# Patient Record
Sex: FEMALE | Race: WHITE | NOT HISPANIC OR LATINO | Employment: OTHER | URBAN - METROPOLITAN AREA
[De-identification: names, ages, dates, MRNs, and addresses within clinical notes are randomized per-mention and may not be internally consistent; named-entity substitution may affect disease eponyms.]

---

## 2017-01-03 ENCOUNTER — ANTICOAGULATION THERAPY VISIT (OUTPATIENT)
Dept: ANTICOAGULATION | Facility: CLINIC | Age: 77
End: 2017-01-03
Payer: MEDICARE

## 2017-01-03 DIAGNOSIS — Z79.01 LONG-TERM (CURRENT) USE OF ANTICOAGULANTS: Primary | ICD-10-CM

## 2017-01-03 DIAGNOSIS — I26.99 PULMONARY EMBOLISM, OTHER: ICD-10-CM

## 2017-01-03 LAB — INR POINT OF CARE: 4.8 (ref 0.86–1.14)

## 2017-01-03 PROCEDURE — 36416 COLLJ CAPILLARY BLOOD SPEC: CPT

## 2017-01-03 PROCEDURE — 85610 PROTHROMBIN TIME: CPT | Mod: QW

## 2017-01-03 PROCEDURE — 99207 ZZC NO CHARGE NURSE ONLY: CPT

## 2017-01-03 NOTE — PROGRESS NOTES
ANTICOAGULATION FOLLOW-UP CLINIC VISIT    Patient Name:  Tracy Palomo  Date:  1/3/2017  Contact Type:  Face to Face    SUBJECTIVE:     Patient Findings     Positives Diet Changes (drinking clamato juice), OTC meds (took Ibuprofen one day over the weekend after hitting her knee on a drawer )           OBJECTIVE    INR PROTIME   Date Value Ref Range Status   01/03/2017 4.8* 0.86 - 1.14 Final       ASSESSMENT / PLAN  INR assessment SUPRA    Recheck INR In: 3 WEEKS    INR Location Clinic      Anticoagulation Summary as of 1/3/2017     INR goal 2.0-3.0   Selected INR 4.8! (1/3/2017)   Maintenance plan 2.5 mg (5 mg x 0.5) on Tue, Thu, Sat; 5 mg (5 mg x 1) all other days   Full instructions 1/3: Hold; Otherwise 2.5 mg on Tue, Thu, Sat; 5 mg all other days   Weekly total 27.5 mg   Plan last modified Julio Cesar Santiago RN (6/1/2016)   Next INR check 1/24/2017   Target end date     Indications   Pulmonary emboli 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 [I26.99]  Long-term (current) use of anticoagulants [Z79.01] [Z79.01]         Anticoagulation Episode Summary     INR check location     Preferred lab     Send INR reminders to [facility]    Comments 5 mg tablets       Anticoagulation Care Providers     Provider Role Specialty Phone number    Gerard Medrano MD [facility] [phone]            See the Encounter Report to view Anticoagulation Flowsheet and Dosing Calendar (Go to Encounters tab in chart review, and find the Anticoagulation Therapy Visit)    Dosage adjustment made based on physician directed care plan.    Hold x1 then resume.     Johanna Marshall RN

## 2017-01-24 ENCOUNTER — ANTICOAGULATION THERAPY VISIT (OUTPATIENT)
Dept: ANTICOAGULATION | Facility: CLINIC | Age: 77
End: 2017-01-24
Payer: MEDICARE

## 2017-01-24 DIAGNOSIS — I26.99 PULMONARY EMBOLISM, OTHER: ICD-10-CM

## 2017-01-24 DIAGNOSIS — Z79.01 LONG-TERM (CURRENT) USE OF ANTICOAGULANTS: Primary | ICD-10-CM

## 2017-01-24 LAB — INR POINT OF CARE: 1.8 (ref 0.86–1.14)

## 2017-01-24 PROCEDURE — 36416 COLLJ CAPILLARY BLOOD SPEC: CPT

## 2017-01-24 PROCEDURE — 99207 ZZC NO CHARGE NURSE ONLY: CPT

## 2017-01-24 PROCEDURE — 85610 PROTHROMBIN TIME: CPT | Mod: QW

## 2017-01-24 NOTE — PROGRESS NOTES
ANTICOAGULATION FOLLOW-UP CLINIC VISIT    Patient Name:  Tracy Palomo  Date:  1/24/2017  Contact Type:  Face to Face    SUBJECTIVE:     Patient Findings     Positives Diet Changes (more brocolii this week. Will cut down ), No Problem Findings           OBJECTIVE    INR PROTIME   Date Value Ref Range Status   01/24/2017 1.8* 0.86 - 1.14 Final       ASSESSMENT / PLAN  INR assessment SUB    Recheck INR In: 6 WEEKS    INR Location Clinic      Anticoagulation Summary as of 1/24/2017     INR goal 2.0-3.0   Selected INR 1.8! (1/24/2017)   Maintenance plan 2.5 mg (5 mg x 0.5) on Tue, Thu, Sat; 5 mg (5 mg x 1) all other days   Full instructions 1/24: 5 mg; Otherwise 2.5 mg on Tue, Thu, Sat; 5 mg all other days   Weekly total 27.5 mg   Plan last modified Julio Cesar Santiago RN (6/1/2016)   Next INR check 3/7/2017   Target end date     Indications   Pulmonary emboli 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 [I26.99]  Long-term (current) use of anticoagulants [Z79.01] [Z79.01]         Anticoagulation Episode Summary     INR check location     Preferred lab     Send INR reminders to [facility] PO[facility]    Comments 5 mg tablets       Anticoagulation Care Providers     Provider Role Specialty Phone number    Gerard Medrano MD [facility] [phone]            See the Encounter Report to view Anticoagulation Flowsheet and Dosing Calendar (Go to Encounters tab in chart review, and find the Anticoagulation Therapy Visit)    Dosage adjustment made based on physician directed care plan.    Johanna Marshall RN

## 2017-01-25 ENCOUNTER — OFFICE VISIT (OUTPATIENT)
Dept: URGENT CARE | Facility: RETAIL CLINIC | Age: 77
End: 2017-01-25
Payer: MEDICARE

## 2017-01-25 VITALS
SYSTOLIC BLOOD PRESSURE: 139 MMHG | HEART RATE: 71 BPM | OXYGEN SATURATION: 96 % | DIASTOLIC BLOOD PRESSURE: 79 MMHG | TEMPERATURE: 101.6 F

## 2017-01-25 DIAGNOSIS — Z20.89 PNEUMONIA EXPOSURE: ICD-10-CM

## 2017-01-25 DIAGNOSIS — J10.1 INFLUENZA A: Primary | ICD-10-CM

## 2017-01-25 DIAGNOSIS — R50.9 FEVER, UNSPECIFIED: ICD-10-CM

## 2017-01-25 DIAGNOSIS — R05.9 COUGH: ICD-10-CM

## 2017-01-25 PROCEDURE — 87804 INFLUENZA ASSAY W/OPTIC: CPT | Mod: QW | Performed by: PHYSICIAN ASSISTANT

## 2017-01-25 PROCEDURE — 99213 OFFICE O/P EST LOW 20 MIN: CPT | Performed by: PHYSICIAN ASSISTANT

## 2017-01-25 RX ORDER — OSELTAMIVIR PHOSPHATE 75 MG/1
75 CAPSULE ORAL 2 TIMES DAILY
Qty: 10 CAPSULE | Refills: 0 | Status: SHIPPED | OUTPATIENT
Start: 2017-01-25 | End: 2017-03-15

## 2017-01-25 NOTE — PROGRESS NOTES
SUBJECTIVE:   Pt. presenting to [facility] -  with a chief complaint of sudden onset fever, chills, some cough last night . No SOB or chest pain. Raspy cough. Gets in coughing jags and V x 1.  Hx of asthma no  Here with daughter.  Onset of symptoms sudden  Course of illness is worsening.    Severity moderate  Current and Associated symptoms: fever, cough  and malaise  Treatment measures tried include Fluids and Rest.  Predisposing factors include coumadin and spouse recently dx with pneumonia     Last antibiotic > year    Smoker no    Past Medical History   Diagnosis Date     Unspecified essential hypertension      Other and unspecified hyperlipidemia      Colon polyps      Diverticulosis of colon (without mention of hemorrhage)      Diverticulosis     DVT (deep vein thrombosis) in pregnancy (H) 2014     after colon surgery     PE (pulmonary embolism) 2014     related to colon surgery     Atrial fibrillation (H) 2014     related to colon surgery     Past Surgical History   Procedure Laterality Date     Colonoscopy  09, 10, 12     [facility]     Flexible sigmoidoscopy  09, 11     Laparotomy exploratory N/A 10/20/2014     Procedure: LAPAROTOMY EXPLORATORY;  Surgeon: Miguel Oleary MD;  Location: [facility] OR     Laparotomy, lysis adhesions, combined N/A 10/20/2014     Procedure: COMBINED LAPAROTOMY, LYSIS ADHESIONS;  Surgeon: Miguel Oleary MD;  Location: [facility] OR     Resect small bowel without ostomy N/A 10/20/2014     Procedure: RESECT SMALL BOWEL WITHOUT OSTOMY;  Surgeon: Miguel Oleary MD;  Location: [facility] OR     Patient Active Problem List   Diagnosis     Hypertension goal BP (blood pressure) < 140/90     Hyperlipidemia LDL goal <130     Encounter for long-term current use of medication     History of colonic polyps     Advanced directives, counseling/discussion     Pulmonary emboli 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     Recent major surgery - exploratory lap with small bowel  resection 10/20     Pleural effusion on right     Anemia     Paroxysmal atrial fibrillation (H)     Long-term (current) use of anticoagulants [Z79.01]     Current Outpatient Prescriptions   Medication     warfarin (COUMADIN) 5 MG tablet     lisinopril (PRINIVIL,ZESTRIL) 10 MG tablet     lovastatin (MEVACOR) 40 MG tablet     ciprofloxacin (CIPRO) 500 MG tablet     No current facility-administered medications for this visit.       OBJECTIVE:  /79 mmHg  Pulse 71  Temp(Src) 101.6  F (38.7  C) (Oral)  SpO2 96%    GENERAL APPEARANCE: cooperative, alert and no distress. Appears well hydrated.  EYES: conjunctiva clear  HENT: Rt ear canal  clear and TM normal   Lt ear canal clear and TM normal   Nose no congestion. no discharge  Mouth without ulcers or lesions. no erythema. no exudate  NECK: supple, no palp ant nodes. No  posterior nodes.  RESP: lungs clear to auscultation - no rales, rhonchi or wheezes. Breathing easily. Deep dry cough  CV: regular rates and rhythm  ABDOMEN:  soft, nontender, no HSM or masses and bowel sounds normal   SKIN: no suspicious lesions or rashes  no tenderness to palpate over  sinus areas.    Influenza A and B - B neg A pos    ASSESSMENT:     Cough  Fever, unspecified  Pneumonia exposure  Influenza A      PLAN:  Symptomatic measures   Prescriptions as below. Discussed indications, dosing, side affects and adverse reactions of medications with  Patient and daughter -Tamiflu. See AVS  OTC cough suppressant/expectorant discussed  Cool mist vaporizer   Stay in clean air environment.  > rest.  > fluids.  Contagiousness and hygiene discussed.  Fever and pain  control measures discussed.   If unable to swallow or any breathing difficulty to go to ED     Follow up with your primary care provider in the next 1-2 days -earlier if worse.  [facility]  [phone]    AVS given and discussed:  Patient Instructions     Influenza (Adult)    Influenza is also called the flu. It is a viral  illness that affects the air passages of your lungs. It is different from the common cold. The flu can easily be passed from one to person to another. It may be spread through the air by coughing and sneezing. Or it can be spread by touching the sick person and then touching your own eyes, nose, or mouth.  The flu starts 1 to 3 days after you are exposed to the flu virus. It may last for 1 to 2 weeks. You usually don t need to take antibiotics unless you have a complication. This might be an ear or sinus infection or pneumonia.  Symptoms of the flu may be mild or severe. They can include extreme tiredness (wanting to stay in bed all day), chills, fevers, muscle aches, soreness with eye movement, headache, and a dry, hacking cough.  Home care  Follow these guidelines when caring for yourself at home:    Avoid being around cigarette smoke, whether yours or other people s.    Acetaminophen or ibuprofen will help ease your fever, muscle aches, and headache. Don t give aspirin to anyone younger than 18 who has the flu. Aspirin can harm the liver.    Nausea and loss of appetite are common with the flu. Eat light meals. Drink 6 to 8 glasses of liquids every day. Good choices are water, sport drinks, soft drinks without caffeine, juices, tea, and soup. Extra fluids will also help loosen secretions in your nose and lungs.    Over-the-counter cold medicines will not make the flu go away faster. But the medicines may help with coughing, sore throat, and congestion in your nose and sinuses. Don t use a decongestant if you have high blood pressure.    Stay home until your fever has been gone for at least 24 hours without using medicine to reduce fever.  Follow-up care  Follow up with your health care provider, or as advised, if you are not getting better over the next week.  If you are 65 or older, talk with your provider about getting a pneumococcal vaccine every 5 years. You should also get this vaccine if you have chronic  asthma or COPD. All adults should get a flu vaccine every fall. Ask your provider about this.  When to seek medical advice  Call your health care provider right away if any of these occur:    Cough with lots of colored sputum (mucus) or blood in your sputum    Chest pain, shortness of breath, wheezing, or difficulty breathing    Severe headache, or face, neck, or ear pain    New rash with fever    Fever of 101 F (38 C) oral or higher that doesn t get better with fever medicine    Confusion, behavior change, or seizure    Severe weakness or dizziness    You get a fever or cough after getting better for a few days       3693-0587 [employer]. [address] [place] [address]. All rights reserved. This information is not intended as a substitute for professional medical care. Always follow your healthcare professional's instructions.        Pneumonia (Adult)  Pneumonia is an infection deep within the lungs. It is in the small air sacs (alveoli). Pneumonia may be caused by a virus or bacteria. Pneumonia caused by bacteria is usually treated with an antibiotic. Severe cases may need to be treated in the hospital. Milder cases can be treated at home. Symptoms usually start to get better during the first 2 days of treatment.    Home care  Follow these guidelines when caring for yourself at home:    Rest at home for the first 2 to 3 days, or until you feel stronger. Don t let yourself get overly tired when you go back to your activities.    Stay away from cigarette smoke - yours or other people s.    You may use acetaminophen or ibuprofen to control fever or pain, unless another medicine was prescribed. If you have chronic liver or kidney disease, talk with your health care provider before using these medicines. Also talk with your provider if you ve had a stomach ulcer or GI bleeding. Don t give aspirin to anyone younger than 18 years of age who is ill with a fever. It may cause severe liver  damage.    Your appetite may be poor, so a light diet is fine.    Drink 6 to 8 glasses of fluids every day to make sure you are getting enough fluids. Beverages can include water, sport drinks, sodas without caffeine, juices, tea, or soup. Fluids will help loosen secretions in the lung. This will make it easier for you to cough up the phlegm (sputum). If you also have heart or kidney disease, check with your health care provider before you drink extra fluids.    Take antibiotic medicine prescribed until it is all gone, even if you are feeling better after a few days.  Follow-up care  Follow up with your health care provider in the next 2 to 3 days, or as advised. This is to be sure the medicine is helping you get better.  If you are 65 or older, you should get a pneumococcal vaccine and a yearly flu (influenza) shot. You should also get these vaccines if you have chronic lung disease like asthma, emphysema, or COPD. Ask your provider about this.  When to seek medical advice  Call your health care provider right away if any of these occur:    You don t get better within the first 48 hours of treatment    Shortness of breath gets worse    Rapid breathing (more than 25 breaths per minute)    Coughing up blood    Chest pain gets worse with breathing    Fever of 102 F (38 C) or higher that doesn t get better with fever medicine    Weakness, dizziness, or fainting that gets worse    Thirst or dry mouth that gets worse    Sinus pain, headache, or a stiff neck    Chest pain not caused by coughing    4303-1315 [employer]. [address] [place] [address]. All rights reserved. This information is not intended as a substitute for professional medical care. Always follow your healthcare professional's instructions.      ...............................  Please make an appointment to see Dr Betts or colleague for recheck before the weekend.  Go to the emergency room if you are short of breath, wheezing or  'worse'.  Do not visit spouse in hospital.    Robitussin DM or Delsym may help nighttime cough.      Patient is comfortable with this plan.  Electronically signed,  STARR Bey, PAC

## 2017-01-25 NOTE — MR AVS SNAPSHOT
After Visit Summary   1/25/2017    Tracy Palomo    MRN: [id number]           Patient Information     Date Of Birth          1940        Visit Information        Provider Department      1/25/2017 2:40 PM Judy Bey PA-C [facility]        Today's Diagnoses     Cough    -  1     Fever, unspecified         Pneumonia exposure         Influenza A           Care Instructions      Influenza (Adult)    Influenza is also called the flu. It is a viral illness that affects the air passages of your lungs. It is different from the common cold. The flu can easily be passed from one to person to another. It may be spread through the air by coughing and sneezing. Or it can be spread by touching the sick person and then touching your own eyes, nose, or mouth.  The flu starts 1 to 3 days after you are exposed to the flu virus. It may last for 1 to 2 weeks. You usually don t need to take antibiotics unless you have a complication. This might be an ear or sinus infection or pneumonia.  Symptoms of the flu may be mild or severe. They can include extreme tiredness (wanting to stay in bed all day), chills, fevers, muscle aches, soreness with eye movement, headache, and a dry, hacking cough.  Home care  Follow these guidelines when caring for yourself at home:    Avoid being around cigarette smoke, whether yours or other people s.    Acetaminophen or ibuprofen will help ease your fever, muscle aches, and headache. Don t give aspirin to anyone younger than 18 who has the flu. Aspirin can harm the liver.    Nausea and loss of appetite are common with the flu. Eat light meals. Drink 6 to 8 glasses of liquids every day. Good choices are water, sport drinks, soft drinks without caffeine, juices, tea, and soup. Extra fluids will also help loosen secretions in your nose and lungs.    Over-the-counter cold medicines will not make the flu go away faster. But the medicines may help with coughing,  sore throat, and congestion in your nose and sinuses. Don t use a decongestant if you have high blood pressure.    Stay home until your fever has been gone for at least 24 hours without using medicine to reduce fever.  Follow-up care  Follow up with your health care provider, or as advised, if you are not getting better over the next week.  If you are 65 or older, talk with your provider about getting a pneumococcal vaccine every 5 years. You should also get this vaccine if you have chronic asthma or COPD. All adults should get a flu vaccine every fall. Ask your provider about this.  When to seek medical advice  Call your health care provider right away if any of these occur:    Cough with lots of colored sputum (mucus) or blood in your sputum    Chest pain, shortness of breath, wheezing, or difficulty breathing    Severe headache, or face, neck, or ear pain    New rash with fever    Fever of 101 F (38 C) oral or higher that doesn t get better with fever medicine    Confusion, behavior change, or seizure    Severe weakness or dizziness    You get a fever or cough after getting better for a few days       3182-7432 [employer]. [address] [place] [address]. All rights reserved. This information is not intended as a substitute for professional medical care. Always follow your healthcare professional's instructions.        Pneumonia (Adult)  Pneumonia is an infection deep within the lungs. It is in the small air sacs (alveoli). Pneumonia may be caused by a virus or bacteria. Pneumonia caused by bacteria is usually treated with an antibiotic. Severe cases may need to be treated in the hospital. Milder cases can be treated at home. Symptoms usually start to get better during the first 2 days of treatment.    Home care  Follow these guidelines when caring for yourself at home:    Rest at home for the first 2 to 3 days, or until you feel stronger. Don t let yourself get overly tired when you go  back to your activities.    Stay away from cigarette smoke - yours or other people s.    You may use acetaminophen or ibuprofen to control fever or pain, unless another medicine was prescribed. If you have chronic liver or kidney disease, talk with your health care provider before using these medicines. Also talk with your provider if you ve had a stomach ulcer or GI bleeding. Don t give aspirin to anyone younger than 18 years of age who is ill with a fever. It may cause severe liver damage.    Your appetite may be poor, so a light diet is fine.    Drink 6 to 8 glasses of fluids every day to make sure you are getting enough fluids. Beverages can include water, sport drinks, sodas without caffeine, juices, tea, or soup. Fluids will help loosen secretions in the lung. This will make it easier for you to cough up the phlegm (sputum). If you also have heart or kidney disease, check with your health care provider before you drink extra fluids.    Take antibiotic medicine prescribed until it is all gone, even if you are feeling better after a few days.  Follow-up care  Follow up with your health care provider in the next 2 to 3 days, or as advised. This is to be sure the medicine is helping you get better.  If you are 65 or older, you should get a pneumococcal vaccine and a yearly flu (influenza) shot. You should also get these vaccines if you have chronic lung disease like asthma, emphysema, or COPD. Ask your provider about this.  When to seek medical advice  Call your health care provider right away if any of these occur:    You don t get better within the first 48 hours of treatment    Shortness of breath gets worse    Rapid breathing (more than 25 breaths per minute)    Coughing up blood    Chest pain gets worse with breathing    Fever of 102 F (38 C) or higher that doesn t get better with fever medicine    Weakness, dizziness, or fainting that gets worse    Thirst or dry mouth that gets worse    Sinus pain, headache,  "or a stiff neck    Chest pain not caused by coughing    2873-7015 [employer]. [address] [place] [address], [place] [address]. All rights reserved. This information is not intended as a substitute for professional medical care. Always follow your healthcare professional's instructions.      ...............................  Please make an appointment to see Dr Betts or colleague for recheck before the weekend.  Go to the emergency room if you are short of breath, wheezing or 'worse'.  Do not visit spouse in hospital.    Robitussin DM or Delsym may help nighttime cough.        Follow-ups after your visit        Your next 10 appointments already scheduled     Mar 07, 2017  9:45 AM   [facility]    [address] [place] [id number] [phone]              Who to contact     You can reach your care team any time of the day by calling [phone].  Notification of test results:  If you have an abnormal lab result, we will notify you by phone as soon as possible.         Additional Information About Your Visit        MyChart Information     [facility] lets you send messages to your doctor, view your test results, renew your prescriptions, schedule appointments and more. To sign up, go to [email or web address] . Click on \"Log in\" on the left side of the screen, which will take you to the Welcome page. Then click on \"Sign up Now\" on the right side of the page.     You will be asked to enter the access code listed below, as well as some personal information. Please follow the directions to create your username and password.     Your access code is: [email or web address]  Expires: 2017  3:12 PM     Your access code will  in 90 days. If you need help or a new code, please call your [facility] or [phone].        Care EveryWhere ID     This is your Care EveryWhere ID. This could be used by other organizations " to access your [facility] medical records  [id number]        Your Vitals Were     Pulse Temperature Pulse Oximetry             71 101.6  F (38.7  C) (Oral) 96%          Blood Pressure from Last 3 Encounters:   01/25/17 139/79   11/11/16 118/74   10/11/16 111/65    Weight from Last 3 Encounters:   11/11/16 192 lb (87.091 kg)   08/30/16 192 lb (87.091 kg)   08/11/16 193 lb 9.6 oz (87.816 kg)              We Performed the Following     Influenza A/B antigen          Today's Medication Changes          These changes are accurate as of: 1/25/17  3:12 PM.  If you have any questions, ask your nurse or doctor.               Start taking these medicines.        Dose/Directions    oseltamivir 75 MG capsule   Commonly known as:  TAMIFLU   Used for:  Influenza A        Dose:  75 mg   Take 1 capsule (75 mg) by mouth 2 times daily   Quantity:  10 capsule   Refills:  0            Where to get your medicines      These medications were sent to [address]  [address], [facility] [address]     Phone:  [phone]    - oseltamivir 75 MG capsule             Primary Care Provider Office Phone # Fax #    Chad Betts [phone]       [facility] [address] [facility]   [facility] [address]        Thank you!     Thank you for choosing [facility]  for your care. Our goal is always to provide you with excellent care. Hearing back from our patients is one way we can continue to improve our services. Please take a few minutes to complete the written survey that you may receive in the mail after your visit with us. Thank you!             Your Updated Medication List - Protect others around you: Learn how to safely use, store and throw away your medicines at www.disposemymeds.org.          This list is accurate as of: 1/25/17  3:12 PM.  Always use your most recent med list.                   Brand Name Dispense Instructions for use    ciprofloxacin 500 MG  tablet    CIPRO    6 tablet    Take 1 tablet (500 mg) by mouth 2 times daily       lisinopril 10 MG tablet    PRINIVIL/ZESTRIL    90 tablet    Take 1 tablet (10 mg) by mouth daily       lovastatin 40 MG tablet    MEVACOR    90 tablet    TAKE ONE TABLET BY MOUTH AT BEDTIME       oseltamivir 75 MG capsule    TAMIFLU    10 capsule    Take 1 capsule (75 mg) by mouth 2 times daily       warfarin 5 MG tablet    COUMADIN    100 tablet    2.5 mg Tues, Thurs, Sat and 5 mg the rest of the week. Or as directed by the coumadin clinic

## 2017-01-25 NOTE — PATIENT INSTRUCTIONS
Influenza (Adult)    Influenza is also called the flu. It is a viral illness that affects the air passages of your lungs. It is different from the common cold. The flu can easily be passed from one to person to another. It may be spread through the air by coughing and sneezing. Or it can be spread by touching the sick person and then touching your own eyes, nose, or mouth.  The flu starts 1 to 3 days after you are exposed to the flu virus. It may last for 1 to 2 weeks. You usually don t need to take antibiotics unless you have a complication. This might be an ear or sinus infection or pneumonia.  Symptoms of the flu may be mild or severe. They can include extreme tiredness (wanting to stay in bed all day), chills, fevers, muscle aches, soreness with eye movement, headache, and a dry, hacking cough.  Home care  Follow these guidelines when caring for yourself at home:    Avoid being around cigarette smoke, whether yours or other people s.    Acetaminophen or ibuprofen will help ease your fever, muscle aches, and headache. Don t give aspirin to anyone younger than 18 who has the flu. Aspirin can harm the liver.    Nausea and loss of appetite are common with the flu. Eat light meals. Drink 6 to 8 glasses of liquids every day. Good choices are water, sport drinks, soft drinks without caffeine, juices, tea, and soup. Extra fluids will also help loosen secretions in your nose and lungs.    Over-the-counter cold medicines will not make the flu go away faster. But the medicines may help with coughing, sore throat, and congestion in your nose and sinuses. Don t use a decongestant if you have high blood pressure.    Stay home until your fever has been gone for at least 24 hours without using medicine to reduce fever.  Follow-up care  Follow up with your health care provider, or as advised, if you are not getting better over the next week.  If you are 65 or older, talk with your provider about getting a pneumococcal vaccine  every 5 years. You should also get this vaccine if you have chronic asthma or COPD. All adults should get a flu vaccine every fall. Ask your provider about this.  When to seek medical advice  Call your health care provider right away if any of these occur:    Cough with lots of colored sputum (mucus) or blood in your sputum    Chest pain, shortness of breath, wheezing, or difficulty breathing    Severe headache, or face, neck, or ear pain    New rash with fever    Fever of 101 F (38 C) oral or higher that doesn t get better with fever medicine    Confusion, behavior change, or seizure    Severe weakness or dizziness    You get a fever or cough after getting better for a few days       6103-5731 [employer]. [address] [place] [address], [place] [address]. All rights reserved. This information is not intended as a substitute for professional medical care. Always follow your healthcare professional's instructions.        Pneumonia (Adult)  Pneumonia is an infection deep within the lungs. It is in the small air sacs (alveoli). Pneumonia may be caused by a virus or bacteria. Pneumonia caused by bacteria is usually treated with an antibiotic. Severe cases may need to be treated in the hospital. Milder cases can be treated at home. Symptoms usually start to get better during the first 2 days of treatment.    Home care  Follow these guidelines when caring for yourself at home:    Rest at home for the first 2 to 3 days, or until you feel stronger. Don t let yourself get overly tired when you go back to your activities.    Stay away from cigarette smoke - yours or other people s.    You may use acetaminophen or ibuprofen to control fever or pain, unless another medicine was prescribed. If you have chronic liver or kidney disease, talk with your health care provider before using these medicines. Also talk with your provider if you ve had a stomach ulcer or GI bleeding. Don t give aspirin to anyone younger than 18  years of age who is ill with a fever. It may cause severe liver damage.    Your appetite may be poor, so a light diet is fine.    Drink 6 to 8 glasses of fluids every day to make sure you are getting enough fluids. Beverages can include water, sport drinks, sodas without caffeine, juices, tea, or soup. Fluids will help loosen secretions in the lung. This will make it easier for you to cough up the phlegm (sputum). If you also have heart or kidney disease, check with your health care provider before you drink extra fluids.    Take antibiotic medicine prescribed until it is all gone, even if you are feeling better after a few days.  Follow-up care  Follow up with your health care provider in the next 2 to 3 days, or as advised. This is to be sure the medicine is helping you get better.  If you are 65 or older, you should get a pneumococcal vaccine and a yearly flu (influenza) shot. You should also get these vaccines if you have chronic lung disease like asthma, emphysema, or COPD. Ask your provider about this.  When to seek medical advice  Call your health care provider right away if any of these occur:    You don t get better within the first 48 hours of treatment    Shortness of breath gets worse    Rapid breathing (more than 25 breaths per minute)    Coughing up blood    Chest pain gets worse with breathing    Fever of 102 F (38 C) or higher that doesn t get better with fever medicine    Weakness, dizziness, or fainting that gets worse    Thirst or dry mouth that gets worse    Sinus pain, headache, or a stiff neck    Chest pain not caused by coughing    3759-8484 [employer]. [address] [place] [address], [place] [id number]. All rights reserved. This information is not intended as a substitute for professional medical care. Always follow your healthcare professional's instructions.      ...............................  Please make an appointment to see Dr Betts or colleague for recheck before the  weekend.  Go to the emergency room if you are short of breath, wheezing or 'worse'.  Do not visit spouse in hospital.    Robitussin DM or Delsym may help nighttime cough.

## 2017-01-27 ENCOUNTER — OFFICE VISIT (OUTPATIENT)
Dept: INTERNAL MEDICINE | Facility: CLINIC | Age: 77
End: 2017-01-27
Payer: MEDICARE

## 2017-01-27 VITALS
TEMPERATURE: 97.9 F | OXYGEN SATURATION: 96 % | HEART RATE: 82 BPM | RESPIRATION RATE: 16 BRPM | SYSTOLIC BLOOD PRESSURE: 110 MMHG | DIASTOLIC BLOOD PRESSURE: 76 MMHG | BODY MASS INDEX: 31.17 KG/M2 | WEIGHT: 193 LBS

## 2017-01-27 DIAGNOSIS — R05.9 COUGH: Primary | ICD-10-CM

## 2017-01-27 DIAGNOSIS — J10.1 INFLUENZA A: ICD-10-CM

## 2017-01-27 PROCEDURE — 99213 OFFICE O/P EST LOW 20 MIN: CPT | Performed by: INTERNAL MEDICINE

## 2017-01-27 ASSESSMENT — PAIN SCALES - GENERAL: PAINLEVEL: NO PAIN (0)

## 2017-01-27 NOTE — PROGRESS NOTES
SUBJECTIVE:                                                    Tracy Palomo is a 76 year old female who presents to clinic today for the following health issues:    Chief Complaint   Patient presents with     Flu     follow up on influenza and possible pneumonia      has had influenza and in the hospital. She got it two days later, was positive for the flu as well, had temps. She has been on tamiflu, [facility] wanted her seen and lungs checked.  No more fevers, some cough with laughing, ribs are sore.    Past Medical History   Diagnosis Date     Unspecified essential hypertension      Other and unspecified hyperlipidemia      Colon polyps      Diverticulosis of colon (without mention of hemorrhage)      Diverticulosis     DVT (deep vein thrombosis) in pregnancy (H) 2014     after colon surgery     PE (pulmonary embolism) 2014     related to colon surgery     Atrial fibrillation (H) 2014     related to colon surgery     Current Outpatient Prescriptions   Medication     oseltamivir (TAMIFLU) 75 MG capsule     warfarin (COUMADIN) 5 MG tablet     lisinopril (PRINIVIL,ZESTRIL) 10 MG tablet     lovastatin (MEVACOR) 40 MG tablet     No current facility-administered medications for this visit.     Social History   Substance Use Topics     Smoking status: Never Smoker      Smokeless tobacco: Never Used     Alcohol Use: No     Physical Exam  /76 mmHg  Pulse 82  Temp(Src) 97.9  F (36.6  C) (Temporal)  Resp 16  Wt 193 lb (87.544 kg)  SpO2 96%  General Appearance-healthy, alert, no distress  Cardiac-regular rate and rhythm  with normal S1, S2 ; no murmur, rub or gallops  Lungs-a few rhonchi in the right lower lobe, the rest of. the lungs are clear    ASSESSMENT:  Patient who was positive for influenza earlier this week. She has been on Tamiflu for 2 days. Her fevers are gone. Her  was sick with influenza and is in the ICU. They're concerned about her lungs. Her breathing is getting better. She  has minimal cough, no fevers, her energy is improved. She is not worried but was told to come in for evaluation. I think overall she is much improved she should finish the Tamiflu. She did work on deep breathing to keep her lungs open. As long as are fevers are gone she should get better. If she worsens then she needs to get on antibiotic treatment. We discussed doing a chest x-ray but would not  so we will hold off.    Electronically signed by Chad Betts MD

## 2017-01-27 NOTE — MR AVS SNAPSHOT
"              After Visit Summary   1/27/2017    Tracy Palomo    MRN: [id number]           Patient Information     Date Of Birth          1940        Visit Information        Provider Department      1/27/2017 12:00 PM Chad Betts MD [facility]         Follow-ups after your visit        Your next 10 appointments already scheduled     Jan 27, 2017 12:00 PM   [facility]    [address] [place] [id number] [phone]            Mar 07, 2017  9:45 AM   Anticoagulation Visit with [facility]   [facility]    [address] [place] [id number] [phone]              Who to contact     If you have questions or need follow up information about today's clinic visit or your schedule please contact [facility] directly at [phone].  Normal or non-critical lab and imaging results will be communicated to you by MyChart, letter or phone within 4 business days after the clinic has received the results. If you do not hear from us within 7 days, please contact the clinic through [employer] or phone. If you have a critical or abnormal lab result, we will notify you by phone as soon as possible.  Submit refill requests through [employer] or call your pharmacy and they will forward the refill request to us. Please allow 3 business days for your refill to be completed.          Additional Information About Your Visit        [employer] Information     [employer] lets you send messages to your doctor, view your test results, renew your prescriptions, schedule appointments and more. To sign up, go to [email or web address] . Click on \"Log in\" on the left side of the screen, which will take you to the Welcome page. Then click on \"Sign up Now\" on the right side of the page.     You will be asked to enter the access code listed below, as well as some " personal information. Please follow the directions to create your username and password.     Your access code is: [email or web address]  Expires: 2017  3:12 PM     Your access code will  in 90 days. If you need help or a new code, please call your [place] clinic or [phone].        Care EveryWhere ID     This is your Care EveryWhere ID. This could be used by other organizations to access your [place] medical records  [id number]        Your Vitals Were     Pulse Temperature Respirations Pulse Oximetry          82 97.9  F (36.6  C) (Temporal) 16 96%         Blood Pressure from Last 3 Encounters:   17 110/76   17 139/79   16 118/74    Weight from Last 3 Encounters:   17 193 lb (87.544 kg)   16 192 lb (87.091 kg)   16 192 lb (87.091 kg)              Today, you had the following     No orders found for display         Today's Medication Changes          These changes are accurate as of: 17 11:55 AM.  If you have any questions, ask your nurse or doctor.               Stop taking these medicines if you haven't already. Please contact your care team if you have questions.     ciprofloxacin 500 MG tablet   Commonly known as:  CIPRO   Stopped by:  Chad Betts MD                    Primary Care Provider Office Phone # Fax #    Chad Betts [phone]       [facility] [address] [place] [address]        Thank you!     Thank you for choosing [facility]  for your care. Our goal is always to provide you with excellent care. Hearing back from our patients is one way we can continue to improve our services. Please take a few minutes to complete the written survey that you may receive in the mail after your visit with us. Thank you!             Your Updated Medication List - Protect others around you: Learn how to safely use, store and throw away your medicines at www.disposemymeds.org.          This list is accurate as  of: 1/27/17 11:55 AM.  Always use your most recent med list.                   Brand Name Dispense Instructions for use    lisinopril 10 MG tablet    PRINIVIL/ZESTRIL    90 tablet    Take 1 tablet (10 mg) by mouth daily       lovastatin 40 MG tablet    MEVACOR    90 tablet    TAKE ONE TABLET BY MOUTH AT BEDTIME       oseltamivir 75 MG capsule    TAMIFLU    10 capsule    Take 1 capsule (75 mg) by mouth 2 times daily       warfarin 5 MG tablet    COUMADIN    100 tablet    2.5 mg Tues, Thurs, Sat and 5 mg the rest of the week. Or as directed by the coumadin clinic

## 2017-01-27 NOTE — NURSING NOTE
"Chief Complaint   Patient presents with     Flu     follow up on influenza and possible pneumonia       Initial /76 mmHg  Pulse 82  Temp(Src) 97.9  F (36.6  C) (Temporal)  Resp 16  Wt 193 lb (87.544 kg)  SpO2 96% Estimated body mass index is 31.17 kg/(m^2) as calculated from the following:    Height as of 8/11/16: 5' 6\" (1.676 m).    Weight as of this encounter: 193 lb (87.544 kg).  BP completed using cuff size: christian Ellsworth MA    "

## 2017-03-07 ENCOUNTER — TELEPHONE (OUTPATIENT)
Dept: ANTICOAGULATION | Facility: CLINIC | Age: 77
End: 2017-03-07

## 2017-03-07 ENCOUNTER — ANTICOAGULATION THERAPY VISIT (OUTPATIENT)
Dept: ANTICOAGULATION | Facility: CLINIC | Age: 77
End: 2017-03-07
Payer: MEDICARE

## 2017-03-07 DIAGNOSIS — I48.20 CHRONIC ATRIAL FIBRILLATION (H): Primary | ICD-10-CM

## 2017-03-07 DIAGNOSIS — I26.99 PULMONARY EMBOLISM, OTHER: ICD-10-CM

## 2017-03-07 DIAGNOSIS — Z79.01 LONG-TERM (CURRENT) USE OF ANTICOAGULANTS: ICD-10-CM

## 2017-03-07 LAB — INR POINT OF CARE: 1.6 (ref 0.86–1.14)

## 2017-03-07 PROCEDURE — 85610 PROTHROMBIN TIME: CPT | Mod: QW

## 2017-03-07 PROCEDURE — 36416 COLLJ CAPILLARY BLOOD SPEC: CPT

## 2017-03-07 PROCEDURE — 99207 ZZC NO CHARGE NURSE ONLY: CPT

## 2017-03-07 NOTE — PROGRESS NOTES
ANTICOAGULATION FOLLOW-UP CLINIC VISIT    Patient Name:  Tracy Palomo  Date:  3/7/2017  Contact Type:  Face to Face    SUBJECTIVE:     Patient Findings     Positives Change in diet/appetite (pt is doing Weight Watchers so she is eating more greens- she plans to keep this u[p)           OBJECTIVE    INR Protime   Date Value Ref Range Status   03/07/2017 1.6 (A) 0.86 - 1.14 Final       ASSESSMENT / PLAN  INR assessment SUB    Recheck INR In: 2 WEEKS    INR Location Clinic      Anticoagulation Summary as of 3/7/2017     INR goal 2.0-3.0   Today's INR 1.6!   Maintenance plan 2.5 mg (5 mg x 0.5) on Mon; 5 mg (5 mg x 1) all other days   Full instructions 2.5 mg on Mon; 5 mg all other days   Weekly total 32.5 mg   Plan last modified Johanna Marshall, RN (3/7/2017)   Next INR check 3/21/2017   Target end date     Indications   Pulmonary emboli 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 [I26.99]  Long-term (current) use of anticoagulants [Z79.01] [Z79.01]         Anticoagulation Episode Summary     INR check location     Preferred lab     Send INR reminders to [facility] POOL    Comments 5 mg tablets       Anticoagulation Care Providers     Provider Role Specialty Phone number    Gerard Medrano MD [facility] [phone]            See the Encounter Report to view Anticoagulation Flowsheet and Dosing Calendar (Go to Encounters tab in chart review, and find the Anticoagulation Therapy Visit)    Dosage adjustment made based on physician directed care plan.    Johanna Marshall RN

## 2017-03-07 NOTE — MR AVS SNAPSHOT
Tracy SAV Dewey   3/7/2017 9:45 AM   Anticoagulation Therapy Visit    Description:  76 year old female   Provider:  [facility]   Department:  [facility]           INR as of 3/7/2017     Today's INR 1.6!      Anticoagulation Summary as of 3/7/2017     INR goal 2.0-3.0   Today's INR 1.6!   Full instructions 2.5 mg on Mon; 5 mg all other days   Next INR check 3/21/2017    Indications   Pulmonary emboli 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 [I26.99]  Long-term (current) use of anticoagulants [Z79.01] [Z79.01]         Your next Anticoagulation Clinic appointment(s)     Mar 21, 2017  9:15 AM CDT   Anticoagulation Visit with [facility] ANTI MART[facility]   [facility]    [address] [place] [id number] [phone]              Contact Numbers     Clinic Number:         March 2017 Details    Sun Mon Tue Wed Thu Fri Sat        1               2               3               4                 5               6               7      5 mg   See details      8      5 mg         9      5 mg         10      5 mg         11      5 mg           12      5 mg         13      2.5 mg         14      5 mg         15      5 mg         16      5 mg         17      5 mg         18      5 mg           19      5 mg         20      2.5 mg         21            22               23               24               25                 26               27               28               29               30               31                 Date Details   03/07 This INR check       Date of next INR:  3/21/2017         How to take your warfarin dose     To take:  2.5 mg Take 0.5 of a 5 mg tablet.    To take:  5 mg Take 1 of the 5 mg tablets.

## 2017-03-07 NOTE — TELEPHONE ENCOUNTER
Please review and renew this patients INR referral, orders pending. Thank you!      Johanna Marshall RN

## 2017-03-15 ENCOUNTER — HOSPITAL ENCOUNTER (INPATIENT)
Facility: CLINIC | Age: 77
LOS: 4 days | Discharge: NURSING, REHAB OR LONG-TERM CARE | DRG: 470 | End: 2017-03-19
Attending: EMERGENCY MEDICINE | Admitting: FAMILY MEDICINE
Payer: MEDICARE

## 2017-03-15 ENCOUNTER — APPOINTMENT (OUTPATIENT)
Dept: GENERAL RADIOLOGY | Facility: CLINIC | Age: 77
DRG: 470 | End: 2017-03-15
Attending: FAMILY MEDICINE
Payer: MEDICARE

## 2017-03-15 ENCOUNTER — APPOINTMENT (OUTPATIENT)
Dept: GENERAL RADIOLOGY | Facility: CLINIC | Age: 77
DRG: 470 | End: 2017-03-15
Attending: EMERGENCY MEDICINE
Payer: MEDICARE
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
## 2017-03-15 DIAGNOSIS — Z79.01 LONG-TERM (CURRENT) USE OF ANTICOAGULANTS: ICD-10-CM

## 2017-03-15 DIAGNOSIS — I26.99 PULMONARY EMBOLISM, OTHER: ICD-10-CM

## 2017-03-15 DIAGNOSIS — S72.002A HIP FRACTURE, LEFT, CLOSED, INITIAL ENCOUNTER (H): ICD-10-CM

## 2017-03-15 DIAGNOSIS — I48.0 PAROXYSMAL ATRIAL FIBRILLATION (H): Primary | ICD-10-CM

## 2017-03-15 LAB
ANION GAP SERPL CALCULATED.3IONS-SCNC: 5 MMOL/L (ref 3–14)
BASOPHILS # BLD AUTO: 0 10E9/L (ref 0–0.2)
BASOPHILS NFR BLD AUTO: 0.3 %
BUN SERPL-MCNC: 11 MG/DL (ref 7–30)
CALCIUM SERPL-MCNC: 9.6 MG/DL (ref 8.5–10.1)
CHLORIDE SERPL-SCNC: 105 MMOL/L (ref 94–109)
CO2 SERPL-SCNC: 32 MMOL/L (ref 20–32)
CREAT SERPL-MCNC: 0.92 MG/DL (ref 0.52–1.04)
DIFFERENTIAL METHOD BLD: NORMAL
EOSINOPHIL # BLD AUTO: 0.1 10E9/L (ref 0–0.7)
EOSINOPHIL NFR BLD AUTO: 2 %
ERYTHROCYTE [DISTWIDTH] IN BLOOD BY AUTOMATED COUNT: 14.2 % (ref 10–15)
GFR SERPL CREATININE-BSD FRML MDRD: 60 ML/MIN/1.7M2
GLUCOSE SERPL-MCNC: 107 MG/DL (ref 70–99)
HCT VFR BLD AUTO: 43.4 % (ref 35–47)
HGB BLD-MCNC: 13.8 G/DL (ref 11.7–15.7)
IMM GRANULOCYTES # BLD: 0 10E9/L (ref 0–0.4)
IMM GRANULOCYTES NFR BLD: 0.3 %
INR PPP: 2.99 (ref 0.86–1.14)
LYMPHOCYTES # BLD AUTO: 1.7 10E9/L (ref 0.8–5.3)
LYMPHOCYTES NFR BLD AUTO: 24.6 %
MCH RBC QN AUTO: 28.5 PG (ref 26.5–33)
MCHC RBC AUTO-ENTMCNC: 31.8 G/DL (ref 31.5–36.5)
MCV RBC AUTO: 90 FL (ref 78–100)
MONOCYTES # BLD AUTO: 0.6 10E9/L (ref 0–1.3)
MONOCYTES NFR BLD AUTO: 8.5 %
NEUTROPHILS # BLD AUTO: 4.4 10E9/L (ref 1.6–8.3)
NEUTROPHILS NFR BLD AUTO: 64.3 %
PLATELET # BLD AUTO: 228 10E9/L (ref 150–450)
POTASSIUM SERPL-SCNC: 4 MMOL/L (ref 3.4–5.3)
RBC # BLD AUTO: 4.85 10E12/L (ref 3.8–5.2)
SODIUM SERPL-SCNC: 142 MMOL/L (ref 133–144)
WBC # BLD AUTO: 6.8 10E9/L (ref 4–11)

## 2017-03-15 PROCEDURE — 25000128 H RX IP 250 OP 636: Performed by: EMERGENCY MEDICINE

## 2017-03-15 PROCEDURE — 99222 1ST HOSP IP/OBS MODERATE 55: CPT | Mod: AI | Performed by: FAMILY MEDICINE

## 2017-03-15 PROCEDURE — 85025 COMPLETE CBC W/AUTO DIFF WBC: CPT | Performed by: EMERGENCY MEDICINE

## 2017-03-15 PROCEDURE — 99285 EMERGENCY DEPT VISIT HI MDM: CPT | Performed by: EMERGENCY MEDICINE

## 2017-03-15 PROCEDURE — 25000128 H RX IP 250 OP 636: Performed by: FAMILY MEDICINE

## 2017-03-15 PROCEDURE — 12000000 ZZH R&B MED SURG/OB

## 2017-03-15 PROCEDURE — 80048 BASIC METABOLIC PNL TOTAL CA: CPT | Performed by: EMERGENCY MEDICINE

## 2017-03-15 PROCEDURE — 99285 EMERGENCY DEPT VISIT HI MDM: CPT | Mod: 25

## 2017-03-15 PROCEDURE — 85610 PROTHROMBIN TIME: CPT | Performed by: EMERGENCY MEDICINE

## 2017-03-15 PROCEDURE — A9270 NON-COVERED ITEM OR SERVICE: HCPCS | Mod: GY | Performed by: EMERGENCY MEDICINE

## 2017-03-15 PROCEDURE — 96374 THER/PROPH/DIAG INJ IV PUSH: CPT

## 2017-03-15 PROCEDURE — 25000132 ZZH RX MED GY IP 250 OP 250 PS 637: Mod: GY | Performed by: EMERGENCY MEDICINE

## 2017-03-15 PROCEDURE — 73502 X-RAY EXAM HIP UNI 2-3 VIEWS: CPT | Mod: TC

## 2017-03-15 PROCEDURE — 96376 TX/PRO/DX INJ SAME DRUG ADON: CPT

## 2017-03-15 PROCEDURE — 71010 XR CHEST PORT 1 VW: CPT | Mod: TC

## 2017-03-15 RX ORDER — PROCHLORPERAZINE 25 MG
12.5 SUPPOSITORY, RECTAL RECTAL EVERY 12 HOURS PRN
Status: DISCONTINUED | OUTPATIENT
Start: 2017-03-15 | End: 2017-03-16

## 2017-03-15 RX ORDER — LIDOCAINE 40 MG/G
CREAM TOPICAL
Status: DISCONTINUED | OUTPATIENT
Start: 2017-03-15 | End: 2017-03-15

## 2017-03-15 RX ORDER — HYDROMORPHONE HYDROCHLORIDE 1 MG/ML
0.5 INJECTION, SOLUTION INTRAMUSCULAR; INTRAVENOUS; SUBCUTANEOUS
Status: COMPLETED | OUTPATIENT
Start: 2017-03-15 | End: 2017-03-15

## 2017-03-15 RX ORDER — PROCHLORPERAZINE MALEATE 5 MG
5 TABLET ORAL EVERY 6 HOURS PRN
Status: DISCONTINUED | OUTPATIENT
Start: 2017-03-15 | End: 2017-03-16

## 2017-03-15 RX ORDER — NALOXONE HYDROCHLORIDE 0.4 MG/ML
.1-.4 INJECTION, SOLUTION INTRAMUSCULAR; INTRAVENOUS; SUBCUTANEOUS
Status: DISCONTINUED | OUTPATIENT
Start: 2017-03-15 | End: 2017-03-16

## 2017-03-15 RX ORDER — HYDROMORPHONE HYDROCHLORIDE 1 MG/ML
0.2 INJECTION, SOLUTION INTRAMUSCULAR; INTRAVENOUS; SUBCUTANEOUS
Status: DISCONTINUED | OUTPATIENT
Start: 2017-03-15 | End: 2017-03-15

## 2017-03-15 RX ORDER — ONDANSETRON 4 MG/1
4 TABLET, ORALLY DISINTEGRATING ORAL EVERY 6 HOURS PRN
Status: DISCONTINUED | OUTPATIENT
Start: 2017-03-15 | End: 2017-03-16

## 2017-03-15 RX ORDER — ONDANSETRON 2 MG/ML
4 INJECTION INTRAMUSCULAR; INTRAVENOUS EVERY 6 HOURS PRN
Status: DISCONTINUED | OUTPATIENT
Start: 2017-03-15 | End: 2017-03-16

## 2017-03-15 RX ORDER — HYDROMORPHONE HYDROCHLORIDE 1 MG/ML
.3-.5 INJECTION, SOLUTION INTRAMUSCULAR; INTRAVENOUS; SUBCUTANEOUS
Status: DISCONTINUED | OUTPATIENT
Start: 2017-03-15 | End: 2017-03-16

## 2017-03-15 RX ORDER — SODIUM CHLORIDE 9 MG/ML
INJECTION, SOLUTION INTRAVENOUS CONTINUOUS
Status: DISCONTINUED | OUTPATIENT
Start: 2017-03-15 | End: 2017-03-16

## 2017-03-15 RX ORDER — LIDOCAINE 40 MG/G
CREAM TOPICAL
Status: DISCONTINUED | OUTPATIENT
Start: 2017-03-15 | End: 2017-03-16

## 2017-03-15 RX ADMIN — ONDANSETRON HYDROCHLORIDE 4 MG: 2 SOLUTION INTRAMUSCULAR; INTRAVENOUS at 22:11

## 2017-03-15 RX ADMIN — HYDROMORPHONE HYDROCHLORIDE 0.5 MG: 10 INJECTION, SOLUTION INTRAMUSCULAR; INTRAVENOUS; SUBCUTANEOUS at 18:14

## 2017-03-15 RX ADMIN — HYDROMORPHONE HYDROCHLORIDE 0.5 MG: 10 INJECTION, SOLUTION INTRAMUSCULAR; INTRAVENOUS; SUBCUTANEOUS at 21:43

## 2017-03-15 RX ADMIN — HYDROMORPHONE HYDROCHLORIDE 0.5 MG: 10 INJECTION, SOLUTION INTRAMUSCULAR; INTRAVENOUS; SUBCUTANEOUS at 19:13

## 2017-03-15 RX ADMIN — HYDROMORPHONE HYDROCHLORIDE 0.5 MG: 10 INJECTION, SOLUTION INTRAMUSCULAR; INTRAVENOUS; SUBCUTANEOUS at 20:40

## 2017-03-15 RX ADMIN — PHYTONADIONE 2.5 MG: 5 TABLET ORAL at 20:01

## 2017-03-15 RX ADMIN — SODIUM CHLORIDE: 9 INJECTION, SOLUTION INTRAVENOUS at 22:18

## 2017-03-15 ASSESSMENT — ACTIVITIES OF DAILY LIVING (ADL)
DRESS: 0-->INDEPENDENT
BATHING: 2-->ASSISTIVE PERSON
RETIRED_EATING: 0-->INDEPENDENT
TRANSFERRING: 0-->INDEPENDENT
COGNITION: 0 - NO COGNITION ISSUES REPORTED
TOILETING: 0-->INDEPENDENT
SWALLOWING: 0-->SWALLOWS FOODS/LIQUIDS WITHOUT DIFFICULTY
DRESS: 2-->ASSISTIVE PERSON
FALL_HISTORY_WITHIN_LAST_SIX_MONTHS: YES
NUMBER_OF_TIMES_PATIENT_HAS_FALLEN_WITHIN_LAST_SIX_MONTHS: 1
WHICH_OF_THE_ABOVE_FUNCTIONAL_RISKS_HAD_A_RECENT_ONSET_OR_CHANGE?: AMBULATION;TRANSFERRING;TOILETING;FALL HISTORY
TRANSFERRING: 4-->COMPLETELY DEPENDENT
COMMUNICATION: 0-->UNDERSTANDS/COMMUNICATES WITHOUT DIFFICULTY
AMBULATION: 4-->COMPLETELY DEPENDENT
BATHING: 0-->INDEPENDENT
CHANGE_IN_FUNCTIONAL_STATUS_SINCE_ONSET_OF_CURRENT_ILLNESS/INJURY: NO
TOILETING: 4-->COMPLETELY DEPENDENT
SWALLOWING: 0-->SWALLOWS FOODS/LIQUIDS WITHOUT DIFFICULTY
EATING: 0-->INDEPENDENT
AMBULATION: 0-->INDEPENDENT
RETIRED_COMMUNICATION: 0-->UNDERSTANDS/COMMUNICATES WITHOUT DIFFICULTY

## 2017-03-15 NOTE — IP AVS SNAPSHOT
` ` Patient Information     Patient Name Sex     Tracy Palomo ([id number]) Female 1940       Room Bed    266 266-01      Patient Demographics     Address Phone    [address] [place] [id number] [phone] (Home)  [phone] (Mobile)      Patient Ethnicity & Race     Ethnic Group Patient Race    American White      Emergency Contact(s)     Name Relation Home Work Mobile    mattie day Daughter [phone]    luma snowden Daughter [phone]    Pete Palomo  Spouse [phone]        Documents on File        Status Date Received Description       Documents for the Patient    Privacy Notice - [facility]       Insurance Card Received 13 medicare    External Medication Information Consent       Patient ID Received () 14     Consent for Services - Hospital/Clinic  ()      Insurance Card Received 13 parish    Consent for Services - Hospital/Clinic Received () 13     Privacy Notice - [facility] Received 13     External Medication Information Consent Accepted 13     Consent for EHR Access  13 Copied from existing Consent for services - C/HOD collected on 2013    Advance Directives and Living Will Received 13 POLST 2013    Advance Directives and Living Will Received 13 POLST 2013    [facility] Specified Other       Insurance Card Received 14 [employer]    Insurance Card Received 14 medicare    Consent for Services - Hospital/Clinic Received () 14     [employer] [employer] Authorization - File Only  14 [facility] - 14    Consent for Services - Hospital/Clinic Received () 05/15/15     Consent for Services/Privacy Notice - Hospital/Clinic Received 17     Consent for Services/Privacy Notice - Hospital/Clinic Received () 16     Insurance Card Received 16     Insurance Card Received 08/30/16 8/3/2020    Patient  ID Received 01/03/17 exp 6/2020    Insurance Card Received 03/15/17 [facility]       Documents for the Encounter    CMS IM for Patient Signature Received 03/17/17     ECG   ECG Report      Admission Information     Attending Provider Admitting Provider Admission Type Admission Date/Time    Kaleb Pang, Kaleb Clinton DO Emergency 03/15/17  1736    Discharge Date Hospital Service Auth/Cert Status Service Area     Hospitalist [facility] SERVICES    Unit Room/Bed Admission Status       PH 2A MEDICAL SURGICAL 266/266-01 Admission (Confirmed)       Admission     Complaint    Closed left hip fracture (H), left hip fracture, Fractured hip, left, closed, initial encounter (H)      Hospital Account     Name Acct ID Class Status Primary Coverage    Tracy Palomo [id number] Inpatient Open MEDICARE - MEDICARE            Guarantor Account (for Hospital Account #[id number])     Name Relation to Pt Service Area Active? Acct Type    Tracy Palomo Self FCS Yes Personal/Family    Address Phone          [address] [place] [address] [phone](H)              Coverage Information (for Hospital Account #[id number])     1. MEDICARE/MEDICARE     F/O Payor/Plan Precert #    MEDICARE/MEDICARE     Subscriber Subscriber #    Tracy Palomo [id number]    Address Phone    [employer] CLAIMS  [address]  [place] [address] [phone]          2. [employer]     F/O Payor/Plan Precert #    [employer]     Subscriber Subscriber #    Tracy Palomo [id number]    Address Phone    Service Center   [facility] [place] [address] [phone]

## 2017-03-15 NOTE — IP AVS SNAPSHOT
"` `           [address] MEDICAL SURGICAL: [phone]                                              INTERAGENCY TRANSFER FORM - NURSING   3/15/2017                    Hospital Admission Date: 3/15/2017  YOLANDA WOODARD   : 1940  Sex: Female        Attending Provider: Kaleb Pang DO     Allergies:  No Known Allergies    Infection:  None   Service:  HOSPITALIST    Ht:  1.676 m (5' 6\")   Wt:  86.6 kg (191 lb)   Admission Wt:  84.8 kg (187 lb)    BMI:  30.83 kg/m 2   BSA:  2.01 m 2            Patient PCP Information     Provider PCP Type    Chad Betts MD General      Current Code Status     Date Active Code Status Order ID Comments User Context       Prior      Code Status History     Date Active Date Inactive Code Status Order ID Comments User Context    3/19/2017  8:48 AM  Full Code [id number]  Ge Mahmood MD Outpatient    3/16/2017  1:46 PM 3/19/2017  8:48 AM Full Code [id number]  Kaleb Pang DO Inpatient    3/15/2017 10:01 PM 3/16/2017  1:45 PM Full Code [id number]  Donnie Jarrett MD Inpatient    2014  4:30 PM 2014  5:21 PM Full Code [id number]  Kaleb Lloyd MD Inpatient    10/20/2014  3:51 PM 10/24/2014  6:26 PM Full Code [id number]  Miguel Oleary MD Inpatient      Advance Directives        Does patient have a scanned Advance Directive/ACP document in EPIC?           Yes        Hospital Problems as of 3/19/2017              Priority Class Noted POA    Hypertension goal BP (blood pressure) < 140/90   2013 Yes    Hyperlipidemia LDL goal <130   2013 Yes    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 Medium  2014 Yes    Paroxysmal atrial fibrillation (H) Medium  11/3/2014 Yes    Long-term (current) use of anticoagulants [Z79.01] Medium  3/22/2016 Yes    * (Principal)Hip fracture, left, closed, initial encounter (H) Medium  3/15/2017 Yes    Closed left hip fracture (H) Medium  3/15/2017 Yes    Fractured " hip, left, closed, initial encounter (H) Medium  3/16/2017 Yes      Non-Hospital Problems as of 3/19/2017              Priority Class Noted    Encounter for long-term current use of medication   2/22/2013    History of colonic polyps   2/22/2013    Advanced directives, counseling/discussion   2/22/2013    Recent major surgery - exploratory lap with small bowel resection 10/20 Medium  11/1/2014    Pleural effusion on right Medium  11/1/2014    Anemia Medium  11/3/2014      Immunizations     Name Date      Hepatitis A Vac Ped/Adol-2 Dose 11/01/10     Hepatitis B 11/01/10     Hepatitis B 10/26/09     Hepatitis B 08/31/09     Influenza (High Dose) 3 valent vaccine 08/30/16     Influenza (High Dose) 3 valent vaccine 10/22/14     Influenza (IIV3) 08/31/09     Pneumococcal (PCV 13) 08/31/09     Pneumococcal 23 valent 10/22/14     TD (ADULT, 7+) 08/31/09          END      ASSESSMENT     Discharge Profile Flowsheet     EXPECTED DISCHARGE     Swallowing  0-->swallows foods/liquids without difficulty 03/19/17 1029    Expected Discharge Date  03/18/17 03/17/17 0950   Change in Functional Status Since Onset of Current Illness/Injury  no 03/15/17 2239    DISCHARGE NEEDS ASSESSMENT     COGNITIVE/PERCEPTUAL/DEVELOPMENTAL      Concerns To Be Addressed  basic needs concerns;adjustment to diagnosis/illness concerns (caregiving concerns) 11/03/14 1126   Current Mental Status/Cognitive Functioning  no deficits noted 03/19/17 1029    Patient/family verbalizes understanding of discharge plan recommendations?  Yes 03/19/17 1029   Recent Changes in Mental Status/Cognitive Functioning  no changes 03/19/17 1029    Medical Team notified of plan?  yes 03/19/17 1029   GASTROINTESTINAL (ADULT,PEDIATRIC,OB)      Readmission Within The Last 30 Days  no previous admission in last 30 days 03/15/17 2245   GI WDL  WDL 03/19/17 0937    Anticipated Changes Related to Illness  none 03/15/17 2245   Last Bowel Movement  03/19/17 03/19/17 1029    Equipment  "Currently Used at Home  none 03/17/17 1002   Passing flatus  yes 03/19/17 0937    Transportation Available  van, wheelchair accessible 03/19/17 1029   COMMUNICATION ASSESSMENT      FUNCTIONAL LEVEL CURRENT     Patient's communication style  spoken language (English or Bilingual) 03/15/17 1736    Ambulation  3-->assistive equipment and person 03/19/17 1029   SKIN      Transferring  2-->assistive person 03/19/17 1029   Inspection  Full 03/19/17 0937    Toileting  2-->assistive person 03/19/17 1029   Procedural focused assessment (identify areas inspected)   -- (operative site, ESU site) 03/16/17 1203    Bathing  2-->assistive person 03/19/17 1029   Skin WDL  ex 03/19/17 0129    Dressing  2-->assistive person 03/19/17 1029   Skin Integrity  incision(s) 03/19/17 0937    Eating  0-->independent 03/19/17 1029   SAFETY      Communication  0-->understands/communicates without difficulty 03/19/17 1029   Safety WDL  WDL 03/19/17 0937                 Assessment WDL (Within Defined Limits) Definitions           Safety WDL     Effective: 09/28/15    Row Information: <b>WDL Definition:</b> Bed in low position, wheels locked; call light in reach; upper side rails up x 2; ID band on<br> <font color=\"gray\"><i>Item=AS safety wdl>>List=AS safety wdl>>Version=F14</i></font>      Skin WDL     Effective: 09/28/15    Row Information: <b>WDL Definition:</b> Warm; dry; intact; elastic; without discoloration; pressure points without redness<br> <font color=\"gray\"><i>Item=AS skin wdl>>List=AS skin wdl>>Version=F14</i></font>      Vitals     Vital Signs Flowsheet     VITAL SIGNS     CLINICALLY ALIGNED PAIN ASSESSMENT (CAPA) ([facility], [facility] AND [facility] ADULTS ONLY)      Temp  97.9  F (36.6  C) 03/19/17 0717   Comfort  comfortably manageable 03/19/17 0232    Temp src  Oral 03/19/17 0717   Change in Pain  about the same 03/19/17 0232    Resp  16 03/19/17 0717   Pain Control  fully effective 03/19/17 0232    Pulse  69 03/19/17 0717   Functioning  " "can do most things, but pain gets in the way of some 03/18/17 0206    Heart Rate  69 03/19/17 0717   Sleep  normal sleep 03/18/17 0206    Pulse/Heart Rate Source  Monitor 03/19/17 0717   ANALGESIA SIDE EFFECTS MONITORING      BP  93/60 03/19/17 0717   Side Effects Monitoring: Respiratory Quality  R 03/19/17 0232    BP Location  Left arm 03/19/17 0717   Side Effects Monitoring: Respiratory Depth  N 03/19/17 0232    OXYGEN THERAPY     Side Effects Monitoring: Sedation Level  S 03/19/17 0232    SpO2  96 % 03/19/17 0717   HEIGHT AND WEIGHT      O2 Device  None (Room air) 03/19/17 0717   Height  1.676 m (5' 6\") 03/15/17 2212    Oxygen Delivery  1 LPM 03/17/17 0707   Height Method  Actual 03/15/17 1744    RESPIRATORY MONITORING     Weight  86.6 kg (191 lb) 03/15/17 2212    Respiratory Monitoring (EtCO2)  40 mmHg 03/17/17 0204   BSA (Calculated - sq m)  2.01 03/15/17 2212    Integrated Pulmonary Index (IPI)  8-9 03/17/17 0204   BMI (Calculated)  30.89 03/15/17 2212    PAIN/COMFORT     POSITIONING      Patient Currently in Pain  sleeping: patient not able to self report 03/19/17 0124   Head of Bed (HOB)  HOB at 20-30 degrees 03/19/17 0232    Preferred Pain Scale  CAPA (Clinically Aligned Pain Assessment) ([facility], [facility] and [facility] Adults Only) 03/16/17 1352   Body Position  independently positioning 03/19/17 0937    0-10 Pain Scale  6 03/19/17 0905   Chair  Upright in chair 03/17/17 1614    Pain Location  Hip 03/17/17 0323   DAILY CARE      Pain Orientation  Left 03/17/17 0323   Activity Type  up in chair 03/19/17 0937    Pain Descriptors  Aching;Sore 03/16/17 1244   Activity Level of Assistance  assistance, 1 person 03/19/17 0937    Pain Management Interventions  analgesia administered 03/18/17 1204   ECG      Pain Intervention(s)  Medication (See eMAR);Cold applied 03/18/17 0206   ECG Rhythm  Sinus rhythm 03/16/17 1249    Response to Interventions  Absence of nonverbal indicators of pain 03/19/17 0124               "   Patient Lines/Drains/Airways Status    Active LINES/DRAINS/AIRWAYS     Name: Placement date: Placement time: Site: Days: Last dressing change:    Peripheral IV 03/16/17 Left Lower forearm 03/16/17   1026   Lower forearm   3     Incision/Surgical Site 03/16/17 Left Hip 03/16/17   1107    2             Patient Lines/Drains/Airways Status    Active PICC/CVC     None            Intake/Output Detail Report     Date Intake         Output   Net    Shift P.O. I.V. IV Piggyback Plasma Blood Components Total Urine Blood Total       Noc 03/17/17 2300 - 03/18/17 0659 -- -- -- -- -- -- -- -- -- 0    Day 03/18/17 0700 - 03/18/17 1459 360 -- -- -- -- 360 700 -- 700 -340    Kiara 03/18/17 1500 - 03/18/17 2259 -- -- -- -- -- -- 100 -- 100 -100    Noc 03/18/17 2300 - 03/19/17 0659 -- -- -- -- -- -- -- -- -- 0    Day 03/19/17 0700 - 03/19/17 1459 -- -- -- -- -- -- -- -- -- 0      Last Void/BM       Most Recent Value    Urine Occurrence 1 at 03/18/2017 0200    Stool Occurrence       Case Management/Discharge Planning     Case Management/Discharge Planning Flowsheet     REFERRAL INFORMATION     COPING/STRESS      Did the Initial Social Work Assessment result in a Social Work Case?  Yes 03/17/17 0929   Major Change/Loss/Stressor  other (see comments) (spouse in [place]) 03/15/17 2250    Admission Type  inpatient 03/17/17 0929   Patient Personal Strengths  assertive;expressive of needs;positive attitude;resourceful;self-reliant 11/03/14 1126    Arrived From  home or self-care 03/17/17 0929   EXPECTED DISCHARGE      Referral Source  interdisciplinary rounds;physician 03/17/17 0929   Expected Discharge Date  03/18/17 03/17/17 0950    Reason For Consult  discharge planning 03/17/17 0929   ASSESSMENT/CONCERNS TO BE ADDRESSED      Record Reviewed  history and physical;medical record 03/17/17 0929   Concerns To Be Addressed  basic needs concerns;adjustment to diagnosis/illness concerns (caregiving concerns) 11/03/14 1126      Assigned to Case  Hailey 03/17/17 0929   DISCHARGE PLANNING      [facility]  03/17/17 0950   Patient/family verbalizes understanding of discharge plan recommendations?  Yes 03/19/17 1029    Primary Care MD Name  Dr. Chad Betts  03/17/17 0950   Medical Team notified of plan?  yes 03/19/17 1029    LIVING ENVIRONMENT     Readmission Within The Last 30 Days  no previous admission in last 30 days 03/15/17 2245    Lives With  spouse 03/17/17 1000   Anticipated Changes Related to Illness  none 03/15/17 2245    Living Arrangements  house 03/17/17 1000   Transportation Available  van, wheelchair accessible 03/19/17 1029    Provides Primary Care For  no one 03/17/17 0950   Discharge Plan Concerns  no concerns 03/15/17 2245    Quality Of Family Relationships  supportive;helpful;involved 03/17/17 0950   FINAL RESOURCES      Able to Return to Prior Living Arrangements  other (see comments) (Will go to [facility] before returning home ) 03/17/17 0950   Equipment Currently Used at Home  none 03/17/17 1002    HOME SAFETY     ABUSE RISK SCREEN      Patient Feels Safe Living in Home?  yes 03/17/17 0950   QUESTION TO PATIENT:  Has a member of your family or a partner(now or in the past) intimidated, hurt, manipulated, or controlled you in any way?  no 03/15/17 2240    ASSESSMENT OF FAMILY/SOCIAL SUPPORT     QUESTION TO PATIENT: Do you feel safe going back to the place where you are living?  yes 03/15/17 2240    Marital Status   03/17/17 0950   OBSERVATION: Is there reason to believe there has been maltreatment of a vulnerable adult (ie. Physical/Sexual/Emotional abuse, self neglect, lack of adequate food, shelter, medical care, or financial exploitation)?  no 03/15/17 2240    Who is your support system?  ;Children 03/17/17 0950   (R) MENTAL HEALTH SUICIDE RISK      Spouse's Name  Pete  03/17/17 0950   Are you depressed or being treated for depression?  No 03/15/17 2240    Description of  Support System  Supportive;Involved 03/17/17 0950   HOMICIDE RISK      Support Assessment  Adequate family and caregiver support;Adequate social supports 03/17/17 0950   Homicidal Ideation  no 03/15/17 9481    Quality of Family Relationships  supportive;involved;evident 03/17/17 0912

## 2017-03-15 NOTE — IP AVS SNAPSHOT
` `           [address] SURGICAL: [phone]                 INTERAGENCY TRANSFER FORM - NOTES (H&P, Discharge Summary, Consults, Procedures, Therapies)   3/15/2017                    Hospital Admission Date: 3/15/2017  YOLANDA WOODARD   : 1940  Sex: Female        Patient PCP Information     Provider PCP Type    Chad Betts MD General         History & Physicals      H&P by Donnie Jarrett MD at 3/15/2017  8:30 PM     Author:  Donnie Jarrett MD Service:  Hospitalist Author Type:  Physician    Filed:  3/15/2017 11:00 PM Date of Service:  3/15/2017  8:30 PM Note Created:  3/15/2017  8:52 PM    Status:  Addendum :  Donnie Jarrett MD (Physician)         [facility]    History and Physical  Hospitalist       Date of Admission:  3/15/2017  Date of Service (when I saw the patient): 03/15/17[WG1.1]    Assessment & Plan[WG1.2]   Yolanda Woodard is a 76 year old female who presents with left hip pain after slipping on the ice at home and falling. Evaluation in the emergency department is showing the displaced transverse fracture of the cervical portion of the proximal femur. They've spoken to Dr. Pang, was anticipating surgery tomorrow afternoon. Her past history is significant for history of pulmonary embolism on chronic Coumadin therapy. Her INR today is 2.99. She has been given oral vitamin K and will plan to recheck INR in the morning. The goal is to have her INR 1.4 or less. She will be admitted to inpatient status, kept at bed rest, with NPO status after midnight, IV fluids and IV narcotics for pain. Anticipates she will be in the hospital for at least 2 days and may need TCU placement at discharge.[WG1.1] If the INR can be corrected, she would have no contraindications to surgery tomorrow.[WG1.2]     Principal Problem:    Hip fracture, left, closed, initial encounter (H)    Assessment:[WG1.3] occurring after a fall at home. No  other injuries[WG1.1]    Plan:[WG1.3] probable surgery tomorrow. Keep at bed rest tonight with IV narcotics for pain. 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 May need fresh frozen plasma prior to surgery[WG1.1]    Paroxysmal atrial fibrillation (H)    Assessment:[WG1.3] noted, no current symptoms[WG1.1]    Plan:[WG1.3] check EKG[WG1.1]    Long-term (current) use of anticoagulants [Z79.01]    Assessment:[WG1.3] taking in relation to previous DVT and pulmonary embolism[WG1.1]    Plan:[WG1.3] hold for now, vitamin K given, expect she will be on Lovenox after surgery with restarting the Coumadin at some point[WG1.1]    Hyperlipidemia LDL goal <130    Assessment:[WG1.3] taking Mevacor[WG1.1]    Plan:[WG1.3] restart later  DVT Prophylaxis: Pneumatic Compression Devices  Code Status: Full Code    Disposition: Expected discharge in 2-3 days once surgery completed, disposition arranged.[WG1.1]    Donnie Jarrett MD    Primary Care Physician   Chad Betts    Chief Complaint[WG1.2]   76-year-old female with left hip pain after a fall    History is obtained from the patient, electronic health record, emergency department physician and patient's daughter[WG1.1]    History of Present Illness[WG1.2]   Tracy Palomo is a 76 year old female who presents with acute left hip pain after a fall on ice at home this afternoon. Was walking to her house from the car when she slipped on ice. Hit her left hip on the ground with immediate  pain. Denies injuries to her head, neck or back. She was able to call 911 and transported to the hospital by ambulance. Prior to falling she felt well with no upper respiratory symptoms cough, shortness of breath, abdominal pain. She is on Coumadin therapy for treatment of previous PE in 2014. She has known hypertension, and hyperlipidemia taking medications. Her hip pain has been controlled with use of fentanyl and IV hydromorphone.[WG1.1]    Past Medical History[WG1.2]    I have reviewed this patient's medical history and updated it with pertinent information if needed.[WG1.1]   Past Medical History   Diagnosis Date     Atrial fibrillation (H) 2014     related to colon surgery     Colon polyps      Diverticulosis of colon (without mention of hemorrhage)      Diverticulosis     DVT (deep vein thrombosis) in pregnancy (H) 2014     after colon surgery     Other and unspecified hyperlipidemia      PE (pulmonary embolism) 2014     related to colon surgery     Unspecified essential hypertension        Past Surgical History[WG1.2]   I have reviewed this patient's surgical history and updated it with pertinent information if needed.[WG1.1]  Past Surgical History   Procedure Laterality Date     Colonoscopy  09, 10, 12     [facility], [place]     Flexible sigmoidoscopy  09, 11     Laparotomy exploratory N/A 10/20/2014     Procedure: LAPAROTOMY EXPLORATORY;  Surgeon: Miguel Oleary MD;  Location: [facility] OR     Laparotomy, lysis adhesions, combined N/A 10/20/2014     Procedure: COMBINED LAPAROTOMY, LYSIS ADHESIONS;  Surgeon: Miguel Oleary MD;  Location: [facility] OR     Resect small bowel without ostomy N/A 10/20/2014     Procedure: RESECT SMALL BOWEL WITHOUT OSTOMY;  Surgeon: Miguel Oleary MD;  Location: [facility] OR       Prior to Admission Medications   Prior to Admission Medications   Prescriptions Last Dose Informant Patient Reported? Taking?   lisinopril (PRINIVIL,ZESTRIL) 10 MG tablet 3/14/2017 at 2200  No Yes    Sig: Take 1 tablet (10 mg) by mouth daily   lovastatin (MEVACOR) 40 MG tablet 3/14/2017 at 2200  No Yes   Sig: TAKE ONE TABLET BY MOUTH AT BEDTIME   warfarin (COUMADIN) 5 MG tablet 3/14/2017 at 2200  No Yes   Si.5 mg Tues, Thurs, Sat and 5 mg the rest of the week. Or as directed by the coumadin clinic      Facility-Administered Medications: None     Allergies   Allergies   Allergen Reactions     No Known Allergies        Social History[WG1.2]   I have reviewed this patient's social history and updated it with pertinent information if needed. Tracy Palomo[WG1.1]  reports that she has never smoked. She has never used smokeless tobacco. She reports that she does not drink alcohol or use illicit drugs.    Family History[WG1.2]   I have reviewed this patient's family history and updated it with pertinent information if needed.[WG1.1]   Family History   Problem Relation Age of Onset     Blood Disease No family hx of      blood clots       Review of Systems[WG1.2]   The 10 point Review of Systems is negative other than noted in the HPI or here.[WG1.1]     Physical Exam   Temp: 96.3  F (35.7  C) Temp src: Oral BP: 166/68 Pulse: 84   Resp: 16 SpO2: 100 %[WG1.2]      Vital Signs with Ranges[WG1.1]  Temp:  [96.3  F (35.7  C)-97.6  F (36.4  C)] 96.3  F (35.7  C)  Pulse:  [57-84] 84  Resp:  [16-20] 16  BP: (121-166)/(68-96) 166/68  SpO2:  [96 %-100 %] 100 %  191 lbs 0 oz[WG1.2]    EXAM:  Constitutional: alert, mild distress and severe distress   Cardiovascular: PMI normal. No lifts, heaves, or thrills. RRR. No murmurs, clicks gallops or rub  Respiratory: Percussion normal. Good diaphragmatic excursion. Lungs clear  Psychiatric: mentation appears normal and affect normal/bright  Head: Normocephalic. No masses, lesions, tenderness or abnormalities  Neck: Neck supple. No adenopathy. Thyroid symmetric, normal size,  ENT: ENT exam normal, no neck nodes or sinus tenderness  Abdomen: Abdomen soft, non-tender. BS normal. No  masses, organomegaly  NEURO: non-focal, sensation intact  SKIN: no suspicious lesions or rashes  LYMPH: Normal cervical lymph nodes  JOINT/EXTREMITIES: left leg externally rotated and mildly shortened. Tender over the left hip area.[WG1.1]     Data[WG1.2]   Data reviewed today:  I personally reviewed the Hip x-ray image(s) showing Transverse fracture over the proximal femur.[WG1.1] EKG with bradycardia, otherwise appears normal    Recent Labs  Lab 03/15/17  1807   WBC 6.8   HGB 13.8   MCV 90      INR 2.99*      POTASSIUM 4.0   CHLORIDE 105   CO2 32   BUN 11   CR 0.92   ANIONGAP 5   HARVEY 9.6   *       Recent Results (from the past 24 hour(s))   XR Pelvis w Hip Left 1 View    Narrative    PELVIS AND HIP LEFT ONE VIEW  3/15/2017 6:34 PM     COMPARISON: None    HISTORY: Trauma      Impression    IMPRESSION: There is a transverse fracture of the low cervical neck of  the proximal left femur with lateral displacement and various  angulation of the distal fragment. No other fractures.    SELENE ESCOBEDO MD   XR Chest Port 1 View    Narrative    CHEST PORTABLE ONE VIEW 3/15/2017 9:16 PM     COMPARISON: Two-view chest x-ray 11/14/2014.    HISTORY: Pre-op.    FINDINGS: The cardiac silhouette, pulmonary vasculature, lungs and  pleural spaces are within normal limits.      Impression    IMPRESSION: Clear lungs.    SELENE ESCOBEDO MD[WG1.2]          Revision History        User Key Date/Time User Provider Type Action    > WG1.2 3/15/2017 11:00 PM Donnie Jarrett MD Physician Addend     WG1.3 3/15/2017  9:08 PM Donnie Jarrett MD Physician Sign     WG1.1 3/15/2017  8:52 PM Donnie Jarrett MD Physician                      Discharge Summaries      Discharge Summaries by Rohan Valerio MD at 3/19/2017  8:54 AM     Author:  Rohan Valerio MD Service:  Orthopedics Author Type:  Physician    Filed:  3/19/2017 10:09 AM Date of Service:  3/19/2017  8:54 AM Note Created:  3/19/2017  8:49  AM    Status:  Addendum :  Rohan Valerio MD (Physician)         [facility] Discharge Summary    Tracy Palomo MRN# [id number]   Age: 76 year old YOB: 1940     Date of Admission:  3/15/2017  Date of Discharge::  3/19/2017  Admitting Physician:  Kaleb Pang DO  Discharge Physician:  Ge Mahmood MD     Home clinic: [facility]          Admission Diagnoses:   Closed left hip fracture (H)  left hip fracture  Fractured hip, left, closed, initial encounter (H)          Discharge Diagnosis:   Closed left hip fracture (H)  left hip fracture  Fractured hip, left, closed, initial encounter (H)          Procedures:   Procedure(s): Procedure(s):  OPEN REDUCTION INTERNAL FIXATION HIP BIPOLAR       No procedures performed during this admission           Medications Prior to Admission:     Prescriptions Prior to Admission   Medication Sig Dispense Refill Last Dose     warfarin (COUMADIN) 5 MG tablet 2.5 mg Tues, Thurs, Sat and 5 mg the rest of the week. Or as directed by the coumadin clinic 100 tablet 3 3/14/2017 at 2200     lisinopril (PRINIVIL,ZESTRIL) 10 MG tablet Take 1 tablet (10 mg) by mouth daily 90 tablet 3 3/14/2017 at 2200     lovastatin (MEVACOR) 40 MG tablet TAKE ONE TABLET BY MOUTH AT BEDTIME 90 tablet 3 3/14/2017 at 2200[JL1.1]           Review of your medicines      START taking       Dose / Directions    acetaminophen 325 MG tablet   Commonly known as:  TYLENOL        Dose:  975 mg   Take 3 tablets (975 mg) by mouth every 8 hours   Quantity:  100 tablet   Refills:  0       cyclobenzaprine 5 MG tablet   Commonly known as:  FLEXERIL        Dose:  5 mg   Take 1 tablet (5 mg) by mouth 3 times daily as needed for muscle spasms   Quantity:  42 tablet   Refills:  0       docusate sodium 100 MG capsule   Commonly known as:  COLACE        Dose:  100 mg   Take 1 capsule (100 mg) by mouth 2 times daily   Quantity:  60 capsule   Refills:  0        metoprolol 25 MG tablet   Commonly known as:  LOPRESSOR   Used for:  Paroxysmal atrial fibrillation (H)        Dose:  12.5 mg   Take 0.5 tablets (12.5 mg) by mouth 2 times daily   Refills:  0       oxyCODONE 5 MG IR tablet   Commonly known as:  ROXICODONE        Dose:  5 mg   Take 1 tablet (5 mg) by mouth every 4 hours as needed for moderate to severe pain   Quantity:  40 tablet   Refills:  0         CONTINUE these medicines which may have CHANGED, or have new prescriptions. If we are uncertain of the size of tablets/capsules you have at home, strength may be listed as something that might have changed.       Dose / Directions    warfarin 5 MG tablet   Commonly known as:  COUMADIN   This may have changed:  additional instructions   Used for:  Long-term (current) use of anticoagulants, Pulmonary embolism, other (H)        7.5 mg on 3/19.  Then dosing based on INR results.  Pt's usual regimen (once back to therapeutic) is 2.5 mg Tues, Thurs, Sat and 5 mg the rest of the week.   Quantity:  100 tablet   Refills:  3         CONTINUE these medicines which have NOT CHANGED       Dose / Directions    lisinopril 10 MG tablet   Commonly known as:  PRINIVIL/ZESTRIL   Used for:  Essential hypertension with goal blood pressure less than 140/90        Dose:  10 mg   Take 1 tablet (10 mg) by mouth daily   Quantity:  90 tablet   Refills:  3       lovastatin 40 MG tablet   Commonly known as:  MEVACOR   Used for:  Hyperlipidemia LDL goal <130        TAKE ONE TABLET BY MOUTH AT BEDTIME   Quantity:  90 tablet   Refills:  3            Where to get your medicines      Some of these will need a paper prescription and others can be bought over the counter. Ask your nurse if you have questions.     Bring a paper prescription for each of these medications      oxyCODONE 5 MG IR tablet       You don't need a prescription for these medications      acetaminophen 325 MG tablet     cyclobenzaprine 5 MG tablet     docusate sodium 100 MG  capsule     metoprolol 25 MG tablet     warfarin 5 MG tablet[DJ1.1]                 Consultations:   Consultation during this admission received from hospital-based physician.          Brief History of Illness:   Reason for admission requiring a surgical or invasive procedure:   Closed left hip fracture (H)  left hip fracture  Fractured hip, left, closed, initial encounter (H)   The patient underwent the following procedure(s):   Procedure(s):  OPEN REDUCTION INTERNAL FIXATION HIP BIPOLAR   There were no immediate complications during this procedure.    Please refer to the full operative summary for details.           Hospital Course:   The patient's hospital course was unremarkable.  She recovered as anticipated and experienced no post-operative complications.    She was ambulating well with the use of a walker weightbearing as tolerated. The wound dressing was intact. The wound was covered with Aquacell.    She did have some episodes of atrial fibrillation. These were felt to be related to the stress of surgery. These appear to reversed back to her normal.     Patient did have her chronic Coumadin therapy reversed for the surgical procedure. She was restarted on Coumadin using her normal preoperative dosages. However because of preoperative vitamin K her Coumadin levels were slow to return. Therefore her anticoagulation is being bridged with Lovenox. Those instructions should be on the discharge MAR completed by the hospital-based physician          Discharge Instructions and Follow-Up:   Discharge diet: Pre-procedure diet or regular   Discharge activity: Activity as tolerated  Driving restricitIons: no driving while taking narcotic analgesics  Weight bearing status: Weight bearing as tolerated   Discharge follow-up: Follow up with Dr. Pang in 10-14 days.  Patient should already have an appointment scheduled   Wound care: Aquacell dressing in place - OK to shower over this  Aquacell and staples to be removed at  follow up           Discharge Disposition:   Discharged to short-term care facility      Attestation:  I have reviewed today's vital signs, notes, medications, labs and imaging.  Amount of time performed on this evaluating patient, reviewing chart, conversing with hospital-based physician, completing the discharge summary: 30 minutes.  Total time: 30 minutes    Ge Mahmood MD[JL1.1]          Revision History        User Key Date/Time User Provider Type Action    > DJ1.1 3/19/2017 10:09 AM Rohan Valerio MD Physician Addend     JL1.1 3/19/2017  8:54 AM Ge Mahmood MD Physician Sign                     Consult Notes      Consults by Hailey Rodriguez at 3/17/2017  3:04 PM     Author:  Hailey Rodriguez Service:  Social Work Author Type:      Filed:  3/17/2017  3:04 PM Date of Service:  3/17/2017  3:04 PM Note Created:  3/17/2017  2:59 PM    Status:  Signed :  Hailey Rodriguez ()     Consult Orders:    1. Care Transition RN/SW IP Consult [[id number]] ordered by Kaleb Pang DO at 03/17/17 1151                Care Transition Initial Assessment - SW  Reason For Consult: discharge planning  Met with: Patient[SF1.1]    Principal Problem:    Hip fracture, left, closed, initial encounter (H)  Active Problems:    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    Paroxysmal atrial fibrillation (H)    Long-term (current) use of anticoagulants [Z79.01]    Closed left hip fracture (H)    Fractured hip, left, closed, initial encounter (H)    Hypertension goal BP (blood pressure) < 140/90    Hyperlipidemia LDL goal <130[SF1.2]         DATA  Lives With: spouse  Living Arrangements: house  Description of Support System: Supportive, Involved  Who is your support system?: , Children  Support Assessment: Adequate family and caregiver support, Adequate social supports.     Patient feels that they have adequate support @ home?  No           Quality Of Family  Relationships: supportive, helpful, involved    Transportation Available: family or friend will provide  Van transport and private pay cost also discussed in case needed.     ASSESSMENT  Cognitive Status:  awake, alert and oriented       PLAN  Financial costs for the patient includes :Van transport if needed.  Patient given options and choices for discharge : Patient wanting [facility] TCU, as  is currently in TCU there.  Patient/family is agreeable to the plan?  Yes    Patient anticipates discharging to:  TCU at [facility].      will continue to follow for d/c planning.[SF1.1]        Revision History        User Key Date/Time User Provider Type Action    > SF1.2 3/17/2017  3:04 PM Hailey Rodriguez  Sign     SF1.1 3/17/2017  2:59 PM Hailey Rodriguez              Consults by Mary Alice Jacobs at 3/16/2017  1:58 PM     Author:  Mary Alice Jacobs Service:  Pharmacy Author Type:  Pharmacy Intern    Filed:  3/16/2017  1:58 PM Date of Service:  3/16/2017  1:58 PM Note Created:  3/16/2017  1:57 PM    Status:  Signed :  Mary Alice Jacobs (Pharmacy Intern)    Cosigner:  Jarrod Kiran RPH at 3/16/2017  1:59 PM         Consult Orders:    1. Pharmacy to dose warfarin (COUMADIN) [[id number]] ordered by Kaleb Pang DO at 03/16/17 1140                Pharmacy to dose warfarin consult has been acknowledged.   Please see previous note for details.    Mary Alice Jacobs, PharmD Student[NF1.1]        Revision History        User Key Date/Time User Provider Type Action    > NF1.1 3/16/2017  1:58 PM Mary Alice Jacobs Pharmacy Intern Sign            Consults signed by Kaleb Pang DO at 3/16/2017 10:17 AM      Author:  Kaleb Pang DO Service:  Orthopedics Author Type:  Physician    Filed:  3/16/2017 10:17 AM Date of Service:  3/16/2017  8:08 AM Note Created:  3/16/2017  9:53 AM    Status:  Signed :  Kaleb Pang DO  (Physician)         ORTHOPEDIC SURGERY CONSULTATION      REQUESTING PHYSICIAN:  Donnie Jarrett MD      REASON FOR CONSULTATION:  Left hip fracture.      NOTE:  Tracy Palomo is a pleasant 76-year-old female independent ambulator who lives alone, with a ground level fall after slipping on ice, landing on her left side.  She was evaluated in the ER, and diagnosed with a displaced left femoral neck fracture.  She denies any other injuries.  Denies any loss of consciousness.  She was evaluated on the second floor, resting comfortably.  History of atrial fibrillation with a previous pulmonary embolisms.  She normally takes Coumadin.      PAST MEDICAL HISTORY:  Atrial fibrillation, colon polyps, diverticulosis, DVT, pulmonary embolism, hypertension.      PAST SURGICAL HISTORY:  Sigmoidoscopy, exploratory laparotomy, lysis of adhesions with the laparotomy, small bowel resection.      PRIOR TO ADMISSION MEDICATIONS:  Lisinopril, lovastatin, Coumadin.      ALLERGIES:  No known drug allergies.      SOCIAL HISTORY:  No tobacco, ETOH or illicits.  Lives independently, ambulates independently.   is currently residing at [facility].      FAMILY HISTORY:  Blood clots.      REVIEW OF SYSTEMS:  Ten-point review of systems was performed and otherwise unremarkable as per HPI.      PHYSICAL EXAM:   VITAL SIGNS:  Temperature 98.3, pulse 60, blood pressure 110/65, respiratory rate 16, saturation 95% on room air.   GENERAL:  She is awake, alert, nontoxic, in no acute distress, appropriate and pleasant.   HEENT:  Atraumatic, normocephalic.   /NECK:  Cervical spine has no pain with palpation or range of motion.  There is no gross deformity or step-off.   CHEST:  Equal chest rise and fall.  No audible wheezing or retraction.  No pain with palpation.   ABDOMEN:  Soft, nontender, nondistended.   EXTREMITIES:  Bilateral upper extremities and right lower extremity show no pain with palpation or range of motion.  There is no swelling or  peripheral edema.  The integumentary system is intact.  Left lower extremity is in a shortened, externally rotated posture.  Distally, she has no pain from mid-thigh down.  There is no knee effusion.  Motor is intact distally.  Sensation is intact.  Toes are slightly cool but equal bilaterally.  There is a 2+ dorsalis pedis pulse.  No peripheral edema.      IMAGING:  X-rays reviewed, and show a displaced left femoral neck fracture.      IMPRESSION:  This is a 76-year-old female with a displaced left femoral neck fracture with Coumadin coagulopathy.      PLAN:  The above was relayed to the patient.  I also discussed the case with Dr. Akers, the hospitalist.  She had a repeat INR this morning after receiving vitamin K in the ER, showing the INR is 3.0.  She just received some additional IV vitamin K.  In order to expedite surgery, we will plan to utilize fresh frozen plasma in order to reverse safely and allow her to become mobile quicker.  Plan to proceed with surgery later this morning.  The risks, benefits, complications, alternatives and anticipated postoperative course were reviewed with her.  Risks including bleeding, infection, scar, scar tenderness, neurovascular injury, fracture, blood clots, heart attacks, strokes and death were discussed.  I would anticipate she will need a rehab placement postoperatively, as she lives alone.  Anticipated hospitalization is approximately 2-3 days.         SULAIMAN HERNANDEZ DO             D: 2017 08:08   T: 2017 09:52   MT: [id number]      Name:     YOLANDA WOODARD   MRN:      [id number]        Account:       [id number]   :      1940           Consult Date:  2017      Document: [id number]       cc: Donnie Jarrett III, MD   [JL1.1]   Revision History        User Key Date/Time User Provider Type Action    > JL1.1 3/16/2017 10:17 AM Sulaiman Hernandez DO Physician Sign            Consults by Sulaiman Hernandez DO at 3/16/2017  7:57 AM      Author:  Kaleb Pang DO Service:  Orthopedics Author Type:  Physician    Filed:  3/16/2017  8:08 AM Date of Service:  3/16/2017  7:57 AM Note Created:  3/16/2017  7:57 AM    Status:  Addendum :  Kaleb Pang DO (Physician)     Consult Orders:    1. Orthopedic Surgery IP Consult: Patient to be seen: Routine - within 24 hours; left hip fracture; Consultant may enter orders: Yes [[id number]] ordered by Donnie Jarrett MD at 03/15/17 2052                Impression:  Left displaced femoral fracture  Coumadin coagulopathy    Plan:  Recommend left hip hemiarthroplasty in order to regain function.  She started C vitamin K and INR equals 3. Spoke with Dr. Akers-will utilize fresh frozen plasma.  Keep n.p.o.  Risks and benefits discussed.  Planning surgery 10 AM today.    Full consult dictated:[JL1.1][id number][JL1.2]    Vitaly Pang D.O.[JL1.1]     Revision History        User Key Date/Time User Provider Type Action    > JL1.2 3/16/2017  8:08 AM Kaleb Pang DO Physician Addend     JL1.1 3/16/2017  7:59 AM Kaleb Pang DO Physician Sign                     Progress Notes - Physician (Notes from 03/16/17 through 03/19/17)      Progress Notes by Rohan Valerio MD at 3/19/2017 10:09 AM     Author:  Rohan Valerio MD Service:  Hospitalist Author Type:  Physician    Filed:  3/19/2017 10:11 AM Date of Service:  3/19/2017 10:09 AM Note Created:  3/19/2017 10:09 AM    Status:  Signed :  Rohan Valerio MD (Physician)         [facility]    Hospitalist Progress Note    Date of Service (when I saw the patient): 03/19/2017    Assessment & Plan   Tracy Palomo is a 76 year old female who was admitted on 3/15/2017. Doing well.  Hip is healing appropriately.  Atrial fibrillation is controlled.[DJ1.1]    Principal Problem:    Hip fracture, left, closed, initial encounter (H)  Active Problems:    Hypertension goal  BP (blood pressure) < 140/90    Hyperlipidemia LDL goal <130    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    Paroxysmal atrial fibrillation (H)    Long-term (current) use of anticoagulants [Z79.01]    Closed left hip fracture (H)    Fractured hip, left, closed, initial encounter (H)[DJ1.2]    Will d/c to rehab today on addition of metoprolol and continue warfarin until therapeutic.      DVT Prophylaxis: Enoxaprain (Lovenox) SQ and Warfarin  Code Status: Prior    Disposition: d/c today per roz Valerio MD    Interval History   Feeling much better.  Rate controlled.  INR is increasing.  Feels ready to go to rehab today.    -Data reviewed today: I reviewed all new labs and imaging results over the last 24 hours. I personally reviewed no images or EKG's today.    Physical Exam   Temp: 97.9  F (36.6  C) Temp src: Oral BP: 93/60 Pulse: 69 Heart Rate: 69 Resp: 16 SpO2: 96 % O2 Device: None (Room air)    Vitals:    03/15/17 1744 03/15/17 2150   Weight: 187 lb (84.8 kg) 191 lb (86.6 kg)     Vital Signs with Ranges  Temp:  [96.1  F (35.6  C)-97.9  F (36.6  C)] 97.9  F (36.6  C)  Pulse:  [62-69] 69  Heart Rate:  [62-70] 69  Resp:  [16-18] 16  BP: ()/(60-65) 93/60  SpO2:  [95 %-96 %] 96 %  I/O last 3 completed shifts:  In: 360 [P.O.:360]  Out: 800 [Urine:800]    Constitutional: WDWN  Respiratory: CTAB  Cardiovascular: RRR, no MRG  GI: soft, benign  Skin/Integumen: no bleeding/bruising  Neuro: grossly intact  Other:     Medications     Warfarin Therapy Reminder         warfarin  7.5 mg Oral ONCE at 18:00     enoxaparin  1 mg/kg Subcutaneous Q12H     metoprolol  12.5 mg Oral BID     simvastatin  20 mg Oral At Bedtime     sodium chloride (PF)  3 mL Intracatheter Q8H     acetaminophen  975 mg Oral Q8H     docusate sodium  100 mg Oral BID       Data   Data reviewed today:  I personally reviewed her labs.    Recent Labs  Lab 03/19/17  0603 03/18/17  0625 03/17/17  0535  03/16/17  0525  03/15/17  1807   WBC  --   --   --   --   --  6.8   HGB  --  10.6* 10.5*  --  13.3 13.8   MCV  --   --   --   --   --  90   PLT  --   --   --   --   --  228   INR 1.19* 1.04 1.08  < > 3.08* 2.99*   NA  --   --   --   --   --  142   POTASSIUM  --   --   --   --   --  4.0   CHLORIDE  --   --   --   --   --  105   CO2  --   --   --   --   --  32   BUN  --   --   --   --   --  11   CR  --   --   --   --   --  0.92   ANIONGAP  --   --   --   --   --  5   HARVEY  --   --   --   --   --  9.6   GLC  --   --  121*  --   --  107*   < > = values in this interval not displayed.    No results found for this or any previous visit (from the past 24 hour(s)).[DJ1.1]      Revision History        User Key Date/Time User Provider Type Action    > DJ1.2 3/19/2017 10:11 AM Rohan Valerio MD Physician Sign     DJ1.1 3/19/2017 10:09 AM Rohan Valerio MD Physician             Progress Notes by Ge Mahmood MD at 3/19/2017  8:38 AM     Author:  Ge Mahmood MD Service:  Orthopedics Author Type:  Physician    Filed:  3/19/2017  8:40 AM Date of Service:  3/19/2017  8:38 AM Note Created:  3/19/2017  8:38 AM    Status:  Signed :  Ge Mahmood MD (Physician)         [facility] Orthopedic Post-Op Note         Assessment and Plan:    Assessment:   Post-operative day #3  Femoral neck fracture and repair (Left)  Procedure(s):  OPEN REDUCTION INTERNAL FIXATION HIP BIPOLAR  Doing well.  Pain well-controlled.  Tolerating physical therapy and rehabilitation well.  It appears as though her irregular heart rhythm has stabilized.   Her anticoagulation is slowly increasing. She is now being bridged with Lovenox.        Plan:    patient to be transferred to extended care facility today.            Interval History:   Doing well.  Continues to improve.  Pain is well-controlled.  No fevers.              Physical Exam:   All vitals have been reviewed  Patient Vitals for the past 8 hrs:   BP Temp Temp  src Pulse Heart Rate Resp SpO2   03/19/17 0717 93/60 97.9  F (36.6  C) Oral 69 69 16 96 %   03/19/17 0200 113/65 96.1  F (35.6  C) Oral 62 62 16 95 %     I/O last 3 completed shifts:  In: 360 [P.O.:360]  Out: 800 [Urine:800]    Dressing dry and intact.           Data:   All laboratory/imaging data related to this surgery reviewed  Results for orders placed or performed during the hospital encounter of 03/15/17 (from the past 24 hour(s))   INR   Result Value Ref Range    INR 1.19 (H) 0.86 - 1.14        Ge Mahmood MD[JL1.1]     Revision History        User Key Date/Time User Provider Type Action    > JL1.1 3/19/2017  8:40 AM Ge Mahmood MD Physician Sign            Progress Notes by Whitley Pacheco at 3/18/2017 11:58 AM     Author:  Whitley Pacheco Service:  (none) Author Type:      Filed:  3/18/2017 12:04 PM Date of Service:  3/18/2017 11:58 AM Note Created:  3/18/2017 11:58 AM    Status:  Addendum :  Whitley Pacheco ()         Updated[JK1.1] Marlon,[JK1.2] P.Marilla that patient is not medically ready for discharge today.    HONEY Johnson  [facility] [phone]/ [facility] [phone][JK1.1]       Revision History        User Key Date/Time User Provider Type Action    > JK1.2 3/18/2017 12:04 PM Whitley Pacheco  Addend     JK1.1 3/18/2017 11:59 AM Whitley Pacheco  Sign            Progress Notes by Rohan Valerio MD at 3/18/2017 10:31 AM     Author:  Rohan Valerio MD Service:  Hospitalist Author Type:  Physician    Filed:  3/18/2017 10:44 AM Date of Service:  3/18/2017 10:31 AM Note Created:  3/18/2017 10:31 AM    Status:  Signed :  Rohan Valerio MD (Physician)         [facility]    Hospitalist Progress Note    Date of Service (when I saw the patient):[DJ1.1] 03/18/2017    Assessment & Plan[DJ1.2]   Tracy Palomo is a 76 year old female who was admitted on 3/15/2017. Her hip fracture was  repaired on 3/16, making her POD #2.  She did have episode of paroxysmal atrial fibrillation, but spontaneously converted.  INR is not therapeutic.  Pt's rate is bradycardic at baseline.  Not on any rate-lowering medications.  She does continue to have some mild dizziness at this time.[DJ1.1]    Principal Problem:    Hip fracture, left, closed, initial encounter (H)  Active Problems:    Hypertension goal BP (blood pressure) < 140/90    Hyperlipidemia LDL goal <130    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    Paroxysmal atrial fibrillation (H)    Long-term (current) use of anticoagulants [Z79.01]    Closed left hip fracture (H)    Fractured hip, left, closed, initial encounter (H)[DJ1.3]    Will start Lovenox since still very low INR.  Will recommend more aggressive dosing as I think we'll need to overcome the preoperative vitamin K before she will get therapeutic.  Will cautiously try low dose of metoprolol for rate control.  Suspect that her episode of atrial fib was related to post-op fluids causing atrial stretch.  These have been stopped at this point.    Monitor for further dizziness.  Anticipate need for one more day of inpatient care.  DVT Prophylaxis: Enoxaprain (Lovenox) SQ and Warfarin  Code Status:[DJ1.1] Full Code[DJ1.2]    Disposition: Expected discharge in 1 day once better control of paroxysmal atrial fibrillation and if stable per ortho.[DJ1.1]    Rohan Valerio MD    Interval History[DJ1.2]   Pt had episode of palpitations last night, was in atrial fibrillation with elevated rates.  Pt spontaneously converted without intervention.  Has known h/o atrial fibrillation (paroxysmal).  She does have some persistent dizziness this morning after getting up to the restroom.  Otherwise doing well.  Pain is controlled.  INR still sub therapeutic, actually slightly worse.    -Data reviewed today: I reviewed all new labs and imaging results over the last 24 hours. I personally  reviewed no images or EKG's today.  Atrial fibrillation was on rhythm strip[DJ1.1]    Physical Exam   Temp: 97.2  F (36.2  C) Temp src: Oral BP: 108/63 Pulse: 59 Heart Rate: 63 Resp: 18 SpO2: 96 % O2 Device: None (Room air)    Vitals:    03/15/17 1744 03/15/17 2150   Weight: 187 lb (84.8 kg) 191 lb (86.6 kg)[DJ1.2]     Vital Signs with Ranges[DJ1.1]  Temp:  [96.2  F (35.7  C)-97.5  F (36.4  C)] 97.2  F (36.2  C)  Pulse:  [59-74] 59  Heart Rate:  [59-74] 63  Resp:  [16-18] 18  BP: (105-116)/(54-63) 108/63  SpO2:  [96 %-98 %] 96 %  I/O last 3 completed shifts:  In: 740 [P.O.:740]  Out: 700 [Urine:700][DJ1.2]    Constitutional: WDWN, NAD  Respiratory: CTAB  Cardiovascular: RRR, no MRG  GI: soft, NT, ND, no masses  Skin/Integumen: no rash  Neuro: grossly intact  Other:[DJ1.1]     Medications     Warfarin Therapy Reminder         enoxaparin  1 mg/kg Subcutaneous Q12H     metoprolol  12.5 mg Oral BID     warfarin  10 mg Oral ONCE at 18:00     simvastatin  20 mg Oral At Bedtime     sodium chloride (PF)  3 mL Intracatheter Q8H     acetaminophen  975 mg Oral Q8H     docusate sodium  100 mg Oral BID       Data[DJ1.2]   Data reviewed today:  I personally reviewed her labs.[DJ1.1]    Recent Labs  Lab 03/18/17  0625 03/17/17  0535 03/16/17  0955 03/16/17  0525 03/15/17  1807   WBC  --   --   --   --  6.8   HGB 10.6* 10.5*  --  13.3 13.8   MCV  --   --   --   --  90   PLT  --   --   --   --  228   INR 1.04 1.08 1.54* 3.08* 2.99*   NA  --   --   --   --  142   POTASSIUM  --   --   --   --  4.0   CHLORIDE  --   --   --   --  105   CO2  --   --   --   --  32   BUN  --   --   --   --  11   CR  --   --   --   --  0.92   ANIONGAP  --   --   --   --  5   HARVEY  --   --   --   --  9.6   GLC  --  121*  --   --  107*       No results found for this or any previous visit (from the past 24 hour(s)).[DJ1.2]      Revision History        User Key Date/Time User Provider Type Action    > DJ1.3 3/18/2017 10:44 AM Rohan Valerio MD Physician  Sign     DJ1.2 3/18/2017 10:38 AM Rohan Valerio MD Physician      DJ1.1 3/18/2017 10:31 AM Rohan Valerio MD Physician             Progress Notes by Ge Mahmood MD at 3/18/2017  7:03 AM     Author:  Ge Mahmood MD Service:  Orthopedics Author Type:  Physician    Filed:  3/18/2017  7:08 AM Date of Service:  3/18/2017  7:03 AM Note Created:  3/18/2017  7:03 AM    Status:  Signed :  Ge Mahmood MD (Physician)         [facility] Orthopedic Post-Op Note         Assessment and Plan:    Assessment:   Post-operative day #2  Femoral neck fracture and repair (Left)  Procedure(s):  OPEN REDUCTION INTERNAL FIXATION HIP BIPOLAR  Doing well.  No immediate surgical complications identified.  No excessive bleeding  Pain well-controlled.  Tolerating physical therapy and rehabilitation well.  Recognizes that she has had episodes of atrial fibrillation. Patient is on chronic Coumadin therapy for history of DVT.       Plan:   Continue occupational therapy, physical therapy, supportive and symptomatic treatment and we will have to defer to the hospital physician any further recommendations for her heart rhythm.       It appears as though she is indeed stabilizing with respect to her orthopedic problem. From this perspective she may be ready to transfer soon.            Interval History:   Doing well.  Continues to improve.  Pain is well-controlled.  No fevers.    Describing the atypical heart rhythm.             Physical Exam:   All vitals have been reviewed[JL1.1]  No data found.    I/O last 3 completed shifts:  In: 740 [P.O.:740]  Out: 700 [Urine:700][JL1.2]    Dressing dry and intact.  Patient tolerates left hip range of motion without any difficulty.   Dressing will be changed today by the nurse.            Data:   All laboratory/imaging data related to this surgery reviewed[JL1.1]  Results for orders placed or performed during the hospital encounter of 03/15/17  (from the past 24 hour(s))   Care Transition RN/SW IP Consult    Narrative    Hailey Rodriguez     3/17/2017  3:04 PM  Care Transition Initial Assessment - SW  Reason For Consult: discharge planning  Met with: Patient    Principal Problem:    Hip fracture, left, closed, initial encounter (H)  Active Problems:    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    Paroxysmal atrial fibrillation (H)    Long-term (current) use of anticoagulants [Z79.01]    Closed left hip fracture (H)    Fractured hip, left, closed, initial encounter (H)    Hypertension goal BP (blood pressure) < 140/90    Hyperlipidemia LDL goal <130         DATA  Lives With: spouse  Living Arrangements: house  Description of Support System: Supportive, Involved  Who is your support system?: , Children  Support Assessment: Adequate family and caregiver support,   Adequate social supports.     Patient feels that they have adequate support @ home?  No           Quality Of Family Relationships: supportive, helpful, involved    Transportation Available: family or friend will provide  Van transport and private pay cost also discussed in case needed.       ASSESSMENT  Cognitive Status:  awake, alert and oriented       PLAN  Financial costs for the patient includes :Van transport if   needed.  Patient given options and choices for discharge : Patient wanting   [facility] TCU, as  is currently in TCU there.  Patient/family is agreeable to the plan?  Yes    Patient anticipates discharging to:  TCU at [facility].      will continue to follow for d/c planning.      Hemoglobin   Result Value Ref Range    Hemoglobin 10.6 (L) 11.7 - 15.7 g/dL   INR   Result Value Ref Range    INR 1.04 0.86 - 1.14        Ge Mahmood MD[JL1.2]     Revision History        User Key Date/Time User Provider Type Action    > JL1.2 3/18/2017  7:08 AM Ge Mahmood MD Physician Sign     JL1.1 3/18/2017  7:03 AM Ge Mahmood  "MD Juanito Physician             Progress Notes by Ann Steele, RN at 3/18/2017 12:30 AM     Author:  Ann Steele RN Service:  (none) Author Type:  Registered Nurse    Filed:  3/18/2017  2:44 AM Date of Service:  3/18/2017 12:30 AM Note Created:  3/18/2017  2:41 AM    Status:  Signed :  Ann Steeel RN (Registered Nurse)         S-(situation): MD notification    B-(background): post op JUANCARLOS , history of AFib.     A-(assessment): patient complained of palpitations noted heart rate irregular and rate ranges from /130. She denied any complaints of chest pain, dizziness or shortness of breath.     R-(recommendations): Dr Jarrett aware will continue anticoagulants as ordered and continue to monitor vitals and for any sustained elevated heart rate or change in symptoms at this time.[KL1.1]        Revision History        User Key Date/Time User Provider Type Action    > KL1.1 3/18/2017  2:44 AM Ann Steele RN Registered Nurse Sign            Progress Notes by Randy Tobar at 3/17/2017  3:32 PM     Author:  Randy Tobar Service:  Spiritual Health Author Type:      Filed:  3/17/2017  3:58 PM Date of Service:  3/17/2017  3:32 PM Note Created:  3/17/2017  3:32 PM    Status:  Signed :  Randy Tobar ()         SPIRITUAL HEALTH SERVICES  SPIRITUAL ASSESSMENT Progress Note  [facility]      PRIMARY FOCUS:     Emotional/spiritual/Yazidi distress - Pt requested visit    Support for coping    ILLNESS CIRCUMSTANCES:     Reviewed documentation.     Context of Serious Illness/Symptoms - Left hip fracture due to a fall.    Resources for Support - two daughters, two adult grandsons, other family    DISTRESS:     Emotional/Spiritual/Existential Distress - Pt became teary-eyed as she talked about her concern for her , Leonel, who was declining mentally and physically.  She said she was bearing the brunt of his anger \"that the world " "was closing in on him.\"   validated her tears and affirmed her for talking about it.  (Pt also reminisced about the many good times with her  until five years ago.)    Uatsdin Distress - Pt said she would like to possibly connect with a Sikhism, but her  was opposed.    Social/Cultural/Economic Distress - Not Discussed    SPIRITUAL/Yazidism COPING:     Cheondoism/Lori - Pt said she was raised Yazidism and her  Denominational (his Dad was a Denominational ), but they could never agree on a denomination, so they need up without any.   talked about the Lord \"knowing a person's heart\" and loving her as God's daughter.     Spiritual Practice - Prayer  Pt welcomed prayer, which  provided.  She declined a prayer shawl because she said that might upset her .    GOALS OF CARE:    Goals of Care - Recover from surgery, begin rehab.    Meaning/Sense-Making - Not Discussed    PLAN:  will refer pt to Associate nima Candelaria who will be here next week.    Randy Tobar M.Div., [facility]  Staff   Office tel: [phone][JV1.1]       Revision History        User Key Date/Time User Provider Type Action    > JV1.1 3/17/2017  3:58 PM Randy Tobar Sign            Progress Notes by Rohan Valerio MD at 3/17/2017 11:58 AM     Author:  Rohan Valerio MD Service:  Hospitalist Author Type:  Physician    Filed:  3/17/2017 12:06 PM Date of Service:  3/17/2017 11:58 AM Note Created:  3/17/2017 11:58 AM    Status:  Signed :  Rohan Valerio MD (Physician)         [facility]    Hospitalist Progress Note    Date of Service (when I saw the patient):[DJ1.1] 03/17/2017    Assessment & Plan[DJ1.2]   Tracy Palomo is a 76 year old female who was admitted on 3/15/2017 for left hip fracture.  This was repaired on 3/16.  Pt doing well with post-op course.  No obvious complications at this time.  Does have " history of PE, had vitamin K to reverse her anticoagulation preoperatively.[DJ1.1]    Principal Problem:    Hip fracture, left, closed, initial encounter (H)  Active Problems:    Hypertension goal BP (blood pressure) < 140/90    Hyperlipidemia LDL goal <130    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    Paroxysmal atrial fibrillation (H)    Long-term (current) use of anticoagulants [Z79.01]    Closed left hip fracture (H)    Fractured hip, left, closed, initial encounter (H)[DJ1.3]    BP controlled.  Continue current regimen.    Resume anticoagulation for history of PE and atrial fibrillation.      DVT Prophylaxis: Warfarin  Code Status:[DJ1.1] Full Code[DJ1.2]    Disposition: Per ortho[DJ1.1]    Rohan Valerio MD    Interval History[DJ1.2]   Pt reports good pain control, starting to ambulate.  No real complaints at all.  Denies CP, SOB, other concerns.    -Data reviewed today: I reviewed all new labs and imaging results over the last 24 hours. I personally reviewed her reprots.[DJ1.1]    Physical Exam   Temp: 97.3  F (36.3  C) Temp src: Oral BP: 128/71 Pulse: 56 Heart Rate: 56 Resp: 16 SpO2: 96 % O2 Device: Nasal cannula Oxygen Delivery: 1 LPM  Vitals:    03/15/17 1744 03/15/17 2150   Weight: 187 lb (84.8 kg) 191 lb (86.6 kg)[DJ1.2]     Vital Signs with Ranges[DJ1.1]  Temp:  [95.8  F (35.4  C)-97.9  F (36.6  C)] 97.3  F (36.3  C)  Pulse:  [55-61] 56  Heart Rate:  [55-64] 56  Resp:  [5-18] 16  BP: ()/(40-83) 128/71  SpO2:  [90 %-97 %] 96 %  I/O last 3 completed shifts:  In: 3835 [P.O.:300; I.V.:2935]  Out: 2625 [Urine:1975; Blood:650][DJ1.2]    Constitutional: WDWN, NAD  Respiratory: CTAB  Cardiovascular: RRR, no MRG  GI: soft, NT, ND, no massses  Skin/Integumen: no rashes noted.  Neuro: grossly intact.  Other:[DJ1.1]     Medications     Warfarin Therapy Reminder       lactated ringers Stopped (03/17/17 0324)       warfarin  7.5 mg Oral ONCE at 18:00     simvastatin  20 mg Oral At  Bedtime     sodium chloride (PF)  3 mL Intracatheter Q8H     acetaminophen  975 mg Oral Q8H     docusate sodium  100 mg Oral BID       Data[DJ1.2]   Data reviewed today:  I personally reviewed her labs.[DJ1.1]    Recent Labs  Lab 03/17/17  0535 03/16/17  0955 03/16/17  0525 03/15/17  1807   WBC  --   --   --  6.8   HGB 10.5*  --  13.3 13.8   MCV  --   --   --  90   PLT  --   --   --  228   INR 1.08 1.54* 3.08* 2.99*   NA  --   --   --  142   POTASSIUM  --   --   --  4.0   CHLORIDE  --   --   --  105   CO2  --   --   --  32   BUN  --   --   --  11   CR  --   --   --  0.92   ANIONGAP  --   --   --  5   HARVEY  --   --   --  9.6   *  --   --  107*       Recent Results (from the past 24 hour(s))   XR Pelvis w Hip Port Left 1 View    Narrative    PORTABLE ONE VIEW PELVIS AND ONE VIEW LEFT HIP 3/16/2017 12:29 PM     HISTORY: Postoperative evaluation of the left hip.    COMPARISON: 3/15/2017    FINDINGS: There are interval surgical changes of a left bipolar hip  arthroplasty. The hardware is intact with no fracture or other  complication seen. Lateral skin staples are seen.  No other  abnormality is demonstrated.      Impression    IMPRESSION: Unremarkable postoperative appearance of the left hip.    JOYCE LAYTON MD[DJ1.2]         Revision History        User Key Date/Time User Provider Type Action    > DJ1.3 3/17/2017 12:06 PM Rohan Valerio MD Physician Sign     DJ1.2 3/17/2017 12:03 PM Rohan Valerio MD Physician      DJ1.1 3/17/2017 11:58 AM Rohan Valerio MD Physician             Progress Notes by Jana Guerra OT at 3/17/2017 10:54 AM     Author:  Jana Guerra OT Service:  (none) Author Type:  Occupational Therapist    Filed:  3/17/2017 10:55 AM Date of Service:  3/17/2017 10:54 AM Note Created:  3/17/2017 10:54 AM    Status:  Signed :  Charlie, Jana M, OT (Occupational Therapist)                                            Occupational Therapy  "Evaluation     03/17/17 0955   Quick Adds   Type of Visit Initial Occupational Therapy Evaluation   Living Environment   Lives With spouse   Living Arrangements house   Home Accessibility no concerns   Number of Stairs to Enter Home 0  (ramp)   Number of Stairs Within Home 10   Transportation Available family or friend will provide   Living Environment Comment Pt lives in a house with a ramp into the front door. Once inside, all needs are on the main level including bedroom, bathroom, kitchen, living room and laundry. The bathroom is wheelchair accessible with a walk-in shower and a shower chair. She typically is the caretaker for her , however he is currently at a TCU for a recent hospital admission. No one else is at home with them.   (original from PT eval note)   Self-Care   Dominant Hand right   Usual Activity Tolerance excellent   Current Activity Tolerance good   Regular Exercise yes   Activity/Exercise Type walking   Exercise Amount/Frequency daily   Equipment Currently Used at Home none   Activity/Exercise/Self-Care Comment Medical alert systen (when returning to home will be activated) reacher, sock aide, LH shoe horn, shower chair, , walker, lift chair   Functional Level Prior   Ambulation 0-->independent   Transferring 0-->independent   Toileting 0-->independent   Bathing 0-->independent   Dressing 0-->independent   Eating 0-->independent   Communication 0-->understands/communicates without difficulty   Swallowing 0-->swallows foods/liquids without difficulty   Cognition 0 - no cognition issues reported   Fall history within last six months yes   Number of times patient has fallen within last six months 1   Which of the above functional risks had a recent onset or change? ambulation;transferring   Prior Functional Level Comment Very active woman.   General Information   Onset of Illness/Injury or Date of Surgery - Date 03/16/17   Referring Physician Dr Pang   Patient/Family Goals Statement \"short " term rehab prior return to previous living situation.   Additional Occupational Profile Info/Pertinent History of Current Problem The patient is a 77 y/o female who slipped and fell onto ice, resulting with left hip fracture and hemiplasty.  She is a very active individual and was fully independent with BADL/IADLs, including care for her  prior injury.   Precautions/Limitations fall precautions   Weight-Bearing Status - LLE weight-bearing as tolerated   General Info Comments Daughter, Iesha present for most of OT eval.   Cognitive Status Examination   Orientation orientation to person, place and time   Level of Consciousness alert   Able to Follow Commands success, 4-or-more step commands   Personal Safety (Cognitive) good awareness, safety precautions   Memory intact   Attention No deficits were identified   Organization/Problem Solving No deficits were identified   Executive Function No deficits were identified   Cognitive Comment (patient is alert and sociable)   Visual Perception   Visual Perception Wears glasses  (reading only)   Pain Assessment   Patient Currently in Pain Yes, see Vital Sign flowsheet   Posture   Posture not impaired   Range of Motion (ROM)   ROM Quick Adds No deficits were identified   Strength   Manual Muscle Testing Quick Adds No deficits were identified   Coordination   Upper Extremity Coordination No deficits were identified   Transfer Skills   Transfer Comments refer to PT eval and findings   Balance   Balance Comments refer PT eval and findings   Upper Body Dressing   Level of [place]: Dress Upper Body independent   Lower Body Dressing   Level of [place]: Dress Lower Body dependent (less than 25% patients effort)  (due to hip precautions)   Grooming   Level of [place]: Grooming independent   Physical Assist/Nonphysical Assist: Grooming set-up required   Eating/Self Feeding   Level of [place]: Eating independent   Physical Assist/Nonphysical Assist: Eating set-up  "required   Instrumental Activities of Daily Living (IADL)   Previous Responsibilities driving;meal prep;housekeeping;laundry;shopping;yardwork;medication management;finances   IADL Comments bike rides, walks, fishing, camping, volunteer  (cared for  medical needs)   General Therapy Interventions   Planned Therapy Interventions ADL retraining;bed mobility training;transfer training;home program guidelines   Clinical Impression   Criteria for Skilled Therapeutic Interventions Met yes, treatment indicated   OT Diagnosis Decreased safety and functional independence BADL/IADL s/p injury    Influenced by the following impairments s/p left hip hemiplasty; pain, decreased ROM/strength and standing tolerance   Assessment of Occupational Performance 1-3 Performance Deficits   Identified Performance Deficits dressing, bathing, groom/hygiene standing at sink, driving, home mgt, community mobility, leisure activities and care for spouse   Clinical Decision Making (Complexity) Low complexity   Therapy Frequency daily   Predicted Duration of Therapy Intervention (days/wks) 1 day, prior TCU   Anticipated Equipment Needs at Discharge (defer to TCU)   Anticipated Discharge Disposition Transitional Care Facility   Risks and Benefits of Treatment have been explained. Yes   Patient, Family & other staff in agreement with plan of care Yes   [facility] TM \"6 Clicks\"   2016, Trustees of [facility], under license to [employer]  All rights reserved.   6 Clicks Short Forms Daily Activity Inpatient Short Form   [facility]  \"6 Clicks\" Daily Activity Inpatient Short Form   1. Putting on and taking off regular lower body clothing? 1 - Total   2. Bathing (including washing, rinsing, drying)? 2 - A Lot   3. Toileting, which includes using toilet, bedpan or urinal? 2 - A Lot   4. Putting on and taking off regular upper body clothing? 4 - None   5. Taking care of personal grooming such as brushing teeth? 4 " "- None   6. Eating meals? 4 - None   Daily Activity Raw Score (Score out of 24.Lower scores equate to lower levels of function) 17   Total Evaluation Time   Total Evaluation Time (Minutes) 15         Jana Guerra MA, OTR/L  [facility]  [phone][MS1.1]     Revision History        User Key Date/Time User Provider Type Action    > MS1.1 3/17/2017 10:55 AM Jana Guerra OT Occupational Therapist Sign            Progress Notes by Abimael Dodson PA-C at 3/17/2017  7:04 AM     Author:  Abimael Dodson PA-C Service:  Orthopedics Author Type:  Physician Assistant    Filed:  3/17/2017  7:17 AM Date of Service:  3/17/2017  7:04 AM Note Created:  3/17/2017  7:04 AM    Status:  Addendum :  Abimael Dodson PA-C (Physician Assistant)         [facility]    Progress note    76 year old female POD#1 s/p L Hip Hemiarthroplasty   S: Patient seen at bedside in no apparent distress, alert and pleasant.  I discussed surgery and rehabilitation with the patient.  She denies numbness, tingling or major pain issues.  She is doing well, says pain is a lot better than before surgery.  PT went well yesterday.  Discussed DC to TCU.    O: CMS intact LLE, DF/PF intact       Dressing on, clean and dry with a good seal       Ace on and intact       L hip of what I can see above the ace, with minimal swelling and no erythema or ecchymosis        Bilateral calves soft and non tender    Vital signs:  Temp: 97.2  F (36.2  C) Temp src: Oral BP: 102/55 Pulse: 55 Heart Rate: 55 Resp: 14 SpO2: 95 % O2 Device: Nasal cannula Oxygen Delivery: 2 LPM Height: 167.6 cm (5' 6\") Weight: 86.6 kg (191 lb)  Estimated body mass index is 30.83 kg/(m^2) as calculated from the following:    Height as of this encounter: 1.676 m (5' 6\").    Weight as of this encounter: 86.6 kg (191 lb).      Hemoglobin   Date Value Ref Range Status   03/17/2017 10.5 (L) 11.7 - 15.7 g/dL Final     INR   Date Value Ref " Range Status   03/17/2017 1.08 0.86 - 1.14 Final     INR Protime   Date Value Ref Range Status   03/07/2017 1.6 (A) 0.86 - 1.14 Final         A/P:  1. Pain-controlled   2. DVT Prophylaxis[MT1.1]/afib treatment[MT1.2]-[MT1.1]back on coumadin, INR Goal 2-3[MT1.2]  3. Weight Bearing Status-WBAT[MT1.1] L[MT1.2]LE  4. Physical Therapy-[MT1.1]Recommending Transitional Care Center.[MT1.2]    5. Occupational Therapy-[MT1.1]ADLs[MT1.2]   6. Case Management-[MT1.1]to see the patient.  She says [facility][MT1.2]   7. Hospitalist-[MT1.1]Dr. Kolby MD did H&P[MT1.2]  8. Incision-no signs or symptoms of infection[MT1.1]. Keep incision covered with hospital dressing (Aquacel) for one week. It is okay to shower with this dressing on. However, do not submerge your dressing and incision in water for roughly 2 weeks. After one week remove this dressing and then keep it clean, dry, and covered. If this dressing become looses or falls off it is ok to cover with gauze and tape.  Wash the incision gently with warm soapy water after removing your hospital dressing and lightly pat dry.[MT1.3]    9. Plan-[MT1.1]DC to [facility] Sunday is the plan.[MT1.2]      Abimael Dodson PA-C  3/17/2017[MT1.1]             Revision History        User Key Date/Time User Provider Type Action    > MT1.3 3/17/2017  7:17 AM Abimael Dodson PA-C Physician Assistant Addend     MT1.2 3/17/2017  7:10 AM Abimael Dodson PA-C Physician Assistant Sign     MT1.1 3/17/2017  7:04 AM Abimael Dodson PA-C Physician Assistant             Progress Notes by Jay Jay Hurd PT at 3/16/2017  5:26 PM     Author:  Jay Jay Hurd PT Service:  (none) Author Type:  Physical Therapist    Filed:  3/16/2017  5:26 PM Date of Service:  3/16/2017  5:26 PM Note Created:  3/16/2017  5:26 PM    Status:  Signed :  Jay Jay Hurd PT (Physical Therapist)          03/16/17 1547   Quick Adds   Type of Visit Initial PT Evaluation   Living Environment    Lives With spouse   Living Arrangements house   Home Accessibility no concerns   Number of Stairs to Enter Home 0  (ramp)   Number of Stairs Within Home 10  (basement)   Stair Railings at Home other (see comments)  (grab bars set up in bedroom, bathroom)   Transportation Available family or friend will provide   Living Environment Comment Pt lives in a house with a ramp into the front door. Once inside, all needs are on the main level including bedroom, bathroom, kitchen, living room and laundry. The bathroom is wheelchair accessible with a walk-in shower and a shower chair. She typically is the caretaker for her , however he is currently at a TCU for a recent hospital admission. No one else is at home with them.    Self-Care   Usual Activity Tolerance good   Current Activity Tolerance good   Regular Exercise yes   Activity/Exercise Type walking   Exercise Amount/Frequency daily   Equipment Currently Used at Home none   Activity/Exercise/Self-Care Comment Pt very active at baseline. She is used to taking care of her  who is wheelchair-bound, and walks daily as well.    Functional Level Prior   Ambulation 0-->independent   Transferring 0-->independent   Toileting 0-->independent   Bathing 0-->independent   Dressing 0-->independent   Eating 0-->independent   Communication 0-->understands/communicates without difficulty   Swallowing 0-->swallows foods/liquids without difficulty   Cognition 0 - no cognition issues reported   Fall history within last six months yes   Number of times patient has fallen within last six months 1   Which of the above functional risks had a recent onset or change? ambulation;transferring   Prior Functional Level Comment Pt was independent with ADLs and ambulation prior   General Information   Onset of Illness/Injury or Date of Surgery - Date 03/16/17   Referring Physician Dr. Pang   Patient/Family Goals Statement Pt wants to get back to going on her daily walks, and ride a  "bike   Pertinent History of Current Problem (include personal factors and/or comorbidities that impact the POC) Copied from previous EMR: \"Tracy Palomo is a pleasant 76-year-old female independent ambulator who lives alone, with a ground level fall after slipping on ice, landing on her left side.  She was evaluated in the ER, and diagnosed with a displaced left femoral neck fracture.\" PMH includes history of PEs secondary to A-fib, and pt had been under anticoagulation therapy, which was reversed for the surgery. She is the primary caretaker for her , who is dependent on wheelchair for mobility, however he has been in a TCU himself, so pt is currently living alone. She is a fairly active and fit individual at baseline.    Precautions/Limitations fall precautions   Weight-Bearing Status - LUE full weight-bearing   Weight-Bearing Status - RUE full weight-bearing   Weight-Bearing Status - LLE weight-bearing as tolerated   Weight-Bearing Status - RLE full weight-bearing   Heart Disease Risk Factors Age   General Observations Pt's sister and daughter present for session   Cognitive Status Examination   Orientation orientation to person, place and time   Level of Consciousness alert   Follows Commands and Answers Questions 100% of the time   Personal Safety and Judgment intact   Memory intact   Pain Assessment   Patient Currently in Pain No   Integumentary/Edema   Integumentary/Edema no deficits were identifed   Integumentary/Edema Comments Incision not directly observed, dressing in place over left hip, no bleeding or spotting noted   Posture    Posture Protracted shoulders;Kyphosis   Range of Motion (ROM)   ROM Comment UEs and R LE WFL for functional mobility and ambulation, L LE limited secondary to recent surgery   Strength   Strength Comments UEs and R LE WFL for functional mobility and ambulation, L LE limited secondary to recent surgery   Bed Mobility   Bed Mobility Comments Demonstrates bed mobility " including rolling bilaterally and bridging with Min Assist to help mobilize L LE, using overhead trapeze, with HOB flat   Transfer Skills   Transfer Comments Supine <> sit transfers with use of overhead trapeze with Min Assist to move L LE, able to work her way to edge of bed safely given extra time to perform task. Sit <> stand transfer with CGA x1 for safety using front wheeled walker appropriately, verbal cues for hand placement with task, bed raised slightly.    Gait   Gait Comments Gait not truly assessed during evaluation due to fatigue, Able to perform pre-gait activities such as marching and side-stepping to [facility] with CGA x1 and verbal cues with no increase in pain reported   Balance   Balance Comments Sitting balance fair, relies with moderate effort of UEs to maintain balance, but able to raise both arms overhead without significant trunk LOB. Standing balance fair with use of walker, relies on UEs for steadiness   Sensory Examination   Sensory Perception no deficits were identified   Coordination   Coordination no deficits were identified   Muscle Tone   Muscle Tone no deficits were identified   Modality Interventions   Planned Modality Interventions Comments prn for pain    General Therapy Interventions   Planned Therapy Interventions balance training;bed mobility training;gait training;ROM;strengthening;transfer training;risk factor education;progressive activity/exercise   Intervention Comments Plan to use the above interventions to progress safety and independence with functional mobility and ambulation prior to d/c   Clinical Impression   Criteria for Skilled Therapeutic Intervention yes, treatment indicated   PT Diagnosis s/p L hip Hemiarthroplasty, POD #0   Influenced by the following impairments Pain, Decreased strength, Decreased ROM   Functional limitations due to impairments Impaired bed mobility, gait, transfers, balance   Clinical Presentation Stable/Uncomplicated   Clinical Presentation  "Rationale Doing as expected for current post-op status   Clinical Decision Making (Complexity) Low complexity   Therapy Frequency` daily   Predicted Duration of Therapy Intervention (days/wks) 3 days   Anticipated Discharge Disposition Transitional Care Facility   Risk & Benefits of therapy have been explained Yes   Patient, Family & other staff in agreement with plan of care Yes   Clinical Impression Comments Tracy is a pleasant 75 y/o female admitted to the hospital currently POD #0 s/p L hip hemiarthroplasty, after sustaining a ground level fall slipping on ice in her driveway yesterday. She is doing well overall thus far, requiring Min Assist for functional mobility and contact guard for pre-gait activities in standing. She will benefit from skilled therapy to progress safety and independence with functional mobility and ambulation, and will likely require short term rehab stay to ensure she is independent with ADLs and all mobility prior to returning home, as she does not have any assistance at home.    [facility] TM \"6 Clicks\"   2016, Trustees of [facility], under license to [employer].  All rights reserved.   6 Clicks Short Forms Basic Mobility Inpatient Short Form   [facility]  \"6 Clicks\" V.2 Basic Mobility Inpatient Short Form   1. Turning from your back to your side while in a flat bed without using bedrails? 3 - A Little   2. Moving from lying on your back to sitting on the side of a flat bed without using bedrails? 3 - A Little   3. Moving to and from a bed to a chair (including a wheelchair)? 3 - A Little   4. Standing up from a chair using your arms (e.g., wheelchair, or bedside chair)? 3 - A Little   5. To walk in hospital room? 2 - A Lot   6. Climbing 3-5 steps with a railing? 2 - A Lot   Basic Mobility Raw Score (Score out of 24.Lower scores equate to lower levels of function) 16   Total Evaluation Time   Total Evaluation Time (Minutes) 23           Jay Jay " GUILLERMO Hurd, DPT        3/16/2017      5:26 PM  [facility]  [phone][TD1.1]             Revision History        User Key Date/Time User Provider Type Action    > TD1.1 3/16/2017  5:26 PM Jay Jay Hurd PT Physical Therapist Sign            Progress Notes by Mona Duran RN at 3/16/2017  2:02 PM     Author:  Mona Duran RN Service:  (none) Author Type:  Registered Nurse    Filed:  3/16/2017  2:05 PM Date of Service:  3/16/2017  2:02 PM Note Created:  3/16/2017  2:02 PM    Status:  Signed :  Mona Duran RN (Registered Nurse)         S-(situation): Transfer of care note.    B-(background): s/p left hip hemiarthroplasty    A-(assessment): pt sleepy, arousable to voice. Denies pain at this time. Left hip dressing C/D/I. CMS intact. Tolerating ice chips/clear liquids.    R-(recommendations): continue post -op VS and assesments.[KH1.1]     Revision History        User Key Date/Time User Provider Type Action    > KH1.1 3/16/2017  2:05 PM Mona Duran RN Registered Nurse Sign            ED Provider Notes by Ronal Jones MD at 3/15/2017  5:36 PM     Author:  Ronal Jones MD Service:  Emergency Medicine Author Type:  Physician    Filed:  3/16/2017 10:40 AM Date of Service:  3/15/2017  5:36 PM Note Created:  3/15/2017  5:44 PM    Status:  Addendum :  Ronal Jones MD (Physician)           History[MM1.1]     Chief Complaint   Patient presents with     Hip Pain     Left hip pain. Fall just PTA. Fell on ice.[BM1.1]      The history is provided by the patient.[MM1.2]     Tracy Palomo is a 76 year old female who[MM1.1] presents to the ED via EMS with left hip pain. Patient was outside her house when she slipped and fell. She has excruciating pain to left hip[MM1.2] that radiates down her leg[BM1.2]. She was given IV fentanyl, 150 mg en route with some relief. Patient lives alone.[MM1.2]     Patient Active Problem List   Diagnosis     Hypertension goal BP (blood pressure) <  140/90     Hyperlipidemia LDL goal <130     Encounter for long-term current use of medication     History of colonic polyps     Advanced directives, counseling/discussion     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     Recent major surgery - exploratory lap with small bowel resection 10/20     Pleural effusion on right     Anemia     Paroxysmal atrial fibrillation (H)     Long-term (current) use of anticoagulants [Z79.01]     Hip fracture, left, closed, initial encounter (H)     Closed left hip fracture (H)     Past Medical History   Diagnosis Date     Atrial fibrillation (H) 2014     related to colon surgery     Colon polyps      Diverticulosis of colon (without mention of hemorrhage)      Diverticulosis     DVT (deep vein thrombosis) in pregnancy (H) 2014     after colon surgery     Other and unspecified hyperlipidemia      PE (pulmonary embolism) 2014     related to colon surgery     Unspecified essential hypertension        Past Surgical History   Procedure Laterality Date     Colonoscopy  09, 10, 12     [facility]     Flexible sigmoidoscopy  09, 11     Laparotomy exploratory N/A 10/20/2014     Procedure: LAPAROTOMY EXPLORATORY;  Surgeon: Miguel Oleary MD;  Location: [facility] OR     Laparotomy, lysis adhesions, combined N/A 10/20/2014     Procedure: COMBINED LAPAROTOMY, LYSIS ADHESIONS;  Surgeon: Miguel Oleary MD;  Location: [facility] OR     Resect small bowel without ostomy N/A 10/20/2014     Procedure: RESECT SMALL BOWEL WITHOUT OSTOMY;  Surgeon: Miguel Oleary MD;  Location: [facility] OR       Family History   Problem Relation Age of Onset     Blood Disease No family hx of      blood clots       Social History   Substance Use Topics     Smoking status: Never Smoker     Smokeless tobacco: Never Used     Alcohol use No        Immunization History   Administered Date(s) Administered     Hepatitis A Vac Ped/Adol-2 Dose 11/01/2010     Hepatitis B 08/31/2009, 10/26/2009,  11/01/2010     Influenza (High Dose) 3 valent vaccine 10/22/2014, 08/30/2016     Influenza (IIV3) 08/31/2009     Pneumococcal (PCV 13) 08/31/2009     Pneumococcal 23 valent 10/22/2014     TD (ADULT, 7+) 08/31/2009          Allergies   Allergen Reactions     No Known Allergies        No current outpatient prescriptions on file.[BM1.1]       I have reviewed the Medications, Allergies, Past Medical and Surgical History, and Social History in the Epic system.[MM1.1]    Review of Systems   Musculoskeletal:        Positive for left hip pain.    All other systems reviewed and are negative.[MM1.2]      Physical Exam[MM1.1]      Physical Exam   Constitutional: She is oriented to person, place, and time. She appears well-developed and well-nourished. No distress.   HENT:   Head: Normocephalic and atraumatic.   Eyes: Conjunctivae and[MM1.2] EOM[BM1.3] are normal. Pupils are equal, round, and reactive to light.   Neck:[MM1.2] Normal range of motion[BM1.3] present.   Cardiovascular: Normal rate, regular rhythm and[MM1.2] normal heart sounds[BM1.3].    No murmur heard.  Pulmonary/Chest: Effort normal and breath sounds normal. No respiratory distress. She exhibits[MM1.2] no tenderness[BM1.3].   Abdominal:[MM1.2] Soft[BM1.3].[MM1.2] Bowel sounds are normal[BM1.3]. There is[MM1.2] no tenderness[BM1.3].   Musculoskeletal:[MM1.2] Normal range of motion[BM1.3]. She exhibits no[MM1.2] tenderness[BM1.3].        Cervical back: She exhibits[MM1.2] no tenderness[BM1.3].        Thoracic back: She exhibits[MM1.2] no tenderness[BM1.3].        Lumbar back: She exhibits[MM1.2] no tenderness[BM1.3].   She is holding her left hip. There is pain with flexion. Minimal range of motion creates pain. CMS to left foot is intact.    Neurological: She is alert and oriented to person, place, and time.   Skin: Skin is warm and dry.[MM1.2] No abrasion[BM1.3],[MM1.2] no laceration[BM1.3] and no rash noted. She is not diaphoretic.   Psychiatric: She has a  normal mood and affect. Her behavior is normal.   Nursing note and vitals reviewed.[MM1.2]      ED Course[MM1.1]     ED Course[BM1.1]     Procedures             Critical Care time:[MM1.1]  none[BM1.2]          Results for orders placed or performed during the hospital encounter of 03/15/17 (from the past 24 hour(s))   CBC with platelets differential   Result Value Ref Range    WBC 6.8 4.0 - 11.0 10e9/L    RBC Count 4.85 3.8 - 5.2 10e12/L    Hemoglobin 13.8 11.7 - 15.7 g/dL    Hematocrit 43.4 35.0 - 47.0 %    MCV 90 78 - 100 fl    MCH 28.5 26.5 - 33.0 pg    MCHC 31.8 31.5 - 36.5 g/dL    RDW 14.2 10.0 - 15.0 %    Platelet Count 228 150 - 450 10e9/L    Diff Method Automated Method     % Neutrophils 64.3 %    % Lymphocytes 24.6 %    % Monocytes 8.5 %    % Eosinophils 2.0 %    % Basophils 0.3 %    % Immature Granulocytes 0.3 %    Absolute Neutrophil 4.4 1.6 - 8.3 10e9/L    Absolute Lymphocytes 1.7 0.8 - 5.3 10e9/L    Absolute Monocytes 0.6 0.0 - 1.3 10e9/L    Absolute Eosinophils 0.1 0.0 - 0.7 10e9/L    Absolute Basophils 0.0 0.0 - 0.2 10e9/L    Abs Immature Granulocytes 0.0 0 - 0.4 10e9/L   INR   Result Value Ref Range    INR 2.99 (H) 0.86 - 1.14   Basic metabolic panel   Result Value Ref Range    Sodium 142 133 - 144 mmol/L    Potassium 4.0 3.4 - 5.3 mmol/L    Chloride 105 94 - 109 mmol/L    Carbon Dioxide 32 20 - 32 mmol/L    Anion Gap 5 3 - 14 mmol/L    Glucose 107 (H) 70 - 99 mg/dL    Urea Nitrogen 11 7 - 30 mg/dL    Creatinine 0.92 0.52 - 1.04 mg/dL    GFR Estimate 60 (L) >60 mL/min/1.7m2    GFR Estimate If Black 72 >60 mL/min/1.7m2    Calcium 9.6 8.5 - 10.1 mg/dL   XR Pelvis w Hip Left 1 View    Narrative    PELVIS AND HIP LEFT ONE VIEW  3/15/2017 6:34 PM     COMPARISON: None    HISTORY: Trauma      Impression    IMPRESSION: There is a transverse fracture of the low cervical neck of  the proximal left femur with lateral displacement and various  angulation of the distal fragment. No other fractures.    SELENE  MD FANNY   XR Chest Port 1 View    Narrative    CHEST PORTABLE ONE VIEW 3/15/2017 9:16 PM     COMPARISON: Two-view chest x-ray 11/14/2014.    HISTORY: Pre-op.    FINDINGS: The cardiac silhouette, pulmonary vasculature, lungs and  pleural spaces are within normal limits.      Impression    IMPRESSION: Clear lungs.    SELENE ESCOBEDO MD   Orthopedic Surgery IP Consult: Patient to be seen: Routine - within 24 hours; left hip fracture; Consultant may enter orders: Yes    Kaleb France, DO     3/16/2017  8:08 AM  Impression:  Left displaced femoral fracture  Coumadin coagulopathy    Plan:  Recommend left hip hemiarthroplasty in order to regain function.  She started C vitamin K and INR equals 3. Spoke with Dr. Akers-will utilize fresh frozen plasma.  Keep n.p.o.  Risks and benefits discussed.  Planning surgery 10 AM today.    Full consult dictated:[id number]    Vitaly Pang D.O.   INR   Result Value Ref Range    INR 3.08 (H) 0.86 - 1.14   Hemoglobin   Result Value Ref Range    Hemoglobin 13.3 11.7 - 15.7 g/dL   Plasma prepare order unit   Result Value Ref Range    Ordered Component Type Plasma     Units Ordered 2    Blood component   Result Value Ref Range    Unit Number [id number]     Blood Component Type Plasma, Thawed     Division Number 00     Status of Unit Released to care unit 03/16/2017 0917     Blood Product Code [id number]     Unit Status ISS    Blood component   Result Value Ref Range    Unit Number [id number]     Blood Component Type Plasma, Thawed     Division Number 00     Status of Unit Released to care unit 03/16/2017 0844     Blood Product Code [id number]     Unit Status ISS    ABO/Rh type and screen   Result Value Ref Range    ABO Pending     RH(D) Pending     Antibody Screen Pending     Test Valid Only At Pending     Specimen Expires Pending      Medications   naloxone (NARCAN) injection 0.1-0.4 mg ( Intravenous Auto Hold 3/16/17 0937)   lidocaine 1 % 1 mL ( Other Auto Hold  3/16/17 0937)   lidocaine (LMX4) kit ( Topical Auto Hold 3/16/17 0937)   sodium chloride (PF) 0.9% PF flush 3 mL ( Intracatheter Auto Hold 3/16/17 0937)   sodium chloride (PF) 0.9% PF flush 3 mL ( Intracatheter Automatically Held 4/14/17 2215)   0.9% sodium chloride infusion ( Intravenous New Bag 3/16/17 0647)   HYDROmorphone (PF) (DILAUDID) injection 0.3-0.5 mg ( Intravenous Auto Hold 3/16/17 0937)   ondansetron (ZOFRAN-ODT) ODT tab 4 mg ( Oral Auto Hold 3/16/17 0937)     Or   ondansetron (ZOFRAN) injection 4 mg ( Intravenous Auto Hold 3/16/17 0937)   prochlorperazine (COMPAZINE) injection 5 mg ( Intravenous Auto Hold 3/16/17 0937)     Or   prochlorperazine (COMPAZINE) tablet 5 mg ( Oral Auto Hold 3/16/17 0937)     Or   prochlorperazine (COMPAZINE) Suppository 12.5 mg ( Rectal Auto Hold 3/16/17 0937)   ceFAZolin sodium-dextrose (ANCEF) infusion 2 g (not administered)   ceFAZolin (ANCEF) 1 g vial to attach to  ml bag for ADULT or 50 ml bag for PEDS (not administered)   sodium citrate-citric acid (BICITRA) solution 30 mL (not administered)   ondansetron (ZOFRAN) injection 4 mg (not administered)   HYDROmorphone (PF) (DILAUDID) injection 0.5 mg (0.5 mg Intravenous Given 3/15/17 2040)   phytonadione (MEPHYTON) half-tab 2.5 mg (2.5 mg Oral Given 3/15/17 2001)   phytonadione (AQUA-MEPHYTON) 5 mg in NaCl 0.9 % 50 mL intermittent infusion (5 mg Intravenous New Bag 3/16/17 0647)   ondansetron (ZOFRAN) injection 4 mg (4 mg Intravenous Given 3/16/17 0957)[BM1.1]       Assessments & Plan (with Medical Decision Making)  Tracy Palomo is a 76 year old female[MM1.1] presenting with excruciating pain to left hip after a fall. She was given  Dilaudid for the pain.[MM1.2] Left pelvis and hip x-ray revealed a transverse fracture of the low cervical neck of the proximal left femur.[MM1.3]  She is admitted to the hospitalist.  Case was also discussed with orthopedist on call, Dr. Pang.  She is on Coumadin as he has a history  of atrial fibrillation and a pulmonary embolism associated with clearing her colon obstruction perioperatively.  They both felt she was appropriate for admission here and treatment here.  They have recommended initiating vitamin K.[BM1.2]       I have reviewed the nursing notes.    I have reviewed the findings, diagnosis, plan and need for follow up with the patient.[MM1.1]    Current Discharge Medication List          Final diagnoses:   Hip fracture, left, closed, initial encounter (H)   Fall, initial encounter[BM1.1]   This document serves as a record of services personally performed by Ronal Jones MD. It was created on their behalf by Jasmine Mortensen, a trained medical scribe. The creation of this record is based on the provider's personal observations and the statements of the patient. This document has been checked and approved by the attending provider.   Note: Chart documentation done in part with Dragon Voice Recognition software. Although reviewed after completion, some word and grammatical errors may remain.[MM1.2]        3/15/2017   [facility] EMERGENCY DEPARTMENT[MM1.1]     Ronal Jones MD  03/16/17 1033[BM1.4]       Ronal Jones MD  03/16/17 1040  [BM1.5]     Revision History        User Key Date/Time User Provider Type Action    > BM1.5 3/16/2017 10:40 AM Ronal Jones MD Physician Addend     BM1.3 3/16/2017 10:39 AM Ronal Jones MD Physician      BM1.4 3/16/2017 10:33 AM Ronal Jones MD Physician Sign     BM1.1 3/16/2017 10:32 AM Ronal Jones MD Physician      BM1.2 3/16/2017 10:27 AM Ronal Jones MD Physician      MM1.3 3/15/2017  7:03 PM Jasmine Mortensen     MM1.2 3/15/2017  5:54 PM Jasmine Mortensen     MM1.1 3/15/2017  5:46 PM Jasmine Mortensen            Progress Notes by Donnie Jarrett MD at 3/16/2017  6:14 AM     Author:  Donnie Jarrett MD Service:  Hospitalist Author Type:  Physician    Filed:   3/16/2017  6:15 AM Date of Service:  3/16/2017  6:14 AM Note Created:  3/16/2017  6:14 AM    Status:  Signed :  Donnie Jarrett MD (Physician)         INR this AM still high at 3.08. Has received 2.5 mg oral vitamin K last PM. Spoke with pharmacy and will order 5 mg Vit K IV now and recheck at noon. Still planning for surgery later this PM  Electronically signed by ANGELICA Jarrett on March 16, 2017[WG1.1]        Revision History        User Key Date/Time User Provider Type Action    > WG1.1 3/16/2017  6:15 AM Donnie Jarrett MD Physician Sign                  Procedure Notes     No notes of this type exist for this encounter.         Progress Notes - Therapies (Notes from 03/16/17 through 03/19/17)      Progress Notes by Jana Guerra OT at 3/17/2017 10:54 AM     Author:  Jana Guerra OT Service:  (none) Author Type:  Occupational Therapist    Filed:  3/17/2017 10:55 AM Date of Service:  3/17/2017 10:54 AM Note Created:  3/17/2017 10:54 AM    Status:  Signed :  Jana Guerra OT (Occupational Therapist)                                            Occupational Therapy Evaluation     03/17/17 0955   Quick Adds   Type of Visit Initial Occupational Therapy Evaluation   Living Environment   Lives With spouse   Living Arrangements house   Home Accessibility no concerns   Number of Stairs to Enter Home 0  (ramp)   Number of Stairs Within Home 10   Transportation Available family or friend will provide   Living Environment Comment Pt lives in a house with a ramp into the front door. Once inside, all needs are on the main level including bedroom, bathroom, kitchen, living room and laundry. The bathroom is wheelchair accessible with a walk-in shower and a shower chair. She typically is the caretaker for her , however he is currently at a TCU for a recent hospital admission. No one else is at home with them.   (original from PT eval note)   Self-Care   Dominant Hand right  "  Usual Activity Tolerance excellent   Current Activity Tolerance good   Regular Exercise yes   Activity/Exercise Type walking   Exercise Amount/Frequency daily   Equipment Currently Used at Home none   Activity/Exercise/Self-Care Comment Medical alert systen (when returning to home will be activated) reacher, sock aide, LH shoe horn, shower chair, , walker, lift chair   Functional Level Prior   Ambulation 0-->independent   Transferring 0-->independent   Toileting 0-->independent   Bathing 0-->independent   Dressing 0-->independent   Eating 0-->independent   Communication 0-->understands/communicates without difficulty   Swallowing 0-->swallows foods/liquids without difficulty   Cognition 0 - no cognition issues reported   Fall history within last six months yes   Number of times patient has fallen within last six months 1   Which of the above functional risks had a recent onset or change? ambulation;transferring   Prior Functional Level Comment Very active woman.   General Information   Onset of Illness/Injury or Date of Surgery - Date 03/16/17   Referring Physician Dr Pang   Patient/Family Goals Statement \"short term rehab prior return to previous living situation.   Additional Occupational Profile Info/Pertinent History of Current Problem The patient is a 75 y/o female who slipped and fell onto ice, resulting with left hip fracture and hemiplasty.  She is a very active individual and was fully independent with BADL/IADLs, including care for her  prior injury.   Precautions/Limitations fall precautions   Weight-Bearing Status - LLE weight-bearing as tolerated   General Info Comments Daughter, Iesha present for most of OT eval.   Cognitive Status Examination   Orientation orientation to person, place and time   Level of Consciousness alert   Able to Follow Commands success, 4-or-more step commands   Personal Safety (Cognitive) good awareness, safety precautions   Memory intact   Attention No deficits were " identified   Organization/Problem Solving No deficits were identified   Executive Function No deficits were identified   Cognitive Comment (patient is alert and sociable)   Visual Perception   Visual Perception Wears glasses  (reading only)   Pain Assessment   Patient Currently in Pain Yes, see Vital Sign flowsheet   Posture   Posture not impaired   Range of Motion (ROM)   ROM Quick Adds No deficits were identified   Strength   Manual Muscle Testing Quick Adds No deficits were identified   Coordination   Upper Extremity Coordination No deficits were identified   Transfer Skills   Transfer Comments refer to PT eval and findings   Balance   Balance Comments refer PT eval and findings   Upper Body Dressing   Level of [place]: Dress Upper Body independent   Lower Body Dressing   Level of [place]: Dress Lower Body dependent (less than 25% patients effort)  (due to hip precautions)   Grooming   Level of [place]: Grooming independent   Physical Assist/Nonphysical Assist: Grooming set-up required   Eating/Self Feeding   Level of [place]: Eating independent   Physical Assist/Nonphysical Assist: Eating set-up required   Instrumental Activities of Daily Living (IADL)   Previous Responsibilities driving;meal prep;housekeeping;laundry;shopping;yardwork;medication management;finances   IADL Comments bike rides, walks, fishing, camping, volunteer  (cared for  medical needs)   General Therapy Interventions   Planned Therapy Interventions ADL retraining;bed mobility training;transfer training;home program guidelines   Clinical Impression   Criteria for Skilled Therapeutic Interventions Met yes, treatment indicated   OT Diagnosis Decreased safety and functional independence BADL/IADL s/p injury    Influenced by the following impairments s/p left hip hemiplasty; pain, decreased ROM/strength and standing tolerance   Assessment of Occupational Performance 1-3 Performance Deficits   Identified Performance  "Deficits dressing, bathing, groom/hygiene standing at sink, driving, home mgt, community mobility, leisure activities and care for spouse   Clinical Decision Making (Complexity) Low complexity   Therapy Frequency daily   Predicted Duration of Therapy Intervention (days/wks) 1 day, prior TCU   Anticipated Equipment Needs at Discharge (defer to TCU)   Anticipated Discharge Disposition Transitional Care Facility   Risks and Benefits of Treatment have been explained. Yes   Patient, Family & other staff in agreement with plan of care Yes   [facility] TM \"6 Clicks\"   2016, Trustees of [facility], under license to [employer].  All rights reserved.   6 Clicks Short Forms Daily Activity Inpatient Short Form   [facility] AM-PAC  \"6 Clicks\" Daily Activity Inpatient Short Form   1. Putting on and taking off regular lower body clothing? 1 - Total   2. Bathing (including washing, rinsing, drying)? 2 - A Lot   3. Toileting, which includes using toilet, bedpan or urinal? 2 - A Lot   4. Putting on and taking off regular upper body clothing? 4 - None   5. Taking care of personal grooming such as brushing teeth? 4 - None   6. Eating meals? 4 - None   Daily Activity Raw Score (Score out of 24.Lower scores equate to lower levels of function) 17   Total Evaluation Time   Total Evaluation Time (Minutes) 15         Jana Guerra MA, OTR/L  [facility]  [phone][MS1.1]     Revision History        User Key Date/Time User Provider Type Action    > MS1.1 3/17/2017 10:55 AM Jana Guerra, OT Occupational Therapist Sign            Progress Notes by Jay Jay Hurd PT at 3/16/2017  5:26 PM     Author:  Jay Jay Hurd PT Service:  (none) Author Type:  Physical Therapist    Filed:  3/16/2017  5:26 PM Date of Service:  3/16/2017  5:26 PM Note Created:  3/16/2017  5:26 PM    Status:  Signed :  Jay Jay Hurd PT (Physical Therapist)          03/16/17 1547   Quick Adds "   Type of Visit Initial PT Evaluation   Living Environment   Lives With spouse   Living Arrangements house   Home Accessibility no concerns   Number of Stairs to Enter Home 0  (ramp)   Number of Stairs Within Home 10  (basement)   Stair Railings at Home other (see comments)  (grab bars set up in bedroom, bathroom)   Transportation Available family or friend will provide   Living Environment Comment Pt lives in a house with a ramp into the front door. Once inside, all needs are on the main level including bedroom, bathroom, kitchen, living room and laundry. The bathroom is wheelchair accessible with a walk-in shower and a shower chair. She typically is the caretaker for her , however he is currently at a TCU for a recent hospital admission. No one else is at home with them.    Self-Care   Usual Activity Tolerance good   Current Activity Tolerance good   Regular Exercise yes   Activity/Exercise Type walking   Exercise Amount/Frequency daily   Equipment Currently Used at Home none   Activity/Exercise/Self-Care Comment Pt very active at baseline. She is used to taking care of her  who is wheelchair-bound, and walks daily as well.    Functional Level Prior   Ambulation 0-->independent   Transferring 0-->independent   Toileting 0-->independent   Bathing 0-->independent   Dressing 0-->independent   Eating 0-->independent   Communication 0-->understands/communicates without difficulty   Swallowing 0-->swallows foods/liquids without difficulty   Cognition 0 - no cognition issues reported   Fall history within last six months yes   Number of times patient has fallen within last six months 1   Which of the above functional risks had a recent onset or change? ambulation;transferring   Prior Functional Level Comment Pt was independent with ADLs and ambulation prior   General Information   Onset of Illness/Injury or Date of Surgery - Date 03/16/17   Referring Physician Dr. Pang   Patient/Family Goals Statement Pt  "wants to get back to going on her daily walks, and ride a bike   Pertinent History of Current Problem (include personal factors and/or comorbidities that impact the POC) Copied from previous EMR: \"Tracy Palomo is a pleasant 76-year-old female independent ambulator who lives alone, with a ground level fall after slipping on ice, landing on her left side.  She was evaluated in the ER, and diagnosed with a displaced left femoral neck fracture.\" PMH includes history of PEs secondary to A-fib, and pt had been under anticoagulation therapy, which was reversed for the surgery. She is the primary caretaker for her , who is dependent on wheelchair for mobility, however he has been in a TCU himself, so pt is currently living alone. She is a fairly active and fit individual at baseline.    Precautions/Limitations fall precautions   Weight-Bearing Status - LUE full weight-bearing   Weight-Bearing Status - RUE full weight-bearing   Weight-Bearing Status - LLE weight-bearing as tolerated   Weight-Bearing Status - RLE full weight-bearing   Heart Disease Risk Factors Age   General Observations Pt's sister and daughter present for session   Cognitive Status Examination   Orientation orientation to person, place and time   Level of Consciousness alert   Follows Commands and Answers Questions 100% of the time   Personal Safety and Judgment intact   Memory intact   Pain Assessment   Patient Currently in Pain No   Integumentary/Edema   Integumentary/Edema no deficits were identifed   Integumentary/Edema Comments Incision not directly observed, dressing in place over left hip, no bleeding or spotting noted   Posture    Posture Protracted shoulders;Kyphosis   Range of Motion (ROM)   ROM Comment UEs and R LE WFL for functional mobility and ambulation, L LE limited secondary to recent surgery   Strength   Strength Comments UEs and R LE WFL for functional mobility and ambulation, L LE limited secondary to recent surgery   Bed " Mobility   Bed Mobility Comments Demonstrates bed mobility including rolling bilaterally and bridging with Min Assist to help mobilize L LE, using overhead trapeze, with HOB flat   Transfer Skills   Transfer Comments Supine <> sit transfers with use of overhead trapeze with Min Assist to move L LE, able to work her way to edge of bed safely given extra time to perform task. Sit <> stand transfer with CGA x1 for safety using front wheeled walker appropriately, verbal cues for hand placement with task, bed raised slightly.    Gait   Gait Comments Gait not truly assessed during evaluation due to fatigue, Able to perform pre-gait activities such as marching and side-stepping to [facility] with CGA x1 and verbal cues with no increase in pain reported   Balance   Balance Comments Sitting balance fair, relies with moderate effort of UEs to maintain balance, but able to raise both arms overhead without significant trunk LOB. Standing balance fair with use of walker, relies on UEs for steadiness   Sensory Examination   Sensory Perception no deficits were identified   Coordination   Coordination no deficits were identified   Muscle Tone   Muscle Tone no deficits were identified   Modality Interventions   Planned Modality Interventions Comments prn for pain    General Therapy Interventions   Planned Therapy Interventions balance training;bed mobility training;gait training;ROM;strengthening;transfer training;risk factor education;progressive activity/exercise   Intervention Comments Plan to use the above interventions to progress safety and independence with functional mobility and ambulation prior to d/c   Clinical Impression   Criteria for Skilled Therapeutic Intervention yes, treatment indicated   PT Diagnosis s/p L hip Hemiarthroplasty, POD #0   Influenced by the following impairments Pain, Decreased strength, Decreased ROM   Functional limitations due to impairments Impaired bed mobility, gait, transfers, balance   Clinical  "Presentation Stable/Uncomplicated   Clinical Presentation Rationale Doing as expected for current post-op status   Clinical Decision Making (Complexity) Low complexity   Therapy Frequency` daily   Predicted Duration of Therapy Intervention (days/wks) 3 days   Anticipated Discharge Disposition Transitional Care Facility   Risk & Benefits of therapy have been explained Yes   Patient, Family & other staff in agreement with plan of care Yes   Clinical Impression Comments Tracy is a pleasant 75 y/o female admitted to the hospital currently POD #0 s/p L hip hemiarthroplasty, after sustaining a ground level fall slipping on ice in her driveway yesterday. She is doing well overall thus far, requiring Min Assist for functional mobility and contact guard for pre-gait activities in standing. She will benefit from skilled therapy to progress safety and independence with functional mobility and ambulation, and will likely require short term rehab stay to ensure she is independent with ADLs and all mobility prior to returning home, as she does not have any assistance at home.    [facility] TM \"6 Clicks\"   2016, Trustees of [facility], under license to [employer].  All rights reserved.   6 Clicks Short Forms Basic Mobility Inpatient Short Form   [facility] AM-Located within [facility]  \"6 Clicks\" V.2 Basic Mobility Inpatient Short Form   1. Turning from your back to your side while in a flat bed without using bedrails? 3 - A Little   2. Moving from lying on your back to sitting on the side of a flat bed without using bedrails? 3 - A Little   3. Moving to and from a bed to a chair (including a wheelchair)? 3 - A Little   4. Standing up from a chair using your arms (e.g., wheelchair, or bedside chair)? 3 - A Little   5. To walk in hospital room? 2 - A Lot   6. Climbing 3-5 steps with a railing? 2 - A Lot   Basic Mobility Raw Score (Score out of 24.Lower scores equate to lower levels of function) 16   Total Evaluation Time "   Total Evaluation Time (Minutes) 23           Jay Jay Hurd PT, DPT        3/16/2017      5:26 PM  [facility]  [phone][TD1.1]             Revision History        User Key Date/Time User Provider Type Action    > TD1.1 3/16/2017  5:26 PM Jay Jay Hurd, GUILLERMO Physical Therapist Sign

## 2017-03-15 NOTE — IP AVS SNAPSHOT
"          [address] SURGICAL: [phone]                                              INTERAGENCY TRANSFER FORM - LAB / IMAGING / EKG / EMG RESULTS   3/15/2017                    Hospital Admission Date: 3/15/2017  YOLANDA WOODARD   : 1940  Sex: Female        Attending Provider: Kaleb Pang DO     Allergies:  No Known Allergies    Infection:  None   Service:  HOSPITALIST    Ht:  1.676 m (5' 6\")   Wt:  86.6 kg (191 lb)   Admission Wt:  84.8 kg (187 lb)    BMI:  30.83 kg/m 2   BSA:  2.01 m 2            Patient PCP Information     Provider PCP Type    Chad Betts MD General         Lab Results - 3 Days      INR [[id number]] (Abnormal)  Resulted: 17 0624, Result status: Final result    Ordering provider: Kaleb Pang,   17 0000 Resulting lab: [facility]    Specimen Information    Type Source Collected On   Blood  17 0603          Components       Value Reference Range Flag Lab   INR 1.19 0.86 - 1.14 H [facility]            INR [[id number]]  Resulted: 17 0656, Result status: Final result    Ordering provider: Kaleb Pang DO  17 0000 Resulting lab: [facility]    Specimen Information    Type Source Collected On   Blood  17 0625          Components       Value Reference Range Flag Lab   INR 1.04 0.86 - 1.14  [facility]            Hemoglobin [[id number]] (Abnormal)  Resulted: 17 0650, Result status: Final result    Ordering provider: Kaleb Pang DO  17 0000 Resulting lab: [facility]    Specimen Information    Type Source Collected On   Blood  17 0625          Components       Value Reference Range Flag Lab   Hemoglobin 10.6 11.7 - 15.7 g/dL L [facility]            Hemoglobin [[id number]] (Abnormal)  Resulted: 17 0616, Result status: Final result    Ordering provider: Kaleb Pang DO  17 0001 Resulting " lab: [facility]    Specimen Information    Type Source Collected On   Blood  03/17/17 0535          Components       Value Reference Range Flag Lab   Hemoglobin 10.5 11.7 - 15.7 g/dL L [facility]            Glucose [[id number]] (Abnormal)  Resulted: 03/17/17 0603, Result status: Final result    Ordering provider: Kaleb Pang DO  03/17/17 0001 Resulting lab: [facility]    Specimen Information    Type Source Collected On   Blood  03/17/17 0535          Components       Value Reference Range Flag Lab   Glucose 121 70 - 99 mg/dL H [facility]            INR [[id number]]  Resulted: 03/17/17 0600, Result status: Final result    Ordering provider: Kaleb Pang DO  03/17/17 0001 Resulting lab: [facility]    Specimen Information    Type Source Collected On   Blood  03/17/17 0535          Components       Value Reference Range Flag Lab   INR 1.08 0.86 - 1.14  [facility]            INR [[id number]] (Abnormal)  Resulted: 03/16/17 1055, Result status: Final result    Ordering provider: Hank Alonso APRN CRNA  03/16/17 0945 Resulting lab: [facility]    Specimen Information    Type Source Collected On   Blood  03/16/17 0955          Components       Value Reference Range Flag Lab   INR 1.54 0.86 - 1.14 H [facility]            INR [[id number]] (Abnormal)  Resulted: 03/16/17 0550, Result status: Final result    Ordering provider: Donnie Jarrett MD  03/16/17 0001 Resulting lab: [facility]    Specimen Information    Type Source Collected On   Blood  03/16/17 0525          Components       Value Reference Range Flag Lab   INR 3.08 0.86 - 1.14 H [facility]            Hemoglobin [[id number]]  Resulted: 03/16/17 0539, Result status: Final result    Ordering provider: Donnie Jarrett MD  03/16/17 0001 Resulting lab: [facility]    Specimen Information    Type Source Collected On    Blood  03/16/17 0525          Components       Value Reference Range Flag Lab   Hemoglobin 13.3 11.7 - 15.7 g/dL  [facility]            Testing Performed By     Lab - Abbreviation Name Director Address Valid Date Range    22 - FN Lab [facility] Unknown [address] [facility]   [place] [address] 05/08/15 1057 - Present            Unresulted Labs (24h ago through future)    Start       Ordered    03/17/17 0600  INR  (warfarin (COUMADIN) Pharmacy Consult  )  DAILY,   Routine      03/16/17 1346    Unscheduled  Hemoglobin  CONDITIONAL X 2,   Routine     Comments:  Release on POD 1 and POD 2 if the morning Hgb is less than 8.0    03/16/17 1346    Unscheduled  INR  Routine      03/19/17 1027         Imaging Results - 3 Days      XR Pelvis w Hip Port Left 1 View [[id number]]  Resulted: 03/16/17 1247, Result status: Final result    Ordering provider: Kaleb Pang,   03/16/17 1200 Resulted by: Joyce Casey MD    Performed: 03/16/17 1207 - 03/16/17 1229 Resulting lab: RADIOLOGY RESULTS    Narrative:       PORTABLE ONE VIEW PELVIS AND ONE VIEW LEFT HIP 3/16/2017 12:29 PM     HISTORY: Postoperative evaluation of the left hip.    COMPARISON: 3/15/2017    FINDINGS: There are interval surgical changes of a left bipolar hip  arthroplasty. The hardware is intact with no fracture or other  complication seen. Lateral skin staples are seen.  No other  abnormality is demonstrated.      Impression:       IMPRESSION: Unremarkable postoperative appearance of the left hip.    JOYCE CASEY MD      Testing Performed By     Lab - Abbreviation Name Director Address Valid Date Range    104 - Rad [facility] RADIOLOGY RESULTS Unknown Unknown 02/16/05 1553 - Present            Encounter-Level Documents:     There are no encounter-level documents.      Order-Level Documents:     There are no order-level documents.

## 2017-03-15 NOTE — LETTER
Transition Communication Hand-off for Care Transitions to Next Level of Care Provider    Name: Tracy Palomo  MRN #: [id number]  Primary Care Provider: Chad Betts  Primary Care MD Name: Dr. Chad Betts   Primary Clinic: [facility] [address] [place] [address]  Primary Care Clinic Name: [facility]   Reason for Hospitalization:  Closed left hip fracture (H)  left hip fracture  Fractured hip, left, closed, initial encounter (H)  Admit Date/Time: 3/15/2017  5:36 PM  Discharge Date: 3/19/17  Payor Source: Payor: MEDICARE / Plan: MEDICARE / Product Type: Medicare /     Discharge Plan:  [facility] (Main Phone: [phone] Admissions Phone: [phone] Fax: [phone])     Discharge Needs Assessment:  Needs       Most Recent Value    Anticipated Changes Related to Illness none    Equipment Currently Used at Home none    Transportation Available van, wheelchair accessible    PAS Number [id number]          Follow-up plan:  Future Appointments  Date Time Provider Department Center   3/21/2017 9:15 AM [facility]       Any outstanding tests or procedures:        Referrals     Future Labs/Procedures    Occupational Therapy Adult Consult     Comments:    Evaluate and treat as clinically indicated.    Reason:  ADLs.  Until mastered.    Physical Therapy Adult Consult     Comments:    Evaluate and treat as clinically indicated.    Reason:  Left hip fracture treated with hemiarthroplasty through a posterior surgical approach. Patient should be gait trained weight-bear as tolerated. Hip precautions for the posterior approach should be maintained. Standard postop hip exercises may be implemented.        Supplies     Future Labs/Procedures    AntiEmbolism Stockings     Comments:    Bilateral below knee length.On in the morning, off at night          HONEY Johnson  [facility] [phone]/ [facility] [phone]      AVS/Discharge Summary is the source of truth;  this is a helpful guide for improved communication of patient story

## 2017-03-15 NOTE — IP AVS SNAPSHOT
"          [address] MEDICAL SURGICAL: [phone]                                              INTERAGENCY TRANSFER FORM - PHYSICIAN ORDERS   3/15/2017                    Hospital Admission Date: 3/15/2017  YOLANDA WOODARD   : 1940  Sex: Female        Attending Provider: Kaleb Pang DO     Allergies:  No Known Allergies    Infection:  None   Service:  HOSPITALIST    Ht:  1.676 m (5' 6\")   Wt:  86.6 kg (191 lb)   Admission Wt:  84.8 kg (187 lb)    BMI:  30.83 kg/m 2   BSA:  2.01 m 2            Patient PCP Information     Provider PCP Type    Chad Betts MD General      ED Clinical Impression     Diagnosis Description Comment Added By Time Added    Hip fracture, left, closed, initial encounter (H) [S72.002A] Hip fracture, left, closed, initial encounter (H) [S72.002A]  Ronal Jones MD 3/16/2017 10:31 AM    Fall, initial encounter [W19.XXXA] Fall, initial encounter [W19.XXXA]  Ronal Jones MD 3/16/2017 10:32 AM      Hospital Problems as of 3/19/2017              Priority Class Noted POA    Hypertension goal BP (blood pressure) < 140/90   2013 Yes    Hyperlipidemia LDL goal <130   2013 Yes    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 Medium  2014 Yes    Paroxysmal atrial fibrillation (H) Medium  11/3/2014 Yes    Long-term (current) use of anticoagulants [Z79.01] Medium  3/22/2016 Yes    * (Principal)Hip fracture, left, closed, initial encounter (H) Medium  3/15/2017 Yes    Closed left hip fracture (H) Medium  3/15/2017 Yes    Fractured hip, left, closed, initial encounter (H) Medium  3/16/2017 Yes      Non-Hospital Problems as of 3/19/2017              Priority Class Noted    Encounter for long-term current use of medication   2013    History of colonic polyps   2013    Advanced directives, counseling/discussion   2013    Recent major surgery - exploratory lap with small bowel resection 10/20 Medium  2014    Pleural " effusion on right Medium  11/1/2014    Anemia Medium  11/3/2014      Code Status History     Date Active Date Inactive Code Status Order ID Comments User Context    3/19/2017  8:48 AM  Full Code [id number]  Ge Mahmood MD Outpatient    3/16/2017  1:46 PM 3/19/2017  8:48 AM Full Code [id number]  Kaleb Pang DO Inpatient    3/15/2017 10:01 PM 3/16/2017  1:45 PM Full Code [id number]  Donnie Jarrett MD Inpatient    11/1/2014  4:30 PM 11/6/2014  5:21 PM Full Code [id number]  Kaleb Lloyd MD Inpatient    10/20/2014  3:51 PM 10/24/2014  6:26 PM Full Code [id number]  Miguel Oleary MD Inpatient         Medication Review      START taking        Dose / Directions Comments    acetaminophen 325 MG tablet   Commonly known as:  TYLENOL        Dose:  975 mg   Take 3 tablets (975 mg) by mouth every 8 hours   Quantity:  100 tablet   Refills:  0        cyclobenzaprine 5 MG tablet   Commonly known as:  FLEXERIL        Dose:  5 mg   Take 1 tablet (5 mg) by mouth 3 times daily as needed for muscle spasms   Quantity:  42 tablet   Refills:  0        docusate sodium 100 MG capsule   Commonly known as:  COLACE        Dose:  100 mg   Take 1 capsule (100 mg) by mouth 2 times daily   Quantity:  60 capsule   Refills:  0        metoprolol 25 MG tablet   Commonly known as:  LOPRESSOR   Used for:  Paroxysmal atrial fibrillation (H)        Dose:  12.5 mg   Take 0.5 tablets (12.5 mg) by mouth 2 times daily   Refills:  0        oxyCODONE 5 MG IR tablet   Commonly known as:  ROXICODONE        Dose:  5 mg   Take 1 tablet (5 mg) by mouth every 4 hours as needed for moderate to severe pain   Quantity:  40 tablet   Refills:  0          CONTINUE these medications which may have CHANGED, or have new prescriptions. If we are uncertain of the size of tablets/capsules you have at home, strength may be listed as something that might have changed.        Dose / Directions Comments    warfarin 5 MG tablet   Commonly known as:   COUMADIN   This may have changed:  additional instructions   Used for:  Long-term (current) use of anticoagulants, Pulmonary embolism, other (H)        7.5 mg on 3/19.  Then dosing based on INR results.  Pt's usual regimen (once back to therapeutic) is 2.5 mg Tues, Thurs, Sat and 5 mg the rest of the week.   Quantity:  100 tablet   Refills:  3          CONTINUE these medications which have NOT CHANGED        Dose / Directions Comments    lisinopril 10 MG tablet   Commonly known as:  PRINIVIL/ZESTRIL   Used for:  Essential hypertension with goal blood pressure less than 140/90        Dose:  10 mg   Take 1 tablet (10 mg) by mouth daily   Quantity:  90 tablet   Refills:  3        lovastatin 40 MG tablet   Commonly known as:  MEVACOR   Used for:  Hyperlipidemia LDL goal <130        TAKE ONE TABLET BY MOUTH AT BEDTIME   Quantity:  90 tablet   Refills:  3                Summary of Visit     Reason for your hospital stay       Patient was hospitalized following a left femoral neck fracture.  She underwent successful surgical treatment on 3/16/2017 by Dr. Kaleb Pang  She is on chronic Coumadin therapy for history of paroxysmal atrial fibrillation as well as a previous pulmonary embolism.             After Care     Activity - Ambulate in hallway       Every shift       Advance Diet as Tolerated       Follow this diet upon discharge: Regular       General info for SNF       Length of Stay Estimate: Short Term Care: Estimated # of Days <30  Condition at Discharge: Improving  Level of care:board and care  Rehabilitation Potential: Excellent  Admission H&P remains valid and up-to-date: Yes  Recent Chemotherapy: N/A  Use Nursing Home Standing Orders: N/A       Hip precautions       Posterior approach       Mantoux instructions       Give two-step Mantoux (PPD) Per Facility Policy Yes       Weight bearing status       As tolerated       Wound care (specify)       Site:   Left hip  Instructions:  The wound has been covered with  aqua cell. This should be left in place until follow-up with Dr. Kaleb obrien in 2 weeks. Patient may shower with dressing in place.             Referrals     Occupational Therapy Adult Consult       Evaluate and treat as clinically indicated.    Reason:  ADLs.  Until mastered.       Physical Therapy Adult Consult       Evaluate and treat as clinically indicated.    Reason:  Left hip fracture treated with hemiarthroplasty through a posterior surgical approach. Patient should be gait trained weight-bear as tolerated. Hip precautions for the posterior approach should be maintained. Standard postop hip exercises may be implemented.             Supplies     AntiEmbolism Stockings       Bilateral below knee length.On in the morning, off at night             Lab Orders     INR  (1 day)                Your next 10 appointments already scheduled     Mar 21, 2017  9:15 AM CDT   Anticoagulation Visit with PH [facility] CO[facility]   [facility]    [address] [place] [id number] [phone]              Follow-Up Appointment Instructions     Future Labs/Procedures    Follow Up and recommended labs and tests     Comments:    Follow up with Dr. Obrien  in 10  days.  No follow up labs or test are needed.    Follow Up and recommended labs and tests     Comments:    Daily INR until further orders      Follow-Up Appointment Instructions     Follow Up and recommended labs and tests       Follow up with Dr. Obrien  in 10  days.  No follow up labs or test are needed.       Follow Up and recommended labs and tests       Daily INR until further orders             Statement of Approval     Ordered          03/19/17 1050  I have reviewed and agree with all the recommendations and orders detailed in this document.  EFFECTIVE NOW     Approved and electronically signed by:  Rohna Valerio MD

## 2017-03-15 NOTE — ED PROVIDER NOTES
History     Chief Complaint   Patient presents with     Hip Pain     Left hip pain. Fall just PTA. Fell on ice.      The history is provided by the patient.     Tracy Palomo is a 76 year old female who presents to the ED via EMS with left hip pain. Patient was outside her house when she slipped and fell. She has excruciating pain to left hip that radiates down her leg. She was given IV fentanyl, 150 mg en route with some relief. Patient lives alone.     Patient Active Problem List   Diagnosis     Hypertension goal BP (blood pressure) < 140/90     Hyperlipidemia LDL goal <130     Encounter for long-term current use of medication     History of colonic polyps     Advanced directives, counseling/discussion     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     Recent major surgery - exploratory lap with small bowel resection 10/20     Pleural effusion on right     Anemia     Paroxysmal atrial fibrillation (H)     Long-term (current) use of anticoagulants [Z79.01]     Hip fracture, left, closed, initial encounter (H)     Closed left hip fracture (H)     Past Medical History   Diagnosis Date     Atrial fibrillation (H) 2014     related to colon surgery     Colon polyps      Diverticulosis of colon (without mention of hemorrhage)      Diverticulosis     DVT (deep vein thrombosis) in pregnancy (H) 2014     after colon surgery     Other and unspecified hyperlipidemia      PE (pulmonary embolism) 2014     related to colon surgery     Unspecified essential hypertension        Past Surgical History   Procedure Laterality Date     Colonoscopy  09, 10, 12     [facility]     Flexible sigmoidoscopy  09, 11     Laparotomy exploratory N/A 10/20/2014     Procedure: LAPAROTOMY EXPLORATORY;  Surgeon: Miguel Oleary MD;  Location: [facility] OR     Laparotomy, lysis adhesions, combined N/A 10/20/2014     Procedure: COMBINED LAPAROTOMY, LYSIS ADHESIONS;  Surgeon: Miguel Oleary MD;  Location: [facility] OR      Resect small bowel without ostomy N/A 10/20/2014     Procedure: RESECT SMALL BOWEL WITHOUT OSTOMY;  Surgeon: Miguel Oleary MD;  Location: [facility] OR       Family History   Problem Relation Age of Onset     Blood Disease No family hx of      blood clots       Social History   Substance Use Topics     Smoking status: Never Smoker     Smokeless tobacco: Never Used     Alcohol use No        Immunization History   Administered Date(s) Administered     Hepatitis A Vac Ped/Adol-2 Dose 11/01/2010     Hepatitis B 08/31/2009, 10/26/2009, 11/01/2010     Influenza (High Dose) 3 valent vaccine 10/22/2014, 08/30/2016     Influenza (IIV3) 08/31/2009     Pneumococcal (PCV 13) 08/31/2009     Pneumococcal 23 valent 10/22/2014     TD (ADULT, 7+) 08/31/2009          Allergies   Allergen Reactions     No Known Allergies        No current outpatient prescriptions on file.       I have reviewed the Medications, Allergies, Past Medical and Surgical History, and Social History in the Epic system.    Review of Systems   Musculoskeletal:        Positive for left hip pain.    All other systems reviewed and are negative.      Physical Exam      Physical Exam   Constitutional: She is oriented to person, place, and time. She appears well-developed and well-nourished. No distress.   HENT:   Head: Normocephalic and atraumatic.   Eyes: Conjunctivae and EOM are normal. Pupils are equal, round, and reactive to light.   Neck: Normal range of motion present.   Cardiovascular: Normal rate, regular rhythm and normal heart sounds.    No murmur heard.  Pulmonary/Chest: Effort normal and breath sounds normal. No respiratory distress. She exhibits no tenderness.   Abdominal: Soft. Bowel sounds are normal. There is no tenderness.   Musculoskeletal: Normal range of motion. She exhibits no tenderness.        Cervical back: She exhibits no tenderness.        Thoracic back: She exhibits no tenderness.        Lumbar back: She exhibits no tenderness.   She is  holding her left hip. There is pain with flexion. Minimal range of motion creates pain. CMS to left foot is intact.    Neurological: She is alert and oriented to person, place, and time.   Skin: Skin is warm and dry. No abrasion, no laceration and no rash noted. She is not diaphoretic.   Psychiatric: She has a normal mood and affect. Her behavior is normal.   Nursing note and vitals reviewed.      ED Course     ED Course     Procedures             Critical Care time:  none          Results for orders placed or performed during the hospital encounter of 03/15/17 (from the past 24 hour(s))   CBC with platelets differential   Result Value Ref Range    WBC 6.8 4.0 - 11.0 10e9/L    RBC Count 4.85 3.8 - 5.2 10e12/L    Hemoglobin 13.8 11.7 - 15.7 g/dL    Hematocrit 43.4 35.0 - 47.0 %    MCV 90 78 - 100 fl    MCH 28.5 26.5 - 33.0 pg    MCHC 31.8 31.5 - 36.5 g/dL    RDW 14.2 10.0 - 15.0 %    Platelet Count 228 150 - 450 10e9/L    Diff Method Automated Method     % Neutrophils 64.3 %    % Lymphocytes 24.6 %    % Monocytes 8.5 %    % Eosinophils 2.0 %    % Basophils 0.3 %    % Immature Granulocytes 0.3 %    Absolute Neutrophil 4.4 1.6 - 8.3 10e9/L    Absolute Lymphocytes 1.7 0.8 - 5.3 10e9/L    Absolute Monocytes 0.6 0.0 - 1.3 10e9/L    Absolute Eosinophils 0.1 0.0 - 0.7 10e9/L    Absolute Basophils 0.0 0.0 - 0.2 10e9/L    Abs Immature Granulocytes 0.0 0 - 0.4 10e9/L   INR   Result Value Ref Range    INR 2.99 (H) 0.86 - 1.14   Basic metabolic panel   Result Value Ref Range    Sodium 142 133 - 144 mmol/L    Potassium 4.0 3.4 - 5.3 mmol/L    Chloride 105 94 - 109 mmol/L    Carbon Dioxide 32 20 - 32 mmol/L    Anion Gap 5 3 - 14 mmol/L    Glucose 107 (H) 70 - 99 mg/dL    Urea Nitrogen 11 7 - 30 mg/dL    Creatinine 0.92 0.52 - 1.04 mg/dL    GFR Estimate 60 (L) >60 mL/min/1.7m2    GFR Estimate If Black 72 >60 mL/min/1.7m2    Calcium 9.6 8.5 - 10.1 mg/dL   XR Pelvis w Hip Left 1 View    Narrative    PELVIS AND HIP LEFT ONE VIEW   3/15/2017 6:34 PM     COMPARISON: None    HISTORY: Trauma      Impression    IMPRESSION: There is a transverse fracture of the low cervical neck of  the proximal left femur with lateral displacement and various  angulation of the distal fragment. No other fractures.    SELENE ESCOBEDO MD   XR Chest Port 1 View    Narrative    CHEST PORTABLE ONE VIEW 3/15/2017 9:16 PM     COMPARISON: Two-view chest x-ray 11/14/2014.    HISTORY: Pre-op.    FINDINGS: The cardiac silhouette, pulmonary vasculature, lungs and  pleural spaces are within normal limits.      Impression    IMPRESSION: Clear lungs.    SELENE ESCOBEDO MD   Orthopedic Surgery IP Consult: Patient to be seen: Routine - within 24 hours; left hip fracture; Consultant may enter orders: Yes    Kaleb France, DO     3/16/2017  8:08 AM  Impression:  Left displaced femoral fracture  Coumadin coagulopathy    Plan:  Recommend left hip hemiarthroplasty in order to regain function.  She started C vitamin K and INR equals 3. Spoke with Dr. Akers-will utilize fresh frozen plasma.  Keep n.p.o.  Risks and benefits discussed.  Planning surgery 10 AM today.    Full consult dictated:[id number]    Vitaly Pang D.O.   INR   Result Value Ref Range    INR 3.08 (H) 0.86 - 1.14   Hemoglobin   Result Value Ref Range    Hemoglobin 13.3 11.7 - 15.7 g/dL   Plasma prepare order unit   Result Value Ref Range    Ordered Component Type Plasma     Units Ordered 2    Blood component   Result Value Ref Range    Unit Number [id number]     Blood Component Type Plasma, Thawed     Division Number 00     Status of Unit Released to care unit 03/16/2017 0917     Blood Product Code O7475E80     Unit Status ISS    Blood component   Result Value Ref Range    Unit Number [id number]     Blood Component Type Plasma, Thawed     Division Number 00     Status of Unit Released to care unit 03/16/2017 0844     Blood Product Code J7317I32     Unit Status ISS    ABO/Rh type and screen    Result Value Ref Range    ABO Pending     RH(D) Pending     Antibody Screen Pending     Test Valid Only At Pending     Specimen Expires Pending      Medications   naloxone (NARCAN) injection 0.1-0.4 mg ( Intravenous Auto Hold 3/16/17 0937)   lidocaine 1 % 1 mL ( Other Auto Hold 3/16/17 0937)   lidocaine (LMX4) kit ( Topical Auto Hold 3/16/17 0937)   sodium chloride (PF) 0.9% PF flush 3 mL ( Intracatheter Auto Hold 3/16/17 0937)   sodium chloride (PF) 0.9% PF flush 3 mL ( Intracatheter Automatically Held 4/14/17 2215)   0.9% sodium chloride infusion ( Intravenous New Bag 3/16/17 0647)   HYDROmorphone (PF) (DILAUDID) injection 0.3-0.5 mg ( Intravenous Auto Hold 3/16/17 0937)   ondansetron (ZOFRAN-ODT) ODT tab 4 mg ( Oral Auto Hold 3/16/17 0937)     Or   ondansetron (ZOFRAN) injection 4 mg ( Intravenous Auto Hold 3/16/17 0937)   prochlorperazine (COMPAZINE) injection 5 mg ( Intravenous Auto Hold 3/16/17 0937)     Or   prochlorperazine (COMPAZINE) tablet 5 mg ( Oral Auto Hold 3/16/17 0937)     Or   prochlorperazine (COMPAZINE) Suppository 12.5 mg ( Rectal Auto Hold 3/16/17 0937)   ceFAZolin sodium-dextrose (ANCEF) infusion 2 g (not administered)   ceFAZolin (ANCEF) 1 g vial to attach to  ml bag for ADULT or 50 ml bag for PEDS (not administered)   sodium citrate-citric acid (BICITRA) solution 30 mL (not administered)   ondansetron (ZOFRAN) injection 4 mg (not administered)   HYDROmorphone (PF) (DILAUDID) injection 0.5 mg (0.5 mg Intravenous Given 3/15/17 2040)   phytonadione (MEPHYTON) half-tab 2.5 mg (2.5 mg Oral Given 3/15/17 2001)   phytonadione (AQUA-MEPHYTON) 5 mg in NaCl 0.9 % 50 mL intermittent infusion (5 mg Intravenous New Bag 3/16/17 0581)   ondansetron (ZOFRAN) injection 4 mg (4 mg Intravenous Given 3/16/17 7002)       Assessments & Plan (with Medical Decision Making)  Tracy Palomo is a 76 year old female presenting with excruciating pain to left hip after a fall. She was given  Dilaudid for the  pain. Left pelvis and hip x-ray revealed a transverse fracture of the low cervical neck of the proximal left femur.  She is admitted to the hospitalist.  Case was also discussed with orthopedist on call, Dr. Pang.  She is on Coumadin as he has a history of atrial fibrillation and a pulmonary embolism associated with clearing her colon obstruction perioperatively.  They both felt she was appropriate for admission here and treatment here.  They have recommended initiating vitamin K.       I have reviewed the nursing notes.    I have reviewed the findings, diagnosis, plan and need for follow up with the patient.    Current Discharge Medication List          Final diagnoses:   Hip fracture, left, closed, initial encounter (H)   Fall, initial encounter   This document serves as a record of services personally performed by Ronal Jones MD. It was created on their behalf by Jasmine Mortensen, a trained medical scribe. The creation of this record is based on the provider's personal observations and the statements of the patient. This document has been checked and approved by the attending provider.   Note: Chart documentation done in part with Dragon Voice Recognition software. Although reviewed after completion, some word and grammatical errors may remain.        3/15/2017   [facility] EMERGENCY DEPARTMENT     Ronal Jones MD  03/16/17 1033       Ronal Jones MD  03/16/17 1043

## 2017-03-15 NOTE — ED NOTES
Pt presents with severe left hip pain after falling on ice at home. Pt layed on ground and called 911. IV fentanyl 150 mg given enroute. Pt states pain is severe. Pillow under left knee. Pt notes her  is in the  [facility].

## 2017-03-15 NOTE — IP AVS SNAPSHOT
MRN:[id number]                      After Visit Summary   3/15/2017    Tracy Palomo    MRN: [id number]           Thank you!     Thank you for choosing [facility] for your care. Our goal is always to provide you with excellent care. Hearing back from our patients is one way we can continue to improve our services. Please take a few minutes to complete the written survey that you may receive in the mail after you visit with us. Thank you!        Patient Information     Date Of Birth          1940        About your hospital stay     You were admitted on:  March 15, 2017 You last received care in the:  [address] [facility]    You were discharged on:  March 19, 2017        Reason for your hospital stay       Patient was hospitalized following a left femoral neck fracture.  She underwent successful surgical treatment on 3/16/2017 by Dr. Kaleb Pang  She is on chronic Coumadin therapy for history of paroxysmal atrial fibrillation as well as a previous pulmonary embolism.                  Who to Call     For medical emergencies, please call 911.  For non-urgent questions about your medical care, please call your primary care provider or clinic, [phone]  For questions related to your surgery, please call your surgery clinic        Attending Provider     Provider Specialty    Ronal Jones MD Emergency Medicine    [place], Donnie Neumann MD Family Practice    Kaleb Pang DO Orthopedics       Primary Care Provider Office Phone # Fax #    Chad Betts [phone]       [facility] [address] [place] [address]        After Care Instructions     Activity - Ambulate in hallway       Every shift            Advance Diet as Tolerated       Follow this diet upon discharge: Regular            General info for SNF       Length of Stay Estimate: Short Term Care: Estimated # of Days <30  Condition at Discharge: Improving  Level of  care:board and care  Rehabilitation Potential: Excellent  Admission H&P remains valid and up-to-date: Yes  Recent Chemotherapy: N/A  Use Nursing Home Standing Orders: N/A            Hip precautions       Posterior approach            Mantoux instructions       Give two-step Mantoux (PPD) Per Facility Policy Yes            Weight bearing status       As tolerated            Wound care (specify)       Site:   Left hip  Instructions:  The wound has been covered with aqua cell. This should be left in place until follow-up with Dr. Kaleb obrien in 2 weeks. Patient may shower with dressing in place.                  Follow-up Appointments     Follow Up and recommended labs and tests       Follow up with Dr. Obrien  in 10  days.  No follow up labs or test are needed.            Follow Up and recommended labs and tests       Daily INR until further orders                  Your next 10 appointments already scheduled     Mar 21, 2017  9:15 AM CDT   [facility] with [facility]   [facility]    [address] [place] [id number] [phone]              Additional Services     Occupational Therapy Adult Consult       Evaluate and treat as clinically indicated.    Reason:  ADLs.  Until mastered.            Physical Therapy Adult Consult       Evaluate and treat as clinically indicated.    Reason:  Left hip fracture treated with hemiarthroplasty through a posterior surgical approach. Patient should be gait trained weight-bear as tolerated. Hip precautions for the posterior approach should be maintained. Standard postop hip exercises may be implemented.                  Future tests that were ordered for you     AntiEmbolism Stockings       Bilateral below knee length.On in the morning, off at night            INR  (1 day)                Warfarin Instruction     You have started taking a medicine called warfarin. This is a blood-thinning medicine (anticoagulant).  "It helps prevent and treat blood clots.      Before leaving the hospital, make sure you know how much to take and how long to take it.      You will need regular blood tests to make sure your blood is clotting safely. It is very important to see your doctor for regular blood tests.    Talk to your doctor before taking any new medicine (this includes over-the-counter drugs and herbal products). Many medicines can interact with warfarin. This may cause more bleeding or too much clotting.     Eating a lot of vitamin K--found in green, leafy vegetables--can change the way warfarin works in your body. Do NOT avoid these foods. Instead, try to eat the same amount each day.     Bleeding is the most common side-effect of warfarin. You may notice bleeding gums, a bloody nose, bruises and bleeding longer when you cut yourself. See a doctor at once if:   o You cough up blood  o You find blood in your stool (poop)  o You have a deep cut, or a cut that bleeds longer than 10 minutes   o You have a bad cut, hard fall, accident or hit your head (go to urgent care or the emergency room).    For women who can get pregnant: This medicine can harm an unborn baby. Be very careful not to get pregnant while taking this medicine. If you think you might be pregnant, call your doctor right away.    For more information, read \"Guide to Warfarin Therapy,  the booklet you received in the hospital.        Pending Results     No orders found from 3/13/2017 to 3/16/2017.            Statement of Approval     Ordered          03/19/17 1050  I have reviewed and agree with all the recommendations and orders detailed in this document.  EFFECTIVE NOW     Approved and electronically signed by:  Rohan Valerio MD             Admission Information     Date & Time Provider Department Dept. Phone    3/15/2017 Kaleb Pang DO [address] [facility] [phone]      Your Vitals Were     Blood Pressure Pulse Temperature " "Respirations Height Weight    93/60 (BP Location: Left arm) 69 97.9  F (36.6  C) (Oral) 16 1.676 m (5' 6\") 86.6 kg (191 lb)    Pulse Oximetry BMI (Body Mass Index)                96% 30.83 kg/m2          Care EveryWhere ID     This is your Care EveryWhere ID. This could be used by other organizations to access your [facility] medical records  [id number]           Review of your medicines      START taking        Dose / Directions    acetaminophen 325 MG tablet   Commonly known as:  TYLENOL        Dose:  975 mg   Take 3 tablets (975 mg) by mouth every 8 hours   Quantity:  100 tablet   Refills:  0       cyclobenzaprine 5 MG tablet   Commonly known as:  FLEXERIL        Dose:  5 mg   Take 1 tablet (5 mg) by mouth 3 times daily as needed for muscle spasms   Quantity:  42 tablet   Refills:  0       docusate sodium 100 MG capsule   Commonly known as:  COLACE        Dose:  100 mg   Take 1 capsule (100 mg) by mouth 2 times daily   Quantity:  60 capsule   Refills:  0       metoprolol 25 MG tablet   Commonly known as:  LOPRESSOR   Used for:  Paroxysmal atrial fibrillation (H)        Dose:  12.5 mg   Take 0.5 tablets (12.5 mg) by mouth 2 times daily   Refills:  0       oxyCODONE 5 MG IR tablet   Commonly known as:  ROXICODONE        Dose:  5 mg   Take 1 tablet (5 mg) by mouth every 4 hours as needed for moderate to severe pain   Quantity:  40 tablet   Refills:  0         CONTINUE these medicines which may have CHANGED, or have new prescriptions. If we are uncertain of the size of tablets/capsules you have at home, strength may be listed as something that might have changed.        Dose / Directions    warfarin 5 MG tablet   Commonly known as:  COUMADIN   This may have changed:  additional instructions   Used for:  Long-term (current) use of anticoagulants, Pulmonary embolism, other (H)        7.5 mg on 3/19.  Then dosing based on INR results.  Pt's usual regimen (once back to therapeutic) is 2.5 mg Tues, Thurs, Sat and 5 mg the " rest of the week.   Quantity:  100 tablet   Refills:  3         CONTINUE these medicines which have NOT CHANGED        Dose / Directions    lisinopril 10 MG tablet   Commonly known as:  PRINIVIL/ZESTRIL   Used for:  Essential hypertension with goal blood pressure less than 140/90        Dose:  10 mg   Take 1 tablet (10 mg) by mouth daily   Quantity:  90 tablet   Refills:  3       lovastatin 40 MG tablet   Commonly known as:  MEVACOR   Used for:  Hyperlipidemia LDL goal <130        TAKE ONE TABLET BY MOUTH AT BEDTIME   Quantity:  90 tablet   Refills:  3            Where to get your medicines      Some of these will need a paper prescription and others can be bought over the counter. Ask your nurse if you have questions.     Bring a paper prescription for each of these medications     oxyCODONE 5 MG IR tablet       You don't need a prescription for these medications     acetaminophen 325 MG tablet    cyclobenzaprine 5 MG tablet    docusate sodium 100 MG capsule    metoprolol 25 MG tablet    warfarin 5 MG tablet                Protect others around you: Learn how to safely use, store and throw away your medicines at www.disposemymeds.org.             Medication List: This is a list of all your medications and when to take them. Check marks below indicate your daily home schedule. Keep this list as a reference.      Medications           Morning Afternoon Evening Bedtime As Needed    acetaminophen 325 MG tablet   Commonly known as:  TYLENOL   Take 3 tablets (975 mg) by mouth every 8 hours   Last time this was given:  975 mg on 3/19/2017  2:59 AM                                cyclobenzaprine 5 MG tablet   Commonly known as:  FLEXERIL   Take 1 tablet (5 mg) by mouth 3 times daily as needed for muscle spasms                                docusate sodium 100 MG capsule   Commonly known as:  COLACE   Take 1 capsule (100 mg) by mouth 2 times daily   Last time this was given:  100 mg on 3/19/2017  9:05 AM                                 lisinopril 10 MG tablet   Commonly known as:  PRINIVIL/ZESTRIL   Take 1 tablet (10 mg) by mouth daily                                lovastatin 40 MG tablet   Commonly known as:  MEVACOR   TAKE ONE TABLET BY MOUTH AT BEDTIME                                metoprolol 25 MG tablet   Commonly known as:  LOPRESSOR   Take 0.5 tablets (12.5 mg) by mouth 2 times daily   Last time this was given:  12.5 mg on 3/19/2017  9:05 AM                                oxyCODONE 5 MG IR tablet   Commonly known as:  ROXICODONE   Take 1 tablet (5 mg) by mouth every 4 hours as needed for moderate to severe pain   Last time this was given:  5 mg on 3/19/2017  9:05 AM                                warfarin 5 MG tablet   Commonly known as:  COUMADIN   7.5 mg on 3/19.  Then dosing based on INR results.  Pt's usual regimen (once back to therapeutic) is 2.5 mg Tues, Thurs, Sat and 5 mg the rest of the week.   Last time this was given:  10 mg on 3/18/2017  6:38 PM

## 2017-03-15 NOTE — IP AVS SNAPSHOT
` [address] SURGICAL: [phone]            Medication Administration Report for Tracy Palomo as of 03/19/17 1053   Legend:    Given Hold Not Given Due Canceled Entry Other Actions    Time Time (Time) Time  Time-Action       Inactive    Active    Linked        Medications 03/13/17 03/14/17 03/15/17 03/16/17 03/17/17 03/18/17 03/19/17    acetaminophen (TYLENOL) tablet 975 mg  Dose: 975 mg Freq: EVERY 8 HOURS Route: PO  Start: 03/16/17 1930   End: 03/19/17 1929   Admin Instructions: Do not use if patient has an active opioid/acetaminophen analgesic order for pain  Maximum acetaminophen dose from all sources = 75 mg/kg/day not to exceed 4 grams/day.        1920 (975 mg)-Given        0318 (975 mg)-Given       1203 (975 mg)-Given       1949 (975 mg)-Given        0457 (975 mg)-Given [C]       1159 (975 mg)-Given       1838 (975 mg)-Given        0259 (975 mg)-Given       [ ] 1130       1929-Med Discontinued       benzocaine-menthol (CHLORASEPTIC) 6-10 MG lozenge 1-2 lozenge  Dose: 1-2 lozenge Freq: EVERY 1 HOUR PRN Route: BU  PRN Reason: sore throat  PRN Comment: sore throat without fever  Start: 03/16/17 1346              cyclobenzaprine (FLEXERIL) tablet 5 mg  Dose: 5 mg Freq: 3 TIMES DAILY PRN Route: PO  PRN Reason: muscle spasms  Start: 03/16/17 1346   Admin Instructions: Caution to be used when administering multiple CNS depressing meds within a short time frame.               docusate sodium (COLACE) capsule 100 mg  Dose: 100 mg Freq: 2 TIMES DAILY Route: PO  Start: 03/16/17 2100   Admin Instructions: To prevent constipation.  Hold for loose stools.        1951 (100 mg)-Given               0800 (100 mg)-Given       2035 (100 mg)-Given        0859 (100 mg)-Given       2144 (100 mg)-Given        0905 (100 mg)-Given       [ ] 2100           enoxaparin (LOVENOX) injection 90 mg  Dose: 1 mg/kg Freq: EVERY 12 HOURS Route: SC  Start: 03/18/17 1015         1203 (90 mg)-Given       2146 (34  "mg)-Given        [ ] 1015       [ ] 2215           HYDROmorphone (PF) (DILAUDID) injection 0.3-0.5 mg  Dose: 0.3-0.5 mg Freq: EVERY 2 HOURS PRN Route: IV  PRN Reason: severe pain  PRN Comment: or if patient unable to take PO  Start: 03/16/17 1346   Admin Instructions: Hold while on PCA.               hydrOXYzine (ATARAX) tablet 10 mg  Dose: 10 mg Freq: EVERY 6 HOURS PRN Route: PO  PRN Reason: itching  Start: 03/16/17 1346   Admin Instructions: Caution to be used when administering multiple CNS depressing meds within a short time frame.               lidocaine (LMX4) kit  Freq: EVERY 1 HOUR PRN Route: Top  PRN Reason: pain  PRN Comment: with VAD insertion or accessing implanted port.  Start: 03/16/17 1346   Admin Instructions: Do NOT give if patient has a history of allergy to any local anesthetic or any \"itz\" product.   Apply 30 minutes prior to VAD insertion or port access.  MAX Dose:  2.5 g (  of 5 g tube)               lidocaine 1 % 1 mL  Dose: 1 mL Freq: EVERY 1 HOUR PRN Route: OTHER  PRN Comment: mild pain with VAD insertion or accessing implanted port  Start: 03/16/17 1346   Admin Instructions: Do NOT give if patient has a history of allergy to any local anesthetic or any \"itz\" product. MAX dose 1 mL subcutaneous OR intradermal in divided doses.               metoprolol (LOPRESSOR) half-tab 12.5 mg  Dose: 12.5 mg Freq: 2 TIMES DAILY Route: PO  Start: 03/18/17 1015         1200 (12.5 mg)-Given       2144 (12.5 mg)-Given        0905 (12.5 mg)-Given       [ ] 2100           naloxone (NARCAN) injection 0.1-0.4 mg  Dose: 0.1-0.4 mg Freq: EVERY 2 MIN PRN Route: IV  PRN Reason: opioid reversal  Start: 03/16/17 1346   Admin Instructions: For respiratory rate LESS than or EQUAL to 8.  Partial reversal dose:  0.1 mg titrated q 2 minutes for Analgesia Side Effects Monitoring Sedation Level of 3 (frequently drowsy, arousable, drifts to sleep during conversation).Full reversal dose:  0.4 mg bolus for Analgesia Side " Effects Monitoring Sedation Level of 4 (somnolent, minimal or no response to stimulation).               ondansetron (ZOFRAN-ODT) ODT tab 4 mg  Dose: 4 mg Freq: EVERY 6 HOURS PRN Route: PO  PRN Reason: nausea  Start: 03/16/17 1346   Admin Instructions: This is Step 1 of nausea and vomiting management.  If nausea not resolved in 15 minutes, go to Step 2 prochlorperazine (COMPAZINE). Do not push through foil backing. Peel back foil and gently remove. Place on tongue immediately. Administration with liquid unnecessary              Or  ondansetron (ZOFRAN) injection 4 mg  Dose: 4 mg Freq: EVERY 6 HOURS PRN Route: IV  PRN Reasons: nausea,vomiting  Start: 03/16/17 1346   Admin Instructions: This is Step 1 of nausea and vomiting management.  If nausea not resolved in 15 minutes, go to Step 2 prochlorperazine (COMPAZINE).  Irritant.               oxyCODONE (ROXICODONE) IR tablet 5 mg  Dose: 5 mg Freq: EVERY 4 HOURS PRN Route: PO  PRN Reason: moderate to severe pain  Start: 03/16/17 1346   Admin Instructions: Hold while on PCA or with regular IV opioid dosing.  IF CrCl UNKNOWN start at lowest end of dosing range.        1952 (5 mg)-Given        0200 (5 mg)-Given       0737 (5 mg)-Given       1203 (5 mg)-Given       1602 (5 mg)-Given       2035 (5 mg)-Given        0013 (5 mg)-Given       1200 (5 mg)-Given       2144 (5 mg)-Given        0905 (5 mg)-Given           prochlorperazine (COMPAZINE) injection 5 mg  Dose: 5 mg Freq: EVERY 6 HOURS PRN Route: IV  PRN Reasons: nausea,vomiting  Start: 03/16/17 1346   Admin Instructions: This is Step 2 of nausea and vomiting management.   If nausea not resolved in 15 minutes, give metoclopramide (REGLAN) if ordered (step 3 of nausea and vomiting management)        1556 (5 mg)-Given             Or  prochlorperazine (COMPAZINE) tablet 5 mg  Dose: 5 mg Freq: EVERY 6 HOURS PRN Route: PO  PRN Reasons: nausea,vomiting  Start: 03/16/17 1346   Admin Instructions: This is Step 2 of nausea and  vomiting management.   If nausea not resolved in 15 minutes, give metoclopramide (REGLAN) if ordered (step 3 of nausea and vomiting management)                      simvastatin (ZOCOR) tablet 20 mg  Dose: 20 mg Freq: AT BEDTIME Route: PO  Start: 03/16/17 2100   Admin Instructions: lovastatin has been automatically substituted for simvastatin per [employer]<br>        1951 (20 mg)-Given               2035 (20 mg)-Given        2144 (20 mg)-Given        [ ] 2100           sodium chloride (PF) 0.9% PF flush 3 mL  Dose: 3 mL Freq: EVERY 8 HOURS Route: IK  Start: 03/16/17 1400   Admin Instructions: And Q1H PRN, to lock peripheral IV dormant line.        (1406)-Not Given       (2153)-Not Given        0730 (3 mL)-Given       1602 (3 mL)-Given       (2200)-Not Given        (0600)-Not Given       (1400)-Not Given       (2145)-Not Given        [ ] 0600       [ ] 1400       [ ] 2200           sodium chloride (PF) 0.9% PF flush 3 mL  Dose: 3 mL Freq: EVERY 1 HOUR PRN Route: IK  PRN Reason: line flush  PRN Comment: for peripheral IV flush post IV meds  Start: 03/16/17 1346              Warfarin Therapy Reminder (Check START DATE - warfarin may be starting in the FUTURE)  Freq: CONTINUOUS PRN Route: XX  Start: 03/16/17 1346   Admin Instructions: Reorder warfarin (COUMADIN) daily  Patient is on Warfarin Therapy - check for daily order              Future Medications  Medications 03/13/17 03/14/17 03/15/17 03/16/17 03/17/17 03/18/17 03/19/17       warfarin (COUMADIN) tablet 7.5 mg  Dose: 7.5 mg Freq: ONCE AT 6PM Route: PO  Start: 03/19/17 1800          [ ] 1800          Completed Medications  Medications 03/13/17 03/14/17 03/15/17 03/16/17 03/17/17 03/18/17 03/19/17         Dose: 2 g Freq: EVERY 8 HOURS Route: IV  Indications of Use: SURGICAL PROPHYLAXIS  Start: 03/16/17 1830   End: 03/17/17 0227   Admin Instructions: First post-op dose due 8 hours after intra-op dose, see eMAR.          1920 (2 g)-Given        0157 (2  g)-Given               Dose: 10 mg Freq: ONCE AT 6PM Route: PO  Start: 03/18/17 1800   End: 03/18/17 1838         1838 (10 mg)-Given              Dose: 5 mg Freq: ONCE AT 6PM Route: PO  Start: 03/16/17 1800   End: 03/16/17 1920       1920 (5 mg)-Given                Dose: 7.5 mg Freq: ONCE AT 6PM Route: PO  Start: 03/17/17 1800   End: 03/17/17 1920        1920 (7.5 mg)-Given            Discontinued Medications  Medications 03/13/17 03/14/17 03/15/17 03/16/17 03/17/17 03/18/17 03/19/17         Rate: 100 mL/hr Freq: CONTINUOUS Route: IV  Last Dose: Stopped (03/16/17 1136)  Start: 03/15/17 2215   End: 03/16/17 1345      2218 ( )-New Bag        0647 ( )-New Bag       1136-Stopped       1345-Med Discontinued            Dose: 2.5 mg Freq: EVERY 4 HOURS PRN Route: NEBULIZATION  PRN Reason: shortness of breath / dyspnea  Start: 03/16/17 1200   End: 03/16/17 1344       1344-Med Discontinued            Dose: 1,500 ml given Freq: PRN  Start: 03/16/17 1126   End: 03/16/17 1344       1126 (1,500 ml given)-Given       1344-Med Discontinued            Dose: 1 g Freq: SEE ADMIN INSTRUCTIONS Route: IV  Indications of Use: SURGICAL PROPHYLAXIS  Start: 03/16/17 0921   End: 03/16/17 1159   Admin Instructions: Intra-Op Dose.  Give every 2 hours while patient in surgery, starting 2 hours after pre-op dose.  DO NOT GIVE intra-op dose if CrCl less than 10 mL/min (on dialysis).  If CrCL less than 50 mL/min, double the time interval between doses.               1159-Med Discontinued            Dose: 12.5 mg Freq: EVERY 10 MIN PRN Route: IV  PRN Comment: nausea & vomiting accompanied by dizziness / motion sickness.  Start: 03/16/17 1200   End: 03/16/17 1344       1344-Med Discontinued            Dose: 25-50 mcg Freq: EVERY 2 MIN PRN Route: IV  PRN Reason: other  PRN Comment: acute pain  Start: 03/16/17 1200   End: 03/16/17 1344   Admin Instructions: MAX cumulative dose = 250 mcg.    Use Fentanyl initially, as a short acting agent for acute  pain control.  If insufficient, or a longer acting agent is needed, begin Morphine or Hydromorphone if ordered.        1230 (50 mcg)-Given       1344-Med Discontinued            Dose: 2.5-5 mg Freq: EVERY 10 MIN PRN Route: IV  PRN Reason: high blood pressure  PRN Comment: for Systolic Blood Pressure greater than 160 and Heart Rate greater than 60 bpm.  Start: 03/16/17 1200   End: 03/16/17 1344   Admin Instructions: Max cumulative dose = 20 mg.  FOR USE IN PACU ONLY, DC WHEN TRANSFERRED TO FLOOR.        1344-Med Discontinued            Dose: 0.3-0.5 mg Freq: EVERY 5 MIN PRN Route: IV  PRN Reason: other  PRN Comment: acute pain.  May administer if Respiratory Rate is greater than 10  Start: 03/16/17 1200   End: 03/16/17 1344   Admin Instructions: If fentanyl is also ordered, use HYDROmorphone if pain control insufficient with fentanyl or a longer acting agent is needed.   Max cumulative dose = 2 mg        1211 (0.5 mg)-Given       1232 (0.5 mg)-Given       1344-Med Discontinued            Dose: 0.3-0.5 mg Freq: EVERY 1 HOUR PRN Route: IV  PRN Reason: severe pain  Start: 03/15/17 2138   End: 03/16/17 1345   Admin Instructions: Hold while on PCA.       2143 (0.5 mg)-Given        0029 (0.5 mg)-Given       0335 (0.5 mg)-Given       0608 (0.5 mg)-Given       0809 (0.5 mg)-Given       0937-Auto Hold       1344-Unhold       1345-Med Discontinued            Rate: 100 mL/hr Freq: CONTINUOUS Route: IV  Last Dose: Stopped (03/17/17 0324)  Start: 03/16/17 1400   End: 03/18/17 1022   Admin Instructions: Change to saline lock when PO well tolerated.        1418 ( )-New Bag       2209 ( )-Rate/Dose Verify        0324-Stopped        1022-Med Discontinued          Rate: 100 mL/hr Freq: CONTINUOUS Route: IV  Start: 03/16/17 1215   End: 03/16/17 1344   Admin Instructions: Continue until IV catheter is weaned               1344-Med Discontinued            Freq: EVERY 1 HOUR PRN Route: Top  PRN Reason: pain  PRN Comment: with VAD  "insertion or accessing implanted port.  Start: 03/15/17 2201   End: 03/16/17 1345   Admin Instructions: Do NOT give if patient has a history of allergy to any local anesthetic or any \"itz\" product.   Apply 30 minutes prior to VAD insertion or port access.  MAX Dose:  2.5 g (  of 5 g tube)        0937-Auto Hold       1344-Unhold       1345-Med Discontinued            Dose: 1 mL Freq: EVERY 1 HOUR PRN Route: OTHER  PRN Comment: mild pain with VAD insertion or accessing implanted port  Start: 03/15/17 2201   End: 03/16/17 1345   Admin Instructions: Do NOT give if patient has a history of allergy to any local anesthetic or any \"itz\" product. MAX dose 1 mL subcutaneous OR intradermal in divided doses.        0937-Auto Hold       1344-Unhold       1345-Med Discontinued            Dose: 0.1-0.4 mg Freq: EVERY 2 MIN PRN Route: IV  PRN Reason: opioid reversal  Start: 03/15/17 2201   End: 03/16/17 1345   Admin Instructions: For respiratory rate LESS than or EQUAL to 8.  Partial reversal dose:  0.1 mg titrated q 2 minutes for Analgesia Side Effects Monitoring Sedation Level of 3 (frequently drowsy, arousable, drifts to sleep during conversation).Full reversal dose:  0.4 mg bolus for Analgesia Side Effects Monitoring Sedation Level of 4 (somnolent, minimal or no response to stimulation).        0937-Auto Hold       1344-Unhold       1345-Med Discontinued            Dose: 4 mg Freq: ONCE Route: IV  Start: 03/16/17 1000   End: 03/16/17 1159   Admin Instructions: Give in Pre-OP for nausea.  Irritant.               1159-Med Discontinued            Dose: 4 mg Freq: EVERY 30 MIN PRN Route: PO  PRN Reason: nausea  Start: 03/16/17 1200   End: 03/16/17 1344   Admin Instructions: MAX total dose = 8 mg, including OR dosing. If not resolved in 15 minutes, then go to step 2 (Prochlorperazine if ordered).        1344-Med Discontinued         Or    Dose: 4 mg Freq: EVERY 30 MIN PRN Route: IV  PRN Reason: nausea  Start: 03/16/17 1200   " End: 03/16/17 1344   Admin Instructions: MAX total dose = 8 mg, including OR dosing. If not resolved in 15 minutes, then go to step 2 (Prochlorperazine if ordered).  Irritant.        1344-Med Discontinued            Dose: 4 mg Freq: EVERY 6 HOURS PRN Route: PO  PRN Reason: nausea  Start: 03/15/17 2201   End: 03/16/17 1345   Admin Instructions: This is Step 1 of nausea and vomiting management.  If nausea not resolved in 15 minutes, go to Step 2 prochlorperazine (COMPAZINE). Do not push through foil backing. Peel back foil and gently remove. Place on tongue immediately. Administration with liquid unnecessary                      0937-Auto Hold       1344-Unhold       1345-Med Discontinued         Or    Dose: 4 mg Freq: EVERY 6 HOURS PRN Route: IV  PRN Reasons: nausea,vomiting  Start: 03/15/17 2201   End: 03/16/17 1345   Admin Instructions: This is Step 1 of nausea and vomiting management.  If nausea not resolved in 15 minutes, go to Step 2 prochlorperazine (COMPAZINE).  Irritant.       2211 (4 mg)-Given        0822 (4 mg)-Given       0937-Auto Hold       1344-Unhold       1345-Med Discontinued            Dose: 10 mg Freq: EVERY 4 HOURS PRN Route: PO  PRN Reason: moderate to severe pain  Start: 03/16/17 1346   End: 03/16/17 1418       1418-Med Discontinued            Dose: 5 mg Freq: EVERY 6 HOURS PRN Route: IV  PRN Reasons: nausea,vomiting  Start: 03/15/17 2201   End: 03/16/17 1345   Admin Instructions: This is Step 2 of nausea and vomiting management.   If nausea not resolved in 15 minutes, give metoclopramide (REGLAN) if ordered (step 3 of nausea and vomiting management)        0937-Auto Hold       1344-Unhold       1345-Med Discontinued         Or    Dose: 5 mg Freq: EVERY 6 HOURS PRN Route: PO  PRN Reason: vomiting  Start: 03/15/17 2201   End: 03/16/17 1345   Admin Instructions: This is Step 2 of nausea and vomiting management.   If nausea not resolved in 15 minutes, give metoclopramide (REGLAN) if ordered (step 3  of nausea and vomiting management)        0937-Auto Hold       1344-Unhold       1345-Med Discontinued         Or    Dose: 12.5 mg Freq: EVERY 12 HOURS PRN Route: RE  PRN Reasons: nausea,vomiting  Start: 03/15/17 2201   End: 03/16/17 1345   Admin Instructions: This is Step 2 of nausea and vomiting management.   If nausea not resolved in 15 minutes, give metoclopramide (REGLAN) if ordered (step 3 of nausea and vomiting management)        0937-Auto Hold       1344-Unhold       1345-Med Discontinued            Dose: 3 mL Freq: EVERY 8 HOURS Route: IK  Start: 03/15/17 2215   End: 03/16/17 1345   Admin Instructions: And Q1H PRN, to lock peripheral IV dormant line.       2215 (3 mL)-Given        (0628)-Not Given       0937-Auto Hold       1344-Unhold       1345-Med Discontinued            Dose: 3 mL Freq: EVERY 1 HOUR PRN Route: IK  PRN Reason: line flush  PRN Comment: for peripheral IV flush post IV meds  Start: 03/15/17 2201   End: 03/16/17 1345       0937-Auto Hold       1344-Unhold       1345-Med Discontinued            Dose: 30 mL Freq: PRE-OP/PRE-PROCEDURE Route: PO  Start: 03/16/17 0953   End: 03/16/17 1159   Admin Instructions: Give in Pre-Op for gastric pH neutralization.        1159-Med Discontinued            Dose: 7.5 mg Freq: ONCE AT 6PM Route: PO  Start: 03/18/17 1800   End: 03/18/17 1032         1032-Med Discontinued     Medications 03/13/17 03/14/17 03/15/17 03/16/17 03/17/17 03/18/17 03/19/17

## 2017-03-16 ENCOUNTER — ANESTHESIA EVENT (OUTPATIENT)
Dept: SURGERY | Facility: CLINIC | Age: 77
DRG: 470 | End: 2017-03-16
Payer: MEDICARE

## 2017-03-16 ENCOUNTER — APPOINTMENT (OUTPATIENT)
Dept: GENERAL RADIOLOGY | Facility: CLINIC | Age: 77
DRG: 470 | End: 2017-03-16
Attending: ORTHOPAEDIC SURGERY
Payer: MEDICARE

## 2017-03-16 ENCOUNTER — APPOINTMENT (OUTPATIENT)
Dept: PHYSICAL THERAPY | Facility: CLINIC | Age: 77
DRG: 470 | End: 2017-03-16
Attending: ORTHOPAEDIC SURGERY
Payer: MEDICARE

## 2017-03-16 ENCOUNTER — ANESTHESIA (OUTPATIENT)
Dept: SURGERY | Facility: CLINIC | Age: 77
DRG: 470 | End: 2017-03-16
Payer: MEDICARE

## 2017-03-16 PROBLEM — S72.002A: Status: ACTIVE | Noted: 2017-03-16

## 2017-03-16 LAB
ABO + RH BLD: NORMAL
ABO + RH BLD: NORMAL
BLD GP AB SCN SERPL QL: NORMAL
BLD PROD TYP BPU: NORMAL
BLD UNIT ID BPU: 0
BLD UNIT ID BPU: 0
BLOOD BANK CMNT PATIENT-IMP: NORMAL
BLOOD PRODUCT CODE: NORMAL
BLOOD PRODUCT CODE: NORMAL
BPU ID: NORMAL
BPU ID: NORMAL
HGB BLD-MCNC: 13.3 G/DL (ref 11.7–15.7)
INR PPP: 1.54 (ref 0.86–1.14)
INR PPP: 3.08 (ref 0.86–1.14)
NUM BPU REQUESTED: 2
SPECIMEN EXP DATE BLD: NORMAL
TRANSFUSION STATUS PATIENT QL: NORMAL

## 2017-03-16 PROCEDURE — 85610 PROTHROMBIN TIME: CPT | Performed by: NURSE ANESTHETIST, CERTIFIED REGISTERED

## 2017-03-16 PROCEDURE — 36415 COLL VENOUS BLD VENIPUNCTURE: CPT | Performed by: FAMILY MEDICINE

## 2017-03-16 PROCEDURE — 25000128 H RX IP 250 OP 636: Performed by: NURSE ANESTHETIST, CERTIFIED REGISTERED

## 2017-03-16 PROCEDURE — A9270 NON-COVERED ITEM OR SERVICE: HCPCS | Mod: GY | Performed by: ORTHOPAEDIC SURGERY

## 2017-03-16 PROCEDURE — 0SRS03A REPLACEMENT OF LEFT HIP JOINT, FEMORAL SURFACE WITH CERAMIC SYNTHETIC SUBSTITUTE, UNCEMENTED, OPEN APPROACH: ICD-10-PCS | Performed by: ORTHOPAEDIC SURGERY

## 2017-03-16 PROCEDURE — 37000009 ZZH ANESTHESIA TECHNICAL FEE, EACH ADDTL 15 MIN: Performed by: ORTHOPAEDIC SURGERY

## 2017-03-16 PROCEDURE — 40000306 ZZH STATISTIC PRE PROC ASSESS II: Performed by: ORTHOPAEDIC SURGERY

## 2017-03-16 PROCEDURE — 97161 PT EVAL LOW COMPLEX 20 MIN: CPT | Mod: GP

## 2017-03-16 PROCEDURE — 71000015 ZZH RECOVERY PHASE 1 LEVEL 2 EA ADDTL HR: Performed by: ORTHOPAEDIC SURGERY

## 2017-03-16 PROCEDURE — 25000125 ZZHC RX 250: Performed by: NURSE ANESTHETIST, CERTIFIED REGISTERED

## 2017-03-16 PROCEDURE — 86850 RBC ANTIBODY SCREEN: CPT | Performed by: NURSE ANESTHETIST, CERTIFIED REGISTERED

## 2017-03-16 PROCEDURE — 36000093 ZZH SURGERY LEVEL 4 1ST 30 MIN: Performed by: ORTHOPAEDIC SURGERY

## 2017-03-16 PROCEDURE — 27210794 ZZH OR GENERAL SUPPLY STERILE: Performed by: ORTHOPAEDIC SURGERY

## 2017-03-16 PROCEDURE — 99221 1ST HOSP IP/OBS SF/LOW 40: CPT | Mod: 57 | Performed by: ORTHOPAEDIC SURGERY

## 2017-03-16 PROCEDURE — 40000344 ZZHCL STATISTIC THAWING COMPONENT: Performed by: ORTHOPAEDIC SURGERY

## 2017-03-16 PROCEDURE — 40000193 ZZH STATISTIC PT WARD VISIT

## 2017-03-16 PROCEDURE — P9059 PLASMA, FRZ BETWEEN 8-24HOUR: HCPCS | Performed by: ORTHOPAEDIC SURGERY

## 2017-03-16 PROCEDURE — C1776 JOINT DEVICE (IMPLANTABLE): HCPCS | Performed by: ORTHOPAEDIC SURGERY

## 2017-03-16 PROCEDURE — 86901 BLOOD TYPING SEROLOGIC RH(D): CPT | Performed by: NURSE ANESTHETIST, CERTIFIED REGISTERED

## 2017-03-16 PROCEDURE — 71000014 ZZH RECOVERY PHASE 1 LEVEL 2 FIRST HR: Performed by: ORTHOPAEDIC SURGERY

## 2017-03-16 PROCEDURE — 86900 BLOOD TYPING SEROLOGIC ABO: CPT | Performed by: NURSE ANESTHETIST, CERTIFIED REGISTERED

## 2017-03-16 PROCEDURE — 36415 COLL VENOUS BLD VENIPUNCTURE: CPT | Performed by: NURSE ANESTHETIST, CERTIFIED REGISTERED

## 2017-03-16 PROCEDURE — 85018 HEMOGLOBIN: CPT | Performed by: FAMILY MEDICINE

## 2017-03-16 PROCEDURE — 25000132 ZZH RX MED GY IP 250 OP 250 PS 637: Mod: GY | Performed by: ORTHOPAEDIC SURGERY

## 2017-03-16 PROCEDURE — 40000940 XR PELVIS AND HIP PORTABLE LEFT 1 VIEW

## 2017-03-16 PROCEDURE — 85610 PROTHROMBIN TIME: CPT | Performed by: FAMILY MEDICINE

## 2017-03-16 PROCEDURE — 25800025 ZZH RX 258: Performed by: NURSE ANESTHETIST, CERTIFIED REGISTERED

## 2017-03-16 PROCEDURE — 27236 TREAT THIGH FRACTURE: CPT | Mod: LT | Performed by: ORTHOPAEDIC SURGERY

## 2017-03-16 PROCEDURE — 12000007 ZZH R&B INTERMEDIATE

## 2017-03-16 PROCEDURE — 25000128 H RX IP 250 OP 636: Performed by: FAMILY MEDICINE

## 2017-03-16 PROCEDURE — 25000566 ZZH SEVOFLURANE, EA 15 MIN: Performed by: ORTHOPAEDIC SURGERY

## 2017-03-16 PROCEDURE — 25000125 ZZHC RX 250: Performed by: ORTHOPAEDIC SURGERY

## 2017-03-16 PROCEDURE — 36000063 ZZH SURGERY LEVEL 4 EA 15 ADDTL MIN: Performed by: ORTHOPAEDIC SURGERY

## 2017-03-16 PROCEDURE — 25000128 H RX IP 250 OP 636: Performed by: ORTHOPAEDIC SURGERY

## 2017-03-16 PROCEDURE — 25800025 ZZH RX 258: Performed by: ORTHOPAEDIC SURGERY

## 2017-03-16 PROCEDURE — 97530 THERAPEUTIC ACTIVITIES: CPT | Mod: GP

## 2017-03-16 PROCEDURE — 37000008 ZZH ANESTHESIA TECHNICAL FEE, 1ST 30 MIN: Performed by: ORTHOPAEDIC SURGERY

## 2017-03-16 DEVICE — IMPLANTABLE DEVICE: Type: IMPLANTABLE DEVICE | Site: HIP | Status: FUNCTIONAL

## 2017-03-16 RX ORDER — HYDRALAZINE HYDROCHLORIDE 20 MG/ML
2.5-5 INJECTION INTRAMUSCULAR; INTRAVENOUS EVERY 10 MIN PRN
Status: DISCONTINUED | OUTPATIENT
Start: 2017-03-16 | End: 2017-03-16 | Stop reason: HOSPADM

## 2017-03-16 RX ORDER — NALOXONE HYDROCHLORIDE 0.4 MG/ML
.1-.4 INJECTION, SOLUTION INTRAMUSCULAR; INTRAVENOUS; SUBCUTANEOUS
Status: DISCONTINUED | OUTPATIENT
Start: 2017-03-16 | End: 2017-03-19 | Stop reason: HOSPADM

## 2017-03-16 RX ORDER — OXYCODONE HYDROCHLORIDE 5 MG/1
10 TABLET ORAL EVERY 4 HOURS PRN
Status: DISCONTINUED | OUTPATIENT
Start: 2017-03-16 | End: 2017-03-16

## 2017-03-16 RX ORDER — ONDANSETRON 2 MG/ML
4 INJECTION INTRAMUSCULAR; INTRAVENOUS ONCE
Status: COMPLETED | OUTPATIENT
Start: 2017-03-16 | End: 2017-03-16

## 2017-03-16 RX ORDER — CYCLOBENZAPRINE HCL 5 MG
5 TABLET ORAL 3 TIMES DAILY PRN
Status: DISCONTINUED | OUTPATIENT
Start: 2017-03-16 | End: 2017-03-19 | Stop reason: HOSPADM

## 2017-03-16 RX ORDER — HYDROMORPHONE HYDROCHLORIDE 1 MG/ML
.3-.5 INJECTION, SOLUTION INTRAMUSCULAR; INTRAVENOUS; SUBCUTANEOUS EVERY 5 MIN PRN
Status: DISCONTINUED | OUTPATIENT
Start: 2017-03-16 | End: 2017-03-16 | Stop reason: HOSPADM

## 2017-03-16 RX ORDER — ONDANSETRON 4 MG/1
4 TABLET, ORALLY DISINTEGRATING ORAL EVERY 6 HOURS PRN
Status: DISCONTINUED | OUTPATIENT
Start: 2017-03-16 | End: 2017-03-19 | Stop reason: HOSPADM

## 2017-03-16 RX ORDER — ALBUTEROL SULFATE 0.83 MG/ML
2.5 SOLUTION RESPIRATORY (INHALATION) EVERY 4 HOURS PRN
Status: DISCONTINUED | OUTPATIENT
Start: 2017-03-16 | End: 2017-03-16 | Stop reason: HOSPADM

## 2017-03-16 RX ORDER — OXYCODONE HYDROCHLORIDE 5 MG/1
5 TABLET ORAL EVERY 4 HOURS PRN
Status: DISCONTINUED | OUTPATIENT
Start: 2017-03-16 | End: 2017-03-19 | Stop reason: HOSPADM

## 2017-03-16 RX ORDER — CEFAZOLIN SODIUM 1 G/3ML
1 INJECTION, POWDER, FOR SOLUTION INTRAMUSCULAR; INTRAVENOUS SEE ADMIN INSTRUCTIONS
Status: DISCONTINUED | OUTPATIENT
Start: 2017-03-16 | End: 2017-03-16 | Stop reason: HOSPADM

## 2017-03-16 RX ORDER — ACETAMINOPHEN 10 MG/ML
INJECTION, SOLUTION INTRAVENOUS PRN
Status: DISCONTINUED | OUTPATIENT
Start: 2017-03-16 | End: 2017-03-16

## 2017-03-16 RX ORDER — CEFAZOLIN SODIUM 2 G/100ML
2 INJECTION, SOLUTION INTRAVENOUS EVERY 8 HOURS
Status: COMPLETED | OUTPATIENT
Start: 2017-03-16 | End: 2017-03-17

## 2017-03-16 RX ORDER — ONDANSETRON 2 MG/ML
INJECTION INTRAMUSCULAR; INTRAVENOUS PRN
Status: DISCONTINUED | OUTPATIENT
Start: 2017-03-16 | End: 2017-03-16

## 2017-03-16 RX ORDER — SODIUM CHLORIDE, SODIUM LACTATE, POTASSIUM CHLORIDE, CALCIUM CHLORIDE 600; 310; 30; 20 MG/100ML; MG/100ML; MG/100ML; MG/100ML
INJECTION, SOLUTION INTRAVENOUS CONTINUOUS
Status: DISCONTINUED | OUTPATIENT
Start: 2017-03-16 | End: 2017-03-16 | Stop reason: HOSPADM

## 2017-03-16 RX ORDER — PROCHLORPERAZINE MALEATE 5 MG
5 TABLET ORAL EVERY 6 HOURS PRN
Status: DISCONTINUED | OUTPATIENT
Start: 2017-03-16 | End: 2017-03-19 | Stop reason: HOSPADM

## 2017-03-16 RX ORDER — SODIUM CHLORIDE, SODIUM LACTATE, POTASSIUM CHLORIDE, CALCIUM CHLORIDE 600; 310; 30; 20 MG/100ML; MG/100ML; MG/100ML; MG/100ML
INJECTION, SOLUTION INTRAVENOUS CONTINUOUS
Status: DISCONTINUED | OUTPATIENT
Start: 2017-03-16 | End: 2017-03-18

## 2017-03-16 RX ORDER — FENTANYL CITRATE 50 UG/ML
INJECTION, SOLUTION INTRAMUSCULAR; INTRAVENOUS PRN
Status: DISCONTINUED | OUTPATIENT
Start: 2017-03-16 | End: 2017-03-16

## 2017-03-16 RX ORDER — SIMVASTATIN 20 MG
20 TABLET ORAL AT BEDTIME
Status: DISCONTINUED | OUTPATIENT
Start: 2017-03-16 | End: 2017-03-19 | Stop reason: HOSPADM

## 2017-03-16 RX ORDER — LIDOCAINE 40 MG/G
CREAM TOPICAL
Status: DISCONTINUED | OUTPATIENT
Start: 2017-03-16 | End: 2017-03-19 | Stop reason: HOSPADM

## 2017-03-16 RX ORDER — ONDANSETRON 2 MG/ML
4 INJECTION INTRAMUSCULAR; INTRAVENOUS ONCE
Status: DISCONTINUED | OUTPATIENT
Start: 2017-03-16 | End: 2017-03-16 | Stop reason: HOSPADM

## 2017-03-16 RX ORDER — ETOMIDATE 2 MG/ML
INJECTION INTRAVENOUS PRN
Status: DISCONTINUED | OUTPATIENT
Start: 2017-03-16 | End: 2017-03-16

## 2017-03-16 RX ORDER — DIMENHYDRINATE 50 MG/ML
12.5 INJECTION, SOLUTION INTRAMUSCULAR; INTRAVENOUS EVERY 10 MIN PRN
Status: DISCONTINUED | OUTPATIENT
Start: 2017-03-16 | End: 2017-03-16 | Stop reason: HOSPADM

## 2017-03-16 RX ORDER — PROPOFOL 10 MG/ML
INJECTION, EMULSION INTRAVENOUS PRN
Status: DISCONTINUED | OUTPATIENT
Start: 2017-03-16 | End: 2017-03-16

## 2017-03-16 RX ORDER — ACETAMINOPHEN 325 MG/1
975 TABLET ORAL EVERY 8 HOURS
Status: DISCONTINUED | OUTPATIENT
Start: 2017-03-16 | End: 2017-03-19 | Stop reason: HOSPADM

## 2017-03-16 RX ORDER — ONDANSETRON 2 MG/ML
4 INJECTION INTRAMUSCULAR; INTRAVENOUS EVERY 6 HOURS PRN
Status: DISCONTINUED | OUTPATIENT
Start: 2017-03-16 | End: 2017-03-19 | Stop reason: HOSPADM

## 2017-03-16 RX ORDER — NEOSTIGMINE METHYLSULFATE 1 MG/ML
VIAL (ML) INJECTION PRN
Status: DISCONTINUED | OUTPATIENT
Start: 2017-03-16 | End: 2017-03-16

## 2017-03-16 RX ORDER — GLYCOPYRROLATE 0.2 MG/ML
INJECTION, SOLUTION INTRAMUSCULAR; INTRAVENOUS PRN
Status: DISCONTINUED | OUTPATIENT
Start: 2017-03-16 | End: 2017-03-16

## 2017-03-16 RX ORDER — DOCUSATE SODIUM 100 MG/1
100 CAPSULE, LIQUID FILLED ORAL 2 TIMES DAILY
Status: DISCONTINUED | OUTPATIENT
Start: 2017-03-16 | End: 2017-03-19 | Stop reason: HOSPADM

## 2017-03-16 RX ORDER — ONDANSETRON 4 MG/1
4 TABLET, ORALLY DISINTEGRATING ORAL EVERY 30 MIN PRN
Status: DISCONTINUED | OUTPATIENT
Start: 2017-03-16 | End: 2017-03-16 | Stop reason: HOSPADM

## 2017-03-16 RX ORDER — ONDANSETRON 2 MG/ML
4 INJECTION INTRAMUSCULAR; INTRAVENOUS EVERY 30 MIN PRN
Status: DISCONTINUED | OUTPATIENT
Start: 2017-03-16 | End: 2017-03-16 | Stop reason: HOSPADM

## 2017-03-16 RX ORDER — WARFARIN SODIUM 5 MG/1
5 TABLET ORAL
Status: COMPLETED | OUTPATIENT
Start: 2017-03-16 | End: 2017-03-16

## 2017-03-16 RX ORDER — HYDROXYZINE HYDROCHLORIDE 10 MG/1
10 TABLET, FILM COATED ORAL EVERY 6 HOURS PRN
Status: DISCONTINUED | OUTPATIENT
Start: 2017-03-16 | End: 2017-03-19 | Stop reason: HOSPADM

## 2017-03-16 RX ORDER — FENTANYL CITRATE 50 UG/ML
25-50 INJECTION, SOLUTION INTRAMUSCULAR; INTRAVENOUS
Status: DISCONTINUED | OUTPATIENT
Start: 2017-03-16 | End: 2017-03-16 | Stop reason: HOSPADM

## 2017-03-16 RX ORDER — HYDROMORPHONE HYDROCHLORIDE 1 MG/ML
.3-.5 INJECTION, SOLUTION INTRAMUSCULAR; INTRAVENOUS; SUBCUTANEOUS
Status: DISCONTINUED | OUTPATIENT
Start: 2017-03-16 | End: 2017-03-19 | Stop reason: HOSPADM

## 2017-03-16 RX ORDER — EPHEDRINE SULFATE 50 MG/ML
INJECTION, SOLUTION INTRAMUSCULAR; INTRAVENOUS; SUBCUTANEOUS PRN
Status: DISCONTINUED | OUTPATIENT
Start: 2017-03-16 | End: 2017-03-16

## 2017-03-16 RX ORDER — SODIUM CHLORIDE, SODIUM LACTATE, POTASSIUM CHLORIDE, CALCIUM CHLORIDE 600; 310; 30; 20 MG/100ML; MG/100ML; MG/100ML; MG/100ML
INJECTION, SOLUTION INTRAVENOUS CONTINUOUS PRN
Status: DISCONTINUED | OUTPATIENT
Start: 2017-03-16 | End: 2017-03-16

## 2017-03-16 RX ORDER — CEFAZOLIN SODIUM 2 G/100ML
2 INJECTION, SOLUTION INTRAVENOUS
Status: COMPLETED | OUTPATIENT
Start: 2017-03-16 | End: 2017-03-16

## 2017-03-16 RX ADMIN — SODIUM CHLORIDE, POTASSIUM CHLORIDE, SODIUM LACTATE AND CALCIUM CHLORIDE: 600; 310; 30; 20 INJECTION, SOLUTION INTRAVENOUS at 11:30

## 2017-03-16 RX ADMIN — PHYTONADIONE 5 MG: 10 INJECTION, EMULSION INTRAMUSCULAR; INTRAVENOUS; SUBCUTANEOUS at 06:47

## 2017-03-16 RX ADMIN — PROCHLORPERAZINE EDISYLATE 5 MG: 5 INJECTION INTRAMUSCULAR; INTRAVENOUS at 15:56

## 2017-03-16 RX ADMIN — MIDAZOLAM HYDROCHLORIDE 0.5 MG: 1 INJECTION, SOLUTION INTRAMUSCULAR; INTRAVENOUS at 10:11

## 2017-03-16 RX ADMIN — ONDANSETRON HYDROCHLORIDE 4 MG: 2 SOLUTION INTRAMUSCULAR; INTRAVENOUS at 08:22

## 2017-03-16 RX ADMIN — CEFAZOLIN SODIUM 2 G: 2 INJECTION, SOLUTION INTRAVENOUS at 19:20

## 2017-03-16 RX ADMIN — CEFAZOLIN SODIUM 2 G: 2 INJECTION, SOLUTION INTRAVENOUS at 10:35

## 2017-03-16 RX ADMIN — SIMVASTATIN 20 MG: 20 TABLET, FILM COATED ORAL at 19:51

## 2017-03-16 RX ADMIN — SUCCINYLCHOLINE CHLORIDE 100 MG: 20 INJECTION, SOLUTION INTRAMUSCULAR; INTRAVENOUS at 10:20

## 2017-03-16 RX ADMIN — ROCURONIUM BROMIDE 15 MG: 10 INJECTION INTRAVENOUS at 10:35

## 2017-03-16 RX ADMIN — ACETAMINOPHEN 975 MG: 325 TABLET ORAL at 19:20

## 2017-03-16 RX ADMIN — ROCURONIUM BROMIDE 20 MG: 10 INJECTION INTRAVENOUS at 10:30

## 2017-03-16 RX ADMIN — OXYCODONE HYDROCHLORIDE 5 MG: 5 TABLET ORAL at 19:52

## 2017-03-16 RX ADMIN — PROPOFOL 30 MG: 10 INJECTION, EMULSION INTRAVENOUS at 11:28

## 2017-03-16 RX ADMIN — FENTANYL CITRATE 50 MCG: 50 INJECTION, SOLUTION INTRAMUSCULAR; INTRAVENOUS at 12:30

## 2017-03-16 RX ADMIN — ONDANSETRON HYDROCHLORIDE 4 MG: 2 SOLUTION INTRAMUSCULAR; INTRAVENOUS at 09:57

## 2017-03-16 RX ADMIN — SODIUM CHLORIDE: 9 INJECTION, SOLUTION INTRAVENOUS at 06:47

## 2017-03-16 RX ADMIN — HYDROMORPHONE HYDROCHLORIDE 0.5 MG: 10 INJECTION, SOLUTION INTRAMUSCULAR; INTRAVENOUS; SUBCUTANEOUS at 08:09

## 2017-03-16 RX ADMIN — HYDROMORPHONE HYDROCHLORIDE 0.5 MG: 1 INJECTION, SOLUTION INTRAMUSCULAR; INTRAVENOUS; SUBCUTANEOUS at 12:11

## 2017-03-16 RX ADMIN — WARFARIN SODIUM 5 MG: 5 TABLET ORAL at 19:20

## 2017-03-16 RX ADMIN — Medication 5 MG: at 10:30

## 2017-03-16 RX ADMIN — FENTANYL CITRATE 50 MCG: 50 INJECTION, SOLUTION INTRAMUSCULAR; INTRAVENOUS at 10:42

## 2017-03-16 RX ADMIN — HYDROMORPHONE HYDROCHLORIDE 0.5 MG: 1 INJECTION, SOLUTION INTRAMUSCULAR; INTRAVENOUS; SUBCUTANEOUS at 12:32

## 2017-03-16 RX ADMIN — SODIUM CHLORIDE, POTASSIUM CHLORIDE, SODIUM LACTATE AND CALCIUM CHLORIDE: 600; 310; 30; 20 INJECTION, SOLUTION INTRAVENOUS at 14:18

## 2017-03-16 RX ADMIN — HYDROMORPHONE HYDROCHLORIDE 0.5 MG: 10 INJECTION, SOLUTION INTRAMUSCULAR; INTRAVENOUS; SUBCUTANEOUS at 03:35

## 2017-03-16 RX ADMIN — HYDROMORPHONE HYDROCHLORIDE 0.5 MG: 10 INJECTION, SOLUTION INTRAMUSCULAR; INTRAVENOUS; SUBCUTANEOUS at 06:08

## 2017-03-16 RX ADMIN — NEOSTIGMINE METHYLSULFATE 3 MG: 1 INJECTION INTRAMUSCULAR; INTRAVENOUS; SUBCUTANEOUS at 11:31

## 2017-03-16 RX ADMIN — ETOMIDATE 16 MG: 2 INJECTION INTRAVENOUS at 10:20

## 2017-03-16 RX ADMIN — SODIUM CHLORIDE, POTASSIUM CHLORIDE, SODIUM LACTATE AND CALCIUM CHLORIDE: 600; 310; 30; 20 INJECTION, SOLUTION INTRAVENOUS at 10:26

## 2017-03-16 RX ADMIN — FENTANYL CITRATE 50 MCG: 50 INJECTION, SOLUTION INTRAMUSCULAR; INTRAVENOUS at 10:14

## 2017-03-16 RX ADMIN — ONDANSETRON 4 MG: 2 INJECTION INTRAMUSCULAR; INTRAVENOUS at 11:17

## 2017-03-16 RX ADMIN — ACETAMINOPHEN 1000 MG: 10 INJECTION, SOLUTION INTRAVENOUS at 11:16

## 2017-03-16 RX ADMIN — Medication 5 MG: at 11:17

## 2017-03-16 RX ADMIN — GLYCOPYRROLATE 0.6 MG: 0.2 INJECTION, SOLUTION INTRAMUSCULAR; INTRAVENOUS at 11:31

## 2017-03-16 RX ADMIN — HYDROMORPHONE HYDROCHLORIDE 0.5 MG: 10 INJECTION, SOLUTION INTRAMUSCULAR; INTRAVENOUS; SUBCUTANEOUS at 00:29

## 2017-03-16 RX ADMIN — DOCUSATE SODIUM 100 MG: 100 CAPSULE ORAL at 19:51

## 2017-03-16 RX ADMIN — Medication 5 MG: at 10:52

## 2017-03-16 ASSESSMENT — ENCOUNTER SYMPTOMS: DYSRHYTHMIAS: 1

## 2017-03-16 ASSESSMENT — PAIN DESCRIPTION - DESCRIPTORS: DESCRIPTORS: ACHING;SORE

## 2017-03-16 NOTE — CONSULTS
Impression:  Left displaced femoral fracture  Coumadin coagulopathy    Plan:  Recommend left hip hemiarthroplasty in order to regain function.  She started C vitamin K and INR equals 3. Spoke with Dr. Akers-will utilize fresh frozen plasma.  Keep n.p.o.  Risks and benefits discussed.  Planning surgery 10 AM today.    Full consult dictated:[id number]    Vitaly Pang D.O.

## 2017-03-16 NOTE — PHARMACY-ANTICOAGULATION SERVICE
Clinical Pharmacy - Warfarin Dosing Consult     Pharmacy has been consulted to manage this patient s warfarin therapy.  Indication: Atrial Fibrillation  Therapy Goal: INR 2-3  Provider/Team: Gerard ARAGON [facility]: YEN Franco  Warfarin Prior to Admission: Yes  Warfarin PTA Regimen: 2.5 mg on Mon; 5 mg all other days  Recent documented change in oral intake/nutrition: Unknown    INR   Date Value Ref Range Status   03/16/2017 1.54 (H) 0.86 - 1.14 Final   03/16/2017 3.08 (H) 0.86 - 1.14 Final       Recommend warfarin 5 mg today.  Pharmacy will monitor Tracy Palomo daily and order warfarin doses to achieve specified goal.      Please contact pharmacy as soon as possible if the warfarin needs to be held for a procedure or if the warfarin goals change.      Mary Alice Jacobs, PharmD Student

## 2017-03-16 NOTE — ED NOTES
ED Nursing criteria listed below was addressed during verbal handoff:     Abnormal vitals: Yes  Abnormal results: Yes  Med Reconciliation completed: Yes  Meds given in ED: Yes  Any Overdue Meds: No  Core Measures: N/A  Isolation: N/A  Special needs: Yes  Skin assessment: Yes    Observation Patient  Education provided: N/A

## 2017-03-16 NOTE — PROGRESS NOTES
INR this AM still high at 3.08. Has received 2.5 mg oral vitamin K last PM. Spoke with pharmacy and will order 5 mg Vit K IV now and recheck at noon. Still planning for surgery later this PM  Electronically signed by ANGELICA Jarrett on March 16, 2017

## 2017-03-16 NOTE — BRIEF OP NOTE
[facility] Brief Operative Note    Pre-operative diagnosis: left displaced femoral neck fracture   Post-operative diagnosis: left displaced femoral neck fracture   Procedure: Procedure(s):  Left hip hemiarthroplasty    Surgeon: Kaleb Pang DO   Assistant(s): Brad Cosby Scrub Tech   Estimated blood loss:  Fluids:  UO:  Implants: 650 ml  1900 ml Crystalloids  125 ml  [place] Omnifit HFx 132 size 9, 28 mm +2.5 offset head, 28/49 mm bipolar cup   Specimens: None   Findings: See dictated operative report for full details [id number]     Vitaly Pang D.O.

## 2017-03-16 NOTE — CONSULTS
ORTHOPEDIC SURGERY CONSULTATION      REQUESTING PHYSICIAN:  Donnie Jarrett MD      REASON FOR CONSULTATION:  Left hip fracture.      NOTE:  Tracy Palomo is a pleasant 76-year-old female independent ambulator who lives alone, with a ground level fall after slipping on ice, landing on her left side.  She was evaluated in the ER, and diagnosed with a displaced left femoral neck fracture.  She denies any other injuries.  Denies any loss of consciousness.  She was evaluated on the second floor, resting comfortably.  History of atrial fibrillation with a previous pulmonary embolisms.  She normally takes Coumadin.      PAST MEDICAL HISTORY:  Atrial fibrillation, colon polyps, diverticulosis, DVT, pulmonary embolism, hypertension.      PAST SURGICAL HISTORY:  Sigmoidoscopy, exploratory laparotomy, lysis of adhesions with the laparotomy, small bowel resection.      PRIOR TO ADMISSION MEDICATIONS:  Lisinopril, lovastatin, Coumadin.      ALLERGIES:  No known drug allergies.      SOCIAL HISTORY:  No tobacco, ETOH or illicits.  Lives independently, ambulates independently.   is currently residing at [place].      FAMILY HISTORY:  Blood clots.      REVIEW OF SYSTEMS:  Ten-point review of systems was performed and otherwise unremarkable as per HPI.      PHYSICAL EXAM:   VITAL SIGNS:  Temperature 98.3, pulse 60, blood pressure 110/65, respiratory rate 16, saturation 95% on room air.   GENERAL:  She is awake, alert, nontoxic, in no acute distress, appropriate and pleasant.   HEENT:  Atraumatic, normocephalic.   /NECK:  Cervical spine has no pain with palpation or range of motion.  There is no gross deformity or step-off.   CHEST:  Equal chest rise and fall.  No audible wheezing or retraction.  No pain with palpation.   ABDOMEN:  Soft, nontender, nondistended.   EXTREMITIES:  Bilateral upper extremities and right lower extremity show no pain with palpation or range of motion.  There is no swelling or peripheral edema.  The  integumentary system is intact.  Left lower extremity is in a shortened, externally rotated posture.  Distally, she has no pain from mid-thigh down.  There is no knee effusion.  Motor is intact distally.  Sensation is intact.  Toes are slightly cool but equal bilaterally.  There is a 2+ dorsalis pedis pulse.  No peripheral edema.      IMAGING:  X-rays reviewed, and show a displaced left femoral neck fracture.      IMPRESSION:  This is a 76-year-old female with a displaced left femoral neck fracture with Coumadin coagulopathy.      PLAN:  The above was relayed to the patient.  I also discussed the case with Dr. Akers, the hospitalist.  She had a repeat INR this morning after receiving vitamin K in the ER, showing the INR is 3.0.  She just received some additional IV vitamin K.  In order to expedite surgery, we will plan to utilize fresh frozen plasma in order to reverse safely and allow her to become mobile quicker.  Plan to proceed with surgery later this morning.  The risks, benefits, complications, alternatives and anticipated postoperative course were reviewed with her.  Risks including bleeding, infection, scar, scar tenderness, neurovascular injury, fracture, blood clots, heart attacks, strokes and death were discussed.  I would anticipate she will need a rehab placement postoperatively, as she lives alone.  Anticipated hospitalization is approximately 2-3 days.         SULAIMAN HERNANDEZ DO             D: 2017 08:08   T: 2017 09:52   MT: [id number]      Name:     YOLANDA WOODARD   MRN:      [id number]        Account:       [id number]   :      1940           Consult Date:  2017      Document: [id number]       cc: Donnie Jarrett III, MD

## 2017-03-16 NOTE — OR NURSING
Transfer from  PACU to Room 266  Transferred via hospital bed (Glyder Mat,Transfer Boar,Slider Sheet)    S: 77 y/o female  S/P left hip hemiarthroplasty       Anesthesia Type:  general       Surgeon:  Dr. Pang       Allergies:  See Medication Reconciliation Record       DNR:   (Yes,No)    B:  Pertinent Medical History:    (CHF; Heart Disease; Lung Disease; Chronic Pain; Diabetes; Other (Comment)          Surgical History:      A:  EBL: total 650 in OR, first 450ml was rapid per OR CRNA report, pt received 2 units FFP prior to going to OR, INR was 1.54 per Hank CLEMENTS CRNA when pt went into OR         IVF:  800 NS, 1050 LR in OR, 450 in PACU        UOP:  125 in OR        NPO:  ___Yes __xNo has taken a few ice chips        Vomiting:  ___Yes _x__No         Drainage: none        Skin Integrity: new surgical incision to left hip (Normal; Pressure Ulcer (Location)        RFO: _x__Yes___No (identify item if present) mora cather was present went pt went into OR        SSI Patient?  __x_Yes___No (if yes, see checklist for actions)        Brace/sling/equipment:  ___Yes_x__No (identify item if present)         See PACU record for ongoing assessment, vital signs and pain assessment.  Patient c/o pain initally, given IV Fentanyl and Dilaudid for pain.  Patient is sensitive to narcotics, switched from cannula to oxymask at 4 lpm, O2 sats 95-97 %.  Post op xray completed at 1222    R: Post-Op vitals and assessments as ordered/indicated per patient's condition.       Follow Post-Op orders and notify Physician prn.       Continue to involve patient/family in plan of care and discharge planning.       Reinforce Pre-Operative education.       Implement skin safety interventions as appropriate.    Name: Trena Sloan RN

## 2017-03-16 NOTE — H&P
[facility]    History and Physical  Hospitalist       Date of Admission:  3/15/2017  Date of Service (when I saw the patient): 03/15/17    Assessment & Plan   Tracy Palomo is a 76 year old female who presents with left hip pain after slipping on the ice at home and falling. Evaluation in the emergency department is showing the displaced transverse fracture of the cervical portion of the proximal femur. They've spoken to Dr. Pang, was anticipating surgery tomorrow afternoon. Her past history is significant for history of pulmonary embolism on chronic Coumadin therapy. Her INR today is 2.99. She has been given oral vitamin K and will plan to recheck INR in the morning. The goal is to have her INR 1.4 or less. She will be admitted to inpatient status, kept at bed rest, with NPO status after midnight, IV fluids and IV narcotics for pain. Anticipates she will be in the hospital for at least 2 days and may need TCU placement at discharge. If the INR can be corrected, she would have no contraindications to surgery tomorrow.     Principal Problem:    Hip fracture, left, closed, initial encounter (H)    Assessment: occurring after a fall at home. No other injuries    Plan: probable surgery tomorrow. Keep at bed rest tonight with IV narcotics for pain. Somewhat concerned about increased bleeding with her Coumadin, will check hemoglobin in a.m.  Active Problems:    Hypertension goal BP (blood pressure) < 140/90    Assessment: controlled use of lisinopril    Plan: hold lisinopril for now, reevaluate need after surgery    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    Assessment: occurring after surgery in 2014, on ongoing Coumadin since    Plan: will need to reverse tonight with vitamin K, recheck INR in a.m. May need fresh frozen plasma prior to surgery    Paroxysmal atrial fibrillation (H)    Assessment: noted, no current symptoms    Plan: check EKG    Long-term  (current) use of anticoagulants [Z79.01]    Assessment: taking in relation to previous DVT and pulmonary embolism    Plan: hold for now, vitamin K given, expect she will be on Lovenox after surgery with restarting the Coumadin at some point    Hyperlipidemia LDL goal <130    Assessment: taking Mevacor    Plan: restart later  DVT Prophylaxis: Pneumatic Compression Devices  Code Status: Full Code    Disposition: Expected discharge in 2-3 days once surgery completed, disposition arranged.    Donine Jarrett MD    Primary Care Physician   Chad Betts    Chief Complaint   76-year-old female with left hip pain after a fall    History is obtained from the patient, electronic health record, emergency department physician and patient's daughter    History of Present Illness   Tracy Palomo is a 76 year old female who presents with acute left hip pain after a fall on ice at home this afternoon. Was walking to her house from the car when she slipped on ice. Hit her left hip on the ground with immediate pain. Denies injuries to her head, neck or back. She was able to call 911 and transported to the hospital by ambulance. Prior to falling she felt well with no upper respiratory symptoms cough, shortness of breath, abdominal pain. She is on Coumadin therapy for treatment of previous PE in 2014. She has known hypertension, and hyperlipidemia taking medications. Her hip pain has been controlled with use of fentanyl and IV hydromorphone.    Past Medical History    I have reviewed this patient's medical history and updated it with pertinent information if needed.   Past Medical History   Diagnosis Date     Atrial fibrillation (H) 2014     related to colon surgery     Colon polyps      Diverticulosis of colon (without mention of hemorrhage)      Diverticulosis     DVT (deep vein thrombosis) in pregnancy (H) 2014     after colon surgery     Other and unspecified hyperlipidemia      PE (pulmonary embolism) 2014     related to  colon surgery     Unspecified essential hypertension        Past Surgical History   I have reviewed this patient's surgical history and updated it with pertinent information if needed.  Past Surgical History   Procedure Laterality Date     Colonoscopy  09, 10,      [facility]     Flexible sigmoidoscopy       Laparotomy exploratory N/A 10/20/2014     Procedure: LAPAROTOMY EXPLORATORY;  Surgeon: Miguel Oleary MD;  Location: [facility] OR     Laparotomy, lysis adhesions, combined N/A 10/20/2014     Procedure: COMBINED LAPAROTOMY, LYSIS ADHESIONS;  Surgeon: Miguel Oleary MD;  Location: [facility] OR     Resect small bowel without ostomy N/A 10/20/2014     Procedure: RESECT SMALL BOWEL WITHOUT OSTOMY;  Surgeon: Miguel Oleary MD;  Location: [facility] OR       Prior to Admission Medications   Prior to Admission Medications   Prescriptions Last Dose Informant Patient Reported? Taking?   lisinopril (PRINIVIL,ZESTRIL) 10 MG tablet 3/14/2017 at 2200  No Yes   Sig: Take 1 tablet (10 mg) by mouth daily   lovastatin (MEVACOR) 40 MG tablet 3/14/2017 at 2200  No Yes   Sig: TAKE ONE TABLET BY MOUTH AT BEDTIME   warfarin (COUMADIN) 5 MG tablet 3/14/2017 at 2200  No Yes   Si.5 mg Tues, Thurs, Sat and 5 mg the rest of the week. Or as directed by the coumadin clinic      Facility-Administered Medications: None     Allergies   Allergies   Allergen Reactions     No Known Allergies        Social History   I have reviewed this patient's social history and updated it with pertinent information if needed. Tracy Palomo  reports that she has never smoked. She has never used smokeless tobacco. She reports that she does not drink alcohol or use illicit drugs.    Family History   I have reviewed this patient's family history and updated it with pertinent information if needed.   Family History   Problem Relation Age of Onset     Blood Disease No family hx of      blood clots       Review of Systems   The 10 point Review  of Systems is negative other than noted in the HPI or here.     Physical Exam   Temp: 96.3  F (35.7  C) Temp src: Oral BP: 166/68 Pulse: 84   Resp: 16 SpO2: 100 %      Vital Signs with Ranges  Temp:  [96.3  F (35.7  C)-97.6  F (36.4  C)] 96.3  F (35.7  C)  Pulse:  [57-84] 84  Resp:  [16-20] 16  BP: (121-166)/(68-96) 166/68  SpO2:  [96 %-100 %] 100 %  191 lbs 0 oz    EXAM:  Constitutional: alert, mild distress and severe distress   Cardiovascular: PMI normal. No lifts, heaves, or thrills. RRR. No murmurs, clicks gallops or rub  Respiratory: Percussion normal. Good diaphragmatic excursion. Lungs clear  Psychiatric: mentation appears normal and affect normal/bright  Head: Normocephalic. No masses, lesions, tenderness or abnormalities  Neck: Neck supple. No adenopathy. Thyroid symmetric, normal size,  ENT: ENT exam normal, no neck nodes or sinus tenderness  Abdomen: Abdomen soft, non-tender. BS normal. No masses, organomegaly  NEURO: non-focal, sensation intact  SKIN: no suspicious lesions or rashes  LYMPH: Normal cervical lymph nodes  JOINT/EXTREMITIES: left leg externally rotated and mildly shortened. Tender over the left hip area.     Data   Data reviewed today:  I personally reviewed the Hip x-ray image(s) showing Transverse fracture over the proximal femur. EKG with bradycardia, otherwise appears normal    Recent Labs  Lab 03/15/17  1807   WBC 6.8   HGB 13.8   MCV 90      INR 2.99*      POTASSIUM 4.0   CHLORIDE 105   CO2 32   BUN 11   CR 0.92   ANIONGAP 5   HARVEY 9.6   *       Recent Results (from the past 24 hour(s))   XR Pelvis w Hip Left 1 View    Narrative    PELVIS AND HIP LEFT ONE VIEW  3/15/2017 6:34 PM     COMPARISON: None    HISTORY: Trauma      Impression    IMPRESSION: There is a transverse fracture of the low cervical neck of  the proximal left femur with lateral displacement and various  angulation of the distal fragment. No other fractures.    SELENE ESCOBEDO MD   XR Chest Port 1 View     Narrative    CHEST PORTABLE ONE VIEW 3/15/2017 9:16 PM     COMPARISON: Two-view chest x-ray 11/14/2014.    HISTORY: Pre-op.    FINDINGS: The cardiac silhouette, pulmonary vasculature, lungs and  pleural spaces are within normal limits.      Impression    IMPRESSION: Clear lungs.    SELENE ESCOBEDO MD

## 2017-03-16 NOTE — CONSULTS
Pharmacy to dose warfarin consult has been acknowledged.   Please see previous note for details.    Mary Alice Jacobs, PharmD Student

## 2017-03-16 NOTE — PLAN OF CARE
Problem: Goal Outcome Summary  Goal: Goal Outcome Summary  Patient is a 76 year old year old female admitted to the hospital currently POD #0 s/p L Hip Hemiarthroplasty after sustaining a ground level fall in her driveway. Prior to admission, patient was living in a house with her spouse (spouse is w/c dependent, and is in a TCU himself at the moment) with 0 stairs to enter (ramp), using no AD for mobility, and requiring no assist for ADLs. Currently, patient is requiring Minimal assistance for functional mobility and CGA for ambulation (has only taken several steps thus far) using front wheeled walker. Barriers to return to previous living situation include Lives alone, ramps to enter.  Patient equipment needs at discharge include Defer to TCU.  Recommend discharge to TCU with family transport.     Upon first sitting upright, pt did report dizziness/lightheadedness, with seated BP measured at 107/59. Her symptoms passed after approximately 45 seconds, prior to performing standing activities.           Jay Jay Hurd, PT, DPT        3/16/2017      5:12 PM  [facility]  [phone]

## 2017-03-16 NOTE — ANESTHESIA PREPROCEDURE EVALUATION
Anesthesia Evaluation     . Pt has had prior anesthetic. Type: General    No history of anesthetic complications     ROS/MED HX    ENT/Pulmonary:  - neg pulmonary ROS     Neurologic:  - neg neurologic ROS     Cardiovascular:     (+) Dyslipidemia, hypertension-range: < 140 / 90, ---. Taking blood thinners Pt has received instructions: Instructions Given to patient: Will give FFP and bring to OR for ORIF of her left hip with hemiarthroplasty. . . :. dysrhythmias a-fib, Irregular Heartbeat/Palpitations, . Previous cardiac testing Echodate:11/02/2014results:Interpretation Summary  Left ventricular systolic function is normal. No regional wall motion   abnormalities noted.  PatientHeight: 66 in  PatientWeight: 189 lbs  SystolicPressure: 104 mmHg  DiastolicPressure: 57 mmHg  HeartRate: 60 bpm  BSA 2.0 m^2        Left Ventricle  The left ventricle is normal in size.  There is borderline concentric left ventricular hypertrophy.  Left ventricular systolic function is normal.  The visual ejection fraction is estimated at 55-60%.  Normal left ventricular diastolic function.  E by E prime ratio is between 8 and 15, which is indeterminate for assessment   of left ventricular filling pressures.  No regional wall motion abnormalities noted.     Right Ventricle  The right ventricle is normal size.  The right ventricular systolic function is normal.     Atria  Normal left atrial size.  Right atrial size is normal.     Mitral Valve  The mitral valve leaflets appear normal. There is no evidence of stenosis,   fluttering, or prolapse.  There is physiologic mitral regurgitation.     Tricuspid Valve  Normal tricuspid valve.  There is trace to mild tricuspid regurgitation.  The right ventricular systolic pressure is approximated at 30 mmHg plus the   right atrial pressure.  Normal IVC (1.5-2.5cm) with >50% respiratory collapse; right atrial pressure   is estimated at 5-10mmHg.     Aortic Valve  The aortic valve is trileaflet.  No aortic  regurgitation is present.  No aortic stenosis is present.     Pulmonic Valve  The pulmonic valve is not well visualized.  This degree of valvular regurgitation is within normal limits.  Normal pulmonic valve velocity.     Vessels  The aortic root is not well visualized.  Normal size ascending aorta.  The IVC is normal in size and reactivity with respiration, suggesting normal   central venous pressure.     Pericardium  There is no pericardial effusion.     Rhythm  The rhythm was normal sinus.     Procedure  Complete Portable Echo Adult.     MMode 2D Measurements & Calculations  RVDd: 2.9 cm  IVSd: 1.1 cm  IVSs: 1.4 cm  LVIDd: 4.3 cm  LVIDs: 2.9 cm  LVPWd: 1.1 cm  LVPWs: 1.4 cm  FS: 33 %  LV mass(C)d: 163 grams  Ao root diam: 2.5 cm  LA dimension: 3.3 cm  asc Aorta: 3.1 cm  LA/Ao: 1.3   Doppler Measurements & Calculations  MV E point: 80 cm/sec  MV A point: 94 cm/sec  MV E/A: 0.85   MV dec time: 0.20 sec  TR Max nadine: 272 cm/sec  TR Max P mmHg     Interpreting Physician:  Johanna Mckeon MD electronically signed on   2014 12:14:10date: results:ECG reviewed date:03/15/2017 results:Sinus Bradycardia   -Old anterior infarct.     ABNORMAL    date: results:         : 140/90.   METS/Exercise Tolerance:  >4 METS   Hematologic: Comments: INR 3.08 this am - Given 5 mg Vit K and is on her 2nd unit if FFP and surgeon requests pt be brought to the surgery pre-op area and wants to proceed with Left hemiarthroplasty after consult with Dr. Akers.    (+) History of blood clots pt is anticoagulated, -      Musculoskeletal: Comment: Left hip fx - presents for ORIF of same with hemiarthroplasty  (+) , fracture lower extremity: Hip, -       GI/Hepatic:     (+) GERD Symptomatic,       Renal/Genitourinary:  - ROS Renal section negative       Endo:  - neg endo ROS       Psychiatric:  - neg psychiatric ROS       Infectious Disease:  - neg infectious disease ROS       Malignancy:      - no malignancy   Other:    - neg  other ROS           Physical Exam  Normal systems: dental    Airway   Mallampati: I  TM distance: >3 FB  Neck ROM: full    Dental     Cardiovascular   Rhythm and rate: irregular and normal      Pulmonary    breath sounds clear to auscultation    Other findings: Last dose Lisinopril was 48 hrs ago  INR 3.08 this am and receiving po Vit K and on her 2nd unit of FFP.                Anesthesia Plan      History & Physical Review  History and physical reviewed and following examination; no interval change.    ASA Status:  3 emergent.    NPO Status:  > 6 hours    Plan for General, ETT and RSI with Intravenous and Etomidate induction. Maintenance will be Balanced.    PONV prophylaxis:  Ondansetron (or other 5HT-3) and Dexamethasone or Solumedrol       Postoperative Care  Postoperative pain management:  IV analgesics.      Consents  Anesthetic plan, risks, benefits and alternatives discussed with:  Patient and Spouse.  Use of blood products discussed: Yes.   Use of blood products discussed with Patient and Spouse.  Consented to blood products.  .                          .

## 2017-03-16 NOTE — ANESTHESIA CARE TRANSFER NOTE
Patient: Tracy Palomo    Procedure(s):  left hip hemiarthroplasty - Wound Class: I-Clean    Diagnosis: left hip fracture  Diagnosis Additional Information: No value filed.    Anesthesia Type:   General, ETT, RSI     Note:  Airway :Nasal Cannula  Patient transferred to:PACU        Vitals: (Last set prior to Anesthesia Care Transfer)    CRNA VITALS  3/16/2017 1124 - 3/16/2017 1205      3/16/2017             Pulse: 82    SpO2: (!)  88 %                Electronically Signed By: BILLY Bowers CRNA  March 16, 2017  12:05 PM

## 2017-03-16 NOTE — PROGRESS NOTES
S-(situation): Transfer of care note.    B-(background): s/p left hip hemiarthroplasty    A-(assessment): pt sleepy, arousable to voice. Denies pain at this time. Left hip dressing C/D/I. CMS intact. Tolerating ice chips/clear liquids.    R-(recommendations): continue post -op VS and assesments.

## 2017-03-16 NOTE — PROGRESS NOTES
S-(situation): Patient arrives to room 266 @ 2200 from ED    B-(background): Left hip fx    A-(assessment): LS clear, no skin issues noted, L hip pain tolerable at present, dilaudid given in ED.      R-(recommendations): Orders reviewed with pt. Will monitor patient per MD orders. yes    Inpatient nursing criteria listed below were met:    Health care directives status obtained and documented: yes  Core Measures assessed (SSI): yes  SCD's Documented:yes  Vaccine assessment done and vaccines ordered if appropriate: prior  Skin issues/needs documented:yes  Isolation needs addressed, if appropriate:n/a  Fall Prevention: Care plan updated, Education given and documented yes  MRSA swab completed for patient 55 years and older (exclude JUANCARLOS and TKA): hip  My Chart patient sign up addressed and documented: declined  Care Plan initiate   yes  Education Assessment documented:{yes  Education Documented (Pre-existing chronic infection such as, MRSA/VRE need education on admission): yes  New medication patient education completed and documented (Possible Side Effects of Common Medications handout): yes  Home medications if not able to send immediately home with family stored here:n/a   Reminder note placed in discharge instructions:n/a  Discharge planning review completed (admission navigator) yes, pt's  in [place] at present, she would like to go there at discharge for rehab

## 2017-03-16 NOTE — PLAN OF CARE
Problem: Goal Outcome Summary  Goal: Goal Outcome Summary  S-(situation): end of shift summary     B-(background): left hip fracture due to fall     A-(assessment): Pt resting in between pain medication doses.  Adequate urine output. VSS, afebrile.  INR elevated this morning, MD has IV medication ordered.       R-(recommendations): Plan to have hip fracture repaired this afternoon.

## 2017-03-16 NOTE — PROGRESS NOTES
03/16/17 1547   Quick Adds   Type of Visit Initial PT Evaluation   Living Environment   Lives With spouse   Living Arrangements house   Home Accessibility no concerns   Number of Stairs to Enter Home 0  (ramp)   Number of Stairs Within Home 10  (basement)   Stair Railings at Home other (see comments)  (grab bars set up in bedroom, bathroom)   Transportation Available family or friend will provide   Living Environment Comment Pt lives in a house with a ramp into the front door. Once inside, all needs are on the main level including bedroom, bathroom, kitchen, living room and laundry. The bathroom is wheelchair accessible with a walk-in shower and a shower chair. She typically is the caretaker for her , however he is currently at a TCU for a recent hospital admission. No one else is at home with them.    Self-Care   Usual Activity Tolerance good   Current Activity Tolerance good   Regular Exercise yes   Activity/Exercise Type walking   Exercise Amount/Frequency daily   Equipment Currently Used at Home none   Activity/Exercise/Self-Care Comment Pt very active at baseline. She is used to taking care of her  who is wheelchair-bound, and walks daily as well.    Functional Level Prior   Ambulation 0-->independent   Transferring 0-->independent   Toileting 0-->independent   Bathing 0-->independent   Dressing 0-->independent   Eating 0-->independent   Communication 0-->understands/communicates without difficulty   Swallowing 0-->swallows foods/liquids without difficulty   Cognition 0 - no cognition issues reported   Fall history within last six months yes   Number of times patient has fallen within last six months 1   Which of the above functional risks had a recent onset or change? ambulation;transferring   Prior Functional Level Comment Pt was independent with ADLs and ambulation prior   General Information   Onset of Illness/Injury or Date of Surgery - Date 03/16/17   Referring Physician Dr. Pang  "  Patient/Family Goals Statement Pt wants to get back to going on her daily walks, and ride a bike   Pertinent History of Current Problem (include personal factors and/or comorbidities that impact the POC) Copied from previous EMR: \"Tracy Palomo is a pleasant 76-year-old female independent ambulator who lives alone, with a ground level fall after slipping on ice, landing on her left side.  She was evaluated in the ER, and diagnosed with a displaced left femoral neck fracture.\" PMH includes history of PEs secondary to A-fib, and pt had been under anticoagulation therapy, which was reversed for the surgery. She is the primary caretaker for her , who is dependent on wheelchair for mobility, however he has been in a TCU himself, so pt is currently living alone. She is a fairly active and fit individual at baseline.    Precautions/Limitations fall precautions   Weight-Bearing Status - LUE full weight-bearing   Weight-Bearing Status - RUE full weight-bearing   Weight-Bearing Status - LLE weight-bearing as tolerated   Weight-Bearing Status - RLE full weight-bearing   Heart Disease Risk Factors Age   General Observations Pt's sister and daughter present for session   Cognitive Status Examination   Orientation orientation to person, place and time   Level of Consciousness alert   Follows Commands and Answers Questions 100% of the time   Personal Safety and Judgment intact   Memory intact   Pain Assessment   Patient Currently in Pain No   Integumentary/Edema   Integumentary/Edema no deficits were identifed   Integumentary/Edema Comments Incision not directly observed, dressing in place over left hip, no bleeding or spotting noted   Posture    Posture Protracted shoulders;Kyphosis   Range of Motion (ROM)   ROM Comment UEs and R LE WFL for functional mobility and ambulation, L LE limited secondary to recent surgery   Strength   Strength Comments UEs and R LE WFL for functional mobility and ambulation, L LE limited " secondary to recent surgery   Bed Mobility   Bed Mobility Comments Demonstrates bed mobility including rolling bilaterally and bridging with Min Assist to help mobilize L LE, using overhead trapeze, with HOB flat   Transfer Skills   Transfer Comments Supine <> sit transfers with use of overhead trapeze with Min Assist to move L LE, able to work her way to edge of bed safely given extra time to perform task. Sit <> stand transfer with CGA x1 for safety using front wheeled walker appropriately, verbal cues for hand placement with task, bed raised slightly.    Gait   Gait Comments Gait not truly assessed during evaluation due to fatigue, Able to perform pre-gait activities such as marching and side-stepping to [facility] with CGA x1 and verbal cues with no increase in pain reported   Balance   Balance Comments Sitting balance fair, relies with moderate effort of UEs to maintain balance, but able to raise both arms overhead without significant trunk LOB. Standing balance fair with use of walker, relies on UEs for steadiness   Sensory Examination   Sensory Perception no deficits were identified   Coordination   Coordination no deficits were identified   Muscle Tone   Muscle Tone no deficits were identified   Modality Interventions   Planned Modality Interventions Comments prn for pain    General Therapy Interventions   Planned Therapy Interventions balance training;bed mobility training;gait training;ROM;strengthening;transfer training;risk factor education;progressive activity/exercise   Intervention Comments Plan to use the above interventions to progress safety and independence with functional mobility and ambulation prior to d/c   Clinical Impression   Criteria for Skilled Therapeutic Intervention yes, treatment indicated   PT Diagnosis s/p L hip Hemiarthroplasty, POD #0   Influenced by the following impairments Pain, Decreased strength, Decreased ROM   Functional limitations due to impairments Impaired bed mobility, gait,  "transfers, balance   Clinical Presentation Stable/Uncomplicated   Clinical Presentation Rationale Doing as expected for current post-op status   Clinical Decision Making (Complexity) Low complexity   Therapy Frequency` daily   Predicted Duration of Therapy Intervention (days/wks) 3 days   Anticipated Discharge Disposition Transitional Care Facility   Risk & Benefits of therapy have been explained Yes   Patient, Family & other staff in agreement with plan of care Yes   Clinical Impression Comments Tracy is a pleasant 77 y/o female admitted to the hospital currently POD #0 s/p L hip hemiarthroplasty, after sustaining a ground level fall slipping on ice in her driveway yesterday. She is doing well overall thus far, requiring Min Assist for functional mobility and contact guard for pre-gait activities in standing. She will benefit from skilled therapy to progress safety and independence with functional mobility and ambulation, and will likely require short term rehab stay to ensure she is independent with ADLs and all mobility prior to returning home, as she does not have any assistance at home.    [facility] TM \"6 Clicks\"   2016, Trustees of [facility], under license to [employer].  All rights reserved.   6 Clicks Short Forms Basic Mobility Inpatient Short Form   [facility]  \"6 Clicks\" V.2 Basic Mobility Inpatient Short Form   1. Turning from your back to your side while in a flat bed without using bedrails? 3 - A Little   2. Moving from lying on your back to sitting on the side of a flat bed without using bedrails? 3 - A Little   3. Moving to and from a bed to a chair (including a wheelchair)? 3 - A Little   4. Standing up from a chair using your arms (e.g., wheelchair, or bedside chair)? 3 - A Little   5. To walk in hospital room? 2 - A Lot   6. Climbing 3-5 steps with a railing? 2 - A Lot   Basic Mobility Raw Score (Score out of 24.Lower scores equate to lower levels of " function) 16   Total Evaluation Time   Total Evaluation Time (Minutes) 23           Jay Jay Hurd PT, DPT        3/16/2017      5:26 PM  [facility]  [phone]

## 2017-03-17 ENCOUNTER — APPOINTMENT (OUTPATIENT)
Dept: PHYSICAL THERAPY | Facility: CLINIC | Age: 77
DRG: 470 | End: 2017-03-17
Payer: MEDICARE

## 2017-03-17 ENCOUNTER — APPOINTMENT (OUTPATIENT)
Dept: OCCUPATIONAL THERAPY | Facility: CLINIC | Age: 77
DRG: 470 | End: 2017-03-17
Attending: ORTHOPAEDIC SURGERY
Payer: MEDICARE

## 2017-03-17 LAB
GLUCOSE SERPL-MCNC: 121 MG/DL (ref 70–99)
HGB BLD-MCNC: 10.5 G/DL (ref 11.7–15.7)
INR PPP: 1.08 (ref 0.86–1.14)

## 2017-03-17 PROCEDURE — 82947 ASSAY GLUCOSE BLOOD QUANT: CPT | Performed by: ORTHOPAEDIC SURGERY

## 2017-03-17 PROCEDURE — 97535 SELF CARE MNGMENT TRAINING: CPT | Mod: GO | Performed by: OCCUPATIONAL THERAPIST

## 2017-03-17 PROCEDURE — 99232 SBSQ HOSP IP/OBS MODERATE 35: CPT | Performed by: FAMILY MEDICINE

## 2017-03-17 PROCEDURE — 97165 OT EVAL LOW COMPLEX 30 MIN: CPT | Mod: GO | Performed by: OCCUPATIONAL THERAPIST

## 2017-03-17 PROCEDURE — 85018 HEMOGLOBIN: CPT | Performed by: ORTHOPAEDIC SURGERY

## 2017-03-17 PROCEDURE — 36415 COLL VENOUS BLD VENIPUNCTURE: CPT | Performed by: ORTHOPAEDIC SURGERY

## 2017-03-17 PROCEDURE — 97530 THERAPEUTIC ACTIVITIES: CPT | Mod: GP

## 2017-03-17 PROCEDURE — 97116 GAIT TRAINING THERAPY: CPT | Mod: GP

## 2017-03-17 PROCEDURE — 85610 PROTHROMBIN TIME: CPT | Performed by: ORTHOPAEDIC SURGERY

## 2017-03-17 PROCEDURE — 97110 THERAPEUTIC EXERCISES: CPT | Mod: GP

## 2017-03-17 PROCEDURE — 12000007 ZZH R&B INTERMEDIATE

## 2017-03-17 PROCEDURE — A9270 NON-COVERED ITEM OR SERVICE: HCPCS | Mod: GY | Performed by: ORTHOPAEDIC SURGERY

## 2017-03-17 PROCEDURE — 25000128 H RX IP 250 OP 636: Performed by: ORTHOPAEDIC SURGERY

## 2017-03-17 PROCEDURE — 40000193 ZZH STATISTIC PT WARD VISIT

## 2017-03-17 PROCEDURE — 99207 ZZC MOONLIGHTING INDICATOR: CPT | Performed by: FAMILY MEDICINE

## 2017-03-17 PROCEDURE — 40000133 ZZH STATISTIC OT WARD VISIT: Performed by: OCCUPATIONAL THERAPIST

## 2017-03-17 PROCEDURE — 25000132 ZZH RX MED GY IP 250 OP 250 PS 637: Mod: GY | Performed by: ORTHOPAEDIC SURGERY

## 2017-03-17 RX ADMIN — WARFARIN SODIUM 7.5 MG: 5 TABLET ORAL at 19:20

## 2017-03-17 RX ADMIN — OXYCODONE HYDROCHLORIDE 5 MG: 5 TABLET ORAL at 02:00

## 2017-03-17 RX ADMIN — OXYCODONE HYDROCHLORIDE 5 MG: 5 TABLET ORAL at 07:37

## 2017-03-17 RX ADMIN — OXYCODONE HYDROCHLORIDE 5 MG: 5 TABLET ORAL at 16:02

## 2017-03-17 RX ADMIN — SIMVASTATIN 20 MG: 20 TABLET, FILM COATED ORAL at 20:35

## 2017-03-17 RX ADMIN — OXYCODONE HYDROCHLORIDE 5 MG: 5 TABLET ORAL at 20:35

## 2017-03-17 RX ADMIN — ACETAMINOPHEN 975 MG: 325 TABLET ORAL at 19:49

## 2017-03-17 RX ADMIN — ACETAMINOPHEN 975 MG: 325 TABLET ORAL at 12:03

## 2017-03-17 RX ADMIN — CEFAZOLIN SODIUM 2 G: 2 INJECTION, SOLUTION INTRAVENOUS at 01:57

## 2017-03-17 RX ADMIN — DOCUSATE SODIUM 100 MG: 100 CAPSULE ORAL at 20:35

## 2017-03-17 RX ADMIN — DOCUSATE SODIUM 100 MG: 100 CAPSULE ORAL at 08:00

## 2017-03-17 RX ADMIN — ACETAMINOPHEN 975 MG: 325 TABLET ORAL at 03:18

## 2017-03-17 RX ADMIN — OXYCODONE HYDROCHLORIDE 5 MG: 5 TABLET ORAL at 12:03

## 2017-03-17 ASSESSMENT — ACTIVITIES OF DAILY LIVING (ADL): PREVIOUS_RESPONSIBILITIES: DRIVING;MEAL PREP;HOUSEKEEPING;LAUNDRY;SHOPPING;YARDWORK;MEDICATION MANAGEMENT;FINANCES

## 2017-03-17 NOTE — PLAN OF CARE
Problem: Goal Outcome Summary  Goal: Goal Outcome Summary  Occupational Therapy     Goal status:   OT referral received and evaluation completed     Functional status: SBA to min assist with 1st instruction of use of adaptive equipment for LB dressing.  Patient declined any additional functional activities, this date due to pain and fatigue.     Areas of progress:  Ambulation and pain     Barrier to discharge Home: level of assist with BADL/IADLs     Equipment needs: defer to TCU     Discharge disposition/rationale: TCU for continued strengthening , safety and functional independence training with BADL/IADL.       Transport recommendations: car/family     Jana MEADE/L  [facility]  [phone]

## 2017-03-17 NOTE — OP NOTE
PREOPERATIVE DIAGNOSIS:  Left displaced femoral neck fracture.      POSTOPERATIVE DIAGNOSIS:  Left displaced femoral neck fracture.      PROCEDURE:  Left hip hemiarthroplasty.      DATE OF PROCEDURE:  03/16/2017      SURGEON:  Kaleb Pang DO      ASSISTANT:  Brad Cosby Scrub Tech.      ANESTHESIA:  General.      COMPLICATIONS:  None.      ESTIMATED BLOOD LOSS:  650 cc.       FLUIDS:  1900 cc crystalloids.      URINE OUTPUT:  125 mL.      SPECIFICATIONS:  Include a [place] Omnifit fracture stem 132 degrees size 9; a 28 mm +2.5 offset head with a 49 mm outer diameter bipolar cup.      COUNTS:  Correct.      DISPOSITION:  To PACU in stable condition.      GROSS FINDINGS:  There was a comminuted femoral neck fracture with complete displacement.      INDICATIONS:  Ms. Tracy Palomo is a 76-year-old female with a history of Coumadin usage for atrial fibrillation and pulmonary embolism who presented after a ground level fall.  She is a normal independent ambulator.  Evaluated in the ER.  She was initially given some vitamin K.  I evaluated her the next morning.  INR actually had gone up slightly.  She had just received some vitamin K IV.  We discussed treatment options.  Given her independent ambulation status with displaced femoral neck in a 76-year-old female I recommended a left hip hemiarthroplasty.  Risks, benefits, complications, alternatives, anticipated postop course was reviewed.  Risks including bleeding, infection, scar, scar tenderness, neurovascular injuries, fractures, blood clots, heart attacks, strokes and death.  We also discussed reversal of her Coumadin and the risks for blood clots being elevated.  Once fully reviewed, she opted to proceed.  Given that her INR was not correcting we opted to proceed with fresh frozen plasma in order to expedite her surgery in order to return her back to her pre-injury function and avoid the sequelae of prolonged bed rest.  She received 2 units of fresh frozen  plasma started approximately 1 hour before surgery and infused as she was wheeled into the operating room.      DETAILS OF PROCEDURE:  She was met preoperatively and again informed consent was verified.  Her daughter was at the bedside, the consent was signed.  She was wheeled to OP suite #1.  When deemed appropriate by Anesthesia, she was positioned in the lateral decubitus position.  An axillary roll was placed.  All bony prominences were padded.  She was secured to the table.  The left lower extremity was then prepped and draped in a normal manner.  Prior to incision, a timeout was performed, and the patient, surgery and extremity were verified and antibiotics administered.       A posterior approach was taken.  The skin was incised.  The subcutaneous tissue was incised in line utilizing electrocautery for hemostasis.  This was brought down to the level of the IT band and fascia.  This was split in line.  A Charnley retractor was placed.  She had just a slight bit of oozing from her incision area, no gross bleeding.  The short external rotators were released.  I did this sparing the piriformis.  The capsule was split in line.  This was tagged for later repair.  The leg was positioned and the fracture was easily visualized.  A corkscrew was placed into the femoral head with no issues and this was removed.  A cobra retractor was placed around the fractured femoral neck.  Utilizing a saw this was cleaned up to fresh margins.  These fragments were removed.  The acetabulum was swept with my finger and all bony fragments were removed.  A canal finder was utilized.  This was followed with sequential reaming.  I had cortical chatter at 10 mm I stopped reaming.  I then sequentially broached.  A size 9 had excellent stability.  This was removed.  The area was copiously irrigated.  I placed the Omnifit fracture stem size 9 with good press-fit.  A trialed some femoral head neck offsets.  I opted for the +2.5 which seemed to  recreate leg length and stability.  The final components as listed above were placed into position.  The hip was taken through range of motion and had good range of motion with no instability.  A dilute Betadine lavage followed with pulse lavage was then performed.  The short external rotators and capsule were repaired back to the greater trochanter.  The IT band and fascia were repaired followed with a 2-0 subcuticular closure.  Staples were then placed.  A sterile bandage was applied.       She was subsequently transferred to the PACU in stable condition, admitted to the hospital for continued orthopedic care, DVT prophylaxis and pain management.         SULAIMAN HERNANDEZ DO             D: 2017 07:26   T: 2017 09:48   MT: [id number]      Name:     YOLANDA WOODARD   MRN:      [id number]        Account:        [id number]   :      1940           Procedure Date: 2017      Document: [id number]

## 2017-03-17 NOTE — PHARMACY-ANTICOAGULATION SERVICE
Clinical Pharmacy - Warfarin Dosing Consult     Pharmacy has been consulted to manage this patient s warfarin therapy.  Indication: Atrial Fibrillation  Therapy Goal: INR 2-3  Provider/Team: Gerard ARAGON [facility]: YEN Franco  Warfarin Prior to Admission: Yes  Warfarin PTA Regimen: 2.5 mg on Mon; 5 mg all other days  Recent documented change in oral intake/nutrition: Unknown    INR   Date Value Ref Range Status   03/17/2017 1.08 0.86 - 1.14 Final   03/16/2017 1.54 (H) 0.86 - 1.14 Final       Recommend warfarin 7.5 mg today. Providing loading dose in order to overcome vitamin K administration and get to the target INR quickly.  Pharmacy will monitor Tracy Palomo daily and order warfarin doses to achieve specified goal.      Please contact pharmacy as soon as possible if the warfarin needs to be held for a procedure or if the warfarin goals change.      Demetrio Flores, PharmD. Pharmacy Resident

## 2017-03-17 NOTE — PROGRESS NOTES
"SPIRITUAL HEALTH SERVICES  SPIRITUAL ASSESSMENT Progress Note  [facility]      PRIMARY FOCUS:     Emotional/spiritual/Orthodox distress - Pt requested visit    Support for coping    ILLNESS CIRCUMSTANCES:     Reviewed documentation.     Context of Serious Illness/Symptoms - Left hip fracture due to a fall.    Resources for Support - two daughters, two adult grandsons, other family    DISTRESS:     Emotional/Spiritual/Existential Distress - Pt became teary-eyed as she talked about her concern for her , Leonel, who was declining mentally and physically.  She said she was bearing the brunt of his anger \"that the world was closing in on him.\"   validated her tears and affirmed her for talking about it.  (Pt also reminisced about the many good times with her  until five years ago.)    Zoroastrianism Distress - Pt said she would like to possibly connect with a Rastafarian, but her  was opposed.    Social/Cultural/Economic Distress - Not Discussed    SPIRITUAL/Spiritism COPING:     Anglican/Lori - Pt said she was raised Denominational and her  Voodoo (his Dad was a Voodoo ), but they could never agree on a denomination, so they need up without any.   talked about the Lord \"knowing a person's heart\" and loving her as God's daughter.     Spiritual Practice - Prayer  Pt welcomed prayer, which  provided.  She declined a prayer shawl because she said that might upset her .    GOALS OF CARE:    Goals of Care - Recover from surgery, begin rehab.    Meaning/Sense-Making - Not Discussed    PLAN:  will refer pt to Associate nima Candelaria who will be here next week.    Randy Tobar M.Div., [facility]  Staff   Office tel: [phone]    "

## 2017-03-17 NOTE — ANESTHESIA POSTPROCEDURE EVALUATION
Patient: Tracy Palomo    Procedure(s):  left hip hemiarthroplasty - Wound Class: I-Clean    Diagnosis:left hip fracture  Diagnosis Additional Information: No value filed.    Anesthesia Type:  General, ETT, RSI    Note:  Anesthesia Post Evaluation    Patient location during evaluation: Floor  Patient participation: Able to fully participate in evaluation  Level of consciousness: awake and alert  Pain management: adequate  Airway patency: patent  Cardiovascular status: acceptable  Respiratory status: acceptable, spontaneous ventilation and room air  Hydration status: acceptable  PONV: none     Anesthetic complications: None          Last vitals:  Vitals:    03/16/17 2226 03/17/17 0100 03/17/17 0706   BP:  102/55 128/71   Pulse: 61 55 56   Resp: 12 14 16   Temp:  97.2  F (36.2  C) 97.3  F (36.3  C)   SpO2: 94% 95% 96%         Electronically Signed By: BILLY Poole CRNA  March 17, 2017  8:47 AM

## 2017-03-17 NOTE — CONSULTS
Care Transition Initial Assessment -   Reason For Consult: discharge planning  Met with: Patient    Principal Problem:    Hip fracture, left, closed, initial encounter (H)  Active Problems:    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    Paroxysmal atrial fibrillation (H)    Long-term (current) use of anticoagulants [Z79.01]    Closed left hip fracture (H)    Fractured hip, left, closed, initial encounter (H)    Hypertension goal BP (blood pressure) < 140/90    Hyperlipidemia LDL goal <130         DATA  Lives With: spouse  Living Arrangements: house  Description of Support System: Supportive, Involved  Who is your support system?: , Children  Support Assessment: Adequate family and caregiver support, Adequate social supports.     Patient feels that they have adequate support @ home?  No           Quality Of Family Relationships: supportive, helpful, involved    Transportation Available: family or friend will provide  Van transport and private pay cost also discussed in case needed.     ASSESSMENT  Cognitive Status:  awake, alert and oriented       PLAN  Financial costs for the patient includes :Van transport if needed.  Patient given options and choices for [facility] : [facility].  Patient/family is agreeable to the plan?  Yes    Patient anticipates discharging to:  TCU at [facility].      will continue to follow for d/c planning.

## 2017-03-17 NOTE — PLAN OF CARE
Problem: Goal Outcome Summary  Goal: Goal Outcome Summary     Goal status: Progressing     Functional status: Pt sitting in chair at start of session, performs sit <> stand transfer with CGA x1 for safety and verbal cues for hip precautions. Ambulates x80 ft with front wheeled walker with CGA x1, verbal cues given for sequencing walker gait, and to keep walker a bit further in front of her.     PM session: Doing better, SBA for ambulation in hallway with front wheeled walker with good walker use without reminders, x125 ft. Still requiring Min Assist to help L LE into bed with sit <> supine transfers due to pain. Reports little pain other than with sit <> supine transfers. Needs occasional reinforcement of hip precautions with sit <> stands, as she tends to lean forward to stand.      Areas of progress: Gait initiated, level of assist with sit <> stand    Barrier to discharge Home: No assistance available at home     Equipment needs: Defer to TCU     Discharge disposition/rationale: TCU for rehab to progress safety and independence with functional mobility and ambulation prior to returning to home     Transport recommendations: Family/friend transport if available           Jay Jay Hurd PT, DPT        3/17/2017      9:51 AM  [facility]  [phone]

## 2017-03-17 NOTE — PLAN OF CARE
Problem: Goal Outcome Summary  Goal: Goal Outcome Summary  VSS, 2LNC-94% O2 sat level, lungs clear, +CMS, adequate UO in Reyna catheter, clear liquids-no nausea, pain at 4-Tylenol and Roxicodone 5mg effective, A and O, family has been present.

## 2017-03-17 NOTE — PLAN OF CARE
Problem: Goal Outcome Summary  Goal: Goal Outcome Summary  Outcome: Therapy, progress toward functional goals as expected  Pt T&R every 3 hours per her request.  Mora d/c'd.  Has not voided yet.  Good amount of urine from her mora bag.  VSS.  Afebrile.  Medicated x 2 for pain.  CMS intact good pulse.  Incision covered  D&I.

## 2017-03-17 NOTE — PROGRESS NOTES
Occupational Therapy Evaluation     03/17/17 0935   Quick Adds   Type of Visit Initial Occupational Therapy Evaluation   Living Environment   Lives With spouse   Living Arrangements house   Home Accessibility no concerns   Number of Stairs to Enter Home 0  (ramp)   Number of Stairs Within Home 10   Transportation Available family or friend will provide   Living Environment Comment Pt lives in a house with a ramp into the front door. Once inside, all needs are on the main level including bedroom, bathroom, kitchen, living room and laundry. The bathroom is wheelchair accessible with a walk-in shower and a shower chair. She typically is the caretaker for her , however he is currently at a TCU for a recent hospital admission. No one else is at home with them.   (original from PT eval note)   Self-Care   Dominant Hand right   Usual Activity Tolerance excellent   Current Activity Tolerance good   Regular Exercise yes   Activity/Exercise Type walking   Exercise Amount/Frequency daily   Equipment Currently Used at Home none   Activity/Exercise/Self-Care Comment Medical alert systen (when returning to home will be activated) reacher, sock aide, LH shoe horn, shower chair, , walker, lift chair   Functional Level Prior   Ambulation 0-->independent   Transferring 0-->independent   Toileting 0-->independent   Bathing 0-->independent   Dressing 0-->independent   Eating 0-->independent   Communication 0-->understands/communicates without difficulty   Swallowing 0-->swallows foods/liquids without difficulty   Cognition 0 - no cognition issues reported   Fall history within last six months yes   Number of times patient has fallen within last six months 1   Which of the above functional risks had a recent onset or change? ambulation;transferring   Prior Functional Level Comment Very active woman.   General Information   Onset of Illness/Injury or Date of Surgery - Date 03/16/17   Referring  "Physician Dr Pang   Patient/Family Goals Statement \"short term rehab prior return to previous living situation.   Additional Occupational Profile Info/Pertinent History of Current Problem The patient is a 75 y/o female who slipped and fell onto ice, resulting with left hip fracture and hemiplasty.  She is a very active individual and was fully independent with BADL/IADLs, including care for her  prior injury.   Precautions/Limitations fall precautions   Weight-Bearing Status - LLE weight-bearing as tolerated   General Info Comments Daughter, Iesha present for most of OT eval.   Cognitive Status Examination   Orientation orientation to person, place and time   Level of Consciousness alert   Able to Follow Commands success, 4-or-more step commands   Personal Safety (Cognitive) good awareness, safety precautions   Memory intact   Attention No deficits were identified   Organization/Problem Solving No deficits were identified   Executive Function No deficits were identified   Cognitive Comment (patient is alert and sociable)   Visual Perception   Visual Perception Wears glasses  (reading only)   Pain Assessment   Patient Currently in Pain Yes, see Vital Sign flowsheet   Posture   Posture not impaired   Range of Motion (ROM)   ROM Quick Adds No deficits were identified   Strength   Manual Muscle Testing Quick Adds No deficits were identified   Coordination   Upper Extremity Coordination No deficits were identified   Transfer Skills   Transfer Comments refer to PT eval and findings   Balance   Balance Comments refer PT eval and findings   Upper Body Dressing   Level of [place]: Dress Upper Body independent   Lower Body Dressing   Level of [place]: Dress Lower Body dependent (less than 25% patients effort)  (due to hip precautions)   Grooming   Level of [place]: Grooming independent   Physical Assist/Nonphysical Assist: Grooming set-up required   Eating/Self Feeding   Level of [place]: Eating " "independent   Physical Assist/Nonphysical Assist: Eating set-up required   Instrumental Activities of Daily Living (IADL)   Previous Responsibilities driving;meal prep;housekeeping;laundry;shopping;yardwork;medication management;finances   IADL Comments bike rides, walks, fishing, camping, volunteer  (cared for  medical needs)   General Therapy Interventions   Planned Therapy Interventions ADL retraining;bed mobility training;transfer training;home program guidelines   Clinical Impression   Criteria for Skilled Therapeutic Interventions Met yes, treatment indicated   OT Diagnosis Decreased safety and functional independence BADL/IADL s/p injury    Influenced by the following impairments s/p left hip hemiplasty; pain, decreased ROM/strength and standing tolerance   Assessment of Occupational Performance 1-3 Performance Deficits   Identified Performance Deficits dressing, bathing, groom/hygiene standing at sink, driving, home mgt, community mobility, leisure activities and care for spouse   Clinical Decision Making (Complexity) Low complexity   Therapy Frequency daily   Predicted Duration of Therapy Intervention (days/wks) 1 day, prior TCU   Anticipated Equipment Needs at Discharge (defer to TCU)   Anticipated Discharge Disposition Transitional Care Facility   Risks and Benefits of Treatment have been explained. Yes   Patient, Family & other staff in agreement with plan of care Yes   [facility] AM-PAC TM \"6 Clicks\"   2016, Trustees of [facility], under license to [email or web address].  All rights reserved.   6 Clicks Short Forms Daily Activity Inpatient Short Form   [facility] AM-PAC  \"6 Clicks\" Daily Activity Inpatient Short Form   1. Putting on and taking off regular lower body clothing? 1 - Total   2. Bathing (including washing, rinsing, drying)? 2 - A Lot   3. Toileting, which includes using toilet, bedpan or urinal? 2 - A Lot   4. Putting on and taking off regular upper body clothing? 4 - None "   5. Taking care of personal grooming such as brushing teeth? 4 - None   6. Eating meals? 4 - None   Daily Activity Raw Score (Score out of 24.Lower scores equate to lower levels of function) 17   Total Evaluation Time   Total Evaluation Time (Minutes) 15         Jana Guerra MA, OTR/L  [facility]  [phone]

## 2017-03-17 NOTE — PROGRESS NOTES
"[facility] Orthoped[facility]    Progress note    76 year old female POD#1 s/p L Hip Hemiarthroplasty   S: Patient seen at bedside in no apparent distress, alert and pleasant.  I discussed surgery and rehabilitation with the patient.  She denies numbness, tingling or major pain issues.  She is doing well, says pain is a lot better than before surgery.  PT went well yesterday.  Discussed DC to TCU.    O: CMS intact LLE, DF/PF intact       Dressing on, clean and dry with a good seal       Ace on and intact       L hip of what I can see above the ace, with minimal swelling and no erythema or ecchymosis        Bilateral calves soft and non tender    Vital signs:  Temp: 97.2  F (36.2  C) Temp src: Oral BP: 102/55 Pulse: 55 Heart Rate: 55 Resp: 14 SpO2: 95 % O2 Device: Nasal cannula Oxygen Delivery: 2 LPM Height: 167.6 cm (5' 6\") Weight: 86.6 kg (191 lb)  Estimated body mass index is 30.83 kg/(m^2) as calculated from the following:    Height as of this encounter: 1.676 m (5' 6\").    Weight as of this encounter: 86.6 kg (191 lb).      Hemoglobin   Date Value Ref Range Status   03/17/2017 10.5 (L) 11.7 - 15.7 g/dL Final     INR   Date Value Ref Range Status   03/17/2017 1.08 0.86 - 1.14 Final     INR Protime   Date Value Ref Range Status   03/07/2017 1.6 (A) 0.86 - 1.14 Final         A/P:  1. Pain-controlled   2. DVT Prophylaxis/afib treatment-back on coumadin, INR Goal 2-3  3. Weight Bearing Status-WBAT LLE  4. Physical Therapy-Recommending [facility].    5. Occupational Therapy-ADLs   6. Case Management-to see the patient.  She says [facility]   7. Hospitalist-Dr. Kolby MD did H&P  8. Incision-no signs or symptoms of infection. Keep incision covered with hospital dressing (Aquacel) for one week. It is okay to shower with this dressing on. However, do not submerge your dressing and incision in water for roughly 2 weeks. After one week remove this dressing and then keep it clean, dry, " and covered. If this dressing become looses or falls off it is ok to cover with gauze and tape.  Wash the incision gently with warm soapy water after removing your hospital dressing and lightly pat dry.    9. Plan-DC to [facility] Sunday is the plan.      Abimael Dodson PA-C  3/17/2017

## 2017-03-17 NOTE — PROGRESS NOTES
[facility]    Hospitalist Progress Note    Date of Service (when I saw the patient): 03/17/2017    Assessment & Plan   Tracy Palomo is a 76 year old female who was admitted on 3/15/2017 for left hip fracture.  This was repaired on 3/16.  Pt doing well with post-op course.  No obvious complications at this time.  Does have history of PE, had vitamin K to reverse her anticoagulation preoperatively.    Principal Problem:    Hip fracture, left, closed, initial encounter (H)  Active Problems:    Hypertension goal BP (blood pressure) < 140/90    Hyperlipidemia LDL goal <130    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    Paroxysmal atrial fibrillation (H)    Long-term (current) use of anticoagulants [Z79.01]    Closed left hip fracture (H)    Fractured hip, left, closed, initial encounter (H)    BP controlled.  Continue current regimen.    Resume anticoagulation for history of PE and atrial fibrillation.      DVT Prophylaxis: Warfarin  Code Status: Full Code    Disposition: Per ortho    Rohan Valerio MD    Interval History   Pt reports good pain control, starting to ambulate.  No real complaints at all.  Denies CP, SOB, other concerns.    -Data reviewed today: I reviewed all new labs and imaging results over the last 24 hours. I personally reviewed her reprots.    Physical Exam   Temp: 97.3  F (36.3  C) Temp src: Oral BP: 128/71 Pulse: 56 Heart Rate: 56 Resp: 16 SpO2: 96 % O2 Device: Nasal cannula Oxygen Delivery: 1 LPM  Vitals:    03/15/17 1744 03/15/17 2150   Weight: 187 lb (84.8 kg) 191 lb (86.6 kg)     Vital Signs with Ranges  Temp:  [95.8  F (35.4  C)-97.9  F (36.6  C)] 97.3  F (36.3  C)  Pulse:  [55-61] 56  Heart Rate:  [55-64] 56  Resp:  [5-18] 16  BP: ()/(40-83) 128/71  SpO2:  [90 %-97 %] 96 %  I/O last 3 completed shifts:  In: 3835 [P.O.:300; I.V.:2935]  Out: 2625 [Urine:1975; Blood:650]    Constitutional: WDWN,  NAD  Respiratory: CTAB  Cardiovascular: RRR, no MRG  GI: soft, NT, ND, no massses  Skin/Integumen: no rashes noted.  Neuro: grossly intact.  Other:     Medications     Warfarin Therapy Reminder       lactated ringers Stopped (03/17/17 0324)       warfarin  7.5 mg Oral ONCE at 18:00     simvastatin  20 mg Oral At Bedtime     sodium chloride (PF)  3 mL Intracatheter Q8H     acetaminophen  975 mg Oral Q8H     docusate sodium  100 mg Oral BID       Data   Data reviewed today:  I personally reviewed her labs.    Recent Labs  Lab 03/17/17  0535 03/16/17  0955 03/16/17  0525 03/15/17  1807   WBC  --   --   --  6.8   HGB 10.5*  --  13.3 13.8   MCV  --   --   --  90   PLT  --   --   --  228   INR 1.08 1.54* 3.08* 2.99*   NA  --   --   --  142   POTASSIUM  --   --   --  4.0   CHLORIDE  --   --   --  105   CO2  --   --   --  32   BUN  --   --   --  11   CR  --   --   --  0.92   ANIONGAP  --   --   --  5   HARVEY  --   --   --  9.6   *  --   --  107*       Recent Results (from the past 24 hour(s))   XR Pelvis w Hip Port Left 1 View    Narrative    PORTABLE ONE VIEW PELVIS AND ONE VIEW LEFT HIP 3/16/2017 12:29 PM     HISTORY: Postoperative evaluation of the left hip.    COMPARISON: 3/15/2017    FINDINGS: There are interval surgical changes of a left bipolar hip  arthroplasty. The hardware is intact with no fracture or other  complication seen. Lateral skin staples are seen.  No other  abnormality is demonstrated.      Impression    IMPRESSION: Unremarkable postoperative appearance of the left hip.    JOYCE LAYTON MD

## 2017-03-18 ENCOUNTER — APPOINTMENT (OUTPATIENT)
Dept: PHYSICAL THERAPY | Facility: CLINIC | Age: 77
DRG: 470 | End: 2017-03-18
Payer: MEDICARE

## 2017-03-18 LAB
HGB BLD-MCNC: 10.6 G/DL (ref 11.7–15.7)
INR PPP: 1.04 (ref 0.86–1.14)

## 2017-03-18 PROCEDURE — 25000132 ZZH RX MED GY IP 250 OP 250 PS 637: Mod: GY | Performed by: FAMILY MEDICINE

## 2017-03-18 PROCEDURE — 85018 HEMOGLOBIN: CPT | Performed by: ORTHOPAEDIC SURGERY

## 2017-03-18 PROCEDURE — 85610 PROTHROMBIN TIME: CPT | Performed by: ORTHOPAEDIC SURGERY

## 2017-03-18 PROCEDURE — 12000000 ZZH R&B MED SURG/OB

## 2017-03-18 PROCEDURE — 99232 SBSQ HOSP IP/OBS MODERATE 35: CPT | Performed by: FAMILY MEDICINE

## 2017-03-18 PROCEDURE — 40000893 ZZH STATISTIC PT IP EVAL DEFER: Performed by: PHYSICAL THERAPIST

## 2017-03-18 PROCEDURE — 25000132 ZZH RX MED GY IP 250 OP 250 PS 637: Mod: GY | Performed by: ORTHOPAEDIC SURGERY

## 2017-03-18 PROCEDURE — 25000128 H RX IP 250 OP 636: Performed by: FAMILY MEDICINE

## 2017-03-18 PROCEDURE — 97110 THERAPEUTIC EXERCISES: CPT | Mod: GP | Performed by: PHYSICAL THERAPIST

## 2017-03-18 PROCEDURE — A9270 NON-COVERED ITEM OR SERVICE: HCPCS | Mod: GY | Performed by: FAMILY MEDICINE

## 2017-03-18 PROCEDURE — 40000193 ZZH STATISTIC PT WARD VISIT: Performed by: PHYSICAL THERAPIST

## 2017-03-18 PROCEDURE — 36415 COLL VENOUS BLD VENIPUNCTURE: CPT | Performed by: ORTHOPAEDIC SURGERY

## 2017-03-18 PROCEDURE — 99207 ZZC MOONLIGHTING INDICATOR: CPT | Performed by: FAMILY MEDICINE

## 2017-03-18 PROCEDURE — A9270 NON-COVERED ITEM OR SERVICE: HCPCS | Mod: GY | Performed by: ORTHOPAEDIC SURGERY

## 2017-03-18 PROCEDURE — 97116 GAIT TRAINING THERAPY: CPT | Mod: GP | Performed by: PHYSICAL THERAPIST

## 2017-03-18 RX ORDER — WARFARIN SODIUM 5 MG/1
10 TABLET ORAL
Status: COMPLETED | OUTPATIENT
Start: 2017-03-18 | End: 2017-03-18

## 2017-03-18 RX ADMIN — OXYCODONE HYDROCHLORIDE 5 MG: 5 TABLET ORAL at 21:44

## 2017-03-18 RX ADMIN — WARFARIN SODIUM 10 MG: 5 TABLET ORAL at 18:38

## 2017-03-18 RX ADMIN — METOPROLOL TARTRATE 12.5 MG: 25 TABLET, FILM COATED ORAL at 12:00

## 2017-03-18 RX ADMIN — ACETAMINOPHEN 975 MG: 325 TABLET ORAL at 04:57

## 2017-03-18 RX ADMIN — OXYCODONE HYDROCHLORIDE 5 MG: 5 TABLET ORAL at 12:00

## 2017-03-18 RX ADMIN — METOPROLOL TARTRATE 12.5 MG: 25 TABLET, FILM COATED ORAL at 21:44

## 2017-03-18 RX ADMIN — ACETAMINOPHEN 975 MG: 325 TABLET ORAL at 11:59

## 2017-03-18 RX ADMIN — OXYCODONE HYDROCHLORIDE 5 MG: 5 TABLET ORAL at 00:13

## 2017-03-18 RX ADMIN — SIMVASTATIN 20 MG: 20 TABLET, FILM COATED ORAL at 21:44

## 2017-03-18 RX ADMIN — ENOXAPARIN SODIUM 90 MG: 100 INJECTION SUBCUTANEOUS at 12:03

## 2017-03-18 RX ADMIN — ENOXAPARIN SODIUM 90 MG: 100 INJECTION SUBCUTANEOUS at 21:45

## 2017-03-18 RX ADMIN — DOCUSATE SODIUM 100 MG: 100 CAPSULE ORAL at 21:44

## 2017-03-18 RX ADMIN — DOCUSATE SODIUM 100 MG: 100 CAPSULE ORAL at 08:59

## 2017-03-18 RX ADMIN — ACETAMINOPHEN 975 MG: 325 TABLET ORAL at 18:38

## 2017-03-18 NOTE — PROGRESS NOTES
Updated Yoko Mason that patient is not medically ready for discharge today.    HONEY Johnson  [facility] [phone]/ [facility] [phone]

## 2017-03-18 NOTE — PROGRESS NOTES
[facility]    Hospitalist Progress Note    Date of Service (when I saw the patient): 03/18/2017    Assessment & Plan   Tracy Palomo is a 76 year old female who was admitted on 3/15/2017. Her hip fracture was repaired on 3/16, making her POD #2.  She did have episode of paroxysmal atrial fibrillation, but spontaneously converted.  INR is not therapeutic.  Pt's rate is bradycardic at baseline.  Not on any rate-lowering medications.  She does continue to have some mild dizziness at this time.    Principal Problem:    Hip fracture, left, closed, initial encounter (H)  Active Problems:    Hypertension goal BP (blood pressure) < 140/90    Hyperlipidemia LDL goal <130    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    Paroxysmal atrial fibrillation (H)    Long-term (current) use of anticoagulants [Z79.01]    Closed left hip fracture (H)    Fractured hip, left, closed, initial encounter (H)    Will start Lovenox since still very low INR.  Will recommend more aggressive dosing as I think we'll need to overcome the preoperative vitamin K before she will get therapeutic.  Will cautiously try low dose of metoprolol for rate control.  Suspect that her episode of atrial fib was related to post-op fluids causing atrial stretch.  These have been stopped at this point.    Monitor for further dizziness.  Anticipate need for one more day of inpatient care.  DVT Prophylaxis: Enoxaprain (Lovenox) SQ and Warfarin  Code Status: Full Code    Disposition: Expected discharge in 1 day once better control of paroxysmal atrial fibrillation and if stable per ortho.    Rohan Valerio MD    Interval History   Pt had episode of palpitations last night, was in atrial fibrillation with elevated rates.  Pt spontaneously converted without intervention.  Has known h/o atrial fibrillation (paroxysmal).  She does have some persistent dizziness this morning after getting up to the restroom.   Otherwise doing well.  Pain is controlled.  INR still sub therapeutic, actually slightly worse.    -Data reviewed today: I reviewed all new labs and imaging results over the last 24 hours. I personally reviewed no images or EKG's today.  Atrial fibrillation was on rhythm strip    Physical Exam   Temp: 97.2  F (36.2  C) Temp src: Oral BP: 108/63 Pulse: 59 Heart Rate: 63 Resp: 18 SpO2: 96 % O2 Device: None (Room air)    Vitals:    03/15/17 1744 03/15/17 2150   Weight: 187 lb (84.8 kg) 191 lb (86.6 kg)     Vital Signs with Ranges  Temp:  [96.2  F (35.7  C)-97.5  F (36.4  C)] 97.2  F (36.2  C)  Pulse:  [59-74] 59  Heart Rate:  [59-74] 63  Resp:  [16-18] 18  BP: (105-116)/(54-63) 108/63  SpO2:  [96 %-98 %] 96 %  I/O last 3 completed shifts:  In: 740 [P.O.:740]  Out: 700 [Urine:700]    Constitutional: WDWN, NAD  Respiratory: CTAB  Cardiovascular: RRR, no MRG  GI: soft, NT, ND, no masses  Skin/Integumen: no rash  Neuro: grossly intact  Other:     Medications     Warfarin Therapy Reminder         enoxaparin  1 mg/kg Subcutaneous Q12H     metoprolol  12.5 mg Oral BID     warfarin  10 mg Oral ONCE at 18:00     simvastatin  20 mg Oral At Bedtime     sodium chloride (PF)  3 mL Intracatheter Q8H     acetaminophen  975 mg Oral Q8H     docusate sodium  100 mg Oral BID       Data   Data reviewed today:  I personally reviewed her labs.    Recent Labs  Lab 03/18/17  0625 03/17/17  0535 03/16/17  0955 03/16/17  0525 03/15/17  1807   WBC  --   --   --   --  6.8   HGB 10.6* 10.5*  --  13.3 13.8   MCV  --   --   --   --  90   PLT  --   --   --   --  228   INR 1.04 1.08 1.54* 3.08* 2.99*   NA  --   --   --   --  142   POTASSIUM  --   --   --   --  4.0   CHLORIDE  --   --   --   --  105   CO2  --   --   --   --  32   BUN  --   --   --   --  11   CR  --   --   --   --  0.92   ANIONGAP  --   --   --   --  5   HARVEY  --   --   --   --  9.6   GLC  --  121*  --   --  107*       No results found for this or any previous visit (from the past 24  hour(s)).

## 2017-03-18 NOTE — PLAN OF CARE
Problem: Goal Outcome Summary  Goal: Goal Outcome Summary  Outcome: Improving  CMS left lower extremity intact. Dressing covering incision is clean, dry, intact. Up to bathroom with walker and one assist. Pain controlled with oxycodone 5 mg every 3 hours as needed. Medicated once during the night. No further incidence of heart palpitations and has been able to get some restful sleep.

## 2017-03-18 NOTE — PHARMACY-ANTICOAGULATION SERVICE
Clinical Pharmacy - Warfarin Dosing Consult     Pharmacy has been consulted to manage this patient s warfarin therapy.  Indication: Atrial Fibrillation  Therapy Goal: INR 2-3  Provider/Team: Gerard ARAGON [facility]: YEN Franco  Warfarin Prior to Admission: Yes  Warfarin PTA Regimen: 2.5 mg on Mon; 5 mg all other days  Recent documented change in oral intake/nutrition: Unknown    INR   Date Value Ref Range Status   03/18/2017 1.04 0.86 - 1.14 Final   03/17/2017 1.08 0.86 - 1.14 Final       Recommend warfarin 7.5 mg today. Patient given 10mg per MD order.  Pharmacy will monitor Tracy Palomo daily and order warfarin doses to achieve specified goal.      Please contact pharmacy as soon as possible if the warfarin needs to be held for a procedure or if the warfarin goals change.

## 2017-03-18 NOTE — PLAN OF CARE
Problem: Goal Outcome Summary  Goal: Goal Outcome Summary  Pt too dizzy this a.m. For early appt.  She became dizzy with nursing when up to bathroom.  OT checked on her during rounding time and still too dizzy to do anything.  Pt requested to wait on PT until 10:45 or later.  Cancelled first appt for today.     Saw pt later for second appt today and she was feeling much better.        Goal status: one cue on precautions and one clarification on ex to avoid.  Progressing with mobility.     Functional status: progressed back to CGA then advancing to SBA with gait with walker.     Areas of progress:Mobilty and ex.    Barrier to discharge Home: safe, independent mobility and all self cares while maintaining precuaitons     Equipment needs: defer to TCU     Discharge disposition/rationale: TCU     Transport recommendations: van

## 2017-03-18 NOTE — PROGRESS NOTES
S-(situation): MD notification    B-(background): post op JUANCARLOS , history of AFib.     A-(assessment): patient complained of palpitations noted heart rate irregular and rate ranges from /130. She denied any complaints of chest pain, dizziness or shortness of breath.     R-(recommendations): Dr Jarrett aware will continue anticoagulants as ordered and continue to monitor vitals and for any sustained elevated heart rate or change in symptoms at this time.

## 2017-03-18 NOTE — PLAN OF CARE
Problem: Fractured Hip (Adult)  Goal: Signs and Symptoms of Listed Potential Problems Will be Absent or Manageable (Fractured Hip)  Signs and symptoms of listed potential problems will be absent or manageable by discharge/transition of care (reference Fractured Hip (Adult) CPG).   Outcome: Improving  VSS, up with one assist and walker, pain well controlled with Roxicodone, adequate I and O, C/O some dizziness with activity-has resolved- + CMS, no S/S of infection, coumadin adjusted and Lovenox begun, slight heart palpitations at times, plans to D/C tp [facility] when medically stable.

## 2017-03-18 NOTE — PLAN OF CARE
Problem: Goal Outcome Summary  Goal: Goal Outcome Summary  Outcome: Improving  VSS. Afebrile. Left hip dressing C/D/I. Pain management with Oxycodone PRN and scheduled Tylenol. Transferring with assist of one and walker.

## 2017-03-18 NOTE — PLAN OF CARE
Problem: Goal Outcome Summary  Goal: Goal Outcome Summary  Outcome: No Change  OT:  Patient politely declining OOB activity this AM due to reports of dizziness.  Patient experienced episode of A-fib yesterday.  Continue to recommend TCU when medically stable for discharge.    Amy PALMER

## 2017-03-18 NOTE — PROGRESS NOTES
[facility] Orthopedic Post-Op Note         Assessment and Plan:    Assessment:   Post-operative day #2  Femoral neck fracture and repair (Left)  Procedure(s):  OPEN REDUCTION INTERNAL FIXATION HIP BIPOLAR  Doing well.  No immediate surgical complications identified.  No excessive bleeding  Pain well-controlled.  Tolerating physical therapy and rehabilitation well.  Recognizes that she has had episodes of atrial fibrillation. Patient is on chronic Coumadin therapy for history of DVT.       Plan:   Continue occupational therapy, physical therapy, supportive and symptomatic treatment and we will have to defer to the hospital physician any further recommendations for her heart rhythm.       It appears as though she is indeed stabilizing with respect to her orthopedic problem. From this perspective she may be ready to transfer soon.            Interval History:   Doing well.  Continues to improve.  Pain is well-controlled.  No fevers.    Describing the atypical heart rhythm.             Physical Exam:   All vitals have been reviewed  No data found.    I/O last 3 completed shifts:  In: 740 [P.O.:740]  Out: 700 [Urine:700]    Dressing dry and intact.  Patient tolerates left hip range of motion without any difficulty.   Dressing will be changed today by the nurse.            Data:   All laboratory/imaging data related to this surgery reviewed  Results for orders placed or performed during the hospital encounter of 03/15/17 (from the past 24 hour(s))   Care Transition RN/SW IP Consult    Narrative    Hailey Rodriguez     3/17/2017  3:04 PM  Care Transition Initial Assessment - SW  Reason For Consult: discharge planning  Met with: Patient    Principal Problem:    Hip fracture, left, closed, initial encounter (H)  Active Problems:    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    Paroxysmal atrial fibrillation (H)    Long-term (current) use of anticoagulants [Z79.01]    Closed left hip fracture  (H)    Fractured hip, left, closed, initial encounter (H)    Hypertension goal BP (blood pressure) < 140/90    Hyperlipidemia LDL goal <130         DATA  Lives With: spouse  Living Arrangements: house  Description of Support System: Supportive, Involved  Who is your support system?: , Children  Support Assessment: Adequate family and caregiver support,   Adequate social supports.     Patient feels that they have adequate support @ home?  No           Quality Of Family Relationships: supportive, helpful, involved    Transportation Available: family or friend will provide  Van transport and private pay cost also discussed in case needed.       ASSESSMENT  Cognitive Status:  awake, alert and oriented       PLAN  Financial costs for the patient includes :Van transport if   needed.  Patient given options and choices for discharge : Patient wanting   [facility] TCU, as  is currently in TCU there.  Patient/family is agreeable to the plan?  Yes    Patient anticipates discharging to:  TCU at [facility].      will continue to follow for d/c planning.      Hemoglobin   Result Value Ref Range    Hemoglobin 10.6 (L) 11.7 - 15.7 g/dL   INR   Result Value Ref Range    INR 1.04 0.86 - 1.14        Ge Mahmood MD

## 2017-03-19 ENCOUNTER — APPOINTMENT (OUTPATIENT)
Dept: PHYSICAL THERAPY | Facility: CLINIC | Age: 77
DRG: 470 | End: 2017-03-19
Payer: MEDICARE

## 2017-03-19 VITALS
HEART RATE: 69 BPM | WEIGHT: 191 LBS | RESPIRATION RATE: 16 BRPM | BODY MASS INDEX: 30.7 KG/M2 | TEMPERATURE: 97.9 F | DIASTOLIC BLOOD PRESSURE: 60 MMHG | SYSTOLIC BLOOD PRESSURE: 93 MMHG | HEIGHT: 66 IN | OXYGEN SATURATION: 96 %

## 2017-03-19 LAB — INR PPP: 1.19 (ref 0.86–1.14)

## 2017-03-19 PROCEDURE — 25000132 ZZH RX MED GY IP 250 OP 250 PS 637: Mod: GY | Performed by: ORTHOPAEDIC SURGERY

## 2017-03-19 PROCEDURE — 99232 SBSQ HOSP IP/OBS MODERATE 35: CPT | Performed by: FAMILY MEDICINE

## 2017-03-19 PROCEDURE — 25000128 H RX IP 250 OP 636: Performed by: FAMILY MEDICINE

## 2017-03-19 PROCEDURE — 25000132 ZZH RX MED GY IP 250 OP 250 PS 637: Mod: GY | Performed by: FAMILY MEDICINE

## 2017-03-19 PROCEDURE — 40000193 ZZH STATISTIC PT WARD VISIT: Performed by: PHYSICAL THERAPIST

## 2017-03-19 PROCEDURE — 85610 PROTHROMBIN TIME: CPT | Performed by: ORTHOPAEDIC SURGERY

## 2017-03-19 PROCEDURE — 99207 ZZC MOONLIGHTING INDICATOR: CPT | Performed by: FAMILY MEDICINE

## 2017-03-19 PROCEDURE — 97116 GAIT TRAINING THERAPY: CPT | Mod: GP | Performed by: PHYSICAL THERAPIST

## 2017-03-19 PROCEDURE — A9270 NON-COVERED ITEM OR SERVICE: HCPCS | Mod: GY | Performed by: ORTHOPAEDIC SURGERY

## 2017-03-19 PROCEDURE — 36415 COLL VENOUS BLD VENIPUNCTURE: CPT | Performed by: ORTHOPAEDIC SURGERY

## 2017-03-19 RX ORDER — WARFARIN SODIUM 5 MG/1
TABLET ORAL
Qty: 100 TABLET | Refills: 3 | DISCHARGE
Start: 2017-03-19 | End: 2017-03-22

## 2017-03-19 RX ORDER — OXYCODONE HYDROCHLORIDE 5 MG/1
5 TABLET ORAL EVERY 4 HOURS PRN
Qty: 40 TABLET | Refills: 0 | Status: SHIPPED | OUTPATIENT
Start: 2017-03-19 | End: 2017-03-22

## 2017-03-19 RX ORDER — METOPROLOL TARTRATE 25 MG/1
12.5 TABLET, FILM COATED ORAL 2 TIMES DAILY
DISCHARGE
Start: 2017-03-19 | End: 2017-08-06

## 2017-03-19 RX ORDER — ACETAMINOPHEN 325 MG/1
975 TABLET ORAL EVERY 8 HOURS
Qty: 100 TABLET | DISCHARGE
Start: 2017-03-19 | End: 2018-10-10 | Stop reason: ALTCHOICE

## 2017-03-19 RX ORDER — CYCLOBENZAPRINE HCL 5 MG
5 TABLET ORAL 3 TIMES DAILY PRN
Qty: 42 TABLET | DISCHARGE
Start: 2017-03-19 | End: 2017-08-06

## 2017-03-19 RX ORDER — DOCUSATE SODIUM 100 MG/1
100 CAPSULE, LIQUID FILLED ORAL 2 TIMES DAILY
Qty: 60 CAPSULE | DISCHARGE
Start: 2017-03-19 | End: 2017-08-06

## 2017-03-19 RX ADMIN — METOPROLOL TARTRATE 12.5 MG: 25 TABLET, FILM COATED ORAL at 09:05

## 2017-03-19 RX ADMIN — ENOXAPARIN SODIUM 90 MG: 100 INJECTION SUBCUTANEOUS at 10:55

## 2017-03-19 RX ADMIN — DOCUSATE SODIUM 100 MG: 100 CAPSULE ORAL at 09:05

## 2017-03-19 RX ADMIN — ACETAMINOPHEN 975 MG: 325 TABLET ORAL at 02:59

## 2017-03-19 RX ADMIN — OXYCODONE HYDROCHLORIDE 5 MG: 5 TABLET ORAL at 09:05

## 2017-03-19 NOTE — PROGRESS NOTES
[facility] Post-Op Note         Assessment and Plan:    Assessment:   Post-operative day #3  Femoral neck fracture and repair (Left)  Procedure(s):  OPEN REDUCTION INTERNAL FIXATION HIP BIPOLAR  Doing well.  Pain well-controlled.  Tolerating physical therapy and rehabilitation well.  It appears as though her irregular heart rhythm has stabilized.   Her anticoagulation is slowly increasing. She is now being bridged with Lovenox.        Plan:    patient to be transferred to extended care facility today.            Interval History:   Doing well.  Continues to improve.  Pain is well-controlled.  No fevers.              Physical Exam:   All vitals have been reviewed  Patient Vitals for the past 8 hrs:   BP Temp Temp src Pulse Heart Rate Resp SpO2   03/19/17 0717 93/60 97.9  F (36.6  C) Oral 69 69 16 96 %   03/19/17 0200 113/65 96.1  F (35.6  C) Oral 62 62 16 95 %     I/O last 3 completed shifts:  In: 360 [P.O.:360]  Out: 800 [Urine:800]    Dressing dry and intact.           Data:   All laboratory/imaging data related to this surgery reviewed  Results for orders placed or performed during the hospital encounter of 03/15/17 (from the past 24 hour(s))   INR   Result Value Ref Range    INR 1.19 (H) 0.86 - 1.14        Ge Mahmood MD

## 2017-03-19 NOTE — PROGRESS NOTES
S-(situation): Patient discharged to [facility], via [facility]i-van W/C.    B-(background):  Left hip fx -repair    A-(assessment): Goals were met for D/C to rehab.    R-(recommendations): Discharge instructions reviewed with pt and daughter. Listed belongings gathered and returned to patient. Report was given to TIARA Mathur.         Discharge Nursing Criteria:     Care Plan and Patient education resolved: Yes    New Medications- pt has been educated about purpose and side effects: Yes    Vaccines  Pneumonia Vaccine verified at discharge: Yes  Influenza status verified at discharge:  Yes          MISC  Prescriptions if needed, hard copies sent with patient  Yes- with [place] papers  Home and hospital aquired medications returned to patient: NA  Medication Bin checked and emptied on discharge Yes  Patient reports post-discharge pain management plan is effective: Yes

## 2017-03-19 NOTE — DISCHARGE SUMMARY
[facility] Discharge Summary    Tracy Palomo MRN# [id number]   Age: 76 year old YOB: 1940     Date of Admission:  3/15/2017  Date of Discharge::  3/19/2017  Admitting Physician:  Kaleb Pang DO  Discharge Physician:  Ge Mahmood MD     [place] clinic: [facility]          Admission Diagnoses:   Closed left hip fracture (H)  left hip fracture  Fractured hip, left, closed, initial encounter (H)          Discharge Diagnosis:   Closed left hip fracture (H)  left hip fracture  Fractured hip, left, closed, initial encounter (H)          Procedures:   Procedure(s): Procedure(s):  OPEN REDUCTION INTERNAL FIXATION HIP BIPOLAR       No procedures performed during this admission           Medications Prior to Admission:     Prescriptions Prior to Admission   Medication Sig Dispense Refill Last Dose     warfarin (COUMADIN) 5 MG tablet 2.5 mg Tues, Thurs, Sat and 5 mg the rest of the week. Or as directed by the coumadin clinic 100 tablet 3 3/14/2017 at 2200     lisinopril (PRINIVIL,ZESTRIL) 10 MG tablet Take 1 tablet (10 mg) by mouth daily 90 tablet 3 3/14/2017 at 2200     lovastatin (MEVACOR) 40 MG tablet TAKE ONE TABLET BY MOUTH AT BEDTIME 90 tablet 3 3/14/2017 at 2200           Review of your medicines      START taking       Dose / Directions    acetaminophen 325 MG tablet   Commonly known as:  TYLENOL        Dose:  975 mg   Take 3 tablets (975 mg) by mouth every 8 hours   Quantity:  100 tablet   Refills:  0       cyclobenzaprine 5 MG tablet   Commonly known as:  FLEXERIL        Dose:  5 mg   Take 1 tablet (5 mg) by mouth 3 times daily as needed for muscle spasms   Quantity:  42 tablet   Refills:  0       docusate sodium 100 MG capsule   Commonly known as:  COLACE        Dose:  100 mg   Take 1 capsule (100 mg) by mouth 2 times daily   Quantity:  60 capsule   Refills:  0       metoprolol 25 MG tablet   Commonly known as:  LOPRESSOR   Used for:  Paroxysmal  atrial fibrillation (H)        Dose:  12.5 mg   Take 0.5 tablets (12.5 mg) by mouth 2 times daily   Refills:  0       oxyCODONE 5 MG IR tablet   Commonly known as:  ROXICODONE        Dose:  5 mg   Take 1 tablet (5 mg) by mouth every 4 hours as needed for moderate to severe pain   Quantity:  40 tablet   Refills:  0         CONTINUE these medicines which may have CHANGED, or have new prescriptions. If we are uncertain of the size of tablets/capsules you have at home, strength may be listed as something that might have changed.       Dose / Directions    warfarin 5 MG tablet   Commonly known as:  COUMADIN   This may have changed:  additional instructions   Used for:  Long-term (current) use of anticoagulants, Pulmonary embolism, other (H)        7.5 mg on 3/19.  Then dosing based on INR results.  Pt's usual regimen (once back to therapeutic) is 2.5 mg Tues, Thurs, Sat and 5 mg the rest of the week.   Quantity:  100 tablet   Refills:  3         CONTINUE these medicines which have NOT CHANGED       Dose / Directions    lisinopril 10 MG tablet   Commonly known as:  PRINIVIL/ZESTRIL   Used for:  Essential hypertension with goal blood pressure less than 140/90        Dose:  10 mg   Take 1 tablet (10 mg) by mouth daily   Quantity:  90 tablet   Refills:  3       lovastatin 40 MG tablet   Commonly known as:  MEVACOR   Used for:  Hyperlipidemia LDL goal <130        TAKE ONE TABLET BY MOUTH AT BEDTIME   Quantity:  90 tablet   Refills:  3            Where to get your medicines      Some of these will need a paper prescription and others can be bought over the counter. Ask your nurse if you have questions.     Bring a paper prescription for each of these medications      oxyCODONE 5 MG IR tablet       You don't need a prescription for these medications      acetaminophen 325 MG tablet     cyclobenzaprine 5 MG tablet     docusate sodium 100 MG capsule     metoprolol 25 MG tablet     warfarin 5 MG tablet                  Consultations:   Consultation during this admission received from hospital-based physician.          Brief History of Illness:   Reason for admission requiring a surgical or invasive procedure:   Closed left hip fracture (H)  left hip fracture  Fractured hip, left, closed, initial encounter (H)   The patient underwent the following procedure(s):   Procedure(s):  OPEN REDUCTION INTERNAL FIXATION HIP BIPOLAR   There were no immediate complications during this procedure.    Please refer to the full operative summary for details.           Hospital Course:   The patient's hospital course was unremarkable.  She recovered as anticipated and experienced no post-operative complications.    She was ambulating well with the use of a walker weightbearing as tolerated. The wound dressing was intact. The wound was covered with Aquacell.    She did have some episodes of atrial fibrillation. These were felt to be related to the stress of surgery. These appear to reversed back to her normal.     Patient did have her chronic Coumadin therapy reversed for the surgical procedure. She was restarted on Coumadin using her normal preoperative dosages. However because of preoperative vitamin K her Coumadin levels were slow to return. Therefore her anticoagulation is being bridged with Lovenox. Those instructions should be on the discharge MAR completed by the hospital-based physician          Discharge Instructions and Follow-Up:   Discharge diet: Pre-procedure diet or regular   Discharge activity: Activity as tolerated  Driving restricitIons: no driving while taking narcotic analgesics  Weight bearing status: Weight bearing as tolerated   Discharge follow-up: Follow up with Dr. Pang in 10-14 days.  Patient should already have an appointment scheduled   Wound care: Aquacell dressing in place - OK to shower over this  Aquacell and staples to be removed at follow up           Discharge Disposition:   Discharged to short-term care facility       Attestation:  I have reviewed today's vital signs, notes, medications, labs and imaging.  Amount of time performed on this evaluating patient, reviewing chart, conversing with hospital-based physician, completing the discharge summary: 30 minutes.  Total time: 30 minutes    Ge Mahmood MD

## 2017-03-19 NOTE — PROGRESS NOTES
Name: Tracy Palomo    MRN#: [id number]    Reason for Hospitalization: Closed left hip fracture (H)  left hip fracture  Fractured hip, left, closed, initial encounter (H)    Discharge Date: 3/19/2017    Patient / Family response to discharge plan: in agreement    Follow-Up Appt: Future Appointments  Date Time Provider Department Center   3/21/2017 9:15 AM PH ANTI COAG [facility]       Other Providers (Care Coordinator, County Services, PCA services etc): No    Discharge Disposition: transitional care unit - [facility] (Main Phone: [phone] Admissions Phone: [phone] Fax: [phone]) via [facility]-Van    PAS-RR    D: Per DHS regulation, SW completed and submitted PAS-RR to [place] [employer]ging Direct Connect via the Senior LinkAge Line.  PAS-RR confirmation # is : [id number]    P: Further questions may be directed to Senior LinkAge Line at #[phone], option #4 for PAS-RR staff.      HONEY Johnson  [facility] [phone]/ [facility] [phone]

## 2017-03-19 NOTE — PLAN OF CARE
Problem: Goal Outcome Summary  Goal: Goal Outcome Summary  Outcome: Improving  Patient up with assist of one and walker to bathroom, cms intact denies numbness/tingling, dressing CDI, pain controlled with oxycodone.  Plan to discharge to [facility] today

## 2017-03-19 NOTE — PLAN OF CARE
Problem: Goal Outcome Summary  Goal: Goal Outcome Summary  Outcome: Improving  AOx3. VSSA. Sating WNL RA. Voiding. No stool yet, + flatus. Up ambulating in halls with walker and SBA. Oxycodone given for pain with relief. Family here and supportive. Dressing to hip CDI. CMS +.

## 2017-03-19 NOTE — PROGRESS NOTES
"Tracy Palomo  Gender: female  : 1940  [address]  [facility] [address]  [phone] (home)     Medical Record: [id number]  Pharmacy: [id number] - [place], [place] - [address]  Primary Care Provider: Chad Betts    Parent's names are: Data Unavailable (mother) and Data Unavailable (father).      [facility]  2017    Discharge Phone Call:  Key Words/Key Times      Introduction - AIDET (Acknowledge, Introduce, Duration, Explanation)      Empathy-   We are calling to see how you are since your recent stay in the hospital?     Call back COMMENTS:       Clinical Questions -  (f/u appts, medication side effects/purpose, ability to care for self at home) \"For your safety, it is important to us that you understand the purpose and side effects of your medications, can you tell me what your new medications are?\"     Call back COMMENTS:       Staff Recognition -  We like to recognize staff and physicians who have done an excellent job.  Do you remember any people from your care team that you would like recognize?     Call back COMMENTS:       Very Good Care -  We want to provide very good care to all patients.  How was your care?     Call back COMMENTS:       Opportunities for Improvement -  Our goal is to be the best.  Do you have any suggestions for things that we could improve upon?     Call back COMMENTS:      Thank You     3/19/17 1130 pt discharged to [facility] [place] [place]. Jaime Pipkin RN    "

## 2017-03-19 NOTE — PHARMACY-ANTICOAGULATION SERVICE
March 19, 2017, 7:36 AM Patient s goal INR is: 2-3 for the indication of Atrial fibrillation.     Tracy Palomo's INR level of 1.19 for today is Subtherapeutic for the patient. (trending up)    Recommend warfarin 7.5 mg today.  Pharmacy will continue to monitor Tracy Palomo's labs daily and order warfarin doses to achieve specified INR goal.      Jarrod Kiran RP

## 2017-03-19 NOTE — PLAN OF CARE
Problem: Goal Outcome Summary  Goal: Goal Outcome Summary     Goal status:progressing with gait and transfers     Functional status: feelign much better today     Areas of progress:up with SBA    Barrier to discharge Home: safety with independent mobiltity and cares     Equipment needs: defer to TCU       Discharge disposition/rationale: TCU today     Transport recommendations: van

## 2017-03-19 NOTE — PROGRESS NOTES
[facility]    Hospitalist Progress Note    Date of Service (when I saw the patient): 03/19/2017    Assessment & Plan   Tracy Palomo is a 76 year old female who was admitted on 3/15/2017. Doing well.  Hip is healing appropriately.  Atrial fibrillation is controlled.    Principal Problem:    Hip fracture, left, closed, initial encounter (H)  Active Problems:    Hypertension goal BP (blood pressure) < 140/90    Hyperlipidemia LDL goal <130    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    Paroxysmal atrial fibrillation (H)    Long-term (current) use of anticoagulants [Z79.01]    Closed left hip fracture (H)    Fractured hip, left, closed, initial encounter (H)    Will d/c to rehab today on addition of metoprolol and continue warfarin until therapeutic.      DVT Prophylaxis: Enoxaprain (Lovenox) SQ and Warfarin  Code Status: Prior    Disposition: d/c today per roz Valerio MD    Interval History   Feeling much better.  Rate controlled.  INR is increasing.  Feels ready to go to rehab today.    -Data reviewed today: I reviewed all new labs and imaging results over the last 24 hours. I personally reviewed no images or EKG's today.    Physical Exam   Temp: 97.9  F (36.6  C) Temp src: Oral BP: 93/60 Pulse: 69 Heart Rate: 69 Resp: 16 SpO2: 96 % O2 Device: None (Room air)    Vitals:    03/15/17 1744 03/15/17 2150   Weight: 187 lb (84.8 kg) 191 lb (86.6 kg)     Vital Signs with Ranges  Temp:  [96.1  F (35.6  C)-97.9  F (36.6  C)] 97.9  F (36.6  C)  Pulse:  [62-69] 69  Heart Rate:  [62-70] 69  Resp:  [16-18] 16  BP: ()/(60-65) 93/60  SpO2:  [95 %-96 %] 96 %  I/O last 3 completed shifts:  In: 360 [P.O.:360]  Out: 800 [Urine:800]    Constitutional: WDWN  Respiratory: CTAB  Cardiovascular: RRR, no MRG  GI: soft, benign  Skin/Integumen: no bleeding/bruising  Neuro: grossly intact  Other:     Medications     Warfarin Therapy Reminder         warfarin  7.5  mg Oral ONCE at 18:00     enoxaparin  1 mg/kg Subcutaneous Q12H     metoprolol  12.5 mg Oral BID     simvastatin  20 mg Oral At Bedtime     sodium chloride (PF)  3 mL Intracatheter Q8H     acetaminophen  975 mg Oral Q8H     docusate sodium  100 mg Oral BID       Data   Data reviewed today:  I personally reviewed her labs.    Recent Labs  Lab 03/19/17  0603 03/18/17  0625 03/17/17  0535  03/16/17  0525 03/15/17  1807   WBC  --   --   --   --   --  6.8   HGB  --  10.6* 10.5*  --  13.3 13.8   MCV  --   --   --   --   --  90   PLT  --   --   --   --   --  228   INR 1.19* 1.04 1.08  < > 3.08* 2.99*   NA  --   --   --   --   --  142   POTASSIUM  --   --   --   --   --  4.0   CHLORIDE  --   --   --   --   --  105   CO2  --   --   --   --   --  32   BUN  --   --   --   --   --  11   CR  --   --   --   --   --  0.92   ANIONGAP  --   --   --   --   --  5   HARVEY  --   --   --   --   --  9.6   GLC  --   --  121*  --   --  107*   < > = values in this interval not displayed.    No results found for this or any previous visit (from the past 24 hour(s)).

## 2017-03-20 ENCOUNTER — CARE COORDINATION (OUTPATIENT)
Dept: CARE COORDINATION | Facility: CLINIC | Age: 77
End: 2017-03-20

## 2017-03-20 ENCOUNTER — HOSPITAL LABORATORY (OUTPATIENT)
Dept: NURSING HOME | Facility: OTHER | Age: 77
End: 2017-03-20

## 2017-03-20 ENCOUNTER — NURSING HOME VISIT (OUTPATIENT)
Dept: GERIATRICS | Facility: CLINIC | Age: 77
End: 2017-03-20
Payer: MEDICARE

## 2017-03-20 VITALS
DIASTOLIC BLOOD PRESSURE: 58 MMHG | RESPIRATION RATE: 16 BRPM | OXYGEN SATURATION: 97 % | SYSTOLIC BLOOD PRESSURE: 106 MMHG | TEMPERATURE: 97.6 F | HEART RATE: 68 BPM | BODY MASS INDEX: 31.25 KG/M2 | WEIGHT: 193.6 LBS

## 2017-03-20 DIAGNOSIS — Z51.81 ENCOUNTER FOR THERAPEUTIC DRUG MONITORING: ICD-10-CM

## 2017-03-20 DIAGNOSIS — I48.0 PAROXYSMAL ATRIAL FIBRILLATION (H): Primary | ICD-10-CM

## 2017-03-20 DIAGNOSIS — Z79.01 LONG-TERM (CURRENT) USE OF ANTICOAGULANTS: ICD-10-CM

## 2017-03-20 DIAGNOSIS — I26.99 PULMONARY EMBOLISM, OTHER: ICD-10-CM

## 2017-03-20 LAB
BLD PROD TYP BPU: NORMAL
BLD UNIT ID BPU: 0
BLOOD PRODUCT CODE: NORMAL
BPU ID: NORMAL
INR PPP: 1.53 (ref 0.86–1.14)
TRANSFUSION STATUS PATIENT QL: NORMAL

## 2017-03-20 PROCEDURE — 99307 SBSQ NF CARE SF MDM 10: CPT | Performed by: NURSE PRACTITIONER

## 2017-03-20 NOTE — LETTER
Hand-off  for Care Coordination  What is Care Coordination?  [facility] Care Coordination Services are available to people in complex situations,   for example medical, social or financial. The Care Coordinator, a SW or an RN, works with the   patient and their doctor to determine health goals, obtain resources, achieve outcomes,   and develop plans to coordinate care across settings.      o Patient Name:   Tracy Palomo  o Patient :     1940  o Patient PCP:     Chad Betts MD    o Patient Primary Clinic:   [address]  [place] [facility] [address]  o D/C Facility: [facility]   o TCU Contact Info for questions: ___________________________  o D/C Date:  ______________________________________________  o Follow-up Apt with PCP after TCU D/C:   ____________________  o Other Follow-Up Apt s: ____________________________________  Additional information (concerns, and Home Care, ect ):   __________________________________________________________________  __________________________________________________________________        __________________________________________________________________    Care Coordinator to Contact  **Please fax this sheet back to me when pt is ready to discharge**  Gemma Tracy RN  Clinic Care Coordinator  Fax: [phone]  Phone: [phone]

## 2017-03-20 NOTE — MR AVS SNAPSHOT
After Visit Summary   3/20/2017    Tracy Palomo    MRN: [id number]           Patient Information     Date Of Birth          1940        Visit Information        Provider Department      3/20/2017 1:00 PM Barbara Becerra APRN CNP Ger[facility]s Transitional [facility]        Today's Diagnoses     Paroxysmal atrial fibrillation (H)    -  1    Pulmonary embolism, other (H)        Encounter for therapeutic drug monitoring        Long-term (current) use of anticoagulants [Z79.01]           Follow-ups after your visit        Your next 10 appointments already scheduled     Mar 30, 2017 10:10 AM CDT   [facility] Kaleb Pang,    [facility]    [address] [facility] [address] [address] [phone]              Future tests that were ordered for you today     Open Future Orders        Priority Expected Expires Ordered    XR Pelvis and Hip Left 2 Views Routine 3/30/2017 3/21/2018 3/20/2017            Who to contact     If you have questions or need follow up information about today's clinic visit or your schedule please contact [facility] directly at [phone].  Normal or non-critical lab and imaging results will be communicated to you by MyChart, letter or phone within 4 business days after the clinic has received the results. If you do not hear from us within 7 days, please contact the clinic through MyChart or phone. If you have a critical or abnormal lab result, we will notify you by phone as soon as possible.  Submit refill requests through [facility] or call your pharmacy and they will forward the refill request to us. Please allow 3 business days for your refill to be completed.          Additional Information About Your Visit        Care EveryWhere ID     This is your Care EveryWhere ID. This could be used by other organizations to access your [place] medical records  [id number]        Your Vitals Were      Pulse Temperature Respirations Pulse Oximetry BMI (Body Mass Index)       68 97.6  F (36.4  C) 16 97% 31.25 kg/m2        Blood Pressure from Last 3 Encounters:   03/23/17 115/54   03/21/17 108/65   03/20/17 106/58    Weight from Last 3 Encounters:   03/23/17 193 lb (87.5 kg)   03/21/17 194 lb 12.8 oz (88.4 kg)   03/20/17 193 lb 9.6 oz (87.8 kg)              Today, you had the following     No orders found for display       Primary Care Provider Office Phone # Fax #    Chad Betts [phone]       [facility] [address] [place] [address]        Thank you!     Thank you for choosing [facility]AT[facility]  for your care. Our goal is always to provide you with excellent care. Hearing back from our patients is one way we can continue to improve our services. Please take a few minutes to complete the written survey that you may receive in the mail after your visit with us. Thank you!             Your Updated Medication List - Protect others around you: Learn how to safely use, store and throw away your medicines at www.disposemymeds.org.          This list is accurate as of: 3/20/17 11:59 PM.  Always use your most recent med list.                   Brand Name Dispense Instructions for use    acetaminophen 325 MG tablet    TYLENOL    100 tablet    Take 3 tablets (975 mg) by mouth every 8 hours       cyclobenzaprine 5 MG tablet    FLEXERIL    42 tablet    Take 1 tablet (5 mg) by mouth 3 times daily as needed for muscle spasms       docusate sodium 100 MG capsule    COLACE    60 capsule    Take 1 capsule (100 mg) by mouth 2 times daily       lisinopril 10 MG tablet    PRINIVIL/ZESTRIL    90 tablet    Take 1 tablet (10 mg) by mouth daily       lovastatin 40 MG tablet    MEVACOR    90 tablet    TAKE ONE TABLET BY MOUTH AT BEDTIME       metoprolol 25 MG tablet    LOPRESSOR     Take 0.5 tablets (12.5 mg) by mouth 2 times daily

## 2017-03-20 NOTE — PROGRESS NOTES
Clinic Care Coordination Contact  Care Team Conversations    Care Coordination Communication    Referral Source: CTS    Clinical Data: RN CC received CTS referral. Pt was discharged from [facility] on 3/19/17 and went to [facility]. RN CC faxed communication sheet to  at TCU.     Plan:  RN CC will wait for f/u from  at TCU notifying RN CC of pt's discharge plans/needs. Plans to review chart and check in with TCU in 2-4 weeks.    Aida Briseno RN-BSN  RN Clinic Care Coordinator  [facility]  [phone]

## 2017-03-20 NOTE — PROGRESS NOTES
[facility]    HPI:    Tracy Palomo is a 76 year old  (1940), who is being seen today for an episodic care visit at [facility]. Today's concern is INR/Coumadin management for A. Fib    Bleeding Signs/Symptoms:  None  Thromboembolic Signs/Symptoms:  None    Medication Changes:  Yes: was just in the hospital and so finding adjusting her coumadin   Dietary Changes:  Yes: nursing home food now  Activity Changes: Yes: attending therapies for fractured hip  Bacterial/Viral Infection:  No    Missed Coumadin Doses:  None    On ASA: No    Other Concerns:  No    OBJECTIVE:    INR Today:  1.53  Current Dose:  Coumadin 7.5mg on 3/19/17    ASSESSMENT:    Subtherapeutic INR for goal of 2-3    PLAN:    New Dose: Coumadin 7.5 mg po QD x 2 days.      Next INR: 3/22/17      BILLY Grider CNP

## 2017-03-21 ENCOUNTER — NURSING HOME VISIT (OUTPATIENT)
Dept: GERIATRICS | Facility: CLINIC | Age: 77
End: 2017-03-21
Payer: MEDICARE

## 2017-03-21 DIAGNOSIS — I48.0 PAROXYSMAL ATRIAL FIBRILLATION (H): ICD-10-CM

## 2017-03-21 DIAGNOSIS — S72.002D CLOSED LEFT HIP FRACTURE, WITH ROUTINE HEALING, SUBSEQUENT ENCOUNTER: ICD-10-CM

## 2017-03-21 DIAGNOSIS — I10 HYPERTENSION GOAL BP (BLOOD PRESSURE) < 140/90: Primary | ICD-10-CM

## 2017-03-21 DIAGNOSIS — K59.01 SLOW TRANSIT CONSTIPATION: ICD-10-CM

## 2017-03-21 PROCEDURE — 99207 ZZC CDG-CORRECTLY CODED, REVIEWED AND AGREE: CPT | Performed by: NURSE PRACTITIONER

## 2017-03-21 PROCEDURE — 99310 SBSQ NF CARE HIGH MDM 45: CPT | Performed by: NURSE PRACTITIONER

## 2017-03-21 NOTE — MR AVS SNAPSHOT
After Visit Summary   3/21/2017    Tracy Palomo    MRN: [id number]           Patient Information     Date Of Birth          1940        Visit Information        Provider Department      3/21/2017 11:30 AM Barbara Becerra APRN CNP [facility]        Today's Diagnoses     Hypertension goal BP (blood pressure) < 140/90    -  1    Closed left hip fracture, with routine healing, subsequent encounter        Paroxysmal atrial fibrillation (H)        Slow transit constipation           Follow-ups after your visit        Your next 10 appointments already scheduled     Mar 30, 2017  9:50 AM CDT   XR PELVIS AND HIP LEFT 2 VIEWS with [email or web address]   [facility])    [address] [place] [id number]              Please bring a list of your current medicines to your exam. (Include vitamins, minerals and over-thecounter medicines.) Leave your valuables at home.  Tell your doctor if there is a chance you may be pregnant.  You do not need to do anything special for this exam.            Mar 30, 2017 10:10 AM CDT   [facility]   [facility]    [address] [place] [id number] [phone]            Apr 11, 2017  9:45 AM CDT   Anticoagulation Visit with PH ANTI COAG   [facility]    [address] [place] [id number] [phone]              Future tests that were ordered for you today     Open Future Orders        Priority Expected Expires Ordered    XR Pelvis and Hip Left 2 Views Routine 3/30/2017 3/21/2018 3/20/2017            Who to contact     If you have questions or need follow up information about today's clinic visit or your schedule please contact G[facility]AT[facility] directly at [phone].  Normal or non-critical lab and imaging results will be communicated to you by  MyChart, letter or phone within 4 business days after the clinic has received the results. If you do not hear from us within 7 days, please contact the clinic through MyChart or phone. If you have a critical or abnormal lab result, we will notify you by phone as soon as possible.  Submit refill requests through [employer] or call your pharmacy and they will forward the refill request to us. Please allow 3 business days for your refill to be completed.          Additional Information About Your Visit        Care EveryWhere ID     This is your Care EveryWhere ID. This could be used by other organizations to access your [place] medical records  [id number]        Your Vitals Were     Pulse Temperature Respirations Pulse Oximetry BMI (Body Mass Index)       57 97.1  F (36.2  C) 22 96% 31.44 kg/m2        Blood Pressure from Last 3 Encounters:   03/29/17 111/60   03/28/17 111/60   03/23/17 115/54    Weight from Last 3 Encounters:   03/29/17 191 lb 3.2 oz (86.7 kg)   03/28/17 191 lb 3.2 oz (86.7 kg)   03/23/17 193 lb (87.5 kg)              Today, you had the following     No orders found for display       Primary Care Provider Office Phone # Fax #    Chad Betts [phone]       [facility] [address] [place] [address]        Thank you!     Thank you for choosing [facility]  for your care. Our goal is always to provide you with excellent care. Hearing back from our patients is one way we can continue to improve our services. Please take a few minutes to complete the written survey that you may receive in the mail after your visit with us. Thank you!             Your Updated Medication List - Protect others around you: Learn how to safely use, store and throw away your medicines at www.disposemymeds.org.          This list is accurate as of: 3/21/17 11:59 PM.  Always use your most recent med list.                   Brand Name Dispense Instructions for use     acetaminophen 325 MG tablet    TYLENOL    100 tablet    Take 3 tablets (975 mg) by mouth every 8 hours       cyclobenzaprine 5 MG tablet    FLEXERIL    42 tablet    Take 1 tablet (5 mg) by mouth 3 times daily as needed for muscle spasms       docusate sodium 100 MG capsule    COLACE    60 capsule    Take 1 capsule (100 mg) by mouth 2 times daily       lisinopril 10 MG tablet    PRINIVIL/ZESTRIL    90 tablet    Take 1 tablet (10 mg) by mouth daily       lovastatin 40 MG tablet    MEVACOR    90 tablet    TAKE ONE TABLET BY MOUTH AT BEDTIME       metoprolol 25 MG tablet    LOPRESSOR     Take 0.5 tablets (12.5 mg) by mouth 2 times daily       sennosides 8.6 MG tablet    SENOKOT     Take 1 tablet by mouth 2 times daily

## 2017-03-22 ENCOUNTER — HOSPITAL LABORATORY (OUTPATIENT)
Dept: NURSING HOME | Facility: OTHER | Age: 77
End: 2017-03-22

## 2017-03-22 ENCOUNTER — NURSING HOME VISIT (OUTPATIENT)
Dept: GERIATRICS | Facility: CLINIC | Age: 77
End: 2017-03-22
Payer: MEDICARE

## 2017-03-22 VITALS
TEMPERATURE: 97.1 F | HEART RATE: 57 BPM | DIASTOLIC BLOOD PRESSURE: 65 MMHG | RESPIRATION RATE: 22 BRPM | WEIGHT: 194.8 LBS | OXYGEN SATURATION: 96 % | SYSTOLIC BLOOD PRESSURE: 108 MMHG | BODY MASS INDEX: 31.44 KG/M2

## 2017-03-22 DIAGNOSIS — I26.99 PULMONARY EMBOLISM, OTHER: ICD-10-CM

## 2017-03-22 DIAGNOSIS — Z79.01 LONG-TERM (CURRENT) USE OF ANTICOAGULANTS: ICD-10-CM

## 2017-03-22 DIAGNOSIS — I48.0 PAROXYSMAL ATRIAL FIBRILLATION (H): Primary | ICD-10-CM

## 2017-03-22 DIAGNOSIS — Z51.81 ENCOUNTER FOR THERAPEUTIC DRUG MONITORING: ICD-10-CM

## 2017-03-22 LAB — INR PPP: 3.28 (ref 0.86–1.14)

## 2017-03-22 PROCEDURE — 99307 SBSQ NF CARE SF MDM 10: CPT | Performed by: NURSE PRACTITIONER

## 2017-03-22 RX ORDER — SENNOSIDES 8.6 MG
1 TABLET ORAL 2 TIMES DAILY
COMMUNITY
End: 2017-08-06

## 2017-03-22 NOTE — MR AVS SNAPSHOT
After Visit Summary   3/22/2017    Tracy Palomo    MRN: [id number]           Patient Information     Date Of Birth          1940        Visit Information        Provider Department      3/22/2017 12:00 PM Barbara Becerra APRN CNP [facility] Transitional [facility]        Today's Diagnoses     Paroxysmal atrial fibrillation (H)    -  1    Pulmonary embolism, other (H)        Encounter for therapeutic drug monitoring        Long-term (current) use of anticoagulants [Z79.01]           Follow-ups after your visit        Your next 10 appointments already scheduled     Mar 30, 2017 10:10 AM CDT   Return Visit with Kaleb Pang,    [facility]    [address] [id number] [phone]              Future tests that were ordered for you today     Open Future Orders        Priority Expected Expires Ordered    XR Pelvis and Hip Left 2 Views Routine 3/30/2017 3/21/2018 3/20/2017            Who to contact     If you have questions or need follow up information about today's clinic visit or your schedule please contact GER[facility]AT[facility] directly at [phone].  Normal or non-critical lab and imaging results will be communicated to you by MyChart, letter or phone within 4 business days after the clinic has received the results. If you do not hear from us within 7 days, please contact the clinic through MyChart or phone. If you have a critical or abnormal lab result, we will notify you by phone as soon as possible.  Submit refill requests through [employer] or call your pharmacy and they will forward the refill request to us. Please allow 3 business days for your refill to be completed.          Additional Information About Your Visit        Care EveryWhere ID     This is your Care EveryWhere ID. This could be used by other organizations to access your [place] medical records  [id number]        Your Vitals Were      Pulse Temperature Respirations Pulse Oximetry BMI (Body Mass Index)       62 97.6  F (36.4  C) 17 96% 31.15 kg/m2        Blood Pressure from Last 3 Encounters:   03/23/17 115/54   03/21/17 108/65   03/20/17 106/58    Weight from Last 3 Encounters:   03/23/17 193 lb (87.5 kg)   03/21/17 194 lb 12.8 oz (88.4 kg)   03/20/17 193 lb 9.6 oz (87.8 kg)              Today, you had the following     No orders found for display       Primary Care Provider Office Phone # Fax #    Chad Betts [phone]       [facility] [address] [place] [address]        Thank you!     Thank you for choosing [facility]  for your care. Our goal is always to provide you with excellent care. Hearing back from our patients is one way we can continue to improve our services. Please take a few minutes to complete the written survey that you may receive in the mail after your visit with us. Thank you!             Your Updated Medication List - Protect others around you: Learn how to safely use, store and throw away your medicines at www.disposemymeds.org.          This list is accurate as of: 3/22/17 11:59 PM.  Always use your most recent med list.                   Brand Name Dispense Instructions for use    acetaminophen 325 MG tablet    TYLENOL    100 tablet    Take 3 tablets (975 mg) by mouth every 8 hours       cyclobenzaprine 5 MG tablet    FLEXERIL    42 tablet    Take 1 tablet (5 mg) by mouth 3 times daily as needed for muscle spasms       docusate sodium 100 MG capsule    COLACE    60 capsule    Take 1 capsule (100 mg) by mouth 2 times daily       lisinopril 10 MG tablet    PRINIVIL/ZESTRIL    90 tablet    Take 1 tablet (10 mg) by mouth daily       lovastatin 40 MG tablet    MEVACOR    90 tablet    TAKE ONE TABLET BY MOUTH AT BEDTIME       metoprolol 25 MG tablet    LOPRESSOR     Take 0.5 tablets (12.5 mg) by mouth 2 times daily       sennosides 8.6 MG tablet    SENOKOT     Take 1  tablet by mouth 2 times daily

## 2017-03-22 NOTE — PROGRESS NOTES
[facility]  PRIMARY CARE PROVIDER AND CLINIC:  Chad Betts [facility] [address]  / [place] [address]  Chief Complaint   Patient presents with     Hospital F/U     Fracture     Atrial Fib     Hypertension       HPI:    Tracy Palomo is a 76 year old  (1940),admitted to the [facility]  from Hospital  [facility].  Hospital stay 3/15/17 through 3/19/17.  Admitted to this facility for  rehab, medical management and nursing care. Current issues are:        1. Hypertension goal BP (blood pressure) < 140/90 - came to see Tracy today as she was in therapies.  She is here for rehab after falling on ice at her house and fractured her left hip.    Vitals daily.  Pressures ranging from 120-140's/60's.   2. Closed left hip fracture, with routine healing, subsequent encounter - Aquacel coving incision.  Left leg shows swelling.  Expected to stay for a short time and then back home.  Talked about pain control while doing therapies.   3. Paroxysmal atrial fibrillation (H) - INR done yesterday with one again tomorrow.   4. Slow transit constipation - is on scheduled Colace.       CODE STATUS/ADVANCE DIRECTIVES DISCUSSION:   CPR/Full code   Patient's living condition: lives with spouse    ALLERGIES:No known allergies  PAST MEDICAL HISTORY:  has a past medical history of Atrial fibrillation (H) (2014); Colon polyps; Diverticulosis of colon (without mention of hemorrhage); DVT (deep vein thrombosis) in pregnancy (H) (2014); Other and unspecified hyperlipidemia; PE (pulmonary embolism) (2014); and Unspecified essential hypertension.  PAST SURGICAL HISTORY:  has a past surgical history that includes colonoscopy (09, 10, 12); flexible sigmoidoscopy (09, 11); Laparotomy exploratory (N/A, 10/20/2014); Laparotomy, lysis adhesions, combined (N/A, 10/20/2014); Resect small bowel without ostomy (N/A, 10/20/2014); and Open reduction internal fixation  hip bipolar (Left, 3/16/2017).  FAMILY HISTORY: family history is negative for Blood Disease.  SOCIAL HISTORY:  reports that she has never smoked. She has never used smokeless tobacco. She reports that she does not drink alcohol or use illicit drugs.    Post Discharge Medication Reconciliation Status: discharge medications reconciled and changed, per note/orders (see AVS).  Current Outpatient Prescriptions   Medication Sig Dispense Refill     sennosides (SENOKOT) 8.6 MG tablet Take 1 tablet by mouth 2 times daily       acetaminophen (TYLENOL) 325 MG tablet Take 3 tablets (975 mg) by mouth every 8 hours 100 tablet      metoprolol (LOPRESSOR) 25 MG tablet Take 0.5 tablets (12.5 mg) by mouth 2 times daily       docusate sodium (COLACE) 100 MG capsule Take 1 capsule (100 mg) by mouth 2 times daily 60 capsule      cyclobenzaprine (FLEXERIL) 5 MG tablet Take 1 tablet (5 mg) by mouth 3 times daily as needed for muscle spasms 42 tablet      lisinopril (PRINIVIL,ZESTRIL) 10 MG tablet Take 1 tablet (10 mg) by mouth daily 90 tablet 3     lovastatin (MEVACOR) 40 MG tablet TAKE ONE TABLET BY MOUTH AT BEDTIME 90 tablet 3       ROS:  4 point ROS including Respiratory, CV, GI and , other than that noted in the HPI,  is negative    Exam:  /65  Pulse 57  Temp 97.1  F (36.2  C)  Resp 22  Wt 194 lb 12.8 oz (88.4 kg)  SpO2 96%  BMI 31.44 kg/m2  GENERAL APPEARANCE:  Alert, in no distress, appears healthy, oriented, cooperative  EYES:  EOM, conjunctivae, lids, pupils and irises normal, PERRL  RESP:  respiratory effort and palpation of chest normal, lungs clear to auscultation , no respiratory distress  CV:  Palpation and auscultation of heart done , heart rate regular/irregular, left leg shows mild swelling with no edema in right.  ABDOMEN:  normal bowel sounds, soft, nontender, no hepatosplenomegaly or other masses, no guarding or rebound  M/S:   Gait and station abnormal SBA for ambulating with walker, attending  therapies  SKIN:  skin is pink, dry and warm.  Aquacel covering incision and monitored by nursing  PSYCH:  oriented X 3, normal insight, judgement and memory, affect and mood normal    Lab/Diagnostic data:     WBC   Date Value Ref Range Status   03/15/2017 6.8 4.0 - 11.0 10e9/L Final   ]  Hemoglobin   Date Value Ref Range Status   03/18/2017 10.6 (L) 11.7 - 15.7 g/dL Final   03/17/2017 10.5 (L) 11.7 - 15.7 g/dL Final   ]  Last Basic Metabolic Panel:  Lab Results   Component Value Date     03/15/2017      Lab Results   Component Value Date    POTASSIUM 4.0 03/15/2017     Lab Results   Component Value Date    HARVEY 9.6 03/15/2017     Lab Results   Component Value Date    CO2 32 03/15/2017     Lab Results   Component Value Date    BUN 11 03/15/2017     Lab Results   Component Value Date    CR 0.92 03/15/2017     Lab Results   Component Value Date     03/17/2017       ASSESSMENT/PLAN:  Hypertension goal BP (blood pressure) < 140/90  Vitals daily.  Remains on Lisinopril 10mg daily, and metoprolol 12.5mg twice a day.  Stable.    Closed left hip fracture, with routine healing, subsequent encounter  Does not want the oxycodone and so will discontinue.  Will just use the Tylenol 975mg every 8 hours.  aquacel in place until appointment with Dr. Pang 3/30/17.  Will continue to attend therapies.  Is on Coumadin already.      Paroxysmal atrial fibrillation (H)  Coumadin at 7.5mg daily with INR tomorrow.  Want to get her up to be in range of 2-3.  In orders the regular dosing is recorded and so will plan to get her back to 2.5mg three times a week and 5mg on other days.      Slow transit constipation  Will add Senna 1 tab BID since she is already on Colace twice a day.  Having trouble with stooling right after surgery.  Staff can offer her other things to help get her stool moving.       Orders:  1.  Discontinue oxycodone  2.  Add Senna 1 tab BID for constipation.  3.  HGB level on 3/27/17 to follow after  surgery    Information reviewed:  Medications, vital signs, orders, nursing notes, problem list, hospital information. Total time spent with patient visit was 35 min including patient visit and review of past records. Greater than 50% of total time spent with counseling and coordinating care.    Electronically signed by:  BILLY Grider CNP

## 2017-03-23 VITALS
HEART RATE: 62 BPM | BODY MASS INDEX: 31.15 KG/M2 | RESPIRATION RATE: 17 BRPM | WEIGHT: 193 LBS | DIASTOLIC BLOOD PRESSURE: 54 MMHG | TEMPERATURE: 97.6 F | OXYGEN SATURATION: 96 % | SYSTOLIC BLOOD PRESSURE: 115 MMHG

## 2017-03-23 NOTE — PROGRESS NOTES
[facility]    HPI:    Tracy Palomo is a 76 year old  (1940), who is being seen today for an episodic care visit at [facility]. Today's concern is INR/Coumadin management for A. Fib and PE    Bleeding Signs/Symptoms:  None  Thromboembolic Signs/Symptoms:  None    Medication Changes:  No  Dietary Changes:  No  Activity Changes: Yes: attending therapies after having a hip fracture  Bacterial/Viral Infection:  No    Missed Coumadin Doses:  None    On ASA: No    Other Concerns:  Yes: attempting to get INR into range after hospital stay.    OBJECTIVE:    INR Today:  3.28  Current Dose:  Coumadin 7.5 x2 days    ASSESSMENT:    Therapeutic INR for goal of 2-3    PLAN:    New Dose: will return to previous dosing of 2.5mg of coumadin on Tu, Th and Sat with 5mg on other days      Next INR: Monday, 3/27/17      BILLY Grider CNP

## 2017-03-27 ENCOUNTER — HOSPITAL LABORATORY (OUTPATIENT)
Dept: NURSING HOME | Facility: OTHER | Age: 77
End: 2017-03-27

## 2017-03-27 LAB — HGB BLD-MCNC: 10.4 G/DL (ref 11.7–15.7)

## 2017-03-28 ENCOUNTER — DISCHARGE SUMMARY NURSING HOME (OUTPATIENT)
Dept: GERIATRICS | Facility: CLINIC | Age: 77
End: 2017-03-28
Payer: MEDICARE

## 2017-03-28 ENCOUNTER — ANTICOAGULATION THERAPY VISIT (OUTPATIENT)
Dept: ANTICOAGULATION | Facility: CLINIC | Age: 77
End: 2017-03-28
Payer: MEDICARE

## 2017-03-28 DIAGNOSIS — I10 HYPERTENSION GOAL BP (BLOOD PRESSURE) < 140/90: ICD-10-CM

## 2017-03-28 DIAGNOSIS — Z79.01 LONG-TERM (CURRENT) USE OF ANTICOAGULANTS: ICD-10-CM

## 2017-03-28 DIAGNOSIS — I26.99 PULMONARY EMBOLISM, OTHER: ICD-10-CM

## 2017-03-28 DIAGNOSIS — I48.0 PAROXYSMAL ATRIAL FIBRILLATION (H): Primary | ICD-10-CM

## 2017-03-28 DIAGNOSIS — S72.002D CLOSED LEFT HIP FRACTURE, WITH ROUTINE HEALING, SUBSEQUENT ENCOUNTER: ICD-10-CM

## 2017-03-28 LAB — INR POINT OF CARE: 2.6 (ref 0.86–1.14)

## 2017-03-28 PROCEDURE — 99316 NF DSCHRG MGMT 30 MIN+: CPT | Performed by: NURSE PRACTITIONER

## 2017-03-28 PROCEDURE — 36416 COLLJ CAPILLARY BLOOD SPEC: CPT

## 2017-03-28 PROCEDURE — 85610 PROTHROMBIN TIME: CPT | Mod: QW

## 2017-03-28 PROCEDURE — 99207 ZZC NO CHARGE NURSE ONLY: CPT

## 2017-03-28 PROCEDURE — 99207 ZZC CDG-CORRECTLY CODED, REVIEWED AND AGREE: CPT | Performed by: NURSE PRACTITIONER

## 2017-03-28 NOTE — PROGRESS NOTES
ANTICOAGULATION FOLLOW-UP CLINIC VISIT    Patient Name:  Tracy Palomo  Date:  3/28/2017  Contact Type:  Face to Face    SUBJECTIVE:     Patient Findings     Positives No Problem Findings           OBJECTIVE    INR Protime   Date Value Ref Range Status   03/28/2017 2.6 (A) 0.86 - 1.14 Final       ASSESSMENT / PLAN  INR assessment THER    Recheck INR In: 2 WEEKS    INR Location Clinic      Anticoagulation Summary as of 3/28/2017     INR goal 2.0-3.0   Today's INR 2.6   Maintenance plan 2.5 mg (5 mg x 0.5) on Mon; 5 mg (5 mg x 1) all other days   Full instructions 2.5 mg on Mon; 5 mg all other days   Weekly total 32.5 mg   Plan last modified Johanna Marshall RN (3/7/2017)   Next INR check 4/11/2017   Target end date     Indications   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]  Long-term (current) use of anticoagulants [Z79.01] [Z79.01]         Anticoagulation Episode Summary     INR check location     Preferred lab     Send INR reminders to [facility] POOL    Comments 5 mg tablets       Anticoagulation Care Providers     Provider Role Specialty Phone number    Gerard Medrano MD [facility] [phone]            See the Encounter Report to view Anticoagulation Flowsheet and Dosing Calendar (Go to Encounters tab in chart review, and find the Anticoagulation Therapy Visit)    Dosage adjustment made based on physician directed care plan.    Pt [facility] notes, pt has been getting 2.5 mg Tues, Thurs, Sat and 5 mg ROW. She will be discharged tomorrow. She was in clinic today to see her  and she wanted to get her INR done for peace of mind.   She will cont with 2.5 mg Tues, Thurs, Sat and 5 mg ROW, recheck 2 weeks.     Johanna Marshall, RN

## 2017-03-28 NOTE — MR AVS SNAPSHOT
Tracy ANG [facility]   3/28/2017 3:00 PM   Anticoagulation Therapy Visit    Description:  76 year old female   Provider:  ERICK ANTI COAG   Department:  [facility]           INR as of 3/28/2017     Today's INR 2.6      Anticoagulation Summary as of 3/28/2017     INR goal 2.0-3.0   Today's INR 2.6   Full instructions 2.5 mg on Tue, Thu, Sat; 5 mg all other days   Next INR check 4/11/2017    Indications   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]  Long-term (current) use of anticoagulants [Z79.01] [Z79.01]         Your next Anticoagulation Clinic appointment(s)     Apr 11, 2017  9:45 AM CDT   Anticoagulation Visit with ERICK ANTI MARTIN   [facility]    [address] [place] [id number]   [phone]              Contact Numbers     Clinic Number:         March 2017 Details    Sun Mon Tue Wed Thu Fri Sat        1               2               3               4                 5               6               7               8               9               10               11                 12               13               14               15               16               17               18                 19               20               21               22               23               24               25                 26               27               28      2.5 mg   See details      29      5 mg         30      2.5 mg         31      5 mg           Date Details   03/28 This INR check               How to take your warfarin dose     To take:  2.5 mg Take 0.5 of a 5 mg tablet.    To take:  5 mg Take 1 of the 5 mg tablets.           April 2017 Details    Sun Mon Tue Wed Thu Fri Sat           1      2.5 mg           2      5 mg         3      5 mg         4      2.5 mg         5      5 mg         6      2.5 mg         7      5 mg         8      2.5 mg           9      5 mg         10      5 mg         11            12                13               14               15                 16               17               18               19               20               21               22                 23               24               25               26               27               28               29                 30                      Date Details   No additional details    Date of next INR:  4/11/2017         How to take your warfarin dose     To take:  2.5 mg Take 0.5 of a 5 mg tablet.    To take:  5 mg Take 1 of the 5 mg tablets.

## 2017-03-29 ENCOUNTER — NURSING HOME VISIT (OUTPATIENT)
Dept: GERIATRICS | Facility: CLINIC | Age: 77
End: 2017-03-29
Payer: MEDICARE

## 2017-03-29 ENCOUNTER — HOSPITAL LABORATORY (OUTPATIENT)
Dept: NURSING HOME | Facility: OTHER | Age: 77
End: 2017-03-29

## 2017-03-29 VITALS
HEART RATE: 67 BPM | WEIGHT: 191.2 LBS | OXYGEN SATURATION: 98 % | DIASTOLIC BLOOD PRESSURE: 60 MMHG | BODY MASS INDEX: 30.86 KG/M2 | RESPIRATION RATE: 23 BRPM | TEMPERATURE: 97.7 F | SYSTOLIC BLOOD PRESSURE: 111 MMHG

## 2017-03-29 VITALS
OXYGEN SATURATION: 98 % | HEART RATE: 67 BPM | WEIGHT: 191.2 LBS | RESPIRATION RATE: 23 BRPM | SYSTOLIC BLOOD PRESSURE: 111 MMHG | DIASTOLIC BLOOD PRESSURE: 60 MMHG | TEMPERATURE: 97.7 F | BODY MASS INDEX: 30.86 KG/M2

## 2017-03-29 DIAGNOSIS — I48.0 PAROXYSMAL ATRIAL FIBRILLATION (H): Primary | ICD-10-CM

## 2017-03-29 DIAGNOSIS — I26.99 PULMONARY EMBOLISM, OTHER: ICD-10-CM

## 2017-03-29 DIAGNOSIS — Z79.01 LONG-TERM (CURRENT) USE OF ANTICOAGULANTS: ICD-10-CM

## 2017-03-29 DIAGNOSIS — Z51.81 ENCOUNTER FOR THERAPEUTIC DRUG MONITORING: ICD-10-CM

## 2017-03-29 LAB — INR PPP: 2.27 (ref 0.86–1.14)

## 2017-03-29 PROCEDURE — 99307 SBSQ NF CARE SF MDM 10: CPT | Performed by: NURSE PRACTITIONER

## 2017-03-29 NOTE — PROGRESS NOTES
[facility]    HPI:    Tracy Palomo is a 76 year old  (1940), who is being seen today for an episodic care visit at [facility]. Today's concern is INR/Coumadin management for A. Fib and PE    Bleeding Signs/Symptoms:  None  Thromboembolic Signs/Symptoms:  None    Medication Changes:  Yes: eliminating medications due to not needing some anymore after surgery  Dietary Changes:  Yes: discharging home today and so diet will change once home  Activity Changes: No  Bacterial/Viral Infection:  No    Missed Coumadin Doses:  None    On ASA: No    Other Concerns:  No    OBJECTIVE:    INR Today:  2.27  Current Dose:  2.5mg on Tu/Th/Sa and 5mg on other days    ASSESSMENT:    Therapeutic INR for goal of 2-3    PLAN:    New Dose: No Change      Next INR: 1 week in the clinic, she will make her own appointment        BILLY Grider CNP

## 2017-03-29 NOTE — PROGRESS NOTES
[place] [facility] DISCHARGE SUMMARY    PATIENT'S NAME: Tracy Palomo  YOB: 1940  MEDICAL RECORD NUMBER:  [id number]    PRIMARY CARE PROVIDER AND CLINIC RESPONSIBLE AFTER TRANSFER: Chad Betts [facility] [address] [facility] [address]  / [place] [address]     CODE STATUS/ADVANCE DIRECTIVES DISCUSSION:   CPR/Full code        Allergies   Allergen Reactions     No Known Allergies        TRANSFERRING PROVIDERS: BILLY Grider CNP, Meena Booth MD  DATE OF SNF ADMISSION:  March / 09 / 2017  DATE OF SNF (anticipated) DISCHARGE: March / 29 / 2017  DISCHARGE DISPOSITION: [facility] Provider   Nursing Facility: [facility] stay 3/15/17 to 3/19/17.     Condition on Discharge:  Improving.  Function:  Ambulating with walker  Cognitive Scores: BIMS 15/15 on 3/28/17    Equipment: walker    DISCHARGE DIAGNOSIS:   1. Paroxysmal atrial fibrillation (H)    2. Hypertension goal BP (blood pressure) < 140/90    3. Closed left hip fracture, with routine healing, subsequent encounter    4. Pulmonary embolism, other (H)        [facility] Course:    Hypertension goal BP (blood pressure) < 140/90  Blood pressures have ranged from 100-120's/60's.    Remains on Lisinopril 10mg daily and metoprolol 12.5mg twice a day.    Closed left hip fracture, with routine healing, subsequent encounter  Tracy fell on some ice at her home and suffered a left hip fracture.  She has done well.  Using a walker for ambulation.  No services to go home with.  Her spouse will be here so she will be here often with help of her children.  Has an appointment on 3/30/17 with the surgeon.  Aquacel has stayed intact until appointment with clinic.  Continues with the Tylenol 975mg every 8 hours.  Did not want oxycodone and so that was discontinued.  Does have cyclobenzaprine 5mg TID prn.    Paroxysmal atrial fibrillation (H)  Pulmonary embolism,  other (H)  - will be having a INR done prior to return home.  Has returned her to her previous regimen of coumadin 2.5mg on Tu/Th and Sat with 5mg on other days.        PAST MEDICAL HISTORY:  has a past medical history of Atrial fibrillation (H) (2014); Colon polyps; Diverticulosis of colon (without mention of hemorrhage); DVT (deep vein thrombosis) in pregnancy (H) (2014); Other and unspecified hyperlipidemia; PE (pulmonary embolism) (2014); and Unspecified essential hypertension.    DISCHARGE MEDICATIONS:  Current Outpatient Prescriptions   Medication Sig Dispense Refill     sennosides (SENOKOT) 8.6 MG tablet Take 1 tablet by mouth 2 times daily       acetaminophen (TYLENOL) 325 MG tablet Take 3 tablets (975 mg) by mouth every 8 hours 100 tablet      metoprolol (LOPRESSOR) 25 MG tablet Take 0.5 tablets (12.5 mg) by mouth 2 times daily       docusate sodium (COLACE) 100 MG capsule Take 1 capsule (100 mg) by mouth 2 times daily 60 capsule      cyclobenzaprine (FLEXERIL) 5 MG tablet Take 1 tablet (5 mg) by mouth 3 times daily as needed for muscle spasms 42 tablet      lisinopril (PRINIVIL,ZESTRIL) 10 MG tablet Take 1 tablet (10 mg) by mouth daily 90 tablet 3     lovastatin (MEVACOR) 40 MG tablet TAKE ONE TABLET BY MOUTH AT BEDTIME 90 tablet 3       MEDICATION CHANGES/RATIONALE:   3/20/17  OT eval and treat for ADLs, therapeutic exercise, and functional mobility  3/20/17  PT eval and treat for exercise, gait, therapeutic activity and neuro re-ed  3/20/17  INR - 1.53  Coumadin 7.5mg daily with INR on 3/22/17  3/21/17  HgB next Monday for s/p hip fracture.  Discontinue oxycodone.  Senna-S 1 tabs BID for constipation  3/22/17  INR = 3.28  Coumadin 2.5mg Tu, Th and Sa with 5mg on other days.  INR on Monday 3/27/17  3/23/17  Discontinue colace and senna  3/28/17  INR tomorrow PE    Controlled medications sent with patient: not applicable/none     ROS:    4 point ROS including Respiratory, CV, GI and , other than that  noted in the HPI,  is negative    Physical Exam:   Vitals: /60  Pulse 67  Temp 97.7  F (36.5  C)  Resp 23  Wt 191 lb 3.2 oz (86.7 kg)  SpO2 98%  BMI 30.86 kg/m2  BMI= Body mass index is 30.86 kg/(m^2).    GENERAL APPEARANCE:  Alert, in no distress, cooperative  EYES:  EOM, conjunctivae, lids, pupils and irises normal, PERRL  RESP:  respiratory effort and palpation of chest normal, lungs clear to auscultation , no respiratory distress  CV:  Palpation and auscultation of heart done , no edema, heart rate regular and strong today, slight edema in left lower leg.  ABDOMEN:  normal bowel sounds, soft, nontender, no hepatosplenomegaly or other masses, no guarding or rebound  M/S:   Gait and station abnormal using walker for safety  SKIN:  pink, warm and dry.  Aquacel in place.  PSYCH:  oriented X 3, normal insight, judgement and memory, affect and mood normal    DISCHARGE PLAN:  return home without service  Patient instructed to follow-up with:  PCP in 7-14 days       [facility] scheduled appointments:  Future Appointments  Date Time Provider Department Center   4/11/2017 9:45 AM PH ANTI COAG [facility]   4/26/2017 9:40 [facility]       MT referral needed and placed by this provider: No    Pending labs: INR tomorrow    SNF labs INR as above.  Lab Results   Component Value Date    HGB 10.4 03/27/2017       Discharge Treatments: ambulate with walker.    TOTAL DISCHARGE TIME:   Greater than 30 minutes  Electronically signed by:  BILLY Grider CNP

## 2017-03-29 NOTE — MR AVS SNAPSHOT
After Visit Summary   3/29/2017    Tracy Palomo    MRN: [id number]           Patient Information     Date Of Birth          1940        Visit Information        Provider Department      3/29/2017 11:00 AM Barbara Becerra APRN CNP [facility]        Today's Diagnoses     Paroxysmal atrial fibrillation (H)    -  1    Pulmonary embolism, other (H)        Encounter for therapeutic drug monitoring        Long-term (current) use of anticoagulants [Z79.01]           Follow-ups after your visit        Your next 10 appointments already scheduled     Apr 11, 2017  9:45 AM CDT   [facility]   [facility]    [address] [place] [address] [phone]            Apr 26, 2017  9:40 AM CDT   Return Visit with Kaleb Pang,    [facility]    [address] [place] [address] [id number] [phone]              Who to contact     If you have questions or need follow up information about today's clinic visit or your schedule please contact GER[facility]ATR[facility] TR[facility]ITIONAL [facility] directly at [phone].  Normal or non-critical lab and imaging results will be communicated to you by MyChart, letter or phone within 4 business days after the clinic has received the results. If you do not hear from us within 7 days, please contact the clinic through MyChart or phone. If you have a critical or abnormal lab result, we will notify you by phone as soon as possible.  Submit refill requests through [employer] or call your pharmacy and they will forward the refill request to us. Please allow 3 business days for your refill to be completed.          Additional Information About Your Visit        Care EveryWhere ID     This is your Care EveryWhere ID. This could be used by other organizations to access your [place] medical records  [id number]        Your Vitals  Were     Pulse Temperature Respirations Pulse Oximetry BMI (Body Mass Index)       67 97.7  F (36.5  C) 23 98% 30.86 kg/m2        Blood Pressure from Last 3 Encounters:   03/29/17 111/60   03/28/17 111/60   03/23/17 115/54    Weight from Last 3 Encounters:   03/30/17 191 lb (86.6 kg)   03/29/17 191 lb 3.2 oz (86.7 kg)   03/28/17 191 lb 3.2 oz (86.7 kg)              Today, you had the following     No orders found for display       Primary Care Provider Office Phone # Fax #    Chad Betts [phone]       [facility] [address] [place] [address]        Thank you!     Thank you for choosing G[facility]AT[facility]  for your care. Our goal is always to provide you with excellent care. Hearing back from our patients is one way we can continue to improve our services. Please take a few minutes to complete the written survey that you may receive in the mail after your visit with us. Thank you!             Your Updated Medication List - Protect others around you: Learn how to safely use, store and throw away your medicines at www.disposemymeds.org.          This list is accurate as of: 3/29/17 11:59 PM.  Always use your most recent med list.                   Brand Name Dispense Instructions for use    acetaminophen 325 MG tablet    TYLENOL    100 tablet    Take 3 tablets (975 mg) by mouth every 8 hours       cyclobenzaprine 5 MG tablet    FLEXERIL    42 tablet    Take 1 tablet (5 mg) by mouth 3 times daily as needed for muscle spasms       docusate sodium 100 MG capsule    COLACE    60 capsule    Take 1 capsule (100 mg) by mouth 2 times daily       lisinopril 10 MG tablet    PRINIVIL/ZESTRIL    90 tablet    Take 1 tablet (10 mg) by mouth daily       lovastatin 40 MG tablet    MEVACOR    90 tablet    TAKE ONE TABLET BY MOUTH AT BEDTIME       metoprolol 25 MG tablet    LOPRESSOR     Take 0.5 tablets (12.5 mg) by mouth 2 times daily       sennosides 8.6 MG tablet    SENOKOT      Take 1 tablet by mouth 2 times daily

## 2017-03-30 ENCOUNTER — RADIANT APPOINTMENT (OUTPATIENT)
Dept: GENERAL RADIOLOGY | Facility: CLINIC | Age: 77
End: 2017-03-30
Attending: ORTHOPAEDIC SURGERY
Payer: MEDICARE

## 2017-03-30 ENCOUNTER — OFFICE VISIT (OUTPATIENT)
Dept: ORTHOPEDICS | Facility: CLINIC | Age: 77
End: 2017-03-30
Payer: MEDICARE

## 2017-03-30 VITALS — BODY MASS INDEX: 30.7 KG/M2 | WEIGHT: 191 LBS | HEIGHT: 66 IN | TEMPERATURE: 97.3 F

## 2017-03-30 DIAGNOSIS — S72.002D CLOSED LEFT HIP FRACTURE, WITH ROUTINE HEALING, SUBSEQUENT ENCOUNTER: Primary | ICD-10-CM

## 2017-03-30 DIAGNOSIS — S72.002A CLOSED LEFT HIP FRACTURE (H): ICD-10-CM

## 2017-03-30 PROCEDURE — 99024 POSTOP FOLLOW-UP VISIT: CPT | Performed by: ORTHOPAEDIC SURGERY

## 2017-03-30 PROCEDURE — 73502 X-RAY EXAM HIP UNI 2-3 VIEWS: CPT | Mod: TC

## 2017-03-30 ASSESSMENT — PAIN SCALES - GENERAL: PAINLEVEL: NO PAIN (0)

## 2017-03-30 NOTE — MR AVS SNAPSHOT
After Visit Summary   3/30/2017    Tracy Palomo    MRN: [id number]           Patient Information     Date Of Birth          1940        Visit Information        Provider Department      3/30/2017 10:10 AM Kaleb Pang,  [facility]        Today's Diagnoses     Closed left hip fracture, with routine healing, subsequent encounter    -  1       Follow-ups after your visit        Additional Services     PHYSICAL THERAPY REFERRAL       *This therapy referral will be filtered to a centralized scheduling office at [facility] and the patient will receive a call to schedule an appointment at a [place] location most convenient for them. *     [facility] provides Physical Therapy evaluation and treatment and many specialty services across the [place] system.  If requesting a specialty program, please choose from the list below.    If you have not heard from the scheduling office within 2 business days, please call [phone] for all locations, with the exception of [place], please call [phone].  Treatment: Evaluation & Treatment  Special Instructions/Modalities: Core,Hamstring, Quad,Hip flexor.  Home program  Dos: 3/16/17- total hip  Start with flexibility and work towards endurance    Special Programs:     Please be aware that coverage of these services is subject to the terms and limitations of your health insurance plan.  Call member services at your health plan with any benefit or coverage questions.      **Note to Provider:  If you are referring outside of [place] for the therapy appointment, please list the name of the location in the  special instructions  above, print the referral and give to the patient to schedule the appointment.                  Your next 10 appointments already scheduled     Apr 11, 2017  9:45 AM CDT   Anticoagulation Visit with PH ANTI COAG   [facility] ([facility]  [facility])    [address] [place] [address] [phone]            Apr 26, 2017  9:40 AM CDT   Return Visit with Kaleb Pang DO   [facility]    [address] [place] [phone]              Who to contact     If you have questions or need follow up information about today's clinic visit or your schedule please contact [facility] directly at [phone].  Normal or non-critical lab and imaging results will be communicated to you by MyChart, letter or phone within 4 business days after the clinic has received the results. If you do not hear from us within 7 days, please contact the clinic through MyChart or phone. If you have a critical or abnormal lab result, we will notify you by phone as soon as possible.  Submit refill requests through [employer] or call your pharmacy and they will forward the refill request to us. Please allow 3 business days for your refill to be completed.          Additional Information About Your Visit        Care EveryWhere ID     This is your Care EveryWhere ID. This could be used by other organizations to access your [place] medical records  [id number]        Your Vitals Were     Temperature Height BMI (Body Mass Index)             97.3  F (36.3  C) (Temporal) 1.676 m (5' 6\") 30.83 kg/m2          Blood Pressure from Last 3 Encounters:   03/29/17 111/60   03/28/17 111/60   03/23/17 115/54    Weight from Last 3 Encounters:   03/30/17 86.6 kg (191 lb)   03/29/17 86.7 kg (191 lb 3.2 oz)   03/28/17 86.7 kg (191 lb 3.2 oz)              We Performed the Following     PHYSICAL THERAPY REFERRAL        Primary Care Provider Office Phone # Fax #    Chad Betts [phone]       [address] [place] [address]        Thank you!     Thank you for choosing [facility]  for your care. Our goal is always to provide you " with excellent care. Hearing back from our patients is one way we can continue to improve our services. Please take a few minutes to complete the written survey that you may receive in the mail after your visit with us. Thank you!             Your Updated Medication List - Protect others around you: Learn how to safely use, store and throw away your medicines at www.disposemymeds.org.          This list is accurate as of: 3/30/17 10:40 AM.  Always use your most recent med list.                   Brand Name Dispense Instructions for use    acetaminophen 325 MG tablet    TYLENOL    100 tablet    Take 3 tablets (975 mg) by mouth every 8 hours       cyclobenzaprine 5 MG tablet    FLEXERIL    42 tablet    Take 1 tablet (5 mg) by mouth 3 times daily as needed for muscle spasms       docusate sodium 100 MG capsule    COLACE    60 capsule    Take 1 capsule (100 mg) by mouth 2 times daily       lisinopril 10 MG tablet    PRINIVIL/ZESTRIL    90 tablet    Take 1 tablet (10 mg) by mouth daily       lovastatin 40 MG tablet    MEVACOR    90 tablet    TAKE ONE TABLET BY MOUTH AT BEDTIME       metoprolol 25 MG tablet    LOPRESSOR     Take 0.5 tablets (12.5 mg) by mouth 2 times daily       sennosides 8.6 MG tablet    SENOKOT     Take 1 tablet by mouth 2 times daily

## 2017-03-30 NOTE — PROGRESS NOTES
Appropriate assistive devices provided during their visit. yes (Yes, No, N/A) cane (list device)    Exam table and/or cart  placed in the lowest position. yes (Yes, No, N/A)    Brakes on tables/carts/wheelchairs used at all times. yes (Yes, No, N/A)    Non slip footwear applied. n/a (Yes, No, NA)    Patient was accompanied by staff throughout visit. yes (Yes, No, N/A)    Equipment safety straps used. n/a (Yes, No, N/A)    Assist with toileting. n/a (Yes, No, N/A)

## 2017-03-30 NOTE — LETTER
3/30/2017       RE: Tracy Palomo  [address] [place] [id number]           Dear Colleague,    Thank you for referring your patient, Tracy Palomo, to the [facility]. Please see a copy of my visit note below.    Orthopedic Clinic Post-Operative Note    CHIEF COMPLAINT:   Chief Complaint   Patient presents with     Surgical Followup     DOS: 3/16/17~Left hip hemiarthroplasty~14 days postop       HISTORY OF PRESENT ILLNESS  Location: left hip   Rating of Pain:  mild  Pain is better with:  Rest  Pain improving overall: Yes  Associated Features: None  Patient concerns: No    Patient's past medical, surgical, social and family histories reviewed.     Past Medical History:   Diagnosis Date     Atrial fibrillation (H) 2014    related to colon surgery     Colon polyps      Diverticulosis of colon (without mention of hemorrhage)     Diverticulosis     DVT (deep vein thrombosis) in pregnancy (H) 2014    after colon surgery     Other and unspecified hyperlipidemia      PE (pulmonary embolism) 2014    related to colon surgery     Unspecified essential hypertension        Past Surgical History:   Procedure Laterality Date     COLONOSCOPY  09, 10, 12    [facility]     FLEXIBLE SIGMOIDOSCOPY  09, 11     LAPAROTOMY EXPLORATORY N/A 10/20/2014    Procedure: LAPAROTOMY EXPLORATORY;  Surgeon: Miguel Oleary MD;  Location: [facility] OR     LAPAROTOMY, LYSIS ADHESIONS, COMBINED N/A 10/20/2014    Procedure: COMBINED LAPAROTOMY, LYSIS ADHESIONS;  Surgeon: Miguel Oleary MD;  Location: [facility] OR     OPEN REDUCTION INTERNAL FIXATION HIP BIPOLAR Left 3/16/2017    Procedure: OPEN REDUCTION INTERNAL FIXATION HIP BIPOLAR;  Surgeon: Kaleb Pang DO;  Location: [facility] OR     RESECT SMALL BOWEL WITHOUT OSTOMY N/A 10/20/2014    Procedure: RESECT SMALL BOWEL WITHOUT OSTOMY;  Surgeon: Miguel Oleary MD;  Location: [facility] OR       Medications:    Current Outpatient Prescriptions on File Prior to  "Visit:  acetaminophen (TYLENOL) 325 MG tablet Take 3 tablets (975 mg) by mouth every 8 hours   metoprolol (LOPRESSOR) 25 MG tablet Take 0.5 tablets (12.5 mg) by mouth 2 times daily   lisinopril (PRINIVIL,ZESTRIL) 10 MG tablet Take 1 tablet (10 mg) by mouth daily   lovastatin (MEVACOR) 40 MG tablet TAKE ONE TABLET BY MOUTH AT BEDTIME   sennosides (SENOKOT) 8.6 MG tablet Take 1 tablet by mouth 2 times daily   docusate sodium (COLACE) 100 MG capsule Take 1 capsule (100 mg) by mouth 2 times daily   cyclobenzaprine (FLEXERIL) 5 MG tablet Take 1 tablet (5 mg) by mouth 3 times daily as needed for muscle spasms     No current facility-administered medications on file prior to visit.     Allergies   Allergen Reactions     No Known Allergies        Social History     Occupational History     Not on file.     Social History Main Topics     Smoking status: Never Smoker     Smokeless tobacco: Never Used     Alcohol use No     Drug use: No     Sexual activity: Yes     Partners: Male       Family History   Problem Relation Age of Onset     Blood Disease No family hx of      blood clots       REVIEW OF SYSTEMS  General: negative for, night sweats, dizziness, fatigue  Resp: No shortness of breath and no cough  CV: negative for chest pain, syncope or near-syncope  GI: negative for nausea, vomiting and diarrhea  : negative for dysuria and hematuria  Musculoskeletal: as above  Neurologic: negative for syncope   Hematologic: negative for bleeding disorder    Physical Exam:  Vitals: Temp 97.3  F (36.3  C) (Temporal)  Ht 5' 6\" (1.676 m)  Wt 191 lb (86.6 kg)  BMI 30.83 kg/m2  BMI= Body mass index is 30.83 kg/(m^2).  Constitutional: healthy, alert and no acute distress   Psychiatric: mentation appears normal and affect normal/bright  NEURO: no focal deficits  SKIN: .healing well, well approximated skin edges, without signs of infection including no erythema, incision breakdown or purlent drainage, some ecchymosis into the thigh. Some " appropriate incisional site swelling. No hematoma.  JOINT/EXTREMITIES: Distal neurovascular intact. No significant peripheral edema.  GAIT: antalgic and with use of a cane    Diagnostic Modalities:  left hip X-ray: Hemiarthroplasty in place. No evidence of position changes. Press-fit.  Independent visualization of the images was performed.      Impression:   Chief Complaint   Patient presents with     Surgical Followup     DOS: 3/16/17~Left hip hemiarthroplasty~14 days postop    doing exceedingly well. Ambulating with a cane.    Plan:   Activity: Posterior precautions for an additional 4 weeks.  Staples/Sutures out: Yes.  Pain controlled: Yes. Continue to use: Nothing  Immobilzation: No  Physical Therapy: passive range of motion, active range of motion, endurance  Rest, Ice, Elevation  Return to clinic 4, week(s), or sooner as needed for changes.  Currently taking Coumadin.  Re-x-ray on return: No    Vitaly Pang D.O.    Again, thank you for allowing me to participate in the care of your patient.        Sincerely,              Kaleb Pang, DO

## 2017-03-30 NOTE — PROGRESS NOTES
Orthopedic Clinic Post-Operative Note    CHIEF COMPLAINT:   Chief Complaint   Patient presents with     Surgical Followup     DOS: 3/16/17~Left hip hemiarthroplasty~14 days postop       HISTORY OF PRESENT ILLNESS  Location: left hip   Rating of Pain:  mild  Pain is better with:  Rest  Pain improving overall: Yes  Associated Features: None  Patient concerns: No    Patient's past medical, surgical, social and family histories reviewed.     Past Medical History:   Diagnosis Date     Atrial fibrillation (H) 2014    related to colon surgery     Colon polyps      Diverticulosis of colon (without mention of hemorrhage)     Diverticulosis     DVT (deep vein thrombosis) in pregnancy (H) 2014    after colon surgery     Other and unspecified hyperlipidemia      PE (pulmonary embolism) 2014    related to colon surgery     Unspecified essential hypertension        Past Surgical History:   Procedure Laterality Date     COLONOSCOPY  09, 10, 12    [facility]     FLEXIBLE SIGMOIDOSCOPY  09, 11     LAPAROTOMY EXPLORATORY N/A 10/20/2014    Procedure: LAPAROTOMY EXPLORATORY;  Surgeon: Miguel Oleary MD;  Location: [facility] OR     LAPAROTOMY, LYSIS ADHESIONS, COMBINED N/A 10/20/2014    Procedure: COMBINED LAPAROTOMY, LYSIS ADHESIONS;  Surgeon: Miguel Oleary MD;  Location: [facility] OR     OPEN REDUCTION INTERNAL FIXATION HIP BIPOLAR Left 3/16/2017    Procedure: OPEN REDUCTION INTERNAL FIXATION HIP BIPOLAR;  Surgeon: Kaleb Pang DO;  Location: [facility]     RESECT SMALL BOWEL WITHOUT OSTOMY N/A 10/20/2014    Procedure: RESECT SMALL BOWEL WITHOUT OSTOMY;  Surgeon: Miguel Oleary MD;  Location: [facility] OR       Medications:    Current Outpatient Prescriptions on File Prior to Visit:  acetaminophen (TYLENOL) 325 MG tablet Take 3 tablets (975 mg) by mouth every 8 hours   metoprolol (LOPRESSOR) 25 MG tablet Take 0.5 tablets (12.5 mg) by mouth 2 times daily   lisinopril (PRINIVIL,ZESTRIL) 10 MG tablet Take 1 tablet (10  "mg) by mouth daily   lovastatin (MEVACOR) 40 MG tablet TAKE ONE TABLET BY MOUTH AT BEDTIME   sennosides (SENOKOT) 8.6 MG tablet Take 1 tablet by mouth 2 times daily   docusate sodium (COLACE) 100 MG capsule Take 1 capsule (100 mg) by mouth 2 times daily   cyclobenzaprine (FLEXERIL) 5 MG tablet Take 1 tablet (5 mg) by mouth 3 times daily as needed for muscle spasms     No current facility-administered medications on file prior to visit.     Allergies   Allergen Reactions     No Known Allergies        Social History     Occupational History     Not on file.     Social History Main Topics     Smoking status: Never Smoker     Smokeless tobacco: Never Used     Alcohol use No     Drug use: No     Sexual activity: Yes     Partners: Male       Family History   Problem Relation Age of Onset     Blood Disease No family hx of      blood clots       REVIEW OF SYSTEMS  General: negative for, night sweats, dizziness, fatigue  Resp: No shortness of breath and no cough  CV: negative for chest pain, syncope or near-syncope  GI: negative for nausea, vomiting and diarrhea  : negative for dysuria and hematuria  Musculoskeletal: as above  Neurologic: negative for syncope   Hematologic: negative for bleeding disorder    Physical Exam:  Vitals: Temp 97.3  F (36.3  C) (Temporal)  Ht 5' 6\" (1.676 m)  Wt 191 lb (86.6 kg)  BMI 30.83 kg/m2  BMI= Body mass index is 30.83 kg/(m^2).  Constitutional: healthy, alert and no acute distress   Psychiatric: mentation appears normal and affect normal/bright  NEURO: no focal deficits  SKIN: .healing well, well approximated skin edges, without signs of infection including no erythema, incision breakdown or purlent drainage, some ecchymosis into the thigh. Some appropriate incisional site swelling. No hematoma.  JOINT/EXTREMITIES: Distal neurovascular intact. No significant peripheral edema.  GAIT: antalgic and with use of a cane    Diagnostic Modalities:  left hip X-ray: Hemiarthroplasty in place. No " evidence of position changes. Press-fit.  Independent visualization of the images was performed.      Impression:   Chief Complaint   Patient presents with     Surgical Followup     DOS: 3/16/17~Left hip hemiarthroplasty~14 days postop    doing exceedingly well. Ambulating with a cane.    Plan:   Activity: Posterior precautions for an additional 4 weeks.  Staples/Sutures out: Yes.  Pain controlled: Yes. Continue to use: Nothing  Immobilzation: No  Physical Therapy: passive range of motion, active range of motion, endurance  Rest, Ice, Elevation  Return to clinic 4, week(s), or sooner as needed for changes.  Currently taking Coumadin.  Re-x-ray on return: No    Vitaly Pang D.O.

## 2017-03-30 NOTE — NURSING NOTE
"Chief Complaint   Patient presents with     Surgical Followup     DOS: 3/16/17~Left hip hemiarthroplasty~14 days postop       Initial Temp 97.3  F (36.3  C) (Temporal)  Ht 1.676 m (5' 6\")  BMI 30.86 kg/m2 Estimated body mass index is 30.83 kg/(m^2) as calculated from the following:    Height as of this encounter: 1.676 m (5' 6\").    Weight as of this encounter: 86.6 kg (191 lb).  Medication Reconciliation: complete   Owen/RAMIRO     "

## 2017-04-05 ENCOUNTER — HOSPITAL ENCOUNTER (OUTPATIENT)
Dept: PHYSICAL THERAPY | Facility: CLINIC | Age: 77
Setting detail: THERAPIES SERIES
End: 2017-04-05
Attending: ORTHOPAEDIC SURGERY
Payer: MEDICARE

## 2017-04-05 PROCEDURE — 97110 THERAPEUTIC EXERCISES: CPT | Mod: GP | Performed by: PHYSICAL THERAPIST

## 2017-04-05 PROCEDURE — G8979 MOBILITY GOAL STATUS: HCPCS | Mod: GP,CH | Performed by: PHYSICAL THERAPIST

## 2017-04-05 PROCEDURE — G8978 MOBILITY CURRENT STATUS: HCPCS | Mod: GP,CK | Performed by: PHYSICAL THERAPIST

## 2017-04-05 PROCEDURE — 40000718 ZZHC STATISTIC PT DEPARTMENT ORTHO VISIT: Performed by: PHYSICAL THERAPIST

## 2017-04-05 PROCEDURE — 97161 PT EVAL LOW COMPLEX 20 MIN: CPT | Mod: GP | Performed by: PHYSICAL THERAPIST

## 2017-04-05 NOTE — PROGRESS NOTES
04/05/17 1000   General Information   Type of Visit Initial OP Ortho PT Evaluation   Start of Care Date 04/05/17   Referring Physician Bird   Patient/Family Goals Statement Pt wants to be able to walk approximately an hour, 3x per day.    Orders Evaluate and Treat   Orders Comment lance the core, quad   Date of Order 03/30/17   Insurance Type Medicare   Medical Diagnosis Post op L Hip Arthroplasty   Surgical/Medical history reviewed Yes   Precautions/Limitations no known precautions/limitations   Weight-Bearing Status - LLE weight-bearing as tolerated   Body Part(s)   Body Part(s) Hip   Presentation and Etiology   Pertinent history of current problem (include personal factors and/or comorbidities that impact the POC) Pt fell on ice, broke her hip on 3/15/2017. Hip surgery, L hemiarthroplasty, 3/16/2017. Pt states she enjoys gardening, ride her bike, walk, read. Pt wants to work on getting back to riding your bike. Pt states the most difficulty thing for her to do is roll over in bed without fearing of hurting her hip. Pt states she really has not been having any pain, she says she one time tried to roll over onto her right side, felt an increase in pain in the left hip with the transfer. Pt states her  is handicapped but not planning to come home from the SNF until he can be more mobile with transfers.   Impairments D. Decreased ROM;E. Decreased flexibility;H. Impaired gait;F. Decreased strength and endurance   Functional Limitations perform activities of daily living;perform desired leisure / sports activities   Symptom Location L lateral hip   How/Where did it occur With a fall   Onset date of current episode/exacerbation 03/16/17   Chronicity New   Pain rating (0-10 point scale) Denies pain   Pain quality H. Other  (none)   Prior Level of Function   Prior Level of Function-Mobility Pt could previously walk independently, without an AD and perform bicycling for exercise. Pt could also perform  cleaning, cooking and gardening without being limited due to mobility.   Current Level of Function   Patient role/employment history F. Retired   Living environment House/townhome   Home/community accessibility no stairs, rambler home, handicap accessible bathroom, walk in shower.    Current equipment-Gait/Locomotion Standard cane   Fall Risk Screen   Fall screen completed by PT   Per patient - Fall 2 or more times in past year? No   Per patient - Fall with injury in past year? Yes   Is patient a fall risk? No   Fall screen comments Pt fell on ice, breaking her hip with subsequent surgery on 3/16/2017   Hip Objective Findings   Side (if bilateral, select both right and left) Left   Integumentary  observed L hip surgical incision, normal wound healing observed with normal skin coloring, steristrips still in place. wound is closed.   Posture forward head posture, kyphosis   Gait/Locomotion Pt amb w/ SPC in RUE, pt's elbow is bent to 90*. Pt amb with decreased foot clearance on LLE, decreased terminal knee extension   Balance/Proprioception (Single Leg Stance) 6s on the right, 1s on the left with RUE support.   Hip ROM Comments Pt could tolerate 90* hip flexion ROM (within precautions) and maintain neutral position when supine to perform exercises.   Hip Special Tests Comments special tests not indicated secondary to pt recent post op left hemiarthroplasty with precautions of ROM    Neurological Testing Comments not indicated, pt states no numbness/ tingling into the feet or legs   Accessory Motion/Joint Mobility NT secondary to recent hemiarthroplasty.   Left Hip Flexion Strength L 4-/5, R 4+/5   Left Knee Flexion Strength L 4/5, R 4+/5   Left Knee Extension Strength L 4/5, R 4+/5   Planned Therapy Interventions   Planned Therapy Interventions IADL retraining;balance training;gait training;manual therapy;motor coordination training;ROM;strengthening;stretching;joint mobilization   Clinical Impression   Criteria for  Skilled Therapeutic Interventions Met yes, treatment indicated   PT Diagnosis Decreased Strength and endurance secondary to recent L Hemiarthroplasty   Influenced by the following impairments decreased strength   Functional limitations due to impairments decreased independence with gait, decreased participation with cleaning, cooking   Clinical Presentation Stable/Uncomplicated   Clinical Presentation Rationale Pt presents with stable comorbidities,few participation restrictions and stable characteristics. Pt is motivated to participate with PT.   Clinical Decision Making (Complexity) Low complexity   Therapy Frequency 1 time/week   Predicted Duration of Therapy Intervention (days/wks) 8 weeks   Risk & Benefits of therapy have been explained Yes   Patient, Family & other staff in agreement with plan of care Yes   ORTHO GOALS   PT Ortho Eval Goals 1;2;3;4   Ortho Goal 1   Goal Identifier Ambulation   Goal Description Pt will be able to walk >1 hour with no AD in order to perform full participation with functional mobility.   Target Date 05/03/17   Ortho Goal 2   Goal Identifier Bike    Goal Description Pt will be able to use a bicycle in 30minutes-1 hour outside in order to participate with previous level of activity and exercise.   Target Date 05/31/17   Ortho Goal 3   Goal Identifier Gardening   Goal Description Pt will be able to lift atleast 10# with proper body mechanics in order to participate with gardening and home tasks.   Target Date 05/31/17   Ortho Goal 4   Goal Identifier HEP   Goal Description Pt will be able to demonstrate proper form and duration of strengthening exercises in order to encourage self managment of symptoms.   Target Date 05/10/17   Total Evaluation Time   Total Evaluation Time 25   Therapy Certification   Certification date from 04/05/17   Certification date to 05/31/17   Medical Diagnosis Post op L Hip Arthroplasty

## 2017-04-05 NOTE — PROGRESS NOTES
[facility]          OUTPATIENT PHYSICAL THERAPY ORTHOPEDIC EVALUATION  PLAN OF TREATMENT FOR OUTPATIENT REHABILITATION  (COMPLETE FOR INITIAL CLAIMS ONLY)  Patient's Last Name, First Name, M.I.  YOB: 1940  Tracy Palomo    Provider s Name:  [facility]   Medical Record No.  [id number]   Start of Care Date:  04/05/17   Onset Date:  03/16/17   Type:     _X__PT   ___OT   ___SLP Medical Diagnosis:  Post op L Hip Arthroplasty     PT Diagnosis:  Decreased Strength and endurance secondary to recent L Hemiarthroplasty   Visits from SOC:  1      _________________________________________________________________________________  Plan of Treatment/Functional Goals:  IADL retraining, balance training, gait training, manual therapy, motor coordination training, ROM, strengthening, stretching, joint mobilization           Goals  Goal Identifier: Ambulation  Goal Description: Pt will be able to walk >1 hour with no AD in order to perform full participation with functional mobility.  Target Date: 05/03/17    Goal Identifier: Bike   Goal Description: Pt will be able to use a bicycle in 30minutes-1 hour outside in order to participate with previous level of activity and exercise.  Target Date: 05/31/17    Goal Identifier: Gardening  Goal Description: Pt will be able to lift atleast 10# with proper body mechanics in order to participate with gardening and home tasks.  Target Date: 05/31/17    Goal Identifier: HEP  Goal Description: Pt will be able to demonstrate proper form and duration of strengthening exercises in order to encourage self managment of symptoms.  Target Date: 05/10/17             Therapy Frequency:  1 time/week  Predicted Duration of Therapy Intervention:  8 weeks    Aida Valdez PT                 I CERTIFY THE NEED FOR THESE SERVICES FURNISHED UNDER        THIS PLAN OF TREATMENT AND WHILE UNDER MY CARE     (Physician co-signature of this document  indicates review and certification of the therapy plan).                       Certification Date From:  04/05/17   Certification Date To:  05/31/17    Referring Provider:  Bird    Initial Assessment        See Epic Evaluation Start of Care Date: 04/05/17

## 2017-04-11 ENCOUNTER — HOSPITAL ENCOUNTER (OUTPATIENT)
Dept: PHYSICAL THERAPY | Facility: CLINIC | Age: 77
Setting detail: THERAPIES SERIES
End: 2017-04-11
Attending: ORTHOPAEDIC SURGERY
Payer: MEDICARE

## 2017-04-11 ENCOUNTER — ANTICOAGULATION THERAPY VISIT (OUTPATIENT)
Dept: ANTICOAGULATION | Facility: CLINIC | Age: 77
End: 2017-04-11
Payer: MEDICARE

## 2017-04-11 VITALS — HEART RATE: 56 BPM | DIASTOLIC BLOOD PRESSURE: 62 MMHG | SYSTOLIC BLOOD PRESSURE: 105 MMHG

## 2017-04-11 DIAGNOSIS — I26.99 PULMONARY EMBOLISM, OTHER: ICD-10-CM

## 2017-04-11 DIAGNOSIS — Z79.01 LONG-TERM (CURRENT) USE OF ANTICOAGULANTS: ICD-10-CM

## 2017-04-11 LAB — INR POINT OF CARE: 2.2 (ref 0.86–1.14)

## 2017-04-11 PROCEDURE — 36416 COLLJ CAPILLARY BLOOD SPEC: CPT

## 2017-04-11 PROCEDURE — 85610 PROTHROMBIN TIME: CPT | Mod: QW

## 2017-04-11 PROCEDURE — 99207 ZZC NO CHARGE NURSE ONLY: CPT

## 2017-04-11 PROCEDURE — 97110 THERAPEUTIC EXERCISES: CPT | Mod: GP

## 2017-04-11 PROCEDURE — 40000718 ZZHC STATISTIC PT DEPARTMENT ORTHO VISIT

## 2017-04-11 NOTE — PROGRESS NOTES
ANTICOAGULATION FOLLOW-UP CLINIC VISIT    Patient Name:  Tracy Palomo  Date:  4/11/2017  Contact Type:  Face to Face    SUBJECTIVE:     Patient Findings     Positives No Problem Findings           OBJECTIVE    INR Protime   Date Value Ref Range Status   04/11/2017 2.2 (A) 0.86 - 1.14 Final       ASSESSMENT / PLAN  INR assessment THER    Recheck INR In: 3 WEEKS    INR Location Clinic      Anticoagulation Summary as of 4/11/2017     INR goal 2.0-3.0   Today's INR 2.2   Maintenance plan 2.5 mg (5 mg x 0.5) on Tue, Thu, Sat; 5 mg (5 mg x 1) all other days   Full instructions 2.5 mg on Tue, Thu, Sat; 5 mg all other days   Weekly total 27.5 mg   No change documented Johanna Marshall RN   Plan last modified Johanna Marshall RN (3/28/2017)   Next INR check 5/2/2017   Target end date     Indications   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]  Long-term (current) use of anticoagulants [Z79.01] [Z79.01]         Anticoagulation Episode Summary     INR check location     Preferred lab     Send INR reminders to [facility] POOL    Comments 5 mg tablets       Anticoagulation Care Providers     Provider Role Specialty Phone number    Gerard Medrano MD [facility] [phone]            See the Encounter Report to view Anticoagulation Flowsheet and Dosing Calendar (Go to Encounters tab in chart review, and find the Anticoagulation Therapy Visit)    Dosage adjustment made based on physician directed care plan.    Johanna Marshall RN

## 2017-04-11 NOTE — MR AVS SNAPSHOT
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]  Long-term (current) use of anticoagulants [Z79.01] [Z79.01]         Your next Anticoagulation Clinic appointment(s)     May 02, 2017  9:15 AM CDT   Anticoagulation Visit with ERICK ANTI COAPATRICK   [facility] ([facility])    [address] [place] [id number]   [phone]              Contact Numbers     Clinic Number:         April 2017 Details    Sun Mon Tue Wed Thu Fri Sat           1                 2               3               4               5               6               7               8                 9               10               11      2.5 mg   See details      12      5 mg         13      2.5 mg         14      5 mg         15      2.5 mg           16      5 mg         17      5 mg         18      2.5 mg         19      5 mg         20      2.5 mg         21      5 mg         22      2.5 mg           23      5 mg         24      5 mg         25      2.5 mg         26      5 mg         27      2.5 mg         28      5 mg         29      2.5 mg           30      5 mg                Date Details   04/11 This INR check               How to take your warfarin dose     To take:  2.5 mg Take 0.5 of a 5 mg tablet.    To take:  5 mg Take 1 of the 5 mg tablets.           May 2017 Details    Sun Mon Tue Wed Thu Fri Sat      1      5 mg         2            3               4               5               6                 7               8               9               10               11                12               13                 14               15               16               17               18               19               20                 21               22               23               24               25               26               27                 28               29               30               31                   Date Details   No additional details    Date of next INR:  5/2/2017         How to take your warfarin dose     To take:  2.5 mg Take 0.5 of a 5 mg tablet.    To take:  5 mg Take 1 of the 5 mg tablets.

## 2017-04-12 ENCOUNTER — CARE COORDINATION (OUTPATIENT)
Dept: CARE COORDINATION | Facility: CLINIC | Age: 77
End: 2017-04-12

## 2017-04-14 NOTE — PROGRESS NOTES
Clinic Care Coordination Contact  Care Team Conversations    Received late notice pt was discharged from TCU after recent hip fracture related to fall. Pt was sent home with no HC.  In reviewing Chart pt has already been in to see her Orthopedic doctor and being managed by Coumadin clinic.   No outreach for TCU follow up at this time.         Gemma Tarcy RN,BSN  Clinical Care Coordinator    [facility] [phone]  [facility] [phone]

## 2017-04-18 ENCOUNTER — HOSPITAL ENCOUNTER (OUTPATIENT)
Dept: PHYSICAL THERAPY | Facility: CLINIC | Age: 77
Setting detail: THERAPIES SERIES
End: 2017-04-18
Attending: ORTHOPAEDIC SURGERY
Payer: MEDICARE

## 2017-04-18 PROCEDURE — 97110 THERAPEUTIC EXERCISES: CPT | Mod: GP

## 2017-04-18 PROCEDURE — 40000718 ZZHC STATISTIC PT DEPARTMENT ORTHO VISIT

## 2017-04-25 ENCOUNTER — HOSPITAL ENCOUNTER (OUTPATIENT)
Dept: PHYSICAL THERAPY | Facility: CLINIC | Age: 77
Setting detail: THERAPIES SERIES
End: 2017-04-25
Attending: ORTHOPAEDIC SURGERY
Payer: MEDICARE

## 2017-04-25 PROCEDURE — 40000718 ZZHC STATISTIC PT DEPARTMENT ORTHO VISIT

## 2017-04-25 PROCEDURE — 97110 THERAPEUTIC EXERCISES: CPT | Mod: GP

## 2017-04-25 PROCEDURE — 97112 NEUROMUSCULAR REEDUCATION: CPT | Mod: GP

## 2017-04-26 ENCOUNTER — TELEPHONE (OUTPATIENT)
Dept: INTERNAL MEDICINE | Facility: CLINIC | Age: 77
End: 2017-04-26

## 2017-04-26 ENCOUNTER — OFFICE VISIT (OUTPATIENT)
Dept: INTERNAL MEDICINE | Facility: CLINIC | Age: 77
End: 2017-04-26
Payer: MEDICARE

## 2017-04-26 ENCOUNTER — OFFICE VISIT (OUTPATIENT)
Dept: ORTHOPEDICS | Facility: CLINIC | Age: 77
End: 2017-04-26
Payer: MEDICARE

## 2017-04-26 VITALS
TEMPERATURE: 97 F | BODY MASS INDEX: 30.34 KG/M2 | SYSTOLIC BLOOD PRESSURE: 130 MMHG | DIASTOLIC BLOOD PRESSURE: 80 MMHG | WEIGHT: 188 LBS | HEART RATE: 78 BPM | OXYGEN SATURATION: 98 %

## 2017-04-26 VITALS — BODY MASS INDEX: 30.22 KG/M2 | HEIGHT: 66 IN | WEIGHT: 188 LBS | TEMPERATURE: 96.9 F

## 2017-04-26 DIAGNOSIS — S72.002D CLOSED LEFT HIP FRACTURE, WITH ROUTINE HEALING, SUBSEQUENT ENCOUNTER: Primary | ICD-10-CM

## 2017-04-26 DIAGNOSIS — Z79.01 LONG-TERM (CURRENT) USE OF ANTICOAGULANTS: ICD-10-CM

## 2017-04-26 DIAGNOSIS — D64.9 ANEMIA, UNSPECIFIED TYPE: ICD-10-CM

## 2017-04-26 DIAGNOSIS — I48.0 PAROXYSMAL ATRIAL FIBRILLATION (H): Primary | ICD-10-CM

## 2017-04-26 LAB
ERYTHROCYTE [DISTWIDTH] IN BLOOD BY AUTOMATED COUNT: 14.1 % (ref 10–15)
HCT VFR BLD AUTO: 40.6 % (ref 35–47)
HGB BLD-MCNC: 12.6 G/DL (ref 11.7–15.7)
MCH RBC QN AUTO: 28.4 PG (ref 26.5–33)
MCHC RBC AUTO-ENTMCNC: 31 G/DL (ref 31.5–36.5)
MCV RBC AUTO: 91 FL (ref 78–100)
PLATELET # BLD AUTO: 265 10E9/L (ref 150–450)
RBC # BLD AUTO: 4.44 10E12/L (ref 3.8–5.2)
WBC # BLD AUTO: 6.1 10E9/L (ref 4–11)

## 2017-04-26 PROCEDURE — 99024 POSTOP FOLLOW-UP VISIT: CPT | Performed by: ORTHOPAEDIC SURGERY

## 2017-04-26 PROCEDURE — 36415 COLL VENOUS BLD VENIPUNCTURE: CPT | Performed by: INTERNAL MEDICINE

## 2017-04-26 PROCEDURE — 85027 COMPLETE CBC AUTOMATED: CPT | Performed by: INTERNAL MEDICINE

## 2017-04-26 PROCEDURE — 99213 OFFICE O/P EST LOW 20 MIN: CPT | Performed by: INTERNAL MEDICINE

## 2017-04-26 RX ORDER — WARFARIN SODIUM 5 MG/1
TABLET ORAL
Qty: 30 TABLET | COMMUNITY
Start: 2017-04-26 | End: 2017-10-04 | Stop reason: DRUGHIGH

## 2017-04-26 NOTE — TELEPHONE ENCOUNTER
----- Message from Chad Betts MD sent at 4/26/2017 12:36 PM CDT -----  Her hgb is back up to normal levels.

## 2017-04-26 NOTE — PROGRESS NOTES
SUBJECTIVE:                                                    Tracy Palomo is a 76 year old female who presents to clinic today for the following health issues:    Chief Complaint   Patient presents with     Consult     questions regarding      She had hip fracture and had it repaired and is back home now.  Living on her own, saw ortho and doing well, no restriction.      Patient had some postoperative anemia needs that rechecked. She is doing rather well at home.    Her stress is down as her  is now in the nursing home.    She is accompanied by her -2 daughters and we did discuss her 'nursing home stay, his left leg weakness, his difficulty with swallowing and aspiration.    Past Medical History:   Diagnosis Date     Atrial fibrillation (H) 2014    related to colon surgery     Colon polyps      Diverticulosis of colon (without mention of hemorrhage)     Diverticulosis     DVT (deep vein thrombosis) in pregnancy (H) 2014    after colon surgery     Other and unspecified hyperlipidemia      PE (pulmonary embolism) 2014    related to colon surgery     Unspecified essential hypertension      Current Outpatient Prescriptions   Medication     warfarin (COUMADIN) 5 MG tablet     sennosides (SENOKOT) 8.6 MG tablet     acetaminophen (TYLENOL) 325 MG tablet     metoprolol (LOPRESSOR) 25 MG tablet     docusate sodium (COLACE) 100 MG capsule     lisinopril (PRINIVIL,ZESTRIL) 10 MG tablet     lovastatin (MEVACOR) 40 MG tablet     cyclobenzaprine (FLEXERIL) 5 MG tablet     No current facility-administered medications for this visit.      Physical Exam  /80  Pulse 78  Temp 97  F (36.1  C) (Temporal)  Wt 188 lb (85.3 kg)  SpO2 98%  BMI 30.34 kg/m2  General Appearance-healthy, alert, no distress    ASSESSMENT:  Patient who is post hospital stay for a hip fracture and post nursing home stay. She was doing well at home, has completed rehabilitation. She saw orthopedics today. Post operative  anemia we will recheck her hemoglobin. Recheck of her blood pressure was good and she'll continue her medications of lisinopril, metoprolol, lovastatin, and Coumadin.    Electronically signed by Chad Betts MD    s

## 2017-04-26 NOTE — MR AVS SNAPSHOT
After Visit Summary   4/26/2017    Tracy Palomo    MRN: [id number]           Patient Information     Date Of Birth          1940        Visit Information        Provider Department      4/26/2017 11:30 AM Chad Betts MD [facility]         Follow-ups after your visit        Your next 10 appointments already scheduled     May 02, 2017  8:30 AM CDT   Treatment 45 with Itzel Ferguson, PT   [facility] ([facility])    [address] [place] [address]  [place] [id number] [phone]            May 02, 2017  9:15 AM CDT   Anticoagulation Visit with [facility] ANTI COAG   [facility]    [address] [id number] [phone]            Jun 07, 2017  9:10 AM CDT   Return Visit with Kaleb Pang DO   [facility] ([facility]    [address] [id number] [phone]              Who to contact     If you have questions or need follow up information about today's clinic visit or your schedule please contact [facility] directly at [phone].  Normal or non-critical lab and imaging results will be communicated to you by MyChart, letter or phone within 4 business days after the clinic has received the results. If you do not hear from us within 7 days, please contact the clinic through MyChart or phone. If you have a critical or abnormal lab result, we will notify you by phone as soon as possible.  Submit refill requests through [employer] or call your pharmacy and they will forward the refill request to us. Please allow 3 business days for your refill to be completed.          Additional Information About Your Visit        Care EveryWhere ID     This is your Care EveryWhere ID. This could be used by other organizations to access your [facility] medical records  [id number]        Your Vitals Were     Pulse Temperature  Pulse Oximetry BMI (Body Mass Index)          78 97  F (36.1  C) (Temporal) 98% 30.34 kg/m2         Blood Pressure from Last 3 Encounters:   04/26/17 148/76   04/11/17 105/62   03/29/17 111/60    Weight from Last 3 Encounters:   04/26/17 188 lb (85.3 kg)   04/26/17 188 lb (85.3 kg)   03/30/17 191 lb (86.6 kg)              Today, you had the following     No orders found for display       Primary Care Provider Office Phone # Fax #    Chad Betts [phone]       [facility] [address] [place] [address]        Thank you!     Thank you for choosing [facility]  for your care. Our goal is always to provide you with excellent care. Hearing back from our patients is one way we can continue to improve our services. Please take a few minutes to complete the written survey that you may receive in the mail after your visit with us. Thank you!             Your Updated Medication List - Protect others around you: Learn how to safely use, store and throw away your medicines at www.disposemymeds.org.          This list is accurate as of: 4/26/17 11:33 AM.  Always use your most recent med list.                   Brand Name Dispense Instructions for use    acetaminophen 325 MG tablet    TYLENOL    100 tablet    Take 3 tablets (975 mg) by mouth every 8 hours       cyclobenzaprine 5 MG tablet    FLEXERIL    42 tablet    Take 1 tablet (5 mg) by mouth 3 times daily as needed for muscle spasms       docusate sodium 100 MG capsule    COLACE    60 capsule    Take 1 capsule (100 mg) by mouth 2 times daily       lisinopril 10 MG tablet    PRINIVIL/ZESTRIL    90 tablet    Take 1 tablet (10 mg) by mouth daily       lovastatin 40 MG tablet    MEVACOR    90 tablet    TAKE ONE TABLET BY MOUTH AT BEDTIME       metoprolol 25 MG tablet    LOPRESSOR     Take 0.5 tablets (12.5 mg) by mouth 2 times daily       sennosides 8.6 MG tablet    SENOKOT     Take 1 tablet by mouth 2 times daily

## 2017-04-26 NOTE — NURSING NOTE
"Chief Complaint   Patient presents with     Consult     questions regarding        Initial /76 (BP Location: Right arm, Patient Position: Chair, Cuff Size: Adult Regular)  Pulse 78  Temp 97  F (36.1  C) (Temporal)  Wt 188 lb (85.3 kg)  SpO2 98%  BMI 30.34 kg/m2 Estimated body mass index is 30.34 kg/(m^2) as calculated from the following:    Height as of an earlier encounter on 4/26/17: 5' 6\" (1.676 m).    Weight as of this encounter: 188 lb (85.3 kg).  Medication Reconciliation: complete    "

## 2017-04-26 NOTE — PROGRESS NOTES
Orthopedic Clinic Post-Operative Note    CHIEF COMPLAINT:   Chief Complaint   Patient presents with     RECHECK     Left hip follow up     Surgical Followup     DOS: 3/16/17~Left hip hemiarthroplasty~6 weeks postop       HISTORY OF PRESENT ILLNESS  Presents with daughters. Ambulating with use the cane. She is very active. Some medial thigh discomfort at times. Worse when she swings her leg in the bed.    Patient's past medical, surgical, social and family histories reviewed.     Past Medical History:   Diagnosis Date     Atrial fibrillation (H) 2014    related to colon surgery     Colon polyps      Diverticulosis of colon (without mention of hemorrhage)     Diverticulosis     DVT (deep vein thrombosis) in pregnancy (H) 2014    after colon surgery     Other and unspecified hyperlipidemia      PE (pulmonary embolism) 2014    related to colon surgery     Unspecified essential hypertension        Past Surgical History:   Procedure Laterality Date     COLONOSCOPY  09, 10, 12    [facility]     FLEXIBLE SIGMOIDOSCOPY  09, 11     LAPAROTOMY EXPLORATORY N/A 10/20/2014    Procedure: LAPAROTOMY EXPLORATORY;  Surgeon: Miguel Oleary MD;  [facility]     LAPAROTOMY, LYSIS ADHESIONS, COMBINED N/A 10/20/2014    Procedure: COMBINED LAPAROTOMY, LYSIS ADHESIONS;  Surgeon: Miguel Oleary MD;  [facility]     OPEN REDUCTION INTERNAL FIXATION HIP BIPOLAR Left 3/16/2017    Procedure: OPEN REDUCTION INTERNAL FIXATION HIP BIPOLAR;  Surgeon: Kaleb Pang DO;  [facility]     RESECT SMALL BOWEL WITHOUT OSTOMY N/A 10/20/2014    Procedure: RESECT SMALL BOWEL WITHOUT OSTOMY;  Surgeon: Miguel Oleary MD;  Location[facility]       Medications:    Current Outpatient Prescriptions on File Prior to Visit:  sennosides (SENOKOT) 8.6 MG tablet Take 1 tablet by mouth 2 times daily   acetaminophen (TYLENOL) 325 MG tablet Take 3 tablets (975 mg) by mouth every 8 hours   metoprolol (LOPRESSOR) 25 MG  "tablet Take 0.5 tablets (12.5 mg) by mouth 2 times daily   docusate sodium (COLACE) 100 MG capsule Take 1 capsule (100 mg) by mouth 2 times daily   cyclobenzaprine (FLEXERIL) 5 MG tablet Take 1 tablet (5 mg) by mouth 3 times daily as needed for muscle spasms   lisinopril (PRINIVIL,ZESTRIL) 10 MG tablet Take 1 tablet (10 mg) by mouth daily   lovastatin (MEVACOR) 40 MG tablet TAKE ONE TABLET BY MOUTH AT BEDTIME     No current facility-administered medications on file prior to visit.     Allergies   Allergen Reactions     No Known Allergies        Social History     Occupational History     Not on file.     Social History Main Topics     Smoking status: Never Smoker     Smokeless tobacco: Never Used     Alcohol use No     Drug use: No     Sexual activity: Yes     Partners: Male       Family History   Problem Relation Age of Onset     Blood Disease No family hx of      blood clots       REVIEW OF SYSTEMS  General: negative for, night sweats, dizziness, fatigue  Resp: No shortness of breath and no cough  CV: negative for chest pain, syncope or near-syncope  GI: negative for nausea, vomiting and diarrhea  : negative for dysuria and hematuria  Musculoskeletal: as above  Neurologic: negative for syncope   Hematologic: negative for bleeding disorder    Physical Exam:  Vitals: Temp 96.9  F (36.1  C) (Temporal)  Ht 5' 6\" (1.676 m)  Wt 188 lb (85.3 kg)  BMI 30.34 kg/m2  BMI= Body mass index is 30.34 kg/(m^2).  Constitutional: healthy, alert and no acute distress   Psychiatric: mentation appears normal and affect normal/bright  NEURO: no focal deficits  SKIN: .well healed, no erythema, no incision breakdown and no drainage.  JOINT/EXTREMITIES: Full motion. No focal areas of tenderness. Some weakness with flexion, abduction, adduction.  GAIT: non-antalgic    Diagnostic Modalities:  None today.  Independent visualization of the images was performed.      Impression:   Chief Complaint   Patient presents with     RECHECK     " Left hip follow up     Surgical Followup     DOS: 3/16/17~Left hip hemiarthroplasty~6 weeks postop    doing extremely well for her age. Some weakness and I recommend she continue work on strengthening.    Plan:   Activity: Gradual return back to full activity  Staples/Sutures out: Not applicable.  Pain controlled: Yes. Continue to use: Nothing  Immobilzation: No  Physical Therapy: endurance, resistance training  Return to clinic 6, week(s), or sooner as needed for changes.    Re-x-ray on return: Yes.    Vitaly Pang D.O.

## 2017-04-26 NOTE — LETTER
4/26/2017       RE: Tracy Palomo  [address] [place] [id number]           Dear Colleague,    Thank you for referring your patient, Tracy Palomo, to the [facility]. Please see a copy of my visit note below.    Orthopedic Clinic Post-Operative Note    CHIEF COMPLAINT:   Chief Complaint   Patient presents with     RECHECK     Left hip follow up     Surgical Followup     DOS: 3/16/17~Left hip hemiarthroplasty~6 weeks postop       HISTORY OF PRESENT ILLNESS  Presents with daughters. Ambulating with use the cane. She is very active. Some medial thigh discomfort at times. Worse when she swings her leg in the bed.    Patient's past medical, surgical, social and family histories reviewed.     Past Medical History:   Diagnosis Date     Atrial fibrillation (H) 2014    related to colon surgery     Colon polyps      Diverticulosis of colon (without mention of hemorrhage)     Diverticulosis     DVT (deep vein thrombosis) in pregnancy (H) 2014    after colon surgery     Other and unspecified hyperlipidemia      PE (pulmonary embolism) 2014    related to colon surgery     Unspecified essential hypertension        Past Surgical History:   Procedure Laterality Date     COLONOSCOPY  09, 10, 12    [facility]     FLEXIBLE SIGMOIDOSCOPY  09, 11     LAPAROTOMY EXPLORATORY N/A 10/20/2014    Procedure: LAPAROTOMY EXPLORATORY;  Surgeon: Miguel Oleary MD;  [facility]     LAPAROTOMY, LYSIS ADHESIONS, COMBINED N/A 10/20/2014    Procedure: COMBINED LAPAROTOMY, LYSIS ADHESIONS;  Surgeon: Miguel Oleary MD;  [facility]     OPEN REDUCTION INTERNAL FIXATION HIP BIPOLAR Left 3/16/2017    Procedure: OPEN REDUCTION INTERNAL FIXATION HIP BIPOLAR;  Surgeon: Kaleb Pang DO;  [facility]     RESECT SMALL BOWEL WITHOUT OSTOMY N/A 10/20/2014    Procedure: RESECT SMALL BOWEL WITHOUT OSTOMY;  Surgeon: Miguel Oleary MD;  Location: [facility]       Medications:    Current  "Outpatient Prescriptions on File Prior to Visit:  sennosides (SENOKOT) 8.6 MG tablet Take 1 tablet by mouth 2 times daily   acetaminophen (TYLENOL) 325 MG tablet Take 3 tablets (975 mg) by mouth every 8 hours   metoprolol (LOPRESSOR) 25 MG tablet Take 0.5 tablets (12.5 mg) by mouth 2 times daily   docusate sodium (COLACE) 100 MG capsule Take 1 capsule (100 mg) by mouth 2 times daily   cyclobenzaprine (FLEXERIL) 5 MG tablet Take 1 tablet (5 mg) by mouth 3 times daily as needed for muscle spasms   lisinopril (PRINIVIL,ZESTRIL) 10 MG tablet Take 1 tablet (10 mg) by mouth daily   lovastatin (MEVACOR) 40 MG tablet TAKE ONE TABLET BY MOUTH AT BEDTIME     No current facility-administered medications on file prior to visit.     Allergies   Allergen Reactions     No Known Allergies        Social History     Occupational History     Not on file.     Social History Main Topics     Smoking status: Never Smoker     Smokeless tobacco: Never Used     Alcohol use No     Drug use: No     Sexual activity: Yes     Partners: Male       Family History   Problem Relation Age of Onset     Blood Disease No family hx of      blood clots       REVIEW OF SYSTEMS  General: negative for, night sweats, dizziness, fatigue  Resp: No shortness of breath and no cough  CV: negative for chest pain, syncope or near-syncope  GI: negative for nausea, vomiting and diarrhea  : negative for dysuria and hematuria  Musculoskeletal: as above  Neurologic: negative for syncope   Hematologic: negative for bleeding disorder    Physical Exam:  Vitals: Temp 96.9  F (36.1  C) (Temporal)  Ht 5' 6\" (1.676 m)  Wt 188 lb (85.3 kg)  BMI 30.34 kg/m2  BMI= Body mass index is 30.34 kg/(m^2).  Constitutional: healthy, alert and no acute distress   Psychiatric: mentation appears normal and affect normal/bright  NEURO: no focal deficits  SKIN: .well healed, no erythema, no incision breakdown and no drainage.  JOINT/EXTREMITIES: Full motion. No focal areas of tenderness. " Some weakness with flexion, abduction, adduction.  GAIT: non-antalgic    Diagnostic Modalities:  None today.  Independent visualization of the images was performed.      Impression:   Chief Complaint   Patient presents with     RECHECK     Left hip follow up     Surgical Followup     DOS: 3/16/17~Left hip hemiarthroplasty~6 weeks postop    doing extremely well for her age. Some weakness and I recommend she continue work on strengthening.    Plan:   Activity: Gradual return back to full activity  Staples/Sutures out: Not applicable.  Pain controlled: Yes. Continue to use: Nothing  Immobilzation: No  Physical Therapy: endurance, resistance training  Return to clinic 6, week(s), or sooner as needed for changes.    Re-x-ray on return: Yes.    Vitaly Pang D.O.    Again, thank you for allowing me to participate in the care of your patient.        Sincerely,              Kaleb Pang, DO

## 2017-04-26 NOTE — NURSING NOTE
"Chief Complaint   Patient presents with     RECHECK     Left hip follow up     Surgical Followup     DOS: 3/16/17~Left hip hemiarthroplasty~6 weeks postop       Initial Temp 96.9  F (36.1  C) (Temporal)  Ht 1.676 m (5' 6\")  Wt 85.3 kg (188 lb)  BMI 30.34 kg/m2 Estimated body mass index is 30.34 kg/(m^2) as calculated from the following:    Height as of this encounter: 1.676 m (5' 6\").    Weight as of this encounter: 85.3 kg (188 lb).  Medication Reconciliation: complete    "

## 2017-04-26 NOTE — MR AVS SNAPSHOT
After Visit Summary   4/26/2017    Tracy Palomo    MRN: [id number]           Patient Information     Date Of Birth          1940        Visit Information        Provider Department      4/26/2017 9:40 AM Kaleb Pang DO [facility]         Follow-ups after your visit        Your next 10 appointments already scheduled     Apr 26, 2017  9:40 AM CDT   Return Visit with Kaleb Pang DO   [facility] ([address] [id number] [phone]            Apr 26, 2017 11:30 AM CDT   Office Visit with Chad Betts MD   [facility] ([facility])    [address] [place] [id number] [phone]           Bring a current list of meds and any records pertaining to this visit.  For Physicals, please bring immunization records and any forms needing to be filled out.  Please arrive 10 minutes early to complete paperwork.            May 02, 2017  8:30 AM CDT   Treatment 45 with Itzel Ferguson, GUILLERMO   [facility] ([facility])    [address] [place] [address]  [place] [address] [phone]            May 02, 2017  9:15 AM CDT   Anticoagulation Visit with PH ANTI COAG   [facility] ([facility])    [address] [place] [id number] [phone]              Who to contact     If you have questions or need follow up information about today's clinic visit or your schedule please contact [facility] directly at [phone].  Normal or non-critical lab and imaging results will be communicated to you by MyChart, letter or phone within 4 business days after the clinic has received the results. If you do not hear from us within 7 days, please contact the clinic through MyChart or phone. If you have a critical or abnormal lab result, we will notify you by phone as soon as  "possible.  Submit refill requests through [employer] or call your pharmacy and they will forward the refill request to us. Please allow 3 business days for your refill to be completed.          Additional Information About Your Visit        Care EveryWhere ID     This is your Care EveryWhere ID. This could be used by other organizations to access your [facility] medical records  [id number]        Your Vitals Were     Temperature Height BMI (Body Mass Index)             96.9  F (36.1  C) (Temporal) 1.676 m (5' 6\") 30.34 kg/m2          Blood Pressure from Last 3 Encounters:   04/11/17 105/62   03/29/17 111/60   03/28/17 111/60    Weight from Last 3 Encounters:   04/26/17 85.3 kg (188 lb)   03/30/17 86.6 kg (191 lb)   03/29/17 86.7 kg (191 lb 3.2 oz)              Today, you had the following     No orders found for display       Primary Care Provider Office Phone # Fax #    Chad Betts [phone]       [facility] [address] [place] [address]        Thank you!     Thank you for choosing [facility]  for your care. Our goal is always to provide you with excellent care. Hearing back from our patients is one way we can continue to improve our services. Please take a few minutes to complete the written survey that you may receive in the mail after your visit with us. Thank you!             Your Updated Medication List - Protect others around you: Learn how to safely use, store and throw away your medicines at www.disposemymeds.org.          This list is accurate as of: 4/26/17  9:36 AM.  Always use your most recent med list.                   Brand Name Dispense Instructions for use    acetaminophen 325 MG tablet    TYLENOL    100 tablet    Take 3 tablets (975 mg) by mouth every 8 hours       cyclobenzaprine 5 MG tablet    FLEXERIL    42 tablet    Take 1 tablet (5 mg) by mouth 3 times daily as needed for muscle spasms       docusate sodium 100 MG capsule    COLACE    " 60 capsule    Take 1 capsule (100 mg) by mouth 2 times daily       lisinopril 10 MG tablet    PRINIVIL/ZESTRIL    90 tablet    Take 1 tablet (10 mg) by mouth daily       lovastatin 40 MG tablet    MEVACOR    90 tablet    TAKE ONE TABLET BY MOUTH AT BEDTIME       metoprolol 25 MG tablet    LOPRESSOR     Take 0.5 tablets (12.5 mg) by mouth 2 times daily       sennosides 8.6 MG tablet    SENOKOT     Take 1 tablet by mouth 2 times daily

## 2017-05-02 ENCOUNTER — ANTICOAGULATION THERAPY VISIT (OUTPATIENT)
Dept: ANTICOAGULATION | Facility: CLINIC | Age: 77
End: 2017-05-02
Payer: MEDICARE

## 2017-05-02 ENCOUNTER — HOSPITAL ENCOUNTER (OUTPATIENT)
Dept: PHYSICAL THERAPY | Facility: CLINIC | Age: 77
Setting detail: THERAPIES SERIES
End: 2017-05-02
Attending: ORTHOPAEDIC SURGERY
Payer: MEDICARE

## 2017-05-02 DIAGNOSIS — Z79.01 LONG-TERM (CURRENT) USE OF ANTICOAGULANTS: ICD-10-CM

## 2017-05-02 DIAGNOSIS — I26.99 PULMONARY EMBOLI (H): ICD-10-CM

## 2017-05-02 LAB — INR POINT OF CARE: 3.2 (ref 0.86–1.14)

## 2017-05-02 PROCEDURE — 85610 PROTHROMBIN TIME: CPT | Mod: QW

## 2017-05-02 PROCEDURE — 99207 ZZC NO CHARGE NURSE ONLY: CPT

## 2017-05-02 PROCEDURE — 36416 COLLJ CAPILLARY BLOOD SPEC: CPT

## 2017-05-02 PROCEDURE — 97112 NEUROMUSCULAR REEDUCATION: CPT | Mod: GP

## 2017-05-02 PROCEDURE — 40000718 ZZHC STATISTIC PT DEPARTMENT ORTHO VISIT

## 2017-05-02 PROCEDURE — 97110 THERAPEUTIC EXERCISES: CPT | Mod: GP

## 2017-05-02 NOTE — PROGRESS NOTES
ANTICOAGULATION FOLLOW-UP CLINIC VISIT    Patient Name:  Tracy Palomo  Date:  5/2/2017  Contact Type:  Face to Face    SUBJECTIVE:     Patient Findings     Positives No Problem Findings           OBJECTIVE    INR Protime   Date Value Ref Range Status   05/02/2017 3.2 (A) 0.86 - 1.14 Final       ASSESSMENT / PLAN  INR assessment THER    Recheck INR In: 3 WEEKS    INR Location Clinic      Anticoagulation Summary as of 5/2/2017     INR goal 2.0-3.0   Today's INR 3.2!   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]  Long-term (current) use of anticoagulants [Z79.01] [Z79.01]         Anticoagulation Episode Summary     INR check location     Preferred lab     Send INR reminders to [facility] POOL    Comments 5 mg tablets       Anticoagulation Care Providers     Provider Role Specialty Phone number    Gerard Medrano MD [facility] [phone]            See the Encounter Report to view Anticoagulation Flowsheet and Dosing Calendar (Go to Encounters tab in chart review, and find the Anticoagulation Therapy Visit)    Dosage adjustment made based on physician directed care plan.    Arti Ruelas RN

## 2017-05-02 NOTE — MR AVS SNAPSHOT
Tracy ANG Dewey   5/2/2017 9:15 AM   Anticoagulation Therapy Visit    Description:  76 year old female   Provider:  [facility] ANTI COAPATRICK   Department:  [facility]           INR as of 5/2/2017     Today's INR 3.2!      Anticoagulation Summary as of 5/2/2017     INR goal 2.0-3.0   Today's INR 3.2!   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]  Long-term (current) use of anticoagulants [Z79.01] [Z79.01]         Your next Anticoagulation Clinic appointment(s)     May 23, 2017 10:45 AM CDT   Anticoagulation [facility] with PH ANTI COAG   [facility]    [address] [place] [id number]   [phone]              Contact Numbers     Clinic Number:         May 2017 Details    Sun Mon Tue Wed Thu Fri Sat      1               2      2.5 mg   See details      3      5 mg         4      2.5 mg         5      5 mg         6      2.5 mg           7      5 mg         8      5 mg         9      2.5 mg         10      5 mg         11      2.5 mg         12      5 mg         13      2.5 mg           14      5 mg         15      5 mg         16      2.5 mg         17      5 mg         18      2.5 mg         19      5 mg         20      2.5 mg           21      5 mg         22      5 mg         23            24               25               26               27                 28               29               30               31                   Date Details   05/02 This INR check       Date of next INR:  5/23/2017         How to take your warfarin dose     To take:  2.5 mg Take 0.5 of a 5 mg tablet.    To take:  5 mg Take 1 of the 5 mg tablets.

## 2017-05-10 ENCOUNTER — HOSPITAL ENCOUNTER (OUTPATIENT)
Dept: PHYSICAL THERAPY | Facility: CLINIC | Age: 77
Setting detail: THERAPIES SERIES
End: 2017-05-10
Attending: ORTHOPAEDIC SURGERY
Payer: MEDICARE

## 2017-05-10 PROCEDURE — 97112 NEUROMUSCULAR REEDUCATION: CPT | Mod: GP

## 2017-05-10 PROCEDURE — 40000718 ZZHC STATISTIC PT DEPARTMENT ORTHO VISIT

## 2017-05-10 PROCEDURE — 97110 THERAPEUTIC EXERCISES: CPT | Mod: GP

## 2017-05-18 ENCOUNTER — HOSPITAL ENCOUNTER (OUTPATIENT)
Dept: PHYSICAL THERAPY | Facility: CLINIC | Age: 77
Setting detail: THERAPIES SERIES
End: 2017-05-18
Attending: ORTHOPAEDIC SURGERY
Payer: MEDICARE

## 2017-05-18 PROCEDURE — 97112 NEUROMUSCULAR REEDUCATION: CPT | Mod: GP

## 2017-05-18 PROCEDURE — 40000718 ZZHC STATISTIC PT DEPARTMENT ORTHO VISIT

## 2017-05-23 ENCOUNTER — ANTICOAGULATION THERAPY VISIT (OUTPATIENT)
Dept: ANTICOAGULATION | Facility: CLINIC | Age: 77
End: 2017-05-23
Payer: MEDICARE

## 2017-05-23 DIAGNOSIS — I26.99 PULMONARY EMBOLI (H): ICD-10-CM

## 2017-05-23 DIAGNOSIS — Z79.01 LONG-TERM (CURRENT) USE OF ANTICOAGULANTS: ICD-10-CM

## 2017-05-23 LAB — INR PPP: 1.9

## 2017-05-23 PROCEDURE — 99207 ZZC NO CHARGE NURSE ONLY: CPT

## 2017-05-23 NOTE — PROGRESS NOTES
ANTICOAGULATION FOLLOW-UP CLINIC VISIT    Patient Name:  Tracy Palomo  Date:  5/23/2017  Contact Type:  Face to Face    SUBJECTIVE:     Patient Findings     Positives Change in diet/appetite (more Julianne K this week)           OBJECTIVE    INR   Date Value Ref Range Status   05/23/2017 1.9  Final       ASSESSMENT / PLAN  INR assessment THER    Recheck INR In: 3 WEEKS    INR Location Clinic      Anticoagulation Summary as of 5/23/2017     INR goal 2.0-3.0   Today's INR 1.9!   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]  Long-term (current) use of anticoagulants [Z79.01] [Z79.01]         Anticoagulation Episode Summary     INR check location     Preferred lab     Send INR reminders to [facility] POOL    Comments 5 mg tablets       Anticoagulation Care Providers     Provider Role Specialty Phone number    Gerard Medrano MD [facility] [phone]            See the Encounter Report to view Anticoagulation Flowsheet and Dosing Calendar (Go to Encounters tab in chart review, and find the Anticoagulation Therapy Visit)    Dosage adjustment made based on physician directed care plan.      Johanna Marshall RN

## 2017-05-23 NOTE — MR AVS SNAPSHOT
Tracy Palomo   5/23/2017 10:45 AM   Anticoagulation Therapy Visit    Description:  76 year old female   Provider:  ERICK ANTI COAPATRICK   Department:  [facility] Antico[facility]           INR as of 5/23/2017     Today's INR 1.9!      Anticoagulation Summary as of 5/23/2017     INR goal 2.0-3.0   Today's INR 1.9!   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]  Long-term (current) use of anticoagulants [Z79.01] [Z79.01]         Your next Anticoagulation Clinic appointment(s)     Jun 13, 2017  9:15 AM CDT   [facility]agulation [facility] with [facility] ANTI COAG   [facility]    [address] [place] [id number]   [phone]              Contact Numbers     Clinic Number:         May 2017 Details    Sun Mon Tue Wed Thu Fri Sat      1               2               3               4               5               6                 7               8               9               10               11               12               13                 14               15               16               17               18               19               20                 21               22               23      2.5 mg   See details      24      5 mg         25      2.5 mg         26      5 mg         27      2.5 mg           28      5 mg         29      5 mg         30      2.5 mg         31      5 mg             Date Details   05/23 This INR check               How to take your warfarin dose     To take:  2.5 mg Take 0.5 of a 5 mg tablet.    To take:  5 mg Take 1 of the 5 mg tablets.           June 2017 Details    Sun Mon Tue Wed Thu Fri Sat         1      2.5 mg         2      5 mg         3      2.5 mg           4      5 mg         5      5 mg         6      2.5 mg         7      5 mg         8      2.5 mg         9      5 mg         10      2.5 mg           11             12               13               14               15               16               17                 18               19               20               21               22               23               24                 25               26               27               28               29               30                 Date Details   No additional details    Date of next INR:  6/11/2017         How to take your warfarin dose     To take:  2.5 mg Take 0.5 of a 5 mg tablet.    To take:  5 mg Take 1 of the 5 mg tablets.

## 2017-06-07 ENCOUNTER — RADIANT APPOINTMENT (OUTPATIENT)
Dept: GENERAL RADIOLOGY | Facility: CLINIC | Age: 77
End: 2017-06-07
Attending: ORTHOPAEDIC SURGERY
Payer: MEDICARE

## 2017-06-07 ENCOUNTER — OFFICE VISIT (OUTPATIENT)
Dept: ORTHOPEDICS | Facility: CLINIC | Age: 77
End: 2017-06-07
Payer: MEDICARE

## 2017-06-07 VITALS — BODY MASS INDEX: 29.49 KG/M2 | WEIGHT: 183.5 LBS | TEMPERATURE: 97.2 F | HEIGHT: 66 IN

## 2017-06-07 DIAGNOSIS — S72.002D CLOSED LEFT HIP FRACTURE, WITH ROUTINE HEALING, SUBSEQUENT ENCOUNTER: Primary | ICD-10-CM

## 2017-06-07 DIAGNOSIS — S72.002D CLOSED LEFT HIP FRACTURE, WITH ROUTINE HEALING, SUBSEQUENT ENCOUNTER: ICD-10-CM

## 2017-06-07 PROCEDURE — 99024 POSTOP FOLLOW-UP VISIT: CPT | Performed by: ORTHOPAEDIC SURGERY

## 2017-06-07 PROCEDURE — 73502 X-RAY EXAM HIP UNI 2-3 VIEWS: CPT | Mod: TC

## 2017-06-07 NOTE — PROGRESS NOTES
Appropriate assistive devices provided during their visit. na (Yes, No, N/A) na (list device)    Exam table and/or cart  placed in the lowest position. yes (Yes, No, N/A)    Brakes on tables/carts/wheelchairs used at all times. na (Yes, No, N/A)    Non slip footwear applied. na (Yes, No, NA)    Patient was accompanied by staff throughout visit. yes (Yes, No, N/A)    Equipment safety straps used. na (Yes, No, N/A)    Assist with toileting. na (Yes, No, N/A)  Debra Lamb  6/7/2017  9:06 AM

## 2017-06-07 NOTE — PROGRESS NOTES
Orthopedic Clinic Post-Operative Note    CHIEF COMPLAINT:   Chief Complaint   Patient presents with     RECHECK     Left hip follow up     Surgical Followup     DOS: 3/16/17~Left hip hemiarthroplasty~12 weeks postop       HISTORY OF PRESENT ILLNESS  Location: left hip   Rating of Pain:  mild  Pain is better with:  Rest  Pain improving overall: Yes  Associated Features: None  Patient concerns: Overall doing very well. The only issue she notices is that she lays on that side. She has some discomfort.    Patient's past medical, surgical, social and family histories reviewed.     Past Medical History:   Diagnosis Date     Atrial fibrillation (H) 2014    related to colon surgery     Colon polyps      Diverticulosis of colon (without mention of hemorrhage)     Diverticulosis     DVT (deep vein thrombosis) in pregnancy (H) 2014    after colon surgery     Other and unspecified hyperlipidemia      PE (pulmonary embolism) 2014    related to colon surgery     Unspecified essential hypertension        Past Surgical History:   Procedure Laterality Date     COLONOSCOPY  09, 10, 12    [facility]     FLEXIBLE SIGMOIDOSCOPY  09, 11     LAPAROTOMY EXPLORATORY N/A 10/20/2014    Procedure: LAPAROTOMY EXPLORATORY;  Surgeon: Miguel Oleary MD;  Location: [facility] OR     LAPAROTOMY, LYSIS ADHESIONS, COMBINED N/A 10/20/2014    Procedure: COMBINED LAPAROTOMY, LYSIS ADHESIONS;  Surgeon: Miguel Oleary MD;  Location: [facility] OR     OPEN REDUCTION INTERNAL FIXATION HIP BIPOLAR Left 3/16/2017    Procedure: OPEN REDUCTION INTERNAL FIXATION HIP BIPOLAR;  Surgeon: Kaleb Pang DO;  Location: [facility] OR     RESECT SMALL BOWEL WITHOUT OSTOMY N/A 10/20/2014    Procedure: RESECT SMALL BOWEL WITHOUT OSTOMY;  Surgeon: Miguel Oleary MD;  Location: [facility] OR       Medications:    Current Outpatient Prescriptions on File Prior to Visit:  warfarin (COUMADIN) 5 MG tablet Whole and half pill per coumadin clinic   sennosides (SENOKOT)  "8.6 MG tablet Take 1 tablet by mouth 2 times daily   acetaminophen (TYLENOL) 325 MG tablet Take 3 tablets (975 mg) by mouth every 8 hours   metoprolol (LOPRESSOR) 25 MG tablet Take 0.5 tablets (12.5 mg) by mouth 2 times daily   docusate sodium (COLACE) 100 MG capsule Take 1 capsule (100 mg) by mouth 2 times daily   cyclobenzaprine (FLEXERIL) 5 MG tablet Take 1 tablet (5 mg) by mouth 3 times daily as needed for muscle spasms (Patient not taking: Reported on 6/7/2017)   lisinopril (PRINIVIL,ZESTRIL) 10 MG tablet Take 1 tablet (10 mg) by mouth daily   lovastatin (MEVACOR) 40 MG tablet TAKE ONE TABLET BY MOUTH AT BEDTIME     No current facility-administered medications on file prior to visit.     Allergies   Allergen Reactions     No Known Allergies        Social History     Occupational History     Not on file.     Social History Main Topics     Smoking status: Never Smoker     Smokeless tobacco: Never Used     Alcohol use No     Drug use: No     Sexual activity: Yes     Partners: Male       Family History   Problem Relation Age of Onset     Blood Disease No family hx of      blood clots       REVIEW OF SYSTEMS  General: negative for, night sweats, dizziness, fatigue  Resp: No shortness of breath and no cough  CV: negative for chest pain, syncope or near-syncope  GI: negative for nausea, vomiting and diarrhea  : negative for dysuria and hematuria  Musculoskeletal: as above  Neurologic: negative for syncope   Hematologic: negative for bleeding disorder    Physical Exam:  Vitals: Temp 97.2  F (36.2  C) (Temporal)  Ht 5' 6\" (1.676 m)  Wt 183 lb 8 oz (83.2 kg)  BMI 29.62 kg/m2  BMI= Body mass index is 29.62 kg/(m^2).  Constitutional: healthy, alert and no acute distress   Psychiatric: mentation appears normal and affect normal/bright  NEURO: no focal deficits  SKIN: .well healed, no erythema, no incision breakdown and no drainage.  JOINT/EXTREMITIES: Full unrestricted range of motion. No instability. Distal " neurovascular intact. Some tissue adherence along the incision.  GAIT: non-antalgic    Diagnostic Modalities:  left hip X-ray: Press-fit hemiarthroplasty in place. No evidence of position changes. No fractures.  Independent visualization of the images was performed.      Impression:   Chief Complaint   Patient presents with     RECHECK     Left hip follow up     Surgical Followup     DOS: 3/16/17~Left hip hemiarthroplasty~12 weeks postop    doing well.    Plan:   Activity: No use of affected extremity  Staples/Sutures out: Not applicable.  Pain controlled: Yes. Continue to use: Nothing  Immobilzation: No  Physical Therapy: continue/transition to home exercise routine. , I've also recommended some soft tissue massage and showed her how to do it.  Return to clinic 9 months, or sooner as needed for changes.    Re-x-ray on return: Yes.    Vitaly Pang D.O.

## 2017-06-07 NOTE — MR AVS SNAPSHOT
"              After Visit Summary   6/7/2017    Tracy Palomo    MRN: [id number]           Patient Information     Date Of Birth          1940        Visit Information        Provider Department      6/7/2017 9:10 AM Kaleb Pang,  [facility]        Today's Diagnoses     Closed left hip fracture, with routine healing, subsequent encounter    -  1       Follow-ups after your visit        Your next 10 appointments already scheduled     Jun 13, 2017  9:15 AM CDT   Anticoagulation Visit with [facility]   [facility]    [address] [place] [address] [id number] [phone]              Who to contact     If you have questions or need follow up information about today's clinic visit or your schedule please contact [facility] directly at [phone].  Normal or non-critical lab and imaging results will be communicated to you by MyChart, letter or phone within 4 business days after the clinic has received the results. If you do not hear from us within 7 days, please contact the clinic through MyChart or phone. If you have a critical or abnormal lab result, we will notify you by phone as soon as possible.  Submit refill requests through [employer] or call your pharmacy and they will forward the refill request to us. Please allow 3 business days for your refill to be completed.          Additional Information About Your Visit        Care EveryWhere ID     This is your Care EveryWhere ID. This could be used by other organizations to access your [facility] medical records  [id number]        Your Vitals Were     Temperature Height BMI (Body Mass Index)             97.2  F (36.2  C) (Temporal) 1.676 m (5' 6\") 29.62 kg/m2          Blood Pressure from Last 3 Encounters:   04/26/17 130/80   04/11/17 105/62   03/29/17 111/60    Weight from Last 3 Encounters:   06/07/17 83.2 kg (183 lb 8 oz)   04/26/17 85.3 kg (188 lb)   04/26/17 " 85.3 kg (188 lb)               Primary Care Provider Office Phone # Fax #    Chad Betts [phone]       [facility] [address] [place] [address]        Thank you!     Thank you for choosing [facility]  for your care. Our goal is always to provide you with excellent care. Hearing back from our patients is one way we can continue to improve our services. Please take a few minutes to complete the written survey that you may receive in the mail after your visit with us. Thank you!             Your Updated Medication List - Protect others around you: Learn how to safely use, store and throw away your medicines at www.disposemymeds.org.          This list is accurate as of: 6/7/17  9:14 AM.  Always use your most recent med list.                   Brand Name Dispense Instructions for use    acetaminophen 325 MG tablet    TYLENOL    100 tablet    Take 3 tablets (975 mg) by mouth every 8 hours       cyclobenzaprine 5 MG tablet    FLEXERIL    42 tablet    Take 1 tablet (5 mg) by mouth 3 times daily as needed for muscle spasms       docusate sodium 100 MG capsule    COLACE    60 capsule    Take 1 capsule (100 mg) by mouth 2 times daily       lisinopril 10 MG tablet    PRINIVIL/ZESTRIL    90 tablet    Take 1 tablet (10 mg) by mouth daily       lovastatin 40 MG tablet    MEVACOR    90 tablet    TAKE ONE TABLET BY MOUTH AT BEDTIME       metoprolol 25 MG tablet    LOPRESSOR     Take 0.5 tablets (12.5 mg) by mouth 2 times daily       sennosides 8.6 MG tablet    SENOKOT     Take 1 tablet by mouth 2 times daily       warfarin 5 MG tablet    COUMADIN    30 tablet    Whole and half pill per coumadin clinic

## 2017-06-07 NOTE — NURSING NOTE
"Chief Complaint   Patient presents with     RECHECK     Left hip follow up     Surgical Followup     DOS: 3/16/17~Left hip hemiarthroplasty~12 weeks postop       Initial Temp 97.2  F (36.2  C) (Temporal)  Ht 1.676 m (5' 6\")  Wt 83.2 kg (183 lb 8 oz)  BMI 29.62 kg/m2 Estimated body mass index is 29.62 kg/(m^2) as calculated from the following:    Height as of this encounter: 1.676 m (5' 6\").    Weight as of this encounter: 83.2 kg (183 lb 8 oz).  Medication Reconciliation: complete    "

## 2017-06-07 NOTE — LETTER
6/7/2017       RE: Tracy Palomo  [address] [facility] [address]           Dear Colleague,    Thank you for referring your patient, Tracy Palomo, to the [facility]. Please see a copy of my visit note below.    Orthopedic Clinic Post-Operative Note    CHIEF COMPLAINT:   Chief Complaint   Patient presents with     RECHECK     Left hip follow up     Surgical Followup     DOS: 3/16/17~Left hip hemiarthroplasty~12 weeks postop       HISTORY OF PRESENT ILLNESS  Location: left hip   Rating of Pain:  mild  Pain is better with:  Rest  Pain improving overall: Yes  Associated Features: None  Patient concerns: Overall doing very well. The only issue she notices is that she lays on that side. She has some discomfort.    Patient's past medical, surgical, social and family histories reviewed.     Past Medical History:   Diagnosis Date     Atrial fibrillation (H) 2014    related to colon surgery     Colon polyps      Diverticulosis of colon (without mention of hemorrhage)     Diverticulosis     DVT (deep vein thrombosis) in pregnancy (H) 2014    after colon surgery     Other and unspecified hyperlipidemia      PE (pulmonary embolism) 2014    related to colon surgery     Unspecified essential hypertension        Past Surgical History:   Procedure Laterality Date     COLONOSCOPY  09, 10, 12    [facility]     FLEXIBLE SIGMOIDOSCOPY  09, 11     LAPAROTOMY EXPLORATORY N/A 10/20/2014    Procedure: LAPAROTOMY EXPLORATORY;  Surgeon: Miguel Oleary MD;  [facility]     LAPAROTOMY, LYSIS ADHESIONS, COMBINED N/A 10/20/2014    Procedure: COMBINED LAPAROTOMY, LYSIS ADHESIONS;  Surgeon: Miguel Oleary MD;  Location: [facility] OR     OPEN REDUCTION INTERNAL FIXATION HIP BIPOLAR Left 3/16/2017    Procedure: OPEN REDUCTION INTERNAL FIXATION HIP BIPOLAR;  Surgeon: Kaleb Pang DO;  Location: [facility] OR     RESECT SMALL BOWEL WITHOUT OSTOMY N/A 10/20/2014    Procedure: RESECT SMALL BOWEL  "WITHOUT OSTOMY;  Surgeon: Miguel Oleary MD;  Location:  OR       Medications:    Current Outpatient Prescriptions on File Prior to Visit:  warfarin (COUMADIN) 5 MG tablet Whole and half pill per coumadin clinic   sennosides (SENOKOT) 8.6 MG tablet Take 1 tablet by mouth 2 times daily   acetaminophen (TYLENOL) 325 MG tablet Take 3 tablets (975 mg) by mouth every 8 hours   metoprolol (LOPRESSOR) 25 MG tablet Take 0.5 tablets (12.5 mg) by mouth 2 times daily   docusate sodium (COLACE) 100 MG capsule Take 1 capsule (100 mg) by mouth 2 times daily   cyclobenzaprine (FLEXERIL) 5 MG tablet Take 1 tablet (5 mg) by mouth 3 times daily as needed for muscle spasms (Patient not taking: Reported on 6/7/2017)   lisinopril (PRINIVIL,ZESTRIL) 10 MG tablet Take 1 tablet (10 mg) by mouth daily   lovastatin (MEVACOR) 40 MG tablet TAKE ONE TABLET BY MOUTH AT BEDTIME     No current facility-administered medications on file prior to visit.     Allergies   Allergen Reactions     No Known Allergies        Social History     Occupational History     Not on file.     Social History Main Topics     Smoking status: Never Smoker     Smokeless tobacco: Never Used     Alcohol use No     Drug use: No     Sexual activity: Yes     Partners: Male       Family History   Problem Relation Age of Onset     Blood Disease No family hx of      blood clots       REVIEW OF SYSTEMS  General: negative for, night sweats, dizziness, fatigue  Resp: No shortness of breath and no cough  CV: negative for chest pain, syncope or near-syncope  GI: negative for nausea, vomiting and diarrhea  : negative for dysuria and hematuria  Musculoskeletal: as above  Neurologic: negative for syncope   Hematologic: negative for bleeding disorder    Physical Exam:  Vitals: Temp 97.2  F (36.2  C) (Temporal)  Ht 5' 6\" (1.676 m)  Wt 183 lb 8 oz (83.2 kg)  BMI 29.62 kg/m2  BMI= Body mass index is 29.62 kg/(m^2).  Constitutional: healthy, alert and no acute distress "   Psychiatric: mentation appears normal and affect normal/bright  NEURO: no focal deficits  SKIN: .well healed, no erythema, no incision breakdown and no drainage.  JOINT/EXTREMITIES: Full unrestricted range of motion. No instability. Distal neurovascular intact. Some tissue adherence along the incision.  GAIT: non-antalgic    Diagnostic Modalities:  left hip X-ray: Press-fit hemiarthroplasty in place. No evidence of position changes. No fractures.  Independent visualization of the images was performed.      Impression:   Chief Complaint   Patient presents with     RECHECK     Left hip follow up     Surgical Followup     DOS: 3/16/17~Left hip hemiarthroplasty~12 weeks postop    doing well.    Plan:   Activity: No use of affected extremity  Staples/Sutures out: Not applicable.  Pain controlled: Yes. Continue to use: Nothing  Immobilzation: No  Physical Therapy: continue/transition to home exercise routine. , I've also recommended some soft tissue massage and showed her how to do it.  Return to clinic 9 months, or sooner as needed for changes.    Re-x-ray on return: Yes.    Vitaly Pang D.O.    Again, thank you for allowing me to participate in the care of your patient.        Sincerely,              Kaleb Pang, DO

## 2017-06-13 ENCOUNTER — ANTICOAGULATION THERAPY VISIT (OUTPATIENT)
Dept: ANTICOAGULATION | Facility: CLINIC | Age: 77
End: 2017-06-13
Payer: MEDICARE

## 2017-06-13 DIAGNOSIS — I26.99 PULMONARY EMBOLI (H): ICD-10-CM

## 2017-06-13 DIAGNOSIS — Z79.01 LONG-TERM (CURRENT) USE OF ANTICOAGULANTS: ICD-10-CM

## 2017-06-13 LAB — INR POINT OF CARE: 2.7 (ref 0.86–1.14)

## 2017-06-13 PROCEDURE — 99207 ZZC NO CHARGE NURSE ONLY: CPT

## 2017-06-13 PROCEDURE — 85610 PROTHROMBIN TIME: CPT | Mod: QW

## 2017-06-13 PROCEDURE — 36416 COLLJ CAPILLARY BLOOD SPEC: CPT

## 2017-06-13 NOTE — MR AVS SNAPSHOT
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]  Long-term (current) use of anticoagulants [Z79.01] [Z79.01]         Your next Anticoagulation Clinic appointment(s)     Jul 11, 2017 10:15 AM CDT   Anticoagulation Visit with [facility] ANTI COAG   [facility]    [address] [place] [id number]   [phone]              Contact Numbers     Clinic Number:         June 2017 Details    Sun Mon Tue Wed Thu Fri Sat         1               2               3                 4               5               6               7               8               9               10                 11               12               13      2.5 mg   See details      14      5 mg         15      2.5 mg         16      5 mg         17      2.5 mg           18      5 mg         19      5 mg         20      2.5 mg         21      5 mg         22      2.5 mg         23      5 mg         24      2.5 mg           25      5 mg         26      5 mg         27      2.5 mg         28      5 mg         29      2.5 mg         30      5 mg           Date Details   06/13 This INR check               How to take your warfarin dose     To take:  2.5 mg Take 0.5 of a 5 mg tablet.    To take:  5 mg Take 1 of the 5 mg tablets.           July 2017 Details    Sun Mon Tue Wed Thu Fri Sat           1      2.5 mg           2      5 mg         3      5 mg         4      2.5 mg         5      5 mg         6      2.5 mg         7      5 mg         8      2.5 mg           9      5 mg         10       5 mg         11            12               13               14               15                 16               17               18               19               20               21               22                 23               24               25               26               27               28               29                 30               31                     Date Details   No additional details    Date of next INR:  7/11/2017         How to take your warfarin dose     To take:  2.5 mg Take 0.5 of a 5 mg tablet.    To take:  5 mg Take 1 of the 5 mg tablets.

## 2017-06-13 NOTE — PROGRESS NOTES
ANTICOAGULATION FOLLOW-UP CLINIC VISIT    Patient Name:  Tracy Palomo  Date:  6/13/2017  Contact Type:  Face to Face    SUBJECTIVE:     Patient Findings     Positives No Problem Findings           OBJECTIVE    INR Protime   Date Value Ref Range Status   06/13/2017 2.7 (A) 0.86 - 1.14 Final       ASSESSMENT / PLAN  INR assessment THER    Recheck INR In: 4 WEEKS    INR Location Clinic      Anticoagulation Summary as of 6/13/2017     INR goal 2.0-3.0   Today's INR 2.7   Maintenance plan 2.5 mg (5 mg x 0.5) on Tue, Thu, Sat; 5 mg (5 mg x 1) all other days   Full instructions 2.5 mg on Tue, Thu, Sat; 5 mg all other days   Weekly total 27.5 mg   No change documented Johanna Mckeon RN   Plan last modified Johanna Mckeon RN (3/28/2017)   Next INR check 7/11/2017   Target end date     Indications   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]  Long-term (current) use of anticoagulants [Z79.01] [Z79.01]         Anticoagulation Episode Summary     INR check location     Preferred lab     Send INR reminders to [facility] POOL    Comments 5 mg tablets       Anticoagulation Care Providers     Provider Role Specialty Phone number    Gerard Medrano MD [facility] [phone]            See the Encounter Report to view Anticoagulation Flowsheet and Dosing Calendar (Go to Encounters tab in chart review, and find the Anticoagulation Therapy Visit)    Dosage adjustment made based on physician directed care plan.      Johanna Mckeon RN

## 2017-07-11 ENCOUNTER — ANTICOAGULATION THERAPY VISIT (OUTPATIENT)
Dept: ANTICOAGULATION | Facility: CLINIC | Age: 77
End: 2017-07-11
Payer: MEDICARE

## 2017-07-11 DIAGNOSIS — Z79.01 LONG-TERM (CURRENT) USE OF ANTICOAGULANTS: ICD-10-CM

## 2017-07-11 DIAGNOSIS — I26.99 PULMONARY EMBOLI (H): ICD-10-CM

## 2017-07-11 LAB — INR POINT OF CARE: 2.4 (ref 0.86–1.14)

## 2017-07-11 PROCEDURE — 85610 PROTHROMBIN TIME: CPT | Mod: QW

## 2017-07-11 PROCEDURE — 99207 ZZC NO CHARGE NURSE ONLY: CPT

## 2017-07-11 PROCEDURE — 36416 COLLJ CAPILLARY BLOOD SPEC: CPT

## 2017-07-11 NOTE — MR AVS SNAPSHOT
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]  Long-term (current) use of anticoagulants [Z79.01] [Z79.01]         Your next Anticoagulation Clinic appointment(s)     Jul 11, 2017 10:15 AM CDT   Anticoagulation Visit with PH ANTI COAG   [facility] ([address] [address]   [phone]            Aug 08, 2017  9:30 AM CDT   Anticoagulation Visit with PH ANTI COAG   [facility] ([address] [address]   [phone]              Contact Numbers     Clinic Number:         July 2017 Details    Sun Mon Tue Wed Thu Fri Sat           1                 2               3               4               5               6               7               8                 9               10               11      2.5 mg   See details      12      5 mg         13      2.5 mg         14      5 mg         15      2.5 mg           16      5 mg         17      5 mg         18      2.5 mg         19      5 mg         20      2.5 mg         21      5 mg         22      2.5 mg           23      5 mg         24      5 mg         25      2.5 mg         26      5 mg         27      2.5 mg         28      5 mg         29      2.5 mg           30      5 mg         31      5 mg               Date Details   07/11 This INR check               How to take your warfarin dose     To take:  2.5 mg Take 0.5 of a 5 mg tablet.    To take:  5 mg Take 1 of the 5 mg tablets.           August 2017  Details    Sun Mon Tue Wed Thu Fri Sat       1      2.5 mg         2      5 mg         3      2.5 mg         4      5 mg         5      2.5 mg           6      5 mg         7      5 mg         8            9               10               11               12                 13               14               15               16               17               18               19                 20               21               22               23               24               25               26                 27               28               29               30               31                  Date Details   No additional details    Date of next INR:  8/8/2017         How to take your warfarin dose     To take:  2.5 mg Take 0.5 of a 5 mg tablet.    To take:  5 mg Take 1 of the 5 mg tablets.

## 2017-07-11 NOTE — PROGRESS NOTES
ANTICOAGULATION FOLLOW-UP CLINIC VISIT    Patient Name:  Tracy Palomo  Date:  7/11/2017  Contact Type:  Face to Face    SUBJECTIVE:     Patient Findings     Positives No Problem Findings           OBJECTIVE    INR Protime   Date Value Ref Range Status   07/11/2017 2.4 (A) 0.86 - 1.14 Final       ASSESSMENT / PLAN  INR assessment THER    Recheck INR In: 4 WEEKS    INR Location Clinic      Anticoagulation Summary as of 7/11/2017     INR goal 2.0-3.0   Today's INR 2.4   Maintenance plan 2.5 mg (5 mg x 0.5) on Tue, Thu, Sat; 5 mg (5 mg x 1) all other days   Full instructions 2.5 mg on Tue, Thu, Sat; 5 mg all other days   Weekly total 27.5 mg   No change documented Johanna Mckeon RN   Plan last modified Johanna Mckeon RN (3/28/2017)   Next INR check 8/8/2017   Target end date     Indications   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]  Long-term (current) use of anticoagulants [Z79.01] [Z79.01]         Anticoagulation Episode Summary     INR check location     Preferred lab     Send INR reminders to [facility] POOL    Comments 5 mg tablets       Anticoagulation Care Providers     Provider Role Specialty Phone number    Gerard Medrano MD [facility] [phone]            See the Encounter Report to view Anticoagulation Flowsheet and Dosing Calendar (Go to Encounters tab in chart review, and find the Anticoagulation Therapy Visit)    Dosage adjustment made based on physician directed care plan.      Johanna Mckeon RN

## 2017-08-06 ENCOUNTER — HOSPITAL ENCOUNTER (EMERGENCY)
Facility: CLINIC | Age: 77
Discharge: HOME | End: 2017-08-06
Admitting: PHYSICIAN ASSISTANT
Payer: MEDICARE

## 2017-08-06 ENCOUNTER — APPOINTMENT (OUTPATIENT)
Dept: ULTRASOUND IMAGING | Facility: CLINIC | Age: 77
End: 2017-08-06
Attending: PHYSICIAN ASSISTANT
Payer: MEDICARE

## 2017-08-06 VITALS
TEMPERATURE: 97.9 F | HEIGHT: 66 IN | HEART RATE: 59 BPM | SYSTOLIC BLOOD PRESSURE: 144 MMHG | BODY MASS INDEX: 30.22 KG/M2 | RESPIRATION RATE: 18 BRPM | WEIGHT: 188 LBS | OXYGEN SATURATION: 96 % | DIASTOLIC BLOOD PRESSURE: 81 MMHG

## 2017-08-06 DIAGNOSIS — Z79.01 LONG TERM (CURRENT) USE OF ANTICOAGULANTS: ICD-10-CM

## 2017-08-06 DIAGNOSIS — M79.661 PAIN OF RIGHT LOWER LEG: ICD-10-CM

## 2017-08-06 LAB — INR PPP: 2.91 (ref 0.86–1.14)

## 2017-08-06 PROCEDURE — 99284 EMERGENCY DEPT VISIT MOD MDM: CPT | Mod: 25 | Performed by: PHYSICIAN ASSISTANT

## 2017-08-06 PROCEDURE — 93971 EXTREMITY STUDY: CPT | Mod: RT

## 2017-08-06 PROCEDURE — 85610 PROTHROMBIN TIME: CPT | Performed by: PHYSICIAN ASSISTANT

## 2017-08-06 PROCEDURE — 99282 EMERGENCY DEPT VISIT SF MDM: CPT | Mod: Z6 | Performed by: PHYSICIAN ASSISTANT

## 2017-08-06 ASSESSMENT — ENCOUNTER SYMPTOMS
SHORTNESS OF BREATH: 0
CHILLS: 0
FEVER: 0

## 2017-08-06 NOTE — ED AVS SNAPSHOT
[facility] Emergency Department    [address] [place] [address]    Phone:  [phone]    Fax:  [phone]                                       Tracy Palomo   MRN: [id number]    Department:  [facility] Emergency Department   Date of Visit:  8/6/2017           After Visit Summary Signature Page     I have received my discharge instructions, and my questions have been answered. I have discussed any challenges I see with this plan with the nurse or doctor.    ..........................................................................................................................................  Patient/Patient Representative Signature      ..........................................................................................................................................  Patient Representative Print Name and Relationship to Patient    ..................................................               ................................................  Date                                            Time    ..........................................................................................................................................  Reviewed by Signature/Title    ...................................................              ..............................................  Date                                                            Time

## 2017-08-06 NOTE — ED AVS SNAPSHOT
[facility] Emergency Department    [address] [place] [address]    Phone:  [phone]    Fax:  [phone]                                       Tracy Palomo   MRN: [id number]    Department:  [facility]   Date of Visit:  8/6/2017           Patient Information     Date Of Birth          1940        Your diagnoses for this visit were:     Pain of right lower leg         Discharge Instructions       Please follow up with her primary care provider this week.  Return to the ER for any changing or worsening symptoms.  Use Tylenol for pain.          Possible Causes of Low Back or Leg Pain    The symptoms in your back or leg may be due to pressure on a nerve. This pressure may be caused by a damaged disk or by abnormal bone growth. Either way, you may feel pain, burning, tingling, or numbness. If you have pressure on a nerve that connects to the sciatic nerve, pain may shoot down your leg.    Pressure from the disk  Constant wear and tear can weaken a disk over time and cause back pain. The disk can then be damaged by a sudden movement or injury. If its soft center begins to bulge, the disk may press on a nerve. Or the outside of the disk may tear, and the soft center may squeeze through and pinch a nerve.    Pressure from bone  As a disk wears out, the vertebrae right above and below the disk begin to touch. This can put pressure on a nerve. Often, abnormal bone (called bone spurs) grows where the vertebrae rub against each other. This can cause the foramen or the spinal canal to narrow (called stenosis) and press against a nerve.  Date Last Reviewed: 10/4/2015    [id number] [employer]. [address] [place] [address], [place] [address]. All rights reserved. This information is not intended as a substitute for professional medical care. Always follow your healthcare professional's instructions.          Future Appointments        Provider Department Dept  Phone Center    8/8/2017 9:30 AM [facility] [phone] [facility]      24 Hour Appointment Hotline       To make an appointment at any [facility], call [phone] ([phone]). If you don't have a family doctor or clinic, we will help you find one. [facility] are conveniently located to serve the needs of you and your family.             Review of your medicines      Our records show that you are taking the medicines listed below. If these are incorrect, please call your family doctor or clinic.        Dose / Directions Last dose taken    acetaminophen 325 MG tablet   Commonly known as:  TYLENOL   Dose:  975 mg   Quantity:  100 tablet        Take 3 tablets (975 mg) by mouth every 8 hours   Refills:  0        lisinopril 10 MG tablet   Commonly known as:  PRINIVIL/ZESTRIL   Dose:  10 mg   Quantity:  90 tablet        Take 1 tablet (10 mg) by mouth daily   Refills:  3        lovastatin 40 MG tablet   Commonly known as:  MEVACOR   Quantity:  90 tablet        TAKE ONE TABLET BY MOUTH AT BEDTIME   Refills:  3        warfarin 5 MG tablet   Commonly known as:  COUMADIN   Indication:  5 mg Sun, Mon, Wed, Fri then 2.5 mg on Tues, Thurs and Sat   Quantity:  30 tablet        Whole and half pill per coumadin clinic   Refills:  0                Procedures and tests performed during your visit     INR    US Lower Extremity Venous Duplex Right      Orders Needing Specimen Collection     None      Pending Results     Date and Time Order Name Status Description    8/6/2017 2023 US Lower Extremity Venous Duplex Right Preliminary             Pending Culture Results     No orders found from 8/4/2017 to 8/7/2017.            Pending Results Instructions     If you had any lab results that were not finalized at the time of your Discharge, you can call the ED Lab Result RN at [phone]. You will be contacted by this team for any positive Lab results or changes in treatment.  "The nurses are available 7 days a week from 10A to 6:30P.  You can leave a message 24 hours per day and they will return your call.        Thank you for choosing [facility]       Thank you for choosing [facility] for your care. Our goal is always to provide you with excellent care. Hearing back from our patients is one way we can continue to improve our services. Please take a few minutes to complete the written survey that you may receive in the mail after you visit with us. Thank you!        [email or web address] Information     [employer] lets you send messages to your doctor, view your test results, renew your prescriptions, schedule appointments and more. To sign up, go to [email or web address] . Click on \"Log in\" on the left side of the screen, which will take you to the Welcome page. Then click on \"Sign up Now\" on the right side of the page.     You will be asked to enter the access code listed below, as well as some personal information. Please follow the directions to create your username and password.     Your access code is: [email or web address]  Expires: 2017 10:08 PM     Your access code will  in 90 days. If you need help or a new code, please call your [facility] clinic or [phone].        Care EveryWhere ID     This is your Care EveryWhere ID. This could be used by other organizations to access your [facility] medical records  [id number]        Equal Access to Services     KODI REYNOLDS : Hadareli Braga, waaxda genna, qaybta kaalbuffy ramos. So [facility] [phone].    ATENCIÓN: Si habla español, tiene a dale disposición servicios gratuitos de asistencia lingüística. Dinesh al [phone].    We comply with applicable federal civil rights laws and [place] laws. We do not discriminate on the basis of race, color, national origin, age, disability sex, sexual orientation or gender identity.            After Visit Summary       This is your record. Keep this " with you and show to your community pharmacist(s) and doctor(s) at your next visit.

## 2017-08-07 ENCOUNTER — TELEPHONE (OUTPATIENT)
Dept: FAMILY MEDICINE | Facility: CLINIC | Age: 77
End: 2017-08-07

## 2017-08-07 NOTE — ED NOTES
"/81  Pulse 59  Temp 97.9  F (36.6  C)  Resp 18  Ht 1.676 m (5' 6\")  Wt 85.3 kg (188 lb)  SpO2 96%  BMI 30.34 kg/m2    DC'd stable and ambulatory.  Denies further needs or questions at this time.   "

## 2017-08-07 NOTE — DISCHARGE INSTRUCTIONS
Please follow up with her primary care provider this week.  Return to the ER for any changing or worsening symptoms.  Use Tylenol for pain.          Possible Causes of Low Back or Leg Pain    The symptoms in your back or leg may be due to pressure on a nerve. This pressure may be caused by a damaged disk or by abnormal bone growth. Either way, you may feel pain, burning, tingling, or numbness. If you have pressure on a nerve that connects to the sciatic nerve, pain may shoot down your leg.    Pressure from the disk  Constant wear and tear can weaken a disk over time and cause back pain. The disk can then be damaged by a sudden movement or injury. If its soft center begins to bulge, the disk may press on a nerve. Or the outside of the disk may tear, and the soft center may squeeze through and pinch a nerve.    Pressure from bone  As a disk wears out, the vertebrae right above and below the disk begin to touch. This can put pressure on a nerve. Often, abnormal bone (called bone spurs) grows where the vertebrae rub against each other. This can cause the foramen or the spinal canal to narrow (called stenosis) and press against a nerve.  Date Last Reviewed: 10/4/2015    [id number] [employer]. [address] [place] [address], [place] [address]. All rights reserved. This information is not intended as a substitute for professional medical care. Always follow your healthcare professional's instructions.

## 2017-08-07 NOTE — ED PROVIDER NOTES
"  History     Chief Complaint   Patient presents with     Leg Pain     right     The history is provided by the patient.     Tracy Palomo is a 77 year old female with a history of Hypertension, Hyperlipidemia, Paroxysmal Atrial Fibrillation, DVT, PE, and long term anticoagulant therapy, who presents to the ED complaining of right leg pain. She says that she felt fine most of the day. She went swimming this morning, and went to visit her  in the [facility] this afternoon. When she returned home from visiting her , she noticed that her right lower leg felt \"tight\". This has persisted since onset. No chest pain. No rash. Feels fine otherwise. Patient denies any trauma, pain, fever, chills, shortness or breath, or other associated symptoms. Her last INR was 2.4 on 7/11.     Patient Active Problem List   Diagnosis     Hypertension goal BP (blood pressure) < 140/90     Hyperlipidemia LDL goal <130     Encounter for long-term current use of medication     History of colonic polyps     Advanced directives, counseling/discussion     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     Recent major surgery - exploratory lap with small bowel resection 10/20     Pleural effusion on right     Anemia     Paroxysmal atrial fibrillation (H)     Long-term (current) use of anticoagulants [Z79.01]     Hip fracture, left, closed, initial encounter (H)     Closed left hip fracture (H)     Fractured hip, left, closed, initial encounter (H)     Past Medical History:   Diagnosis Date     Atrial fibrillation (H) 2014    related to colon surgery     Colon polyps      Diverticulosis of colon (without mention of hemorrhage)     Diverticulosis     DVT (deep vein thrombosis) in pregnancy (H) 2014    after colon surgery     Other and unspecified hyperlipidemia      PE (pulmonary embolism) 2014    related to colon surgery     Unspecified essential hypertension        Past Surgical History:   Procedure Laterality Date "     COLONOSCOPY  09, 10, 12    [facility]     FLEXIBLE SIGMOIDOSCOPY  09, 11     LAPAROTOMY EXPLORATORY N/A 10/20/2014    Procedure: LAPAROTOMY EXPLORATORY;  Surgeon: Miguel Oleary MD;  Location: [facility] OR     LAPAROTOMY, LYSIS ADHESIONS, COMBINED N/A 10/20/2014    Procedure: COMBINED LAPAROTOMY, LYSIS ADHESIONS;  Surgeon: Miguel Oleary MD;  Location: [facility] OR     OPEN REDUCTION INTERNAL FIXATION HIP BIPOLAR Left 3/16/2017    Procedure: OPEN REDUCTION INTERNAL FIXATION HIP BIPOLAR;  Surgeon: Kaleb Pang DO;  Location: [facility] OR     RESECT SMALL BOWEL WITHOUT OSTOMY N/A 10/20/2014    Procedure: RESECT SMALL BOWEL WITHOUT OSTOMY;  Surgeon: Miguel Oleary MD;  Location: [facility] OR       Family History   Problem Relation Age of Onset     Blood Disease No family hx of      blood clots       Social History   Substance Use Topics     Smoking status: Never Smoker     Smokeless tobacco: Never Used     Alcohol use No        Immunization History   Administered Date(s) Administered     HepB-Peds 08/31/2009, 10/26/2009, 11/01/2010     Hepatitis A Vac Ped/Adol-2 Dose 11/01/2010     Influenza (High Dose) 3 valent vaccine 10/22/2014, 08/30/2016     Influenza (IIV3) 08/31/2009     Pneumococcal (PCV 13) 08/31/2009     Pneumococcal 23 valent 10/22/2014     TD (ADULT, 7+) 08/31/2009          Allergies   Allergen Reactions     No Known Allergies        Current Outpatient Prescriptions   Medication Sig Dispense Refill     warfarin (COUMADIN) 5 MG tablet Whole and half pill per coumadin clinic 30 tablet      lisinopril (PRINIVIL,ZESTRIL) 10 MG tablet Take 1 tablet (10 mg) by mouth daily 90 tablet 3     lovastatin (MEVACOR) 40 MG tablet TAKE ONE TABLET BY MOUTH AT BEDTIME 90 tablet 3     acetaminophen (TYLENOL) 325 MG tablet Take 3 tablets (975 mg) by mouth every 8 hours 100 tablet      I have reviewed the Medications, Allergies, Past Medical and Surgical History, and Social History in the Epic  "system.    Review of Systems   Constitutional: Negative for chills and fever.   Respiratory: Negative for shortness of breath.    Musculoskeletal:        Positive for right lower leg/ calf tightness.    All other systems reviewed and are negative.      Physical Exam   BP: 158/80  Pulse: 61  Temp: 97.9  F (36.6  C)  Resp: 12  Height: 167.6 cm (5' 6\")  Weight: 85.3 kg (188 lb)  SpO2: 99 %    Physical Exam   Constitutional: No distress.   HENT:   Head: Normocephalic and atraumatic.   Eyes: Conjunctivae and EOM are normal.   Neck: Normal range of motion. Neck supple.   Cardiovascular: Normal rate, regular rhythm, normal heart sounds and intact distal pulses.    No murmur heard.  Pulmonary/Chest: Effort normal. No respiratory distress. She has no wheezes. She has no rales.   Abdominal: She exhibits no distension.   Musculoskeletal: Normal range of motion.        Right lower leg: She exhibits no tenderness, no swelling and no edema.        Left lower leg: She exhibits no tenderness, no swelling and no edema.   Neurological: She is alert.   Skin: Skin is warm and dry. No rash noted. She is not diaphoretic. No pallor.   Psychiatric: She has a normal mood and affect. Her behavior is normal.   Nursing note and vitals reviewed.      ED Course     ED Course     Procedures            Results for orders placed or performed during the hospital encounter of 08/06/17 (from the past 24 hour(s))   INR   Result Value Ref Range    INR 2.91 (H) 0.86 - 1.14   US Lower Extremity Venous Duplex Right    Narrative    ULTRASOUND VENOUS LOWER EXTREMITY UNILATERAL RIGHT  8/6/2017 9:47 PM     HISTORY: Rule out DVT.    COMPARISON: None.    TECHNIQUE: Ultrasound gray scale, Color Doppler flow, and spectral  Doppler waveform analysis performed.    FINDINGS: The right common femoral, superficial femoral, popliteal and  posterior tibial veins are patent and fully compressible and  demonstrate normal venous Doppler flow. The visualized " "greater  saphenous vein is negative for thrombus. Probable 5.0 x 1.0 x 2.0 cm  Baker's cyst on the right.      Impression    IMPRESSION: No DVT demonstrated.     DAYRON MORGAN MD       Results for orders placed or performed during the hospital encounter of 08/06/17   US Lower Extremity Venous Duplex Right    Narrative    ULTRASOUND VENOUS LOWER EXTREMITY UNILATERAL RIGHT  8/6/2017 9:47 PM     HISTORY: Rule out DVT.    COMPARISON: None.    TECHNIQUE: Ultrasound gray scale, Color Doppler flow, and spectral  Doppler waveform analysis performed.    FINDINGS: The right common femoral, superficial femoral, popliteal and  posterior tibial veins are patent and fully compressible and  demonstrate normal venous Doppler flow. The visualized greater  saphenous vein is negative for thrombus. Probable 5.0 x 1.0 x 2.0 cm  Baker's cyst on the right.      Impression    IMPRESSION: No DVT demonstrated.     DAYRON MORGAN MD           Assessments & Plan (with Medical Decision Making)  Tracy is a 77 year old female who presents to the ED complaining of right lower leg \"tightness\" that began this evening PTA. She has a history of PE and DVT, and is currently on anticoagulant therapy. Patient's blood pressure is 158/80 mmHg. She is otherwise afebrile with normal vitals upon presentation to the ED. Her exam is completely unremarkable. INR collected, 2.9.  Right Lower Extremity Venous US obtained, no dvt but there is a baker's cyst of R knee.  Pt likely over exerted herself today.  She does not have any concerning findings on exam.  I recommended use of Tylenol and follow up with her primary care provider.  I discussed with her red flags for return to the emergency department.  She states understanding and leaves in stable condition.       I have reviewed the nursing notes.    I have reviewed the findings, diagnosis, plan and need for follow up with the patient.    Discharge Medication List as of 8/6/2017 10:08 PM          Final diagnoses: "   Pain of right lower leg     This document serves as a record of services personally performed by DEE DEE Jamison It was created on their behalf by Veronica Navarrete, a trained medical scribe. The creation of this record is based on the provider's personal observations and the statements of the patient. This document has been checked and approved by the attending provider.    Note: Chart documentation done in part with Dragon Voice Recognition software. Although reviewed after completion, some word and grammatical errors may remain.    8/6/2017     Abimael Jones    [facility] EMERGENCY DEPARTMENT     Abimael Jones PA-C  08/06/17 2224

## 2017-08-07 NOTE — TELEPHONE ENCOUNTER
": 1940  PHONE #'s: [phone] (home)     PRESENTING PROBLEM:  R leg is painful. Was seen in the ER last night to R/O blood clot.  Hx of them.  \" They didn't find anything , but the baker's cyst behind my knee.\"     NURSING ASSESSMENT  Description:  Denies chest pain, NO SOB,  If I walk > 5,000 steps I will have a lot of pain. I am on Coumadin fo rmy A. Fib.\"  Onset/duration:  4 days  Precip. factors:   Etiology unknown.  Assoc. Sx:  \" The back of my calf feels so hard. \"  Improves/worsens Sx:   same  Pain scale (1-10)   \" The calf feels really tight.\"   Sx specific meds:   Coumadin 5 mg tabs. Taking one whole and a half tab daily.   LMP/preg/breast feeding:  NA  Last exam/Tx: Last evening the ER. \" I didn't get any answers. Just know I don't have a blood clot, but can't figure out why it hurts so bad still. \"     RECOMMENDED DISPOSITION:  See in 24 hours - Dr. Betts is out until Thursday. RN scheduled her with DR. Akers for FU ER check as her leg is still bothering her. Tomasa at 1:40 PM tomorrow afternoon. Her coumadin appt is in the AM. To go to the ER if sudden onset of SOB, chest pain, decreased mobility.   Will comply with recommendation: YES   If further questions/concerns or if Sx do not improve, worsen or new Sx develop, call your PCP or [facility] as soon as possible.    NOTES:  Disposition was determined by the first positive assessment question, therefore all previous assessment questions were negative.  Informed to check provider manual or call insurance company to assure coverage.    Guideline used: Leg Pain / Swelling.   Telephone Triage Protocols for Nurses, Fifth Edition, Anisa Monaco, GLORIA      ED for acute condition Discharge Protocol    \"Hi, my name is Lubna Monaco, a registered nurse, and I am calling from [facility].  I am calling to follow up and see how things are going for you after your recent emergency visit.\"    Tell me how you are doing now " "that you are home?\" \" My Right leg still feels so tight. I didn't really get any answers as to why it hurts. Just told me it isn't a blood clot.\"      Discharge Instructions    \"Let's review your discharge instructions.  What is/are the follow-up recommendations?  Pt. Response: Rest, Use my cane.     \"Has an appointment with your primary care provider been scheduled?\"  No (needed - schedule appointment and remind to bring meds) Scheduled to see Dr. Akers tomorrow.     Medications    \"Tell me what changed about your medicines when you discharged?\"    Nothing. I am taknig Coumadin for my A. Fib.     \"What questions do you have about your medications?\"   None    On warfarin: \"Were you given any recommendations for follow-up with the anticoagulation clinic?\" Yes - Anticoagulation clinic appointment is already scheduled at appropriate interval    Call Summary    \"What questions or concerns do you have about your recent visit and your follow-up care?\"     I just want to know why my R leg still hurts in the back when  I  am up and about. . Advice given: Let's get her in tomorrow to get checked.   Tomasa with Dr. Akers tomorrow at [facility]>     \"If you have questions or things don't continue to improve, we encourage you contact us through the main clinic number (give number).  Even if the clinic is not open, triage nurses are available 24/7 to help you.     We would like you to know that our clinic has extended hours (provide information).  We also have urgent care (provide details on closest location and hours/contact info)\"    \"Thank you for your time and take care!\"  GLORIA Francis                    "

## 2017-08-08 ENCOUNTER — TELEPHONE (OUTPATIENT)
Dept: ANTICOAGULATION | Facility: CLINIC | Age: 77
End: 2017-08-08

## 2017-08-08 ENCOUNTER — ANTICOAGULATION THERAPY VISIT (OUTPATIENT)
Dept: ANTICOAGULATION | Facility: CLINIC | Age: 77
End: 2017-08-08
Payer: MEDICARE

## 2017-08-08 ENCOUNTER — OFFICE VISIT (OUTPATIENT)
Dept: FAMILY MEDICINE | Facility: CLINIC | Age: 77
End: 2017-08-08
Payer: MEDICARE

## 2017-08-08 VITALS
BODY MASS INDEX: 29.89 KG/M2 | SYSTOLIC BLOOD PRESSURE: 164 MMHG | DIASTOLIC BLOOD PRESSURE: 102 MMHG | OXYGEN SATURATION: 98 % | HEART RATE: 71 BPM | TEMPERATURE: 98.3 F | RESPIRATION RATE: 20 BRPM | WEIGHT: 185.2 LBS

## 2017-08-08 DIAGNOSIS — Z79.01 LONG-TERM (CURRENT) USE OF ANTICOAGULANTS: ICD-10-CM

## 2017-08-08 DIAGNOSIS — M79.89 RIGHT LEG SWELLING: Primary | ICD-10-CM

## 2017-08-08 DIAGNOSIS — I26.99 PULMONARY EMBOLI (H): ICD-10-CM

## 2017-08-08 LAB — INR POINT OF CARE: 3.4 (ref 0.86–1.14)

## 2017-08-08 PROCEDURE — 36416 COLLJ CAPILLARY BLOOD SPEC: CPT

## 2017-08-08 PROCEDURE — 99207 ZZC NO CHARGE NURSE ONLY: CPT

## 2017-08-08 PROCEDURE — 85610 PROTHROMBIN TIME: CPT | Mod: QW

## 2017-08-08 PROCEDURE — 99213 OFFICE O/P EST LOW 20 MIN: CPT | Performed by: FAMILY MEDICINE

## 2017-08-08 ASSESSMENT — PATIENT HEALTH QUESTIONNAIRE - PHQ9
5. POOR APPETITE OR OVEREATING: NOT AT ALL
SUM OF ALL RESPONSES TO PHQ QUESTIONS 1-9: 0

## 2017-08-08 ASSESSMENT — ANXIETY QUESTIONNAIRES
1. FEELING NERVOUS, ANXIOUS, OR ON EDGE: NOT AT ALL
6. BECOMING EASILY ANNOYED OR IRRITABLE: NOT AT ALL
GAD7 TOTAL SCORE: 0
7. FEELING AFRAID AS IF SOMETHING AWFUL MIGHT HAPPEN: NOT AT ALL
IF YOU CHECKED OFF ANY PROBLEMS ON THIS QUESTIONNAIRE, HOW DIFFICULT HAVE THESE PROBLEMS MADE IT FOR YOU TO DO YOUR WORK, TAKE CARE OF THINGS AT HOME, OR GET ALONG WITH OTHER PEOPLE: NOT DIFFICULT AT ALL
5. BEING SO RESTLESS THAT IT IS HARD TO SIT STILL: NOT AT ALL
3. WORRYING TOO MUCH ABOUT DIFFERENT THINGS: NOT AT ALL
2. NOT BEING ABLE TO STOP OR CONTROL WORRYING: NOT AT ALL

## 2017-08-08 ASSESSMENT — PAIN SCALES - GENERAL: PAINLEVEL: NO PAIN (0)

## 2017-08-08 NOTE — MR AVS SNAPSHOT
"              After Visit Summary   8/8/2017    Tracy Palomo    MRN: [id number]           Patient Information     Date Of Birth          1940        Visit Information        Provider Department      8/8/2017 1:40 PM David Akers MD [facility]        Today's Diagnoses     Right leg swelling    -  1       Follow-ups after your visit        Your next 10 appointments already scheduled     Aug 22, 2017  1:30 PM CDT   Anticoagulation Visit with PH ANTI COAG   [facility]    [address] [place] [address] [phone]              Who to contact     If you have questions or need follow up information about today's clinic visit or your schedule please contact [facility] directly at [phone].  Normal or non-critical lab and imaging results will be communicated to you by MyChart, letter or phone within 4 business days after the clinic has received the results. If you do not hear from us within 7 days, please contact the clinic through MyChart or phone. If you have a critical or abnormal lab result, we will notify you by phone as soon as possible.  Submit refill requests through [employer] or call your pharmacy and they will forward the refill request to us. Please allow 3 business days for your refill to be completed.          Additional Information About Your Visit        MyChart Information     [employer] lets you send messages to your doctor, view your test results, renew your prescriptions, schedule appointments and more. To sign up, go to [email or web address] . Click on \"Log in\" on the left side of the screen, which will take you to the Welcome page. Then click on \"Sign up Now\" on the right side of the page.     You will be asked to enter the access code listed below, as well as some personal information. Please follow the directions to create your username and password.     Your access code is: " [email or web address]  Expires: 2017 10:08 PM     Your access code will  in 90 days. If you need help or a new code, please call your [facility] or [phone].        Care EveryWhere ID     This is your Care EveryWhere ID. This could be used by other organizations to access your [place] medical records  [id number]        Your Vitals Were     Pulse Temperature Respirations Pulse Oximetry BMI (Body Mass Index)       71 98.3  F (36.8  C) (Temporal) 20 98% 29.89 kg/m2        Blood Pressure from Last 3 Encounters:   17 (!) 164/102   17 144/81   17 130/80    Weight from Last 3 Encounters:   17 185 lb 3.2 oz (84 kg)   17 188 lb (85.3 kg)   17 183 lb 8 oz (83.2 kg)              Today, you had the following     No orders found for display       Primary Care Provider Office Phone # Fax #    Chad Betts [phone]       [facility] [address] [place] [address]        Equal Access to Services     [facility] AH: Hadii aad ku hadasho Soomaali, waaxda luqadaha, qaybta kaalmada adeegyada, waxay idiin hayaan adeeg madonnaarathelma ladoran ah. So [facility] [phone].    ATENCIÓN: Si habla español, tiene a dale disposición servicios gratuitos de asistencia lingüística. Llame al [phone].    We comply with applicable federal civil rights laws and [place] laws. We do not discriminate on the basis of race, color, national origin, age, disability sex, sexual orientation or gender identity.            Thank you!     Thank you for choosing [facility]  for your care. Our goal is always to provide you with excellent care. Hearing back from our patients is one way we can continue to improve our services. Please take a few minutes to complete the written survey that you may receive in the mail after your visit with us. Thank you!             Your Updated Medication List - Protect others around you: Learn how to safely use, store and throw away your  medicines at www.disposemymeds.org.          This list is accurate as of: 8/8/17  7:14 PM.  Always use your most recent med list.                   Brand Name Dispense Instructions for use Diagnosis    acetaminophen 325 MG tablet    TYLENOL    100 tablet    Take 3 tablets (975 mg) by mouth every 8 hours    Hip fracture, left, closed, initial encounter (H)       lisinopril 10 MG tablet    PRINIVIL/ZESTRIL    90 tablet    Take 1 tablet (10 mg) by mouth daily    Essential hypertension with goal blood pressure less than 140/90       lovastatin 40 MG tablet    MEVACOR    90 tablet    TAKE ONE TABLET BY MOUTH AT BEDTIME    Hyperlipidemia LDL goal <130       warfarin 5 MG tablet    COUMADIN    30 tablet    Whole and half pill per coumadin clinic    Long-term (current) use of anticoagulants

## 2017-08-08 NOTE — NURSING NOTE
"Chief Complaint   Patient presents with     ER F/U       Initial BP (!) 164/102 (BP Location: Right arm, Patient Position: Chair, Cuff Size: Adult Regular)  Pulse 71  Temp 98.3  F (36.8  C) (Temporal)  Resp 20  Wt 185 lb 3.2 oz (84 kg)  SpO2 98%  BMI 29.89 kg/m2 Estimated body mass index is 29.89 kg/(m^2) as calculated from the following:    Height as of 8/6/17: 5' 6\" (1.676 m).    Weight as of this encounter: 185 lb 3.2 oz (84 kg).  Medication Reconciliation: complete   Veronica Jasmine CMA     "

## 2017-08-08 NOTE — MR AVS SNAPSHOT
Tracy SAV Dewey   8/8/2017 9:30 AM   Anticoagulation Therapy Visit    Description:  77 year old female   Provider:  ERICK ANTI COAPATRICK   Department:  Ph Anticoag           INR as of 8/8/2017     Today's INR 3.4!      Anticoagulation Summary as of 8/8/2017     INR goal 2.0-3.0   Today's INR 3.4!   Full instructions 2.5 mg on Tue, Thu, Sat; 5 mg all other days   Next INR check 8/22/2017    Indications   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]  Long-term (current) use of anticoagulants [Z79.01] [Z79.01]         Your next Anticoagulation Clinic appointment(s)     Aug 22, 2017  1:30 PM CDT   [facility]agulation [facility] with PH ANTI COAG   [facility]    [address] [place] [id number]   [phone]              Contact Numbers     Clinic Number:         August 2017 Details    Sun Mon Tue Wed Thu Fri Sat       1               2               3               4               5                 6               7               8      2.5 mg   See details      9      5 mg         10      2.5 mg         11      5 mg         12      2.5 mg           13      5 mg         14      5 mg         15      2.5 mg         16      5 mg         17      2.5 mg         18      5 mg         19      2.5 mg           20      5 mg         21      5 mg         22            23               24               25               26                 27               28               29               30               31                  Date Details   08/08 This INR check       Date of next INR:  8/22/2017         How to take your warfarin dose     To take:  2.5 mg Take 0.5 of a 5 mg tablet.    To take:  5 mg Take 1 of the 5 mg tablets.

## 2017-08-08 NOTE — PROGRESS NOTES
ANTICOAGULATION FOLLOW-UP CLINIC VISIT    Patient Name:  Tracy Palomo  Date:  8/8/2017  Contact Type:  Face to Face    SUBJECTIVE:     Patient Findings     Positives Unexplained INR or factor level change    Comments Pt states that she's had lots of greens, and isn't sure why INR would be high.  Pt has an appt with Dr. Akers at 140 pm for RLE edema.  Advised pt to stop in after appt for dosing.  Pt agrees.  Pt wants to increase greens instead of adjusting dose if possible.           OBJECTIVE    INR Protime   Date Value Ref Range Status   08/08/2017 3.4 (A) 0.86 - 1.14 Final       ASSESSMENT / PLAN  INR assessment SUPRA    Recheck INR In: 2 WEEKS    INR Location Clinic      Anticoagulation Summary as of 8/8/2017     INR goal 2.0-3.0   Today's INR 3.4!   Maintenance plan 2.5 mg (5 mg x 0.5) on Tue, Thu, Sat; 5 mg (5 mg x 1) all other days   Full instructions 2.5 mg on Tue, Thu, Sat; 5 mg all other days   Weekly total 27.5 mg   No change documented Julio Cesar Santiago RN   Plan last modified Johanna Mckeon RN (3/28/2017)   Next INR check 8/22/2017   Target end date     Indications   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]  Long-term (current) use of anticoagulants [Z79.01] [Z79.01]         Anticoagulation Episode Summary     INR check location     Preferred lab     Send INR reminders to [facility]    Comments 5 mg tablets       Anticoagulation Care Providers     Provider Role Specialty Phone number    Gerard Medrano MD [facility] [phone]            See the Encounter Report to view Anticoagulation Flowsheet and Dosing Calendar (Go to Encounters tab in chart review, and find the Anticoagulation Therapy Visit)    Dosage adjustment made based on physician directed care plan.    Julio Cesar Santiago, GLORIA

## 2017-08-08 NOTE — TELEPHONE ENCOUNTER
Telephone call back from pt, advised as below.  Pt verbalized understanding and agrees to plan.    Julio Cesar Santiago RN, BSN

## 2017-08-08 NOTE — TELEPHONE ENCOUNTER
Pt saw Dr. Akers this afternoon after INR appt.  Pt was going to stop back in with INR nurse as new medications were a possibility with appt.  Pt was not prescribed anything.  Pt forgot to stop by after appt.  Telephone call attempted to pt x2, no answer.  Left message to call [facility] back.  Pt wanted to increase greens to bring INR down.  Pt can stay on current dose of 2.5 mg Tues, Thurs, Sat and 5 mg ROW with recheck on 8/22/17, and increase greens.      Julio Cesar Santiago RN, BSN

## 2017-08-08 NOTE — PROGRESS NOTES
SUBJECTIVE:                                                    Tracy Palomo is a 77 year old female who presents to clinic today for the following health issues:    Chief Complaint   Patient presents with     ER F/U        ED/UC Followup:    Facility:  [facility]  Date of visit: 08/06/2017  Reason for visit: Right lower leg pain, swelling  Current Status: States that the pain/swelling has gone down since she is resting with her leg up.      r leg swelling. Now better. Us no dvt. Same leg as baker cyst and old dvt. Wondering why she is feeling more weak in legs. Recently broke L hip and much less active. Steps are down 4,000, from 10,000.      ROS:  Constitutional, HEENT, cardiovascular, pulmonary, gi and gu systems are negative, except as otherwise noted.      OBJECTIVE:                                                    BP (!) 164/102 (BP Location: Right arm, Patient Position: Chair, Cuff Size: Adult Regular)  Pulse 71  Temp 98.3  F (36.8  C) (Temporal)  Resp 20  Wt 185 lb 3.2 oz (84 kg)  SpO2 98%  BMI 29.89 kg/m2  Body mass index is 29.89 kg/(m^2).    GENERAL: healthy, alert and no distress  NECK: no adenopathy, no asymmetry, masses, or scars and thyroid normal to palpation  RESP: lungs clear to auscultation - no rales, rhonchi or wheezes  CV: regular rate and rhythm, normal S1 S2, no S3 or S4, no murmur, click or rub, no peripheral edema and peripheral pulses strong. Right and left calves at the greatest circumference measure 41 cm.  ABDOMEN: soft, nontender, no hepatosplenomegaly, no masses and bowel sounds normal  MS: no gross musculoskeletal defects noted, no edema    Diagnostic Test Results:  none      ASSESSMENT/PLAN:                                                        ICD-10-CM    1. Right leg swelling M79.89      Patient with right leg swelling that is now resolved. She was in the ER and had a negative ultrasound. Her swelling sounds like it resolves with elevation. She does have a history of  a blood clot in this leg as well as a Baker's cyst, both of which can interfere with circulation. She is also been much less mobile after fracture left hip. I asked her to find an exercise that would help her maintain her strength and also allow her to improve her circulation. She may use supportive stockings as needed.    David Akers MD  [facility]

## 2017-08-09 ASSESSMENT — ANXIETY QUESTIONNAIRES: GAD7 TOTAL SCORE: 0

## 2017-08-30 ENCOUNTER — ANTICOAGULATION THERAPY VISIT (OUTPATIENT)
Dept: ANTICOAGULATION | Facility: CLINIC | Age: 77
End: 2017-08-30
Payer: MEDICARE

## 2017-08-30 DIAGNOSIS — Z79.01 LONG-TERM (CURRENT) USE OF ANTICOAGULANTS: ICD-10-CM

## 2017-08-30 DIAGNOSIS — I26.99 PULMONARY EMBOLI (H): ICD-10-CM

## 2017-08-30 LAB — INR POINT OF CARE: 3.5 (ref 0.86–1.14)

## 2017-08-30 PROCEDURE — 85610 PROTHROMBIN TIME: CPT | Mod: QW

## 2017-08-30 PROCEDURE — 36416 COLLJ CAPILLARY BLOOD SPEC: CPT

## 2017-08-30 PROCEDURE — 99207 ZZC NO CHARGE NURSE ONLY: CPT

## 2017-08-30 NOTE — PROGRESS NOTES
ANTICOAGULATION FOLLOW-UP CLINIC VISIT    Patient Name:  Tracy Palomo  Date:  8/30/2017  Contact Type:  Face to Face    SUBJECTIVE:     Patient Findings     Positives Unexplained INR or factor level change           OBJECTIVE    INR Protime   Date Value Ref Range Status   08/30/2017 3.5 (A) 0.86 - 1.14 Final       ASSESSMENT / PLAN  INR assessment SUPRA    Recheck INR In: 2 WEEKS    INR Location Clinic      Anticoagulation Summary as of 8/30/2017     INR goal 2.0-3.0   Today's INR 3.5!   Maintenance plan 5 mg (5 mg x 1) on Tue, Thu, Sat; 2.5 mg (5 mg x 0.5) all other days   Full instructions 5 mg on Tue, Thu, Sat; 2.5 mg all other days   Weekly total 25 mg   Plan last modified Johanna Mckeon RN (8/30/2017)   Next INR check 9/13/2017   Target end date     Indications   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]  Long-term (current) use of anticoagulants [Z79.01] [Z79.01]         Anticoagulation Episode Summary     INR check location     Preferred lab     Send INR reminders to [facility] POOL    Comments 5 mg tablets       Anticoagulation Care Providers     Provider Role Specialty Phone number    Gerard Medrano MD [facility] [phone]            See the Encounter Report to view Anticoagulation Flowsheet and Dosing Calendar (Go to Encounters tab in chart review, and find the Anticoagulation Therapy Visit)    Dosage adjustment made based on physician directed care plan.    Pt increased her greens since last visit and INR is still up. Will decrease dose slightly at this time.     Johanna Mckeon RN

## 2017-08-30 NOTE — MR AVS SNAPSHOT
Tracy ANG Dewey   8/30/2017 2:15 PM   Anticoagulation Therapy Visit    Description:  77 year old female   Provider:  ERICK ANTI COAG   Department:  Ph Anticoag           INR as of 8/30/2017     Today's INR 3.5!      Anticoagulation Summary as of 8/30/2017     INR goal 2.0-3.0   Today's INR 3.5!   Full instructions 5 mg on Tue, Thu, Sat; 2.5 mg all other days   Next INR check 9/13/2017    Indications   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]  Long-term (current) use of anticoagulants [Z79.01] [Z79.01]         Your next Anticoagulation Clinic appointment(s)     Sep 13, 2017  1:30 PM CDT   Anticoagulation Visit with ERICK ANTI MARTIN   [facility]    [address] [place] [address]   [phone]              Contact Numbers     Clinic Number:         August 2017 Details    Sun Mon Tue Wed Thu Fri Sat       1               2               3               4               5                 6               7               8               9               10               11               12                 13               14               15               16               17               18               19                 20               21               22               23               24               25               26                 27               28               29               30      2.5 mg   See details      31      5 mg            Date Details   08/30 This INR check               How to take your warfarin dose     To take:  2.5 mg Take 0.5 of a 5 mg tablet.    To take:  5 mg Take 1 of the 5 mg tablets.           September 2017 Details    Sun Mon Tue Wed Thu Fri Sat          1      2.5 mg         2      5 mg           3      2.5 mg         4      2.5 mg         5      5 mg         6      2.5 mg         7      5 mg         8      2.5 mg         9      5 mg           10      2.5 mg         11      2.5 mg         12       5 mg         13            14               15               16                 17               18               19               20               21               22               23                 24               25               26               27               28               29               30                Date Details   No additional details    Date of next INR:  9/13/2017         How to take your warfarin dose     To take:  2.5 mg Take 0.5 of a 5 mg tablet.    To take:  5 mg Take 1 of the 5 mg tablets.

## 2017-09-13 ENCOUNTER — ANTICOAGULATION THERAPY VISIT (OUTPATIENT)
Dept: ANTICOAGULATION | Facility: CLINIC | Age: 77
End: 2017-09-13
Payer: MEDICARE

## 2017-09-13 ENCOUNTER — OFFICE VISIT (OUTPATIENT)
Dept: ORTHOPEDICS | Facility: CLINIC | Age: 77
End: 2017-09-13
Payer: MEDICARE

## 2017-09-13 ENCOUNTER — RADIANT APPOINTMENT (OUTPATIENT)
Dept: GENERAL RADIOLOGY | Facility: CLINIC | Age: 77
End: 2017-09-13
Attending: ORTHOPAEDIC SURGERY
Payer: MEDICARE

## 2017-09-13 VITALS — TEMPERATURE: 97 F | HEIGHT: 66 IN | BODY MASS INDEX: 29.36 KG/M2 | WEIGHT: 182.7 LBS

## 2017-09-13 DIAGNOSIS — S72.002A: ICD-10-CM

## 2017-09-13 DIAGNOSIS — Z96.642 HISTORY OF HEMIARTHROPLASTY OF LEFT HIP: ICD-10-CM

## 2017-09-13 DIAGNOSIS — S72.002D: Primary | ICD-10-CM

## 2017-09-13 DIAGNOSIS — Z79.01 LONG-TERM (CURRENT) USE OF ANTICOAGULANTS: ICD-10-CM

## 2017-09-13 DIAGNOSIS — I26.99 PULMONARY EMBOLI (H): ICD-10-CM

## 2017-09-13 LAB — INR POINT OF CARE: 2.4 (ref 0.86–1.14)

## 2017-09-13 PROCEDURE — 73502 X-RAY EXAM HIP UNI 2-3 VIEWS: CPT | Mod: TC

## 2017-09-13 PROCEDURE — 99213 OFFICE O/P EST LOW 20 MIN: CPT | Performed by: ORTHOPAEDIC SURGERY

## 2017-09-13 PROCEDURE — 99207 ZZC NO CHARGE NURSE ONLY: CPT

## 2017-09-13 PROCEDURE — 36416 COLLJ CAPILLARY BLOOD SPEC: CPT

## 2017-09-13 PROCEDURE — 85610 PROTHROMBIN TIME: CPT | Mod: QW

## 2017-09-13 ASSESSMENT — PAIN SCALES - GENERAL: PAINLEVEL: SEVERE PAIN (6)

## 2017-09-13 NOTE — MR AVS SNAPSHOT
"              After Visit Summary   9/13/2017    Tracy Palomo    MRN: [id number]           Patient Information     Date Of Birth          1940        Visit Information        Provider Department      9/13/2017 1:00 PM Kaleb Pang DO [facility]        Today's Diagnoses     Fractured hip, left, closed, initial encounter (H)    -  1       Follow-ups after your visit        Your next 10 appointments already scheduled     Sep 13, 2017  1:00 PM CDT   Return Visit with Kaleb Pang DO   [facility] ([facility]    [address] [place] [id number] [phone]            Sep 13, 2017  1:30 PM CDT   Anticoagulation Visit with PH ANTI COAG   [facility] ([address] [address] [phone]              Who to contact     If you have questions or need follow up information about today's clinic visit or your schedule please contact [facility] directly at [phone].  Normal or non-critical lab and imaging results will be communicated to you by [employer], letter or phone within 4 business days after the clinic has received the results. If you do not hear from us within 7 days, please contact the clinic through [employer] or phone. If you have a critical or abnormal lab result, we will notify you by phone as soon as possible.  Submit refill requests through [employer] or call your pharmacy and they will forward the refill request to us. Please allow 3 business days for your refill to be completed.          Additional Information About Your Visit        [employer]Particlehart Information     [employer] lets you send messages to your doctor, view your test results, renew your prescriptions, schedule appointments and more. To sign up, go to [email or web address] . Click on \"Log in\" on the left side of the screen, which will take you to the Welcome page. Then click on \"Sign up " "Now\" on the right side of the page.     You will be asked to enter the access code listed below, as well as some personal information. Please follow the directions to create your username and password.     Your access code is: [email or web address]  Expires: 2017 10:08 PM     Your access code will  in 90 days. If you need help or a new code, please call your [place] clinic or [phone].        Care EveryWhere ID     This is your Care EveryWhere ID. This could be used by other organizations to access your [place] medical records  [id number]        Your Vitals Were     Temperature Height BMI (Body Mass Index)             97  F (36.1  C) (Temporal) 1.676 m (5' 6\") 29.49 kg/m2          Blood Pressure from Last 3 Encounters:   17 (!) 164/102   17 144/81   17 130/80    Weight from Last 3 Encounters:   17 82.9 kg (182 lb 11.2 oz)   17 84 kg (185 lb 3.2 oz)   17 85.3 kg (188 lb)               Primary Care Provider Office Phone # Fax #    Chad Betts [phone]       [facility] [address] [place] [address]        Equal Access to Services     [facility] AH: Hadii aad ku hadasho Soomaali, waaxda luqadaha, qaybta kaalmada adeegyada, waxmaury holguin. So [facility] [phone].    ATENCIÓN: Si habla español, tiene a dale disposición servicios gratuitos de asistencia lingüística. Llame al [phone].    We comply with applicable federal civil rights laws and [place] laws. We do not discriminate on the basis of race, color, national origin, age, disability sex, sexual orientation or gender identity.            Thank you!     Thank you for choosing [facility]  for your care. Our goal is always to provide you with excellent care. Hearing back from our patients is one way we can continue to improve our services. Please take a few minutes to complete the written survey that you may receive in the mail after " your visit with us. Thank you!             Your Updated Medication List - Protect others around you: Learn how to safely use, store and throw away your medicines at www.disposemymeds.org.          This list is accurate as of: 9/13/17 12:57 PM.  Always use your most recent med list.                   Brand Name Dispense Instructions for use Diagnosis    acetaminophen 325 MG tablet    TYLENOL    100 tablet    Take 3 tablets (975 mg) by mouth every 8 hours    Hip fracture, left, closed, initial encounter (H)       lisinopril 10 MG tablet    PRINIVIL/ZESTRIL    90 tablet    Take 1 tablet (10 mg) by mouth daily    Essential hypertension with goal blood pressure less than 140/90       lovastatin 40 MG tablet    MEVACOR    90 tablet    TAKE ONE TABLET BY MOUTH AT BEDTIME    Hyperlipidemia LDL goal <130       warfarin 5 MG tablet    COUMADIN    30 tablet    Whole and half pill per coumadin clinic    Long-term (current) use of anticoagulants

## 2017-09-13 NOTE — NURSING NOTE
"Chief Complaint   Patient presents with     RECHECK     left hip follow up     Surgical Followup     DOS: 3/16/17~Left hip hemiarthroplasty~6 months postop       Initial Temp 97  F (36.1  C) (Temporal)  Ht 1.676 m (5' 6\")  Wt 82.9 kg (182 lb 11.2 oz)  BMI 29.49 kg/m2 Estimated body mass index is 29.49 kg/(m^2) as calculated from the following:    Height as of this encounter: 1.676 m (5' 6\").    Weight as of this encounter: 82.9 kg (182 lb 11.2 oz).  Medication Reconciliation: complete    "

## 2017-09-13 NOTE — PROGRESS NOTES
ANTICOAGULATION FOLLOW-UP CLINIC VISIT    Patient Name:  Tracy Palomo  Date:  9/13/2017  Contact Type:  Face to Face    SUBJECTIVE:     Patient Findings     Positives No Problem Findings           OBJECTIVE    INR Protime   Date Value Ref Range Status   09/13/2017 2.4 (A) 0.86 - 1.14 Final       ASSESSMENT / PLAN  INR assessment THER    Recheck INR In: 3 WEEKS    INR Location Clinic      Anticoagulation Summary as of 9/13/2017     INR goal 2.0-3.0   Today's INR 2.4   Maintenance plan 5 mg (5 mg x 1) on Tue, Thu, Sat; 2.5 mg (5 mg x 0.5) all other days   Full instructions 5 mg on Tue, Thu, Sat; 2.5 mg all other days   Weekly total 25 mg   No change documented Johanna Mckeon RN   Plan last modified Johanna Mckeon RN (8/30/2017)   Next INR check 10/3/2017   Target end date     Indications   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]  Long-term (current) use of anticoagulants [Z79.01] [Z79.01]         Anticoagulation Episode Summary     INR check location     Preferred lab     Send INR reminders to [facility] POOL    Comments 5 mg tablets       Anticoagulation Care Providers     Provider Role Specialty Phone number    Gerard Medrano MD [facility] [phone]            See the Encounter Report to view Anticoagulation Flowsheet and Dosing Calendar (Go to Encounters tab in chart review, and find the Anticoagulation Therapy Visit)    Dosage adjustment made based on physician directed care plan.      Johanna Mckeon RN

## 2017-09-13 NOTE — LETTER
"    9/13/2017         RE: Tracy Palomo  [address] [place] [id number]        Dear Colleague,    Thank you for referring your patient, Tracy Palomo, to the [facility]. Please see a copy of my visit note below.    Orthopedic Clinic Post-Operative Note    CHIEF COMPLAINT:   Chief Complaint   Patient presents with     RECHECK     left hip follow up     Surgical Followup     DOS: 3/16/17~Left hip hemiarthroplasty~6 months postop       HISTORY OF PRESENT ILLNESS  Per patient since last visit, she has had increasing left hip pain that is described as deep into the groin and occasional left anterior thigh pain.  Per patient however she is also much more active then she has been. She notes that when she walks more than 4000 steps a day she will \"feel it\" in the evening.  She also notes that some activities like swimming can cause pain. No weakness, no numbness/tingling no new symptoms.  Is doing at home exercises for stretching but no strengthening.  Patient discouraged that she is having increasing pain but also reports she is able to do much more activity.     She was also seen in August in the ED for right leg pain, she states that completely resolved and is doing well.     Patient's past medical, surgical, social and family histories reviewed.     Past Medical History:   Diagnosis Date     Atrial fibrillation (H) 2014    related to colon surgery     Colon polyps      Diverticulosis of colon (without mention of hemorrhage)     Diverticulosis     DVT (deep vein thrombosis) in pregnancy (H) 2014    after colon surgery     Other and unspecified hyperlipidemia      PE (pulmonary embolism) 2014    related to colon surgery     Unspecified essential hypertension        Past Surgical History:   Procedure Laterality Date     COLONOSCOPY  09, 10, 12    [facility]     FLEXIBLE SIGMOIDOSCOPY  09, 11     LAPAROTOMY EXPLORATORY N/A 10/20/2014    Procedure: LAPAROTOMY EXPLORATORY;  Surgeon: " "Miguel Oleary MD;  Location: [facility] OR     LAPAROTOMY, LYSIS ADHESIONS, COMBINED N/A 10/20/2014    Procedure: COMBINED LAPAROTOMY, LYSIS ADHESIONS;  Surgeon: Miguel Oleary MD;  Location: [facility] OR     OPEN REDUCTION INTERNAL FIXATION HIP BIPOLAR Left 3/16/2017    Procedure: OPEN REDUCTION INTERNAL FIXATION HIP BIPOLAR;  Surgeon: Kaleb Pang DO;  Location: [facility] OR     RESECT SMALL BOWEL WITHOUT OSTOMY N/A 10/20/2014    Procedure: RESECT SMALL BOWEL WITHOUT OSTOMY;  Surgeon: Miguel Oleary MD;  Location: [facility] OR       Medications:    Current Outpatient Prescriptions on File Prior to Visit:  warfarin (COUMADIN) 5 MG tablet Whole and half pill per coumadin clinic   acetaminophen (TYLENOL) 325 MG tablet Take 3 tablets (975 mg) by mouth every 8 hours (Patient not taking: Reported on 8/8/2017)   lisinopril (PRINIVIL,ZESTRIL) 10 MG tablet Take 1 tablet (10 mg) by mouth daily   lovastatin (MEVACOR) 40 MG tablet TAKE ONE TABLET BY MOUTH AT BEDTIME     No current facility-administered medications on file prior to visit.     Allergies   Allergen Reactions     No Known Allergies        Social History     Occupational History     Not on file.     Social History Main Topics     Smoking status: Never Smoker     Smokeless tobacco: Never Used     Alcohol use No     Drug use: No     Sexual activity: Yes     Partners: Male       Family History   Problem Relation Age of Onset     Blood Disease No family hx of      blood clots       REVIEW OF SYSTEMS  General: negative for, night sweats, dizziness, fatigue  Resp: No shortness of breath and no cough  CV: negative for chest pain, syncope or near-syncope  GI: negative for nausea, vomiting and diarrhea  : negative for dysuria and hematuria  Musculoskeletal: as above  Neurologic: negative for syncope   Hematologic: negative for bleeding disorder    Physical Exam:  Vitals: Temp 97  F (36.1  C) (Temporal)  Ht 1.676 m (5' 6\")  Wt 82.9 kg (182 lb 11.2 oz)  BMI 29.49 " kg/m2  BMI= Body mass index is 29.49 kg/(m^2).  Constitutional: healthy, alert and no acute distress   Psychiatric: mentation appears normal and affect normal/bright  NEURO: no focal deficits  SKIN: .healing well, well approximated skin edges, without signs of infection including no erythema, incision breakdown or purlent drainage  JOINT/EXTREMITIES: Hip Exam: Palpation: Tender: no areas of focal tenderness.  Greater trochanteric is non-tender.   Range of Motion:  Full ROM to hip flexion, abd, adduction, internal and external rotation.  Mild pain with internal rotation  Strength:  Strength is present to left hip flexion against resistance, slightly weaker when compared to right.  GAIT: not tested     Diagnostic Modalities:  pelvis X-ray: The prosthesis has acceptable alignment. No fractures or dislocations. Prosthesis is well seated with no evidence of loosening. Hemiarthroplasty in place.  Independent visualization of the images was performed.      Impression: Chief Complaint   Patient presents with     RECHECK     left hip follow up     Surgical Followup     DOS: 3/16/17~Left hip hemiarthroplasty~6 months postop   Overall recovering well. More active but also more pain.  Has no at home strengthening program.  Pain likely related to increasing activity and possible acetabular cartilage wear    Plan:   Activity: as tolerated  Staples/Sutures out: Not applicable.  Pain controlled: Yes. Continue to use: Ice, Ibuprofen  Immobilzation: No  Physical Therapy: Begin/continue physical therapy. Work on: , strengthening, core and left hip.   Will send patient to PT to learn HEP for core and left hip strengthening  Rest, Ice, Compression  Return to clinic 4-6 , week(s), or sooner as needed for changes.    Re-x-ray on return: No    Scribed by:  Jared Montiel, APRN, CNP, DNP  1:16 PM  9/13/2017    I attest I have seen and evaluated the patient.  I agree with above impression and plan.  Vitaly Pang D.O.    Again, thank you for  allowing me to participate in the care of your patient.        Sincerely,        Kaleb Pang, DO

## 2017-09-13 NOTE — MR AVS SNAPSHOT
Tracy J Dewey   9/13/2017 1:30 PM   Anticoagulation Therapy Visit    Description:  77 year old female   Provider:  [facility]   Department:  [facility] Anticoag           INR as of 9/13/2017     Today's INR 2.4      Anticoagulation Summary as of 9/13/2017     INR goal 2.0-3.0   Today's INR 2.4   Full instructions 5 mg on Tue, Thu, Sat; 2.5 mg all other days   Next INR check 10/3/2017    Indications   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]  Long-term (current) use of anticoagulants [Z79.01] [Z79.01]         Your next Anticoagulation Clinic appointment(s)     Sep 13, 2017  1:30 PM CDT   Anticoagulation Visit with [facility]   [facility] ([address] [address]   [phone]            Oct 04, 2017  8:30 AM CDT   Anticoagulation Visit with [facility]   [facility] ([address] [address]   [phone]              Contact Numbers     Clinic Number:         September 2017 Details    Sun Mon Tue Wed Thu Fri Sat          1               2                 3               4               5               6               7               8               9                 10               11               12               13      2.5 mg   See details      14      5 mg         15      2.5 mg         16      5 mg           17      2.5 mg         18      2.5 mg         19      5 mg         20      2.5 mg         21      5 mg         22      2.5 mg         23      5 mg           24      2.5 mg         25      2.5 mg         26      5 mg         27      2.5 mg         28      5 mg         29      2.5 mg         30      5 mg          Date Details   09/13 This INR check               How to take your warfarin dose     To take:  2.5 mg Take 0.5 of a 5 mg tablet.    To take:  5 mg Take 1 of the 5 mg tablets.           October 2017 Details    Sun Mon Tue Wed Thu  Fri Sat     1      2.5 mg         2      2.5 mg         3            4               5               6               7                 8               9               10               11               12               13               14                 15               16               17               18               19               20               21                 22               23               24               25               26               27               28                 29               30               31                    Date Details   No additional details    Date of next INR:  10/3/2017         How to take your warfarin dose     To take:  2.5 mg Take 0.5 of a 5 mg tablet.    To take:  5 mg Take 1 of the 5 mg tablets.

## 2017-09-13 NOTE — PROGRESS NOTES
"Orthopedic Clinic Post-Operative Note    CHIEF COMPLAINT:   Chief Complaint   Patient presents with     RECHECK     left hip follow up     Surgical Followup     DOS: 3/16/17~Left hip hemiarthroplasty~6 months postop       HISTORY OF PRESENT ILLNESS  Per patient since last visit, she has had increasing left hip pain that is described as deep into the groin and occasional left anterior thigh pain.  Per patient however she is also much more active then she has been. She notes that when she walks more than 4000 steps a day she will \"feel it\" in the evening.  She also notes that some activities like swimming can cause pain. No weakness, no numbness/tingling no new symptoms.  Is doing at home exercises for stretching but no strengthening.  Patient discouraged that she is having increasing pain but also reports she is able to do much more activity.     She was also seen in August in the ED for right leg pain, she states that completely resolved and is doing well.     Patient's past medical, surgical, social and family histories reviewed.     Past Medical History:   Diagnosis Date     Atrial fibrillation (H) 2014    related to colon surgery     Colon polyps      Diverticulosis of colon (without mention of hemorrhage)     Diverticulosis     DVT (deep vein thrombosis) in pregnancy (H) 2014    after colon surgery     Other and unspecified hyperlipidemia      PE (pulmonary embolism) 2014    related to colon surgery     Unspecified essential hypertension        Past Surgical History:   Procedure Laterality Date     COLONOSCOPY  09, 10, 12    [facility]     FLEXIBLE SIGMOIDOSCOPY  09, 11     LAPAROTOMY EXPLORATORY N/A 10/20/2014    Procedure: LAPAROTOMY EXPLORATORY;  Surgeon: Miguel Oleary MD;  [facility] OR     LAPAROTOMY, LYSIS ADHESIONS, COMBINED N/A 10/20/2014    Procedure: COMBINED LAPAROTOMY, LYSIS ADHESIONS;  Surgeon: Miguel Oleary MD;  Location: [facility] OR     OPEN REDUCTION INTERNAL FIXATION HIP " "BIPOLAR Left 3/16/2017    Procedure: OPEN REDUCTION INTERNAL FIXATION HIP BIPOLAR;  Surgeon: Kaleb Pang DO;  Location: [facility] OR     RESECT SMALL BOWEL WITHOUT OSTOMY N/A 10/20/2014    Procedure: RESECT SMALL BOWEL WITHOUT OSTOMY;  Surgeon: Miguel Oleary MD;  Location: [facility] OR       Medications:    Current Outpatient Prescriptions on File Prior to Visit:  warfarin (COUMADIN) 5 MG tablet Whole and half pill per coumadin clinic   acetaminophen (TYLENOL) 325 MG tablet Take 3 tablets (975 mg) by mouth every 8 hours (Patient not taking: Reported on 8/8/2017)   lisinopril (PRINIVIL,ZESTRIL) 10 MG tablet Take 1 tablet (10 mg) by mouth daily   lovastatin (MEVACOR) 40 MG tablet TAKE ONE TABLET BY MOUTH AT BEDTIME     No current facility-administered medications on file prior to visit.     Allergies   Allergen Reactions     No Known Allergies        Social History     Occupational History     Not on file.     Social History Main Topics     Smoking status: Never Smoker     Smokeless tobacco: Never Used     Alcohol use No     Drug use: No     Sexual activity: Yes     Partners: Male       Family History   Problem Relation Age of Onset     Blood Disease No family hx of      blood clots       REVIEW OF SYSTEMS  General: negative for, night sweats, dizziness, fatigue  Resp: No shortness of breath and no cough  CV: negative for chest pain, syncope or near-syncope  GI: negative for nausea, vomiting and diarrhea  : negative for dysuria and hematuria  Musculoskeletal: as above  Neurologic: negative for syncope   Hematologic: negative for bleeding disorder    Physical Exam:  Vitals: Temp 97  F (36.1  C) (Temporal)  Ht 1.676 m (5' 6\")  Wt 82.9 kg (182 lb 11.2 oz)  BMI 29.49 kg/m2  BMI= Body mass index is 29.49 kg/(m^2).  Constitutional: healthy, alert and no acute distress   Psychiatric: mentation appears normal and affect normal/bright  NEURO: no focal deficits  SKIN: .healing well, well approximated skin edges, " without signs of infection including no erythema, incision breakdown or purlent drainage  JOINT/EXTREMITIES: Hip Exam: Palpation: Tender: no areas of focal tenderness.  Greater trochanteric is non-tender.   Range of Motion:  Full ROM to hip flexion, abd, adduction, internal and external rotation.  Mild pain with internal rotation  Strength:  Strength is present to left hip flexion against resistance, slightly weaker when compared to right.  GAIT: not tested     Diagnostic Modalities:  pelvis X-ray: The prosthesis has acceptable alignment. No fractures or dislocations. Prosthesis is well seated with no evidence of loosening. Hemiarthroplasty in place.  Independent visualization of the images was performed.      Impression: Chief Complaint   Patient presents with     RECHECK     left hip follow up     Surgical Followup     DOS: 3/16/17~Left hip hemiarthroplasty~6 months postop   Overall recovering well. More active but also more pain.  Has no at home strengthening program.  Pain likely related to increasing activity and possible acetabular cartilage wear    Plan:   Activity: as tolerated  Staples/Sutures out: Not applicable.  Pain controlled: Yes. Continue to use: Ice, Ibuprofen  Immobilzation: No  Physical Therapy: Begin/continue physical therapy. Work on: , strengthening, core and left hip.   Will send patient to PT to learn HEP for core and left hip strengthening  Rest, Ice, Compression  Return to clinic 4-6 , week(s), or sooner as needed for changes.    Re-x-ray on return: No    Scribed by:  BILLY Hernandez, CNP, DNP  1:16 PM  9/13/2017    I attest I have seen and evaluated the patient.  I agree with above impression and plan.  Vitaly Pang D.O.

## 2017-09-19 ENCOUNTER — HOSPITAL ENCOUNTER (OUTPATIENT)
Dept: PHYSICAL THERAPY | Facility: CLINIC | Age: 77
Setting detail: THERAPIES SERIES
End: 2017-09-19
Attending: NURSE PRACTITIONER
Payer: MEDICARE

## 2017-09-19 PROCEDURE — 40000718 ZZHC STATISTIC PT DEPARTMENT ORTHO VISIT: Performed by: PHYSICAL THERAPIST

## 2017-09-19 PROCEDURE — 97112 NEUROMUSCULAR REEDUCATION: CPT | Mod: GP | Performed by: PHYSICAL THERAPIST

## 2017-09-19 PROCEDURE — G8979 MOBILITY GOAL STATUS: HCPCS | Mod: GP,CI | Performed by: PHYSICAL THERAPIST

## 2017-09-19 PROCEDURE — 97162 PT EVAL MOD COMPLEX 30 MIN: CPT | Mod: GP | Performed by: PHYSICAL THERAPIST

## 2017-09-19 PROCEDURE — G8978 MOBILITY CURRENT STATUS: HCPCS | Mod: GP,CJ | Performed by: PHYSICAL THERAPIST

## 2017-09-19 NOTE — PROGRESS NOTES
[facility]          OUTPATIENT PHYSICAL THERAPY ORTHOPEDIC EVALUATION  PLAN OF TREATMENT FOR OUTPATIENT REHABILITATION  (COMPLETE FOR INITIAL CLAIMS ONLY)  Patient's Last Name, First Name, M.I.  YOB: 1940  Tracy Palomo    Provider s Name:  [facility]   Medical Record No.  [id number]   Start of Care Date:  09/19/17   Onset Date:  03/16/17 (fall, 3-17-17 surgery)   Type:     _X__PT   ___OT   ___SLP Medical Diagnosis:  Fracture left hip closed with routine healing     PT Diagnosis:  Decreased abdominal strength, antalgic gait and SIJ dysfunction with L hemiarthtoplasty   Visits from SOC:  1      _________________________________________________________________________________  Plan of Treatment/Functional Goals:     Check leg strength next session     heat or cool at home as needed  Goals  Goal Identifier: Walking  Goal Description: Tracy will be able to walk her 10,000 steps for exercise, shopping and getting around her community using pacing as needed for symptoms   Target Date: 10/27/17       Therapy Frequency:     Predicted Duration of Therapy Intervention:  1 time per week x 4 weeks    Carmen Cooper, PT                 I CERTIFY THE NEED FOR THESE SERVICES FURNISHED UNDER        THIS PLAN OF TREATMENT AND WHILE UNDER MY CARE     (Physician co-signature of this document indicates review and certification of the therapy plan).                         Certification Date From:  09/19/17   Certification Date To:  10/16/17    Referring Provider:  Dr. Kaleb Pang    Initial Assessment        See Epic Evaluation Start of Care Date: 09/19/17

## 2017-09-19 NOTE — PROGRESS NOTES
09/19/17 0752   General Information   Type of Visit Initial OP Ortho PT Evaluation   Start of Care Date 09/19/17   Referring Physician Dr. Kaleb Pang   Patient/Family Goals Statement Wants to see what is really going on and hopefully avoid another surgery.    Orders Evaluate and Treat   Orders Comment HEP for core and left leg strengthening   Date of Order 09/13/17   Insurance Type Medicare;Other   Insurance Comments/Visits Authorized [employer], cap of $1275 available   Medical Diagnosis Fracture left hip closed with routine healing   Surgical/Medical history reviewed Yes   Precautions/Limitations no known precautions/limitations   Weight-Bearing Status - LUE full weight-bearing   Weight-Bearing Status - RUE full weight-bearing   Weight-Bearing Status - LLE full weight-bearing   Weight-Bearing Status - RLE full weight-bearing       Present No   Body Part(s)   Body Part(s) Hip   Presentation and Etiology   Pertinent history of current problem (include personal factors and/or comorbidities that impact the POC) 78 yo female who slipped and fell on the ice at home sustaining a displaced transverse fracture of the cervical proximal femur. She underwent a L hip hemiplasty on 3-16-17. She has been working on her ROM exercises but has not completed strengthening. She has noticed an increase in symptoms with her activities. She had R hip issues as well and these resolved. She can walk 4,000 steps and over 7,000 steps per day she gets sore and is down for a day. She cannot do what she wants to. She usually gets 10,000 steps in per day. She cannot pivot on her L foot. She is participating in Silver Sneakers and she then goes into the hot tub. She is cmopleting the rubbing that Dr. Pang taught her and this helps.    Symptom Location L groin and along hip adductor, posterior hip symptoms when donning/doffing socks with hip in ER   How/Where did it occur With a fall   Onset date of current  episode/exacerbation 03/16/17  (fall, 3-17-17 surgery)   Chronicity New   Pain rating (0-10 point scale) Other   Pain rating comment Now: 0/10 increasing to 3/10-4/10   Pain quality A. Sharp   Frequency of pain/symptoms B. Intermittent   Pain/symptoms exacerbated by M. Other   Pain exacerbation comment Standing in one place, rolling in bed, ER of L hip to will/doff socks, walking,    Pain/symptoms eased by E. Changing positions;A. Sitting;C. Rest;K. Other   Pain eased by comment Ibuprofen   Progression of symptoms since onset: Improved   Prior Level of Function   Functional Level Prior Comment very active and independent with cares and chores   Current Level of Function   Current Community Support Family/friend caregiver  (lives alone)   Patient role/employment history F. Retired   [facility]   Home/community accessibility no concerns    Current equipment-Gait/Locomotion Walker;Standard cane   Current equipment-ADL Sock aide   Fall Risk Screen   Fall screen completed by PT   Per patient - Fall 2 or more times in past year? No   Per patient - Fall with injury in past year? Yes   Is patient a fall risk? No   Fall screen comments Situational fall - slipped on ice   Functional Scales   Functional Scales Other   Other Scales  LEFS: 37/80   Hip Objective Findings   Integumentary  Incision is well healed   Gait/Locomotion antalgic gait L   Hip ROM Comments Supine hip AROM (R/L): flexion: 3 degrees- 95 degrees/7 degrees -95 degrees. Knee AROM: extension: -12 degrees/7 degrees   Observation moves in chair but no distress   Pelvic Screen Positive distraction of posterior L SIJ, standing flexion: tightness L SIJ, tender with palpation L SIJ   Hip/Knee Strength Comments Decreased lower abdominal strength   Planned Therapy Interventions   Planned Therapy Interventions Comment Check leg strength next session   Planned Modality Interventions   Planned Modality Interventions Comments heat or cool at home as  needed   Clinical Impression   Criteria for Skilled Therapeutic Interventions Met yes, treatment indicated   PT Diagnosis Decreased abdominal strength, antalgic gait and SIJ dysfunction with L hemiarthtoplasty   Influenced by the following impairments SIJ dysfunction, contracture of R>L knees creating hip flexor tightness and increased trunk flexion in standing   Functional limitations due to impairments standing, walking, rolling in bed supine<->sit   Clinical Presentation Evolving/Changing   Clinical Presentation Rationale L SIJ, L hip, knee contracture, decreased ability to engage lower abdominal muscles   Clinical Decision Making (Complexity) Moderate complexity   Predicted Duration of Therapy Intervention (days/wks) 1 time per week x 4 weeks   Risk & Benefits of therapy have been explained Yes   Patient, Family & other staff in agreement with plan of care Yes   Clinical Impression Comments 78 yo female who reports knee issues bilaterally as well as L hip pain following hemiarhtroplasty. She is normally quite active and wants to retyrn to Silver Sneakers and walking her 10,000 steps per day. Inaddition, she had been getting onto the floor to complete prone plank exericises. I recommended she return to her exercise group using pacing to manage symptoms and hold on the core at this time. She has a posterior L innominant easily corrected with muscle energy techniques and this decreased some of her pain with walking. She is motivated to workout and we agred to 1 time per week for home program. Of interest, she has had to change her shoes to foam vs harder shank and she can stand longer on grassy surfaces vs firmer such as cement or tar.    Education Assessment   Preferred Learning Style Reading   Barriers to Learning No barriers   ORTHO GOALS   PT Ortho Eval Goals 1   Ortho Goal 1   Goal Identifier Walking   Goal Description Tracy will be able to walk her 10,000 steps for exercise, shopping and getting around her  [facility] using pacing as needed for symptoms    Target Date 10/27/17   Total Evaluation Time   Total Evaluation Time 20   Therapy Certification   Certification date from 09/19/17   Certification date to 10/16/17   Medical Diagnosis Fracture left hip closed with routine healing   Thank you for referring Tracy  to [facility]    Carmen Cooper, PT  [phone]

## 2017-09-25 ENCOUNTER — HOSPITAL ENCOUNTER (OUTPATIENT)
Dept: PHYSICAL THERAPY | Facility: CLINIC | Age: 77
Setting detail: THERAPIES SERIES
End: 2017-09-25
Attending: ORTHOPAEDIC SURGERY
Payer: MEDICARE

## 2017-09-25 PROCEDURE — 97110 THERAPEUTIC EXERCISES: CPT | Mod: GP | Performed by: PHYSICAL THERAPIST

## 2017-09-25 PROCEDURE — 40000718 ZZHC STATISTIC PT DEPARTMENT ORTHO VISIT: Performed by: PHYSICAL THERAPIST

## 2017-09-25 PROCEDURE — 97112 NEUROMUSCULAR REEDUCATION: CPT | Mod: GP | Performed by: PHYSICAL THERAPIST

## 2017-10-04 ENCOUNTER — ANTICOAGULATION THERAPY VISIT (OUTPATIENT)
Dept: ANTICOAGULATION | Facility: CLINIC | Age: 77
End: 2017-10-04
Payer: MEDICARE

## 2017-10-04 DIAGNOSIS — I26.99 PULMONARY EMBOLI (H): ICD-10-CM

## 2017-10-04 DIAGNOSIS — Z79.01 LONG-TERM (CURRENT) USE OF ANTICOAGULANTS: ICD-10-CM

## 2017-10-04 LAB — INR POINT OF CARE: 2.1 (ref 0.86–1.14)

## 2017-10-04 PROCEDURE — 99207 ZZC NO CHARGE NURSE ONLY: CPT

## 2017-10-04 PROCEDURE — 36416 COLLJ CAPILLARY BLOOD SPEC: CPT

## 2017-10-04 PROCEDURE — 85610 PROTHROMBIN TIME: CPT | Mod: QW

## 2017-10-04 RX ORDER — WARFARIN SODIUM 5 MG/1
TABLET ORAL
Qty: 30 TABLET | COMMUNITY
Start: 2017-10-04 | End: 2018-05-02 | Stop reason: DRUGHIGH

## 2017-10-04 NOTE — PROGRESS NOTES
ANTICOAGULATION FOLLOW-UP CLINIC VISIT    Patient Name:  Tracy Palomo  Date:  10/4/2017  Contact Type:  Face to Face    SUBJECTIVE:     Patient Findings     Positives OTC meds (pt start Tylenol for arthritic pain ), No Problem Findings           OBJECTIVE    INR Protime   Date Value Ref Range Status   10/04/2017 2.1 (A) 0.86 - 1.14 Final       ASSESSMENT / PLAN  INR assessment THER    Recheck INR In: 3 WEEKS    INR Location Clinic      Anticoagulation Summary as of 10/4/2017     INR goal 2.0-3.0   Today's INR 2.1   Maintenance plan 5 mg (5 mg x 1) on Tue, Thu, Sat; 2.5 mg (5 mg x 0.5) all other days   Full instructions 5 mg on Tue, Thu, Sat; 2.5 mg all other days   Weekly total 25 mg   No change documented Johanna Mckeon RN   Plan last modified Johanna Mckeon RN (8/30/2017)   Next INR check 10/25/2017   Target end date     Indications   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]  Long-term (current) use of anticoagulants [Z79.01] [Z79.01]         Anticoagulation Episode Summary     INR check location     Preferred lab     Send INR reminders to [facility] POOL    Comments 5 mg tablets       Anticoagulation Care Providers     Provider Role Specialty Phone number    Gerard Medrano MD [facility] [phone]            See the Encounter Report to view Anticoagulation Flowsheet and Dosing Calendar (Go to Encounters tab in chart review, and find the Anticoagulation Therapy Visit)    Dosage adjustment made based on physician directed care plan.      Johanna Mckeon RN

## 2017-10-04 NOTE — MR AVS SNAPSHOT
Tracy Millerdwin   10/4/2017 8:30 AM   Anticoagulation Therapy Visit    Description:  77 year old female   Provider:  ERICK ANTI COAG   Department:  [facility]           INR as of 10/4/2017     Today's INR 2.1      Anticoagulation Summary as of 10/4/2017     INR goal 2.0-3.0   Today's INR 2.1   Full instructions 5 mg on Tue, Thu, Sat; 2.5 mg all other days   Next INR check 10/25/2017    Indications   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]  Long-term (current) use of anticoagulants [Z79.01] [Z79.01]         Your next Anticoagulation Clinic appointment(s)     Oct 25, 2017  9:30 AM CDT   Anticoagulation Visit with ERICK ANTI MARTIN   [facility]    [address] [place] [id number]   [phone]              Contact Numbers     Clinic Number:         October 2017 Details    Sun Mon Tue Wed Thu Fri Sat     1               2               3               4      2.5 mg   See details      5      5 mg         6      2.5 mg         7      5 mg           8      2.5 mg         9      2.5 mg         10      5 mg         11      2.5 mg         12      5 mg         13      2.5 mg         14      5 mg           15      2.5 mg         16      2.5 mg         17      5 mg         18      2.5 mg         19      5 mg         20      2.5 mg         21      5 mg           22      2.5 mg         23      2.5 mg         24      5 mg         25            26               27               28                 29               30               31                    Date Details   10/04 This INR check       Date of next INR:  10/25/2017         How to take your warfarin dose     To take:  2.5 mg Take 0.5 of a 5 mg tablet.    To take:  5 mg Take 1 of the 5 mg tablets.

## 2017-10-25 ENCOUNTER — ANTICOAGULATION THERAPY VISIT (OUTPATIENT)
Dept: ANTICOAGULATION | Facility: CLINIC | Age: 77
End: 2017-10-25
Payer: MEDICARE

## 2017-10-25 DIAGNOSIS — I26.99 PULMONARY EMBOLI (H): ICD-10-CM

## 2017-10-25 DIAGNOSIS — I10 ESSENTIAL HYPERTENSION WITH GOAL BLOOD PRESSURE LESS THAN 140/90: ICD-10-CM

## 2017-10-25 DIAGNOSIS — Z79.01 LONG-TERM (CURRENT) USE OF ANTICOAGULANTS: ICD-10-CM

## 2017-10-25 LAB — INR POINT OF CARE: 2.8 (ref 0.86–1.14)

## 2017-10-25 PROCEDURE — 36416 COLLJ CAPILLARY BLOOD SPEC: CPT

## 2017-10-25 PROCEDURE — 85610 PROTHROMBIN TIME: CPT | Mod: QW

## 2017-10-25 PROCEDURE — 99207 ZZC NO CHARGE NURSE ONLY: CPT

## 2017-10-25 NOTE — PROGRESS NOTES
ANTICOAGULATION FOLLOW-UP CLINIC VISIT    Patient Name:  Tracy Palomo  Date:  10/25/2017  Contact Type:  Face to Face    SUBJECTIVE:     Patient Findings     Positives No Problem Findings           OBJECTIVE    INR Protime   Date Value Ref Range Status   10/25/2017 2.8 (A) 0.86 - 1.14 Final       ASSESSMENT / PLAN  INR assessment THER    Recheck INR In: 4 WEEKS    INR Location Clinic      Anticoagulation Summary as of 10/25/2017     INR goal 2.0-3.0   Today's INR 2.8   Maintenance plan 5 mg (5 mg x 1) on Tue, Thu, Sat; 2.5 mg (5 mg x 0.5) all other days   Full instructions 5 mg on Tue, Thu, Sat; 2.5 mg all other days   Weekly total 25 mg   No change documented Johanna Mckeon RN   Plan last modified Johanna Mckeon RN (8/30/2017)   Next INR check 11/22/2017   Target end date     Indications   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]  Long-term (current) use of anticoagulants [Z79.01] [Z79.01]         Anticoagulation Episode Summary     INR check location     Preferred lab     Send INR reminders to [facility] POOL    Comments 5 mg tablets       Anticoagulation Care Providers     Provider Role Specialty Phone number    Gerard Medrano MD [facility] [phone]            See the Encounter Report to view Anticoagulation Flowsheet and Dosing Calendar (Go to Encounters tab in chart review, and find the Anticoagulation Therapy Visit)    Dosage adjustment made based on physician directed care plan.              Johanna Mckeon RN

## 2017-10-25 NOTE — MR AVS SNAPSHOT
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]  Long-term (current) use of anticoagulants [Z79.01] [Z79.01]         Your next Anticoagulation Clinic appointment(s)     Nov 22, 2017  9:00 AM CST   Anticoagulation Visit with ERICK ANTI COAG   [facility]    [address] [place] [id number] [phone]              Contact Numbers     Clinic Number:         October 2017 Details    Sun Mon Tue Wed Thu Fri Sat     1               2               3               4               5               6               7                 8               9               10               11               12               13               14                 15               16               17               18               19               20               21                 22               23               24               25      2.5 mg   See details      26      5 mg         27      2.5 mg         28      5 mg           29      2.5 mg         30      2.5 mg         31      5 mg              Date Details   10/25 This INR check               How to take your warfarin dose     To take:  2.5 mg Take 0.5 of a 5 mg tablet.    To take:  5 mg Take 1 of the 5 mg tablets.           November 2017 Details    Sun Mon Tue Wed Thu Fri Sat        1      2.5 mg         2      5 mg         3      2.5 mg         4      5 mg           5      2.5 mg         6      2.5 mg         7      5 mg         8      2.5 mg         9      5 mg         10      2.5 mg         11       5 mg           12      2.5 mg         13      2.5 mg         14      5 mg         15      2.5 mg         16      5 mg         17      2.5 mg         18      5 mg           19      2.5 mg         20      2.5 mg         21      5 mg         22            23               24               25                 26               27               28               29               30                  Date Details   No additional details    Date of next INR:  11/22/2017         How to take your warfarin dose     To take:  2.5 mg Take 0.5 of a 5 mg tablet.    To take:  5 mg Take 1 of the 5 mg tablets.

## 2017-10-26 RX ORDER — LISINOPRIL 10 MG/1
TABLET ORAL
Qty: 90 TABLET | Refills: 3 | Status: SHIPPED | OUTPATIENT
Start: 2017-10-26 | End: 2017-11-22

## 2017-10-27 ENCOUNTER — TELEPHONE (OUTPATIENT)
Dept: PHYSICAL THERAPY | Facility: CLINIC | Age: 77
End: 2017-10-27

## 2017-11-01 NOTE — DISCHARGE SUMMARY
"Outpatient Physical Therapy Discharge Note     Patient: Tracy Palomo  : 1940    Beginning/End Dates of Reporting Period:  2 visits between 17 and 17    Referring Provider: Dr. Kaleb Pang    Therapy Diagnosis: Decreased abdominal strength, antalgic gait and SIJ dysfunction with L hemiarthtoplasty     Client Self Report: Spoke to Tracy and she states she is doing well and the exercises are doing \"wonders\". She plans to continue with her home program and she does not feel she needs PT at this time.     Objective Measurements:  Objective Measure: LEFS  Details:  - increased from . We discussed this.   Objective Measure: Pelvic position  Details: Good pelvic position today        Goals:  Goal Identifier Walking   Goal Description Tracy will be able to walk her 10,000 steps for exercise, shopping and getting around her community using pacing as needed for symptoms  (6,000 steps)   Target Date 10/27/17   Date Met      Progress:   Progress Toward Goals:   Not assessed this period.    Plan:  Discharge from therapy.    Discharge:    Reason for Discharge: Medicare G-code: Patient did not attend a final scheduled session prior to discharge. Unable to determine discharge functional status.  Feels she is doing well and no longer needs PT    Equipment Issued: none    Discharge Plan: Patient to continue home program.    Thank you for referring Tracy  to [facility] - Joan Cooper, PT  [phone]      "

## 2017-11-01 NOTE — ADDENDUM NOTE
Encounter addended by: Carmen Cooper PT on: 11/1/2017  2:01 PM<BR>     Actions taken: Sign clinical note, Episode resolved

## 2017-11-22 ENCOUNTER — ANTICOAGULATION THERAPY VISIT (OUTPATIENT)
Dept: ANTICOAGULATION | Facility: CLINIC | Age: 77
End: 2017-11-22
Payer: MEDICARE

## 2017-11-22 ENCOUNTER — OFFICE VISIT (OUTPATIENT)
Dept: INTERNAL MEDICINE | Facility: CLINIC | Age: 77
End: 2017-11-22
Payer: MEDICARE

## 2017-11-22 ENCOUNTER — TELEPHONE (OUTPATIENT)
Dept: INTERNAL MEDICINE | Facility: CLINIC | Age: 77
End: 2017-11-22

## 2017-11-22 VITALS
SYSTOLIC BLOOD PRESSURE: 104 MMHG | WEIGHT: 181 LBS | TEMPERATURE: 96.7 F | DIASTOLIC BLOOD PRESSURE: 64 MMHG | BODY MASS INDEX: 29.21 KG/M2 | RESPIRATION RATE: 16 BRPM | OXYGEN SATURATION: 97 % | HEART RATE: 62 BPM

## 2017-11-22 DIAGNOSIS — Z79.01 LONG-TERM (CURRENT) USE OF ANTICOAGULANTS: ICD-10-CM

## 2017-11-22 DIAGNOSIS — I10 HYPERTENSION GOAL BP (BLOOD PRESSURE) < 140/90: Primary | ICD-10-CM

## 2017-11-22 DIAGNOSIS — Z86.0100 HISTORY OF COLONIC POLYPS: ICD-10-CM

## 2017-11-22 DIAGNOSIS — I26.99 PULMONARY EMBOLI (H): ICD-10-CM

## 2017-11-22 DIAGNOSIS — Z79.899 ENCOUNTER FOR LONG-TERM CURRENT USE OF MEDICATION: ICD-10-CM

## 2017-11-22 DIAGNOSIS — Z23 NEED FOR PROPHYLACTIC VACCINATION AND INOCULATION AGAINST INFLUENZA: ICD-10-CM

## 2017-11-22 DIAGNOSIS — E78.5 HYPERLIPIDEMIA LDL GOAL <130: ICD-10-CM

## 2017-11-22 DIAGNOSIS — I10 ESSENTIAL HYPERTENSION WITH GOAL BLOOD PRESSURE LESS THAN 140/90: ICD-10-CM

## 2017-11-22 DIAGNOSIS — I48.0 PAROXYSMAL ATRIAL FIBRILLATION (H): ICD-10-CM

## 2017-11-22 DIAGNOSIS — Z12.11 ENCOUNTER FOR SCREENING COLONOSCOPY: Primary | ICD-10-CM

## 2017-11-22 LAB
ALBUMIN SERPL-MCNC: 3.4 G/DL (ref 3.4–5)
ALP SERPL-CCNC: 117 U/L (ref 40–150)
ALT SERPL W P-5'-P-CCNC: 31 U/L (ref 0–50)
ANION GAP SERPL CALCULATED.3IONS-SCNC: 8 MMOL/L (ref 3–14)
AST SERPL W P-5'-P-CCNC: 21 U/L (ref 0–45)
BILIRUB SERPL-MCNC: 1 MG/DL (ref 0.2–1.3)
BUN SERPL-MCNC: 15 MG/DL (ref 7–30)
CALCIUM SERPL-MCNC: 9.2 MG/DL (ref 8.5–10.1)
CHLORIDE SERPL-SCNC: 108 MMOL/L (ref 94–109)
CHOLEST SERPL-MCNC: 153 MG/DL
CO2 SERPL-SCNC: 28 MMOL/L (ref 20–32)
CREAT SERPL-MCNC: 0.84 MG/DL (ref 0.52–1.04)
ERYTHROCYTE [DISTWIDTH] IN BLOOD BY AUTOMATED COUNT: 14.4 % (ref 10–15)
GFR SERPL CREATININE-BSD FRML MDRD: 66 ML/MIN/1.7M2
GLUCOSE SERPL-MCNC: 89 MG/DL (ref 70–99)
HCT VFR BLD AUTO: 44.4 % (ref 35–47)
HDLC SERPL-MCNC: 64 MG/DL
HGB BLD-MCNC: 13.8 G/DL (ref 11.7–15.7)
INR POINT OF CARE: 2.2 (ref 0.86–1.14)
LDLC SERPL CALC-MCNC: 54 MG/DL
MCH RBC QN AUTO: 28.3 PG (ref 26.5–33)
MCHC RBC AUTO-ENTMCNC: 31.1 G/DL (ref 31.5–36.5)
MCV RBC AUTO: 91 FL (ref 78–100)
NONHDLC SERPL-MCNC: 89 MG/DL
PLATELET # BLD AUTO: 225 10E9/L (ref 150–450)
POTASSIUM SERPL-SCNC: 4.7 MMOL/L (ref 3.4–5.3)
PROT SERPL-MCNC: 7 G/DL (ref 6.8–8.8)
RBC # BLD AUTO: 4.87 10E12/L (ref 3.8–5.2)
SODIUM SERPL-SCNC: 144 MMOL/L (ref 133–144)
TRIGL SERPL-MCNC: 173 MG/DL
WBC # BLD AUTO: 5.9 10E9/L (ref 4–11)

## 2017-11-22 PROCEDURE — 90662 IIV NO PRSV INCREASED AG IM: CPT | Performed by: INTERNAL MEDICINE

## 2017-11-22 PROCEDURE — 99207 ZZC NO CHARGE NURSE ONLY: CPT

## 2017-11-22 PROCEDURE — G0008 ADMIN INFLUENZA VIRUS VAC: HCPCS | Performed by: INTERNAL MEDICINE

## 2017-11-22 PROCEDURE — 80053 COMPREHEN METABOLIC PANEL: CPT | Performed by: INTERNAL MEDICINE

## 2017-11-22 PROCEDURE — 85610 PROTHROMBIN TIME: CPT | Mod: QW

## 2017-11-22 PROCEDURE — 80061 LIPID PANEL: CPT | Performed by: INTERNAL MEDICINE

## 2017-11-22 PROCEDURE — 85027 COMPLETE CBC AUTOMATED: CPT | Performed by: INTERNAL MEDICINE

## 2017-11-22 PROCEDURE — 99214 OFFICE O/P EST MOD 30 MIN: CPT | Mod: 25 | Performed by: INTERNAL MEDICINE

## 2017-11-22 PROCEDURE — 36416 COLLJ CAPILLARY BLOOD SPEC: CPT

## 2017-11-22 RX ORDER — LISINOPRIL 10 MG/1
10 TABLET ORAL DAILY
Qty: 90 TABLET | Refills: 3 | Status: SHIPPED | OUTPATIENT
Start: 2017-11-22 | End: 2018-10-10

## 2017-11-22 ASSESSMENT — PAIN SCALES - GENERAL: PAINLEVEL: NO PAIN (0)

## 2017-11-22 NOTE — PROGRESS NOTES
SUBJECTIVE:   Tracy Palomo is a 77 year old female who presents to clinic today for the following health issues:    Chief Complaint   Patient presents with     Recheck Medication     Htn  Lipids     Doing well, no issues with medications.  INR was 2.2 today, no side effects. No bleeding.  She has had dvt and PE but usually with surgery, then a fib but will stay on coumadin for life unless contraindicated.     No chest pains, no sob.  No headaches.    Past Medical History:   Diagnosis Date     Atrial fibrillation (H) 2014    related to colon surgery     Colon polyps      Diverticulosis of colon (without mention of hemorrhage)     Diverticulosis     DVT (deep vein thrombosis) in pregnancy (H) 2014    after colon surgery     Other and unspecified hyperlipidemia      PE (pulmonary embolism) 2014    related to colon surgery     Unspecified essential hypertension      Current Outpatient Prescriptions   Medication     warfarin (COUMADIN) 5 MG tablet     lovastatin (MEVACOR) 40 MG tablet     acetaminophen (TYLENOL) 325 MG tablet     lisinopril (PRINIVIL,ZESTRIL) 10 MG tablet     [DISCONTINUED] lisinopril (PRINIVIL/ZESTRIL) 10 MG tablet     No current facility-administered medications for this visit.      Social History   Substance Use Topics     Smoking status: Never Smoker     Smokeless tobacco: Never Used     Alcohol use No     Review of Systems  Constitutional-No fevers, chills, or weight changes..  Cardiac-No chest pain or palpitations.  Respiratory-No cough, sob, or hemoptysis.  GI-No nausea, vomitting, diarrhea, constipation, or blood in the stool.  Musculoskeletal-No muscles aches or joint pains.      Physical Exam  /64  Pulse 62  Temp 96.7  F (35.9  C) (Temporal)  Resp 16  Wt 181 lb (82.1 kg)  SpO2 97%  BMI 29.21 kg/m2  General Appearance-healthy, alert, no distress  Cardiac-regular rate and rhythm  with normal S1, S2 ; no murmur, rub or gallops  Lungs-clear to auscultation  Extremities-no  peripheral edema, peripheral pulses normal    ASSESSMENT:  Patient is here for her yearly follow-up. She is overall doing pretty well.    Hypertension-the patient is taking lisinopril. We'll check labs today and refill this for the next year    .Lovastatin 40 mg daily, will check fasting lipids and LFTs today, refill medication for the next year.    Patient is a history of paroxysmal atrial fibrillation, DVTs and PE in the past. Those were associated with surgery regarding multiple risks has been on anticoagulation with Coumadin. She will continue this. She works. She has been stable, will check her CBC.     prostate colonoscopy or mammogram. She is okay to stop her Coumadin 5 days prior to the colonoscopy.      Electronically signed by Chad Betts MD

## 2017-11-22 NOTE — MR AVS SNAPSHOT
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]  Long-term (current) use of anticoagulants [Z79.01] [Z79.01]         Your next Anticoagulation Clinic appointment(s)     Dec 20, 2017  9:45 AM CST   Anticoagulation Visit with ERICK ANTI COAG   [facility]    [address] [place] [id number]   [phone]              Contact Numbers     Clinic Number:         November 2017 Details    Sun Mon Tue Wed Thu Fri Sat        1               2               3               4                 5               6               7               8               9               10               11                 12               13               14               15               16               17               18                 19               20               21               22      2.5 mg   See details      23      5 mg         24      2.5 mg         25      5 mg           26      2.5 mg         27      2.5 mg         28      5 mg         29      2.5 mg         30      5 mg            Date Details   11/22 This INR check               How to take your warfarin dose     To take:  2.5 mg Take 0.5 of a 5 mg tablet.    To take:  5 mg Take 1 of the 5 mg tablets.           December 2017 Details    Sun Mon Tue Wed Thu Fri Sat          1      2.5 mg         2      5 mg           3      2.5 mg         4      2.5 mg         5      5 mg         6      2.5 mg         7      5 mg         8      2.5 mg         9      5 mg           10      2.5 mg         11       2.5 mg         12      5 mg         13      2.5 mg         14      5 mg         15      2.5 mg         16      5 mg           17      2.5 mg         18      2.5 mg         19      5 mg         20            21               22               23                 24               25               26               27               28               29               30                 31                      Date Details   No additional details    Date of next INR:  12/20/2017         How to take your warfarin dose     To take:  2.5 mg Take 0.5 of a 5 mg tablet.    To take:  5 mg Take 1 of the 5 mg tablets.

## 2017-11-22 NOTE — PROGRESS NOTES
Screening Questionnaire for Adult Immunization    Are you sick today?   No   Do you have allergies to medications, food, a vaccine component or latex?   No   Have you ever had a serious reaction after receiving a vaccination?   No   Do you have a long-term health problem with heart disease, lung disease, asthma, kidney disease, metabolic disease (e.g. diabetes), anemia, or other blood disorder?   No   Do you have cancer, leukemia, HIV/AIDS, or any other immune system problem?   No   In the past 3 months, have you taken medications that affect  your immune system, such as prednisone, other steroids, or anticancer drugs; drugs for the treatment of rheumatoid arthritis, Crohn s disease, or psoriasis; or have you had radiation treatments?   No   Have you had a seizure, or a brain or other nervous system problem?   No   During the past year, have you received a transfusion of blood or blood     products, or been given immune (gamma) globulin or antiviral drug?   No   For women: Are you pregnant or is there a chance you could become        pregnant during the next month?   No   Have you received any vaccinations in the past 4 weeks?   No     Immunization questionnaire answers were all negative.        Per orders of Dr. Chad Betts, injection of HD influenza given by Jo Ellsworth. Patient instructed to remain in clinic for 15 minutes afterwards, and to report any adverse reaction to me immediately.       Screening performed by Jo Ellsworth on 11/22/2017 at 9:52 AM.    Injectable Influenza Immunization Documentation    1.  Is the person to be vaccinated sick today?   No    2. Does the person to be vaccinated have an allergy to a component   of the vaccine?   No  Egg Allergy Algorithm Link    3. Has the person to be vaccinated ever had a serious reaction   to influenza vaccine in the past?   No    4. Has the person to be vaccinated ever had Guillain-Barré syndrome?   No    Form completed by Jo Ellsworth  MA

## 2017-11-22 NOTE — TELEPHONE ENCOUNTER
Left message for patient to return call to schedule colonoscopy or EGD. If Pat or Kate are unavailable, please transfer to the surgery center.     OK to schedule with Roselia

## 2017-11-22 NOTE — PROGRESS NOTES
ANTICOAGULATION FOLLOW-UP CLINIC VISIT    Patient Name:  Tracy Palomo  Date:  11/22/2017  Contact Type:  Face to Face    SUBJECTIVE:     Patient Findings     Positives No Problem Findings           OBJECTIVE    INR Protime   Date Value Ref Range Status   11/22/2017 2.2 (A) 0.86 - 1.14 Final       ASSESSMENT / PLAN  INR assessment THER    Recheck INR In: 6 WEEKS    INR Location Clinic      Anticoagulation Summary as of 11/22/2017     INR goal 2.0-3.0   Today's INR 2.2   Maintenance plan 5 mg (5 mg x 1) on Tue, Thu, Sat; 2.5 mg (5 mg x 0.5) all other days   Full instructions 5 mg on Tue, Thu, Sat; 2.5 mg all other days   Weekly total 25 mg   No change documented Johanna Mckeon RN   Plan last modified Johanna Mckeon RN (8/30/2017)   Next INR check 12/20/2017   Target end date     Indications   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 [phone]            See the Encounter Report to view Anticoagulation Flowsheet and Dosing Calendar (Go to Encounters tab in chart review, and find the Anticoagulation Therapy Visit)    Dosage adjustment made based on physician directed care plan.        Johanna Mckeon RN

## 2017-11-22 NOTE — NURSING NOTE
"Chief Complaint   Patient presents with     Recheck Medication     Htn  Lipids       Initial /64  Pulse 62  Temp 96.7  F (35.9  C) (Temporal)  Resp 16  Wt 181 lb (82.1 kg)  SpO2 97%  BMI 29.21 kg/m2 Estimated body mass index is 29.21 kg/(m^2) as calculated from the following:    Height as of 9/13/17: 5' 6\" (1.676 m).    Weight as of this encounter: 181 lb (82.1 kg).  Medication Reconciliation: complete    "

## 2017-11-22 NOTE — MR AVS SNAPSHOT
After Visit Summary   11/22/2017    Tracy Palomo    MRN: [id number]           Patient Information     Date Of Birth          1940        Visit Information        Provider Department      11/22/2017 9:15 AM Chad Betts MD [facility]        Today's Diagnoses     Hypertension goal BP (blood pressure) < 140/90    -  1    Hyperlipidemia LDL goal <130        Encounter for long-term current use of medication        Paroxysmal atrial fibrillation (H)        Essential hypertension with goal blood pressure less than 140/90        History of colonic polyps           Follow-ups after your visit        Additional Services     GASTROENTEROLOGY ADULT REF PROCEDURE ONLY       Last Lab Result: Creatinine (mg/dL)       Date                     Value                 03/15/2017               0.92             ----------  Body mass index is 29.21 kg/(m^2).     Needed:  No  Language:  English    Patient will be contacted to schedule procedure.     Please be aware that coverage of these services is subject to the terms and limitations of your health insurance plan.  Call member services at your health plan with any benefit or coverage questions.  Any procedures must be performed at a [facility] facility OR coordinated by your clinic's referral office.    Please bring the following with you to your appointment:    (1) Any X-Rays, CTs or MRIs which have been performed.  Contact the facility where they were done to arrange for  prior to your scheduled appointment.    (2) List of current medications   (3) This referral request   (4) Any documents/labs given to you for this referral                  Your next 10 appointments already scheduled     Dec 20, 2017  9:45 AM CST   Anticoagulation Visit with PH ANTI COAG   [facility] ([facility]    [address] [place] [id number] [phone]              Who to contact     If you have questions or  "need follow up information about today's clinic visit or your schedule please contact [facility] directly at [phone].  Normal or non-critical lab and imaging results will be communicated to you by [employer], letter or phone within 4 business days after the clinic has received the results. If you do not hear from us within 7 days, please contact the clinic through [employer] or phone. If you have a critical or abnormal lab result, we will notify you by phone as soon as possible.  Submit refill requests through [employer] or call your pharmacy and they will forward the refill request to us. Please allow 3 business days for your refill to be completed.          Additional Information About Your Visit        [employer] Information     [employer] lets you send messages to your doctor, view your test results, renew your prescriptions, schedule appointments and more. To sign up, go to [email or web address] . Click on \"Log in\" on the left side of the screen, which will take you to the Welcome page. Then click on \"Sign up Now\" on the right side of the page.     You will be asked to enter the access code listed below, as well as some personal information. Please follow the directions to create your username and password.     Your access code is: [email or web address]  Expires: 2018  9:09 AM     Your access code will  in 90 days. If you need help or a new code, please call your [place] clinic or [phone].        Care EveryWhere ID     This is your Care EveryWhere ID. This could be used by other organizations to access your [facility] [place] medical records  [id number]        Your Vitals Were     Pulse Temperature Respirations Pulse Oximetry BMI (Body Mass Index)       62 96.7  F (35.9  C) (Temporal) 16 97% 29.21 kg/m2        Blood Pressure from Last 3 Encounters:   17 104/64   17 (!) 164/102   17 144/81    Weight from Last 3 Encounters:   17 181 lb (82.1 kg)   17 182 lb 11.2 oz (82.9 kg) "   08/08/17 185 lb 3.2 oz (84 kg)              We Performed the Following     CBC with platelets     Comprehensive metabolic panel     GASTROENTEROLOGY ADULT REF PROCEDURE ONLY     Lipid Profile          Where to get your medicines      These medications were sent to [facility] [address] [place], [place] - [address]  [address], [facility] [address]     Phone:  [phone]     lisinopril 10 MG tablet          Primary Care Provider Office Phone # Fax #    Chad Betts [phone]       [facility] [address] [facility]   [facility] [place] [address]        Equal Access to Services     [facility]: Hadii aad ku hadasho Soomaali, waaxda luqadaha, qaybta kaalmada adeegyada, waxmaury mansfieldin haytoñan aderomie wilcox . So [facility] [phone].    ATENCIÓN: Si habla español, tiene a dale disposición servicios gratuitos de asistencia lingüística. [facility] [phone].    We comply with applicable federal civil rights laws and [place] laws. We do not discriminate on the basis of race, color, national origin, age, disability, sex, sexual orientation, or gender identity.            Thank you!     Thank you for choosing [facility]  for your care. Our goal is always to provide you with excellent care. Hearing back from our patients is one way we can continue to improve our services. Please take a few minutes to complete the written survey that you may receive in the mail after your visit with us. Thank you!             Your Updated Medication List - Protect others around you: Learn how to safely use, store and throw away your medicines at www.disposemymeds.org.          This list is accurate as of: 11/22/17  9:53 AM.  Always use your most recent med list.                   Brand Name Dispense Instructions for use Diagnosis    acetaminophen 325 MG tablet    TYLENOL    100 tablet    Take 3 tablets (975 mg) by mouth every 8 hours    Hip fracture, left, closed, initial encounter (H)        lisinopril 10 MG tablet    PRINIVIL/ZESTRIL    90 tablet    Take 1 tablet (10 mg) by mouth daily    Essential hypertension with goal blood pressure less than 140/90       lovastatin 40 MG tablet    MEVACOR    90 tablet    TAKE ONE TABLET BY MOUTH AT BEDTIME    Hyperlipidemia LDL goal <130       warfarin 5 MG tablet    COUMADIN    30 tablet    Take 5 mg on Tues, Thurs, Sat and 2.5 mg all other days, or as directed by the coumadin clinic.

## 2017-11-26 DIAGNOSIS — Z79.01 LONG-TERM (CURRENT) USE OF ANTICOAGULANTS: ICD-10-CM

## 2017-11-27 RX ORDER — BISACODYL 5 MG/1
TABLET, DELAYED RELEASE ORAL
Qty: 4 TABLET | Refills: 0 | Status: SHIPPED | OUTPATIENT
Start: 2017-11-27 | End: 2018-06-13

## 2017-11-27 NOTE — TELEPHONE ENCOUNTER
Patient has been scheduled for a colonoscopy. Golytely sent to [facility] in [place]. Patient states that she was told to stop her coumadin 5 days prior to colonoscopy. Prep mailed.

## 2017-11-28 RX ORDER — WARFARIN SODIUM 5 MG/1
TABLET ORAL
Qty: 65 TABLET | Refills: 1 | Status: SHIPPED | OUTPATIENT
Start: 2017-11-28 | End: 2018-03-11

## 2017-12-11 ENCOUNTER — HOSPITAL ENCOUNTER (OUTPATIENT)
Facility: CLINIC | Age: 77
Discharge: HOME | End: 2017-12-11
Attending: INTERNAL MEDICINE | Admitting: INTERNAL MEDICINE
Payer: MEDICARE

## 2017-12-11 ENCOUNTER — SURGERY (OUTPATIENT)
Age: 77
End: 2017-12-11

## 2017-12-11 VITALS
TEMPERATURE: 98.4 F | WEIGHT: 181 LBS | RESPIRATION RATE: 16 BRPM | BODY MASS INDEX: 29.21 KG/M2 | OXYGEN SATURATION: 94 % | DIASTOLIC BLOOD PRESSURE: 76 MMHG | SYSTOLIC BLOOD PRESSURE: 127 MMHG

## 2017-12-11 LAB
COLONOSCOPY: NORMAL
INR PPP: 1 (ref 0.86–1.14)

## 2017-12-11 PROCEDURE — 85610 PROTHROMBIN TIME: CPT | Performed by: INTERNAL MEDICINE

## 2017-12-11 PROCEDURE — 25000125 ZZHC RX 250: Performed by: INTERNAL MEDICINE

## 2017-12-11 PROCEDURE — G0105 COLORECTAL SCRN; HI RISK IND: HCPCS | Performed by: INTERNAL MEDICINE

## 2017-12-11 PROCEDURE — 25000128 H RX IP 250 OP 636: Performed by: INTERNAL MEDICINE

## 2017-12-11 PROCEDURE — 40000296 ZZH STATISTIC ENDO RECOVERY CLASS 1:2 FIRST HOUR: Performed by: INTERNAL MEDICINE

## 2017-12-11 PROCEDURE — G0500 MOD SEDAT ENDO SERVICE >5YRS: HCPCS

## 2017-12-11 PROCEDURE — 45378 DIAGNOSTIC COLONOSCOPY: CPT | Performed by: INTERNAL MEDICINE

## 2017-12-11 RX ORDER — ONDANSETRON 2 MG/ML
4 INJECTION INTRAMUSCULAR; INTRAVENOUS
Status: DISCONTINUED | OUTPATIENT
Start: 2017-12-11 | End: 2017-12-11 | Stop reason: HOSPADM

## 2017-12-11 RX ORDER — LIDOCAINE 40 MG/G
CREAM TOPICAL
Status: DISCONTINUED | OUTPATIENT
Start: 2017-12-11 | End: 2017-12-11 | Stop reason: HOSPADM

## 2017-12-11 RX ORDER — FENTANYL CITRATE 50 UG/ML
INJECTION, SOLUTION INTRAMUSCULAR; INTRAVENOUS PRN
Status: DISCONTINUED | OUTPATIENT
Start: 2017-12-11 | End: 2017-12-11 | Stop reason: HOSPADM

## 2017-12-11 RX ADMIN — MIDAZOLAM 2 MG: 1 INJECTION INTRAMUSCULAR; INTRAVENOUS at 08:30

## 2017-12-11 RX ADMIN — FENTANYL CITRATE 50 MCG: 50 INJECTION, SOLUTION INTRAMUSCULAR; INTRAVENOUS at 08:30

## 2017-12-11 RX ADMIN — MIDAZOLAM 1 MG: 1 INJECTION INTRAMUSCULAR; INTRAVENOUS at 08:33

## 2017-12-11 RX ADMIN — MIDAZOLAM 1 MG: 1 INJECTION INTRAMUSCULAR; INTRAVENOUS at 08:31

## 2017-12-11 RX ADMIN — LIDOCAINE HYDROCHLORIDE 0.1 ML: 10 INJECTION, SOLUTION EPIDURAL; INFILTRATION; INTRACAUDAL; PERINEURAL at 07:51

## 2017-12-11 NOTE — CONSULTS
[facility] GI Pre-Procedure Physical Assessment    Tracy Palomo MRN# [id number]   Age: 77 year old YOB: 1940      Date of Surgery: 12/11/2017  Location [facility]      Date of Exam 12/11/2017 Facility (Same day)       Primary care provider: Chad Betts         Active problem list:   Patient Active Problem List   Diagnosis     Hypertension goal BP (blood pressure) < 140/90     Hyperlipidemia LDL goal <130     Encounter for long-term current use of medication     History of colonic polyps     Advanced directives, counseling/discussion     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     Recent major surgery - exploratory lap with small bowel resection 10/20     Pleural effusion on right     Anemia     Paroxysmal atrial fibrillation (H)     Long-term (current) use of anticoagulants [Z79.01]     Hip fracture, left, closed, initial encounter (H)     Closed left hip fracture (H)     Fractured hip, left, closed, initial encounter (H)            Medications (include herbals and vitamins):   Any Plavix use in the last 7 days?  No     Current Facility-Administered Medications   Medication     lidocaine 1 % 1 mL     lidocaine (LMX4) kit     sodium chloride (PF) 0.9% PF flush 3 mL     sodium chloride (PF) 0.9% PF flush 3 mL     ondansetron (ZOFRAN) injection 4 mg             Allergies:      Allergies   Allergen Reactions     No Known Allergies      Allergy to Latex?  No  Allergy to tape?    No          Social History:     Social History   Substance Use Topics     Smoking status: Never Smoker     Smokeless tobacco: Never Used     Alcohol use No            Physical Exam:   All vitals have been reviewed  Blood pressure 145/80, temperature 98.4  F (36.9  C), temperature source Oral, resp. rate 16, weight 82.1 kg (181 lb), SpO2 97 %.  Airway assessment:   Patient is able to open mouth wide  Patient is able to stick out tongue      Lungs:   No increased work of breathing,  good air exchange, clear to auscultation bilaterally, no crackles or wheezing      Cardiovascular:   Normal apical impulse, regular rate and rhythm, normal S1 and S2, no S3 or S4, and no murmur noted           Lab / Radiology Results:   All laboratory data reviewed          Assessment:   Appropriately NPO  Chief complaint or anatomic assessment of involved area: screen           Plan:   Moderate (conscious) sedation     Patient's active problems diagnostically and therapeutically optimized for the planned procedure  Risks, benefits, alternatives to sedation and blood explained and consent obtained  Risks, benefits, alternatives to procedure explained and consent obtained  P2 (patient with mild systemic disease)  Orders and progress notes are in the chart  Discharge from Phase 1 and / or Phase 2 recovery when patient meets criteria    I have reviewed the history and physical, lab finding(s), diagnostic data, medicaitons, and the plan for sedation.  I have determined this patient to be an appropriate candidate for the planned sedation / procedure and have reassessed the patient immediately prior to sedation / procedure.    I have personally and medically directed the administration of medications used.    Elvis Quezada MD

## 2018-01-17 ENCOUNTER — ANTICOAGULATION THERAPY VISIT (OUTPATIENT)
Dept: ANTICOAGULATION | Facility: CLINIC | Age: 78
End: 2018-01-17
Payer: MEDICARE

## 2018-01-17 DIAGNOSIS — I26.99 PULMONARY EMBOLI (H): ICD-10-CM

## 2018-01-17 DIAGNOSIS — Z79.01 LONG-TERM (CURRENT) USE OF ANTICOAGULANTS: ICD-10-CM

## 2018-01-17 LAB — INR POINT OF CARE: 1.8 (ref 0.86–1.14)

## 2018-01-17 PROCEDURE — 36416 COLLJ CAPILLARY BLOOD SPEC: CPT

## 2018-01-17 PROCEDURE — 99207 ZZC NO CHARGE NURSE ONLY: CPT

## 2018-01-17 PROCEDURE — 85610 PROTHROMBIN TIME: CPT | Mod: QW

## 2018-01-17 NOTE — PROGRESS NOTES
ANTICOAGULATION FOLLOW-UP CLINIC VISIT    Patient Name:  Tracy Palomo  Date:  1/17/2018  Contact Type:  Face to Face    SUBJECTIVE:     Patient Findings     Positives Change in diet/appetite (has been eating salads every day and has started drinking Ensure 1-2 times a week)           OBJECTIVE    INR Protime   Date Value Ref Range Status   01/17/2018 1.8 (A) 0.86 - 1.14 Final       ASSESSMENT / PLAN  INR assessment SUB    Recheck INR In: 2 WEEKS    INR Location Clinic      Anticoagulation Summary as of 1/17/2018     INR goal 2.0-3.0   Today's INR 1.8!   Maintenance plan 2.5 mg (5 mg x 0.5) on Mon, Wed, Fri; 5 mg (5 mg x 1) all other days   Full instructions 1/17: 5 mg; Otherwise 2.5 mg on Mon, Wed, Fri; 5 mg all other days   Weekly total 27.5 mg   Plan last modified Johanna Mckeon RN (1/17/2018)   Next INR check 1/31/2018   Target end date     Indications   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]  Long-term (current) use of anticoagulants [Z79.01] [Z79.01]         Anticoagulation Episode Summary     INR check location     Preferred lab     Send INR reminders to [facility] POOL    Comments 5 mg tablets       Anticoagulation Care Providers     Provider Role Specialty Phone number    Gerard Medrano MD [facility] [phone]            See the Encounter Report to view Anticoagulation Flowsheet and Dosing Calendar (Go to Encounters tab in chart review, and find the Anticoagulation Therapy Visit)    Dosage adjustment made based on physician directed care plan.    5 mg x1 then increase to 2.5 mg Mon, Wed, Fri and 5 mg ROW    Johanna Mckeon, GLORIA

## 2018-01-17 NOTE — MR AVS SNAPSHOT
Tracy J Dewey   1/17/2018 9:15 AM   Anticoagulation Therapy Visit    Description:  77 year old female   Provider:  ERICK ANTI COAG   Department:  Ph Anticoag           INR as of 1/17/2018     Today's INR 1.8!      Anticoagulation Summary as of 1/17/2018     INR goal 2.0-3.0   Today's INR 1.8!   Full instructions 1/17: 5 mg; Otherwise 2.5 mg on Mon, Wed, Fri; 5 mg all other days   Next INR check 1/31/2018    Indications   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]  Long-term (current) use of anticoagulants [Z79.01] [Z79.01]         Your next Anticoagulation Clinic appointment(s)     Jan 31, 2018  9:30 AM CST   Anticoagulation Visit with ERICK ANTI MARTIN   [facility]    [address] [place] [id number]   [phone]              Contact Numbers     Clinic Number:         January 2018 Details    Sun Mon Tue Wed Thu Fri Sat      1               2               3               4               5               6                 7               8               9               10               11               12               13                 14               15               16               17      5 mg   See details      18      5 mg         19      2.5 mg         20      5 mg           21      5 mg         22      2.5 mg         23      5 mg         24      2.5 mg         25      5 mg         26      2.5 mg         27      5 mg           28      5 mg         29      2.5 mg         30      5 mg         31                Date Details   01/17 This INR check       Date of next INR:  1/31/2018         How to take your warfarin dose     To take:  2.5 mg Take 0.5 of a 5 mg tablet.    To take:  5 mg Take 1 of the 5 mg tablets.

## 2018-01-31 ENCOUNTER — TELEPHONE (OUTPATIENT)
Dept: INTERNAL MEDICINE | Facility: CLINIC | Age: 78
End: 2018-01-31

## 2018-01-31 ENCOUNTER — ANTICOAGULATION THERAPY VISIT (OUTPATIENT)
Dept: ANTICOAGULATION | Facility: CLINIC | Age: 78
End: 2018-01-31
Payer: MEDICARE

## 2018-01-31 DIAGNOSIS — Z79.01 LONG-TERM (CURRENT) USE OF ANTICOAGULANTS: ICD-10-CM

## 2018-01-31 DIAGNOSIS — Z20.828 EXPOSURE TO INFLUENZA: Primary | ICD-10-CM

## 2018-01-31 DIAGNOSIS — I26.99 PULMONARY EMBOLI (H): ICD-10-CM

## 2018-01-31 LAB — INR POINT OF CARE: 3.8 (ref 0.86–1.14)

## 2018-01-31 PROCEDURE — 99207 ZZC NO CHARGE NURSE ONLY: CPT

## 2018-01-31 PROCEDURE — 36416 COLLJ CAPILLARY BLOOD SPEC: CPT

## 2018-01-31 PROCEDURE — 85610 PROTHROMBIN TIME: CPT | Mod: QW

## 2018-01-31 RX ORDER — OSELTAMIVIR PHOSPHATE 75 MG/1
75 CAPSULE ORAL DAILY
Qty: 10 CAPSULE | Refills: 0 | Status: SHIPPED | OUTPATIENT
Start: 2018-01-31 | End: 2018-04-11

## 2018-01-31 NOTE — TELEPHONE ENCOUNTER
Reason for Call:  Other call back    Detailed comments: Patient has been spending time at the [place] home with her . They have a huge outbreak for influenza there. She has had her flu shot, but is wondering if she could take tamaflu as a preventive. She is not having symptoms at this point.     Phone Number Patient can be reached at: Home number on file [phone] (home)    Best Time: any    Can we leave a detailed message on this number? YES    Call taken on 1/31/2018 at 1:13 PM by Alia Self

## 2018-01-31 NOTE — PROGRESS NOTES
ANTICOAGULATION FOLLOW-UP CLINIC VISIT    Patient Name:  Tracy Palomo  Date:  1/31/2018  Contact Type:  Face to Face    SUBJECTIVE:     Patient Findings     Positives Change in diet/appetite (pt had the stomach flu and then the resp. flu last week so she has not been eating as many greens or drinking Ensure like she was. She reports she is starting to feel better and get her appetite back now)           OBJECTIVE    INR Protime   Date Value Ref Range Status   01/31/2018 3.8 (A) 0.86 - 1.14 Final       ASSESSMENT / PLAN  INR assessment SUPRA    Recheck INR In: 2 WEEKS    INR Location Clinic      Anticoagulation Summary as of 1/31/2018     INR goal 2.0-3.0   Today's INR 3.8!   Maintenance plan 2.5 mg (5 mg x 0.5) on Mon, Wed, Fri; 5 mg (5 mg x 1) all other days   Full instructions 2/1: 2.5 mg; Otherwise 2.5 mg on Mon, Wed, Fri; 5 mg all other days   Weekly total 27.5 mg   Plan last modified Johanna Mckeon RN (1/17/2018)   Next INR check 2/14/2018   Target end date     Indications   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]  Long-term (current) use of anticoagulants [Z79.01] [Z79.01]         Anticoagulation Episode Summary     INR check location     Preferred lab     Send INR reminders to [facility]    Comments 5 mg tablets       Anticoagulation Care Providers     Provider Role Specialty Phone number    Gerard Medrano MD [facility] [phone]            See the Encounter Report to view Anticoagulation Flowsheet and Dosing Calendar (Go to Encounters tab in chart review, and find the Anticoagulation Therapy Visit)    Dosage adjustment made based on physician directed care plan.      Johanna Mckeon, GLORIA

## 2018-01-31 NOTE — MR AVS SNAPSHOT
Tracy J Dewey   1/31/2018 9:30 AM   Anticoagulation Therapy Visit    Description:  77 year old female   Provider:  ERICK ANTI COAG   Department:  Ph Anticoag           INR as of 1/31/2018     Today's INR 3.8!      Anticoagulation Summary as of 1/31/2018     INR goal 2.0-3.0   Today's INR 3.8!   Full instructions 2/1: 2.5 mg; Otherwise 2.5 mg on Mon, Wed, Fri; 5 mg all other days   Next INR check 2/14/2018    Indications   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]  Long-term (current) use of anticoagulants [Z79.01] [Z79.01]         Your next Anticoagulation Clinic appointment(s)     Feb 14, 2018 10:30 AM CST   Anticoagulation Visit with ERICK ANTI MARTIN   [facility] ([facility]    [address] [place] [address]   [phone]              Contact Numbers     Clinic Number:         January 2018 Details    Sun Mon Tue Wed Thu Fri Sat      1               2               3               4               5               6                 7               8               9               10               11               12               13                 14               15               16               17               18               19               20                 21               22               23               24               25               26               27                 28               29               30               31      2.5 mg   See details          Date Details   01/31 This INR check               How to take your warfarin dose     To take:  2.5 mg Take 0.5 of a 5 mg tablet.           February 2018 Details    Sun Mon Tue Wed Thu Fri Sat         1      2.5 mg         2      2.5 mg         3      5 mg           4      5 mg         5      2.5 mg         6      5 mg         7      2.5 mg         8      5 mg         9      2.5 mg         10      5 mg           11      5 mg         12      2.5 mg         13      5 mg          14            15               16               17                 18               19               20               21               22               23               24                 25               26               27               28                   Date Details   No additional details    Date of next INR:  2/14/2018         How to take your warfarin dose     To take:  2.5 mg Take 0.5 of a 5 mg tablet.    To take:  5 mg Take 1 of the 5 mg tablets.

## 2018-02-14 ENCOUNTER — ANTICOAGULATION THERAPY VISIT (OUTPATIENT)
Dept: ANTICOAGULATION | Facility: CLINIC | Age: 78
End: 2018-02-14
Payer: MEDICARE

## 2018-02-14 DIAGNOSIS — I26.99 PULMONARY EMBOLI (H): ICD-10-CM

## 2018-02-14 DIAGNOSIS — Z79.01 LONG-TERM (CURRENT) USE OF ANTICOAGULANTS: ICD-10-CM

## 2018-02-14 LAB — INR POINT OF CARE: 3.3 (ref 0.86–1.14)

## 2018-02-14 PROCEDURE — 85610 PROTHROMBIN TIME: CPT | Mod: QW

## 2018-02-14 PROCEDURE — 36416 COLLJ CAPILLARY BLOOD SPEC: CPT

## 2018-02-14 PROCEDURE — 99207 ZZC NO CHARGE NURSE ONLY: CPT

## 2018-02-14 NOTE — PROGRESS NOTES
ANTICOAGULATION FOLLOW-UP CLINIC VISIT    Patient Name:  Tracy Palomo  Date:  2/14/2018  Contact Type:  Face to Face    SUBJECTIVE:     Patient Findings     Positives Unexplained INR or factor level change           OBJECTIVE    INR Protime   Date Value Ref Range Status   02/14/2018 3.3 (A) 0.86 - 1.14 Final       ASSESSMENT / PLAN  INR assessment THER    Recheck INR In: 2 WEEKS    INR Location Clinic      Anticoagulation Summary as of 2/14/2018     INR goal 2.0-3.0   Today's INR 3.3!   Maintenance plan 5 mg (5 mg x 1) on Tue, Thu, Sat; 2.5 mg (5 mg x 0.5) all other days   Full instructions 5 mg on Tue, Thu, Sat; 2.5 mg all other days   Weekly total 25 mg   Plan last modified Johanna Mckeon RN (2/14/2018)   Next INR check 2/28/2018   Target end date     Indications   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]  Long-term (current) use of anticoagulants [Z79.01] [Z79.01]         Anticoagulation Episode Summary     INR check location     Preferred lab     Send INR reminders to [facility] POOL    Comments 5 mg tablets, book, PM dose       Anticoagulation Care Providers     Provider Role Specialty Phone number    Gerard eMdrano MD [facility] [phone]            See the Encounter Report to view Anticoagulation Flowsheet and Dosing Calendar (Go to Encounters tab in chart review, and find the Anticoagulation Therapy Visit)    Dosage adjustment made based on physician directed care plan.      Johanna Mckeon RN

## 2018-02-14 NOTE — MR AVS SNAPSHOT
Tracy J Dewey   2/14/2018 10:30 AM   Anticoagulation Therapy Visit    Description:  77 year old female   Provider:  ERICK ANTI MARTIN   Department:  Ph Anticoag           INR as of 2/14/2018     Today's INR 3.3!      Anticoagulation Summary as of 2/14/2018     INR goal 2.0-3.0   Today's INR 3.3!   Full instructions 5 mg on Tue, Thu, Sat; 2.5 mg all other days   Next INR check 2/28/2018    Indications   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]  Long-term (current) use of anticoagulants [Z79.01] [Z79.01]         Your next Anticoagulation Clinic appointment(s)     Feb 28, 2018  9:15 AM CST   Anticoagulation Visit with PH ANTI COAG   [facility])    [address] [place] [id number]   [phone]              Contact Numbers     Clinic Number:         February 2018 Details    Sun Mon Tue Wed Thu Fri Sat         1               2               3                 4               5               6               7               8               9               10                 11               12               13               14      2.5 mg   See details      15      5 mg         16      2.5 mg         17      5 mg           18      2.5 mg         19      2.5 mg         20      5 mg         21      2.5 mg         22      5 mg         23      2.5 mg         24      5 mg           25      2.5 mg         26      2.5 mg         27      5 mg         28                Date Details   02/14 This INR check       Date of next INR:  2/28/2018         How to take your warfarin dose     To take:  2.5 mg Take 0.5 of a 5 mg tablet.    To take:  5 mg Take 1 of the 5 mg tablets.

## 2018-02-28 ENCOUNTER — ANTICOAGULATION THERAPY VISIT (OUTPATIENT)
Dept: ANTICOAGULATION | Facility: CLINIC | Age: 78
End: 2018-02-28
Payer: MEDICARE

## 2018-02-28 DIAGNOSIS — Z79.01 LONG-TERM (CURRENT) USE OF ANTICOAGULANTS: ICD-10-CM

## 2018-02-28 DIAGNOSIS — I26.99 PULMONARY EMBOLI (H): ICD-10-CM

## 2018-02-28 LAB — INR POINT OF CARE: 2.5 (ref 0.86–1.14)

## 2018-02-28 PROCEDURE — 85610 PROTHROMBIN TIME: CPT | Mod: QW

## 2018-02-28 PROCEDURE — 36416 COLLJ CAPILLARY BLOOD SPEC: CPT

## 2018-02-28 PROCEDURE — 99207 ZZC NO CHARGE NURSE ONLY: CPT

## 2018-02-28 NOTE — PROGRESS NOTES
ANTICOAGULATION FOLLOW-UP CLINIC VISIT    Patient Name:  Tracy Palomo  Date:  2/28/2018  Contact Type:  Face to Face    SUBJECTIVE:     Patient Findings     Positives No Problem Findings           OBJECTIVE    INR Protime   Date Value Ref Range Status   02/28/2018 2.5 (A) 0.86 - 1.14 Final       ASSESSMENT / PLAN  INR assessment THER    Recheck INR In: 4 WEEKS    INR Location Clinic      Anticoagulation Summary as of 2/28/2018     INR goal 2.0-3.0   Today's INR 2.5   Maintenance plan 5 mg (5 mg x 1) on Tue, Thu, Sat; 2.5 mg (5 mg x 0.5) all other days   Full instructions 5 mg on Tue, Thu, Sat; 2.5 mg all other days   Weekly total 25 mg   No change documented Johanna Mckeon RN   Plan last modified Johanna Mckeon RN (2/14/2018)   Next INR check 3/28/2018   Target end date     Indications   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]  Long-term (current) use of anticoagulants [Z79.01] [Z79.01]         Anticoagulation Episode Summary     INR check location     Preferred lab     Send INR reminders to [facility] POOL    Comments 5 mg tablets, book, PM dose       Anticoagulation Care Providers     Provider Role Specialty Phone number    Gerard Medrano MD [facility] [phone]            See the Encounter Report to view Anticoagulation Flowsheet and Dosing Calendar (Go to Encounters tab in chart review, and find the Anticoagulation Therapy Visit)    Dosage adjustment made based on physician directed care plan.      Johanna Mckeon RN

## 2018-02-28 NOTE — MR AVS SNAPSHOT
Tracy Palomo   2/28/2018 9:15 AM   Anticoagulation Therapy Visit    Description:  77 year old female   Provider:  ERICK ANTI COAG   Department:  [facility]           INR as of 2/28/2018     Today's INR 2.5      Anticoagulation Summary as of 2/28/2018     INR goal 2.0-3.0   Today's INR 2.5   Full instructions 5 mg on Tue, Thu, Sat; 2.5 mg all other days   Next INR check 3/28/2018    Indications   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]  Long-term (current) use of anticoagulants [Z79.01] [Z79.01]         Your next Anticoagulation Clinic appointment(s)     Mar 28, 2018  9:15 AM CDT   [facility]ticoagulation [facility] with PH ANTI COAG   [facility]    [address] [place] [id number]   [phone]              Contact Numbers     Clinic Number:         February 2018 Details    Sun Mon Tue Wed Thu Fri Sat         1               2               3                 4               5               6               7               8               9               10                 11               12               13               14               15               16               17                 18               19               20               21               22               23               24                 25               26               27               28      2.5 mg   See details          Date Details   02/28 This INR check               How to take your warfarin dose     To take:  2.5 mg Take 0.5 of a 5 mg tablet.           March 2018 Details    Sun Mon Tue Wed Thu Fri Sat         1      5 mg         2      2.5 mg         3      5 mg           4      2.5 mg         5      2.5 mg         6      5 mg         7      2.5 mg         8      5 mg         9      2.5 mg         10      5 mg           11      2.5 mg         12      2.5 mg         13      5 mg         14      2.5 mg         15      5 mg         16      2.5 mg         17       5 mg           18      2.5 mg         19      2.5 mg         20      5 mg         21      2.5 mg         22      5 mg         23      2.5 mg         24      5 mg           25      2.5 mg         26      2.5 mg         27      5 mg         28            29               30               31                Date Details   No additional details    Date of next INR:  3/28/2018         How to take your warfarin dose     To take:  2.5 mg Take 0.5 of a 5 mg tablet.    To take:  5 mg Take 1 of the 5 mg tablets.

## 2018-03-11 DIAGNOSIS — Z79.01 LONG-TERM (CURRENT) USE OF ANTICOAGULANTS: ICD-10-CM

## 2018-03-12 RX ORDER — WARFARIN SODIUM 5 MG/1
TABLET ORAL
Qty: 100 TABLET | Refills: 0 | Status: SHIPPED | OUTPATIENT
Start: 2018-03-12 | End: 2018-05-02

## 2018-03-28 ENCOUNTER — ANTICOAGULATION THERAPY VISIT (OUTPATIENT)
Dept: ANTICOAGULATION | Facility: CLINIC | Age: 78
End: 2018-03-28
Payer: MEDICARE

## 2018-03-28 DIAGNOSIS — Z79.01 LONG-TERM (CURRENT) USE OF ANTICOAGULANTS: ICD-10-CM

## 2018-03-28 DIAGNOSIS — I26.99 PULMONARY EMBOLI (H): ICD-10-CM

## 2018-03-28 LAB — INR POINT OF CARE: 1.9 (ref 0.86–1.14)

## 2018-03-28 PROCEDURE — 85610 PROTHROMBIN TIME: CPT | Mod: QW

## 2018-03-28 PROCEDURE — 99207 ZZC NO CHARGE NURSE ONLY: CPT

## 2018-03-28 PROCEDURE — 36416 COLLJ CAPILLARY BLOOD SPEC: CPT

## 2018-03-28 NOTE — PROGRESS NOTES
ANTICOAGULATION FOLLOW-UP CLINIC VISIT    Patient Name:  Tracy Palomo  Date:  3/28/2018  Contact Type:  Face to Face    SUBJECTIVE:     Patient Findings     Positives No Problem Findings           OBJECTIVE    INR Protime   Date Value Ref Range Status   03/28/2018 1.9 (A) 0.86 - 1.14 Final       ASSESSMENT / PLAN  INR assessment THER    Recheck INR In: 5 WEEKS    INR Location Clinic      Anticoagulation Summary as of 3/28/2018     INR goal 2.0-3.0   Today's INR 1.9!   Maintenance plan 5 mg (5 mg x 1) on Tue, Thu, Sat; 2.5 mg (5 mg x 0.5) all other days   Full instructions 5 mg on Tue, Thu, Sat; 2.5 mg all other days   Weekly total 25 mg   No change documented Johnana Mckeon RN   Plan last modified Johanna Mckeon RN (2/14/2018)   Next INR check 5/2/2018   Target end date     Indications   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]  Long-term (current) use of anticoagulants [Z79.01] [Z79.01]         Anticoagulation Episode Summary     INR check location     Preferred lab     Send INR reminders to [facility] POOL    Comments 5 mg tablets, book, PM dose       Anticoagulation Care Providers     Provider Role Specialty Phone number    Gerard Medrano MD [facility] [phone]            See the Encounter Report to view Anticoagulation Flowsheet and Dosing Calendar (Go to Encounters tab in chart review, and find the Anticoagulation Therapy Visit)    Dosage adjustment made based on physician directed care plan.    Johanna Mckeon RN

## 2018-03-28 NOTE — MR AVS SNAPSHOT
Tracy ANG Dewey   3/28/2018 9:15 AM   Anticoagulation Therapy Visit    Description:  77 year old female   Provider:  ERICK ANTI COAG   Department:  Ph Anticoag           INR as of 3/28/2018     Today's INR 1.9!      Anticoagulation Summary as of 3/28/2018     INR goal 2.0-3.0   Today's INR 1.9!   Full instructions 5 mg on Tue, Thu, Sat; 2.5 mg all other days   Next INR check 5/2/2018    Indications   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]  Long-term (current) use of anticoagulants [Z79.01] [Z79.01]         Your next Anticoagulation Clinic appointment(s)     May 02, 2018  9:15 AM CDT   Anticoagulation Visit with ERICK ANTI MARTIN   [facility]    [address] [place] [address]   [phone]              Contact Numbers     Clinic Number:         March 2018 Details    Sun Mon Tue Wed Thu Fri Sat         1               2               3                 4               5               6               7               8               9               10                 11               12               13               14               15               16               17                 18               19               20               21               22               23               24                 25               26               27               28      2.5 mg   See details      29      5 mg         30      2.5 mg         31      5 mg          Date Details   03/28 This INR check               How to take your warfarin dose     To take:  2.5 mg Take 0.5 of a 5 mg tablet.    To take:  5 mg Take 1 of the 5 mg tablets.           April 2018 Details    Sun Mon Tue Wed Thu Fri Sat     1      2.5 mg         2      2.5 mg         3      5 mg         4      2.5 mg         5      5 mg         6      2.5 mg         7      5 mg           8      2.5 mg         9      2.5 mg         10      5 mg         11      2.5 mg         12      5  mg         13      2.5 mg         14      5 mg           15      2.5 mg         16      2.5 mg         17      5 mg         18      2.5 mg         19      5 mg         20      2.5 mg         21      5 mg           22      2.5 mg         23      2.5 mg         24      5 mg         25      2.5 mg         26      5 mg         27      2.5 mg         28      5 mg           29      2.5 mg         30      2.5 mg               Date Details   No additional details            How to take your warfarin dose     To take:  2.5 mg Take 0.5 of a 5 mg tablet.    To take:  5 mg Take 1 of the 5 mg tablets.           May 2018 Details    Sun Mon Tue Wed Thu Fri Sat       1      5 mg         2            3               4               5                 6               7               8               9               10               11               12                 13               14               15               16               17               18               19                 20               21               22               23               24               25               26                 27               28               29               30               31                  Date Details   No additional details    Date of next INR:  5/2/2018         How to take your warfarin dose     To take:  2.5 mg Take 0.5 of a 5 mg tablet.    To take:  5 mg Take 1 of the 5 mg tablets.

## 2018-04-11 ENCOUNTER — HOSPITAL ENCOUNTER (EMERGENCY)
Facility: CLINIC | Age: 78
Discharge: HOME | End: 2018-04-11
Attending: EMERGENCY MEDICINE | Admitting: EMERGENCY MEDICINE
Payer: MEDICARE

## 2018-04-11 ENCOUNTER — APPOINTMENT (OUTPATIENT)
Dept: GENERAL RADIOLOGY | Facility: CLINIC | Age: 78
End: 2018-04-11
Attending: FAMILY MEDICINE
Payer: MEDICARE

## 2018-04-11 VITALS
DIASTOLIC BLOOD PRESSURE: 74 MMHG | HEART RATE: 61 BPM | OXYGEN SATURATION: 97 % | WEIGHT: 182 LBS | TEMPERATURE: 97.2 F | SYSTOLIC BLOOD PRESSURE: 152 MMHG | BODY MASS INDEX: 29.25 KG/M2 | RESPIRATION RATE: 18 BRPM | HEIGHT: 66 IN

## 2018-04-11 DIAGNOSIS — S63.259A FINGER DISLOCATION, INITIAL ENCOUNTER: ICD-10-CM

## 2018-04-11 PROCEDURE — 99284 EMERGENCY DEPT VISIT MOD MDM: CPT | Mod: 25 | Performed by: EMERGENCY MEDICINE

## 2018-04-11 PROCEDURE — 26670 TREAT HAND DISLOCATION: CPT | Mod: Z6 | Performed by: EMERGENCY MEDICINE

## 2018-04-11 PROCEDURE — 73140 X-RAY EXAM OF FINGER(S): CPT | Mod: TC,LT

## 2018-04-11 PROCEDURE — 26670 TREAT HAND DISLOCATION: CPT | Performed by: EMERGENCY MEDICINE

## 2018-04-11 PROCEDURE — 99283 EMERGENCY DEPT VISIT LOW MDM: CPT | Mod: 25 | Performed by: EMERGENCY MEDICINE

## 2018-04-11 RX ORDER — HYDROCODONE BITARTRATE AND ACETAMINOPHEN 5; 325 MG/1; MG/1
1-2 TABLET ORAL EVERY 4 HOURS PRN
Qty: 14 TABLET | Refills: 0 | Status: SHIPPED | OUTPATIENT
Start: 2018-04-11 | End: 2018-06-13

## 2018-04-11 NOTE — DISCHARGE INSTRUCTIONS
Finger Dislocation  A finger dislocation occurs when the tissues, or ligaments, that hold the joint together are torn. The bones then move apart, or are dislocated, out of their normal position. This causes pain, swelling, and bruising. Sometimes there is also a small chip fracture. Once the joint is put back into place again, it will take about 6 weeks for the ligaments to heal. During this time, you should protect your finger from re-injury.     Buddy taping is a way to secure your injured finger to the one next to it. Buddy taping allows the joint to move. But it also protects it from dislocating again. Buddy tape can be left in place for up to 6 weeks.  Hand exercises may be prescribed at your follow-up visit. These can help speed healing and maintain function. In most cases you will regain full function of your finger. But it may take 12 to 18 months before all mild pain and swelling goes away and full function returns.  Home care    Keep your hand raised, or elevated, to reduce pain and swelling. When sitting or lying down, raise your arm above the level of your heart. You can do this by placing your arm on a pillow that rests on your chest. Or your arm can be on a pillow at your side. This is most important during the first 48 hours after injury.    Put an ice pack on the injured area for no more than 15 to 20 minutes every 3 to 6 hours. Do this for the first 24 to 48 hours. You can make an ice pack by wrapping a plastic Ziploc bag of ice cubes in a thin towel. As the ice melts, be careful that the tape, gauze, or splint doesn t get wet. After that, keep using ice as needed to ease pain and swelling.    If you have a removable splint, you may take it off to bathe and then put it back on. If you have a permanent splint, cover your entire hand with 2 plastic bags. Place 1 bag around the other. Tape each bag with duct tape at the top end. Water can still leak in even when your hand is covered. So it's best to  keep the splint away from water. If a splint gets wet, you can dry it with a hair-dryer on a cool setting.     If you use eugenie tape and it becomes wet or dirty, change it. You may replace it with paper, plastic, or cloth tape. Cloth tape and paper tapes must be kept dry. When re-applying eugenie tape, use gauze or cotton padding between your fingers. This will prevent the skin from getting moist and breaking down, or macerating. It is very important to put padding at the web space. This is the small piece of skin that joins the bases of your fingers. Keep the eugenie tape in place as instructed by your healthcare provider.    You may use over-the-counter pain medicine to control pain, unless another pain medicine was prescribed. Talk with your provider before taking these medicines if you have chronic liver or kidney disease. Also talk with your provider if you have ever had a stomach ulcer or GI (gastrointestinal) bleeding.    Do not play sports or do any physical exercise until your healthcare provider says that you can.  Follow-up care  Follow up with your healthcare provider in 1 week, or as advised. Splints should generally not be left in place longer than 3 weeks to avoid stiffness and loss of joint function. It is important that you see the referral doctor. This provider can determine how long to keep your splint in place and when to begin hand exercises.  If X-rays were taken, you will be told of any new findings that may affect your care.  When to seek medical advice  Call your healthcare provider right away if any of the following occur:    The injured finger has more pain or swelling    The injured finger becomes red or warm    The injured finger becomes cold, blue, numb, or tingly  Date Last Reviewed: 11/23/2015 2000-2017 [employer]. [address] [place] [address], [place] [address]. All rights reserved. This information is not intended as a substitute for professional medical care. Always follow  your healthcare professional's instructions.

## 2018-04-11 NOTE — ED PROVIDER NOTES
History     Chief Complaint   Patient presents with     Hand Pain     HPI  Tracy Palomo is a 77 year old female who presents with injury to her left middle finger.  She is right-hand dominant.  Patient unfortunately slipped on the ice and fell injuring the middle finger.  Exact mechanism is not clear.  She has obvious deformity over the PIP and inability to flex the finger.  Denies previous injury to that finger.  No treatment prior to arrival.  No other injury with the fall.  No abrasions or open sores.  No paresthesias distally.    Problem List:    Patient Active Problem List    Diagnosis Date Noted     Fractured hip, left, closed, initial encounter (H) 03/16/2017     Priority: Medium     Hip fracture, left, closed, initial encounter (H) 03/15/2017     Priority: Medium     Closed left hip fracture (H) 03/15/2017     Priority: Medium     Long-term (current) use of anticoagulants [Z79.01] 03/22/2016     Priority: Medium     Anemia 11/03/2014     Priority: Medium     Paroxysmal atrial fibrillation (H) 11/03/2014     Priority: Medium     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 11/01/2014     Priority: Medium     In 2014       Recent major surgery - exploratory lap with small bowel resection 10/20 11/01/2014     Priority: Medium     Pleural effusion on right 11/01/2014     Priority: Medium     Hypertension goal BP (blood pressure) < 140/90 02/22/2013     Priority: Medium     Hyperlipidemia LDL goal <130 02/22/2013     Priority: Medium     Encounter for long-term current use of medication 02/22/2013     Priority: Medium     Problem list name updated by automated process. Provider to review       History of colonic polyps 02/22/2013     Priority: Medium     Problem list name updated by automated process. Provider to review       Advanced directives, counseling/discussion 02/22/2013     Priority: Medium     Pt states has Advance Directive at home, will bring in to be scanned into chart.           Past Medical History:    Past Medical History:   Diagnosis Date     Atrial fibrillation (H) 2014     Colon polyps      Diverticulosis of colon (without mention of hemorrhage)      DVT (deep vein thrombosis) in pregnancy (H) 2014     Other and unspecified hyperlipidemia      PE (pulmonary embolism) 2014     Unspecified essential hypertension        Past Surgical History:    Past Surgical History:   Procedure Laterality Date     COLONOSCOPY  09, 10, 12    [facility]     COLONOSCOPY N/A 12/11/2017    Procedure: COLONOSCOPY;  colonoscopy;  Surgeon: Elvis Quezada MD;  Location: [facility]     FLEXIBLE SIGMOIDOSCOPY  09, 11     LAPAROTOMY EXPLORATORY N/A 10/20/2014    Procedure: LAPAROTOMY EXPLORATORY;  Surgeon: Miguel Oleary MD;  Location: [facility]     LAPAROTOMY, LYSIS ADHESIONS, COMBINED N/A 10/20/2014    Procedure: COMBINED LAPAROTOMY, LYSIS ADHESIONS;  Surgeon: Miguel Oleary MD;  Location: [facility]     OPEN REDUCTION INTERNAL FIXATION HIP BIPOLAR Left 3/16/2017    Procedure: OPEN REDUCTION INTERNAL FIXATION HIP BIPOLAR;  Surgeon: Kaleb Pang DO;  Location: [facility]     RESECT SMALL BOWEL WITHOUT OSTOMY N/A 10/20/2014    Procedure: RESECT SMALL BOWEL WITHOUT OSTOMY;  Surgeon: Miguel Oleary MD;  Location: [facility]       Family History:    Family History   Problem Relation Age of Onset     Blood Disease No family hx of      blood clots       Social History:  Marital Status:   [2]  Social History   Substance Use Topics     Smoking status: Never Smoker     Smokeless tobacco: Never Used     Alcohol use No        Medications:      HYDROcodone-acetaminophen (NORCO) 5-325 MG per tablet   warfarin (COUMADIN) 5 MG tablet   warfarin (COUMADIN) 5 MG tablet   bisacodyl (DULCOLAX) 5 MG EC tablet   lisinopril (PRINIVIL/ZESTRIL) 10 MG tablet   lovastatin (MEVACOR) 40 MG tablet   acetaminophen (TYLENOL) 325 MG tablet         Review of Systems all other systems reviewed and are  "negative.    Physical Exam   BP: 164/73  Pulse: 60  Temp: 97.2  F (36.2  C)  Resp: 18  Height: 167.6 cm (5' 6\")  Weight: 82.6 kg (182 lb)  SpO2: 97 %      Physical Exam general alert cooperative female in mild to moderate distress.  Examination of her left hand shows obvious deformity of the left middle finger.  On palpation there is obvious step-off of the PIP joint.  Distally she has capillary refill which is intact.  No other injury is noted.    ED Course     ED Course     Procedures           Results for orders placed or performed during the hospital encounter of 04/11/18   XR Finger Left G/E 2 Views    Narrative    FINGER LEFT TWO OR MORE VIEWS    4/11/2018 4:52 PM     HISTORY: Trauma.    COMPARISON: None.       Impression    IMPRESSION: Three views of the left middle finger. There is posterior  dislocation at the PIP joint. Small ossific fragments anterior to the  joint may be avulsion fractures.     Patient had a digital block of lidocaine 1% with a total of 2 cc being used.       Critical Care time:  none               Results for orders placed or performed during the hospital encounter of 04/11/18 (from the past 24 hour(s))   XR Finger Left G/E 2 Views    Narrative    FINGER LEFT TWO OR MORE VIEWS    4/11/2018 4:52 PM     HISTORY: Trauma.    COMPARISON: None.       Impression    IMPRESSION: Three views of the left middle finger. There is posterior  dislocation at the PIP joint. Small ossific fragments anterior to the  joint may be avulsion fractures.       Medications - No data to display    Assessments & Plan (with Medical Decision Making)   Patient is a 77-year-old female presents to injury to her left middle finger.  She slipped and fell on the ice.  No other injury with the incident.  She is right-hand dominant.  Pain at the PIP with obvious deformity.  She does have changes at the DIP.  Hand is unaffected.  X-ray shows a dislocation described above.  Patient had a digital block and then easy reduction " of the finger.  It was then placed in a palmar splint in functional position.  She can take Tylenol or Vicodin for pain.  Follow-up with sports medicine for reassessment in 10 days to weeks.  Handout on finger dislocation is provided.  I have reviewed the nursing notes.    I have reviewed the findings, diagnosis, plan and need for follow up with the patient.       New Prescriptions    HYDROCODONE-ACETAMINOPHEN (NORCO) 5-325 MG PER TABLET    Take 1-2 tablets by mouth every 4 hours as needed for moderate to severe pain       Final diagnoses:   Finger dislocation, initial encounter       4/11/2018   [facility] EMERGENCY DEPARTMENT     Wilbur Foley MD  04/11/18 1716       Wilbur Foley MD  04/11/18 8189

## 2018-04-11 NOTE — PROGRESS NOTES
Outpatient Physical Therapy Discharge Note     Patient: Tracy Palomo  : 1940    Beginning/End Dates of Reporting Period:  2017 to 2017    Referring Provider: Dr. Kaleb Pang    Therapy Diagnosis: gait dysfunction. Pain in hip, weakness     Client Self Report: Pt continues to have upper adductor pain. It is better she states but it is limiting her exercise tolerance.    Goals:  Goal Identifier Ambulation   Goal Description Pt will be able to walk >1 hour with no AD in order to perform full participation with functional mobility.   Target Date 17   Date Met      Progress: unknown. Pain persisted     Goal Identifier Bike    Goal Description Pt will be able to use a bicycle in 30minutes-1 hour outside in order to participate with previous level of activity and exercise.   Target Date 17   Date Met      Progress: she is being limited by pain yet     Goal Identifier Gardening   Goal Description Pt will be able to lift atleast 10# with proper body mechanics in order to participate with gardening and home tasks.   Target Date 17   Date Met      Progress: she knows her body mechanics     Goal Identifier HEP   Goal Description Pt will be able to demonstrate proper form and duration of strengthening exercises in order to encourage self managment of symptoms.   Target Date 05/10/17   Date Met      Progress:limited due to adductor pain     Plan:  Discharge from therapy.    Discharge:    Reason for Discharge: Medicare G-code: Patient did not attend a final scheduled session prior to discharge. Unable to determine discharge functional status.    Equipment Issued: none    Discharge Plan: Patient to continue home program.

## 2018-04-11 NOTE — ED AVS SNAPSHOT
[facility] Emergency Department    [address] [place] [address]    Phone:  [phone]    Fax:  [phone]                                       Tracy Palomo   MRN: [id number]    Department:  [facility] Emergency Department   Date of Visit:  4/11/2018           Patient Information     Date Of Birth          1940        Your diagnoses for this visit were:     Finger dislocation, initial encounter        You were seen by Wilbur Foley MD.        Discharge Instructions         Finger Dislocation  A finger dislocation occurs when the tissues, or ligaments, that hold the joint together are torn. The bones then move apart, or are dislocated, out of their normal position. This causes pain, swelling, and bruising. Sometimes there is also a small chip fracture. Once the joint is put back into place again, it will take about 6 weeks for the ligaments to heal. During this time, you should protect your finger from re-injury.     Arvin taping is a way to secure your injured finger to the one next to it. Arvin taping allows the joint to move. But it also protects it from dislocating again. Arvin tape can be left in place for up to 6 weeks.  Hand exercises may be prescribed at your follow-up visit. These can help speed healing and maintain function. In most cases you will regain full function of your finger. But it may take 12 to 18 months before all mild pain and swelling goes away and full function returns.  Home care    Keep your hand raised, or elevated, to reduce pain and swelling. When sitting or lying down, raise your arm above the level of your heart. You can do this by placing your arm on a pillow that rests on your chest. Or your arm can be on a pillow at your side. This is most important during the first 48 hours after injury.    Put an ice pack on the injured area for no more than 15 to 20 minutes every 3 to 6 hours. Do this for the first 24 to 48 hours. You can make an ice  pack by wrapping a plastic Ziploc bag of ice cubes in a thin towel. As the ice melts, be careful that the tape, gauze, or splint doesn t get wet. After that, keep using ice as needed to ease pain and swelling.    If you have a removable splint, you may take it off to bathe and then put it back on. If you have a permanent splint, cover your entire hand with 2 plastic bags. Place 1 bag around the other. Tape each bag with duct tape at the top end. Water can still leak in even when your hand is covered. So it's best to keep the splint away from water. If a splint gets wet, you can dry it with a hair-dryer on a cool setting.     If you use buddy tape and it becomes wet or dirty, change it. You may replace it with paper, plastic, or cloth tape. Cloth tape and paper tapes must be kept dry. When re-applying buddy tape, use gauze or cotton padding between your fingers. This will prevent the skin from getting moist and breaking down, or macerating. It is very important to put padding at the web space. This is the small piece of skin that joins the bases of your fingers. Keep the buddy tape in place as instructed by your healthcare provider.    You may use over-the-counter pain medicine to control pain, unless another pain medicine was prescribed. Talk with your provider before taking these medicines if you have chronic liver or kidney disease. Also talk with your provider if you have ever had a stomach ulcer or GI (gastrointestinal) bleeding.    Do not play sports or do any physical exercise until your healthcare provider says that you can.  Follow-up care  Follow up with your healthcare provider in 1 week, or as advised. Splints should generally not be left in place longer than 3 weeks to avoid stiffness and loss of joint function. It is important that you see the referral doctor. This provider can determine how long to keep your splint in place and when to begin hand exercises.  If X-rays were taken, you will be told of  "any new findings that may affect your care.  When to seek medical advice  Call your healthcare provider right away if any of the following occur:    The injured finger has more pain or swelling    The injured finger becomes red or warm    The injured finger becomes cold, blue, numb, or tingly  Date Last Reviewed: 11/23/2015 2000-2017 [employer]. [address] [place], [place] [address]. All rights reserved. This information is not intended as a substitute for professional medical care. Always follow your healthcare professional's instructions.          Your next 10 appointments already scheduled     May 02, 2018  9:15 AM CDT   [facility] with [facility]   [facility]    [address] [address] [phone]              24 Hour Appointment Hotline       To make an appointment at any [facility], call [phone] ([phone]). If you don't have a family doctor or clinic, we will help you find one. [facility] are conveniently located to serve the needs of you and your family.             Review of your medicines      START taking        Dose / Directions Last dose taken    HYDROcodone-acetaminophen 5-325 MG per tablet   Commonly known as:  NORCO   Dose:  1-2 tablet   Quantity:  14 tablet        Take 1-2 tablets by mouth every 4 hours as needed for moderate to severe pain   Refills:  0          Our records show that you are taking the medicines listed below. If these are incorrect, please call your family doctor or clinic.        Dose / Directions Last dose taken    acetaminophen 325 MG tablet   Commonly known as:  TYLENOL   Dose:  975 mg   Quantity:  100 tablet        Take 3 tablets (975 mg) by mouth every 8 hours   Refills:  0        bisacodyl 5 MG EC tablet   Commonly known as:  DULCOLAX   Quantity:  4 tablet        Refer to \"Getting Ready for a Colonoscopy\" instruction handout   Refills:  0        " lisinopril 10 MG tablet   Commonly known as:  PRINIVIL/ZESTRIL   Dose:  10 mg   Quantity:  90 tablet        Take 1 tablet (10 mg) by mouth daily   Refills:  3        lovastatin 40 MG tablet   Commonly known as:  MEVACOR   Quantity:  90 tablet        TAKE ONE TABLET BY MOUTH AT BEDTIME   Refills:  3        * warfarin 5 MG tablet   Commonly known as:  COUMADIN   Quantity:  30 tablet        Take 5 mg on Tues, Thurs, Sat and 2.5 mg all other days, or as directed by the coumadin clinic.   Refills:  0        * warfarin 5 MG tablet   Commonly known as:  COUMADIN   Quantity:  100 tablet        TAKE 1 TABLET ON TUESDAY,THURSDAY, SATURDAY, AND 0.5 TABLET THE REST OF THE WEEK OR AS DIRECTED BY THE COUMADIN CLINIC   Refills:  0        * Notice:  This list has 2 medication(s) that are the same as other medications prescribed for you. Read the directions carefully, and ask your doctor or other care provider to review them with you.            Prescriptions were sent or printed at these locations (1 Prescription)                   [facility] [address]    [address] , [facility] [address]    Telephone:  [phone]   Fax:  [phone]   Hours:                  Printed at Department/Unit printer (1 of 1)         HYDROcodone-acetaminophen (NORCO) 5-325 MG per tablet                Procedures and tests performed during your visit     XR Finger Left G/E 2 Views      Orders Needing Specimen Collection     None      Pending Results     Date and Time Order Name Status Description    4/11/2018 1645 XR Finger Left G/E 2 Views Preliminary             Pending Culture Results     No orders found from 4/9/2018 to 4/12/2018.            Pending Results Instructions     If you had any lab results that were not finalized at the time of your Discharge, you can call the ED Lab Result RN at [phone]. You will be contacted by this team for any positive Lab results or changes in treatment. The nurses are  "available 7 days a week from 10A to 6:30P.  You can leave a message 24 hours per day and they will return your call.        Thank you for choosing [facility]       Thank you for choosing [facility] for your care. Our goal is always to provide you with excellent care. Hearing back from our patients is one way we can continue to improve our services. Please take a few minutes to complete the written survey that you may receive in the mail after you visit with us. Thank you!        [employer] Information     [facility] lets you send messages to your doctor, view your test results, renew your prescriptions, schedule appointments and more. To sign up, go to [email or web address] . Click on \"Log in\" on the left side of the screen, which will take you to the Welcome page. Then click on \"Sign up Now\" on the right side of the page.     You will be asked to enter the access code listed below, as well as some personal information. Please follow the directions to create your username and password.     Your access code is: [email or web address]  Expires: 7/10/2018  5:18 PM     Your access code will  in 90 days. If you need help or a new code, please call your [facility] clinic or [phone].        Care EveryWhere ID     This is your Care EveryWhere ID. This could be used by other organizations to access your [facility] medical records  [id number]        Equal Access to Services     KODI REYNOLDS AH: Hadareli Braga, waaxda genna, qaybta kaalbuffy ramos . So [facility] [phone].    ATENCIÓN: Si habla español, tiene a dale disposición servicios gratuitos de asistencia lingüística. Dinesh al [phone].    We comply with applicable federal civil rights laws and [place] laws. We do not discriminate on the basis of race, color, national origin, age, disability, sex, sexual orientation, or gender identity.            After Visit Summary       This is your record. Keep this with you and " show to your community pharmacist(s) and doctor(s) at your next visit.

## 2018-04-11 NOTE — ED NOTES
She slipped on the ice and hurt her L middle finger and the heel of her thumb.  The finger is deformed and swollen.

## 2018-04-11 NOTE — ED AVS SNAPSHOT
[facility] Emergency Department    [address] [place] [address]    Phone:  [phone]    Fax:  [phone]                                       Tracy Palomo   MRN: [id number]    Department:  [facility] Emergency Department   Date of Visit:  4/11/2018           After Visit Summary Signature Page     I have received my discharge instructions, and my questions have been answered. I have discussed any challenges I see with this plan with the nurse or doctor.    ..........................................................................................................................................  Patient/Patient Representative Signature      ..........................................................................................................................................  Patient Representative Print Name and Relationship to Patient    ..................................................               ................................................  Date                                            Time    ..........................................................................................................................................  Reviewed by Signature/Title    ...................................................              ..............................................  Date                                                            Time

## 2018-04-11 NOTE — ADDENDUM NOTE
Encounter addended by: Itzel Ferguson, PT on: 4/11/2018 12:00 PM<BR>     Actions taken: Pend clinical note, Sign clinical note, Episode resolved

## 2018-04-16 ENCOUNTER — RADIANT APPOINTMENT (OUTPATIENT)
Dept: GENERAL RADIOLOGY | Facility: CLINIC | Age: 78
End: 2018-04-16
Attending: PHYSICAL MEDICINE & REHABILITATION
Payer: MEDICARE

## 2018-04-16 ENCOUNTER — OFFICE VISIT (OUTPATIENT)
Dept: ORTHOPEDICS | Facility: CLINIC | Age: 78
End: 2018-04-16
Payer: MEDICARE

## 2018-04-16 VITALS
WEIGHT: 178.5 LBS | BODY MASS INDEX: 30.48 KG/M2 | DIASTOLIC BLOOD PRESSURE: 70 MMHG | HEIGHT: 64 IN | SYSTOLIC BLOOD PRESSURE: 108 MMHG

## 2018-04-16 DIAGNOSIS — S63.259A FINGER DISLOCATION: ICD-10-CM

## 2018-04-16 DIAGNOSIS — S62.629A CLOSED AVULSION FRACTURE OF MIDDLE PHALANX OF FINGER, INITIAL ENCOUNTER: ICD-10-CM

## 2018-04-16 DIAGNOSIS — S63.259A DISLOCATION OF FINGER, INITIAL ENCOUNTER: Primary | ICD-10-CM

## 2018-04-16 PROCEDURE — 73140 X-RAY EXAM OF FINGER(S): CPT | Mod: TC

## 2018-04-16 PROCEDURE — 99203 OFFICE O/P NEW LOW 30 MIN: CPT | Performed by: PHYSICAL MEDICINE & REHABILITATION

## 2018-04-16 NOTE — PROGRESS NOTES
Sports Medicine Clinic Visit    PCP: Chad Betts    CC: Patient presents with:  Hand Pain      HPI:  Tracy Palomo is a 77 year old female who is seen as an ER referral.   She notes left middle finger pain due to an injury on 4/11/2018 when she slipped on ice and fell. She is unsure how she hurt the finger during the fall. Her pain is through the PIP.  She is in an aluminum splint. She is wearing the splint most of the time, taking it off for showering and washing dishes.  She rates the pain at a 4/10 currently.  Symptoms are relieved with splinting and ice. Symptoms are worsened by flexion. She endorses swelling and bruising.   She denies popping, grinding, catching, locking, instability, numbness, tingling, weakness, pain in other joints and fever, chills.  Other treatment has included nothing.      Review of Systems:  Musculoskeletal: as above  Remainder of review of systems is negative including constitutional, eyes, ENT, CV, pulmonary, GI, , endocrine, skin, hematologic, and neurologic except as noted in HPI or medical history.    History reviewed. No pertinent past surgical/medical/family/social history other than as mentioned in HPI.    Patient Active Problem List   Diagnosis     Hypertension goal BP (blood pressure) < 140/90     Hyperlipidemia LDL goal <130     Encounter for long-term current use of medication     History of colonic polyps     Advanced directives, counseling/discussion     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     Recent major surgery - exploratory lap with small bowel resection 10/20     Pleural effusion on right     Anemia     Paroxysmal atrial fibrillation (H)     Long-term (current) use of anticoagulants [Z79.01]     Hip fracture, left, closed, initial encounter (H)     Closed left hip fracture (H)     Fractured hip, left, closed, initial encounter (H)     Past Surgical History:   Procedure Laterality Date     COLONOSCOPY  09, 10, 12    [facility]  "Services     COLONOSCOPY N/A 12/11/2017    Procedure: COLONOSCOPY;  colonoscopy;  Surgeon: Elvis Quezada MD;  [facility]: [facility]     FLEXIBLE SIGMOIDOSCOPY  09, 11     LAPAROTOMY EXPLORATORY N/A 10/20/2014    Procedure: LAPAROTOMY EXPLORATORY;  Surgeon: Miguel Oleary MD;  Location: [facility]     LAPAROTOMY, LYSIS ADHESIONS, COMBINED N/A 10/20/2014    Procedure: COMBINED LAPAROTOMY, LYSIS ADHESIONS;  Surgeon: Miguel Oleary MD;  [facility]: [facility]     OPEN REDUCTION INTERNAL FIXATION HIP BIPOLAR Left 3/16/2017    Procedure: OPEN REDUCTION INTERNAL FIXATION HIP BIPOLAR;  Surgeon: Kaleb Pang DO;  [facility]: [facility]     RESECT SMALL BOWEL WITHOUT OSTOMY N/A 10/20/2014    Procedure: RESECT SMALL BOWEL WITHOUT OSTOMY;  Surgeon: Miguel Oleary MD;  Location: [facility]     Family History   Problem Relation Age of Onset     Blood Disease No family hx of      blood clots     Social History     Social History     Marital status:      Spouse name: N/A     Number of children: N/A     Years of education: N/A     Occupational History     Not on file.     Social History Main Topics     Smoking status: Never Smoker     Smokeless tobacco: Never Used     Alcohol use No     Drug use: No     Sexual activity: Yes     Partners: Male     Other Topics Concern     Parent/Sibling W/ Cabg, Mi Or Angioplasty Before 65f 55m? No     Social History Narrative       She is retired    Current Outpatient Prescriptions   Medication     warfarin (COUMADIN) 5 MG tablet     lisinopril (PRINIVIL/ZESTRIL) 10 MG tablet     warfarin (COUMADIN) 5 MG tablet     lovastatin (MEVACOR) 40 MG tablet     HYDROcodone-acetaminophen (NORCO) 5-325 MG per tablet     bisacodyl (DULCOLAX) 5 MG EC tablet     acetaminophen (TYLENOL) 325 MG tablet     No current facility-administered medications for this visit.      Allergies   Allergen Reactions     No Known Allergies          Objective:  /70  Ht 5' 4.25\" (1.632 m)  Wt 178 lb 8 oz (81 kg)  " BMI 30.4 kg/m2    General: Alert and in no distress    Head: Normocephalic, atraumatic  Eyes: no scleral icterus or conjunctival erythema   Oropharynx:  Mucous membranes moist  Skin: no erythema, petechiae, or jaundice  CV: regular rhythm by palpation, 2+ distal pulses  Resp: normal respiratory effort without conversational dyspnea   Psych: normal mood and affect    Gait: Non-antalgic, appropriate coordination and balance   Neuro: Motor strength and sensation as noted below    Musculoskeletal:    Bilateral Wrist and Hand exam    Inspection:  -Swelling and bruising over the left hand (dorsal/palmar)  -Slight ulnar angulation of the left 3rd digit stemming from the PIP joint    Palpation:  -Tender over the left 3rd digit PIP joint    ROM:  -Decreased flexion at the DIP joint of the 3rd digit.    Strength:  -4+/5 left third digit PIP flexion  - strength and finger abduction 5/5 bilaterally    Neurovascular:       2+ radial pulses bilaterally and normal sensation to light touch in the radial, median and ulnar nerve distributions      Radiology:  Independent visualization of images performed.  Shows interval reduction of the PIP joint of the left 3rd finger.  Small ossific fragments still seen.    Recent Results (from the past 744 hour(s))   XR Finger Left G/E 2 Views    Narrative    FINGER LEFT TWO OR MORE VIEWS    4/11/2018 4:52 PM     HISTORY: Trauma.    COMPARISON: None.       Impression    IMPRESSION: Three views of the left middle finger. There is posterior  dislocation at the PIP joint. Small ossific fragments anterior to the  joint may be avulsion fractures.    REAL MILLS MD   XR Finger Left G/E 2 Views    Narrative    LEFT FINGER TWO OR MORE VIEWS   4/16/2018 11:25 AM     HISTORY: Evaluate. Finger dislocation.    COMPARISON: Left third finger x-rays dated 4/11/2018.       Impression    IMPRESSION:  1. Since the prior state there has been relocation of the middle  phalangeal base into appropriate  relation with the head of the  proximal phalanx of the long finger.  2. Small intra-articular chip fracture of the base of the middle  phalanx of the long finger does not appear to be significantly  displaced.  3. Moderate to severe degenerative changes of the interphalangeal  joints of the fingers (worse distally) and of the first  carpometacarpal and STT joints of the wrist are noted.  4. Diffuse osteopenia.         Assessment:  1. Dislocation of finger, initial encounter    2. Closed avulsion fracture of middle phalanx of finger, initial encounter        Plan:  Discussed the assessment with the patient and developed a plan together:  -Continue splinting and buddy taping for 2 weeks. May take off for showering  -Ice or heat 15-20 minutes as needed (Avoid sleeping on a heating pad or ice)  -Patient's preferred over the counter medications as directed on packaging as needed for pain or soreness.  Please take ibuprofen with food. Do not premedicate prior to activity.    Follow Up: 2 weeks for repeat x-rays and re-evaluation. Please call with any questions or concerns.       Melinda Arias MD, CAQ  [facility]

## 2018-04-16 NOTE — PATIENT INSTRUCTIONS
Today's Plan of Care:  -Continue splinting and buddy taping for 2 weeks. May take off for showering  -Ice or heat 15-20 minutes as needed (Avoid sleeping on a heating pad or ice)  -Patient's preferred over the counter medications as directed on packaging as needed for pain or soreness.  Please take ibuprofen with food. Do not premedicate prior to activity.    Follow Up: 2 weeks for repeat x-rays and reevaluation. Please call with any questions or concerns.

## 2018-04-16 NOTE — LETTER
4/16/2018         RE: Tracy Palomo  [address] [facility] [address]        Dear Colleague,    Thank you for referring your patient, Tracy Palomo, to the [facility]. Please see a copy of my visit note below.    Sports Medicine Clinic Visit    PCP: Chad Betts    CC: Patient presents with:  Hand Pain      HPI:  Tracy Palomo is a 77 year old female who is seen as an ER referral.   She notes left middle finger pain due to an injury on 4/11/2018 when she slipped on ice and fell. She is unsure how she hurt the finger during the fall. Her pain is through the PIP.  She is in an aluminum splint. She is wearing the splint most of the time, taking it off for showering and washing dishes.  She rates the pain at a 4/10 currently.  Symptoms are relieved with splinting and ice. Symptoms are worsened by flexion. She endorses swelling and bruising.   She denies popping, grinding, catching, locking, instability, numbness, tingling, weakness, pain in other joints and fever, chills.  Other treatment has included nothing.      Review of Systems:  Musculoskeletal: as above  Remainder of review of systems is negative including constitutional, eyes, ENT, CV, pulmonary, GI, , endocrine, skin, hematologic, and neurologic except as noted in HPI or medical history.    History reviewed. No pertinent past surgical/medical/family/social history other than as mentioned in HPI.    Patient Active Problem List   Diagnosis     Hypertension goal BP (blood pressure) < 140/90     Hyperlipidemia LDL goal <130     Encounter for long-term current use of medication     History of colonic polyps     Advanced directives, counseling/discussion     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     Recent major surgery - exploratory lap with small bowel resection 10/20     Pleural effusion on right     Anemia     Paroxysmal atrial fibrillation (H)     Long-term (current) use of anticoagulants [Z79.01]      Hip fracture, left, closed, initial encounter (H)     Closed left hip fracture (H)     Fractured hip, left, closed, initial encounter (H)     Past Surgical History:   Procedure Laterality Date     COLONOSCOPY  09, 10, 12    [facility]     COLONOSCOPY N/A 12/11/2017    Procedure: COLONOSCOPY;  colonoscopy;  Surgeon: Elvis Quezada MD;  [facility]     FLEXIBLE SIGMOIDOSCOPY  09, 11     LAPAROTOMY EXPLORATORY N/A 10/20/2014    Procedure: LAPAROTOMY EXPLORATORY;  Surgeon: Miguel Oleary MD;  [facility]     LAPAROTOMY, LYSIS ADHESIONS, COMBINED N/A 10/20/2014    Procedure: COMBINED LAPAROTOMY, LYSIS ADHESIONS;  Surgeon: Miguel Oleary MD;  [facility]     OPEN REDUCTION INTERNAL FIXATION HIP BIPOLAR Left 3/16/2017    Procedure: OPEN REDUCTION INTERNAL FIXATION HIP BIPOLAR;  Surgeon: Kaleb Pang DO;  [facility]     RESECT SMALL BOWEL WITHOUT OSTOMY N/A 10/20/2014    Procedure: RESECT SMALL BOWEL WITHOUT OSTOMY;  Surgeon: Miguel Oleary MD;  [facility]: [facility]     Family History   Problem Relation Age of Onset     Blood Disease No family hx of      blood clots     Social History     Social History     Marital status:      Spouse name: N/A     Number of children: N/A     Years of education: N/A     Occupational History     Not on file.     Social History Main Topics     Smoking status: Never Smoker     Smokeless tobacco: Never Used     Alcohol use No     Drug use: No     Sexual activity: Yes     Partners: Male     Other Topics Concern     Parent/Sibling W/ Cabg, Mi Or Angioplasty Before 65f 55m? No     Social History Narrative       She is retired    Current Outpatient Prescriptions   Medication     warfarin (COUMADIN) 5 MG tablet     lisinopril (PRINIVIL/ZESTRIL) 10 MG tablet     warfarin (COUMADIN) 5 MG tablet     lovastatin (MEVACOR) 40 MG tablet     HYDROcodone-acetaminophen (NORCO) 5-325 MG per tablet     bisacodyl (DULCOLAX) 5 MG EC tablet      "acetaminophen (TYLENOL) 325 MG tablet     No current facility-administered medications for this visit.      Allergies   Allergen Reactions     No Known Allergies          Objective:  /70  Ht 5' 4.25\" (1.632 m)  Wt 178 lb 8 oz (81 kg)  BMI 30.4 kg/m2    General: Alert and in no distress    Head: Normocephalic, atraumatic  Eyes: no scleral icterus or conjunctival erythema   Oropharynx:  Mucous membranes moist  Skin: no erythema, petechiae, or jaundice  CV: regular rhythm by palpation, 2+ distal pulses  Resp: normal respiratory effort without conversational dyspnea   Psych: normal mood and affect    Gait: Non-antalgic, appropriate coordination and balance   Neuro: Motor strength and sensation as noted below    Musculoskeletal:    Bilateral Wrist and Hand exam    Inspection:  -Swelling and bruising over the left hand (dorsal/palmar)  -Slight ulnar angulation of the left 3rd digit stemming from the PIP joint    Palpation:  -Tender over the left 3rd digit PIP joint    ROM:  -Decreased flexion at the DIP joint of the 3rd digit.    Strength:  -4+/5 left third digit PIP flexion  - strength and finger abduction 5/5 bilaterally    Neurovascular:       2+ radial pulses bilaterally and normal sensation to light touch in the radial, median and ulnar nerve distributions      Radiology:  Independent visualization of images performed.  Shows interval reduction of the PIP joint of the left 3rd finger.  Small ossific fragments still seen.    Recent Results (from the past 744 hour(s))   XR Finger Left G/E 2 Views    Narrative    FINGER LEFT TWO OR MORE VIEWS    4/11/2018 4:52 PM     HISTORY: Trauma.    COMPARISON: None.       Impression    IMPRESSION: Three views of the left middle finger. There is posterior  dislocation at the PIP joint. Small ossific fragments anterior to the  joint may be avulsion fractures.    REAL MILLS MD   XR Finger Left G/E 2 Views    Narrative    LEFT FINGER TWO OR MORE VIEWS   4/16/2018 " 11:25 AM     HISTORY: Evaluate. Finger dislocation.    COMPARISON: Left third finger x-rays dated 4/11/2018.       Impression    IMPRESSION:  1. Since the prior state there has been relocation of the middle  phalangeal base into appropriate relation with the head of the  proximal phalanx of the long finger.  2. Small intra-articular chip fracture of the base of the middle  phalanx of the long finger does not appear to be significantly  displaced.  3. Moderate to severe degenerative changes of the interphalangeal  joints of the fingers (worse distally) and of the first  carpometacarpal and STT joints of the wrist are noted.  4. Diffuse osteopenia.         Assessment:  1. Dislocation of finger, initial encounter    2. Closed avulsion fracture of middle phalanx of finger, initial encounter        Plan:  Discussed the assessment with the patient and developed a plan together:  -Continue splinting and buddy taping for 2 weeks. May take off for showering  -Ice or heat 15-20 minutes as needed (Avoid sleeping on a heating pad or ice)  -Patient's preferred over the counter medications as directed on packaging as needed for pain or soreness.  Please take ibuprofen with food. Do not premedicate prior to activity.    Follow Up: 2 weeks for repeat x-rays and re-evaluation. Please call with any questions or concerns.       Melinda Arias MD, [facility]      Again, thank you for allowing me to participate in the care of your patient.        Sincerely,        Kamryn Arias MD

## 2018-04-16 NOTE — PROGRESS NOTES
Appropriate assistive devices provided during their visit. na (Yes, No, N/A) na (list device)    Exam table and/or cart  placed in the lowest position. na (Yes, No, N/A)    Brakes on tables/carts/wheelchairs used at all times. na (Yes, No, N/A)    Non slip footwear applied. na (Yes, No, NA)    Patient was accompanied by staff throughout visit. yes (Yes, No, N/A)    Equipment safety straps used. na (Yes, No, N/A)    Assist with toileting. na (Yes, No, N/A)  Debra Lamb  4/16/2018  11:25 AM

## 2018-04-16 NOTE — MR AVS SNAPSHOT
After Visit Summary   4/16/2018    Tracy Palomo    MRN: [id number]           Patient Information     Date Of Birth          1940        Visit Information        Provider Department      4/16/2018 11:20 AM Kamryn Arias MD [facility]        Today's Diagnoses     Finger dislocation    -  1      Care Instructions    Today's Plan of Care:  -Continue splinting and buddy tapingfor 2 weeks. May take off for showering  -Ice or heat 15-20 minutes as needed (Avoid sleeping on a heating pad or ice)  -Patient's preferred over the counter medications as directed on packaging as needed for pain or soreness.  Please take ibuprofen with food. Do not premedicate prior to activity.    Follow Up: 2 weeks for repeat x-rays and reevaluation. Please call with any questions or concerns.               Follow-ups after your visit        Your next 10 appointments already scheduled     May 02, 2018  9:15 AM CDT   Anticoagulation Visit with [facility]   [facility]    [address] [place] [address] [id number] [phone]              Who to contact     If you have questions or need follow up information about today's clinic visit or your schedule please contact [facility] directly at [phone].  Normal or non-critical lab and imaging results will be communicated to you by [employer], letter or phone within 4 business days after the clinic has received the results. If you do not hear from us within 7 days, please contact the clinic through [employer] or phone. If you have a critical or abnormal lab result, we will notify you by phone as soon as possible.  Submit refill requests through [employer] or call your pharmacy and they will forward the refill request to us. Please allow 3 business days for your refill to be completed.          Additional Information About Your Visit        [employer] Information     [employer] lets you send  "messages to your doctor, view your test results, renew your prescriptions, schedule appointments and more. To sign up, go to [email or web address] . Click on \"Log in\" on the left side of the screen, which will take you to the Welcome page. Then click on \"Sign up Now\" on the right side of the page.     You will be asked to enter the access code listed below, as well as some personal information. Please follow the directions to create your username and password.     Your access code is: [email or web address]  Expires: 7/10/2018  5:18 PM     Your access code will  in 90 days. If you need help or a new code, please call your [facility] clinic or [phone].        Care EveryWhere ID     This is your Care EveryWhere ID. This could be used by other organizations to access your [facility] medical records  [id number]        Your Vitals Were     Height BMI (Body Mass Index)                5' 4.25\" (1.632 m) 30.4 kg/m2           Blood Pressure from Last 3 Encounters:   18 108/70   18 152/74   17 127/76    Weight from Last 3 Encounters:   18 178 lb 8 oz (81 kg)   18 182 lb (82.6 kg)   17 181 lb (82.1 kg)               Primary Care Provider Office Phone # Fax #    Chad Betts [phone]       [address] [facility] [address]        Equal Access to Services     [facility] AH: Hadii aad ku hadasho Soomaali, waaxda luqadaha, qaybta kaalmada adeegyada, waxay daysi holguin. So [facility] [phone].    ATENCIÓN: Si habla español, tiene a dale disposición servicios gratuitos de asistencia lingüística. Llame al [phone].    We comply with applicable federal civil rights laws and [place] laws. We do not discriminate on the basis of race, color, national origin, age, disability, sex, sexual orientation, or gender identity.            Thank you!     Thank you for choosing [facility]  for your care. Our goal is always to provide you with " "excellent care. Hearing back from our patients is one way we can continue to improve our services. Please take a few minutes to complete the written survey that you may receive in the mail after your visit with us. Thank you!             Your Updated Medication List - Protect others around you: Learn how to safely use, store and throw away your medicines at www.disposemymeds.org.          This list is accurate as of 4/16/18 11:39 AM.  Always use your most recent med list.                   Brand Name Dispense Instructions for use Diagnosis    acetaminophen 325 MG tablet    TYLENOL    100 tablet    Take 3 tablets (975 mg) by mouth every 8 hours    Hip fracture, left, closed, initial encounter (H)       bisacodyl 5 MG EC tablet    DULCOLAX    4 tablet    Refer to \"Getting Ready for a Colonoscopy\" instruction handout    Encounter for screening colonoscopy       HYDROcodone-acetaminophen 5-325 MG per tablet    NORCO    14 tablet    Take 1-2 tablets by mouth every 4 hours as needed for moderate to severe pain        lisinopril 10 MG tablet    PRINIVIL/ZESTRIL    90 tablet    Take 1 tablet (10 mg) by mouth daily    Essential hypertension with goal blood pressure less than 140/90       lovastatin 40 MG tablet    MEVACOR    90 tablet    TAKE ONE TABLET BY MOUTH AT BEDTIME    Hyperlipidemia LDL goal <130       * warfarin 5 MG tablet    COUMADIN    30 tablet    Take 5 mg on Tues, Thurs, Sat and 2.5 mg all other days, or as directed by the coumadin clinic.        * warfarin 5 MG tablet    COUMADIN    100 tablet    TAKE 1 TABLET ON TUESDAY,THURSDAY, SATURDAY, AND 0.5 TABLET THE REST OF THE WEEK OR AS DIRECTED BY THE COUMADIN CLINIC    Long-term (current) use of anticoagulants       * Notice:  This list has 2 medication(s) that are the same as other medications prescribed for you. Read the directions carefully, and ask your doctor or other care provider to review them with you.      "

## 2018-04-30 ENCOUNTER — RADIANT APPOINTMENT (OUTPATIENT)
Dept: GENERAL RADIOLOGY | Facility: CLINIC | Age: 78
End: 2018-04-30
Attending: PHYSICAL MEDICINE & REHABILITATION
Payer: MEDICARE

## 2018-04-30 ENCOUNTER — OFFICE VISIT (OUTPATIENT)
Dept: ORTHOPEDICS | Facility: CLINIC | Age: 78
End: 2018-04-30
Payer: MEDICARE

## 2018-04-30 VITALS
SYSTOLIC BLOOD PRESSURE: 98 MMHG | HEIGHT: 64 IN | DIASTOLIC BLOOD PRESSURE: 68 MMHG | WEIGHT: 178.5 LBS | BODY MASS INDEX: 30.48 KG/M2

## 2018-04-30 DIAGNOSIS — S62.629A CLOSED AVULSION FRACTURE OF MIDDLE PHALANX OF FINGER, INITIAL ENCOUNTER: ICD-10-CM

## 2018-04-30 DIAGNOSIS — S63.259D DISLOCATION OF FINGER, SUBSEQUENT ENCOUNTER: Primary | ICD-10-CM

## 2018-04-30 DIAGNOSIS — S63.259A DISLOCATION OF FINGER, INITIAL ENCOUNTER: ICD-10-CM

## 2018-04-30 DIAGNOSIS — S62.629D CLOSED AVULSION FRACTURE OF MIDDLE PHALANX OF FINGER WITH ROUTINE HEALING, SUBSEQUENT ENCOUNTER: ICD-10-CM

## 2018-04-30 PROCEDURE — 99213 OFFICE O/P EST LOW 20 MIN: CPT | Performed by: PHYSICAL MEDICINE & REHABILITATION

## 2018-04-30 PROCEDURE — 73140 X-RAY EXAM OF FINGER(S): CPT | Mod: TC

## 2018-04-30 NOTE — MR AVS SNAPSHOT
After Visit Summary   4/30/2018    Tracy Palomo    MRN: [id number]           Patient Information     Date Of Birth          1940        Visit Information        Provider Department      4/30/2018 11:20 AM Kamryn Arias MD [facility]        Today's Diagnoses     Dislocation of finger, initial encounter    -  1    Closed avulsion fracture of middle phalanx of finger, initial encounter          Care Instructions    Today's Plan of Care:  -Continue buddy taping for 3 weeks.   -Splint for protection  -Ice or heat 15-20 minutes as needed (Avoid sleeping on a heating pad or ice)  -Patient's preferred over the counter medications as directed on packaging as needed for pain or soreness.  Please take ibuprofen with food. Do not premedicate prior to activity.    Follow Up: 3 weeks for repeat x-rays and reevaluation. Please call with any questions or concerns.               Follow-ups after your visit        Your next 10 appointments already scheduled     May 02, 2018  9:15 AM CDT   Anticoagulation Visit with PH ANTI COAPATRICK   [facility] ([facility]    [address] [place] [id number] [phone]              Who to contact     If you have questions or need follow up information about today's clinic visit or your schedule please contact [facility] directly at [phone].  Normal or non-critical lab and imaging results will be communicated to you by MyChart, letter or phone within 4 business days after the clinic has received the results. If you do not hear from us within 7 days, please contact the clinic through MyChart or phone. If you have a critical or abnormal lab result, we will notify you by phone as soon as possible.  Submit refill requests through [employer] or call your pharmacy and they will forward the refill request to us. Please allow 3 business days for your refill to be completed.          Additional  "Information About Your Visit        MyChart Information     [employer] lets you send messages to your doctor, view your test results, renew your prescriptions, schedule appointments and more. To sign up, go to [email or web address] . Click on \"Log in\" on the left side of the screen, which will take you to the Welcome page. Then click on \"Sign up Now\" on the right side of the page.     You will be asked to enter the access code listed below, as well as some personal information. Please follow the directions to create your username and password.     Your access code is: [email or web address]  Expires: 7/10/2018  5:18 PM     Your access code will  in 90 days. If you need help or a new code, please call your [facility] clinic or [phone].        Care EveryWhere ID     This is your Care EveryWhere ID. This could be used by other organizations to access your [facility] medical records  [id number]        Your Vitals Were     Height BMI (Body Mass Index)                5' 4.25\" (1.632 m) 30.4 kg/m2           Blood Pressure from Last 3 Encounters:   18 98/68   18 108/70   18 152/74    Weight from Last 3 Encounters:   18 178 lb 8 oz (81 kg)   18 178 lb 8 oz (81 kg)   18 182 lb (82.6 kg)               Primary Care Provider Office Phone # Fax #    Chad Betts [phone]       [address] [facility] [address]        Equal Access to Services     [facility]: Hadii aad ku hadasho Soomaali, waaxda luqadaha, qaybta kaalmada buffy jaffe . So [facility] [phone].    ATENCIÓN: Si habla español, tiene a dale disposición servicios gratuitos de asistencia lingüística. Llame al [phone].    We comply with applicable federal civil rights laws and [place] laws. We do not discriminate on the basis of race, color, national origin, age, disability, sex, sexual orientation, or gender identity.            Thank you!     Thank you for choosing " "[facility]  for your care. Our goal is always to provide you with excellent care. Hearing back from our patients is one way we can continue to improve our services. Please take a few minutes to complete the written survey that you may receive in the mail after your visit with us. Thank you!             Your Updated Medication List - Protect others around you: Learn how to safely use, store and throw away your medicines at www.disposemymeds.org.          This list is accurate as of 4/30/18 11:31 AM.  Always use your most recent med list.                   Brand Name Dispense Instructions for use Diagnosis    acetaminophen 325 MG tablet    TYLENOL    100 tablet    Take 3 tablets (975 mg) by mouth every 8 hours    Hip fracture, left, closed, initial encounter (H)       bisacodyl 5 MG EC tablet    DULCOLAX    4 tablet    Refer to \"Getting Ready for a Colonoscopy\" instruction handout    Encounter for screening colonoscopy       HYDROcodone-acetaminophen 5-325 MG per tablet    NORCO    14 tablet    Take 1-2 tablets by mouth every 4 hours as needed for moderate to severe pain        lisinopril 10 MG tablet    PRINIVIL/ZESTRIL    90 tablet    Take 1 tablet (10 mg) by mouth daily    Essential hypertension with goal blood pressure less than 140/90       lovastatin 40 MG tablet    MEVACOR    90 tablet    TAKE ONE TABLET BY MOUTH AT BEDTIME    Hyperlipidemia LDL goal <130       * warfarin 5 MG tablet    COUMADIN    30 tablet    Take 5 mg on Tues, Thurs, Sat and 2.5 mg all other days, or as directed by the coumadin clinic.        * warfarin 5 MG tablet    COUMADIN    100 tablet    TAKE 1 TABLET ON TUESDAY,THURSDAY, SATURDAY, AND 0.5 TABLET THE REST OF THE WEEK OR AS DIRECTED BY THE COUMADIN CLINIC    Long-term (current) use of anticoagulants       * Notice:  This list has 2 medication(s) that are the same as other medications prescribed for you. Read the directions carefully, and ask your doctor or other care " provider to review them with you.

## 2018-04-30 NOTE — LETTER
4/30/2018         RE: Tracy Palomo  [address] [facility] [address]        Dear Colleague,    Thank you for referring your patient, Tracy Palomo, to the [facility]. Please see a copy of my visit note below.    Sports Medicine Clinic Visit - Interim History April 30, 2018    Initial Visit Date 4/16/2018  Initial Injury Date 4/11/2018    PCP: Chad Betts    Tracy Palomo is a 77 year old female who is seen in follow up. Since last visit on 4/16/2018 patient has had improvement in the finger pain. She continues to have some soreness and swelling. She has had improvement in her flexion however still some what limited. She is continuing to wear the splint for protection and support. She rates the pain at a 0/10 currently.  Symptoms are relieved with splinting and ice.  Symptoms are worsened by bumping the finger.     - Now ~ 2.5 weeks from initial injury      Review of Systems  Musculoskeletal: as above  Remainder of review of systems is negative including constitutional, eyes, ENT, CV, pulmonary, GI, , endocrine, skin, hematologic, and neurologic except as noted in HPI or medical history.    History reviewed. No pertinent past surgical/medical/family/social history other than as mentioned in HPI.    Past Medical History:   Diagnosis Date     Atrial fibrillation (H) 2014    related to colon surgery     Colon polyps      Diverticulosis of colon (without mention of hemorrhage)     Diverticulosis     DVT (deep vein thrombosis) in pregnancy (H) 2014    after colon surgery     Other and unspecified hyperlipidemia      PE (pulmonary embolism) 2014    related to colon surgery     Unspecified essential hypertension      Past Surgical History:   Procedure Laterality Date     COLONOSCOPY  09, 10, 12    [facility]     COLONOSCOPY N/A 12/11/2017    Procedure: COLONOSCOPY;  colonoscopy;  Surgeon: Elvis Quezada MD;  Location:  GI     FLEXIBLE SIGMOIDOSCOPY  09, 11      "LAPAROTOMY EXPLORATORY N/A 10/20/2014    Procedure: LAPAROTOMY EXPLORATORY;  Surgeon: Miguel Oleary MD;  Location: [facility] OR     LAPAROTOMY, LYSIS ADHESIONS, COMBINED N/A 10/20/2014    Procedure: COMBINED LAPAROTOMY, LYSIS ADHESIONS;  Surgeon: Miguel Oleary MD;  Location: [facility] OR     OPEN REDUCTION INTERNAL FIXATION HIP BIPOLAR Left 3/16/2017    Procedure: OPEN REDUCTION INTERNAL FIXATION HIP BIPOLAR;  Surgeon: Kaleb Pang DO;  Location: [facility] OR     RESECT SMALL BOWEL WITHOUT OSTOMY N/A 10/20/2014    Procedure: RESECT SMALL BOWEL WITHOUT OSTOMY;  Surgeon: Miguel Oleary MD;  Location: [facility] OR     Family History   Problem Relation Age of Onset     Blood Disease No family hx of      blood clots     Social History     Social History     Marital status:      Spouse name: N/A     Number of children: N/A     Years of education: N/A     Occupational History     Not on file.     Social History Main Topics     Smoking status: Never Smoker     Smokeless tobacco: Never Used     Alcohol use No     Drug use: No     Sexual activity: Yes     Partners: Male     Other Topics Concern     Parent/Sibling W/ Cabg, Mi Or Angioplasty Before 65f 55m? No     Social History Narrative       She is retired    Current Outpatient Prescriptions   Medication     acetaminophen (TYLENOL) 325 MG tablet     bisacodyl (DULCOLAX) 5 MG EC tablet     HYDROcodone-acetaminophen (NORCO) 5-325 MG per tablet     lisinopril (PRINIVIL/ZESTRIL) 10 MG tablet     lovastatin (MEVACOR) 40 MG tablet     warfarin (COUMADIN) 5 MG tablet     warfarin (COUMADIN) 5 MG tablet     No current facility-administered medications for this visit.      Allergies   Allergen Reactions     No Known Allergies          Objective:  BP 98/68  Ht 5' 4.25\" (1.632 m)  Wt 178 lb 8 oz (81 kg)  BMI 30.4 kg/m2    General: Alert and in no distress    Head: Normocephalic, atraumatic  Eyes: no scleral icterus or conjunctival erythema   Oropharynx:  Mucous membranes " moist  Skin: no erythema, petechiae, or jaundice  CV: regular rhythm by palpation, 2+ distal pulses  Resp: normal respiratory effort without conversational dyspnea   Psych: normal mood and affect    Gait: Non-antalgic, appropriate coordination and balance   Neuro: Motor strength and sensation as noted below    Musculoskeletal:    Musculoskeletal:     Bilateral Wrist and Hand exam     Inspection:  -Mild bruising over the left 3rd digit  -Slight ulnar angulation of the left 3rd digit stemming from the PIP joint     Palpation:  -Tender over the left 3rd digit PIP joint     ROM:  -Slightly decreased flexion at the DIP joint of the left 3rd digit.     Strength:  -PIP/DIP flexion 5/5 right 3rd digit  - strength and finger abduction 5/5 bilaterally     Neurovascular:       2+ radial pulses bilaterally and normal sensation to light touch in the radial, median and ulnar nerve distributions    Radiology:  Independent visualization of images performed  Recent Results (from the past 744 hour(s))   XR Finger Left G/E 2 Views    Narrative    FINGER LEFT TWO OR MORE VIEWS    4/11/2018 4:52 PM     HISTORY: Trauma.    COMPARISON: None.       Impression    IMPRESSION: Three views of the left middle finger. There is posterior  dislocation at the PIP joint. Small ossific fragments anterior to the  joint may be avulsion fractures.    REAL MILLS MD   XR Finger Left G/E 2 Views    Narrative    LEFT FINGER TWO OR MORE VIEWS   4/16/2018 11:25 AM     HISTORY: Evaluate. Finger dislocation.    COMPARISON: Left third finger x-rays dated 4/11/2018.       Impression    IMPRESSION:  1. Since the prior state there has been relocation of the middle  phalangeal base into appropriate relation with the head of the  proximal phalanx of the long finger.  2. Small intra-articular chip fracture of the base of the middle  phalanx of the long finger does not appear to be significantly  displaced.  3. Moderate to severe degenerative changes of the  interphalangeal  joints of the fingers (worse distally) and of the first  carpometacarpal and STT joints of the wrist are noted.  4. Diffuse osteopenia.    LATASHA MOYA MD   XR Finger Left G/E 2 Views    Narrative    FINGER LEFT TWO OR MORE VIEWS April 30, 2018 11:19 AM     HISTORY: Evaluate healing. Dislocation of finger, initial encounter.  Closed avulsion fracture of middle phalanx of finger, initial  encounter.    COMPARISON: 4/16/2018.      Impression    IMPRESSION: Third finger alignment remains normal. Nondisplaced chip  fracture at the volar base of the middle phalanx of the third finger  has likely rotated since the prior exam since its margin does not  conform to the expected contour of the volar base of this bone. No  other fractures are identified. Marked hypertrophic degenerative  change at the distal interphalangeal joint with milder degenerative  changes elsewhere in the third and adjacent fingers. Soft tissue  swelling about the third finger.       Assessment:  1. Dislocation of finger, subsequent encounter    2. Closed avulsion fracture of middle phalanx of finger with routine healing, subsequent encounter        Plan:  Discussed the assessment with the patient and developed a plan together:  -Continue buddy taping for 3 weeks.   -She can use the splint while doing yardwork for protection if desired.  -Ice or heat 15-20 minutes as needed (Avoid sleeping on a heating pad or ice)  -Patient's preferred over the counter medications as directed on packaging as needed for pain or soreness.  Please take ibuprofen with food. Do not premedicate prior to activity.    Follow Up: 3 weeks for repeat x-rays and re-evaluation. Please call with any questions or concerns.       Melinda Arias MD, [facility]        Again, thank you for allowing me to participate in the care of your patient.        Sincerely,        Kamryn Arias MD

## 2018-04-30 NOTE — PROGRESS NOTES
Sports Medicine Clinic Visit - Interim History April 30, 2018    Initial Visit Date 4/16/2018  Initial Injury Date 4/11/2018    PCP: Chad Betts Dewey is a 77 year old female who is seen in follow up. Since last visit on 4/16/2018 patient has had improvement in the finger pain. She continues to have some soreness and swelling. She has had improvement in her flexion however still some what limited. She is continuing to wear the splint for protection and support. She rates the pain at a 0/10 currently.  Symptoms are relieved with splinting and ice.  Symptoms are worsened by bumping the finger.     - Now ~ 2.5 weeks from initial injury      Review of Systems  Musculoskeletal: as above  Remainder of review of systems is negative including constitutional, eyes, ENT, CV, pulmonary, GI, , endocrine, skin, hematologic, and neurologic except as noted in HPI or medical history.    History reviewed. No pertinent past surgical/medical/family/social history other than as mentioned in HPI.    Past Medical History:   Diagnosis Date     Atrial fibrillation (H) 2014    related to colon surgery     Colon polyps      Diverticulosis of colon (without mention of hemorrhage)     Diverticulosis     DVT (deep vein thrombosis) in pregnancy (H) 2014    after colon surgery     Other and unspecified hyperlipidemia      PE (pulmonary embolism) 2014    related to colon surgery     Unspecified essential hypertension      Past Surgical History:   Procedure Laterality Date     COLONOSCOPY  09, 10, 12    [facility]     COLONOSCOPY N/A 12/11/2017    Procedure: COLONOSCOPY;  colonoscopy;  Surgeon: Elvis Quezada MD;  Location:  GI     FLEXIBLE SIGMOIDOSCOPY  09, 11     LAPAROTOMY EXPLORATORY N/A 10/20/2014    Procedure: LAPAROTOMY EXPLORATORY;  Surgeon: Miguel Oleary MD;  Location:  OR     LAPAROTOMY, LYSIS ADHESIONS, COMBINED N/A 10/20/2014    Procedure: COMBINED LAPAROTOMY, LYSIS ADHESIONS;  Surgeon:  "Miguel Oleary MD;  Location: [facility] OR     OPEN REDUCTION INTERNAL FIXATION HIP BIPOLAR Left 3/16/2017    Procedure: OPEN REDUCTION INTERNAL FIXATION HIP BIPOLAR;  Surgeon: Kaleb Pang DO;  Location: [facility] OR     RESECT SMALL BOWEL WITHOUT OSTOMY N/A 10/20/2014    Procedure: RESECT SMALL BOWEL WITHOUT OSTOMY;  Surgeon: Miguel Oleary MD;  Location: [facility] OR     Family History   Problem Relation Age of Onset     Blood Disease No family hx of      blood clots     Social History     Social History     Marital status:      Spouse name: N/A     Number of children: N/A     Years of education: N/A     Occupational History     Not on file.     Social History Main Topics     Smoking status: Never Smoker     Smokeless tobacco: Never Used     Alcohol use No     Drug use: No     Sexual activity: Yes     Partners: Male     Other Topics Concern     Parent/Sibling W/ Cabg, Mi Or Angioplasty Before 65f 55m? No     Social History Narrative       She is retired    Current Outpatient Prescriptions   Medication     acetaminophen (TYLENOL) 325 MG tablet     bisacodyl (DULCOLAX) 5 MG EC tablet     HYDROcodone-acetaminophen (NORCO) 5-325 MG per tablet     lisinopril (PRINIVIL/ZESTRIL) 10 MG tablet     lovastatin (MEVACOR) 40 MG tablet     warfarin (COUMADIN) 5 MG tablet     warfarin (COUMADIN) 5 MG tablet     No current facility-administered medications for this visit.      Allergies   Allergen Reactions     No Known Allergies          Objective:  BP 98/68  Ht 5' 4.25\" (1.632 m)  Wt 178 lb 8 oz (81 kg)  BMI 30.4 kg/m2    General: Alert and in no distress    Head: Normocephalic, atraumatic  Eyes: no scleral icterus or conjunctival erythema   Oropharynx:  Mucous membranes moist  Skin: no erythema, petechiae, or jaundice  CV: regular rhythm by palpation, 2+ distal pulses  Resp: normal respiratory effort without conversational dyspnea   Psych: normal mood and affect    Gait: Non-antalgic, appropriate coordination and " balance   Neuro: Motor strength and sensation as noted below    Musculoskeletal:    Musculoskeletal:     Bilateral Wrist and Hand exam     Inspection:  -Mild bruising over the left 3rd digit  -Slight ulnar angulation of the left 3rd digit stemming from the PIP joint     Palpation:  -Tender over the left 3rd digit PIP joint     ROM:  -Slightly decreased flexion at the DIP joint of the left 3rd digit.     Strength:  -PIP/DIP flexion 5/5 right 3rd digit  - strength and finger abduction 5/5 bilaterally     Neurovascular:       2+ radial pulses bilaterally and normal sensation to light touch in the radial, median and ulnar nerve distributions    Radiology:  Independent visualization of images performed  Recent Results (from the past 744 hour(s))   XR Finger Left G/E 2 Views    Narrative    FINGER LEFT TWO OR MORE VIEWS    4/11/2018 4:52 PM     HISTORY: Trauma.    COMPARISON: None.       Impression    IMPRESSION: Three views of the left middle finger. There is posterior  dislocation at the PIP joint. Small ossific fragments anterior to the  joint may be avulsion fractures.    REAL MILLS MD   XR Finger Left G/E 2 Views    Narrative    LEFT FINGER TWO OR MORE VIEWS   4/16/2018 11:25 AM     HISTORY: Evaluate. Finger dislocation.    COMPARISON: Left third finger x-rays dated 4/11/2018.       Impression    IMPRESSION:  1. Since the prior state there has been relocation of the middle  phalangeal base into appropriate relation with the head of the  proximal phalanx of the long finger.  2. Small intra-articular chip fracture of the base of the middle  phalanx of the long finger does not appear to be significantly  displaced.  3. Moderate to severe degenerative changes of the interphalangeal  joints of the fingers (worse distally) and of the first  carpometacarpal and STT joints of the wrist are noted.  4. Diffuse osteopenia.    LATASHA MOYA MD   XR Finger Left G/E 2 Views    Narrative    FINGER LEFT TWO OR MORE VIEWS  April 30, 2018 11:19 AM     HISTORY: Evaluate healing. Dislocation of finger, initial encounter.  Closed avulsion fracture of middle phalanx of finger, initial  encounter.    COMPARISON: 4/16/2018.      Impression    IMPRESSION: Third finger alignment remains normal. Nondisplaced chip  fracture at the volar base of the middle phalanx of the third finger  has likely rotated since the prior exam since its margin does not  conform to the expected contour of the volar base of this bone. No  other fractures are identified. Marked hypertrophic degenerative  change at the distal interphalangeal joint with milder degenerative  changes elsewhere in the third and adjacent fingers. Soft tissue  swelling about the third finger.       Assessment:  1. Dislocation of finger, subsequent encounter    2. Closed avulsion fracture of middle phalanx of finger with routine healing, subsequent encounter        Plan:  Discussed the assessment with the patient and developed a plan together:  -Continue buddy taping for 3 weeks.   -She can use the splint while doing yardwork for protection if desired.  -Ice or heat 15-20 minutes as needed (Avoid sleeping on a heating pad or ice)  -Patient's preferred over the counter medications as directed on packaging as needed for pain or soreness.  Please take ibuprofen with food. Do not premedicate prior to activity.    Follow Up: 3 weeks for repeat x-rays and re-evaluation. Please call with any questions or concerns.       Melinda Arias MD, CAQ  [facility]

## 2018-04-30 NOTE — PATIENT INSTRUCTIONS
Today's Plan of Care:  -Continue buddy taping for 3 weeks.   -Splint for protection  -Ice or heat 15-20 minutes as needed (Avoid sleeping on a heating pad or ice)  -Patient's preferred over the counter medications as directed on packaging as needed for pain or soreness.  Please take ibuprofen with food. Do not premedicate prior to activity.    Follow Up: 3 weeks for repeat x-rays and re-evaluation. Please call with any questions or concerns.

## 2018-05-02 ENCOUNTER — TELEPHONE (OUTPATIENT)
Dept: ANTICOAGULATION | Facility: CLINIC | Age: 78
End: 2018-05-02

## 2018-05-02 ENCOUNTER — ANTICOAGULATION THERAPY VISIT (OUTPATIENT)
Dept: ANTICOAGULATION | Facility: CLINIC | Age: 78
End: 2018-05-02
Payer: MEDICARE

## 2018-05-02 DIAGNOSIS — Z79.01 LONG-TERM (CURRENT) USE OF ANTICOAGULANTS: ICD-10-CM

## 2018-05-02 DIAGNOSIS — I26.99 PULMONARY EMBOLI (H): ICD-10-CM

## 2018-05-02 DIAGNOSIS — Z86.711 HISTORY OF PULMONARY EMBOLISM: ICD-10-CM

## 2018-05-02 DIAGNOSIS — I48.20 CHRONIC ATRIAL FIBRILLATION (H): Primary | ICD-10-CM

## 2018-05-02 LAB — INR POINT OF CARE: 1.9 (ref 0.86–1.14)

## 2018-05-02 PROCEDURE — 36416 COLLJ CAPILLARY BLOOD SPEC: CPT

## 2018-05-02 PROCEDURE — 99207 ZZC NO CHARGE NURSE ONLY: CPT

## 2018-05-02 PROCEDURE — 85610 PROTHROMBIN TIME: CPT | Mod: QW

## 2018-05-02 RX ORDER — WARFARIN SODIUM 5 MG/1
TABLET ORAL
Qty: 100 TABLET | Refills: 0 | COMMUNITY
Start: 2018-05-02 | End: 2018-06-27

## 2018-05-02 NOTE — TELEPHONE ENCOUNTER
Please review and renew this patients INR referral, orders pending. The last two referrals have just A fib listed, but the one from 2014 has PE listed. I have added both to the referral that is cued up.       Has the patient previously taken warfarin? yes  If yes, for what indication? A Fib, PE    Does the patient have any of the following indications for a higher range of 2.5-3.5:    Mitral position mechanical valve? no    Francine-Shiley, Ball and Cage or Monoleaflet valve (regardless of position) no    Other (if yes, please explain) no    Johanna Mckeon RN

## 2018-05-02 NOTE — PROGRESS NOTES
ANTICOAGULATION FOLLOW-UP CLINIC VISIT    Patient Name:  Tracy Palomo  Date:  5/2/2018  Contact Type:  Face to Face    SUBJECTIVE:     Patient Findings     Positives No Problem Findings    Comments Pt dislocated her middle finger on her left had about 3 weeks ago. She is not taking any pain relievers for it             OBJECTIVE    INR Protime   Date Value Ref Range Status   05/02/2018 1.9 (A) 0.86 - 1.14 Final       ASSESSMENT / PLAN  INR assessment THER    Recheck INR In: 6 WEEKS    INR Location Clinic      Anticoagulation Summary as of 5/2/2018     INR goal 2.0-3.0   Today's INR 1.9!   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]  Long-term (current) use of anticoagulants [Z79.01] [Z79.01]         Anticoagulation Episode Summary     INR check location     Preferred lab     Send INR reminders to MIHM POOL    Comments 5 mg tablets, book, PM dose       Anticoagulation Care Providers     Provider Role Specialty Phone number    Gerard Medrano MD [facility] [phone]            See the Encounter Report to view Anticoagulation Flowsheet and Dosing Calendar (Go to Encounters tab in chart review, and find the Anticoagulation Therapy Visit)    Dosage adjustment made based on physician directed care plan.      Johanna Mckeon RN

## 2018-05-02 NOTE — MR AVS SNAPSHOT
Tracy ANG Dewey   5/2/2018 9:15 AM   Anticoagulation Therapy Visit    Description:  77 year old female   Provider:  ERICK ANTI COAG   Department:  [facility]           INR as of 5/2/2018     Today's INR 1.9!      Anticoagulation Summary as of 5/2/2018     INR goal 2.0-3.0   Today's INR 1.9!   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]  Long-term (current) use of anticoagulants [Z79.01] [Z79.01]         Your next Anticoagulation Clinic appointment(s)     Jun 13, 2018  9:15 AM CDT   Anticoagulation Visit with [facility]ICK ANTI COAPATRICK   [facility]    [address] [place] [id number]   [phone]              Contact Numbers     Clinic Number:         May 2018 Details    Sun Mon Tue Wed Thu Fri Sat       1               2      2.5 mg   See details      3      5 mg         4      2.5 mg         5      5 mg           6      5 mg         7      2.5 mg         8      5 mg         9      2.5 mg         10      5 mg         11      2.5 mg         12      5 mg           13      5 mg         14      2.5 mg         15      5 mg         16      2.5 mg         17      5 mg         18      2.5 mg         19      5 mg           20      5 mg         21      2.5 mg         22      5 mg         23      2.5 mg         24      5 mg         25      2.5 mg         26      5 mg           27      5 mg         28      2.5 mg         29      5 mg         30      2.5 mg         31      5 mg            Date Details   05/02 This INR check               How to take your warfarin dose     To take:  2.5 mg Take 0.5 of a 5 mg tablet.    To take:  5 mg Take 1 of the 5 mg tablets.           June 2018 Details    Sun Mon Tue Wed Thu Fri Sat          1      2.5 mg         2      5 mg           3      5 mg         4      2.5 mg         5      5 mg         6      2.5 mg          7      5 mg         8      2.5 mg         9      5 mg           10      5 mg         11      2.5 mg         12      5 mg         13            14               15               16                 17               18               19               20               21               22               23                 24               25               26               27               28               29               30                Date Details   No additional details    Date of next INR:  6/13/2018         How to take your warfarin dose     To take:  2.5 mg Take 0.5 of a 5 mg tablet.    To take:  5 mg Take 1 of the 5 mg tablets.

## 2018-05-21 ENCOUNTER — RADIANT APPOINTMENT (OUTPATIENT)
Dept: GENERAL RADIOLOGY | Facility: CLINIC | Age: 78
End: 2018-05-21
Attending: PHYSICAL MEDICINE & REHABILITATION
Payer: MEDICARE

## 2018-05-21 ENCOUNTER — OFFICE VISIT (OUTPATIENT)
Dept: ORTHOPEDICS | Facility: CLINIC | Age: 78
End: 2018-05-21
Payer: MEDICARE

## 2018-05-21 VITALS
WEIGHT: 178.5 LBS | SYSTOLIC BLOOD PRESSURE: 102 MMHG | DIASTOLIC BLOOD PRESSURE: 78 MMHG | BODY MASS INDEX: 30.48 KG/M2 | HEIGHT: 64 IN

## 2018-05-21 DIAGNOSIS — S62.629D CLOSED AVULSION FRACTURE OF MIDDLE PHALANX OF FINGER WITH ROUTINE HEALING, SUBSEQUENT ENCOUNTER: ICD-10-CM

## 2018-05-21 DIAGNOSIS — S63.259D DISLOCATION OF FINGER, SUBSEQUENT ENCOUNTER: Primary | ICD-10-CM

## 2018-05-21 DIAGNOSIS — S63.259D DISLOCATION OF FINGER, SUBSEQUENT ENCOUNTER: ICD-10-CM

## 2018-05-21 PROCEDURE — 73140 X-RAY EXAM OF FINGER(S): CPT | Mod: TC

## 2018-05-21 PROCEDURE — 99213 OFFICE O/P EST LOW 20 MIN: CPT | Performed by: PHYSICAL MEDICINE & REHABILITATION

## 2018-05-21 NOTE — PROGRESS NOTES
Sports Medicine Clinic Visit - Interim History May 21, 2018    Initial Visit Date 4/16/2018  Initial Injury Date 4/11/2018    PCP: Chad Betts Dewey is a 77 year old female who is seen in follow up. Since last visit on 4/30/2018 patient has been doing very well. She has not been taping for the past 4 days. She notices some discomfort with heavy lifting. She is able to  and make a fist with very mild pain to no pain at all. She rates the pain at a   0/10 currently.  Symptoms are relieved with ice.  Symptoms are worsened by lifting and straining the finger.     - Now ~ 6 weeks from initial injury      Review of Systems  Musculoskeletal: as above  Remainder of review of systems is negative including constitutional, eyes, ENT, CV, pulmonary, GI, , endocrine, skin, hematologic, and neurologic except as noted in HPI or medical history.    History reviewed. No pertinent past surgical/medical/family/social history other than as mentioned in HPI.    Past Medical History:   Diagnosis Date     Atrial fibrillation (H) 2014    related to colon surgery     Colon polyps      Diverticulosis of colon (without mention of hemorrhage)     Diverticulosis     DVT (deep vein thrombosis) in pregnancy (H) 2014    after colon surgery     Other and unspecified hyperlipidemia      PE (pulmonary embolism) 2014    related to colon surgery     Unspecified essential hypertension      Past Surgical History:   Procedure Laterality Date     COLONOSCOPY  09, 10, 12    [facility]     COLONOSCOPY N/A 12/11/2017    Procedure: COLONOSCOPY;  colonoscopy;  Surgeon: Elvis Quezada MD;  Location:  GI     FLEXIBLE SIGMOIDOSCOPY  09, 11     LAPAROTOMY EXPLORATORY N/A 10/20/2014    Procedure: LAPAROTOMY EXPLORATORY;  Surgeon: Miguel Oleary MD;  Location:  OR     LAPAROTOMY, LYSIS ADHESIONS, COMBINED N/A 10/20/2014    Procedure: COMBINED LAPAROTOMY, LYSIS ADHESIONS;  Surgeon: Miguel Oleary MD;  Location:  "[facility] OR     OPEN REDUCTION INTERNAL FIXATION HIP BIPOLAR Left 3/16/2017    Procedure: OPEN REDUCTION INTERNAL FIXATION HIP BIPOLAR;  Surgeon: Kaleb Pang DO;  Location: [facility] OR     RESECT SMALL BOWEL WITHOUT OSTOMY N/A 10/20/2014    Procedure: RESECT SMALL BOWEL WITHOUT OSTOMY;  Surgeon: Miguel Oleary MD;  Location: [facility] OR     Family History   Problem Relation Age of Onset     Blood Disease No family hx of      blood clots     Social History     Social History     Marital status:      Spouse name: N/A     Number of children: N/A     Years of education: N/A     Occupational History     Not on file.     Social History Main Topics     Smoking status: Never Smoker     Smokeless tobacco: Never Used     Alcohol use No     Drug use: No     Sexual activity: Yes     Partners: Male     Other Topics Concern     Parent/Sibling W/ Cabg, Mi Or Angioplasty Before 65f 55m? No     Social History Narrative       Current Outpatient Prescriptions   Medication     acetaminophen (TYLENOL) 325 MG tablet     bisacodyl (DULCOLAX) 5 MG EC tablet     HYDROcodone-acetaminophen (NORCO) 5-325 MG per tablet     lisinopril (PRINIVIL/ZESTRIL) 10 MG tablet     lovastatin (MEVACOR) 40 MG tablet     warfarin (COUMADIN) 5 MG tablet     No current facility-administered medications for this visit.      Allergies   Allergen Reactions     No Known Allergies          Objective:  /78  Ht 5' 4.25\" (1.632 m)  Wt 178 lb 8 oz (81 kg)  BMI 30.4 kg/m2    General: Alert and in no distress    Head: Normocephalic, atraumatic  Eyes: no scleral icterus or conjunctival erythema   Oropharynx:  Mucous membranes moist  Skin: no erythema, petechiae, or jaundice  CV: regular rhythm by palpation, 2+ distal pulses  Resp: normal respiratory effort without conversational dyspnea   Psych: normal mood and affect    Gait: Non-antalgic, appropriate coordination and balance   Neuro: Motor strength and sensation as noted " below    Musculoskeletal:    Bilateral Wrist and Hand exam      Inspection:  -Slight ulnar angulation of the left 3rd digit stemming from the PIP joint      Palpation:  -Tender over the left 3rd digit PIP joint      ROM:  -Full and pain free active ROM of the wrist/hand/digits      Strength:  -MCP/PIP/DIP flexion/extension 5/5 left 3rd digit  - strength and finger abduction 5/5 bilaterally      Neurovascular:       2+ radial pulses bilaterally and normal sensation to light touch in the radial, median and ulnar nerve distributions    Radiology:  Independent visualization of images performed  Recent Results (from the past 744 hour(s))   XR Finger Left G/E 2 Views    Narrative    FINGER LEFT TWO OR MORE VIEWS April 30, 2018 11:19 AM     HISTORY: Evaluate healing. Dislocation of finger, initial encounter.  Closed avulsion fracture of middle phalanx of finger, initial  encounter.    COMPARISON: 4/16/2018.      Impression    IMPRESSION: Third finger alignment remains normal. Nondisplaced chip  fracture at the volar base of the middle phalanx of the third finger  has likely rotated since the prior exam since its margin does not  conform to the expected contour of the volar base of this bone. No  other fractures are identified. Marked hypertrophic degenerative  change at the distal interphalangeal joint with milder degenerative  changes elsewhere in the third and adjacent fingers. Soft tissue  swelling about the third finger.    BECKY VÁZQUEZ MD   XR Finger Left G/E 2 Views    Narrative    LEFT FINGER TWO OR MORE VIEWS   5/21/2018 10:08 AM     HISTORY: Evaluate healing. Dislocation of finger, subsequent  encounter. Closed avulsion fracture of middle phalanx of finger with  routine healing, subsequent encounter.    COMPARISON: Left lung finger x-rays dated 4/30/2018.     FINDINGS: Diffuse osteopenia and severe distal interphalangeal joint  degenerative changes. Again small avulsion fracture from the palmar  aspect of  the base of the middle phalanx of the long finger is not  significantly changed in alignment. There is a lucency extending  longitudinally through the base of the middle phalanx which was not  well-seen on the prior study and could represent an additional  intra-articular fracture line. Degenerative changes of other  interphalangeal joints are also again noted.      Impression    IMPRESSION:  1. No change in alignment of the small avulsion fracture of the base  of the middle phalanx of the left long finger.  2. Longitudinal intra-articular lucency extending in the base of the  middle phalanx of the left long finger was not well-seen on the prior  study and could represent an additional intra-articular fracture line  of this phalanx.  3. Diffuse osteopenia and degenerative changes of the fingers are  again noted.         Assessment:  1. Dislocation of finger, subsequent encounter    2. Closed avulsion fracture of middle phalanx of finger with routine healing, subsequent encounter        Plan:  Discussed the assessment with the patient and developed a plan together:  -Tracy is now about ~6 weeks out and is doing quite well.  She is having minimal pain.  Discussed that she may continue to have the slight angulation in the left 3rd digit.  She states this does not bother her.  We can discontinue immobilization/buddy taping.  She can follow up as needed.  Please call with questions or concerns.      Melinda Arias MD, CAQ  [facility]

## 2018-05-21 NOTE — LETTER
5/21/2018         RE: Tracy Palomo  [address] [facility] [address]        Dear Colleague,    Thank you for referring your patient, Tracy Palomo, to the [facility]. Please see a copy of my visit note below.    Sports Medicine Clinic Visit - Interim History May 21, 2018    Initial Visit Date 4/16/2018  Initial Injury Date 4/11/2018    PCP: Chad Betts    Tracy Palomo is a 77 year old female who is seen in follow up. Since last visit on 4/30/2018 patient has been doing very well. She has not been taping for the past 4 days. She notices some discomfort with heavy lifting. She is able to  and make a fist with very mild pain to no pain at all. She rates the pain at a   0/10 currently.  Symptoms are relieved with ice.  Symptoms are worsened by lifting and straining the finger.     - Now ~ 6 weeks from initial injury      Review of Systems  Musculoskeletal: as above  Remainder of review of systems is negative including constitutional, eyes, ENT, CV, pulmonary, GI, , endocrine, skin, hematologic, and neurologic except as noted in HPI or medical history.    History reviewed. No pertinent past surgical/medical/family/social history other than as mentioned in HPI.    Past Medical History:   Diagnosis Date     Atrial fibrillation (H) 2014    related to colon surgery     Colon polyps      Diverticulosis of colon (without mention of hemorrhage)     Diverticulosis     DVT (deep vein thrombosis) in pregnancy (H) 2014    after colon surgery     Other and unspecified hyperlipidemia      PE (pulmonary embolism) 2014    related to colon surgery     Unspecified essential hypertension      Past Surgical History:   Procedure Laterality Date     COLONOSCOPY  09, 10, 12    [facility]     COLONOSCOPY N/A 12/11/2017    Procedure: COLONOSCOPY;  colonoscopy;  Surgeon: Elvsi Quezada MD;  Location:  GI     FLEXIBLE SIGMOIDOSCOPY  09, 11     LAPAROTOMY EXPLORATORY N/A 10/20/2014  "   Procedure: LAPAROTOMY EXPLORATORY;  Surgeon: Miguel Oleary MD;  Location: [facility] OR     LAPAROTOMY, LYSIS ADHESIONS, COMBINED N/A 10/20/2014    Procedure: COMBINED LAPAROTOMY, LYSIS ADHESIONS;  Surgeon: Miguel Oleary MD;  Location: [facility] OR     OPEN REDUCTION INTERNAL FIXATION HIP BIPOLAR Left 3/16/2017    Procedure: OPEN REDUCTION INTERNAL FIXATION HIP BIPOLAR;  Surgeon: Kaleb Pang DO;  Location: [facility] OR     RESECT SMALL BOWEL WITHOUT OSTOMY N/A 10/20/2014    Procedure: RESECT SMALL BOWEL WITHOUT OSTOMY;  Surgeon: Miguel Oleary MD;  Location: [facility] OR     Family History   Problem Relation Age of Onset     Blood Disease No family hx of      blood clots     Social History     Social History     Marital status:      Spouse name: N/A     Number of children: N/A     Years of education: N/A     Occupational History     Not on file.     Social History Main Topics     Smoking status: Never Smoker     Smokeless tobacco: Never Used     Alcohol use No     Drug use: No     Sexual activity: Yes     Partners: Male     Other Topics Concern     Parent/Sibling W/ Cabg, Mi Or Angioplasty Before 65f 55m? No     Social History Narrative       Current Outpatient Prescriptions   Medication     acetaminophen (TYLENOL) 325 MG tablet     bisacodyl (DULCOLAX) 5 MG EC tablet     HYDROcodone-acetaminophen (NORCO) 5-325 MG per tablet     lisinopril (PRINIVIL/ZESTRIL) 10 MG tablet     lovastatin (MEVACOR) 40 MG tablet     warfarin (COUMADIN) 5 MG tablet     No current facility-administered medications for this visit.      Allergies   Allergen Reactions     No Known Allergies          Objective:  /78  Ht 5' 4.25\" (1.632 m)  Wt 178 lb 8 oz (81 kg)  BMI 30.4 kg/m2    General: Alert and in no distress    Head: Normocephalic, atraumatic  Eyes: no scleral icterus or conjunctival erythema   Oropharynx:  Mucous membranes moist  Skin: no erythema, petechiae, or jaundice  CV: regular rhythm by palpation, 2+ distal " pulses  Resp: normal respiratory effort without conversational dyspnea   Psych: normal mood and affect    Gait: Non-antalgic, appropriate coordination and balance   Neuro: Motor strength and sensation as noted below    Musculoskeletal:    Bilateral Wrist and Hand exam      Inspection:  -Slight ulnar angulation of the left 3rd digit stemming from the PIP joint      Palpation:  -Tender over the left 3rd digit PIP joint      ROM:  -Full and pain free active ROM of the wrist/hand/digits      Strength:  -MCP/PIP/DIP flexion/extension 5/5 left 3rd digit  - strength and finger abduction 5/5 bilaterally      Neurovascular:       2+ radial pulses bilaterally and normal sensation to light touch in the radial, median and ulnar nerve distributions    Radiology:  Independent visualization of images performed  Recent Results (from the past 744 hour(s))   XR Finger Left G/E 2 Views    Narrative    FINGER LEFT TWO OR MORE VIEWS April 30, 2018 11:19 AM     HISTORY: Evaluate healing. Dislocation of finger, initial encounter.  Closed avulsion fracture of middle phalanx of finger, initial  encounter.    COMPARISON: 4/16/2018.      Impression    IMPRESSION: Third finger alignment remains normal. Nondisplaced chip  fracture at the volar base of the middle phalanx of the third finger  has likely rotated since the prior exam since its margin does not  conform to the expected contour of the volar base of this bone. No  other fractures are identified. Marked hypertrophic degenerative  change at the distal interphalangeal joint with milder degenerative  changes elsewhere in the third and adjacent fingers. Soft tissue  swelling about the third finger.    BECKY VÁZQUEZ MD   XR Finger Left G/E 2 Views    Narrative    LEFT FINGER TWO OR MORE VIEWS   5/21/2018 10:08 AM     HISTORY: Evaluate healing. Dislocation of finger, subsequent  encounter. Closed avulsion fracture of middle phalanx of finger with  routine healing, subsequent  encounter.    COMPARISON: Left lung finger x-rays dated 4/30/2018.     FINDINGS: Diffuse osteopenia and severe distal interphalangeal joint  degenerative changes. Again small avulsion fracture from the palmar  aspect of the base of the middle phalanx of the long finger is not  significantly changed in alignment. There is a lucency extending  longitudinally through the base of the middle phalanx which was not  well-seen on the prior study and could represent an additional  intra-articular fracture line. Degenerative changes of other  interphalangeal joints are also again noted.      Impression    IMPRESSION:  1. No change in alignment of the small avulsion fracture of the base  of the middle phalanx of the left long finger.  2. Longitudinal intra-articular lucency extending in the base of the  middle phalanx of the left long finger was not well-seen on the prior  study and could represent an additional intra-articular fracture line  of this phalanx.  3. Diffuse osteopenia and degenerative changes of the fingers are  again noted.         Assessment:  1. Dislocation of finger, subsequent encounter    2. Closed avulsion fracture of middle phalanx of finger with routine healing, subsequent encounter        Plan:  Discussed the assessment with the patient and developed a plan together:  -Tracy is now about ~6 weeks out and is doing quite well.  She is having minimal pain.  Discussed that she may continue to have the slight angulation in the left 3rd digit.  She states this does not bother her.  We can discontinue immobilization/buddy taping.  She can follow up as needed.  Please call with questions or concerns.      Melinda Arias MD, [facility]        Again, thank you for allowing me to participate in the care of your patient.        Sincerely,        Kamryn Arias MD

## 2018-05-21 NOTE — MR AVS SNAPSHOT
"              After Visit Summary   5/21/2018    Tracy Palomo    MRN: [id number]           Patient Information     Date Of Birth          1940        Visit Information        Provider Department      5/21/2018 10:00 AM Kamryn Arias MD [facility]        Today's Diagnoses     Dislocation of finger, subsequent encounter    -  1    Closed avulsion fracture of middle phalanx of finger with routine healing, subsequent encounter          Care Instructions    Return as needed.  Please call with questions or concerns.            Follow-ups after your visit        Your next 10 appointments already scheduled     Jun 13, 2018  9:15 AM CDT   An[facility]agulation Visit with [facility]TI COAG   [facility]    [address] [facility] [address] [phone]              Who to contact     If you have questions or need follow up information about today's clinic visit or your schedule please contact [facility] directly at [phone].  Normal or non-critical lab and imaging results will be communicated to you by MyChart, letter or phone within 4 business days after the clinic has received the results. If you do not hear from us within 7 days, please contact the clinic through [facility] or phone. If you have a critical or abnormal lab result, we will notify you by phone as soon as possible.  Submit refill requests through [employer] or call your pharmacy and they will forward the refill request to us. Please allow 3 business days for your refill to be completed.          Additional Information About Your Visit        [facility] Information     [employer] lets you send messages to your doctor, view your test results, renew your prescriptions, schedule appointments and more. To sign up, go to [email or web address] . Click on \"Log in\" on the left side of the screen, which will take you to the Welcome page. Then click on \"Sign up Now\" on the right " "side of the page.     You will be asked to enter the access code listed below, as well as some personal information. Please follow the directions to create your username and password.     Your access code is: [email or web address]  Expires: 7/10/2018  5:18 PM     Your access code will  in 90 days. If you need help or a new code, please call your [place] clinic or [phone].        Care EveryWhere ID     This is your Care EveryWhere ID. This could be used by other organizations to access your [place] medical records  [id number]        Your Vitals Were     Height BMI (Body Mass Index)                5' 4.25\" (1.632 m) 30.4 kg/m2           Blood Pressure from Last 3 Encounters:   18 102/78   18 98/68   18 108/70    Weight from Last 3 Encounters:   18 178 lb 8 oz (81 kg)   18 178 lb 8 oz (81 kg)   18 178 lb 8 oz (81 kg)               Primary Care Provider Office Phone # Fax #    Chad Betts [phone]       [address] [facility] [address]        Equal Access to Services     KODI REYNOLDS AH: Hadii aad ku hadasho Soomaali, waaxda luqadaha, qaybta kaalmada adeegyada, buffy mansfieldin haytoñan kirti wilcox . So [facility] [phone].    ATENCIÓN: Si habla español, tiene a dale disposición servicios gratuitos de asistencia lingüística. Llame al [phone].    We comply with applicable federal civil rights laws and [place] laws. We do not discriminate on the basis of race, color, national origin, age, disability, sex, sexual orientation, or gender identity.            Thank you!     Thank you for choosing [facility]  for your care. Our goal is always to provide you with excellent care. Hearing back from our patients is one way we can continue to improve our services. Please take a few minutes to complete the written survey that you may receive in the mail after your visit with us. Thank you!             Your Updated Medication List - Protect " "others around you: Learn how to safely use, store and throw away your medicines at www.disposemymeds.org.          This list is accurate as of 5/21/18 10:16 AM.  Always use your most recent med list.                   Brand Name Dispense Instructions for use Diagnosis    acetaminophen 325 MG tablet    TYLENOL    100 tablet    Take 3 tablets (975 mg) by mouth every 8 hours    Hip fracture, left, closed, initial encounter (H)       bisacodyl 5 MG EC tablet    DULCOLAX    4 tablet    Refer to \"Getting Ready for a Colonoscopy\" instruction handout    Encounter for screening colonoscopy       HYDROcodone-acetaminophen 5-325 MG per tablet    NORCO    14 tablet    Take 1-2 tablets by mouth every 4 hours as needed for moderate to severe pain        lisinopril 10 MG tablet    PRINIVIL/ZESTRIL    90 tablet    Take 1 tablet (10 mg) by mouth daily    Essential hypertension with goal blood pressure less than 140/90       lovastatin 40 MG tablet    MEVACOR    90 tablet    TAKE ONE TABLET BY MOUTH AT BEDTIME    Hyperlipidemia LDL goal <130       warfarin 5 MG tablet    COUMADIN    100 tablet    Take 2.5 mg on Mon, Wed, Fri and 5 mg all other days, or as directed by the Coumadin clinic.    Long-term (current) use of anticoagulants         "

## 2018-06-13 ENCOUNTER — ANTICOAGULATION THERAPY VISIT (OUTPATIENT)
Dept: ANTICOAGULATION | Facility: CLINIC | Age: 78
End: 2018-06-13
Payer: MEDICARE

## 2018-06-13 ENCOUNTER — TELEPHONE (OUTPATIENT)
Dept: INTERNAL MEDICINE | Facility: CLINIC | Age: 78
End: 2018-06-13

## 2018-06-13 ENCOUNTER — OFFICE VISIT (OUTPATIENT)
Dept: FAMILY MEDICINE | Facility: CLINIC | Age: 78
End: 2018-06-13
Payer: MEDICARE

## 2018-06-13 VITALS
BODY MASS INDEX: 30.21 KG/M2 | TEMPERATURE: 97.5 F | HEART RATE: 68 BPM | WEIGHT: 177.4 LBS | DIASTOLIC BLOOD PRESSURE: 70 MMHG | SYSTOLIC BLOOD PRESSURE: 124 MMHG | RESPIRATION RATE: 16 BRPM

## 2018-06-13 DIAGNOSIS — Z79.01 LONG-TERM (CURRENT) USE OF ANTICOAGULANTS: ICD-10-CM

## 2018-06-13 DIAGNOSIS — I26.99 PULMONARY EMBOLI (H): ICD-10-CM
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
## 2018-06-13 LAB — INR POINT OF CARE: 2.5 (ref 0.86–1.14)

## 2018-06-13 PROCEDURE — 85610 PROTHROMBIN TIME: CPT | Mod: QW

## 2018-06-13 PROCEDURE — 99207 ZZC NO CHARGE NURSE ONLY: CPT

## 2018-06-13 PROCEDURE — 10120 INC&RMVL FB SUBQ TISS SMPL: CPT | Performed by: FAMILY MEDICINE

## 2018-06-13 PROCEDURE — 36416 COLLJ CAPILLARY BLOOD SPEC: CPT

## 2018-06-13 ASSESSMENT — PAIN SCALES - GENERAL: PAINLEVEL: NO PAIN (0)

## 2018-06-13 NOTE — MR AVS SNAPSHOT
Tracy ANG Dewey   6/13/2018 9:15 AM   Anticoagulation Therapy Visit    Description:  78 year old female   Provider:  ERICK ANTI COAG   Department:  [facility]           INR as of 6/13/2018     Today's INR 2.5      Anticoagulation Summary as of 6/13/2018     INR goal 2.0-3.0   Today's INR 2.5   Full warfarin instructions 2.5 mg on Mon, Wed, Fri; 5 mg all other days   Next INR check 7/25/2018    Indications   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]  Long-term (current) use of anticoagulants [Z79.01] [Z79.01]         Your next Anticoagulation Clinic appointment(s)     Jul 25, 2018  9:15 AM CDT   Anticoagulation Visit with ERICK ANTI COAPATRICK   [facility]    [address] [place] [id number]   [phone]              Contact Numbers     Clinic Number:         June 2018 Details    Sun Mon Tue Wed Thu Fri Sat          1               2                 3               4               5               6               7               8               9                 10               11               12               13      2.5 mg   See details      14      5 mg         15      2.5 mg         16      5 mg           17      5 mg         18      2.5 mg         19      5 mg         20      2.5 mg         21      5 mg         22      2.5 mg         23      5 mg           24      5 mg         25      2.5 mg         26      5 mg         27      2.5 mg         28      5 mg         29      2.5 mg         30      5 mg          Date Details   06/13 This INR check               How to take your warfarin dose     To take:  2.5 mg Take 0.5 of a 5 mg tablet.    To take:  5 mg Take 1 of the 5 mg tablets.           July 2018 Details    Sun Mon Tue Wed Thu Fri Sat     1      5 mg         2      2.5 mg         3      5 mg         4      2.5 mg         5      5 mg         6      2.5 mg         7      5 mg           8      5 mg         9      2.5 mg          10      5 mg         11      2.5 mg         12      5 mg         13      2.5 mg         14      5 mg           15      5 mg         16      2.5 mg         17      5 mg         18      2.5 mg         19      5 mg         20      2.5 mg         21      5 mg           22      5 mg         23      2.5 mg         24      5 mg         25            26               27               28                 29               30               31                    Date Details   No additional details    Date of next INR:  7/25/2018         How to take your warfarin dose     To take:  2.5 mg Take 0.5 of a 5 mg tablet.    To take:  5 mg Take 1 of the 5 mg tablets.

## 2018-06-13 NOTE — PROGRESS NOTES
ANTICOAGULATION FOLLOW-UP CLINIC VISIT    Patient Name:  Tracy Palomo  Date:  6/13/2018  Contact Type:  Face to Face    SUBJECTIVE:     Patient Findings     Positives No Problem Findings           OBJECTIVE    INR Protime   Date Value Ref Range Status   06/13/2018 2.5 (A) 0.86 - 1.14 Final       ASSESSMENT / PLAN  INR assessment THER    Recheck INR In: 6 WEEKS    INR Location Clinic      Anticoagulation Summary as of 6/13/2018     INR goal 2.0-3.0   Today's INR 2.5   Warfarin maintenance plan 2.5 mg (5 mg x 0.5) on Mon, Wed, Fri; 5 mg (5 mg x 1) all other days   Full warfarin instructions 2.5 mg on Mon, Wed, Fri; 5 mg all other days   Weekly warfarin total 27.5 mg   No change documented Johanna Mckeon RN   Plan last modified Johanna Mckeon RN (5/2/2018)   Next INR check 7/25/2018   Target end date     Indications   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]  Long-term (current) use of anticoagulants [Z79.01] [Z79.01]         Anticoagulation Episode Summary     INR check location     Preferred lab     Send INR reminders to [facility] POOL    Comments 5 mg tablets, book, PM dose       Anticoagulation Care Providers     Provider Role Specialty Phone number    Gerard Medrano MD [facility] [phone]            See the Encounter Report to view Anticoagulation Flowsheet and Dosing Calendar (Go to Encounters tab in chart review, and find the Anticoagulation Therapy Visit)    Dosage adjustment made based on physician directed care plan.      Johanna Mckeon RN

## 2018-06-13 NOTE — TELEPHONE ENCOUNTER
Reason for call:  Patient reporting a symptom    Symptom or request: tick head embedded    Duration (how long have symptoms been present): just now    Have you been treated for this before? No    Additional comments: patient attempted to remove a tick and the head stayed in.  She is wondering if she needs to be seen, or if she can do something at home to get it out.      Phone Number patient can be reached at:  Home number on file [phone] (home)    Best Time:      Can we leave a detailed message on this number:  YES    Call taken on 6/13/2018 at 8:46 AM by Lexus May

## 2018-06-13 NOTE — PROGRESS NOTES
SUBJECTIVE:   Tracy Palomo is a 78 year old female who presents to clinic today for the following health issues:  Chief Complaint   Patient presents with     Insect Bites     tick bite rt rib area   /70  Pulse 68  Temp 97.5  F (36.4  C) (Temporal)  Resp 16  Wt 177 lb 6.4 oz (80.5 kg)  BMI 30.21 kg/m2    Dog tick imbedded in rt post thorax since yesterday, partially removed by pt.  Remains removed with a 3 mm  Punch with local anesthesia and sterile conditions  Wound cleaned and dressed, wound precautions given   cb if problems  Tet current

## 2018-06-27 DIAGNOSIS — Z79.01 LONG-TERM (CURRENT) USE OF ANTICOAGULANTS: ICD-10-CM

## 2018-06-27 RX ORDER — WARFARIN SODIUM 5 MG/1
TABLET ORAL
Qty: 70 TABLET | Refills: 1 | Status: SHIPPED | OUTPATIENT
Start: 2018-06-27 | End: 2018-10-10

## 2018-06-27 NOTE — TELEPHONE ENCOUNTER
"Requested Prescriptions   Pending Prescriptions Disp Refills     warfarin (COUMADIN) 5 MG tablet 100 tablet 0     Sig: Take 2.5 mg on Mon, Wed, Fri and 5 mg all other days, or as directed by the Coumadin clinic.    Vitamin K Antagonists Failed    6/27/2018  4:16 PM       Failed - INR is within goal in the past 6 weeks    Confirm INR is within goal in the past 6 weeks.     Recent Labs   Lab Test 06/13/18   INR  2.5*                      Passed - Recent (12 mo) or future (30 days) visit within the authorizing provider's specialty    Patient had office visit in the last 12 months or has a visit in the next 30 days with authorizing provider or within the authorizing provider's specialty.  See \"Patient Info\" tab in inbasket, or \"Choose Columns\" in Meds & Orders section of the refill encounter.           Passed - Patient is 18 years of age or older       Passed - Patient is not pregnant       Passed - No positive pregnancy on file in past 12 months          Last Written Prescription Date:  5/2/18  Last Fill Quantity: 100,  # refills: 0   Last Office Visit with G, P or [facility] prescribing provider:  6-13-18   Future Office Visit:       "

## 2018-06-27 NOTE — TELEPHONE ENCOUNTER
Prescription approved per [facility] Refill Protocol.  Pauline Bailey, ABDIELN, RN

## 2018-07-25 ENCOUNTER — ANTICOAGULATION THERAPY VISIT (OUTPATIENT)
Dept: ANTICOAGULATION | Facility: CLINIC | Age: 78
End: 2018-07-25
Payer: MEDICARE

## 2018-07-25 DIAGNOSIS — I26.99 PULMONARY EMBOLI (H): ICD-10-CM

## 2018-07-25 DIAGNOSIS — Z79.01 LONG-TERM (CURRENT) USE OF ANTICOAGULANTS: ICD-10-CM

## 2018-07-25 LAB — INR POINT OF CARE: 3.3 (ref 0.86–1.14)

## 2018-07-25 PROCEDURE — 36416 COLLJ CAPILLARY BLOOD SPEC: CPT

## 2018-07-25 PROCEDURE — 85610 PROTHROMBIN TIME: CPT | Mod: QW

## 2018-07-25 PROCEDURE — 99207 ZZC NO CHARGE NURSE ONLY: CPT

## 2018-07-25 NOTE — MR AVS SNAPSHOT
Tracy Palomo   7/25/2018 9:15 AM   Anticoagulation Therapy Visit    Description:  78 year old female   Provider:  ERICK ANTI MARTIN   Department:  [facility]           INR as of 7/25/2018     Today's INR 3.3!      Anticoagulation Summary as of 7/25/2018     INR goal 2.0-3.0   Today's INR 3.3!   Full warfarin instructions 2.5 mg on Mon, Wed, Fri; 5 mg all other days   Next INR check 9/5/2018    Indications   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]  Long-term (current) use of anticoagulants [Z79.01] [Z79.01]         Your next Anticoagulation Clinic appointment(s)     Sep 05, 2018  9:15 AM CDT   Anticoagulation Visit with PH ANTI COAG   [facility]    [address] [place] [id number]   [phone]              Contact Numbers     Clinic Number:         July 2018 Details    Sun Mon Tue Wed Thu Fri Sat     1               2               3               4               5               6               7                 8               9               10               11               12               13               14                 15               16               17               18               19               20               21                 22               23               24               25      2.5 mg   See details      26      5 mg         27      2.5 mg         28      5 mg           29      5 mg         30      2.5 mg         31      5 mg              Date Details   07/25 This INR check               How to take your warfarin dose     To take:  2.5 mg Take 0.5 of a 5 mg tablet.    To take:  5 mg Take 1 of the 5 mg tablets.           August 2018 Details    Sun Mon Tue Wed Thu Fri Sat        1      2.5 mg         2      5 mg         3      2.5 mg         4      5 mg           5      5 mg         6      2.5 mg         7      5 mg         8      2.5 mg         9      5 mg         10      2.5 mg         11      5  mg           12      5 mg         13      2.5 mg         14      5 mg         15      2.5 mg         16      5 mg         17      2.5 mg         18      5 mg           19      5 mg         20      2.5 mg         21      5 mg         22      2.5 mg         23      5 mg         24      2.5 mg         25      5 mg           26      5 mg         27      2.5 mg         28      5 mg         29      2.5 mg         30      5 mg         31      2.5 mg           Date Details   No additional details            How to take your warfarin dose     To take:  2.5 mg Take 0.5 of a 5 mg tablet.    To take:  5 mg Take 1 of the 5 mg tablets.           September 2018 Details    Sun Mon Tue Wed Thu Fri Sat           1      5 mg           2      5 mg         3      2.5 mg         4      5 mg         5            6               7               8                 9               10               11               12               13               14               15                 16               17               18               19               20               21               22                 23               24               25               26               27               28               29                 30                      Date Details   No additional details    Date of next INR:  9/5/2018         How to take your warfarin dose     To take:  2.5 mg Take 0.5 of a 5 mg tablet.    To take:  5 mg Take 1 of the 5 mg tablets.

## 2018-07-25 NOTE — PROGRESS NOTES
ANTICOAGULATION FOLLOW-UP CLINIC VISIT    Patient Name:  Tracy Palomo  Date:  7/25/2018  Contact Type:  Face to Face    SUBJECTIVE:     Patient Findings     Positives No Problem Findings    Comments Pt will add some extra greens today and tomorrow.            OBJECTIVE    INR Protime   Date Value Ref Range Status   07/25/2018 3.3 (A) 0.86 - 1.14 Final       ASSESSMENT / PLAN  INR assessment THER    Recheck INR In: 6 WEEKS    INR Location Clinic      Anticoagulation Summary as of 7/25/2018     INR goal 2.0-3.0   Today's INR 3.3!   Warfarin maintenance plan 2.5 mg (5 mg x 0.5) on Mon, Wed, Fri; 5 mg (5 mg x 1) all other days   Full warfarin instructions 2.5 mg on Mon, Wed, Fri; 5 mg all other days   Weekly warfarin total 27.5 mg   No change documented Arti Ruelas RN   Plan last modified Johanna Mckeon RN (5/2/2018)   Next INR check 9/5/2018   Target end date     Indications   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]  Long-term (current) use of anticoagulants [Z79.01] [Z79.01]         Anticoagulation Episode Summary     INR check location     Preferred lab     Send INR reminders to [facility] POOL    Comments 5 mg tablets, book, PM dose       Anticoagulation Care Providers     Provider Role Specialty Phone number    Gerard Medrano MD [facility] [phone]            See the Encounter Report to view Anticoagulation Flowsheet and Dosing Calendar (Go to Encounters tab in chart review, and find the Anticoagulation Therapy Visit)    Dosage adjustment made based on physician directed care plan.    Arti Ruelas RN

## 2018-09-05 ENCOUNTER — ANTICOAGULATION THERAPY VISIT (OUTPATIENT)
Dept: ANTICOAGULATION | Facility: CLINIC | Age: 78
End: 2018-09-05
Payer: MEDICARE

## 2018-09-05 DIAGNOSIS — Z79.01 LONG-TERM (CURRENT) USE OF ANTICOAGULANTS: ICD-10-CM

## 2018-09-05 DIAGNOSIS — I26.99 PULMONARY EMBOLI (H): ICD-10-CM

## 2018-09-05 LAB — INR POINT OF CARE: 2.3 (ref 0.86–1.14)

## 2018-09-05 PROCEDURE — 99207 ZZC NO CHARGE NURSE ONLY: CPT

## 2018-09-05 PROCEDURE — 85610 PROTHROMBIN TIME: CPT | Mod: QW

## 2018-09-05 PROCEDURE — 36416 COLLJ CAPILLARY BLOOD SPEC: CPT

## 2018-09-05 NOTE — MR AVS SNAPSHOT
Tracy Millerdwin   9/5/2018 9:15 AM   Anticoagulation Therapy Visit    Description:  78 year old female   Provider:  ERICK ANTI COAG   Department:  [facility]           INR as of 9/5/2018     Today's INR 2.3      Anticoagulation Summary as of 9/5/2018     INR goal 2.0-3.0   Today's INR 2.3   Full warfarin instructions 2.5 mg on Mon, Wed, Fri; 5 mg all other days   Next INR check 10/17/2018    Indications   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]  Long-term (current) use of anticoagulants [Z79.01] [Z79.01]         Your next Anticoagulation Clinic appointment(s)     Oct 17, 2018  9:15 AM CDT   Anticoagulation Visit with ERICK ANTI COAPATRICK   [facility]    [address] [place] [id number]   [phone]              Contact Numbers     Clinic Number:         September 2018 Details    Sun Mon Tue Wed Thu Fri Sat           1                 2               3               4               5      2.5 mg   See details      6      5 mg         7      2.5 mg         8      5 mg           9      5 mg         10      2.5 mg         11      5 mg         12      2.5 mg         13      5 mg         14      2.5 mg         15      5 mg           16      5 mg         17      2.5 mg         18      5 mg         19      2.5 mg         20      5 mg         21      2.5 mg         22      5 mg           23      5 mg         24      2.5 mg         25      5 mg         26      2.5 mg         27      5 mg         28      2.5 mg         29      5 mg           30      5 mg                Date Details   09/05 This INR check               How to take your warfarin dose     To take:  2.5 mg Take 0.5 of a 5 mg tablet.    To take:  5 mg Take 1 of the 5 mg tablets.           October 2018 Details    Sun Mon Tue Wed Thu Fri Sat      1      2.5 mg         2      5 mg         3      2.5 mg         4      5 mg         5      2.5 mg         6      5 mg           7      5  mg         8      2.5 mg         9      5 mg         10      2.5 mg         11      5 mg         12      2.5 mg         13      5 mg           14      5 mg         15      2.5 mg         16      5 mg         17            18               19               20                 21               22               23               24               25               26               27                 28               29               30               31                   Date Details   No additional details    Date of next INR:  10/17/2018         How to take your warfarin dose     To take:  2.5 mg Take 0.5 of a 5 mg tablet.    To take:  5 mg Take 1 of the 5 mg tablets.

## 2018-09-05 NOTE — PROGRESS NOTES
ANTICOAGULATION FOLLOW-UP CLINIC VISIT    Patient Name:  Tracy Palomo  Date:  9/5/2018  Contact Type:  Face to Face    SUBJECTIVE:     Patient Findings     Positives No Problem Findings           OBJECTIVE    INR Protime   Date Value Ref Range Status   09/05/2018 2.3 (A) 0.86 - 1.14 Final       ASSESSMENT / PLAN  INR assessment THER    Recheck INR In: 6 WEEKS    INR Location Clinic      Anticoagulation Summary as of 9/5/2018     INR goal 2.0-3.0   Today's INR 2.3   Warfarin maintenance plan 2.5 mg (5 mg x 0.5) on Mon, Wed, Fri; 5 mg (5 mg x 1) all other days   Full warfarin instructions 2.5 mg on Mon, Wed, Fri; 5 mg all other days   Weekly warfarin total 27.5 mg   No change documented Arti Ruelas RN   Plan last modified Johanna Mckeon RN (5/2/2018)   Next INR check 10/17/2018   Target end date     Indications   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]  Long-term (current) use of anticoagulants [Z79.01] [Z79.01]         Anticoagulation Episode Summary     INR check location     Preferred lab     Send INR reminders to [facility] POOL    Comments 5 mg tablets, book, PM dose       Anticoagulation Care Providers     Provider Role Specialty Phone number    Gerard Medrano MD [facility] [phone]            See the Encounter Report to view Anticoagulation Flowsheet and Dosing Calendar (Go to Encounters tab in chart review, and find the Anticoagulation Therapy Visit)    Dosage adjustment made based on physician directed care plan.    Arti Ruelas RN

## 2018-10-10 ENCOUNTER — OFFICE VISIT (OUTPATIENT)
Dept: INTERNAL MEDICINE | Facility: CLINIC | Age: 78
End: 2018-10-10
Payer: MEDICARE

## 2018-10-10 VITALS
HEART RATE: 74 BPM | DIASTOLIC BLOOD PRESSURE: 66 MMHG | WEIGHT: 179 LBS | TEMPERATURE: 96.8 F | SYSTOLIC BLOOD PRESSURE: 108 MMHG | BODY MASS INDEX: 30.49 KG/M2 | RESPIRATION RATE: 16 BRPM | OXYGEN SATURATION: 97 %

## 2018-10-10 DIAGNOSIS — I26.99 OTHER PULMONARY EMBOLISM WITHOUT ACUTE COR PULMONALE, UNSPECIFIED CHRONICITY (H): ICD-10-CM

## 2018-10-10 DIAGNOSIS — I10 ESSENTIAL HYPERTENSION WITH GOAL BLOOD PRESSURE LESS THAN 140/90: ICD-10-CM

## 2018-10-10 DIAGNOSIS — Z23 NEED FOR PROPHYLACTIC VACCINATION AND INOCULATION AGAINST INFLUENZA: ICD-10-CM

## 2018-10-10 DIAGNOSIS — I10 HYPERTENSION GOAL BP (BLOOD PRESSURE) < 140/90: ICD-10-CM

## 2018-10-10 DIAGNOSIS — I48.0 PAROXYSMAL ATRIAL FIBRILLATION (H): Primary | ICD-10-CM

## 2018-10-10 DIAGNOSIS — E78.5 HYPERLIPIDEMIA LDL GOAL <130: ICD-10-CM

## 2018-10-10 DIAGNOSIS — Z79.01 LONG TERM CURRENT USE OF ANTICOAGULANT THERAPY: ICD-10-CM

## 2018-10-10 LAB
ALBUMIN SERPL-MCNC: 3.4 G/DL (ref 3.4–5)
ALP SERPL-CCNC: 99 U/L (ref 40–150)
ALT SERPL W P-5'-P-CCNC: 25 U/L (ref 0–50)
ANION GAP SERPL CALCULATED.3IONS-SCNC: 4 MMOL/L (ref 3–14)
AST SERPL W P-5'-P-CCNC: 22 U/L (ref 0–45)
BILIRUB SERPL-MCNC: 1.3 MG/DL (ref 0.2–1.3)
BUN SERPL-MCNC: 17 MG/DL (ref 7–30)
CALCIUM SERPL-MCNC: 9.2 MG/DL (ref 8.5–10.1)
CHLORIDE SERPL-SCNC: 110 MMOL/L (ref 94–109)
CHOLEST SERPL-MCNC: 162 MG/DL
CO2 SERPL-SCNC: 30 MMOL/L (ref 20–32)
CREAT SERPL-MCNC: 1.05 MG/DL (ref 0.52–1.04)
ERYTHROCYTE [DISTWIDTH] IN BLOOD BY AUTOMATED COUNT: 13.5 % (ref 10–15)
GFR SERPL CREATININE-BSD FRML MDRD: 51 ML/MIN/1.7M2
GLUCOSE SERPL-MCNC: 89 MG/DL (ref 70–99)
HCT VFR BLD AUTO: 42.6 % (ref 35–47)
HDLC SERPL-MCNC: 54 MG/DL
HGB BLD-MCNC: 13.5 G/DL (ref 11.7–15.7)
LDLC SERPL CALC-MCNC: 59 MG/DL
MCH RBC QN AUTO: 29.1 PG (ref 26.5–33)
MCHC RBC AUTO-ENTMCNC: 31.7 G/DL (ref 31.5–36.5)
MCV RBC AUTO: 92 FL (ref 78–100)
NONHDLC SERPL-MCNC: 108 MG/DL
PLATELET # BLD AUTO: 212 10E9/L (ref 150–450)
POTASSIUM SERPL-SCNC: 4.5 MMOL/L (ref 3.4–5.3)
PROT SERPL-MCNC: 6.4 G/DL (ref 6.8–8.8)
RBC # BLD AUTO: 4.64 10E12/L (ref 3.8–5.2)
SODIUM SERPL-SCNC: 144 MMOL/L (ref 133–144)
TRIGL SERPL-MCNC: 246 MG/DL
WBC # BLD AUTO: 5.7 10E9/L (ref 4–11)

## 2018-10-10 PROCEDURE — 80053 COMPREHEN METABOLIC PANEL: CPT | Performed by: INTERNAL MEDICINE

## 2018-10-10 PROCEDURE — 99214 OFFICE O/P EST MOD 30 MIN: CPT | Mod: 25 | Performed by: INTERNAL MEDICINE

## 2018-10-10 PROCEDURE — G0008 ADMIN INFLUENZA VIRUS VAC: HCPCS | Performed by: INTERNAL MEDICINE

## 2018-10-10 PROCEDURE — 90662 IIV NO PRSV INCREASED AG IM: CPT | Performed by: INTERNAL MEDICINE

## 2018-10-10 PROCEDURE — 36415 COLL VENOUS BLD VENIPUNCTURE: CPT | Performed by: INTERNAL MEDICINE

## 2018-10-10 PROCEDURE — 85027 COMPLETE CBC AUTOMATED: CPT | Performed by: INTERNAL MEDICINE

## 2018-10-10 PROCEDURE — 80061 LIPID PANEL: CPT | Performed by: INTERNAL MEDICINE

## 2018-10-10 RX ORDER — LOVASTATIN 40 MG
TABLET ORAL
Qty: 90 TABLET | Refills: 3 | Status: SHIPPED | OUTPATIENT
Start: 2018-10-10 | End: 2020-01-15

## 2018-10-10 RX ORDER — WARFARIN SODIUM 5 MG/1
TABLET ORAL
Qty: 70 TABLET | Refills: 1 | Status: SHIPPED | OUTPATIENT
Start: 2018-10-10 | End: 2019-01-02 | Stop reason: DRUGHIGH

## 2018-10-10 RX ORDER — OMEGA-3 FATTY ACIDS/FISH OIL 300-1000MG
200 CAPSULE ORAL EVERY 4 HOURS PRN
Status: ON HOLD | COMMUNITY
End: 2019-01-11

## 2018-10-10 RX ORDER — LISINOPRIL 10 MG/1
10 TABLET ORAL DAILY
Qty: 90 TABLET | Refills: 3 | Status: ON HOLD | OUTPATIENT
Start: 2018-10-10 | End: 2019-01-11

## 2018-10-10 ASSESSMENT — PAIN SCALES - GENERAL: PAINLEVEL: SEVERE PAIN (7)

## 2018-10-10 NOTE — MR AVS SNAPSHOT
"              After Visit Summary   10/10/2018    Tracy Palomo    MRN: [id number]           Patient Information     Date Of Birth          1940        Visit Information        Provider Department      10/10/2018 8:30 AM Chad Betts MD [facility]         Follow-ups after your visit        Your next 10 appointments already scheduled     Oct 17, 2018  9:15 AM CDT   Anticoagulation Visit with PH ANTI COAG   [facility] ([facility]    [address] [place] [id number] [phone]              Who to contact     If you have questions or need follow up information about today's clinic visit or your schedule please contact [facility] directly at [phone].  Normal or non-critical lab and imaging results will be communicated to you by MyChart, letter or phone within 4 business days after the clinic has received the results. If you do not hear from us within 7 days, please contact the clinic through MyChart or phone. If you have a critical or abnormal lab result, we will notify you by phone as soon as possible.  Submit refill requests through [employer] or call your pharmacy and they will forward the refill request to us. Please allow 3 business days for your refill to be completed.          Additional Information About Your Visit        [employer] Information     [employer] lets you send messages to your doctor, view your test results, renew your prescriptions, schedule appointments and more. To sign up, go to [email or web address] . Click on \"Log in\" on the left side of the screen, which will take you to the Welcome page. Then click on \"Sign up Now\" on the right side of the page.     You will be asked to enter the access code listed below, as well as some personal information. Please follow the directions to create your username and password.     Your access code is: [email or web address]  Expires: 2019  8:19 AM     Your access code will  " in 90 days. If you need help or a new code, please call your [place] clinic or [phone].        Care EveryWhere ID     This is your Care EveryWhere ID. This could be used by other organizations to access your [place] medical records  [id number]        Your Vitals Were     Pulse Temperature Respirations Pulse Oximetry BMI (Body Mass Index)       74 96.8  F (36  C) (Temporal) 16 97% 30.49 kg/m2        Blood Pressure from Last 3 Encounters:   10/10/18 108/66   06/13/18 124/70   05/21/18 102/78    Weight from Last 3 Encounters:   10/10/18 179 lb (81.2 kg)   06/13/18 177 lb 6.4 oz (80.5 kg)   05/21/18 178 lb 8 oz (81 kg)              Today, you had the following     No orders found for display         Today's Medication Changes          These changes are accurate as of 10/10/18  8:32 AM.  If you have any questions, ask your nurse or doctor.               Stop taking these medicines if you haven't already. Please contact your care team if you have questions.     acetaminophen 325 MG tablet   Commonly known as:  TYLENOL   Stopped by:  Chad Betts MD                    Primary Care Provider Office Phone # Fax #    Chad Betts [phone]       [address] [place] [address]        Equal Access to Services     [facility]: Hadii luis antonio ku hadasho Soomaali, waaxda luqadaha, qaybta kaalmada adeegyada, buffy tavarez haymaria elena wilcox . So [facility] [phone].    ATENCIÓN: Si habla español, tiene a dale disposición servicios gratuitos de asistencia lingüística. Llame al [phone].    We comply with applicable federal civil rights laws and [place] laws. We do not discriminate on the basis of race, color, national origin, age, disability, sex, sexual orientation, or gender identity.            Thank you!     Thank you for choosing [facility]  for your care. Our goal is always to provide you with excellent care. Hearing back from our patients is one way we can  continue to improve our services. Please take a few minutes to complete the written survey that you may receive in the mail after your visit with us. Thank you!             Your Updated Medication List - Protect others around you: Learn how to safely use, store and throw away your medicines at www.disposemymeds.org.          This list is accurate as of 10/10/18  8:32 AM.  Always use your most recent med list.                   Brand Name Dispense Instructions for use Diagnosis    ibuprofen 200 MG capsule      Take 200 mg by mouth every 4 hours as needed for fever        lisinopril 10 MG tablet    PRINIVIL/ZESTRIL    90 tablet    Take 1 tablet (10 mg) by mouth daily    Essential hypertension with goal blood pressure less than 140/90       lovastatin 40 MG tablet    MEVACOR    90 tablet    TAKE ONE TABLET BY MOUTH AT BEDTIME    Hyperlipidemia LDL goal <130       warfarin 5 MG tablet    COUMADIN    70 tablet    Take 2.5 mg on Mon, Wed, Fri and 5 mg all other days, or as directed by the Coumadin clinic.    Long-term (current) use of anticoagulants

## 2018-10-10 NOTE — PROGRESS NOTES
Screening Questionnaire for Adult Immunization    Are you sick today?   No   Do you have allergies to medications, food, a vaccine component or latex?   No   Have you ever had a serious reaction after receiving a vaccination?   No   Do you have a long-term health problem with heart disease, lung disease, asthma, kidney disease, metabolic disease (e.g. diabetes), anemia, or other blood disorder?   No   Do you have cancer, leukemia, HIV/AIDS, or any other immune system problem?   No   In the past 3 months, have you taken medications that affect  your immune system, such as prednisone, other steroids, or anticancer drugs; drugs for the treatment of rheumatoid arthritis, Crohn s disease, or psoriasis; or have you had radiation treatments?   No   Have you had a seizure, or a brain or other nervous system problem?   No   During the past year, have you received a transfusion of blood or blood     products, or been given immune (gamma) globulin or antiviral drug?   No   For women: Are you pregnant or is there a chance you could become        pregnant during the next month?   No   Have you received any vaccinations in the past 4 weeks?   No     Immunization questionnaire answers were all negative.        Per orders of Dr. Chad Betts, injection of HD Influenza given by Jo Ellsworth. Patient instructed to remain in clinic for 15 minutes afterwards, and to report any adverse reaction to me immediately.       Screening performed by Jo Ellsworth on 10/10/2018 at 8:41 AM.      Injectable Influenza Immunization Documentation    1.  Is the person to be vaccinated sick today?   No    2. Does the person to be vaccinated have an allergy to a component   of the vaccine?   No  Egg Allergy Algorithm Link    3. Has the person to be vaccinated ever had a serious reaction   to influenza vaccine in the past?   No    4. Has the person to be vaccinated ever had Guillain-Barré syndrome?   No    Form completed by Jo Ellsworth  MA

## 2018-10-10 NOTE — PROGRESS NOTES
SUBJECTIVE:   Tracy Palomo is a 78 year old female who presents to clinic today for the following health issues:    Chief Complaint   Patient presents with     Recheck Medication     Atrial fib  Lipids  Htn     Arm Pain     right upper arm pain since July - NKI     Joint Pain       On coumadin for a fib and past PE, does ok and will stay on it, discussed novel therapies but likes to have it reversed if needed.      Right upper arm pain, started in July, wakes her up.  Pain goes down her biceps and in the tricep area.  No known injury.  But she is quite active, she is right handed.      Past Medical History:   Diagnosis Date     Atrial fibrillation (H) 2014    related to colon surgery     Colon polyps      Diverticulosis of colon (without mention of hemorrhage)     Diverticulosis     DVT (deep vein thrombosis) in pregnancy (H) 2014    after colon surgery     Other and unspecified hyperlipidemia      PE (pulmonary embolism) 2014    related to colon surgery     Unspecified essential hypertension      Current Outpatient Prescriptions   Medication     ibuprofen 200 MG capsule     lisinopril (PRINIVIL/ZESTRIL) 10 MG tablet     lovastatin (MEVACOR) 40 MG tablet     warfarin (COUMADIN) 5 MG tablet     [DISCONTINUED] lisinopril (PRINIVIL/ZESTRIL) 10 MG tablet     [DISCONTINUED] lovastatin (MEVACOR) 40 MG tablet     [DISCONTINUED] warfarin (COUMADIN) 5 MG tablet     No current facility-administered medications for this visit.      Social History   Substance Use Topics     Smoking status: Never Smoker     Smokeless tobacco: Never Used     Alcohol use No     Review of Systems  Constitutional-No fevers, chills, or weight changes..  ENT-No earpain, sore throat, voice changes or rhinitis.  Cardiac-No chest pain or palpitations.  Respiratory-No cough, sob, or hemoptysis.  GI-No nausea, vomitting, diarrhea, constipation, or blood in the stool.  Musculoskeletal-+joint pains.    Physical Exam  /66  Pulse 74  Temp 96.8  F  (36  C) (Temporal)  Resp 16  Wt 179 lb (81.2 kg)  SpO2 97%  BMI 30.49 kg/m2  General Appearance-healthy, alert, no distress  Cardiac-regular rate and rhythm  with normal S1, S2 ; no murmur, rub or gallops  Lungs-clear to auscultation  Extremities-no peripheral edema, peripheral pulses normal  Musculoskeletal-right shoulder with good rom, some pain in the upper arm/deltoid area.   Rotator cuff seems fine and intact.    ASSESSMENT:  78-year-old woman who is here for recheck.  She is overall doing well, atrial fibrillation she is continues on Coumadin.  We discussed other methods of anticoagulation including the Novel therapies but she will stay with Coumadin and she likes to have a way to reverse it if possible.  She is controlled with her blood pressure and heart rate today.  No changes on that.    We will check labs including her statin and fasting lipids today.    Hypertension her blood pressures controlled with lisinopril we will continue with just 10 mg a day, check her potassium and kidney function today.    We discussed her arthritis she does have some arthritis in her hands and her knees, I did offer her an injection in her knees but she will wait, she will try some over-the-counter glucosamine/chondroitin as this helped her in the past.    Right shoulder pain her rotator cuff appears intact but she does have some pain seems like from the deltoid insertion in the upper arm no bone pain, she will try to rest the area continue with heat and anti-inflammatories    Electronically signed by Chad Betts MD

## 2018-10-11 ASSESSMENT — PATIENT HEALTH QUESTIONNAIRE - PHQ9: SUM OF ALL RESPONSES TO PHQ QUESTIONS 1-9: 2

## 2018-10-17 ENCOUNTER — ANTICOAGULATION THERAPY VISIT (OUTPATIENT)
Dept: ANTICOAGULATION | Facility: CLINIC | Age: 78
End: 2018-10-17
Payer: MEDICARE

## 2018-10-17 DIAGNOSIS — Z79.01 LONG TERM CURRENT USE OF ANTICOAGULANT THERAPY: ICD-10-CM

## 2018-10-17 DIAGNOSIS — I26.99 OTHER PULMONARY EMBOLISM WITHOUT ACUTE COR PULMONALE, UNSPECIFIED CHRONICITY (H): ICD-10-CM

## 2018-10-17 LAB — INR POINT OF CARE: 4.7 (ref 0.86–1.14)

## 2018-10-17 PROCEDURE — 99207 ZZC NO CHARGE NURSE ONLY: CPT

## 2018-10-17 PROCEDURE — 85610 PROTHROMBIN TIME: CPT | Mod: QW

## 2018-10-17 PROCEDURE — 36416 COLLJ CAPILLARY BLOOD SPEC: CPT

## 2018-10-17 NOTE — PROGRESS NOTES
ANTICOAGULATION FOLLOW-UP CLINIC VISIT    Patient Name:  Tracy Palomo  Date:  10/17/2018  Contact Type:  Face to Face    SUBJECTIVE:     Patient Findings     Positives Change in medications (pt started glucosamine about 2.5 weeks ago- this can increase INR, per micromedex )           OBJECTIVE    INR Protime   Date Value Ref Range Status   10/17/2018 4.7 (A) 0.86 - 1.14 Final       ASSESSMENT / PLAN  INR assessment SUPRA    Recheck INR In: 1 WEEK    INR Location Clinic      Anticoagulation Summary as of 10/17/2018     INR goal 2.0-3.0   Today's INR 4.7!   Warfarin maintenance plan 5 mg (5 mg x 1) on Tue, Sat; 2.5 mg (5 mg x 0.5) all other days   Full warfarin instructions 5 mg on Tue, Sat; 2.5 mg all other days   Weekly warfarin total 22.5 mg   Plan last modified Johanna Mckeon RN (10/17/2018)   Next INR check 10/24/2018   Target end date     Indications   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]  Long term current use of anticoagulant therapy [Z79.01]         Anticoagulation Episode Summary     INR check location     Preferred lab     Send INR reminders to [facility] POOL    Comments 5 mg tablets, book, PM dose       Anticoagulation Care Providers     Provider Role Specialty Phone number    Gerard Medrano MD [facility] [phone]            See the Encounter Report to view Anticoagulation Flowsheet and Dosing Calendar (Go to Encounters tab in chart review, and find the Anticoagulation Therapy Visit)    Dosage adjustment made based on physician directed care plan.      Johanna Mckeon RN

## 2018-10-17 NOTE — MR AVS SNAPSHOT
Tracy Millerdwin   10/17/2018 9:15 AM   Anticoagulation Therapy Visit    Description:  78 year old female   Provider:  ERICK ANTI COAPATRICK   Department:  [facility]           INR as of 10/17/2018     Today's INR 4.7!      Anticoagulation Summary as of 10/17/2018     INR goal 2.0-3.0   Today's INR 4.7!   Full warfarin instructions 5 mg on Tue, Sat; 2.5 mg all other days   Next INR check 10/24/2018    Indications   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]  Long term current use of anticoagulant therapy [Z79.01]         Your next Anticoagulation Clinic appointment(s)     Oct 26, 2018  9:00 AM CDT   [facility]    [address] [place] [id number] [phone]              Contact Numbers     Clinic Number:         October 2018 Details    Sun Mon Tue Wed Thu Fri Sat      1               2               3               4               5               6                 7               8               9               10               11               12               13                 14               15               16               17      2.5 mg   See details      18      2.5 mg         19      2.5 mg         20      5 mg           21      2.5 mg         22      2.5 mg         23      5 mg         24            25               26               27                 28               29               30               31                   Date Details   10/17 This INR check       Date of next INR:  10/24/2018         How to take your warfarin dose     To take:  2.5 mg Take 0.5 of a 5 mg tablet.    To take:  5 mg Take 1 of the 5 mg tablets.

## 2018-10-26 ENCOUNTER — ANTICOAGULATION THERAPY VISIT (OUTPATIENT)
Dept: ANTICOAGULATION | Facility: CLINIC | Age: 78
End: 2018-10-26
Payer: MEDICARE

## 2018-10-26 DIAGNOSIS — I26.99 OTHER PULMONARY EMBOLISM WITHOUT ACUTE COR PULMONALE, UNSPECIFIED CHRONICITY (H): ICD-10-CM

## 2018-10-26 DIAGNOSIS — Z79.01 LONG TERM CURRENT USE OF ANTICOAGULANT THERAPY: ICD-10-CM

## 2018-10-26 LAB — INR POINT OF CARE: 2 (ref 0.86–1.14)

## 2018-10-26 PROCEDURE — 85610 PROTHROMBIN TIME: CPT | Mod: QW

## 2018-10-26 PROCEDURE — 99207 ZZC NO CHARGE NURSE ONLY: CPT

## 2018-10-26 PROCEDURE — 36416 COLLJ CAPILLARY BLOOD SPEC: CPT

## 2018-10-26 NOTE — MR AVS SNAPSHOT
Tracy ANG Palomo   10/26/2018 9:00 AM   Anticoagulation Therapy Visit    Description:  78 year old female   Provider:  ERICK ANTI COAG   Department:  [facility]           INR as of 10/26/2018     Today's INR 2.0      Anticoagulation Summary as of 10/26/2018     INR goal 2.0-3.0   Today's INR 2.0   Full warfarin instructions 5 mg on Tue, Thu, Sat; 2.5 mg all other days   Next INR check 11/9/2018    Indications   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]  Long term current use of anticoagulant therapy [Z79.01]         Your next Anticoagulation Clinic appointment(s)     Nov 09, 2018  9:15 AM CST   Anticoagulation Visit with ERICK ANTI MARTIN   [facility]    [address] [place] [id number] [phone]              Contact Numbers     Clinic Number:         October 2018 Details    Sun Mon Tue Wed Thu Fri Sat      1               2               3               4               5               6                 7               8               9               10               11               12               13                 14               15               16               17               18               19               20                 21               22               23               24               25               26      2.5 mg   See details      27      5 mg           28      2.5 mg         29      2.5 mg         30      5 mg         31      2.5 mg             Date Details   10/26 This INR check               How to take your warfarin dose     To take:  2.5 mg Take 0.5 of a 5 mg tablet.    To take:  5 mg Take 1 of the 5 mg tablets.           November 2018 Details    Sun Mon Tue Wed Thu Fri Sat         1      5 mg         2      2.5 mg         3      5 mg           4      2.5 mg         5      2.5 mg         6      5 mg         7      2.5 mg         8      5 mg         9            10                 11               12                13               14               15               16               17                 18               19               20               21               22               23               24                 25               26               27               28               29               30                 Date Details   No additional details    Date of next INR:  11/9/2018         How to take your warfarin dose     To take:  2.5 mg Take 0.5 of a 5 mg tablet.    To take:  5 mg Take 1 of the 5 mg tablets.

## 2018-10-26 NOTE — PROGRESS NOTES
ANTICOAGULATION FOLLOW-UP CLINIC VISIT    Patient Name:  Tracy Palomo  Date:  10/26/2018  Contact Type:  Face to Face    SUBJECTIVE:     Patient Findings     Positives No Problem Findings           OBJECTIVE    INR Protime   Date Value Ref Range Status   10/26/2018 2.0 (A) 0.86 - 1.14 Final       ASSESSMENT / PLAN  INR assessment THER    Recheck INR In: 2 WEEKS    INR Location Clinic      Anticoagulation Summary as of 10/26/2018     INR goal 2.0-3.0   Today's INR 2.0   Warfarin maintenance plan 5 mg (5 mg x 1) on Tue, Thu, Sat; 2.5 mg (5 mg x 0.5) all other days   Full warfarin instructions 5 mg on Tue, Thu, Sat; 2.5 mg all other days   Weekly warfarin total 25 mg   Plan last modified Johanna Mckeon RN (10/26/2018)   Next INR check 11/9/2018   Target end date     Indications   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]  Long term current use of anticoagulant therapy [Z79.01]         Anticoagulation Episode Summary     INR check location     Preferred lab     Send INR reminders to [facility] POOL    Comments 5 mg tablets, book, PM dose       Anticoagulation Care Providers     Provider Role Specialty Phone number    Gerard Medrano MD [facility] [phone]            See the Encounter Report to view Anticoagulation Flowsheet and Dosing Calendar (Go to Encounters tab in chart review, and find the Anticoagulation Therapy Visit)    Dosage adjustment made based on physician directed care plan.      Johanna Mckeon RN

## 2018-11-09 ENCOUNTER — ANTICOAGULATION THERAPY VISIT (OUTPATIENT)
Dept: ANTICOAGULATION | Facility: CLINIC | Age: 78
End: 2018-11-09
Payer: MEDICARE

## 2018-11-09 DIAGNOSIS — Z79.01 LONG TERM CURRENT USE OF ANTICOAGULANT THERAPY: ICD-10-CM

## 2018-11-09 DIAGNOSIS — I26.99 OTHER PULMONARY EMBOLISM WITHOUT ACUTE COR PULMONALE, UNSPECIFIED CHRONICITY (H): ICD-10-CM

## 2018-11-09 LAB — INR POINT OF CARE: 1.8 (ref 0.86–1.14)

## 2018-11-09 PROCEDURE — 99207 ZZC NO CHARGE NURSE ONLY: CPT

## 2018-11-09 PROCEDURE — 36416 COLLJ CAPILLARY BLOOD SPEC: CPT

## 2018-11-09 PROCEDURE — 85610 PROTHROMBIN TIME: CPT | Mod: QW

## 2018-11-09 NOTE — MR AVS SNAPSHOT
Tracy ANG Dewey   11/9/2018 9:15 AM   Anticoagulation Therapy Visit    Description:  78 year old female   Provider:  ERICK ANTI COAG   Department:  [facility] Anticoag           INR as of 11/9/2018     Today's INR 1.8!      Anticoagulation Summary as of 11/9/2018     INR goal 2.0-3.0   Today's INR 1.8!   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5 mg           11      5 mg         12      5 mg         13      2.5 mg         14      5 mg         15      2.5 mg         16      5 mg         17      2.5 mg           18      5 mg         19      5 mg         20      2.5 mg         21      5 mg         22      2.5 mg         23      5 mg         24      2.5 mg           25      5 mg         26            27               28               29               30                 Date Details   11/09 This INR check       Date of next INR:  11/26/2018         How to take your warfarin dose     To take:  2.5 mg Take 0.5 of a 5 mg tablet.    To take:  5 mg Take 1 of the 5 mg tablets.

## 2018-11-09 NOTE — PROGRESS NOTES
ANTICOAGULATION FOLLOW-UP CLINIC VISIT    Patient Name:  Tracy Palomo  Date:  11/9/2018  Contact Type:  Face to Face    SUBJECTIVE:     Patient Findings     Positives No Problem Findings           OBJECTIVE    INR Protime   Date Value Ref Range Status   11/09/2018 1.8 (A) 0.86 - 1.14 Final       ASSESSMENT / PLAN  INR assessment SUB    Recheck INR In: 2 WEEKS    INR Location Clinic      Anticoagulation Summary as of 11/9/2018     INR goal 2.0-3.0   Today's INR 1.8!   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]  Long term current use of anticoagulant therapy [Z79.01]         Anticoagulation Episode Summary     INR check location     Preferred lab     Send INR reminders to [facility] POOL    Comments 5 mg tablets, book, PM dose       Anticoagulation Care Providers     Provider Role Specialty Phone number    Gerard Medrano MD [facility] [phone]            See the Encounter Report to view Anticoagulation Flowsheet and Dosing Calendar (Go to Encounters tab in chart review, and find the Anticoagulation Therapy Visit)    Dosage adjustment made based on physician directed care plan.      Johanna Mckeon RN

## 2018-11-15 ENCOUNTER — OFFICE VISIT (OUTPATIENT)
Dept: ORTHOPEDICS | Facility: CLINIC | Age: 78
End: 2018-11-15
Payer: MEDICARE

## 2018-11-15 ENCOUNTER — TELEPHONE (OUTPATIENT)
Dept: ORTHOPEDICS | Facility: CLINIC | Age: 78
End: 2018-11-15

## 2018-11-15 ENCOUNTER — RADIANT APPOINTMENT (OUTPATIENT)
Dept: GENERAL RADIOLOGY | Facility: CLINIC | Age: 78
End: 2018-11-15
Attending: ORTHOPAEDIC SURGERY
Payer: MEDICARE

## 2018-11-15 VITALS
WEIGHT: 176.6 LBS | DIASTOLIC BLOOD PRESSURE: 84 MMHG | SYSTOLIC BLOOD PRESSURE: 138 MMHG | BODY MASS INDEX: 30.15 KG/M2 | HEIGHT: 64 IN

## 2018-11-15 DIAGNOSIS — M17.11 PRIMARY OSTEOARTHRITIS OF RIGHT KNEE: Primary | ICD-10-CM

## 2018-11-15 DIAGNOSIS — M17.12 PRIMARY OSTEOARTHRITIS OF LEFT KNEE: ICD-10-CM

## 2018-11-15 DIAGNOSIS — M25.561 RIGHT KNEE PAIN: ICD-10-CM

## 2018-11-15 DIAGNOSIS — Z01.818 PRE-OP EXAM: Primary | ICD-10-CM

## 2018-11-15 PROCEDURE — 99213 OFFICE O/P EST LOW 20 MIN: CPT | Performed by: ORTHOPAEDIC SURGERY

## 2018-11-15 PROCEDURE — 73564 X-RAY EXAM KNEE 4 OR MORE: CPT | Mod: TC

## 2018-11-15 ASSESSMENT — PAIN SCALES - GENERAL: PAINLEVEL: MILD PAIN (2)

## 2018-11-15 NOTE — TELEPHONE ENCOUNTER
Type of surgery: Right total knee replacement  Location of surgery: [facility]   Date of surgery: 1/8/19  Surgeon: Dr. Pang  Pre-Op Appt Date: 12/21/18  Post-Op Appt Date: 1/23/18   Packet sent out: Surgery packet was given to patient in clinic.   Pre-cert/Authorization completed: NA  Date: 11/15/2018    **labs ordered  **soap given  **class 12/20/18    Pat Reese  Surgery Scheduler

## 2018-11-15 NOTE — LETTER
"    11/15/2018         RE: Tracy Palomo  [address] [facility] [id number]        Dear Colleague,    Thank you for referring your patient, Tracy Palomo, to the [facility]. Please see a copy of my visit note below.    ORTHOPEDIC CONSULT      Chief Complaint: Tracy Palomo is a 78 year old female who is being seen for Chief Complaint   Patient presents with     Knee Pain     right knee pain       History of Present Illness: Tracy Palomo is a 78 year old female who is seen in consultation at the request of self for evaluation of bilateral knee pain  Seen previous for her hip, first time seeing her for knees.  Mechanism of Injury: No trauma or inciting event. States has had knee pain for many years, R > L.  In 2007 underwent a knee arthroscopy for a meniscus tear on the right knee, she states the right knee did well for awhile but then over the last few years and especially the last 2 years has been having increasing progressive pain.  Has had more mild pain on the left knee for at least 2 years.  Pain is right side, medially, constant dull ache.  Moderate pain on right mild pain on left.  Worst with walking, weightbearing, and especially stairs.  Starting to limp more and more causing pain in both hips, back.  Also waking her up at night due to the pain.  States the knee feel not stable, bowed out, and having some swelling.    Therapies tried to date:   Icing, ibuprofen, glucosamine, activity modification, rest, knee arthroscopy, ibuprofen without benefit.     Takes coumadin due to hx of 2 \"blood clots\" one that was reported to the knee following the knee arthroscopy in 2007 and one in the lung following a colon procedure.  Per patient she has been recommended for coumadin for life. She notes no issues with clots after the left hip surgery in 2017.      Patient's past medical, surgical, social and family histories reviewed.     Past Medical History:   Diagnosis Date     Atrial " fibrillation (H) 2014    related to colon surgery     Colon polyps      Diverticulosis of colon (without mention of hemorrhage)     Diverticulosis     DVT (deep vein thrombosis) in pregnancy (H) 2014    after colon surgery     Other and unspecified hyperlipidemia      PE (pulmonary embolism) 2014    related to colon surgery     Unspecified essential hypertension        Past Surgical History:   Procedure Laterality Date     COLONOSCOPY  09, 10, 12    [facility]     COLONOSCOPY N/A 12/11/2017    Procedure: COLONOSCOPY;  colonoscopy;  Surgeon: Elvis Quezada MD;  [facility]     FLEXIBLE SIGMOIDOSCOPY  09, 11     LAPAROTOMY EXPLORATORY N/A 10/20/2014    Procedure: LAPAROTOMY EXPLORATORY;  Surgeon: Miguel Oleary MD;  [facility]     LAPAROTOMY, LYSIS ADHESIONS, COMBINED N/A 10/20/2014    Procedure: COMBINED LAPAROTOMY, LYSIS ADHESIONS;  Surgeon: Miguel Oleary MD;  [facility]     OPEN REDUCTION INTERNAL FIXATION HIP BIPOLAR Left 3/16/2017    Procedure: OPEN REDUCTION INTERNAL FIXATION HIP BIPOLAR;  Surgeon: Kaleb Pang DO;  [facility]     RESECT SMALL BOWEL WITHOUT OSTOMY N/A 10/20/2014    Procedure: RESECT SMALL BOWEL WITHOUT OSTOMY;  Surgeon: Miguel Oleary MD;  Location: [facility]       Medications:    Current Outpatient Prescriptions on File Prior to Visit:  ibuprofen 200 MG capsule Take 200 mg by mouth every 4 hours as needed for fever   lisinopril (PRINIVIL/ZESTRIL) 10 MG tablet Take 1 tablet (10 mg) by mouth daily   lovastatin (MEVACOR) 40 MG tablet TAKE ONE TABLET BY MOUTH AT BEDTIME   warfarin (COUMADIN) 5 MG tablet Take 2.5 mg on Mon, Wed, Fri and 5 mg all other days, or as directed by the Coumadin clinic.     No current facility-administered medications on file prior to visit.     Allergies   Allergen Reactions     No Known Allergies        Social History     Occupational History     Not on file.     Social History Main Topics     Smoking status:  "Never Smoker     Smokeless tobacco: Never Used     Alcohol use No     Drug use: No     Sexual activity: Not Currently     Partners: Male       Family History   Problem Relation Age of Onset     Blood Disease No family hx of      blood clots       REVIEW OF SYSTEMS  10 point review systems performed otherwise negative as noted as per history of present illness.    Physical Exam:  Vitals: /84  Ht 1.632 m (5' 4.25\")  Wt 80.1 kg (176 lb 9.6 oz)  BMI 30.08 kg/m2  BMI= Body mass index is 30.08 kg/(m^2).  Constitutional: healthy, alert and no acute distress   Psychiatric: mentation appears normal and affect normal/bright  NEURO: no focal deficits  RESP: Normal with easy respirations and no use of accessory muscles to breathe, no audible wheezing or retractions  CV: bilateral lower extremity:   no edema  ]SKIN: No erythema, rashes, excoriation, or breakdown. No evidence of infection.   JOINT/EXTREMITIES:right knee: varus deformity, moderate effusion, no bruising.  AROM , PROM 5-110 with stiffness and pain with ROM.  Tender to lateral joint line, tender to medial joint line. Extensor mechanism intact.  No instability with valgus/varus testing. Some pain with valgus testing. Distal neurovascular grossly intact.     Left knee: no deformity, mild effusion, no bruising.  AROM 0-110, PROM 0-115 Non tender to lateral joint line, non tender to medial joint line. No focal tenderness Extensor mechanism intact.  No instability with valgus/varus testing. No pain with valgus/varus testing. Distal neurovascular grossly intact.   GAIT: not tested     Diagnostic Modalities:  Right knee xray: varus deformity, bone on bone medial compartment narrowing with osteophyte formation.  Lateral compartment has osteophyte formation.  Patellofemoral compartment bone on bone joint narrowing with osteophyte formation.   Left knee xray: varus deformity moderate to severe medial compartment narrowing with osteophyte formation.  Lateral " compartment has osteophyte formation.  Patellofemoral compartment bone on bone joint narrowing with osteophyte formation.     Independent visualization of the images was performed.      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  Patient has had many years of knee pain, progressively getting worse.  Discussed options of starting with steroid injection and physical therapy, patient requesting total knee replacement.  Given the length of symptoms, imaging, and exam this would be appropriate.  She would though in the meantime like to get started with physical therapy to work on motion.      The patient has attempted conservative treatments that include NSAIDs, gluclosamine chondroitin supplement, activity modifications, rest, knee arthroscopy 10 years ago yet still continues to have significant issues. These issues include pain at rest, increased pain with activity, pain that wakes at night, loss of motion of the joint, instability of the knee, fear of falling, unable to do activities like gardening. Secondary to these reasons I have offered surgery in the form of RIGHT total knee arthroplasty    Risks, benefits, complications, alternatives, limitations, and post operative course were discussed. Risks including infection (requiring long-term antibiotics and repeat surgeries), implant loosening (requiring revision surgery), heart attacks, strokes, bleeding (possibly requiring blood transfusion), scars, instability, numbness around the knee, stiffness (requiring manipulations or repeat surgeries), instability and dislocations, fractures, blood clots the legs and lungs, nerve injury (possibly leading to foot drop), and or death.  Postoperative course was discussed as far as limitations and expected recovery times. It was also discussed that after surgery there is a need for blood thinners to  prevent blood clots, but even when being treated it is possible to develop a blood clot. Anticipate being hospitalized 1-3 days and subsequently either discharged home or to a rehabilitation facility. Determinations of this will be based on progress with physical therapy while in the hospital. The importance of post-operative physical therapy was discussed. If discharge home from the hospital expect [facility] home physical therapy for about 2 weeks and then transition to going to a physical therapy location for more aggressive therapy. Without physical therapy, outcomes may not be optimal. All questions were answered. The patient agrees to proceed with surgery.  Past medical and surgical history was reviewed. It is positive for past blood clots, and is on coumadin. Ms. Palomo will need a pre-operative medical evaluation and clearance by a primary care provider. We will obtain a CBC, UA, nasal swab for MRSA as well as the appropriate radiographs prior to surgery. I will leave it to the discretion of the primary care provider for additional pre-operative testing.     Asked patient to stopped ibuprofen, will leave to PCP to determine need or not for lovenox bridging and coumadin management.      Return to clinic 1 week prior to surgery no imaging needed, 14 days post-op with knee xrays., or sooner as needed for changes.  Re-x-ray on return:  As above.     Scribed by:  BILLY Hernandez, CNP  12:11 PM  11/15/2018    I attest I have seen and evaluated the patient.  I agree with above impression and plan.  Vitaly Pang D.O.    Again, thank you for allowing me to participate in the care of your patient.        Sincerely,        Kaleb Pang, DO

## 2018-11-15 NOTE — PROGRESS NOTES
"ORTHOPEDIC CONSULT      Chief Complaint: Tracy Palomo is a 78 year old female who is being seen for Chief Complaint   Patient presents with     Knee Pain     right knee pain       History of Present Illness: Tracy Palomo is a 78 year old female who is seen in consultation at the request of self for evaluation of bilateral knee pain  Seen previous for her hip, first time seeing her for knees.  Mechanism of Injury: No trauma or inciting event. States has had knee pain for many years, R > L.  In 2007 underwent a knee arthroscopy for a meniscus tear on the right knee, she states the right knee did well for awhile but then over the last few years and especially the last 2 years has been having increasing progressive pain.  Has had more mild pain on the left knee for at least 2 years.  Pain is right side, medially, constant dull ache.  Moderate pain on right mild pain on left.  Worst with walking, weightbearing, and especially stairs.  Starting to limp more and more causing pain in both hips, back.  Also waking her up at night due to the pain.  States the knee feel not stable, bowed out, and having some swelling.    Therapies tried to date:   Icing, ibuprofen, glucosamine, activity modification, rest, knee arthroscopy, ibuprofen without benefit.     Takes coumadin due to hx of 2 \"blood clots\" one that was reported to the knee following the knee arthroscopy in 2007 and one in the lung following a colon procedure.  Per patient she has been recommended for coumadin for life. She notes no issues with clots after the left hip surgery in 2017.      Patient's past medical, surgical, social and family histories reviewed.     Past Medical History:   Diagnosis Date     Atrial fibrillation (H) 2014    related to colon surgery     Colon polyps      Diverticulosis of colon (without mention of hemorrhage)     Diverticulosis     DVT (deep vein thrombosis) in pregnancy (H) 2014    after colon surgery     Other and unspecified " hyperlipidemia      PE (pulmonary embolism) 2014    related to colon surgery     Unspecified essential hypertension        Past Surgical History:   Procedure Laterality Date     COLONOSCOPY  09, 10, 12    [facility]     COLONOSCOPY N/A 12/11/2017    Procedure: COLONOSCOPY;  colonoscopy;  Surgeon: Elvis Quezada MD;  [facility]     FLEXIBLE SIGMOIDOSCOPY  09, 11     LAPAROTOMY EXPLORATORY N/A 10/20/2014    Procedure: LAPAROTOMY EXPLORATORY;  Surgeon: Miguel Oleary MD;  [facility]     LAPAROTOMY, LYSIS ADHESIONS, COMBINED N/A 10/20/2014    Procedure: COMBINED LAPAROTOMY, LYSIS ADHESIONS;  Surgeon: Miguel Oleary MD;  [facility]     OPEN REDUCTION INTERNAL FIXATION HIP BIPOLAR Left 3/16/2017    Procedure: OPEN REDUCTION INTERNAL FIXATION HIP BIPOLAR;  Surgeon: Kaleb Pang DO;  [facility]     RESECT SMALL BOWEL WITHOUT OSTOMY N/A 10/20/2014    Procedure: RESECT SMALL BOWEL WITHOUT OSTOMY;  Surgeon: Miguel Oleary MD;  [facility]       Medications:    Current Outpatient Prescriptions on File Prior to Visit:  ibuprofen 200 MG capsule Take 200 mg by mouth every 4 hours as needed for fever   lisinopril (PRINIVIL/ZESTRIL) 10 MG tablet Take 1 tablet (10 mg) by mouth daily   lovastatin (MEVACOR) 40 MG tablet TAKE ONE TABLET BY MOUTH AT BEDTIME   warfarin (COUMADIN) 5 MG tablet Take 2.5 mg on Mon, Wed, Fri and 5 mg all other days, or as directed by the Coumadin clinic.     No current facility-administered medications on file prior to visit.     Allergies   Allergen Reactions     No Known Allergies        Social History     Occupational History     Not on file.     Social History Main Topics     Smoking status: Never Smoker     Smokeless tobacco: Never Used     Alcohol use No     Drug use: No     Sexual activity: Not Currently     Partners: Male       Family History   Problem Relation Age of Onset     Blood Disease No family hx of      blood clots  "      REVIEW OF SYSTEMS  10 point review systems performed otherwise negative as noted as per history of present illness.    Physical Exam:  Vitals: /84  Ht 1.632 m (5' 4.25\")  Wt 80.1 kg (176 lb 9.6 oz)  BMI 30.08 kg/m2  BMI= Body mass index is 30.08 kg/(m^2).  Constitutional: healthy, alert and no acute distress   Psychiatric: mentation appears normal and affect normal/bright  NEURO: no focal deficits  RESP: Normal with easy respirations and no use of accessory muscles to breathe, no audible wheezing or retractions  CV: bilateral lower extremity:   no edema  ]SKIN: No erythema, rashes, excoriation, or breakdown. No evidence of infection.   JOINT/EXTREMITIES:right knee: varus deformity, moderate effusion, no bruising.  AROM , PROM 5-110 with stiffness and pain with ROM.  Tender to lateral joint line, tender to medial joint line. Extensor mechanism intact.  No instability with valgus/varus testing. Some pain with valgus testing. Distal neurovascular grossly intact.     Left knee: no deformity, mild effusion, no bruising.  AROM 0-110, PROM 0-115 Non tender to lateral joint line, non tender to medial joint line. No focal tenderness Extensor mechanism intact.  No instability with valgus/varus testing. No pain with valgus/varus testing. Distal neurovascular grossly intact.   GAIT: not tested     Diagnostic Modalities:  Right knee xray: varus deformity, bone on bone medial compartment narrowing with osteophyte formation.  Lateral compartment has osteophyte formation.  Patellofemoral compartment bone on bone joint narrowing with osteophyte formation.   Left knee xray: varus deformity moderate to severe medial compartment narrowing with osteophyte formation.  Lateral compartment has osteophyte formation.  Patellofemoral compartment bone on bone joint narrowing with osteophyte formation.     Independent visualization of the images was performed.      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  Patient has had many years of knee pain, progressively getting worse.  Discussed options of starting with steroid injection and physical therapy, patient requesting total knee replacement.  Given the length of symptoms, imaging, and exam this would be appropriate.  She would though in the meantime like to get started with physical therapy to work on motion.      The patient has attempted conservative treatments that include NSAIDs, gluclosamine chondroitin supplement, activity modifications, rest, knee arthroscopy 10 years ago yet still continues to have significant issues. These issues include pain at rest, increased pain with activity, pain that wakes at night, loss of motion of the joint, instability of the knee, fear of falling, unable to do activities like gardening. Secondary to these reasons I have offered surgery in the form of RIGHT total knee arthroplasty    Risks, benefits, complications, alternatives, limitations, and post operative course were discussed. Risks including infection (requiring long-term antibiotics and repeat surgeries), implant loosening (requiring revision surgery), heart attacks, strokes, bleeding (possibly requiring blood transfusion), scars, instability, numbness around the knee, stiffness (requiring manipulations or repeat surgeries), instability and dislocations, fractures, blood clots the legs and lungs, nerve injury (possibly leading to foot drop), and or death.  Postoperative course was discussed as far as limitations and expected recovery times. It was also discussed that after surgery there is a need for blood thinners to prevent blood clots, but even when being treated it is possible to develop a blood clot. Anticipate being hospitalized 1-3 days and subsequently either discharged home or to a rehabilitation facility. Determinations of this will be  based on progress with physical therapy while in the hospital. The importance of post-operative physical therapy was discussed. If discharge home from the hospital expect [place] home physical therapy for about 2 weeks and then transition to going to a physical therapy location for more aggressive therapy. Without physical therapy, outcomes may not be optimal. All questions were answered. The patient agrees to proceed with surgery.  Past medical and surgical history was reviewed. It is positive for past blood clots, and is on coumadin. Ms. Palomo will need a pre-operative medical evaluation and clearance by a primary care provider. We will obtain a CBC, UA, nasal swab for MRSA as well as the appropriate radiographs prior to surgery. I will leave it to the discretion of the primary care provider for additional pre-operative testing.     Asked patient to stopped ibuprofen, will leave to PCP to determine need or not for lovenox bridging and coumadin management.      Return to clinic 1 week prior to surgery no imaging needed, 14 days post-op with knee xrays., or sooner as needed for changes.  Re-x-ray on return:  As above.     Scribed by:  BILLY Hernandez, CNP  12:11 PM  11/15/2018    I attest I have seen and evaluated the patient.  I agree with above impression and plan.  Vitaly Pang D.O.

## 2018-11-15 NOTE — MR AVS SNAPSHOT
"              After Visit Summary   11/15/2018    Tracy Palomo    MRN: [id number]           Patient Information     Date Of Birth          1940        Visit Information        Provider Department      11/15/2018 9:40 AM Kaleb Pang DO [facility]        Today's Diagnoses     Right knee pain    -  1       Follow-ups after your visit        Your next 10 appointments already scheduled     Nov 15, 2018  9:40 AM CST   [facility]    [address] [place] [address] [id number] [phone]            Nov 27, 2018  9:00 AM CST   [facility] with [facility]   [facility]    [address] [place] [id number] [phone]              Who to contact     If you have questions or need follow up information about today's clinic visit or your schedule please contact [facility] directly at [phone].  Normal or non-critical lab and imaging results will be communicated to you by [employer], letter or phone within 4 business days after the clinic has received the results. If you do not hear from us within 7 days, please contact the clinic through [employer] or phone. If you have a critical or abnormal lab result, we will notify you by phone as soon as possible.  Submit refill requests through [employer] or call your pharmacy and they will forward the refill request to us. Please allow 3 business days for your refill to be completed.          Additional Information About Your Visit        [employer] Information     [employer] lets you send messages to your doctor, view your test results, renew your prescriptions, schedule appointments and more. To sign up, go to [email or web address] . Click on \"Log in\" on the left side of the screen, which will take you to the Welcome page. Then click on \"Sign up Now\" on the right side of the " "page.     You will be asked to enter the access code listed below, as well as some personal information. Please follow the directions to create your username and password.     Your access code is: [email or web address]  Expires: 2019  7:19 AM     Your access code will  in 90 days. If you need help or a new code, please call your [place] clinic or [phone].        Care EveryWhere ID     This is your Care EveryWhere ID. This could be used by other organizations to access your [place] medical records  [id number]        Your Vitals Were     Height BMI (Body Mass Index)                1.632 m (5' 4.25\") 30.08 kg/m2           Blood Pressure from Last 3 Encounters:   11/15/18 138/84   10/10/18 108/66   18 124/70    Weight from Last 3 Encounters:   11/15/18 80.1 kg (176 lb 9.6 oz)   10/10/18 81.2 kg (179 lb)   18 80.5 kg (177 lb 6.4 oz)               Primary Care Provider Office Phone # Fax #    Chad Betts [phone]       [address] [place] [phone]        Equal Access to Services     KODI REYNOLDS AH: Hadii luis antonio ku hadasho Soomaali, waaxda luqadaha, qaybta kaalmada adeegyada, waxay daysi holguin. So [facility] [phone].    ATENCIÓN: Si habla español, tiene a dale disposición servicios gratuitos de asistencia lingüística. Llame al [phone].    We comply with applicable federal civil rights laws and [place] laws. We do not discriminate on the basis of race, color, national origin, age, disability, sex, sexual orientation, or gender identity.            Thank you!     Thank you for choosing [facility]  for your care. Our goal is always to provide you with excellent care. Hearing back from our patients is one way we can continue to improve our services. Please take a few minutes to complete the written survey that you may receive in the mail after your visit with us. Thank you!             Your Updated Medication List - Protect others " around you: Learn how to safely use, store and throw away your medicines at www.disposemymeds.org.          This list is accurate as of 11/15/18  9:37 AM.  Always use your most recent med list.                   Brand Name Dispense Instructions for use Diagnosis    ibuprofen 200 MG capsule      Take 200 mg by mouth every 4 hours as needed for fever        lisinopril 10 MG tablet    PRINIVIL/ZESTRIL    90 tablet    Take 1 tablet (10 mg) by mouth daily    Essential hypertension with goal blood pressure less than 140/90       lovastatin 40 MG tablet    MEVACOR    90 tablet    TAKE ONE TABLET BY MOUTH AT BEDTIME    Hyperlipidemia LDL goal <130       warfarin 5 MG tablet    COUMADIN    70 tablet    Take 2.5 mg on Mon, Wed, Fri and 5 mg all other days, or as directed by the Coumadin clinic.    Paroxysmal atrial fibrillation (H), Other pulmonary embolism without acute cor pulmonale, unspecified chronicity (H)

## 2018-11-27 ENCOUNTER — ANTICOAGULATION THERAPY VISIT (OUTPATIENT)
Dept: ANTICOAGULATION | Facility: CLINIC | Age: 78
End: 2018-11-27
Payer: MEDICARE

## 2018-11-27 DIAGNOSIS — I26.99 OTHER PULMONARY EMBOLISM WITHOUT ACUTE COR PULMONALE, UNSPECIFIED CHRONICITY (H): ICD-10-CM

## 2018-11-27 DIAGNOSIS — Z79.01 LONG TERM CURRENT USE OF ANTICOAGULANT THERAPY: ICD-10-CM

## 2018-11-27 LAB — INR POINT OF CARE: 4.4 (ref 0.86–1.14)

## 2018-11-27 PROCEDURE — 99207 ZZC NO CHARGE NURSE ONLY: CPT

## 2018-11-27 PROCEDURE — 36416 COLLJ CAPILLARY BLOOD SPEC: CPT

## 2018-11-27 PROCEDURE — 85610 PROTHROMBIN TIME: CPT | Mod: QW

## 2018-11-27 NOTE — MR AVS SNAPSHOT
Tracy Millerdwin   11/27/2018 9:00 AM   Anticoagulation Therapy Visit    Description:  78 year old female   Provider:  [facility] ANTI COAG   Department:  [facility]           INR as of 11/27/2018     Today's INR 4.4!      Anticoagulation Summary as of 11/27/2018     INR goal 2.0-3.0   Today's INR 4.4!   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]  Long term current use of anticoagulant therapy [Z79.01]         Your next Anticoagulation Clinic appointment(s)     Nov 30, 2018  9:15 AM CST   Anticoagulation Visit with [facility] ANTI COAG   [facility] ([facility]    [address] [place] [address]   [phone]              Contact Numbers     Clinic Number:         November 2018 Details    Sun Mon Tue Wed Thu Fri Sat         1               2               3                 4               5               6               7               8               9               10                 11               12               13               14               15               16               17                 18               19               20               21               22               23               24                 25               26               27      Hold   See details      28      5 mg         29      2.5 mg         30              Date Details   11/27 This INR check       Date of next INR:  11/30/2018         How to take your warfarin dose     To take:  2.5 mg Take 0.5 of a 5 mg tablet.    To take:  5 mg Take 1 of the 5 mg tablets.    Hold Do not take your warfarin dose. See the Details table to the right for additional instructions.

## 2018-11-27 NOTE — PROGRESS NOTES
ANTICOAGULATION FOLLOW-UP CLINIC VISIT    Patient Name:  Tracy Palomo  Date:  11/27/2018  Contact Type:  Face to Face    SUBJECTIVE:     Patient Findings     Positives OTC meds (taking Ibuprofen- about 2 tablets 4 times a day. I encouraged her to stick with Tylenol- she said she will stop the Ibu)           OBJECTIVE    INR Protime   Date Value Ref Range Status   11/27/2018 4.4 (A) 0.86 - 1.14 Final       ASSESSMENT / PLAN  INR assessment SUPRA    Recheck INR In: 3 DAYS    INR Location Clinic      Anticoagulation Summary as of 11/27/2018     INR goal 2.0-3.0   Today's INR 4.4!   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]  Long term current use of anticoagulant therapy [Z79.01]         Anticoagulation Episode Summary     INR check location     Preferred lab     Send INR reminders to [facility] POOL    Comments 5 mg tablets, book, PM dose       Anticoagulation Care Providers     Provider Role Specialty Phone number    Gerard Medrano MD [facility] [phone]            See the Encounter Report to view Anticoagulation Flowsheet and Dosing Calendar (Go to Encounters tab in chart review, and find the Anticoagulation Therapy Visit)    Dosage adjustment made based on physician directed care plan.      Johanna Mckeon RN

## 2018-11-30 ENCOUNTER — ANTICOAGULATION THERAPY VISIT (OUTPATIENT)
Dept: ANTICOAGULATION | Facility: CLINIC | Age: 78
End: 2018-11-30
Payer: MEDICARE

## 2018-11-30 DIAGNOSIS — I26.99 OTHER PULMONARY EMBOLISM WITHOUT ACUTE COR PULMONALE, UNSPECIFIED CHRONICITY (H): ICD-10-CM

## 2018-11-30 DIAGNOSIS — Z79.01 LONG TERM CURRENT USE OF ANTICOAGULANT THERAPY: ICD-10-CM

## 2018-11-30 LAB — INR POINT OF CARE: 3.4 (ref 0.86–1.14)

## 2018-11-30 PROCEDURE — 36416 COLLJ CAPILLARY BLOOD SPEC: CPT

## 2018-11-30 PROCEDURE — 85610 PROTHROMBIN TIME: CPT | Mod: QW

## 2018-11-30 PROCEDURE — 99207 ZZC NO CHARGE NURSE ONLY: CPT

## 2018-11-30 NOTE — MR AVS SNAPSHOT
Tracy ANG Dewey   11/30/2018 9:15 AM   Anticoagulation Therapy Visit    Description:  78 year old female   Provider:  ERICK ANTI COAG   Department:  [facility]           INR as of 11/30/2018     Today's INR 3.4!      Anticoagulation Summary as of 11/30/2018     INR goal 2.0-3.0   Today's INR 3.4!   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]  Long term current use of anticoagulant therapy [Z79.01]         Your next Anticoagulation Clinic appointment(s)     Nov 30, 2018  9:15 AM CST   Anticoagulation Visit with PH ANTI COAG   [facility]    [address] [place] [id number]   [phone]            Dec 05, 2018  9:15 AM CST   Anticoagulation Visit with PH ANTI COAG   [facility]    [address] [place] [id number]   [phone]              Contact Numbers     Clinic Number:         November 2018 Details    Sun Mon Tue Wed Thu Fri Sat         1               2               3                 4               5               6               7               8               9               10                 11               12               13               14               15               16               17                 18               19               20               21               22               23               24                 25               26               27               28               29               30      2.5 mg   See details        Date Details   11/30 This INR check               How to take your warfarin dose     To take:  2.5 mg Take 0.5 of a 5 mg tablet.           December 2018 Details    Sun Mon Tue Wed Thu Fri Sat           1      5 mg           2       2.5 mg         3      2.5 mg         4      5 mg         5            6               7               8                 9               10               11               12               13               14               15                 16               17               18               19               20               21               22                 23               24               25               26               27               28               29                 30               31                     Date Details   No additional details    Date of next INR:  12/5/2018         How to take your warfarin dose     To take:  2.5 mg Take 0.5 of a 5 mg tablet.    To take:  5 mg Take 1 of the 5 mg tablets.

## 2018-11-30 NOTE — PROGRESS NOTES
ANTICOAGULATION FOLLOW-UP CLINIC VISIT    Patient Name:  Tracy Palomo  Date:  11/30/2018  Contact Type:  Face to Face    SUBJECTIVE:     Patient Findings     Positives OTC meds (stopped taking Ibuprofen and has only needed a little Tylenol over the last couple days since knee pain is improving ), Intentional hold of therapy (held on Tues due to elevated INR)           OBJECTIVE    INR Protime   Date Value Ref Range Status   11/30/2018 3.4 (A) 0.86 - 1.14 Final       ASSESSMENT / PLAN  INR assessment SUPRA    Recheck INR In: 5 DAYS    INR Location Clinic      Anticoagulation Summary as of 11/30/2018     INR goal 2.0-3.0   Today's INR 3.4!   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]  Long term current use of anticoagulant therapy [Z79.01]         Anticoagulation Episode Summary     INR check location     Preferred lab     Send INR reminders to [facility] POOL    Comments 5 mg tablets, book, PM dose       Anticoagulation Care Providers     Provider Role Specialty Phone number    Gerard Medrano MD [facility] [phone]            See the Encounter Report to view Anticoagulation Flowsheet and Dosing Calendar (Go to Encounters tab in chart review, and find the Anticoagulation Therapy Visit)    Dosage adjustment made based on physician directed care plan.      Johanna Mckeon, GLORIA

## 2018-12-04 ENCOUNTER — HOSPITAL ENCOUNTER (OUTPATIENT)
Dept: PHYSICAL THERAPY | Facility: CLINIC | Age: 78
Setting detail: THERAPIES SERIES
End: 2018-12-04
Attending: NURSE PRACTITIONER
Payer: MEDICARE

## 2018-12-04 ENCOUNTER — TELEPHONE (OUTPATIENT)
Dept: ANTICOAGULATION | Facility: CLINIC | Age: 78
End: 2018-12-04

## 2018-12-04 DIAGNOSIS — M17.11 PRIMARY OSTEOARTHRITIS OF RIGHT KNEE: ICD-10-CM

## 2018-12-04 DIAGNOSIS — I26.99 OTHER PULMONARY EMBOLISM WITHOUT ACUTE COR PULMONALE, UNSPECIFIED CHRONICITY (H): Primary | ICD-10-CM

## 2018-12-04 DIAGNOSIS — M17.12 PRIMARY OSTEOARTHRITIS OF LEFT KNEE: ICD-10-CM

## 2018-12-04 PROCEDURE — 97161 PT EVAL LOW COMPLEX 20 MIN: CPT | Mod: GP | Performed by: PHYSICAL THERAPIST

## 2018-12-04 PROCEDURE — 97110 THERAPEUTIC EXERCISES: CPT | Mod: GP | Performed by: PHYSICAL THERAPIST

## 2018-12-04 PROCEDURE — 40000718 ZZHC STATISTIC PT DEPARTMENT ORTHO VISIT: Performed by: PHYSICAL THERAPIST

## 2018-12-04 PROCEDURE — G8979 MOBILITY GOAL STATUS: HCPCS | Mod: GP,CK | Performed by: PHYSICAL THERAPIST

## 2018-12-04 PROCEDURE — G8978 MOBILITY CURRENT STATUS: HCPCS | Mod: GP,CK | Performed by: PHYSICAL THERAPIST

## 2018-12-04 NOTE — TELEPHONE ENCOUNTER
Pt will be having a total knee replacement on 1/8/19. Please review pt's history and advise if he/she will need to be bridged with Lovenox. If so, 1 mg/kg BID or 1.5 mg/kg daily? If not, OK to stop coumadin 5 days prior and resume usual dose 12-24 hours after procedure? Please advise.    Pt sees ACC tomorrow and will be informed of decision then    Johanna Mckeon RN

## 2018-12-04 NOTE — TELEPHONE ENCOUNTER
Pt weighs 176 pounds (80 kg). Please review the Lovenox instructions below. If appropriate, please send RX to the pharmacy.     Thursday 1/3/19 5 days before surgery: Stop warfarin.  No Lovenox.   Friday 1/4/19  4 days before surgery: No warfarin.  No Lovenox.  Saturday 1/5/19 3 days before surgery: No warfarin, begin Lovenox shots 80 mg  SQ every 12 hours.  Sunday 1/6/19  2 days before surgery: No warfarin, Lovenox shots 80 mg SQ every 12 hours.  Monday 1/7/19  1 day before surgery: No warfarin, Lovenox shot in AM only. No PM dose.  Check INR.    Tuesday 1/8/19  Day of surgery: No Lovenox, warfarin 7.5 mg (bump dose) in the PM after surgery/procedure (first dose of Coumadin should be taken 12-24 hours following procedure to reduce the risk of bleeding)    Wednesday 1/9/19 POD 1: warfarin 5 mg, Lovenox 80 mg SQ every 12 hours.  Thursday 1/10/19 POD 2: warfarin 2.5 mg, Lovenox 80 mg SQ every 12 hours.  Friday 1/11/19  POD 3: warfarin 2.5 mg, Lovenox 80 mg SQ every 12 hours.  Saturday 1/12/19 POD 4: warfarin 5 mg, Lovenox 80 mg SQ every 12 hours.  Sunday 1/13/19 POD 5: warfarin 5 mg, Lovenox 80 mg SQ every 12 hours.  Monday 1/14/19 POD 6: Lovenox 80 mg in the AM - further instructions with INR check (needs to be done at the lab)    *or as directed by the surgeon     Johanna Mckeon RN

## 2018-12-05 ENCOUNTER — ANTICOAGULATION THERAPY VISIT (OUTPATIENT)
Dept: ANTICOAGULATION | Facility: CLINIC | Age: 78
End: 2018-12-05
Payer: MEDICARE

## 2018-12-05 DIAGNOSIS — Z79.01 LONG TERM CURRENT USE OF ANTICOAGULANT THERAPY: ICD-10-CM

## 2018-12-05 DIAGNOSIS — I26.99 OTHER PULMONARY EMBOLISM WITHOUT ACUTE COR PULMONALE, UNSPECIFIED CHRONICITY (H): ICD-10-CM

## 2018-12-05 LAB — INR POINT OF CARE: 2.3 (ref 0.86–1.14)

## 2018-12-05 PROCEDURE — 85610 PROTHROMBIN TIME: CPT | Mod: QW

## 2018-12-05 PROCEDURE — 99207 ZZC NO CHARGE NURSE ONLY: CPT

## 2018-12-05 PROCEDURE — 36416 COLLJ CAPILLARY BLOOD SPEC: CPT

## 2018-12-05 NOTE — PROGRESS NOTES
ANTICOAGULATION FOLLOW-UP CLINIC VISIT    Patient Name:  Tracy Palomo  Date:  12/5/2018  Contact Type:  Face to Face    SUBJECTIVE:     Patient Findings     Positives OTC meds (pt stopped taking glucosimde about 2 weeks ago and has only needed Tylenol a couple times in the last week (glucosimide can increase INR()    Comments Pt has TKR scheduled for 1/8. She is aware that she will need to be bridged with Lovenox. She will be hospitalized for a couple days afterward and then rehabbing at the [facility], so she will only need a few syringes of Lovenox. I have called [facility] and asked that they only dispense 5 syringes for now, as the NH will be supplying the Lovenox when discharged.            OBJECTIVE    INR Protime   Date Value Ref Range Status   12/05/2018 2.3 (A) 0.86 - 1.14 Final       ASSESSMENT / PLAN  INR assessment THER    Recheck INR In: 10 DAYS    INR Location Clinic      Anticoagulation Summary as of 12/5/2018     INR goal 2.0-3.0   Today's INR 2.3   Warfarin maintenance plan 5 mg (5 mg x 1) on Mon, Wed, Fri; 2.5 mg (5 mg x 0.5) all other days   Full warfarin instructions 5 mg on Mon, Wed, Fri; 2.5 mg all other days   Weekly warfarin total 25 mg   Plan last modified Johanna Mckeon RN (12/5/2018)   Next INR check 12/14/2018   Target end date     Indications   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]  Long term current use of anticoagulant therapy [Z79.01]         Anticoagulation Episode Summary     INR check location     Preferred lab     Send INR reminders to [facility] POOL    Comments 5 mg tablets, book, PM dose       Anticoagulation Care Providers     Provider Role Specialty Phone number    Gerard Medrano MD [facility] [phone]            See the Encounter Report to view Anticoagulation Flowsheet and Dosing Calendar (Go to Encounters tab in chart review, and find the Anticoagulation Therapy Visit)    Dosage adjustment made based on physician  directed care plan.      Johanna Mckeon RN

## 2018-12-05 NOTE — MR AVS SNAPSHOT
Tracy ANG Dewey   12/5/2018 9:15 AM   Anticoagulation Therapy Visit    Description:  78 year old female   Provider:  ERICK ANTI COAPATRICK   Department:  [facility]           INR as of 12/5/2018     Today's INR 2.3      Anticoagulation Summary as of 12/5/2018     INR goal 2.0-3.0   Today's INR 2.3   Full warfarin instructions 5 mg on Mon, Wed, Fri; 2.5 mg all other days   Next INR check 12/14/2018    Indications   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]  Long term current use of anticoagulant therapy [Z79.01]         Your next Anticoagulation Clinic appointment(s)     Dec 14, 2018  8:45 AM CST   Anticoagulation Visit with PH ANTI COAG   [facility]    [address] [place] [id number] [phone]              Contact Numbers     Clinic Number:         December 2018 Details    Sun Mon Tue Wed Thu Fri Sat           1                 2               3               4               5      5 mg   See details      6      2.5 mg         7      5 mg         8      2.5 mg           9      2.5 mg         10      5 mg         11      2.5 mg         12      5 mg         13      2.5 mg         14            15                 16               17               18               19               20               21               22                 23               24               25               26               27               28               29                 30               31                     Date Details   12/05 This INR check       Date of next INR:  12/14/2018         How to take your warfarin dose     To take:  2.5 mg Take 0.5 of a 5 mg tablet.    To take:  5 mg Take 1 of the 5 mg tablets.

## 2018-12-12 ENCOUNTER — TELEPHONE (OUTPATIENT)
Dept: INTERNAL MEDICINE | Facility: CLINIC | Age: 78
End: 2018-12-12

## 2018-12-12 NOTE — TELEPHONE ENCOUNTER
Prior Authorization Retail Medication Request    Medication/Dose:enoxaparin (LOVENOX) 80 MG/0.8ML syringe   ICD code (if different than what is on RX):    Previously Tried and Failed:    Rationale:  Needs to bridge with lovenox for surgery    Insurance Name:  [employer] WE[employer] JUDIE NGUYEN  Member ID [id number]  Group ID:        Pharmacy Information (if different than what is on RX)  Name:  [facility]  Phone:  [phone]

## 2018-12-13 NOTE — PROGRESS NOTES
12/04/18 0730   General Information   Type of Visit Initial OP Ortho PT Evaluation   Start of Care Date 12/04/18   Referring Physician DEE DEE Hernandez and Dr Pang   Patient/Family Goals Statement Get ex pre surgery for knee and strengthen it before surgery.   Orders Evaluate and Treat   Date of Order 11/15/18   Insurance Type Medicare;Other  ([facility])   Insurance Comments/Visits Authorized G-Codes   Medical Diagnosis primary OA of right and left knees   Surgical/Medical history reviewed Yes   Precautions/Limitations no known precautions/limitations   General Information Comments PMH: colon operation 5 years ago for a blockage   Body Part(s)   Body Part(s) Knee   Presentation and Etiology   Pertinent history of current problem (include personal factors and/or comorbidities that impact the POC) Primary OA in both right and left knees.  Has h/o [employer] and was active as kid, even then the doctor said I would get arthritis.  Knee scoped and menicus shaved in 2007 while living in [place].  Hip fracture from a fall (slipped on black ice) 2017 surgically repaired.  She notes her tennis shoes really wore down in a flat pattern (per her) but much more worn than the left--she believes on the heel taht she wore down.  She has new tennis shoes now.  Both knees are irritated.  See LEFS for details on specific difficulties.  She got blood clots in lungs after hip surgery.  She is on warfarin.   Impairments A. Pain;D. Decreased ROM;E. Decreased flexibility;F. Decreased strength and endurance;H. Impaired gait;G. Impaired balance   Functional Limitations perform activities of daily living;perform desired leisure / sports activities   Symptom Location both knees, right more than left   How/Where did it occur From Degenerative Joint Disease   Onset date of current episode/exacerbation 11/15/18   Pain rating (0-10 point scale) Best (/10);Worst (/10);Other   Best (/10) R 0; L 0   Worst (/10) R 10 (stood all day at  Thanksgiving and had 9-10 pain that night and 10 the next day and still hurting pretty bad on Sun; L not sure because R was so bad, but in the night both knees were reallly bad   Pain rating comment current: R 0/10; L 0/10   Pain quality A. Sharp;B. Dull   Frequency of pain/symptoms C. With activity   Pain/symptoms are: Worse during the night   Pain/symptoms exacerbated by B. Walking;A. Sitting  (walking on cement; sleeping)   Pain/symptoms eased by C. Rest;H. Cold   Progression of symptoms since onset: Worsened   Prior Level of Function   Prior Level of Function-Mobility active and no use AD   Prior Level of Function-ADLs active and independent with no use of AD   Current Level of Function   Patient role/employment history F. 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 No   Have you fallen and had an injury in the past year? No   Is patient a fall risk? No   Functional Scales   Functional Scales Other   Other Scales  LEFS 35/80   Knee Objective Findings   Side (if bilateral, select both right and left) Right;Left   Right Knee Extension AROM minus 20 degrees in sitting but in standing equals PROM minus 10 degrees   Right Knee Extension PROM minus 18 degrees, firm endfeel   Right Knee Flexion Strength 4+/5   Left Knee Extension AROM minus 10 degrees in sitting but in standing equals PROM which is minus 5 degrees   Left Knee Extension PROM minus 5 degrees   Left Knee Flexion Strength 4/5   Planned Therapy Interventions   Planned Therapy Interventions neuromuscular re-education;strengthening;stretching;balance training   Clinical Impression   Criteria for Skilled Therapeutic Interventions Met yes, treatment indicated   PT Diagnosis bilat knee pain   Influenced by the following impairments  pain   Functional limitations due to impairments functional mobility   Clinical Presentation Stable/Uncomplicated   Clinical Decision Making (Complexity) Low complexity   Therapy Frequency 1 time/week   Predicted Duration of Therapy Intervention (days/wks) 6 weeks   Risk & Benefits of therapy have been explained Yes   Patient, Family & other staff in agreement with plan of care Yes   Education Assessment   Preferred Learning Style Listening;Demonstration;Pictures/video   Barriers to Learning No barriers   ORTHO GOALS   PT Ortho Eval Goals 1;2   Ortho Goal 1   Goal Identifier HEP   Goal Description Independent with HEP.   Target Date 02/01/19   Ortho Goal 2   Goal Identifier ready for surgery   Goal Description Pt will be able to complete all ex for both knees and show 5/5 MMT to be ready for surgery.   Target Date 02/01/19   Total Evaluation Time   PT Eval, Low Complexity Minutes (32773) 40   Therapy Certification   Certification date from 12/04/18   Certification date to 02/15/19   Medical Diagnosis Primary OA of right and left knees

## 2018-12-13 NOTE — PROGRESS NOTES
[facility]          OUTPATIENT PHYSICAL THERAPY ORTHOPEDIC EVALUATION  PLAN OF TREATMENT FOR OUTPATIENT REHABILITATION  (COMPLETE FOR INITIAL CLAIMS ONLY)  Patient's Last Name, First Name, M.I.  YOB: 1940  Tracy Palomo    Provider s Name:  [facility]   Medical Record No.  [id number]   Start of Care Date:  12/04/18   Onset Date:  11/15/18   Type:     _X__PT   ___OT   ___SLP Medical Diagnosis:  Primary OA of right and left knees     PT Diagnosis:  bilat knee pain   Visits from SOC:  1      _________________________________________________________________________________  Plan of Treatment/Functional Goals:  neuromuscular re-education, strengthening, stretching, balance training           Goals  Goal Identifier: HEP  Goal Description: Independent with HEP.  Target Date: 02/01/19    Goal Identifier: ready for surgery  Goal Description: Pt will be able to complete all ex for both knees and show 5/5 MMT to be ready for surgery.  Target Date: 02/01/19             Therapy Frequency:  1 time/week  Predicted Duration of Therapy Intervention:  6 weeks    Lea Martins, PT                 I CERTIFY THE NEED FOR THESE SERVICES FURNISHED UNDER        THIS PLAN OF TREATMENT AND WHILE UNDER MY CARE     (Physician co-signature of this document indicates review and certification of the therapy plan).                       Certification Date From:  12/04/18   Certification Date To:  02/15/19    Referring Provider:  DEE DEE Hernandez and Dr Pang    Initial Assessment        See Epic Evaluation Start of Care Date: 12/04/18

## 2018-12-13 NOTE — TELEPHONE ENCOUNTER
PA Initiation    Medication: enoxaparin (LOVENOX) 80 MG/0.8ML syringe - pending  Insurance Company: [employer] - Phone [phone] Fax [phone]  Pharmacy Filling the Rx: [facility] [address] [place] [address] - [address]  Filling Pharmacy Phone:  [phone]  Filling Pharmacy Fax:    Start Date: 12/13/2018    *waiting for question set.

## 2018-12-13 NOTE — ADDENDUM NOTE
Encounter addended by: Lea Martins, PT on: 12/13/2018 1:50 PM   Actions taken: Sign clinical note, Document edited, Flowsheet accepted

## 2018-12-14 ENCOUNTER — HOSPITAL ENCOUNTER (OUTPATIENT)
Dept: PHYSICAL THERAPY | Facility: CLINIC | Age: 78
Setting detail: THERAPIES SERIES
End: 2018-12-14
Attending: ORTHOPAEDIC SURGERY
Payer: MEDICARE

## 2018-12-14 ENCOUNTER — ANTICOAGULATION THERAPY VISIT (OUTPATIENT)
Dept: ANTICOAGULATION | Facility: CLINIC | Age: 78
End: 2018-12-14
Payer: MEDICARE

## 2018-12-14 DIAGNOSIS — I26.99 OTHER PULMONARY EMBOLISM WITHOUT ACUTE COR PULMONALE, UNSPECIFIED CHRONICITY (H): ICD-10-CM

## 2018-12-14 DIAGNOSIS — Z79.01 LONG TERM CURRENT USE OF ANTICOAGULANT THERAPY: ICD-10-CM

## 2018-12-14 LAB — INR POINT OF CARE: 2.3 (ref 0.86–1.14)

## 2018-12-14 PROCEDURE — 97110 THERAPEUTIC EXERCISES: CPT | Mod: GP | Performed by: PHYSICAL THERAPIST

## 2018-12-14 PROCEDURE — 36416 COLLJ CAPILLARY BLOOD SPEC: CPT

## 2018-12-14 PROCEDURE — 85610 PROTHROMBIN TIME: CPT | Mod: QW

## 2018-12-14 PROCEDURE — 99207 ZZC NO CHARGE NURSE ONLY: CPT

## 2018-12-14 NOTE — TELEPHONE ENCOUNTER
I have submitted info via CoverMyMeds     Note: Pt will be admitted to the hospital for a couple days following the procedure, then will be rehabbing at the [facility] so she will only need about 5 syringes for bridging beforehand    Johanna Mckeon RN

## 2018-12-14 NOTE — PROGRESS NOTES
ANTICOAGULATION FOLLOW-UP CLINIC VISIT    Patient Name:  Tracy Palomo  Date:  2018  Contact Type:  Face to Face    SUBJECTIVE:     Patient Findings     Positives:   No Problem Findings    Comments:   Has procedure in [place]- Lovenox needs PA which is in process  /Johanna Mckeon RN           OBJECTIVE    INR Protime   Date Value Ref Range Status   2018 2.3 (A) 0.86 - 1.14 Final       ASSESSMENT / PLAN  INR assessment THER    Recheck INR In: 2 WEEKS    INR Location Clinic      Anticoagulation Summary  As of 2018    INR goal:   2.0-3.0   TTR:   59.0 % (2.7 y)   INR used for dosin.3 (2018)   Warfarin maintenance plan:   5 mg (5 mg x 1) every Mon, Wed, Fri; 2.5 mg (5 mg x 0.5) all other days   Full warfarin instructions:   5 mg every Mon, Wed, Fri; 2.5 mg all other days   Weekly warfarin total:   25 mg   No change documented:   Johanna Mckeon RN   Plan last modified:   Johanna Mckeon RN (2018)   Next INR check:   2018   Target end date:       Indications    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]  Long term current use of anticoagulant therapy [Z79.01]             Anticoagulation Episode Summary     INR check location:       Preferred lab:       Send INR reminders to:   MICHAEL MAYA    Comments:   5 mg tablets, book, PM dose       Anticoagulation Care Providers     Provider Role Specialty Phone number    Gerard Medrano MD [facility] [phone]            See the Encounter Report to view Anticoagulation Flowsheet and Dosing Calendar (Go to Encounters tab in chart review, and find the Anticoagulation Therapy Visit)    Dosage adjustment made based on physician directed care plan.      Johanna Mckeon RN

## 2018-12-21 ENCOUNTER — OFFICE VISIT (OUTPATIENT)
Dept: INTERNAL MEDICINE | Facility: CLINIC | Age: 78
End: 2018-12-21
Payer: MEDICARE

## 2018-12-21 VITALS
RESPIRATION RATE: 16 BRPM | SYSTOLIC BLOOD PRESSURE: 112 MMHG | WEIGHT: 176.9 LBS | OXYGEN SATURATION: 98 % | TEMPERATURE: 96.2 F | DIASTOLIC BLOOD PRESSURE: 66 MMHG | BODY MASS INDEX: 30.2 KG/M2 | HEART RATE: 73 BPM | HEIGHT: 64 IN

## 2018-12-21 DIAGNOSIS — Z01.818 PREOP GENERAL PHYSICAL EXAM: Primary | ICD-10-CM

## 2018-12-21 DIAGNOSIS — M17.11 ARTHRITIS OF RIGHT KNEE: ICD-10-CM

## 2018-12-21 DIAGNOSIS — Z01.818 PRE-OP EXAM: ICD-10-CM

## 2018-12-21 LAB
ANION GAP SERPL CALCULATED.3IONS-SCNC: 8 MMOL/L (ref 3–14)
BUN SERPL-MCNC: 18 MG/DL (ref 7–30)
CALCIUM SERPL-MCNC: 9 MG/DL (ref 8.5–10.1)
CHLORIDE SERPL-SCNC: 105 MMOL/L (ref 94–109)
CO2 SERPL-SCNC: 28 MMOL/L (ref 20–32)
CREAT SERPL-MCNC: 0.99 MG/DL (ref 0.52–1.04)
ERYTHROCYTE [DISTWIDTH] IN BLOOD BY AUTOMATED COUNT: 13.4 % (ref 10–15)
GFR SERPL CREATININE-BSD FRML MDRD: 55 ML/MIN/{1.73_M2}
GLUCOSE SERPL-MCNC: 93 MG/DL (ref 70–99)
HCT VFR BLD AUTO: 41.7 % (ref 35–47)
HGB BLD-MCNC: 13.3 G/DL (ref 11.7–15.7)
MCH RBC QN AUTO: 29 PG (ref 26.5–33)
MCHC RBC AUTO-ENTMCNC: 31.9 G/DL (ref 31.5–36.5)
MCV RBC AUTO: 91 FL (ref 78–100)
PLATELET # BLD AUTO: 213 10E9/L (ref 150–450)
POTASSIUM SERPL-SCNC: 4.4 MMOL/L (ref 3.4–5.3)
RBC # BLD AUTO: 4.58 10E12/L (ref 3.8–5.2)
SODIUM SERPL-SCNC: 141 MMOL/L (ref 133–144)
WBC # BLD AUTO: 5.3 10E9/L (ref 4–11)

## 2018-12-21 PROCEDURE — 93000 ELECTROCARDIOGRAM COMPLETE: CPT | Performed by: INTERNAL MEDICINE

## 2018-12-21 PROCEDURE — 80048 BASIC METABOLIC PNL TOTAL CA: CPT | Performed by: INTERNAL MEDICINE

## 2018-12-21 PROCEDURE — 36415 COLL VENOUS BLD VENIPUNCTURE: CPT | Performed by: INTERNAL MEDICINE

## 2018-12-21 PROCEDURE — 40000868 ZZHCL STATISTIC MRSA/MSSA PRESURGICAL SCREEN ID: Performed by: ORTHOPAEDIC SURGERY

## 2018-12-21 PROCEDURE — 85027 COMPLETE CBC AUTOMATED: CPT | Performed by: INTERNAL MEDICINE

## 2018-12-21 PROCEDURE — 99215 OFFICE O/P EST HI 40 MIN: CPT | Performed by: INTERNAL MEDICINE

## 2018-12-21 PROCEDURE — 40000869 ZZHCL STATISTIC MRSA/MSSA PRESURGICAL SCREEN CULTURE: Performed by: ORTHOPAEDIC SURGERY

## 2018-12-21 ASSESSMENT — ANXIETY QUESTIONNAIRES
7. FEELING AFRAID AS IF SOMETHING AWFUL MIGHT HAPPEN: NOT AT ALL
2. NOT BEING ABLE TO STOP OR CONTROL WORRYING: NOT AT ALL
1. FEELING NERVOUS, ANXIOUS, OR ON EDGE: NOT AT ALL
GAD7 TOTAL SCORE: 0
IF YOU CHECKED OFF ANY PROBLEMS ON THIS QUESTIONNAIRE, HOW DIFFICULT HAVE THESE PROBLEMS MADE IT FOR YOU TO DO YOUR WORK, TAKE CARE OF THINGS AT HOME, OR GET ALONG WITH OTHER PEOPLE: NOT DIFFICULT AT ALL
5. BEING SO RESTLESS THAT IT IS HARD TO SIT STILL: NOT AT ALL
6. BECOMING EASILY ANNOYED OR IRRITABLE: NOT AT ALL
3. WORRYING TOO MUCH ABOUT DIFFERENT THINGS: NOT AT ALL

## 2018-12-21 ASSESSMENT — MIFFLIN-ST. JEOR: SCORE: 1276.38

## 2018-12-21 ASSESSMENT — PATIENT HEALTH QUESTIONNAIRE - PHQ9: 5. POOR APPETITE OR OVEREATING: NOT AT ALL

## 2018-12-21 ASSESSMENT — PAIN SCALES - GENERAL: PAINLEVEL: NO PAIN (0)

## 2018-12-21 NOTE — PROGRESS NOTES
[address] [address]  [phone]  Dept: [phone]    PRE-OP EVALUATION:  Today's date: 2018    Tracy Palomo (: 1940) presents for pre-operative evaluation assessment as requested by Dr. Pang.  She requires evaluation and anesthesia risk assessment prior to undergoing surgery/procedure for treatment of Right total knee replacement .    Proposed Surgery/ Procedure: right total knee replacement  Date of Surgery/ Procedure: 19  Time of Surgery/ Procedure: 10:00 am   Hospital/Surgical Facility: [facility]  Fax number for surgical facility:   Primary Physician: Chad Betts  Type of Anesthesia Anticipated: to be determined    Patient has a Health Care Directive or Living Will:  YES    1. NO - Do you have a history of heart attack, stroke, stent, bypass or surgery on an artery in the head, neck, heart or legs?  2. NO - Do you ever have any pain or discomfort in your chest?  3. NO - Do you have a history of  Heart Failure?  4. NO - Are you troubled by shortness of breath when: walking on the level, up a slight hill or at night?  5. NO - Do you currently have a cold, bronchitis or other respiratory infection?  6. NO - Do you have a cough, shortness of breath or wheezing?  7. NO - Do you sometimes get pains in the calves of your legs when you walk?  8. YES - DO YOU OR ANYONE IN YOUR FAMILY HAVE PREVIOUS HISTORY OF BLOOD CLOTS? PE and DVT and atrial fibrillation  9. NO - Do you or does anyone in your family have a serious bleeding problem such as prolonged bleeding following surgeries or cuts?  10. NO - Have you ever had problems with anemia or been told to take iron pills?  11. NO - Have you had any abnormal blood loss such as black, tarry or bloody stools, or abnormal vaginal bleeding?  12. NO - Have you ever had a blood transfusion?  13. NO - Have you or any of your relatives ever had problems with anesthesia?  14. NO - Do you have sleep  apnea, excessive snoring or daytime drowsiness?  15. NO - Do you have any prosthetic heart valves?  16. YES - DO YOU HAVE PROSTHETIC JOINTS?   17. NO - Is there any chance that you may be pregnant?      HPI:     HPI related to upcoming procedure: right knee arthritis and needs replacement.       A-FIB - Patient has a longstanding history of chronic A-fib currently on rate control. Current treatment regimen includes Warfarin for stroke prevention and denies significant symptoms of lightheadedness, palpitations or dyspnea.                                                                                                                                                                             .  HYPERLIPIDEMIA - Patient has a long history of significant Hyperlipidemia requiring medication for treatment with recent good control. Patient reports no problems or side effects with the medication.                                                                                                                                                       .  HYPERTENSION - Patient has longstanding history of HTN , currently denies any symptoms referable to elevated blood pressure. Specifically denies chest pain, palpitations, dyspnea, orthopnea, PND or peripheral edema. Blood pressure readings have been in normal range. Current medication regimen is as listed below. Patient denies any side effects of medication.                                                                                                                                                                                          .    MEDICAL HISTORY:     Patient Active Problem List    Diagnosis Date Noted     Primary osteoarthritis of right knee 11/15/2018     Priority: Medium     Primary osteoarthritis of left knee 11/15/2018     Priority: Medium     Fractured hip, left, closed, initial encounter (H) 03/16/2017     Priority: Medium     Hip fracture, left, closed,  initial encounter (H) 03/15/2017     Priority: Medium     Closed left hip fracture (H) 03/15/2017     Priority: Medium     Long term current use of anticoagulant therapy 03/22/2016     Priority: Medium     Anemia 11/03/2014     Priority: Medium     Paroxysmal atrial fibrillation (H) 11/03/2014     Priority: Medium     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 11/01/2014     Priority: Medium     In 2014       Recent major surgery - exploratory lap with small bowel resection 10/20 11/01/2014     Priority: Medium     Pleural effusion on right 11/01/2014     Priority: Medium     Hypertension goal BP (blood pressure) < 140/90 02/22/2013     Priority: Medium     Hyperlipidemia LDL goal <130 02/22/2013     Priority: Medium     Encounter for long-term current use of medication 02/22/2013     Priority: Medium     Problem list name updated by automated process. Provider to review       History of colonic polyps 02/22/2013     Priority: Medium     Problem list name updated by automated process. Provider to review       Advanced directives, counseling/discussion 02/22/2013     Priority: Medium     Pt states has Advance Directive at home, will bring in to be scanned into chart.        Past Medical History:   Diagnosis Date     Atrial fibrillation (H) 2014    related to colon surgery     Colon polyps      Diverticulosis of colon (without mention of hemorrhage)     Diverticulosis     DVT (deep vein thrombosis) in pregnancy (H) 2014    after colon surgery     Other and unspecified hyperlipidemia      PE (pulmonary embolism) 2014    related to colon surgery     Unspecified essential hypertension      Past Surgical History:   Procedure Laterality Date     COLONOSCOPY  09, 10, 12    [facility]     COLONOSCOPY N/A 12/11/2017    Procedure: COLONOSCOPY;  colonoscopy;  Surgeon: Elvis Quezada MD;  [facility]: [facility]     FLEXIBLE SIGMOIDOSCOPY  09, 11     LAPAROTOMY EXPLORATORY N/A 10/20/2014     "Procedure: LAPAROTOMY EXPLORATORY;  Surgeon: Miguel Oleary MD;  [facility]     LAPAROTOMY, LYSIS ADHESIONS, COMBINED N/A 10/20/2014    Procedure: COMBINED LAPAROTOMY, LYSIS ADHESIONS;  Surgeon: Miguel Oleary MD;  [facility]     OPEN REDUCTION INTERNAL FIXATION HIP BIPOLAR Left 3/16/2017    Procedure: OPEN REDUCTION INTERNAL FIXATION HIP BIPOLAR;  Surgeon: Kaleb Pang DO;  [facility]     RESECT SMALL BOWEL WITHOUT OSTOMY N/A 10/20/2014    Procedure: RESECT SMALL BOWEL WITHOUT OSTOMY;  Surgeon: Miguel Oleary MD;  [facility]     Current Outpatient Medications   Medication Sig Dispense Refill     enoxaparin (LOVENOX) 80 MG/0.8ML syringe Inject 0.8 mLs (80 mg) Subcutaneous every 12 hours 5 Syringe 0     ibuprofen 200 MG capsule Take 200 mg by mouth every 4 hours as needed for fever       lisinopril (PRINIVIL/ZESTRIL) 10 MG tablet Take 1 tablet (10 mg) by mouth daily 90 tablet 3     lovastatin (MEVACOR) 40 MG tablet TAKE ONE TABLET BY MOUTH AT BEDTIME 90 tablet 3     warfarin (COUMADIN) 5 MG tablet Take 2.5 mg on Mon, Wed, Fri and 5 mg all other days, or as directed by the Coumadin clinic. 70 tablet 1     OTC products: None, except as noted above    Allergies   Allergen Reactions     No Known Allergies       Latex Allergy: NO    Social History     Tobacco Use     Smoking status: Never Smoker     Smokeless tobacco: Never Used   Substance Use Topics     Alcohol use: No     Alcohol/week: 0.0 oz     History   Drug Use No       REVIEW OF SYSTEMS:   Constitutional, neuro, ENT, endocrine, pulmonary, cardiac, gastrointestinal, genitourinary, musculoskeletal, integument and psychiatric systems are negative, except as otherwise noted.    EXAM:   /66 (BP Location: Left arm, Patient Position: Chair, Cuff Size: Adult Regular)   Pulse 73   Temp 96.2  F (35.7  C) (Temporal)   Resp 16   Ht 1.632 m (5' 4.25\")   Wt 80.2 kg (176 lb 14.4 oz)   SpO2 98%   BMI 30.13 kg/m      " GENERAL APPEARANCE: healthy, alert and no distress     HENT: ear canals and TM's normal and nose and mouth without ulcers or lesions     NECK: no adenopathy, no asymmetry, masses, or scars and thyroid normal to palpation     RESP: lungs clear to auscultation - no rales, rhonchi or wheezes     CV: regular rates and rhythm, normal S1 S2, no S3 or S4 and no murmur, click or rub     ABDOMEN:  soft, nontender, no HSM or masses and bowel sounds normal     MS: 1+ pitting edema      SKIN: no suspicious lesions or rashes     NEURO: Normal strength and tone, sensory exam grossly normal, mentation intact and speech normal     PSYCH: mentation appears normal. and affect normal/bright     LYMPHATICS: No cervical adenopathy    DIAGNOSTICS:   EKG: sinus bradycardia, low voltage, normal axis, normal intervals, no acute ST/T changes c/w ischemia, unchanged from previous tracings    Labs pending today.     Recent Labs   Lab Test 12/14/18 12/05/18  10/10/18  0910  11/22/17  0956  10/20/14  0625   HGB  --   --   --  13.5  --  13.8   < > 16.0*   PLT  --   --   --  212  --  225   < > 241   INR 2.3* 2.3*   < >  --    < >  --    < >  --    NA  --   --   --  144  --  144   < > 138   POTASSIUM  --   --   --  4.5  --  4.7   < > 3.7   CR  --   --   --  1.05*  --  0.84   < > 0.80   A1C  --   --   --   --   --   --   --  5.8    < > = values in this interval not displayed.     IMPRESSION:   Reason for surgery/procedure: right knee arthritis    The proposed surgical procedure is considered INTERMEDIATE risk.    REVISED CARDIAC RISK INDEX  The patient has the following serious cardiovascular risks for perioperative complications such as (MI, PE, VFib and 3  AV Block):  No serious cardiac risks  INTERPRETATION: 1 risks: Class II (low risk - 0.9% complication rate)    The patient has the following additional risks for perioperative complications:  Atrial fibrillation and past PE, needs lovenox.      ICD-10-CM    1. Preop general physical exam Z01.818         RECOMMENDATIONS:     --Because of DVT or PE history, patient should be on low molecular weight heparin and wear compression hose before & after surgery      Anticoagulant or Antiplatelet Medication Use  WARFARIN: Bridging therapy will be coordinated by coumadin clinic        ACE Inhibitor or Angiotensin Receptor Blocker (ARB) Use  Ace inhibitor or Angiotensin Receptor Blocker (ARB) and should HOLD this medication for the 24 hours prior to surgery.      APPROVAL GIVEN to proceed with proposed procedure, without further diagnostic evaluation       Signed Electronically by: Chad Betts MD    Copy of this evaluation report is provided to requesting physician.    [facility] Preop Guidelines    Revised Cardiac Risk Index

## 2018-12-21 NOTE — H&P (VIEW-ONLY)
[address] [address]  [phone]  Dept: [phone]    PRE-OP EVALUATION:  Today's date: 2018    Tracy Palomo (: 1940) presents for pre-operative evaluation assessment as requested by Dr. Pang.  She requires evaluation and anesthesia risk assessment prior to undergoing surgery/procedure for treatment of Right total knee replacement .    Proposed Surgery/ Procedure: right total knee replacement  Date of Surgery/ Procedure: 19  Time of Surgery/ Procedure: 10:00 am   Hospital/Surgical Facility: [facility]  Fax number for surgical facility:   Primary Physician: Chad Betts  Type of Anesthesia Anticipated: to be determined    Patient has a Health Care Directive or Living Will:  YES    1. NO - Do you have a history of heart attack, stroke, stent, bypass or surgery on an artery in the head, neck, heart or legs?  2. NO - Do you ever have any pain or discomfort in your chest?  3. NO - Do you have a history of  Heart Failure?  4. NO - Are you troubled by shortness of breath when: walking on the level, up a slight hill or at night?  5. NO - Do you currently have a cold, bronchitis or other respiratory infection?  6. NO - Do you have a cough, shortness of breath or wheezing?  7. NO - Do you sometimes get pains in the calves of your legs when you walk?  8. YES - DO YOU OR ANYONE IN YOUR FAMILY HAVE PREVIOUS HISTORY OF BLOOD CLOTS? PE and DVT and atrial fibrillation  9. NO - Do you or does anyone in your family have a serious bleeding problem such as prolonged bleeding following surgeries or cuts?  10. NO - Have you ever had problems with anemia or been told to take iron pills?  11. NO - Have you had any abnormal blood loss such as black, tarry or bloody stools, or abnormal vaginal bleeding?  12. NO - Have you ever had a blood transfusion?  13. NO - Have you or any of your relatives ever had problems with anesthesia?  14. NO - Do you have sleep  apnea, excessive snoring or daytime drowsiness?  15. NO - Do you have any prosthetic heart valves?  16. YES - DO YOU HAVE PROSTHETIC JOINTS?   17. NO - Is there any chance that you may be pregnant?      HPI:     HPI related to upcoming procedure: right knee arthritis and needs replacement.       A-FIB - Patient has a longstanding history of chronic A-fib currently on rate control. Current treatment regimen includes Warfarin for stroke prevention and denies significant symptoms of lightheadedness, palpitations or dyspnea.                                                                                                                                                                             .  HYPERLIPIDEMIA - Patient has a long history of significant Hyperlipidemia requiring medication for treatment with recent good control. Patient reports no problems or side effects with the medication.                                                                                                                                                       .  HYPERTENSION - Patient has longstanding history of HTN , currently denies any symptoms referable to elevated blood pressure. Specifically denies chest pain, palpitations, dyspnea, orthopnea, PND or peripheral edema. Blood pressure readings have been in normal range. Current medication regimen is as listed below. Patient denies any side effects of medication.                                                                                                                                                                                          .    MEDICAL HISTORY:     Patient Active Problem List    Diagnosis Date Noted     Primary osteoarthritis of right knee 11/15/2018     Priority: Medium     Primary osteoarthritis of left knee 11/15/2018     Priority: Medium     Fractured hip, left, closed, initial encounter (H) 03/16/2017     Priority: Medium     Hip fracture, left, closed,  initial encounter (H) 03/15/2017     Priority: Medium     Closed left hip fracture (H) 03/15/2017     Priority: Medium     Long term current use of anticoagulant therapy 03/22/2016     Priority: Medium     Anemia 11/03/2014     Priority: Medium     Paroxysmal atrial fibrillation (H) 11/03/2014     Priority: Medium     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 11/01/2014     Priority: Medium     In 2014       Recent major surgery - exploratory lap with small bowel resection 10/20 11/01/2014     Priority: Medium     Pleural effusion on right 11/01/2014     Priority: Medium     Hypertension goal BP (blood pressure) < 140/90 02/22/2013     Priority: Medium     Hyperlipidemia LDL goal <130 02/22/2013     Priority: Medium     Encounter for long-term current use of medication 02/22/2013     Priority: Medium     Problem list name updated by automated process. Provider to review       History of colonic polyps 02/22/2013     Priority: Medium     Problem list name updated by automated process. Provider to review       Advanced directives, counseling/discussion 02/22/2013     Priority: Medium     Pt states has Advance Directive at home, will bring in to be scanned into chart.        Past Medical History:   Diagnosis Date     Atrial fibrillation (H) 2014    related to colon surgery     Colon polyps      Diverticulosis of colon (without mention of hemorrhage)     Diverticulosis     DVT (deep vein thrombosis) in pregnancy (H) 2014    after colon surgery     Other and unspecified hyperlipidemia      PE (pulmonary embolism) 2014    related to colon surgery     Unspecified essential hypertension      Past Surgical History:   Procedure Laterality Date     COLONOSCOPY  09, 10, 12    [facility]     COLONOSCOPY N/A 12/11/2017    Procedure: COLONOSCOPY;  colonoscopy;  Surgeon: Elvis Quezada MD;  [facility]     FLEXIBLE SIGMOIDOSCOPY  09, 11     LAPAROTOMY EXPLORATORY N/A 10/20/2014     "Procedure: LAPAROTOMY EXPLORATORY;  Surgeon: Miguel Oleary MD;  [facility]     LAPAROTOMY, LYSIS ADHESIONS, COMBINED N/A 10/20/2014    Procedure: COMBINED LAPAROTOMY, LYSIS ADHESIONS;  Surgeon: Miguel Oleary MD;  [facility]     OPEN REDUCTION INTERNAL FIXATION HIP BIPOLAR Left 3/16/2017    Procedure: OPEN REDUCTION INTERNAL FIXATION HIP BIPOLAR;  Surgeon: Kaleb Pang DO;  [facility]     RESECT SMALL BOWEL WITHOUT OSTOMY N/A 10/20/2014    Procedure: RESECT SMALL BOWEL WITHOUT OSTOMY;  Surgeon: Miguel Oleary MD;  [facility]     Current Outpatient Medications   Medication Sig Dispense Refill     enoxaparin (LOVENOX) 80 MG/0.8ML syringe Inject 0.8 mLs (80 mg) Subcutaneous every 12 hours 5 Syringe 0     ibuprofen 200 MG capsule Take 200 mg by mouth every 4 hours as needed for fever       lisinopril (PRINIVIL/ZESTRIL) 10 MG tablet Take 1 tablet (10 mg) by mouth daily 90 tablet 3     lovastatin (MEVACOR) 40 MG tablet TAKE ONE TABLET BY MOUTH AT BEDTIME 90 tablet 3     warfarin (COUMADIN) 5 MG tablet Take 2.5 mg on Mon, Wed, Fri and 5 mg all other days, or as directed by the Coumadin clinic. 70 tablet 1     OTC products: None, except as noted above    Allergies   Allergen Reactions     No Known Allergies       Latex Allergy: NO    Social History     Tobacco Use     Smoking status: Never Smoker     Smokeless tobacco: Never Used   Substance Use Topics     Alcohol use: No     Alcohol/week: 0.0 oz     History   Drug Use No       REVIEW OF SYSTEMS:   Constitutional, neuro, ENT, endocrine, pulmonary, cardiac, gastrointestinal, genitourinary, musculoskeletal, integument and psychiatric systems are negative, except as otherwise noted.    EXAM:   /66 (BP Location: Left arm, Patient Position: Chair, Cuff Size: Adult Regular)   Pulse 73   Temp 96.2  F (35.7  C) (Temporal)   Resp 16   Ht 1.632 m (5' 4.25\")   Wt 80.2 kg (176 lb 14.4 oz)   SpO2 98%   BMI 30.13 kg/m      " GENERAL APPEARANCE: healthy, alert and no distress     HENT: ear canals and TM's normal and nose and mouth without ulcers or lesions     NECK: no adenopathy, no asymmetry, masses, or scars and thyroid normal to palpation     RESP: lungs clear to auscultation - no rales, rhonchi or wheezes     CV: regular rates and rhythm, normal S1 S2, no S3 or S4 and no murmur, click or rub     ABDOMEN:  soft, nontender, no HSM or masses and bowel sounds normal     MS: 1+ pitting edema      SKIN: no suspicious lesions or rashes     NEURO: Normal strength and tone, sensory exam grossly normal, mentation intact and speech normal     PSYCH: mentation appears normal. and affect normal/bright     LYMPHATICS: No cervical adenopathy    DIAGNOSTICS:   EKG: sinus bradycardia, low voltage, normal axis, normal intervals, no acute ST/T changes c/w ischemia, unchanged from previous tracings    Labs pending today.     Recent Labs   Lab Test 12/14/18 12/05/18  10/10/18  0910  11/22/17  0956  10/20/14  0625   HGB  --   --   --  13.5  --  13.8   < > 16.0*   PLT  --   --   --  212  --  225   < > 241   INR 2.3* 2.3*   < >  --    < >  --    < >  --    NA  --   --   --  144  --  144   < > 138   POTASSIUM  --   --   --  4.5  --  4.7   < > 3.7   CR  --   --   --  1.05*  --  0.84   < > 0.80   A1C  --   --   --   --   --   --   --  5.8    < > = values in this interval not displayed.     IMPRESSION:   Reason for surgery/procedure: right knee arthritis    The proposed surgical procedure is considered INTERMEDIATE risk.    REVISED CARDIAC RISK INDEX  The patient has the following serious cardiovascular risks for perioperative complications such as (MI, PE, VFib and 3  AV Block):  No serious cardiac risks  INTERPRETATION: 1 risks: Class II (low risk - 0.9% complication rate)    The patient has the following additional risks for perioperative complications:  Atrial fibrillation and past PE, needs lovenox.      ICD-10-CM    1. Preop general physical exam Z01.818         RECOMMENDATIONS:     --Because of DVT or PE history, patient should be on low molecular weight heparin and wear compression hose before & after surgery      Anticoagulant or Antiplatelet Medication Use  WARFARIN: Bridging therapy will be coordinated by coumadin clinic        ACE Inhibitor or Angiotensin Receptor Blocker (ARB) Use  Ace inhibitor or Angiotensin Receptor Blocker (ARB) and should HOLD this medication for the 24 hours prior to surgery.      APPROVAL GIVEN to proceed with proposed procedure, without further diagnostic evaluation       Signed Electronically by: Chad Betts MD    Copy of this evaluation report is provided to requesting physician.    [facility] Preop Guidelines    Revised Cardiac Risk Index

## 2018-12-22 DIAGNOSIS — Z79.01 LONG TERM CURRENT USE OF ANTICOAGULANT THERAPY: ICD-10-CM

## 2018-12-22 LAB
BACTERIA SPEC CULT: ABNORMAL
SPECIMEN SOURCE: ABNORMAL

## 2018-12-22 ASSESSMENT — ANXIETY QUESTIONNAIRES: GAD7 TOTAL SCORE: 0

## 2018-12-23 ENCOUNTER — TELEPHONE (OUTPATIENT)
Dept: ORTHOPEDICS | Facility: CLINIC | Age: 78
End: 2018-12-23

## 2018-12-23 DIAGNOSIS — Z22.321 MSSA (METHICILLIN-SUSCEPTIBLE STAPHYLOCOCCUS AUREUS) COLONIZATION: Primary | ICD-10-CM

## 2018-12-23 NOTE — TELEPHONE ENCOUNTER
Lab results returned.  +MSSA from nasal swab.  Patient will need 5 days BID 0.5gram each nostril 2% bactroban prescription.     Please inform patient and determine which pharmacy she would like this sent. medication pended    BILLY Oh, CNP  Orthopedic Surgery

## 2018-12-24 NOTE — TELEPHONE ENCOUNTER
Pharmacy obtained. Patient notified of results and instructions. Script sent to pharmacy.  Pauline Mantilla RN on 12/24/2018 at 8:52 AM

## 2018-12-26 RX ORDER — WARFARIN SODIUM 5 MG/1
TABLET ORAL
Qty: 65 TABLET | Refills: 1 | Status: ON HOLD | OUTPATIENT
Start: 2018-12-26 | End: 2019-01-11

## 2018-12-27 ENCOUNTER — HOSPITAL ENCOUNTER (OUTPATIENT)
Dept: PHYSICAL THERAPY | Facility: CLINIC | Age: 78
Setting detail: THERAPIES SERIES
End: 2018-12-27
Attending: ORTHOPAEDIC SURGERY
Payer: MEDICARE

## 2018-12-27 PROCEDURE — 97110 THERAPEUTIC EXERCISES: CPT | Mod: GP

## 2018-12-27 PROCEDURE — G8980 MOBILITY D/C STATUS: HCPCS | Mod: GP,CJ

## 2018-12-27 PROCEDURE — G8979 MOBILITY GOAL STATUS: HCPCS | Mod: GP,CK

## 2018-12-28 ENCOUNTER — ANTICOAGULATION THERAPY VISIT (OUTPATIENT)
Dept: ANTICOAGULATION | Facility: CLINIC | Age: 78
End: 2018-12-28
Payer: MEDICARE

## 2018-12-28 DIAGNOSIS — I26.99 OTHER PULMONARY EMBOLISM WITHOUT ACUTE COR PULMONALE, UNSPECIFIED CHRONICITY (H): ICD-10-CM

## 2018-12-28 DIAGNOSIS — Z79.01 LONG TERM CURRENT USE OF ANTICOAGULANT THERAPY: ICD-10-CM

## 2018-12-28 LAB — INR POINT OF CARE: 3.5 (ref 0.86–1.14)

## 2018-12-28 PROCEDURE — 99207 ZZC NO CHARGE NURSE ONLY: CPT

## 2018-12-28 PROCEDURE — 36416 COLLJ CAPILLARY BLOOD SPEC: CPT

## 2018-12-28 PROCEDURE — 85610 PROTHROMBIN TIME: CPT | Mod: QW

## 2018-12-28 NOTE — PROGRESS NOTES
ANTICOAGULATION FOLLOW-UP CLINIC VISIT    Patient Name:  Tracy Palomo  Date:  12/28/2018  Contact Type:  Face to Face    SUBJECTIVE:     Patient Findings     Positives:   Change in diet/appetite (did not have broccoli this week, but plans to go back to her normal amount of twice a week)           OBJECTIVE    INR Protime   Date Value Ref Range Status   12/28/2018 3.5 (A) 0.86 - 1.14 Final       ASSESSMENT / PLAN  INR assessment SUPRA    Recheck INR In: 5 DAYS    INR Location Clinic      Anticoagulation Summary  As of 12/28/2018    INR goal:   2.0-3.0   TTR:   59.0 % (2.7 y)   INR used for dosing:   3.5! (12/28/2018)   Warfarin maintenance plan:   5 mg (5 mg x 1) every Mon, Wed, Fri; 2.5 mg (5 mg x 0.5) all other days   Full warfarin instructions:   12/28: 2.5 mg; Otherwise 5 mg every Mon, Wed, Fri; 2.5 mg all other days   Weekly warfarin total:   25 mg   Plan last modified:   Johanna Mckeon RN (12/5/2018)   Next INR check:   1/2/2019   Target end date:       Indications    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]  Long term current use of anticoagulant therapy [Z79.01]             Anticoagulation Episode Summary     INR check location:       Preferred lab:       Send INR reminders to:   MICHAEL MAYA    Comments:   5 mg tablets, book, PM dose       Anticoagulation Care Providers     Provider Role Specialty Phone number    Gerard Medrnao MD [facility] [phone]            See the Encounter Report to view Anticoagulation Flowsheet and Dosing Calendar (Go to Encounters tab in chart review, and find the Anticoagulation Therapy Visit)    Dosage adjustment made based on physician directed care plan.      Johanna Mckeon RN

## 2018-12-28 NOTE — TELEPHONE ENCOUNTER
Patient states she ended up just buying the lovenox because insurance took too long to approve, but would like to be reimbursed if possible when its approved.   Johanna Mckeon RN

## 2018-12-28 NOTE — TELEPHONE ENCOUNTER
I have called [facility] ([phone]) and spoke with a representative. She said the turnaround/approval is 24-72 hours and at that time patient can be reimbursed if approved  Johanna Mckeon RN

## 2018-12-28 NOTE — TELEPHONE ENCOUNTER
See note below from Divine Wright. I dont know what a question set is?  I see patient today to discuss lovenox and she will need to start Lovenox next week so this will need to be addressed ASAP.   Let me know if there is anything I can do.   Johanna Mckeon RN            The question set was sent to clinic and it appears answered by the clinic. I just called [employer] and they have not received the questions yet. Can you fax again to [employer] or fax to the PA with Attn: Divine and I can continue to follow? (Routing comment)

## 2018-12-31 NOTE — PROGRESS NOTES
Outpatient Physical Therapy Discharge Note     Patient: Tracy Palomo  : 1940    Beginning/End Dates of Reporting Period:  18 to 2018    Referring Provider: DEE DEE Hernandez / Bird Ann Diagnosis: Bilateral knee pain due to primary osteoarthritis     Client Self Report: Patient reports her home exercises have helped decrease knee pain, her groin has been sore for a few days but is not sure what she did to cause this pain. Feeling positive about her upcoming surgery .     Objective Measurements:    Outcome Measures (most recent score):    Lower Extremity Functional Scale: 35/80 on 18, did not assess today.     Goals:  Goal Identifier HEP   Goal Description Independent with HEP.   Target Date 19   Date Met  18   Progress:     Goal Identifier ready for surgery   Goal Description Pt will be able to complete all ex for both knees and show 5/5 MMT to be ready for surgery.   Target Date 19   Date Met      Progress: Patient demonstrates compliance with all exercises today, did not assess MMT. Patient reports improved functional strength throughout day.       Progress Toward Goals:   Progress this reporting period: Patient is independent with home exercise program. Patient reports improved functional strength and ability throughout ADLs and other activities. Patient reports she is eager for upcoming surgery on 19. Review HEP at end of treatment today and patient demonstrates understanding.     Plan:  Discharge from therapy.    Discharge:    Reason for Discharge: Patient chooses to discontinue therapy. Patient feels home exercise program will be enough to prepare for upcoming surgery. Patient reports exercises have improved pain and strength and expresses willingness and readiness to perform home program diligently upon discharge. Patient plans to return to physical therapy post-op.     Equipment Issued: None    Discharge Plan: Patient to continue home program.

## 2018-12-31 NOTE — ADDENDUM NOTE
Encounter addended by: Klaudia Martins, PT on: 12/31/2018 8:38 AM   Actions taken: Sign clinical note, Flowsheet accepted, Charge Capture section accepted, Episode resolved

## 2019-01-02 ENCOUNTER — OFFICE VISIT (OUTPATIENT)
Dept: ORTHOPEDICS | Facility: CLINIC | Age: 79
End: 2019-01-02
Payer: MEDICARE

## 2019-01-02 ENCOUNTER — ANTICOAGULATION THERAPY VISIT (OUTPATIENT)
Dept: ANTICOAGULATION | Facility: CLINIC | Age: 79
End: 2019-01-02
Payer: MEDICARE

## 2019-01-02 VITALS
BODY MASS INDEX: 30.73 KG/M2 | WEIGHT: 180 LBS | DIASTOLIC BLOOD PRESSURE: 75 MMHG | SYSTOLIC BLOOD PRESSURE: 126 MMHG | HEIGHT: 64 IN

## 2019-01-02 DIAGNOSIS — I26.99 OTHER PULMONARY EMBOLISM WITHOUT ACUTE COR PULMONALE, UNSPECIFIED CHRONICITY (H): ICD-10-CM

## 2019-01-02 DIAGNOSIS — M17.11 PRIMARY OSTEOARTHRITIS OF RIGHT KNEE: Primary | ICD-10-CM

## 2019-01-02 DIAGNOSIS — Z79.01 LONG TERM CURRENT USE OF ANTICOAGULANT THERAPY: ICD-10-CM

## 2019-01-02 LAB — INR POINT OF CARE: 2.1 (ref 0.86–1.14)

## 2019-01-02 PROCEDURE — 85610 PROTHROMBIN TIME: CPT | Mod: QW

## 2019-01-02 PROCEDURE — 36416 COLLJ CAPILLARY BLOOD SPEC: CPT

## 2019-01-02 PROCEDURE — 99207 ZZC NO CHARGE NURSE ONLY: CPT

## 2019-01-02 PROCEDURE — 99207 ZZC PREOP VISIT IN GLOBAL PKG: CPT | Performed by: ORTHOPAEDIC SURGERY

## 2019-01-02 ASSESSMENT — MIFFLIN-ST. JEOR: SCORE: 1290.44

## 2019-01-02 NOTE — LETTER
1/2/2019         RE: Tracy Palomo  [address] [place] [id number]        Dear Colleague,    Thank you for referring your patient, Tracy Palomo, to the [facility]. Please see a copy of my visit note below.    1 week prior to right total knee replacement.  H&P reviewed.  She has a plan for Coumadin/Lovenox bridging.  She has no family to assist.  She would like to go to a rehab center postoperatively.  Plan to resume Coumadin postoperatively.  All questions answered.  MSSA treated with Bactroban    Again, thank you for allowing me to participate in the care of your patient.        Sincerely,        Kaleb Pang, DO

## 2019-01-02 NOTE — PROGRESS NOTES
1 week prior to right total knee replacement.  H&P reviewed.  She has a plan for Coumadin/Lovenox bridging.  She has no family to assist.  She would like to go to a rehab center postoperatively.  Plan to resume Coumadin postoperatively.  All questions answered.  MSSA treated with Bactroban

## 2019-01-02 NOTE — PROGRESS NOTES
ANTICOAGULATION FOLLOW-UP CLINIC VISIT    Patient Name:  Tracy Palomo  Date:  2019  Contact Type:  Face to Face    SUBJECTIVE:     Patient Findings     Positives:   No Problem Findings    Comments:   Pt will start holding coumadin tomorrow, and then will bridge with Lovenox. She will be discharged to the [facility] after surgery            OBJECTIVE    INR Protime   Date Value Ref Range Status   2019 2.1 (A) 0.86 - 1.14 Final       ASSESSMENT / PLAN  INR assessment THER    Recheck INR In: 5 DAYS    INR Location Clinic      Anticoagulation Summary  As of 2019    INR goal:   2.0-3.0   TTR:   59.1 % (2.7 y)   INR used for dosin.1 (2019)   Warfarin maintenance plan:   5 mg (5 mg x 1) every Mon, Wed, Fri; 2.5 mg (5 mg x 0.5) all other days   Full warfarin instructions:   1/3: Hold; : Hold; : Hold; : Hold; Otherwise 5 mg every Mon, Wed, Fri; 2.5 mg all other days   Weekly warfarin total:   25 mg   Plan last modified:   Johanna Mckeon RN (2018)   Next INR check:   2019   Target end date:       Indications    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]  Long term current use of anticoagulant therapy [Z79.01]             Anticoagulation Episode Summary     INR check location:       Preferred lab:       Send INR reminders to:   MICHAEL MAYA    Comments:   5 mg tablets, book, PM dose       Anticoagulation Care Providers     Provider Role Specialty Phone number    Gerard Medrano MD [facility] [phone]            See the Encounter Report to view Anticoagulation Flowsheet and Dosing Calendar (Go to Encounters tab in chart review, and find the Anticoagulation Therapy Visit)    Dosage adjustment made based on physician directed care plan.      Johanna Mckeon, RN

## 2019-01-04 NOTE — TELEPHONE ENCOUNTER
I spoke with a [employer] representative. It was approved. Authorization # [id number]  She back dated it to 12/5/18 -12/5/19. She said the patient can go to the pharmacy and run the claim for the date that she bought the medication or call the reimbursement # - [phone].  Johanna Mckeon RN

## 2019-01-07 ENCOUNTER — ANTICOAGULATION THERAPY VISIT (OUTPATIENT)
Dept: ANTICOAGULATION | Facility: CLINIC | Age: 79
End: 2019-01-07
Payer: MEDICARE

## 2019-01-07 ENCOUNTER — ANESTHESIA EVENT (OUTPATIENT)
Dept: SURGERY | Facility: CLINIC | Age: 79
DRG: 470 | End: 2019-01-07
Payer: MEDICARE

## 2019-01-07 DIAGNOSIS — Z79.01 LONG TERM CURRENT USE OF ANTICOAGULANT THERAPY: ICD-10-CM

## 2019-01-07 DIAGNOSIS — I26.99 OTHER PULMONARY EMBOLISM WITHOUT ACUTE COR PULMONALE, UNSPECIFIED CHRONICITY (H): ICD-10-CM

## 2019-01-07 LAB — INR POINT OF CARE: 1.2 (ref 0.86–1.14)

## 2019-01-07 PROCEDURE — 36416 COLLJ CAPILLARY BLOOD SPEC: CPT

## 2019-01-07 PROCEDURE — 85610 PROTHROMBIN TIME: CPT | Mod: QW

## 2019-01-07 PROCEDURE — 99207 ZZC NO CHARGE NURSE ONLY: CPT

## 2019-01-07 NOTE — PROGRESS NOTES
ANTICOAGULATION FOLLOW-UP CLINIC VISIT    Patient Name:  Tracy Palomo  Date:  2019  Contact Type:  Face to Face    SUBJECTIVE:     Patient Findings     Positives:   Intentional hold of therapy (pt has knee surgery tomorrow so she has been holding Coumadin since Thursday last week)    Comments:   Total knee replacement tomorrow. Will be admitted to the hospital about 2 days then will rehab at the [facility] for a couple weeks  Johanna Mckeon RN             OBJECTIVE    INR Protime   Date Value Ref Range Status   2019 1.2 (A) 0.86 - 1.14 Final       ASSESSMENT / PLAN  INR assessment THER    Recheck INR In: 3 WEEKS    INR Location Clinic      Anticoagulation Summary  As of 2019    INR goal:   2.0-3.0   TTR:   58.8 % (2.8 y)   INR used for dosin.2! (2019)   Warfarin maintenance plan:   5 mg (5 mg x 1) every Mon, Wed, Fri; 2.5 mg (5 mg x 0.5) all other days   Full warfarin instructions:   5 mg every Mon, Wed, Fri; 2.5 mg all other days   Weekly warfarin total:   25 mg   No change documented:   Johanna Mckeon RN   Plan last modified:   Johanna Mckeon RN (2018)   Next INR check:   2019   Target end date:       Indications    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]  Long term current use of anticoagulant therapy [Z79.01]             Anticoagulation Episode Summary     INR check location:       Preferred lab:       Send INR reminders to:   MICHAEL MAYA    Comments:   5 mg tablets, book, PM dose       Anticoagulation Care Providers     Provider Role Specialty Phone number    Gerard Medrano MD [facility] [phone]            See the Encounter Report to view Anticoagulation Flowsheet and Dosing Calendar (Go to Encounters tab in chart review, and find the Anticoagulation Therapy Visit)    Dosage adjustment made based on physician directed care plan.      Johanna Mckeon RN

## 2019-01-08 ENCOUNTER — APPOINTMENT (OUTPATIENT)
Dept: GENERAL RADIOLOGY | Facility: CLINIC | Age: 79
DRG: 470 | End: 2019-01-08
Attending: ORTHOPAEDIC SURGERY
Payer: MEDICARE

## 2019-01-08 ENCOUNTER — APPOINTMENT (OUTPATIENT)
Dept: PHYSICAL THERAPY | Facility: CLINIC | Age: 79
DRG: 470 | End: 2019-01-08
Attending: ORTHOPAEDIC SURGERY
Payer: MEDICARE

## 2019-01-08 ENCOUNTER — HOSPITAL ENCOUNTER (INPATIENT)
Facility: CLINIC | Age: 79
LOS: 3 days | Discharge: NURSING, REHAB OR LONG-TERM CARE | DRG: 470 | End: 2019-01-11
Attending: ORTHOPAEDIC SURGERY | Admitting: ORTHOPAEDIC SURGERY
Payer: MEDICARE

## 2019-01-08 ENCOUNTER — ANESTHESIA (OUTPATIENT)
Dept: SURGERY | Facility: CLINIC | Age: 79
DRG: 470 | End: 2019-01-08
Payer: MEDICARE

## 2019-01-08 DIAGNOSIS — I10 HYPERTENSION GOAL BP (BLOOD PRESSURE) < 140/90: ICD-10-CM

## 2019-01-08 DIAGNOSIS — I26.99 OTHER PULMONARY EMBOLISM WITHOUT ACUTE COR PULMONALE, UNSPECIFIED CHRONICITY (H): ICD-10-CM

## 2019-01-08 DIAGNOSIS — Z96.651 S/P TOTAL KNEE REPLACEMENT USING CEMENT, RIGHT: Primary | ICD-10-CM

## 2019-01-08 DIAGNOSIS — Z79.01 LONG TERM CURRENT USE OF ANTICOAGULANT THERAPY: ICD-10-CM

## 2019-01-08 PROBLEM — R07.89 OTHER CHEST PAIN: Status: ACTIVE | Noted: 2019-01-08

## 2019-01-08 PROCEDURE — 25000128 H RX IP 250 OP 636: Performed by: NURSE ANESTHETIST, CERTIFIED REGISTERED

## 2019-01-08 PROCEDURE — 99207 ZZC CONSULT E&M CHANGED TO SUBSEQUENT LEVEL: CPT | Performed by: INTERNAL MEDICINE

## 2019-01-08 PROCEDURE — 0SRC0J9 REPLACEMENT OF RIGHT KNEE JOINT WITH SYNTHETIC SUBSTITUTE, CEMENTED, OPEN APPROACH: ICD-10-PCS | Performed by: ORTHOPAEDIC SURGERY

## 2019-01-08 PROCEDURE — 37000008 ZZH ANESTHESIA TECHNICAL FEE, 1ST 30 MIN: Performed by: ORTHOPAEDIC SURGERY

## 2019-01-08 PROCEDURE — 25000125 ZZHC RX 250: Performed by: INTERNAL MEDICINE

## 2019-01-08 PROCEDURE — 27810169 ZZH OR IMPLANT GENERAL: Performed by: ORTHOPAEDIC SURGERY

## 2019-01-08 PROCEDURE — 36000063 ZZH SURGERY LEVEL 4 EA 15 ADDTL MIN: Performed by: ORTHOPAEDIC SURGERY

## 2019-01-08 PROCEDURE — 25000128 H RX IP 250 OP 636: Performed by: ORTHOPAEDIC SURGERY

## 2019-01-08 PROCEDURE — 25000128 H RX IP 250 OP 636: Performed by: NURSE PRACTITIONER

## 2019-01-08 PROCEDURE — A9270 NON-COVERED ITEM OR SERVICE: HCPCS | Mod: GY | Performed by: INTERNAL MEDICINE

## 2019-01-08 PROCEDURE — 12000000 ZZH R&B MED SURG/OB

## 2019-01-08 PROCEDURE — 25000125 ZZHC RX 250: Performed by: NURSE ANESTHETIST, CERTIFIED REGISTERED

## 2019-01-08 PROCEDURE — 37000009 ZZH ANESTHESIA TECHNICAL FEE, EACH ADDTL 15 MIN: Performed by: ORTHOPAEDIC SURGERY

## 2019-01-08 PROCEDURE — 99233 SBSQ HOSP IP/OBS HIGH 50: CPT | Performed by: INTERNAL MEDICINE

## 2019-01-08 PROCEDURE — 97110 THERAPEUTIC EXERCISES: CPT | Mod: GP | Performed by: PHYSICAL THERAPIST

## 2019-01-08 PROCEDURE — 97530 THERAPEUTIC ACTIVITIES: CPT | Mod: GP | Performed by: PHYSICAL THERAPIST

## 2019-01-08 PROCEDURE — 40000306 ZZH STATISTIC PRE PROC ASSESS II: Performed by: ORTHOPAEDIC SURGERY

## 2019-01-08 PROCEDURE — 73560 X-RAY EXAM OF KNEE 1 OR 2: CPT | Mod: TC,RT

## 2019-01-08 PROCEDURE — 25000125 ZZHC RX 250: Performed by: ORTHOPAEDIC SURGERY

## 2019-01-08 PROCEDURE — A9270 NON-COVERED ITEM OR SERVICE: HCPCS | Mod: GY | Performed by: ORTHOPAEDIC SURGERY

## 2019-01-08 PROCEDURE — 25000132 ZZH RX MED GY IP 250 OP 250 PS 637: Mod: GY | Performed by: ORTHOPAEDIC SURGERY

## 2019-01-08 PROCEDURE — 25000132 ZZH RX MED GY IP 250 OP 250 PS 637: Mod: GY | Performed by: INTERNAL MEDICINE

## 2019-01-08 PROCEDURE — C1776 JOINT DEVICE (IMPLANTABLE): HCPCS | Performed by: ORTHOPAEDIC SURGERY

## 2019-01-08 PROCEDURE — 40000193 ZZH STATISTIC PT WARD VISIT: Performed by: PHYSICAL THERAPIST

## 2019-01-08 PROCEDURE — 25000566 ZZH SEVOFLURANE, EA 15 MIN: Performed by: ORTHOPAEDIC SURGERY

## 2019-01-08 PROCEDURE — 36000093 ZZH SURGERY LEVEL 4 1ST 30 MIN: Performed by: ORTHOPAEDIC SURGERY

## 2019-01-08 PROCEDURE — 27110028 ZZH OR GENERAL SUPPLY NON-STERILE: Performed by: ORTHOPAEDIC SURGERY

## 2019-01-08 PROCEDURE — 27447 TOTAL KNEE ARTHROPLASTY: CPT | Mod: AS | Performed by: NURSE PRACTITIONER

## 2019-01-08 PROCEDURE — 97161 PT EVAL LOW COMPLEX 20 MIN: CPT | Mod: GP | Performed by: PHYSICAL THERAPIST

## 2019-01-08 PROCEDURE — 27210794 ZZH OR GENERAL SUPPLY STERILE: Performed by: ORTHOPAEDIC SURGERY

## 2019-01-08 PROCEDURE — 27447 TOTAL KNEE ARTHROPLASTY: CPT | Mod: RT | Performed by: ORTHOPAEDIC SURGERY

## 2019-01-08 PROCEDURE — 71000014 ZZH RECOVERY PHASE 1 LEVEL 2 FIRST HR: Performed by: ORTHOPAEDIC SURGERY

## 2019-01-08 DEVICE — IMPLANTABLE DEVICE: Type: IMPLANTABLE DEVICE | Site: TIBIA | Status: FUNCTIONAL

## 2019-01-08 DEVICE — BONE CEMENT GENTAMYCIN SMART SET GHV 5450-35-500: Type: IMPLANTABLE DEVICE | Site: KNEE | Status: FUNCTIONAL

## 2019-01-08 DEVICE — IMPLANTABLE DEVICE: Type: IMPLANTABLE DEVICE | Site: FEMUR | Status: FUNCTIONAL

## 2019-01-08 RX ORDER — HYDROMORPHONE HYDROCHLORIDE 1 MG/ML
0.2 INJECTION, SOLUTION INTRAMUSCULAR; INTRAVENOUS; SUBCUTANEOUS
Status: DISCONTINUED | OUTPATIENT
Start: 2019-01-08 | End: 2019-01-11 | Stop reason: HOSPADM

## 2019-01-08 RX ORDER — HYDROXYZINE HYDROCHLORIDE 10 MG/1
10 TABLET, FILM COATED ORAL EVERY 6 HOURS PRN
Status: DISCONTINUED | OUTPATIENT
Start: 2019-01-08 | End: 2019-01-11 | Stop reason: HOSPADM

## 2019-01-08 RX ORDER — MEPERIDINE HYDROCHLORIDE 25 MG/ML
12.5 INJECTION INTRAMUSCULAR; INTRAVENOUS; SUBCUTANEOUS
Status: DISCONTINUED | OUTPATIENT
Start: 2019-01-08 | End: 2019-01-08 | Stop reason: HOSPADM

## 2019-01-08 RX ORDER — NALOXONE HYDROCHLORIDE 0.4 MG/ML
.1-.4 INJECTION, SOLUTION INTRAMUSCULAR; INTRAVENOUS; SUBCUTANEOUS
Status: DISCONTINUED | OUTPATIENT
Start: 2019-01-08 | End: 2019-01-08 | Stop reason: HOSPADM

## 2019-01-08 RX ORDER — ONDANSETRON 2 MG/ML
4 INJECTION INTRAMUSCULAR; INTRAVENOUS EVERY 6 HOURS PRN
Status: DISCONTINUED | OUTPATIENT
Start: 2019-01-08 | End: 2019-01-11 | Stop reason: HOSPADM

## 2019-01-08 RX ORDER — DIMENHYDRINATE 50 MG/ML
25 INJECTION, SOLUTION INTRAMUSCULAR; INTRAVENOUS
Status: DISCONTINUED | OUTPATIENT
Start: 2019-01-08 | End: 2019-01-08 | Stop reason: HOSPADM

## 2019-01-08 RX ORDER — LIDOCAINE 40 MG/G
CREAM TOPICAL
Status: DISCONTINUED | OUTPATIENT
Start: 2019-01-08 | End: 2019-01-11 | Stop reason: HOSPADM

## 2019-01-08 RX ORDER — SODIUM CHLORIDE, SODIUM LACTATE, POTASSIUM CHLORIDE, CALCIUM CHLORIDE 600; 310; 30; 20 MG/100ML; MG/100ML; MG/100ML; MG/100ML
INJECTION, SOLUTION INTRAVENOUS CONTINUOUS
Status: DISCONTINUED | OUTPATIENT
Start: 2019-01-08 | End: 2019-01-10

## 2019-01-08 RX ORDER — CEFAZOLIN SODIUM 2 G/100ML
2 INJECTION, SOLUTION INTRAVENOUS EVERY 8 HOURS
Status: COMPLETED | OUTPATIENT
Start: 2019-01-08 | End: 2019-01-09

## 2019-01-08 RX ORDER — FENTANYL CITRATE 50 UG/ML
25-50 INJECTION, SOLUTION INTRAMUSCULAR; INTRAVENOUS
Status: DISCONTINUED | OUTPATIENT
Start: 2019-01-08 | End: 2019-01-08 | Stop reason: HOSPADM

## 2019-01-08 RX ORDER — HYDROMORPHONE HYDROCHLORIDE 1 MG/ML
.3-.5 INJECTION, SOLUTION INTRAMUSCULAR; INTRAVENOUS; SUBCUTANEOUS EVERY 10 MIN PRN
Status: DISCONTINUED | OUTPATIENT
Start: 2019-01-08 | End: 2019-01-08 | Stop reason: HOSPADM

## 2019-01-08 RX ORDER — ONDANSETRON 2 MG/ML
4 INJECTION INTRAMUSCULAR; INTRAVENOUS EVERY 30 MIN PRN
Status: DISCONTINUED | OUTPATIENT
Start: 2019-01-08 | End: 2019-01-08 | Stop reason: HOSPADM

## 2019-01-08 RX ORDER — ONDANSETRON 4 MG/1
4 TABLET, ORALLY DISINTEGRATING ORAL EVERY 30 MIN PRN
Status: DISCONTINUED | OUTPATIENT
Start: 2019-01-08 | End: 2019-01-08 | Stop reason: HOSPADM

## 2019-01-08 RX ORDER — BUPIVACAINE HYDROCHLORIDE AND EPINEPHRINE 5; 5 MG/ML; UG/ML
INJECTION, SOLUTION PERINEURAL PRN
Status: DISCONTINUED | OUTPATIENT
Start: 2019-01-08 | End: 2019-01-08

## 2019-01-08 RX ORDER — OXYCODONE HYDROCHLORIDE 5 MG/1
5 TABLET ORAL
Status: DISCONTINUED | OUTPATIENT
Start: 2019-01-08 | End: 2019-01-10

## 2019-01-08 RX ORDER — PROPOFOL 10 MG/ML
INJECTION, EMULSION INTRAVENOUS PRN
Status: DISCONTINUED | OUTPATIENT
Start: 2019-01-08 | End: 2019-01-08

## 2019-01-08 RX ORDER — SODIUM CHLORIDE, SODIUM LACTATE, POTASSIUM CHLORIDE, CALCIUM CHLORIDE 600; 310; 30; 20 MG/100ML; MG/100ML; MG/100ML; MG/100ML
INJECTION, SOLUTION INTRAVENOUS CONTINUOUS
Status: DISCONTINUED | OUTPATIENT
Start: 2019-01-08 | End: 2019-01-08 | Stop reason: HOSPADM

## 2019-01-08 RX ORDER — NALOXONE HYDROCHLORIDE 0.4 MG/ML
.1-.4 INJECTION, SOLUTION INTRAMUSCULAR; INTRAVENOUS; SUBCUTANEOUS
Status: ACTIVE | OUTPATIENT
Start: 2019-01-08 | End: 2019-01-09

## 2019-01-08 RX ORDER — ONDANSETRON 2 MG/ML
INJECTION INTRAMUSCULAR; INTRAVENOUS PRN
Status: DISCONTINUED | OUTPATIENT
Start: 2019-01-08 | End: 2019-01-08

## 2019-01-08 RX ORDER — NITROGLYCERIN 0.4 MG/1
0.4 TABLET SUBLINGUAL EVERY 5 MIN PRN
Status: DISCONTINUED | OUTPATIENT
Start: 2019-01-08 | End: 2019-01-11 | Stop reason: HOSPADM

## 2019-01-08 RX ORDER — CEFAZOLIN SODIUM 1 G/3ML
1 INJECTION, POWDER, FOR SOLUTION INTRAMUSCULAR; INTRAVENOUS SEE ADMIN INSTRUCTIONS
Status: DISCONTINUED | OUTPATIENT
Start: 2019-01-08 | End: 2019-01-08 | Stop reason: HOSPADM

## 2019-01-08 RX ORDER — METHOCARBAMOL 500 MG/1
500 TABLET, FILM COATED ORAL 4 TIMES DAILY PRN
Status: DISCONTINUED | OUTPATIENT
Start: 2019-01-08 | End: 2019-01-11 | Stop reason: HOSPADM

## 2019-01-08 RX ORDER — LIDOCAINE 40 MG/G
CREAM TOPICAL
Status: DISCONTINUED | OUTPATIENT
Start: 2019-01-08 | End: 2019-01-08 | Stop reason: HOSPADM

## 2019-01-08 RX ORDER — GABAPENTIN 100 MG/1
100 CAPSULE ORAL AT BEDTIME
Status: COMPLETED | OUTPATIENT
Start: 2019-01-08 | End: 2019-01-10

## 2019-01-08 RX ORDER — ONDANSETRON 4 MG/1
4 TABLET, ORALLY DISINTEGRATING ORAL EVERY 6 HOURS PRN
Status: DISCONTINUED | OUTPATIENT
Start: 2019-01-08 | End: 2019-01-11 | Stop reason: HOSPADM

## 2019-01-08 RX ORDER — BUPIVACAINE HYDROCHLORIDE 7.5 MG/ML
INJECTION, SOLUTION INTRASPINAL PRN
Status: DISCONTINUED | OUTPATIENT
Start: 2019-01-08 | End: 2019-01-08

## 2019-01-08 RX ORDER — DEXAMETHASONE SODIUM PHOSPHATE 10 MG/ML
INJECTION, SOLUTION INTRAMUSCULAR; INTRAVENOUS PRN
Status: DISCONTINUED | OUTPATIENT
Start: 2019-01-08 | End: 2019-01-08

## 2019-01-08 RX ORDER — SODIUM CHLORIDE, SODIUM LACTATE, POTASSIUM CHLORIDE, CALCIUM CHLORIDE 600; 310; 30; 20 MG/100ML; MG/100ML; MG/100ML; MG/100ML
INJECTION, SOLUTION INTRAVENOUS CONTINUOUS
Status: DISCONTINUED | OUTPATIENT
Start: 2019-01-08 | End: 2019-01-08

## 2019-01-08 RX ORDER — PROPOFOL 10 MG/ML
INJECTION, EMULSION INTRAVENOUS CONTINUOUS PRN
Status: DISCONTINUED | OUTPATIENT
Start: 2019-01-08 | End: 2019-01-08

## 2019-01-08 RX ORDER — NALOXONE HYDROCHLORIDE 0.4 MG/ML
.1-.4 INJECTION, SOLUTION INTRAMUSCULAR; INTRAVENOUS; SUBCUTANEOUS
Status: DISCONTINUED | OUTPATIENT
Start: 2019-01-08 | End: 2019-01-11 | Stop reason: HOSPADM

## 2019-01-08 RX ORDER — EPHEDRINE SULFATE 50 MG/ML
INJECTION, SOLUTION INTRAMUSCULAR; INTRAVENOUS; SUBCUTANEOUS PRN
Status: DISCONTINUED | OUTPATIENT
Start: 2019-01-08 | End: 2019-01-08

## 2019-01-08 RX ORDER — ACETAMINOPHEN 325 MG/1
975 TABLET ORAL EVERY 8 HOURS
Status: COMPLETED | OUTPATIENT
Start: 2019-01-08 | End: 2019-01-11

## 2019-01-08 RX ORDER — CEFAZOLIN SODIUM 2 G/100ML
2 INJECTION, SOLUTION INTRAVENOUS
Status: COMPLETED | OUTPATIENT
Start: 2019-01-08 | End: 2019-01-08

## 2019-01-08 RX ORDER — FENTANYL CITRATE 50 UG/ML
INJECTION, SOLUTION INTRAMUSCULAR; INTRAVENOUS PRN
Status: DISCONTINUED | OUTPATIENT
Start: 2019-01-08 | End: 2019-01-08

## 2019-01-08 RX ORDER — PROCHLORPERAZINE MALEATE 5 MG
5 TABLET ORAL EVERY 6 HOURS PRN
Status: DISCONTINUED | OUTPATIENT
Start: 2019-01-08 | End: 2019-01-11 | Stop reason: HOSPADM

## 2019-01-08 RX ORDER — DOCUSATE SODIUM 100 MG/1
100 CAPSULE, LIQUID FILLED ORAL 2 TIMES DAILY
Status: DISCONTINUED | OUTPATIENT
Start: 2019-01-08 | End: 2019-01-11 | Stop reason: HOSPADM

## 2019-01-08 RX ORDER — WARFARIN SODIUM 5 MG/1
5 TABLET ORAL
Status: COMPLETED | OUTPATIENT
Start: 2019-01-08 | End: 2019-01-08

## 2019-01-08 RX ADMIN — CEFAZOLIN SODIUM 2 G: 2 INJECTION, SOLUTION INTRAVENOUS at 10:14

## 2019-01-08 RX ADMIN — FENTANYL CITRATE 50 MCG: 50 INJECTION INTRAMUSCULAR; INTRAVENOUS at 12:20

## 2019-01-08 RX ADMIN — CEFAZOLIN SODIUM 2 G: 2 INJECTION, SOLUTION INTRAVENOUS at 18:37

## 2019-01-08 RX ADMIN — BUPIVACAINE HYDROCHLORIDE IN DEXTROSE 1.7 ML: 7.5 INJECTION, SOLUTION SUBARACHNOID at 10:13

## 2019-01-08 RX ADMIN — DEXAMETHASONE SODIUM PHOSPHATE 10 MG: 10 INJECTION, SOLUTION INTRAMUSCULAR; INTRAVENOUS at 12:22

## 2019-01-08 RX ADMIN — SODIUM CHLORIDE, POTASSIUM CHLORIDE, SODIUM LACTATE AND CALCIUM CHLORIDE: 600; 310; 30; 20 INJECTION, SOLUTION INTRAVENOUS at 22:43

## 2019-01-08 RX ADMIN — OXYCODONE HYDROCHLORIDE 5 MG: 5 TABLET ORAL at 20:56

## 2019-01-08 RX ADMIN — PROPOFOL 150 MG: 10 INJECTION, EMULSION INTRAVENOUS at 11:14

## 2019-01-08 RX ADMIN — Medication 100 MG: at 11:14

## 2019-01-08 RX ADMIN — Medication 10 MG: at 10:32

## 2019-01-08 RX ADMIN — WARFARIN SODIUM 5 MG: 5 TABLET ORAL at 18:32

## 2019-01-08 RX ADMIN — MIDAZOLAM 2 MG: 1 INJECTION INTRAMUSCULAR; INTRAVENOUS at 10:00

## 2019-01-08 RX ADMIN — LIDOCAINE HYDROCHLORIDE 30 ML: 20 SOLUTION ORAL; TOPICAL at 21:15

## 2019-01-08 RX ADMIN — DEXAMETHASONE SODIUM PHOSPHATE 10 MG: 10 INJECTION, SOLUTION INTRAMUSCULAR; INTRAVENOUS at 11:40

## 2019-01-08 RX ADMIN — GABAPENTIN 100 MG: 100 CAPSULE ORAL at 20:52

## 2019-01-08 RX ADMIN — Medication 10 MG: at 10:36

## 2019-01-08 RX ADMIN — FENTANYL CITRATE 50 MCG: 50 INJECTION, SOLUTION INTRAMUSCULAR; INTRAVENOUS at 10:11

## 2019-01-08 RX ADMIN — SODIUM CHLORIDE, POTASSIUM CHLORIDE, SODIUM LACTATE AND CALCIUM CHLORIDE: 600; 310; 30; 20 INJECTION, SOLUTION INTRAVENOUS at 10:36

## 2019-01-08 RX ADMIN — DOCUSATE SODIUM 100 MG: 100 CAPSULE, LIQUID FILLED ORAL at 20:52

## 2019-01-08 RX ADMIN — PROPOFOL 100 MCG/KG/MIN: 10 INJECTION, EMULSION INTRAVENOUS at 10:15

## 2019-01-08 RX ADMIN — Medication 20 ML: at 12:22

## 2019-01-08 RX ADMIN — ONDANSETRON 4 MG: 2 INJECTION INTRAMUSCULAR; INTRAVENOUS at 11:40

## 2019-01-08 RX ADMIN — SODIUM CHLORIDE, POTASSIUM CHLORIDE, SODIUM LACTATE AND CALCIUM CHLORIDE: 600; 310; 30; 20 INJECTION, SOLUTION INTRAVENOUS at 09:16

## 2019-01-08 RX ADMIN — ACETAMINOPHEN 975 MG: 325 TABLET ORAL at 15:17

## 2019-01-08 RX ADMIN — OXYCODONE HYDROCHLORIDE 5 MG: 5 TABLET ORAL at 15:20

## 2019-01-08 RX ADMIN — ACETAMINOPHEN 975 MG: 325 TABLET ORAL at 22:37

## 2019-01-08 RX ADMIN — LIDOCAINE HYDROCHLORIDE 1 ML: 10 INJECTION, SOLUTION EPIDURAL; INFILTRATION; INTRACAUDAL; PERINEURAL at 09:16

## 2019-01-08 ASSESSMENT — ENCOUNTER SYMPTOMS: DYSRHYTHMIAS: 1

## 2019-01-08 ASSESSMENT — ACTIVITIES OF DAILY LIVING (ADL)
ADLS_ACUITY_SCORE: 14
ADLS_ACUITY_SCORE: 13

## 2019-01-08 NOTE — PHARMACY-ANTICOAGULATION SERVICE
Clinical Pharmacy - Warfarin Dosing Consult     Pharmacy has been consulted to manage this patient s warfarin therapy.  Indication: DVT/PE Prophylaxis  Therapy Goal: INR 2-3  Warfarin Prior to Admission: Yes  Warfarin PTA Regimen: 5 mg M, W, F, 2.5 mg all other days of week  Significant drug interactions: cefazolin  Recent documented change in oral intake/nutrition: Unknown    INR Protime   Date Value Ref Range Status   01/07/2019 1.2 (A) 0.86 - 1.14 Final   01/02/2019 2.1 (A) 0.86 - 1.14 Final       Recommend warfarin 5 mg today.  Pharmacy will monitor Tracy Palomo daily and order warfarin doses to achieve specified goal.      Please contact pharmacy as soon as possible if the warfarin needs to be held for a procedure or if the warfarin goals change.

## 2019-01-08 NOTE — OR NURSING
PACU to Inpatient Nursing Handoff    Patient Tracy Palomo is a 78 year old female who speaks English.   Procedure Procedure(s):  Right total knee replacement   Surgeon(s) Primary: Kaleb Pang DO  Assisting: Al Montiel APRN CNP     Allergies   Allergen Reactions     No Known Allergies        Isolation  [unfilled]     Past Medical History   has a past medical history of Atrial fibrillation (H) (2014), Colon polyps, Diverticulosis of colon (without mention of hemorrhage), DVT (deep vein thrombosis) in pregnancy (H) (2014), Other and unspecified hyperlipidemia, PE (pulmonary embolism) (2014), and Unspecified essential hypertension.    Anesthesia Spinal   Dermatome Level     Preop Meds Not applicable   Nerve block Adductor canal.  Location:right. Med:Not applicable. Time given: 1222   Intraop Meds see EHR   Local Meds Yes - Local Cocktail (morphine, ropivacaine, epinephrine, Toradol)   Antibiotics cefazolin (Ancef) - last given at see MAR     Pain Patient Currently in Pain: yes  Comfort: comfortably manageable  Pain Control: partially effective   PACU meds  fentanyl (Sublimaze): 50 mcg (total dose) last given at 1223    PCA / epidural No   Capnography Respiratory Monitoring (EtCO2): 34 mmHg   Telemetry ECG Rhythm: Normal sinus rhythm   Inpatient Telemetry Monitor Ordered? No        Labs Glucose Lab Results   Component Value Date    GLC 93 12/21/2018       Hgb Lab Results   Component Value Date    HGB 13.3 12/21/2018       INR Lab Results   Component Value Date    INR 1.2 01/07/2019    INR 1.00 12/11/2017      PACU Imaging Completed     Wound/Incision Incision/Surgical Site 03/16/17 Left Hip (Active)   Number of days: 663       Incision/Surgical Site 01/08/19 Right Knee (Active)   Incision Assessment UTV 1/8/2019 12:46 PM   Alissa-Incision Assessment UTV 1/8/2019 12:46 PM   Closure Approximated;Sutures;Liquid bandage 1/8/2019 11:42 AM   Incision Drainage Amount None 1/8/2019 12:46 PM    Incision Care Ice applied 1/8/2019 12:46 PM   Dressing Intervention Clean, dry, intact 1/8/2019 12:46 PM   Number of days: 0      CMS        Equipment ice pack and knee immobilizer   Other LDA       IV Access Peripheral IV 01/08/19 Right Hand (Active)   Site Assessment WDL 1/8/2019 12:33 PM   Line Status Infusing 1/8/2019 12:33 PM   Phlebitis Scale 0-->no symptoms 1/8/2019 12:33 PM   Infiltration Scale 0 1/8/2019 12:33 PM   Infiltration Site Treatment Method  None 1/8/2019 12:33 PM   Extravasation? No 1/8/2019 12:33 PM   Dressing Intervention New dressing  1/8/2019  9:15 AM   Number of days: 0      Blood Products Not applicable EBL 0 mL   Intake/Output Date 01/08/19 0700 - 01/09/19 0659   Shift 4050-4839 4103-3203 2171-6608 24 Hour Total   INTAKE   I.V. 1400   1400   Shift Total 1400   1400   OUTPUT   Shift Total       Weight (kg)          Drains / Reyna     Time of void PreOp      PostOp      Diapered? No   Bladder Scan     PO    ice chips     Vitals    B/P: 124/65  T: 97.3  F (36.3  C)    Temp src: Oral  P:  Pulse: 56 (01/08/19 1245)    Heart Rate: 68 (01/08/19 1245)     R: 16  O2:  SpO2: 96 %    O2 Device: Nasal cannula (01/08/19 1245)    Oxygen Delivery: 2 LPM (01/08/19 1245)         Family/support present daughters, mattie and luma   Patient belongings     Patient transported on air mat   DC meds/scripts (obs/outpt) Not applicable   Inpatient Pain Meds Released? No       Special needs/considerations None   Tasks needing completion None       Kvng Mendoza RN

## 2019-01-08 NOTE — OP NOTE
Procedure Date: 01/08/2019      PREOPERATIVE DIAGNOSIS:  Right knee primary osteoarthritis.      POSTOPERATIVE DIAGNOSIS:  Right knee primary osteoarthritis.      PROCEDURE:  Right total knee arthroplasty (patella was not resurfaced).      SURGEON:  Kaleb Pang DO      ASSISTANT:  Jared Montiel, nurse practitioner (utilized throughout the procedure, assisting with soft tissue retraction, assisting with trial and final implant placement, and providing skin closure).      ANESTHESIA:  Spinal that was converted to general.      ESTIMATED BLOOD LOSS:  Less than 10 mL.      FLUIDS:  1400 mL crystalloids.      URINE OUTPUT:  75 mL.      TOURNIQUET PRESSURE:  83 minutes at 292 mmHg.      COUNTS:  Correct.      DISPOSITION:  To PACU in stable condition.      IMPLANTS:  Include DePuy Attune size 5 rotating platform tibial baseplate, a size 6 posterior stabilized femoral component Attune and a 5 mm polyethylene insert.      GROSS FINDINGS:  Motion was approximately .  There were significant degenerative changes throughout.  Her patella had significant rimming osteophytes associated with thinning of the lateral facet.  It measured approximately 12 mm in dimension and thickness.  Given the significant wear, I did not perform a resurfacing out of fear of fracture.      INDICATION FOR PROCEDURE:  This is a pleasant 78-year-old female with complaints of bilateral knee pain for many years.  Pain has been refractory to rest, activity modifications, anti-inflammatories, glucosamine and chondroitin supplements and a knee arthroscopy 10 years ago.  She had pain at rest, increased pain with activity, pain that woke her at night and loss of motion.  Her radiographs are consistent with her clinical exam.  Given her failure of conservative care with pain impacting the quality of her life, we discussed proceeding with a right total knee arthroplasty.  The risks, benefits, and complications were thoroughly discussed.  Risks including  bleeding, infection and neurovascular injury were reviewed.  The postoperative timeframe for recovery was discussed.  Once reviewed, she opted to proceed.      DETAILS OF PROCEDURE:  She was met preoperatively.  Informed consent was verified.  The appropriate extremity was marked, and she was wheeled to operative suite #1.  Transferred to the OR table with no issues.  When deemed appropriate by Anesthesia, the right lower extremity was sterilely prepped and draped in the normal manner.  Prior to the incision, a timeout was performed and the appropriate patient, surgery and extremity were verified and antibiotics were administered.  The HemaClear tourniquet was applied.  A midline skin incision was followed with a medial parapatellar arthrotomy.  The anterior fat pad was excised.  At this point, the patella was carefully inspected.  There was some rimming osteophytes that were removed with a rongeur.  I measured the patella, showing it to be approximately 12 mm in the lateral facet.  It was extremely sclerotic at that area.  Given the thinness, I opted not to perform a resection out of fear of fracture.        I then turned my attention back to the distal femur.  The knee was flexed up, collateral ligament retractors were placed and maintained through the key components of the procedure.  A drill was utilized to enter the distal intramedullary canal with no issues.  The contents were aspirated.  The distal sizing guide was gradually and very easily placed intramedullary.  I decided to take 11 mm off distally given her flexion contracture and 5 degrees of valgus.  The jig was pinned into position.  With the ligaments protected, the medial and lateral aspect of the distal femur was resected with no issues.  The tibia was then presented.  Using the extramedullary tibia device, I decided to take 4 mm off the medial side, which was the low wear side with care to recreate her slope and varus valgus alignment and the jig  was pinned into position.  With this shown to be acceptable, an osteotomy of the proximal tibia was performed with no issues.  Bone remnants were removed.  The knee was brought out to extension and a spacer block was placed.  It showed full restoration of extension and was balanced in full extension.  The block was removed.  The knee was gently flexed back up with retractors placed.  The sizing guide was placed.  Rotation was based on the Pascual line and epicondylar axis and it was pinned into position.  It measured a size 6.  The size 6, 4-in-1 block was placed.  The knee was brought out to 90 degrees with the block showing an equal flexion and extension space.  The cuts were then performed with no problems.  The box cut was performed.  Excess meniscal and bone remnants were removed.  Trial implants were placed.  She had 0-130 degrees of motion.  Patellar tracked midline with the no-thumbs technique.  The tibia and femur were prepared.  The final components as listed above were cemented in position and held in extension under compression until the cement cured.  During this process, a dilute Betadine lavage was performed for approximately 3 minutes followed with pulse lavage.  The knee was inspected.  Excess bone and cement fragments were removed.  The arthrotomy was closed with 0 Vicryl, followed with 2-0 Vicryl subcutaneous, followed with a running 4-0 Monocryl suture with some Dermabond on the skin.  A sterile bandage was applied.  She was subsequently transferred to the PACU in stable condition.      PLAN:  She will be admitted to the hospital for orthopedic care, DVT prophylaxis, and pain management.         SULAIMAN HERNANDEZ DO             D: 2019   T: 2019   MT: FAITH      Name:     YOLANDA WOODARD   MRN:      [id number]        Account:        [id number]   :      1940           Procedure Date: 2019      Document: [id number]

## 2019-01-08 NOTE — ANESTHESIA CARE TRANSFER NOTE
Patient: Tracy Palomo    Procedure(s):  Right total knee replacement    Diagnosis: Right knee arthritis  Diagnosis Additional Information: No value filed.    Anesthesia Type:   Spinal     Note:  Airway :Face Mask  Patient transferred to:PACU  Handoff Report: Identifed the Patient, Identified the Reponsible Provider, Reviewed the pertinent medical history, Discussed the surgical course, Reviewed Intra-OP anesthesia mangement and issues during anesthesia, Set expectations for post-procedure period and Allowed opportunity for questions and acknowledgement of understanding      Vitals: (Last set prior to Anesthesia Care Transfer)    CRNA VITALS  1/8/2019 1135 - 1/8/2019 1234      1/8/2019             SpO2:  95 %                Electronically Signed By: BILLY Melendrez CRNA  January 8, 2019  12:34 PM

## 2019-01-08 NOTE — PROGRESS NOTES
"[facility]    Post Op Check Note    78 year old female POD#0 s/p right total knee arthroplasty  S: Patient seen at bedside in no apparent distress, alert and pleasant.  She is visiting with her 2 daughters.  I discussed surgery with the patient.  She denies numbness, tingling or major pain issues.  She is joking and looking in good spirits.  She is happy she did physical therapy prior to surgery.    O: CMS intact right LE, DF/PF intact       Able to do a very slight straight leg raise.       Ace Dressing on, clean, dry and intact       Knee Immobilizer on and intact       Right knee with minimal swelling and no erythema or ecchymosis at the foot (the rest of the leg is covered in the ace wrap)       Bilateral calves soft and non tender    Vital signs:  Temp: 97.3  F (36.3  C) Temp src: Oral BP: 121/65 Pulse: 61 Heart Rate: 78 Resp: 16 SpO2: 97 % O2 Device: Nasal cannula Oxygen Delivery: 2 LPM      Estimated body mass index is 30.66 kg/m  as calculated from the following:    Height as of 1/2/19: 1.632 m (5' 4.25\").    Weight as of 1/2/19: 81.6 kg (180 lb).      Hemoglobin   Date Value Ref Range Status   12/21/2018 13.3 11.7 - 15.7 g/dL Final     INR   Date Value Ref Range Status   12/11/2017 1.00 0.86 - 1.14 Final     Comment:     Test performed on [employer]ixChek  This test is intended for monitoring Coumadin therapy.  Results are not   accurate in patients with prolonged INR due to factor deficiency.       INR Protime   Date Value Ref Range Status   01/07/2019 1.2 (A) 0.86 - 1.14 Final         A/P:  1. Pain-controlled   2. DVT Prophylaxis-Starting Lovenox tomorrow, patient normally on Coumadin for atrial fibrillation.  Patient also has Ace wrap and leg pumps.  3. Weight Bearing Status-WBAT right LE  4. Plan-Continue to follow.    Abimael Dodson PA-C  1/8/2019         "

## 2019-01-08 NOTE — INTERVAL H&P NOTE
This H&P has been reviewed and there are no clinically significant changes in the patient s condition.  The patient was evaluated by myself as well as Dr. Betts prior to surgery. The Patient is approved for the surgery and the stated surgical procedure is still clinically indicated.

## 2019-01-08 NOTE — PLAN OF CARE
S-(situation): Shift note    B-(background): s/p Right total knee arthroplasty    A-(assessment): Patient received oxycodone once with relief of pain; also on scheduled tylenol.  Denies nausea.  Tolerating clear liquids and applesauce.  Up to side of bed with PT.  Surgical dressing clean, dry, intact.  Vitals stable.    R-(recommendations): Continue to monitor pain, vitals.  Encourage activity.

## 2019-01-08 NOTE — ANESTHESIA PREPROCEDURE EVALUATION
Anesthesia Pre-Procedure Evaluation    Patient: Tracy Palomo   MRN: [id number] : 1940          Preoperative Diagnosis: Right knee arthritis    Procedure(s):  Right total knee replacement    Past Medical History:   Diagnosis Date     Atrial fibrillation (H)     related to colon surgery     Colon polyps      Diverticulosis of colon (without mention of hemorrhage)     Diverticulosis     DVT (deep vein thrombosis) in pregnancy (H)     after colon surgery     Other and unspecified hyperlipidemia      PE (pulmonary embolism)     related to colon surgery     Unspecified essential hypertension      Past Surgical History:   Procedure Laterality Date     COLONOSCOPY  09, 10,     [facility]     COLONOSCOPY N/A 2017    Procedure: COLONOSCOPY;  colonoscopy;  Surgeon: Elvis Quezada MD;  Location: [facility]     FLEXIBLE SIGMOIDOSCOPY       LAPAROTOMY EXPLORATORY N/A 10/20/2014    Procedure: LAPAROTOMY EXPLORATORY;  Surgeon: Miguel Oleary MD;  Location: [facility]     LAPAROTOMY, LYSIS ADHESIONS, COMBINED N/A 10/20/2014    Procedure: COMBINED LAPAROTOMY, LYSIS ADHESIONS;  Surgeon: Miguel Oleary MD;  Location: [facility]     OPEN REDUCTION INTERNAL FIXATION HIP BIPOLAR Left 3/16/2017    Procedure: OPEN REDUCTION INTERNAL FIXATION HIP BIPOLAR;  Surgeon: Kaleb Pang DO;  Location: [facility]     RESECT SMALL BOWEL WITHOUT OSTOMY N/A 10/20/2014    Procedure: RESECT SMALL BOWEL WITHOUT OSTOMY;  Surgeon: Miguel Oleary MD;  Location: [facility]       Anesthesia Evaluation     . Pt has had prior anesthetic. Type: General    No history of anesthetic complications          ROS/MED HX    ENT/Pulmonary:  - neg pulmonary ROS     Neurologic:  - neg neurologic ROS     Cardiovascular:     (+) Dyslipidemia, hypertension-range: < 140 / 90, ---. Taking blood thinners Pt has received instructions: Instructions Given to patient: Will give FFP and bring to OR for ORIF of her left hip with  hemiarthroplasty. . . :. dysrhythmias a-fib, Irregular Heartbeat/Palpitations, . Previous cardiac testing Echodate:11/02/2014results:Interpretation Summary  Left ventricular systolic function is normal. No regional wall motion   abnormalities noted.  PatientHeight: 66 in  PatientWeight: 189 lbs  SystolicPressure: 104 mmHg  DiastolicPressure: 57 mmHg  HeartRate: 60 bpm  BSA 2.0 m^2        Left Ventricle  The left ventricle is normal in size.  There is borderline concentric left ventricular hypertrophy.  Left ventricular systolic function is normal.  The visual ejection fraction is estimated at 55-60%.  Normal left ventricular diastolic function.  E by E prime ratio is between 8 and 15, which is indeterminate for assessment   of left ventricular filling pressures.  No regional wall motion abnormalities noted.     Right Ventricle  The right ventricle is normal size.  The right ventricular systolic function is normal.     Atria  Normal left atrial size.  Right atrial size is normal.     Mitral Valve  The mitral valve leaflets appear normal. There is no evidence of stenosis,   fluttering, or prolapse.  There is physiologic mitral regurgitation.     Tricuspid Valve  Normal tricuspid valve.  There is trace to mild tricuspid regurgitation.  The right ventricular systolic pressure is approximated at 30 mmHg plus the   right atrial pressure.  Normal IVC (1.5-2.5cm) with >50% respiratory collapse; right atrial pressure   is estimated at 5-10mmHg.     Aortic Valve  The aortic valve is trileaflet.  No aortic regurgitation is present.  No aortic stenosis is present.     Pulmonic Valve  The pulmonic valve is not well visualized.  This degree of valvular regurgitation is within normal limits.  Normal pulmonic valve velocity.     Vessels  The aortic root is not well visualized.  Normal size ascending aorta.  The IVC is normal in size and reactivity with respiration, suggesting normal   central venous pressure.     Pericardium  There  is no pericardial effusion.     Rhythm  The rhythm was normal sinus.     Procedure  Complete Portable Echo Adult.     MMode 2D Measurements & Calculations  RVDd: 2.9 cm  IVSd: 1.1 cm  IVSs: 1.4 cm  LVIDd: 4.3 cm  LVIDs: 2.9 cm  LVPWd: 1.1 cm  LVPWs: 1.4 cm  FS: 33 %  LV mass(C)d: 163 grams  Ao root diam: 2.5 cm  LA dimension: 3.3 cm  asc Aorta: 3.1 cm  LA/Ao: 1.3   Doppler Measurements & Calculations  MV E point: 80 cm/sec  MV A point: 94 cm/sec  MV E/A: 0.85   MV dec time: 0.20 sec  TR Max nadine: 272 cm/sec  TR Max P mmHg     Interpreting Physician:  Johanna Mckeon MD electronically signed on   2014 12:14:10date: results:ECG reviewed date:03/15/2017 results:Sinus Bradycardia   -Old anterior infarct.     ABNORMAL    date: results:         : 140/90.   METS/Exercise Tolerance:  >4 METS   Hematologic: Comments: INR 3.08 this am - Given 5 mg Vit K and is on her 2nd unit if FFP and surgeon requests pt be brought to the surgery pre-op area and wants to proceed with Left hemiarthroplasty after consult with Dr. Akers.    (+) History of blood clots pt is anticoagulated, -      Musculoskeletal: Comment: Left hip fx - presents for ORIF of same with hemiarthroplasty  (+) arthritis, , Hip, -      (-) fracture lower extremity   GI/Hepatic:     (+) GERD Symptomatic,       Renal/Genitourinary:  - ROS Renal section negative       Endo:  - neg endo ROS       Psychiatric:  - neg psychiatric ROS       Infectious Disease:  - neg infectious disease ROS       Malignancy:      - no malignancy   Other:    - neg other ROS                      Physical Exam  Normal systems: dental    Airway   Mallampati: I  TM distance: >3 FB  Neck ROM: full    Dental     Cardiovascular   Rhythm and rate: regular and normal      Pulmonary    breath sounds clear to auscultation            Lab Results   Component Value Date    WBC 5.3 2018    HGB 13.3 2018    HCT 41.7 2018     2018    CRP <2.9 10/20/2014    NA  "141 12/21/2018    POTASSIUM 4.4 12/21/2018    CHLORIDE 105 12/21/2018    CO2 28 12/21/2018    BUN 18 12/21/2018    CR 0.99 12/21/2018    GLC 93 12/21/2018    HARVEY 9.0 12/21/2018    ALBUMIN 3.4 10/10/2018    PROTTOTAL 6.4 (L) 10/10/2018    ALT 25 10/10/2018    AST 22 10/10/2018    ALKPHOS 99 10/10/2018    BILITOTAL 1.3 10/10/2018    LIPASE 115 11/01/2014    PTT 34 11/01/2014    INR 1.2 (A) 01/07/2019    TSH 1.56 08/24/2015       Preop Vitals  BP Readings from Last 3 Encounters:   01/02/19 126/75   12/21/18 112/66   11/15/18 138/84    Pulse Readings from Last 3 Encounters:   12/21/18 73   10/10/18 74   06/13/18 68      Resp Readings from Last 3 Encounters:   12/21/18 16   10/10/18 16   06/13/18 16    SpO2 Readings from Last 3 Encounters:   12/21/18 98%   10/10/18 97%   04/11/18 97%      Temp Readings from Last 1 Encounters:   12/21/18 96.2  F (35.7  C) (Temporal)    Ht Readings from Last 1 Encounters:   01/02/19 1.632 m (5' 4.25\")      Wt Readings from Last 1 Encounters:   01/02/19 81.6 kg (180 lb)    Estimated body mass index is 30.66 kg/m  as calculated from the following:    Height as of 1/2/19: 1.632 m (5' 4.25\").    Weight as of 1/2/19: 81.6 kg (180 lb).       Anesthesia Plan      History & Physical Review  History and physical reviewed and following examination; no interval change.    ASA Status:  2 .    NPO Status:  > 6 hours    Plan for Periph. Nerve Block for postop pain and Spinal with Propofol induction. Maintenance will be TIVA.    PONV prophylaxis:  Ondansetron (or other 5HT-3) and Dexamethasone or Solumedrol       Postoperative Care  Postoperative pain management:  IV analgesics, Oral pain medications, Peripheral nerve block (Single Shot) and Multi-modal analgesia.      Consents  Anesthetic plan, risks, benefits and alternatives discussed with:  Patient and Spouse.  Use of blood products discussed: No .   Use of blood products discussed with Patient and Spouse.  .                 BILLY Melendrez " CRNA

## 2019-01-08 NOTE — ANESTHESIA PROCEDURE NOTES
Peripheral nerve/Neuraxial procedure note : intrathecal  Pre-Procedure  Performed by  Sergey Dunaway APRN CRNA   Location: OR      Pre-Anesthestic Checklist: patient identified, IV checked, risks and benefits discussed, informed consent, monitors and equipment checked and pre-op evaluation    Timeout  Correct Patient: Yes   Correct Procedure: Yes   Correct Site: Yes   Correct Laterality: N/A   Correct Position: Yes   Site Marked: N/A   .   Procedure Documentation  ASA 2  Diagnosis:Knee surgery.    Procedure:    Intrathecal.  Insertion Site:L3-4  (midline approach)      Patient Prep;mask, sterile gloves, povidone-iodine 7.5% surgical scrub, patient draped.  .  Needle: Donna tip Spinal Needle (gauge): 25  Spinal/LP Needle Length (inches): 3.5 # of attempts: 1 and # of redirects:  Introducer used Introducer: 20 G .       Assessment/Narrative  Paresthesias: No.  .  .  clear CSF fluid removed while sitting   . Sensory Level: T6 Comments:  Patient tolerated spinal block very well. No complications noted.

## 2019-01-08 NOTE — PLAN OF CARE
Discharge Planner PT   Patient plan for discharge: TCU  Current status: Patient is a 78 year old female, day 0 status post right total knee arthroplasty. Patient has a previous medical history of AFib, HTN, hyperlipidemia, anemia, pulmonary emboli and left hip fracture with bipolar ORIF in 3/2017. Prior to admission patient living alone in a single family home with no stairs to enter and stairs into the basement which she does not use. Patient reports using a walker and cane in 2017, thus has equipment, however does not use currently. Patient reports no falls in the past 6 months with decreased activity tolerance due to knee pain. Patient with daughter's present and supportive however patient requesting to discharge to TCU. Currently, patient with knee immobilizer on, instructed in management and use of device. Completed LE TKA HEP with good tolerance, knee AROM 3-30 degrees with pain at end range. Supine to sitting EOB MOD IND with knee immobilizer. Sitting EOB 5 minutes with dizziness and declined to stand. Sit to supine min assist for LE management with good tolerance. Good bridging and scooting in bed with knee immobilizer. Educated regarding CAPNO, limb position, knee extension, elevation and icing.   Barriers to return to prior living situation: Medical status, decreased activity tolerance, knee pain, impaired balance, decreased safety with transitional movements, lives alone  Recommendations for discharge: TCU  Rationale for recommendations: Patient mobilized fair, unable to to progress to gait however given patient's prior level of function and motivation anticipate patient to progress well with mobility. Given patient's lack of assistance in the home she would benefit from TCU placement for safe discharge disposition. Patient would benefit from continued skilled therapeutic intervention in order to progress them towards their desired level of function in accordance with their surgeon's protocol.        Entered by: Anna Mortensen 01/08/2019 4:58 PM

## 2019-01-08 NOTE — BRIEF OP NOTE
[facility] Brief Operative Note    Pre-operative diagnosis: right knee primary osteoarthritis    Post-operative diagnosis: right knee primary osteoarthritis    Procedure: Procedure(s):  Right total knee replacement (patella was not resurfaced)   Surgeon:  Anesthesia: Kaleb Pang DO  Spinal-converted to general   Assistant(s): Jared Montiel, APRN, CNP, DNP (A advanced practice provider was necessary for his expertise, exposure and surgical assistance throughout the case.)   Estimated blood loss:  Fluids:  UO:  TT/Pressure: Less than 10 ml  1400 ml Crystalloids  75 ml  83 minutes at 292 mmHg   Specimens: None   Findings: See dictated operative report for full details [id number]     Vitaly Pang D.O.

## 2019-01-08 NOTE — ANESTHESIA PROCEDURE NOTES
Peripheral nerve/Neuraxial procedure note : Adductor canal  Pre-Procedure  Performed by  Sergey Dunaway APRN CRNA   Location: post-op      Pre-Anesthestic Checklist: patient identified, IV checked, site marked, risks and benefits discussed, informed consent, monitors and equipment checked, pre-op evaluation, at physician/surgeon's request and post-op pain management    Timeout  Correct Patient: Yes   Correct Procedure: Yes   Correct Site: Yes   Correct Laterality: Yes   Correct Position: Yes   Site Marked: Yes   .   Procedure Documentation    Diagnosis:POST OP PAIN CONTROL.    Procedure:  right  Adductor canal.  Local skin infiltrated with 1 mL of 1% lidocaine.     Ultrasound used to identify targeted nerve, plexus, or vascular marker and placed a needle adjacent to it., Ultrasound was used to visualize the spread of the anesthetic in close proximity to the above stated nerve. A permanent image is entered into the patient's record.  Patient Prep;mask, sterile gloves, chlorhexidine gluconate and isopropyl alcohol.  Nerve Stim: Initial Level 0.05 mA. Lowest motor response mA..  Needle: insulated Needle Gauge: 22.  Needle Length (millimeters) 100  Insertion Method: Single Shot.       Assessment/Narrative  Paresthesias: No.  Injection made incrementally with aspirations every 5 mL..  The placement was negative for: blood aspirated, painful injection and site bleeding.  Bolus given via needle..   Secured via.   Complications: none. Comments:  Pt tolerated procedure well.  Patient's pain decreased to 9 / 10 from 3/10.  No complications noted.  Will follow if needed.

## 2019-01-09 ENCOUNTER — APPOINTMENT (OUTPATIENT)
Dept: PHYSICAL THERAPY | Facility: CLINIC | Age: 79
DRG: 470 | End: 2019-01-09
Attending: ORTHOPAEDIC SURGERY
Payer: MEDICARE

## 2019-01-09 ENCOUNTER — APPOINTMENT (OUTPATIENT)
Dept: CARDIOLOGY | Facility: CLINIC | Age: 79
DRG: 470 | End: 2019-01-09
Attending: ORTHOPAEDIC SURGERY
Payer: MEDICARE

## 2019-01-09 ENCOUNTER — APPOINTMENT (OUTPATIENT)
Dept: OCCUPATIONAL THERAPY | Facility: CLINIC | Age: 79
DRG: 470 | End: 2019-01-09
Attending: ORTHOPAEDIC SURGERY
Payer: MEDICARE

## 2019-01-09 ENCOUNTER — TELEPHONE (OUTPATIENT)
Dept: ORTHOPEDICS | Facility: OTHER | Age: 79
End: 2019-01-09

## 2019-01-09 PROBLEM — I50.810 RVF (RIGHT VENTRICULAR FAILURE) (H): Status: ACTIVE | Noted: 2019-01-09

## 2019-01-09 PROBLEM — R60.0 PERIPHERAL EDEMA: Status: ACTIVE | Noted: 2019-01-09

## 2019-01-09 LAB
GLUCOSE SERPL-MCNC: 145 MG/DL (ref 70–99)
HGB BLD-MCNC: 10.5 G/DL (ref 11.7–15.7)
INR PPP: 1.15 (ref 0.86–1.14)
TROPONIN I SERPL-MCNC: <0.015 UG/L (ref 0–0.04)

## 2019-01-09 PROCEDURE — 40000264 ECHOCARDIOGRAM COMPLETE

## 2019-01-09 PROCEDURE — 97165 OT EVAL LOW COMPLEX 30 MIN: CPT | Mod: GO

## 2019-01-09 PROCEDURE — 97530 THERAPEUTIC ACTIVITIES: CPT | Mod: GP | Performed by: PHYSICAL THERAPIST

## 2019-01-09 PROCEDURE — 40000193 ZZH STATISTIC PT WARD VISIT

## 2019-01-09 PROCEDURE — 97530 THERAPEUTIC ACTIVITIES: CPT | Mod: GP

## 2019-01-09 PROCEDURE — 93306 TTE W/DOPPLER COMPLETE: CPT | Mod: 26 | Performed by: INTERNAL MEDICINE

## 2019-01-09 PROCEDURE — 25000128 H RX IP 250 OP 636: Performed by: ORTHOPAEDIC SURGERY

## 2019-01-09 PROCEDURE — 82947 ASSAY GLUCOSE BLOOD QUANT: CPT | Performed by: ORTHOPAEDIC SURGERY

## 2019-01-09 PROCEDURE — 85018 HEMOGLOBIN: CPT | Performed by: ORTHOPAEDIC SURGERY

## 2019-01-09 PROCEDURE — A9270 NON-COVERED ITEM OR SERVICE: HCPCS | Mod: GY | Performed by: ORTHOPAEDIC SURGERY

## 2019-01-09 PROCEDURE — 25500064 ZZH RX 255 OP 636: Performed by: INTERNAL MEDICINE

## 2019-01-09 PROCEDURE — 84484 ASSAY OF TROPONIN QUANT: CPT | Performed by: ORTHOPAEDIC SURGERY

## 2019-01-09 PROCEDURE — 97110 THERAPEUTIC EXERCISES: CPT | Mod: GP

## 2019-01-09 PROCEDURE — 36415 COLL VENOUS BLD VENIPUNCTURE: CPT | Performed by: INTERNAL MEDICINE

## 2019-01-09 PROCEDURE — 40000133 ZZH STATISTIC OT WARD VISIT

## 2019-01-09 PROCEDURE — 99232 SBSQ HOSP IP/OBS MODERATE 35: CPT | Performed by: PEDIATRICS

## 2019-01-09 PROCEDURE — 12000000 ZZH R&B MED SURG/OB

## 2019-01-09 PROCEDURE — 25000132 ZZH RX MED GY IP 250 OP 250 PS 637: Mod: GY | Performed by: ORTHOPAEDIC SURGERY

## 2019-01-09 PROCEDURE — 85610 PROTHROMBIN TIME: CPT | Performed by: ORTHOPAEDIC SURGERY

## 2019-01-09 PROCEDURE — 40000193 ZZH STATISTIC PT WARD VISIT: Performed by: PHYSICAL THERAPIST

## 2019-01-09 PROCEDURE — 36415 COLL VENOUS BLD VENIPUNCTURE: CPT | Performed by: ORTHOPAEDIC SURGERY

## 2019-01-09 PROCEDURE — 84484 ASSAY OF TROPONIN QUANT: CPT | Performed by: INTERNAL MEDICINE

## 2019-01-09 PROCEDURE — 97535 SELF CARE MNGMENT TRAINING: CPT | Mod: GO

## 2019-01-09 PROCEDURE — 97116 GAIT TRAINING THERAPY: CPT | Mod: GP | Performed by: PHYSICAL THERAPIST

## 2019-01-09 RX ORDER — WARFARIN SODIUM 5 MG/1
5 TABLET ORAL
Status: COMPLETED | OUTPATIENT
Start: 2019-01-09 | End: 2019-01-09

## 2019-01-09 RX ADMIN — METHOCARBAMOL 500 MG: 500 TABLET ORAL at 16:00

## 2019-01-09 RX ADMIN — CEFAZOLIN SODIUM 2 G: 2 INJECTION, SOLUTION INTRAVENOUS at 01:05

## 2019-01-09 RX ADMIN — ACETAMINOPHEN 975 MG: 325 TABLET ORAL at 23:07

## 2019-01-09 RX ADMIN — HYDROXYZINE HYDROCHLORIDE 10 MG: 10 TABLET ORAL at 19:35

## 2019-01-09 RX ADMIN — GABAPENTIN 100 MG: 100 CAPSULE ORAL at 20:39

## 2019-01-09 RX ADMIN — HUMAN ALBUMIN MICROSPHERES AND PERFLUTREN 4 ML: 10; .22 INJECTION, SOLUTION INTRAVENOUS at 08:29

## 2019-01-09 RX ADMIN — OXYCODONE HYDROCHLORIDE 5 MG: 5 TABLET ORAL at 23:42

## 2019-01-09 RX ADMIN — OXYCODONE HYDROCHLORIDE 5 MG: 5 TABLET ORAL at 06:57

## 2019-01-09 RX ADMIN — WARFARIN SODIUM 5 MG: 5 TABLET ORAL at 18:00

## 2019-01-09 RX ADMIN — ENOXAPARIN SODIUM 80 MG: 80 INJECTION SUBCUTANEOUS at 14:25

## 2019-01-09 RX ADMIN — OXYCODONE HYDROCHLORIDE 5 MG: 5 TABLET ORAL at 14:25

## 2019-01-09 RX ADMIN — METHOCARBAMOL 500 MG: 500 TABLET ORAL at 23:07

## 2019-01-09 RX ADMIN — ENOXAPARIN SODIUM 80 MG: 80 INJECTION SUBCUTANEOUS at 23:08

## 2019-01-09 RX ADMIN — DOCUSATE SODIUM 100 MG: 100 CAPSULE, LIQUID FILLED ORAL at 09:57

## 2019-01-09 RX ADMIN — OXYCODONE HYDROCHLORIDE 5 MG: 5 TABLET ORAL at 10:05

## 2019-01-09 RX ADMIN — OXYCODONE HYDROCHLORIDE 5 MG: 5 TABLET ORAL at 20:39

## 2019-01-09 RX ADMIN — OXYCODONE HYDROCHLORIDE 5 MG: 5 TABLET ORAL at 18:00

## 2019-01-09 RX ADMIN — DOCUSATE SODIUM 100 MG: 100 CAPSULE, LIQUID FILLED ORAL at 20:39

## 2019-01-09 RX ADMIN — ACETAMINOPHEN 975 MG: 325 TABLET ORAL at 14:25

## 2019-01-09 RX ADMIN — ACETAMINOPHEN 975 MG: 325 TABLET ORAL at 06:24

## 2019-01-09 ASSESSMENT — ACTIVITIES OF DAILY LIVING (ADL)
RETIRED_COMMUNICATION: 0-->UNDERSTANDS/COMMUNICATES WITHOUT DIFFICULTY
AMBULATION: 0-->INDEPENDENT
SWALLOWING: 0-->SWALLOWS FOODS/LIQUIDS WITHOUT DIFFICULTY
ADLS_ACUITY_SCORE: 13
ADLS_ACUITY_SCORE: 13
BATHING: 0-->INDEPENDENT
FALL_HISTORY_WITHIN_LAST_SIX_MONTHS: NO
ADLS_ACUITY_SCORE: 16
ADLS_ACUITY_SCORE: 16
DRESS: 0-->INDEPENDENT
COGNITION: 0 - NO COGNITION ISSUES REPORTED
RETIRED_EATING: 0-->INDEPENDENT
TOILETING: 1-->ASSISTIVE EQUIPMENT
ADLS_ACUITY_SCORE: 13
TRANSFERRING: 0-->INDEPENDENT
PREVIOUS_RESPONSIBILITIES: HOUSEKEEPING;MEAL PREP;LAUNDRY;SHOPPING;YARDWORK;MEDICATION MANAGEMENT;FINANCES;DRIVING
ADLS_ACUITY_SCORE: 16

## 2019-01-09 NOTE — PLAN OF CARE
Pt alert/orientated, VSS, LS clear, IS up to 2000, CMS intact, afebrile, Echo completed today ER 50-60%, getting prn oxycodone 5mg q 3 hrs, had robaxen around 1615 for increased pain, ice pack also used, Tele NSR, coumadin restarted tonight, INR 1.15. Trop at noon neg.

## 2019-01-09 NOTE — PROGRESS NOTES
01/08/19 1400   Quick Adds   Type of Visit Initial PT Evaluation       Present no   Living Environment   Lives With alone   Living Arrangements house   Home Accessibility no concerns;stairs within home  (to basement, however does not need to access. )   Living Environment Comment 1 step to wash room within the [facility]   Self-Care   Usual Activity Tolerance moderate   Current Activity Tolerance moderate   Equipment Currently Used at Home cane, straight;grab bar, toilet;grab bar, tub/shower;shower chair;lift device   Activity/Exercise/Self-Care Comment Able to walk 4,000 steps without pain, the remaining 4,000 a day resulted in increased pain - decreased walking in November due to pain. Has a 2WW and SPC available at home. Walk in shower.    Functional Level Prior   Ambulation 0-->independent   Transferring 0-->independent   Toileting 1-->assistive equipment  (grab bar when needed)   Bathing 0-->independent   Communication 0-->understands/communicates without difficulty   Swallowing 0-->swallows foods/liquids without difficulty   Cognition 0 - no cognition issues reported   Fall history within last six months no   Which of the above functional risks had a recent onset or change? none   Prior Functional Level Comment Uses a SPC when needed, however not used recently.   General Information   Onset of Illness/Injury or Date of Surgery - Date 01/08/19   Referring Physician Dr. Pang   Patient/Family Goals Statement Discharge to TCU   Pertinent History of Current Problem (include personal factors and/or comorbidities that impact the POC) Patient is a 78 year old female, day 0 status post right total knee arthroplasty. Patient has a previous medical history of AFib, HTN, hyperlipidemia, anemia, pulmonary emboli and left hip fracture with bipolar ORIF in 3/2017.   Precautions/Limitations fall precautions   Weight-Bearing Status - LUE full weight-bearing   Weight-Bearing Status - RUE full  weight-bearing   Weight-Bearing Status - LLE full weight-bearing   Weight-Bearing Status - RLE weight-bearing as tolerated   General Observations Patient alert and pleasant, daughters Iesha and Jenny visiting. Patient agreeable to PT evaluation.    General Info Comments PT orders: evaluate and treat post operative knee. Activity orders: up in chair, up with assistance, position knee, PCDs, CAPNO and incentive spirometer.    Cognitive Status Examination   Orientation orientation to person, place and time   Level of Consciousness alert   Follows Commands and Answers Questions 100% of the time;able to follow multistep instructions   Personal Safety and Judgment intact   Memory intact   Pain Assessment   Patient Currently in Pain No   Integumentary/Edema   Integumentary/Edema Comments RLE post operative dressing intact   Posture    Posture Forward head position;Kyphosis   Range of Motion (ROM)   ROM Comment Bilat UE WFL, LLE WFL, right knee arom 3-30 with pain   Strength   Strength Comments bilat UE 4/5, LLE 4/5   Bed Mobility   Bed Mobility Bed mobility skill: Scooting/Bridging;Bed mobility skill: Sit to supine;Bed mobility skill: Supine to sit   Bed Mobility Skill: Scooting/Bridging   Level of [place]: Scooting/Bridging stand-by assist   Physical/Nonphysical Assist: Scooting/Bridging verbal cues   Bed Mobility Skill: Sit to Supine   Level of [place]: Sit/Supine minimum assist (75% patients effort)   Physical Assist/Nonphysical Assist: Sit/Supine supervision;verbal cues   Assistive Device: Sit/Supine (knee immobilizer)   Bed Mobility Skill: Supine to Sit   Level of [place]: Supine/Sit stand-by assist   Physical Assist/Nonphysical Assist: Supine/Sit supervision;verbal cues   Assistive Device: Supine/Sit (knee immobilizer)   Transfer Skills   Transfer Comments Did not progress due to symptoms upon sitting EOB   Balance   Balance Comments IND sitting balance   Sensory Examination   Sensory Perception no  deficits were identified   Coordination   Coordination no deficits were identified   Muscle Tone   Muscle Tone no deficits were identified   Modality Interventions   Planned Modality Interventions Cryotherapy   Planned Modality Interventions Comments right knee PRN   General Therapy Interventions   Planned Therapy Interventions balance training;bed mobility training;gait training;ROM;strengthening;home program guidelines   Clinical Impression   Criteria for Skilled Therapeutic Intervention yes, treatment indicated   PT Diagnosis right knee stiffness, pain, muscle weakness, impaired gait   Influenced by the following impairments day 0 status post right TKA   Functional limitations due to impairments impaired functional mobility, decreased safety with transitional movement, increased assistance for self care and safe mobilization, decreased activity tolerance   Clinical Presentation Evolving/Changing   Clinical Presentation Rationale Patient is day 0 status post TKA, reports changes in knee pain and dizziness with mobilization. Anticipate progression towards stable presentation with medical management and therapeutic intervention.    Clinical Decision Making (Complexity) Low complexity   Therapy Frequency` 2 times/day   Predicted Duration of Therapy Intervention (days/wks) 2-3 days   Anticipated Equipment Needs at Discharge (defer to TCU)   Anticipated Discharge Disposition Transitional Care Facility   Risk & Benefits of therapy have been explained Yes   Patient, Family & other staff in agreement with plan of care Yes   Clinical Impression Comments Patient mobilized fair, unable to progress to gait however given patient's prior level of function and motivation anticipate patient to progress well with mobility. Given patient's lack of assistance in the home she would benefit from TCU placement for safe discharge disposition. Patient would benefit from continued skilled therapeutic intervention in order to progress them  "towards their desired level of function in accordance with their surgeon's protocol.   [employer] AM-PAC TM \"6 Clicks\"   2016, Trustees of [employer], under license to [employer].  All rights reserved.   6 Clicks Short Forms Basic Mobility Inpatient Short Form   [employer] AM-PAC  \"6 Clicks\" V.2 Basic Mobility Inpatient Short Form   1. Turning from your back to your side while in a flat bed without using bedrails? 4 - None   2. Moving from lying on your back to sitting on the side of a flat bed without using bedrails? 4 - None   3. Moving to and from a bed to a chair (including a wheelchair)? 2 - A Lot   4. Standing up from a chair using your arms (e.g., wheelchair, or bedside chair)? 2 - A Lot   5. To walk in hospital room? 2 - A Lot   6. Climbing 3-5 steps with a railing? 1 - Total   Basic Mobility Raw Score (Score out of 24.Lower scores equate to lower levels of function) 15   Total Evaluation Time   Total Evaluation Time (Minutes) 15     Thank you for your referral.    Anna Mortensen, PT, DPT, ATC    [facility]    O: [phone]  E: [email or web address]      "

## 2019-01-09 NOTE — ANESTHESIA POSTPROCEDURE EVALUATION
Patient: Tracy Palomo    Procedure(s):  Right total knee replacement    Diagnosis:Right knee arthritis  Diagnosis Additional Information: No value filed.    Anesthesia Type:  Spinal    Note:  Anesthesia Post Evaluation    Patient location during evaluation: Floor  Patient participation: Able to fully participate in evaluation  Level of consciousness: awake and alert  Pain management: adequate  Airway patency: patent  Cardiovascular status: blood pressure returned to baseline  Respiratory status: spontaneous ventilation and room air  Hydration status: acceptable  PONV: none     Anesthetic complications: None    Comments: Patient was very pleased with her anesthetic yesterday.  She feels her adductor canal block is working and has only had to take tylenol po.  She has been up walking with PT without incident. No anesthesia concerns.         Last vitals:  Vitals:    01/08/19 2235 01/09/19 0419 01/09/19 0843   BP: 103/58 108/52 116/65   Pulse:  54 57   Resp: 16 15 16   Temp: 97.2  F (36.2  C) 96.7  F (35.9  C) 97.2  F (36.2  C)   SpO2: 95% 97% 97%         Electronically Signed By: BILLY Scott CRNA  January 9, 2019  12:25 PM

## 2019-01-09 NOTE — PROGRESS NOTES
Saw and examined patient. More sore after physical therapy and block has worn off.  Pain with knee.  Ambulatory but sore.  No other new symptoms.  Starting oral medications.    Daughters had multiple questions and concerns. All questions answered concerns discussed.  Per patient staying with a sister would not work now as she has 2 large active dogs and concerned about being injured.  No family to stay with patient she would like to continue with plan for rehab.  Discussed edema control again with patient and family.    /52 (BP Location: Left arm)   Pulse 59   Temp 97.7  F (36.5  C) (Oral)   Resp 16   SpO2 96%   Dressing CDI.  Sensation intact to foot.  Some swelling throughout leg. Toes well perfused.      Will see again tomorrow AM.    BILLY Oh, CNP  Orthopedic Surgery

## 2019-01-09 NOTE — PLAN OF CARE
Discharge Planner PT   Patient plan for discharge: TCU  Current status: Pt was able to complete IND sit <> supine transfers with HOB flat today. SIT <> stand transfers with first trial to stand min A, following trials only required SBA with FWW. Pt ambulated 70 feet in the hallway with FWW with step through patterning and good walker negotiation. Good engagement with TKA exericses. Noted to actively achieve about 40 degs knee flexion, lacked about 5 degs from full extension.   Barriers to return to prior living situation: Lives alone, assist for mobility  Recommendations for discharge: TCU  Rationale for recommendations: To progress strength and functional mobility for improved safety and IND in home       Entered by: Linnette Hernandez 01/09/2019 12:09 PM     Thank you for your referral,     Linnette Hernandez, PT, DPT  [phone]  [facility]

## 2019-01-09 NOTE — PLAN OF CARE
Discharge Planner OT   Patient plan for discharge: Pt is debating on TCU or home with HH care  Current status: OT eval completed and treatment initiated. Pt completed all aspects of toileting with CGA and verbal cues for safety with use of toilet safety frame; min assist for bed mobility; mod assist for LB dressing. Pt lives alone in an accessible home. Pt does have all recommended AE to facilitate safety within her home, however is unsure if she would be able to receive 24/7 support upon discharge. Pt states her children may be able to stay with her over the weekend or she may be able to stay at her sister's house, but she is unsure at this time.  Barriers to return to prior living situation: Lives alone; level of assist  Recommendations for discharge: TCU or home with HHA, HH OT, and support from family, pending pt's progress and if she is able to determine safe plan for 24/7 supervision/assist upon discharge  Rationale for recommendations: Given pt's current level of function pt would benefit from further skilled OT services within the TCU setting to increase independence with ADLs and progress toward prior level of function. If pt is able to acquire 24/7 supervision/assist upon discharge, home with HHA and HHOT may be appropriate.        Entered by: Mary Alice Dodson 01/09/2019 1:32 PM

## 2019-01-09 NOTE — PROGRESS NOTES
01/09/19 1145   Quick Adds   Type of Visit Initial Occupational Therapy Evaluation   Living Environment   Lives With alone   Living Arrangements house   Home Accessibility no concerns;stairs within home  (to basement, does not need to access. )   Living Environment Comment Pt reports her home is accessible. Walk in shower with shower chair. Grab bars by shower and toilet. Pt lives alone, however states her children may be able to stay with her over the weekend. Pt is also thinking about staying with her sister upon d/c   Self-Care   Usual Activity Tolerance moderate   Current Activity Tolerance fair   Equipment Currently Used at Home cane, straight;grab bar, toilet;grab bar, tub/shower;shower chair;lift device   Functional Level   Ambulation 0-->independent   Transferring 0-->independent   Toileting 1-->assistive equipment  (grab bar when needed)   Bathing 0-->independent   Dressing 0-->independent   Eating 0-->independent   Communication 0-->understands/communicates without difficulty   Swallowing 0-->swallows foods/liquids without difficulty   Cognition 0 - no cognition issues reported   Fall history within last six months no   Which of the above functional risks had a recent onset or change? none   General Information   Onset of Illness/Injury or Date of Surgery - Date 01/08/19   Referring Physician Dr. Pang   Patient/Family Goals Statement TCU or return home, pt is attempting to figure out options right now   Additional Occupational Profile Info/Pertinent History of Current Problem Patient is a 78 year old female being seen for OT evaluation s/p right total knee arthroplasty. Patient has a previous medical history of AFib, HTN, hyperlipidemia, anemia, pulmonary emboli and left hip fracture with bipolar ORIF in 3/2017. Pt lives alone in an accessible house and was previously independent with all ADL/IADL tasks.    Mobility   Bed Mobility Bed mobility skill: Supine to sit;Bed mobility skill: Sit to supine   Bed  Mobility Skill: Sit to Supine   Level of [place]: Sit/Supine minimum assist (75% patients effort)   Physical Assist/Nonphysical Assist: Sit/Supine 1 person assist   Bed Mobility Skill: Supine to Sit   Level of [place]: Supine/Sit minimum assist (75% patients effort)   Physical Assist/Nonphysical Assist: Supine/Sit 1 person assist   Bathing   Level of [place] minimum assist (75% patients effort)   Physical Assist/Nonphysical Assist 1 person assist   Assistive Device shower chair;detachable shower head   Upper Body Dressing   Level of [place]: Dress Upper Body independent   Lower Body Dressing   Level of [place]: Dress Lower Body moderate assist (50% patients effort)   Physical Assist/Nonphysical Assist: Dress Lower Body 1 person assist   Toileting   Level of [place]: Toilet contact guard   Physical Assist/Nonphysical Assist: Toilet 1 person assist   Grooming   Level of [place]: Grooming contact guard   Physical Assist/Nonphysical Assist: Grooming 1 person assist   Eating/Self Feeding   Level of [place]: Eating independent   Instrumental Activities of Daily Living (IADL)   Previous Responsibilities housekeeping;meal prep;laundry;shopping;yardwork;medication management;finances;driving   IADL Comments Pt was previously independent with all ADL   Activities of Daily Living Analysis   Impairments Contributing to Impaired Activities of Daily Living balance impaired;ROM decreased;post surgical precautions;pain;strength decreased   General Therapy Interventions   Planned Therapy Interventions ADL retraining;IADL retraining;bed mobility training;ROM;strengthening   Clinical Impression   Criteria for Skilled Therapeutic Interventions Met yes, treatment indicated   OT Diagnosis Decreased independence with ADL/IADL   Influenced by the following impairments R TKA   Assessment of Occupational Performance 3-5 Performance Deficits   Identified Performance Deficits dressing, bathing,  "toileting, home mgmt   Clinical Decision Making (Complexity) Low complexity   Therapy Frequency daily   Predicted Duration of Therapy Intervention (days/wks) 1-3 days   Anticipated Discharge Disposition Transitional Care Facility;Home with Home Therapy;Home with Assist   Risks and Benefits of Treatment have been explained. Yes   Patient, Family & other staff in agreement with plan of care Yes   Clinical Impression Comments Pt would benefit from skilled OT inpatient services to increase independence with ADL and progress toward prior level of function   [facility] TM \"6 Clicks\"   2016, Trustees of [facility], under license to [employer].  All rights reserved.   6 Clicks Short Forms Daily Activity Inpatient Short Form   [facility] AM-PAC  \"6 Clicks\" Daily Activity Inpatient Short Form   1. Putting on and taking off regular lower body clothing? 3 - A Little   2. Bathing (including washing, rinsing, drying)? 3 - A Little   3. Toileting, which includes using toilet, bedpan or urinal? 3 - A Little   4. Putting on and taking off regular upper body clothing? 4 - None   5. Taking care of personal grooming such as brushing teeth? 3 - A Little   6. Eating meals? 4 - None   Daily Activity Raw Score (Score out of 24.Lower scores equate to lower levels of function) 20   Total Evaluation Time   Total Evaluation Time (Minutes) 10     MARLENE Sam/L  [facility]  [phone]  "

## 2019-01-09 NOTE — CONSULTS
[facility]    Hospitalist Consultation    Date of Admission:  1/8/2019    Assessment & Plan   Tracy Palomo is a 78 year old female who was admitted on 1/8/2019 for elective Right TKA . I was asked to see the patient for evaluation of chest pain.    Principal Problem:      Chest pain, nonexertional, developed at 20: 55. DD angina vs gerd vs musculoskeletal. Unlikely PE-no hypoxia, tachypnea, tachycardia, cough, pleuritic component of chest pain.  Chest pain developed during emotional conversation with her family-mentioning patient's recent in August 2018 death of her , and other numerous losses of friends/family within last 4 months.  She has been resolved spontaneously within 5 minutes.  EKG showed sinus bradycardia, HR 59, old inferior MI-QS in lead III, which look the same on EKG from 12/21/18 and 3/15/17.  No history of CAD, CHF, valvular abnormalities.  Most recent echo on 11/1/14: Concentric LVH, EF 55-60%, no diastolic dysfunction, no WMA.  Patient CAD risk factors: HTN, HLD, age.  -Obtain serial cardiac enzymes; echo in a.m.  Repeat EKG if chest pain occurs.  Telemetry monitoring.  As needed NTG.  One-time dose of GI cocktail.  Continue anticoagulation-Coumadin and Lovenox bridge.    Active Problems:    Hypertension goal BP (blood pressure) < 140/90; low normal postop BP.  Takes lisinopril at home.  Holding lisinopril for now, will resume when SBP persistently above 120.    Paroxysmal atrial fibrillation, currently normal SR.  Not in rate control medications.  Anticoagulated on Coumadin.  This was stopped preop.  INR 1.1 1/7/19.  Coumadin resumed, on Lovenox per orthopedics.      Hyperlipidemia LDL goal <130, on lovastatin, continue.      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, developed after colonoscopy in 2014.  Anticoagulated on Coumadin.      S/P total knee replacement using cement, right, on 1/8/19.  No immediate postop complications.   Management per orthopedic surgery.    DVT Prophylaxis: Enoxaparin (Lovenox) SQ and Warfarin  Code Status: Full Code    Disposition: Expected discharge in 2-3 days once cleared for discharge by orthopedics.    Eunice Feldman    Reason for Consult   Reason for consult: I was asked by Dr. Pang to evaluate this patient for chest pain.    Primary Care Physician   Chad Betts    Chief Complaint   Chest pain    History is obtained from the patient    History of Present Illness   Tracy Palomo is a 78 year old female with PMH of paroxysmal atrial fibrillation, PE developed after colonoscopy, chronic anticoagulation with Coumadin, HTN, HLD, left hip fracture from fall/status post ORIF, admitted by orthopedic surgery for elective right TKA.  Uneventful surgery, per Orth O note reviewed.  Postoperatively doing well.  Patient developed substernal chest pain/pressure at 20: 55, during visit of her daughter.  They were talking about patient  status in August 2018, and on the family/friend losses within last 4 months.  In the middle of conversation patient developed substernal chest discomfort, without radiation, without associated dyspnea, diaphoresis, nausea, lightheadedness.  Stat EKG obtained, showed normal sinus rhythm, no ischemic changes.  Chest pain resolved spontaneously.  First troponin obtained, results pending.  Patient denies history of similar chest pains in the past.  No history of CAD, CHF, valvular disease- per echo in 2014.  BP low normal postop.  He is anticoagulated on Coumadin, which was felt preop, and resumed today, along with Lovenox.  Denies history of GERD.  Patient endorses stress test more than 10 years ago, when she used to live in [place].  To have best recollection, it was negative for inducible ischemia.  No history of coronary angiograms.  No history of diabetes, PVD, smoking, stimulant abuse.    Past Medical History    I have reviewed this patient's medical history and updated it with  pertinent information if needed.   Past Medical History:   Diagnosis Date     Atrial fibrillation (H) 2014    related to colon surgery     Colon polyps      Diverticulosis of colon (without mention of hemorrhage)     Diverticulosis     DVT (deep vein thrombosis) in pregnancy (H) 2014    after colon surgery     Other and unspecified hyperlipidemia      PE (pulmonary embolism) 2014    related to colon surgery     Unspecified essential hypertension        Past Surgical History   I have reviewed this patient's surgical history and updated it with pertinent information if needed.  Past Surgical History:   Procedure Laterality Date     COLONOSCOPY  09, 10, 12    [facility]     COLONOSCOPY N/A 12/11/2017    Procedure: COLONOSCOPY;  colonoscopy;  Surgeon: Elvis Quezada MD;  Location: [facility]     FLEXIBLE SIGMOIDOSCOPY  09, 11     LAPAROTOMY EXPLORATORY N/A 10/20/2014    Procedure: LAPAROTOMY EXPLORATORY;  Surgeon: Miguel Oleary MD;  Location: [facility]     LAPAROTOMY, LYSIS ADHESIONS, COMBINED N/A 10/20/2014    Procedure: COMBINED LAPAROTOMY, LYSIS ADHESIONS;  Surgeon: Miguel Oleary MD;  Location: [facility]     OPEN REDUCTION INTERNAL FIXATION HIP BIPOLAR Left 3/16/2017    Procedure: OPEN REDUCTION INTERNAL FIXATION HIP BIPOLAR;  Surgeon: Kaleb Pang DO;  Location: [facility]     RESECT SMALL BOWEL WITHOUT OSTOMY N/A 10/20/2014    Procedure: RESECT SMALL BOWEL WITHOUT OSTOMY;  Surgeon: Miguel Oleary MD;  Location: [facility]       Prior to Admission Medications   Prior to Admission Medications   Prescriptions Last Dose Informant Patient Reported? Taking?   enoxaparin (LOVENOX) 80 MG/0.8ML syringe 1/7/2019 at 0900  No Yes   Sig: Inject 0.8 mLs (80 mg) Subcutaneous every 12 hours   ibuprofen 200 MG capsule 1/2/2019 at Unknown time  Yes Yes   Sig: Take 200 mg by mouth every 4 hours as needed for fever   lisinopril (PRINIVIL/ZESTRIL) 10 MG tablet 1/7/2019 at 2300  No Yes   Sig: Take 1 tablet (10  mg) by mouth daily   lovastatin (MEVACOR) 40 MG tablet 1/6/2019 at 2300  No No   Sig: TAKE ONE TABLET BY MOUTH AT BEDTIME   warfarin (COUMADIN) 5 MG tablet 1/2/2019 at Unknown time  No Yes   Sig: Take 5 mg on Monday, Wednesday, Friday and 2.5 mg all other days, or as directed by the Coumadin Clinic      Facility-Administered Medications: None     Allergies   Allergies   Allergen Reactions     No Known Allergies        Social History   I have reviewed this patient's social history and updated it with pertinent information if needed. Tracy Palomo  reports that  has never smoked. she has never used smokeless tobacco. She reports that she does not drink alcohol or use drugs.    Family History   I have reviewed this patient's family history and updated it with pertinent information if needed.   Family History   Problem Relation Age of Onset     Blood Disease No family hx of         blood clots       Review of Systems   The 10 point Review of Systems is negative other than noted in the HPI.    Physical Exam   Temp: 96.8  F (36  C) Temp src: Oral BP: 107/62 Pulse: 69 Heart Rate: 61 Resp: 16 SpO2: 97 % O2 Device: Nasal cannula Oxygen Delivery: 2 LPM  Vital Signs with Ranges  Temp:  [96.8  F (36  C)-98.6  F (37  C)] 96.8  F (36  C)  Pulse:  [52-70] 69  Heart Rate:  [52-78] 61  Resp:  [9-25] 16  BP: (107-141)/() 107/62  SpO2:  [91 %-100 %] 97 %  0 lbs 0 oz    Constitutional: alert, mild distress and severe distress   Cardiovascular: PMI normal. No lifts, heaves, or thrills. RRR. No murmurs, clicks gallops or rub  Respiratory: Percussion normal. Good diaphragmatic excursion. Lungs clear, no chest wall tenderness.  Psychiatric: mentation appears normal and affect normal/bright  Head: Normocephalic. No masses, lesions, tenderness or abnormalities  Neck: Neck supple. No adenopathy. Thyroid symmetric, normal size,  ENT: ENT exam normal, no neck nodes or sinus tenderness  Abdomen: Abdomen soft, non-tender. BS normal. No  masses, organomegaly  NEURO: non-focal, sensation intact  SKIN: no suspicious lesions or rashes  LYMPH: Normal peripheral LAD  JOINT/EXTREMITIES: CMS intact right LE, DF/PF intact       Able to do a very slight straight leg raise.       Ace Dressing on, clean, dry and intact       Knee Immobilizer on and intact       Right knee with minimal swelling and no erythema or ecchymosis at the foot (the rest of the leg is covered in the ace wrap)       Bilateral calves soft and non tender    Data   -Data reviewed today: All pertinent laboratory and imaging results from this encounter were reviewed. I personally reviewed the EKG tracing showing Normal sinus rhythm, HR 59, low voltage, QS in lead T, no change from previous EKGs in 2018 and 17..  Recent Labs   Lab 01/07/19 01/02/19   INR 1.2* 2.1*       Recent Results (from the past 24 hour(s))   XR Knee Port Right 1/2 Views    Narrative    XR KNEE PORT RT 1/2 VW 1/8/2019 12:35 PM    HISTORY: Total knee arthroplasty.    COMPARISON: 11/15/2018    FINDINGS: Right knee arthroplasty components appear well-seated.      Impression    IMPRESSION: Right knee arthroplasty.    ABHIJIT RAMIREZ MD

## 2019-01-09 NOTE — TELEPHONE ENCOUNTER
SHIRA with Iesha suggesting she bring her concerns forward to the inpatient nurse and SW as they will have the greatest involvement in her discharge planning.    Pauline Mantilla RN on 1/9/2019 at 4:52 PM

## 2019-01-09 NOTE — PROGRESS NOTES
[facility]    Medicine Progress Note - Hospitalist Service       Date of Admission:  1/8/2019  Assessment & Plan    78-year-old woman admitted yesterday after elective right total knee replacement.  Transient chest pain that occurred yesterday postoperatively has resolved, and an acute coronary syndrome is not suspected.  However, incidental note is made of findings on echocardiogram consistent with chronic right heart failure after previous significant pulmonary emboli in 2014.  She also endorses chronic peripheral edema that could be due to right heart failure, and she will be at risk for worsening peripheral edema postoperatively because of IV fluid administration and decreased activity level.  Acute exacerbation of chronic right heart failure is not suspected so far.  She has not had atrial fibrillation so far during this hospital stay.  Blood pressure has been stable even while holding previous chronic lisinopril therapy postoperatively.  Previous chronic anticoagulation therapy was interrupted for her surgical procedure and is now being restarted postoperatively.    Principal Problem:    S/P total knee replacement using cement, right  Active Problems:    Paroxysmal atrial fibrillation (H)    Other chest pain    RVF (right ventricular failure) (H) - per Echo 2019    Hypertension goal BP (blood pressure) < 140/90    Hyperlipidemia LDL goal <130    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    Long term current use of anticoagulant therapy    Continue to hold lisinopril, may need to hold lisinopril at discharge until she is further recovered postoperatively depending upon her clinical course while here in the hospital  Recommend discontinuing IV fluids now that she is tolerating adequate oral intake and using compression bandages on her lower legs to control peripheral edema  Agree with Lovenox bridging until INR is therapeutic after restarting warfarin postop  eratively  Discussed echocardiogram findings and diagnosis of suspected chronic right heart failure after large pulmonary emboli in 2014 with the patient today, her questions and concerns were answered and addressed, recommended outpatient follow-up with her PCP regarding this chronic medical problem    Diet: Regular Diet Adult  Room Service    DVT Prophylaxis: Defer to primary service  Reyna Catheter: not present  Code Status: Full Code      Disposition Plan   Expected discharge: Deferred to primary team, recommended to Deferred to primary team once Deferred to primary team.  Entered: Kaleb Lloyd MD 01/09/2019, 1:20 PM       The patient's care was discussed with the Patient.    Kaleb Lloyd MD  Hospitalist Service  [facility]    ______________________________________________________________________    Interval History   After her 20-minute episode of mid to left-sided chest pressure last night, she has not had any other chest pain.  She denies any shortness of breath.  She denies any heartburn.  She denies any cough.  She has been afebrile and hemodynamically stable.  Oxygenation has been normal.  Urine output has been adequate.  She is tolerating good oral intake and advancing activity.    Additional history is obtained from reviewing the chart and discussion with the patient today.  In fall 2014, she had large bilateral pulmonary emboli that were treated with anticoagulation and for which she was hospitalized.  This occurred after colon surgery.  Echocardiogram at that time was normal.  Since before Thanksgiving last year, she has noted swelling in her lower legs and feet.  This seemed to worsen when she was less active physically.  There was nothing specific that she was trying to do at home to manage those symptoms.    Data reviewed today: I reviewed all medications, new labs and imaging results over the last 24 hours. I personally reviewed the EKG tracing showing  Findings consistent with possible pulmonary disease including rightward P axis and rotation.    Physical Exam   Vital Signs: Temp: 97.2  F (36.2  C) Temp src: Oral BP: 116/65 Pulse: 57 Heart Rate: 56 Resp: 16 SpO2: 97 % O2 Device: Nasal cannula Oxygen Delivery: 2 LPM  Weight: 0 lbs 0 oz  General Appearance: No acute distress while resting in bed  Cardiovascular: Regular rate and rhythm    Data   Recent Labs   Lab 01/09/19  1203 01/09/19  0550 01/09/19  0045 01/07/19   HGB  --  10.5*  --   --    INR  --  1.15*  --  1.2*   GLC  --  145*  --   --    TROPI <0.015 <0.015 <0.015  --      Medications     lactated ringers 100 mL/hr at 01/08/19 9533     Warfarin Therapy Reminder         acetaminophen  975 mg Oral Q8H     docusate sodium  100 mg Oral BID     enoxaparin  1 mg/kg (Order-Specific) Subcutaneous Q12H     gabapentin  100 mg Oral At Bedtime     sodium chloride (PF)  3 mL Intracatheter Q8H     warfarin  5 mg Oral ONCE at 18:00

## 2019-01-09 NOTE — CONSULTS
CARE TRANSITION SOCIAL WORK INITIAL ASSESSMENT:  Reason For Consult: discharge planning   Met with: Patient.    DATA  Principal Problem:    Other chest pain  Active Problems:    Hypertension goal BP (blood pressure) < 140/90    Hyperlipidemia LDL goal <130    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    Paroxysmal atrial fibrillation (H)    Long term current use of anticoagulant therapy    S/P total knee replacement using cement, right       [facility]   Primary Care MD Name: Dr. Chad Betts   Contact information and PCP information verified: Yes      ASSESSMENT  Cognitive Status: awake, alert and oriented.                      Description of Support System: Supportive, Involved   Who is your support system?: Children, Sibling(s)   Support Assessment: Adequate family and caregiver support, Adequate social supports   Insurance Concerns: No Insurance issues identified        This writer met with patient and introduced self and role. Discussed discharge planning and medicare guidelines in regards to home care and SNF benefits. Discussed TCU versus home with home care plan.  Patient was provided with Medicare certified nursing home list. Pts choices are as follows: [facility] (Phone: [phone] Fax: [phone]) [facility] (Admissions: [phone] Main Phone: [phone] Fax: [phone]) [facility]  (Phone: [phone] Fax: [phone])    Pt was provided with Medicare certified home care list as well.    Patient is unsure how many midnights she will qualify to be in the hospital, so discussed private pay costs of her choices for TCU.  Patient now thinking about her options and whether or not her children could help her over the weekend or if she could maybe stay at her sister's home, if she does not qualify for coverage at a TCU.          PLAN    TCU versus home with family assist and home care or staying at her sister's and having  home care.  Care Transitions will continue to follow and assist with discharge planning.         HONEY Thao

## 2019-01-09 NOTE — PHARMACY-ANTICOAGULATION SERVICE
Clinical Pharmacy - Warfarin Dosing Consult     Pharmacy has been consulted to manage this patient s warfarin therapy.  Indication: DVT/PE Prophylaxis  Therapy Goal: INR 2-3  Warfarin Prior to Admission: Yes  Warfarin PTA Regimen: 5 mg M, W, F, 2.5 mg all other days of week  Significant drug interactions: cefazolin  Recent documented change in oral intake/nutrition: Unknown    INR   Date Value Ref Range Status   01/09/2019 1.15 (H) 0.86 - 1.14 Final     INR Protime   Date Value Ref Range Status   01/07/2019 1.2 (A) 0.86 - 1.14 Final       Recommend warfarin 5 mg today.  Pharmacy will monitor Tracy Palomo daily and order warfarin doses to achieve specified goal.      Please contact pharmacy as soon as possible if the warfarin needs to be held for a procedure or if the warfarin goals change.

## 2019-01-09 NOTE — TELEPHONE ENCOUNTER
Reason for Call:  Other appointment    Detailed comments: Patient's daughter Iesha stopped by the desk here concerned with the fact that she may released from the hospital on 01/10 as she isn't able to get out of bed easily and she has no one at home to take care of her. Also concerns about her INR numbers as well. Please call daughter at [phone]    Phone Number Patient can be reached at: Home number on file  [phone]     Best Time: any    Can we leave a detailed message on this number? YES    Call taken on 1/9/2019 at 4:31 PM by Pauline Dean

## 2019-01-09 NOTE — PROGRESS NOTES
S-(situation): End of shift note    B-(background): Right total knee replacement    A-(assessment): Pt is alert and oriented. VSS. Afebrile. Denies SOB and N/V. Complaining of left knee pain mainly while moving. Controlling pain with PRN oxycodone and scheduled tylenol. Pt has good CMS and incision site is CDI. Pt had a little chest pressure last night that lasted for around 20 min. Md notified and EKG was ordered and telemetry was put on. Telemetry is showing SR. Pt has been getting up and walking to bathroom. Tolerating diet. Fluids running @ 100 ml/hr. Blood pressure 108/52, pulse 54, temperature 96.7  F (35.9  C), temperature source Oral, resp. rate 15, SpO2 97 %.      R-(recommendations): Continue to monitor vitals, pain and incision site

## 2019-01-09 NOTE — PROGRESS NOTES
[facility]  Orthopedics Progress Note           Assessment and Plan:    Assessment:   Post-operative day #1 Procedure(s):  Right total knee replacement   Acute post-operative blood loss anemia-asymptomatic   Isolated short lasting episode of chest pain POD0 - medicine following and managing. troponin and ekg negative.   Hx of afib.       Plan:   Doing well.  No immediate surgical complications identified.  No excessive bleeding  Pain well-controlled.  Tolerating physical therapy and rehabilitation well.  Encourage IS  Start or continue physical therapy  Activity as tolerated  Weight-bear as tolerated.  Discharge to rehab facility- has no family at home to help- discharge pending placement and medical clearance.  Advance diet as tolerated  Routine wound care  DVT Prophylaxis: Coumadin, Lovenox, SCD's, Compression Hose  Hospitalist following and assisting in medical issues.            Interval History:   Doing well.  Continues to improve.  Pain is well-controlled.  No fevers.  No current chest pain today, last night while having an emotional conservation with her daughter about recently  friends and family had 1 very short lived episode of chest pain.  Medicine was contacted and performed work-up.  Work up has been negative, echo to be performed this AM. Was given one time GI cocktail and per patient this helped.   Per patient has not had any other episodes of this and has been doing well otherwise.  Denies other symptoms with this On coumadin with lovenox bridge.  Tolerating oral intake, voiding, passing flatus.  Been working with physical therapy and tolerating this ok.             Review of Systems:    The patient denies any chest pain, shortness of breath, excessive pain, fever, chills, purulent drainage from the wound, nausea or vomiting.               Physical Exam:   General: awake, alert, appropriate and in no acute distress  Blood pressure 116/65, pulse 57, temperature 97.2  F (36.2   C), temperature source Oral, resp. rate 16, SpO2 97 %.  Right knee:  Dressing clean, dry and intact. Surrounding skin intact, no breakdown. Calf is soft, nontender with no significant swelling. Distal neurovascular grossly intact.  Compartments soft and non-tender.  2+ distal pulse, sensation intact to foot, able to df/pf against resistance. Brisk cap refill.            Data:     Results for orders placed or performed during the hospital encounter of 01/08/19 (from the past 24 hour(s))   XR Knee Port Right 1/2 Views    Narrative    XR KNEE PORT RT 1/2 VW 1/8/2019 12:35 PM    HISTORY: Total knee arthroplasty.    COMPARISON: 11/15/2018    FINDINGS: Right knee arthroplasty components appear well-seated.      Impression    IMPRESSION: Right knee arthroplasty.    ABHIJIT RAMIREZ MD   Hospitalist IP Consult: Patient to be seen: STAT - within 1 hour; Call back #: [phone]; chest pain; Consultant may enter orders: Yes    Narrative    Eunice Feldman MD     1/8/2019 10:13 PM  [facility]    Hospitalist Consultation    Date of Admission:  1/8/2019    Assessment & Plan   Tracy Palomo is a 78 year old female who was admitted on   1/8/2019 for elective Right TKA . I was asked to see the patient   for evaluation of chest pain.    Principal Problem:      Chest pain, nonexertional, developed at 20: 55. DD angina vs   gerd vs musculoskeletal. Unlikely PE-no hypoxia, tachypnea,   tachycardia, cough, pleuritic component of chest pain.  Chest pain developed during emotional conversation with her   family-mentioning patient's recent in August 2018 death of her   , and other numerous losses of friends/family within last   4 months.  She has been resolved spontaneously within 5 minutes.  EKG showed sinus bradycardia, HR 59, old inferior MI-QS in lead   III, which look the same on EKG from 12/21/18 and 3/15/17.  No history of CAD, CHF, valvular abnormalities.  Most recent echo on 11/1/14:  Concentric LVH, EF 55-60%, no   diastolic dysfunction, no WMA.  Patient CAD risk factors: HTN, HLD, age.  -Obtain serial cardiac enzymes; echo in a.m.  Repeat EKG if chest   pain occurs.  Telemetry monitoring.  As needed NTG.  One-time   dose of GI cocktail.  Continue anticoagulation-Coumadin and   Lovenox bridge.    Active Problems:    Hypertension goal BP (blood pressure) < 140/90; low normal   postop BP.  Takes lisinopril at home.  Holding lisinopril for   now, will resume when SBP persistently above 120.    Paroxysmal atrial fibrillation, currently normal SR.  Not in rate   control medications.  Anticoagulated on Coumadin.  This was stopped preop.  INR 1.1   1/7/19.  Coumadin resumed, on Lovenox per orthopedics.      Hyperlipidemia LDL goal <130, on lovastatin, continue.      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,   developed after colonoscopy in 2014.  Anticoagulated on Coumadin.      S/P total knee replacement using cement, right, on 1/8/19.  No   immediate postop complications.  Management per orthopedic   surgery.    DVT Prophylaxis: Enoxaparin (Lovenox) SQ and Warfarin  Code Status: Full Code    Disposition: Expected discharge in 2-3 days once cleared for   discharge by orthopedics.    Eunice Feldman    Reason for Consult   Reason for consult: I was asked by Dr. Pang to evaluate this   patient for chest pain.    Primary Care Physician   Chad Betts    Chief Complaint   Chest pain    History is obtained from the patient    History of Present Illness   Tracy Palomo is a 78 year old female with PMH of paroxysmal   atrial fibrillation, PE developed after colonoscopy, chronic   anticoagulation with Coumadin, HTN, HLD, left hip fracture from   fall/status post ORIF, admitted by orthopedic surgery for   elective right TKA.  Uneventful surgery, per Orth O note   reviewed.  Postoperatively doing well.  Patient developed substernal chest pain/pressure at 20: 55,   during visit of her  daughter.  They were talking about patient    status in August 2018, and on the family/friend losses   within last 4 months.  In the middle of conversation patient   developed substernal chest discomfort, without radiation, without   associated dyspnea, diaphoresis, nausea, lightheadedness.  Stat   EKG obtained, showed normal sinus rhythm, no ischemic changes.    Chest pain resolved spontaneously.  First troponin obtained, results pending.  Patient denies history of similar chest pains in the past.  No   history of CAD, CHF, valvular disease- per echo in 2014.  BP low   normal postop.  He is anticoagulated on Coumadin, which was felt preop, and   resumed today, along with Lovenox.  Denies history of GERD.  Patient endorses stress test more than 10 years ago, when she   used to live in [place].  To have best recollection, it was negative   for inducible ischemia.  No history of coronary angiograms.  No   history of diabetes, PVD, smoking, stimulant abuse.    Past Medical History    I have reviewed this patient's medical history and updated it   with pertinent information if needed.   Past Medical History:   Diagnosis Date     Atrial fibrillation (H) 2014    related to colon surgery     Colon polyps      Diverticulosis of colon (without mention of hemorrhage)     Diverticulosis     DVT (deep vein thrombosis) in pregnancy (H) 2014    after colon surgery     Other and unspecified hyperlipidemia      PE (pulmonary embolism) 2014    related to colon surgery     Unspecified essential hypertension        Past Surgical History   I have reviewed this patient's surgical history and updated it   with pertinent information if needed.  Past Surgical History:   Procedure Laterality Date     COLONOSCOPY  09, 10, 12    [facility]     COLONOSCOPY N/A 12/11/2017    Procedure: COLONOSCOPY;  colonoscopy;  Surgeon: Elvis Quezada MD;  Location:  GI     FLEXIBLE SIGMOIDOSCOPY  09, 11     LAPAROTOMY EXPLORATORY  N/A 10/20/2014    Procedure: LAPAROTOMY EXPLORATORY;  Surgeon: Miguel Oleary MD;  Location: [facility] OR     LAPAROTOMY, LYSIS ADHESIONS, COMBINED N/A 10/20/2014    Procedure: COMBINED LAPAROTOMY, LYSIS ADHESIONS;  Surgeon:   Miguel Oleary MD;  Location: [facility] OR     OPEN REDUCTION INTERNAL FIXATION HIP BIPOLAR Left 3/16/2017    Procedure: OPEN REDUCTION INTERNAL FIXATION HIP BIPOLAR;    Surgeon: Kaleb Pang DO;  Location: [facility] OR     RESECT SMALL BOWEL WITHOUT OSTOMY N/A 10/20/2014    Procedure: RESECT SMALL BOWEL WITHOUT OSTOMY;  Surgeon:   Miguel Oleary MD;  Location: [facility] OR       Prior to Admission Medications   Prior to Admission Medications   Prescriptions Last Dose Informant Patient Reported? Taking?   enoxaparin (LOVENOX) 80 MG/0.8ML syringe 1/7/2019 at 0900  No Yes     Sig: Inject 0.8 mLs (80 mg) Subcutaneous every 12 hours   ibuprofen 200 MG capsule 1/2/2019 at Unknown time  Yes Yes   Sig: Take 200 mg by mouth every 4 hours as needed for fever   lisinopril (PRINIVIL/ZESTRIL) 10 MG tablet 1/7/2019 at 2300  No   Yes   Sig: Take 1 tablet (10 mg) by mouth daily   lovastatin (MEVACOR) 40 MG tablet 1/6/2019 at 2300  No No   Sig: TAKE ONE TABLET BY MOUTH AT BEDTIME   warfarin (COUMADIN) 5 MG tablet 1/2/2019 at Unknown time  No Yes   Sig: Take 5 mg on Monday, Wednesday, Friday and 2.5 mg all other   days, or as directed by the Coumadin Clinic      Facility-Administered Medications: None     Allergies   Allergies   Allergen Reactions     No Known Allergies        Social History   I have reviewed this patient's social history and updated it with   pertinent information if needed. Tracy Palomo  reports that    has never smoked. she has never used smokeless tobacco. She   reports that she does not drink alcohol or use drugs.    Family History   I have reviewed this patient's family history and updated it with   pertinent information if needed.   Family History   Problem Relation Age of Onset      Blood Disease No family hx of         blood clots       Review of Systems   The 10 point Review of Systems is negative other than noted in   the HPI.    Physical Exam   Temp: 96.8  F (36  C) Temp src: Oral BP: 107/62 Pulse: 69 Heart   Rate: 61 Resp: 16 SpO2: 97 % O2 Device: Nasal cannula Oxygen   Delivery: 2 LPM  Vital Signs with Ranges  Temp:  [96.8  F (36  C)-98.6  F (37  C)] 96.8  F (36  C)  Pulse:  [52-70] 69  Heart Rate:  [52-78] 61  Resp:  [9-25] 16  BP: (107-141)/() 107/62  SpO2:  [91 %-100 %] 97 %  0 lbs 0 oz    Constitutional: alert, mild distress and severe distress   Cardiovascular: PMI normal. No lifts, heaves, or thrills. RRR. No   murmurs, clicks gallops or rub  Respiratory: Percussion normal. Good diaphragmatic excursion.   Lungs clear, no chest wall tenderness.  Psychiatric: mentation appears normal and affect normal/bright  Head: Normocephalic. No masses, lesions, tenderness or   abnormalities  Neck: Neck supple. No adenopathy. Thyroid symmetric, normal size,  ENT: ENT exam normal, no neck nodes or sinus tenderness  Abdomen: Abdomen soft, non-tender. BS normal. No masses,   organomegaly  NEURO: non-focal, sensation intact  SKIN: no suspicious lesions or rashes  LYMPH: Normal peripheral LAD  JOINT/EXTREMITIES: CMS intact right LE, DF/PF intact       Able to do a very slight straight leg raise.       Ace Dressing on, clean, dry and intact       Knee Immobilizer on and intact       Right knee with minimal swelling and no erythema or   ecchymosis at the foot (the rest of the leg is covered in the ace   wrap)       Bilateral calves soft and non tender    Data   -Data reviewed today: All pertinent laboratory and imaging   results from this encounter were reviewed. I personally reviewed   the EKG tracing showing Normal sinus rhythm, HR 59, low voltage,   QS in lead T, no change from previous EKGs in 2018 and 17..  Recent Labs   Lab 01/07/19 01/02/19   INR 1.2* 2.1*       Recent Results (from the  past 24 hour(s))   XR Knee Port Right 1/2 Views    Narrative    XR KNEE PORT RT 1/2 VW 1/8/2019 12:35 PM    HISTORY: Total knee arthroplasty.    COMPARISON: 11/15/2018    FINDINGS: Right knee arthroplasty components appear well-seated.      Impression    IMPRESSION: Right knee arthroplasty.    ABHIJIT RAMIREZ MD              Troponin I   Result Value Ref Range    Troponin I ES <0.015 0.000 - 0.045 ug/L   Hemoglobin   Result Value Ref Range    Hemoglobin 10.5 (L) 11.7 - 15.7 g/dL   INR   Result Value Ref Range    INR 1.15 (H) 0.86 - 1.14   Troponin I   Result Value Ref Range    Troponin I ES <0.015 0.000 - 0.045 ug/L   Glucose   Result Value Ref Range    Glucose 145 (H) 70 - 99 mg/dL     BILLY Oh, CNP  Orthopedic Surgery

## 2019-01-09 NOTE — TELEPHONE ENCOUNTER
Discussed with patient and daughter directly on hospital floor. All questions answered.    Al Montiel APRN, CNP  Orthopedic Surgery

## 2019-01-10 ENCOUNTER — APPOINTMENT (OUTPATIENT)
Dept: PHYSICAL THERAPY | Facility: CLINIC | Age: 79
DRG: 470 | End: 2019-01-10
Attending: ORTHOPAEDIC SURGERY
Payer: MEDICARE

## 2019-01-10 ENCOUNTER — APPOINTMENT (OUTPATIENT)
Dept: ULTRASOUND IMAGING | Facility: CLINIC | Age: 79
DRG: 470 | End: 2019-01-10
Attending: ORTHOPAEDIC SURGERY
Payer: MEDICARE

## 2019-01-10 ENCOUNTER — APPOINTMENT (OUTPATIENT)
Dept: OCCUPATIONAL THERAPY | Facility: CLINIC | Age: 79
DRG: 470 | End: 2019-01-10
Attending: ORTHOPAEDIC SURGERY
Payer: MEDICARE

## 2019-01-10 LAB
D DIMER PPP FEU-MCNC: 2.5 UG/ML FEU (ref 0–0.5)
GLUCOSE SERPL-MCNC: 109 MG/DL (ref 70–99)
HGB BLD-MCNC: 10 G/DL (ref 11.7–15.7)
INR PPP: 1.48 (ref 0.86–1.14)
PLATELET # BLD AUTO: 171 10E9/L (ref 150–450)
TROPONIN I SERPL-MCNC: <0.015 UG/L (ref 0–0.04)
TSH SERPL DL<=0.005 MIU/L-ACNC: 2.38 MU/L (ref 0.4–4)

## 2019-01-10 PROCEDURE — 84443 ASSAY THYROID STIM HORMONE: CPT | Performed by: HOSPITALIST

## 2019-01-10 PROCEDURE — 97116 GAIT TRAINING THERAPY: CPT | Mod: GP | Performed by: PHYSICAL THERAPIST

## 2019-01-10 PROCEDURE — 85018 HEMOGLOBIN: CPT | Performed by: ORTHOPAEDIC SURGERY

## 2019-01-10 PROCEDURE — 25000132 ZZH RX MED GY IP 250 OP 250 PS 637: Mod: GY | Performed by: HOSPITALIST

## 2019-01-10 PROCEDURE — 85049 AUTOMATED PLATELET COUNT: CPT | Performed by: ORTHOPAEDIC SURGERY

## 2019-01-10 PROCEDURE — 25000128 H RX IP 250 OP 636: Performed by: ORTHOPAEDIC SURGERY

## 2019-01-10 PROCEDURE — A9270 NON-COVERED ITEM OR SERVICE: HCPCS | Mod: GY | Performed by: ORTHOPAEDIC SURGERY

## 2019-01-10 PROCEDURE — 99232 SBSQ HOSP IP/OBS MODERATE 35: CPT | Performed by: HOSPITALIST

## 2019-01-10 PROCEDURE — 85610 PROTHROMBIN TIME: CPT | Performed by: ORTHOPAEDIC SURGERY

## 2019-01-10 PROCEDURE — 85379 FIBRIN DEGRADATION QUANT: CPT | Performed by: HOSPITALIST

## 2019-01-10 PROCEDURE — A9270 NON-COVERED ITEM OR SERVICE: HCPCS | Mod: GY | Performed by: HOSPITALIST

## 2019-01-10 PROCEDURE — 93970 EXTREMITY STUDY: CPT

## 2019-01-10 PROCEDURE — 25000125 ZZHC RX 250: Performed by: HOSPITALIST

## 2019-01-10 PROCEDURE — 40000133 ZZH STATISTIC OT WARD VISIT

## 2019-01-10 PROCEDURE — 40000193 ZZH STATISTIC PT WARD VISIT: Performed by: PHYSICAL THERAPIST

## 2019-01-10 PROCEDURE — 97530 THERAPEUTIC ACTIVITIES: CPT | Mod: GP | Performed by: PHYSICAL THERAPIST

## 2019-01-10 PROCEDURE — 12000000 ZZH R&B MED SURG/OB

## 2019-01-10 PROCEDURE — 85049 AUTOMATED PLATELET COUNT: CPT | Performed by: NURSE PRACTITIONER

## 2019-01-10 PROCEDURE — 82947 ASSAY GLUCOSE BLOOD QUANT: CPT | Performed by: ORTHOPAEDIC SURGERY

## 2019-01-10 PROCEDURE — 36415 COLL VENOUS BLD VENIPUNCTURE: CPT | Performed by: HOSPITALIST

## 2019-01-10 PROCEDURE — 97535 SELF CARE MNGMENT TRAINING: CPT | Mod: GO

## 2019-01-10 PROCEDURE — 84484 ASSAY OF TROPONIN QUANT: CPT | Performed by: HOSPITALIST

## 2019-01-10 PROCEDURE — 36415 COLL VENOUS BLD VENIPUNCTURE: CPT | Performed by: ORTHOPAEDIC SURGERY

## 2019-01-10 PROCEDURE — 25000132 ZZH RX MED GY IP 250 OP 250 PS 637: Mod: GY | Performed by: ORTHOPAEDIC SURGERY

## 2019-01-10 RX ORDER — HYDROMORPHONE HYDROCHLORIDE 1 MG/ML
1 SOLUTION ORAL ONCE
Status: DISCONTINUED | OUTPATIENT
Start: 2019-01-10 | End: 2019-01-10

## 2019-01-10 RX ORDER — POLYETHYLENE GLYCOL 3350 17 G/17G
17 POWDER, FOR SOLUTION ORAL DAILY
Status: DISCONTINUED | OUTPATIENT
Start: 2019-01-10 | End: 2019-01-11 | Stop reason: HOSPADM

## 2019-01-10 RX ORDER — METOPROLOL TARTRATE 25 MG/1
25 TABLET, FILM COATED ORAL 2 TIMES DAILY
Status: DISCONTINUED | OUTPATIENT
Start: 2019-01-10 | End: 2019-01-11 | Stop reason: HOSPADM

## 2019-01-10 RX ORDER — DILTIAZEM HYDROCHLORIDE 5 MG/ML
10 INJECTION INTRAVENOUS ONCE
Status: COMPLETED | OUTPATIENT
Start: 2019-01-10 | End: 2019-01-10

## 2019-01-10 RX ORDER — WARFARIN SODIUM 2.5 MG/1
2.5 TABLET ORAL
Status: DISCONTINUED | OUTPATIENT
Start: 2019-01-10 | End: 2019-01-10 | Stop reason: DRUGHIGH

## 2019-01-10 RX ORDER — WARFARIN SODIUM 5 MG/1
5 TABLET ORAL
Status: COMPLETED | OUTPATIENT
Start: 2019-01-10 | End: 2019-01-10

## 2019-01-10 RX ORDER — PRAVASTATIN SODIUM 20 MG
40 TABLET ORAL AT BEDTIME
Status: DISCONTINUED | OUTPATIENT
Start: 2019-01-10 | End: 2019-01-11 | Stop reason: HOSPADM

## 2019-01-10 RX ORDER — OXYCODONE HYDROCHLORIDE 5 MG/1
10 TABLET ORAL
Status: DISCONTINUED | OUTPATIENT
Start: 2019-01-10 | End: 2019-01-11

## 2019-01-10 RX ADMIN — ACETAMINOPHEN 975 MG: 325 TABLET ORAL at 06:19

## 2019-01-10 RX ADMIN — METOPROLOL TARTRATE 25 MG: 25 TABLET, FILM COATED ORAL at 12:16

## 2019-01-10 RX ADMIN — ENOXAPARIN SODIUM 80 MG: 80 INJECTION SUBCUTANEOUS at 12:04

## 2019-01-10 RX ADMIN — GABAPENTIN 100 MG: 100 CAPSULE ORAL at 21:13

## 2019-01-10 RX ADMIN — HYDROMORPHONE HYDROCHLORIDE 0.2 MG: 1 INJECTION, SOLUTION INTRAMUSCULAR; INTRAVENOUS; SUBCUTANEOUS at 13:38

## 2019-01-10 RX ADMIN — METHOCARBAMOL 500 MG: 500 TABLET ORAL at 07:29

## 2019-01-10 RX ADMIN — PRAVASTATIN SODIUM 40 MG: 20 TABLET ORAL at 21:13

## 2019-01-10 RX ADMIN — DILTIAZEM HYDROCHLORIDE 10 MG: 5 INJECTION INTRAVENOUS at 12:03

## 2019-01-10 RX ADMIN — ENOXAPARIN SODIUM 80 MG: 80 INJECTION SUBCUTANEOUS at 23:55

## 2019-01-10 RX ADMIN — ACETAMINOPHEN 975 MG: 325 TABLET ORAL at 15:02

## 2019-01-10 RX ADMIN — METOPROLOL TARTRATE 25 MG: 25 TABLET, FILM COATED ORAL at 21:13

## 2019-01-10 RX ADMIN — DOCUSATE SODIUM 100 MG: 100 CAPSULE, LIQUID FILLED ORAL at 09:08

## 2019-01-10 RX ADMIN — DILTIAZEM HYDROCHLORIDE 10 MG: 5 INJECTION INTRAVENOUS at 15:01

## 2019-01-10 RX ADMIN — DOCUSATE SODIUM 100 MG: 100 CAPSULE, LIQUID FILLED ORAL at 21:13

## 2019-01-10 RX ADMIN — OXYCODONE HYDROCHLORIDE 10 MG: 5 TABLET ORAL at 21:13

## 2019-01-10 RX ADMIN — OXYCODONE HYDROCHLORIDE 5 MG: 5 TABLET ORAL at 06:57

## 2019-01-10 RX ADMIN — WARFARIN SODIUM 5 MG: 5 TABLET ORAL at 18:44

## 2019-01-10 RX ADMIN — ACETAMINOPHEN 975 MG: 325 TABLET ORAL at 23:55

## 2019-01-10 RX ADMIN — OXYCODONE HYDROCHLORIDE 5 MG: 5 TABLET ORAL at 02:45

## 2019-01-10 RX ADMIN — POLYETHYLENE GLYCOL 3350 17 G: 17 POWDER, FOR SOLUTION ORAL at 12:16

## 2019-01-10 RX ADMIN — OXYCODONE HYDROCHLORIDE 10 MG: 5 TABLET ORAL at 16:47

## 2019-01-10 RX ADMIN — OXYCODONE HYDROCHLORIDE 5 MG: 5 TABLET ORAL at 10:05

## 2019-01-10 RX ADMIN — HYDROXYZINE HYDROCHLORIDE 10 MG: 10 TABLET ORAL at 11:34

## 2019-01-10 RX ADMIN — HYDROXYZINE HYDROCHLORIDE 10 MG: 10 TABLET ORAL at 06:19

## 2019-01-10 ASSESSMENT — ACTIVITIES OF DAILY LIVING (ADL)
ADLS_ACUITY_SCORE: 13

## 2019-01-10 NOTE — PROGRESS NOTES
Pt having increased pain in knee today, have given Robaxin, adder ax and 5mg oxycodone, pink slip sent to see if we can increase oxycodone to 10mg, pt heart rate continues to be tachy 120's-130's, after IV dose of Cardizem given.

## 2019-01-10 NOTE — PLAN OF CARE
Discharge Planner PT   Patient plan for discharge:TCU  Current status: Pt required min A with sit<> stand with HOB elevated for stability. Pt reports left knee pain with sit<>stand transfers. Pt required min A with donning R knee immobilizer. Completed SBA with toilet transfer stand to sit and min A for sit to stand for stability with toilet transfer. Pt ambulated 60ft in hallway with 2WW with step to pattern and decreased step length on R leg. Cued pt on R quad activation in stance phase and to reduce UE tension with ambulation of walker, with fair to good carry over. Pt stated 8/10 R knee pain with ambulation. Pt reported receiving pain medication ~ 30 minutes prior to treatment. Completed stand to sit with SBA and min A at LEs with HOB flat for sit to supine. She demonstrated approximately 3-40 degrees R knee AROM in supine with HOB elevated. Left pt in room with daughter and all needs within reach, bed alarm on.  Barriers to return to prior living situation: lives alone, requires assist for mobilization  Recommendations for discharge: TCU  Rationale for recommendations: To progress strength, stability with transfers, and functional mobility for improved safety and independence for home.       Entered by: Ann Linares 01/10/2019 8:48 AM

## 2019-01-10 NOTE — PROGRESS NOTES
S-(situation): End of shift note    B-(background): Right total knee replacement    A-(assessment): Pt is alert and oriented. VSS. Afebrile. Denies SOB and N/V. Complaining of left knee pain mainly while moving. Controlling pain with PRN oxycodone and scheduled tylenol. Ice applied. Pt has good CMS and incision site is CDI. Telemetry is showing SR. Pt has been getting up and walking to bathroom. Tolerating diet. Blood pressure 124/51, pulse 60, temperature 97  F (36.1  C), temperature source Oral, resp. rate 16, SpO2 95 %.      R-(recommendations): continue to monitor vitals, pain and CMS

## 2019-01-10 NOTE — PLAN OF CARE
Discharge Planner PT   Patient plan for discharge: TCU  Current status: Patient up in chair, requesting to ambulate however complete sit to stand from recliner with 2WW with pain and significant effort. Patient complained of dizziness and feeling to weak to mobilize. Progressed chair to bed with 2WW, CGA for safety and patient expressed fear of not making it to the chair, severe pain and being too weak to mobilize. Sit to supine dependent RLE into bed with pain. MOD IND scooting up in bed with bed rails and overhead trapeze. Positioned RLE elevated in bed with ice on knee.    Barriers to return to prior living situation: Medical status, pain, assistance with functional mobility, lives alone  Recommendations for discharge: TCU  Rationale for recommendations: Patient demonstrates decline in functional mobility, activity tolerance and right knee activity tolerance. Patient would benefit from placement for safe discharge as well as medical monitoring of current medical problems. Patient would benefit from continued skilled therapeutic intervention in order to progress them towards their desired level of function in accordance with their surgeon's protocol.       Entered by: Anna Mortensen 01/10/2019 1:26 PM

## 2019-01-10 NOTE — PROGRESS NOTES
Discharge Planner   Discharge Plans in progress: Yes. Patient accepted for TCU placement at [facility] for Friday, 1/11/19.  [facility] to transport patient on 1/11/19 at 1300, per patient request.  Patient aware of private pay cost for transport.      Barriers to discharge plan: None     Follow up plan: Per ortho team       Entered by: ARNIE JAMES 01/10/2019 12:43 PM         HONEY Thao

## 2019-01-10 NOTE — PHARMACY-ANTICOAGULATION SERVICE
Clinical Pharmacy - Warfarin Dosing Consult     Pharmacy has been consulted to manage this patient s warfarin therapy.  Indication: DVT/PE Prophylaxis  Therapy Goal: INR 2-3  Warfarin Prior to Admission: Yes  Warfarin PTA Regimen: 5 mg M, W, F, 2.5 mg all other days of week  Significant drug interactions: cefazolin  Recent documented change in oral intake/nutrition: Unknown    INR   Date Value Ref Range Status   01/10/2019 1.48 (H) 0.86 - 1.14 Final   01/09/2019 1.15 (H) 0.86 - 1.14 Final       Recommend warfarin 5 mg today.  Pharmacy will monitor Tracy Palomo daily and order warfarin doses to achieve specified goal.      Please contact pharmacy as soon as possible if the warfarin needs to be held for a procedure or if the warfarin goals change.

## 2019-01-10 NOTE — PROGRESS NOTES
[facility]    Medicine Progress Note - Hospitalist Service       Date of Admission:  1/8/2019  Assessment & Plan    78-year-old woman admitted 1/8/19 after elective right total knee replacement.  Transient chest pain that occurred yesterday postoperatively has resolved, MI ruled out by negative troponinx 3, echocardiogram is consistent with chronic right heart failure from previous significant pulmonary emboli in 2014.  She also endorses chronic peripheral edema, R>L which is worse today.  She goes in and out of A fib with RVR,   Previous chronic anticoagulation therapy was interrupted for her surgical procedure and is now being restarted postoperatively with lovenox bridging    HTN/a fib with RVR/chronic right heart failure  -cardizem 10mg iv x 2 given followed by lopressor 25mg bid  -continue to hold lisinopril due to lowish BP  -lovenox 80mg subcutaneous bid while waiting for coumadin to become therapeutic again  -echo result as follows  Interpretation Summary  1. The right ventricle is moderately dilated with moderately reduced systolic  function.  2. Normal left ventricular size and systolic function. Estimated LVEF 50-55%.  3. Trace tricuspid and mitral valve regurgitation.  4. The right ventricular systolic pressure is approximated at 16.3 mmHg plus  the right atrial pressure.  5. Normal size inferior vena cava.     On the previous study dated 11/2/2014, the right ventricle was normal in size  and systolic function.    Right knee OA, s/p TKA    Hyperlipidemia  -continue pravachol    Chest pain/right leg worsening edema  -leg doppler negative for DVT, but positive for right knee baker cyst     Obstipation  -miralax added      Diet: Regular Diet Adult  Room Service    DVT Prophylaxis: lovenox+coumadin  Reyna Catheter: not present  Code Status: Full Code      Disposition Plan   Expected discharge: Deferred to primary team, recommended to Deferred to primary team once Deferred to  primary team.  Entered: Wilda Zamora MD 01/10/2019, 4:51 PM       The patient's care was discussed with the Patient.    Wilda Zamora MD  Hospitalist Service  [facility]    ______________________________________________________________________    Interval History   After her 20-minute episode of mid to left-sided chest pressure last night, she has not had any other chest pain.  She denies any shortness of breath.  She denies any heartburn.  She denies any cough.  She has been afebrile and hemodynamically stable.  Oxygenation has been normal.  Urine output has been adequate.  She is tolerating good oral intake and advancing activity.  Hx of PE after colon surgery in 2014 and has been on coumadin since  She has concern over worsening right leg edema  Also has no BM since Monday, denies abdominal pain/n/v    Data reviewed today: I reviewed all medications, new labs and imaging results over the last 24 hours. I personally reviewed telemetry tracing showed a fib with RVR(HR in low 100s).    Physical Exam   Vital Signs: Temp: 98  F (36.7  C) Temp src: Oral BP: 108/65 Pulse: 132 Heart Rate: 132 Resp: 20 SpO2: 95 % O2 Device: None (Room air)    Weight: 0 lbs 0 oz  General Appearance: No acute distress while resting in bed  Cardiovascular: irregular rate and rhythm  Abdomen: soft, faint BS, -HSM, no ascites  Neuro: aox 3  Extremities: 2+ edema, R>L    Data   Recent Labs   Lab 01/10/19  1131 01/10/19  0539 01/09/19  1203 01/09/19  0550  01/07/19   HGB  --  10.0*  --  10.5*  --   --    PLT  --  171  --   --   --   --    INR  --  1.48*  --  1.15*  --  1.2*   GLC  --  109*  --  145*  --   --    TROPI <0.015  --  <0.015 <0.015   < >  --     < > = values in this interval not displayed.     Medications     Warfarin Therapy Reminder         acetaminophen  975 mg Oral Q8H     docusate sodium  100 mg Oral BID     enoxaparin  1 mg/kg (Order-Specific) Subcutaneous Q12H     gabapentin  100 mg Oral At Bedtime      metoprolol tartrate  25 mg Oral BID     polyethylene glycol  17 g Oral Daily     pravastatin  40 mg Oral At Bedtime     sodium chloride (PF)  3 mL Intracatheter Q8H     warfarin  5 mg Oral ONCE at 18:00

## 2019-01-10 NOTE — PROGRESS NOTES
Pt telemetry changed to a-fib, HR in 130's, pt voiced and put call light on to tell writer that her a-fib has started up, pt having increased pain in right leg, have applied ice packs, elevated leg, and gave prn pain medications, pink slip sent on this, and charge nurse aware, also home medications need to be ordered, pink slip sent on this as well.

## 2019-01-10 NOTE — PLAN OF CARE
Pt alert/orientated, LS clear, pt had increased pain today that was not controlled with 5mg oxycodone, pt telemetry turned form NSR to A-fib, 's-130's, oxycodone increased to 10 mg, pt had US done today bilateral legs, results show old clot in right knee, Cardizem given x 2 IV for a-fib with no change, pink slips sent, blood pressure dropped after cardiziem given 99's/60's, pt had one dose of IV prn dilaudid to help with 9/10 pain, INR 1.48, Hgb 10, , trace edema in right leg, elevation and ice frequently today, CMS intact, denies numbness/tingling, incision appeared intact and no s/s of infection noted, daughters have been in bedside with pt most of the day.

## 2019-01-10 NOTE — PROGRESS NOTES
"[facility]  Orthopedics Progress Note           Assessment and Plan:    Assessment:   Post-operative day #2 Procedure(s):  Right total knee replacement   Acute post-operative blood loss anemia-asymptomatic   Isolated short lasting episode of chest pain POD0 - medicine following and managing. troponin and ekg negative.   Hx of afib. SR on ekg currently      Plan:   Doing well.  No immediate surgical complications identified.  No excessive bleeding  Pain well-controlled.  Tolerating physical therapy and rehabilitation well.  Encourage IS  Start or continue physical therapy  Activity as tolerated  Weight-bear as tolerated.  Discharge to rehab facility- has no family at home to help- discharge pending placement and medical clearance, likely tomorrow  Advance diet as tolerated  Routine wound care  DVT Prophylaxis: Coumadin, Lovenox, SCD's, Compression Hose  Hospitalist following and assisting in medical issues.            Interval History:   Doing well.  Continues to improve.  No fevers.  No current chest pain since 1 x episode POD0.  Currently EKG monitoring. Patient had increased pain and was \"very sore\" after block wore off was having difficulty with ambulation last night due to pain, once oral medication were started now patient states pain is somewhat better but still pretty sore has been able to ambulate. On coumadin with lovenox bridge.  Tolerating oral intake, voiding, passing flatus.  Been working with physical therapy and tolerating this ok.  Reinforced strict edema control with patient and family again, does have some swelling to the lower extremity,  No more than expected and not worse than yesterday.    Discussed discharge with patient and family.  Questionable if she would have family to help her at home, unlikely.  TCU placement is placement of choice for patient/family, this is reasonable as she has exhausted other options.  No placement found yet.  Will continue to monitor patient today " for pain control and mobility with physical therapy while waiting placement. Likely discharge tomorrow to TCU.               Review of Systems:    The patient denies any chest pain, shortness of breath, excessive pain, fever, chills, purulent drainage from the wound, nausea or vomiting.               Physical Exam:   General: awake, alert, appropriate and in no acute distress  Blood pressure 134/62, pulse 53, temperature 97.3  F (36.3  C), temperature source Oral, resp. rate 16, SpO2 95 %.  Right knee:  Dressing clean, dry and intact. Dressing removed. Very minimum dried bloody drainage on dressing. Incision approximated. Some swelling to knee and lower extremity. Not worse than yesterday. New sterile Aquacell placed. Surrounding skin intact, no breakdown. Calf is soft, nontender with no significant swelling. Distal neurovascular grossly intact.  Compartments soft and non-tender.  2+ distal pulse, sensation intact to foot, able to df/pf against resistance. Brisk cap refill.            Data:     Results for orders placed or performed during the hospital encounter of 19 (from the past 24 hour(s))   Echocardiogram Complete    Narrative    [id number]  [id number]  [id number]  [id number]^SOTO^SARA^O           [facility]  Echocardiography Laboratory  [address] [place] [address]        Name: YOLANDA WOODARD  MRN: [id number]  : 1940  Study Date: 2019 08:00 AM  Age: 78 yrs  Gender: Female  Patient Location: [facility]  Reason For Study: Chest Pain  History: HTN, Hyperlipid, A fib, CP, Pleural Effusion  Ordering Physician: SARA VALERA  Referring Physician: Chad Betts  Performed By: Lamar Caballero     BSA: 1.9 m2  Height: 64 in  Weight: 180 lb  HR: 50  BP: 107/62 mmHg  _____________________________________________________________________________  __        Procedure  Complete Portable Echo Adult. Contrast  Optison.  _____________________________________________________________________________  __        Interpretation Summary     1. The right ventricle is moderately dilated with moderately reduced systolic  function.  2. Normal left ventricular size and systolic function. Estimated LVEF 50-55%.  3. Trace tricuspid and mitral valve regurgitation.  4. The right ventricular systolic pressure is approximated at 16.3 mmHg plus  the right atrial pressure.  5. Normal size inferior vena cava.     On the previous study dated 11/2/2014, the right ventricle was normal in size  and systolic function.  _____________________________________________________________________________  __        Left Ventricle  The left ventricle is normal in size. There is normal left ventricular wall  thickness. Left ventricular systolic function is normal. The visual ejection  fraction is estimated at 50-55%. Grade I or early diastolic dysfunction.     Right Ventricle  The right ventricle is moderately dilated. The right ventricle is not well  visualized. Moderately decreased right ventricular systolic function.     Atria  The left atrium is mildly dilated. Right atrial size is normal. There is no  color Doppler evidence of an atrial shunt.     Mitral Valve  The mitral valve leaflets appear normal. There is no evidence of stenosis,  fluttering, or prolapse. There is trace mitral regurgitation.        Tricuspid Valve  The tricuspid valve is not well visualized. There is trace tricuspid  regurgitation. The right ventricular systolic pressure is approximated at 16.3  mmHg plus the right atrial pressure.     Aortic Valve  The aortic valve is not well visualized. There is trace aortic regurgitation.  No hemodynamically significant valvular aortic stenosis.     Pulmonic Valve  The pulmonic valve is not well seen, but is grossly normal. There is trace  pulmonic valvular regurgitation. Normal pulmonic valve velocity.     Vessels  The aortic root is normal  size. Normal size ascending aorta. The IVC is normal  in size and reactivity with respiration, suggesting normal central venous  pressure.     Pericardium  There is no pericardial effusion.        Rhythm  Sinus rhythm was noted.  _____________________________________________________________________________  __  MMode/2D Measurements & Calculations  IVSd: 0.98 cm     LVIDd: 4.2 cm  LVIDs: 3.3 cm  LVPWd: 1.0 cm  FS: 22.0 %  LV mass(C)d: 136.2 grams  LV mass(C)dI: 72.8 grams/m2  Ao root diam: 2.9 cm  LA dimension: 3.0 cm  asc Aorta Diam: 2.8 cm  LA/Ao: 1.0  LA Volume (BP): 67.4 ml  LA Volume Index (BP): 36.0 ml/m2  RWT: 0.49           Doppler Measurements & Calculations  MV E max gregg: 77.9 cm/sec  MV A max gregg: 82.1 cm/sec  MV E/A: 0.95  MV dec time: 0.20 sec  Ao V2 max: 141.2 cm/sec  Ao max P.0 mmHg  LV V1 max P.1 mmHg  LV V1 max: 101.7 cm/sec  PA acc time: 0.12 sec  TR max gregg: 202.0 cm/sec  TR max P.3 mmHg  AV Gregg Ratio (DI): 0.72  E/E' av.0  Lateral E/e': 8.0  Medial E/e': 9.9              _____________________________________________________________________________  __        Report approved by: Dr Eliel Sterling 2019 12:30 PM      Troponin I   Result Value Ref Range    Troponin I ES <0.015 0.000 - 0.045 ug/L   Hemoglobin   Result Value Ref Range    Hemoglobin 10.0 (L) 11.7 - 15.7 g/dL   INR   Result Value Ref Range    INR 1.48 (H) 0.86 - 1.14   Glucose   Result Value Ref Range    Glucose 109 (H) 70 - 99 mg/dL   Platelet count   Result Value Ref Range    Platelet Count 171 150 - 450 10e9/L     BILLY Oh, CNP  Orthopedic Surgery

## 2019-01-10 NOTE — PLAN OF CARE
Discharge Planner OT   Patient plan for discharge: TCU  Current status: Pt completed all aspects of toileting with CGA and use of toilet safety frame. Improved performance and safety during toilet transfer. Pt stood at sink for 10 minutes to complete grooming tasks with SBA. Minimal fatigue and SOB noted after standing tolerance. Pt completed bed mobility with min assist and use of gait belt as leg .   Barriers to return to prior living situation: Lack of 24/7 assist at home  Recommendations for discharge: TCU  Rationale for recommendations: Given that pt lives alone and does not have 24/7 assist, pt would benefit from further skilled OT services within the TCU setting to increase independence with ADL in order to ensure safe discharge disposition.        Entered by: Mary Alice Dodson 01/10/2019 1:36 PM

## 2019-01-10 NOTE — PROGRESS NOTES
SPIRITUAL HEALTH SERVICES  SPIRITUAL ASSESSMENT Progress Note  [facility]      Pt request - (Pt known to  from previous hospitalization.)  While pt was walking with the Physical Therapist in the hallway,  provided a prayer.   is available for pt/family needs.    Randy Tobar M.Div., [facility]  Staff   Office tel: [phone]

## 2019-01-11 ENCOUNTER — APPOINTMENT (OUTPATIENT)
Dept: PHYSICAL THERAPY | Facility: CLINIC | Age: 79
DRG: 470 | End: 2019-01-11
Attending: ORTHOPAEDIC SURGERY
Payer: MEDICARE

## 2019-01-11 ENCOUNTER — TELEPHONE (OUTPATIENT)
Dept: INTERNAL MEDICINE | Facility: CLINIC | Age: 79
End: 2019-01-11

## 2019-01-11 VITALS
DIASTOLIC BLOOD PRESSURE: 51 MMHG | TEMPERATURE: 96.9 F | OXYGEN SATURATION: 93 % | SYSTOLIC BLOOD PRESSURE: 110 MMHG | HEART RATE: 66 BPM | RESPIRATION RATE: 20 BRPM

## 2019-01-11 LAB
ALBUMIN SERPL-MCNC: 2.7 G/DL (ref 3.4–5)
ALP SERPL-CCNC: 69 U/L (ref 40–150)
ALT SERPL W P-5'-P-CCNC: 34 U/L (ref 0–50)
ANION GAP SERPL CALCULATED.3IONS-SCNC: 3 MMOL/L (ref 3–14)
AST SERPL W P-5'-P-CCNC: 24 U/L (ref 0–45)
BILIRUB SERPL-MCNC: 0.8 MG/DL (ref 0.2–1.3)
BUN SERPL-MCNC: 17 MG/DL (ref 7–30)
CALCIUM SERPL-MCNC: 8.6 MG/DL (ref 8.5–10.1)
CHLORIDE SERPL-SCNC: 108 MMOL/L (ref 94–109)
CO2 SERPL-SCNC: 29 MMOL/L (ref 20–32)
CREAT SERPL-MCNC: 0.76 MG/DL (ref 0.52–1.04)
ERYTHROCYTE [DISTWIDTH] IN BLOOD BY AUTOMATED COUNT: 14.6 % (ref 10–15)
GFR SERPL CREATININE-BSD FRML MDRD: 75 ML/MIN/{1.73_M2}
GLUCOSE SERPL-MCNC: 94 MG/DL (ref 70–99)
HCT VFR BLD AUTO: 32.6 % (ref 35–47)
HGB BLD-MCNC: 10.3 G/DL (ref 11.7–15.7)
INR PPP: 1.36 (ref 0.86–1.14)
MAGNESIUM SERPL-MCNC: 2.1 MG/DL (ref 1.6–2.3)
MCH RBC QN AUTO: 29.4 PG (ref 26.5–33)
MCHC RBC AUTO-ENTMCNC: 31.6 G/DL (ref 31.5–36.5)
MCV RBC AUTO: 93 FL (ref 78–100)
PLATELET # BLD AUTO: 176 10E9/L (ref 150–450)
POTASSIUM SERPL-SCNC: 4.3 MMOL/L (ref 3.4–5.3)
PROT SERPL-MCNC: 5.7 G/DL (ref 6.8–8.8)
RBC # BLD AUTO: 3.5 10E12/L (ref 3.8–5.2)
SODIUM SERPL-SCNC: 140 MMOL/L (ref 133–144)
WBC # BLD AUTO: 7.8 10E9/L (ref 4–11)

## 2019-01-11 PROCEDURE — 25000132 ZZH RX MED GY IP 250 OP 250 PS 637: Mod: GY | Performed by: HOSPITALIST

## 2019-01-11 PROCEDURE — 25000132 ZZH RX MED GY IP 250 OP 250 PS 637: Mod: GY | Performed by: NURSE PRACTITIONER

## 2019-01-11 PROCEDURE — 85610 PROTHROMBIN TIME: CPT | Performed by: ORTHOPAEDIC SURGERY

## 2019-01-11 PROCEDURE — 83735 ASSAY OF MAGNESIUM: CPT | Performed by: ORTHOPAEDIC SURGERY

## 2019-01-11 PROCEDURE — 80053 COMPREHEN METABOLIC PANEL: CPT | Performed by: ORTHOPAEDIC SURGERY

## 2019-01-11 PROCEDURE — 25000132 ZZH RX MED GY IP 250 OP 250 PS 637: Mod: GY | Performed by: ORTHOPAEDIC SURGERY

## 2019-01-11 PROCEDURE — A9270 NON-COVERED ITEM OR SERVICE: HCPCS | Mod: GY | Performed by: NURSE PRACTITIONER

## 2019-01-11 PROCEDURE — 25000128 H RX IP 250 OP 636: Performed by: ORTHOPAEDIC SURGERY

## 2019-01-11 PROCEDURE — A9270 NON-COVERED ITEM OR SERVICE: HCPCS | Mod: GY | Performed by: ORTHOPAEDIC SURGERY

## 2019-01-11 PROCEDURE — 40000193 ZZH STATISTIC PT WARD VISIT: Performed by: PHYSICAL THERAPIST

## 2019-01-11 PROCEDURE — A9270 NON-COVERED ITEM OR SERVICE: HCPCS | Mod: GY | Performed by: HOSPITALIST

## 2019-01-11 PROCEDURE — 97116 GAIT TRAINING THERAPY: CPT | Mod: GP | Performed by: PHYSICAL THERAPIST

## 2019-01-11 PROCEDURE — 36415 COLL VENOUS BLD VENIPUNCTURE: CPT | Performed by: ORTHOPAEDIC SURGERY

## 2019-01-11 PROCEDURE — 99232 SBSQ HOSP IP/OBS MODERATE 35: CPT | Performed by: HOSPITALIST

## 2019-01-11 PROCEDURE — 85027 COMPLETE CBC AUTOMATED: CPT | Performed by: ORTHOPAEDIC SURGERY

## 2019-01-11 PROCEDURE — 97110 THERAPEUTIC EXERCISES: CPT | Mod: GP | Performed by: PHYSICAL THERAPIST

## 2019-01-11 RX ORDER — WARFARIN SODIUM 5 MG/1
5 TABLET ORAL DAILY
Qty: 30 TABLET | Refills: 0 | Status: SHIPPED | OUTPATIENT
Start: 2019-01-11 | End: 2020-01-31

## 2019-01-11 RX ORDER — WARFARIN SODIUM 5 MG/1
5 TABLET ORAL
Status: DISCONTINUED | OUTPATIENT
Start: 2019-01-11 | End: 2019-01-11 | Stop reason: HOSPADM

## 2019-01-11 RX ORDER — OXYCODONE HYDROCHLORIDE 5 MG/1
5-10 TABLET ORAL EVERY 4 HOURS PRN
Status: DISCONTINUED | OUTPATIENT
Start: 2019-01-11 | End: 2019-01-11 | Stop reason: HOSPADM

## 2019-01-11 RX ORDER — NITROGLYCERIN 0.4 MG/1
TABLET SUBLINGUAL
Qty: 25 TABLET | Refills: 0 | Status: ON HOLD | OUTPATIENT
Start: 2019-01-11 | End: 2020-08-05

## 2019-01-11 RX ORDER — ACETAMINOPHEN 325 MG/1
650 TABLET ORAL EVERY 8 HOURS PRN
Qty: 30 TABLET | Refills: 0 | Status: SHIPPED | OUTPATIENT
Start: 2019-01-11 | End: 2019-01-14

## 2019-01-11 RX ORDER — OXYCODONE HYDROCHLORIDE 5 MG/1
5-10 TABLET ORAL EVERY 4 HOURS PRN
Qty: 50 TABLET | Refills: 0 | Status: SHIPPED | OUTPATIENT
Start: 2019-01-11 | End: 2019-01-11

## 2019-01-11 RX ORDER — POLYETHYLENE GLYCOL 3350 17 G/17G
17 POWDER, FOR SOLUTION ORAL DAILY PRN
Qty: 30 PACKET | Refills: 0 | DISCHARGE
Start: 2019-01-11 | End: 2019-01-14

## 2019-01-11 RX ORDER — ACETAMINOPHEN 325 MG/1
975 TABLET ORAL EVERY 8 HOURS PRN
DISCHARGE
Start: 2019-01-11 | End: 2019-01-11

## 2019-01-11 RX ORDER — OXYCODONE HYDROCHLORIDE 5 MG/1
5 TABLET ORAL EVERY 6 HOURS PRN
Qty: 30 TABLET | Refills: 0 | Status: SHIPPED | OUTPATIENT
Start: 2019-01-11 | End: 2019-01-14

## 2019-01-11 RX ORDER — ONDANSETRON 4 MG/1
4 TABLET, ORALLY DISINTEGRATING ORAL EVERY 8 HOURS PRN
Qty: 20 TABLET | Refills: 1 | DISCHARGE
Start: 2019-01-11 | End: 2019-03-04

## 2019-01-11 RX ORDER — METHOCARBAMOL 500 MG/1
500 TABLET, FILM COATED ORAL 3 TIMES DAILY PRN
Qty: 60 TABLET | Refills: 0 | DISCHARGE
Start: 2019-01-11 | End: 2019-03-04

## 2019-01-11 RX ORDER — METOPROLOL TARTRATE 25 MG/1
25 TABLET, FILM COATED ORAL 2 TIMES DAILY
Qty: 60 TABLET | Refills: 0 | Status: SHIPPED | OUTPATIENT
Start: 2019-01-11 | End: 2019-01-11

## 2019-01-11 RX ORDER — DOCUSATE SODIUM 100 MG/1
100 CAPSULE, LIQUID FILLED ORAL 2 TIMES DAILY PRN
Qty: 60 CAPSULE | Refills: 0 | DISCHARGE
Start: 2019-01-11 | End: 2019-03-04

## 2019-01-11 RX ORDER — METOPROLOL TARTRATE 25 MG/1
12.5 TABLET, FILM COATED ORAL 2 TIMES DAILY
Qty: 30 TABLET | Refills: 0 | Status: SHIPPED | OUTPATIENT
Start: 2019-01-11 | End: 2019-02-06

## 2019-01-11 RX ADMIN — DOCUSATE SODIUM 100 MG: 100 CAPSULE, LIQUID FILLED ORAL at 07:51

## 2019-01-11 RX ADMIN — OXYCODONE HYDROCHLORIDE 10 MG: 5 TABLET ORAL at 07:48

## 2019-01-11 RX ADMIN — OXYCODONE HYDROCHLORIDE 10 MG: 5 TABLET ORAL at 00:39

## 2019-01-11 RX ADMIN — ACETAMINOPHEN 975 MG: 325 TABLET ORAL at 06:50

## 2019-01-11 RX ADMIN — OXYCODONE HYDROCHLORIDE 10 MG: 5 TABLET ORAL at 12:26

## 2019-01-11 RX ADMIN — POLYETHYLENE GLYCOL 3350 17 G: 17 POWDER, FOR SOLUTION ORAL at 07:52

## 2019-01-11 RX ADMIN — ENOXAPARIN SODIUM 80 MG: 80 INJECTION SUBCUTANEOUS at 12:26

## 2019-01-11 ASSESSMENT — ACTIVITIES OF DAILY LIVING (ADL)
ADLS_ACUITY_SCORE: 13

## 2019-01-11 NOTE — PROGRESS NOTES
Name: Tracy Palomo    MRN#: [id number]    Reason for Hospitalization: Right knee arthritis  S/P total knee replacement using cement, right    Discharge Date: 1/11/2019    Patient / Family response to discharge plan: Patient and family in agreement with discharge to [facility] TCU today.  [facility] transport scheduled for 1pm, as bed not available until then.      Other Providers (Care Coordinator, County Services, PCA services etc): No    CTS Hand Off Completed: Not needed    PAS #: [id number]    FIDELIA Score: Low     Future Appointments:   Future Appointments   Date Time Provider Department Center   1/23/2019 10:30 AM Kaleb Pang DO PHOS [facility]       Discharge Disposition: transitional care unit    Discharge Planner   Discharge Plans in progress: Yes. Completed. discharge to the TCU at [facility] on 1/11/19. [facility] transport at 1300.    Barriers to discharge plan: None   Follow up plan: Per ortho        Entered by: ARNIE JAMES 01/11/2019 1:36 PM           HONEY Thao

## 2019-01-11 NOTE — PROGRESS NOTES
SPIRITUAL HEALTH SERVICES  SPIRITUAL ASSESSMENT Progress Note  [facility]      Pt request - (Pt known to  from previous hospitalization.)  Pt informed  her , Leonel, had  last August.  She said she had made the decision to let him go and had not regretted it.   voiced support for her decision.  Pt stated she was glad the knee replacement was behind her and was ready to move on.   provided supportive listening, prayer and gave pt a prayer shawl.  No follow up is needed.    Randy Tobar M.Div., [facility]  Staff   Office tel: [phone]

## 2019-01-11 NOTE — PROGRESS NOTES
Tracy Palomo  Gender: female  : 1940  [address] [place] [facility] [place] [facility] [address]  [phone] (home)     Medical Record: [id number]  Pharmacy: [facility] [address] [place], [place] - [address]  Primary Care Provider: Chad Betts    Parent's names are: Data Unavailable (mother) and Data Unavailable (father).      [facility]  2019     Discharge Phone Call:  Discharge to [facility]

## 2019-01-11 NOTE — PLAN OF CARE
Discharge Planner PT   Patient plan for discharge: TCU  Current status: She is trying to use both hands on arm rests for sit->stand and this is safer for her as she is not losing the walker. However, she does need CGA->minimal assist to complete this transfer based on height of chair. She is able to walk x 40 feet with CGA, immobilizer in place and verbal cues for walker placement (using front wheeled walker) and she sits with fair control and CGA. She was able to participate in her home program with 5 degrees extension to approximately 50 degrees of flexion in her chair. She had the ability to complete the short arc quad today 2 sets of 3 reps and 1 set of 1 rep. Her quads were engaging today. She feels much better overall. She had an increase of 1/10 symptoms after the exercise and the walk.  Barriers to return to prior living situation: Lives alone, transfers, bed mobility and gait require assistance and she has not completed steps at this time. She is post op day 3 but was unable to participate as she had too much pain.   Recommendations for discharge: Agree with TCU   Rationale for recommendations: Improve her knee control, safety with transfers, bed mobility, gait with 2 wheeled walker and better endurance.     Nurses should walk her 1 time per day to 2 times per day       Entered by: Carmen Cooper 01/11/2019 4:10 PM

## 2019-01-11 NOTE — DISCHARGE INSTRUCTIONS
Total Knee Replacement Discharge Instructions                                     [phone]  Bone and Joint Service Line for issues or concerns  General Care:  After surgery you may feel tired/sleepy. This is normal. If you have any question along the way please contact the office. If you feel it is an issue cannot wait for normal office hours, contact the 24 hour bone and joint line at [phone]. You should not drive or operate machines/equipment until released by your physician to do so.     Wound Care:  Keep incision covered with hospital dressing (Aquacel) for one week. It is okay to shower with this dressing on. However, do not submerge your dressing and incision in water for roughly 2 weeks. After one week remove this dressing and then keep it clean, dry, and covered. If this dressing become looses or falls off it is ok to cover with gauze and tape.  Wash the incision gently with warm soapy water after removing your hospital dressing and lightly pat dry.       Diet:  Start with non-alcoholic liquids at first, particularly water or sports drinks after surgery. Progress to bland foods such as crackers and bread and finally to your normal diet if you have no problems. Avoid alcohol when taking narcotic pain medications.      Pain control:  Take your pain medications as prescribed. These medications may make you sleepy. Do not drive, operate equipment, or drink alcohol when taking these.  You may take Tylenol (Generic name is acetaminophen) as directed on the bottle for additional relief or in place of the prescribed pain medications as your pain gets better. Do not take any other NSAIDs (Motrin, Ibuprofen, Aleve, Naproxen) while taking the blood thinner. If the medications cause a reaction such as nausea or skin rash, stop taking them and contact your doctor. Please plan accordingly, pain medications will not be re-filled on the weekends or at night. Call the office during the day if you need more  medications.    Blood thinner:  It is very important to take it as prescribed. It is a medication to help prevent blood clots in your legs or lungs. No medication is perfect, so if you notice a sudden onset of pain/swelling in your calf area call your doctor. If you notice a sudden onset of troubles breathing and/or chest pain call 911.     Swelling (edema) control:  Preventing swelling (edema) in your legs after surgery is very important. It is helpful for achieving optimal range of motion as well as preventing blood clots.  It starts with simple things such as elevation of your legs and icing. Elevate your legs above your heart.    Do this for 20 minutes every couple hours the first few days after surgery. We also recommend MACKENZIE hose (compression hose) to be worn. Wear on both legs during the day. You may remove at night. Wear these until directed to stop.      Icing:  It is common for some swelling, aching and stiffness to occur for up to 6-9 months after a knee replacement. If you knee swells, get off your feet, elevate your leg and apply some ice packs. Apply for 20 minutes at a time. For the first 1-2 weeks apply ice 2-3 x day or more after therapy.    Walker/crutches:  Use a walker/crutches when you go home. You will transition to the use of a cane and finally to no additional support.     Braces:  You will go home with a knee immobilizer. You can take it off when you are lying or sitting down. Wear it when you are walking. This immobilizer can come off after you are doing a straight leg raise. Your physician and or physical therapist will help to determine when to stop wearing it.     Physical Therapy:  The success of your knee replacement is based on doing physical therapy. You will have some pain and discomfort along the way. If you feel your pain is limiting your progress make sure to take some pain medication prior to your therapy session. If you pain medications are not working talk to your surgeon.    For the first 2 weeks after going home you will have in-home physical therapy. The goal is to work on range of motion. While it is important to working on bending your knee, it is equally important to make sure you knee comes out all the way straight. To assist in this do not place any bumps, pillows and or blankets under your knee when you are lying down. You should have an outpatient physical therapy appointment scheduled for about 2 weeks after surgery.     Activity:  Unless otherwise instructed, you can weight bear as tolerated. While laying or sitting down you should straighten your knee all the way out and then gently work on bending the knee back. Do not worry at first if your knee feels stiff and will not bend like normal, this will get better. Never put a pillow or bump under you knee, instead put a pillow under your ankle so your knee will straighten out all the way.     Normal findings after surgery:  Numbness and tenderness around the incisions and to the outside of the incision is normal.  You may have bruising around the incisions and down the lower leg.   Your knee will be swollen for months after surgery. It will feel  tight  to move.   Low grade fevers less than 100.5 degrees Fahrenheit are normal.   You may have some minor swelling in the leg/calf area.  You will have some increased pain after your therapy sessions.     When to call the Office:  Temperature greater than 101.5 degrees Fahreheit.  Fever, chills, and increasing pain in the knee.  Excessive drainage from the incisions that include bright red blood.  Drainage from the incisions sites that appear yellow, pus-like, or foul smelling.  Increasing pain the knee not relieved by the prescribed pain medications or ice.  Persistent nausea or vomiting not helped by the Zofran.  Increased pain or swelling in your calf area (in back above your ankle) that wasn t there when in the hospital.  Any other effects you feel are significant.  Call 911 if  you experience any chest pain and/or shortness or breath.

## 2019-01-11 NOTE — PROGRESS NOTES
[facility]    Medicine Progress Note - Hospitalist Service       Date of Admission:  1/8/2019  Assessment & Plan    78-year-old woman admitted 1/8/19 after elective right total knee replacement.  Transient chest pain that occurred yesterday postoperatively has resolved, MI ruled out by negative troponinx 3, echocardiogram is consistent with chronic right heart failure from previous significant pulmonary emboli in 2014.  She also endorses chronic peripheral edema, R>L which is worse today.  She goes in and out of A fib with RVR,   Previous chronic anticoagulation therapy was interrupted for her surgical procedure and is now being restarted postoperatively with lovenox bridging    HTN/a fib with RVR/chronic right heart failure  -cardizem 10mg iv x 2 given followed by lopressor 25mg bid, discharge on lopressor 12.5mg bid due to tachybradycardia in house, will need to see cardiologist about necessity of pacer in the near future  -continue to hold lisinopril due to lowish BP  -lovenox 80mg subcutaneous bid while waiting for coumadin to become therapeutic again, NP at [facility] agreed to follow her protime  -echo result as follows  Interpretation Summary  1. The right ventricle is moderately dilated with moderately reduced systolic  function.  2. Normal left ventricular size and systolic function. Estimated LVEF 50-55%.  3. Trace tricuspid and mitral valve regurgitation.  4. The right ventricular systolic pressure is approximated at 16.3 mmHg plus  the right atrial pressure.  5. Normal size inferior vena cava.  On the previous study dated 11/2/2014, the right ventricle was normal in size  and systolic function.    Right knee OA, s/p TKA  -follow up with orthopedic    Hyperlipidemia  -continue pravachol    Chest pain/right leg worsening edema  -leg doppler negative for DVT, but positive for right knee baker cyst     Obstipation  -miralax added      Diet: Regular Diet Adult  Room Service  Advance  Diet as Tolerated    DVT Prophylaxis: lovenox+coumadin  Reyna Catheter: not present  Code Status: Full Code      Disposition Plan   Expected discharge: Deferred to primary team, recommended to Deferred to primary team once Deferred to primary team.  Entered: Wilda Zamora MD 01/11/2019, 3:22 PM       The patient's care was discussed with the Patient.    Wilda Zamora MD  Hospitalist Service  [facility]    ______________________________________________________________________    Interval History   After her 20-minute episode of mid to left-sided chest pressure last night, she has not had any other chest pain.  She denies any shortness of breath.  She denies any heartburn.  She denies any cough.  She has been afebrile and hemodynamically stable.  Oxygenation has been normal.  Urine output has been adequate.  She is tolerating good oral intake and advancing activity.  Hx of PE after colon surgery in 2014 and has been on coumadin since  She has concern over worsening right leg edema  Also has no BM since Monday, denies abdominal pain/n/v    Data reviewed today: I reviewed all medications, new labs and imaging results over the last 24 hours. I personally reviewed telemetry tracing showed a fib with RVR(HR in low 100s).    Physical Exam   Vital Signs: Temp: 96.9  F (36.1  C) Temp src: Oral BP: 110/51 Pulse: 66 Heart Rate: 66 Resp: 20 SpO2: 93 % O2 Device: None (Room air)    Weight: 0 lbs 0 oz  General Appearance: No acute distress while resting in bed  Cardiovascular: irregular rate and rhythm  Abdomen: soft, faint BS, -HSM, no ascites  Neuro: aox 3  Extremities: 2+ edema, R>L    Data   Recent Labs   Lab 01/11/19  0528 01/10/19  1131 01/10/19  0539 01/09/19  1203 01/09/19  0550   WBC 7.8  --   --   --   --    HGB 10.3*  --  10.0*  --  10.5*   MCV 93  --   --   --   --      --  171  --   --    INR 1.36*  --  1.48*  --  1.15*     --   --   --   --    POTASSIUM 4.3  --   --   --   --     CHLORIDE 108  --   --   --   --    CO2 29  --   --   --   --    BUN 17  --   --   --   --    CR 0.76  --   --   --   --    ANIONGAP 3  --   --   --   --    HARVEY 8.6  --   --   --   --    GLC 94  --  109*  --  145*   ALBUMIN 2.7*  --   --   --   --    PROTTOTAL 5.7*  --   --   --   --    BILITOTAL 0.8  --   --   --   --    ALKPHOS 69  --   --   --   --    ALT 34  --   --   --   --    AST 24  --   --   --   --    TROPI  --  <0.015  --  <0.015 <0.015     Medications     Warfarin Therapy Reminder         docusate sodium  100 mg Oral BID     enoxaparin  1 mg/kg (Order-Specific) Subcutaneous Q12H     metoprolol tartrate  25 mg Oral BID     polyethylene glycol  17 g Oral Daily     pravastatin  40 mg Oral At Bedtime     sodium chloride (PF)  3 mL Intracatheter Q8H     warfarin  5 mg Oral ONCE at 18:00

## 2019-01-11 NOTE — PLAN OF CARE
Problem: Patient Care Overview  Goal: Plan of Care/Patient Progress Review  S-(situation): OT treatment session    B-(background): Pt is a 78 year old female being seen for OT s/p R TKA. Pt scheduled for OT session at 1100 this AM. Attempted to see pt to address ADL, however pt reported she was getting ready to take a shower and was preparing for discharge from facility.    A-(assessment): Pt not seen for scheduled OT session to allow pt to prepare for discharge.     R-(recommendations): Continue OT treatment at TCU to progress independence and safety with ADL/IADL prior to returning home.       Occupational Therapy Discharge Summary    Reason for therapy discharge:    Discharged to transitional care facility.    Progress towards therapy goal(s). See goals on Care Plan in [facility] electronic health record for goal details.  Goals met    Therapy recommendation(s):    Continued therapy is recommended.  Rationale/Recommendations:  to progress independence and safety with ADL/IADL prior to returning home.     Mary Alice Dodson OTR/L  [facility]  [phone]

## 2019-01-11 NOTE — TELEPHONE ENCOUNTER
Prior Authorization Retail Medication Request    Medication/Dose:enoxaparin  ICD code (if different than what is on RX):  I26.99  Previously Tried and Failed:    Rationale:  Post op    Insurance Name:  MED RX -D WEI NGUYEN   Insurance ID:  [id number]      Pharmacy Information (if different than what is on RX)  Name:  [facility]  Phone:  [phone]

## 2019-01-11 NOTE — PROGRESS NOTES
S-(situation): Patient discharged to [facility] via w/c with [facility].    B-(background): POD # 3 Right TKA    A-(assessment): see VSS f/s. aquacelle dressing C/D/I. Transfers with A1 and walker. Pain management with Roxicodone, see MAR.  Last bowel movement: 01/07/19     R-(recommendations):Report called to Lenore CASTRO. Listed belongings gathered and sent with patient.     Discharge Nursing Criteria:     Care Plan and Patient education resolved: Yes    Vaccines  Influenza status verified at discharge:  Yes    [facility]  Home and hospital aquired medications returned to patient: NA  Medication Bin checked and emptied on discharge Yes  All paperwork sent with patient/Copy of AVS given to patient or family Yes.

## 2019-01-11 NOTE — PROGRESS NOTES
[facility]  Orthopedics Progress Note           Assessment and Plan:    Assessment:   Post-operative day #3 Procedure(s):  Right total knee replacement   Acute post-operative blood loss anemia-asymptomatic   Isolated short lasting episode of chest pain POD0 - medicine following and managing. troponin and ekg negative.   Hx of afib. SR on ekg currently - episode of afib RVR POD2      Plan:   Doing well.  No immediate surgical complications identified.  No excessive bleeding  Pain well-controlled.  Tolerating physical therapy and rehabilitation well.  Encourage IS  Start or continue physical therapy  Activity as tolerated  Weight-bear as tolerated.  Discharge to rehab facility- today pending medical clearance  Advance diet as tolerated  Routine wound care  DVT Prophylaxis: Coumadin, Lovenox, SCD's, Compression Hose  Hospitalist following and assisting in medical issues.            Interval History:   Doing well.  Continues to improve.  No fevers.  No current chest pain since 1 x episode POD0. POD2 did spontaneous convert to afib with RVR, was started on medications by medicine, this AM Sbrady in 50s.  Per patient she is feeling very good today. Pain is much better and she feels ready for discharge. Due to leg edema medicine performed a bilateral lower extremity US for concerns of a dVT given her past hx of this on POD2 which was negative for acute findings.  Currently On coumadin with lovenox bridge.  Tolerating oral intake, voiding, passing flatus.  Been working with physical therapy and tolerating this ok.  Reinforced strict edema control with patient and family again, does have some swelling to the lower extremity,  No more than expected and not worse than yesterday.    Patient accepted by TCU placement.  Orthopedic standpoint cleared for discharge, will await for medicine recommendations and clearance.              Review of Systems:    The patient denies any chest pain, shortness of breath,  excessive pain, fever, chills, purulent drainage from the wound, nausea or vomiting.               Physical Exam:   General: awake, alert, appropriate and in no acute distress  Blood pressure 110/62, pulse 59, temperature 96.3  F (35.7  C), temperature source Oral, resp. rate 20, SpO2 96 %.  Right knee:  Dressing clean, dry and intact. Some swelling to knee and lower extremity. Not worse than yesterday. New sterile Aquacell placed. Surrounding skin intact, no breakdown. Calf is soft, nontender with no significant swelling. Distal neurovascular grossly intact.  Compartments soft and non-tender.  2+ distal pulse, sensation intact to foot, able to df/pf against resistance. Brisk cap refill.            Data:     Results for orders placed or performed during the hospital encounter of 01/08/19 (from the past 24 hour(s))   TSH with free T4 reflex   Result Value Ref Range    TSH 2.38 0.40 - 4.00 mU/L   Troponin I   Result Value Ref Range    Troponin I ES <0.015 0.000 - 0.045 ug/L   D dimer quantitative   Result Value Ref Range    D Dimer 2.5 (H) 0.0 - 0.50 ug/ml FEU   US Lower Extremity Venous Duplex Bilateral    Narrative    ULTRASOUND VENOUS LOWER EXTREMITY BILATERAL 1/10/2019 2:56 PM     HISTORY: Leg edema, plus D-dimer.    COMPARISON: None.    TECHNIQUE: Ultrasound gray scale, Color Doppler flow, and spectral  Doppler waveform analysis performed.    FINDINGS: There is nonocclusive echogenic material in the right  popliteal vein. The right common femoral vein, femoral vein, and  posterior tibial veins are normally compressible with color flow.    The left common femoral vein, femoral vein, popliteal vein, and  posterior tibial veins demonstrate normal color flow and  compressibility.    There is echogenic material in a right-sided Baker's cyst.      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 No evidence of acute DVT. Probable hemorrhagic right  Mckay's cyst.    BORIS GONZALES  MD BRIDGET   INR   Result Value Ref Range    INR 1.36 (H) 0.86 - 1.14   CBC with platelets   Result Value Ref Range    WBC 7.8 4.0 - 11.0 10e9/L    RBC Count 3.50 (L) 3.8 - 5.2 10e12/L    Hemoglobin 10.3 (L) 11.7 - 15.7 g/dL    Hematocrit 32.6 (L) 35.0 - 47.0 %    MCV 93 78 - 100 fl    MCH 29.4 26.5 - 33.0 pg    MCHC 31.6 31.5 - 36.5 g/dL    RDW 14.6 10.0 - 15.0 %    Platelet Count 176 150 - 450 10e9/L   Comprehensive metabolic panel   Result Value Ref Range    Sodium 140 133 - 144 mmol/L    Potassium 4.3 3.4 - 5.3 mmol/L    Chloride 108 94 - 109 mmol/L    Carbon Dioxide 29 20 - 32 mmol/L    Anion Gap 3 3 - 14 mmol/L    Glucose 94 70 - 99 mg/dL    Urea Nitrogen 17 7 - 30 mg/dL    Creatinine 0.76 0.52 - 1.04 mg/dL    GFR Estimate 75 >60 mL/min/[1.73_m2]    GFR Estimate If Black 87 >60 mL/min/[1.73_m2]    Calcium 8.6 8.5 - 10.1 mg/dL    Bilirubin Total 0.8 0.2 - 1.3 mg/dL    Albumin 2.7 (L) 3.4 - 5.0 g/dL    Protein Total 5.7 (L) 6.8 - 8.8 g/dL    Alkaline Phosphatase 69 40 - 150 U/L    ALT 34 0 - 50 U/L    AST 24 0 - 45 U/L   Magnesium   Result Value Ref Range    Magnesium 2.1 1.6 - 2.3 mg/dL     Al Montiel APRN, CNP  Orthopedic Surgery

## 2019-01-11 NOTE — PHARMACY-ANTICOAGULATION SERVICE
Clinical Pharmacy - Warfarin Dosing Consult     Pharmacy has been consulted to manage this patient s warfarin therapy.  Indication: DVT/PE Prophylaxis  Therapy Goal: INR 2-3  Warfarin Prior to Admission: Yes  Warfarin PTA Regimen: 5 mg M, W, F, 2.5 mg all other days of week  Significant drug interactions: cefazolin  Recent documented change in oral intake/nutrition: Unknown    INR   Date Value Ref Range Status   01/11/2019 1.36 (H) 0.86 - 1.14 Final   01/10/2019 1.48 (H) 0.86 - 1.14 Final       Recommend warfarin 5 mg today.  Pharmacy will monitor Tracy Palomo daily and order warfarin doses to achieve specified goal.      Please contact pharmacy as soon as possible if the warfarin needs to be held for a procedure or if the warfarin goals change.

## 2019-01-11 NOTE — PLAN OF CARE
Telemetry is NSR/SBrady with HR 56-60, other vss. Pain to R knee controlled with prn Oxycodone. Pt has been up to the bathroom with minimal assistance of one and gait belt and walker. CMS intact to RLE. Trace edema to RLE. Will continue with plan of care. Pt planning to discharge to [facility] for rehab.

## 2019-01-12 ENCOUNTER — TELEPHONE (OUTPATIENT)
Dept: GERIATRICS | Facility: CLINIC | Age: 79
End: 2019-01-12

## 2019-01-12 NOTE — TELEPHONE ENCOUNTER
INR result today 1.5  She is on coumadin for atrial fib  Last INR was 1.4, she has been on coumadin 5mg QD  Patient is on lovenox 80mg q 12 hours until INR is > 2.0  Order: coumadin 5mg QD INR on Monday

## 2019-01-13 ENCOUNTER — TELEPHONE (OUTPATIENT)
Dept: GERIATRICS | Facility: CLINIC | Age: 79
End: 2019-01-13

## 2019-01-13 NOTE — TELEPHONE ENCOUNTER
New patient on daily INRs.      INR = 1.6 and on 5mg for last two days    Increase coumadin to 7.5mg   INR tomorrow    Electronically signed by Barbara Becerra RN, CNP

## 2019-01-14 ENCOUNTER — NURSING HOME VISIT (OUTPATIENT)
Dept: GERIATRICS | Facility: CLINIC | Age: 79
End: 2019-01-14
Payer: MEDICARE

## 2019-01-14 VITALS
TEMPERATURE: 97.8 F | DIASTOLIC BLOOD PRESSURE: 54 MMHG | OXYGEN SATURATION: 95 % | BODY MASS INDEX: 32.36 KG/M2 | HEART RATE: 65 BPM | SYSTOLIC BLOOD PRESSURE: 189 MMHG | WEIGHT: 190 LBS | RESPIRATION RATE: 15 BRPM

## 2019-01-14 DIAGNOSIS — K59.03 DRUG-INDUCED CONSTIPATION: ICD-10-CM

## 2019-01-14 DIAGNOSIS — I26.99 OTHER PULMONARY EMBOLISM WITHOUT ACUTE COR PULMONALE, UNSPECIFIED CHRONICITY (H): ICD-10-CM

## 2019-01-14 DIAGNOSIS — Z96.651 STATUS POST TOTAL RIGHT KNEE REPLACEMENT: Primary | ICD-10-CM

## 2019-01-14 DIAGNOSIS — I48.0 PAROXYSMAL ATRIAL FIBRILLATION (H): ICD-10-CM

## 2019-01-14 DIAGNOSIS — Z71.89 ACP (ADVANCE CARE PLANNING): ICD-10-CM

## 2019-01-14 PROCEDURE — 99310 SBSQ NF CARE HIGH MDM 45: CPT | Performed by: NURSE PRACTITIONER

## 2019-01-14 RX ORDER — POLYETHYLENE GLYCOL 3350 17 G/17G
1 POWDER, FOR SOLUTION ORAL DAILY
COMMUNITY
End: 2019-03-04

## 2019-01-14 RX ORDER — ACETAMINOPHEN 500 MG
500-1000 TABLET ORAL EVERY 6 HOURS PRN
COMMUNITY
End: 2020-08-11

## 2019-01-14 RX ORDER — BISACODYL 10 MG
10 SUPPOSITORY, RECTAL RECTAL DAILY PRN
COMMUNITY
End: 2019-03-04

## 2019-01-14 NOTE — PROGRESS NOTES
[place] [facility]  PRIMARY CARE PROVIDER AND CLINIC:  Chad Betts [address] [facility] [address]  Chief Complaint   Patient presents with     Hospital F/U     TCU     [facility] Record Number:  [id number]  Place of Service where encounter took place:  [facility] ([facility]) [[id number]]    HPI:    Tracy Palomo is a 78 year old  (1940),admitted to the above facility from  [facility].  Hospital stay 1/8/19 through 1/11/19.  Admitted to this facility for  rehab, medical management and nursing care.  HPI information obtained from: facility chart records, facility staff, patient report, [facility] chart review and Care Everywhere [facility] chart review.     [facility] SUMMARY: Patient admitted after routine elective surgery.  Patient discharge POD3 without incident.  By day of discharge discharge: Did well.  Continued to improve.  Pain was well-controlled with oral medications.  No fevers.  Denied N/v. Had episode of CP on POD0, this resolved quickly was worked up by medicine no acute findings, incidentally did find some evidence of heart failure on echo. Medicine managing this.  POD2 had ultrasound of bilateral lower extremities due to edema and this was negative for acute findings. By POD3 patient felt good and felt ready to discharge.  Patient did have a hx of afib, and did spontaneously convert to afib with RVR POD2 but by POD3 sinus julianne in the 50s. Medicine was managing this.    Per patient she felt good by day of discharge.  Tolerating oral intake and voiding without difficulty. She had no family or friends at home, and based on recommendations from therapy as well not having any help at home, recommended and then discharged to TCU on POD3.    No concerns, questions, or other new issues.    Current issues are:         Status post total right knee replacement  Other pulmonary embolism without acute cor pulmonale, unspecified chronicity  (H)  Paroxysmal atrial fibrillation (H)  Drug-induced constipation  ACP (advance care planning)    CODE STATUS/ADVANCE DIRECTIVES DISCUSSION:   DNR / DNI  Patient's living condition: lives alone    ALLERGIES:No known allergies  PAST MEDICAL HISTORY:  has a past medical history of Atrial fibrillation (H) (2014), Colon polyps, Diverticulosis of colon (without mention of hemorrhage), DVT (deep vein thrombosis) in pregnancy (H) (2014), Other and unspecified hyperlipidemia, PE (pulmonary embolism) (2014), and Unspecified essential hypertension.  PAST SURGICAL HISTORY:  has a past surgical history that includes colonoscopy (09, 10, 12); flexible sigmoidoscopy (09, 11); Laparotomy exploratory (N/A, 10/20/2014); Laparotomy, lysis adhesions, combined (N/A, 10/20/2014); Resect small bowel without ostomy (N/A, 10/20/2014); Open reduction internal fixation hip bipolar (Left, 3/16/2017); Colonoscopy (N/A, 12/11/2017); and Arthroplasty knee (Right, 1/8/2019).  FAMILY HISTORY: family history is not on file.  SOCIAL HISTORY:  reports that  has never smoked. she has never used smokeless tobacco. She reports that she does not drink alcohol or use drugs.    Post Discharge Medication Reconciliation Status: discharge medications reconciled, continue medications without change.  Current Outpatient Medications   Medication Sig Dispense Refill     acetaminophen (TYLENOL) 500 MG tablet Take 1,000 mg by mouth 3 times daily And 1000 mg po qd       bisacodyl (DULCOLAX) 10 MG suppository Place 10 mg rectally daily as needed for constipation       docusate sodium (COLACE) 100 MG capsule Take 1 capsule (100 mg) by mouth 2 times daily as needed for constipation 60 capsule 0     enoxaparin (LOVENOX) 80 MG/0.8ML syringe Inject 0.8 mLs (80 mg) Subcutaneous every 12 hours 48 mL 0     lovastatin (MEVACOR) 40 MG tablet TAKE ONE TABLET BY MOUTH AT BEDTIME 90 tablet 3     methocarbamol (ROBAXIN) 500 MG tablet Take 1 tablet (500 mg) by mouth 3 times daily  as needed for muscle spasms 60 tablet 0     metoprolol tartrate (LOPRESSOR) 25 MG tablet Take 0.5 tablets (12.5 mg) by mouth 2 times daily 30 tablet 0     nitroGLYcerin (NITROSTAT) 0.4 MG sublingual tablet For chest pain place 1 tablet under the tongue every 5 minutes for 3 doses. If symptoms persist 5 minutes after 1st dose call 911. 25 tablet 0     ondansetron (ZOFRAN-ODT) 4 MG ODT tab Take 1 tablet (4 mg) by mouth every 8 hours as needed for nausea or vomiting 20 tablet 1     oxyCODONE HCl (OXAYDO) 5 MG TABA Take 5 mg by mouth every 4 hours as needed       polyethylene glycol (MIRALAX/GLYCOLAX) packet Take 1 packet by mouth daily       warfarin (COUMADIN) 5 MG tablet Take 1 tablet (5 mg) by mouth daily Can change per direction of coumadin clinic 30 tablet 0       ROS:  10 point ROS of systems including Constitutional, Eyes, Respiratory, Cardiovascular, Gastroenterology, Genitourinary, Integumentary, Muscularskeletal, Psychiatric were all negative except for pertinent positives noted.    Exam:  /54   Pulse 65   Temp 97.8  F (36.6  C)   Resp 15   Wt 86.2 kg (190 lb)   SpO2 95%   BMI 32.36 kg/m    GENERAL APPEARANCE:  Alert, in no distress  RESP:  respiratory effort and palpation of chest normal, no respiratory distress, lungs sounds clear  CV:  Palpation and auscultation of heart done , regular rate and rhythm, no murmur, rub, or gallop, edema +1 non pitting right lower extrem.   ABDOMEN:  normal bowel sounds, soft, nontender, no hepatosplenomegaly or other masses  M/S:  Gait and station obs in wheelchair. Knee brace on,   SKIN:  Inspection and palpation of skin and subcutaneous tissue at baseline. Incision not obs  PSYCH:  insight and judgement, memory intact, affect and mood normal     Lab/Diagnostic data: Hospital labs reviewed.    ASSESSMENT/PLAN:  Status post total right knee replacement  Admitted to the TCU for PT/OT following a hospital stay.  States pain not well controlled with current  regimen.  -Continue PT Ocupational Therapy  -Continue Lovenox until INR therapeutic  -Start Tylenol 1000 mg by mouth 3 times a day and 1000 mg by mouth once a day as needed  -Increase frequency of oxycodone to oxycodone 5 mg every 4 hours as needed for pain    Other pulmonary embolism without acute cor pulmonale, unspecified chronicity (H)  INR today 1.9.  On Lovenox until INR is therapeutic for 2 days.  Home dose of warfarin is 5 mg Monday Wednesday Friday and 2.5 mg all other days.  On 7.5 mg yesterday and 5 mg prior to that.  - Warfarin 5 mg every day  -Check INR on Wednesday    Paroxysmal atrial fibrillation (H)  Noted to have A. fib with RVR at the hospital.   - Rate controlled with metoprolol.    Drug-induced constipation  Complains of constipation  -Scheduled MiraLAX    ACP (advance care planning)  - DNR/DNI       Orders:  1.INR: 1.9 Warfarin 5 mg po every day-Discontinue everyday INR. Check INR on Wed 1/16/19.  2.  Change Tyl to 1000 mg po tid and 1000 mg po every day prn.  3.  Change Oxycodone to 5 mg tab-give 1 tab po q 4 hours prn for severe pain #90 ninety.  4.  Add Miralax 17 g po every day for constipation.    Total time spent with patient visit was 35 min including patient visit and review of past records. Greater than 50% of total time spent with counseling and coordinating care due to diagnosis.    Electronically signed by:  Lulú Mora NP

## 2019-01-18 ENCOUNTER — NURSING HOME VISIT (OUTPATIENT)
Dept: GERIATRICS | Facility: CLINIC | Age: 79
End: 2019-01-18
Payer: MEDICARE

## 2019-01-18 VITALS
SYSTOLIC BLOOD PRESSURE: 108 MMHG | DIASTOLIC BLOOD PRESSURE: 61 MMHG | TEMPERATURE: 97.6 F | BODY MASS INDEX: 31.34 KG/M2 | HEART RATE: 80 BPM | OXYGEN SATURATION: 95 % | WEIGHT: 184 LBS | RESPIRATION RATE: 18 BRPM

## 2019-01-18 DIAGNOSIS — I48.0 PAROXYSMAL ATRIAL FIBRILLATION (H): ICD-10-CM

## 2019-01-18 DIAGNOSIS — K59.03 DRUG-INDUCED CONSTIPATION: ICD-10-CM

## 2019-01-18 DIAGNOSIS — Z96.651 STATUS POST TOTAL RIGHT KNEE REPLACEMENT: Primary | ICD-10-CM

## 2019-01-18 DIAGNOSIS — I26.99 OTHER PULMONARY EMBOLISM WITHOUT ACUTE COR PULMONALE, UNSPECIFIED CHRONICITY (H): ICD-10-CM

## 2019-01-18 PROCEDURE — 99309 SBSQ NF CARE MODERATE MDM 30: CPT | Performed by: NURSE PRACTITIONER

## 2019-01-18 NOTE — PROGRESS NOTES
[place] [facility]  PRIMARY CARE PROVIDER AND CLINIC:  Chad Betts [address] [facility] [address]  Chief Complaint   Patient presents with     Nursing Home Acute     TCU Follow-up     [facility] Medical Record Number:  [id number]  Place of Service where encounter took place:  [facility] ([facility]) [[id number]]    HPI:    Tracy Palomo is a 78 year old  (1940),admitted to the above facility from  [facility].  Hospital stay 1/8/19 through 1/11/19.  Admitted to this facility for  rehab, medical management and nursing care.  HPI information obtained from: facility chart records, facility staff, patient report, [facility] chart review and Care Everywhere [facility] chart review.     Current issues are:         Status post total right knee replacement  Paroxysmal atrial fibrillation (H)  Drug-induced constipation  Other pulmonary embolism without acute cor pulmonale, unspecified chronicity (H)    ALLERGIES:No known allergies  PAST MEDICAL HISTORY:  has a past medical history of Atrial fibrillation (H) (2014), Colon polyps, Diverticulosis of colon (without mention of hemorrhage), DVT (deep vein thrombosis) in pregnancy (H) (2014), Other and unspecified hyperlipidemia, PE (pulmonary embolism) (2014), and Unspecified essential hypertension.  PAST SURGICAL HISTORY:  has a past surgical history that includes colonoscopy (09, 10, 12); flexible sigmoidoscopy (09, 11); Laparotomy exploratory (N/A, 10/20/2014); Laparotomy, lysis adhesions, combined (N/A, 10/20/2014); Resect small bowel without ostomy (N/A, 10/20/2014); Open reduction internal fixation hip bipolar (Left, 3/16/2017); Colonoscopy (N/A, 12/11/2017); and Arthroplasty knee (Right, 1/8/2019).  FAMILY HISTORY: family history is not on file.  SOCIAL HISTORY:  reports that  has never smoked. she has never used smokeless tobacco. She reports that she does not drink alcohol or use drugs.      Current Outpatient  Medications   Medication Sig Dispense Refill     acetaminophen (TYLENOL) 500 MG tablet Take 1,000 mg by mouth 3 times daily And 1000 mg po qd       bisacodyl (DULCOLAX) 10 MG suppository Place 10 mg rectally daily as needed for constipation       lovastatin (MEVACOR) 40 MG tablet TAKE ONE TABLET BY MOUTH AT BEDTIME 90 tablet 3     metoprolol tartrate (LOPRESSOR) 25 MG tablet Take 0.5 tablets (12.5 mg) by mouth 2 times daily 30 tablet 0     nitroGLYcerin (NITROSTAT) 0.4 MG sublingual tablet For chest pain place 1 tablet under the tongue every 5 minutes for 3 doses. If symptoms persist 5 minutes after 1st dose call 911. 25 tablet 0     oxyCODONE HCl (OXAYDO) 5 MG TABA Take 5 mg by mouth every 4 hours as needed       polyethylene glycol (MIRALAX/GLYCOLAX) packet Take 1 packet by mouth daily       warfarin (COUMADIN) 5 MG tablet Take 1 tablet (5 mg) by mouth daily Can change per direction of coumadin clinic 30 tablet 0       ROS:  4 point ROS including Respiratory, CV, GI and , other than that noted in the HPI,  is negative    Exam:  /61   Pulse 80   Temp 97.6  F (36.4  C)   Resp 18   Wt 83.5 kg (184 lb)   SpO2 95%   BMI 31.34 kg/m    GENERAL APPEARANCE:  Alert, in no distress  RESP:  respiratory effort and palpation of chest normal, no respiratory distress, lungs sounds clear  CV:  Palpation and auscultation of heart done , regular rate and rhythm, no murmur, rub, or gallop, edema +1 non pitting right lower extrem.   ABDOMEN:  normal bowel sounds, soft, nontender, no hepatosplenomegaly or other masses  M/S:  Gait and station obs in wheelchair. Knee brace on,   SKIN:  Inspection and palpation of skin and subcutaneous tissue at baseline. Incision not obs  PSYCH:  insight and judgement, memory intact, affect and mood normal     Lab/Diagnostic data: Facility labs reviewed.    ASSESSMENT/PLAN:  Status post total right knee replacement  Admitted to the TCU for PT/OT following a hospital stay.  States pain well  controlled when she gets the oxy prior to therapy. Continues to have left leg swelling, which is to be expected. Improved with movement and ice. Continue foot excer while in bed, teds, ice and knee brace. No evidence of calf dvt.   -Continue PT Ocupational Therapy  -Tylenol 1000 mg by mouth 3 times a day and 1000 mg by mouth once a day as needed  -oxycodone 5mg twice daily at 8am 12p and oxycodone 5 mg every 4 hours as needed for pain    Hx of pulm embolism.   INR has been therapuetic on home regimen of 5 mg Mon, wed, Friday nad 2.5 mg tues, thurs, sat, sun. lovonox discontinued  - continue warfarin to keep INR 2 - 3    Paroxysmal atrial fibrillation (H)  Noted to have A. fib with RVR at the hospital.   - Rate controlled with metoprolol.    Drug-induced constipation  Continue Scheduled MiraLAX    Orders:  1. Schedule oxycodone 5 mg at 8am and 12 pm continue PRN    Electronically signed by:  Lulú Mora NP

## 2019-01-21 ENCOUNTER — TELEPHONE (OUTPATIENT)
Dept: ORTHOPEDICS | Facility: OTHER | Age: 79
End: 2019-01-21

## 2019-01-21 ENCOUNTER — APPOINTMENT (OUTPATIENT)
Dept: CT IMAGING | Facility: CLINIC | Age: 79
End: 2019-01-21
Payer: MEDICARE

## 2019-01-21 ENCOUNTER — HOSPITAL ENCOUNTER (EMERGENCY)
Facility: CLINIC | Age: 79
Discharge: HOME | End: 2019-01-21
Attending: FAMILY MEDICINE | Admitting: FAMILY MEDICINE
Payer: MEDICARE

## 2019-01-21 VITALS
TEMPERATURE: 98.3 F | OXYGEN SATURATION: 96 % | HEART RATE: 70 BPM | DIASTOLIC BLOOD PRESSURE: 66 MMHG | SYSTOLIC BLOOD PRESSURE: 132 MMHG | RESPIRATION RATE: 16 BRPM

## 2019-01-21 DIAGNOSIS — Z86.711 HISTORY OF PULMONARY EMBOLISM: ICD-10-CM

## 2019-01-21 DIAGNOSIS — M25.669 POSTOPERATIVE STIFFNESS OF TOTAL KNEE REPLACEMENT, INITIAL ENCOUNTER (H): ICD-10-CM

## 2019-01-21 DIAGNOSIS — R07.89 ATYPICAL CHEST PAIN: ICD-10-CM

## 2019-01-21 DIAGNOSIS — Z96.659 POSTOPERATIVE STIFFNESS OF TOTAL KNEE REPLACEMENT, INITIAL ENCOUNTER (H): ICD-10-CM

## 2019-01-21 DIAGNOSIS — T84.89XA POSTOPERATIVE STIFFNESS OF TOTAL KNEE REPLACEMENT, INITIAL ENCOUNTER (H): ICD-10-CM

## 2019-01-21 LAB
ALBUMIN SERPL-MCNC: 3.1 G/DL (ref 3.4–5)
ALBUMIN UR-MCNC: NEGATIVE MG/DL
ALP SERPL-CCNC: 110 U/L (ref 40–150)
ALT SERPL W P-5'-P-CCNC: 19 U/L (ref 0–50)
ANION GAP SERPL CALCULATED.3IONS-SCNC: 6 MMOL/L (ref 3–14)
APPEARANCE UR: CLEAR
AST SERPL W P-5'-P-CCNC: 20 U/L (ref 0–45)
BASOPHILS # BLD AUTO: 0 10E9/L (ref 0–0.2)
BASOPHILS NFR BLD AUTO: 0.3 %
BILIRUB SERPL-MCNC: 1.4 MG/DL (ref 0.2–1.3)
BILIRUB UR QL STRIP: NEGATIVE
BUN SERPL-MCNC: 22 MG/DL (ref 7–30)
CALCIUM SERPL-MCNC: 8.8 MG/DL (ref 8.5–10.1)
CHLORIDE SERPL-SCNC: 103 MMOL/L (ref 94–109)
CO2 SERPL-SCNC: 29 MMOL/L (ref 20–32)
COLOR UR AUTO: NORMAL
CREAT SERPL-MCNC: 0.98 MG/DL (ref 0.52–1.04)
D DIMER PPP FEU-MCNC: 3.4 UG/ML FEU (ref 0–0.5)
DIFFERENTIAL METHOD BLD: ABNORMAL
EOSINOPHIL NFR BLD AUTO: 2.4 %
ERYTHROCYTE [DISTWIDTH] IN BLOOD BY AUTOMATED COUNT: 15 % (ref 10–15)
GFR SERPL CREATININE-BSD FRML MDRD: 56 ML/MIN/{1.73_M2}
GLUCOSE SERPL-MCNC: 146 MG/DL (ref 70–99)
GLUCOSE UR STRIP-MCNC: NEGATIVE MG/DL
HCT VFR BLD AUTO: 32.4 % (ref 35–47)
HGB BLD-MCNC: 9.9 G/DL (ref 11.7–15.7)
HGB UR QL STRIP: NEGATIVE
IMM GRANULOCYTES # BLD: 0.2 10E9/L (ref 0–0.4)
IMM GRANULOCYTES NFR BLD: 1.6 %
KETONES UR STRIP-MCNC: NEGATIVE MG/DL
LEUKOCYTE ESTERASE UR QL STRIP: NEGATIVE
LYMPHOCYTES # BLD AUTO: 1.2 10E9/L (ref 0.8–5.3)
LYMPHOCYTES NFR BLD AUTO: 12.8 %
MCH RBC QN AUTO: 28.9 PG (ref 26.5–33)
MCHC RBC AUTO-ENTMCNC: 30.6 G/DL (ref 31.5–36.5)
MCV RBC AUTO: 95 FL (ref 78–100)
MONOCYTES # BLD AUTO: 0.6 10E9/L (ref 0–1.3)
MONOCYTES NFR BLD AUTO: 7 %
NEUTROPHILS # BLD AUTO: 6.9 10E9/L (ref 1.6–8.3)
NEUTROPHILS NFR BLD AUTO: 75.9 %
NITRATE UR QL: NEGATIVE
NRBC # BLD AUTO: 0 10*3/UL
NRBC BLD AUTO-RTO: 0 /100
PH UR STRIP: 5 PH (ref 5–7)
PLATELET # BLD AUTO: 410 10E9/L (ref 150–450)
POTASSIUM SERPL-SCNC: 4 MMOL/L (ref 3.4–5.3)
PROT SERPL-MCNC: 6.9 G/DL (ref 6.8–8.8)
RBC # BLD AUTO: 3.43 10E12/L (ref 3.8–5.2)
RBC #/AREA URNS AUTO: 0 /HPF (ref 0–2)
SODIUM SERPL-SCNC: 138 MMOL/L (ref 133–144)
SOURCE: NORMAL
SP GR UR STRIP: 1.01 (ref 1–1.03)
SQUAMOUS #/AREA URNS AUTO: <1 /HPF (ref 0–1)
TROPONIN I SERPL-MCNC: <0.015 UG/L (ref 0–0.04)
UROBILINOGEN UR STRIP-MCNC: 0 MG/DL (ref 0–2)
WBC # BLD AUTO: 9.1 10E9/L (ref 4–11)
WBC #/AREA URNS AUTO: 2 /HPF (ref 0–5)

## 2019-01-21 PROCEDURE — 85025 COMPLETE CBC W/AUTO DIFF WBC: CPT | Performed by: FAMILY MEDICINE

## 2019-01-21 PROCEDURE — 99285 EMERGENCY DEPT VISIT HI MDM: CPT | Mod: 25 | Performed by: FAMILY MEDICINE

## 2019-01-21 PROCEDURE — 71260 CT THORAX DX C+: CPT

## 2019-01-21 PROCEDURE — 84484 ASSAY OF TROPONIN QUANT: CPT | Performed by: FAMILY MEDICINE

## 2019-01-21 PROCEDURE — 85379 FIBRIN DEGRADATION QUANT: CPT | Performed by: FAMILY MEDICINE

## 2019-01-21 PROCEDURE — 80053 COMPREHEN METABOLIC PANEL: CPT | Performed by: FAMILY MEDICINE

## 2019-01-21 PROCEDURE — 93005 ELECTROCARDIOGRAM TRACING: CPT | Performed by: FAMILY MEDICINE

## 2019-01-21 PROCEDURE — 25000125 ZZHC RX 250: Performed by: FAMILY MEDICINE

## 2019-01-21 PROCEDURE — 93010 ELECTROCARDIOGRAM REPORT: CPT | Mod: Z6 | Performed by: FAMILY MEDICINE

## 2019-01-21 PROCEDURE — 25000128 H RX IP 250 OP 636: Performed by: FAMILY MEDICINE

## 2019-01-21 PROCEDURE — 81001 URINALYSIS AUTO W/SCOPE: CPT | Performed by: FAMILY MEDICINE

## 2019-01-21 RX ORDER — IOPAMIDOL 755 MG/ML
100 INJECTION, SOLUTION INTRAVASCULAR ONCE
Status: COMPLETED | OUTPATIENT
Start: 2019-01-21 | End: 2019-01-21

## 2019-01-21 RX ADMIN — SODIUM CHLORIDE 100 ML: 9 INJECTION, SOLUTION INTRAVENOUS at 20:51

## 2019-01-21 RX ADMIN — IOPAMIDOL 100 ML: 755 INJECTION, SOLUTION INTRAVENOUS at 20:50

## 2019-01-21 ASSESSMENT — ENCOUNTER SYMPTOMS
CONFUSION: 0
SORE THROAT: 0
SHORTNESS OF BREATH: 0
FEVER: 0
HEADACHES: 0
VOMITING: 0
NAUSEA: 0
CHILLS: 0
EYE REDNESS: 0
BACK PAIN: 0
DIARRHEA: 0
WHEEZING: 0
ABDOMINAL PAIN: 0
CHEST TIGHTNESS: 0
COUGH: 0
DYSURIA: 0

## 2019-01-21 NOTE — TELEPHONE ENCOUNTER
Reason for Call:  Other call back    Detailed comments: patient is in a TCU facility after knee surgery, Violette is calling from there wondering if Dr. Pang wants her marnie removed prior to her visit with him on Wednesday? Please call Violette at [phone]    Phone Number Patient can be reached at: Home number on file [phone] (home)    Best Time: any    Can we leave a detailed message on this number? YES    Call taken on 1/21/2019 at 10:51 AM by Klaudia Ragsdale

## 2019-01-21 NOTE — ED AVS SNAPSHOT
[facility] Emergency Department  [address] [place] [address]  Phone:  [phone]  Fax:  [phone]                                    Tracy Palomo   MRN: [id number]    Department:  [facility] Emergency Department   Date of Visit:  1/21/2019           After Visit Summary Signature Page    I have received my discharge instructions, and my questions have been answered. I have discussed any challenges I see with this plan with the nurse or doctor.    ..........................................................................................................................................  Patient/Patient Representative Signature      ..........................................................................................................................................  Patient Representative Print Name and Relationship to Patient    ..................................................               ................................................  Date                                   Time    ..........................................................................................................................................  Reviewed by Signature/Title    ...................................................              ..............................................  Date                                               Time          22EPIC Rev 08/18

## 2019-01-22 NOTE — ED PROVIDER NOTES
History     Chief Complaint   Patient presents with     Chest Pain     HPI  Tracy Palomo is a 78 year old female who underwent total knee replacement on 1/9/2019.  She presents the ER with 2 episodes of chest pain.  These lasted approximately 30 minutes.  They were not associated with any fever shortness of breath cough or congestion.  She has a history of DVTs and PEs.  She is on Coumadin and will be for life.  She took Lovenox during her surgery.  She has an acute and a chronic DVT recent history.  She has been going to therapy for her knee and has had no trouble with chest pain or shortness of breath with this.  She is having no dyspnea on exertion.  She has no heart history.    Allergies:  Allergies   Allergen Reactions     No Known Allergies        Problem List:    Patient Active Problem List    Diagnosis Date Noted     RVF (right ventricular failure) (H) - per Echo 2019 01/09/2019     Priority: Medium     Peripheral edema 01/09/2019     Priority: Medium     S/P total knee replacement using cement, right 01/08/2019     Priority: Medium     Other chest pain 01/08/2019     Priority: Medium     Primary osteoarthritis of right knee 11/15/2018     Priority: Medium     Primary osteoarthritis of left knee 11/15/2018     Priority: Medium     Fractured hip, left, closed, initial encounter (H) 03/16/2017     Priority: Medium     Hip fracture, left, closed, initial encounter (H) 03/15/2017     Priority: Medium     Closed left hip fracture (H) 03/15/2017     Priority: Medium     Long term current use of anticoagulant therapy 03/22/2016     Priority: Medium     Anemia 11/03/2014     Priority: Medium     Paroxysmal atrial fibrillation (H) 11/03/2014     Priority: Medium     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 11/01/2014     Priority: Medium     In 2014       Recent major surgery - exploratory lap with small bowel resection 10/20 11/01/2014     Priority: Medium     Pleural effusion on right  11/01/2014     Priority: Medium     Hypertension goal BP (blood pressure) < 140/90 02/22/2013     Priority: Medium     Hyperlipidemia LDL goal <130 02/22/2013     Priority: Medium     Encounter for long-term current use of medication 02/22/2013     Priority: Medium     Problem list name updated by automated process. Provider to review       History of colonic polyps 02/22/2013     Priority: Medium     Problem list name updated by automated process. Provider to review       Advanced directives, counseling/discussion 02/22/2013     Priority: Medium     Pt states has Advance Directive at home, will bring in to be scanned into chart.          Past Medical History:    Past Medical History:   Diagnosis Date     Atrial fibrillation (H) 2014     Colon polyps      Diverticulosis of colon (without mention of hemorrhage)      DVT (deep vein thrombosis) in pregnancy (H) 2014     Other and unspecified hyperlipidemia      PE (pulmonary embolism) 2014     Unspecified essential hypertension        Past Surgical History:    Past Surgical History:   Procedure Laterality Date     ARTHROPLASTY KNEE Right 1/8/2019    Procedure: Right total knee replacement;  Surgeon: Kaleb Pang DO;  Location: [facility]     COLONOSCOPY  09, 10, 12    [facility]     COLONOSCOPY N/A 12/11/2017    Procedure: COLONOSCOPY;  colonoscopy;  Surgeon: Elvis Quezada MD;  Location:  GI     FLEXIBLE SIGMOIDOSCOPY  09, 11     LAPAROTOMY EXPLORATORY N/A 10/20/2014    Procedure: LAPAROTOMY EXPLORATORY;  Surgeon: Miguel Oleary MD;  Location: [facility] OR     LAPAROTOMY, LYSIS ADHESIONS, COMBINED N/A 10/20/2014    Procedure: COMBINED LAPAROTOMY, LYSIS ADHESIONS;  Surgeon: Miguel Oleary MD;  Location: [facility] OR     OPEN REDUCTION INTERNAL FIXATION HIP BIPOLAR Left 3/16/2017    Procedure: OPEN REDUCTION INTERNAL FIXATION HIP BIPOLAR;  Surgeon: Kaleb Pang DO;  Location: [facility]     RESECT SMALL BOWEL WITHOUT OSTOMY N/A  10/20/2014    Procedure: RESECT SMALL BOWEL WITHOUT OSTOMY;  Surgeon: Miguel Oleary MD;  Location: [facility] OR       Family History:    Family History   Problem Relation Age of Onset     Blood Disease No family hx of         blood clots       Social History:  Marital Status:   [2]  Social History     Tobacco Use     Smoking status: Never Smoker     Smokeless tobacco: Never Used   Substance Use Topics     Alcohol use: No     Alcohol/week: 0.0 oz     Drug use: No        Medications:      acetaminophen (TYLENOL) 500 MG tablet   bisacodyl (DULCOLAX) 10 MG suppository   docusate sodium (COLACE) 100 MG capsule   enoxaparin (LOVENOX) 80 MG/0.8ML syringe   lovastatin (MEVACOR) 40 MG tablet   methocarbamol (ROBAXIN) 500 MG tablet   metoprolol tartrate (LOPRESSOR) 25 MG tablet   nitroGLYcerin (NITROSTAT) 0.4 MG sublingual tablet   oxyCODONE HCl (OXAYDO) 5 MG TABA   polyethylene glycol (MIRALAX/GLYCOLAX) packet   warfarin (COUMADIN) 5 MG tablet         Review of Systems   Constitutional: Negative for chills and fever.   HENT: Negative for congestion and sore throat.    Eyes: Negative for redness.   Respiratory: Negative for cough, chest tightness, shortness of breath and wheezing.    Cardiovascular: Positive for chest pain and leg swelling.   Gastrointestinal: Negative for abdominal pain, diarrhea, nausea and vomiting.   Genitourinary: Negative for dysuria.   Musculoskeletal: Negative for back pain.   Skin: Negative for pallor and rash.   Neurological: Negative for headaches.   Psychiatric/Behavioral: Negative for confusion.   All other systems reviewed and are negative.      Physical Exam   BP: 132/73  Pulse: 78  Heart Rate: 72  Temp: 98.3  F (36.8  C)  Resp: 20  SpO2: 98 %      Physical Exam   Constitutional: She is oriented to person, place, and time. She appears well-developed and well-nourished. No distress.   HENT:   Head: Normocephalic and atraumatic.   Right Ear: External ear normal.   Left Ear: External ear  normal.   Nose: Nose normal.   Mouth/Throat: Oropharynx is clear and moist.   Eyes: Conjunctivae and EOM are normal. Pupils are equal, round, and reactive to light.   Neck: Normal range of motion. Neck supple.   Cardiovascular: Normal rate, regular rhythm, normal heart sounds and intact distal pulses.   Pulmonary/Chest: Effort normal and breath sounds normal.   Abdominal: Soft. Bowel sounds are normal.   Musculoskeletal: Normal range of motion.   Neurological: She is alert and oriented to person, place, and time.   Skin: Skin is warm and dry. Capillary refill takes less than 2 seconds.   Psychiatric: She has a normal mood and affect.   Nursing note and vitals reviewed.      ED Course        Procedures                EKG Interpretation:      Interpreted by Juvencio Granados   Symptoms at time of EKG: None   Rhythm: Normal sinus   Rate: Normal  Axis: Normal  Ectopy: None  Conduction: Normal  ST Segments/ T Waves: No ST-T wave changes and No acute ischemic changes  Q Waves: None  Comparison to prior: No old EKG available    Clinical Impression: normal EKG--normal sinus rhythm at a rate of 71 with no acute ST-T changes.  There is some R wave progression suggesting pulmonary disease or old anterior.  No acute ST-T changes.    Results for orders placed or performed during the hospital encounter of 01/21/19   CT Chest Pulmonary Embolism w Contrast    Narrative    CT CHEST PULMONARY EMBOLISM WITH CONTRAST  1/21/2019  9:00 PM    HISTORY: Pulmonary embolism suspected, low pretest prob.    TECHNIQUE: Axial images from thoracic inlet to diaphragm. 100mL  Isovue-370. Radiation dose for this scan was reduced using automated  exposure control, adjustment of the mA and/or kV according to patient  size, or iterative reconstruction technique.    COMPARISON: 1/1/2014 CT chest    FINDINGS:   Chest: No evidence for acute pulmonary embolus. No thoracic aortic  dissection. Subcentimeter left thyroid nodule image 21. In a patient  without  known thyroid disease, an incidental thyroid nodule without  microcalcifications measuring <1.0 cm in size is most likely benign  and does not typically require follow-up.     No evidence for mediastinal, hilar, or axillary adenopathy. Series 5  image 69: Indeterminate 0.5 cm anterior lateral right lower lobe  pulmonary nodule without significant change since 11/1/2014. Minimal  opacity in the inferior lingula may be mild atelectasis. No aggressive  bone lesions.      Impression    IMPRESSION: No evidence for acute pulmonary embolus or acute pulmonary  disease. Minimal atelectasis in the lingula. Stable tiny right lower  lobe pulmonary nodule.   CBC with platelets differential   Result Value Ref Range    WBC 9.1 4.0 - 11.0 10e9/L    RBC Count 3.43 (L) 3.8 - 5.2 10e12/L    Hemoglobin 9.9 (L) 11.7 - 15.7 g/dL    Hematocrit 32.4 (L) 35.0 - 47.0 %    MCV 95 78 - 100 fl    MCH 28.9 26.5 - 33.0 pg    MCHC 30.6 (L) 31.5 - 36.5 g/dL    RDW 15.0 10.0 - 15.0 %    Platelet Count 410 150 - 450 10e9/L    Diff Method Automated Method     % Neutrophils 75.9 %    % Lymphocytes 12.8 %    % Monocytes 7.0 %    % Eosinophils 2.4 %    % Basophils 0.3 %    % Immature Granulocytes 1.6 %    Nucleated RBCs 0 0 /100    Absolute Neutrophil 6.9 1.6 - 8.3 10e9/L    Absolute Lymphocytes 1.2 0.8 - 5.3 10e9/L    Absolute Monocytes 0.6 0.0 - 1.3 10e9/L    Absolute Basophils 0.0 0.0 - 0.2 10e9/L    Abs Immature Granulocytes 0.2 0 - 0.4 10e9/L    Absolute Nucleated RBC 0.0    Comprehensive metabolic panel   Result Value Ref Range    Sodium 138 133 - 144 mmol/L    Potassium 4.0 3.4 - 5.3 mmol/L    Chloride 103 94 - 109 mmol/L    Carbon Dioxide 29 20 - 32 mmol/L    Anion Gap 6 3 - 14 mmol/L    Glucose 146 (H) 70 - 99 mg/dL    Urea Nitrogen 22 7 - 30 mg/dL    Creatinine 0.98 0.52 - 1.04 mg/dL    GFR Estimate 56 (L) >60 mL/min/[1.73_m2]    GFR Estimate If Black 64 >60 mL/min/[1.73_m2]    Calcium 8.8 8.5 - 10.1 mg/dL    Bilirubin Total 1.4 (H) 0.2 - 1.3  mg/dL    Albumin 3.1 (L) 3.4 - 5.0 g/dL    Protein Total 6.9 6.8 - 8.8 g/dL    Alkaline Phosphatase 110 40 - 150 U/L    ALT 19 0 - 50 U/L    AST 20 0 - 45 U/L   D dimer quantitative   Result Value Ref Range    D Dimer 3.4 (H) 0.0 - 0.50 ug/ml FEU   Troponin I   Result Value Ref Range    Troponin I ES <0.015 0.000 - 0.045 ug/L   UA with Microscopic   Result Value Ref Range    Color Urine Straw     Appearance Urine Clear     Glucose Urine Negative NEG^Negative mg/dL    Bilirubin Urine Negative NEG^Negative    Ketones Urine Negative NEG^Negative mg/dL    Specific Gravity Urine 1.011 1.003 - 1.035    Blood Urine Negative NEG^Negative    pH Urine 5.0 5.0 - 7.0 pH    Protein Albumin Urine Negative NEG^Negative mg/dL    Urobilinogen mg/dL 0.0 0.0 - 2.0 mg/dL    Nitrite Urine Negative NEG^Negative    Leukocyte Esterase Urine Negative NEG^Negative    Source Midstream Urine     WBC Urine 2 0 - 5 /HPF    RBC Urine 0 0 - 2 /HPF    Squamous Epithelial /HPF Urine <1 0 - 1 /HPF            Critical Care time:  none               Results for orders placed or performed during the hospital encounter of 01/21/19 (from the past 24 hour(s))   CBC with platelets differential   Result Value Ref Range    WBC 9.1 4.0 - 11.0 10e9/L    RBC Count 3.43 (L) 3.8 - 5.2 10e12/L    Hemoglobin 9.9 (L) 11.7 - 15.7 g/dL    Hematocrit 32.4 (L) 35.0 - 47.0 %    MCV 95 78 - 100 fl    MCH 28.9 26.5 - 33.0 pg    MCHC 30.6 (L) 31.5 - 36.5 g/dL    RDW 15.0 10.0 - 15.0 %    Platelet Count 410 150 - 450 10e9/L    Diff Method Automated Method     % Neutrophils 75.9 %    % Lymphocytes 12.8 %    % Monocytes 7.0 %    % Eosinophils 2.4 %    % Basophils 0.3 %    % Immature Granulocytes 1.6 %    Nucleated RBCs 0 0 /100    Absolute Neutrophil 6.9 1.6 - 8.3 10e9/L    Absolute Lymphocytes 1.2 0.8 - 5.3 10e9/L    Absolute Monocytes 0.6 0.0 - 1.3 10e9/L    Absolute Basophils 0.0 0.0 - 0.2 10e9/L    Abs Immature Granulocytes 0.2 0 - 0.4 10e9/L    Absolute Nucleated RBC 0.0     Comprehensive metabolic panel   Result Value Ref Range    Sodium 138 133 - 144 mmol/L    Potassium 4.0 3.4 - 5.3 mmol/L    Chloride 103 94 - 109 mmol/L    Carbon Dioxide 29 20 - 32 mmol/L    Anion Gap 6 3 - 14 mmol/L    Glucose 146 (H) 70 - 99 mg/dL    Urea Nitrogen 22 7 - 30 mg/dL    Creatinine 0.98 0.52 - 1.04 mg/dL    GFR Estimate 56 (L) >60 mL/min/[1.73_m2]    GFR Estimate If Black 64 >60 mL/min/[1.73_m2]    Calcium 8.8 8.5 - 10.1 mg/dL    Bilirubin Total 1.4 (H) 0.2 - 1.3 mg/dL    Albumin 3.1 (L) 3.4 - 5.0 g/dL    Protein Total 6.9 6.8 - 8.8 g/dL    Alkaline Phosphatase 110 40 - 150 U/L    ALT 19 0 - 50 U/L    AST 20 0 - 45 U/L   D dimer quantitative   Result Value Ref Range    D Dimer 3.4 (H) 0.0 - 0.50 ug/ml FEU   Troponin I   Result Value Ref Range    Troponin I ES <0.015 0.000 - 0.045 ug/L   CT Chest Pulmonary Embolism w Contrast    Narrative    CT CHEST PULMONARY EMBOLISM WITH CONTRAST  1/21/2019  9:00 PM    HISTORY: Pulmonary embolism suspected, low pretest prob.    TECHNIQUE: Axial images from thoracic inlet to diaphragm. 100mL  Isovue-370. Radiation dose for this scan was reduced using automated  exposure control, adjustment of the mA and/or kV according to patient  size, or iterative reconstruction technique.    COMPARISON: 1/1/2014 CT chest    FINDINGS:   Chest: No evidence for acute pulmonary embolus. No thoracic aortic  dissection. Subcentimeter left thyroid nodule image 21. In a patient  without known thyroid disease, an incidental thyroid nodule without  microcalcifications measuring <1.0 cm in size is most likely benign  and does not typically require follow-up.     No evidence for mediastinal, hilar, or axillary adenopathy. Series 5  image 69: Indeterminate 0.5 cm anterior lateral right lower lobe  pulmonary nodule without significant change since 11/1/2014. Minimal  opacity in the inferior lingula may be mild atelectasis. No aggressive  bone lesions.      Impression    IMPRESSION: No evidence  for acute pulmonary embolus or acute pulmonary  disease. Minimal atelectasis in the lingula. Stable tiny right lower  lobe pulmonary nodule.   UA with Microscopic   Result Value Ref Range    Color Urine Straw     Appearance Urine Clear     Glucose Urine Negative NEG^Negative mg/dL    Bilirubin Urine Negative NEG^Negative    Ketones Urine Negative NEG^Negative mg/dL    Specific Gravity Urine 1.011 1.003 - 1.035    Blood Urine Negative NEG^Negative    pH Urine 5.0 5.0 - 7.0 pH    Protein Albumin Urine Negative NEG^Negative mg/dL    Urobilinogen mg/dL 0.0 0.0 - 2.0 mg/dL    Nitrite Urine Negative NEG^Negative    Leukocyte Esterase Urine Negative NEG^Negative    Source Midstream Urine     WBC Urine 2 0 - 5 /HPF    RBC Urine 0 0 - 2 /HPF    Squamous Epithelial /HPF Urine <1 0 - 1 /HPF       Medications   iopamidol (ISOVUE-370) solution 100 mL (100 mLs Intravenous Given 1/21/19 2050)   sodium chloride 0.9 % bag 500mL for CT scan flush use (100 mLs Intravenous Given 1/21/19 2051)   sodium chloride (PF) 0.9% PF flush 3 mL (3 mLs Intravenous Given 1/21/19 2050)       Assessments & Plan (with Medical Decision Making)   MDM--78-year-old female who is status post left total knee replacement on 1/9/2019.  She has a previous history of DVT and PE and because of this is on chronic Coumadin therapy.  She presents with 2 episodes of chest pain.  She is completely pain-free at this time.  She has no shortness of breath or any dyspnea on exertion.  She has been active doing her therapy and rehab for her knee and is never experienced any chest pain.  She has no cardiac history and limited risk factors of hypertension.  Her EKG is normal troponin is normal.  Her d-dimer was markedly elevated but I would expect this given her recent surgery.  Her CT scan shows no pulmonary emboli.  Patient was reassured and discharged home in stable condition.  I have reviewed the nursing notes.    I have reviewed the findings, diagnosis, plan and need  for follow up with the patient.          Medication List      There are no discharge medications for this visit.         Final diagnoses:   Postoperative stiffness of total knee replacement, initial encounter (H)   Atypical chest pain   History of pulmonary embolism       1/21/2019   [facility] EMERGENCY DEPARTMENT     Darwin, Juvencio NGUYEN MD  01/21/19 [id number]

## 2019-01-23 ENCOUNTER — OFFICE VISIT (OUTPATIENT)
Dept: ORTHOPEDICS | Facility: CLINIC | Age: 79
End: 2019-01-23
Payer: MEDICARE

## 2019-01-23 ENCOUNTER — ANCILLARY PROCEDURE (OUTPATIENT)
Dept: GENERAL RADIOLOGY | Facility: CLINIC | Age: 79
End: 2019-01-23
Payer: MEDICARE

## 2019-01-23 ENCOUNTER — DISCHARGE SUMMARY NURSING HOME (OUTPATIENT)
Dept: GERIATRICS | Facility: CLINIC | Age: 79
End: 2019-01-23
Payer: MEDICARE

## 2019-01-23 VITALS
HEART RATE: 63 BPM | OXYGEN SATURATION: 93 % | BODY MASS INDEX: 31.34 KG/M2 | DIASTOLIC BLOOD PRESSURE: 65 MMHG | RESPIRATION RATE: 20 BRPM | SYSTOLIC BLOOD PRESSURE: 120 MMHG | WEIGHT: 184 LBS | TEMPERATURE: 97.6 F

## 2019-01-23 VITALS
TEMPERATURE: 97.6 F | BODY MASS INDEX: 30.73 KG/M2 | HEIGHT: 64 IN | SYSTOLIC BLOOD PRESSURE: 112 MMHG | DIASTOLIC BLOOD PRESSURE: 66 MMHG | WEIGHT: 180 LBS

## 2019-01-23 DIAGNOSIS — Z96.651 STATUS POST TOTAL RIGHT KNEE REPLACEMENT: Primary | ICD-10-CM

## 2019-01-23 DIAGNOSIS — Z96.651 S/P TOTAL KNEE REPLACEMENT USING CEMENT, RIGHT: Primary | ICD-10-CM

## 2019-01-23 DIAGNOSIS — Z96.651 S/P TOTAL KNEE REPLACEMENT USING CEMENT, RIGHT: ICD-10-CM

## 2019-01-23 DIAGNOSIS — K59.03 DRUG-INDUCED CONSTIPATION: ICD-10-CM

## 2019-01-23 DIAGNOSIS — I48.0 PAROXYSMAL ATRIAL FIBRILLATION (H): ICD-10-CM

## 2019-01-23 DIAGNOSIS — I26.99 OTHER PULMONARY EMBOLISM WITHOUT ACUTE COR PULMONALE, UNSPECIFIED CHRONICITY (H): ICD-10-CM

## 2019-01-23 DIAGNOSIS — I10 HYPERTENSION GOAL BP (BLOOD PRESSURE) < 140/90: ICD-10-CM

## 2019-01-23 PROCEDURE — 99024 POSTOP FOLLOW-UP VISIT: CPT | Performed by: ORTHOPAEDIC SURGERY

## 2019-01-23 PROCEDURE — 73562 X-RAY EXAM OF KNEE 3: CPT | Mod: TC

## 2019-01-23 PROCEDURE — 99316 NF DSCHRG MGMT 30 MIN+: CPT | Performed by: NURSE PRACTITIONER

## 2019-01-23 ASSESSMENT — MIFFLIN-ST. JEOR: SCORE: 1290.44

## 2019-01-23 ASSESSMENT — PAIN SCALES - GENERAL: PAINLEVEL: MILD PAIN (3)

## 2019-01-23 NOTE — PROGRESS NOTES
[place] [facility] DISCHARGE SUMMARY    PATIENT'S NAME: Tracy Palomo  YOB: 1940  MEDICAL RECORD NUMBER:  [id number]  Place of Service where encounter took place:  [facility] (Pending sale to [facility]) [[id number]]    PRIMARY CARE PROVIDER AND CLINIC RESPONSIBLE AFTER TRANSFER: Chad Betts [address] [facility] [id number]       TRANSFERRING PROVIDERS: Lulú Mora CNP, Leida Franks MD  DATE OF SNF ADMISSION:  January / 11 / 2019  DATE OF SNF (anticipated) DISCHARGE: January / 24 / 2019  DISCHARGE DISPOSITION: [facility] Provider   RECENT HOSPITALIZATION/ED:  [facility] stay 1/8/19 to 1/11/19.     CODE STATUS/ADVANCE DIRECTIVES DISCUSSION:   DNR / DNI  Allergies   Allergen Reactions     No Known Allergies        Condition on Discharge:  Stable.  Function:  Lives ind up, uses walker  Cognitive Scores: BIMS 15/15    Equipment: walker    HOSPITAL COURSE (copied from discharge summary): Patient admitted after routine elective surgery.  Patient discharge POD3 without incident.  By day of discharge discharge: Did well.  Continued to improve.  Pain was well-controlled with oral medications.  No fevers.  Denied N/v. Had episode of CP on POD0, this resolved quickly was worked up by medicine no acute findings, incidentally did find some evidence of heart failure on echo. Medicine managing this.  POD2 had ultrasound of bilateral lower extremities due to edema and this was negative for acute findings. By POD3 patient felt good and felt ready to discharge.  Patient did have a hx of afib, and did spontaneously convert to afib with RVR POD2 but by POD3 sinus julianne in the 50s. Medicine was managing this    DISCHARGE DIAGNOSIS:   1. Status post total right knee replacement    2. Other pulmonary embolism without acute cor pulmonale, unspecified chronicity (H)    3. Paroxysmal atrial fibrillation (H)    4. Drug-induced constipation    5. Hypertension goal BP (blood pressure)  < 140/90       Status post total right knee replacement  Admitted to the TCU for PT/OT following a hospital stay.  States pain well controlled when she gets the oxy prior to therapy. Continues to have right leg swelling, which is to be expected. Improved with movement and ice. Continue foot excer while in bed, teds, ice and knee brace. No evidence of calf dvt.   -Continue PT Ocupational Therapy  -Tylenol 1000 mg by mouth 3 times a day and 1000 mg by mouth once a day as needed  - change oxycodone to 5 mg po q 4 hrs prn. Only using 1 tab po bid prior to therapy.      Hx of pulm embolism.   INR has been therapuetic on home regimen of 5 mg Mon, wed, Friday nad 2.5 mg tues, thurs, sat, sun. lovonox discontinued  - continue warfarin to keep INR 2 - 3     Paroxysmal atrial fibrillation (H)  Noted to have A. fib with RVR at the hospital. Episode of chest pain while at TCU. No further complaints.   - Rate controlled with metoprolol.     Drug-induced constipation  Continue Scheduled MiraLAX    Hypertension goal BP (blood pressure) < 140/90  Controlled.     PAST MEDICAL HISTORY:  has a past medical history of Atrial fibrillation (H) (2014), Colon polyps, Diverticulosis of colon (without mention of hemorrhage), DVT (deep vein thrombosis) in pregnancy (H) (2014), Other and unspecified hyperlipidemia, PE (pulmonary embolism) (2014), and Unspecified essential hypertension.    DISCHARGE MEDICATIONS:  Current Outpatient Medications   Medication Sig Dispense Refill     acetaminophen (TYLENOL) 500 MG tablet Take 1,000 mg by mouth 3 times daily And 1000 mg po qd       bisacodyl (DULCOLAX) 10 MG suppository Place 10 mg rectally daily as needed for constipation       lovastatin (MEVACOR) 40 MG tablet TAKE ONE TABLET BY MOUTH AT BEDTIME 90 tablet 3     metoprolol tartrate (LOPRESSOR) 25 MG tablet Take 0.5 tablets (12.5 mg) by mouth 2 times daily 30 tablet 0     nitroGLYcerin (NITROSTAT) 0.4 MG sublingual tablet For chest pain place 1  tablet under the tongue every 5 minutes for 3 doses. If symptoms persist 5 minutes after 1st dose call 911. 25 tablet 0     oxyCODONE HCl (OXAYDO) 5 MG TABA Take 5 mg by mouth every 4 hours as needed       polyethylene glycol (MIRALAX/GLYCOLAX) packet Take 1 packet by mouth daily       warfarin (COUMADIN) 5 MG tablet Take 1 tablet (5 mg) by mouth daily Can change per direction of coumadin clinic 30 tablet 0       MEDICATION CHANGES/RATIONALE:   Discontinue zofran, methocarbamol due to no use  Oxycodone increased to q 4 hours PRN from q 6 hours prn       ROS:    4 point ROS including Respiratory, CV, GI and , other than that noted in the HPI,  is negative    Physical Exam:   Vitals: /65   Pulse 63   Temp 97.6  F (36.4  C)   Resp 20   Wt 83.5 kg (184 lb)   SpO2 93%   BMI 31.34 kg/m    BMI= Body mass index is 31.34 kg/m .    GENERAL APPEARANCE:  Alert, in no distress    DISCHARGE PLAN:  Occupational Therapy, Physical Therapy, Registered Nurse and Home Health Aide  Patient instructed to follow-up with:  PCP in 7 days      Current [place] scheduled appointments:  Future Appointments   Date Time Provider Department Center   1/23/2019 12:30 PM Lulú Mora NP [facility]   2/6/2019  9:30 AM Chad Betts MD [facility]   2/20/2019  9:50 AM Kaleb Pang DO [facility] referral needed and placed by this provider: No    Pending labs: none  SNF labs None  Discharge Treatments:  Orders:  1.  Discharge to home with current meds and treatments.  2.  Home PT/OT, RN, HHA.  3.  Script for 15 tabs of oxycodone written.  4.  Follow-up with PCP in 1-2 weeks.  5.  Change oxycodone to 5 mg po q 4 hours prn at discharge.    TOTAL DISCHARGE TIME:   Greater than 30 minutes  Electronically signed by:  Lulú Mora NP

## 2019-01-23 NOTE — LETTER
1/23/2019         RE: Tracy Palomo  [address] [facility] [address]        Dear Colleague,    Thank you for referring your patient, Tracy Palomo, to the [facility]. Please see a copy of my visit note below.    Orthopedic Clinic Post-Operative Note    CHIEF COMPLAINT:   Chief Complaint   Patient presents with     Surgical Followup     DOS: 1/8/2019~Right total knee arthroplasty (patella was not resurfaced). ~15 days postop       HISTORY OF PRESENT ILLNESS  Returns to clinic. Presents with daughter. Has been recovering at [facility]CU> states rehab going very well, pain is improving every day. No new injuries. States motion is improving, ambulation is improving.  Complaining of some lateral sided knee pain but this is slowly improving. No numbness. Using MACKENZIE stocking and continues previous coumadin.  States getting discharge from TCU tomorrow as met all their rehab goals. Will be getting some in home services from [facility] once home.  Daughter, patient on board with this.  No incision concerns.  Happy with progress. Taking occasional oxycodone.  Did get seen in ED 2 days ago for a short episode of chest pain.  Cardiac was ruled out, PE was ruled out.  States no episodes since then.     Patient's past medical, surgical, social and family histories reviewed.     Past Medical History:   Diagnosis Date     Atrial fibrillation (H) 2014    related to colon surgery     Colon polyps      Diverticulosis of colon (without mention of hemorrhage)     Diverticulosis     DVT (deep vein thrombosis) in pregnancy (H) 2014    after colon surgery     Other and unspecified hyperlipidemia      PE (pulmonary embolism) 2014    related to colon surgery     Unspecified essential hypertension        Past Surgical History:   Procedure Laterality Date     ARTHROPLASTY KNEE Right 1/8/2019    Procedure: Right total knee replacement;  Surgeon: Kaleb Pang DO;  [facility]: [facility]     COLONOSCOPY  09, 10, 12     [facility]     COLONOSCOPY N/A 12/11/2017    Procedure: COLONOSCOPY;  colonoscopy;  Surgeon: Elvis Quezada MD;  Location: [facility] GI     FLEXIBLE SIGMOIDOSCOPY  09, 11     LAPAROTOMY EXPLORATORY N/A 10/20/2014    Procedure: LAPAROTOMY EXPLORATORY;  Surgeon: Miguel Oleary MD;  [facility]     LAPAROTOMY, LYSIS ADHESIONS, COMBINED N/A 10/20/2014    Procedure: COMBINED LAPAROTOMY, LYSIS ADHESIONS;  Surgeon: Miguel Oleary MD;  [facility]     OPEN REDUCTION INTERNAL FIXATION HIP BIPOLAR Left 3/16/2017    Procedure: OPEN REDUCTION INTERNAL FIXATION HIP BIPOLAR;  Surgeon: Kaleb Pang DO;  [facility]     RESECT SMALL BOWEL WITHOUT OSTOMY N/A 10/20/2014    Procedure: RESECT SMALL BOWEL WITHOUT OSTOMY;  Surgeon: Miguel Oleary MD;  [facility]       Medications:    Current Outpatient Medications on File Prior to Visit:  acetaminophen (TYLENOL) 500 MG tablet Take 1,000 mg by mouth 3 times daily And 1000 mg po qd   bisacodyl (DULCOLAX) 10 MG suppository Place 10 mg rectally daily as needed for constipation   lovastatin (MEVACOR) 40 MG tablet TAKE ONE TABLET BY MOUTH AT BEDTIME   metoprolol tartrate (LOPRESSOR) 25 MG tablet Take 0.5 tablets (12.5 mg) by mouth 2 times daily   oxyCODONE HCl (OXAYDO) 5 MG TABA Take 5 mg by mouth every 4 hours as needed   polyethylene glycol (MIRALAX/GLYCOLAX) packet Take 1 packet by mouth daily   warfarin (COUMADIN) 5 MG tablet Take 1 tablet (5 mg) by mouth daily Can change per direction of coumadin clinic   nitroGLYcerin (NITROSTAT) 0.4 MG sublingual tablet For chest pain place 1 tablet under the tongue every 5 minutes for 3 doses. If symptoms persist 5 minutes after 1st dose call 911.     No current facility-administered medications on file prior to visit.     Allergies   Allergen Reactions     No Known Allergies        Social History     Occupational History     Not on file   Tobacco Use     Smoking status: Never Smoker      "Smokeless tobacco: Never Used   Substance and Sexual Activity     Alcohol use: No     Alcohol/week: 0.0 oz     Drug use: No     Sexual activity: Not Currently     Partners: Male       Family History   Problem Relation Age of Onset     Blood Disease No family hx of         blood clots       REVIEW OF SYSTEMS  General: negative for, night sweats, dizziness, fatigue  Resp: No shortness of breath and no cough  CV: negative for chest pain, syncope or near-syncope  GI: negative for nausea, vomiting and diarrhea  : negative for dysuria and hematuria  Musculoskeletal: as above  Neurologic: negative for syncope   Hematologic: negative for bleeding disorder    Physical Exam:  Vitals: /66   Temp 97.6  F (36.4  C) (Temporal)   Ht 1.632 m (5' 4.25\")   Wt 81.6 kg (180 lb)   BMI 30.66 kg/m     BMI= Body mass index is 30.66 kg/m .  Constitutional: healthy, alert and no acute distress   Psychiatric: mentation appears normal and affect normal/bright  NEURO: no focal deficits  SKIN: .healing well, well approximated skin edges, without signs of infection including no erythema, incision breakdown or purlent drainage  JOINT/EXTREMITIES: right knee: mild swelling through knee and into leg.  Bruising present around incision.  AROM 5-90 without pain. Able to perform straight leg raise.  No focal tenderness.  Calf is soft non-tender.  No instability. Distal neurovascular grossly intact.    Diagnostic Modalities:  right knee X-ray: The prosthesis has acceptable alignment. No fractures or dislocations. Prosthesis is well seated with no evidence of loosening. Native patella. On lateral view, indention seen at distal femur at level of the patella, present in prior to surgery xrays.   Independent visualization of the images was performed.      Impression:   Chief Complaint   Patient presents with     Surgical Followup     DOS: 1/8/2019~Right total knee arthroplasty (patella was not resurfaced). ~15 days postop   Recovering as " expected.     Plan:   Discussed continue to increase activity as tolerated. [facility]charge home from [facility] will be getting home services.  Recommended continued physical therapy and home exercise.  Continue TEDs for 4 more weeks, continue coumadin per previous plan.    Discussed recent ED visit, recommended to f/u with PCP for eval after ED visit.    Staples/Sutures out: Not applicable. Monocryl clipped down to skin from proximal and distal pole.  Pain controlled: Yes. Continue to use: Ice, Tylenol, occasional oxycodone  Immobilzation: No  Physical Therapy: continue  Rest, Ice, Elevation  Return to clinic 4, week(s), or sooner as needed for changes.    Re-x-ray on return: No    Scribed by:  BILLY Hernandez, CNP  2:59 PM  1/23/2019    I attest I have seen and evaluated the patient.  I agree with above impression and plan.   Vitaly Pang D.O.    Again, thank you for allowing me to participate in the care of your patient.        Sincerely,        Kaleb Pang, DO

## 2019-01-23 NOTE — PROGRESS NOTES
Orthopedic Clinic Post-Operative Note    CHIEF COMPLAINT:   Chief Complaint   Patient presents with     Surgical Followup     DOS: 1/8/2019~Right total knee arthroplasty (patella was not resurfaced). ~15 days postop       HISTORY OF PRESENT ILLNESS  Returns to clinic. Presents with daughter. Has been recovering at TCU> states rehab going very well, pain is improving every day. No new injuries. States motion is improving, ambulation is improving.  Complaining of some lateral sided knee pain but this is slowly improving. No numbness. Using MACKENZIE stocking and continues previous coumadin.  States getting discharge from TCU tomorrow as met all their rehab goals. Will be getting some in home services from [facility] once home.  Daughter, patient on board with this.  No incision concerns.  Happy with progress. Taking occasional oxycodone.  Did get seen in ED 2 days ago for a short episode of chest pain.  Cardiac was ruled out, PE was ruled out.  States no episodes since then.     Patient's past medical, surgical, social and family histories reviewed.     Past Medical History:   Diagnosis Date     Atrial fibrillation (H) 2014    related to colon surgery     Colon polyps      Diverticulosis of colon (without mention of hemorrhage)     Diverticulosis     DVT (deep vein thrombosis) in pregnancy (H) 2014    after colon surgery     Other and unspecified hyperlipidemia      PE (pulmonary embolism) 2014    related to colon surgery     Unspecified essential hypertension        Past Surgical History:   Procedure Laterality Date     ARTHROPLASTY KNEE Right 1/8/2019    Procedure: Right total knee replacement;  Surgeon: Kaleb Pang DO;  [facility]     COLONOSCOPY  09, 10, 12    [facility]     COLONOSCOPY N/A 12/11/2017    Procedure: COLONOSCOPY;  colonoscopy;  Surgeon: Elvis Quezada MD;  Location:  GI     FLEXIBLE SIGMOIDOSCOPY  09, 11     LAPAROTOMY EXPLORATORY N/A 10/20/2014    Procedure:  LAPAROTOMY EXPLORATORY;  Surgeon: Miguel Oleary MD;  [facility]     LAPAROTOMY, LYSIS ADHESIONS, COMBINED N/A 10/20/2014    Procedure: COMBINED LAPAROTOMY, LYSIS ADHESIONS;  Surgeon: Miguel Oleary MD;  [facility]     OPEN REDUCTION INTERNAL FIXATION HIP BIPOLAR Left 3/16/2017    Procedure: OPEN REDUCTION INTERNAL FIXATION HIP BIPOLAR;  Surgeon: Kaleb Pang DO;  [facility]     RESECT SMALL BOWEL WITHOUT OSTOMY N/A 10/20/2014    Procedure: RESECT SMALL BOWEL WITHOUT OSTOMY;  Surgeon: Miguel Oleary MD;  [facility]       Medications:    Current Outpatient Medications on File Prior to Visit:  acetaminophen (TYLENOL) 500 MG tablet Take 1,000 mg by mouth 3 times daily And 1000 mg po qd   bisacodyl (DULCOLAX) 10 MG suppository Place 10 mg rectally daily as needed for constipation   lovastatin (MEVACOR) 40 MG tablet TAKE ONE TABLET BY MOUTH AT BEDTIME   metoprolol tartrate (LOPRESSOR) 25 MG tablet Take 0.5 tablets (12.5 mg) by mouth 2 times daily   oxyCODONE HCl (OXAYDO) 5 MG TABA Take 5 mg by mouth every 4 hours as needed   polyethylene glycol (MIRALAX/GLYCOLAX) packet Take 1 packet by mouth daily   warfarin (COUMADIN) 5 MG tablet Take 1 tablet (5 mg) by mouth daily Can change per direction of coumadin clinic   nitroGLYcerin (NITROSTAT) 0.4 MG sublingual tablet For chest pain place 1 tablet under the tongue every 5 minutes for 3 doses. If symptoms persist 5 minutes after 1st dose call 911.     No current facility-administered medications on file prior to visit.     Allergies   Allergen Reactions     No Known Allergies        Social History     Occupational History     Not on file   Tobacco Use     Smoking status: Never Smoker     Smokeless tobacco: Never Used   Substance and Sexual Activity     Alcohol use: No     Alcohol/week: 0.0 oz     Drug use: No     Sexual activity: Not Currently     Partners: Male       Family History   Problem Relation Age of Onset     Blood  "Disease No family hx of         blood clots       REVIEW OF SYSTEMS  General: negative for, night sweats, dizziness, fatigue  Resp: No shortness of breath and no cough  CV: negative for chest pain, syncope or near-syncope  GI: negative for nausea, vomiting and diarrhea  : negative for dysuria and hematuria  Musculoskeletal: as above  Neurologic: negative for syncope   Hematologic: negative for bleeding disorder    Physical Exam:  Vitals: /66   Temp 97.6  F (36.4  C) (Temporal)   Ht 1.632 m (5' 4.25\")   Wt 81.6 kg (180 lb)   BMI 30.66 kg/m    BMI= Body mass index is 30.66 kg/m .  Constitutional: healthy, alert and no acute distress   Psychiatric: mentation appears normal and affect normal/bright  NEURO: no focal deficits  SKIN: .healing well, well approximated skin edges, without signs of infection including no erythema, incision breakdown or purlent drainage  JOINT/EXTREMITIES: right knee: mild swelling through knee and into leg.  Bruising present around incision.  AROM 5-90 without pain. Able to perform straight leg raise.  No focal tenderness.  Calf is soft non-tender.  No instability. Distal neurovascular grossly intact.    Diagnostic Modalities:  right knee X-ray: The prosthesis has acceptable alignment. No fractures or dislocations. Prosthesis is well seated with no evidence of loosening. Native patella. On lateral view, indention seen at distal femur at level of the patella, present in prior to surgery xrays.   Independent visualization of the images was performed.      Impression:   Chief Complaint   Patient presents with     Surgical Followup     DOS: 1/8/2019~Right total knee arthroplasty (patella was not resurfaced). ~15 days postop   Recovering as expected.     Plan:   Discussed continue to increase activity as tolerated. [facility]charge home from [facility] will be getting home services.  Recommended continued physical therapy and home exercise.  Continue TEDs for 4 more weeks, continue " coumadin per previous plan.    Discussed recent ED visit, recommended to f/u with PCP for eval after ED visit.    Staples/Sutures out: Not applicable. Monocryl clipped down to skin from proximal and distal pole.  Pain controlled: Yes. Continue to use: Ice, Tylenol, occasional oxycodone  Immobilzation: No  Physical Therapy: continue  Rest, Ice, Elevation  Return to clinic 4, week(s), or sooner as needed for changes.    Re-x-ray on return: No    Scribed by:  BILLY Hernandez, CNP  2:59 PM  1/23/2019    I attest I have seen and evaluated the patient.  I agree with above impression and plan.   Vitaly Pang D.O.

## 2019-01-24 PROBLEM — S72.002A HIP FRACTURE, LEFT, CLOSED, INITIAL ENCOUNTER (H): Status: RESOLVED | Noted: 2017-03-15 | Resolved: 2019-01-24

## 2019-01-24 PROBLEM — S72.002A: Status: RESOLVED | Noted: 2017-03-16 | Resolved: 2019-01-24

## 2019-01-25 ENCOUNTER — TELEPHONE (OUTPATIENT)
Dept: INTERNAL MEDICINE | Facility: CLINIC | Age: 79
End: 2019-01-25

## 2019-01-25 NOTE — TELEPHONE ENCOUNTER
Reason for Call: Request for an order or referral:    Order or referral being requested: verbal order for HC, SN 1 time a week for 1 week, 2 times a week for 1 week, 1 time weekly for 7 weeks. PT and OT eval and treat, HHA 1 time a week for 8 weeks    Date needed: as soon as possible    Has the patient been seen by the PCP for this problem? YES    Additional comments: Please call Jennifer back and advise.     Phone number Patient can be reached at:  Other phone number:      Best Time:  anytime    Can we leave a detailed message on this number?  YES    Call taken on 1/25/2019 at 4:07 PM by Camila Ramirez

## 2019-01-31 ENCOUNTER — ANTICOAGULATION THERAPY VISIT (OUTPATIENT)
Dept: ANTICOAGULATION | Facility: CLINIC | Age: 79
End: 2019-01-31
Payer: MEDICARE

## 2019-01-31 ENCOUNTER — TELEPHONE (OUTPATIENT)
Dept: INTERNAL MEDICINE | Facility: CLINIC | Age: 79
End: 2019-01-31

## 2019-01-31 DIAGNOSIS — Z79.01 LONG TERM CURRENT USE OF ANTICOAGULANT THERAPY: ICD-10-CM

## 2019-01-31 DIAGNOSIS — I26.99 OTHER PULMONARY EMBOLISM WITHOUT ACUTE COR PULMONALE, UNSPECIFIED CHRONICITY (H): ICD-10-CM

## 2019-01-31 LAB — INR PPP: 2.2 (ref 0.9–1.1)

## 2019-01-31 PROCEDURE — 99207 ZZC NO CHARGE NURSE ONLY: CPT | Performed by: INTERNAL MEDICINE

## 2019-01-31 NOTE — TELEPHONE ENCOUNTER
Reason for Call:  INR    Who is calling?  Home Care: [facility]     Phone number:  [phone]    Fax number:  NA     Name of caller:  Yanelis     INR Value:  2.2    Are there any other concerns:  No    Can we leave a detailed message on this number? YES    Phone number patient can be reached at: Home number on file [phone] (home)      Call taken on 1/31/2019 at 12:54 PM by Aida Lester

## 2019-01-31 NOTE — PROGRESS NOTES
ANTICOAGULATION FOLLOW-UP CLINIC VISIT    Patient Name:  Tracy Palomo  Date:  2019  Contact Type:  Telephone    SUBJECTIVE:     Patient Findings     Positives:   No Problem Findings    Comments:   Reason for Call:  INR     Who is calling?  Home Care: [facility]      Phone number:  [phone]     Fax number:  NA      Name of caller:  Yanelis      INR Value:  2.2     Are there any other concerns:  No     Can we leave a detailed message on this number? YES     Phone number patient can be reached at: Home number on file [phone] (home)        Call taken on 2019 at 12:54 PM by Aida Lester           OBJECTIVE    INR   Date Value Ref Range Status   2019 2.2 (A) 0.9 - 1.1 Final       ASSESSMENT / PLAN  INR assessment THER    Recheck INR In: 2 WEEKS    INR Location Home[facility] INR      Anticoagulation Summary  As of 2019    INR goal:   2.0-3.0   TTR:   58.8 % (2.8 y)   INR used for dosin.2 (2019)   Warfarin maintenance plan:   5 mg (5 mg x 1) every Mon, Wed, Fri; 2.5 mg (5 mg x 0.5) all other days   Full warfarin instructions:   5 mg every Mon, Wed, Fri; 2.5 mg all other days   Weekly warfarin total:   25 mg   No change documented:   Radha Rodriguez, RN   Plan last modified:   Johanna Mckeon RN (2018)   Next INR check:   2019   Target end date:       Indications    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]  Long term current use of anticoagulant therapy [Z79.01]             Anticoagulation Episode Summary     INR check location:       Preferred lab:       Send INR reminders to:   MICHAEL MAYA    Comments:   5 mg tablets, book, PM dose       Anticoagulation Care Providers     Provider Role Specialty Phone number    Gerard Medrano MD [facility] [phone]            See the Encounter Report to view Anticoagulation Flowsheet and Dosing Calendar (Go to Encounters tab in chart review, and find the Anticoagulation Therapy  Visit)    Dosage adjustment made based on physician directed care plan.    Radha Rodriguez RN

## 2019-02-06 ENCOUNTER — OFFICE VISIT (OUTPATIENT)
Dept: INTERNAL MEDICINE | Facility: CLINIC | Age: 79
End: 2019-02-06
Payer: MEDICARE

## 2019-02-06 VITALS
BODY MASS INDEX: 29.84 KG/M2 | HEART RATE: 87 BPM | WEIGHT: 175.2 LBS | RESPIRATION RATE: 18 BRPM | SYSTOLIC BLOOD PRESSURE: 110 MMHG | TEMPERATURE: 97.5 F | DIASTOLIC BLOOD PRESSURE: 68 MMHG | OXYGEN SATURATION: 98 %

## 2019-02-06 DIAGNOSIS — I10 HYPERTENSION GOAL BP (BLOOD PRESSURE) < 140/90: ICD-10-CM

## 2019-02-06 DIAGNOSIS — Z96.651 STATUS POST TOTAL RIGHT KNEE REPLACEMENT: ICD-10-CM

## 2019-02-06 DIAGNOSIS — I48.0 PAROXYSMAL ATRIAL FIBRILLATION (H): Primary | ICD-10-CM

## 2019-02-06 DIAGNOSIS — D62 POSTOPERATIVE ANEMIA DUE TO ACUTE BLOOD LOSS: ICD-10-CM

## 2019-02-06 LAB — HGB BLD-MCNC: 12.6 G/DL (ref 11.7–15.7)

## 2019-02-06 PROCEDURE — 36415 COLL VENOUS BLD VENIPUNCTURE: CPT | Performed by: INTERNAL MEDICINE

## 2019-02-06 PROCEDURE — 85018 HEMOGLOBIN: CPT | Performed by: INTERNAL MEDICINE

## 2019-02-06 PROCEDURE — 99214 OFFICE O/P EST MOD 30 MIN: CPT | Performed by: INTERNAL MEDICINE

## 2019-02-06 RX ORDER — METOPROLOL TARTRATE 25 MG/1
12.5 TABLET, FILM COATED ORAL 2 TIMES DAILY
Qty: 90 TABLET | Refills: 3 | Status: SHIPPED | OUTPATIENT
Start: 2019-02-06 | End: 2020-01-31

## 2019-02-06 ASSESSMENT — PAIN SCALES - GENERAL: PAINLEVEL: MILD PAIN (2)

## 2019-02-06 NOTE — PROGRESS NOTES
SUBJECTIVE:   Tracy Palomo is a 78 year old female who presents to clinic today for the following health issues:    Chief Complaint   Patient presents with     Surgical Followup     AFIB      She had her knee replaced, right side.      Had a fib post operatively again.      Using oxycodone with PT, 3 times a week.  Not having constipation now.     Homecare now has PT.  Working on rom, at 90 and wants to get to 120.      Otherwise happy with knee, improved.    Past Medical History:   Diagnosis Date     Atrial fibrillation (H) 2014    related to colon surgery     Colon polyps      Diverticulosis of colon (without mention of hemorrhage)     Diverticulosis     DVT (deep vein thrombosis) in pregnancy (H) 2014    after colon surgery     Other and unspecified hyperlipidemia      PE (pulmonary embolism) 2014    related to colon surgery     Unspecified essential hypertension      Current Outpatient Medications   Medication     acetaminophen (TYLENOL) 500 MG tablet     lovastatin (MEVACOR) 40 MG tablet     metoprolol tartrate (LOPRESSOR) 25 MG tablet     nitroGLYcerin (NITROSTAT) 0.4 MG sublingual tablet     oxyCODONE HCl (OXAYDO) 5 MG TABA     polyethylene glycol (MIRALAX/GLYCOLAX) packet     warfarin (COUMADIN) 5 MG tablet     bisacodyl (DULCOLAX) 10 MG suppository     No current facility-administered medications for this visit.      Social History     Tobacco Use     Smoking status: Never Smoker     Smokeless tobacco: Never Used   Substance Use Topics     Alcohol use: No     Alcohol/week: 0.0 oz     Drug use: No     Review of Systems  Constitutional-No fevers, chills, or weight changes..  ENT-No earpain, sore throat, voice changes or rhinitis.  Cardiac-No chest pain or palpitations.  Respiratory-No cough, sob, or hemoptysis.  GI-No nausea, vomitting, diarrhea, constipation, or blood in the stool.    Physical Exam  /68   Pulse 87   Temp 97.5  F (36.4  C) (Temporal)   Resp 18   Wt 79.5 kg (175 lb 3.2 oz)    SpO2 98%   BMI 29.84 kg/m    General Appearance-healthy, alert, no distress  Cardiac-regular rate and rhythm  with normal S1, S2 ; no murmur, rub or gallops  Lungs-clear to auscultation  Extremities-trace edema right leg,   Musculoskeletal-right knee with well healed scar.  78-year-old woman is here for post hospital and post nursing home check.  She had her right knee replaced    ASSESSMENT:  January 9.  She did well postoperatively she did have some anemia and went into A. fib briefly.  Sounds like she was given IV dose of beta-blocker and diltiazem and converted to normal sinus rhythm.  She did get switched from lisinopril to metoprolol 12.5 mg twice a day she is already been on Coumadin for history of blood clots and will be on lifelong Coumadin.  We discussed atrial fibrillation, reviewed her echo, and until there is no need for cardiology at this point.  She is back in normal sinus rhythm, anticoagulated and not having issues with tachycardia.    The patient is doing well with her knee she continues with physical therapy I will give her some more oxycodone to take on the day she has PT.  She will see orthopedics coming up on February 20.  Graft the patient does have a history of DVTs and PEs.  On her echo she did have some right sided increased right-sided pressures.  We will continue her on Coumadin.  She did have a DVT on her right leg but she is therapeutic on anticoagulation currently.    Postoperative anemia her hemoglobin continued to be at 9.9 recheck it today as peak preoperatively it was 13.  If it continues to be low she will need some oral iron    Electronically signed by Chad Betts MD'

## 2019-02-15 ENCOUNTER — TELEPHONE (OUTPATIENT)
Dept: INTERNAL MEDICINE | Facility: CLINIC | Age: 79
End: 2019-02-15

## 2019-02-15 ENCOUNTER — ANTICOAGULATION THERAPY VISIT (OUTPATIENT)
Dept: ANTICOAGULATION | Facility: CLINIC | Age: 79
End: 2019-02-15
Payer: MEDICARE

## 2019-02-15 DIAGNOSIS — Z79.01 LONG TERM CURRENT USE OF ANTICOAGULANT THERAPY: ICD-10-CM

## 2019-02-15 DIAGNOSIS — I26.99 OTHER PULMONARY EMBOLISM WITHOUT ACUTE COR PULMONALE, UNSPECIFIED CHRONICITY (H): ICD-10-CM

## 2019-02-15 LAB — INR PPP: 2.9 (ref 0.9–1.1)

## 2019-02-15 PROCEDURE — 99207 ZZC NO CHARGE NURSE ONLY: CPT | Performed by: INTERNAL MEDICINE

## 2019-02-15 NOTE — PROGRESS NOTES
ANTICOAGULATION FOLLOW-UP CLINIC VISIT    Patient Name:  Tracy Palomo  Date:  2/15/2019  Contact Type:  Telephone/ [facility]    SUBJECTIVE:     Patient Findings     Positives:   No Problem Findings    Comments:   Reason for Call:  INR     Who is calling?  [facility]: [facility]     Phone number:       Fax number:       Name of caller: Yanelis- [phone]     INR Value:  2.9     Are there any other concerns:  No  At clients home     Can we leave a detailed message on this number? YES     Phone number patient can be reached at: Other phone number:          Call taken on 2/15/2019 at 9:23 AM by Camila Ramirez           OBJECTIVE    INR   Date Value Ref Range Status   02/15/2019 2.9 (A) 0.9 - 1.1 Final       ASSESSMENT / PLAN  INR assessment THER    Recheck INR In: 2 WEEKS    INR Location [facility] INR      Anticoagulation Summary  As of 2/15/2019    INR goal:   2.0-3.0   TTR:   59.4 % (2.8 y)   INR used for dosin.9 (2/15/2019)   Warfarin maintenance plan:   5 mg (5 mg x 1) every Mon, Wed, Fri; 2.5 mg (5 mg x 0.5) all other days   Full warfarin instructions:   5 mg every Mon, Wed, Fri; 2.5 mg all other days   Weekly warfarin total:   25 mg   No change documented:   Johanna Mckeon, RN   Plan last modified:   Johanna Mckeon RN (2018)   Next INR check:   3/1/2019   Target end date:       Indications    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]  Long term current use of anticoagulant therapy [Z79.01]             Anticoagulation Episode Summary     INR check location:       Preferred lab:       Send INR reminders to:   MICHAEL MAYA    Comments:   5 mg tablets, book, PM dose       Anticoagulation Care Providers     Provider Role Specialty Phone number    Gerard Medrano MD [facility] [phone]            See the Encounter Report to view Anticoagulation Flowsheet and Dosing Calendar (Go to Encounters tab in chart review, and find the  Anticoagulation Therapy Visit)    Dosage adjustment made based on physician directed care plan.      Johanna Mckeon RN

## 2019-02-15 NOTE — TELEPHONE ENCOUNTER
Reason for Call:  INR    Who is calling?  Home Care: [facility]     Phone number:      Fax number:      Name of caller: Yanelis    INR Value:  2.9    Are there any other concerns:  No  At clients home    Can we leave a detailed message on this number? YES    Phone number patient can be reached at: Other phone number:        Call taken on 2/15/2019 at 9:23 AM by Camila Ramirez

## 2019-02-28 ENCOUNTER — ANTICOAGULATION THERAPY VISIT (OUTPATIENT)
Dept: ANTICOAGULATION | Facility: CLINIC | Age: 79
End: 2019-02-28
Payer: MEDICARE

## 2019-02-28 ENCOUNTER — TELEPHONE (OUTPATIENT)
Dept: INTERNAL MEDICINE | Facility: CLINIC | Age: 79
End: 2019-02-28

## 2019-02-28 DIAGNOSIS — Z79.01 LONG TERM CURRENT USE OF ANTICOAGULANT THERAPY: ICD-10-CM

## 2019-02-28 DIAGNOSIS — I26.99 OTHER PULMONARY EMBOLISM WITHOUT ACUTE COR PULMONALE, UNSPECIFIED CHRONICITY (H): ICD-10-CM

## 2019-02-28 LAB — INR PPP: 2.4 (ref 0.9–1.1)

## 2019-02-28 PROCEDURE — 99207 ZZC NO CHARGE NURSE ONLY: CPT | Performed by: INTERNAL MEDICINE

## 2019-02-28 NOTE — PROGRESS NOTES
ANTICOAGULATION FOLLOW-UP CLINIC VISIT    Patient Name:  Trcay Palomo  Date:  2019  Contact Type:  Telephone    SUBJECTIVE:     Patient Findings     Positives:   No Problem Findings    Comments:   Reason for Call:  INR     Who is calling?  Home Care: [facility]     Phone number:  [phone]     Fax number:       Name of caller: Yanelis     INR Value: 2.4, at home waiting to set up meds.      Are there any other concerns:  No     Can we leave a detailed message on this number? YES     Phone number patient can be reached at:         Call taken on 2019 at 10:07 AM by Lisset Yousif           OBJECTIVE    INR   Date Value Ref Range Status   2019 2.4 (A) 0.9 - 1.1 Final       ASSESSMENT / PLAN  INR assessment THER    Recheck INR In: 2 WEEKS    INR Location Homecare INR      Anticoagulation Summary  As of 2019    INR goal:   2.0-3.0   TTR:   59.9 % (2.8 y)   INR used for dosin.4 (2019)   Warfarin maintenance plan:   5 mg (5 mg x 1) every Mon, Wed, Fri; 2.5 mg (5 mg x 0.5) all other days   Full warfarin instructions:   5 mg every Mon, Wed, Fri; 2.5 mg all other days   Weekly warfarin total:   25 mg   No change documented:   Radha Rodriguez RN   Plan last modified:   Johanna Mckeon RN (2018)   Next INR check:   3/14/2019   Target end date:       Indications    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]  Long term current use of anticoagulant therapy [Z79.01]             Anticoagulation Episode Summary     INR check location:       Preferred lab:       Send INR reminders to:   MICHAEL MAYA    Comments:   5 mg tablets, book, PM dose       Anticoagulation Care Providers     Provider Role Specialty Phone number    Gerard Medrano MD [facility] [phone]            See the Encounter Report to view Anticoagulation Flowsheet and Dosing Calendar (Go to Encounters tab in chart review, and find the Anticoagulation Therapy  Visit)    Dosage adjustment made based on physician directed care plan.    Radha Rodriguez RN

## 2019-02-28 NOTE — TELEPHONE ENCOUNTER
Reason for Call:  INR    Who is calling?  Home Care: [facility]    Phone number:  [phone]    Fax number:      Name of caller: Yanelis    INR Value: 2.4, at home waiting to set up meds.     Are there any other concerns:  No    Can we leave a detailed message on this number? YES    Phone number patient can be reached at:       Call taken on 2/28/2019 at 10:07 AM by Lisset Yousif

## 2019-03-01 ENCOUNTER — TELEPHONE (OUTPATIENT)
Dept: INTERNAL MEDICINE | Facility: CLINIC | Age: 79
End: 2019-03-01

## 2019-03-01 ENCOUNTER — OFFICE VISIT (OUTPATIENT)
Dept: ORTHOPEDICS | Facility: CLINIC | Age: 79
End: 2019-03-01
Payer: MEDICARE

## 2019-03-01 VITALS
SYSTOLIC BLOOD PRESSURE: 160 MMHG | BODY MASS INDEX: 31.07 KG/M2 | DIASTOLIC BLOOD PRESSURE: 70 MMHG | HEIGHT: 64 IN | WEIGHT: 182 LBS

## 2019-03-01 DIAGNOSIS — Z96.651 S/P TOTAL KNEE REPLACEMENT USING CEMENT, RIGHT: Primary | ICD-10-CM

## 2019-03-01 PROCEDURE — 99024 POSTOP FOLLOW-UP VISIT: CPT | Performed by: NURSE PRACTITIONER

## 2019-03-01 ASSESSMENT — MIFFLIN-ST. JEOR: SCORE: 1299.52

## 2019-03-01 ASSESSMENT — PAIN SCALES - GENERAL: PAINLEVEL: MILD PAIN (2)

## 2019-03-01 NOTE — TELEPHONE ENCOUNTER
Reason for Call:  Work in Appointment, Requested Provider:  Chad Betts MD    PCP: Chad Betts    Reason for visit: Swelling in both legs. Please advise if patient can be worked in.     Duration of symptoms: 3 weeks    Have you been treated for this in the past? Yes    Additional comments:     Can we leave a detailed message on this number? YES    Phone number patient can be reached at: Home number on file [phone] (home)    Best Time: any    Call taken on 3/1/2019 at 3:36 PM by Lisset Yousif

## 2019-03-01 NOTE — PROGRESS NOTES
Orthopedic Clinic Post-Operative Note    CHIEF COMPLAINT:   Chief Complaint   Patient presents with     RECHECK     right knee follow up     Surgical Followup     DOS: 1/8/2019~Right total knee arthroplasty (patella was not resurfaced). ~7 weeks postop       HISTORY OF PRESENT ILLNESS  States doing well. Slowly improving. Working hard with therapy and motion to about 7-10 extension and 100 flexion.  Motion slowly improving.  Walking going well. Using a cane now. Knee better than pre-op. Takes 1 oxycodone a day before therapy.  Permanently on couamdin. Using Betsy stockings.  Starting to wonder about left knee replacement. No new injuries. At home. Going well.     Patient's past medical, surgical, social and family histories reviewed.     Past Medical History:   Diagnosis Date     Atrial fibrillation (H) 2014    related to colon surgery     Colon polyps      Diverticulosis of colon (without mention of hemorrhage)     Diverticulosis     DVT (deep vein thrombosis) in pregnancy (H) 2014    after colon surgery     Other and unspecified hyperlipidemia      PE (pulmonary embolism) 2014    related to colon surgery     Unspecified essential hypertension        Past Surgical History:   Procedure Laterality Date     ARTHROPLASTY KNEE Right 1/8/2019    Procedure: Right total knee replacement;  Surgeon: Kaleb Pang DO;  Location:  OR     COLONOSCOPY  09, 10, 12    [facility]     COLONOSCOPY N/A 12/11/2017    Procedure: COLONOSCOPY;  colonoscopy;  Surgeon: Elvis Quezada MD;  Location:  GI     FLEXIBLE SIGMOIDOSCOPY  09, 11     LAPAROTOMY EXPLORATORY N/A 10/20/2014    Procedure: LAPAROTOMY EXPLORATORY;  Surgeon: Miguel Oleary MD;  Location:  OR     LAPAROTOMY, LYSIS ADHESIONS, COMBINED N/A 10/20/2014    Procedure: COMBINED LAPAROTOMY, LYSIS ADHESIONS;  Surgeon: Miguel Oleary MD;  Location:  OR     OPEN REDUCTION INTERNAL FIXATION HIP BIPOLAR Left 3/16/2017    Procedure: OPEN  REDUCTION INTERNAL FIXATION HIP BIPOLAR;  Surgeon: Kaleb Pang DO;  Location: [facility]     RESECT SMALL BOWEL WITHOUT OSTOMY N/A 10/20/2014    Procedure: RESECT SMALL BOWEL WITHOUT OSTOMY;  Surgeon: Miguel Oleary MD;  Location: [facility] OR       Medications:    Current Outpatient Medications on File Prior to Visit:  acetaminophen (TYLENOL) 500 MG tablet Take 1,000 mg by mouth 3 times daily And 1000 mg po qd   bisacodyl (DULCOLAX) 10 MG suppository Place 10 mg rectally daily as needed for constipation   lovastatin (MEVACOR) 40 MG tablet TAKE ONE TABLET BY MOUTH AT BEDTIME   metoprolol tartrate (LOPRESSOR) 25 MG tablet Take 0.5 tablets (12.5 mg) by mouth 2 times daily   nitroGLYcerin (NITROSTAT) 0.4 MG sublingual tablet For chest pain place 1 tablet under the tongue every 5 minutes for 3 doses. If symptoms persist 5 minutes after 1st dose call 911.   polyethylene glycol (MIRALAX/GLYCOLAX) packet Take 1 packet by mouth daily   warfarin (COUMADIN) 5 MG tablet Take 1 tablet (5 mg) by mouth daily Can change per direction of coumadin clinic   [] docusate sodium (COLACE) 100 MG capsule Take 1 capsule (100 mg) by mouth 2 times daily as needed for constipation   [] methocarbamol (ROBAXIN) 500 MG tablet Take 1 tablet (500 mg) by mouth 3 times daily as needed for muscle spasms   [] mupirocin (BACTROBAN) 2 % nasal ointment Spray 0.5 g into both nostrils 2 times daily for 5 days   [] ondansetron (ZOFRAN-ODT) 4 MG ODT tab Take 1 tablet (4 mg) by mouth every 8 hours as needed for nausea or vomiting     No current facility-administered medications on file prior to visit.     Allergies   Allergen Reactions     No Known Allergies        Social History     Occupational History     Not on file   Tobacco Use     Smoking status: Never Smoker     Smokeless tobacco: Never Used   Substance and Sexual Activity     Alcohol use: No     Alcohol/week: 0.0 oz     Drug use: No     Sexual activity: Not  "Currently     Partners: Male       Family History   Problem Relation Age of Onset     Blood Disease No family hx of         blood clots       REVIEW OF SYSTEMS  General: negative for, night sweats, dizziness, fatigue  Resp: No shortness of breath and no cough  CV: negative for chest pain, syncope or near-syncope  GI: negative for nausea, vomiting and diarrhea  : negative for dysuria and hematuria  Musculoskeletal: as above  Neurologic: negative for syncope   Hematologic: negative for bleeding disorder    Physical Exam:  Vitals: /70   Ht 1.632 m (5' 4.25\")   Wt 82.6 kg (182 lb)   BMI 31.00 kg/m    BMI= Body mass index is 31 kg/m .  Constitutional: healthy, alert and no acute distress   Psychiatric: mentation appears normal and affect normal/bright  NEURO: no focal deficits  SKIN: .well healed, no erythema, no incision breakdown and no drainage.  JOINT/EXTREMITIES: left knee: mild swelling throughout knee and 1+pitting edema bilateral legs.  Knee AROM 5-7-100, PROM 3-100. Able to straight leg raise. No instability. No pain with testing.  Non-tender.  Calf soft non-tender.  No bruising. No numbness.  Distal neurovascular grossly intact.    Diagnostic Modalities:  None today.  Independent visualization of the images was performed.      Impression:   Chief Complaint   Patient presents with     RECHECK     right knee follow up     Surgical Followup     DOS: 1/8/2019~Right total knee arthroplasty (patella was not resurfaced). ~7 weeks postop   Recovery as expected     Plan:   Discussed with patient. Recommend to continue to work on motion, she feels and she says her therapist and her both feel that the motion is improving.  Prior to surgery her motion was .  Continue therapy.  Also discussed the bilateral edema, recommended to continue the MACKENZIE stockings and talk to her PCP because this is bilateral. Also discussed edema control of elevation 20-30 minutes at a time 3-4 times a day above the level of the " heart with icing 20 minutes at a time.  Otherwise activity as tolerated.  She continues previous coumadin.    She asked about timing of the left knee replacement. She does not feel ready yet.  Discussed that the decision to proceed with the left knee largely depends on when she feels she will be able to bear majority of her weight and the right leg will be strong enough/the better knee. She does not feel there yet but is thinking she is getting close.     She is set to return to clinic in 6 weeks for next follow-up, discussed with her if she is feeling that she is ready for the next knee to return sooner and we will discuss left knee total replacement at that time.       Return to clinic 6, week(s), or sooner as needed for changes.    Re-x-ray on return: Yes.    BILLY Oh, CNP  Orthopedic Surgery

## 2019-03-01 NOTE — LETTER
3/1/2019         RE: Tracy Palomo  [address] [place] [address]        Dear Colleague,    Thank you for referring your patient, Tracy Palomo, to the [facility]. Please see a copy of my visit note below.    Orthopedic Clinic Post-Operative Note    CHIEF COMPLAINT:   Chief Complaint   Patient presents with     RECHECK     right knee follow up     Surgical Followup     DOS: 1/8/2019~Right total knee arthroplasty (patella was not resurfaced). ~7 weeks postop       HISTORY OF PRESENT ILLNESS  States doing well. Slowly improving. Working hard with therapy and motion to about 7-10 extension and 100 flexion.  Motion slowly improving.  Walking going well. Using a cane now. Knee better than pre-op. Takes 1 oxycodone a day before therapy.  Permanently on couamdin. Using Betsy stockings.  Starting to wonder about left knee replacement. No new injuries. At home. Going well.     Patient's past medical, surgical, social and family histories reviewed.     Past Medical History:   Diagnosis Date     Atrial fibrillation (H) 2014    related to colon surgery     Colon polyps      Diverticulosis of colon (without mention of hemorrhage)     Diverticulosis     DVT (deep vein thrombosis) in pregnancy (H) 2014    after colon surgery     Other and unspecified hyperlipidemia      PE (pulmonary embolism) 2014    related to colon surgery     Unspecified essential hypertension        Past Surgical History:   Procedure Laterality Date     ARTHROPLASTY KNEE Right 1/8/2019    Procedure: Right total knee replacement;  Surgeon: Kaleb Pang DO;  Location:  OR     COLONOSCOPY  09, 10, 12    [facility]     COLONOSCOPY N/A 12/11/2017    Procedure: COLONOSCOPY;  colonoscopy;  Surgeon: Elvis Quezada MD;  Location:  GI     FLEXIBLE SIGMOIDOSCOPY  09, 11     LAPAROTOMY EXPLORATORY N/A 10/20/2014    Procedure: LAPAROTOMY EXPLORATORY;  Surgeon: Miguel Oleary MD;  Location:  OR      LAPAROTOMY, LYSIS ADHESIONS, COMBINED N/A 10/20/2014    Procedure: COMBINED LAPAROTOMY, LYSIS ADHESIONS;  Surgeon: Miguel Oleary MD;  Location[facility]     OPEN REDUCTION INTERNAL FIXATION HIP BIPOLAR Left 3/16/2017    Procedure: OPEN REDUCTION INTERNAL FIXATION HIP BIPOLAR;  Surgeon: Kaleb Pang DO;  Location[facility]     RESECT SMALL BOWEL WITHOUT OSTOMY N/A 10/20/2014    Procedure: RESECT SMALL BOWEL WITHOUT OSTOMY;  Surgeon: Miguel Oleary MD;  Location: [facility] OR       Medications:    Current Outpatient Medications on File Prior to Visit:  acetaminophen (TYLENOL) 500 MG tablet Take 1,000 mg by mouth 3 times daily And 1000 mg po qd   bisacodyl (DULCOLAX) 10 MG suppository Place 10 mg rectally daily as needed for constipation   lovastatin (MEVACOR) 40 MG tablet TAKE ONE TABLET BY MOUTH AT BEDTIME   metoprolol tartrate (LOPRESSOR) 25 MG tablet Take 0.5 tablets (12.5 mg) by mouth 2 times daily   nitroGLYcerin (NITROSTAT) 0.4 MG sublingual tablet For chest pain place 1 tablet under the tongue every 5 minutes for 3 doses. If symptoms persist 5 minutes after 1st dose call 911.   polyethylene glycol (MIRALAX/GLYCOLAX) packet Take 1 packet by mouth daily   warfarin (COUMADIN) 5 MG tablet Take 1 tablet (5 mg) by mouth daily Can change per direction of coumadin clinic   [] docusate sodium (COLACE) 100 MG capsule Take 1 capsule (100 mg) by mouth 2 times daily as needed for constipation   [] methocarbamol (ROBAXIN) 500 MG tablet Take 1 tablet (500 mg) by mouth 3 times daily as needed for muscle spasms   [] mupirocin (BACTROBAN) 2 % nasal ointment Spray 0.5 g into both nostrils 2 times daily for 5 days   [] ondansetron (ZOFRAN-ODT) 4 MG ODT tab Take 1 tablet (4 mg) by mouth every 8 hours as needed for nausea or vomiting     No current facility-administered medications on file prior to visit.     Allergies   Allergen Reactions     No Known Allergies        Social History  "    Occupational History     Not on file   Tobacco Use     Smoking status: Never Smoker     Smokeless tobacco: Never Used   Substance and Sexual Activity     Alcohol use: No     Alcohol/week: 0.0 oz     Drug use: No     Sexual activity: Not Currently     Partners: Male       Family History   Problem Relation Age of Onset     Blood Disease No family hx of         blood clots       REVIEW OF SYSTEMS  General: negative for, night sweats, dizziness, fatigue  Resp: No shortness of breath and no cough  CV: negative for chest pain, syncope or near-syncope  GI: negative for nausea, vomiting and diarrhea  : negative for dysuria and hematuria  Musculoskeletal: as above  Neurologic: negative for syncope   Hematologic: negative for bleeding disorder    Physical Exam:  Vitals: /70   Ht 1.632 m (5' 4.25\")   Wt 82.6 kg (182 lb)   BMI 31.00 kg/m     BMI= Body mass index is 31 kg/m .  Constitutional: healthy, alert and no acute distress   Psychiatric: mentation appears normal and affect normal/bright  NEURO: no focal deficits  SKIN: .well healed, no erythema, no incision breakdown and no drainage.  JOINT/EXTREMITIES: left knee: mild swelling throughout knee and 1+pitting edema bilateral legs.  Knee AROM 5-7-100, PROM 3-100. Able to straight leg raise. No instability. No pain with testing.  Non-tender.  Calf soft non-tender.  No bruising. No numbness.  Distal neurovascular grossly intact.    Diagnostic Modalities:  None today.  Independent visualization of the images was performed.      Impression:   Chief Complaint   Patient presents with     RECHECK     right knee follow up     Surgical Followup     DOS: 1/8/2019~Right total knee arthroplasty (patella was not resurfaced). ~7 weeks postop   Recovery as expected     Plan:   Discussed with patient. Recommend to continue to work on motion, she feels and she says her therapist and her both feel that the motion is improving.  Prior to surgery her motion was .  Continue " therapy.  Also discussed the bilateral edema, recommended to continue the MACKENZIE stockings and talk to her PCP because this is bilateral. Also discussed edema control of elevation 20-30 minutes at a time 3-4 times a day above the level of the heart with icing 20 minutes at a time.  Otherwise activity as tolerated.  She continues previous coumadin.    She asked about timing of the left knee replacement. She does not feel ready yet.  Discussed that the decision to proceed with the left knee largely depends on when she feels she will be able to bear majority of her weight and the right leg will be strong enough/the better knee. She does not feel there yet but is thinking she is getting close.     She is set to return to clinic in 6 weeks for next follow-up, discussed with her if she is feeling that she is ready for the next knee to return sooner and we will discuss left knee total replacement at that time.       Return to clinic 6, week(s), or sooner as needed for changes.    Re-x-ray on return: Yes.    BILLY Oh, CNP  Orthopedic Surgery        Again, thank you for allowing me to participate in the care of your patient.        Sincerely,        Kaleb Pang, DO

## 2019-03-04 ENCOUNTER — OFFICE VISIT (OUTPATIENT)
Dept: INTERNAL MEDICINE | Facility: CLINIC | Age: 79
End: 2019-03-04
Payer: MEDICARE

## 2019-03-04 VITALS
WEIGHT: 180 LBS | TEMPERATURE: 97.5 F | DIASTOLIC BLOOD PRESSURE: 66 MMHG | RESPIRATION RATE: 16 BRPM | OXYGEN SATURATION: 98 % | SYSTOLIC BLOOD PRESSURE: 118 MMHG | BODY MASS INDEX: 30.66 KG/M2 | HEART RATE: 80 BPM

## 2019-03-04 DIAGNOSIS — Z96.651 STATUS POST TOTAL RIGHT KNEE REPLACEMENT: ICD-10-CM

## 2019-03-04 DIAGNOSIS — R60.0 PERIPHERAL EDEMA: ICD-10-CM

## 2019-03-04 DIAGNOSIS — R22.43 LOCALIZED SWELLING OF BOTH LOWER LEGS: Primary | ICD-10-CM

## 2019-03-04 LAB
ALBUMIN SERPL-MCNC: 3 G/DL (ref 3.4–5)
ALP SERPL-CCNC: 135 U/L (ref 40–150)
ALT SERPL W P-5'-P-CCNC: 45 U/L (ref 0–50)
ANION GAP SERPL CALCULATED.3IONS-SCNC: 2 MMOL/L (ref 3–14)
AST SERPL W P-5'-P-CCNC: 23 U/L (ref 0–45)
BILIRUB SERPL-MCNC: 0.6 MG/DL (ref 0.2–1.3)
BUN SERPL-MCNC: 13 MG/DL (ref 7–30)
CALCIUM SERPL-MCNC: 8.9 MG/DL (ref 8.5–10.1)
CHLORIDE SERPL-SCNC: 107 MMOL/L (ref 94–109)
CO2 SERPL-SCNC: 33 MMOL/L (ref 20–32)
CREAT SERPL-MCNC: 0.85 MG/DL (ref 0.52–1.04)
GFR SERPL CREATININE-BSD FRML MDRD: 66 ML/MIN/{1.73_M2}
GLUCOSE SERPL-MCNC: 97 MG/DL (ref 70–99)
POTASSIUM SERPL-SCNC: 4 MMOL/L (ref 3.4–5.3)
PROT SERPL-MCNC: 6.4 G/DL (ref 6.8–8.8)
SODIUM SERPL-SCNC: 142 MMOL/L (ref 133–144)

## 2019-03-04 PROCEDURE — 99214 OFFICE O/P EST MOD 30 MIN: CPT | Performed by: INTERNAL MEDICINE

## 2019-03-04 PROCEDURE — 80053 COMPREHEN METABOLIC PANEL: CPT | Performed by: INTERNAL MEDICINE

## 2019-03-04 PROCEDURE — 36415 COLL VENOUS BLD VENIPUNCTURE: CPT | Performed by: INTERNAL MEDICINE

## 2019-03-04 RX ORDER — FUROSEMIDE 20 MG
TABLET ORAL
Qty: 30 TABLET | Refills: 0 | Status: SHIPPED | OUTPATIENT
Start: 2019-03-04 | End: 2019-06-14

## 2019-03-04 ASSESSMENT — PAIN SCALES - GENERAL: PAINLEVEL: NO PAIN (0)

## 2019-03-04 NOTE — PROGRESS NOTES
SUBJECTIVE:   Tracy Palomo is a 78 year old female who presents to clinic today for the following health issues:    Chief Complaint   Patient presents with     Edema     swelling in the legs     Orthopedics saw her Friday and had some swelling.  Weight is up 5 pounds from a month ago.     No shortness of breath.     Tired quite a bit.  Does lay down with leg up.  Doing quite a bit of rehab exercises. Uses oxycodone for therapy sessions.      Past Medical History:   Diagnosis Date     Atrial fibrillation (H) 2014    related to colon surgery     Colon polyps      Diverticulosis of colon (without mention of hemorrhage)     Diverticulosis     DVT (deep vein thrombosis) in pregnancy (H) 2014    after colon surgery     Other and unspecified hyperlipidemia      PE (pulmonary embolism) 2014    related to colon surgery     Unspecified essential hypertension      Current Outpatient Medications   Medication     acetaminophen (TYLENOL) 500 MG tablet     furosemide (LASIX) 20 MG tablet     lovastatin (MEVACOR) 40 MG tablet     metoprolol tartrate (LOPRESSOR) 25 MG tablet     oxyCODONE HCl (OXAYDO) 5 MG TABA     warfarin (COUMADIN) 5 MG tablet     nitroGLYcerin (NITROSTAT) 0.4 MG sublingual tablet     No current facility-administered medications for this visit.      Social History     Tobacco Use     Smoking status: Never Smoker     Smokeless tobacco: Never Used   Substance Use Topics     Alcohol use: No     Alcohol/week: 0.0 oz     Drug use: No     Review of Systems  Constitutional-Weight gain.  ENT-No earpain, sore throat, voice changes or rhinitis.  Cardiac-No chest pain or palpitations.  Respiratory-No cough, sob, or hemoptysis.  GI-No nausea, vomitting, diarrhea, constipation, or blood in the stool.  Right knee has swelling, doing rehab after surgery.    Physical Exam  /66 (BP Location: Left arm, Patient Position: Sitting, Cuff Size: Adult Regular)   Pulse 80   Temp 97.5  F (36.4  C) (Temporal)   Resp 16   Wt  81.6 kg (180 lb)   SpO2 98%   BMI 30.66 kg/m    General Appearance-healthy, alert, no distress  Cardiac-regular rate and rhythm  with normal S1, S2 ; no murmur, rub or gallops  Lungs-clear to auscultation  Extremities-some swelling in both legs, she has been wearing thigh high compression stockings, with ring at the top.      ASSESSMENT:  This is a 78-year-old woman who has a history of right knee replacement in January.  She was here in February for recheck.  She had some postoperative anemia had an episode of atrial fibrillation postop which resolved.  She now comes in after seeing her orthopedic last week.  There was concerned that she had some lower extremity generalized edema.  She is wearing compression stockings.  She did go home after being in the nursing home for 3-4 weeks.  In discussion her weight is up 5 pounds since last time she was here in February.  She is eating more salt that she is eating prepared meals instead of cooking herself.  She definitely has some swelling in both legs but is more in the right leg around her knee.  We will check her kidney and liver functions today.  She did have an echocardiogram recently with her RAFAEL landeros and that was stable.  We will make sure her kidney function is okay potassium is stable and then will put her on Lasix 20 mg a day for 2 days to try to get her weight down.  I would like her to drop 5 pounds of weight and then use that as her dry weight at home.  If her weight goes up by 3 pounds she should take a Lasix pill that day otherwise not need the Lasix continuously.    Electronically signed by Chad Betts MD

## 2019-03-05 ENCOUNTER — TELEPHONE (OUTPATIENT)
Dept: INTERNAL MEDICINE | Facility: CLINIC | Age: 79
End: 2019-03-05

## 2019-03-05 NOTE — TELEPHONE ENCOUNTER
The patient called back and information has been given.   Thank you,  Pearl Self   for [facility]

## 2019-03-05 NOTE — TELEPHONE ENCOUNTER
----- Message from Chad Betts MD sent at 3/4/2019  4:39 PM CST -----  Labs are ok for kidneys and liver, ok to do the diuretic if weight is up.

## 2019-03-14 ENCOUNTER — ANTICOAGULATION THERAPY VISIT (OUTPATIENT)
Dept: ANTICOAGULATION | Facility: OTHER | Age: 79
End: 2019-03-14
Payer: MEDICARE

## 2019-03-14 ENCOUNTER — TELEPHONE (OUTPATIENT)
Dept: INTERNAL MEDICINE | Facility: CLINIC | Age: 79
End: 2019-03-14

## 2019-03-14 DIAGNOSIS — I26.99 OTHER PULMONARY EMBOLISM WITHOUT ACUTE COR PULMONALE, UNSPECIFIED CHRONICITY (H): ICD-10-CM

## 2019-03-14 DIAGNOSIS — R60.0 PERIPHERAL EDEMA: Primary | ICD-10-CM

## 2019-03-14 DIAGNOSIS — Z96.651 S/P TOTAL KNEE REPLACEMENT USING CEMENT, RIGHT: ICD-10-CM

## 2019-03-14 DIAGNOSIS — Z79.01 LONG TERM CURRENT USE OF ANTICOAGULANT THERAPY: ICD-10-CM

## 2019-03-14 DIAGNOSIS — M17.12 PRIMARY OSTEOARTHRITIS OF LEFT KNEE: ICD-10-CM

## 2019-03-14 LAB — INR PPP: 2.3 (ref 0.9–1.1)

## 2019-03-14 PROCEDURE — 99207 ZZC NO CHARGE NURSE ONLY: CPT | Performed by: INTERNAL MEDICINE

## 2019-03-14 NOTE — TELEPHONE ENCOUNTER
Reason for Call:  Home Health Care    Yanelis with [facility] called regarding (reason for call): orders    Orders are needed for this patient.     PT: Out patient Physical Therapy. Please call when orders have been approved.         Pt Provider:     Phone Number Homecare Nurse can be reached at:     Can we leave a detailed message on this number? YES    Phone number patient can be reached at:     Best Time:     Call taken on 3/14/2019 at 12:54 PM by Gabriela Troy

## 2019-03-14 NOTE — TELEPHONE ENCOUNTER
Reason for Call:  INR    Who is calling?  Home Care:     Phone number:      Fax number:      Name of caller:     INR Value:  2.3    Are there any other concerns:  No    Can we leave a detailed message on this number? YES    Phone number patient can be reached at:       Call taken on 3/14/2019 at 12:53 PM by Gabriela Troy

## 2019-03-14 NOTE — PROGRESS NOTES
ANTICOAGULATION FOLLOW-UP CLINIC VISIT    Patient Name:  Tracy Palomo  Date:  3/14/2019  Contact Type:  Telephone/ [facility] - homecare    SUBJECTIVE:     Patient Findings     Comments:   Reason for Call:  INR     Who is calling?  Home Care:      Phone number:       Fax number:       Name of caller:      INR Value:  2.3     Are there any other concerns:  No     Can we leave a detailed message on this number? YES     Phone number patient can be reached at:         Call taken on 3/14/2019 at 12:53 PM by Gabriela Troy                OBJECTIVE    INR   Date Value Ref Range Status   2019 2.3 (A) 0.9 - 1.1 Final       ASSESSMENT / PLAN  INR assessment THER    Recheck INR In: 1 WEEK    INR Location Clinic      Anticoagulation Summary  As of 3/14/2019    INR goal:   2.0-3.0   TTR:   60.5 % (2.9 y)   INR used for dosin.3 (3/14/2019)   Warfarin maintenance plan:   5 mg (5 mg x 1) every Mon, Wed, Fri; 2.5 mg (5 mg x 0.5) all other days   Full warfarin instructions:   5 mg every Mon, Wed, Fri; 2.5 mg all other days   Weekly warfarin total:   25 mg   No change documented:   Arti Ruelas RN   Plan last modified:   Johanna Mckeon, RN (2018)   Next INR check:   3/22/2019   Target end date:       Indications    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]  Long term current use of anticoagulant therapy [Z79.01]             Anticoagulation Episode Summary     INR check location:       Preferred lab:       Send INR reminders to:   MICHAEL MAYA    Comments:   5 mg tablets, book, PM dose       Anticoagulation Care Providers     Provider Role Specialty Phone number    Gerard Medrano MD [facility] [phone]            See the Encounter Report to view Anticoagulation Flowsheet and Dosing Calendar (Go to Encounters tab in chart review, and find the Anticoagulation Therapy Visit)    Dosage adjustment made based on physician directed care plan.    Arti Ruelas,  RN

## 2019-03-15 NOTE — TELEPHONE ENCOUNTER
Yanelis calling back and states PT cannot take verbal orders, they need an order in her chart. Please advise as soon as possible.

## 2019-03-22 ENCOUNTER — ANTICOAGULATION THERAPY VISIT (OUTPATIENT)
Dept: ANTICOAGULATION | Facility: CLINIC | Age: 79
End: 2019-03-22
Payer: MEDICARE

## 2019-03-22 ENCOUNTER — HOSPITAL ENCOUNTER (OUTPATIENT)
Dept: PHYSICAL THERAPY | Facility: CLINIC | Age: 79
Setting detail: THERAPIES SERIES
End: 2019-03-22
Attending: INTERNAL MEDICINE
Payer: MEDICARE

## 2019-03-22 DIAGNOSIS — Z79.01 LONG TERM CURRENT USE OF ANTICOAGULANT THERAPY: ICD-10-CM

## 2019-03-22 DIAGNOSIS — I26.99 OTHER PULMONARY EMBOLISM WITHOUT ACUTE COR PULMONALE, UNSPECIFIED CHRONICITY (H): ICD-10-CM

## 2019-03-22 LAB — INR POINT OF CARE: 2.5 (ref 0.9–1.1)

## 2019-03-22 PROCEDURE — 97162 PT EVAL MOD COMPLEX 30 MIN: CPT | Mod: GP | Performed by: PHYSICAL THERAPIST

## 2019-03-22 PROCEDURE — 97112 NEUROMUSCULAR REEDUCATION: CPT | Mod: GP | Performed by: PHYSICAL THERAPIST

## 2019-03-22 PROCEDURE — 85610 PROTHROMBIN TIME: CPT | Mod: QW

## 2019-03-22 PROCEDURE — 97110 THERAPEUTIC EXERCISES: CPT | Mod: GP | Performed by: PHYSICAL THERAPIST

## 2019-03-22 PROCEDURE — 36416 COLLJ CAPILLARY BLOOD SPEC: CPT

## 2019-03-22 PROCEDURE — 99207 ZZC NO CHARGE NURSE ONLY: CPT

## 2019-03-25 NOTE — ADDENDUM NOTE
Encounter addended by: Carmen Cooper, PT on: 3/25/2019 8:21 AM   Actions taken: Sign clinical note, Document created, Document edited, Flowsheet accepted

## 2019-03-25 NOTE — PROGRESS NOTES
[facility]          OUTPATIENT PHYSICAL THERAPY ORTHOPEDIC EVALUATION  PLAN OF TREATMENT FOR OUTPATIENT REHABILITATION  (COMPLETE FOR INITIAL CLAIMS ONLY)  Patient's Last Name, First Name, M.I.  YOB: 1940  Tracy Palomo    Provider s Name:  [facility]   Medical Record No.  [id number]   Start of Care Date:  03/22/19   Onset Date:  01/08/19   Type:     _X__PT   ___OT   ___SLP Medical Diagnosis:  Peripheral edema, Primary osteoarthritis of the knee, S/P total knee replacement using cement right, other pulmonary embolism without acute cor polmonale     PT Diagnosis:  Decreased knee ROM, strength, neural irritation R knee with bilateral knee contractures of hamstrings   Visits from SOC:  1      _________________________________________________________________________________  Plan of Treatment/Functional Goals:     Neural slides, mobilization/manual therapy, home program for ROM, strength, proprioception, functional strengthening for activities     as needed - consider NMES, US  Goals  Goal Identifier: Goals  Goal Description: Tracy will think about her goals and will develop them over the enxt 2 sessions so she is more motivatated and engaged in her home program/rehab  Target Date: (next 1-2 sessions. )    Goal Identifier: Steps  Goal Description: Tracy will be able to negotiate 12 steps without AD and safely so she can get into her basement for laundry, down to dock at cabin for fishing.   Target Date: 05/01/19      Therapy Frequency:     Predicted Duration of Therapy Intervention:  2 times per week x 6 weeks/12 visits    Carmen Cooper, PT                 I CERTIFY THE NEED FOR THESE SERVICES FURNISHED UNDER        THIS PLAN OF TREATMENT AND WHILE UNDER MY CARE     (Physician co-signature of this document indicates review and certification of the therapy plan).                       Certification Date From:  03/22/19   Certification Date To:   05/03/19    Referring Provider:  Dr. Chad Betts    Initial Assessment        See Epic Evaluation Start of Care Date: 03/22/19

## 2019-03-25 NOTE — ADDENDUM NOTE
Encounter addended by: Carmen Cooper, PT on: 3/25/2019 8:25 AM   Actions taken: Sign clinical note, Flowsheet accepted

## 2019-03-25 NOTE — PROGRESS NOTES
03/22/19 1439   General Information   Type of Visit Initial OP Ortho PT Evaluation   Start of Care Date 03/22/19   Referring Physician Dr. Cahd Betts   Patient/Family Goals Statement Get rid of pain, garden   Orders Evaluate and Treat   Date of Order 03/20/19   Insurance Type Medicare   Medical Diagnosis Peripheral edema, Primary osteoarthritis of the knee, S/P total knee replacement using cement right, other pulmonary embolism without acute cor polmonale   Surgical/Medical history reviewed Yes   Precautions/Limitations no known precautions/limitations   Weight-Bearing Status - LUE full weight-bearing   Weight-Bearing Status - RUE full weight-bearing   Weight-Bearing Status - LLE full weight-bearing   Weight-Bearing Status - RLE full weight-bearing   Body Part(s)   Body Part(s) Knee   Presentation and Etiology   Pertinent history of current problem (include personal factors and/or comorbidities that impact the POC) 79 yo female with a R TKA. She has been working with [facility] for rehab. She is having swelling in the medial of the R knee. She is noting tightness behiond the knee and has difficulty straightening and bending her knee. She felt the hip flexor stretch in the past was helpful. HEr current home program includes elevation, standing march, hip abduction, hip extension, heel slides, supine SLR for abduction, quad sets, glutl sets, ankle pumps, long and short arc quads, seated assisted knee flexion.    Impairments A. Pain;C. Swelling;D. Decreased ROM;E. Decreased flexibility;F. Decreased strength and endurance;H. Impaired gait;G. Impaired balance   Functional Limitations perform activities of daily living;perform required work activities;perform desired leisure / sports activities   Symptom Location R medial knee - intermittent electric zap   How/Where did it occur From Degenerative Joint Disease   Onset date of current episode/exacerbation 01/08/19   Chronicity New   Pain quality C. Aching    Frequency of pain/symptoms A. Constant   Pain/symptoms are: Worse in the morning   Pain/symptoms exacerbated by A. Sitting;B. Walking;C. Lifting;I. Bending;K. Home tasks   Pain/symptoms eased by E. Changing positions   Progression of symptoms since onset: Unchanged   Prior Level of Function   Functional Level Prior Comment independent with activities, however she has had knee pain for a period of time and she had knee contracture described pre surgery   Current Level of Function   Current Community Support ()   Patient role/employment history F. Retired   [facility]   Home/community accessibility steps are concerning   Current equipment-Gait/Locomotion None   Fall Risk Screen   Fall screen completed by PT   Have you fallen 2 or more times in the past year? No   Have you fallen and had an injury in the past year? No   Is patient a fall risk? No   Abuse Screen (yes response referral indicated)   Feels Unsafe at Home or Work/School no   Feels Threatened by Someone no   Does Anyone Try to Keep You From Having Contact with Others or Doing Things Outside Your Home? no   Physical Signs of Abuse Present no   Functional Scales   Functional Scales Other   Other Scales  LEFS: 35/80 - note she does not run or hop and she left several answers blank as she was not sure so will complete next session - we discussed this.    Knee Objective Findings   Observation moving her knees constantly and occasionally standing to be more comfortable   Integumentary  well healed incision with swelling noted in her medial R knee   Posture fair with forward head and shoulders, hip ER and foot eversion in standing.    Gait/Locomotion antalgic with short stride, slower john and flat foot landing R leg with minimal knee and hip movement   Balance/Proprioception (Single Leg Stance) not tested today   Knee ROM Comment supine knee AROM L: 12 degrees to 115 degrees, R: 9 degrees to 89 degrees with symptoms noted at end  ranges   Knee/Hip Strength Comments Poor abdominal strength   Palpation tender and painful medial knee R. Positive test for Saphenous nerve irritation R. She was slightly tender L but this was not as reactive as the R.    Planned Therapy Interventions   Planned Therapy Interventions Comment Neural slides, mobilization/manual therapy, home program for ROM, strength, proprioception, functional strengthening for activities   Planned Modality Interventions   Planned Modality Interventions Comments as needed - consider NMES, US   Clinical Impression   Criteria for Skilled Therapeutic Interventions Met yes, treatment indicated   PT Diagnosis Decreased knee ROM, strength, neural irritation R knee with bilateral knee contractures of hamstrings   Influenced by the following impairments Recent R TKA, L knee osteoarthritis with decreased ROM    Functional limitations due to impairments walking, sitting, rising, steps   Clinical Presentation Evolving/Changing   Clinical Presentation Rationale 3 comorbidities   Clinical Decision Making (Complexity) Moderate complexity   Predicted Duration of Therapy Intervention (days/wks) 2 times per week x 6 weeks/12 visits   Risk & Benefits of therapy have been explained Yes   Patient, Family & other staff in agreement with plan of care Yes   Clinical Impression Comments Tracy has many questions and concerns about PT today so these were addressed. She is concerned about pain as she describes some of the exercises hurt her for example the passive knee extension stretch with full overpressure. She has been trying to complete the exercises as she is able. She understood the importance of moving the knee and we discussed modifiying her home program so she can complete this properly. She has postive test for R sural nerve irritation and we discussed how this was a normal outcome with the TKA and she was given glides which should help. We agreed she would work on her hamstring lengthening using  her pelvic posterior tilts and gastrocnemius stretches as she has very poor core strength creasting a crossed posture indicating hamstrings are overstretched stretched at this time proximally. She was able to complete the activities for her home program..    Education Assessment   Preferred Learning Style Reading;Demonstration   Barriers to Learning No barriers   ORTHO GOALS   PT Ortho Eval Goals 1;2   Ortho Goal 1   Goal Identifier Goals   Goal Description Tracy will think about her goals and will develop them over the enxt 2 sessions so she is more motivatated and engaged in her home program/rehab   Target Date (next 1-2 sessions. )   Ortho Goal 2   Goal Identifier Steps   Goal Description Tracy will be able to negotiate 12 steps without AD and safely so she can get into her basement for laundry, down to dock at cabin for fishing.    Target Date 05/01/19   Total Evaluation Time   PT Joyce, Moderate Complexity Minutes (23742) 20   Therapy Certification   Certification date from 03/22/19   Certification date to 05/03/19   Medical Diagnosis Peripheral edema, Primary osteoarthritis of the knee, S/P total knee replacement using cement right, other pulmonary embolism without acute cor polmonale     Thank you for referring Tracy to [facility]    Carmen Cooper, PT  [phone]

## 2019-04-03 ENCOUNTER — HOSPITAL ENCOUNTER (OUTPATIENT)
Dept: PHYSICAL THERAPY | Facility: CLINIC | Age: 79
Setting detail: THERAPIES SERIES
End: 2019-04-03
Attending: INTERNAL MEDICINE
Payer: MEDICARE

## 2019-04-03 PROCEDURE — 97112 NEUROMUSCULAR REEDUCATION: CPT | Mod: GP | Performed by: PHYSICAL THERAPIST

## 2019-04-03 PROCEDURE — 97110 THERAPEUTIC EXERCISES: CPT | Mod: GP | Performed by: PHYSICAL THERAPIST

## 2019-04-05 ENCOUNTER — HOSPITAL ENCOUNTER (OUTPATIENT)
Dept: PHYSICAL THERAPY | Facility: CLINIC | Age: 79
Setting detail: THERAPIES SERIES
End: 2019-04-05
Attending: INTERNAL MEDICINE
Payer: MEDICARE

## 2019-04-05 ENCOUNTER — ANTICOAGULATION THERAPY VISIT (OUTPATIENT)
Dept: ANTICOAGULATION | Facility: CLINIC | Age: 79
End: 2019-04-05
Payer: MEDICARE

## 2019-04-05 DIAGNOSIS — Z79.01 LONG TERM CURRENT USE OF ANTICOAGULANT THERAPY: ICD-10-CM

## 2019-04-05 DIAGNOSIS — I26.99 OTHER PULMONARY EMBOLISM WITHOUT ACUTE COR PULMONALE, UNSPECIFIED CHRONICITY (H): ICD-10-CM

## 2019-04-05 LAB — INR POINT OF CARE: 2.4 (ref 0.9–1.1)

## 2019-04-05 PROCEDURE — 97110 THERAPEUTIC EXERCISES: CPT | Mod: GP | Performed by: PHYSICAL THERAPIST

## 2019-04-05 PROCEDURE — 85610 PROTHROMBIN TIME: CPT | Mod: QW

## 2019-04-05 PROCEDURE — 36416 COLLJ CAPILLARY BLOOD SPEC: CPT

## 2019-04-05 PROCEDURE — 97112 NEUROMUSCULAR REEDUCATION: CPT | Mod: GP | Performed by: PHYSICAL THERAPIST

## 2019-04-05 PROCEDURE — 99207 ZZC NO CHARGE NURSE ONLY: CPT

## 2019-04-05 NOTE — PROGRESS NOTES
ANTICOAGULATION FOLLOW-UP CLINIC VISIT    Patient Name:  Tracy Palomo  Date:  2019  Contact Type:  Face to Face    SUBJECTIVE:     Patient Findings     Comments:   The patient was assessed for diet, medication, and activity level changes, missed or extra doses, bruising or bleeding, with no problem findings.  Johanna Mckeon RN             OBJECTIVE    INR Protime   Date Value Ref Range Status   2019 2.4 (A) 0.9 - 1.1 Final       ASSESSMENT / PLAN  INR assessment THER    Recheck INR In: 4 WEEKS    INR Location Clinic      Anticoagulation Summary  As of 2019    INR goal:   2.0-3.0   TTR:   61.3 % (2.9 y)   INR used for dosin.4 (2019)   Warfarin maintenance plan:   5 mg (5 mg x 1) every Mon, Wed, Fri; 2.5 mg (5 mg x 0.5) all other days   Full warfarin instructions:   5 mg every Mon, Wed, Fri; 2.5 mg all other days   Weekly warfarin total:   25 mg   No change documented:   Johanna Mckeon RN   Plan last modified:   Johanna Mckeon RN (2018)   Next INR check:      Target end date:       Indications    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]  Long term current use of anticoagulant therapy [Z79.01]             Anticoagulation Episode Summary     INR check location:       Preferred lab:       Send INR reminders to:   MICHAEL MAYA    Comments:   5 mg tablets, book, PM dose       Anticoagulation Care Providers     Provider Role Specialty Phone number    Gerard Medrano MD [facility] [phone]            See the Encounter Report to view Anticoagulation Flowsheet and Dosing Calendar (Go to Encounters tab in chart review, and find the Anticoagulation Therapy Visit)    Dosage adjustment made based on physician directed care plan.      Johanna Mckeon RN

## 2019-04-09 ENCOUNTER — HOSPITAL ENCOUNTER (OUTPATIENT)
Dept: PHYSICAL THERAPY | Facility: CLINIC | Age: 79
Setting detail: THERAPIES SERIES
End: 2019-04-09
Attending: INTERNAL MEDICINE
Payer: MEDICARE

## 2019-04-09 PROCEDURE — 97110 THERAPEUTIC EXERCISES: CPT | Mod: GP

## 2019-04-10 ENCOUNTER — OFFICE VISIT (OUTPATIENT)
Dept: ORTHOPEDICS | Facility: CLINIC | Age: 79
End: 2019-04-10
Payer: MEDICARE

## 2019-04-10 ENCOUNTER — ANCILLARY PROCEDURE (OUTPATIENT)
Dept: GENERAL RADIOLOGY | Facility: CLINIC | Age: 79
End: 2019-04-10
Attending: ORTHOPAEDIC SURGERY
Payer: MEDICARE

## 2019-04-10 VITALS
SYSTOLIC BLOOD PRESSURE: 130 MMHG | DIASTOLIC BLOOD PRESSURE: 60 MMHG | HEIGHT: 64 IN | BODY MASS INDEX: 30.39 KG/M2 | WEIGHT: 178 LBS

## 2019-04-10 DIAGNOSIS — Z96.651 S/P TOTAL KNEE REPLACEMENT USING CEMENT, RIGHT: ICD-10-CM

## 2019-04-10 DIAGNOSIS — Z96.651 S/P TOTAL KNEE REPLACEMENT USING CEMENT, RIGHT: Primary | ICD-10-CM

## 2019-04-10 PROCEDURE — 99213 OFFICE O/P EST LOW 20 MIN: CPT | Performed by: ORTHOPAEDIC SURGERY

## 2019-04-10 PROCEDURE — 73562 X-RAY EXAM OF KNEE 3: CPT | Mod: RT

## 2019-04-10 ASSESSMENT — MIFFLIN-ST. JEOR: SCORE: 1281.37

## 2019-04-10 NOTE — PROGRESS NOTES
Orthopedic Clinic Post-Operative Note    CHIEF COMPLAINT:   Chief Complaint   Patient presents with     RECHECK     right knee follow up     Surgical Followup     DOS: 1/8/2019~Right total knee arthroplasty (patella was not resurfaced). ~3 months postop       HISTORY OF PRESENT ILLNESS  Returns for her right knee.  Has some stiffness.  Overall she is very happy though.  Working with therapy.    Patient's past medical, surgical, social and family histories reviewed.     Past Medical History:   Diagnosis Date     Atrial fibrillation (H) 2014    related to colon surgery     Colon polyps      Diverticulosis of colon (without mention of hemorrhage)     Diverticulosis     DVT (deep vein thrombosis) in pregnancy (H) 2014    after colon surgery     Other and unspecified hyperlipidemia      PE (pulmonary embolism) 2014    related to colon surgery     Unspecified essential hypertension        Past Surgical History:   Procedure Laterality Date     ARTHROPLASTY KNEE Right 1/8/2019    Procedure: Right total knee replacement;  Surgeon: Kaleb Pang DO;  Location: [facility] OR     COLONOSCOPY  09, 10, 12    [facility]     COLONOSCOPY N/A 12/11/2017    Procedure: COLONOSCOPY;  colonoscopy;  Surgeon: Elvis Quezada MD;  Location:  GI     FLEXIBLE SIGMOIDOSCOPY  09, 11     LAPAROTOMY EXPLORATORY N/A 10/20/2014    Procedure: LAPAROTOMY EXPLORATORY;  Surgeon: Miguel Oleary MD;  Location: [facility] OR     LAPAROTOMY, LYSIS ADHESIONS, COMBINED N/A 10/20/2014    Procedure: COMBINED LAPAROTOMY, LYSIS ADHESIONS;  Surgeon: Miguel Oleary MD;  Location: [facility] OR     OPEN REDUCTION INTERNAL FIXATION HIP BIPOLAR Left 3/16/2017    Procedure: OPEN REDUCTION INTERNAL FIXATION HIP BIPOLAR;  Surgeon: Kaleb Pang DO;  Location: [facility] OR     RESECT SMALL BOWEL WITHOUT OSTOMY N/A 10/20/2014    Procedure: RESECT SMALL BOWEL WITHOUT OSTOMY;  Surgeon: Miguel Oleary MD;  Location: [facility] OR  "      Medications:    Current Outpatient Medications on File Prior to Visit:  acetaminophen (TYLENOL) 500 MG tablet Take 1,000 mg by mouth 3 times daily And 1000 mg po qd   furosemide (LASIX) 20 MG tablet Take one daily if weight is up 3 pounds.   lovastatin (MEVACOR) 40 MG tablet TAKE ONE TABLET BY MOUTH AT BEDTIME   metoprolol tartrate (LOPRESSOR) 25 MG tablet Take 0.5 tablets (12.5 mg) by mouth 2 times daily   nitroGLYcerin (NITROSTAT) 0.4 MG sublingual tablet For chest pain place 1 tablet under the tongue every 5 minutes for 3 doses. If symptoms persist 5 minutes after 1st dose call 911.   oxyCODONE HCl (OXAYDO) 5 MG TABA Take 5 mg by mouth daily as needed (pain)   warfarin (COUMADIN) 5 MG tablet Take 1 tablet (5 mg) by mouth daily Can change per direction of coumadin clinic     No current facility-administered medications on file prior to visit.     Allergies   Allergen Reactions     No Known Allergies        Social History     Occupational History     Not on file   Tobacco Use     Smoking status: Never Smoker     Smokeless tobacco: Never Used   Substance and Sexual Activity     Alcohol use: No     Alcohol/week: 0.0 oz     Drug use: No     Sexual activity: Not Currently     Partners: Male       Family History   Problem Relation Age of Onset     Blood Disease No family hx of         blood clots       REVIEW OF SYSTEMS  General: negative for, night sweats, dizziness, fatigue  Resp: No shortness of breath and no cough  CV: negative for chest pain, syncope or near-syncope  GI: negative for nausea, vomiting and diarrhea  : negative for dysuria and hematuria  Musculoskeletal: as above  Neurologic: negative for syncope   Hematologic: negative for bleeding disorder    Physical Exam:  Vitals: /60   Ht 1.632 m (5' 4.25\")   Wt 80.7 kg (178 lb)   BMI 30.32 kg/m    BMI= Body mass index is 30.32 kg/m .  Constitutional: healthy, alert and no acute distress   Psychiatric: mentation appears normal and affect " normal/bright  NEURO: no focal deficits  SKIN: .well healed, no erythema, no incision breakdown and no drainage.  JOINT/EXTREMITIES: Active motion 10-95 passively 7-100 degrees with a bouncy extension endpoint.  No instability through the arc of motion.  No focal areas of tenderness.  GAIT: not tested     Diagnostic Modalities:  right knee X-ray: The prosthesis has acceptable alignment. No fractures or dislocations. Prosthesis is well seated with no evidence of loosening.  Patella was not resurfaced.  Independent visualization of the images was performed.      Impression:   Chief Complaint   Patient presents with     RECHECK     right knee follow up     Surgical Followup     DOS: 1/8/2019~Right total knee arthroplasty (patella was not resurfaced). ~3 months postop   Overall she is very happy with her outcomes.  Range of motion is consistent with her preoperative motion at this point.  I am hopeful she can gain some more extension.    Plan:   Keep working on the range of motion.  I discussed ways to work on extension.  Overall though she is very happy.  Daughter is present feels like she is walking much more normal compared to presurgery.  We discussed proceeding with her left knee replacement at some point.  I recommended she allow a little bit more recovery for the right before considering.  Return to clinic 3, month(s), or sooner as needed for changes.    Re-x-ray on return: No    Vitaly Pang D.O.

## 2019-04-10 NOTE — LETTER
4/10/2019         RE: Tarcy Palomo  [address] [facility] [address]        Dear Colleague,    Thank you for referring your patient, Tracy Palomo, to the [facility]. Please see a copy of my visit note below.    Orthopedic Clinic Post-Operative Note    CHIEF COMPLAINT:   Chief Complaint   Patient presents with     RECHECK     right knee follow up     Surgical Followup     DOS: 1/8/2019~Right total knee arthroplasty (patella was not resurfaced). ~3 months postop       HISTORY OF PRESENT ILLNESS  Returns for her right knee.  Has some stiffness.  Overall she is very happy though.  Working with therapy.    Patient's past medical, surgical, social and family histories reviewed.     Past Medical History:   Diagnosis Date     Atrial fibrillation (H) 2014    related to colon surgery     Colon polyps      Diverticulosis of colon (without mention of hemorrhage)     Diverticulosis     DVT (deep vein thrombosis) in pregnancy (H) 2014    after colon surgery     Other and unspecified hyperlipidemia      PE (pulmonary embolism) 2014    related to colon surgery     Unspecified essential hypertension        Past Surgical History:   Procedure Laterality Date     ARTHROPLASTY KNEE Right 1/8/2019    Procedure: Right total knee replacement;  Surgeon: Kaleb Pang DO;  Location:  OR     COLONOSCOPY  09, 10, 12    [facility]     COLONOSCOPY N/A 12/11/2017    Procedure: COLONOSCOPY;  colonoscopy;  Surgeon: Elvis Quezada MD;  Location:  GI     FLEXIBLE SIGMOIDOSCOPY  09, 11     LAPAROTOMY EXPLORATORY N/A 10/20/2014    Procedure: LAPAROTOMY EXPLORATORY;  Surgeon: Miguel Oleary MD;  [facility]     LAPAROTOMY, LYSIS ADHESIONS, COMBINED N/A 10/20/2014    Procedure: COMBINED LAPAROTOMY, LYSIS ADHESIONS;  Surgeon: Miguel Oleary MD;  [facility]     OPEN REDUCTION INTERNAL FIXATION HIP BIPOLAR Left 3/16/2017    Procedure: OPEN REDUCTION INTERNAL FIXATION HIP  BIPOLAR;  Surgeon: Kaleb Pang DO;  [facility]     RESECT SMALL BOWEL WITHOUT OSTOMY N/A 10/20/2014    Procedure: RESECT SMALL BOWEL WITHOUT OSTOMY;  Surgeon: Miguel Oleary MD;  [facility]       Medications:    Current Outpatient Medications on File Prior to Visit:  acetaminophen (TYLENOL) 500 MG tablet Take 1,000 mg by mouth 3 times daily And 1000 mg po qd   furosemide (LASIX) 20 MG tablet Take one daily if weight is up 3 pounds.   lovastatin (MEVACOR) 40 MG tablet TAKE ONE TABLET BY MOUTH AT BEDTIME   metoprolol tartrate (LOPRESSOR) 25 MG tablet Take 0.5 tablets (12.5 mg) by mouth 2 times daily   nitroGLYcerin (NITROSTAT) 0.4 MG sublingual tablet For chest pain place 1 tablet under the tongue every 5 minutes for 3 doses. If symptoms persist 5 minutes after 1st dose call 911.   oxyCODONE HCl (OXAYDO) 5 MG TABA Take 5 mg by mouth daily as needed (pain)   warfarin (COUMADIN) 5 MG tablet Take 1 tablet (5 mg) by mouth daily Can change per direction of coumadin clinic     No current facility-administered medications on file prior to visit.     Allergies   Allergen Reactions     No Known Allergies        Social History     Occupational History     Not on file   Tobacco Use     Smoking status: Never Smoker     Smokeless tobacco: Never Used   Substance and Sexual Activity     Alcohol use: No     Alcohol/week: 0.0 oz     Drug use: No     Sexual activity: Not Currently     Partners: Male       Family History   Problem Relation Age of Onset     Blood Disease No family hx of         blood clots       REVIEW OF SYSTEMS  General: negative for, night sweats, dizziness, fatigue  Resp: No shortness of breath and no cough  CV: negative for chest pain, syncope or near-syncope  GI: negative for nausea, vomiting and diarrhea  : negative for dysuria and hematuria  Musculoskeletal: as above  Neurologic: negative for syncope   Hematologic: negative for bleeding disorder    Physical Exam:  Vitals: /60   " Ht 1.632 m (5' 4.25\")   Wt 80.7 kg (178 lb)   BMI 30.32 kg/m     BMI= Body mass index is 30.32 kg/m .  Constitutional: healthy, alert and no acute distress   Psychiatric: mentation appears normal and affect normal/bright  NEURO: no focal deficits  SKIN: .well healed, no erythema, no incision breakdown and no drainage.  JOINT/EXTREMITIES: Active motion 10-95 passively 7-100 degrees with a bouncy extension endpoint.  No instability through the arc of motion.  No focal areas of tenderness.  GAIT: not tested     Diagnostic Modalities:  right knee X-ray: The prosthesis has acceptable alignment. No fractures or dislocations. Prosthesis is well seated with no evidence of loosening.  Patella was not resurfaced.  Independent visualization of the images was performed.      Impression:   Chief Complaint   Patient presents with     RECHECK     right knee follow up     Surgical Followup     DOS: 1/8/2019~Right total knee arthroplasty (patella was not resurfaced). ~3 months postop   Overall she is very happy with her outcomes.  Range of motion is consistent with her preoperative motion at this point.  I am hopeful she can gain some more extension.    Plan:   Keep working on the range of motion.  I discussed ways to work on extension.  Overall though she is very happy.  Daughter is present feels like she is walking much more normal compared to presurgery.  We discussed proceeding with her left knee replacement at some point.  I recommended she allow a little bit more recovery for the right before considering.  Return to clinic 3, month(s), or sooner as needed for changes.    Re-x-ray on return: No    Vitaly Pang D.O.    Again, thank you for allowing me to participate in the care of your patient.        Sincerely,        Kaleb Pang, DO    "

## 2019-04-12 ENCOUNTER — HOSPITAL ENCOUNTER (OUTPATIENT)
Dept: PHYSICAL THERAPY | Facility: CLINIC | Age: 79
Setting detail: THERAPIES SERIES
End: 2019-04-12
Attending: INTERNAL MEDICINE
Payer: MEDICARE

## 2019-04-12 PROCEDURE — 97110 THERAPEUTIC EXERCISES: CPT | Mod: GP | Performed by: PHYSICAL THERAPIST

## 2019-04-12 PROCEDURE — 97530 THERAPEUTIC ACTIVITIES: CPT | Mod: GP,XU | Performed by: PHYSICAL THERAPIST

## 2019-04-12 PROCEDURE — 97140 MANUAL THERAPY 1/> REGIONS: CPT | Mod: GP | Performed by: PHYSICAL THERAPIST

## 2019-04-15 ENCOUNTER — HOSPITAL ENCOUNTER (OUTPATIENT)
Dept: PHYSICAL THERAPY | Facility: CLINIC | Age: 79
Setting detail: THERAPIES SERIES
End: 2019-04-15
Attending: INTERNAL MEDICINE
Payer: MEDICARE

## 2019-04-15 PROCEDURE — 97112 NEUROMUSCULAR REEDUCATION: CPT | Mod: GP | Performed by: PHYSICAL THERAPIST

## 2019-04-15 PROCEDURE — 97110 THERAPEUTIC EXERCISES: CPT | Mod: GP | Performed by: PHYSICAL THERAPIST

## 2019-04-19 ENCOUNTER — HOSPITAL ENCOUNTER (OUTPATIENT)
Dept: PHYSICAL THERAPY | Facility: CLINIC | Age: 79
Setting detail: THERAPIES SERIES
End: 2019-04-19
Attending: INTERNAL MEDICINE
Payer: MEDICARE

## 2019-04-19 PROCEDURE — 97110 THERAPEUTIC EXERCISES: CPT | Mod: GP | Performed by: PHYSICAL THERAPIST

## 2019-04-19 PROCEDURE — 97140 MANUAL THERAPY 1/> REGIONS: CPT | Mod: GP | Performed by: PHYSICAL THERAPIST

## 2019-04-23 DIAGNOSIS — Z96.651 S/P TOTAL KNEE REPLACEMENT USING CEMENT, RIGHT: Primary | ICD-10-CM

## 2019-04-23 DIAGNOSIS — Z79.2 PROPHYLACTIC ANTIBIOTIC: ICD-10-CM

## 2019-04-23 RX ORDER — AMOXICILLIN 500 MG/1
CAPSULE ORAL
Qty: 4 CAPSULE | Refills: 0 | Status: SHIPPED | OUTPATIENT
Start: 2019-04-23 | End: 2019-06-14

## 2019-04-23 NOTE — TELEPHONE ENCOUNTER
This patient has not had amox prophylactically before so this initial rx must be signed by provider. Pended RX and sent. No allergy to amox.    Kaley GARCIA RN. . .  4/23/2019, 9:04 AM

## 2019-04-23 NOTE — TELEPHONE ENCOUNTER
Reason for Call:  Medication or medication refill:    Do you use a [facility]?  Name of the pharmacy and phone number for the current request:  [facility] - [phone]     Name of the medication requested: antibiotic  Can we leave a detailed message on this number? YES    Phone number patient can be reached at: Cell number on file:    Telephone Information:   Mobile [phone]       Best Time: pt is having dental work done, needs antibiotics. Thank You Taylor      Call taken on 4/23/2019 at 8:19 AM by Taylor Boyce

## 2019-04-24 ENCOUNTER — HOSPITAL ENCOUNTER (OUTPATIENT)
Dept: PHYSICAL THERAPY | Facility: CLINIC | Age: 79
Setting detail: THERAPIES SERIES
End: 2019-04-24
Attending: INTERNAL MEDICINE
Payer: MEDICARE

## 2019-04-24 PROCEDURE — 97112 NEUROMUSCULAR REEDUCATION: CPT | Mod: GP | Performed by: PHYSICAL THERAPIST

## 2019-04-24 PROCEDURE — 97110 THERAPEUTIC EXERCISES: CPT | Mod: GP | Performed by: PHYSICAL THERAPIST

## 2019-04-26 ENCOUNTER — HOSPITAL ENCOUNTER (OUTPATIENT)
Dept: PHYSICAL THERAPY | Facility: CLINIC | Age: 79
Setting detail: THERAPIES SERIES
End: 2019-04-26
Attending: INTERNAL MEDICINE
Payer: MEDICARE

## 2019-04-26 PROCEDURE — 97110 THERAPEUTIC EXERCISES: CPT | Mod: GP | Performed by: PHYSICAL THERAPIST

## 2019-04-26 PROCEDURE — 97140 MANUAL THERAPY 1/> REGIONS: CPT | Mod: GP | Performed by: PHYSICAL THERAPIST

## 2019-04-29 ENCOUNTER — ANTICOAGULATION THERAPY VISIT (OUTPATIENT)
Dept: ANTICOAGULATION | Facility: CLINIC | Age: 79
End: 2019-04-29
Payer: MEDICARE

## 2019-04-29 DIAGNOSIS — I26.99 OTHER PULMONARY EMBOLISM WITHOUT ACUTE COR PULMONALE, UNSPECIFIED CHRONICITY (H): ICD-10-CM

## 2019-04-29 DIAGNOSIS — Z79.01 LONG TERM CURRENT USE OF ANTICOAGULANT THERAPY: ICD-10-CM

## 2019-04-29 LAB — INR POINT OF CARE: 2.4 (ref 0.9–1.1)

## 2019-04-29 PROCEDURE — 36416 COLLJ CAPILLARY BLOOD SPEC: CPT

## 2019-04-29 PROCEDURE — 99207 ZZC NO CHARGE NURSE ONLY: CPT

## 2019-04-29 PROCEDURE — 85610 PROTHROMBIN TIME: CPT | Mod: QW

## 2019-04-29 NOTE — PROGRESS NOTES
ANTICOAGULATION FOLLOW-UP CLINIC VISIT    Patient Name:  Tracy Palomo  Date:  2019  Contact Type:  Face to Face    SUBJECTIVE:     Patient Findings     Comments:   Pt anticipates getting a tooth pulled on  and INR needs to be <2.2.   She will take a half tab the next two days, increase greens, and recheck on Wed  Johanna Mckeon RN             OBJECTIVE    INR Protime   Date Value Ref Range Status   2019 2.4 (A) 0.9 - 1.1 Final       ASSESSMENT / PLAN  INR assessment THER    Recheck INR In: 2 DAYS    INR Location Clinic      Anticoagulation Summary  As of 2019    INR goal:   2.0-3.0   TTR:   62.1 % (3 y)   INR used for dosin.4 (2019)   Warfarin maintenance plan:   5 mg (5 mg x 1) every Mon, Wed, Fri; 2.5 mg (5 mg x 0.5) all other days   Full warfarin instructions:   5 mg every Mon, Wed, Fri; 2.5 mg all other days   Weekly warfarin total:   25 mg   No change documented:   Johanna Mckeon RN   Plan last modified:   Johanna Mckeon RN (2018)   Next INR check:   2019   Target end date:       Indications    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]  Long term current use of anticoagulant therapy [Z79.01]             Anticoagulation Episode Summary     INR check location:       Preferred lab:       Send INR reminders to:   MICHAEL MAYA    Comments:   5 mg tablets, book, PM dose       Anticoagulation Care Providers     Provider Role Specialty Phone number    Gerard Medrano MD [facility] [phone]            See the Encounter Report to view Anticoagulation Flowsheet and Dosing Calendar (Go to Encounters tab in chart review, and find the Anticoagulation Therapy Visit)    Dosage adjustment made based on physician directed care plan.      Johanna Mckeon RN

## 2019-04-30 ENCOUNTER — HOSPITAL ENCOUNTER (OUTPATIENT)
Dept: PHYSICAL THERAPY | Facility: CLINIC | Age: 79
Setting detail: THERAPIES SERIES
End: 2019-04-30
Attending: INTERNAL MEDICINE
Payer: MEDICARE

## 2019-04-30 PROCEDURE — 97140 MANUAL THERAPY 1/> REGIONS: CPT | Mod: GP | Performed by: PHYSICAL THERAPIST

## 2019-04-30 PROCEDURE — 97110 THERAPEUTIC EXERCISES: CPT | Mod: GP | Performed by: PHYSICAL THERAPIST

## 2019-04-30 NOTE — PROGRESS NOTES
[facility]      OUTPATIENT PHYSICAL THERAPY  PLAN OF TREATMENT FOR OUTPATIENT REHABILITATION    Patient's Last Name, First Name, M.I.                YOB: 1940  Tracy Palomo                        Provider's Name  [facility] Medical Record No.  [id number]                               Onset Date: 1-8-19   Start of Care Date: 3-22-19   Type:     _X_PT   ___OT   ___SLP Medical Diagnosis: Peripheral edema, Primary osteoarthritis of the knee, S/P total knee replacement using cement right, other pulmonary embolism without acute cor polmonale                       PT Diagnosis: Decreased knee ROM, strength, neural irritation R knee with bilateral knee contractures of hamstrings      _________________________________________________________________________________  Plan of Treatment:    Frequency/Duration: 2 times per week x 4 weeks/8 sessions     Goals:  Goal Identifier Goals   Goal Description Tracy will think about her goals and will develop them over the enxt 2 sessions so she is more motivatated and engaged in her home program/rehab   Target Date (Met)   Date Met      Progress:     Goal Identifier Steps   Goal Description Tracy will be able to negotiate 12 steps without AD and safely so she can get into her basement for laundry, down to dock at cabin for fishing. (progressing - going down basement steps, not recpr consist. )   Target Date 05/01/19   Date Met      Progress:     Goal Identifier Biking   Goal Description Tracy will be able to bike 1-2 miles daily with good balance(Increased AROM for the R knee and decreased swelling)   Target Date 07/11/19   Date Met      Progress:     Goal Identifier walking   Goal Description Tracy will be able to walk 10, 000 steps(3,404 steps currently )   Target Date 09/02/19   Date Met      Progress:         Progress Toward Goals:    Progress this reporting period: Tracy has worked on tapering her activities and has found a level of activity that is more comfortable. Her AROM has improved as well as her LEFS score indicating an improvement in her function. Her swelling is minimal at this time. Will continue PT to work toward full AROM and strengthening as well as balance. We are limited as she is having pain in her L knee with WB activities.       Certification date from 4-30-19 to 5-28-19    Carmen Cooper, PT          I CERTIFY THE NEED FOR THESE SERVICES FURNISHED UNDER        THIS PLAN OF TREATMENT AND WHILE UNDER MY CARE     (Physician co-signature of this document indicates review and certification of the therapy plan).                Referring Provider: Chad Betts MD

## 2019-04-30 NOTE — PROGRESS NOTES
"Outpatient Physical Therapy Progress Note     Patient: Tracy Palomo  : 1940    Beginning/End Dates of Reporting Period:  10 sessions between 3-22-19 and 19    Referring Provider: Dr. Chad Betts    Therapy Diagnosis: Decreased knee ROM, strength, neural irritation R knee with bilateral knee contractures of hamstrings     Client Self Report: She is continuing to monitor her activities and she is completing her exercises. SHe is noting a decrease in her swelling. She is able to get into and out of the car easier now. She is doing better going down steps and can complete reciprocally with ascending and not descending. She has days when \"it is not working right\" she does not walk reciprocally.     Objective Measurements:  Objective Measure: Supine L knee AROM   Details: 8 degrees to 103 degrees  Objective Measure: Symptoms  Details: -2/10  LEFS:  55/80     Goals:  Goal Identifier Goals   Goal Description Tracy will think about her goals and will develop them over the enxt 2 sessions so she is more motivatated and engaged in her home program/rehab   Target Date (Met)   Date Met      Progress:     Goal Identifier Steps   Goal Description Tracy will be able to negotiate 12 steps without AD and safely so she can get into her basement for laundry, down to dock at cabin for fishing. (progressing - going down basement steps, not recpr consist. )   Target Date 19   Date Met      Progress:     Goal Identifier Biking   Goal Description Tracy will be able to bike 1-2 miles daily with good balance(Increased AROM for the R knee and decreased swelling)   Target Date 19   Date Met      Progress:     Goal Identifier walking   Goal Description Tracy will be able to walk 10, 000 steps(3,404 steps currently )   Target Date 19   Date Met      Progress:         Progress Toward Goals:   Progress this reporting period: Tracy has worked on tapering her activities and has found a level of activity that " is more comfortable. Her AROM has improved as well as her LEFS score indicating an improvement in her function. Her swelling is minimal at this time. Will continue PT to work toward full AROM and strengthening as well as balance. We are limited as she is having pain in her L knee with WB activities.           Plan:  Continue therapy per current plan of care 2 times per week x 4 weeks until her AROM improves and then decrease to 1 time per week.     Discharge:  No    Thank you for referring Tracy  to [facility] [place]    Carmen Cooper, PT  [phone]

## 2019-05-01 ENCOUNTER — ANTICOAGULATION THERAPY VISIT (OUTPATIENT)
Dept: ANTICOAGULATION | Facility: CLINIC | Age: 79
End: 2019-05-01
Payer: MEDICARE

## 2019-05-01 DIAGNOSIS — I26.99 OTHER PULMONARY EMBOLISM WITHOUT ACUTE COR PULMONALE, UNSPECIFIED CHRONICITY (H): ICD-10-CM

## 2019-05-01 DIAGNOSIS — Z79.01 LONG TERM CURRENT USE OF ANTICOAGULANT THERAPY: ICD-10-CM

## 2019-05-01 LAB — INR POINT OF CARE: 1.9 (ref 0.9–1.1)

## 2019-05-01 PROCEDURE — 99207 ZZC NO CHARGE NURSE ONLY: CPT

## 2019-05-01 PROCEDURE — 36416 COLLJ CAPILLARY BLOOD SPEC: CPT

## 2019-05-01 PROCEDURE — 85610 PROTHROMBIN TIME: CPT | Mod: QW

## 2019-05-01 NOTE — PROGRESS NOTES
ANTICOAGULATION FOLLOW-UP CLINIC VISIT    Patient Name:  Tracy Palomo  Date:  2019  Contact Type:  Face to Face    SUBJECTIVE:     Patient Findings     Comments:   Took 2.5 mg on Monday and Tuesday to decrease INR to 2.2 or lower for dental appt   Johanna Mckeon RN             OBJECTIVE    INR Protime   Date Value Ref Range Status   2019 1.9 (A) 0.9 - 1.1 Final       ASSESSMENT / PLAN  INR assessment THER    Recheck INR In: 4 WEEKS    INR Location Clinic      Anticoagulation Summary  As of 2019    INR goal:   2.0-3.0   TTR:   62.2 % (3 y)   INR used for dosin.9! (2019)   Warfarin maintenance plan:   5 mg (5 mg x 1) every Mon, Wed, Fri; 2.5 mg (5 mg x 0.5) all other days   Full warfarin instructions:   : 2.5 mg; Otherwise 5 mg every Mon, Wed, Fri; 2.5 mg all other days   Weekly warfarin total:   25 mg   Plan last modified:   Johanna Mckeon RN (2018)   Next INR check:   2019   Target end date:       Indications    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]  Long term current use of anticoagulant therapy [Z79.01]             Anticoagulation Episode Summary     INR check location:       Preferred lab:       Send INR reminders to:   MICHAEL MAYA    Comments:   5 mg tablets, book, PM dose       Anticoagulation Care Providers     Provider Role Specialty Phone number    Gerard Medrano MD [facility] [phone]            See the Encounter Report to view Anticoagulation Flowsheet and Dosing Calendar (Go to Encounters tab in chart review, and find the Anticoagulation Therapy Visit)    Dosage adjustment made based on physician directed care plan.      Johanna Mckeon RN                  Pt ambulates to triage  Chief Complaint   Patient presents with   • Abscess     R shin dime sized raised red    • Wound Check     recent tattoo to L FA, peeling and ozzing   Pt asked to wait in lobby, pt updated on triage process and pt asked to inform RN of any changes.

## 2019-05-06 ENCOUNTER — HOSPITAL ENCOUNTER (OUTPATIENT)
Dept: PHYSICAL THERAPY | Facility: CLINIC | Age: 79
Setting detail: THERAPIES SERIES
End: 2019-05-06
Attending: INTERNAL MEDICINE
Payer: MEDICARE

## 2019-05-06 PROCEDURE — 97110 THERAPEUTIC EXERCISES: CPT | Mod: GP | Performed by: PHYSICAL THERAPIST

## 2019-05-06 PROCEDURE — 97140 MANUAL THERAPY 1/> REGIONS: CPT | Mod: GP | Performed by: PHYSICAL THERAPIST

## 2019-05-13 ENCOUNTER — HOSPITAL ENCOUNTER (OUTPATIENT)
Dept: PHYSICAL THERAPY | Facility: CLINIC | Age: 79
Setting detail: THERAPIES SERIES
End: 2019-05-13
Attending: INTERNAL MEDICINE
Payer: MEDICARE

## 2019-05-13 PROCEDURE — 97140 MANUAL THERAPY 1/> REGIONS: CPT | Mod: GP | Performed by: PHYSICAL THERAPIST

## 2019-05-13 PROCEDURE — 97110 THERAPEUTIC EXERCISES: CPT | Mod: GP | Performed by: PHYSICAL THERAPIST

## 2019-05-15 ENCOUNTER — HOSPITAL ENCOUNTER (OUTPATIENT)
Dept: PHYSICAL THERAPY | Facility: CLINIC | Age: 79
Setting detail: THERAPIES SERIES
End: 2019-05-15
Attending: INTERNAL MEDICINE
Payer: MEDICARE

## 2019-05-15 PROCEDURE — 97112 NEUROMUSCULAR REEDUCATION: CPT | Mod: GP | Performed by: PHYSICAL THERAPIST

## 2019-05-15 PROCEDURE — 97110 THERAPEUTIC EXERCISES: CPT | Mod: GP | Performed by: PHYSICAL THERAPIST

## 2019-05-20 ENCOUNTER — HOSPITAL ENCOUNTER (OUTPATIENT)
Dept: PHYSICAL THERAPY | Facility: CLINIC | Age: 79
Setting detail: THERAPIES SERIES
End: 2019-05-20
Attending: INTERNAL MEDICINE
Payer: MEDICARE

## 2019-05-20 PROCEDURE — 97110 THERAPEUTIC EXERCISES: CPT | Mod: GP | Performed by: PHYSICAL THERAPIST

## 2019-05-20 PROCEDURE — 97140 MANUAL THERAPY 1/> REGIONS: CPT | Mod: GP | Performed by: PHYSICAL THERAPIST

## 2019-05-20 PROCEDURE — 97112 NEUROMUSCULAR REEDUCATION: CPT | Mod: GP | Performed by: PHYSICAL THERAPIST

## 2019-05-29 ENCOUNTER — ANTICOAGULATION THERAPY VISIT (OUTPATIENT)
Dept: ANTICOAGULATION | Facility: CLINIC | Age: 79
End: 2019-05-29
Payer: MEDICARE

## 2019-05-29 DIAGNOSIS — I26.99 OTHER PULMONARY EMBOLISM WITHOUT ACUTE COR PULMONALE, UNSPECIFIED CHRONICITY (H): ICD-10-CM

## 2019-05-29 DIAGNOSIS — Z79.01 LONG TERM CURRENT USE OF ANTICOAGULANT THERAPY: ICD-10-CM

## 2019-05-29 LAB — INR POINT OF CARE: 2.5 (ref 0.9–1.1)

## 2019-05-29 PROCEDURE — 99207 ZZC NO CHARGE NURSE ONLY: CPT

## 2019-05-29 PROCEDURE — 36416 COLLJ CAPILLARY BLOOD SPEC: CPT

## 2019-05-29 PROCEDURE — 85610 PROTHROMBIN TIME: CPT | Mod: QW

## 2019-05-29 NOTE — PROGRESS NOTES
ANTICOAGULATION FOLLOW-UP CLINIC VISIT    Patient Name:  Tracy Palomo  Date:  2019  Contact Type:  Face to Face    SUBJECTIVE:  Patient Findings     Comments:   The patient was assessed for diet, medication, and activity level changes, missed or extra doses, bruising or bleeding, with no problem findings.  Johanna Mckeon RN          Clinical Outcomes     Negatives:   Major bleeding event, Thromboembolic event, Anticoagulation-related hospital admission, Anticoagulation-related ED visit, Anticoagulation-related fatality    Comments:   The patient was assessed for diet, medication, and activity level changes, missed or extra doses, bruising or bleeding, with no problem findings.  Johanna Mckeon RN             OBJECTIVE    INR Protime   Date Value Ref Range Status   2019 2.5 (A) 0.9 - 1.1 Final       ASSESSMENT / PLAN  INR assessment THER    Recheck INR In: 6 WEEKS    INR Location Clinic      Anticoagulation Summary  As of 2019    INR goal:   2.0-3.0   TTR:   62.7 % (3.1 y)   INR used for dosin.5 (2019)   Warfarin maintenance plan:   5 mg (5 mg x 1) every Mon, Wed, Fri; 2.5 mg (5 mg x 0.5) all other days   Full warfarin instructions:   5 mg every Mon, Wed, Fri; 2.5 mg all other days   Weekly warfarin total:   25 mg   No change documented:   Johanna Mckeon RN   Plan last modified:   Johanna Mckeon RN (2018)   Next INR check:   7/10/2019   Target end date:       Indications    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]  Long term current use of anticoagulant therapy [Z79.01]             Anticoagulation Episode Summary     INR check location:       Preferred lab:       Send INR reminders to:   MICHAEL MAYA    Comments:   5 mg tablets, book, PM dose       Anticoagulation Care Providers     Provider Role Specialty Phone number    Gerard Medrano MD [facility] [phone]            See the Encounter Report to view  Anticoagulation Flowsheet and Dosing Calendar (Go to Encounters tab in chart review, and find the Anticoagulation Therapy Visit)    Dosage adjustment made based on physician directed care plan.      Johanna Mckeon RN

## 2019-06-04 ENCOUNTER — HOSPITAL ENCOUNTER (OUTPATIENT)
Dept: PHYSICAL THERAPY | Facility: CLINIC | Age: 79
Setting detail: THERAPIES SERIES
End: 2019-06-04
Attending: INTERNAL MEDICINE
Payer: MEDICARE

## 2019-06-04 PROCEDURE — 97530 THERAPEUTIC ACTIVITIES: CPT | Mod: GP | Performed by: PHYSICAL THERAPIST

## 2019-06-04 PROCEDURE — 97110 THERAPEUTIC EXERCISES: CPT | Mod: GP | Performed by: PHYSICAL THERAPIST

## 2019-06-04 NOTE — PROGRESS NOTES
Outpatient Physical Therapy Progress Note     Patient: Tracy Palomo  : 1940    Beginning/End Dates of Reporting Period:  5 visits between 19 and 19 for a total of 15 sessions    Referring Provider: Dr. Chad Betts    Therapy Diagnosis: Decreased knee ROM, strength, neural irritation R knee with bilateral knee contractures of hamstrings       Client Self Report: She feels she is doing well overall. Her balance is better. She is negotiating steps reciprocally ascending but concerned about descending. She is walking x 2 days without the cane. She was able to throw sticks to dogs and did well until she threw too hard.     Objective Measurements:  Objective Measure: Supine L knee AROM  Details: 6 degrees to 103 degrees  Objective Measure: MRROM in sitting  Details: R knee - quads: 3+/5, hamstrings 5/5        Goals:  Goal Identifier Goals   Goal Description Tracy will think about her goals and will develop them over the enxt 2 sessions so she is more motivatated and engaged in her home program/rehab   Target Date (Met)   Date Met      Progress:     Goal Identifier Steps   Goal Description Tracy will be able to negotiate 12 steps without AD and safely so she can get into her basement for laundry, down to dock at cabin for fishing. (progressing - going down basement steps, not recpr consist. )   Target Date 19   Date Met      Progress:     Goal Identifier Biking   Goal Description Tracy will be able to bike 1-2 miles daily with good balance(Increased AROM for the R knee and decreased swelling)   Target Date 19   Date Met      Progress:     Goal Identifier walking   Goal Description Tracy will be able to walk 10, 000 steps   Target Date 19   Date Met      Progress:   Progress Toward Goals:   Progress this reporting period: Tracy has increased her knee extension. She is able to descend x 3 steps reciprocally with railing. Her quads on the R leg are weak and she has difficulty  relying on it for steps. She has pain in her left knee with activity and symptoms along the R medial knee. HEr endurance is improving and she is able to navigate the hills at her cabin and is generally pleased with her progress.       Plan:  Continue therapy per current plan of care.    Discharge:  No    Thank you for referring Tracy  to [facility] [place]    Carmen Cooper, PT  [phone]

## 2019-06-04 NOTE — PROGRESS NOTES
[facility]      OUTPATIENT PHYSICAL THERAPY  PLAN OF TREATMENT FOR OUTPATIENT REHABILITATION    Patient's Last Name, First Name, M.I.                YOB: 1940  Tracy Palomo                        Provider's Name  [facility] Medical Record No.  [id number]                               Onset Date: 1-8-19   Start of Care Date: 3-22-19   Type:     _X_PT   ___OT   ___SLP Medical Diagnosis: Peripheral edema, Primary osteoarthritis of the knee, S/P total knee replacement using cement right, other pulmonary embolism without acute cor polmonale                                             PT Diagnosis: Decreased knee ROM, strength, neural irritation R knee with bilateral knee contractures of hamstrings      _________________________________________________________________________________  Plan of Treatment:    Frequency/Duration: 2 times per week x 6 weeks/12 sessions   Goals:  Goal Identifier Goals   Goal Description Tracy will think about her goals and will develop them over the enxt 2 sessions so she is more motivatated and engaged in her home program/rehab   Target Date (Met)   Date Met      Progress:     Goal Identifier Steps   Goal Description Tracy will be able to negotiate 12 steps without AD and safely so she can get into her basement for laundry, down to dock at cabin for fishing. (progressing - going down basement steps, not recpr consist. )   Target Date 05/01/19   Date Met      Progress:     Goal Identifier Biking   Goal Description Tracy will be able to bike 1-2 miles daily with good balance(Increased AROM for the R knee and decreased swelling)   Target Date 07/11/19   Date Met      Progress:     Goal Identifier walking   Goal Description Tracy will be able to walk 10, 000 steps   Target Date 09/02/19   Date Met      Progress:       Progress Toward Goals:   Progress  this reporting period: Tracy has increased her knee extension. She is able to descend x 3 steps reciprocally with railing. Her quads on the R leg are weak and she has difficulty relying on it for steps. She has pain in her left knee with activity and symptoms along the R medial knee. HEr endurance is improving and she is able to navigate the hills at her cabin and is generally pleased with her progress.        Certification date from 6-4-19 to 7-17-19    Carmen Cooper, PT          I CERTIFY THE NEED FOR THESE SERVICES FURNISHED UNDER        THIS PLAN OF TREATMENT AND WHILE UNDER MY CARE     (Physician co-signature of this document indicates review and certification of the therapy plan).                Referring Provider: Chad Betts MD

## 2019-06-07 ENCOUNTER — HOSPITAL ENCOUNTER (OUTPATIENT)
Dept: PHYSICAL THERAPY | Facility: CLINIC | Age: 79
Setting detail: THERAPIES SERIES
End: 2019-06-07
Attending: INTERNAL MEDICINE
Payer: MEDICARE

## 2019-06-07 PROCEDURE — 97110 THERAPEUTIC EXERCISES: CPT | Mod: GP | Performed by: PHYSICAL THERAPIST

## 2019-06-07 PROCEDURE — 97112 NEUROMUSCULAR REEDUCATION: CPT | Mod: GP | Performed by: PHYSICAL THERAPIST

## 2019-06-07 PROCEDURE — 97140 MANUAL THERAPY 1/> REGIONS: CPT | Mod: GP | Performed by: PHYSICAL THERAPIST

## 2019-06-10 NOTE — ADDENDUM NOTE
Encounter addended by: Carmen Cooper PT on: 6/10/2019 1:56 PM   Actions taken: Flowsheet data copied forward, Flowsheet accepted

## 2019-06-13 ENCOUNTER — HOSPITAL ENCOUNTER (OUTPATIENT)
Dept: PHYSICAL THERAPY | Facility: CLINIC | Age: 79
Setting detail: THERAPIES SERIES
End: 2019-06-13
Attending: INTERNAL MEDICINE
Payer: MEDICARE

## 2019-06-13 PROCEDURE — 97110 THERAPEUTIC EXERCISES: CPT | Mod: GP

## 2019-06-13 PROCEDURE — 97140 MANUAL THERAPY 1/> REGIONS: CPT | Mod: GP

## 2019-06-14 ENCOUNTER — OFFICE VISIT (OUTPATIENT)
Dept: INTERNAL MEDICINE | Facility: CLINIC | Age: 79
End: 2019-06-14
Payer: MEDICARE

## 2019-06-14 VITALS
SYSTOLIC BLOOD PRESSURE: 118 MMHG | RESPIRATION RATE: 16 BRPM | TEMPERATURE: 97.3 F | HEART RATE: 72 BPM | OXYGEN SATURATION: 96 % | BODY MASS INDEX: 30.49 KG/M2 | WEIGHT: 179 LBS | DIASTOLIC BLOOD PRESSURE: 68 MMHG

## 2019-06-14 DIAGNOSIS — M79.621 PAIN OF RIGHT UPPER ARM: ICD-10-CM

## 2019-06-14 DIAGNOSIS — M19.011 ARTHRITIS OF RIGHT SHOULDER REGION: Primary | ICD-10-CM

## 2019-06-14 PROCEDURE — 99213 OFFICE O/P EST LOW 20 MIN: CPT | Performed by: INTERNAL MEDICINE

## 2019-06-14 ASSESSMENT — PAIN SCALES - GENERAL: PAINLEVEL: NO PAIN (0)

## 2019-06-14 NOTE — PROGRESS NOTES
Subjective     Tracy Palomo is a 79 year old female who presents to clinic today for the following health issues:    HPI   Chief Complaint   Patient presents with     Pain     right shoulder/arm pain     Right knee was replaced in January, still doing therapy to try and get it straightened out.  Using a cane still.  Happy with surgery, doesn't have the pain.  Will see ortho again.      Right shoulder is giving her pain, was flipping machado and could feel it go from the shoulder to her elbow.  Now hard to hold her coffee cup.  Odd pain, not quite numb, doesn't go to the hand, just the inner elbow.      Past Medical History:   Diagnosis Date     Atrial fibrillation (H) 2014    related to colon surgery     Colon polyps      Diverticulosis of colon (without mention of hemorrhage)     Diverticulosis     DVT (deep vein thrombosis) in pregnancy (H) 2014    after colon surgery     Other and unspecified hyperlipidemia      PE (pulmonary embolism) 2014    related to colon surgery     Unspecified essential hypertension      Physical Exam  /68   Pulse 72   Temp 97.3  F (36.3  C) (Temporal)   Resp 16   Wt 81.2 kg (179 lb)   SpO2 96%   BMI 30.49 kg/m    General Appearance-healthy, alert, no distress  Right shoulder with good rom, mild grinding on exam.    Ok on internal and external rotation, rotator cuff testing appears intact.      ASSESSMENT:  This is a 79-year-old woman who had her right knee replaced in January, she is still doing physical therapy for this trying to increase her range of motion due to some posterior stiffness.  She has happy she had the knee replaced and is thinking about getting the left replaced in the future.  She does use a cane.  Unfortunately her right arm is been giving her some trouble.  It sounds like this is pain from her shoulder down on the inside of her arm to her elbow, almost nervelike pain.  Today on exam she has normal function throughout her hand and upper arm, reflexes are  subtle but equal to the legs and opposite side.  Her rotator cuff appears to be intact she probably has some mild arthritis in the right shoulder but I wonder more about irritation throughout the ulnar nerve and inside of her upper arm.  We will refer her to physical therapy to work on this.  She is not getting better than could see orthopedics to have evaluation for more of an impingement syndrome.  Ice the area, try to rest it.    Electronically signed by Chad Betts MD

## 2019-06-17 ENCOUNTER — HOSPITAL ENCOUNTER (OUTPATIENT)
Dept: PHYSICAL THERAPY | Facility: CLINIC | Age: 79
Setting detail: THERAPIES SERIES
End: 2019-06-17
Attending: INTERNAL MEDICINE
Payer: MEDICARE

## 2019-06-17 PROCEDURE — 97140 MANUAL THERAPY 1/> REGIONS: CPT | Mod: GP | Performed by: PHYSICAL THERAPIST

## 2019-06-17 PROCEDURE — 97110 THERAPEUTIC EXERCISES: CPT | Mod: GP | Performed by: PHYSICAL THERAPIST

## 2019-06-20 ENCOUNTER — HOSPITAL ENCOUNTER (OUTPATIENT)
Dept: PHYSICAL THERAPY | Facility: CLINIC | Age: 79
Setting detail: THERAPIES SERIES
End: 2019-06-20
Attending: INTERNAL MEDICINE
Payer: MEDICARE

## 2019-06-20 PROCEDURE — 97140 MANUAL THERAPY 1/> REGIONS: CPT | Mod: GP | Performed by: PHYSICAL THERAPIST

## 2019-06-20 PROCEDURE — 97110 THERAPEUTIC EXERCISES: CPT | Mod: GP | Performed by: PHYSICAL THERAPIST

## 2019-06-21 ENCOUNTER — HOSPITAL ENCOUNTER (OUTPATIENT)
Dept: PHYSICAL THERAPY | Facility: CLINIC | Age: 79
Setting detail: THERAPIES SERIES
End: 2019-06-21
Attending: INTERNAL MEDICINE
Payer: MEDICARE

## 2019-06-21 DIAGNOSIS — M19.011 ARTHRITIS OF RIGHT SHOULDER REGION: ICD-10-CM

## 2019-06-21 PROCEDURE — 97110 THERAPEUTIC EXERCISES: CPT | Mod: GP

## 2019-06-21 PROCEDURE — 97161 PT EVAL LOW COMPLEX 20 MIN: CPT | Mod: GP

## 2019-06-21 NOTE — PROGRESS NOTES
[facility]          OUTPATIENT PHYSICAL THERAPY ORTHOPEDIC EVALUATION  PLAN OF TREATMENT FOR OUTPATIENT REHABILITATION  (COMPLETE FOR INITIAL CLAIMS ONLY)  Patient's Last Name, First Name, M.I.  YOB: 1940  Tracy Palomo    Provider s Name:  [facility]   Medical Record No.  [id number]   Start of Care Date:  06/21/19   Onset Date:  05/13/19   Type:     _X__PT   ___OT   ___SLP Medical Diagnosis:  Arthritis of Right Shoulder     PT Diagnosis:  Right anterior shoulder pain, right bicep pain and right elbow pain   Visits from SOC:  1      _________________________________________________________________________________  Plan of Treatment/Functional Goals:  strengthening, stretching, ROM, manual therapy, joint mobilization  Scapular strengthening and shoulder mobility for anterior impingement     PRN Only  Goals                                              Goal Identifier: SPADI  Goal Description: Patient will reduce score on SPADI to 3 or less to demonstrate improved function  Target Date: 08/29/19    Goal Identifier: Strength  Goal Description: Patient will improve right external rotation strength to 4/5 or greater without pain  Target Date: 08/29/19    Goal Identifier: Home tasks  Goal Description: Patient will be able to hold/drink coffee, flip machado, mix batter and drive without pain or symptoms in her arm or shoulder.  Target Date: 08/29/19               Therapy Frequency:  other (see comments)  Predicted Duration of Therapy Intervention:  6-12 weeks    Jay Jay Gutierrez PT                 I CERTIFY THE NEED FOR THESE SERVICES FURNISHED UNDER        THIS PLAN OF TREATMENT AND WHILE UNDER MY CARE     (Physician co-signature of this document indicates review and certification of the therapy plan).                       Certification Date From:  06/21/19   Certification Date To:  09/18/19    Referring Provider:  Dr. Chad Betts MD    Initial  Assessment        See Epic Evaluation Start of Care Date: 06/21/19

## 2019-06-21 NOTE — PROGRESS NOTES
06/21/19 1505   General Information   Type of Visit Initial OP Ortho PT Evaluation   Start of Care Date 06/21/19   Referring Physician Dr. Chad Betts MD   Patient/Family Goals Statement Patient's goal is to be able to flip machado without shoulder or elbow pain   Orders Evaluate and Treat   Date of Order 06/14/19   Certification Required? Yes   Medical Diagnosis Arthritis of the Right Shoulder   Surgical/Medical history reviewed Yes   Precautions/Limitations no known precautions/limitations   General Information Comments Patient reports that one day she was flipping machado. After flipping the machado so much she had pain in her axilla and her medial elbow and was unable to flip/pronate anymore. She also has issues with holding a cup of coffee, which causes pain. Patient also has issues with driving and turning the steering wheel. Patient also gets shoulder pain when holding/reading the paper. Patient uses her arms a lot due to her LE weakness.   Body Part(s)   Body Part(s) Shoulder   Presentation and Etiology   Pertinent history of current problem (include personal factors and/or comorbidities that impact the POC) Patient is a 79 year old women who is currently beeing seen by physical therapy for a TKA. Today she presents with right axilla pain and right medial elbow pain that occurs when flipping machado, driving and holding her coffee. Patient reports no history of shoulder injury.   Impairments A. Pain   Functional Limitations perform activities of daily living;perform desired leisure / sports activities;perform required work activities   Symptom Location Right shoulder, right axilla, right bicep and right medial elbow   How/Where did it occur With repetition/overuse   Onset date of current episode/exacerbation 05/13/19   Chronicity New   Pain rating (0-10 point scale) Best (/10);Worst (/10)   Best (/10) 0   Worst (/10) 7   Pain quality H. Other   Pain quality comment A pain that she didn't want to move her arm at  all   Frequency of pain/symptoms C. With activity   Pain/symptoms are: The same all the time   Pain/symptoms exacerbated by K. Home tasks   Pain/symptoms eased by F. Certain positions;C. Rest;K. Other   Pain eased by comment Place arm in supination and rest   Progression of symptoms since onset: Unchanged   Prior Level of Function   Prior Level of Function-Mobility Independent   Prior Level of Function-ADLs Independent   Current Level of Function   Patient role/employment history F. Retired   Living environment House/townhome   Home/community accessibility Lives   Current equipment-Gait/Locomotion Standard cane   Current equipment-ADL None   Fall Risk Screen   Fall screen completed by PT   Have you fallen 2 or more times in the past year? No   Have you fallen and had an injury in the past year? No   Is patient a fall risk? No   Shoulder Objective Findings   Side (if bilateral, select both right and left) Right   Observation Forward posture   Integumentary  Unremarkable   Posture Forward shoulders posture   Cervical Screen (ROM, quadrant) WNL   Shoulder ROM Comment Her ROM isn't truly full however it is functionally full and painfree   Neer's Test Positive   Mckinnon-David Test Negative   Coracoid Test Positive   Cooper's Test Negative   Crossover Test Positive   Right Shoulder Flexion AROM Full and Painfree   Right Shoulder Flexion PROM Full and Painfree   Right Shoulder Abduction AROM Full and Painfree   Right Shoulder Abduction PROM Full and Painfree   Right Shoulder ER AROM Full and Painfree   Right Shoulder ER PROM Full and Painfree   Right Shoulder IR AROM Full and Painfree   Right Shoulder IR PROM Full and Painfree   Right Shoulder Flexion Strength 5/5   Right Shoulder Abduction Strength 5/5   Right Shoulder ER Strength 3/5 and painful   Right Shoulder IR Strength 5/5   Planned Therapy Interventions   Planned Therapy Interventions strengthening;stretching;ROM;manual therapy;joint mobilization   Planned  Therapy Interventions Comment Scapular strengthening and shoulder mobility for anterior impingement   Planned Modality Interventions   Planned Modality Interventions Comments PRN Only   Clinical Impression   Criteria for Skilled Therapeutic Interventions Met yes, treatment indicated   PT Diagnosis Right anterior shoulder pain, right bicep pain and right elbow pain   Influenced by the following impairments Patient has right shoulder, bicep and elbow pain with movements such as holding a cup of coffee or flipping machado. Patient has external rotation weakness and pain reproduced with anterior shoulder impingement   Functional limitations due to impairments Patient has pain with flipping machado, holding newspaper, holding cup of coffee and driving.   Clinical Presentation Stable/Uncomplicated   Clinical Presentation Rationale Based on evaluation, observation and clinical judgement   Clinical Decision Making (Complexity) Low complexity   Therapy Frequency other (see comments)   Predicted Duration of Therapy Intervention (days/wks) 6-12 weeks   Risk & Benefits of therapy have been explained Yes   Patient, Family & other staff in agreement with plan of care Yes   Clinical Impression Comments Patient presents with symptoms similar to shoulder external rotation pain/weakness and anterior shoulder impingement. Patient would benefit from periscapular strengthening, impingement based exercises and rotator cuff strengthening.   ORTHO GOALS   PT Ortho Eval Goals 5;6;7   Ortho Goal 5   Goal Identifier SPADI   Goal Description Patient will reduce score on SPADI to 3 or less to demonstrate improved function   Target Date 08/29/19   Ortho Goal 6   Goal Identifier Strength   Goal Description Patient will improve right external rotation strength to 4/5 or greater without pain   Target Date 08/29/19   Ortho Goal 7   Goal Identifier Home tasks   Goal Description Patient will be able to hold/drink coffee, flip machado, mix batter and drive  without pain or symptoms in her arm or shoulder.   Target Date 08/29/19   Total Evaluation Time   PT Eval, Low Complexity Minutes (16748) 40   Therapy Certification   Certification date from 06/21/19   Certification date to 09/18/19   Medical Diagnosis Arthritis of Right Shoulder

## 2019-06-24 ENCOUNTER — HOSPITAL ENCOUNTER (OUTPATIENT)
Dept: PHYSICAL THERAPY | Facility: CLINIC | Age: 79
Setting detail: THERAPIES SERIES
End: 2019-06-24
Attending: INTERNAL MEDICINE
Payer: MEDICARE

## 2019-06-24 PROCEDURE — 97112 NEUROMUSCULAR REEDUCATION: CPT | Mod: GP,KX | Performed by: PHYSICAL THERAPIST

## 2019-06-26 ENCOUNTER — HOSPITAL ENCOUNTER (OUTPATIENT)
Dept: PHYSICAL THERAPY | Facility: CLINIC | Age: 79
Setting detail: THERAPIES SERIES
End: 2019-06-26
Attending: INTERNAL MEDICINE
Payer: MEDICARE

## 2019-06-26 PROCEDURE — 97110 THERAPEUTIC EXERCISES: CPT | Mod: GP | Performed by: PHYSICAL THERAPIST

## 2019-06-28 NOTE — PROGRESS NOTES
[facility]      OUTPATIENT PHYSICAL THERAPY  PLAN OF TREATMENT FOR OUTPATIENT REHABILITATION    Patient's Last Name, First Name, M.I.                YOB: 1940  Tracy Palomo                        Provider's Name  [facility] Medical Record No.  [id number]                               Onset Date:  1-8-19   Start of Care Date: 3-22-19   Type:     _X_PT   ___OT   ___SLP Medical Diagnosis: Peripheral edema, Primary osteoarthritis of the knee, S/P total knee replacement using cement right, other pulmonary embolism without acute cor polmonale                       PT Diagnosis: Decreased knee ROM, strength, neural irritation R knee with bilateral knee contractures of hamstrings      _________________________________________________________________________________  Plan of Treatment:    Frequency/Duration: 1 session x 4 weeks and then call for follow up.      Goals:  Goal Identifier Goals   Goal Description Tracy will think about her goals and will develop them over the enxt 2 sessions so she is more motivatated and engaged in her home program/rehab   Target Date (Met)   Date Met      Progress:     Goal Identifier Steps   Goal Description Tracy will be able to negotiate 12 steps without AD and safely so she can get into her basement for laundry, down to dock at cabin for fishing. (progressing - going down basement steps, not recpr consist. )   Target Date 08/01/19   Date Met      Progress:     Goal Identifier Biking   Goal Description Tracy will be able to bike 1-2 miles daily with good balance(She is going to try this next year d/t R knee flexion)   Target Date 05/11/20   Date Met      Progress:     Goal Identifier walking   Goal Description Tracy will be able to walk 10, 000 steps(7.000 steps and is tired)   Target Date 09/02/19   Date Met      Progress:       Progress Toward  "Goals:   Progress this reporting period: Tracy is very happy with her knee function. She was reminded by her children that she was walking with a \"crooked\" leg for yrs. THis may be why she is so sore with some swelling in her posterior knee. THis swelling has decreased since our last session. She had difficulty floor->stand due to her L knee symptoms. It took moderate assist x 1 to assist her to standing. Her AROM varies and she has had 5 degrees knee extension and is now 108 degrees. She is independent with her home program and throughout her sessions, she has had to be reminded to slow down and not force the movement. She is very motivated. She has decided not to ride her bike until next yr and this is a good decision based on her R knee ROM.She was asked to continue with stationary biking for ROM and endurance. She is very confident she will continue to improve but concerned about her HS. We agreed to 1 time over the next 4 weeks as she practices her home program. She was asked to call if she had questions. She may benefit from knee joint mobilization (ongoing) if she does not have an increase in her AROM with her home program.         Certification date from 6-26-19  to 7-25-19    Carmen Cooper, PT          I CERTIFY THE NEED FOR THESE SERVICES FURNISHED UNDER        THIS PLAN OF TREATMENT AND WHILE UNDER MY CARE     (Physician co-signature of this document indicates review and certification of the therapy plan).                Referring Provider: Chad Betts MD    "

## 2019-06-28 NOTE — PROGRESS NOTES
"Outpatient Physical Therapy Progress Note     Patient: Tracy Palomo  : 1940    Beginning/End Dates of Reporting Period:  6 sessions between 19 and 19 for a total of 22 sessions.     Referring Provider: Dr. Chad Betts    Therapy Diagnosis: Decreased knee ROM, strength, neural irritation R knee with bilateral knee contractures of hamstrings     Client Self Report: Tracy feels she is improving overall and is nearly 70% improves. She has opted to try road biking next yr. She has issues with squatting and descending her steps and notes tightness along the inferior  and medial knee.     Objective Measurements:  Objective Measure: Supine R knee AROM  Details: 13 degrees to 107 degrees  Objective Measure: LEFS  Details: 56 - we discussed this and as she does not run, she will not achieve full 80 points.             Goals:  Goal Identifier Goals   Goal Description Tracy will think about her goals and will develop them over the enxt 2 sessions so she is more motivatated and engaged in her home program/rehab   Target Date (Met)   Date Met      Progress:     Goal Identifier Steps   Goal Description Tracy will be able to negotiate 12 steps without AD and safely so she can get into her basement for laundry, down to dock at cabin for fishing. (progressing - going down basement steps, not recpr consist. )   Target Date 19   Date Met      Progress:     Goal Identifier Biking   Goal Description Tracy will be able to bike 1-2 miles daily with good balance(She is going to try this next year d/t R knee flexion)   Target Date 20   Date Met      Progress:     Goal Identifier walking   Goal Description Tracy will be able to walk 10, 000 steps(7.000 steps and is tired)   Target Date 19   Date Met      Progress:         Progress Toward Goals:   Progress this reporting period: Tracy is very happy with her knee function. She was reminded by her children that she was walking with a \"crooked\" " leg for yrs. THis may be why she is so sore with some swelling in her posterior knee. THis swelling has decreased since our last session. She had difficulty floor->stand due to her L knee symptoms. It took moderate assist x 1 to assist her to standing. Her AROM varies and she has had 5 degrees knee extension and is now 108 degrees. She is independent with her home program and throughout her sessions, she has had to be reminded to slow down and not force the movement. She is very motivated. She has decided not to ride her bike until next yr and this is a good decision based on her R knee ROM.She was asked to continue with stationary biking for ROM and endurance. She is very confident she will continue to improve but concerned about her HS. We agreed to 1 time over the next 4 weeks as she practices her home program. She was asked to call if she had questions. She may benefit from knee joint mobilization (ongoing) if she does not have an increase in her AROM with her home program.           Plan:  Changes to therapy plan of care: 1 visit over the next 4 weeks then call and discharge.     Discharge:  No    Thank you for referring Tracy   to [facility]    Carmen Cooper, PT  [phone]

## 2019-07-10 ENCOUNTER — OFFICE VISIT (OUTPATIENT)
Dept: ORTHOPEDICS | Facility: CLINIC | Age: 79
End: 2019-07-10
Payer: MEDICARE

## 2019-07-10 ENCOUNTER — ANTICOAGULATION THERAPY VISIT (OUTPATIENT)
Dept: ANTICOAGULATION | Facility: CLINIC | Age: 79
End: 2019-07-10
Payer: MEDICARE

## 2019-07-10 VITALS
DIASTOLIC BLOOD PRESSURE: 74 MMHG | SYSTOLIC BLOOD PRESSURE: 120 MMHG | WEIGHT: 181 LBS | BODY MASS INDEX: 30.9 KG/M2 | HEIGHT: 64 IN

## 2019-07-10 DIAGNOSIS — Z96.651 S/P TOTAL KNEE REPLACEMENT USING CEMENT, RIGHT: Primary | ICD-10-CM

## 2019-07-10 DIAGNOSIS — M17.12 PRIMARY OSTEOARTHRITIS OF LEFT KNEE: ICD-10-CM

## 2019-07-10 DIAGNOSIS — I26.99 OTHER PULMONARY EMBOLISM WITHOUT ACUTE COR PULMONALE, UNSPECIFIED CHRONICITY (H): ICD-10-CM

## 2019-07-10 DIAGNOSIS — Z79.01 LONG TERM CURRENT USE OF ANTICOAGULANT THERAPY: ICD-10-CM

## 2019-07-10 LAB — INR POINT OF CARE: 3.1 (ref 0.9–1.1)

## 2019-07-10 PROCEDURE — 85610 PROTHROMBIN TIME: CPT | Mod: QW

## 2019-07-10 PROCEDURE — 99207 ZZC NO CHARGE NURSE ONLY: CPT

## 2019-07-10 PROCEDURE — 36416 COLLJ CAPILLARY BLOOD SPEC: CPT

## 2019-07-10 PROCEDURE — 99213 OFFICE O/P EST LOW 20 MIN: CPT | Performed by: ORTHOPAEDIC SURGERY

## 2019-07-10 ASSESSMENT — MIFFLIN-ST. JEOR: SCORE: 1289.98

## 2019-07-10 ASSESSMENT — PAIN SCALES - GENERAL: PAINLEVEL: NO PAIN (0)

## 2019-07-10 NOTE — PROGRESS NOTES
ANTICOAGULATION FOLLOW-UP CLINIC VISIT    Patient Name:  Tracy Palomo  Date:  7/10/2019  Contact Type:  Face to Face    SUBJECTIVE:  Patient Findings     Positives:   Change in diet/appetite (less veggies over the holiday weekend, but plans to back to her normal amount. )             OBJECTIVE    INR Protime   Date Value Ref Range Status   07/10/2019 3.1 (A) 0.9 - 1.1 Final       ASSESSMENT / PLAN  INR assessment SUPRA    Recheck INR In: 6 WEEKS    INR Location Clinic      Anticoagulation Summary  As of 7/10/2019    INR goal:   2.0-3.0   TTR:   63.4 % (3.2 y)   INR used for dosing:   3.1! 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]  Long term current use of anticoagulant therapy [Z79.01]             Anticoagulation Episode Summary     INR check location:       Preferred lab:       Send INR reminders to:   [facility] [place]    Comments:   5 mg tablets, book, PM dose       Anticoagulation Care Providers     Provider Role Specialty Phone number    Gerard Medrano MD [facility] [phone]            See the Encounter Report to view Anticoagulation Flowsheet and Dosing Calendar (Go to Encounters tab in chart review, and find the Anticoagulation Therapy Visit)    Dosage adjustment made based on physician directed care plan.      Johanna Mckeon RN

## 2019-07-10 NOTE — PROGRESS NOTES
"Office Visit-Follow up    Chief Complaint: Tracy Palomo is a 79 year old female who is being seen for   Chief Complaint   Patient presents with     RECHECK     right knee follow up     Surgical Followup     DOS: 1/8/2019~Right total knee arthroplasty (patella was not resurfaced). ~6 months postop       History of Present Illness:   Extremely happy with right knee replacement.  She was able to swim and kick her legs the other day with no pain.  Left knee is doing okay.  She has pain \"at times\".  Overall very content with it.      REVIEW OF SYSTEMS  General: negative for, night sweats, dizziness, fatigue  Resp: No shortness of breath and no cough  CV: negative for chest pain, syncope or near-syncope  GI: negative for nausea, vomiting and diarrhea  : negative for dysuria and hematuria  Musculoskeletal: as above  Neurologic: negative for syncope   Hematologic: negative for bleeding disorder    Physical Exam:  Vitals: /74   Ht 1.632 m (5' 4.25\")   Wt 82.1 kg (181 lb)   BMI 30.83 kg/m    BMI= Body mass index is 30.83 kg/m .  Constitutional: healthy, alert and no acute distress   Psychiatric: mentation appears normal and affect normal/bright  NEURO: no focal deficits  RESP: Normal with easy respirations and no use of accessory muscles to breathe, no audible wheezing or retractions  CV: RLE: no edema         Regular rate and rhythm by palpation  SKIN: No erythema, rashes, excoriation, or breakdown. No evidence of infection.   JOINT/EXTREMITIES:right knee: Surgical incision is well-healed.  Motion is approximately 10-95 actively passive 7-105.  No instability through the arc of motion.  GAIT: not tested             Diagnostic Modalities:  Previous imaging reviewed.  Independent visualization of the images was performed.      Impression: right total knee arthroplasty functioning well.  Left knee primary osteoarthritis    Plan:  All of the above pertinent physical exam and imaging modalities findings was " reviewed with Tracy and her daughter.    As far as the right knee motion may stay about the same.  Its equivalent to her preop motion.  Recommend she continue working on strengthening of her glutes.  Wall sits work well.  Unable to resurface the patella which may contribute is some anterior knee pain with deep flexion.    Left knee is doing well right now.  Recommend she come back if it is bothersome.      Return to clinic as needed or plan to see her at the year anniversary, or sooner as needed for changes.  Re-x-ray on return: Yes.    Vitaly Pang D.O.

## 2019-07-10 NOTE — LETTER
"    7/10/2019         RE: Tracy Palomo  [address] [place] [address]        Dear Colleague,    Thank you for referring your patient, Tracy Palomo, to the [facility]. Please see a copy of my visit note below.    Office Visit-Follow up    Chief Complaint: Tracy Palomo is a 79 year old female who is being seen for   Chief Complaint   Patient presents with     RECHECK     right knee follow up     Surgical Followup     DOS: 1/8/2019~Right total knee arthroplasty (patella was not resurfaced). ~6 months postop       History of Present Illness:   Extremely happy with right knee replacement.  She was able to swim and kick her legs the other day with no pain.  Left knee is doing okay.  She has pain \"at times\".  Overall very content with it.      REVIEW OF SYSTEMS  General: negative for, night sweats, dizziness, fatigue  Resp: No shortness of breath and no cough  CV: negative for chest pain, syncope or near-syncope  GI: negative for nausea, vomiting and diarrhea  : negative for dysuria and hematuria  Musculoskeletal: as above  Neurologic: negative for syncope   Hematologic: negative for bleeding disorder    Physical Exam:  Vitals: /74   Ht 1.632 m (5' 4.25\")   Wt 82.1 kg (181 lb)   BMI 30.83 kg/m     BMI= Body mass index is 30.83 kg/m .  Constitutional: healthy, alert and no acute distress   Psychiatric: mentation appears normal and affect normal/bright  NEURO: no focal deficits  RESP: Normal with easy respirations and no use of accessory muscles to breathe, no audible wheezing or retractions  CV: RLE: no edema         Regular rate and rhythm by palpation  SKIN: No erythema, rashes, excoriation, or breakdown. No evidence of infection.   JOINT/EXTREMITIES:right knee: Surgical incision is well-healed.  Motion is approximately 10-95 actively passive 7-105.  No instability through the arc of motion.  GAIT: not tested             Diagnostic Modalities:  Previous imaging " reviewed.  Independent visualization of the images was performed.      Impression: right total knee arthroplasty functioning well.  Left knee primary osteoarthritis    Plan:  All of the above pertinent physical exam and imaging modalities findings was reviewed with Tracy and her daughter.    As far as the right knee motion may stay about the same.  Its equivalent to her preop motion.  Recommend she continue working on strengthening of her glutes.  Wall sits work well.  Unable to resurface the patella which may contribute is some anterior knee pain with deep flexion.    Left knee is doing well right now.  Recommend she come back if it is bothersome.      Return to clinic as needed or plan to see her at the year anniversary, or sooner as needed for changes.  Re-x-ray on return: Yes.    Vitaly Pang D.O.          Again, thank you for allowing me to participate in the care of your patient.        Sincerely,        Kaleb Pang, DO

## 2019-07-19 ENCOUNTER — HOSPITAL ENCOUNTER (OUTPATIENT)
Dept: PHYSICAL THERAPY | Facility: CLINIC | Age: 79
Setting detail: THERAPIES SERIES
End: 2019-07-19
Attending: INTERNAL MEDICINE
Payer: MEDICARE

## 2019-07-19 PROCEDURE — 97110 THERAPEUTIC EXERCISES: CPT | Mod: KX,GP

## 2019-07-19 NOTE — PROGRESS NOTES
Outpatient Physical Therapy Discharge Note     Patient: Tracy Palomo  : 1940    Beginning/End Dates of Reporting Period:  2019 to 2019    Referring Provider: Chad Betts MD    Therapy Diagnosis: Shoulder Pain     Client Self Report: Patient reports flipping machado, shovel, wash clothes, hang dry clothes, vacuum and swim without issues. Patient reports that she had one instance of shoulder symptoms but doesn't quite recall it and states it wasn't severely limiting.    Objective Measurements:                    Objective Measure: SPADI  Details: 0  Objective Measure: MMT  Details: 5/5 strength in all shoulder muscles except 4+/5 in external rotation            Outcome Measures (most recent score):  SPADI: 0    Goals:  Goal Identifier SPADI   Goal Description Patient will reduce score on SPADI to 3 or less to demonstrate improved function   Target Date 19   Date Met   2019   Progress: Patient scored 0 on the SPADI     Goal Identifier Strength   Goal Description Patient will improve right external rotation strength to 4/5 or greater without pain   Target Date 19   Date Met  19   Progress: 4+/5 in right shoulder external rotation     Goal Identifier Home tasks   Goal Description Patient will be able to hold/drink coffee, flip machado, mix batter and drive without pain or symptoms in her arm or shoulder.   Target Date 19   Date Met  19   Progress: No symptoms with drinking coffee, driving, cooking, shoveling or swimming.       Progress Toward Goals:   Progress this reporting period: Patient accomplished all goals, improved her strength and has no functional limitations indicating she is ready for discharge.          Plan:  Discharge from therapy for her shoulder.    Discharge:    Reason for Discharge: Patient has met all goals.    Equipment Issued: Therabands    Discharge Plan: Patient to continue home program.

## 2019-07-30 ENCOUNTER — TELEPHONE (OUTPATIENT)
Dept: PHYSICAL THERAPY | Facility: CLINIC | Age: 79
End: 2019-07-30

## 2019-08-16 NOTE — ADDENDUM NOTE
Encounter addended by: Carmen Cooper, PT on: 8/16/2019 3:57 PM   Actions taken: Sign clinical note, Episode resolved

## 2019-08-16 NOTE — DISCHARGE SUMMARY
Outpatient Physical Therapy Discharge Note     Patient: Tracy Palomo  : 1940    Beginning/End Dates of Reporting Period:  22 sessions for her R knee    Referring Provider: Dr. Chad Betts    Therapy Diagnosis: Decreased knee ROM, strength, neural irritation R knee with bilateral knee contractures of hamstrings     Client Self Report: Please refer to the 19 progress note for details    Objective Measurements:  Please refer to the 19 note for details.     Goals:  Goal Identifier Goals   Goal Description Tracy will think about her goals and will develop them over the enxt 2 sessions so she is more motivatated and engaged in her home program/rehab   Target Date (Met)   Date Met      Progress:     Goal Identifier Steps   Goal Description Tracy will be able to negotiate 12 steps without AD and safely so she can get into her basement for laundry, down to dock at cabin for fishing. (progressing - going down basement steps, not recpr consist. )   Target Date 19   Date Met      Progress:     Goal Identifier Biking   Goal Description Tracy will be able to bike 1-2 miles daily with good balance(Increased AROM for the R knee and decreased swelling)   Target Date 19   Date Met      Progress:     Goal Identifier walking   Goal Description Tracy will be able to walk 10, 000 steps   Target Date 19   Date Met      Progress:     Progress Toward Goals:   Not assessed this period for her R knee    Plan:  Discharge from therapy for her R knee    Discharge:    Reason for Discharge: meeting goals for her R knee    Equipment Issued: Band    Discharge Plan: Patient to continue home program.  Continue for shoulder issues    Thank you for referring Tracy  to [facility] - Joan Cooper, PT  [phone]

## 2019-08-21 ENCOUNTER — ANTICOAGULATION THERAPY VISIT (OUTPATIENT)
Dept: ANTICOAGULATION | Facility: CLINIC | Age: 79
End: 2019-08-21
Payer: MEDICARE

## 2019-08-21 DIAGNOSIS — I26.99 OTHER PULMONARY EMBOLISM WITHOUT ACUTE COR PULMONALE, UNSPECIFIED CHRONICITY (H): ICD-10-CM

## 2019-08-21 DIAGNOSIS — Z79.01 LONG TERM CURRENT USE OF ANTICOAGULANT THERAPY: ICD-10-CM

## 2019-08-21 LAB — INR POINT OF CARE: 3.7 (ref 0.9–1.1)

## 2019-08-21 PROCEDURE — 85610 PROTHROMBIN TIME: CPT | Mod: QW

## 2019-08-21 PROCEDURE — 99207 ZZC NO CHARGE NURSE ONLY: CPT

## 2019-08-21 PROCEDURE — 36416 COLLJ CAPILLARY BLOOD SPEC: CPT

## 2019-08-21 NOTE — PROGRESS NOTES
ANTICOAGULATION FOLLOW-UP CLINIC VISIT    Patient Name:  Tracy Palomo  Date:  8/21/2019  Contact Type:  Face to Face    SUBJECTIVE:  Patient Findings     Positives:   Change in activity (less active )             OBJECTIVE    INR Protime   Date Value Ref Range Status   08/21/2019 3.7 (A) 0.9 - 1.1 Final       ASSESSMENT / PLAN  INR assessment SUPRA    Recheck INR In: 5 DAYS    INR Location Clinic      Anticoagulation Summary  As of 8/21/2019    INR goal:   2.0-3.0   TTR:   61.2 % (3.3 y)   INR used for dosing:   3.7! (8/21/2019)   Warfarin maintenance plan:   5 mg (5 mg x 1) every Mon, Fri; 2.5 mg (5 mg x 0.5) all other days   Full warfarin instructions:   5 mg every Mon, Fri; 2.5 mg all other days   Weekly warfarin total:   22.5 mg   Plan last modified:   Johanna Mckeon RN (8/21/2019)   Next INR check:   8/26/2019   Target end date:       Indications    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]  Long term current use of anticoagulant therapy [Z79.01]             Anticoagulation Episode Summary     INR check location:       Preferred lab:       Send INR reminders to:   [facility]    Comments:   5 mg tablets, book, PM dose       Anticoagulation Care Providers     Provider Role Specialty Phone number    Gerard Medrano MD [facility] [phone]            See the Encounter Report to view Anticoagulation Flowsheet and Dosing Calendar (Go to Encounters tab in chart review, and find the Anticoagulation Therapy Visit)    Dosage adjustment made based on physician directed care plan.      Johanna Mckeon RN

## 2019-08-26 ENCOUNTER — ANTICOAGULATION THERAPY VISIT (OUTPATIENT)
Dept: ANTICOAGULATION | Facility: CLINIC | Age: 79
End: 2019-08-26
Payer: MEDICARE

## 2019-08-26 DIAGNOSIS — Z79.01 LONG TERM CURRENT USE OF ANTICOAGULANT THERAPY: ICD-10-CM

## 2019-08-26 DIAGNOSIS — I26.99 OTHER PULMONARY EMBOLISM WITHOUT ACUTE COR PULMONALE, UNSPECIFIED CHRONICITY (H): ICD-10-CM

## 2019-08-26 LAB — INR POINT OF CARE: 2.3 (ref 0.9–1.1)

## 2019-08-26 PROCEDURE — 99207 ZZC NO CHARGE NURSE ONLY: CPT

## 2019-08-26 PROCEDURE — 36416 COLLJ CAPILLARY BLOOD SPEC: CPT

## 2019-08-26 PROCEDURE — 85610 PROTHROMBIN TIME: CPT | Mod: QW

## 2019-08-26 NOTE — PROGRESS NOTES
ANTICOAGULATION FOLLOW-UP CLINIC VISIT    Patient Name:  Tracy Palomo  Date:  2019  Contact Type:  Face to Face    SUBJECTIVE:  Patient Findings     Comments:   The patient was assessed for diet, medication, and activity level changes, missed or extra doses, bruising or bleeding, with no problem findings.  Johanna Mckeon RN          Clinical Outcomes     Comments:   The patient was assessed for diet, medication, and activity level changes, missed or extra doses, bruising or bleeding, with no problem findings.  Johanna Mckeon RN             OBJECTIVE    INR Protime   Date Value Ref Range Status   2019 2.3 (A) 0.9 - 1.1 Final       ASSESSMENT / PLAN  INR assessment THER    Recheck INR In: 2 WEEKS    INR Location Clinic      Anticoagulation Summary  As of 2019    INR goal:   2.0-3.0   TTR:   61.2 % (3.3 y)   INR used for dosin.3 (2019)   Warfarin maintenance plan:   5 mg (5 mg x 1) every Mon, Fri; 2.5 mg (5 mg x 0.5) all other days   Full warfarin instructions:   5 mg every Mon, Fri; 2.5 mg all other days   Weekly warfarin total:   22.5 mg   No change documented:   Johanna Mckeon RN   Plan last modified:   Johanna Mckeon RN (2019)   Next INR check:   2019   Target end date:       Indications    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]  Long term current use of anticoagulant therapy [Z79.01]             Anticoagulation Episode Summary     INR check location:       Preferred lab:       Send INR reminders to:   [facility]    Comments:   5 mg tablets, book, PM dose       Anticoagulation Care Providers     Provider Role Specialty Phone number    Gerard Medrano MD [facility] [phone]            See the Encounter Report to view Anticoagulation Flowsheet and Dosing Calendar (Go to Encounters tab in chart review, and find the Anticoagulation Therapy Visit)    Dosage adjustment made based on physician directed  care plan.      Johanna Mckeon RN

## 2019-09-09 ENCOUNTER — ANTICOAGULATION THERAPY VISIT (OUTPATIENT)
Dept: ANTICOAGULATION | Facility: CLINIC | Age: 79
End: 2019-09-09
Payer: MEDICARE

## 2019-09-09 DIAGNOSIS — Z79.01 LONG TERM CURRENT USE OF ANTICOAGULANT THERAPY: ICD-10-CM

## 2019-09-09 DIAGNOSIS — I26.99 OTHER PULMONARY EMBOLISM WITHOUT ACUTE COR PULMONALE, UNSPECIFIED CHRONICITY (H): ICD-10-CM

## 2019-09-09 LAB — INR POINT OF CARE: 2.2 (ref 0.9–1.1)

## 2019-09-09 PROCEDURE — 36416 COLLJ CAPILLARY BLOOD SPEC: CPT

## 2019-09-09 PROCEDURE — 85610 PROTHROMBIN TIME: CPT | Mod: QW

## 2019-09-09 PROCEDURE — 99207 ZZC NO CHARGE NURSE ONLY: CPT

## 2019-09-09 NOTE — PROGRESS NOTES
ANTICOAGULATION FOLLOW-UP CLINIC VISIT    Patient Name:  Tracy Palomo  Date:  2019  Contact Type:  Face to Face    SUBJECTIVE:  Patient Findings     Comments:   The patient was assessed for diet, medication, and activity level changes, missed or extra doses, bruising or bleeding, with no problem findings.  oJhanna Mckeon RN          Clinical Outcomes     Negatives:   Major bleeding event, Thromboembolic event, Anticoagulation-related hospital admission, Anticoagulation-related ED visit, Anticoagulation-related fatality    Comments:   The patient was assessed for diet, medication, and activity level changes, missed or extra doses, bruising or bleeding, with no problem findings.  Johanna Mckeon RN             OBJECTIVE    INR Protime   Date Value Ref Range Status   2019 2.2 (A) 0.9 - 1.1 Final       ASSESSMENT / PLAN  INR assessment THER    Recheck INR In: 3 WEEKS    INR Location Clinic      Anticoagulation Summary  As of 2019    INR goal:   2.0-3.0   TTR:   61.6 % (3.4 y)   INR used for dosin.2 (2019)   Warfarin maintenance plan:   5 mg (5 mg x 1) every Mon, Fri; 2.5 mg (5 mg x 0.5) all other days   Full warfarin instructions:   5 mg every Mon, Fri; 2.5 mg all other days   Weekly warfarin total:   22.5 mg   No change documented:   Johanna Mckeon RN   Plan last modified:   Johanna Mckeon RN (2019)   Next INR check:   10/2/2019   Target end date:       Indications    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]  Long term current use of anticoagulant therapy [Z79.01]             Anticoagulation Episode Summary     INR check location:       Preferred lab:       Send INR reminders to:   [facility] [place]    Comments:   5 mg tablets, book, PM dose       Anticoagulation Care Providers     Provider Role Specialty Phone number    Gerard Medrano MD [facility] [phone]            See the Encounter Report to view Anticoagulation  Flowsheet and Dosing Calendar (Go to Encounters tab in chart review, and find the Anticoagulation Therapy Visit)    Dosage adjustment made based on physician directed care plan.    Johanna Mckeon RN

## 2019-10-02 ENCOUNTER — ANTICOAGULATION THERAPY VISIT (OUTPATIENT)
Dept: ANTICOAGULATION | Facility: CLINIC | Age: 79
End: 2019-10-02
Payer: MEDICARE

## 2019-10-02 VITALS — HEART RATE: 54 BPM | DIASTOLIC BLOOD PRESSURE: 77 MMHG | SYSTOLIC BLOOD PRESSURE: 152 MMHG

## 2019-10-02 DIAGNOSIS — Z79.01 LONG TERM CURRENT USE OF ANTICOAGULANT THERAPY: ICD-10-CM

## 2019-10-02 DIAGNOSIS — I26.99 OTHER PULMONARY EMBOLISM WITHOUT ACUTE COR PULMONALE, UNSPECIFIED CHRONICITY (H): ICD-10-CM

## 2019-10-02 LAB — INR POINT OF CARE: 2 (ref 0.9–1.1)

## 2019-10-02 PROCEDURE — 99207 ZZC NO CHARGE NURSE ONLY: CPT

## 2019-10-02 PROCEDURE — 85610 PROTHROMBIN TIME: CPT | Mod: QW

## 2019-10-02 PROCEDURE — 36416 COLLJ CAPILLARY BLOOD SPEC: CPT

## 2019-10-02 NOTE — PROGRESS NOTES
ANTICOAGULATION FOLLOW-UP CLINIC VISIT    Patient Name:  Tracy Palomo  Date:  10/2/2019  Contact Type:  Face to Face    SUBJECTIVE:  Patient Findings     Comments:   The patient was assessed for diet, medication, and activity level changes, missed or extra doses, bruising or bleeding, with no problem findings.      BP reports her BP running a little high today at home. Today in clinic it was 145/77 HR 54 and 152/77 HR 54. She denies any chest pain/discomfot, shortness of breath. I have advised her to keep an eye on her BP and symptoms and call PCP if problem persists.   GLORIA Swift RN          Clinical Outcomes     Negatives:   Major bleeding event, Thromboembolic event, Anticoagulation-related hospital admission, Anticoagulation-related ED visit, Anticoagulation-related fatality    Comments:   The patient was assessed for diet, medication, and activity level changes, missed or extra doses, bruising or bleeding, with no problem findings.      BP reports her BP running a little high today at home. Today in clinic it was 145/77 HR 54 and 152/77 HR 54. She denies any chest pain/discomfot, shortness of breath. I have advised her to keep an eye on her BP and symptoms and call PCP if problem persists.   GLORIA Swift RN             OBJECTIVE    INR Protime   Date Value Ref Range Status   10/02/2019 2.0 (A) 0.9 - 1.1 Final       ASSESSMENT / PLAN  INR assessment THER    Recheck INR In: 5 WEEKS    INR Location Clinic      Anticoagulation Summary  As of 10/2/2019    INR goal:   2.0-3.0   TTR:   62.3 % (3.4 y)   INR used for dosin.0 (10/2/2019)   Warfarin maintenance plan:   5 mg (5 mg x 1) every Mon, Fri; 2.5 mg (5 mg x 0.5) all other days   Full warfarin instructions:   5 mg every Mon, Fri; 2.5 mg all other days   Weekly warfarin total:   22.5 mg   No change documented:   Johanna Mckeon RN   Plan last modified:   Johanna Mckeon RN (2019)    Next INR check:   11/6/2019   Target end date:       Indications    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]  Long term current use of anticoagulant therapy [Z79.01]             Anticoagulation Episode Summary     INR check location:       Preferred lab:       Send INR reminders to:   [facility] [place]    Comments:   5 mg tablets, book, PM dose       Anticoagulation Care Providers     Provider Role Specialty Phone number    Gerard Medrano MD [facility] [phone]            See the Encounter Report to view Anticoagulation Flowsheet and Dosing Calendar (Go to Encounters tab in chart review, and find the Anticoagulation Therapy Visit)    Dosage adjustment made based on physician directed care plan.        Johanna Mckeon RN

## 2019-10-18 ENCOUNTER — TELEPHONE (OUTPATIENT)
Dept: INTERNAL MEDICINE | Facility: CLINIC | Age: 79
End: 2019-10-18

## 2019-10-18 DIAGNOSIS — Z96.651 S/P TOTAL KNEE REPLACEMENT USING CEMENT, RIGHT: Primary | ICD-10-CM

## 2019-10-20 RX ORDER — AMOXICILLIN 500 MG/1
CAPSULE ORAL
Qty: 4 CAPSULE | Refills: 1 | Status: SHIPPED | OUTPATIENT
Start: 2019-10-20 | End: 2020-01-15

## 2019-11-06 ENCOUNTER — ANTICOAGULATION THERAPY VISIT (OUTPATIENT)
Dept: ANTICOAGULATION | Facility: CLINIC | Age: 79
End: 2019-11-06
Payer: MEDICARE

## 2019-11-06 DIAGNOSIS — Z79.01 LONG TERM CURRENT USE OF ANTICOAGULANT THERAPY: ICD-10-CM

## 2019-11-06 DIAGNOSIS — I26.99 OTHER PULMONARY EMBOLISM WITHOUT ACUTE COR PULMONALE, UNSPECIFIED CHRONICITY (H): ICD-10-CM

## 2019-11-06 LAB — INR POINT OF CARE: 3.1 (ref 0.9–1.1)

## 2019-11-06 PROCEDURE — 36416 COLLJ CAPILLARY BLOOD SPEC: CPT

## 2019-11-06 PROCEDURE — 99207 ZZC NO CHARGE NURSE ONLY: CPT

## 2019-11-06 PROCEDURE — 85610 PROTHROMBIN TIME: CPT | Mod: QW

## 2019-11-06 NOTE — PROGRESS NOTES
ANTICOAGULATION FOLLOW-UP CLINIC VISIT    Patient Name:  Tracy Palomo  Date:  11/6/2019  Contact Type:  Face to Face       SUBJECTIVE:  Patient Findings     Comments:   Pt will increase coco Mckeon RN          Clinical Outcomes     Comments:   Pt will increase coco Mckeon RN             OBJECTIVE    INR Protime   Date Value Ref Range Status   11/06/2019 3.1 (A) 0.9 - 1.1 Final       ASSESSMENT / PLAN  INR assessment SUPRA    Recheck INR In: 6 WEEKS    INR Location Clinic      Anticoagulation Summary  As of 11/6/2019    INR goal:   2.0-3.0   TTR:   63.1 % (3.5 y)   INR used for dosing:   3.1! (11/6/2019)   Warfarin maintenance plan:   5 mg (5 mg x 1) every Mon, Fri; 2.5 mg (5 mg x 0.5) all other days   Full warfarin instructions:   5 mg every Mon, Fri; 2.5 mg all other days   Weekly warfarin total:   22.5 mg   No change documented:   Johanna Mckeon RN   Plan last modified:   Johanna Mckeon RN (8/21/2019)   Next INR check:   12/18/2019   Target end date:       Indications    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]  Long term current use of anticoagulant therapy [Z79.01]             Anticoagulation Episode Summary     INR check location:       Preferred lab:       Send INR reminders to:   [facility] [place]    Comments:   5 mg tablets, book, PM dose       Anticoagulation Care Providers     Provider Role Specialty Phone number    Gerard Medrano MD [facility] [phone]            See the Encounter Report to view Anticoagulation Flowsheet and Dosing Calendar (Go to Encounters tab in chart review, and find the Anticoagulation Therapy Visit)    Dosage adjustment made based on physician directed care plan.    Johanna Mckeon RN

## 2019-12-18 ENCOUNTER — ANTICOAGULATION THERAPY VISIT (OUTPATIENT)
Dept: ANTICOAGULATION | Facility: CLINIC | Age: 79
End: 2019-12-18
Payer: MEDICARE

## 2019-12-18 DIAGNOSIS — Z79.01 LONG TERM CURRENT USE OF ANTICOAGULANT THERAPY: ICD-10-CM

## 2019-12-18 DIAGNOSIS — I26.99 OTHER PULMONARY EMBOLISM WITHOUT ACUTE COR PULMONALE, UNSPECIFIED CHRONICITY (H): ICD-10-CM

## 2019-12-18 LAB — INR POINT OF CARE: 1.9 (ref 0.9–1.1)

## 2019-12-18 PROCEDURE — 36416 COLLJ CAPILLARY BLOOD SPEC: CPT

## 2019-12-18 PROCEDURE — 85610 PROTHROMBIN TIME: CPT | Mod: QW

## 2019-12-18 PROCEDURE — 99207 ZZC NO CHARGE NURSE ONLY: CPT

## 2019-12-18 NOTE — PROGRESS NOTES
ANTICOAGULATION FOLLOW-UP CLINIC VISIT    Patient Name:  Tracy Palomo  Date:  12/18/2019  Contact Type:  Face to Face    SUBJECTIVE:  Patient Findings     Positives:   Change in diet/appetite (extra greens lately. )             OBJECTIVE    INR Protime   Date Value Ref Range Status   11/06/2019 3.1 (A) 0.9 - 1.1 Final       ASSESSMENT / PLAN  INR assessment SUB    Recheck INR In: 6 WEEKS    INR Location Clinic      Anticoagulation Summary  As of 12/18/2019    INR goal:   2.0-3.0   TTR:   76.0 % (10 mo)   INR used for dosing:      Warfarin maintenance plan:   5 mg (5 mg x 1) every Mon, Fri; 2.5 mg (5 mg x 0.5) all other days   Full warfarin instructions:   12/18: 5 mg; Otherwise 5 mg every Mon, Fri; 2.5 mg all other days   Weekly warfarin total:   22.5 mg   Plan last modified:   Johanna Mckeon RN (8/21/2019)   Next INR check:   1/28/2020   Priority:   Maintenance   Target end date:       Indications    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]  Long term current use of anticoagulant therapy [Z79.01]             Anticoagulation Episode Summary     INR check location:       Preferred lab:       Send INR reminders to:   [facility]    Comments:   5 mg tablets, book, PM dose       Anticoagulation Care Providers     Provider Role Specialty Phone number    Gerard Medrano MD [facility] [phone]            See the Encounter Report to view Anticoagulation Flowsheet and Dosing Calendar (Go to Encounters tab in chart review, and find the Anticoagulation Therapy Visit)    Dosage adjustment made based on physician directed care plan.    Johanna Mckeon RN

## 2020-01-14 DIAGNOSIS — E78.5 HYPERLIPIDEMIA LDL GOAL <130: ICD-10-CM

## 2020-01-15 ENCOUNTER — ANCILLARY PROCEDURE (OUTPATIENT)
Dept: GENERAL RADIOLOGY | Facility: CLINIC | Age: 80
End: 2020-01-15
Attending: ORTHOPAEDIC SURGERY
Payer: MEDICARE

## 2020-01-15 ENCOUNTER — OFFICE VISIT (OUTPATIENT)
Dept: ORTHOPEDICS | Facility: CLINIC | Age: 80
End: 2020-01-15
Payer: MEDICARE

## 2020-01-15 VITALS
BODY MASS INDEX: 31.33 KG/M2 | DIASTOLIC BLOOD PRESSURE: 80 MMHG | WEIGHT: 183.5 LBS | HEIGHT: 64 IN | SYSTOLIC BLOOD PRESSURE: 132 MMHG

## 2020-01-15 DIAGNOSIS — Z96.651 S/P TOTAL KNEE REPLACEMENT USING CEMENT, RIGHT: ICD-10-CM

## 2020-01-15 DIAGNOSIS — Z96.651 S/P TOTAL KNEE REPLACEMENT USING CEMENT, RIGHT: Primary | ICD-10-CM

## 2020-01-15 PROCEDURE — 73562 X-RAY EXAM OF KNEE 3: CPT | Mod: TC

## 2020-01-15 PROCEDURE — 99213 OFFICE O/P EST LOW 20 MIN: CPT | Performed by: ORTHOPAEDIC SURGERY

## 2020-01-15 RX ORDER — LOVASTATIN 40 MG
TABLET ORAL
Qty: 90 TABLET | Refills: 0 | Status: SHIPPED | OUTPATIENT
Start: 2020-01-15 | End: 2020-01-31

## 2020-01-15 ASSESSMENT — MIFFLIN-ST. JEOR: SCORE: 1297.35

## 2020-01-15 ASSESSMENT — PAIN SCALES - GENERAL: PAINLEVEL: NO PAIN (0)

## 2020-01-15 NOTE — LETTER
1/15/2020         RE: Tracy Palomo  [address] [facility] [address]        Dear Colleague,    Thank you for referring your patient, Tracy Palomo, to the [facility]. Please see a copy of my visit note below.    Orthopedic Clinic Post-Operative Note    CHIEF COMPLAINT:   Chief Complaint   Patient presents with     RECHECK     right knee follow up     Surgical Followup     DOS: 1/8/2019~Right total knee arthroplasty (patella was not resurfaced). ~1 year and 7 days postop       HISTORY OF PRESENT ILLNESS  Returns to clinic. 1 week post-op.  Doing great. Has no complaints. Able to kneel. No pain. Very happy without outcomes. No incision concerns. No instability no new injuries. Happy with the motion she has. Left knee doing well. Motion better than pre-op.     Patient's past medical, surgical, social and family histories reviewed.     Past Medical History:   Diagnosis Date     Atrial fibrillation (H) 2014    related to colon surgery     Colon polyps      Diverticulosis of colon (without mention of hemorrhage)     Diverticulosis     DVT (deep vein thrombosis) in pregnancy 2014    after colon surgery     Other and unspecified hyperlipidemia      PE (pulmonary embolism) 2014    related to colon surgery     Unspecified essential hypertension        Past Surgical History:   Procedure Laterality Date     ARTHROPLASTY KNEE Right 1/8/2019    Procedure: Right total knee replacement;  Surgeon: Kaleb Pang DO;  Location:  OR     COLONOSCOPY  09, 10, 12    [facility]     COLONOSCOPY N/A 12/11/2017    Procedure: COLONOSCOPY;  colonoscopy;  Surgeon: Elvis Quezada MD;  Location:  GI     FLEXIBLE SIGMOIDOSCOPY  09, 11     LAPAROTOMY EXPLORATORY N/A 10/20/2014    Procedure: LAPAROTOMY EXPLORATORY;  Surgeon: Miguel Oleary MD;  Location:  OR     LAPAROTOMY, LYSIS ADHESIONS, COMBINED N/A 10/20/2014    Procedure: COMBINED LAPAROTOMY, LYSIS ADHESIONS;  Surgeon:  Miguel Oleary MD;  Location: [facility] OR     OPEN REDUCTION INTERNAL FIXATION HIP BIPOLAR Left 3/16/2017    Procedure: OPEN REDUCTION INTERNAL FIXATION HIP BIPOLAR;  Surgeon: Kaleb Pang DO;  Location: [facility] OR     RESECT SMALL BOWEL WITHOUT OSTOMY N/A 10/20/2014    Procedure: RESECT SMALL BOWEL WITHOUT OSTOMY;  Surgeon: Miguel Oleary MD;  Location: [facility] OR       Medications:  lovastatin (MEVACOR) 40 MG tablet, TAKE ONE TABLET BY MOUTH AT BEDTIME  metoprolol tartrate (LOPRESSOR) 25 MG tablet, Take 0.5 tablets (12.5 mg) by mouth 2 times daily  warfarin (COUMADIN) 5 MG tablet, Take 1 tablet (5 mg) by mouth daily Can change per direction of coumadin clinic  acetaminophen (TYLENOL) 500 MG tablet, Take 500 mg by mouth as needed   nitroGLYcerin (NITROSTAT) 0.4 MG sublingual tablet, For chest pain place 1 tablet under the tongue every 5 minutes for 3 doses. If symptoms persist 5 minutes after 1st dose call 911. (Patient not taking: Reported on 7/10/2019)    No current facility-administered medications on file prior to visit.       Allergies   Allergen Reactions     No Known Allergies        Social History     Occupational History     Not on file   Tobacco Use     Smoking status: Never Smoker     Smokeless tobacco: Never Used   Substance and Sexual Activity     Alcohol use: No     Alcohol/week: 0.0 standard drinks     Drug use: No     Sexual activity: Not Currently     Partners: Male       Family History   Problem Relation Age of Onset     Blood Disease No family hx of         blood clots       REVIEW OF SYSTEMS  General: negative for, night sweats, dizziness, fatigue  Resp: No shortness of breath and no cough  CV: negative for chest pain, syncope or near-syncope  GI: negative for nausea, vomiting and diarrhea  : negative for dysuria and hematuria  Musculoskeletal: as above  Neurologic: negative for syncope   Hematologic: negative for bleeding disorder    Physical Exam:  Vitals: /80   Ht 1.626 m (5'  "4\")   Wt 83.2 kg (183 lb 8 oz)   BMI 31.50 kg/m     BMI= Body mass index is 31.5 kg/m .  Constitutional: healthy, alert and no acute distress   Psychiatric: mentation appears normal and affect normal/bright  NEURO: no focal deficits  SKIN: .well healed, no erythema, no incision breakdown and no drainage.  JOINT/EXTREMITIES: right knee. No effusion, non-tender. No instability. Extensor intact. AROM 5-100.  Calf SNT.  Distal neurovascular grossly intact.  GAIT: non-antalgic    Diagnostic Modalities:  right knee X-ray: The prosthesis has acceptable alignment. No fractures or dislocations. Prosthesis is well seated with no evidence of loosening. No patella resurfacing.   Independent visualization of the images was performed.      Impression:   Chief Complaint   Patient presents with     RECHECK     right knee follow up     Surgical Followup     DOS: 1/8/2019~Right total knee arthroplasty (patella was not resurfaced). ~1 year and 7 days postop   Very happy with outcomes    Plan:   Activity: continue with activity as tolerated. Her motion was 10 degrees less full extension prior to surgery and now 5 degrees less.  She is very happy with this and able to do all her activities including kneeling without pain. Does not need any antibiotic prior to dental work now.     Return to clinic 2-3 years, or sooner as needed for changes.    Re-x-ray on return: Yes.    Scribed by:  BILLY Hernandez, CNP  10:50 AM  1/15/2020  The information in this document, created by a scribe for me, accurately reflects the services I personally performed and the decisions made by me. I have reviewed and approved this document for accuracy    Kaleb Pang DO         Again, thank you for allowing me to participate in the care of your patient.        Sincerely,        Kaleb Pang DO    "

## 2020-01-15 NOTE — TELEPHONE ENCOUNTER
"Lovastatin  Last Written Prescription Date:  10/10/2018  Last Fill Quantity: 90,  # refills: 3   Last office visit: 6/14/2019 with prescribing provider:     Future Office Visit:   Next 5 appointments (look out 90 days)    Jan 31, 2020  8:30 AM CST  [facility] Visit with [facility] Betts MD  [facility] [address] [place] [id number] [phone]         Requested Prescriptions   Pending Prescriptions Disp Refills     lovastatin (MEVACOR) 40 MG tablet [Pharmacy Med Name: Lovastatin 40 MG Oral Tablet]  0     Sig: TAKE 1 TABLET BY MOUTH ONCE DAILY AT BEDTIME       Statins Protocol Failed - 1/14/2020 11:37 AM        Failed - LDL on file in past 12 months     Recent Labs   Lab Test 10/10/18  0910   LDL 59             Passed - No abnormal creatine kinase in past 12 months     No lab results found.             Passed - Recent (12 mo) or future (30 days) visit within the authorizing provider's specialty     Patient has had an office visit with the authorizing provider or a provider within the authorizing providers department within the previous 12 mos or has a future within next 30 days. See \"Patient Info\" tab in inbasket, or \"Choose Columns\" in Meds & Orders section of the refill encounter.              Passed - Medication is active on med list        Passed - Patient is age 18 or older        Passed - No active pregnancy on record        Passed - No positive pregnancy test in past 12 months        Medication is being filled for 1 time refill only due to:  patient has an upcoming appointment   Mora Hooekr RN BSN      "

## 2020-01-15 NOTE — PROGRESS NOTES
Orthopedic Clinic Post-Operative Note    CHIEF COMPLAINT:   Chief Complaint   Patient presents with     RECHECK     right knee follow up     Surgical Followup     DOS: 1/8/2019~Right total knee arthroplasty (patella was not resurfaced). ~1 year and 7 days postop       HISTORY OF PRESENT ILLNESS  Returns to clinic. 1 week post-op.  Doing great. Has no complaints. Able to kneel. No pain. Very happy without outcomes. No incision concerns. No instability no new injuries. Happy with the motion she has. Left knee doing well. Motion better than pre-op.     Patient's past medical, surgical, social and family histories reviewed.     Past Medical History:   Diagnosis Date     Atrial fibrillation (H) 2014    related to colon surgery     Colon polyps      Diverticulosis of colon (without mention of hemorrhage)     Diverticulosis     DVT (deep vein thrombosis) in pregnancy 2014    after colon surgery     Other and unspecified hyperlipidemia      PE (pulmonary embolism) 2014    related to colon surgery     Unspecified essential hypertension        Past Surgical History:   Procedure Laterality Date     ARTHROPLASTY KNEE Right 1/8/2019    Procedure: Right total knee replacement;  Surgeon: Kaleb Pang DO;  Location:  OR     COLONOSCOPY  09, 10, 12    [facility]     COLONOSCOPY N/A 12/11/2017    Procedure: COLONOSCOPY;  colonoscopy;  Surgeon: Elvis Quezada MD;  Location:  GI     FLEXIBLE SIGMOIDOSCOPY  09, 11     LAPAROTOMY EXPLORATORY N/A 10/20/2014    Procedure: LAPAROTOMY EXPLORATORY;  Surgeon: Miguel Oleary MD;  Location: [facility] OR     LAPAROTOMY, LYSIS ADHESIONS, COMBINED N/A 10/20/2014    Procedure: COMBINED LAPAROTOMY, LYSIS ADHESIONS;  Surgeon: Miguel Oleary MD;  Location: [facility] OR     OPEN REDUCTION INTERNAL FIXATION HIP BIPOLAR Left 3/16/2017    Procedure: OPEN REDUCTION INTERNAL FIXATION HIP BIPOLAR;  Surgeon: Kaleb Pang DO;  Location: [facility] OR     RESECT SMALL BOWEL  "WITHOUT OSTOMY N/A 10/20/2014    Procedure: RESECT SMALL BOWEL WITHOUT OSTOMY;  Surgeon: Miguel Oleary MD;  Location:  OR       Medications:  lovastatin (MEVACOR) 40 MG tablet, TAKE ONE TABLET BY MOUTH AT BEDTIME  metoprolol tartrate (LOPRESSOR) 25 MG tablet, Take 0.5 tablets (12.5 mg) by mouth 2 times daily  warfarin (COUMADIN) 5 MG tablet, Take 1 tablet (5 mg) by mouth daily Can change per direction of coumadin clinic  acetaminophen (TYLENOL) 500 MG tablet, Take 500 mg by mouth as needed   nitroGLYcerin (NITROSTAT) 0.4 MG sublingual tablet, For chest pain place 1 tablet under the tongue every 5 minutes for 3 doses. If symptoms persist 5 minutes after 1st dose call 911. (Patient not taking: Reported on 7/10/2019)    No current facility-administered medications on file prior to visit.       Allergies   Allergen Reactions     No Known Allergies        Social History     Occupational History     Not on file   Tobacco Use     Smoking status: Never Smoker     Smokeless tobacco: Never Used   Substance and Sexual Activity     Alcohol use: No     Alcohol/week: 0.0 standard drinks     Drug use: No     Sexual activity: Not Currently     Partners: Male       Family History   Problem Relation Age of Onset     Blood Disease No family hx of         blood clots       REVIEW OF SYSTEMS  General: negative for, night sweats, dizziness, fatigue  Resp: No shortness of breath and no cough  CV: negative for chest pain, syncope or near-syncope  GI: negative for nausea, vomiting and diarrhea  : negative for dysuria and hematuria  Musculoskeletal: as above  Neurologic: negative for syncope   Hematologic: negative for bleeding disorder    Physical Exam:  Vitals: /80   Ht 1.626 m (5' 4\")   Wt 83.2 kg (183 lb 8 oz)   BMI 31.50 kg/m    BMI= Body mass index is 31.5 kg/m .  Constitutional: healthy, alert and no acute distress   Psychiatric: mentation appears normal and affect normal/bright  NEURO: no focal deficits  SKIN: " .well healed, no erythema, no incision breakdown and no drainage.  JOINT/EXTREMITIES: right knee. No effusion, non-tender. No instability. Extensor intact. AROM 5-100.  Calf SNT.  Distal neurovascular grossly intact.  GAIT: non-antalgic    Diagnostic Modalities:  right knee X-ray: The prosthesis has acceptable alignment. No fractures or dislocations. Prosthesis is well seated with no evidence of loosening. No patella resurfacing.   Independent visualization of the images was performed.      Impression:   Chief Complaint   Patient presents with     RECHECK     right knee follow up     Surgical Followup     DOS: 1/8/2019~Right total knee arthroplasty (patella was not resurfaced). ~1 year and 7 days postop   Very happy with outcomes    Plan:   Activity: continue with activity as tolerated. Her motion was 10 degrees less full extension prior to surgery and now 5 degrees less.  She is very happy with this and able to do all her activities including kneeling without pain. Does not need any antibiotic prior to dental work now.     Return to clinic 2-3 years, or sooner as needed for changes.    Re-x-ray on return: Yes.    Scribed by:  BILLY Hernandez, CNP  10:50 AM  1/15/2020  The information in this document, created by a scribe for me, accurately reflects the services I personally performed and the decisions made by me. I have reviewed and approved this document for accuracy    Kaleb Pang DO

## 2020-01-16 ENCOUNTER — TELEPHONE (OUTPATIENT)
Dept: INTERNAL MEDICINE | Facility: CLINIC | Age: 80
End: 2020-01-16

## 2020-01-16 NOTE — TELEPHONE ENCOUNTER
I have attempted to contact this patient by phone with the following results: no answer. VM left asking her to call the [facility] back to discuss. Arti Ruelas RN, BSN

## 2020-01-16 NOTE — TELEPHONE ENCOUNTER
Reason for Call:  Other prescription    Detailed comments: Patient calling stating she takes warfarin and has diarrhea. Patient wondering if she should take her doses on Friday and Monday if she is still experiencing symptoms of a stomach flu. Please advise     Phone Number Patient can be reached at: Cell number on file:    Telephone Information:   Mobile [phone]       Best Time:      Can we leave a detailed message on this number? YES    Call taken on 1/16/2020 at 9:10 AM by Jeannette Ellsworth

## 2020-01-16 NOTE — TELEPHONE ENCOUNTER
Spoke with pt and she states that she would like to just take 2.5 mg tomorrow rather than 5 mg due to diarrhea. I have advised her that this would be ok and to call us back if the diarrhea persists otherwise we will see her as planned on the 29th. Patient verbalized understanding and agrees with plan of care. Pt had no further questions or concerns at this time.   Arti Ruelas, RN, BSN

## 2020-01-16 NOTE — TELEPHONE ENCOUNTER
Forwarding this patients concerns about taking coumadin and not feeling well to the Coumadin team that has been monitoring her..........................GLORIA Francis

## 2020-01-29 ENCOUNTER — ANTICOAGULATION THERAPY VISIT (OUTPATIENT)
Dept: ANTICOAGULATION | Facility: CLINIC | Age: 80
End: 2020-01-29
Payer: MEDICARE

## 2020-01-29 DIAGNOSIS — I26.99 OTHER PULMONARY EMBOLISM WITHOUT ACUTE COR PULMONALE, UNSPECIFIED CHRONICITY (H): ICD-10-CM

## 2020-01-29 DIAGNOSIS — Z79.01 LONG TERM CURRENT USE OF ANTICOAGULANT THERAPY: ICD-10-CM

## 2020-01-29 LAB — INR POINT OF CARE: 2.8 (ref 0.9–1.1)

## 2020-01-29 PROCEDURE — 99207 ZZC NO CHARGE NURSE ONLY: CPT

## 2020-01-29 PROCEDURE — 36416 COLLJ CAPILLARY BLOOD SPEC: CPT

## 2020-01-29 PROCEDURE — 85610 PROTHROMBIN TIME: CPT | Mod: QW

## 2020-01-29 NOTE — PROGRESS NOTES
ANTICOAGULATION FOLLOW-UP CLINIC VISIT    Patient Name:  Tracy Palomo  Date:  2020  Contact Type:  Face to Face    SUBJECTIVE:  Patient Findings     Comments:   The patient was assessed for diet, medication, and activity level changes, missed or extra doses, bruising or bleeding, with no problem findings.  Johanna Mckeon RN          Clinical Outcomes     Negatives:   Major bleeding event, Thromboembolic event, Anticoagulation-related hospital admission, Anticoagulation-related ED visit, Anticoagulation-related fatality    Comments:   The patient was assessed for diet, medication, and activity level changes, missed or extra doses, bruising or bleeding, with no problem findings.  Johanna Mckeon RN             OBJECTIVE    INR Protime   Date Value Ref Range Status   2020 2.8 (A) 0.9 - 1.1 Final       ASSESSMENT / PLAN  INR assessment THER    Recheck INR In: 6 WEEKS    INR Location Clinic      Anticoagulation Summary  As of 2020    INR goal:   2.0-3.0   TTR:   80.3 % (1 y)   INR used for dosin.8 (2020)   Warfarin maintenance plan:   5 mg (5 mg x 1) every Mon, Fri; 2.5 mg (5 mg x 0.5) all other days   Full warfarin instructions:   5 mg every Mon, Fri; 2.5 mg all other days   Weekly warfarin total:   22.5 mg   No change documented:   Johanna Mckeon RN   Plan last modified:   Johanna Mckeon RN (2019)   Next INR check:   3/11/2020   Priority:   Maintenance   Target end date:       Indications    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 [phone]            See the Encounter  Report to view Anticoagulation Flowsheet and Dosing Calendar (Go to Encounters tab in chart review, and find the Anticoagulation Therapy Visit)    Dosage adjustment made based on physician directed care plan.      Johanna Mckeon RN

## 2020-01-31 ENCOUNTER — OFFICE VISIT (OUTPATIENT)
Dept: INTERNAL MEDICINE | Facility: CLINIC | Age: 80
End: 2020-01-31
Payer: MEDICARE

## 2020-01-31 VITALS
BODY MASS INDEX: 31.58 KG/M2 | RESPIRATION RATE: 16 BRPM | OXYGEN SATURATION: 98 % | HEART RATE: 66 BPM | WEIGHT: 184 LBS | SYSTOLIC BLOOD PRESSURE: 128 MMHG | DIASTOLIC BLOOD PRESSURE: 70 MMHG | TEMPERATURE: 96.4 F

## 2020-01-31 DIAGNOSIS — Z79.01 LONG TERM CURRENT USE OF ANTICOAGULANT THERAPY: ICD-10-CM

## 2020-01-31 DIAGNOSIS — I48.0 PAROXYSMAL ATRIAL FIBRILLATION (H): ICD-10-CM

## 2020-01-31 DIAGNOSIS — Z23 NEED FOR PROPHYLACTIC VACCINATION AND INOCULATION AGAINST INFLUENZA: ICD-10-CM

## 2020-01-31 DIAGNOSIS — E78.5 HYPERLIPIDEMIA LDL GOAL <130: Primary | ICD-10-CM

## 2020-01-31 DIAGNOSIS — I10 HYPERTENSION GOAL BP (BLOOD PRESSURE) < 140/90: ICD-10-CM

## 2020-01-31 LAB
ALBUMIN SERPL-MCNC: 3.5 G/DL (ref 3.4–5)
ALP SERPL-CCNC: 110 U/L (ref 40–150)
ALT SERPL W P-5'-P-CCNC: 27 U/L (ref 0–50)
ANION GAP SERPL CALCULATED.3IONS-SCNC: 4 MMOL/L (ref 3–14)
AST SERPL W P-5'-P-CCNC: 25 U/L (ref 0–45)
BILIRUB SERPL-MCNC: 1.6 MG/DL (ref 0.2–1.3)
BUN SERPL-MCNC: 17 MG/DL (ref 7–30)
CALCIUM SERPL-MCNC: 9.1 MG/DL (ref 8.5–10.1)
CHLORIDE SERPL-SCNC: 108 MMOL/L (ref 94–109)
CHOLEST SERPL-MCNC: 155 MG/DL
CO2 SERPL-SCNC: 28 MMOL/L (ref 20–32)
CREAT SERPL-MCNC: 0.8 MG/DL (ref 0.52–1.04)
CREAT UR-MCNC: 20 MG/DL
ERYTHROCYTE [DISTWIDTH] IN BLOOD BY AUTOMATED COUNT: 13.5 % (ref 10–15)
GFR SERPL CREATININE-BSD FRML MDRD: 70 ML/MIN/{1.73_M2}
GLUCOSE SERPL-MCNC: 97 MG/DL (ref 70–99)
HCT VFR BLD AUTO: 45.4 % (ref 35–47)
HDLC SERPL-MCNC: 66 MG/DL
HGB BLD-MCNC: 14.4 G/DL (ref 11.7–15.7)
LDLC SERPL CALC-MCNC: 56 MG/DL
MCH RBC QN AUTO: 29.1 PG (ref 26.5–33)
MCHC RBC AUTO-ENTMCNC: 31.7 G/DL (ref 31.5–36.5)
MCV RBC AUTO: 92 FL (ref 78–100)
MICROALBUMIN UR-MCNC: 6 MG/L
MICROALBUMIN/CREAT UR: 31.04 MG/G CR (ref 0–25)
NONHDLC SERPL-MCNC: 89 MG/DL
PLATELET # BLD AUTO: 217 10E9/L (ref 150–450)
POTASSIUM SERPL-SCNC: 4.9 MMOL/L (ref 3.4–5.3)
PROT SERPL-MCNC: 7 G/DL (ref 6.8–8.8)
RBC # BLD AUTO: 4.95 10E12/L (ref 3.8–5.2)
SODIUM SERPL-SCNC: 140 MMOL/L (ref 133–144)
TRIGL SERPL-MCNC: 167 MG/DL
WBC # BLD AUTO: 6.5 10E9/L (ref 4–11)

## 2020-01-31 PROCEDURE — 85027 COMPLETE CBC AUTOMATED: CPT | Performed by: INTERNAL MEDICINE

## 2020-01-31 PROCEDURE — 90662 IIV NO PRSV INCREASED AG IM: CPT | Performed by: INTERNAL MEDICINE

## 2020-01-31 PROCEDURE — 99214 OFFICE O/P EST MOD 30 MIN: CPT | Mod: 25 | Performed by: INTERNAL MEDICINE

## 2020-01-31 PROCEDURE — G0008 ADMIN INFLUENZA VIRUS VAC: HCPCS | Performed by: INTERNAL MEDICINE

## 2020-01-31 PROCEDURE — 36415 COLL VENOUS BLD VENIPUNCTURE: CPT | Performed by: INTERNAL MEDICINE

## 2020-01-31 PROCEDURE — 80053 COMPREHEN METABOLIC PANEL: CPT | Performed by: INTERNAL MEDICINE

## 2020-01-31 PROCEDURE — 82043 UR ALBUMIN QUANTITATIVE: CPT | Performed by: INTERNAL MEDICINE

## 2020-01-31 PROCEDURE — 80061 LIPID PANEL: CPT | Performed by: INTERNAL MEDICINE

## 2020-01-31 RX ORDER — LOVASTATIN 40 MG
TABLET ORAL
Qty: 90 TABLET | Refills: 3 | Status: SHIPPED | OUTPATIENT
Start: 2020-01-31 | End: 2021-04-20

## 2020-01-31 RX ORDER — METOPROLOL TARTRATE 25 MG/1
12.5 TABLET, FILM COATED ORAL 2 TIMES DAILY
Qty: 90 TABLET | Refills: 3 | Status: SHIPPED | OUTPATIENT
Start: 2020-01-31 | End: 2020-08-11

## 2020-01-31 RX ORDER — WARFARIN SODIUM 5 MG/1
5 TABLET ORAL DAILY
Qty: 90 TABLET | Refills: 3 | Status: ON HOLD | OUTPATIENT
Start: 2020-01-31 | End: 2020-08-05

## 2020-01-31 ASSESSMENT — PAIN SCALES - GENERAL: PAINLEVEL: NO PAIN (0)

## 2020-01-31 NOTE — PROGRESS NOTES
Prior to immunization administration, verified patients identity using patient s name and date of birth. Please see Immunization Activity for additional information.     Screening Questionnaire for Adult Immunization    Are you sick today?   No   Do you have allergies to medications, food, a vaccine component or latex?   No   Have you ever had a serious reaction after receiving a vaccination?   No   Do you have a long-term health problem with heart, lung, kidney, or metabolic disease (e.g., diabetes), asthma, a blood disorder, no spleen, complement component deficiency, a cochlear implant, or a spinal fluid leak?  Are you on long-term aspirin therapy?   No   Do you have cancer, leukemia, HIV/AIDS, or any other immune system problem?   No   Do you have a parent, brother, or sister with an immune system problem?   No   In the past 3 months, have you taken medications that affect  your immune system, such as prednisone, other steroids, or anticancer drugs; drugs for the treatment of rheumatoid arthritis, Crohn s disease, or psoriasis; or have you had radiation treatments?   No   Have you had a seizure, or a brain or other nervous system problem?   No   During the past year, have you received a transfusion of blood or blood    products, or been given immune (gamma) globulin or antiviral drug?   No   For women: Are you pregnant or is there a chance you could become       pregnant during the next month?   No   Have you received any vaccinations in the past 4 weeks?   No     Immunization questionnaire answers were all negative.        Per orders of Dr. Chad Betts, injection of HD Influenza given by Jo Ellsworth CMA. Patient instructed to remain in clinic for 15 minutes afterwards, and to report any adverse reaction to me immediately.       Screening performed by Jo Ellsworth CMA on 1/31/2020 at 8:54 AM.

## 2020-01-31 NOTE — PROGRESS NOTES
Subjective     Tracy Palomo is a 79 year old female who presents to clinic today for the following health issues:    HPI   Chief Complaint   Patient presents with     Recheck Medication     Lipids  Htn     Doing well overall.  Walks for exercise, goes outside but watches the ice.  Practicing getting up from her knees.  One fall on the ice.    No chest pains, no sob.    Medications are ok, no side effects, no bleeding issues.      Past Medical History:   Diagnosis Date     Atrial fibrillation (H) 2014    related to colon surgery     Colon polyps      Diverticulosis of colon (without mention of hemorrhage)     Diverticulosis     DVT (deep vein thrombosis) in pregnancy 2014    after colon surgery     Other and unspecified hyperlipidemia      PE (pulmonary embolism) 2014    related to colon surgery     Unspecified essential hypertension      Current Outpatient Medications   Medication     acetaminophen (TYLENOL) 500 MG tablet     lovastatin (MEVACOR) 40 MG tablet     metoprolol tartrate (LOPRESSOR) 25 MG tablet     warfarin ANTICOAGULANT (COUMADIN) 5 MG tablet     nitroGLYcerin (NITROSTAT) 0.4 MG sublingual tablet     No current facility-administered medications for this visit.      Social History     Tobacco Use     Smoking status: Never Smoker     Smokeless tobacco: Never Used   Substance Use Topics     Alcohol use: No     Alcohol/week: 0.0 standard drinks     Drug use: No     Review of Systems  Constitutional-No fevers, chills, or weight changes..  ENT-No earpain, sore throat, voice changes or rhinitis.  Cardiac-No chest pain or palpitations.  Respiratory-No cough, sob, or hemoptysis.  GI-No nausea, vomitting, diarrhea, constipation, or blood in the stool.  Musculoskeletal-No muscles aches or joint pains.    Physical Exam  /70   Pulse 66   Temp 96.4  F (35.8  C) (Temporal)   Resp 16   Wt 83.5 kg (184 lb)   SpO2 98%   BMI 31.58 kg/m    General Appearance-healthy, alert, no distress  Cardiac-regular  rate and rhythm  with normal S1, S2 ; no murmur, rub or gallops  Lungs-clear to auscultation  GI-Soft, nontender.  Normal bowel sounds.  No hepatosplenomegaly or abnormal masses  Extremities-no peripheral edema, peripheral pulses normal    ASSESSMENT:  79-year-old woman who is here for a recheck, annual check.  She is overall doing well she continues to walk quite a bit.  We discussed safety and walking carefully when it is ACL.  She is been able to get up once when she fell down.    Hyperlipidemia we are going to check her fasting lipids, she is on lovastatin we will refill this for the next year.    Hypertension she is well controlled at 128/70, takes metoprolol low-dose 12.5 twice a day and we will refill this.    She is got a history of paroxysmal atrial fibrillation and PE she is on anticoagulation of Coumadin her INR is been good I am in a check her CBC to make sure she is not developing anemia continue her Coumadin.    I did recommend she get a mammogram we will update her flu shot and she will try to get a tetanus shot at 1 of her pharmacies.    Electronically signed by Chad Betts MD

## 2020-02-04 ENCOUNTER — HOSPITAL ENCOUNTER (OUTPATIENT)
Dept: MAMMOGRAPHY | Facility: CLINIC | Age: 80
Discharge: HOME | End: 2020-02-04
Attending: INTERNAL MEDICINE | Admitting: INTERNAL MEDICINE
Payer: MEDICARE

## 2020-02-04 DIAGNOSIS — Z12.31 VISIT FOR SCREENING MAMMOGRAM: ICD-10-CM

## 2020-02-04 PROCEDURE — 77063 BREAST TOMOSYNTHESIS BI: CPT

## 2020-03-02 ENCOUNTER — HEALTH MAINTENANCE LETTER (OUTPATIENT)
Age: 80
End: 2020-03-02

## 2020-03-11 ENCOUNTER — ANTICOAGULATION THERAPY VISIT (OUTPATIENT)
Dept: ANTICOAGULATION | Facility: CLINIC | Age: 80
End: 2020-03-11
Payer: MEDICARE

## 2020-03-11 DIAGNOSIS — I26.99 OTHER PULMONARY EMBOLISM WITHOUT ACUTE COR PULMONALE, UNSPECIFIED CHRONICITY (H): ICD-10-CM

## 2020-03-11 DIAGNOSIS — Z79.01 LONG TERM CURRENT USE OF ANTICOAGULANT THERAPY: ICD-10-CM

## 2020-03-11 LAB — INR POINT OF CARE: 1.9 (ref 0.9–1.1)

## 2020-03-11 PROCEDURE — 85610 PROTHROMBIN TIME: CPT | Mod: QW

## 2020-03-11 PROCEDURE — 36416 COLLJ CAPILLARY BLOOD SPEC: CPT

## 2020-03-11 PROCEDURE — 99207 ZZC NO CHARGE NURSE ONLY: CPT

## 2020-03-11 NOTE — PROGRESS NOTES
ANTICOAGULATION FOLLOW-UP CLINIC VISIT    Patient Name:  Tracy Palomo  Date:  3/11/2020  Contact Type:  Face to Face    SUBJECTIVE:  Patient Findings     Positives:   Change in activity (more active with nicer weather ), Change in diet/appetite (mroe greens - about 5 a week)             OBJECTIVE    INR Protime   Date Value Ref Range Status   2020 1.9 (A) 0.9 - 1.1 Final       ASSESSMENT / PLAN  INR assessment THER    Recheck INR In: 4 WEEKS    INR Location Clinic      Anticoagulation Summary  As of 3/11/2020    INR goal:   2.0-3.0   TTR:   79.2 % (1 y)   INR used for dosin.9! 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]  Long term current use of anticoagulant therapy [Z79.01]             Anticoagulation Episode Summary     INR check location:       Preferred lab:       Send INR reminders to:   [facility]    Comments:   5 mg tablets, book, PM dose       Anticoagulation Care Providers     Provider Role Specialty Phone number    Gerard Medrano MD [facility] [phone]            See the Encounter Report to view Anticoagulation Flowsheet and Dosing Calendar (Go to Encounters tab in chart review, and find the Anticoagulation Therapy Visit)    Dosage adjustment made based on physician directed care plan.    Johanna Mckeon RN

## 2020-04-06 DIAGNOSIS — Z79.01 LONG TERM CURRENT USE OF ANTICOAGULANT THERAPY: Primary | ICD-10-CM

## 2020-04-06 DIAGNOSIS — I26.99 OTHER PULMONARY EMBOLISM WITHOUT ACUTE COR PULMONALE, UNSPECIFIED CHRONICITY (H): ICD-10-CM

## 2020-04-08 ENCOUNTER — ANTICOAGULATION THERAPY VISIT (OUTPATIENT)
Dept: ANTICOAGULATION | Facility: CLINIC | Age: 80
End: 2020-04-08

## 2020-04-08 DIAGNOSIS — I26.99 OTHER PULMONARY EMBOLISM WITHOUT ACUTE COR PULMONALE, UNSPECIFIED CHRONICITY (H): ICD-10-CM

## 2020-04-08 DIAGNOSIS — Z79.01 LONG TERM CURRENT USE OF ANTICOAGULANT THERAPY: ICD-10-CM

## 2020-04-08 LAB
CAPILLARY BLOOD COLLECTION: NORMAL
INR BLD: 2.3 (ref 0.86–1.14)

## 2020-04-08 PROCEDURE — 85610 PROTHROMBIN TIME: CPT | Mod: QW | Performed by: INTERNAL MEDICINE

## 2020-04-08 PROCEDURE — 99207 ZZC NO CHARGE NURSE ONLY: CPT | Performed by: INTERNAL MEDICINE

## 2020-04-08 PROCEDURE — 36416 COLLJ CAPILLARY BLOOD SPEC: CPT | Performed by: INTERNAL MEDICINE

## 2020-04-08 NOTE — PROGRESS NOTES
ANTICOAGULATION FOLLOW-UP CLINIC VISIT    Patient Name:  Tracy Palomo  Date:  2020  Contact Type:  Telephone    SUBJECTIVE:  Patient Findings     Comments:   The patient was assessed for diet, medication, and activity level changes, missed or extra doses, bruising or bleeding, with no problem findings.  Johanna Mckeon RN          Clinical Outcomes     Negatives:   Major bleeding event, Thromboembolic event, Anticoagulation-related hospital admission, Anticoagulation-related ED visit, Anticoagulation-related fatality    Comments:   The patient was assessed for diet, medication, and activity level changes, missed or extra doses, bruising or bleeding, with no problem findings.  Johanna Mckeon RN             OBJECTIVE    INR Point of Care   Date Value Ref Range Status   2020 2.3 (H) 0.86 - 1.14 Final     Comment:     This test is intended for monitoring Coumadin therapy.  Results are not   accurate in patients with prolonged INR due to factor deficiency.         ASSESSMENT / PLAN  INR assessment THER    Recheck INR In: 7 WEEKS    INR Location Outside lab      Anticoagulation Summary  As of 2020    INR goal:   2.0-3.0   TTR:   77.2 % (1 y)   INR used for dosin.3 (2020)   Warfarin maintenance plan:   5 mg (5 mg x 1) every Mon, Wed, Fri; 2.5 mg (5 mg x 0.5) all other days   Full warfarin instructions:   5 mg every Mon, Wed, Fri; 2.5 mg all other days   Weekly warfarin total:   25 mg   No change documented:   Johanna Mckeon RN   Plan last modified:   Johanna Mckeon RN (3/11/2020)   Next INR check:   2020   Priority:   Maintenance   Target end date:       Indications    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]  Long term current use of anticoagulant therapy [Z79.01]             Anticoagulation Episode Summary     INR check location:       Preferred lab:       Send INR reminders to:   [facility]    Comments:   5 mg tablets, book, PM dose        Anticoagulation Care Providers     Provider Role Specialty Phone number    Gerard Medrano MD [facility] [phone]            See the Encounter Report to view Anticoagulation Flowsheet and Dosing Calendar (Go to Encounters tab in chart review, and find the Anticoagulation Therapy Visit)    Dosage adjustment made based on physician directed care plan.      Johanna Mckeon RN

## 2020-04-22 NOTE — MR AVS SNAPSHOT
After Visit Summary   6/13/2018    Tracy Palomo    MRN: [id number]           Patient Information     Date Of Birth          1940        Visit Information        Provider Department      6/13/2018 4:30 PM Gerard Medrano MD [facility]        Today's Diagnoses     Tick bite, initial encounter    -  1       Follow-ups after your visit        Your next 10 appointments already scheduled     Jul 25, 2018  9:15 AM CDT   Anticoagulation Visit with [facility] ANTI COAG   [facility] ([facility])    [address] [place] [id number] [phone]              Who to contact     If you have questions or need follow up information about today's clinic visit or your schedule please contact [facility] directly at [phone].  Normal or non-critical lab and imaging results will be communicated to you by MyChart, letter or phone within 4 business days after the clinic has received the results. If you do not hear from us within 7 days, please contact the clinic through MyChart or phone. If you have a critical or abnormal lab result, we will notify you by phone as soon as possible.  Submit refill requests through [employer] or call your pharmacy and they will forward the refill request to us. Please allow 3 business days for your refill to be completed.          Additional Information About Your Visit        Care EveryWhere ID     This is your Care EveryWhere ID. This could be used by other organizations to access your [facility] medical records  [id number]        Your Vitals Were     Pulse Temperature Respirations BMI (Body Mass Index)          68 97.5  F (36.4  C) (Temporal) 16 30.21 kg/m2         Blood Pressure from Last 3 Encounters:   06/13/18 124/70   05/21/18 102/78   04/30/18 98/68    Weight from Last 3 Encounters:   06/13/18 177 lb 6.4 oz (80.5 kg)   05/21/18 178 lb 8 oz (81 kg)   04/30/18 178 lb 8 oz (81 kg)              We Performed  Continue with ibuprofen 2 - 3 tabs three times per day as needed for 3 -7 days. May consider prescription alternative if necessary     Continue with heat / ice therapy . Continue with chiropractic techniques. Call if persisting issues. the Following     REMOVE FOREIGN BODY SIMPLE        Primary Care Provider Office Phone # Fax #    Chad Betts [phone] [address] [facility] [address]        Equal Access to Services     RAFFIGABRIELA GAIL : Hadii luis antonio ku hadsamo Soomaali, waaxda luqadaha, qaybta kaalmada adechema, buffy gonzalezmaria elena holguin. So [facility] [phone].    ATENCIÓN: Si habla español, tiene a dale disposición servicios gratuitos de asistencia lingüística. Llame al [phone].    We comply with applicable federal civil rights laws and [place] laws. We do not discriminate on the basis of race, color, national origin, age, disability, sex, sexual orientation, or gender identity.            Thank you!     Thank you for choosing [facility]  for your care. Our goal is always to provide you with excellent care. Hearing back from our patients is one way we can continue to improve our services. Please take a few minutes to complete the written survey that you may receive in the mail after your visit with us. Thank you!             Your Updated Medication List - Protect others around you: Learn how to safely use, store and throw away your medicines at www.disposemymeds.org.          This list is accurate as of 6/13/18  5:15 PM.  Always use your most recent med list.                   Brand Name Dispense Instructions for use Diagnosis    acetaminophen 325 MG tablet    TYLENOL    100 tablet    Take 3 tablets (975 mg) by mouth every 8 hours    Hip fracture, left, closed, initial encounter (H)       lisinopril 10 MG tablet    PRINIVIL/ZESTRIL    90 tablet    Take 1 tablet (10 mg) by mouth daily    Essential hypertension with goal blood pressure less than 140/90       lovastatin 40 MG tablet    MEVACOR    90 tablet    TAKE ONE TABLET BY MOUTH AT BEDTIME    Hyperlipidemia LDL goal <130       warfarin 5 MG tablet    COUMADIN    100 tablet    Take 2.5 mg on Mon, Wed, Fri and 5 mg all other  days, or as directed by the Coumadin clinic.    Long-term (current) use of anticoagulants

## 2020-05-27 ENCOUNTER — TELEPHONE (OUTPATIENT)
Dept: ANTICOAGULATION | Facility: CLINIC | Age: 80
End: 2020-05-27

## 2020-05-27 ENCOUNTER — ANTICOAGULATION THERAPY VISIT (OUTPATIENT)
Dept: ANTICOAGULATION | Facility: CLINIC | Age: 80
End: 2020-05-27

## 2020-05-27 DIAGNOSIS — I26.99 OTHER PULMONARY EMBOLISM WITHOUT ACUTE COR PULMONALE, UNSPECIFIED CHRONICITY (H): ICD-10-CM

## 2020-05-27 DIAGNOSIS — Z79.01 LONG TERM CURRENT USE OF ANTICOAGULANT THERAPY: Primary | ICD-10-CM

## 2020-05-27 DIAGNOSIS — Z79.01 LONG TERM CURRENT USE OF ANTICOAGULANT THERAPY: ICD-10-CM

## 2020-05-27 LAB
CAPILLARY BLOOD COLLECTION: NORMAL
INR BLD: 3.7 (ref 0.86–1.14)

## 2020-05-27 PROCEDURE — 85610 PROTHROMBIN TIME: CPT | Mod: QW | Performed by: INTERNAL MEDICINE

## 2020-05-27 PROCEDURE — 99207 ZZC NO CHARGE NURSE ONLY: CPT | Performed by: INTERNAL MEDICINE

## 2020-05-27 PROCEDURE — 36416 COLLJ CAPILLARY BLOOD SPEC: CPT | Performed by: INTERNAL MEDICINE

## 2020-05-27 NOTE — TELEPHONE ENCOUNTER
Please review and renew this patients INR referral, orders pending. Thank you!        Johanna Mckeon RN

## 2020-05-27 NOTE — PROGRESS NOTES
ANTICOAGULATION FOLLOW-UP CLINIC VISIT    Patient Name:  Tracy Palomo  Date:  5/27/2020  Contact Type:  Telephone    SUBJECTIVE:  Patient Findings     Positives:   Change in diet/appetite (less greens over the holiday weekend )    Comments:   Will increase greens today along with taking 2.5 mg x1 instead of 5 mg  Johanna Mckeon RN            Clinical Outcomes     Comments:   Will increase greens today along with taking 2.5 mg x1 instead of 5 mg  Johanna Mckeon RN               OBJECTIVE    Recent labs: (last 7 days)     05/27/20  0748   INR 3.7*       ASSESSMENT / PLAN  INR assessment SUPRA    Recheck INR In: 4 WEEKS    INR Location Outside lab      Anticoagulation Summary  As of 5/27/2020    INR goal:   2.0-3.0   TTR:   71.9 % (1 y)   INR used for dosing:   3.7! 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]  Long term current use of anticoagulant therapy [Z79.01]             Anticoagulation Episode Summary     INR check location:       Preferred lab:       Send INR reminders to:   [facility]    Comments:   5 mg tablets, book, PM dose       Anticoagulation Care Providers     Provider Role Specialty Phone number    Gerard Medrano MD [facility] [phone]            See the Encounter Report to view Anticoagulation Flowsheet and Dosing Calendar (Go to Encounters tab in chart review, and find the Anticoagulation Therapy Visit)    Dosage adjustment made based on physician directed care plan.      Johanna Mckeon RN

## 2020-06-11 ENCOUNTER — TELEPHONE (OUTPATIENT)
Dept: INTERNAL MEDICINE | Facility: CLINIC | Age: 80
End: 2020-06-11

## 2020-06-11 DIAGNOSIS — H90.6 MIXED CONDUCTIVE AND SENSORINEURAL HEARING LOSS OF BOTH EARS: Primary | ICD-10-CM

## 2020-06-11 NOTE — TELEPHONE ENCOUNTER
Reason for Call: Request for an order or referral:    Order or referral being requested: Requesting referral be ordered and [facility] [place] for patient to have a hearing test.    [facility]  [address]  [place]  [address]    Date needed: as soon as possible    Has the patient been seen by the PCP for this problem?     Additional comments: Dr Light office requesting this referral for patient    Phone number Patient can be reached at:  [phone]    Best Time:  any    Can we leave a detailed message on this number?  YES    Call taken on 6/11/2020 at 11:55 AM by Johanna Salter

## 2020-06-16 ENCOUNTER — TELEPHONE (OUTPATIENT)
Dept: INTERNAL MEDICINE | Facility: CLINIC | Age: 80
End: 2020-06-16

## 2020-06-16 DIAGNOSIS — H90.6 MIXED CONDUCTIVE AND SENSORINEURAL HEARING LOSS OF BOTH EARS: Primary | ICD-10-CM

## 2020-06-16 NOTE — TELEPHONE ENCOUNTER
Reason for Call:  Other referral for billing purposes     Detailed comments: [place] is requesting the referral for the hearing test strictly for billing purposes.   Please mail to:   [address] [facility], [address]   [facility] [address]    Phone Number Patient can be reached at: Other phone number :[phone]  Best Time: any     Can we leave a detailed message on this number? YES    Call taken on 6/16/2020 at 12:53 PM by Alia Hawkins

## 2020-06-24 ENCOUNTER — TELEPHONE (OUTPATIENT)
Dept: ANTICOAGULATION | Facility: CLINIC | Age: 80
End: 2020-06-24

## 2020-06-24 ENCOUNTER — ANTICOAGULATION THERAPY VISIT (OUTPATIENT)
Dept: ANTICOAGULATION | Facility: CLINIC | Age: 80
End: 2020-06-24

## 2020-06-24 DIAGNOSIS — I26.99 OTHER PULMONARY EMBOLISM WITHOUT ACUTE COR PULMONALE, UNSPECIFIED CHRONICITY (H): ICD-10-CM

## 2020-06-24 DIAGNOSIS — Z79.01 LONG TERM CURRENT USE OF ANTICOAGULANT THERAPY: ICD-10-CM

## 2020-06-24 LAB
CAPILLARY BLOOD COLLECTION: NORMAL
INR BLD: 3.6 (ref 0.86–1.14)

## 2020-06-24 PROCEDURE — 85610 PROTHROMBIN TIME: CPT | Mod: QW | Performed by: INTERNAL MEDICINE

## 2020-06-24 PROCEDURE — 99207 ZZC NO CHARGE NURSE ONLY: CPT | Performed by: INTERNAL MEDICINE

## 2020-06-24 PROCEDURE — 36416 COLLJ CAPILLARY BLOOD SPEC: CPT | Performed by: INTERNAL MEDICINE

## 2020-06-24 NOTE — PROGRESS NOTES
ANTICOAGULATION FOLLOW-UP CLINIC VISIT    Patient Name:  Tracy Palomo  Date:  6/24/2020  Contact Type:  Telephone    SUBJECTIVE:  Patient Findings     Comments:   Patient denies any identifiable changes that caused the supratherapeutic INR.   Pt states she has gained 10 pounds but is still getting her greens like normal and as active as she always has been  Johanna Mckeon RN              Clinical Outcomes     Comments:   Patient denies any identifiable changes that caused the supratherapeutic INR.   Pt states she has gained 10 pounds but is still getting her greens like normal and as active as she always has been  Johanna Mckeon RN                 OBJECTIVE    Recent labs: (last 7 days)     06/24/20  0818   INR 3.6*       ASSESSMENT / PLAN  INR assessment SUPRA    Recheck INR In: 4 WEEKS    INR Location Outside lab      Anticoagulation Summary  As of 6/24/2020    INR goal:   2.0-3.0   TTR:   64.3 % (1 y)   Prior goal:   2.0-3.0   INR used for dosing:   3.6! (6/24/2020)   Warfarin maintenance plan:   5 mg (5 mg x 1) every Mon, Fri; 2.5 mg (5 mg x 0.5) all other days   Full warfarin instructions:   5 mg every Mon, Fri; 2.5 mg all other days   Weekly warfarin total:   22.5 mg   Plan last modified:   Johanna Mckeon RN (6/24/2020)   Next INR check:   7/22/2020   Priority:   Maintenance   Target end date:   Indefinite    Indications    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]  Long term current use of anticoagulant therapy [Z79.01]  Other pulmonary embolism without acute cor pulmonale  unspecified chronicity (H) [I26.99]             Anticoagulation Episode Summary     INR check location:       Preferred lab:       Send INR reminders to:   [facility]    Comments:   5 mg tablets, book, PM dose       Anticoagulation Care Providers     Provider Role Specialty Phone number    Chad Betts MD Referring Internal Medicine [phone]    Gerard Medrano MD Responsible  [facility] [phone]            See the Encounter Report to view Anticoagulation Flowsheet and Dosing Calendar (Go to Encounters tab in chart review, and find the Anticoagulation Therapy Visit)    Dosage adjustment made based on physician directed care plan.    Johanna Mckeon RN

## 2020-06-24 NOTE — TELEPHONE ENCOUNTER
Attempted to contact pt regarding INR of 3.6 from 6/24. VM left to call [facility] back to discuss    Johanna Mckeon RN

## 2020-07-22 ENCOUNTER — ANTICOAGULATION THERAPY VISIT (OUTPATIENT)
Dept: ANTICOAGULATION | Facility: CLINIC | Age: 80
End: 2020-07-22

## 2020-07-22 DIAGNOSIS — I26.99 OTHER PULMONARY EMBOLISM WITHOUT ACUTE COR PULMONALE, UNSPECIFIED CHRONICITY (H): ICD-10-CM

## 2020-07-22 DIAGNOSIS — Z79.01 LONG TERM CURRENT USE OF ANTICOAGULANT THERAPY: ICD-10-CM

## 2020-07-22 LAB
CAPILLARY BLOOD COLLECTION: NORMAL
INR BLD: 1.4 (ref 0.86–1.14)

## 2020-07-22 PROCEDURE — 36416 COLLJ CAPILLARY BLOOD SPEC: CPT | Performed by: INTERNAL MEDICINE

## 2020-07-22 PROCEDURE — 99207 ZZC NO CHARGE NURSE ONLY: CPT | Performed by: INTERNAL MEDICINE

## 2020-07-22 PROCEDURE — 85610 PROTHROMBIN TIME: CPT | Mod: QW | Performed by: INTERNAL MEDICINE

## 2020-07-22 NOTE — PROGRESS NOTES
ANTICOAGULATION FOLLOW-UP CLINIC VISIT    Patient Name:  Tracy Palomo  Date:  2020  Contact Type:  Telephone    SUBJECTIVE:  Patient Findings     Positives:   Change in activity (very active this last weekend ), Missed doses (missed dose on  )             OBJECTIVE    Recent labs: (last 7 days)     20  0908   INR 1.4*       ASSESSMENT / PLAN  INR assessment SUB    Recheck INR In: 1 WEEK    INR Location Outside lab      Anticoagulation Summary  As of 2020    INR goal:   2.0-3.0   TTR:   65.5 % (1 y)   INR used for dosin.4! (2020)   Warfarin maintenance plan:   5 mg (5 mg x 1) every Mon, Fri; 2.5 mg (5 mg x 0.5) all other days   Full warfarin instructions:   : 7.5 mg; Otherwise 5 mg every Mon, Fri; 2.5 mg all other days   Weekly warfarin total:   22.5 mg   Plan last modified:   Johanna Mckeon RN (2020)   Next INR check:   2020   Priority:   Maintenance   Target end date:   Indefinite    Indications    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]  Long term current use of anticoagulant therapy [Z79.01]  Other pulmonary embolism without acute cor pulmonale  unspecified chronicity (H) [I26.99]             Anticoagulation Episode Summary     INR check location:       Preferred lab:       Send INR reminders to:   [facility]    Comments:   5 mg tablets, book, PM dose       Anticoagulation Care Providers     Provider Role Specialty Phone number    Chad Betts MD Referring Internal Medicine [phone]    Gerard Medrano MD Responsible Family Practice [phone]            See the Encounter Report to view Anticoagulation Flowsheet and Dosing Calendar (Go to Encounters tab in chart review, and find the Anticoagulation Therapy Visit)    Dosage adjustment made based on physician directed care plan.        Johanna Mckeon, GLORIA

## 2020-07-29 ENCOUNTER — OFFICE VISIT (OUTPATIENT)
Dept: ORTHOPEDICS | Facility: CLINIC | Age: 80
End: 2020-07-29
Payer: MEDICARE

## 2020-07-29 ENCOUNTER — ANTICOAGULATION THERAPY VISIT (OUTPATIENT)
Dept: ANTICOAGULATION | Facility: CLINIC | Age: 80
End: 2020-07-29
Payer: MEDICARE

## 2020-07-29 ENCOUNTER — ANCILLARY PROCEDURE (OUTPATIENT)
Dept: GENERAL RADIOLOGY | Facility: CLINIC | Age: 80
End: 2020-07-29
Attending: ORTHOPAEDIC SURGERY
Payer: MEDICARE

## 2020-07-29 VITALS
HEIGHT: 64 IN | WEIGHT: 187.5 LBS | BODY MASS INDEX: 32.01 KG/M2 | SYSTOLIC BLOOD PRESSURE: 155 MMHG | DIASTOLIC BLOOD PRESSURE: 86 MMHG

## 2020-07-29 DIAGNOSIS — Z79.01 LONG TERM CURRENT USE OF ANTICOAGULANT THERAPY: ICD-10-CM

## 2020-07-29 DIAGNOSIS — M79.642 LEFT HAND PAIN: ICD-10-CM

## 2020-07-29 DIAGNOSIS — I26.99 OTHER PULMONARY EMBOLISM WITHOUT ACUTE COR PULMONALE, UNSPECIFIED CHRONICITY (H): ICD-10-CM

## 2020-07-29 DIAGNOSIS — M65.312 TRIGGER FINGER OF LEFT THUMB: Primary | ICD-10-CM

## 2020-07-29 LAB
CAPILLARY BLOOD COLLECTION: NORMAL
INR BLD: 4.6 (ref 0.86–1.14)

## 2020-07-29 PROCEDURE — 85610 PROTHROMBIN TIME: CPT | Mod: QW | Performed by: INTERNAL MEDICINE

## 2020-07-29 PROCEDURE — 36416 COLLJ CAPILLARY BLOOD SPEC: CPT | Performed by: INTERNAL MEDICINE

## 2020-07-29 PROCEDURE — 73130 X-RAY EXAM OF HAND: CPT | Mod: TC

## 2020-07-29 PROCEDURE — 20600 DRAIN/INJ JOINT/BURSA W/O US: CPT | Performed by: ORTHOPAEDIC SURGERY

## 2020-07-29 PROCEDURE — 99207 ZZC NO CHARGE NURSE ONLY: CPT | Performed by: INTERNAL MEDICINE

## 2020-07-29 PROCEDURE — 99213 OFFICE O/P EST LOW 20 MIN: CPT | Mod: 25 | Performed by: ORTHOPAEDIC SURGERY

## 2020-07-29 RX ORDER — BETAMETHASONE SODIUM PHOSPHATE AND BETAMETHASONE ACETATE 3; 3 MG/ML; MG/ML
6 INJECTION, SUSPENSION INTRA-ARTICULAR; INTRALESIONAL; INTRAMUSCULAR; SOFT TISSUE ONCE
Status: COMPLETED | OUTPATIENT
Start: 2020-07-29 | End: 2020-07-29

## 2020-07-29 RX ADMIN — BETAMETHASONE SODIUM PHOSPHATE AND BETAMETHASONE ACETATE 6 MG: 3; 3 INJECTION, SUSPENSION INTRA-ARTICULAR; INTRALESIONAL; INTRAMUSCULAR; SOFT TISSUE at 14:48

## 2020-07-29 ASSESSMENT — MIFFLIN-ST. JEOR: SCORE: 1310.49

## 2020-07-29 ASSESSMENT — PAIN SCALES - GENERAL: PAINLEVEL: MILD PAIN (3)

## 2020-07-29 NOTE — PROGRESS NOTES
Tracy to follow up with Primary Care provider regarding elevated blood pressure.  Clinic Administered Medication Documentation      Injection Documentation     Injection was administered by provider (please see MAR for given by information). Please see MAR and medication order for additional information.     Site: Joint injection   Medication Used: Betamethasone 6mg   Expiration Date:  3/2021  The following medication was given by Al Montiel, BILLY, CNP, DNP:     MEDICATION: Betamethasone (6 mg/mL)  ROUTE: Joint Injection  SITE: lt thumb  DOSE: 0.5  LOT #: [id number]  : [employer]  EXPIRATION DATE:  3/2021  NDC: 0474-8269-82                    Owen/RAMIRO

## 2020-07-29 NOTE — PROGRESS NOTES
"Office Visit-Follow up    Chief Complaint: Tracy Palomo is a 80 year old female who is being seen for   Chief Complaint   Patient presents with     Musculoskeletal Problem     left thumb pain       History of Present Illness:   Complains of left thumb pain with catching and locking since March.  No specific injuries or traumas.  Bothers her with .  Nonradiating.  No peripheral numbness and tingling.  She is never had this before.  Symptoms have been getting worse.  Thumb will lock down at times.  She is attempted rest.  Complains of sharp pain moderate at times when it locks.      Social History     Occupational History     Not on file   Tobacco Use     Smoking status: Never Smoker     Smokeless tobacco: Never Used   Substance and Sexual Activity     Alcohol use: No     Alcohol/week: 0.0 standard drinks     Drug use: No     Sexual activity: Not Currently     Partners: Male       REVIEW OF SYSTEMS  General: negative for, night sweats, dizziness, fatigue  Resp: No shortness of breath and no cough  CV: negative for chest pain, syncope or near-syncope  GI: negative for nausea, vomiting and diarrhea  : negative for dysuria and hematuria  Musculoskeletal: as above  Neurologic: negative for syncope   Hematologic: negative for bleeding disorder    Physical Exam:  Vitals: BP (!) 155/86   Ht 1.626 m (5' 4\")   Wt 85 kg (187 lb 8 oz)   BMI 32.18 kg/m    BMI= Body mass index is 32.18 kg/m .  Constitutional: healthy, alert and no acute distress   Psychiatric: mentation appears normal and affect normal/bright  NEURO: no focal deficits  RESP: Normal with easy respirations and no use of accessory muscles to breathe, no audible wheezing or retractions  CV: LUE:   no edema         Regular rate and rhythm by palpation  SKIN: No erythema, rashes, excoriation, or breakdown. No evidence of infection.   JOINT/EXTREMITIES:left thumb: Well-perfused with good cap refill.  Tenderness along the base of the thumb A1 pulley area.  " Palpable locking catching with flexion of the thumb.  No other focal areas of tenderness.  No gross deformity  GAIT: not tested             Diagnostic Modalities:  left hand X-ray: No fractures or dislocations.  Degenerative changes throughout the IP joints and basilar thumb.  No masses or lesions  Independent visualization of the images was performed.      Impression: left trigger thumb    Plan:  All of the above pertinent physical exam and imaging modalities findings was reviewed with Tracy.                                          INJECTION PROCEDURE:  The patient was counseled about an  injection, including discussion of risks (including infection), contents of the injection, rationale for performing the injection, and expected benefits of the injection. The skin was prepped with alcohol and betadine and then utilizing sterile technique an injection of the left flexor tendon sheath of the thumb digit at the A1 pulley was performed. The injection consisted 0.5 ml Betamethasone (30mg per 5 ml) mixed with 0.5 ml of  0.5% Marcaine. The patient tolerated the injection well, and there were no complications. The injection site was covered with a Band-Aid. The injection was performed by Jared Montiel, APRN, CNP, DNP    Options discussed.  Recommend trying a flexor sheath steroid injection.  If this fails to provide relief we would consider trigger thumb release.  Allow approximately 2-3 weeks to feel the full extent of the injection    Return to clinic 3, week(s), PRN, or sooner as needed for changes.  Re-x-ray on return: No    Vitaly Pang D.O.

## 2020-07-29 NOTE — LETTER
7/29/2020         RE: Tracy Palomo  [address] [place] [id number]        Dear Colleague,    Thank you for referring your patient, Tracy Palomo, to the [facility]. Please see a copy of my visit note below.    Tracy to follow up with Primary Care provider regarding elevated blood pressure.  Clinic Administered Medication Documentation      Injection Documentation     Injection was administered by provider (please see MAR for given by information). Please see MAR and medication order for additional information.     Site: Joint injection   Medication Used: Betamethasone 6mg   Expiration Date:  3/2021  The following medication was given by Al Montiel, APRN, CNP, DNP:     MEDICATION: Betamethasone (6 mg/mL)  ROUTE: Joint Injection  SITE: lt thumb  DOSE: 0.5  LOT #: [id number]  : [employer]  EXPIRATION DATE:  3/2021  NDC: 6122-2765-04                    Owen/RAMIRO     Office Visit-Follow up    Chief Complaint: Tracy Palomo is a 80 year old female who is being seen for   Chief Complaint   Patient presents with     Musculoskeletal Problem     left thumb pain       History of Present Illness:   Complains of left thumb pain with catching and locking since March.  No specific injuries or traumas.  Bothers her with .  Nonradiating.  No peripheral numbness and tingling.  She is never had this before.  Symptoms have been getting worse.  Thumb will lock down at times.  She is attempted rest.  Complains of sharp pain moderate at times when it locks.      Social History     Occupational History     Not on file   Tobacco Use     Smoking status: Never Smoker     Smokeless tobacco: Never Used   Substance and Sexual Activity     Alcohol use: No     Alcohol/week: 0.0 standard drinks     Drug use: No     Sexual activity: Not Currently     Partners: Male       REVIEW OF SYSTEMS  General: negative for, night sweats, dizziness, fatigue  Resp: No shortness of breath and no  "cough  CV: negative for chest pain, syncope or near-syncope  GI: negative for nausea, vomiting and diarrhea  : negative for dysuria and hematuria  Musculoskeletal: as above  Neurologic: negative for syncope   Hematologic: negative for bleeding disorder    Physical Exam:  Vitals: BP (!) 155/86   Ht 1.626 m (5' 4\")   Wt 85 kg (187 lb 8 oz)   BMI 32.18 kg/m    BMI= Body mass index is 32.18 kg/m .  Constitutional: healthy, alert and no acute distress   Psychiatric: mentation appears normal and affect normal/bright  NEURO: no focal deficits  RESP: Normal with easy respirations and no use of accessory muscles to breathe, no audible wheezing or retractions  CV: LUE:   no edema         Regular rate and rhythm by palpation  SKIN: No erythema, rashes, excoriation, or breakdown. No evidence of infection.   JOINT/EXTREMITIES:left thumb: Well-perfused with good cap refill.  Tenderness along the base of the thumb A1 pulley area.  Palpable locking catching with flexion of the thumb.  No other focal areas of tenderness.  No gross deformity  GAIT: not tested             Diagnostic Modalities:  left hand X-ray: No fractures or dislocations.  Degenerative changes throughout the IP joints and basilar thumb.  No masses or lesions  Independent visualization of the images was performed.      Impression: left trigger thumb    Plan:  All of the above pertinent physical exam and imaging modalities findings was reviewed with Tracy.                                          INJECTION PROCEDURE:  The patient was counseled about an  injection, including discussion of risks (including infection), contents of the injection, rationale for performing the injection, and expected benefits of the injection. The skin was prepped with alcohol and betadine and then utilizing sterile technique an injection of the left flexor tendon sheath of the thumb digit at the A1 pulley was performed. The injection consisted 0.5 ml Betamethasone (30mg per 5 ml) " mixed with 0.5 ml of  0.5% Marcaine. The patient tolerated the injection well, and there were no complications. The injection site was covered with a Band-Aid. The injection was performed by Jared Montiel, APRN, CNP, DNP    Options discussed.  Recommend trying a flexor sheath steroid injection.  If this fails to provide relief we would consider trigger thumb release.  Allow approximately 2-3 weeks to feel the full extent of the injection    Return to clinic 3, week(s), PRN, or sooner as needed for changes.  Re-x-ray on return: No    Vitaly Pang D.O.          Again, thank you for allowing me to participate in the care of your patient.        Sincerely,        Kaleb Pang, DO

## 2020-07-29 NOTE — PROGRESS NOTES
ANTICOAGULATION FOLLOW-UP CLINIC VISIT    Patient Name:  Tracy Palomo  Date:  2020  Contact Type:  Telephone    SUBJECTIVE:  Patient Findings     Positives:   Other complaints (pt has a painful finger that she thinks is swollen - getting imaging doneo on it today )             OBJECTIVE    Recent labs: (last 7 days)     20  1351   INR 4.6*       ASSESSMENT / PLAN  INR assessment SUPRA    Recheck INR In: 1 WEEK    INR Location Outside lab      Anticoagulation Summary  As of 2020    INR goal:   2.0-3.0   TTR:   66.1 % (1 y)   INR used for dosin.6! (2020)   Warfarin maintenance plan:   5 mg (5 mg x 1) every Mon, Fri; 2.5 mg (5 mg x 0.5) all other days   Full warfarin instructions:   : Hold; Otherwise 5 mg every Mon, Fri; 2.5 mg all other days   Weekly warfarin total:   22.5 mg   Plan last modified:   Johanna Mckeon RN (2020)   Next INR check:   2020   Priority:   High   Target end date:   Indefinite    Indications    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]  Long term current use of anticoagulant therapy [Z79.01]  Other pulmonary embolism without acute cor pulmonale  unspecified chronicity (H) [I26.99]             Anticoagulation Episode Summary     INR check location:       Preferred lab:       Send INR reminders to:   [facility]    Comments:   5 mg tablets, book, PM dose       Anticoagulation Care Providers     Provider Role Specialty Phone number    Chad Betts MD Referring Internal Medicine [phone]    Gerard Medrano MD Responsible Family Practice [phone]            See the Encounter Report to view Anticoagulation Flowsheet and Dosing Calendar (Go to Encounters tab in chart review, and find the Anticoagulation Therapy Visit)    Dosage adjustment made based on physician directed care plan.      Johanna Mckeon, GLORIA

## 2020-08-05 ENCOUNTER — APPOINTMENT (OUTPATIENT)
Dept: CT IMAGING | Facility: CLINIC | Age: 80
End: 2020-08-05
Attending: EMERGENCY MEDICINE
Payer: MEDICARE

## 2020-08-05 ENCOUNTER — ANTICOAGULATION THERAPY VISIT (OUTPATIENT)
Dept: ANTICOAGULATION | Facility: CLINIC | Age: 80
End: 2020-08-05

## 2020-08-05 ENCOUNTER — HOSPITAL ENCOUNTER (OUTPATIENT)
Facility: CLINIC | Age: 80
Setting detail: OBSERVATION
Discharge: HOME | End: 2020-08-06
Attending: INTERNAL MEDICINE | Admitting: INTERNAL MEDICINE
Payer: MEDICARE

## 2020-08-05 ENCOUNTER — HOSPITAL ENCOUNTER (EMERGENCY)
Facility: CLINIC | Age: 80
Discharge: ANOTHER HOSPITAL | End: 2020-08-05
Attending: EMERGENCY MEDICINE | Admitting: EMERGENCY MEDICINE
Payer: MEDICARE

## 2020-08-05 VITALS
HEART RATE: 46 BPM | RESPIRATION RATE: 17 BRPM | TEMPERATURE: 98.6 F | DIASTOLIC BLOOD PRESSURE: 67 MMHG | OXYGEN SATURATION: 93 % | WEIGHT: 182 LBS | SYSTOLIC BLOOD PRESSURE: 126 MMHG | BODY MASS INDEX: 31.24 KG/M2

## 2020-08-05 DIAGNOSIS — Z79.01 LONG TERM CURRENT USE OF ANTICOAGULANT THERAPY: ICD-10-CM
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
## 2020-08-05 DIAGNOSIS — Z79.01 ANTICOAGULATED ON COUMADIN: ICD-10-CM

## 2020-08-05 DIAGNOSIS — I26.99 OTHER PULMONARY EMBOLISM WITHOUT ACUTE COR PULMONALE, UNSPECIFIED CHRONICITY (H): ICD-10-CM

## 2020-08-05 DIAGNOSIS — S01.81XA FACIAL LACERATION, INITIAL ENCOUNTER: ICD-10-CM

## 2020-08-05 DIAGNOSIS — I60.9 SUBARACHNOID HEMORRHAGE (H): ICD-10-CM
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
## 2020-08-05 DIAGNOSIS — I48.0 PAROXYSMAL ATRIAL FIBRILLATION (H): ICD-10-CM

## 2020-08-05 DIAGNOSIS — S02.2XXA CLOSED FRACTURE OF NASAL BONE, INITIAL ENCOUNTER: ICD-10-CM

## 2020-08-05 PROBLEM — S06.6XAA SUBARACHNOID HEMATOMA (H): Status: ACTIVE | Noted: 2020-08-05

## 2020-08-05 LAB
ANION GAP SERPL CALCULATED.3IONS-SCNC: 5 MMOL/L (ref 3–14)
APTT PPP: 37 SEC (ref 22–37)
BASOPHILS # BLD AUTO: 0 10E9/L (ref 0–0.2)
BASOPHILS NFR BLD AUTO: 0.7 %
BUN SERPL-MCNC: 17 MG/DL (ref 7–30)
CALCIUM SERPL-MCNC: 9.1 MG/DL (ref 8.5–10.1)
CAPILLARY BLOOD COLLECTION: NORMAL
CHLORIDE SERPL-SCNC: 111 MMOL/L (ref 94–109)
CO2 SERPL-SCNC: 28 MMOL/L (ref 20–32)
CREAT SERPL-MCNC: 0.86 MG/DL (ref 0.52–1.04)
DIFFERENTIAL METHOD BLD: NORMAL
EOSINOPHIL NFR BLD AUTO: 2.7 %
ERYTHROCYTE [DISTWIDTH] IN BLOOD BY AUTOMATED COUNT: 13.6 % (ref 10–15)
GFR SERPL CREATININE-BSD FRML MDRD: 64 ML/MIN/{1.73_M2}
GLUCOSE SERPL-MCNC: 108 MG/DL (ref 70–99)
HCT VFR BLD AUTO: 42.4 % (ref 35–47)
HGB BLD-MCNC: 13.7 G/DL (ref 11.7–15.7)
IMM GRANULOCYTES # BLD: 0.1 10E9/L (ref 0–0.4)
IMM GRANULOCYTES NFR BLD: 0.9 %
INR BLD: 2.7 (ref 0.86–1.14)
INR PPP: 2.51 (ref 0.86–1.14)
LYMPHOCYTES # BLD AUTO: 1.1 10E9/L (ref 0.8–5.3)
LYMPHOCYTES NFR BLD AUTO: 18 %
MCH RBC QN AUTO: 29.5 PG (ref 26.5–33)
MCHC RBC AUTO-ENTMCNC: 32.3 G/DL (ref 31.5–36.5)
MCV RBC AUTO: 91 FL (ref 78–100)
MONOCYTES # BLD AUTO: 0.5 10E9/L (ref 0–1.3)
MONOCYTES NFR BLD AUTO: 8.2 %
NEUTROPHILS # BLD AUTO: 4.1 10E9/L (ref 1.6–8.3)
NEUTROPHILS NFR BLD AUTO: 69.5 %
NRBC # BLD AUTO: 0 10*3/UL
NRBC BLD AUTO-RTO: 0 /100
PLATELET # BLD AUTO: 190 10E9/L (ref 150–450)
POTASSIUM SERPL-SCNC: 3.7 MMOL/L (ref 3.4–5.3)
RADIOLOGIST FLAGS: ABNORMAL
RBC # BLD AUTO: 4.64 10E12/L (ref 3.8–5.2)
SODIUM SERPL-SCNC: 144 MMOL/L (ref 133–144)
WBC # BLD AUTO: 5.8 10E9/L (ref 4–11)

## 2020-08-05 PROCEDURE — G0378 HOSPITAL OBSERVATION PER HR: HCPCS

## 2020-08-05 PROCEDURE — 70450 CT HEAD/BRAIN W/O DYE: CPT

## 2020-08-05 PROCEDURE — 99207 ZZC NO CHARGE NURSE ONLY: CPT | Performed by: INTERNAL MEDICINE

## 2020-08-05 PROCEDURE — 99220 ZZC INITIAL OBSERVATION CARE,LEVL III: CPT | Performed by: INTERNAL MEDICINE

## 2020-08-05 PROCEDURE — 99285 EMERGENCY DEPT VISIT HI MDM: CPT | Mod: 25 | Performed by: EMERGENCY MEDICINE

## 2020-08-05 PROCEDURE — 25800030 ZZH RX IP 258 OP 636: Performed by: PHYSICIAN ASSISTANT

## 2020-08-05 PROCEDURE — 25000132 ZZH RX MED GY IP 250 OP 250 PS 637: Mod: GY | Performed by: EMERGENCY MEDICINE

## 2020-08-05 PROCEDURE — 21310 ZZHC CLOSED TX NASAL BONE FRACTURE W/O MANIPULATION: CPT | Performed by: EMERGENCY MEDICINE

## 2020-08-05 PROCEDURE — U0003 INFECTIOUS AGENT DETECTION BY NUCLEIC ACID (DNA OR RNA); SEVERE ACUTE RESPIRATORY SYNDROME CORONAVIRUS 2 (SARS-COV-2) (CORONAVIRUS DISEASE [COVID-19]), AMPLIFIED PROBE TECHNIQUE, MAKING USE OF HIGH THROUGHPUT TECHNOLOGIES AS DESCRIBED BY CMS-2020-01-R: HCPCS | Performed by: INTERNAL MEDICINE

## 2020-08-05 PROCEDURE — 85610 PROTHROMBIN TIME: CPT | Mod: QW | Performed by: INTERNAL MEDICINE

## 2020-08-05 PROCEDURE — 25000132 ZZH RX MED GY IP 250 OP 250 PS 637: Mod: GY | Performed by: PHYSICIAN ASSISTANT

## 2020-08-05 PROCEDURE — 36416 COLLJ CAPILLARY BLOOD SPEC: CPT | Performed by: INTERNAL MEDICINE

## 2020-08-05 PROCEDURE — 12011 RPR F/E/E/N/L/M 2.5 CM/<: CPT | Mod: 59 | Performed by: EMERGENCY MEDICINE

## 2020-08-05 PROCEDURE — 70486 CT MAXILLOFACIAL W/O DYE: CPT

## 2020-08-05 PROCEDURE — 85730 THROMBOPLASTIN TIME PARTIAL: CPT | Performed by: EMERGENCY MEDICINE

## 2020-08-05 PROCEDURE — 99223 1ST HOSP IP/OBS HIGH 75: CPT | Performed by: PHYSICIAN ASSISTANT

## 2020-08-05 PROCEDURE — 80048 BASIC METABOLIC PNL TOTAL CA: CPT | Performed by: EMERGENCY MEDICINE

## 2020-08-05 PROCEDURE — 85610 PROTHROMBIN TIME: CPT | Performed by: EMERGENCY MEDICINE

## 2020-08-05 PROCEDURE — 85025 COMPLETE CBC W/AUTO DIFF WBC: CPT | Performed by: EMERGENCY MEDICINE

## 2020-08-05 PROCEDURE — 21310 ZZHC CLOSED TX NASAL BONE FRACTURE W/O MANIPULATION: CPT | Mod: Z6 | Performed by: EMERGENCY MEDICINE

## 2020-08-05 RX ORDER — ACETAMINOPHEN 325 MG/1
975 TABLET ORAL ONCE
Status: COMPLETED | OUTPATIENT
Start: 2020-08-05 | End: 2020-08-05

## 2020-08-05 RX ORDER — LIDOCAINE 40 MG/G
CREAM TOPICAL
Status: DISCONTINUED | OUTPATIENT
Start: 2020-08-05 | End: 2020-08-06 | Stop reason: HOSPADM

## 2020-08-05 RX ORDER — POLYETHYLENE GLYCOL 3350 17 G/17G
17 POWDER, FOR SOLUTION ORAL DAILY PRN
Status: DISCONTINUED | OUTPATIENT
Start: 2020-08-05 | End: 2020-08-06 | Stop reason: HOSPADM

## 2020-08-05 RX ORDER — ACETAMINOPHEN 325 MG/1
650 TABLET ORAL EVERY 4 HOURS PRN
Status: DISCONTINUED | OUTPATIENT
Start: 2020-08-05 | End: 2020-08-06 | Stop reason: HOSPADM

## 2020-08-05 RX ORDER — SODIUM CHLORIDE 9 MG/ML
INJECTION, SOLUTION INTRAVENOUS CONTINUOUS
Status: DISCONTINUED | OUTPATIENT
Start: 2020-08-05 | End: 2020-08-06 | Stop reason: HOSPADM

## 2020-08-05 RX ORDER — ACETAMINOPHEN 650 MG/1
650 SUPPOSITORY RECTAL EVERY 4 HOURS PRN
Status: DISCONTINUED | OUTPATIENT
Start: 2020-08-05 | End: 2020-08-06 | Stop reason: HOSPADM

## 2020-08-05 RX ORDER — OXYCODONE HYDROCHLORIDE 5 MG/1
5 TABLET ORAL ONCE
Status: COMPLETED | OUTPATIENT
Start: 2020-08-05 | End: 2020-08-05

## 2020-08-05 RX ORDER — AMOXICILLIN 250 MG
2 CAPSULE ORAL 2 TIMES DAILY PRN
Status: DISCONTINUED | OUTPATIENT
Start: 2020-08-05 | End: 2020-08-06 | Stop reason: HOSPADM

## 2020-08-05 RX ORDER — HYDRALAZINE HYDROCHLORIDE 20 MG/ML
10 INJECTION INTRAMUSCULAR; INTRAVENOUS EVERY 4 HOURS PRN
Status: DISCONTINUED | OUTPATIENT
Start: 2020-08-05 | End: 2020-08-05

## 2020-08-05 RX ORDER — WARFARIN SODIUM 5 MG/1
2.5 TABLET ORAL EVERY EVENING
COMMUNITY
End: 2020-12-07

## 2020-08-05 RX ORDER — AMOXICILLIN 250 MG
1 CAPSULE ORAL 2 TIMES DAILY PRN
Status: DISCONTINUED | OUTPATIENT
Start: 2020-08-05 | End: 2020-08-06 | Stop reason: HOSPADM

## 2020-08-05 RX ORDER — PROCHLORPERAZINE MALEATE 5 MG
5 TABLET ORAL EVERY 6 HOURS PRN
Status: DISCONTINUED | OUTPATIENT
Start: 2020-08-05 | End: 2020-08-06 | Stop reason: HOSPADM

## 2020-08-05 RX ORDER — HYDRALAZINE HYDROCHLORIDE 20 MG/ML
10 INJECTION INTRAMUSCULAR; INTRAVENOUS EVERY 4 HOURS PRN
Status: DISCONTINUED | OUTPATIENT
Start: 2020-08-05 | End: 2020-08-06 | Stop reason: HOSPADM

## 2020-08-05 RX ORDER — PROCHLORPERAZINE 25 MG
12.5 SUPPOSITORY, RECTAL RECTAL EVERY 12 HOURS PRN
Status: DISCONTINUED | OUTPATIENT
Start: 2020-08-05 | End: 2020-08-06 | Stop reason: HOSPADM

## 2020-08-05 RX ORDER — NALOXONE HYDROCHLORIDE 0.4 MG/ML
.1-.4 INJECTION, SOLUTION INTRAMUSCULAR; INTRAVENOUS; SUBCUTANEOUS
Status: DISCONTINUED | OUTPATIENT
Start: 2020-08-05 | End: 2020-08-06 | Stop reason: HOSPADM

## 2020-08-05 RX ORDER — ONDANSETRON 4 MG/1
4 TABLET, ORALLY DISINTEGRATING ORAL EVERY 6 HOURS PRN
Status: DISCONTINUED | OUTPATIENT
Start: 2020-08-05 | End: 2020-08-06 | Stop reason: HOSPADM

## 2020-08-05 RX ORDER — ONDANSETRON 2 MG/ML
4 INJECTION INTRAMUSCULAR; INTRAVENOUS EVERY 6 HOURS PRN
Status: DISCONTINUED | OUTPATIENT
Start: 2020-08-05 | End: 2020-08-06 | Stop reason: HOSPADM

## 2020-08-05 RX ADMIN — SODIUM CHLORIDE: 9 INJECTION, SOLUTION INTRAVENOUS at 21:00

## 2020-08-05 RX ADMIN — OXYCODONE HYDROCHLORIDE 5 MG: 5 TABLET ORAL at 12:22

## 2020-08-05 RX ADMIN — ACETAMINOPHEN 975 MG: 325 TABLET, FILM COATED ORAL at 11:56

## 2020-08-05 RX ADMIN — ACETAMINOPHEN 650 MG: 325 TABLET, FILM COATED ORAL at 21:00

## 2020-08-05 NOTE — ED PROVIDER NOTES
History     Chief Complaint   Patient presents with     Fall     HPI  Tracy Palomo is a 80 year old female who resents to the ER after a fall from standing.  She says that she was trying to get something out of her pocket and started to walk when she was not paying close attention, stepped off of a curb and fell onto her face.  She sustained a laceration to her face and had some bleeding.  Is getting a black eye and has some swelling.  She is concerned because she takes Coumadin.  Did not have any loss of consciousness.  Remembers the entire incident.  He says that she has been occasionally dizzy over the last few days, especially when she is turning over in bed, but did not have any dizziness today before the fall.  She also denies any chest pain or shortness of breath.    Allergies:  Allergies   Allergen Reactions     No Known Allergies        Problem List:    Patient Active Problem List    Diagnosis Date Noted     Other pulmonary embolism without acute cor pulmonale, unspecified chronicity (H) 05/27/2020     Priority: Medium     RVF (right ventricular failure) (H) - per Echo 2019 01/09/2019     Priority: Medium     Peripheral edema 01/09/2019     Priority: Medium     S/P total knee replacement using cement, right 01/08/2019     Priority: Medium     Other chest pain 01/08/2019     Priority: Medium     Primary osteoarthritis of right knee 11/15/2018     Priority: Medium     Primary osteoarthritis of left knee 11/15/2018     Priority: Medium     Closed left hip fracture (H) 03/15/2017     Priority: Medium     Long term current use of anticoagulant therapy 03/22/2016     Priority: Medium     Anemia 11/03/2014     Priority: Medium     Paroxysmal atrial fibrillation (H) 11/03/2014     Priority: Medium     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 11/01/2014     Priority: Medium     In 2014       Recent major surgery - exploratory lap with small bowel resection 10/20 11/01/2014     Priority:  Medium     Pleural effusion on right 11/01/2014     Priority: Medium     Hypertension goal BP (blood pressure) < 140/90 02/22/2013     Priority: Medium     Hyperlipidemia LDL goal <130 02/22/2013     Priority: Medium     Encounter for long-term current use of medication 02/22/2013     Priority: Medium     Problem list name updated by automated process. Provider to review       History of colonic polyps 02/22/2013     Priority: Medium     Problem list name updated by automated process. Provider to review       Advanced directives, counseling/discussion 02/22/2013     Priority: Medium     Pt states has Advance Directive at home, will bring in to be scanned into chart.          Past Medical History:    Past Medical History:   Diagnosis Date     Atrial fibrillation (H) 2014     Colon polyps      Diverticulosis of colon (without mention of hemorrhage)      DVT (deep vein thrombosis) in pregnancy 2014     Other and unspecified hyperlipidemia      PE (pulmonary embolism) 2014     Unspecified essential hypertension        Past Surgical History:    Past Surgical History:   Procedure Laterality Date     ARTHROPLASTY KNEE Right 1/8/2019    Procedure: Right total knee replacement;  Surgeon: Kaleb Pang DO;  Location:  OR     COLONOSCOPY  09, 10, 12    [facility]     COLONOSCOPY N/A 12/11/2017    Procedure: COLONOSCOPY;  colonoscopy;  Surgeon: Elvis Quezada MD;  Location:  GI     FLEXIBLE SIGMOIDOSCOPY  09, 11     LAPAROTOMY EXPLORATORY N/A 10/20/2014    Procedure: LAPAROTOMY EXPLORATORY;  Surgeon: Miguel Oleary MD;  Location: [facility] OR     LAPAROTOMY, LYSIS ADHESIONS, COMBINED N/A 10/20/2014    Procedure: COMBINED LAPAROTOMY, LYSIS ADHESIONS;  Surgeon: Miguel Oleary MD;  Location: [facility] OR     OPEN REDUCTION INTERNAL FIXATION HIP BIPOLAR Left 3/16/2017    Procedure: OPEN REDUCTION INTERNAL FIXATION HIP BIPOLAR;  Surgeon: Kaleb Pang DO;  Location: [facility] OR     RESECT SMALL  BOWEL WITHOUT OSTOMY N/A 10/20/2014    Procedure: RESECT SMALL BOWEL WITHOUT OSTOMY;  Surgeon: Miguel Oleary MD;  Location: [facility] OR       Family History:    Family History   Problem Relation Age of Onset     Blood Disease No family hx of         blood clots       Social History:  Marital Status:   [2]  Social History     Tobacco Use     Smoking status: Never Smoker     Smokeless tobacco: Never Used   Substance Use Topics     Alcohol use: No     Alcohol/week: 0.0 standard drinks     Drug use: No        Medications:    acetaminophen (TYLENOL) 500 MG tablet  lovastatin (MEVACOR) 40 MG tablet  metoprolol tartrate (LOPRESSOR) 25 MG tablet  nitroGLYcerin (NITROSTAT) 0.4 MG sublingual tablet  warfarin ANTICOAGULANT (COUMADIN) 5 MG tablet          Review of Systems   All other systems reviewed and are negative.      Physical Exam   BP: (!) 158/79  Pulse: 55  Heart Rate: 52  Temp: 98.6  F (37  C)  Resp: 16  Weight: 82.6 kg (182 lb)  SpO2: 96 %      Physical Exam  Vitals signs and nursing note reviewed.   Constitutional:       General: She is not in acute distress.     Appearance: Normal appearance. She is not diaphoretic.   HENT:      Head: Normocephalic.      Comments: Hematoma over left forehead.  Abrasion over left side of nose     Right Ear: External ear normal.      Left Ear: External ear normal.      Mouth/Throat:      Pharynx: No oropharyngeal exudate.   Eyes:      General: No scleral icterus.     Extraocular Movements: Extraocular movements intact.      Conjunctiva/sclera: Conjunctivae normal.      Pupils: Pupils are equal, round, and reactive to light.        Comments: 1.5 cm linear laceration just under the left eyebrow, bleeding is controlled, no evidence of embedded foreign body.  Periorbital ecchymosis and mild swelling, but extraocular movements intact, no pain with movement, no evidence of entrapment, no evidence of hyphema   Neck:      Musculoskeletal: Normal range of motion and neck supple. No  muscular tenderness.   Cardiovascular:      Heart sounds: Normal heart sounds.   Pulmonary:      Effort: Pulmonary effort is normal. No respiratory distress.      Breath sounds: Normal breath sounds.   Abdominal:      General: Bowel sounds are normal.      Palpations: Abdomen is soft.      Tenderness: There is no abdominal tenderness.   Musculoskeletal:         General: No swelling, tenderness or deformity.   Skin:     General: Skin is warm.      Findings: No rash.   Neurological:      Mental Status: She is alert.             ED Course        [facility]    -Laceration Repair    Date/Time: 8/5/2020 12:00 PM  Performed by: Viridiana Ledesma DO  Authorized by: Viridiana Ledesma DO       ANESTHESIA (see MAR for exact dosages):     Anesthesia method:  None  LACERATION DETAILS     Location:  Face    Face location:  L eyebrow    Length (cm):  1.5    Depth (mm):  2    REPAIR TYPE:     Repair type:  Simple      EXPLORATION:     Hemostasis achieved with:  Direct pressure    Wound exploration: entire depth of wound probed and visualized      Contaminated: no      TREATMENT:     Area cleansed with:  Soap and water    SKIN REPAIR     Repair method:  Tissue adhesive    APPROXIMATION     Approximation:  Close    POST-PROCEDURE DETAILS     Dressing:  Open (no dressing)      PROCEDURE   Patient Tolerance:  Patient tolerated the procedure well with no immediate complications                     Critical Care time:  none       Trauma Summary Disposition     Patient is trauma admission:  Trauma  Evaluation    Intent to transfer: 8/5/2020 @ 12:25 PM    Spine  Backboard removal time: Backboard not applied   C-collar and immobilization: not indicated, cleared.  CSpine Clearance: by [employer] Criteria  Full Primary and Secondary survey with appropriate immobilization of spine completed in exam section.     Neuro  GCS at arrival:  Motor 6=Obeys commands   Verbal 5=Oriented   Eye Opening  4=Spontaneous   Total: 15     GCS at disposition: unchanged    ED Procedures completed  Laceration repair      Transfer Consultant:  Patient s status reviewed with Dr. Cassius MD at [facility] at 1301,  who agrees to accept patient in transfer.                Results for orders placed or performed during the hospital encounter of 08/05/20 (from the past 24 hour(s))   CBC with platelets differential   Result Value Ref Range    WBC 5.8 4.0 - 11.0 10e9/L    RBC Count 4.64 3.8 - 5.2 10e12/L    Hemoglobin 13.7 11.7 - 15.7 g/dL    Hematocrit 42.4 35.0 - 47.0 %    MCV 91 78 - 100 fl    MCH 29.5 26.5 - 33.0 pg    MCHC 32.3 31.5 - 36.5 g/dL    RDW 13.6 10.0 - 15.0 %    Platelet Count 190 150 - 450 10e9/L    Diff Method Automated Method     % Neutrophils 69.5 %    % Lymphocytes 18.0 %    % Monocytes 8.2 %    % Eosinophils 2.7 %    % Basophils 0.7 %    % Immature Granulocytes 0.9 %    Nucleated RBCs 0 0 /100    Absolute Neutrophil 4.1 1.6 - 8.3 10e9/L    Absolute Lymphocytes 1.1 0.8 - 5.3 10e9/L    Absolute Monocytes 0.5 0.0 - 1.3 10e9/L    Absolute Basophils 0.0 0.0 - 0.2 10e9/L    Abs Immature Granulocytes 0.1 0 - 0.4 10e9/L    Absolute Nucleated RBC 0.0    INR   Result Value Ref Range    INR 2.51 (H) 0.86 - 1.14   Basic metabolic panel   Result Value Ref Range    Sodium 144 133 - 144 mmol/L    Potassium 3.7 3.4 - 5.3 mmol/L    Chloride 111 (H) 94 - 109 mmol/L    Carbon Dioxide 28 20 - 32 mmol/L    Anion Gap 5 3 - 14 mmol/L    Glucose 108 (H) 70 - 99 mg/dL    Urea Nitrogen 17 7 - 30 mg/dL    Creatinine 0.86 0.52 - 1.04 mg/dL    GFR Estimate 64 >60 mL/min/[1.73_m2]    GFR Estimate If Black 74 >60 mL/min/[1.73_m2]    Calcium 9.1 8.5 - 10.1 mg/dL   Partial thromboplastin time   Result Value Ref Range    PTT 37 22 - 37 sec   Head CT w/o contrast   Result Value Ref Range    Radiologist flags Possible small amount of subarachnoid hemorrhage. (AA)     Narrative    CT OF THE HEAD WITHOUT CONTRAST 8/5/2020 12:11 PM      COMPARISON: None.    HISTORY: Fall, facial trauma, anticoagulated.    TECHNIQUE: 5 mm thick axial CT images of the head were acquired  without IV contrast material.    FINDINGS: There is a questionable tiny amount of subarachnoid  hemorrhage and a single sulcus at the high anterolateral left frontal  lobe (series 2, image 21; series 4, image 14) that is likely traumatic  in nature. No other intracranial hemorrhage. Short-term reevaluation  recommended.    There is moderate diffuse cerebral volume loss. There are extensive  confluent areas of decreased density in the cerebral white matter  bilaterally that are consistent with sequela of chronic small vessel  ischemic disease. The ventricles and basal cisterns are within normal  limits in configuration given the degree of cerebral volume loss.   There is no midline shift.    No intracranial mass or recent infarct.    The visualized paranasal sinuses are well-aerated. There is no  mastoiditis. There are no fractures of the visualized bones. There is  a moderate-sized left forehead scalp hematoma.      Impression    IMPRESSION:   1. Questionable tiny amount of subarachnoid hemorrhage at the high  anterolateral left frontal lobe. Short-term repeat evaluation  recommended.  2. Moderate-sized left forehead scalp hematoma.   3. Diffuse cerebral volume loss and cerebral white matter changes  consistent with chronic small vessel ischemic disease.     [Critical Result: Possible small amount of subarachnoid hemorrhage.]    Finding was identified on 8/5/2020 12:22 PM.     SERA DIEHL was contacted by Dr. Escobedo on 8/5/2020 12:25  PM and verbalized understanding of the critical result.       Radiation dose for this scan was reduced using automated exposure  control, adjustment of the mA and/or kV according to patient size, or  iterative reconstruction technique.    SELENE ESCOBEDO MD   CT Facial Bones without Contrast    Narrative    CT OF THE FACE WITHOUT CONTRAST  August 5, 2020 12:12 PM     HISTORY: Nasal fracture, suspected. Fall, facial trauma, orbital  fracture suspected, anticoagulated.    TECHNIQUE: Helical thin-section axial CT images of the face were  acquired without contrast. Coronal reconstructions were created from  the axial source data. Radiation dose for this scan was reduced using  automated exposure control, adjustment of the mA and/or kV according  to patient size, or iterative reconstruction technique.    COMPARISON: None.      Impression    IMPRESSION: There are recent appearing minimally displaced bilateral  nasal bone fractures. There are no other facial bone fractures. The  paranasal sinuses are well-aerated. The orbits and globes bilaterally  are unremarkable. Temporomandibular joint alignment bilaterally is  normal.        SELENE ESCOBEDO MD       Medications   acetaminophen (TYLENOL) tablet 975 mg (975 mg Oral Given 8/5/20 1156)   oxyCODONE (ROXICODONE) tablet 5 mg (5 mg Oral Given 8/5/20 1222)       Assessments & Plan (with Medical Decision Making)  Tracy is a 80-year-old female with past medical history of atrial fibrillation and PEs anticoagulated on warfarin who presents after a fall.  She fell from standing when she stepped off a curb.  Did not have any symptoms before falling.  Has a black eye.  See history and focused physical exam as above  Patient does have ecchymosis around her left thigh, some swelling, laceration under her left eyebrow, and an abrasion on her nose.  Extraocular movements are intact, no hyphema or other injury to the globe.  INR was checked, is therapeutic at 2.51.  Remainder of labs are within normal limits.  CT the head does reveal a trace subarachnoid hemorrhage.  She also has minimally displaced nasal bone fractures.  Laceration was repaired per the above procedure note.  Based on the finding of the subarachnoid rib hemorrhage as well as the fact that the patient is currently anticoagulated on Coumadin, recommend that  she be observed overnight.  Since this facility does not have neurosurgery available, I explained that it would be better for her to be transferred to 1 of our other [place] facilities where neurosurgery is more ready of it available if needed.  She is agreeable with this plan.  Called and spoke with Dr. Hammonds, hospitalist on-call at [facility].  Accepted the patient in transfer.  She had received 1 dose of Tylenol and 1 dose of p.o. oxycodone for headache.  Did not require any additional medications while in the ER.  Transferred in no acute distress.     I have reviewed the nursing notes.    I have reviewed the findings, diagnosis, plan and need for follow up with the patient.       ED to Inpatient Handoff:    Discussed with Dr. Hammonds at 1301  Patient accepted for Observation Stay  Pending studies include N/A  Code Status: Not Addressed           New Prescriptions    No medications on file       Final diagnoses:   Subarachnoid hemorrhage (H)   Anticoagulated on Coumadin   Paroxysmal atrial fibrillation (H)   Facial laceration, initial encounter   Closed fracture of nasal bone, initial encounter   Fall, initial encounter       8/5/2020   [facility] EMERGENCY DEPARTMENT     Viridiana Ledesma,   08/05/20 7910

## 2020-08-05 NOTE — PROGRESS NOTES
ANTICOAGULATION FOLLOW-UP CLINIC VISIT    Patient Name:  Tracy Palomo  Date:  2020  Contact Type:  Telephone    SUBJECTIVE:  Patient Findings     Positives:   Missed doses (held last Wed due to elevated INR), Change in diet/appetite (pt states she ate a LOT of greens last week- not something she wants to keep up with )             OBJECTIVE    Recent labs: (last 7 days)     20  0907   INR 2.7*       ASSESSMENT / PLAN  INR assessment THER    Recheck INR In: 8 DAYS    INR Location Outside lab      Anticoagulation Summary  As of 2020    INR goal:   2.0-3.0   TTR:   66.5 % (1 y)   INR used for dosin.7 (2020)   Warfarin maintenance plan:   5 mg (5 mg x 1) every Mon; 2.5 mg (5 mg x 0.5) all other days   Full warfarin instructions:   5 mg every Mon; 2.5 mg all other days   Weekly warfarin total:   20 mg   Plan last modified:   Johanna Mckeon RN (2020)   Next INR check:   2020   Priority:   High   Target end date:   Indefinite    Indications    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]  Long term current use of anticoagulant therapy [Z79.01]  Other pulmonary embolism without acute cor pulmonale  unspecified chronicity (H) [I26.99]             Anticoagulation Episode Summary     INR check location:       Preferred lab:       Send INR reminders to:   GURVINDER LYLECROW FRAZIER    Comments:   5 mg tablets, book, PM dose       Anticoagulation Care Providers     Provider Role Specialty Phone number    Chad Betts MD Referring Internal Medicine [phone]    Gerard Medrano MD Responsible Family Practice [phone]            See the Encounter Report to view Anticoagulation Flowsheet and Dosing Calendar (Go to Encounters tab in chart review, and find the Anticoagulation Therapy Visit)    Dosage adjustment made based on physician directed care plan.    Johanna Mckeon RN

## 2020-08-05 NOTE — H&P
[facility]    History and Physical  Hospitalist       Date of Admission:  8/5/2020    Assessment & Plan   Tracy Palomo is a 80 year old female with a past medical history of hypertension, hyperlipidemia, DVT, paroxysmal afib on chronic AC who presented to the ED for evaluation following a fall with head trauma. Imaging revealed a possible subarachnoid hemorrhage     Very small traumatic subarachnoid hemorrhage, left frontal lobe  Pt on chronic AC for afib and hx DVT, is s/p mechanical fall with facial trauma. No LOC. CT head with questionable tiny subarachnoid hemorrhage at high anterolateral left frontal lobe. Neurologically intact in ED.  -Neurosurgery consulted, discussed with on-call neurosurgery and spoke with DEE DEE Liu for neurosurgery were discussed with .  They reviewed the scan and felt that there was a very small if at all subarachnoid bleed, no need to reverse anticoagulation.  They recommended holding Coumadin, rechecking CT tomorrow morning or earlier if symptoms were worse  -Neuro checks q4h     Mechanical Fall  Left forehead laceration, repaired  Left frontal scalp hematoma  Nasal bone fractures, bilateral, minimally displaced  Pt tripped and fell on curb, hitting left forehead upon falling. No LOC. Neurologically intact in ED. Facial laceration repaired with sutures. CT head with large left frontal hematoma.  -Wound care to repaired laceration with bacitracin BID  -Suture removal in 5-7 days, PCP follow-up  -ENT referral at discharge for nasal bone fx within 1 week  -PT/OT evaluations     Paroxysmal afib  Hx DVT/PE  On chronic AC with coumadin. INR 2.51. rate controlled with metoprolol 12.5mg BID.  -HR on the bradycardic side; monitor on telemetry; hold metoprolol for now  -Holding PTA coumadin as above, resume as per NS recs.  -INR in AM     Hyperlipidemia  -Continue PTA lovastatin when verified by pharmacy      DVT Prophylaxis: Warfarin  Code Status:  DNR/DNI    Disposition: Expected discharge in <2 days    Laron Brumfield MD    Primary Care Physician   Chad Betts    Chief Complaint   Fall    History is obtained from the patient    History of Present Illness   Tracy Palomo is a 80 year old female with a past medical history of hypertension, hyperlipidemia, DVT, paroxysmal afib on chronic AC who presented to the ED for evaluation following a fall. Patient apparently was out to pay her electricity bill when she tripped and fell on a curb, landed face forward, hitting her face on concrete. She had immediate bleeding and swelling to the left forehead. She was brought to the ED for evaluation.  She apparently did not lose consciousness.  There was no preceding syncope or presyncope.  As far as other pertinent review of system is concerned, patient denies chest pain or dyspnea no cough or fever.  No nausea, vomiting or diarrhea. Denies dysuria urinary urgency or frequency.  No lightheadedness or dizziness.  No focal extremity weakness or headache or visual changes.  No hematochezia or melena or bleeding from any source.  Denies known exposure to COVID-19.  On arrival to [facility] ED, patient was noted to be hemodynamically stable and neurologically intact with clear facial trauma. She had ecchymosis and swelling to the left orbit/forehead with associated eyebrow laceration. Workup in ED included basic laboratory studies including INR which was 2.51, as well as CT head, CT facial bones significant for questionable SAH in left frontal lobe, left frontal scalp hematoma, minimally displaced bilateral nasal bone fractures. Given findings of possible subarachnoid hemorrhage and on chronic AC, patient was discussed with [facility] for admission and close monitoring, as well as neurosurgical evaluation.    Past Medical History    I have reviewed this patient's medical history and updated it with pertinent information if needed.   Past Medical  History:   Diagnosis Date     Atrial fibrillation (H) 2014    related to colon surgery     Colon polyps      Diverticulosis of colon (without mention of hemorrhage)     Diverticulosis     DVT (deep vein thrombosis) in pregnancy 2014    after colon surgery     Other and unspecified hyperlipidemia      PE (pulmonary embolism) 2014    related to colon surgery     Unspecified essential hypertension        Past Surgical History   I have reviewed this patient's surgical history and updated it with pertinent information if needed.  Past Surgical History:   Procedure Laterality Date     ARTHROPLASTY KNEE Right 1/8/2019    Procedure: Right total knee replacement;  Surgeon: Kaleb Pang DO;  Location: [facility] OR     COLONOSCOPY  09, 10, 12    [facility]     COLONOSCOPY N/A 12/11/2017    Procedure: COLONOSCOPY;  colonoscopy;  Surgeon: Elvis Quezada MD;  Location: [facility] GI     FLEXIBLE SIGMOIDOSCOPY  09, 11     LAPAROTOMY EXPLORATORY N/A 10/20/2014    Procedure: LAPAROTOMY EXPLORATORY;  Surgeon: Miguel Oleary MD;  Location: [facility] OR     LAPAROTOMY, LYSIS ADHESIONS, COMBINED N/A 10/20/2014    Procedure: COMBINED LAPAROTOMY, LYSIS ADHESIONS;  Surgeon: Miguel Oleary MD;  Location: [facility] OR     OPEN REDUCTION INTERNAL FIXATION HIP BIPOLAR Left 3/16/2017    Procedure: OPEN REDUCTION INTERNAL FIXATION HIP BIPOLAR;  Surgeon: Kaleb Pang DO;  Location: [facility] OR     RESECT SMALL BOWEL WITHOUT OSTOMY N/A 10/20/2014    Procedure: RESECT SMALL BOWEL WITHOUT OSTOMY;  Surgeon: Miguel Oleary MD;  Location: [facility] OR       Prior to Admission Medications   Prior to Admission Medications   Prescriptions Last Dose Informant Patient Reported? Taking?   acetaminophen (TYLENOL) 500 MG tablet   Yes No   Sig: Take 500 mg by mouth as needed    lovastatin (MEVACOR) 40 MG tablet   No No   Sig: TAKE 1 TABLET BY MOUTH ONCE DAILY AT BEDTIME   metoprolol tartrate (LOPRESSOR) 25 MG tablet   No No   Sig: Take 0.5  tablets (12.5 mg) by mouth 2 times daily   nitroGLYcerin (NITROSTAT) 0.4 MG sublingual tablet   No No   Sig: For chest pain place 1 tablet under the tongue every 5 minutes for 3 doses. If symptoms persist 5 minutes after 1st dose call 911.   Patient not taking: Reported on 7/10/2019   warfarin ANTICOAGULANT (COUMADIN) 5 MG tablet   No No   Sig: Take 1 tablet (5 mg) by mouth daily Can change per direction of coumadin clinic      Facility-Administered Medications: None     Allergies   Allergies   Allergen Reactions     No Known Allergies        Social History   I have reviewed this patient's social history and updated it with pertinent information if needed. Tracy Palomo  reports that she has never smoked. She has never used smokeless tobacco. She reports that she does not drink alcohol or use drugs.    Family History   I have reviewed this patient's family history and updated it with pertinent information if needed.   Family History   Problem Relation Age of Onset     Blood Disease No family hx of         blood clots       Review of Systems   The 10 point Review of Systems is negative other than noted in the HPI or here.     Physical Exam   Temp: 98  F (36.7  C) Temp src: Oral BP: (!) 152/75   Heart Rate: 50 Resp: 18 SpO2: 93 % O2 Device: None (Room air)    Vital Signs with Ranges  Temp:  [98  F (36.7  C)-98.6  F (37  C)] 98  F (36.7  C)  Pulse:  [45-55] 46  Heart Rate:  [44-57] 50  Resp:  [10-46] 18  BP: (122-166)/(61-84) 152/75  SpO2:  [93 %-96 %] 93 %  0 lbs 0 oz    Gen: AAOX3, NAD  HEENT: Ecchymosis and swelling of left periorbital area, she is able to pry open the left eye and vision is intact on that side.  Sutured laceration over the left eyebrow  Neck: Supple neck, good ROM  Resp: CTA B/L, normal WOB  CVS- RRR, no murmur, no edema  Abd/GI: Soft, non-tender. BS- normoactive  Skin- Warm, dry, no rashes  MSK: No joint deformity; no edema  Neuro- CN- intact. Moving X 4; No focal deficits.     Data   Data  reviewed today:  I personally reviewed no images or EKG's today.  Recent Labs   Lab 08/05/20  1145 08/05/20  0907   WBC 5.8  --    HGB 13.7  --    MCV 91  --      --    INR 2.51* 2.7*     --    POTASSIUM 3.7  --    CHLORIDE 111*  --    CO2 28  --    BUN 17  --    CR 0.86  --    ANIONGAP 5  --    HARVEY 9.1  --    *  --        Recent Results (from the past 24 hour(s))   Head CT w/o contrast   Result Value    Radiologist flags Possible small amount of subarachnoid hemorrhage. (AA)    Narrative    CT OF THE HEAD WITHOUT CONTRAST 8/5/2020 12:11 PM     COMPARISON: None.    HISTORY: Fall, facial trauma, anticoagulated.    TECHNIQUE: 5 mm thick axial CT images of the head were acquired  without IV contrast material.    FINDINGS: There is a questionable tiny amount of subarachnoid  hemorrhage and a single sulcus at the high anterolateral left frontal  lobe (series 2, image 21; series 4, image 14) that is likely traumatic  in nature. No other intracranial hemorrhage. Short-term reevaluation  recommended.    There is moderate diffuse cerebral volume loss. There are extensive  confluent areas of decreased density in the cerebral white matter  bilaterally that are consistent with sequela of chronic small vessel  ischemic disease. The ventricles and basal cisterns are within normal  limits in configuration given the degree of cerebral volume loss.   There is no midline shift.    No intracranial mass or recent infarct.    The visualized paranasal sinuses are well-aerated. There is no  mastoiditis. There are no fractures of the visualized bones. There is  a moderate-sized left forehead scalp hematoma.      Impression    IMPRESSION:   1. Questionable tiny amount of subarachnoid hemorrhage at the high  anterolateral left frontal lobe. Short-term repeat evaluation  recommended.  2. Moderate-sized left forehead scalp hematoma.   3. Diffuse cerebral volume loss and cerebral white matter changes  consistent with chronic  small vessel ischemic disease.     [Critical Result: Possible small amount of subarachnoid hemorrhage.]    Finding was identified on 8/5/2020 12:22 PM.     SERA DIEHL was contacted by Dr. Escobedo on 8/5/2020 12:25  PM and verbalized understanding of the critical result.       Radiation dose for this scan was reduced using automated exposure  control, adjustment of the mA and/or kV according to patient size, or  iterative reconstruction technique.    SELENE ESCOBEDO MD   CT Facial Bones without Contrast    Narrative    CT OF THE FACE WITHOUT CONTRAST August 5, 2020 12:12 PM     HISTORY: Nasal fracture, suspected. Fall, facial trauma, orbital  fracture suspected, anticoagulated.    TECHNIQUE: Helical thin-section axial CT images of the face were  acquired without contrast. Coronal reconstructions were created from  the axial source data. Radiation dose for this scan was reduced using  automated exposure control, adjustment of the mA and/or kV according  to patient size, or iterative reconstruction technique.    COMPARISON: None.      Impression    IMPRESSION: There are recent appearing minimally displaced bilateral  nasal bone fractures. There are no other facial bone fractures. The  paranasal sinuses are well-aerated. The orbits and globes bilaterally  are unremarkable. Temporomandibular joint alignment bilaterally is  normal.        SELENE ESCOBEDO MD

## 2020-08-05 NOTE — ED NOTES
Assisted patient to bedside commode and back to bed. She denies c/o dizziness with position changes at this time.

## 2020-08-05 NOTE — PHARMACY-ADMISSION MEDICATION HISTORY
Pharmacy Medication History  Admission medication history interview status for the 8/5/2020  admission is complete. See EPIC admission navigator for prior to admission medications     Medication history sources: Patient  Medication history source reliability: Good  Adherence assessment: Good    Significant changes made to the medication list:  Removed nitroglycerin tabs      Additional medication history information:   warf edu done w/ med rec    Medication reconciliation completed by provider prior to medication history? No    Time spent in this activity: 10 min      Prior to Admission medications    Medication Sig Last Dose Taking? Auth Provider   acetaminophen (TYLENOL) 500 MG tablet Take 500-1,000 mg by mouth every 6 hours as needed  prn Yes Reported, Patient   lovastatin (MEVACOR) 40 MG tablet TAKE 1 TABLET BY MOUTH ONCE DAILY AT BEDTIME 8/4/2020 at Unknown time Yes Chad Betts MD   metoprolol tartrate (LOPRESSOR) 25 MG tablet Take 0.5 tablets (12.5 mg) by mouth 2 times daily 8/5/2020 at x1 Yes Chad Betts MD   warfarin ANTICOAGULANT (COUMADIN) 5 MG tablet Take 2.5 mg by mouth every evening Monday 5mg, 2.5mg ROW 8/4/2020 at Unknown time Yes Unknown, Entered By History     Milly Buchanan, PharmD

## 2020-08-05 NOTE — ED NOTES
Patients HR to 45 at times. Placed on cardiac monitor and SB rhythm noted. Patient is requesting to use the bathroom.

## 2020-08-05 NOTE — ED TRIAGE NOTES
Pt tripped on curb, goose egg to left forehead, abrasions to face.    Patient's airway, breathing, circulation, and disability/mental status (ABCDs) intact/WDL during triage.

## 2020-08-06 ENCOUNTER — APPOINTMENT (OUTPATIENT)
Dept: CT IMAGING | Facility: CLINIC | Age: 80
End: 2020-08-06
Attending: PHYSICIAN ASSISTANT
Payer: MEDICARE

## 2020-08-06 ENCOUNTER — APPOINTMENT (OUTPATIENT)
Dept: OCCUPATIONAL THERAPY | Facility: CLINIC | Age: 80
End: 2020-08-06
Attending: PHYSICIAN ASSISTANT
Payer: MEDICARE

## 2020-08-06 VITALS
TEMPERATURE: 98.7 F | OXYGEN SATURATION: 96 % | SYSTOLIC BLOOD PRESSURE: 140 MMHG | RESPIRATION RATE: 16 BRPM | DIASTOLIC BLOOD PRESSURE: 76 MMHG

## 2020-08-06 LAB
ANION GAP SERPL CALCULATED.3IONS-SCNC: 2 MMOL/L (ref 3–14)
BUN SERPL-MCNC: 16 MG/DL (ref 7–30)
CALCIUM SERPL-MCNC: 8.7 MG/DL (ref 8.5–10.1)
CHLORIDE SERPL-SCNC: 108 MMOL/L (ref 94–109)
CO2 SERPL-SCNC: 28 MMOL/L (ref 20–32)
CREAT SERPL-MCNC: 0.87 MG/DL (ref 0.52–1.04)
GFR SERPL CREATININE-BSD FRML MDRD: 64 ML/MIN/{1.73_M2}
GLUCOSE SERPL-MCNC: 89 MG/DL (ref 70–99)
INR PPP: 2.49 (ref 0.86–1.14)
LABORATORY COMMENT REPORT: NORMAL
POTASSIUM SERPL-SCNC: 4.2 MMOL/L (ref 3.4–5.3)
SARS-COV-2 RNA SPEC QL NAA+PROBE: NEGATIVE
SARS-COV-2 RNA SPEC QL NAA+PROBE: NORMAL
SODIUM SERPL-SCNC: 138 MMOL/L (ref 133–144)
SPECIMEN SOURCE: NORMAL
SPECIMEN SOURCE: NORMAL

## 2020-08-06 PROCEDURE — 99217 ZZC OBSERVATION CARE DISCHARGE: CPT | Performed by: INTERNAL MEDICINE

## 2020-08-06 PROCEDURE — 85610 PROTHROMBIN TIME: CPT | Performed by: PHYSICIAN ASSISTANT

## 2020-08-06 PROCEDURE — G0378 HOSPITAL OBSERVATION PER HR: HCPCS

## 2020-08-06 PROCEDURE — 80048 BASIC METABOLIC PNL TOTAL CA: CPT | Performed by: PHYSICIAN ASSISTANT

## 2020-08-06 PROCEDURE — 99205 OFFICE O/P NEW HI 60 MIN: CPT | Performed by: PSYCHIATRY & NEUROLOGY

## 2020-08-06 PROCEDURE — 36415 COLL VENOUS BLD VENIPUNCTURE: CPT | Performed by: PHYSICIAN ASSISTANT

## 2020-08-06 PROCEDURE — 25800030 ZZH RX IP 258 OP 636: Performed by: PHYSICIAN ASSISTANT

## 2020-08-06 PROCEDURE — 25000132 ZZH RX MED GY IP 250 OP 250 PS 637: Mod: GY | Performed by: PHYSICIAN ASSISTANT

## 2020-08-06 PROCEDURE — 97165 OT EVAL LOW COMPLEX 30 MIN: CPT | Mod: GO

## 2020-08-06 PROCEDURE — 70450 CT HEAD/BRAIN W/O DYE: CPT

## 2020-08-06 PROCEDURE — 40000893 ZZH STATISTIC PT IP EVAL DEFER

## 2020-08-06 RX ORDER — ACETAMINOPHEN 500 MG
500-1000 TABLET ORAL EVERY 6 HOURS PRN
Qty: 60 TABLET | Refills: 1 | Status: ON HOLD | COMMUNITY
Start: 2020-08-06 | End: 2021-02-17

## 2020-08-06 RX ADMIN — SODIUM CHLORIDE: 9 INJECTION, SOLUTION INTRAVENOUS at 07:01

## 2020-08-06 RX ADMIN — ACETAMINOPHEN 650 MG: 325 TABLET, FILM COATED ORAL at 00:54

## 2020-08-06 NOTE — PROGRESS NOTES
A&Ox4. SBA. Regular diet. HR: julianne. Plan for repeat head CT tomorrow 8/6. Neuros intact except Periorbital ecchymosis and swelling left eye and forehead.

## 2020-08-06 NOTE — CONSULTS
Consult Date:  08/05/2020     IMPRESSION:  Pleasant 80-year-old female anticoagulated on Coumadin with a mechanical fall today and possible tiny traumatic left frontal subarachnoid hemorrhage.      PLAN:  From the neurosurgical standpoint, this subarachnoid hemorrhage is questionable and quite small; therefore, we would only recommend holding her Coumadin this evening, not reversing her anticoagulation.  We will repeat another head CT tomorrow, 08/06/2020, and then make final recommendations on that head CT regarding her anticoagulation.  Otherwise, for this evening every 4-hour neuro checks.  Maintain blood pressure less than 160 systolic.      TYPE OF VISIT:  The Neurosurgical Service was consulted to see Mrs. Palomo for possible traumatic subarachnoid hemorrhage.      HISTORY OF PRESENT ILLNESS:  Mrs. Palomo is a 80-year-old right-handed female anticoagulated on Coumadin for atrial fibrillation who presents as a transfer from [facility] Emergency Room after a fall.  Mrs. Palomo states she was walking outside today when she was reaching for something in her pocket.  She stepped off of a curb and fell onto her face on the left side.  Because she takes Coumadin and was developing a black eye, she presented to [facility] where head CT revealed possible left frontal subarachnoid hemorrhage and she was transferred to [facility].  She complains of pain around the eye, but no headache, weakness, nausea, vomiting or other complaints.      PAST MEDICAL HISTORY:  Atrial fibrillation, anticoagulated on Coumadin; history of DVT in pregnancy, pulmonary embolism, hypertension, left hip fracture, osteoarthritis, right ventricular failure.      PAST SURGICAL HISTORY:  Right knee arthroplasty, small bowel resection, open reduction internal fixation of the left hip, exploratory laparotomy.      MEDICATIONS:  Reviewed per the electronic medical record, including Coumadin.      ALLERGIES:  NO KNOWN DRUG  ALLERGIES.      SOCIAL HISTORY:  She lives at home independently.  She is right-handed.  No cigarette or alcohol use.      FAMILY HISTORY:  Unknown.      PHYSICAL EXAMINATION:   VITAL SIGNS:  Temperature is 98, blood pressure is 152/75, respiratory rate is 18, heart rate is 50.   GENERAL:  She is awake and alert, sitting up eating dinner.   MUSCULOSKELETAL:  She moves all 4 extremities well.   NEUROLOGIC:  She is intact with a negative pronator drift.     HEENT:  A significant amount of periorbital ecchymosis noted to the left eye and forehead.      IMAGING STUDIES:  Head CT performed earlier today shows a possible tiny amount of subarachnoid hemorrhage in the left frontal lobe, also moderate-sized left scalp hematoma.     CT of the face shows minimally displaced bilateral nasal bone fractures.      LABORATORY DATA:  INR is 2.51.  White count 5.8, platelets 190.  Basic metabolic panel unremarkable.      Discussed with Dr. Bolaños.      Total time spent with the patient, 70 minutes, including 50 minutes of counseling and coordination of care.      As dictated by DEE DEE CHAKRABORTY            D: 2020   T: 2020   MT: SYLVAIN      Name:     YOLANDA WOODARD   MRN:      [id number]        Account:       [id number]   :      1940           Consult Date:  2020      Document: [id number]       cc: Jasper Betts MD

## 2020-08-06 NOTE — PLAN OF CARE
Discharge Planner PT     Patient plan for discharge: Home per chart review.    Current status: PT eval received, chart reviewed. Pt is admitted under OBS status for fall and sustaining a small SDH. Per discussion with OT, pt is independent with bed mobility, tranfers and gait. No need for PT assessment warranted at this time. PT will complete orders.     Barriers to return to prior living situation: None anticipated.    Recommendations for discharge: Defer to OT notes    Rationale for recommendations: Defer to OT notes        Entered by: Braulio Tinoco 08/06/2020 9:39 AM

## 2020-08-06 NOTE — DISCHARGE INSTRUCTIONS
[facility]  [phone]  [address] [facility]  [address]  [place], [place], [address]      Call 911 or return to the emergency room if you experience any of the  following symptoms:      Clear or bloody drainage from your nose or ears    Worsening headache    Changes in vision or differently sized pupils    Seizure activity or jerking / twitching of the face, arms, or legs     Increased Sleepiness or difficulty waking up    Memory loss    Irritability    Nausea or vomiting that won t stop    Confusion or difficulty talking    A fever above 100 degrees F    Arm, leg, or facial weakness    Difficulty walking, loss of balance, and dizziness    Stiff neck    Please follow up with neurology in 4-6 weeks. You may call any of the following neurology clinics for an appointment or a clinic of your choice. Tell them you were recently hospitalized and need follow up as recommended at discharge.    1. [facility]   [address] , [address]   [place] [address]   [phone]  2. [facility]   [address]   [place] [address]   [phone]  3. [facility]   [address] [place], [place] [id number] [phone]  4. [facility]ia Matos MD   [address]   [place], [place] [id number] [phone]

## 2020-08-06 NOTE — PLAN OF CARE
Here with SAH after a fall. A&Ox4. VSS on RA ex HR julianne. Tele: Sinus Dysrhythmia/ Sinus Julianne. Up with SBA. Regular diet. C/o face/eye pain, managed with PRN Tylenol. Neuros intact except Periorbital ecchymosis and swelling left eye and forehead. Able to open the left eye more throughout the shift. Voiding Bathroom. IVF infusing @ 100mL/hr. Had a repeat head CT @0600.Pt scoring green on the Aggression Stop Light Tool. Discharge pending. Continue to monitor.

## 2020-08-06 NOTE — CONSULTS
"Care Transition Initial Assessment - RN  Consulted for: \"discharge planning\" (originally a SW consult but it does not appear there are SW needs)     Met with: Patient.    DATA   Active Problems:    Subarachnoid hematoma (H)    SAH (subarachnoid hemorrhage) (H)       Cognitive Status: awake, alert and oriented.    Contact information and PCP information verified: Yes, Dr. Chad Betts   + [facility] ([phone]) [address] [place] [address]    Insurance concerns: No Insurance issues identified  + Active MEDICARE/MEDICARE & COMMERCIAL/[employer]     ASSESSMENT  Patient currently receives the following services:    + pt lives independently in her home, per OT note:   pt lives in a rambler home w/ ramp entry. Pt has a walk in shower w/ grab bars and a shower chair [...] pt has cane and walker from her late  but does not use at baseline.    Identified issues/concerns regarding health management:   + none, may need f/u apts scheduled    PLAN  Financial costs for the patient include: per insurance .  Patient given options and choices for discharge: YES .  Patient/family is agreeable to the plan?  TBD   Patient anticipates discharging to: Home .  Patient anticipates needs for home equipment: No  Transportation/person available to transport on day of discharge  is: family    Plan/Disposition: Home   Appointments: TBD pending recommendations    Already has a phone PCP followup scheduled within the week; RN-CC added appointment notes, *hosp f/u fall on 8-5 with small SDH & nasal fx, ENT followup\"  Aug 11, 2020  7:30 AM   Telephone Visit with Chad Betts MD   [facility]    And labs:   Aug 13, 2020  9:15 AM   LAB with [facility]   [facility]     Will need \"Referral to ENT within 1 week for nasal fractures.\"  + RN-CC contacted [facility] with the referral, they will contact pt.  RN-CC added clinic contact info to " AVS:   [facility] was contacted with a referral, they will call you or you can call for appointment in [place] [phone]     Care  (CTS) will continue to follow as needed.    Johanna Barrera RN, BSN, PHN  [facility] Care Coordination  [facility]   Mobile: [phone]

## 2020-08-06 NOTE — PLAN OF CARE
Pt here with small L frontal SDH after a fall, non surgical per NSG. Pt A&O x4, julianne HR, baseline for patient, otherwise VSS on RA. LS clear. Tele SBD. CMS and Neuro's intact. L eye/L facial ecchymosis, periorbital swelling improving, forehead laceration, NELI, liquid bandaged, c/o mild pain, declined Tylenol. Up SBA w/ GB. Voiding adequately, +BS, BM yesterday. Reg diet. Repeat CT stable, showing nasal fracture will follow-up with ENT, outpatient. Discharging to home today at 1600, daughter providing transportation. Discharge follow-up care and med teaching given.

## 2020-08-06 NOTE — PLAN OF CARE
Discharge Planner OT   Patient plan for discharge: home  Current status: OT eval completed. Pt admitted under OBS status s/p fall resulting in small SAH.     Pt lives alone and is ind w/ all ADL's/IADL's w/o A.D.     Currently, pt demonstrates ind w/ bed mobility, transfers, functional mobility, and all IADL's. No strength, coordination, ROM, vision, or cognitive deficits noted. Will complete OT order as no skilled IP OT needs identified.  Barriers to return to prior living situation: none  Recommendations for discharge: home  Rationale for recommendations: Pt at/near functional baseline and is anticipated to safely return home when medically able to discharge.        Entered by: Amy Dang 08/06/2020 8:36 AM

## 2020-08-06 NOTE — CONSULTS
"  [facility]    Consult Note    Reason for Consult: \"SAH\"    Chief Complaint: No chief complaint on file.       HPI  Tracy Palomo is a right handed 80 year old female with a PMH of HTN, HLD, history of DVT, PE's, and atrial fibrillation on Warfarin. Patient presented to the emergency department for evaluation after a fall. Patient reprots she was trying to get something out of her pocket while walking and was not playing close attention when she stepped off of a curb and fell onto her face. Patient sustained a laceration to her face, but denies LOC. Initial CT scan revealed a questionable tiny amount of subarachnoid hemorrhage at the high anterolateral left frontal lobe along with a moderate-sized left forehead scalp hematoma. Patient was initially seen at [facility] but given findings of possible subarachnoid hemorrhage while on anticoagulation medications, patient was transferred to [facility] for close monitoring as well as neurosurgical evaluation. Repeat CT this morning was negative. Today patient was sitting up in bed with daughter at the bedside. She states she is feeling better than she was yesterday. She denies any current HA and dizziness. She states at baseline she has episodes of dizziness if she \"gets up too fast.\" Patient denies any N/V, focal weakness or change in sensations.       Impression  Left frontal scalp hematoma/laceration secondary to a mechanical fall.     Recommendations  - Repeat CT scan negative ; Ok to restart PTA Warfarin   - Wound care per Hospitalist team   - PT/OT     Patient Follow-up    - in the next 1-2 week(s) with PCP   - Follow up with [facility] in 4-6 weeks       Thank you for this consult. No further evaluation is recommended, so we will sign off. Please contact us with any additional questions.      Colleen Shore PA-C   Neurology  To page me or covering stroke neurology team member, click here: AMCOM   Choose \"On Call\" tab " "at top, then search dropdown box for \"Neurology Adult\", select location, press Enter, then look for stroke/neuro ICU/telestroke.  _____________________________________________________    Past Medical History   Past Medical History:   Diagnosis Date     Atrial fibrillation (H) 2014    related to colon surgery     Colon polyps      Diverticulosis of colon (without mention of hemorrhage)     Diverticulosis     DVT (deep vein thrombosis) in pregnancy 2014    after colon surgery     Other and unspecified hyperlipidemia      PE (pulmonary embolism) 2014    related to colon surgery     Unspecified essential hypertension      Past Surgical History   Past Surgical History:   Procedure Laterality Date     ARTHROPLASTY KNEE Right 1/8/2019    Procedure: Right total knee replacement;  Surgeon: Kaleb Pang DO;  Location: [facility] OR     COLONOSCOPY  09, 10, 12    [facility]     COLONOSCOPY N/A 12/11/2017    Procedure: COLONOSCOPY;  colonoscopy;  Surgeon: Elvis Quezada MD;  Location:  GI     FLEXIBLE SIGMOIDOSCOPY  09, 11     LAPAROTOMY EXPLORATORY N/A 10/20/2014    Procedure: LAPAROTOMY EXPLORATORY;  Surgeon: Miguel Oleary MD;  Location: [facility] OR     LAPAROTOMY, LYSIS ADHESIONS, COMBINED N/A 10/20/2014    Procedure: COMBINED LAPAROTOMY, LYSIS ADHESIONS;  Surgeon: Miguel Oleary MD;  Location: [facility] OR     OPEN REDUCTION INTERNAL FIXATION HIP BIPOLAR Left 3/16/2017    Procedure: OPEN REDUCTION INTERNAL FIXATION HIP BIPOLAR;  Surgeon: Kaleb Pang DO;  Location: [facility] OR     RESECT SMALL BOWEL WITHOUT OSTOMY N/A 10/20/2014    Procedure: RESECT SMALL BOWEL WITHOUT OSTOMY;  Surgeon: Miguel Oleary MD;  Location: [facility] OR     Medications   Home Meds  Prior to Admission medications    Medication Sig Start Date End Date Taking? Authorizing Provider   acetaminophen (TYLENOL) 500 MG tablet Take 500-1,000 mg by mouth every 6 hours as needed    Yes Reported, Patient   lovastatin (MEVACOR) 40 MG " tablet TAKE 1 TABLET BY MOUTH ONCE DAILY AT BEDTIME 1/31/20  Yes Chad Betts MD   metoprolol tartrate (LOPRESSOR) 25 MG tablet Take 0.5 tablets (12.5 mg) by mouth 2 times daily 1/31/20  Yes Chad Betts MD   warfarin ANTICOAGULANT (COUMADIN) 5 MG tablet Take 2.5 mg by mouth every evening (Monday 5mg, 2.5mg ROW)   Yes Unknown, Entered By History       Scheduled Meds    sodium chloride (PF)  3 mL Intracatheter Q8H       Infusion Meds    sodium chloride 100 mL/hr at 08/06/20 0800       PRN Meds  acetaminophen, acetaminophen, hydrALAZINE, lidocaine 4%, lidocaine (buffered or not buffered), melatonin, naloxone, ondansetron **OR** ondansetron, polyethylene glycol, prochlorperazine **OR** prochlorperazine **OR** prochlorperazine, senna-docusate **OR** senna-docusate, sodium chloride (PF)    Allergies   Allergies   Allergen Reactions     No Known Allergies      Family History   Family History   Problem Relation Age of Onset     Blood Disease No family hx of         blood clots     Social History   Social History     Tobacco Use     Smoking status: Never Smoker     Smokeless tobacco: Never Used   Substance Use Topics     Alcohol use: No     Alcohol/week: 0.0 standard drinks     Drug use: No       Review of Systems   The 10 point Review of Systems is negative other than noted in the HPI or here.        PHYSICAL EXAMINATION   Temp:  [97.8  F (36.6  C)-98.5  F (36.9  C)] 98.5  F (36.9  C)  Pulse:  [45-55] 46  Heart Rate:  [44-59] 56  Resp:  [10-46] 16  BP: (119-166)/(61-84) 137/82  SpO2:  [93 %-96 %] 95 %    Neurologic  General: Periorbital ecchymosis and swelling left eye and forehead.   Mental Status:  alert, oriented x 3, follows commands, speech clear and fluent, naming and repetition normal  Cranial Nerves:  visual fields intact, PERRL, EOMI with normal smooth pursuit, facial sensation intact and symmetric, facial movements symmetric, hearing not formally tested but intact to conversation, palate elevation symmetric  and uvula midline, no dysarthria, tongue protrusion midline  Motor:  normal muscle tone and bulk, no abnormal movements, able to move all limbs spontaneously, strength 5/5 throughout upper and lower extremities, no pronator drift  Reflexes:  deferred   Sensory:  light touch sensation intact and symmetric throughout upper and lower extremities, no extinction on double simultaneous stimulation   Coordination:  normal finger-to-nose bilaterally without dysmetria  Station/Gait:  deferred    Dysphagia Screen  Per Nursing      Stroke Scales  Hunt and Zacarias Scale (at arrival)  Grade    1  Asymptomatic, or mild H/A and slight nuchal rigidity         Imaging  I personally reviewed all imaging; relevant findings per HPI.    Labs Data   CBC  Recent Labs   Lab 08/05/20  1145   WBC 5.8   RBC 4.64   HGB 13.7   HCT 42.4        Basic Metabolic Panel   Recent Labs   Lab 08/06/20  0939 08/05/20  1145    144   POTASSIUM 4.2 3.7   CHLORIDE 108 111*   CO2 28 28   BUN 16 17   CR 0.87 0.86   GLC 89 108*   HARVEY 8.7 9.1     Liver Panel  No results for input(s): PROTTOTAL, ALBUMIN, BILITOTAL, ALKPHOS, AST, ALT, BILIDIRECT in the last 168 hours.  INR    Recent Labs   Lab Test 08/06/20  0939 08/05/20  1145 08/05/20  0907   INR 2.49* 2.51* 2.7*      Lipid Profile    Recent Labs   Lab Test 01/31/20  0859 10/10/18  0910 11/22/17  0956  08/24/15  1001 05/07/14  0840 02/22/13  0843   CHOL 155 162 153   < > 178 162 172   HDL 66 54 64   < > 48* 42* 53   LDL 56 59 54   < > 72 79 85   TRIG 167* 246* 173*   < > 292* 201* 171*   CHOLHDLRATIO  --   --   --   --  3.7 4.0 3.0    < > = values in this interval not displayed.     A1C    Recent Labs   Lab Test 10/20/14  0625   A1C 5.8     Troponin I  No results for input(s): TROPI in the last 168 hours.       Stroke Code / Stroke Consult Data Data This was a non-emergent, non-tele stroke consult.

## 2020-08-06 NOTE — DISCHARGE SUMMARY
[facility]  Discharge Summary        Tracy Palomo MRN# [id number]   YOB: 1940 Age: 80 year old     Date of Admission: 8/5/2020  Date of Discharge: 8/6/2020  Admitting Physician: Laron Brumfield MD  Discharge Physician: Boubacar Vazquez MD     Primary Provider: Chad Betts  Primary Care Physician Phone Number: [phone]         Discharge Diagnoses:   1. Mechanical fall with traumatic injuries.  2. Question of a very small traumatic subarachnoid hemorrhage, left frontal lobe, related to above.  3. Left forehead laceration related to above.  4. Left frontal scalp hematoma related to above.  5. Nasal bone fractures, bilateral, minimally displaced, related to above.        Other Chronic Medical Problems:      1. Hypertension (benign essential).  2. Paroxysmal afib.  3. Hyperlipidemia.  4. Hx DVT and PE.       Allergies:         Allergies   Allergen Reactions     No Known Allergies            Discharge Medications:        Current Discharge Medication List      START taking these medications    Details   !! acetaminophen (TYLENOL) 500 MG tablet Take 1-2 tablets (500-1,000 mg) by mouth every 6 hours as needed for pain or fever  Qty: 60 tablet, Refills: 1    Comments: Maximum 4 grams/day of acetaminophen from all sources.  Associated Diagnoses: Fall, initial encounter       !! - Potential duplicate medications found. Please discuss with provider.      CONTINUE these medications which have NOT CHANGED    Details   !! acetaminophen (TYLENOL) 500 MG tablet Take 500-1,000 mg by mouth every 6 hours as needed       lovastatin (MEVACOR) 40 MG tablet TAKE 1 TABLET BY MOUTH ONCE DAILY AT BEDTIME  Qty: 90 tablet, Refills: 3    Comments: Profile Rx: patient will contact pharmacy when needed  Associated Diagnoses: Hyperlipidemia LDL goal <130      metoprolol tartrate (LOPRESSOR) 25 MG tablet Take 0.5 tablets (12.5 mg) by mouth 2 times daily  Qty: 90 tablet, Refills: 3    Associated  Diagnoses: Hypertension goal BP (blood pressure) < 140/90      warfarin ANTICOAGULANT (COUMADIN) 5 MG tablet Take 2.5 mg by mouth every evening (Monday 5mg, 2.5mg ROW)       !! - Potential duplicate medications found. Please discuss with provider.              Discharge Instructions and Follow-Up:      Discharge Orders      Otolaryngology Referral      Reason for your hospital stay    Fall with traumatic injuries including possible small subarachnoid hemorrhage and nasal fractures.     Activity    Your activity upon discharge: activity as tolerated     Follow-up and recommended labs and tests    Follow-up with primary care provider, Chad Betts, within 7 days for hospital follow-up.  No follow up labs or test are needed. Left forehead sutures to be removed 8/10-8/12.    Referral to ENT within 1 week for nasal fractures. [facility] was contacted with a referral, they will call you or you can call for appointment in [place] [phone]    Follow-up with [facility] 4-6 weeks.     Diet    Follow this diet upon discharge: Orders Placed This Encounter      Regular Diet Adult             Consultations This Hospital Stay:      1. Neurosurgery.  2. Neurology.        Admission History:      Please see the H&P by Laron Brumfield MD on 8/5/2020 for complete details. Briefly, Tracy Palomo is a 80 year old female with a past medical history including hypertension, hyperlipidemia, paroxysmal afib on chronic AC and h/o DVT and PE, who presented 8/5/2020 for evaluation following a fall with head trauma. Imaging revealed a possible subarachnoid hemorrhage.        Problem Oriented Hospital Course:      Question of a very small traumatic subarachnoid hemorrhage, left frontal lobe  * Pt on chronic AC for afib and hx DVT.   * Pt tripped and fell on curb, hitting left forehead upon falling; no LOC. Neurologically intact in ED 8/5. CT head 8/5 with questionable tiny subarachnoid  hemorrhage at high anterolateral left frontal lobe. Neurologically intact in ED. Case discussed with Neurosurgery on admission; they reviewed the scan and felt that there was a very small, if at all, subarachnoid bleed and felt there was no need to reverse anticoagulation. They recommended holding warfarin and rechecking CT next morning or earlier if symptoms were worse. Repeat head CT 8/6 showed no acute findings; previously questioned area of SAH along L frontal lobe not identified.     Mechanical fall.  Left forehead laceration, repaired.  Left frontal scalp hematoma.  Nasal bone fractures, bilateral, minimally displaced.  Suffered fall 8/5 as above. Facial laceration repaired with sutures in ED 8/5. CT head 8/5 as above; also showed a large left frontal hematoma.  CT facial bones 8/5 showed recent appearing minimally displaced bilateral  nasal bone fractures.   - Continue wound care to repaired laceration with bacitracin BID.  - Suture removal in 5-7 days (8/10-8/12).  - ENT referral at discharge for nasal bone fx within 1 week after discharge.     Paroxysmal afib.  Hx DVT and PE.  On chronic AC with coumadin. INR 2.51on admit 8/5. Warfarin held on admit but not reversed, as above. Metoprolol held on admit given HR in 50's on admit, though fall on admit was mechanical and no reports of syncope.  Recent Labs   Lab 08/06/20  0939 08/05/20  1145 08/05/20  0907   INR 2.49* 2.51* 2.7*   - Resume warfarin at discharge.  - Resume metoprolol at discharge.     Hypertension (benign essential).  [PTA: metoprolol 12.5 mg BID.]  Metoprolol held on admit given HR in 50's on admit, though fall on admit was mechanical and no reports of syncope. Pt on 8/6 noted that HR chronically in the 50's.  - Resume metoprolol at discharge.     Hyperlipidemia.  - Continue PTA lovastatin.          Pending Results:        Unresulted Labs Ordered in the Past 30 Days of this Admission     No orders found for last 31 day(s).                 Discharge Disposition:      Discharged to home.        Discharge Time:      Less than 30 minutes.        Key Imaging Studies, Lab Findings and Procedures/Surgeries:        Results for orders placed or performed during the hospital encounter of 08/05/20   CT Head w/o Contrast    Narrative    CT SCAN OF THE HEAD WITHOUT CONTRAST   8/6/2020 6:44 AM     HISTORY: Question of traumatic subarachnoid hemorrhage while on  warfarin, 6-hour follow-up scan.    TECHNIQUE:  Axial images of the head and coronal reformations without  IV contrast material. Radiation dose for this scan was reduced using  automated exposure control, adjustment of the mA and/or kV according  to patient size, or iterative reconstruction technique.    COMPARISON: Head CT 8/5/2020.    FINDINGS:  Mild volume loss is present. Patchy and confluent white  matter hypoattenuation likely represents advanced chronic small vessel  ischemic change. Area of questionable subarachnoid hemorrhage along  the left anterior frontal lobe is not identified. No evidence of acute  ischemia, hemorrhage, mass, mass effect or hydrocephalus.     The visualized calvarium, tympanic cavities, mastoid cavities, and  paranasal sinuses are unremarkable.     Left frontal scalp soft tissue contusion has decreased in size.  Left-sided facial swelling has slightly worsened, suggesting  redistribution of contusion. Nasal bone fracture is present. Left  frontal process of the maxilla fracture is present. Small volume gas  along the left nasal bone.      Impression    IMPRESSION:   1. No acute intracranial abnormality.  2. Previously questioned area of subarachnoid hemorrhage along the  left frontal lobe is not identified.  3. Acute fractures of the nasal bone and left frontal process of the  maxilla.  4. Advanced chronic small vessel ischemic change.  5. Left frontal/facial soft tissue swelling/contusion, slightly  redistributed.    NADYA BRIGHT MD

## 2020-08-06 NOTE — PLAN OF CARE
Discharge Planner SLP   Patient plan for discharge: Did not discuss  Current status: Per conversation with RN and a chart review - SLP evaluation not warranted. Pt tolerating a regular diet and no apparent deficits in speech, language, or cognition     SLP evaluation not warranted.     Barriers to return to prior living situation: None per SLP  Recommendations for discharge: defer to medical team, OT/PT  Rationale for recommendations: No SLP evaluation warranted at this time. Please re consult as needed         Entered by: Anna Hall 08/06/2020 9:32 AM

## 2020-08-06 NOTE — PROGRESS NOTES
08/06/20 0800   Quick Adds   Type of Visit Initial Occupational Therapy Evaluation   Living Environment   Lives With alone   Living Arrangements house   Home Accessibility no concerns   Transportation Anticipated family or friend will provide   Living Environment Comment pt lives in a rambler home w/ ramp entry. Pt has a walk in shower w/ grab bars and a shower chair   Self-Care   Usual Activity Tolerance excellent   Current Activity Tolerance excellent   Equipment Currently Used at Home none   Activity/Exercise/Self-Care Comment pt has cane and walker from her late  but does not use at baseline.   Functional Level   Ambulation 0-->independent   Transferring 0-->independent   Toileting 0-->independent   Bathing 0-->independent   Dressing 0-->independent   Eating 0-->independent   Communication 0-->understands/communicates without difficulty   Swallowing 0-->swallows foods/liquids without difficulty   Cognition 0 - no cognition issues reported   Fall history within last six months yes   Number of times patient has fallen within last six months 1   Which of the above functional risks had a recent onset or change? none   Prior Functional Level Comment Pt ind w/ all ADL's and IADL's w/o A.D, including driving.   General Information   Onset of Illness/Injury or Date of Surgery - Date 08/05/20   Referring Physician Segun Medrano PA-C    Patient/Family Goals Statement return home today   Additional Occupational Profile Info/Pertinent History of Current Problem Mrs. Palomo is a 80-year-old right-handed female anticoagulated on Coumadin for atrial fibrillation who presents as a transfer from [facility] Emergency Room after a fall.  Mrs. Palomo states she was walking outside today when she was reaching for something in her pocket.  She stepped off of a curb and fell onto her face on the left side.  Because she takes Coumadin and was developing a black eye, she presented to [facility] where head CT  "revealed possible left frontal subarachnoid hemorrhage and she was transferred to [facility]   Precautions/Limitations fall precautions   Cognitive Status Examination   Orientation orientation to person, place and time   Level of Consciousness alert   Follows Commands (Cognition) WNL   Memory intact   Attention No deficits were identified   Visual Perception   Visual Perception Comments pt's L eye bruised and swollen but pt states she can still see w/o difficulty   Sensory Examination   Sensory Comments denies any numbness/tingling   Pain Assessment   Patient Currently in Pain No   Range of Motion (ROM)   ROM Comment BUE AROM WNL   Strength   Manual Muscle Testing Quick Adds No deficits were identified   Coordination   Coordination Comments no deficits noted   Mobility   Bed Mobility Comments ind   Transfer Skill: Sit to Stand   Level of [place]: Sit/Stand independent   Transfer Skill: Toilet Transfer   Level of [place]: Toilet independent   Balance   Balance Quick Add No deficits identified   Bathing   Level of [place] independent   Upper Body Dressing   Level of [place]: Dress Upper Body independent   Lower Body Dressing   Level of [place]: Dress Lower Body independent   Toileting   Level of [place]: Toilet independent   Grooming   Level of [place]: Grooming independent   Eating/Self Feeding   Level of [place]: Eating independent   Clinical Impression   Criteria for Skilled Therapeutic Interventions Met no;evaluation only;current level of function same as previous level of function;no problems identified which require skilled intervention   Clinical Decision Making (Complexity) Low complexity   Risks and Benefits of Treatment have been explained. Yes   Patient, Family & other staff in agreement with plan of care Yes   [facility] TM \"6 Clicks\"   2016, Trustees of [facility], under license to [employer].  All rights reserved.   6 Clicks Short " "Forms Daily Activity Inpatient Short Form   [facility] AM-PAC  \"6 Clicks\" Daily Activity Inpatient Short Form   1. Putting on and taking off regular lower body clothing? 4 - None   2. Bathing (including washing, rinsing, drying)? 4 - None   3. Toileting, which includes using toilet, bedpan or urinal? 4 - None   4. Putting on and taking off regular upper body clothing? 4 - None   5. Taking care of personal grooming such as brushing teeth? 4 - None   6. Eating meals? 4 - None   Daily Activity Raw Score (Score out of 24.Lower scores equate to lower levels of function) 24   Total Evaluation Time   Total Evaluation Time (Minutes) 15     "

## 2020-08-06 NOTE — PROGRESS NOTES
[facility]    Internal Medicine Hospitalist Progress Note  08/06/2020  I evaluated patient on the above date.    Boubacar Vazquez Jr., MD  [phone] (p)  Text Page        Assessment & Plan New actions/orders today (08/06/2020) are underlined.    Tracy Palomo is a 80 year old female with a past medical history including hypertension, hyperlipidemia, paroxysmal afib on chronic AC and h/o DVT and PE, who presented 8/5/2020 for evaluation following a fall with head trauma. Imaging revealed a possible subarachnoid hemorrhage     Possible very small traumatic subarachnoid hemorrhage, left frontal lobe  * Pt on chronic AC for afib and hx DVT.   * Pt tripped and fell on curb, hitting left forehead upon falling; no LOC. Neurologically intact in ED 8/5. CT head 8/5 with questionable tiny subarachnoid hemorrhage at high anterolateral left frontal lobe. Neurologically intact in ED. Case discussed with Neurosurgery on admission; they reviewed the scan and felt that there was a very small, if at all, subarachnoid bleed and felt there was no need to reverse anticoagulation. They recommended holding warfarin and rechecking CT next morning or earlier if symptoms were worse. Repeat head CT 8/6 showed no acute findings; previously questioned area of SAH along L frontal lobe not identified.  - Continue neuro checks q4h.  - Await further Neurosurgery rec's, appreciate help.     Mechanical fall.  Left forehead laceration, repaired.  Left frontal scalp hematoma.  Nasal bone fractures, bilateral, minimally displaced.  Suffered fall 8/5 as above. Facial laceration repaired with sutures in ED 8/5. CT head 8/5 as above; also showed a large left frontal hematoma.  CT facial bones 8/5 showed recent appearing minimally displaced bilateral  nasal bone fractures.   - Wound care to repaired laceration with bacitracin BID.  - Suture removal in 5-7 days (8/10-8/12).  - PT, OT evaluations.  - ENT referral at discharge for nasal bone  fx within 1 week after discharge.    Paroxysmal afib.  Hx DVT and PE.  On chronic AC with coumadin. INR 2.51on admit 8/5. Warfarin held on admit but not reversed, as above. Metoprolol held on admit given HR in 50's on admit, though fall on admit was mechanical and no reports of syncope.  Recent Labs   Lab 08/05/20  1145 08/05/20  0907   INR 2.51* 2.7*   - Anticipate restarting warfarin pending Neurosurgery re-evaluation.  - Resume metoprolol at discharge.  - Monitor on telemetry.    Hypertension (benign essential).  [PTA: metoprolol 12.5 mg BID.]  Metoprolol held on admit given HR in 50's on admit, though fall on admit was mechanical and no reports of syncope. Pt on 8/6 noted that HR chronically in the 50's.  - Resume metoprolol at discharge.     Hyperlipidemia.  - Continue PTA lovastatin.    Diet: Regular Diet Adult    Prophylaxis: PCD's, ambulation.   Reyna Catheter: not present  Code Status: No CPR- Do NOT Intubate      Disposition Plan   Expected discharge: Today, recommended to prior living arrangement pending further Neuro rec's.  Entered: Boubacar Vazquez MD 08/06/2020, 8:05 AM         Interval History    Feeling better today.    -Data reviewed today: I reviewed all new labs and imaging over the last 24 hours. I personally reviewed no images or EKG's today.    Physical Exam   Heart Rate: 52, Blood pressure 130/74, temperature 98.2  F (36.8  C), temperature source Oral, resp. rate 16, SpO2 95 %.  There were no vitals filed for this visit.  Vital Signs with Ranges  Temp:  [97.8  F (36.6  C)-98.6  F (37  C)] 98.2  F (36.8  C)  Pulse:  [45-55] 46  Heart Rate:  [44-59] 52  Resp:  [10-46] 16  BP: (119-166)/(61-84) 130/74  SpO2:  [93 %-96 %] 95 %  Patient Vitals for the past 24 hrs:   BP Temp Temp src Heart Rate Resp SpO2   08/06/20 0700 130/74 98.2  F (36.8  C) Oral 52 16 --   08/06/20 0335 (!) 144/75 97.8  F (36.6  C) Oral 52 16 95 %   08/06/20 0050 127/68 98  F (36.7  C) Oral 51 17 93 %   08/05/20 2008 119/65  98.4  F (36.9  C) Oral 59 18 93 %   08/05/20 1659 (!) 152/75 98  F (36.7  C) Oral 50 18 93 %     I/O's Last 24 hours  I/O last 3 completed shifts:  In: 120 [P.O.:120]  Out: -     Constitutional: Awake, alert.  HEENT:  Bruising around left eye.  Respiratory:  Diminished in bases. No crackles or wheezes.  Cardiovascular: RRR, no m/r/g.  GI: Soft, nt, nd, +BS.  Skin/Integumen:   Other:        Data   Recent Labs   Lab 08/05/20  1145 08/05/20  0907   WBC 5.8  --    HGB 13.7  --    MCV 91  --      --    INR 2.51* 2.7*     --    POTASSIUM 3.7  --    CHLORIDE 111*  --    CO2 28  --    BUN 17  --    CR 0.86  --    ANIONGAP 5  --    HARVEY 9.1  --    *  --      Recent Labs   Lab Test 08/05/20  1145 01/31/20  0859 03/04/19  1539 01/21/19 2008 01/11/19  0528  10/22/14  0503   * 97 97 146* 94   < >  --    BGM  --   --   --   --   --   --  140*    < > = values in this interval not displayed.         Recent Results (from the past 24 hour(s))   Head CT w/o contrast   Result Value    Radiologist flags Possible small amount of subarachnoid hemorrhage. (AA)    Narrative    CT OF THE HEAD WITHOUT CONTRAST 8/5/2020 12:11 PM     COMPARISON: None.    HISTORY: Fall, facial trauma, anticoagulated.    TECHNIQUE: 5 mm thick axial CT images of the head were acquired  without IV contrast material.    FINDINGS: There is a questionable tiny amount of subarachnoid  hemorrhage and a single sulcus at the high anterolateral left frontal  lobe (series 2, image 21; series 4, image 14) that is likely traumatic  in nature. No other intracranial hemorrhage. Short-term reevaluation  recommended.    There is moderate diffuse cerebral volume loss. There are extensive  confluent areas of decreased density in the cerebral white matter  bilaterally that are consistent with sequela of chronic small vessel  ischemic disease. The ventricles and basal cisterns are within normal  limits in configuration given the degree of cerebral volume  loss.   There is no midline shift.    No intracranial mass or recent infarct.    The visualized paranasal sinuses are well-aerated. There is no  mastoiditis. There are no fractures of the visualized bones. There is  a moderate-sized left forehead scalp hematoma.      Impression    IMPRESSION:   1. Questionable tiny amount of subarachnoid hemorrhage at the high  anterolateral left frontal lobe. Short-term repeat evaluation  recommended.  2. Moderate-sized left forehead scalp hematoma.   3. Diffuse cerebral volume loss and cerebral white matter changes  consistent with chronic small vessel ischemic disease.     [Critical Result: Possible small amount of subarachnoid hemorrhage.]    Finding was identified on 8/5/2020 12:22 PM.     SERA DIEHL was contacted by Dr. Escobedo on 8/5/2020 12:25  PM and verbalized understanding of the critical result.       Radiation dose for this scan was reduced using automated exposure  control, adjustment of the mA and/or kV according to patient size, or  iterative reconstruction technique.    SELENE ESCOBEDO MD   CT Facial Bones without Contrast    Narrative    CT OF THE FACE WITHOUT CONTRAST August 5, 2020 12:12 PM     HISTORY: Nasal fracture, suspected. Fall, facial trauma, orbital  fracture suspected, anticoagulated.    TECHNIQUE: Helical thin-section axial CT images of the face were  acquired without contrast. Coronal reconstructions were created from  the axial source data. Radiation dose for this scan was reduced using  automated exposure control, adjustment of the mA and/or kV according  to patient size, or iterative reconstruction technique.    COMPARISON: None.      Impression    IMPRESSION: There are recent appearing minimally displaced bilateral  nasal bone fractures. There are no other facial bone fractures. The  paranasal sinuses are well-aerated. The orbits and globes bilaterally  are unremarkable. Temporomandibular joint alignment bilaterally  is  normal.        SELENE ESCOBEDO MD   CT Head w/o Contrast    Narrative    CT SCAN OF THE HEAD WITHOUT CONTRAST   8/6/2020 6:44 AM     HISTORY: Question of traumatic subarachnoid hemorrhage while on  warfarin, 6-hour follow-up scan.    TECHNIQUE:  Axial images of the head and coronal reformations without  IV contrast material. Radiation dose for this scan was reduced using  automated exposure control, adjustment of the mA and/or kV according  to patient size, or iterative reconstruction technique.    COMPARISON: Head CT 8/5/2020.    FINDINGS:  Mild volume loss is present. Patchy and confluent white  matter hypoattenuation likely represents advanced chronic small vessel  ischemic change. Area of questionable subarachnoid hemorrhage along  the left anterior frontal lobe is not identified. No evidence of acute  ischemia, hemorrhage, mass, mass effect or hydrocephalus.     The visualized calvarium, tympanic cavities, mastoid cavities, and  paranasal sinuses are unremarkable.     Left frontal scalp soft tissue contusion has decreased in size.  Left-sided facial swelling has slightly worsened, suggesting  redistribution of contusion. Nasal bone fracture is present. Left  frontal process of the maxilla fracture is present. Small volume gas  along the left nasal bone.      Impression    IMPRESSION:   1. No acute intracranial abnormality.  2. Previously questioned area of subarachnoid hemorrhage along the  left frontal lobe is not identified.  3. Acute fractures of the nasal bone and left frontal process of the  maxilla.  4. Advanced chronic small vessel ischemic change.  5. Left frontal/facial soft tissue swelling/contusion, slightly  redistributed.       Medications   All medications were reviewed.    sodium chloride 100 mL/hr at 08/06/20 0800       sodium chloride (PF)  3 mL Intracatheter Q8H

## 2020-08-06 NOTE — PROGRESS NOTES
[facility]    Neurosurgery  Daily Note    Assessment & Plan   Pt admitted after fall, head CT initially was showing small left frontal SAH, CT this am is not read as showing any acute abnormalities.       Plan:  -no surgical indications. Agree with ENT f/u as outpatient regarding nasal fracture.     Rex Trimble    Interval History   Stable.  Doing well.  Improving slowly.  Pain is reasonably controlled.  No fevers.     Physical Exam   Temp: 98.2  F (36.8  C) Temp src: Oral BP: 130/74   Heart Rate: 52 Resp: 16 SpO2: 95 % O2 Device: None (Room air)    There were no vitals filed for this visit.  Vital Signs with Ranges  Temp:  [97.8  F (36.6  C)-98.6  F (37  C)] 98.2  F (36.8  C)  Pulse:  [45-55] 46  Heart Rate:  [44-59] 52  Resp:  [10-46] 16  BP: (119-166)/(61-84) 130/74  SpO2:  [93 %-96 %] 95 %  I/O last 3 completed shifts:  In: 120 [P.O.:120]  Out: -     Alert and oriented.  Moves all extremities equally.        Medications     sodium chloride 100 mL/hr at 08/06/20 0800        sodium chloride (PF)  3 mL Intracatheter Q8H             Rex Trimble PA-C  [facility] Neurosurgery  [address]  [address] [place] [id number]    Tel [phone]  Pager [phone]

## 2020-08-07 ENCOUNTER — TELEPHONE (OUTPATIENT)
Dept: INTERNAL MEDICINE | Facility: CLINIC | Age: 80
End: 2020-08-07

## 2020-08-07 NOTE — TELEPHONE ENCOUNTER
ED / Discharge Outreach Protocol    Patient Contact    Attempt # 1    Was call answered?  No.  Unable to leave message.    ABDIEL ReynagaN, RN  [facility]

## 2020-08-07 NOTE — TELEPHONE ENCOUNTER
Patient called to schedule an appointment for a hospital follow-up or appeared on a report showing that they were recently discharged from the hospital.    Patient was admitted to [facility]  Discharged date: 08/06/20  Reason for hospital admission:  Fall, Initial Encounter  Does patient have future appointment scheduled with provider? No  Date of future appointment:        This information will be used to help the care team plan for the patients upcoming visit.  The triage RN may determine that a follow up call is necessary and reach out to the patient via the phone number listed in the chart.     Please route this message on routine priority to the Triage RN pool.

## 2020-08-10 NOTE — TELEPHONE ENCOUNTER
"ED/Discharge Protocol    \"Hi, my name is Carleen Villanueva RN, a registered nurse, and I am calling on behalf of Dr. Betts's office at [facility].  I am calling to follow up and see how things are going for you after your recent visit.\"    \"I see that you were in the (ER/UC/IP) on 8/5/2020.    How are you doing now that you are home?\" doing good    Is patient experiencing symptoms that may require a hospital visit?  No    Discharge Instructions    \"Let's review your discharge instructions.  What is/are the follow-up recommendations?  Pt. Response: I am doing good, I keeping it low key    \"Were you instructed to make a follow-up appointment?\"  Pt. Response: Yes.  Has appointment been made?   Yes      \"When you see the provider, I would recommend that you bring your discharge instructions with you.    Medications    \"How many new medications are you on since your hospitalization/ED visit?\"    0-1  \"How many of your current medicines changed (dose, timing, name, etc.) while you were in the hospital/ED visit?\"   0-1  \"Do you have questions about your medications?\"   No  \"Were you newly diagnosed with heart failure, COPD, diabetes or did you have a heart attack?\"   No  For patients on insulin: \"Did you start on insulin in the hospital or did you have your insulin dose changed?\"   No  Post Discharge Medication Reconciliation Status: discharge medications reconciled, continue medications without change.    Was MTM referral placed (*Make sure to put transitions as reason for referral)?   No    Call Summary    \"Do you have any questions or concerns about your condition or care plan at the moment?\"    Yes: talking to Dr. Betts regarding dizziness upon laying down  Triage nurse advice given: Please call with any further questions or concerns, or any new symptoms    Patient was in ER 2 in the past year (assess appropriateness of ER visits.)      \"If you have questions or things don't continue to improve, we encourage you contact " "us through the main clinic number,  [phone].  Even if the clinic is not open, triage nurses are available 24/7 to help you.     We would like you to know that our clinic has extended hours (provide information).  We also have urgent care (provide details on closest location and hours/contact info)\"      \"Thank you for your time and take care!\"    Carleen Villanueva RN        "

## 2020-08-11 ENCOUNTER — VIRTUAL VISIT (OUTPATIENT)
Dept: INTERNAL MEDICINE | Facility: CLINIC | Age: 80
End: 2020-08-11
Payer: MEDICARE

## 2020-08-11 VITALS — DIASTOLIC BLOOD PRESSURE: 65 MMHG | HEART RATE: 53 BPM | SYSTOLIC BLOOD PRESSURE: 128 MMHG

## 2020-08-11 DIAGNOSIS — S06.6X0D SUBARACHNOID HEMATOMA, WITHOUT LOSS OF CONSCIOUSNESS, SUBSEQUENT ENCOUNTER: ICD-10-CM

## 2020-08-11 DIAGNOSIS — S02.2XXD CLOSED FRACTURE OF NASAL BONE WITH ROUTINE HEALING, SUBSEQUENT ENCOUNTER: ICD-10-CM

## 2020-08-11 DIAGNOSIS — H81.13 BENIGN PAROXYSMAL POSITIONAL VERTIGO, BILATERAL: ICD-10-CM

## 2020-08-11 DIAGNOSIS — Z79.01 LONG TERM CURRENT USE OF ANTICOAGULANT THERAPY: ICD-10-CM
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
## 2020-08-11 PROCEDURE — 99495 TRANSJ CARE MGMT MOD F2F 14D: CPT | Performed by: INTERNAL MEDICINE

## 2020-08-11 RX ORDER — MECLIZINE HYDROCHLORIDE 25 MG/1
25 TABLET ORAL 3 TIMES DAILY PRN
Qty: 30 TABLET | Refills: 0 | Status: SHIPPED | OUTPATIENT
Start: 2020-08-11 | End: 2020-11-10

## 2020-08-11 NOTE — PROGRESS NOTES
"Tracy Palomo is a 80 year old female who is being evaluated via a billable telephone visit.      The patient has been notified of following:     \"This telephone visit will be conducted via a call between you and your physician/provider. We have found that certain health care needs can be provided without the need for a physical exam.  This service lets us provide the care you need with a short phone conversation.  If a prescription is necessary we can send it directly to your pharmacy.  If lab work is needed we can place an order for that and you can then stop by our lab to have the test done at a later time.    Telephone visits are billed at different rates depending on your insurance coverage. During this emergency period, for some insurers they may be billed the same as an in-person visit.  Please reach out to your insurance provider with any questions.    If during the course of the call the physician/provider feels a telephone visit is not appropriate, you will not be charged for this service.\"    Patient has given verbal consent for Telephone visit?  Yes    What phone number would you like to be contacted at? [phone]    How would you like to obtain your AVS? Nidat    Subjective     Tracy Palomo is a 80 year old female who presents via phone visit today for the following health issues:    \A Chronology of [place] [facility]\""    Hospital Follow-up Visit:    Hospital/Nursing Home/IP Rehab Facility: [facility]  Date of Admission: 8/5/20  Date of Discharge: 8/6/20  Reason(s) for Admission: Fall        Was your hospitalization related to COVID-19? No   Problems taking medications regularly:  None  Medication changes since discharge: None  Problems adhering to non-medication therapy:  None    Summary of hospitalization:  [facility] discharge summary reviewed  Diagnostic Tests/Treatments reviewed.  Follow up needed: none  Other Healthcare Providers Involved in Patient s Care:         None  Update since " discharge: improved. Post Discharge Medication Reconciliation: discharge medications reconciled and changed, per note/orders.  Plan of care communicated with patient             Dizziness when she gets up and when she lays down.  Started on July 29th.  Pulse of 53, 52, 55.   bp has been 123, 120, 140, 177, 128 in July. 138, 157, 160, 140, 137, 129 , 123 128 in august.   Fall was from a trip over the curb.  Fell down on her face, that is improving.  Fall wasn't from dizziness. Dizziness is room spinning, she had done some scuba diving in July. Better with looking up, turning over in bed can make her dizzy.      Bruising is down to her neck and chin.  Vision is ok,     Had a small subarachnoid hemorrhage as well, admitted 5th to the 6th. Second ct was ok.     No stitches, just glued.     Riding her bike and taking walks.       Patient Active Problem List   Diagnosis     Hypertension goal BP (blood pressure) < 140/90     Hyperlipidemia LDL goal <130     Encounter for long-term current use of medication     History of colonic polyps     Advanced directives, counseling/discussion     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     Recent major surgery - exploratory lap with small bowel resection 10/20     Pleural effusion on right     Anemia     Paroxysmal atrial fibrillation (H)     Long term current use of anticoagulant therapy     Closed left hip fracture (H)     Primary osteoarthritis of right knee     Primary osteoarthritis of left knee     S/P total knee replacement using cement, right     Other chest pain     RVF (right ventricular failure) (H) - per Echo 2019     Peripheral edema     Other pulmonary embolism without acute cor pulmonale, unspecified chronicity (H)     Subarachnoid hematoma (H)     SAH (subarachnoid hemorrhage) (H)     Past Surgical History:   Procedure Laterality Date     ARTHROPLASTY KNEE Right 1/8/2019    Procedure: Right total knee replacement;  Surgeon: Kaleb Pang  DO Al;  Location: [facility] OR     COLONOSCOPY  09, 10, 12    [facility]     COLONOSCOPY N/A 12/11/2017    Procedure: COLONOSCOPY;  colonoscopy;  Surgeon: Elvis Quezada MD;  Location:  GI     FLEXIBLE SIGMOIDOSCOPY  09, 11     LAPAROTOMY EXPLORATORY N/A 10/20/2014    Procedure: LAPAROTOMY EXPLORATORY;  Surgeon: Miguel Oleary MD;  Location: [facility] OR     LAPAROTOMY, LYSIS ADHESIONS, COMBINED N/A 10/20/2014    Procedure: COMBINED LAPAROTOMY, LYSIS ADHESIONS;  Surgeon: Miguel Oleary MD;  Location: [facility] OR     OPEN REDUCTION INTERNAL FIXATION HIP BIPOLAR Left 3/16/2017    Procedure: OPEN REDUCTION INTERNAL FIXATION HIP BIPOLAR;  Surgeon: Kaleb Pang DO;  Location: [facility] OR     RESECT SMALL BOWEL WITHOUT OSTOMY N/A 10/20/2014    Procedure: RESECT SMALL BOWEL WITHOUT OSTOMY;  Surgeon: Miguel Oleary MD;  Location: [facility] OR       Social History     Tobacco Use     Smoking status: Never Smoker     Smokeless tobacco: Never Used   Substance Use Topics     Alcohol use: No     Alcohol/week: 0.0 standard drinks     Family History   Problem Relation Age of Onset     Blood Disease No family hx of         blood clots         Current Outpatient Medications   Medication Sig Dispense Refill     acetaminophen (TYLENOL) 500 MG tablet Take 1-2 tablets (500-1,000 mg) by mouth every 6 hours as needed for pain or fever 60 tablet 1     lovastatin (MEVACOR) 40 MG tablet TAKE 1 TABLET BY MOUTH ONCE DAILY AT BEDTIME 90 tablet 3     metoprolol tartrate (LOPRESSOR) 25 MG tablet Take 0.5 tablets (12.5 mg) by mouth 2 times daily 90 tablet 3     warfarin ANTICOAGULANT (COUMADIN) 5 MG tablet Take 2.5 mg by mouth every evening (Monday 5mg, 2.5mg ROW)       Allergies   Allergen Reactions     No Known Allergies        Reviewed and updated as needed this visit by Provider         Review of Systems   CONSTITUTIONAL: NEGATIVE for fever, chills, change in weight  INTEGUMENTARY/SKIN: NEGATIVE for worrisome rashes,  moles or lesions  EYES: NEGATIVE for vision changes or irritation  ENT/MOUTH: NEGATIVE for ear, mouth and throat problems  RESP: NEGATIVE for significant cough or SOB  CV: NEGATIVE for chest pain, palpitations or peripheral edema  GI: NEGATIVE for nausea, abdominal pain, heartburn, or change in bowel habits  : NEGATIVE for frequency, dysuria, or hematuria  MUSCULOSKELETAL: NEGATIVE for significant arthralgias or myalgia  NEURO: NEGATIVE for weakness, dizziness or paresthesias  ENDOCRINE: NEGATIVE for temperature intolerance, skin/hair changes  HEME: NEGATIVE for bleeding problems  PSYCHIATRIC: NEGATIVE for changes in mood or affect       Objective   Reported vitals:  There were no vitals taken for this visit.   healthy, alert and no distress  PSYCH: Alert and oriented times 3; coherent speech, normal   rate and volume, able to articulate logical thoughts, able   to abstract reason, no tangential thoughts, no hallucinations   or delusions  Her affect is normal  RESP: No cough, no audible wheezing, able to talk in full sentences  Remainder of exam unable to be completed due to telephone visits    Diagnostic Test Results:  Labs reviewed in Epic        Assessment/Plan:    1. Benign paroxysmal positional vertigo, bilateral  Dizziness appears to be vertigo.  She can try meclizine.  She can do maneuvers I told her about and will be seen in ENT in the next week.  - meclizine (ANTIVERT) 25 MG tablet; Take 1 tablet (25 mg) by mouth 3 times daily as needed for dizziness  Dispense: 30 tablet; Refill: 0    2. Long term current use of anticoagulant therapy  Continue long-term anticoagulation no current bleeding face is improving on pictures I saw yesterday.    3. Subarachnoid hematoma, without loss of consciousness, subsequent encounter  Subarachnoid hematoma was gone on the second head CT.  No follow-up needed.    4. Closed fracture of nasal bone with routine healing, subsequent encounter  Nasal fracture is recommended to see  ENT which we will set up to see hopefully in the next week.    5. Fall, subsequent encounter  Fall was from tripping  and hitting her head not dizziness.  Patient has her balance again is up walking and riding her bike.      No follow-ups on file.      Phone call duration:  17 minutes    Chad Betts MD

## 2020-08-13 ENCOUNTER — ANTICOAGULATION THERAPY VISIT (OUTPATIENT)
Dept: ANTICOAGULATION | Facility: CLINIC | Age: 80
End: 2020-08-13

## 2020-08-13 DIAGNOSIS — I26.99 OTHER PULMONARY EMBOLISM WITHOUT ACUTE COR PULMONALE, UNSPECIFIED CHRONICITY (H): ICD-10-CM

## 2020-08-13 DIAGNOSIS — Z79.01 LONG TERM CURRENT USE OF ANTICOAGULANT THERAPY: ICD-10-CM

## 2020-08-13 LAB
CAPILLARY BLOOD COLLECTION: NORMAL
INR BLD: 1.8 (ref 0.86–1.14)

## 2020-08-13 PROCEDURE — 36416 COLLJ CAPILLARY BLOOD SPEC: CPT | Performed by: INTERNAL MEDICINE

## 2020-08-13 PROCEDURE — 85610 PROTHROMBIN TIME: CPT | Mod: QW | Performed by: INTERNAL MEDICINE

## 2020-08-13 PROCEDURE — 99207 ZZC NO CHARGE NURSE ONLY: CPT | Performed by: INTERNAL MEDICINE

## 2020-08-13 NOTE — PROGRESS NOTES
ANTICOAGULATION FOLLOW-UP CLINIC VISIT    Patient Name:  Tracy Palomo  Date:  2020  Contact Type:  Telephone    SUBJECTIVE:  Patient Findings     Comments:   Pt was hospitalized last week after a fall, she missed 2 doses of warfarin        Clinical Outcomes     Comments:   Pt was hospitalized last week after a fall, she missed 2 doses of warfarin           OBJECTIVE    Recent labs: (last 7 days)     20  0905   INR 1.8*       ASSESSMENT / PLAN  INR assessment SUB    Recheck INR In: 2 WEEKS    INR Location Outside lab      Anticoagulation Summary  As of 2020    INR goal:   2.0-3.0   TTR:   68.1 % (1 y)   INR used for dosin.8! (2020)   Warfarin maintenance plan:   5 mg (5 mg x 1) every Mon; 2.5 mg (5 mg x 0.5) all other days   Full warfarin instructions:   5 mg every Mon; 2.5 mg all other days   Weekly warfarin total:   20 mg   Plan last modified:   Johanna Mckeon RN (2020)   Next INR check:   2020   Priority:   High   Target end date:   Indefinite    Indications    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]  Long term current use of anticoagulant therapy [Z79.01]  Other pulmonary embolism without acute cor pulmonale  unspecified chronicity (H) [I26.99]             Anticoagulation Episode Summary     INR check location:       Preferred lab:       Send INR reminders to:   [facility]    Comments:   5 mg tablets, book, PM dose       Anticoagulation Care Providers     Provider Role Specialty Phone number    Chad Betts MD Referring Internal Medicine [phone]    Gerard Medrano MD Responsible Family Practice [phone]            See the Encounter Report to view Anticoagulation Flowsheet and Dosing Calendar (Go to Encounters tab in chart review, and find the Anticoagulation Therapy Visit)    Dosage adjustment made based on physician directed care plan.    Radha Rodriguez RN

## 2020-08-18 NOTE — PROGRESS NOTES
ENT Consultation    Tracy Palomo who is a 80 year old female seen in consultation at the request of self.      History of Present Illness - Tracy Palomo is a 80 year old female presents for evaluation of possible nasal  fracture that may have occurred 2 weeks ago when she fell on the pavement.  She lost her footing and fell.  Patient is on Coumadin and there was fair amount of facial ecchymosis.  Since then she noticed facility for laceration has healed quite well under the left eyebrow.  Also was no further nose bleeding.  Her mom moderate size based on CT scan left forehead scalp hematoma seems to have resolved she was noted to have minimally displaced bilateral nasal bone fractures on a CT scan at that time.  She is breathing well through her nose currently does not notice any significant obvious bony displacement.      Past Medical History -   Past Medical History:   Diagnosis Date     Atrial fibrillation (H) 2014    related to colon surgery     Colon polyps      Diverticulosis of colon (without mention of hemorrhage)     Diverticulosis     DVT (deep vein thrombosis) in pregnancy 2014    after colon surgery     Other and unspecified hyperlipidemia      PE (pulmonary embolism) 2014    related to colon surgery     Unspecified essential hypertension        Current Medications -   Current Outpatient Medications:      acetaminophen (TYLENOL) 500 MG tablet, Take 1-2 tablets (500-1,000 mg) by mouth every 6 hours as needed for pain or fever, Disp: 60 tablet, Rfl: 1     lovastatin (MEVACOR) 40 MG tablet, TAKE 1 TABLET BY MOUTH ONCE DAILY AT BEDTIME, Disp: 90 tablet, Rfl: 3     meclizine (ANTIVERT) 25 MG tablet, Take 1 tablet (25 mg) by mouth 3 times daily as needed for dizziness, Disp: 30 tablet, Rfl: 0     warfarin ANTICOAGULANT (COUMADIN) 5 MG tablet, Take 2.5 mg by mouth every evening (Monday 5mg, 2.5mg ROW), Disp: , Rfl:     Allergies -   Allergies   Allergen Reactions     No Known Allergies        Social  History -   Social History     Socioeconomic History     Marital status:      Spouse name: Not on file     Number of children: Not on file     Years of education: Not on file     Highest education level: Not on file   Occupational History     Not on file   Social Needs     Financial resource strain: Not on file     Food insecurity     Worry: Not on file     Inability: Not on file     Transportation needs     Medical: Not on file     Non-medical: Not on file   Tobacco Use     Smoking status: Never Smoker     Smokeless tobacco: Never Used   Substance and Sexual Activity     Alcohol use: No     Alcohol/week: 0.0 standard drinks     Drug use: No     Sexual activity: Not Currently     Partners: Male   Lifestyle     Physical activity     Days per week: Not on file     Minutes per session: Not on file     Stress: Not on file   Relationships     Social connections     Talks on phone: Not on file     Gets together: Not on file     Attends Religion service: Not on file     Active member of club or organization: Not on file     Attends meetings of clubs or organizations: Not on file     Relationship status: Not on file     Intimate partner violence     Fear of current or ex partner: Not on file     Emotionally abused: Not on file     Physically abused: Not on file     Forced sexual activity: Not on file   Other Topics Concern     Parent/sibling w/ CABG, MI or angioplasty before 65F 55M? No   Social History Narrative     Not on file       Family History -   Family History   Problem Relation Age of Onset     Blood Disease No family hx of         blood clots       Review of Systems - As per HPI and PMHx, otherwise review of system review of the head and neck negative. Otherwise 10+ review of system is negative    Physical Exam  There were no vitals taken for this visit.  BMI: There is no height or weight on file to calculate BMI.    General - The patient is well nourished and well developed, and appears to have good  nutritional status.  Alert and oriented to person and place, answers questions and cooperates with examination appropriately.    SKIN - No suspicious lesions or rashes.  Respiration - No respiratory distress.  Head and Face - Normocephalic and atraumatic, with no gross asymmetry noted of the contour of the facial features.  The facial nerve is intact, with strong symmetric movements.    Voice and Breathing - The patient was breathing comfortably without the use of accessory muscles. The patients voice was clear and strong, and had appropriate pitch and quality.    Ears - Bilateral pinna and EACs with normal appearing overlying skin. Tympanic membrane intact with good mobility on pneumatic otoscopy bilaterally. Bony landmarks of the ossicular chain are normal. The tympanic membranes are normal in appearance. No retraction, perforation, or masses.  No fluid or purulence was seen in the external canal or the middle ear.     Eyes - Extraocular movements intact.  Sclera were not icteric or injected, conjunctiva were pink and moist.    Mouth - Examination of the oral cavity showed pink, healthy oral mucosa. No lesions or ulcerations noted.  The tongue was mobile and midline, and the dentition were in good condition.      Throat - The walls of the oropharynx were smooth, pink, moist, symmetric, and had no lesions or ulcerations.  The tonsillar pillars and soft palate were symmetric.  The uvula was midline on elevation.    Neck - Normal midline excursion of the laryngotracheal complex during swallowing.  Full range of motion on passive movement.  Palpation of the occipital, submental, submandibular, internal jugular chain, and supraclavicular nodes did not demonstrate any abnormal lymph nodes or masses.  The carotid pulse was palpable bilaterally.  Palpation of the thyroid was soft and smooth, with no nodules or goiter appreciated.  The trachea was mobile and midline.    Nose - External contour is symmetric, no gross  deflection or scars.  No external step-offs of the nasal bones were appreciated.  Nasal mucosa is pink and moist with no abnormal mucus.  The septum was somewhat deviated to the left more posteriorly but non-obstructive, turbinates of normal size and position.  No polyps, masses, or purulence noted on examination.    Neuro - Nonfocal neuro exam is normal, CN 2 through 12 intact, normal gait and muscle tone.      Performed in clinic today:  No procedures preformed in clinic today      A/P - Tracy Palomo is a 80 year old female status post the traumatic nasal fracture that appears to be nondisplaced and is well-healed.  Overall she is doing well and will see me back as needed.      Gilbert Eason MD

## 2020-08-19 ENCOUNTER — OFFICE VISIT (OUTPATIENT)
Dept: OTOLARYNGOLOGY | Facility: OTHER | Age: 80
End: 2020-08-19
Payer: MEDICARE

## 2020-08-19 DIAGNOSIS — S02.2XXA CLOSED FRACTURE OF NASAL BONE, INITIAL ENCOUNTER: Primary | ICD-10-CM

## 2020-08-19 PROCEDURE — 99203 OFFICE O/P NEW LOW 30 MIN: CPT | Performed by: OTOLARYNGOLOGY

## 2020-08-19 NOTE — LETTER
8/19/2020         RE: Tracy Palomo  [address] [place] [id number]        Dear Colleague,    Thank you for referring your patient, Tracy Palomo, to the [facility]. Please see a copy of my visit note below.    ENT Consultation    Tracy Palomo who is a 80 year old female seen in consultation at the request of self.      History of Present Illness - Tracy Palomo is a 80 year old female presents for evaluation of possible nasal  fracture that may have occurred 2 weeks ago when she fell on the pavement.  She lost her footing and fell.  Patient is on Coumadin and there was fair amount of facial ecchymosis.  Since then she noticed facility for laceration has healed quite well under the left eyebrow.  Also was no further nose bleeding.  Her mom moderate size based on CT scan left forehead scalp hematoma seems to have resolved she was noted to have minimally displaced bilateral nasal bone fractures on a CT scan at that time.  She is breathing well through her nose currently does not notice any significant obvious bony displacement.      Past Medical History -   Past Medical History:   Diagnosis Date     Atrial fibrillation (H) 2014    related to colon surgery     Colon polyps      Diverticulosis of colon (without mention of hemorrhage)     Diverticulosis     DVT (deep vein thrombosis) in pregnancy 2014    after colon surgery     Other and unspecified hyperlipidemia      PE (pulmonary embolism) 2014    related to colon surgery     Unspecified essential hypertension        Current Medications -   Current Outpatient Medications:      acetaminophen (TYLENOL) 500 MG tablet, Take 1-2 tablets (500-1,000 mg) by mouth every 6 hours as needed for pain or fever, Disp: 60 tablet, Rfl: 1     lovastatin (MEVACOR) 40 MG tablet, TAKE 1 TABLET BY MOUTH ONCE DAILY AT BEDTIME, Disp: 90 tablet, Rfl: 3     meclizine (ANTIVERT) 25 MG tablet, Take 1 tablet (25 mg) by mouth 3 times daily as needed for  dizziness, Disp: 30 tablet, Rfl: 0     warfarin ANTICOAGULANT (COUMADIN) 5 MG tablet, Take 2.5 mg by mouth every evening (Monday 5mg, 2.5mg ROW), Disp: , Rfl:     Allergies -   Allergies   Allergen Reactions     No Known Allergies        Social History -   Social History     Socioeconomic History     Marital status:      Spouse name: Not on file     Number of children: Not on file     Years of education: Not on file     Highest education level: Not on file   Occupational History     Not on file   Social Needs     Financial resource strain: Not on file     Food insecurity     Worry: Not on file     Inability: Not on file     Transportation needs     Medical: Not on file     Non-medical: Not on file   Tobacco Use     Smoking status: Never Smoker     Smokeless tobacco: Never Used   Substance and Sexual Activity     Alcohol use: No     Alcohol/week: 0.0 standard drinks     Drug use: No     Sexual activity: Not Currently     Partners: Male   Lifestyle     Physical activity     Days per week: Not on file     Minutes per session: Not on file     Stress: Not on file   Relationships     Social connections     Talks on phone: Not on file     Gets together: Not on file     Attends Ca[employer]ism service: Not on file     Active member of club or organization: Not on file     Attends meetings of clubs or organizations: Not on file     Relationship status: Not on file     Intimate partner violence     Fear of current or ex partner: Not on file     Emotionally abused: Not on file     Physically abused: Not on file     Forced sexual activity: Not on file   Other Topics Concern     Parent/sibling w/ CABG, MI or angioplasty before 65F 55M? No   Social History Narrative     Not on file       Family History -   Family History   Problem Relation Age of Onset     Blood Disease No family hx of         blood clots       Review of Systems - As per HPI and PMHx, otherwise review of system review of the head and neck negative. Otherwise 10+  review of system is negative    Physical Exam  There were no vitals taken for this visit.  BMI: There is no height or weight on file to calculate BMI.    General - The patient is well nourished and well developed, and appears to have good nutritional status.  Alert and oriented to person and place, answers questions and cooperates with examination appropriately.    SKIN - No suspicious lesions or rashes.  Respiration - No respiratory distress.  Head and Face - Normocephalic and atraumatic, with no gross asymmetry noted of the contour of the facial features.  The facial nerve is intact, with strong symmetric movements.    Voice and Breathing - The patient was breathing comfortably without the use of accessory muscles. The patients voice was clear and strong, and had appropriate pitch and quality.    Ears - Bilateral pinna and EACs with normal appearing overlying skin. Tympanic membrane intact with good mobility on pneumatic otoscopy bilaterally. Bony landmarks of the ossicular chain are normal. The tympanic membranes are normal in appearance. No retraction, perforation, or masses.  No fluid or purulence was seen in the external canal or the middle ear.     Eyes - Extraocular movements intact.  Sclera were not icteric or injected, conjunctiva were pink and moist.    Mouth - Examination of the oral cavity showed pink, healthy oral mucosa. No lesions or ulcerations noted.  The tongue was mobile and midline, and the dentition were in good condition.      Throat - The walls of the oropharynx were smooth, pink, moist, symmetric, and had no lesions or ulcerations.  The tonsillar pillars and soft palate were symmetric.  The uvula was midline on elevation.    Neck - Normal midline excursion of the laryngotracheal complex during swallowing.  Full range of motion on passive movement.  Palpation of the occipital, submental, submandibular, internal jugular chain, and supraclavicular nodes did not demonstrate any abnormal lymph  nodes or masses.  The carotid pulse was palpable bilaterally.  Palpation of the thyroid was soft and smooth, with no nodules or goiter appreciated.  The trachea was mobile and midline.    Nose - External contour is symmetric, no gross deflection or scars.  No external step-offs of the nasal bones were appreciated.  Nasal mucosa is pink and moist with no abnormal mucus.  The septum was somewhat deviated to the left more posteriorly but non-obstructive, turbinates of normal size and position.  No polyps, masses, or purulence noted on examination.    Neuro - Nonfocal neuro exam is normal, CN 2 through 12 intact, normal gait and muscle tone.      Performed in clinic today:  No procedures preformed in clinic today      A/P - Tracy Palomo is a 80 year old female status post the traumatic nasal fracture that appears to be nondisplaced and is well-healed.  Overall she is doing well and will see me back as needed.      Gilbert Eason MD      Again, thank you for allowing me to participate in the care of your patient.        Sincerely,        Gilbert Eason MD, MD

## 2020-08-21 ENCOUNTER — TELEPHONE (OUTPATIENT)
Dept: INTERNAL MEDICINE | Facility: CLINIC | Age: 80
End: 2020-08-21

## 2020-08-21 NOTE — TELEPHONE ENCOUNTER
Reason for Call:  Other      Detailed comments: Tracy was told to call and let you know her blood pressure readings for this morning.   122/80 and pulse 125- not sure if this pulse is correct   117/76 pulse 82   139/68 pulse 97   Tracy would like to know what her pulse should be     Phone Number Patient can be reached at: Home number on file [phone] (home)    Best Time: any     Can we leave a detailed message on this number? YES    Call taken on 8/21/2020 at 10:25 AM by Hayley Ellsworth

## 2020-08-26 ENCOUNTER — ANTICOAGULATION THERAPY VISIT (OUTPATIENT)
Dept: ANTICOAGULATION | Facility: CLINIC | Age: 80
End: 2020-08-26

## 2020-08-26 DIAGNOSIS — Z79.01 LONG TERM CURRENT USE OF ANTICOAGULANT THERAPY: ICD-10-CM

## 2020-08-26 DIAGNOSIS — I26.99 OTHER PULMONARY EMBOLISM WITHOUT ACUTE COR PULMONALE, UNSPECIFIED CHRONICITY (H): ICD-10-CM

## 2020-08-26 LAB
CAPILLARY BLOOD COLLECTION: NORMAL
INR BLD: 2 (ref 0.86–1.14)

## 2020-08-26 PROCEDURE — 36416 COLLJ CAPILLARY BLOOD SPEC: CPT | Performed by: INTERNAL MEDICINE

## 2020-08-26 PROCEDURE — 85610 PROTHROMBIN TIME: CPT | Mod: QW | Performed by: INTERNAL MEDICINE

## 2020-08-26 PROCEDURE — 99207 ZZC NO CHARGE NURSE ONLY: CPT | Performed by: INTERNAL MEDICINE

## 2020-08-26 NOTE — PROGRESS NOTES
ANTICOAGULATION FOLLOW-UP CLINIC VISIT    Patient Name:  Tracy Palomo  Date:  2020  Contact Type:  Telephone    SUBJECTIVE:  Patient Findings     Comments:   The patient was assessed for diet, medication, and activity level changes, missed or extra doses, bruising or bleeding, with no problem findings.  Johanna Mckeon RN          Clinical Outcomes     Negatives:   Major bleeding event, Thromboembolic event, Anticoagulation-related hospital admission, Anticoagulation-related ED visit, Anticoagulation-related fatality    Comments:   The patient was assessed for diet, medication, and activity level changes, missed or extra doses, bruising or bleeding, with no problem findings.  Johanna Mckeon RN             OBJECTIVE    Recent labs: (last 7 days)     20  0906   INR 2.0*       ASSESSMENT / PLAN  INR assessment THER    Recheck INR In: 3 WEEKS    INR Location Outside lab      Anticoagulation Summary  As of 2020    INR goal:   2.0-3.0   TTR:   67.1 % (1 y)   INR used for dosin.0 (2020)   Warfarin maintenance plan:   5 mg (5 mg x 1) every Mon; 2.5 mg (5 mg x 0.5) all other days   Full warfarin instructions:   5 mg every Mon; 2.5 mg all other days   Weekly warfarin total:   20 mg   No change documented:   Johanna Mckeon RN   Plan last modified:   Johanna Mckeon RN (2020)   Next INR check:   2020   Priority:   High   Target end date:   Indefinite    Indications    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]  Long term current use of anticoagulant therapy [Z79.01]  Other pulmonary embolism without acute cor pulmonale  unspecified chronicity (H) [I26.99]             Anticoagulation Episode Summary     INR check location:       Preferred lab:       Send INR reminders to:   [facility]    Comments:   5 mg tablets, book, PM dose       Anticoagulation Care Providers     Provider Role Specialty Phone number    Chad Betts MD Referring  Internal Medicine [phone]    Gerard Medrano MD [facility] [phone]            See the Encounter Report to view Anticoagulation Flowsheet and Dosing Calendar (Go to Encounters tab in chart review, and find the Anticoagulation Therapy Visit)    Dosage adjustment made based on physician directed care plan.      Johanna Mckeon RN

## 2020-09-16 ENCOUNTER — ANTICOAGULATION THERAPY VISIT (OUTPATIENT)
Dept: ANTICOAGULATION | Facility: CLINIC | Age: 80
End: 2020-09-16

## 2020-09-16 DIAGNOSIS — Z79.01 LONG TERM CURRENT USE OF ANTICOAGULANT THERAPY: ICD-10-CM

## 2020-09-16 DIAGNOSIS — I26.99 OTHER PULMONARY EMBOLISM WITHOUT ACUTE COR PULMONALE, UNSPECIFIED CHRONICITY (H): ICD-10-CM

## 2020-09-16 LAB
CAPILLARY BLOOD COLLECTION: NORMAL
INR BLD: 1.9 (ref 0.86–1.14)

## 2020-09-16 PROCEDURE — 99207 ZZC NO CHARGE NURSE ONLY: CPT | Performed by: INTERNAL MEDICINE

## 2020-09-16 PROCEDURE — 36416 COLLJ CAPILLARY BLOOD SPEC: CPT | Performed by: INTERNAL MEDICINE

## 2020-09-16 PROCEDURE — 85610 PROTHROMBIN TIME: CPT | Mod: QW | Performed by: INTERNAL MEDICINE

## 2020-09-16 NOTE — PROGRESS NOTES
ANTICOAGULATION FOLLOW-UP CLINIC VISIT    Patient Name:  Tracy Palomo  Date:  2020  Contact Type:  Telephone    SUBJECTIVE:  Patient Findings     Comments:   Patient denies any identifiable changes that caused the subtherapeutic INR. Johanna Mckeon RN            Clinical Outcomes     Comments:   Patient denies any identifiable changes that caused the subtherapeutic INR. Johanna Mckeon RN               OBJECTIVE    Recent labs: (last 7 days)     20  0851   INR 1.9*       ASSESSMENT / PLAN  INR assessment SUB    Recheck INR In: 3 WEEKS    INR Location Outside lab      Anticoagulation Summary  As of 2020    INR goal:   2.0-3.0   TTR:   61.4 % (1 y)   INR used for dosin.9! (2020)   Warfarin maintenance plan:   5 mg (5 mg x 1) every Mon, Wed; 2.5 mg (5 mg x 0.5) all other days   Full warfarin instructions:   5 mg every Mon, Wed; 2.5 mg all other days   Weekly warfarin total:   22.5 mg   Plan last modified:   Johanna Mckeon RN (2020)   Next INR check:   10/7/2020   Priority:   Maintenance   Target end date:   Indefinite    Indications    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]  Long term current use of anticoagulant therapy [Z79.01]  Other pulmonary embolism without acute cor pulmonale  unspecified chronicity (H) [I26.99]             Anticoagulation Episode Summary     INR check location:       Preferred lab:       Send INR reminders to:   [facility]    Comments:   5 mg tablets, book, PM dose       Anticoagulation Care Providers     Provider Role Specialty Phone number    Chad Betts MD Referring Internal Medicine [phone]    Gerard Medrano MD Responsible Family Practice [phone]            See the Encounter Report to view Anticoagulation Flowsheet and Dosing Calendar (Go to Encounters tab in chart review, and find the Anticoagulation Therapy Visit)    Dosage adjustment made based on physician directed care plan.    Johanna  RADHA Mckeon, RN

## 2020-10-07 ENCOUNTER — ANTICOAGULATION THERAPY VISIT (OUTPATIENT)
Dept: ANTICOAGULATION | Facility: CLINIC | Age: 80
End: 2020-10-07

## 2020-10-07 DIAGNOSIS — Z79.01 LONG TERM CURRENT USE OF ANTICOAGULANT THERAPY: ICD-10-CM

## 2020-10-07 DIAGNOSIS — I26.99 OTHER PULMONARY EMBOLISM WITHOUT ACUTE COR PULMONALE, UNSPECIFIED CHRONICITY (H): ICD-10-CM

## 2020-10-07 LAB
CAPILLARY BLOOD COLLECTION: NORMAL
INR BLD: 2.2 (ref 0.86–1.14)

## 2020-10-07 PROCEDURE — 85610 PROTHROMBIN TIME: CPT | Performed by: INTERNAL MEDICINE

## 2020-10-07 PROCEDURE — 36416 COLLJ CAPILLARY BLOOD SPEC: CPT | Performed by: INTERNAL MEDICINE

## 2020-10-07 NOTE — PROGRESS NOTES
ANTICOAGULATION FOLLOW-UP CLINIC VISIT    Patient Name:  Tracy Palomo  Date:  10/7/2020  Contact Type:  Telephone    SUBJECTIVE:  Patient Findings     Comments:  The patient was assessed for diet, medication, and activity level changes, missed or extra doses, bruising or bleeding, with no problem findings.  Johanna Mckeon RN          Clinical Outcomes     Negatives:  Major bleeding event, Thromboembolic event, Anticoagulation-related hospital admission, Anticoagulation-related ED visit, Anticoagulation-related fatality    Comments:  The patient was assessed for diet, medication, and activity level changes, missed or extra doses, bruising or bleeding, with no problem findings.  Johanna Mckeon RN             OBJECTIVE    Recent labs: (last 7 days)     10/07/20  0905   INR 2.2*       ASSESSMENT / PLAN  INR assessment THER    Recheck INR In: 4 WEEKS    INR Location Outside lab      Anticoagulation Summary  As of 10/7/2020    INR goal:  2.0-3.0   TTR:  59.5 % (1 y)   INR used for dosin.2 (10/7/2020)   Warfarin maintenance plan:  5 mg (5 mg x 1) every Mon, Wed; 2.5 mg (5 mg x 0.5) all other days   Full warfarin instructions:  5 mg every Mon, Wed; 2.5 mg all other days   Weekly warfarin total:  22.5 mg   No change documented:  Johanna Mckeon RN   Plan last modified:  Johanna Mckeon RN (2020)   Next INR check:  2020   Priority:  Maintenance   Target end date:  Indefinite    Indications    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 [phone]            See the Encounter Report to view Anticoagulation Flowsheet and Dosing Calendar (Go to Encounters tab in chart review, and find the Anticoagulation Therapy Visit)    Dosage adjustment made based on physician directed care plan.      Johanna Mckeon RN

## 2020-10-28 ENCOUNTER — ANCILLARY PROCEDURE (OUTPATIENT)
Dept: GENERAL RADIOLOGY | Facility: CLINIC | Age: 80
End: 2020-10-28
Attending: ORTHOPAEDIC SURGERY
Payer: MEDICARE

## 2020-10-28 ENCOUNTER — OFFICE VISIT (OUTPATIENT)
Dept: ORTHOPEDICS | Facility: CLINIC | Age: 80
End: 2020-10-28
Payer: MEDICARE

## 2020-10-28 ENCOUNTER — HOSPITAL ENCOUNTER (OUTPATIENT)
Facility: CLINIC | Age: 80
End: 2020-10-28
Attending: ORTHOPAEDIC SURGERY | Admitting: ORTHOPAEDIC SURGERY
Payer: MEDICARE

## 2020-10-28 VITALS
WEIGHT: 182.2 LBS | HEIGHT: 64 IN | DIASTOLIC BLOOD PRESSURE: 78 MMHG | BODY MASS INDEX: 31.1 KG/M2 | SYSTOLIC BLOOD PRESSURE: 130 MMHG

## 2020-10-28 DIAGNOSIS — M17.12 PRIMARY OSTEOARTHRITIS OF LEFT KNEE: Primary | ICD-10-CM

## 2020-10-28 DIAGNOSIS — M25.562 LEFT KNEE PAIN: ICD-10-CM

## 2020-10-28 PROCEDURE — 99214 OFFICE O/P EST MOD 30 MIN: CPT | Performed by: ORTHOPAEDIC SURGERY

## 2020-10-28 PROCEDURE — 73564 X-RAY EXAM KNEE 4 OR MORE: CPT | Mod: LT | Performed by: RADIOLOGY

## 2020-10-28 ASSESSMENT — PAIN SCALES - GENERAL: PAINLEVEL: NO PAIN (0)

## 2020-10-28 ASSESSMENT — MIFFLIN-ST. JEOR: SCORE: 1286.45

## 2020-10-28 NOTE — PROGRESS NOTES
"Office Visit-Follow up    Chief Complaint: Tracy Palomo is a 80 year old female who is being seen for   Chief Complaint   Patient presents with     Knee Pain     left knee pain       History of Present Illness:   Today's visit:  Presents for long standing left knee pain.  She has attempted ice ibuprofen glucosamine Tylenol activity modification and rest with no improvement.  She finds her activity gradually diminishing.  She finds active days will be followed by painful days where she is unable to walk and do basic things.  She would like to proceed with left knee replacement.  She is here with her daughter with many questions.    Underwent right total knee arthroplasty January 2019.  Extremely happy.    November 15, 2018 visit:   Tracy Palomo is a 78 year old female who is seen in consultation at the request of self for evaluation of bilateral knee pain  Seen previous for her hip, first time seeing her for knees.  Mechanism of Injury: No trauma or inciting event. States has had knee pain for many years, R > L.  In 2007 underwent a knee arthroscopy for a meniscus tear on the right knee, she states the right knee did well for awhile but then over the last few years and especially the last 2 years has been having increasing progressive pain.  Has had more mild pain on the left knee for at least 2 years.  Pain is right side, medially, constant dull ache.  Moderate pain on right mild pain on left.  Worst with walking, weightbearing, and especially stairs.  Starting to limp more and more causing pain in both hips, back.  Also waking her up at night due to the pain.  States the knee feel not stable, bowed out, and having some swelling.    Therapies tried to date:   Icing, ibuprofen, glucosamine, activity modification, rest, knee arthroscopy, ibuprofen without benefit.      Takes coumadin due to hx of 2 \"blood clots\" one that was reported to the knee following the knee arthroscopy in 2007 and one in the lung " "following a colon procedure.  Per patient she has been recommended for coumadin for life. She notes no issues with clots after the left hip surgery in 2017.        Social History     Occupational History     Not on file   Tobacco Use     Smoking status: Never Smoker     Smokeless tobacco: Never Used   Substance and Sexual Activity     Alcohol use: No     Alcohol/week: 0.0 standard drinks     Drug use: No     Sexual activity: Not Currently     Partners: Male       REVIEW OF SYSTEMS  General: negative for, night sweats, dizziness, fatigue  Resp: No shortness of breath and no cough  CV: negative for chest pain, syncope or near-syncope  GI: negative for nausea, vomiting and diarrhea  : negative for dysuria and hematuria  Musculoskeletal: as above  Neurologic: negative for syncope   Hematologic: negative for bleeding disorder    Physical Exam:  Vitals: /78   Ht 1.626 m (5' 4\")   Wt 82.6 kg (182 lb 3.2 oz)   BMI 31.27 kg/m    BMI= Body mass index is 31.27 kg/m .  Constitutional: healthy, alert and no acute distress   Psychiatric: mentation appears normal and affect normal/bright  NEURO: no focal deficits  RESP: Normal with easy respirations and no use of accessory muscles to breathe, no audible wheezing or retractions  CV: LLE:  no edema         Regular rate and rhythm by palpation  SKIN: No erythema, rashes, excoriation, or breakdown. No evidence of infection.   JOINT/EXTREMITIES:left knee: Active motion 3-115 degrees.  Varus deformity does not fully correctable with valgus stressing.  Collateral ligaments are intact.  Small effusion.  Tenderness along medial joint line.  GAIT: antalgic            Diagnostic Modalities:  left knee X-ray: No fractures or dislocations.  Extensive degenerative changes throughout the knee.  There is joint space loss medial lateral compartments with some irregularity along the medial and lateral femoral condyles.  There is bone-on-bone wear patellofemoral with some bony erosion " noted along the anterior metadiaphyseal junction.  Calcification along superior pole of patella.  Independent visualization of the images was performed.      Impression: left knee primary osteoarthritis    Plan:  All of the above pertinent physical exam and imaging modalities findings was reviewed with Tracy and her daughter.    The patient has attempted conservative treatments that include Tylenol, focused self directed physical therapy, activity modifications, rest yet still continues to have significant issues. These issues include pain at rest, increased pain with activity, loss of motion of the joint, pain that causes her to decrease her activity. Secondary to these reasons I have offered surgery in the form of left total knee replacement.    Risks, benefits, complications, alternatives, limitations, and post operative course were discussed. Risks including infection (requiring long-term antibiotics and repeat surgeries), implant loosening (requiring revision surgery), heart attacks, strokes, bleeding (possibly requiring blood transfusion), scars, instability, numbness around the knee, stiffness (requiring manipulations or repeat surgeries), instability and dislocations, fractures, blood clots the legs and lungs, nerve injury (possibly leading to foot drop).  Postoperative course was discussed as far as limitations and expected recovery times. It was also discussed that after surgery there is a need for blood thinners to prevent blood clots, but even when being treated it is possible to develop a blood clot. Anticipate being hospitalized 1 days and subsequently either discharged home or to a rehabilitation facility. Determinations of this will be based on progress with physical therapy while in the hospital. The importance of post-operative physical therapy was discussed. If discharge home from the hospital expect [place] home physical therapy for about 2 weeks and then transition to going to a physical  therapy location for more aggressive therapy. Without physical therapy, outcomes may not be optimal. All questions were answered. The patient agrees to proceed with surgery.  Past medical and surgical history was reviewed. It is positive for pulmonary embolism, A. fib, hypertension. Ms. Palomo will need a pre-operative medical evaluation and clearance by a primary care provider. We will obtain a CBC as well as the appropriate radiographs prior to surgery. I will leave it to the discretion of the primary care provider for additional pre-operative testing.     She is on Coumadin.  We will leave it to the discretion of her family doctor to determine if she needs bridging.  We will certainly resume Coumadin with Lovenox coverage postoperatively.  We discussed potential next day discharge.  We discussed the needs at home as far as family to help.  We discussed concerns regarding Covid virus.    Plan to see her 1 week prior to surgery.    Return to clinic 14 days post-operatively. , or sooner as needed for changes.  Re-x-ray on return: Yes    Vitaly Pang D.O.

## 2020-10-28 NOTE — LETTER
10/28/2020         RE: Tracy Palomo  [address] [facility] [id number]        Dear Colleague,    Thank you for referring your patient, Tracy Palomo, to the [facility]. Please see a copy of my visit note below.    Office Visit-Follow up    Chief Complaint: Tracy Palomo is a 80 year old female who is being seen for   Chief Complaint   Patient presents with     Knee Pain     left knee pain       History of Present Illness:   Today's visit:  Presents for long standing left knee pain.  She has attempted ice ibuprofen glucosamine Tylenol activity modification and rest with no improvement.  She finds her activity gradually diminishing.  She finds active days will be followed by painful days where she is unable to walk and do basic things.  She would like to proceed with left knee replacement.  She is here with her daughter with many questions.    Underwent right total knee arthroplasty January 2019.  Extremely happy.    November 15, 2018 visit:   Tracy Palomo is a 78 year old female who is seen in consultation at the request of self for evaluation of bilateral knee pain  Seen previous for her hip, first time seeing her for knees.  Mechanism of Injury: No trauma or inciting event. States has had knee pain for many years, R > L.  In 2007 underwent a knee arthroscopy for a meniscus tear on the right knee, she states the right knee did well for awhile but then over the last few years and especially the last 2 years has been having increasing progressive pain.  Has had more mild pain on the left knee for at least 2 years.  Pain is right side, medially, constant dull ache.  Moderate pain on right mild pain on left.  Worst with walking, weightbearing, and especially stairs.  Starting to limp more and more causing pain in both hips, back.  Also waking her up at night due to the pain.  States the knee feel not stable, bowed out, and having some swelling.    Therapies tried to date:    "Icing, ibuprofen, glucosamine, activity modification, rest, knee arthroscopy, ibuprofen without benefit.      Takes coumadin due to hx of 2 \"blood clots\" one that was reported to the knee following the knee arthroscopy in 2007 and one in the lung following a colon procedure.  Per patient she has been recommended for coumadin for life. She notes no issues with clots after the left hip surgery in 2017.        Social History     Occupational History     Not on file   Tobacco Use     Smoking status: Never Smoker     Smokeless tobacco: Never Used   Substance and Sexual Activity     Alcohol use: No     Alcohol/week: 0.0 standard drinks     Drug use: No     Sexual activity: Not Currently     Partners: Male       REVIEW OF SYSTEMS  General: negative for, night sweats, dizziness, fatigue  Resp: No shortness of breath and no cough  CV: negative for chest pain, syncope or near-syncope  GI: negative for nausea, vomiting and diarrhea  : negative for dysuria and hematuria  Musculoskeletal: as above  Neurologic: negative for syncope   Hematologic: negative for bleeding disorder    Physical Exam:  Vitals: /78   Ht 1.626 m (5' 4\")   Wt 82.6 kg (182 lb 3.2 oz)   BMI 31.27 kg/m    BMI= Body mass index is 31.27 kg/m .  Constitutional: healthy, alert and no acute distress   Psychiatric: mentation appears normal and affect normal/bright  NEURO: no focal deficits  RESP: Normal with easy respirations and no use of accessory muscles to breathe, no audible wheezing or retractions  CV: LLE:  no edema         Regular rate and rhythm by palpation  SKIN: No erythema, rashes, excoriation, or breakdown. No evidence of infection.   JOINT/EXTREMITIES:left knee: Active motion 3-115 degrees.  Varus deformity does not fully correctable with valgus stressing.  Collateral ligaments are intact.  Small effusion.  Tenderness along medial joint line.  GAIT: antalgic            Diagnostic Modalities:  left knee X-ray: No fractures or dislocations.  " Extensive degenerative changes throughout the knee.  There is joint space loss medial lateral compartments with some irregularity along the medial and lateral femoral condyles.  There is bone-on-bone wear patellofemoral with some bony erosion noted along the anterior metadiaphyseal junction.  Calcification along superior pole of patella.  Independent visualization of the images was performed.      Impression: left knee primary osteoarthritis    Plan:  All of the above pertinent physical exam and imaging modalities findings was reviewed with Tracy and her daughter.    The patient has attempted conservative treatments that include Tylenol, focused self directed physical therapy, activity modifications, rest yet still continues to have significant issues. These issues include pain at rest, increased pain with activity, loss of motion of the joint, pain that causes her to decrease her activity. Secondary to these reasons I have offered surgery in the form of left total knee replacement.    Risks, benefits, complications, alternatives, limitations, and post operative course were discussed. Risks including infection (requiring long-term antibiotics and repeat surgeries), implant loosening (requiring revision surgery), heart attacks, strokes, bleeding (possibly requiring blood transfusion), scars, instability, numbness around the knee, stiffness (requiring manipulations or repeat surgeries), instability and dislocations, fractures, blood clots the legs and lungs, nerve injury (possibly leading to foot drop).  Postoperative course was discussed as far as limitations and expected recovery times. It was also discussed that after surgery there is a need for blood thinners to prevent blood clots, but even when being treated it is possible to develop a blood clot. Anticipate being hospitalized 1 days and subsequently either discharged home or to a rehabilitation facility. Determinations of this will be based on progress with  physical therapy while in the hospital. The importance of post-operative physical therapy was discussed. If discharge home from the hospital expect [place] home physical therapy for about 2 weeks and then transition to going to a physical therapy location for more aggressive therapy. Without physical therapy, outcomes may not be optimal. All questions were answered. The patient agrees to proceed with surgery.  Past medical and surgical history was reviewed. It is positive for pulmonary embolism, A. fib, hypertension. Ms. Palomo will need a pre-operative medical evaluation and clearance by a primary care provider. We will obtain a CBC as well as the appropriate radiographs prior to surgery. I will leave it to the discretion of the primary care provider for additional pre-operative testing.     She is on Coumadin.  We will leave it to the discretion of her family doctor to determine if she needs bridging.  We will certainly resume Coumadin with Lovenox coverage postoperatively.  We discussed potential next day discharge.  We discussed the needs at home as far as family to help.  We discussed concerns regarding Covid virus.    Plan to see her 1 week prior to surgery.    Return to clinic 14 days post-operatively. , or sooner as needed for changes.  Re-x-ray on return: Yes    Vitaly Pang D.O.            Again, thank you for allowing me to participate in the care of your patient.        Sincerely,        Kaleb Pang, DO

## 2020-10-29 ENCOUNTER — TELEPHONE (OUTPATIENT)
Dept: ORTHOPEDICS | Facility: CLINIC | Age: 80
End: 2020-10-29

## 2020-10-29 DIAGNOSIS — Z11.59 ENCOUNTER FOR SCREENING FOR OTHER VIRAL DISEASES: Primary | ICD-10-CM

## 2020-10-29 DIAGNOSIS — Z01.818 PREOPERATIVE EXAMINATION: Primary | ICD-10-CM

## 2020-10-29 NOTE — TELEPHONE ENCOUNTER
Type of surgery: left total knee replacement  Location of surgery: [facility]   Date of surgery: 11/17/20  Surgeon: Dr. Pang  Pre-Op Appt Date: 11/10/20  Post-Op Appt Date: 11/30/20   Packet sent out: Surgery packet was given to patient in clinic.   Pre-cert/Authorization completed: NA  Date: 10/29/2020    Pat Reese  Surgery Scheduler

## 2020-11-02 RX ORDER — TRANEXAMIC ACID 650 MG/1
1950 TABLET ORAL ONCE
Status: CANCELLED | OUTPATIENT
Start: 2020-11-02 | End: 2020-11-02

## 2020-11-02 RX ORDER — CEFAZOLIN SODIUM 2 G/100ML
2 INJECTION, SOLUTION INTRAVENOUS
Status: CANCELLED | OUTPATIENT
Start: 2020-11-02

## 2020-11-02 RX ORDER — CEFAZOLIN SODIUM 1 G/3ML
1 INJECTION, POWDER, FOR SOLUTION INTRAMUSCULAR; INTRAVENOUS SEE ADMIN INSTRUCTIONS
Status: CANCELLED | OUTPATIENT
Start: 2020-11-02

## 2020-11-04 ENCOUNTER — TELEPHONE (OUTPATIENT)
Dept: ANTICOAGULATION | Facility: CLINIC | Age: 80
End: 2020-11-04

## 2020-11-04 ENCOUNTER — ANTICOAGULATION THERAPY VISIT (OUTPATIENT)
Dept: ANTICOAGULATION | Facility: CLINIC | Age: 80
End: 2020-11-04

## 2020-11-04 DIAGNOSIS — Z79.01 LONG TERM CURRENT USE OF ANTICOAGULANT THERAPY: ICD-10-CM

## 2020-11-04 DIAGNOSIS — I26.99 OTHER PULMONARY EMBOLISM WITHOUT ACUTE COR PULMONALE, UNSPECIFIED CHRONICITY (H): ICD-10-CM

## 2020-11-04 DIAGNOSIS — I26.99 OTHER PULMONARY EMBOLISM WITHOUT ACUTE COR PULMONALE, UNSPECIFIED CHRONICITY (H): Primary | ICD-10-CM

## 2020-11-04 DIAGNOSIS — Z01.818 PREOPERATIVE EXAMINATION: ICD-10-CM

## 2020-11-04 LAB
BASOPHILS # BLD AUTO: 0 10E9/L (ref 0–0.2)
BASOPHILS NFR BLD AUTO: 0.6 %
DIFFERENTIAL METHOD BLD: NORMAL
EOSINOPHIL NFR BLD AUTO: 3 %
ERYTHROCYTE [DISTWIDTH] IN BLOOD BY AUTOMATED COUNT: 13.3 % (ref 10–15)
HCT VFR BLD AUTO: 44.5 % (ref 35–47)
HGB BLD-MCNC: 14.1 G/DL (ref 11.7–15.7)
IMM GRANULOCYTES # BLD: 0 10E9/L (ref 0–0.4)
IMM GRANULOCYTES NFR BLD: 0.4 %
INR BLD: 2.2 (ref 0.86–1.14)
LYMPHOCYTES # BLD AUTO: 1.4 10E9/L (ref 0.8–5.3)
LYMPHOCYTES NFR BLD AUTO: 28.3 %
MCH RBC QN AUTO: 29.1 PG (ref 26.5–33)
MCHC RBC AUTO-ENTMCNC: 31.7 G/DL (ref 31.5–36.5)
MCV RBC AUTO: 92 FL (ref 78–100)
MONOCYTES # BLD AUTO: 0.4 10E9/L (ref 0–1.3)
MONOCYTES NFR BLD AUTO: 8.3 %
NEUTROPHILS # BLD AUTO: 2.9 10E9/L (ref 1.6–8.3)
NEUTROPHILS NFR BLD AUTO: 59.4 %
NRBC # BLD AUTO: 0 10*3/UL
NRBC BLD AUTO-RTO: 0 /100
PLATELET # BLD AUTO: 207 10E9/L (ref 150–450)
RBC # BLD AUTO: 4.85 10E12/L (ref 3.8–5.2)
WBC # BLD AUTO: 5 10E9/L (ref 4–11)

## 2020-11-04 PROCEDURE — 36415 COLL VENOUS BLD VENIPUNCTURE: CPT | Performed by: INTERNAL MEDICINE

## 2020-11-04 PROCEDURE — 85025 COMPLETE CBC W/AUTO DIFF WBC: CPT | Performed by: INTERNAL MEDICINE

## 2020-11-04 PROCEDURE — 85610 PROTHROMBIN TIME: CPT | Performed by: INTERNAL MEDICINE

## 2020-11-04 PROCEDURE — 99207 PR NO CHARGE NURSE ONLY: CPT | Performed by: INTERNAL MEDICINE

## 2020-11-04 NOTE — PROGRESS NOTES
ANTICOAGULATION FOLLOW-UP CLINIC VISIT    Patient Name:  Tracy Palomo  Date:  2020  Contact Type:  Telephone    SUBJECTIVE:  Patient Findings     Comments:  The patient was assessed for diet, medication, and activity level changes, missed or extra doses, bruising or bleeding, with no problem findings.    Pt has total knee replacement coming up on .   See TE to PCP regarding procedure  Johanna Mckeon RN          Clinical Outcomes     Negatives:  Major bleeding event, Thromboembolic event, Anticoagulation-related hospital admission, Anticoagulation-related ED visit, Anticoagulation-related fatality    Comments:  The patient was assessed for diet, medication, and activity level changes, missed or extra doses, bruising or bleeding, with no problem findings.    Pt has total knee replacement coming up on .   See TE to PCP regarding procedure  Johanna Mckeon RN             OBJECTIVE    Recent labs: (last 7 days)     20  0915   INR 2.2*       ASSESSMENT / PLAN  INR assessment THER    Recheck INR In: 10 DAYS    INR Location Outside lab      Anticoagulation Summary  As of 2020    INR goal:  2.0-3.0   TTR:  60.1 % (1 y)   INR used for dosin.2 (2020)   Warfarin maintenance plan:  5 mg (5 mg x 1) every Mon, Wed; 2.5 mg (5 mg x 0.5) all other days   Full warfarin instructions:  : Hold; : Hold; : Hold; : Hold; 11/15: Hold; Otherwise 5 mg every Mon, Wed; 2.5 mg all other days   Weekly warfarin total:  22.5 mg   Plan last modified:  Johanna Mckeon RN (2020)   Next INR check:  2020   Priority:  Maintenance   Target end date:  Indefinite    Indications    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]  Long term current use of anticoagulant therapy [Z79.01]  Other pulmonary embolism without acute cor pulmonale  unspecified chronicity (H) [I26.99]             Anticoagulation Episode Summary     INR check location:      Preferred  lab:      Send INR reminders to:  [facility]    Comments:  5 mg tablets, book, PM dose       Anticoagulation Care Providers     Provider Role Specialty Phone number    Chad Betts MD Referring Internal Medicine [phone]    Gerard Medrano MD Responsible Family Medicine [phone]            See the Encounter Report to view Anticoagulation Flowsheet and Dosing Calendar (Go to Encounters tab in chart review, and find the Anticoagulation Therapy Visit)    Dosage adjustment made based on physician directed care plan.      Johanna Mckeon RN

## 2020-11-04 NOTE — TELEPHONE ENCOUNTER
Pt will be having total knee replacement on 11/17. Please review pt's history and advise if he/she will need to be bridged with Lovenox. If so, 1 mg/kg BID or 1.5 mg/kg daily? If not, OK to stop coumadin 5 days prior and resume usual dose 12-24 hours after procedure?    *note - pt did bridge with the 1 mg/kg dose in January 2019. This would put her at 83 mg BID.     Please advise and I will create Lovenox instructions sheet    She will be admitted at least overnight.     Johanna Mckeon RN

## 2020-11-04 NOTE — TELEPHONE ENCOUNTER
Please review the instructions below.   Please send RX to the pharmacy and I will call pt   Johanna Mckeon RN        Thursday 11/12/20 5 days before surgery: Stop warfarin.  No Lovenox.   Friday 11/13/20 4 days before surgery: No warfarin.  No Lovenox.  Saturday 11/14/20 3 days before surgery: No warfarin, begin Lovenox shots 80 mg  SQ every 12 hours.  [place] 11/15/20  2 days before surgery: No warfarin, Lovenox shots 80 mg SQ every 12 hours.  Monday 11/16/20 1 day before surgery: No warfarin, Lovenox shot in AM only. No PM dose.  Check INR.    Tuesday 11/17/20 Day of surgery: No Lovenox, warfarin 7.5 mg (bump dose) in the PM after surgery/procedure    Wednesday 11/18/20 POD 1: warfarin 5 mg, Lovenox 80 mg SQ every 12 hours.  Thursday 11/19/20 POD 2: warfarin 2.5 mg, Lovenox 80 mg SQ every 12 hours.  Friday 11/20/20 POD 3: warfarin 5 mg, Lovenox 80 mg SQ every 12 hours.  Saturday 11/21/20 POD 4: warfarin 2.5 mg, Lovenox 80 mg SQ every 12 hours.  Sunday 11/22/20 POD 5: warfarin 5 mg, Lovenox 80 mg SQ every 12 hours.  Monday 11/23/20 POD 6: Lovenox 80 mg SQ in the AM only - further instructions with INR check    Or as advised by the surgeon

## 2020-11-05 NOTE — TELEPHONE ENCOUNTER
Pt informed of schedule below. She has already picked up the Lovenox. I have sent her the Lovenox schedule via [employer]. She will look this over and let us know if she has any questions. If not, a lab INR appt has been set up for 11/23. Patient verbalized understanding and agrees with plan of care. Pt had no further questions or concerns at this time.   Arti Ruelas RN, BSN

## 2020-11-10 ENCOUNTER — OFFICE VISIT (OUTPATIENT)
Dept: INTERNAL MEDICINE | Facility: CLINIC | Age: 80
End: 2020-11-10
Payer: MEDICARE

## 2020-11-10 VITALS
DIASTOLIC BLOOD PRESSURE: 66 MMHG | HEART RATE: 72 BPM | TEMPERATURE: 97.1 F | SYSTOLIC BLOOD PRESSURE: 118 MMHG | WEIGHT: 183 LBS | BODY MASS INDEX: 31.41 KG/M2 | RESPIRATION RATE: 16 BRPM | OXYGEN SATURATION: 97 %

## 2020-11-10 DIAGNOSIS — Z79.01 LONG TERM CURRENT USE OF ANTICOAGULANT THERAPY: ICD-10-CM

## 2020-11-10 DIAGNOSIS — Z01.818 PREOP GENERAL PHYSICAL EXAM: Primary | ICD-10-CM

## 2020-11-10 DIAGNOSIS — M17.12 ARTHRITIS OF LEFT KNEE: ICD-10-CM

## 2020-11-10 PROCEDURE — 99214 OFFICE O/P EST MOD 30 MIN: CPT | Performed by: INTERNAL MEDICINE

## 2020-11-10 PROCEDURE — 93000 ELECTROCARDIOGRAM COMPLETE: CPT | Performed by: INTERNAL MEDICINE

## 2020-11-10 ASSESSMENT — PAIN SCALES - GENERAL: PAINLEVEL: NO PAIN (0)

## 2020-11-10 NOTE — PROGRESS NOTES
[address] [id number] [phone]  Dept: [phone]    PRE-OP EVALUATION:  Today's date: 11/10/2020    Tracy Palomo (: 1940) presents for pre-operative evaluation assessment as requested by Dr. Pang.  She requires evaluation and anesthesia risk assessment prior to undergoing surgery/procedure for treatment of osteoarthritis left knee .    Fax number for surgical facility:   Primary Physician: Chad Betts  Type of Anesthesia Anticipated: Spinal    Preop Questionnnaire:  Pre-op Questionnaire 11/10/2020   1. Have you ever had a heart attack or stroke? No   2. Have you ever had surgery on your heart or blood vessels, such as a stent placement, a coronary artery bypass, or surgery on an artery in your head, neck, heart, or legs? No   3. Do you have chest pain with activity? No   4. Do you have a history of  heart failure? No   5. Do you currently have a cold, bronchitis or symptoms of other infection? No   6. Do you have a cough, shortness of breath, or wheezing? No   7. Do you or anyone in your family have previous history of blood clots? YES -    8. Do you or does anyone in your family have a serious bleeding problem such as prolonged bleeding following surgeries or cuts? UNKNOWN -   9. Have you ever had problems with anemia or been told to take iron pills? No   10. Have you had any abnormal blood loss such as black, tarry or bloody stools, or abnormal vaginal bleeding? No   11. Have you ever had a blood transfusion? No   12. Are you willing to have a blood transfusion if it is medically needed before, during, or after your surgery? Yes   13. Have you or any of your relatives ever had problems with anesthesia? UNKNOWN -   14. Do you have sleep apnea, excessive snoring or daytime drowsiness? No   15. Do you have any artifical heart valves or other implanted medical devices like a pacemaker, defibrillator, or continuous glucose monitor? No    16. Do you have artificial joints? YES -   17. Are you allergic to latex? No         HPI:     HPI related to upcoming procedure: Left knee pain and needs replaced.       See problem list for active medical problems.  Problems all longstanding and stable, except as noted/documented.  See ROS for pertinent symptoms related to these conditions.      MEDICAL HISTORY:     Patient Active Problem List    Diagnosis Date Noted     Subarachnoid hematoma (H) 08/05/2020     Priority: Medium     SAH (subarachnoid hemorrhage) (H) 08/05/2020     Priority: Medium     Other pulmonary embolism without acute cor pulmonale, unspecified chronicity (H) 05/27/2020     Priority: Medium     RVF (right ventricular failure) (H) - per Echo 2019 01/09/2019     Priority: Medium     Peripheral edema 01/09/2019     Priority: Medium     S/P total knee replacement using cement, right 01/08/2019     Priority: Medium     Other chest pain 01/08/2019     Priority: Medium     Primary osteoarthritis of right knee 11/15/2018     Priority: Medium     Primary osteoarthritis of left knee 11/15/2018     Priority: Medium     Closed left hip fracture (H) 03/15/2017     Priority: Medium     Long term current use of anticoagulant therapy 03/22/2016     Priority: Medium     Anemia 11/03/2014     Priority: Medium     Paroxysmal atrial fibrillation (H) 11/03/2014     Priority: Medium     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 11/01/2014     Priority: Medium     In 2014       Recent major surgery - exploratory lap with small bowel resection 10/20 11/01/2014     Priority: Medium     Pleural effusion on right 11/01/2014     Priority: Medium     Hypertension goal BP (blood pressure) < 140/90 02/22/2013     Priority: Medium     Hyperlipidemia LDL goal <130 02/22/2013     Priority: Medium     Encounter for long-term current use of medication 02/22/2013     Priority: Medium     Problem list name updated by automated process. Provider to review        History of colonic polyps 02/22/2013     Priority: Medium     Problem list name updated by automated process. Provider to review       Advanced directives, counseling/discussion 02/22/2013     Priority: Medium     Pt states has Advance Directive at home, will bring in to be scanned into chart.        Past Medical History:   Diagnosis Date     Atrial fibrillation (H) 2014    related to colon surgery     Colon polyps      Diverticulosis of colon (without mention of hemorrhage)     Diverticulosis     DVT (deep vein thrombosis) in pregnancy 2014    after colon surgery     Other and unspecified hyperlipidemia      PE (pulmonary embolism) 2014    related to colon surgery     Unspecified essential hypertension      Past Surgical History:   Procedure Laterality Date     ARTHROPLASTY KNEE Right 1/8/2019    Procedure: Right total knee replacement;  Surgeon: Kaleb Pang DO;  Location: [facility]     COLONOSCOPY  09, 10, 12    [facility]     COLONOSCOPY N/A 12/11/2017    Procedure: COLONOSCOPY;  colonoscopy;  Surgeon: Elvis Quezada MD;  Location:  GI     FLEXIBLE SIGMOIDOSCOPY  09, 11     LAPAROTOMY EXPLORATORY N/A 10/20/2014    Procedure: LAPAROTOMY EXPLORATORY;  Surgeon: Miguel Oleary MD;  Location: [facility]     LAPAROTOMY, LYSIS ADHESIONS, COMBINED N/A 10/20/2014    Procedure: COMBINED LAPAROTOMY, LYSIS ADHESIONS;  Surgeon: Miguel Oleary MD;  Location: [facility]     OPEN REDUCTION INTERNAL FIXATION HIP BIPOLAR Left 3/16/2017    Procedure: OPEN REDUCTION INTERNAL FIXATION HIP BIPOLAR;  Surgeon: Kaleb Pang DO;  Location: [facility]     RESECT SMALL BOWEL WITHOUT OSTOMY N/A 10/20/2014    Procedure: RESECT SMALL BOWEL WITHOUT OSTOMY;  Surgeon: Miguel Oleary MD;  Location: [facility]     Current Outpatient Medications   Medication Sig Dispense Refill     lovastatin (MEVACOR) 40 MG tablet TAKE 1 TABLET BY MOUTH ONCE DAILY AT BEDTIME 90 tablet 3     warfarin ANTICOAGULANT  (COUMADIN) 5 MG tablet Take 2.5 mg by mouth every evening (Monday 5mg, 2.5mg ROW)       acetaminophen (TYLENOL) 500 MG tablet Take 1-2 tablets (500-1,000 mg) by mouth every 6 hours as needed for pain or fever (Patient not taking: Reported on 10/28/2020) 60 tablet 1     enoxaparin ANTICOAGULANT (LOVENOX) 80 MG/0.8ML syringe Inject 0.8 mLs (80 mg) Subcutaneous 2 times daily (Patient not taking: Reported on 11/10/2020) 20 Syringe 0     OTC products: None, except as noted above    Allergies   Allergen Reactions     No Known Allergies       Latex Allergy: NO    Social History     Tobacco Use     Smoking status: Never Smoker     Smokeless tobacco: Never Used   Substance Use Topics     Alcohol use: No     Alcohol/week: 0.0 standard drinks     History   Drug Use No     REVIEW OF SYSTEMS:   Constitutional, neuro, ENT, endocrine, pulmonary, cardiac, gastrointestinal, genitourinary, musculoskeletal, integument and psychiatric systems are negative, except as otherwise noted.    EXAM:   /66   Pulse 72   Temp 97.1  F (36.2  C) (Temporal)   Resp 16   Wt 83 kg (183 lb)   SpO2 97%   BMI 31.41 kg/m      GENERAL APPEARANCE: healthy, alert and no distress     HENT: ear canals and TM's normal and nose and mouth without ulcers or lesions     NECK: no adenopathy, no asymmetry, masses, or scars and thyroid normal to palpation     RESP: lungs clear to auscultation - no rales, rhonchi or wheezes     CV: regular rates and rhythm, normal S1 S2, no S3 or S4 and no murmur, click or rub     ABDOMEN:  soft, nontender, no HSM or masses and bowel sounds normal     MS: extremities normal- no gross deformities noted, no evidence of inflammation in joints, FROM in all extremities.     SKIN: no suspicious lesions or rashes     NEURO: Normal strength and tone, sensory exam grossly normal, mentation intact and speech normal    DIAGNOSTICS:   EKG: appears normal, NSR, normal axis, normal intervals, no acute ST/T changes c/w ischemia, no LVH by  voltage criteria, unchanged from previous tracings    Recent Labs   Lab Test 11/04/20  0915 10/07/20  0905 08/06/20  0939 08/06/20  0939 08/05/20  1145 10/20/14  0625 10/20/14  0625   HGB 14.1  --   --   --  13.7   < > 16.0*     --   --   --  190   < > 241   INR 2.2* 2.2*   < > 2.49* 2.51*   < >  --    NA  --   --   --  138 144   < > 138   POTASSIUM  --   --   --  4.2 3.7   < > 3.7   CR  --   --   --  0.87 0.86   < > 0.80   A1C  --   --   --   --   --   --  5.8    < > = values in this interval not displayed.        IMPRESSION:   Reason for surgery/procedure: left knee arthritis     The proposed surgical procedure is considered INTERMEDIATE risk.    REVISED CARDIAC RISK INDEX  The patient has the following serious cardiovascular risks for perioperative complications such as (MI, PE, VFib and 3  AV Block):  No serious cardiac risks  INTERPRETATION: 1 risks: Class II (low risk - 0.9% complication rate)    The patient has the following additional risks for perioperative complications:  No identified additional risks    No diagnosis found.    RECOMMENDATIONS:     --Because of DVT or PE history, patient should be on low molecular weight heparin and wear compression hose before & after surgery    She is set to bridge her coumadin with lovenox injections.     APPROVAL GIVEN to proceed with proposed procedure, without further diagnostic evaluation       Signed Electronically by: Chad Betts MD    Copy of this evaluation report is provided to requesting physician.    [facility] Guidelines    Revised Cardiac Risk Index

## 2020-11-10 NOTE — PATIENT INSTRUCTIONS

## 2020-11-11 ENCOUNTER — OFFICE VISIT (OUTPATIENT)
Dept: ORTHOPEDICS | Facility: CLINIC | Age: 80
End: 2020-11-11
Payer: MEDICARE

## 2020-11-11 VITALS
BODY MASS INDEX: 31.41 KG/M2 | SYSTOLIC BLOOD PRESSURE: 138 MMHG | DIASTOLIC BLOOD PRESSURE: 76 MMHG | WEIGHT: 184 LBS | HEIGHT: 64 IN

## 2020-11-11 DIAGNOSIS — M17.12 PRIMARY OSTEOARTHRITIS OF LEFT KNEE: Primary | ICD-10-CM

## 2020-11-11 PROCEDURE — 99207 PR PREOP VISIT IN GLOBAL PKG: CPT | Performed by: PHYSICIAN ASSISTANT

## 2020-11-11 ASSESSMENT — PAIN SCALES - GENERAL: PAINLEVEL: NO PAIN (0)

## 2020-11-11 ASSESSMENT — MIFFLIN-ST. JEOR: SCORE: 1294.62

## 2020-11-11 NOTE — PROGRESS NOTES
One Week Prior to Total Joint    1. Surgery: Left total knee arthroplasty on 11/17/2020  2. Labs: Labs reviewed and within normal limits.  CBC on 11/4/2020 and basic metabolic panel on 8/6/2020.  3. H&P: Cleared on 11/10/2020 by Dr. Betts.  Recommend low molecular weight heparin and wear compression hose before and after surgery, she is set to bridge her Coumadin with Lovenox injections per Coumadin clinic.  Usual INR goal is 2-3 and she is on for history of pulmonary embolism.  Coumadin plan below.  Hypertension, hyperlipidemia, A. fib, history of subarachnoid hemorrhage, history of pulmonary embolism  4. Covid test: Sat 11/14/2020 at 9am  5. DVT prophylaxis: See below  6. Dispo: Discharge postop day 1 to home.  Patient will be having her daughters Brandon and Iesha take turns helping her at her house.  She will do home physical therapy also.    Coumadin clinic plan:  Thursday 11/12/20      5 days before surgery: Stop warfarin.  No Lovenox.   Friday 11/13/20           4 days before surgery: No warfarin.  No Lovenox.  Saturday 11/14/20       3 days before surgery: No warfarin, begin Lovenox shots 80 mg  SQ every 12 hours.  Edin 11/15/20          2 days before surgery: No warfarin, Lovenox shots 80 mg SQ every 12 hours.  Monday 11/16/20        1 day before surgery: No warfarin, Lovenox shot in AM only. No PM dose.  Check INR.     Tuesday 11/17/20        Day of surgery: No Lovenox, warfarin 7.5 mg (bump dose) in the PM after surgery/procedure     Wednesday 11/18/20   POD 1: warfarin 5 mg, Lovenox 80 mg SQ every 12 hours.  Thursday 11/19/20      POD 2: warfarin 2.5 mg, Lovenox 80 mg SQ every 12 hours.  Friday 11/20/20           POD 3: warfarin 5 mg, Lovenox 80 mg SQ every 12 hours.  Saturday 11/21/20       POD 4: warfarin 2.5 mg, Lovenox 80 mg SQ every 12 hours.  Sunday 11/22/20         POD 5: warfarin 5 mg, Lovenox 80 mg SQ every 12 hours.  Monday 11/23/20        POD 6: Lovenox 80 mg SQ in the AM only - further  instructions with INR check     Or as advised by the surgeon       This note was dictated with [employer].    Abimael Dodson PA-C

## 2020-11-11 NOTE — LETTER
11/11/2020         RE: Tracy Palomo  [address] [place] [id number]        Dear Colleague,    Thank you for referring your patient, Tracy Palomo, to the [facility]. Please see a copy of my visit note below.    One Week Prior to Total Joint    1. Surgery: Left total knee arthroplasty on 11/17/2020  2. Labs: Labs reviewed and within normal limits.  CBC on 11/4/2020 and basic metabolic panel on 8/6/2020.  3. H&P: Cleared on 11/10/2020 by Dr. Betts.  Recommend low molecular weight heparin and wear compression hose before and after surgery, she is set to bridge her Coumadin with Lovenox injections per Coumadin clinic.  Usual INR goal is 2-3 and she is on for history of pulmonary embolism.  Coumadin plan below.  Hypertension, hyperlipidemia, A. fib, history of subarachnoid hemorrhage, history of pulmonary embolism  4. Covid test: Sat 11/14/2020 at 9am  5. DVT prophylaxis: See below  6. Dispo: Discharge postop day 1 to home.  Patient will be having her daughters Brandon and Iesha take turns helping her at her house.  She will do home physical therapy also.    Coumadin clinic plan:  Thursday 11/12/20      5 days before surgery: Stop warfarin.  No Lovenox.   Friday 11/13/20           4 days before surgery: No warfarin.  No Lovenox.  Saturday 11/14/20       3 days before surgery: No warfarin, begin Lovenox shots 80 mg  SQ every 12 hours.  Edin 11/15/20          2 days before surgery: No warfarin, Lovenox shots 80 mg SQ every 12 hours.  Monday 11/16/20        1 day before surgery: No warfarin, Lovenox shot in AM only. No PM dose.  Check INR.     Tuesday 11/17/20        Day of surgery: No Lovenox, warfarin 7.5 mg (bump dose) in the PM after surgery/procedure     Wednesday 11/18/20   POD 1: warfarin 5 mg, Lovenox 80 mg SQ every 12 hours.  Thursday 11/19/20      POD 2: warfarin 2.5 mg, Lovenox 80 mg SQ every 12 hours.  Friday 11/20/20           POD 3: warfarin 5 mg, Lovenox 80 mg SQ every  12 hours.  Saturday 11/21/20       POD 4: warfarin 2.5 mg, Lovenox 80 mg SQ every 12 hours.  Sunday 11/22/20         POD 5: warfarin 5 mg, Lovenox 80 mg SQ every 12 hours.  Monday 11/23/20        POD 6: Lovenox 80 mg SQ in the AM only - further instructions with INR check     Or as advised by the surgeon       This note was dictated with [employer].    Abimael Dodson PA-C          Again, thank you for allowing me to participate in the care of your patient.        Sincerely,        Abimael Dodson PA-C

## 2020-11-14 DIAGNOSIS — Z11.59 ENCOUNTER FOR SCREENING FOR OTHER VIRAL DISEASES: ICD-10-CM

## 2020-11-14 PROCEDURE — U0003 INFECTIOUS AGENT DETECTION BY NUCLEIC ACID (DNA OR RNA); SEVERE ACUTE RESPIRATORY SYNDROME CORONAVIRUS 2 (SARS-COV-2) (CORONAVIRUS DISEASE [COVID-19]), AMPLIFIED PROBE TECHNIQUE, MAKING USE OF HIGH THROUGHPUT TECHNOLOGIES AS DESCRIBED BY CMS-2020-01-R: HCPCS | Performed by: ORTHOPAEDIC SURGERY

## 2020-11-15 LAB
LABORATORY COMMENT REPORT: NORMAL
SARS-COV-2 RNA SPEC QL NAA+PROBE: NEGATIVE
SARS-COV-2 RNA SPEC QL NAA+PROBE: NORMAL
SPECIMEN SOURCE: NORMAL
SPECIMEN SOURCE: NORMAL

## 2020-11-16 DIAGNOSIS — I26.99 OTHER PULMONARY EMBOLISM WITHOUT ACUTE COR PULMONALE, UNSPECIFIED CHRONICITY (H): ICD-10-CM

## 2020-11-16 DIAGNOSIS — Z79.01 LONG TERM CURRENT USE OF ANTICOAGULANT THERAPY: ICD-10-CM

## 2020-11-16 LAB
CAPILLARY BLOOD COLLECTION: NORMAL
INR BLD: 1.3 (ref 0.86–1.14)

## 2020-11-16 PROCEDURE — 36416 COLLJ CAPILLARY BLOOD SPEC: CPT | Performed by: INTERNAL MEDICINE

## 2020-11-16 PROCEDURE — 85610 PROTHROMBIN TIME: CPT | Performed by: INTERNAL MEDICINE

## 2020-11-16 NOTE — TELEPHONE ENCOUNTER
Dr. Betts,  Tomorrows surgery has been cancelled.  Patient is asking what to do with her blood thinners now. Please call patient.

## 2020-11-16 NOTE — TELEPHONE ENCOUNTER
Restart her coumadin and continue the lovenox until her INR is up to 2.    Will ask coumadin clinic to contact her.

## 2020-11-20 ENCOUNTER — ANTICOAGULATION THERAPY VISIT (OUTPATIENT)
Dept: ANTICOAGULATION | Facility: OTHER | Age: 80
End: 2020-11-20

## 2020-11-20 ENCOUNTER — TELEPHONE (OUTPATIENT)
Dept: ANTICOAGULATION | Facility: CLINIC | Age: 80
End: 2020-11-20

## 2020-11-20 DIAGNOSIS — Z79.01 LONG TERM CURRENT USE OF ANTICOAGULANT THERAPY: ICD-10-CM

## 2020-11-20 DIAGNOSIS — I26.99 OTHER PULMONARY EMBOLISM WITHOUT ACUTE COR PULMONALE, UNSPECIFIED CHRONICITY (H): ICD-10-CM

## 2020-11-20 LAB
CAPILLARY BLOOD COLLECTION: NORMAL
INR BLD: 1.7 (ref 0.86–1.14)

## 2020-11-20 PROCEDURE — 36416 COLLJ CAPILLARY BLOOD SPEC: CPT | Performed by: INTERNAL MEDICINE

## 2020-11-20 PROCEDURE — 85610 PROTHROMBIN TIME: CPT | Performed by: INTERNAL MEDICINE

## 2020-11-20 PROCEDURE — 99207 PR NO CHARGE NURSE ONLY: CPT | Performed by: INTERNAL MEDICINE

## 2020-11-20 NOTE — PROGRESS NOTES
ANTICOAGULATION FOLLOW-UP CLINIC VISIT    Patient Name:  Tracy Palomo  Date:  2020  Contact Type:  Telephone    SUBJECTIVE:  Patient Findings     Positives:  Missed doses (Was holding due to surgery but this was cancelled. )    Comments:  Pt was advised to continue Lovenox until Monday but she was unsure if she wanted to do this. She understands the risk. Arti Ruelas RN, BSN          Clinical Outcomes     Comments:  Pt was advised to continue Lovenox until Monday but she was unsure if she wanted to do this. She understands the risk. Arti Ruelas RN, BSN             OBJECTIVE    Recent labs: (last 7 days)     20  0837   INR 1.7*       ASSESSMENT / PLAN  INR assessment SUB    Recheck INR In: 3 DAYS    INR Location Outside lab      Anticoagulation Summary  As of 2020    INR goal:  2.0-3.0   TTR:  57.7 % (1 y)   INR used for dosin.7 (2020)   Warfarin maintenance plan:  5 mg (5 mg x 1) every Mon, Wed; 2.5 mg (5 mg x 0.5) all other days   Full warfarin instructions:  : 5 mg; Otherwise 5 mg every Mon, Wed; 2.5 mg all other days   Weekly warfarin total:  22.5 mg   Plan last modified:  Johanna Mckeon RN (2020)   Next INR check:  2020   Priority:  Maintenance   Target end date:  Indefinite    Indications    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]  Long term current use of anticoagulant therapy [Z79.01]  Other pulmonary embolism without acute cor pulmonale  unspecified chronicity (H) [I26.99]             Anticoagulation Episode Summary     INR check location:      Preferred lab:      Send INR reminders to:  [facility]    Comments:  5 mg tablets, book, PM dose       Anticoagulation Care Providers     Provider Role Specialty Phone number    Chad Betts MD Referring Internal Medicine [phone]    Gerard Medrano MD Responsible Family Medicine [phone]            See the Encounter Report to view Anticoagulation Flowsheet and  Dosing Calendar (Go to Encounters tab in chart review, and find the Anticoagulation Therapy Visit)    Dosage adjustment made based on physician directed care plan.    Arti Ruelas RN

## 2020-11-20 NOTE — TELEPHONE ENCOUNTER
I have attempted to contact this patient by phone with the following results: no answer. VM left asking her to call the [facility] back to discuss.     Surgery was suppose to be on 11/17 and this has been cancelled. Need to see what she has been taking for coumadin/Lovenox. Arti Ruelas RN, BSN

## 2020-11-23 ENCOUNTER — ANTICOAGULATION THERAPY VISIT (OUTPATIENT)
Dept: ANTICOAGULATION | Facility: CLINIC | Age: 80
End: 2020-11-23

## 2020-11-23 DIAGNOSIS — Z79.01 LONG TERM CURRENT USE OF ANTICOAGULANT THERAPY: ICD-10-CM

## 2020-11-23 DIAGNOSIS — I26.99 OTHER PULMONARY EMBOLISM WITHOUT ACUTE COR PULMONALE, UNSPECIFIED CHRONICITY (H): ICD-10-CM

## 2020-11-23 LAB
CAPILLARY BLOOD COLLECTION: NORMAL
INR BLD: 2.6 (ref 0.86–1.14)

## 2020-11-23 PROCEDURE — 36416 COLLJ CAPILLARY BLOOD SPEC: CPT | Performed by: INTERNAL MEDICINE

## 2020-11-23 PROCEDURE — 85610 PROTHROMBIN TIME: CPT | Performed by: INTERNAL MEDICINE

## 2020-11-23 PROCEDURE — 99207 PR NO CHARGE NURSE ONLY: CPT | Performed by: INTERNAL MEDICINE

## 2020-11-23 NOTE — PROGRESS NOTES
ANTICOAGULATION FOLLOW-UP CLINIC VISIT    Patient Name:  Tracy Palomo  Date:  2020  Contact Type:  Telephone    SUBJECTIVE:  Patient Findings     Comments:  The patient was assessed for diet, medication, and activity level changes, missed or extra doses, bruising or bleeding, with no problem findings.          Clinical Outcomes     Comments:  The patient was assessed for diet, medication, and activity level changes, missed or extra doses, bruising or bleeding, with no problem findings.             OBJECTIVE    Recent labs: (last 7 days)     20  0854   INR 2.6*       ASSESSMENT / PLAN  INR assessment THER    Recheck INR In: 2 WEEKS    INR Location Outside lab      Anticoagulation Summary  As of 2020    INR goal:  2.0-3.0   TTR:  57.4 % (1 y)   INR used for dosin.6 (2020)   Warfarin maintenance plan:  5 mg (5 mg x 1) every Mon, Wed; 2.5 mg (5 mg x 0.5) all other days   Full warfarin instructions:  5 mg every Mon, Wed; 2.5 mg all other days   Weekly warfarin total:  22.5 mg   No change documented:  Radha Rodriguez, RN   Plan last modified:  Johanna Mckeon, RN (2020)   Next INR check:  2020   Priority:  Maintenance   Target end date:  Indefinite    Indications    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]  Long term current use of anticoagulant therapy [Z79.01]  Other pulmonary embolism without acute cor pulmonale  unspecified chronicity (H) [I26.99]             Anticoagulation Episode Summary     INR check location:      Preferred lab:      Send INR reminders to:  [facility]    Comments:  5 mg tablets, book, PM dose       Anticoagulation Care Providers     Provider Role Specialty Phone number    Chad Betts MD Referring Internal Medicine [phone]    Gerard Medrano MD Responsible Family Medicine [phone]            See the Encounter Report to view Anticoagulation Flowsheet and Dosing Calendar (Go to Encounters tab in chart  review, and find the Anticoagulation Therapy Visit)    Dosage adjustment made based on physician directed care plan.    Radha Rodriguez RN

## 2020-12-07 ENCOUNTER — ANTICOAGULATION THERAPY VISIT (OUTPATIENT)
Dept: ANTICOAGULATION | Facility: CLINIC | Age: 80
End: 2020-12-07

## 2020-12-07 DIAGNOSIS — I26.99 OTHER PULMONARY EMBOLISM WITHOUT ACUTE COR PULMONALE, UNSPECIFIED CHRONICITY (H): ICD-10-CM

## 2020-12-07 DIAGNOSIS — Z79.01 LONG TERM CURRENT USE OF ANTICOAGULANT THERAPY: ICD-10-CM

## 2020-12-07 LAB
CAPILLARY BLOOD COLLECTION: NORMAL
INR BLD: 2.8 (ref 0.86–1.14)

## 2020-12-07 PROCEDURE — 36416 COLLJ CAPILLARY BLOOD SPEC: CPT | Performed by: INTERNAL MEDICINE

## 2020-12-07 PROCEDURE — 99207 PR NO CHARGE NURSE ONLY: CPT | Performed by: INTERNAL MEDICINE

## 2020-12-07 PROCEDURE — 85610 PROTHROMBIN TIME: CPT | Performed by: INTERNAL MEDICINE

## 2020-12-07 RX ORDER — WARFARIN SODIUM 5 MG/1
TABLET ORAL
Qty: 60 TABLET | Refills: 0
Start: 2020-12-07 | End: 2021-03-25

## 2020-12-07 NOTE — PROGRESS NOTES
ANTICOAGULATION FOLLOW-UP CLINIC VISIT    Patient Name:  Tracy Palomo  Date:  2020  Contact Type:  Telephone    SUBJECTIVE:  Patient Findings     Comments:  The patient was assessed for diet, medication, and activity level changes, missed or extra doses, bruising or bleeding, with no problem findings.          Clinical Outcomes     Comments:  The patient was assessed for diet, medication, and activity level changes, missed or extra doses, bruising or bleeding, with no problem findings.             OBJECTIVE    Recent labs: (last 7 days)     20  0901   INR 2.8*       ASSESSMENT / PLAN  INR assessment THER    Recheck INR In: 3 WEEKS    INR Location Outside lab      Anticoagulation Summary  As of 2020    INR goal:  2.0-3.0   TTR:  57.4 % (1 y)   INR used for dosin.8 (2020)   Warfarin maintenance plan:  5 mg (5 mg x 1) every Mon, Wed; 2.5 mg (5 mg x 0.5) all other days   Full warfarin instructions:  5 mg every Mon, Wed; 2.5 mg all other days   Weekly warfarin total:  22.5 mg   No change documented:  Radha Rodriguez, RN   Plan last modified:  Johanna Mckeon, RN (2020)   Next INR check:  2020   Priority:  Maintenance   Target end date:  Indefinite    Indications    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]  Long term current use of anticoagulant therapy [Z79.01]  Other pulmonary embolism without acute cor pulmonale  unspecified chronicity (H) [I26.99]             Anticoagulation Episode Summary     INR check location:      Preferred lab:      Send INR reminders to:  [facility]    Comments:  5 mg tablets, book, PM dose       Anticoagulation Care Providers     Provider Role Specialty Phone number    Chad Betts MD Referring Internal Medicine [phone]    Gerard Medrano MD Responsible Family Medicine [phone]            See the Encounter Report to view Anticoagulation Flowsheet and Dosing Calendar (Go to Encounters tab in chart  review, and find the Anticoagulation Therapy Visit)    Dosage adjustment made based on physician directed care plan.    Radha Rodriguez RN

## 2020-12-22 ENCOUNTER — TELEPHONE (OUTPATIENT)
Dept: ORTHOPEDICS | Facility: CLINIC | Age: 80
End: 2020-12-22

## 2020-12-22 NOTE — TELEPHONE ENCOUNTER
Called patient to reschedule total joint surgery. Patient states that she thinks she has done something to her hip now. She can't do her walks and can't stand over the stove for 2 hours anymore. She would like to see Dr. Pang to discuss before schedule surgery.

## 2020-12-28 ENCOUNTER — ANTICOAGULATION THERAPY VISIT (OUTPATIENT)
Dept: ANTICOAGULATION | Facility: CLINIC | Age: 80
End: 2020-12-28

## 2020-12-28 DIAGNOSIS — Z79.01 LONG TERM CURRENT USE OF ANTICOAGULANT THERAPY: ICD-10-CM

## 2020-12-28 DIAGNOSIS — I26.99 OTHER PULMONARY EMBOLISM WITHOUT ACUTE COR PULMONALE, UNSPECIFIED CHRONICITY (H): ICD-10-CM

## 2020-12-28 LAB
CAPILLARY BLOOD COLLECTION: NORMAL
INR BLD: 1.9 (ref 0.86–1.14)

## 2020-12-28 PROCEDURE — 36416 COLLJ CAPILLARY BLOOD SPEC: CPT | Performed by: INTERNAL MEDICINE

## 2020-12-28 PROCEDURE — 85610 PROTHROMBIN TIME: CPT | Performed by: INTERNAL MEDICINE

## 2020-12-28 NOTE — PROGRESS NOTES
ANTICOAGULATION MANAGEMENT     Patient Name:  Tracy Palomo  Date:  2020    ASSESSMENT /SUBJECTIVE:    Today's INR result of 1.9 is subtherapeutic. Goal INR of 2.0-3.0      Warfarin dose taken: Warfarin taken as instructed    Diet: Increased greens/vitamin K in diet which may be affecting INR; plans to resume previous intake    Medication changes/ interactions: No new medications/supplements affecting INR    Previous INR: Therapeutic     S/S of bleeding or thromboembolism: No    New injury or illness: No    Upcoming surgery, procedure or cardioversion: No    Additional findings: None      PLAN:    Telephone call with Tracy regarding INR result and instructed:     Warfarin Dosing Instructions: 7.5 mg then continue your current warfarin dose of 5 mg Mon, Wed and 2.5 mg ROW    Instructed patient to follow up no later than: 10 days  Lab visit scheduled    Education provided: None required      Pt verbalizes understanding and agrees to warfarin dosing plan.    Instructed to call the Anticoagulation Clinic for any changes, questions or concerns. (#[phone])        Johanna Mckeon RN      OBJECTIVE:  Recent labs: (last 7 days)     20  0903   INR 1.9*         No question data found.  Anticoagulation Summary  As of 2020    INR goal:  2.0-3.0   TTR:  59.0 % (1 y)   INR used for dosin.9 (2020)   Warfarin maintenance plan:  5 mg (5 mg x 1) every Mon, Wed; 2.5 mg (5 mg x 0.5) all other days   Full warfarin instructions:  : 7.5 mg; Otherwise 5 mg every Mon, Wed; 2.5 mg all other days   Weekly warfarin total:  22.5 mg   Plan last modified:  Johanna Mckeon RN (2020)   Next INR check:  2021   Priority:  Maintenance   Target end date:  Indefinite    Indications    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]  Long term current use of anticoagulant therapy [Z79.01]  Other pulmonary embolism without acute cor pulmonale  unspecified chronicity (H)  [I26.99]             Anticoagulation Episode Summary     INR check location:      Preferred lab:      Send INR reminders to:  [facility]    Comments:  5 mg tablets, book, PM dose       Anticoagulation Care Providers     Provider Role Specialty Phone number    Chad Betts MD Referring Internal Medicine [phone]    Gerard Medrano MD Responsible Family Medicine [phone]

## 2021-01-06 ENCOUNTER — ANTICOAGULATION THERAPY VISIT (OUTPATIENT)
Dept: ANTICOAGULATION | Facility: CLINIC | Age: 81
End: 2021-01-06

## 2021-01-06 ENCOUNTER — TELEPHONE (OUTPATIENT)
Dept: ORTHOPEDICS | Facility: OTHER | Age: 81
End: 2021-01-06

## 2021-01-06 ENCOUNTER — OFFICE VISIT (OUTPATIENT)
Dept: ORTHOPEDICS | Facility: CLINIC | Age: 81
End: 2021-01-06
Payer: MEDICARE

## 2021-01-06 ENCOUNTER — ANCILLARY PROCEDURE (OUTPATIENT)
Dept: GENERAL RADIOLOGY | Facility: CLINIC | Age: 81
End: 2021-01-06
Attending: ORTHOPAEDIC SURGERY
Payer: MEDICARE

## 2021-01-06 VITALS
SYSTOLIC BLOOD PRESSURE: 130 MMHG | HEIGHT: 64 IN | BODY MASS INDEX: 31.51 KG/M2 | WEIGHT: 184.6 LBS | DIASTOLIC BLOOD PRESSURE: 70 MMHG

## 2021-01-06 DIAGNOSIS — M53.3 PAIN OF RIGHT SACROILIAC JOINT: ICD-10-CM

## 2021-01-06 DIAGNOSIS — I26.99 OTHER PULMONARY EMBOLISM WITHOUT ACUTE COR PULMONALE, UNSPECIFIED CHRONICITY (H): ICD-10-CM

## 2021-01-06 DIAGNOSIS — Z79.01 LONG TERM CURRENT USE OF ANTICOAGULANT THERAPY: ICD-10-CM

## 2021-01-06 DIAGNOSIS — M65.312 TRIGGER FINGER OF LEFT THUMB: Primary | ICD-10-CM

## 2021-01-06 DIAGNOSIS — Z79.01 LONG TERM CURRENT USE OF ANTICOAGULANTS WITH INR GOAL OF 2.0-3.0: ICD-10-CM

## 2021-01-06 DIAGNOSIS — Z11.59 ENCOUNTER FOR SCREENING FOR OTHER VIRAL DISEASES: Primary | ICD-10-CM

## 2021-01-06 DIAGNOSIS — M25.551 RIGHT HIP PAIN: ICD-10-CM

## 2021-01-06 DIAGNOSIS — M16.11 PRIMARY OSTEOARTHRITIS OF RIGHT HIP: ICD-10-CM

## 2021-01-06 DIAGNOSIS — M17.12 PRIMARY OSTEOARTHRITIS OF LEFT KNEE: ICD-10-CM

## 2021-01-06 DIAGNOSIS — Z01.818 PREOPERATIVE EXAMINATION: Primary | ICD-10-CM

## 2021-01-06 LAB
CAPILLARY BLOOD COLLECTION: NORMAL
INR BLD: 2.3 (ref 0.86–1.14)

## 2021-01-06 PROCEDURE — 36416 COLLJ CAPILLARY BLOOD SPEC: CPT | Performed by: INTERNAL MEDICINE

## 2021-01-06 PROCEDURE — 73502 X-RAY EXAM HIP UNI 2-3 VIEWS: CPT | Mod: TC | Performed by: RADIOLOGY

## 2021-01-06 PROCEDURE — 99214 OFFICE O/P EST MOD 30 MIN: CPT | Performed by: ORTHOPAEDIC SURGERY

## 2021-01-06 PROCEDURE — 85610 PROTHROMBIN TIME: CPT | Performed by: INTERNAL MEDICINE

## 2021-01-06 ASSESSMENT — MIFFLIN-ST. JEOR: SCORE: 1293.37

## 2021-01-06 ASSESSMENT — PAIN SCALES - GENERAL: PAINLEVEL: SEVERE PAIN (6)

## 2021-01-06 NOTE — TELEPHONE ENCOUNTER
Patient has a trigger release surgery on 1/12/21 and total joint on 2/16. Please advise on coumadin.

## 2021-01-06 NOTE — PROGRESS NOTES
ANTICOAGULATION MANAGEMENT     Patient Name:  Tracy Palomo  Date:  2021    ASSESSMENT /SUBJECTIVE:    Today's INR result of 2.3 is therapeutic. Goal INR of 2.0-3.0      Warfarin dose taken: Warfarin taken as instructed    Diet: No new diet changes affecting INR    Medication changes/ interactions: No new medications/supplements affecting INR    Previous INR: Subtherapeutic     S/S of bleeding or thromboembolism: No    New injury or illness: No    Upcoming surgery, procedure or cardioversion: No    Additional findings: None      PLAN:    Telephone call with Tracy regarding INR result and instructed:     Warfarin Dosing Instructions: Continue your current warfarin dose 5 mg Mon, Wed and 2.5 mg ROW    Instructed patient to follow up no later than: 4 weeks  Lab visit scheduled    Education provided: None required      Pt verbalizes understanding and agrees to warfarin dosing plan.    Instructed to call the Anticoagulation Clinic for any changes, questions or concerns. (#[phone])        Johanna Mckeon RN      OBJECTIVE:  Recent labs: (last 7 days)     21  0920   INR 2.3*         INR assessment THER    Recheck INR In: 4 WEEKS    INR Location Outside lab      Anticoagulation Summary  As of 2021    INR goal:  2.0-3.0   TTR:  58.4 % (1 y)   INR used for dosin.3 (2021)   Warfarin maintenance plan:  5 mg (5 mg x 1) every Mon, Wed; 2.5 mg (5 mg x 0.5) all other days   Full warfarin instructions:  5 mg every Mon, Wed; 2.5 mg all other days   Weekly warfarin total:  22.5 mg   No change documented:  Johanna Mckeon RN   Plan last modified:  Johanna Mckeon RN (2020)   Next INR check:  2/3/2021   Priority:  Maintenance   Target end date:  Indefinite    Indications    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

## 2021-01-06 NOTE — PROGRESS NOTES
Office Visit-Follow up    Chief Complaint: Tracy Palomo is a 80 year old female who is being seen for   Chief Complaint   Patient presents with     Musculoskeletal Problem     right hip pain       History of Present Illness:   Today's visit:  She was scheduled to have left total knee arthroplasty.  Unfortunately due to Covid the surgery was canceled.  She presents for three issues today.  First is that she would like to reschedule for her left knee replacement. She states the kne continues to get worse.    #2: about 2-3 months ago started having lower buttock right sided hip pain.  No injury. Started insidious. Got worse about 2-3 weeks ago. It is a lower non radiating buttock pain. Worst with putting on shoes and socks, also can be bad with sitting then standing as well as rolling over in bed. Can be painful with ambulation and activity. She does note the left knee continues to get worse and is limping more with the knee. No previous history of this.  No right knee pain. Hx of previous right knee TKA and left hip hemiplasty.   #3: returns for trigger thumb on the left side. States the injection in July provided about 2-3 weeks of relief. The trigger thumb returned. Is painful. Concerned about walker use and gripping using that hand when recovering from knee replacement. Would like this replaced.   Takes coumadin.    October 28, 2020 visit:  Presents for long standing left knee pain.  She has attempted ice ibuprofen glucosamine Tylenol activity modification and rest with no improvement.  She finds her activity gradually diminishing.  She finds active days will be followed by painful days where she is unable to walk and do basic things.  She would like to proceed with left knee replacement.  She is here with her daughter with many questions.     Underwent right total knee arthroplasty January 2019.  Extremely happy.     November 15, 2018 visit:   Tracy Palomo is a 78 year old female who is seen in  "consultation at the request of self for evaluation of bilateral knee pain  Seen previous for her hip, first time seeing her for knees.  Mechanism of Injury: No trauma or inciting event. States has had knee pain for many years, R > L.  In 2007 underwent a knee arthroscopy for a meniscus tear on the right knee, she states the right knee did well for awhile but then over the last few years and especially the last 2 years has been having increasing progressive pain.  Has had more mild pain on the left knee for at least 2 years.  Pain is right side, medially, constant dull ache.  Moderate pain on right mild pain on left.  Worst with walking, weightbearing, and especially stairs.  Starting to limp more and more causing pain in both hips, back.  Also waking her up at night due to the pain.  States the knee feel not stable, bowed out, and having some swelling.    Therapies tried to date:   Icing, ibuprofen, glucosamine, activity modification, rest, knee arthroscopy, ibuprofen without benefit.      Takes coumadin due to hx of 2 \"blood clots\" one that was reported to the knee following the knee arthroscopy in 2007 and one in the lung following a colon procedure.  Per patient she has been recommended for coumadin for life. She notes no issues with clots after the left hip surgery in 2017.         Social History     Occupational History     Not on file   Tobacco Use     Smoking status: Never Smoker     Smokeless tobacco: Never Used   Substance and Sexual Activity     Alcohol use: No     Alcohol/week: 0.0 standard drinks     Drug use: No     Sexual activity: Not Currently     Partners: Male       REVIEW OF SYSTEMS  General: negative for, night sweats, dizziness, fatigue  Resp: No shortness of breath and no cough  CV: negative for chest pain, syncope or near-syncope  GI: negative for nausea, vomiting and diarrhea  : negative for dysuria and hematuria  Musculoskeletal: as above  Neurologic: negative for syncope   Hematologic: " "negative for bleeding disorder    Physical Exam:  Vitals: /70   Ht 1.619 m (5' 3.75\")   Wt 83.7 kg (184 lb 9.6 oz)   BMI 31.94 kg/m    BMI= Body mass index is 31.94 kg/m .  Constitutional: healthy, alert and no acute distress   Psychiatric: mentation appears normal and affect normal/bright  NEURO: no focal deficits  RESP: Normal with easy respirations and no use of accessory muscles to breathe, no audible wheezing or retractions  CV: LLE:  no edema         Regular rate and rhythm by palpation  SKIN: No erythema, rashes, excoriation, or breakdown. No evidence of infection.   JOINT/EXTREMITIES: Left hip: full motion. No pain with internal rotation or external rotation except at maximum external rotation with DELANO.  Pain with FABERs to buttock. Good quad tone. Tightness along hamstring with straight leg raise but no back or buttock pain. No pain with resisted flexion.  Slow sit to stand. Tender along SI Joint. No tenderness to greater trochanteric bursitis.    left knee: Active motion 3-115 degrees.  Varus deformity does not fully correctable with valgus stressing.  Collateral ligaments are intact.  Small effusion.  Tenderness along medial joint line.  Left hand: left thumb: No effusion. No deformity. No evidence of infection.  Well-perfused with good cap refill.  Tenderness along the base of the thumb A1 pulley area.  Palpable locking catching with flexion of the thumb.  No other focal areas of tenderness.  No gross deformity  GAIT: antalgic            Diagnostic Modalities:  right hip X-ray: No fractures or dislocations.  Superior joint space appears to be well-maintained however there is some medial wear with small inferior osteophytes.  Subtle cortical irregularity on the frog-leg view of the femoral neck, no definitive fracture line or disruption of the trabecular lines..  Independent visualization of the images was performed.      Impression: left knee primary osteoarthritis  Right SI joint pain  Right " hip OA  Left thumb trigger finger    Plan:  All of the above pertinent physical exam and imaging modalities findings was reviewed with Tracy. Regarding the hip, although has some mild OA changes on xray, her symptoms and exam are more consistent with SI joint pain.  Suspect much of this is due to altered mechanics and gait due to limp and increasing left knee pain.  Discussed options and she would like physical therapy for the hip/back on the right as well as the left knee while she awaits her left knee replacement.  Regarding the left knee, continues to get worse, will place new surgical order.  Did review total knee replacement however she is well versed in the surgery, risks and benefit and recovery as she was scheduled for surgery this past November. The surgery is scheduled for 2/16/2021.  Anticipate POD1 discharge home with family and resumption of coumadin/lovenox per coumadin clinic plan.     Regarding the left thumb trigger finger.  She would like this released and recovered prior to knee replacement, this is reasonable considering she will be using a walker and more reliant on upper body following knee replacement.  Will get this released next week under local anesthesia. Discussed her coumadin use, she is going to continue the coumadin use for the trigger finger release. Understands higher risk than normal for hematoma, bleeding, delayed healing and other complications.     The patient has attempted conservative treatments that include rest, time, activity modification, steroid injection yet still continues to have significant issues. These issues include progressively painful locking thumb inhibits some activities and ADLs. Secondary to these reasons I have offered surgery in the form of left thumb trigger finger release under local anesthesia.    Risks, benefits, alternatives, anticipated postoperative course, and limitations was reviewed. Risks including bleeding, infection, scar, scar tenderness,  stiffness, nerve damage leading to numb fingers, damage to artery and tendons, incomplete release, failure to relieve all symptoms was discussed. Anticipated anesthesia ( local anesthetic) was reviewed.      Once all was reviewed, the patient agreed to proceed with surgery. The past medical and surgical history was reviewed, they will not need a preop H&P for the trigger finger, but will need one for the total knee replacement along with a plan from the coumadin clinic for the knee replacement.       Return to clinic 10 days post-operatively from the trigger finger, 1 week prior to the total knee and 2 weeks post-op. , or sooner as needed for changes.    Re-x-ray on return: Yes. At the post-op visit for the knee replacement.     Scribed by:  BILLY Hernandez, CNP  10:54 AM  1/6/2021  The information in this document, created by a scribe for me, accurately reflects the services I personally performed and the decisions made by me. I have reviewed and approved this document for accuracy    Kaleb Pang,

## 2021-01-06 NOTE — TELEPHONE ENCOUNTER
Type of surgery: left total knee replacement  Location of surgery: [facility]   Date of surgery: 2/16/21  Surgeon: Dr. Pang  Pre-Op Appt Date: 2/1/21  Post-Op Appt Date: 3/1/21   Packet sent out: Surgery packet mailed to patient's home address.   Pre-cert/Authorization completed: NA  Date: 1/6/2021    Pat Reese  Surgery Scheduler

## 2021-01-06 NOTE — LETTER
1/6/2021         RE: Tracy Palomo  [address] [facility] [address]        Dear Colleague,    Thank you for referring your patient, Tracy Plaomo, to the [facility]. Please see a copy of my visit note below.    Office Visit-Follow up    Chief Complaint: Tracy Palomo is a 80 year old female who is being seen for   Chief Complaint   Patient presents with     Musculoskeletal Problem     right hip pain       History of Present Illness:   Today's visit:  She was scheduled to have left total knee arthroplasty.  Unfortunately due to Covid the surgery was canceled.  She presents for three issues today.  First is that she would like to reschedule for her left knee replacement. She states the kne continues to get worse.    #2: about 2-3 months ago started having lower buttock right sided hip pain.  No injury. Started insidious. Got worse about 2-3 weeks ago. It is a lower non radiating buttock pain. Worst with putting on shoes and socks, also can be bad with sitting then standing as well as rolling over in bed. Can be painful with ambulation and activity. She does note the left knee continues to get worse and is limping more with the knee. No previous history of this.  No right knee pain. Hx of previous right knee TKA and left hip hemiplasty.   #3: returns for trigger thumb on the left side. States the injection in July provided about 2-3 weeks of relief. The trigger thumb returned. Is painful. Concerned about walker use and gripping using that hand when recovering from knee replacement. Would like this replaced.   Takes coumadin.    October 28, 2020 visit:  Presents for long standing left knee pain.  She has attempted ice ibuprofen glucosamine Tylenol activity modification and rest with no improvement.  She finds her activity gradually diminishing.  She finds active days will be followed by painful days where she is unable to walk and do basic things.  She would like to proceed  "with left knee replacement.  She is here with her daughter with many questions.     Underwent right total knee arthroplasty January 2019.  Extremely happy.     November 15, 2018 visit:   Tracy Palomo is a 78 year old female who is seen in consultation at the request of self for evaluation of bilateral knee pain  Seen previous for her hip, first time seeing her for knees.  Mechanism of Injury: No trauma or inciting event. States has had knee pain for many years, R > L.  In 2007 underwent a knee arthroscopy for a meniscus tear on the right knee, she states the right knee did well for awhile but then over the last few years and especially the last 2 years has been having increasing progressive pain.  Has had more mild pain on the left knee for at least 2 years.  Pain is right side, medially, constant dull ache.  Moderate pain on right mild pain on left.  Worst with walking, weightbearing, and especially stairs.  Starting to limp more and more causing pain in both hips, back.  Also waking her up at night due to the pain.  States the knee feel not stable, bowed out, and having some swelling.    Therapies tried to date:   Icing, ibuprofen, glucosamine, activity modification, rest, knee arthroscopy, ibuprofen without benefit.      Takes coumadin due to hx of 2 \"blood clots\" one that was reported to the knee following the knee arthroscopy in 2007 and one in the lung following a colon procedure.  Per patient she has been recommended for coumadin for life. She notes no issues with clots after the left hip surgery in 2017.         Social History     Occupational History     Not on file   Tobacco Use     Smoking status: Never Smoker     Smokeless tobacco: Never Used   Substance and Sexual Activity     Alcohol use: No     Alcohol/week: 0.0 standard drinks     Drug use: No     Sexual activity: Not Currently     Partners: Male       REVIEW OF SYSTEMS  General: negative for, night sweats, dizziness, fatigue  Resp: No shortness " "of breath and no cough  CV: negative for chest pain, syncope or near-syncope  GI: negative for nausea, vomiting and diarrhea  : negative for dysuria and hematuria  Musculoskeletal: as above  Neurologic: negative for syncope   Hematologic: negative for bleeding disorder    Physical Exam:  Vitals: /70   Ht 1.619 m (5' 3.75\")   Wt 83.7 kg (184 lb 9.6 oz)   BMI 31.94 kg/m    BMI= Body mass index is 31.94 kg/m .  Constitutional: healthy, alert and no acute distress   Psychiatric: mentation appears normal and affect normal/bright  NEURO: no focal deficits  RESP: Normal with easy respirations and no use of accessory muscles to breathe, no audible wheezing or retractions  CV: LLE:  no edema         Regular rate and rhythm by palpation  SKIN: No erythema, rashes, excoriation, or breakdown. No evidence of infection.   JOINT/EXTREMITIES: Left hip: full motion. No pain with internal rotation or external rotation except at maximum external rotation with DELANO.  Pain with FABERs to buttock. Good quad tone. Tightness along hamstring with straight leg raise but no back or buttock pain. No pain with resisted flexion.  Slow sit to stand. Tender along SI Joint. No tenderness to greater trochanteric bursitis.    left knee: Active motion 3-115 degrees.  Varus deformity does not fully correctable with valgus stressing.  Collateral ligaments are intact.  Small effusion.  Tenderness along medial joint line.  Left hand: left thumb: No effusion. No deformity. No evidence of infection.  Well-perfused with good cap refill.  Tenderness along the base of the thumb A1 pulley area.  Palpable locking catching with flexion of the thumb.  No other focal areas of tenderness.  No gross deformity  GAIT: antalgic            Diagnostic Modalities:  right hip X-ray: No fractures or dislocations.  Superior joint space appears to be well-maintained however there is some medial wear with small inferior osteophytes.  Subtle cortical irregularity on " the frog-leg view of the femoral neck, no definitive fracture line or disruption of the trabecular lines..  Independent visualization of the images was performed.      Impression: left knee primary osteoarthritis  Right SI joint pain  Right hip OA  Left thumb trigger finger    Plan:  All of the above pertinent physical exam and imaging modalities findings was reviewed with Tracy. Regarding the hip, although has some mild OA changes on xray, her symptoms and exam are more consistent with SI joint pain.  Suspect much of this is due to altered mechanics and gait due to limp and increasing left knee pain.  Discussed options and she would like physical therapy for the hip/back on the right as well as the left knee while she awaits her left knee replacement.  Regarding the left knee, continues to get worse, will place new surgical order.  Did review total knee replacement however she is well versed in the surgery, risks and benefit and recovery as she was scheduled for surgery this past November. The surgery is scheduled for 2/16/2021.  Anticipate POD1 discharge home with family and resumption of coumadin/lovenox per coumadin clinic plan.     Regarding the left thumb trigger finger.  She would like this released and recovered prior to knee replacement, this is reasonable considering she will be using a walker and more reliant on upper body following knee replacement.  Will get this released next week under local anesthesia. Discussed her coumadin use, she is going to continue the coumadin use for the trigger finger release. Understands higher risk than normal for hematoma, bleeding, delayed healing and other complications.     The patient has attempted conservative treatments that include rest, time, activity modification, steroid injection yet still continues to have significant issues. These issues include progressively painful locking thumb inhibits some activities and ADLs. Secondary to these reasons I have offered  surgery in the form of left thumb trigger finger release under local anesthesia.    Risks, benefits, alternatives, anticipated postoperative course, and limitations was reviewed. Risks including bleeding, infection, scar, scar tenderness, stiffness, nerve damage leading to numb fingers, damage to artery and tendons, incomplete release, failure to relieve all symptoms was discussed. Anticipated anesthesia ( local anesthetic) was reviewed.      Once all was reviewed, the patient agreed to proceed with surgery. The past medical and surgical history was reviewed, they will not need a preop H&P for the trigger finger, but will need one for the total knee replacement along with a plan from the coumadin clinic for the knee replacement.       Return to clinic 10 days post-operatively from the trigger finger, 1 week prior to the total knee and 2 weeks post-op. , or sooner as needed for changes.    Re-x-ray on return: Yes. At the post-op visit for the knee replacement.     Scribed by:  BILLY Hernandez, CNP  10:54 AM  1/6/2021  The information in this document, created by a scribe for me, accurately reflects the services I personally performed and the decisions made by me. I have reviewed and approved this document for accuracy    Kaleb Pang DO           Again, thank you for allowing me to participate in the care of your patient.        Sincerely,        Kaleb Pang DO

## 2021-01-06 NOTE — LETTER
[facility] [place]  [address]  [facility] [address]  [phone]        January 6, 2021    Tracy Palomo  [address] [place] [id number]

## 2021-01-06 NOTE — TELEPHONE ENCOUNTER
Pt has trigger release surgery on 1/12 and total joint on 2/16. In the past pt has needed to be bridged for procedures. Assuming you want her to be bridged again, please review the Lovenox instructions below. If you agree, please send the RX to the pharmacy (pt does have 7 syringes at home, so I have ordered less) and send this message back to the [facility] pool so pt can be contacted. Thanks!    Note: pt weighs 185 (84kg)  Pt believes the procedure on 1/12 will be outpatient and the total joint will likely be inpatient, at least overnight     Johanna Mckeon RN    For procedure on 1/12/2021 Thursday 1/7/21 5 days before surgery: Stop warfarin.  No Lovenox.   Friday 1/8/21  4 days before surgery: No warfarin.  No Lovenox.  Saturday 1/9/21 3 days before surgery: No warfarin, begin Lovenox shots 80 mg  SQ every 12 hours.  Edin 1/10/21 2 days before surgery: No warfarin, Lovenox shots 80 mg SQ every 12 hours.  Monday 1/11/21 1 day before surgery: No warfarin, Lovenox shot in AM only. No PM dose.  Check INR to be sure INR is low enough for procedure     Tuesday 1/12/21 Day of surgery: No Lovenox, warfarin 7.5 mg (bump dose) in the PM after surgery/procedure (first dose of Coumadin to be taken 12-24 hours after procedure to reduce the risk of bleeding)    Wednesday 1/13/21 POD 1: warfarin 5 mg, Lovenox 80 mg SQ every 12 hours.  Thursday 1/14/21 POD 2: warfarin 2.5 mg, Lovenox 80 mg SQ every 12 hours.  Friday 1/15/21  POD 3: warfarin 5 mg, Lovenox 80 mg SQ every 12 hours.  Saturday 1/16/21  POD 4: warfarin 2.5 mg, Lovenox 80 mg SQ every 12 hours.  Sunday 1/17/21 POD 5: warfarin 2.5 mg, Lovenox 80 mg SQ every 12 hours.  Monday 1/18/21 POD 6: Lovenox 80 mg in the AM only - further instructions with INR check     *or as directed by the surgeon       For procedure on 2/16/2010 Thursday 2/11/21 5 days before surgery: Stop warfarin.  No Lovenox.   Friday 2/12/21  4 days before surgery: No warfarin.  No Lovenox.  Saturday  2/13/21 3 days before surgery: No warfarin, begin Lovenox shots 80 mg  SQ every 12 hours.  Sunday 2/14/21 2 days before surgery: No warfarin, Lovenox shots 80 mg SQ every 12 hours.  Monday 2/15/21 1 day before surgery: No warfarin, Lovenox shot in AM only. No PM dose.  Check INR to be sure INR is low enough for procedure     Tuesday 2/16/21 Day of surgery: No Lovenox, warfarin 7.5 mg in the PM after surgery/procedure (first dose of coumadin should be taken 12-24 hours after procedure to reduce risk of bleeding)  Wednesday 2/17/21 POD 1: warfarin 5 mg, Lovenox 80 mg SQ every 12 hours.  Thursday 2/18/21 POD 2: warfarin 2.5 mg, Lovenox 80 mg SQ every 12 hours.  Friday 2/19/21  POD 3: warfarin 5 mg, Lovenox 80 mg SQ every 12 hours.  Saturday 2/20/21 POD 4: warfarin 2.5 mg, Lovenox 80 mg SQ every 12 hours.  Sunday 2/21/21 POD 5: warfarin 2.5 mg, Lovenox 80 mg SQ every 12 hours.  Monday 2/22/21 POD 6: Lovenox 80 mg SQ in the AM only - further instructions with INR check     *or as directed by the surgeon / discharge instructions

## 2021-01-06 NOTE — TELEPHONE ENCOUNTER
Pt would like to  Lovenox instructions from the . I have printed them and put them at    Johanna Mckeon RN

## 2021-01-07 ENCOUNTER — HOSPITAL ENCOUNTER (OUTPATIENT)
Dept: PHYSICAL THERAPY | Facility: CLINIC | Age: 81
Setting detail: THERAPIES SERIES
End: 2021-01-07
Attending: NURSE PRACTITIONER
Payer: MEDICARE

## 2021-01-07 DIAGNOSIS — M17.12 PRIMARY OSTEOARTHRITIS OF LEFT KNEE: ICD-10-CM

## 2021-01-07 DIAGNOSIS — M16.11 PRIMARY OSTEOARTHRITIS OF RIGHT HIP: ICD-10-CM

## 2021-01-07 DIAGNOSIS — M53.3 PAIN OF RIGHT SACROILIAC JOINT: ICD-10-CM

## 2021-01-07 PROCEDURE — 97140 MANUAL THERAPY 1/> REGIONS: CPT | Mod: GP | Performed by: PHYSICAL THERAPIST

## 2021-01-07 PROCEDURE — 97110 THERAPEUTIC EXERCISES: CPT | Mod: GP | Performed by: PHYSICAL THERAPIST

## 2021-01-07 PROCEDURE — 97161 PT EVAL LOW COMPLEX 20 MIN: CPT | Mod: GP | Performed by: PHYSICAL THERAPIST

## 2021-01-07 NOTE — PROGRESS NOTES
01/07/21 1300   General Information   Type of Visit Initial OP Ortho PT Evaluation   Start of Care Date 01/07/21   Referring Physician Jared CERVANTES CNP   Patient/Family Goals Statement get the right hip to not hurt in prep for surgery   Orders Evaluate and Treat   Orders Comment hip and knee pain likely a result of compensaton   Date of Order 01/06/21   Certification Required? Yes   Medical Diagnosis primary OA of left knee, primary OA right hip, pain of right sacroiliac joint   Surgical/Medical history reviewed Yes   Precautions/Limitations no known precautions/limitations   Weight-Bearing Status - LUE full weight-bearing   Weight-Bearing Status - RUE full weight-bearing   Weight-Bearing Status - LLE full weight-bearing   Weight-Bearing Status - RLE full weight-bearing   General Information Comments patient pleasant, conversational and seeking knowledge/explanations for her current status.        Present No   Body Part(s)   Body Part(s) Knee;Hip   Presentation and Etiology   Pertinent history of current problem (include personal factors and/or comorbidities that impact the POC) Patient is a 80 year old female presents this date with chief complaint of right glute pain and left knee pain. Patient seen by Dr. Pang and Jared 1/6/21 and referred to PT for management of primary OA of left knee, right hip and pain in right sacroiliac joint. Patient is scheduled for left TKA 2/16/21 by Dr. Pang. Patient previously had a right TKA 1/2019. Patient with a previous medical history of AFib, on chronic anticoagulation therapy, HTN, hyperlipidemia, small bowel resection 2014, subarachnoid hematoma and hemorrhage 8/2020.    Impairments A. Pain;B. Decreased WB tolerance;D. Decreased ROM;E. Decreased flexibility;F. Decreased strength and endurance;H. Impaired gait;G. Impaired balance   Functional Limitations perform activities of daily living;perform desired leisure / sports activities   Symptom  Location left medial knee. right hip/glute   How/Where did it occur From insidious onset   Onset date of current episode/exacerbation 10/01/20   Chronicity Chronic   Pain rating (0-10 point scale) Best (/10);Worst (/10)   Best (/10) 3  (knee and hi)   Worst (/10) 10  (knee and hip)   Pain quality C. Aching;A. Sharp;F. Stabbing   Frequency of pain/symptoms C. With activity   Pain/symptoms exacerbated by A. Sitting;B. Walking;I. Bending   Pain/symptoms eased by B. Walking;E. Changing positions;G. Heat   Progression of symptoms since onset: Worsened   Current / Previous Interventions   Diagnostic Tests: X-ray   X-ray Results Results   X-ray results 1/6/21: Lower lumbar spine degenerative change.10/28/20: Chondrocalcinosis in the medial and lateral compartments. Moderate medial and lateral compartment osteoarthrosis. Severe patellofemoral osteoarthrosis. Mild lateral subluxation and tilt of the patella. Diffuse bone demineralization. There is no evidence of fracture or effusion. There is probably an ossified intra-articular body in the suprapatellar recess of the knee joint.   Prior Level of Function   Prior Level of Function-Mobility IND   Prior Level of Function-ADLs IND   Current Level of Function   Patient role/employment history F. Retired   Living environment House/[facility]   Home/community accessibility ramp entry. home set up previously for  when he was disabled, no set up concerns.   Current equipment-Gait/Locomotion Standard cane  (4WW and 2WW at home for surgery)   Current equipment-ADL Shower/tub chair;Raised Toilet Seat;Raised toilet seat with arms   Fall Risk Screen   Fall screen completed by PT   Have you fallen 2 or more times in the past year? No   Have you fallen and had an injury in the past year? Yes   Is patient a fall risk? No   Fall screen comments 1 fall in the last year due to tripping over the curb. demonstrates maru stepping in clinic without LOB   Abuse Screen (yes response referral  indicated)   Feels Unsafe at Home or Work/School no   Feels Threatened by Someone no   Does Anyone Try to Keep You From Having Contact with Others or Doing Things Outside Your Home? no   Physical Signs of Abuse Present no   System Outcome Measures   Outcome Measures   (LEFS 45/80)   Knee Objective Findings   Side (if bilateral, select both right and left) Left   Observation mild medial joint swelling. atrophy of medial quad.    Integumentary  no concerns   Posture bilateral knees lacking full extension. lumbar lordosis. left long limb with right posterior pelvic rotation   Gait/Locomotion antalgic with decreased step lenght, incresed step width, decreased toe clearance and limited pelvic nutation with shortened stance. minimal arm swing and poor heel first contact bilaterally, progressing towards full flat foot contact   Foot Position In Standing retroversion   Knee ROM Comment hard end feel into extension. flexion painful at end range overpressure   Palpation tender along medial joint line, medial patella and distal quad into quad tendon   Accessory Motion/Joint Mobility hypomobile patella and tib/femoral    Left Knee Extension AROM 10   Left Knee Extension PROM 8   Left Knee Flexion AROM 110   Left Knee Flexion PROM 111   Left Knee Flexion Strength 4/5   Left Knee Extension Strength 4/5   Left Hip Abduction Strength 4-/5   Left Quad Set Strength 4-/5   L VMO Strength 3+/5   Left Gastrocnemius Flexibility limited   Left Hamstring Flexibility limited, unable to achieve knee extension, hip flexion achieved 60 degrees with tension   Left Hip Flexor Flexibility limited    Left Quadricep Flexibility limited   Hip Objective Findings   Side (if bilateral, select both right and left) Right   Integumentary  no concerns   Lumbar ROM limited trunk extension   Pelvic Screen positive SI compression and distraction. tender to palpation along right SI joint. hypomobile pelvis   Femoral Nerve Stretch Test positive bilaterally    Resisted Hip Adduction Test painful right   Straight Leg Raise Test negative bilat   Scour Test negative   DELANO Test right positive   FADIR Test right positive   Palpation tender right obturator externus with trigger points    Right Hip Flexion PROM 105   Right Hip Abduction PROM 20   Right Hip Ext PROM 3   Right Hip Flexion Strength 4-/5   Right Hip Abduction Strength 4-/5   Right Hip Extension Strength 4-/5   Earnest Flexibility Test limited bilaterally rectus   Planned Therapy Interventions   Planned Therapy Interventions balance training;gait training;joint mobilization;manual therapy;neuromuscular re-education;ROM;strengthening;stretching   Planned Modality Interventions   Planned Modality Interventions Electrical stimulation;Cryotherapy;Hot packs   Clinical Impression   Criteria for Skilled Therapeutic Interventions Met yes, treatment indicated   PT Diagnosis innominate rotatin of the pelvis, muscle tension, muscle weakness, neural tension   Influenced by the following impairments faulty posture and faulty movement patterns   Functional limitations due to impairments pain, antalgic gait, decreased activity tolerance   Clinical Presentation Stable/Uncomplicated   Clinical Presentation Rationale no change in symptoms with assessment. medical history. two system involvement   Clinical Decision Making (Complexity) Low complexity   Therapy Frequency 2 times/Week   Predicted Duration of Therapy Intervention (days/wks) 5 weeks   Risk & Benefits of therapy have been explained Yes   Patient, Family & other staff in agreement with plan of care Yes   Clinical Impression Comments Patient presents with signs and symptoms consistent with faulty movement pattern creating SI joint dysfunction and soft tissue dysfunction local the deep external rotators on the right. Patient also demonstrates signs and symptoms consistent with OA of the left knee. Patient would benefit from skilled physical therapy intervention to address  the above impairments in order to prepare her body for optimal status prior to surgery in February and improve her knowledge of symptom management and overall quality of life by reducing her pain.    Education Assessment   Preferred Learning Style Listening;Demonstration   Barriers to Learning No barriers   ORTHO GOALS   PT Ortho Eval Goals 1;2;3   Ortho Goal 1   Goal Identifier Right hip palpation   Goal Description Patient will express no tenderness to palpation throughout the right deep rotator muscle group as evidence of resolved soft tissue dysfunction in order to progress to higher strengthening and for decreased pain.   Target Date 01/21/21   Ortho Goal 2   Goal Identifier Hamstring flexibility   Goal Description Patient will demonstrate passive left hip flexion with knee extension range of motion of at least 70 degrees as evidence of decreased neural tension and improved hamstring flexibility.   Target Date 01/28/21   Ortho Goal 3   Goal Identifier Quad strength   Goal Description Patient will demonstrate pain free stair climbing, 12 stairs, with good control and without fatigue as evidence of improved bilateral quad strength for optimal surgical outcomes.   Target Date 02/11/21   Total Evaluation Time   PT Eval, Low Complexity Minutes (23439) 25   Therapy Certification   Certification date from 01/07/21   Certification date to 02/15/21   Medical Diagnosis Left knee OA, right hip OA and sacroiliac joint pain     Thank you for your referral.  Anna Mortensen, PT, DPT, ATC    [facility]  O: [phone]  E: [email or web address]

## 2021-01-07 NOTE — PROGRESS NOTES
[facility]          OUTPATIENT PHYSICAL THERAPY ORTHOPEDIC EVALUATION  PLAN OF TREATMENT FOR OUTPATIENT REHABILITATION  (COMPLETE FOR INITIAL CLAIMS ONLY)  Patient's Last Name, First Name, M.I.  YOB: 1940  Tracy Palomo    Provider s Name:  [facility]   Medical Record No.  [id number]   Start of Care Date:  01/07/21   Onset Date:  10/01/20   Type:     _X__PT   ___OT   ___SLP Medical Diagnosis:  Left knee OA, right hip OA and sacroiliac joint pain     PT Diagnosis:  innominate rotatin of the pelvis, muscle tension, muscle weakness, neural tension   Visits from SOC:  1      _________________________________________________________________________________  Plan of Treatment/Functional Goals:  balance training, gait training, joint mobilization, manual therapy, neuromuscular re-education, ROM, strengthening, stretching     Electrical stimulation, Cryotherapy, Hot packs     Goals  Goal Identifier: Right hip palpation  Goal Description: Patient will express no tenderness to palpation throughout the right deep rotator muscle group as evidence of resolved soft tissue dysfunction in order to progress to higher strengthening and for decreased pain.  Target Date: 01/21/21    Goal Identifier: Hamstring flexibility  Goal Description: Patient will demonstrate passive left hip flexion with knee extension range of motion of at least 70 degrees as evidence of decreased neural tension and improved hamstring flexibility.  Target Date: 01/28/21    Goal Identifier: Quad strength  Goal Description: Patient will demonstrate pain free stair climbing, 12 stairs, with good control and without fatigue as evidence of improved bilateral quad strength for optimal surgical outcomes.  Target Date: 02/11/21     Therapy Frequency:  2 times/Week  Predicted Duration of Therapy Intervention:  5 weeks    Anna Mortensen, PT, DTP, ATC                 I CERTIFY THE NEED FOR THESE SERVICES  FURNISHED UNDER        THIS PLAN OF TREATMENT AND WHILE UNDER MY CARE     (Physician co-signature of this document indicates review and certification of the therapy plan).                       Certification Date From:  01/07/21   Certification Date To:  02/15/21    Referring Provider:  Jared CERVANTES CNP    Initial Assessment        See Epic Evaluation Start of Care Date: 01/07/21

## 2021-01-09 DIAGNOSIS — Z11.59 ENCOUNTER FOR SCREENING FOR OTHER VIRAL DISEASES: ICD-10-CM

## 2021-01-09 LAB
SARS-COV-2 RNA RESP QL NAA+PROBE: NORMAL
SPECIMEN SOURCE: NORMAL

## 2021-01-09 PROCEDURE — U0005 INFEC AGEN DETEC AMPLI PROBE: HCPCS | Performed by: ORTHOPAEDIC SURGERY

## 2021-01-09 PROCEDURE — U0003 INFECTIOUS AGENT DETECTION BY NUCLEIC ACID (DNA OR RNA); SEVERE ACUTE RESPIRATORY SYNDROME CORONAVIRUS 2 (SARS-COV-2) (CORONAVIRUS DISEASE [COVID-19]), AMPLIFIED PROBE TECHNIQUE, MAKING USE OF HIGH THROUGHPUT TECHNOLOGIES AS DESCRIBED BY CMS-2020-01-R: HCPCS | Performed by: ORTHOPAEDIC SURGERY

## 2021-01-10 LAB
LABORATORY COMMENT REPORT: NORMAL
SARS-COV-2 RNA RESP QL NAA+PROBE: NEGATIVE
SPECIMEN SOURCE: NORMAL

## 2021-01-11 ENCOUNTER — HOSPITAL ENCOUNTER (OUTPATIENT)
Dept: PHYSICAL THERAPY | Facility: CLINIC | Age: 81
Setting detail: THERAPIES SERIES
End: 2021-01-11
Attending: NURSE PRACTITIONER
Payer: MEDICARE

## 2021-01-11 DIAGNOSIS — I26.99 OTHER PULMONARY EMBOLISM WITHOUT ACUTE COR PULMONALE, UNSPECIFIED CHRONICITY (H): ICD-10-CM

## 2021-01-11 DIAGNOSIS — Z79.01 LONG TERM CURRENT USE OF ANTICOAGULANT THERAPY: ICD-10-CM

## 2021-01-11 LAB
CAPILLARY BLOOD COLLECTION: NORMAL
INR BLD: 1.2 (ref 0.86–1.14)

## 2021-01-11 PROCEDURE — 85610 PROTHROMBIN TIME: CPT | Performed by: INTERNAL MEDICINE

## 2021-01-11 PROCEDURE — 97110 THERAPEUTIC EXERCISES: CPT | Mod: GP | Performed by: PHYSICAL THERAPIST

## 2021-01-11 PROCEDURE — 36416 COLLJ CAPILLARY BLOOD SPEC: CPT | Performed by: INTERNAL MEDICINE

## 2021-01-12 ENCOUNTER — HOSPITAL ENCOUNTER (OUTPATIENT)
Facility: CLINIC | Age: 81
Discharge: HOME | End: 2021-01-12
Attending: ORTHOPAEDIC SURGERY | Admitting: ORTHOPAEDIC SURGERY
Payer: MEDICARE

## 2021-01-12 VITALS
TEMPERATURE: 98.3 F | OXYGEN SATURATION: 96 % | RESPIRATION RATE: 16 BRPM | HEART RATE: 52 BPM | SYSTOLIC BLOOD PRESSURE: 170 MMHG | DIASTOLIC BLOOD PRESSURE: 92 MMHG

## 2021-01-12 DIAGNOSIS — M65.312 TRIGGER FINGER OF LEFT THUMB: ICD-10-CM

## 2021-01-12 PROCEDURE — 272N000001 HC OR GENERAL SUPPLY STERILE: Performed by: ORTHOPAEDIC SURGERY

## 2021-01-12 PROCEDURE — 250N000009 HC RX 250: Performed by: ORTHOPAEDIC SURGERY

## 2021-01-12 PROCEDURE — 26055 INCISE FINGER TENDON SHEATH: CPT | Mod: FA | Performed by: ORTHOPAEDIC SURGERY

## 2021-01-12 PROCEDURE — 999N000141 HC STATISTIC PRE-PROCEDURE NURSING ASSESSMENT: Performed by: ORTHOPAEDIC SURGERY

## 2021-01-12 PROCEDURE — 250N000009 HC RX 250: Performed by: NURSE PRACTITIONER

## 2021-01-12 PROCEDURE — 360N000075 HC SURGERY LEVEL 2, PER MIN: Performed by: ORTHOPAEDIC SURGERY

## 2021-01-12 PROCEDURE — 710N000012 HC RECOVERY PHASE 2, PER MINUTE: Performed by: ORTHOPAEDIC SURGERY

## 2021-01-12 RX ORDER — LIDOCAINE/PRILOCAINE 2.5 %-2.5%
CREAM (GRAM) TOPICAL ONCE
Status: COMPLETED | OUTPATIENT
Start: 2021-01-12 | End: 2021-01-12

## 2021-01-12 RX ORDER — BUPIVACAINE HYDROCHLORIDE 5 MG/ML
INJECTION, SOLUTION PERINEURAL PRN
Status: DISCONTINUED | OUTPATIENT
Start: 2021-01-12 | End: 2021-01-12 | Stop reason: HOSPADM

## 2021-01-12 RX ORDER — HYDROCODONE BITARTRATE AND ACETAMINOPHEN 5; 325 MG/1; MG/1
1-2 TABLET ORAL EVERY 6 HOURS PRN
Qty: 6 TABLET | Refills: 0 | Status: ON HOLD | OUTPATIENT
Start: 2021-01-12 | End: 2021-02-17

## 2021-01-12 RX ADMIN — LIDOCAINE AND PRILOCAINE: 25; 25 CREAM TOPICAL at 11:47

## 2021-01-12 NOTE — PROGRESS NOTES
"[facility]    Post Op Check Note    80 year old female POD#0 s/p Left thumb trigger release at A1 alfredo by Dr. Pang  S: Patient seen at bedside in PACU in no apparent distress, sleepy and pleasant.  I discussed surgery with the patient.  She denies numbness, tingling or major pain issues except decreased thumb sensation.      O: CMS intact left UE, except left thumb decreased sensation.       5/5 , 4/5 thumb and 5/5 interosseous strength       Dressing on, clean, dry and intact       left upper extremity with no swelling and no erythema or ecchymosis     Vital signs:  Temp: 98.3  F (36.8  C) Temp src: Oral BP: (!) 168/80     Resp: 16 SpO2: 99 % O2 Device: None (Room air)        Estimated body mass index is 31.94 kg/m  as calculated from the following:    Height as of 1/6/21: 1.619 m (5' 3.75\").    Weight as of 1/6/21: 83.7 kg (184 lb 9.6 oz).      Hemoglobin   Date Value Ref Range Status   11/04/2020 14.1 11.7 - 15.7 g/dL Final     INR   Date Value Ref Range Status   08/06/2020 2.49 (H) 0.86 - 1.14 Final     INR Point of Care   Date Value Ref Range Status   01/11/2021 1.2 (H) 0.86 - 1.14 Final     Comment:     This test is intended for monitoring Coumadin therapy.  Results are not   accurate in patients with prolonged INR due to factor deficiency.           A/P:  1. Pain-controlled   2. DVT Prophylaxis-leg pumps  3. Weight Bearing Status-Non weight bearing left upper extremity, until tomorrow then no lifting greater than 1lb until follow up.   4. Dispo-discharge to home when cleared by PACU  5. Plan-clinic follow-up.    Abimael Dodson PA-C  1/12/2021      "

## 2021-01-12 NOTE — BRIEF OP NOTE
[facility] Brief Operative Note    Pre-operative diagnosis: Trigger finger of left thumb [M65.312]   Post-operative diagnosis: Same   Procedure: Procedure(s):  Left thumb trigger release at A1 pulley    Surgeon:  Anesthesia: Kaleb Pang DO  Local only   Assistant(s): Kai Dodson PA-C   Estimated blood loss:  Tourniquet time/pressure:  Urine output:  Fluids: Less than 10 ml  4 minutes at 250 mmHg  na  0 ml Crystalloids   Specimens: None   Findings: left trigger thumb [id number]     Vitaly Pang D.O.

## 2021-01-12 NOTE — OP NOTE
Procedure Date: 01/12/2021      PREOPERATIVE DIAGNOSIS:  Left thumb trigger thumb.      POSTOPERATIVE DIAGNOSIS:  Left thumb trigger thumb.      PROCEDURE PERFORMED:  Left thumb trigger release at the A1 pulley.      SURGEON:  Kaleb Pang DO      FIRST ASSISTANT:  DEE DEE Sen      ANESTHESIA:  Local only.      COMPLICATIONS:  None.      ESTIMATED BLOOD LOSS:  Less than 10 mL      FLUIDS:  None.      TOURNIQUET TIME PRESSURE:  4 minutes at 250 mmHg.      COUNTS:  Correct.      DISPOSITION:  PACU in stable condition.      GROSS FINDINGS:  Pre-pulley release, she could take the thumb through full range of motion.  She had locking and catching.  After releasing the A1 pulley, there was no further locking or catching.  There are no masses or lesions.  The tendon was intact.      INDICATIONS FOR PROCEDURE:  This is a pleasant 80-year-old female well known to myself.  Initially had symptoms earlier this year.  She underwent a flexor sheath steroid injection, which resolved them.  Unfortunately, the symptoms came back.  We discussed her options.  She will be having an arthroplasty surgery requiring a walker in the next month or so.  We discussed a trigger finger release in order to allow her to rehab adequately using a walker.  I discussed the risks, benefits, complications.  Once this was thoroughly discussed, she has opted to proceed.      DETAILS OF PROCEDURE:  She was met preoperatively.  Again, informed consent was verified, appropriate extremity was marked, and she was wheeled to operative suite #4.  Transferred to the OR table without any issues.  When deemed appropriate after timeout was performed under aseptic conditions, the thumb area was anesthetized with Marcaine plain.  The hand was then sterilely prepped and draped in normal manner.  Prior to incision, a timeout was performed.  Again, the appropriate patient, surgery and extremity were verified.  An Esmarch was utilized to exsanguinate the extremity,  tourniquet was inflated on her upper arm.  A transverse incision was made at the base of the thumb following a skin crease.  Blunt dissection down to the level of the flexor sheath.  Using a separate #15 blade, the A1 pulley was released with no issues.  She was able to take the thumb through full range of motion with no further locking or catching.  Tourniquet was deflated.  Hemostasis had been achieved in the approach.  Skin edges approximated with nylon.  Sterile bandage was applied.  She was transferred to PACU in stable condition.  She will be discharged home with hydrocodone for pain.  Followup appointment has been scheduled, discharge instructions provided.         SULAIMAN HERNANDEZ DO             D: 2021   T: 2021   MT: SHRUTHI      Name:     YOLANDA WOODARD   MRN:      [id number]        Account:        [id number]   :      1940           Procedure Date: 2021      Document: [id number]

## 2021-01-14 ENCOUNTER — HOSPITAL ENCOUNTER (OUTPATIENT)
Dept: PHYSICAL THERAPY | Facility: CLINIC | Age: 81
Setting detail: THERAPIES SERIES
End: 2021-01-14
Attending: NURSE PRACTITIONER
Payer: MEDICARE

## 2021-01-14 PROCEDURE — 97110 THERAPEUTIC EXERCISES: CPT | Mod: GP | Performed by: PHYSICAL THERAPIST

## 2021-01-14 NOTE — OR NURSING
"[facility] Same Day Surgery  Discharge Call Back  Tracy Palomo  1940  MRN: [id number]  Home: [phone] (home)   PCP: Chad Betts    We are calling to see how you're doing since your surgery/procedure with us?   Comments: \"I am doing great.  It is interesting not being able to use one hand though.\"  Clinical Questions  1. Have you had time to look at your discharge instructions? Do you have any questions in regards to the instructions?   Comment: \"Yes, no I don't have any questions\"  2. Do you feel your pain is being controlled with the regimen the surgeon sent you home on? (ie: prescription medications, over the counter pain medications, ice packs)   Comments: \"You know I took one pain pill last night but I don't think I will take any more because it really does not hurt.\"  3. Have you noticed any drainage on your dressing? Do you know what to do if you have bleeding as a result of your procedure?   Comments: \"no\"  4. Have you had any nausea/vomiting? Do you know how to treat this?   Comment: \"no\"  5. Have you had any signs/symptoms of infection? (ie: fever, swelling, heat, drainage or redness) Do you know what to do if you have?   Comment: \"no\"  6. Do you have a follow up appointment made with your surgeon? Do you have a number to contact them at if you need it?   Comment: reviewed date and time of follow up  Retained Foreign Object (YARY, Hemovac, Penrose, Wound Packing, Vaginal Packing, Nasal Splints, Urethral Stents, Reyna Catheter)  1. Do you still have  in place?   2. If the item is still in place, can you review the plan for removal with me?       You may be randomly selected to fill out a [facility] survey. We would appreciate you taking the time to fill this out. It is important to us if you would answer all of the questions on the survey.              "

## 2021-01-18 ENCOUNTER — ANTICOAGULATION THERAPY VISIT (OUTPATIENT)
Dept: ANTICOAGULATION | Facility: CLINIC | Age: 81
End: 2021-01-18

## 2021-01-18 ENCOUNTER — OFFICE VISIT (OUTPATIENT)
Dept: LAB | Facility: CLINIC | Age: 81
End: 2021-01-18
Payer: MEDICARE

## 2021-01-18 DIAGNOSIS — I26.99 OTHER PULMONARY EMBOLISM WITHOUT ACUTE COR PULMONALE, UNSPECIFIED CHRONICITY (H): ICD-10-CM

## 2021-01-18 DIAGNOSIS — Z79.01 LONG TERM CURRENT USE OF ANTICOAGULANT THERAPY: ICD-10-CM

## 2021-01-18 LAB
CAPILLARY BLOOD COLLECTION: NORMAL
INR BLD: 1.4 (ref 0.86–1.14)

## 2021-01-18 PROCEDURE — 36416 COLLJ CAPILLARY BLOOD SPEC: CPT | Performed by: INTERNAL MEDICINE

## 2021-01-18 PROCEDURE — 85610 PROTHROMBIN TIME: CPT | Performed by: INTERNAL MEDICINE

## 2021-01-18 NOTE — PROGRESS NOTES
ANTICOAGULATION MANAGEMENT     Patient Name:  Tracy Palomo  Date:  2021    ASSESSMENT /SUBJECTIVE:    Today's INR result of 1.4 is subtherapeutic. Goal INR of 2.0-3.0      Warfarin dose taken: Warfarin taken as instructed - see Lovenox instructions (also reflected in Calendar)    Diet: No new diet changes affecting INR    Medication changes/ interactions: No new medications/supplements affecting INR    Previous INR: Therapeutic     S/S of bleeding or thromboembolism: No    New injury or illness: No    Upcoming surgery, procedure or cardioversion: Yes:  - will be bridging with Lovenox again       Additional findings: None      PLAN:    Telephone call with Tracy regarding INR result and instructed:     Warfarin Dosing Instructions: Continue bridging with Enoxaparin and take 7.5 mg coumadin tonight and 5 mg tomorrow     Instructed patient to follow up no later than: 2 days  Lab visit scheduled    Education provided: None required      Pt  verbalizes understanding and agrees to warfarin dosing plan.    Instructed to call the Anticoagulation Clinic for any changes, questions or concerns. (#[phone])        Johanna Mckeon RN      OBJECTIVE:  Recent labs: (last 7 days)     21  0916   INR 1.4*         INR assessment SUB    Recheck INR In: 2 DAYS    INR Location Outside lab      Anticoagulation Summary  As of 2021    INR goal:  2.0-3.0   TTR:  55.5 % (1 y)   INR used for dosin.4 (2021)   Warfarin maintenance plan:  5 mg (5 mg x 1) every Mon, Wed; 2.5 mg (5 mg x 0.5) all other days   Full warfarin instructions:  : 7.5 mg; : 5 mg; Otherwise 5 mg every Mon, Wed; 2.5 mg all other days   Weekly warfarin total:  22.5 mg   Plan last modified:  Johanna Mckeon RN (2020)   Next INR check:  2021   Priority:  Maintenance   Target end date:  Indefinite    Indications    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]  Long term current use of  anticoagulant therapy [Z79.01]  Other pulmonary embolism without acute cor pulmonale  unspecified chronicity (H) [I26.99]             Anticoagulation Episode Summary     INR check location:      Preferred lab:      Send INR reminders to:  [facility]    Comments:  5 mg tablets, book, PM dose       Anticoagulation Care Providers     Provider Role Specialty Phone number    Chad Betts MD Referring Internal Medicine [phone]    Gerard Medrano MD Responsible Family Medicine [phone]

## 2021-01-20 ENCOUNTER — ANTICOAGULATION THERAPY VISIT (OUTPATIENT)
Dept: ANTICOAGULATION | Facility: CLINIC | Age: 81
End: 2021-01-20

## 2021-01-20 DIAGNOSIS — I26.99 OTHER PULMONARY EMBOLISM WITHOUT ACUTE COR PULMONALE, UNSPECIFIED CHRONICITY (H): ICD-10-CM

## 2021-01-20 DIAGNOSIS — Z79.01 LONG TERM CURRENT USE OF ANTICOAGULANT THERAPY: ICD-10-CM

## 2021-01-20 LAB
CAPILLARY BLOOD COLLECTION: NORMAL
INR BLD: 1.7 (ref 0.86–1.14)

## 2021-01-20 PROCEDURE — 36416 COLLJ CAPILLARY BLOOD SPEC: CPT | Performed by: INTERNAL MEDICINE

## 2021-01-20 PROCEDURE — 85610 PROTHROMBIN TIME: CPT | Performed by: INTERNAL MEDICINE

## 2021-01-20 NOTE — PROGRESS NOTES
ANTICOAGULATION MANAGEMENT     Patient Name:  Tracy Palomo  Date:  2021    ASSESSMENT /SUBJECTIVE:    Today's INR result of 1.7 is subtherapeutic. Goal INR of 2.0-3.0      Warfarin dose taken: Warfarin taken as instructed    Diet: No new diet changes affecting INR    Medication changes/ interactions: No new medications/supplements affecting INR    Previous INR: Subtherapeutic     S/S of bleeding or thromboembolism: No    New injury or illness: No    Upcoming surgery, procedure or cardioversion: No    Additional findings: On Lovenox - Pt will continue this and take 5 mg of coumadin daily, recheck her INR in 2 days      PLAN:    Telephone call with Tracy regarding INR result and instructed:     Warfarin Dosing Instructions: Continue bridging with Enoxaparin and take 5 mg of coumadin daily    Instructed patient to follow up no later than: 2 days  Lab visit scheduled    Education provided: Please call back if any changes to your diet, medications or how you've been taking warfarin, Target INR goal and significance of current INR result and Importance of therapeutic range      Tracy verbalizes understanding and agrees to warfarin dosing plan.    Instructed to call the Anticoagulation Clinic for any changes, questions or concerns. (#[phone])        Arti Ruelas RN      OBJECTIVE:  Recent labs: (last 7 days)     21  0916 21  0843   INR 1.4* 1.7*         INR assessment SUB    Recheck INR In: 2 DAYS    INR Location Outside lab      Anticoagulation Summary  As of 2021    INR goal:  2.0-3.0   TTR:  54.9 % (1 y)   INR used for dosin.7 (2021)   Warfarin maintenance plan:  5 mg (5 mg x 1) every Mon, Wed; 2.5 mg (5 mg x 0.5) all other days   Full warfarin instructions:  : 5 mg; Otherwise 5 mg every Mon, Wed; 2.5 mg all other days   Weekly warfarin total:  22.5 mg   Plan last modified:  Johanna Mckeon RN (2020)   Next INR check:  2021   Priority:  Maintenance   Target  end date:  Indefinite    Indications    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]  Long term current use of anticoagulant therapy [Z79.01]  Other pulmonary embolism without acute cor pulmonale  unspecified chronicity (H) [I26.99]             Anticoagulation Episode Summary     INR check location:      Preferred lab:      Send INR reminders to:  [facility]    Comments:  5 mg tablets, book, PM dose       Anticoagulation Care Providers     Provider Role Specialty Phone number    Chad Betts MD Referring Internal Medicine [phone]    Gerard Medrano MD Responsible Family Medicine [phone]

## 2021-01-20 NOTE — PROGRESS NOTES
Orthopedic Clinic Post-Operative Note    CHIEF COMPLAINT:   Chief Complaint   Patient presents with     Surgical Followup     Left thumb trigger release       HISTORY OF PRESENT ILLNESS  Location: Left thumb  Rating of Pain: Minimal  Pain is better with:  rest and not pushing on the incision.  Pain improving overall: Definitely  Associated Features: Patient has not noticed any catching or locking ever since the surgery and also a greater range of motion with flexion.  Patient denies any numbness or tingling or any fevers chills or drainage from the incision.  Patient concerns: None.  She is very happy with her surgery.  Additional History: None    Patient's past medical, surgical, social and family histories reviewed.     Past Medical History:   Diagnosis Date     Atrial fibrillation (H) 2014    related to colon surgery     Colon polyps      Diverticulosis of colon (without mention of hemorrhage)     Diverticulosis     DVT (deep vein thrombosis) in pregnancy 2014    after colon surgery     Other and unspecified hyperlipidemia      PE (pulmonary embolism) 2014    related to colon surgery     Unspecified essential hypertension        Past Surgical History:   Procedure Laterality Date     ARTHROPLASTY KNEE Right 1/8/2019    Procedure: Right total knee replacement;  Surgeon: Kaleb Pang DO;  Location:  OR     COLONOSCOPY  09, 10, 12    [facility]     COLONOSCOPY N/A 12/11/2017    Procedure: COLONOSCOPY;  colonoscopy;  Surgeon: Elvis Quezada MD;  Location:  GI     FLEXIBLE SIGMOIDOSCOPY  09, 11     LAPAROTOMY EXPLORATORY N/A 10/20/2014    Procedure: LAPAROTOMY EXPLORATORY;  Surgeon: Miguel Oleary MD;  Location: [facility] OR     LAPAROTOMY, LYSIS ADHESIONS, COMBINED N/A 10/20/2014    Procedure: COMBINED LAPAROTOMY, LYSIS ADHESIONS;  Surgeon: Miguel Oleary MD;  Location: [facility]     OPEN REDUCTION INTERNAL FIXATION HIP BIPOLAR Left 3/16/2017    Procedure: OPEN REDUCTION INTERNAL  FIXATION HIP BIPOLAR;  Surgeon: Kaleb Pang DO;  Location: [facility] OR     RELEASE TRIGGER FINGER Left 1/12/2021    Procedure: Left thumb trigger release;  Surgeon: Kaleb Pang DO;  Location: [facility] OR     RESECT SMALL BOWEL WITHOUT OSTOMY N/A 10/20/2014    Procedure: RESECT SMALL BOWEL WITHOUT OSTOMY;  Surgeon: Miguel Oleary MD;  Location: [facility] OR       Medications:       acetaminophen (TYLENOL) 500 MG tablet, Take 1-2 tablets (500-1,000 mg) by mouth every 6 hours as needed for pain or fever (Patient not taking: Reported on 10/28/2020)       enoxaparin ANTICOAGULANT (LOVENOX) 80 MG/0.8ML syringe, Inject 0.8 mLs (80 mg) Subcutaneous every 12 hours       enoxaparin ANTICOAGULANT (LOVENOX) 80 MG/0.8ML syringe, Inject 0.8 mLs (80 mg) Subcutaneous 2 times daily (Patient not taking: Reported on 11/10/2020)       HYDROcodone-acetaminophen (NORCO) 5-325 MG tablet, Take 1-2 tablets by mouth every 6 hours as needed for moderate to severe pain       lovastatin (MEVACOR) 40 MG tablet, TAKE 1 TABLET BY MOUTH ONCE DAILY AT BEDTIME       warfarin ANTICOAGULANT (COUMADIN) 5 MG tablet, Take 5 mg Mon, Wed, and 2.5 mg all other days or as directed by coumadin clinic    No current facility-administered medications on file prior to visit.       Allergies   Allergen Reactions     No Known Allergies        Social History     Occupational History     Not on file   Tobacco Use     Smoking status: Never Smoker     Smokeless tobacco: Never Used   Substance and Sexual Activity     Alcohol use: No     Alcohol/week: 0.0 standard drinks     Drug use: No     Sexual activity: Not Currently     Partners: Male       Family History   Problem Relation Age of Onset     Blood Disease No family hx of         blood clots       REVIEW OF SYSTEMS  General: negative for, night sweats, dizziness, fatigue  Resp: No shortness of breath and no cough  CV: negative for chest pain, syncope or near-syncope  GI: negative for nausea, vomiting and  "diarrhea  : negative for dysuria and hematuria  Musculoskeletal: as above  Neurologic: negative for syncope   Hematologic: negative for bleeding disorder    Physical Exam:  Vitals: /85   Ht 1.619 m (5' 3.75\")   Wt 84.4 kg (186 lb)   BMI 32.18 kg/m    BMI= Body mass index is 32.18 kg/m .  Constitutional: healthy, alert and no acute distress   Psychiatric: mentation appears normal and affect normal/bright  NEURO: no focal deficits, CMS intact left upper extremity   RESP: Normal with easy respirations and no use of accessory muscles to breathe, no audible wheezing or retractions  CV: +2 radial pulse and her hand is warm to palpation, capillary refill less than 2 seconds on the thumb.   SKIN: Incision healing very well with no ecchymosis or erythema just slight swelling around the incision.  There is some eschar but the incision is tightly closed and does not open with gentle pressure on either side.  The rest of the hand and arm with no erythema, rashes, excoriation, or breakdown. No evidence of infection.   MUSCULOSKELETAL:    INSPECTION of left thumb: No gross deformities, erythema, edema, ecchymosis, atrophy or fasciculations.     PALPATION: No tenderness to palpation of the thumb or any of the other digits hand wrist or forearm.  No increased warmth.    ROM: Active: Full flexion, to the fifth digit with opposition also, full extension of the thumb.  No catching locking or pain with range of motion.     STRENGTH: 5 out of 5 , thumb without pain.     SPECIAL TEST: none  GAIT: non-antalgic  Lymph: no palpable lymph nodes    Diagnostic Modalities:   No radiographs necessary    Impression: 1.  11 days status post Left thumb trigger release at the A1 alfredo by Dr. Pang    FOCUSED PLAN:   Patient doing excellent and is very happy with her surgery.  She has increased her range of motion and has no catching or locking.  Patient educated on incisional care and okay to start massaging in about 3 days with all " of oil and vitamin E lotion.  No dishes for 1 more week to be safe.  Patient understands.  Follow-up on an as-needed basis.    Re-x-ray on return: No    BP Readings from Last 1 Encounters:   01/22/21 132/85       Tracy to follow up with Primary Care provider regarding elevated blood pressure.     Patient does not use Tobacco products.    This note was dictated with [employer].    Abimael Dosdon PA-C

## 2021-01-22 ENCOUNTER — ANTICOAGULATION THERAPY VISIT (OUTPATIENT)
Dept: ANTICOAGULATION | Facility: CLINIC | Age: 81
End: 2021-01-22

## 2021-01-22 ENCOUNTER — HOSPITAL ENCOUNTER (OUTPATIENT)
Dept: PHYSICAL THERAPY | Facility: CLINIC | Age: 81
Setting detail: THERAPIES SERIES
End: 2021-01-22
Attending: NURSE PRACTITIONER
Payer: MEDICARE

## 2021-01-22 ENCOUNTER — OFFICE VISIT (OUTPATIENT)
Dept: ORTHOPEDICS | Facility: CLINIC | Age: 81
End: 2021-01-22
Payer: MEDICARE

## 2021-01-22 VITALS
SYSTOLIC BLOOD PRESSURE: 132 MMHG | WEIGHT: 186 LBS | BODY MASS INDEX: 31.76 KG/M2 | HEIGHT: 64 IN | DIASTOLIC BLOOD PRESSURE: 85 MMHG

## 2021-01-22 DIAGNOSIS — Z79.01 LONG TERM CURRENT USE OF ANTICOAGULANT THERAPY: ICD-10-CM

## 2021-01-22 DIAGNOSIS — I26.99 OTHER PULMONARY EMBOLISM WITHOUT ACUTE COR PULMONALE, UNSPECIFIED CHRONICITY (H): ICD-10-CM

## 2021-01-22 DIAGNOSIS — Z98.890 S/P TRIGGER FINGER RELEASE: ICD-10-CM

## 2021-01-22 DIAGNOSIS — Z09 SURGICAL FOLLOW-UP CARE: Primary | ICD-10-CM

## 2021-01-22 LAB
CAPILLARY BLOOD COLLECTION: NORMAL
INR BLD: 1.9 (ref 0.86–1.14)

## 2021-01-22 PROCEDURE — 85610 PROTHROMBIN TIME: CPT | Performed by: INTERNAL MEDICINE

## 2021-01-22 PROCEDURE — 97110 THERAPEUTIC EXERCISES: CPT | Mod: GP | Performed by: PHYSICAL THERAPIST

## 2021-01-22 PROCEDURE — 99024 POSTOP FOLLOW-UP VISIT: CPT | Performed by: PHYSICIAN ASSISTANT

## 2021-01-22 PROCEDURE — 36416 COLLJ CAPILLARY BLOOD SPEC: CPT | Performed by: INTERNAL MEDICINE

## 2021-01-22 ASSESSMENT — MIFFLIN-ST. JEOR: SCORE: 1299.72

## 2021-01-22 ASSESSMENT — PAIN SCALES - GENERAL: PAINLEVEL: NO PAIN (0)

## 2021-01-22 NOTE — PROGRESS NOTES
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 She reports her right hip and left knee have done well with the decreased frequency of PT. LEFS score improved from 45pt to 50pts. She reports completing tasks daily, however not several times a day. This date she denies hip pain. Left knee she reports minimal pain in the morning, increasing with activity in the day (two walks daily).     Objective Measurements:  Objective Measure: HEP  Details: no questions. hand is doing well. tolerating SLR well  Objective Measure: symptoms  Details: no pain in left knee at rest, increases with activity. Right hip pain free  Objective Measure: flexibility  Details: left hamstring 102, right hamstring 100. Left knee extension lacking 5 degrees. right knee extension lacking 10 degrees.      Outcome Measures (most recent score):  LEFS  (  FAITH Hanks: Used with Permission) 2021   a. Any of your usual work, housework, or school activities. 4-No Difficulty   b. Your usual hobbies, recreational or sporting activities.  4-No Difficulty   c. Getting into or out of the bath. 4-No Difficulty   d. Walking between rooms. 4-No Difficulty   e. Putting on your shoes or socks. 3-A Little Bit of Difficulty   f. Squatting. 0-Extreme Difficulty or Unable to Perform Activity   g. Lifting an object, like a bag of groceries from the floor.  4-No Difficulty   h. Performing light activities around your home.  4-No Difficulty   i. Performing heavy activities around your home. 0-Extreme Difficulty or  Unable to Perform Activity   j. Getting into or out of a car. 4-No Difficulty   k. Walking 2 blocks. 3-A Little Bit of Difficulty   l. Walking a mile. 2-Moderate Difficulty   m. Going up or down 10 stairs (about 1 flight of stairs). 2-Moderate Difficulty   n. Standing for 1 hour. 4-No Difficulty   o. Sitting for 1 hour. 4-No Difficulty   p. Running on even ground. 0-Extreme Difficulty or Unable to Perform Activity   q. Running on uneven ground. 0-Extreme Difficulty or Unable to Perform Activity   r. Making sharp turns while running fast. 0-Extreme Difficulty or Unable to Perform Activity   s. Hopping. 0-Extreme Difficulty or Unable to Perform Activity   t. Rolling over in bed. 4-No Difficulty   Column Totals: /80 50     Goals:  Goal Identifier Right hip palpation   Goal Description Patient will express no tenderness to palpation throughout the right deep rotator muscle group as evidence of resolved soft tissue dysfunction in order to progress to higher strengthening and for decreased pain.   Target Date 01/21/21   Date Met  01/22/21   Progress:     Goal Identifier Hamstring flexibility   Goal Description Patient will demonstrate passive left hip flexion with knee extension range of motion of at least 70 degrees as evidence of decreased neural tension and improved hamstring flexibility.   Target Date 01/28/21   Date Met  01/22/21(102)   Progress:     Goal Identifier Quad strength   Goal Description Patient will demonstrate pain free stair climbing, 12 stairs, with good control and without fatigue as evidence of improved bilateral quad strength for optimal surgical outcomes.   Target Date 02/11/21   Date Met  01/22/21(pain free, good control, requires hand rail)   Progress:     Progress Toward Goals:   Progress this reporting period: Patient has met all goals established regarding her right hip pain and left knee flexibility and strength. She has made good progress and is highly motivated to continue forward IND.      Plan:  Discharge from therapy.    Discharge:    Reason for Discharge: Patient has met all goals.    Equipment Issued:     Discharge Plan: Patient to continue home program.    Thank you for your referral.  Anna Mortensen PT, DPT, ATC    [facility]  O: [phone]  E: [email or web address]

## 2021-01-22 NOTE — PROGRESS NOTES
ANTICOAGULATION MANAGEMENT     Patient Name:  Tracy Palomo  Date:  2021    ASSESSMENT /SUBJECTIVE:    Today's INR result of 1.9 is subtherapeutic. Goal INR of 2.0-3.0      Warfarin dose taken: Warfarin taken as instructed    Diet: No new diet changes affecting INR    Medication changes/ interactions: No new medications/supplements affecting INR    Previous INR: Subtherapeutic     S/S of bleeding or thromboembolism: No    New injury or illness: No    Upcoming surgery, procedure or cardioversion: Yes:  - has bridging plan in place    Additional findings: None      PLAN:    Telephone call with Tracy regarding INR result and instructed:     Warfarin Dosing Instructions: Stop bridging with Enoxaparin - take 7.5 mg coumadin Fri and 2.5 mg Sat, Sun, and 5 mg Mon    Instructed patient to follow up no later than: 4 days  Lab visit scheduled    Education provided: None required      Pt verbalizes understanding and agrees to warfarin dosing plan.    Instructed to call the Anticoagulation Clinic for any changes, questions or concerns. (#[phone])        Johanna Mckeon RN      OBJECTIVE:  Recent labs: (last 7 days)     21  0916 21  0843 21  0818   INR 1.4* 1.7* 1.9*         INR assessment SUB    Recheck INR In: 4 DAYS    INR Location Outside lab      Anticoagulation Summary  As of 2021    INR goal:  2.0-3.0   TTR:  54.4 % (1 y)   INR used for dosin.9 (2021)   Warfarin maintenance plan:  5 mg (5 mg x 1) every Mon, Wed; 2.5 mg (5 mg x 0.5) all other days   Full warfarin instructions:  : 7.5 mg; Otherwise 5 mg every Mon, Wed; 2.5 mg all other days   Weekly warfarin total:  22.5 mg   Plan last modified:  Johanna Mckeon RN (2020)   Next INR check:  2021   Priority:  Maintenance   Target end date:  Indefinite    Indications    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]  Long term current use of anticoagulant therapy [Z79.01]  Other  pulmonary embolism without acute cor pulmonale  unspecified chronicity (H) [I26.99]             Anticoagulation Episode Summary     INR check location:      Preferred lab:      Send INR reminders to:  [facility]    Comments:  5 mg tablets, book, PM dose       Anticoagulation Care Providers     Provider Role Specialty Phone number    Chad Betts MD Referring Internal Medicine [phone]    Gerard Medrano MD Responsible Family Medicine [phone]

## 2021-01-22 NOTE — LETTER
1/22/2021         RE: Tracy Palomo  [address] [facility] [place] [address]        Dear Colleague,    Thank you for referring your patient, Tracy Palomo, to the [facility]. Please see a copy of my visit note below.    Orthopedic Clinic Post-Operative Note    CHIEF COMPLAINT:   Chief Complaint   Patient presents with     Surgical Followup     Left thumb trigger release       HISTORY OF PRESENT ILLNESS  Location: Left thumb  Rating of Pain: Minimal  Pain is better with:  rest and not pushing on the incision.  Pain improving overall: Definitely  Associated Features: Patient has not noticed any catching or locking ever since the surgery and also a greater range of motion with flexion.  Patient denies any numbness or tingling or any fevers chills or drainage from the incision.  Patient concerns: None.  She is very happy with her surgery.  Additional History: None    Patient's past medical, surgical, social and family histories reviewed.     Past Medical History:   Diagnosis Date     Atrial fibrillation (H) 2014    related to colon surgery     Colon polyps      Diverticulosis of colon (without mention of hemorrhage)     Diverticulosis     DVT (deep vein thrombosis) in pregnancy 2014    after colon surgery     Other and unspecified hyperlipidemia      PE (pulmonary embolism) 2014    related to colon surgery     Unspecified essential hypertension        Past Surgical History:   Procedure Laterality Date     ARTHROPLASTY KNEE Right 1/8/2019    Procedure: Right total knee replacement;  Surgeon: Kaleb Pang DO;  Location:  OR     COLONOSCOPY  09, 10, 12    [facility]     COLONOSCOPY N/A 12/11/2017    Procedure: COLONOSCOPY;  colonoscopy;  Surgeon: Elvis Quezada MD;  Location:  GI     FLEXIBLE SIGMOIDOSCOPY  09, 11     LAPAROTOMY EXPLORATORY N/A 10/20/2014    Procedure: LAPAROTOMY EXPLORATORY;  Surgeon: Miguel Oleary MD;  Location:  OR      LAPAROTOMY, LYSIS ADHESIONS, COMBINED N/A 10/20/2014    Procedure: COMBINED LAPAROTOMY, LYSIS ADHESIONS;  Surgeon: Miguel Oleary MD;  Location: [facility] OR     OPEN REDUCTION INTERNAL FIXATION HIP BIPOLAR Left 3/16/2017    Procedure: OPEN REDUCTION INTERNAL FIXATION HIP BIPOLAR;  Surgeon: Kaleb Pang DO;  Location: [facility] OR     RELEASE TRIGGER FINGER Left 1/12/2021    Procedure: Left thumb trigger release;  Surgeon: Kaleb Pang DO;  Location: [facility] OR     RESECT SMALL BOWEL WITHOUT OSTOMY N/A 10/20/2014    Procedure: RESECT SMALL BOWEL WITHOUT OSTOMY;  Surgeon: Miguel Oleary MD;  Location: [facility] OR       Medications:       acetaminophen (TYLENOL) 500 MG tablet, Take 1-2 tablets (500-1,000 mg) by mouth every 6 hours as needed for pain or fever (Patient not taking: Reported on 10/28/2020)       enoxaparin ANTICOAGULANT (LOVENOX) 80 MG/0.8ML syringe, Inject 0.8 mLs (80 mg) Subcutaneous every 12 hours       enoxaparin ANTICOAGULANT (LOVENOX) 80 MG/0.8ML syringe, Inject 0.8 mLs (80 mg) Subcutaneous 2 times daily (Patient not taking: Reported on 11/10/2020)       HYDROcodone-acetaminophen (NORCO) 5-325 MG tablet, Take 1-2 tablets by mouth every 6 hours as needed for moderate to severe pain       lovastatin (MEVACOR) 40 MG tablet, TAKE 1 TABLET BY MOUTH ONCE DAILY AT BEDTIME       warfarin ANTICOAGULANT (COUMADIN) 5 MG tablet, Take 5 mg Mon, Wed, and 2.5 mg all other days or as directed by coumadin clinic    No current facility-administered medications on file prior to visit.       Allergies   Allergen Reactions     No Known Allergies        Social History     Occupational History     Not on file   Tobacco Use     Smoking status: Never Smoker     Smokeless tobacco: Never Used   Substance and Sexual Activity     Alcohol use: No     Alcohol/week: 0.0 standard drinks     Drug use: No     Sexual activity: Not Currently     Partners: Male       Family History   Problem Relation Age of Onset     Blood  Disease No family hx of         blood clots       REVIEW OF SYSTEMS  General: negative for, night sweats, dizziness, fatigue  Resp: No shortness of breath and no cough  CV: negative for chest pain, syncope or near-syncope  GI: negative for nausea, vomiting and diarrhea  : negative for dysuria and hematuria  Musculoskeletal: as above  Neurologic: negative for syncope   Hematologic: negative for bleeding disorder    Physical Exam:  Vitals: There were no vitals taken for this visit.  BMI= There is no height or weight on file to calculate BMI.  Constitutional: healthy, alert and no acute distress   Psychiatric: mentation appears normal and affect normal/bright  NEURO: no focal deficits, CMS intact left upper extremity   RESP: Normal with easy respirations and no use of accessory muscles to breathe, no audible wheezing or retractions  CV: +2 radial pulse and her hand is warm to palpation, capillary refill less than 2 seconds on the thumb.   SKIN: Incision healing very well with no ecchymosis or erythema just slight swelling around the incision.  There is some eschar but the incision is tightly closed and does not open with gentle pressure on either side.  The rest of the hand and arm with no erythema, rashes, excoriation, or breakdown. No evidence of infection.   MUSCULOSKELETAL:    INSPECTION of left thumb: No gross deformities, erythema, edema, ecchymosis, atrophy or fasciculations.     PALPATION: No tenderness to palpation of the thumb or any of the other digits hand wrist or forearm.  No increased warmth.    ROM: Active: Full flexion, to the fifth digit with opposition also, full extension of the thumb.  No catching locking or pain with range of motion.     STRENGTH: 5 out of 5 , thumb without pain.     SPECIAL TEST: none  GAIT: non-antalgic  Lymph: no palpable lymph nodes    Diagnostic Modalities:   No radiographs necessary    Impression: 1.  11 days status post Left thumb trigger release at the A1 pulley by  Dr. Pang    FOCUSED PLAN:   Patient doing excellent and is very happy with her surgery.  She has increased her range of motion and has no catching or locking.  Patient educated on incisional care and okay to start massaging in about 3 days with all of oil and vitamin E lotion.  No dishes for 1 more week to be safe.  Patient understands.  Follow-up on an as-needed basis.    Re-x-ray on return: No    BP Readings from Last 1 Encounters:   01/12/21 (!) 170/92       Tracy to follow up with Primary Care provider regarding elevated blood pressure.     Patient does not use Tobacco products.    This note was dictated with [employer].    Abimael Dodson PA-C              Again, thank you for allowing me to participate in the care of your patient.        Sincerely,        Abimael Dodson PA-C

## 2021-01-26 ENCOUNTER — ANTICOAGULATION THERAPY VISIT (OUTPATIENT)
Dept: ANTICOAGULATION | Facility: CLINIC | Age: 81
End: 2021-01-26

## 2021-01-26 DIAGNOSIS — I26.99 OTHER PULMONARY EMBOLISM WITHOUT ACUTE COR PULMONALE, UNSPECIFIED CHRONICITY (H): ICD-10-CM

## 2021-01-26 DIAGNOSIS — Z79.01 LONG TERM CURRENT USE OF ANTICOAGULANT THERAPY: ICD-10-CM

## 2021-01-26 LAB
CAPILLARY BLOOD COLLECTION: NORMAL
INR BLD: 2.4 (ref 0.86–1.14)

## 2021-01-26 PROCEDURE — 36416 COLLJ CAPILLARY BLOOD SPEC: CPT | Performed by: INTERNAL MEDICINE

## 2021-01-26 PROCEDURE — 85610 PROTHROMBIN TIME: CPT | Performed by: INTERNAL MEDICINE

## 2021-01-26 NOTE — PROGRESS NOTES
ANTICOAGULATION MANAGEMENT     Patient Name:  Tracy Palomo  Date:  2021    ASSESSMENT /SUBJECTIVE:    Today's INR result of 2.4 is therapeutic. Goal INR of 2.0-3.0      Warfarin dose taken: Warfarin taken as instructed    Diet: No new diet changes affecting INR    Medication changes/ interactions: No new medications/supplements affecting INR    Previous INR: Subtherapeutic     S/S of bleeding or thromboembolism: No    New injury or illness: No    Upcoming surgery, procedure or cardioversion: Yes: bridging plan in place for procedure on       Additional findings: None      PLAN:    Telephone call with Tracy regarding INR result and instructed:     Warfarin Dosing Instructions: resume 5 mg Mon, Wed and 2.5 mg ROW    Instructed patient to follow up no later than: 8 days  Lab visit scheduled    Education provided: None required      Pt verbalizes understanding and agrees to warfarin dosing plan.    Instructed to call the Anticoagulation Clinic for any changes, questions or concerns. (#[phone])        Johanna Mckeon RN      OBJECTIVE:  Recent labs: (last 7 days)     21  0843 21  0818 21  0837   INR 1.7* 1.9* 2.4*         INR assessment THER    Recheck INR In: 8 DAYS    INR Location Outside lab      Anticoagulation Summary  As of 2021    INR goal:  2.0-3.0   TTR:  54.1 % (1 y)   INR used for dosin.4 (2021)   Warfarin maintenance plan:  5 mg (5 mg x 1) every Mon, Wed; 2.5 mg (5 mg x 0.5) all other days   Full warfarin instructions:  5 mg every Mon, Wed; 2.5 mg all other days   Weekly warfarin total:  22.5 mg   No change documented:  Johanna Mckeon, RN   Plan last modified:  Johanna Mckeon RN (2020)   Next INR check:  2/3/2021   Priority:  Maintenance   Target end date:  Indefinite    Indications    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]  Long term current use of anticoagulant therapy [Z79.01]  Other pulmonary embolism  without acute cor pulmonale  unspecified chronicity (H) [I26.99]             Anticoagulation Episode Summary     INR check location:      Preferred lab:      Send INR reminders to:  [facility]    Comments:  5 mg tablets, book, PM dose       Anticoagulation Care Providers     Provider Role Specialty Phone number    Chad Betts MD Referring Internal Medicine [phone]    Gerard Medrano MD Responsible Family Medicine [phone]

## 2021-02-01 ENCOUNTER — OFFICE VISIT (OUTPATIENT)
Dept: INTERNAL MEDICINE | Facility: CLINIC | Age: 81
End: 2021-02-01
Payer: MEDICARE

## 2021-02-01 ENCOUNTER — ANTICOAGULATION THERAPY VISIT (OUTPATIENT)
Dept: ANTICOAGULATION | Facility: CLINIC | Age: 81
End: 2021-02-01

## 2021-02-01 VITALS
OXYGEN SATURATION: 97 % | TEMPERATURE: 97.4 F | DIASTOLIC BLOOD PRESSURE: 76 MMHG | HEART RATE: 66 BPM | WEIGHT: 186 LBS | BODY MASS INDEX: 32.18 KG/M2 | SYSTOLIC BLOOD PRESSURE: 128 MMHG | RESPIRATION RATE: 16 BRPM

## 2021-02-01 DIAGNOSIS — Z79.01 LONG TERM CURRENT USE OF ANTICOAGULANT THERAPY: ICD-10-CM

## 2021-02-01 DIAGNOSIS — R82.90 UNSPECIFIED ABNORMAL FINDINGS IN URINE: ICD-10-CM

## 2021-02-01 DIAGNOSIS — Z11.59 ENCOUNTER FOR SCREENING FOR OTHER VIRAL DISEASES: ICD-10-CM

## 2021-02-01 DIAGNOSIS — M17.12 ARTHRITIS OF LEFT KNEE: ICD-10-CM

## 2021-02-01 DIAGNOSIS — Z01.818 PREOP GENERAL PHYSICAL EXAM: Primary | ICD-10-CM

## 2021-02-01 DIAGNOSIS — I26.99 OTHER PULMONARY EMBOLISM WITHOUT ACUTE COR PULMONALE, UNSPECIFIED CHRONICITY (H): ICD-10-CM

## 2021-02-01 DIAGNOSIS — Z01.818 PREOPERATIVE EXAMINATION: ICD-10-CM

## 2021-02-01 DIAGNOSIS — R82.998 OTHER ABNORMAL FINDINGS IN URINE: ICD-10-CM

## 2021-02-01 LAB
ALBUMIN UR-MCNC: NEGATIVE MG/DL
APPEARANCE UR: CLEAR
BACTERIA #/AREA URNS HPF: ABNORMAL /HPF
BILIRUB UR QL STRIP: NEGATIVE
COLOR UR AUTO: ABNORMAL
ERYTHROCYTE [DISTWIDTH] IN BLOOD BY AUTOMATED COUNT: 13.8 % (ref 10–15)
GLUCOSE UR STRIP-MCNC: NEGATIVE MG/DL
HCT VFR BLD AUTO: 42.2 % (ref 35–47)
HGB BLD-MCNC: 13.7 G/DL (ref 11.7–15.7)
HGB UR QL STRIP: ABNORMAL
INR BLD: 1.8 (ref 0.86–1.14)
KETONES UR STRIP-MCNC: NEGATIVE MG/DL
LEUKOCYTE ESTERASE UR QL STRIP: ABNORMAL
MCH RBC QN AUTO: 29.1 PG (ref 26.5–33)
MCHC RBC AUTO-ENTMCNC: 32.5 G/DL (ref 31.5–36.5)
MCV RBC AUTO: 90 FL (ref 78–100)
MUCOUS THREADS #/AREA URNS LPF: PRESENT /LPF
NITRATE UR QL: NEGATIVE
PH UR STRIP: 5 PH (ref 5–7)
PLATELET # BLD AUTO: 235 10E9/L (ref 150–450)
RBC # BLD AUTO: 4.71 10E12/L (ref 3.8–5.2)
RBC #/AREA URNS AUTO: <1 /HPF (ref 0–2)
SOURCE: ABNORMAL
SP GR UR STRIP: 1 (ref 1–1.03)
SQUAMOUS #/AREA URNS AUTO: 1 /HPF (ref 0–1)
UROBILINOGEN UR STRIP-MCNC: 0 MG/DL (ref 0–2)
WBC # BLD AUTO: 5.8 10E9/L (ref 4–11)
WBC #/AREA URNS AUTO: 3 /HPF (ref 0–5)

## 2021-02-01 PROCEDURE — 87088 URINE BACTERIA CULTURE: CPT | Performed by: INTERNAL MEDICINE

## 2021-02-01 PROCEDURE — 87086 URINE CULTURE/COLONY COUNT: CPT | Performed by: INTERNAL MEDICINE

## 2021-02-01 PROCEDURE — 99214 OFFICE O/P EST MOD 30 MIN: CPT | Performed by: INTERNAL MEDICINE

## 2021-02-01 PROCEDURE — 85027 COMPLETE CBC AUTOMATED: CPT | Performed by: ORTHOPAEDIC SURGERY

## 2021-02-01 PROCEDURE — 85610 PROTHROMBIN TIME: CPT | Performed by: ORTHOPAEDIC SURGERY

## 2021-02-01 PROCEDURE — 36415 COLL VENOUS BLD VENIPUNCTURE: CPT | Performed by: ORTHOPAEDIC SURGERY

## 2021-02-01 PROCEDURE — 87186 SC STD MICRODIL/AGAR DIL: CPT | Performed by: INTERNAL MEDICINE

## 2021-02-01 PROCEDURE — 81001 URINALYSIS AUTO W/SCOPE: CPT | Performed by: INTERNAL MEDICINE

## 2021-02-01 ASSESSMENT — PAIN SCALES - GENERAL: PAINLEVEL: NO PAIN (0)

## 2021-02-01 NOTE — PATIENT INSTRUCTIONS

## 2021-02-01 NOTE — H&P (VIEW-ONLY)
[address] [address]  Phone: [phone]  Primary Provider: Chad Betts  Pre-op Performing Provider: CHAD BETTS    PREOPERATIVE EVALUATION:  Today's date: 2/1/2021    Tracy Palomo is a 80 year old female who presents for a preoperative evaluation.    Surgical Information:  Surgery/Procedure: Left total knee replacement  Surgery Location: [facility]  Surgeon: Dr. Pang  Surgery Date: 2/16/21  Time of Surgery: 7:30  Where patient plans to recover: At home with family  Fax number for surgical facility: Note does not need to be faxed, will be available electronically in Epic.    Type of Anesthesia Anticipated: Spinal    Subjective     HPI related to upcoming procedure:Left knee arthritis and pain, limiting her walking affecting her health.     Preop Questions 2/1/2021   1. Have you ever had a heart attack or stroke? No   2. Have you ever had surgery on your heart or blood vessels, such as a stent placement, a coronary artery bypass, or surgery on an artery in your head, neck, heart, or legs? No   3. Do you have chest pain with activity? No   4. Do you have a history of  heart failure? No   5. Do you currently have a cold, bronchitis or symptoms of other infection? No   6. Do you have a cough, shortness of breath, or wheezing? No   7. Do you or anyone in your family have previous history of blood clots? YES - personal history and on coumadin,    8. Do you or does anyone in your family have a serious bleeding problem such as prolonged bleeding following surgeries or cuts? No   9. Have you ever had problems with anemia or been told to take iron pills? No   10. Have you had any abnormal blood loss such as black, tarry or bloody stools, or abnormal vaginal bleeding? No   11. Have you ever had a blood transfusion? No   12. Are you willing to have a blood transfusion if it is medically needed before, during, or after your surgery? Yes   13. Have you or any  of your relatives ever had problems with anesthesia? No   14. Do you have sleep apnea, excessive snoring or daytime drowsiness? No   15. Do you have any artifical heart valves or other implanted medical devices like a pacemaker, defibrillator, or continuous glucose monitor? No   16. Do you have artificial joints? YES - right knee replaced.    17. Are you allergic to latex? No       Health Care Directive:  Patient has a Health Care Directive on file      Preoperative Review of :  ONly took a day of hydrocodone after thumb surgery.       Status of Chronic Conditions:  HYPERLIPIDEMIA - Patient has a long history of significant Hyperlipidemia requiring medication for treatment with recent good control. Patient reports no problems or side effects with the medication.     Pulmonary embolus and does lovenox bridging for all surgeries.     Review of Systems  Constitutional, neuro, ENT, endocrine, pulmonary, cardiac, gastrointestinal, genitourinary, musculoskeletal, integument and psychiatric systems are negative, except as otherwise noted.    Patient Active Problem List    Diagnosis Date Noted     Pain of right sacroiliac joint 01/06/2021     Priority: Medium     Primary osteoarthritis of right hip 01/06/2021     Priority: Medium     Trigger finger of left thumb 01/06/2021     Priority: Medium     Subarachnoid hematoma (H) 08/05/2020     Priority: Medium     SAH (subarachnoid hemorrhage) (H) 08/05/2020     Priority: Medium     Other pulmonary embolism without acute cor pulmonale, unspecified chronicity (H) 05/27/2020     Priority: Medium     RVF (right ventricular failure) (H) - per Echo 2019 01/09/2019     Priority: Medium     Peripheral edema 01/09/2019     Priority: Medium     S/P total knee replacement using cement, right 01/08/2019     Priority: Medium     Other chest pain 01/08/2019     Priority: Medium     Primary osteoarthritis of right knee 11/15/2018     Priority: Medium     Primary osteoarthritis of left knee  11/15/2018     Priority: Medium     Closed left hip fracture (H) 03/15/2017     Priority: Medium     Long term current use of anticoagulant therapy 03/22/2016     Priority: Medium     Anemia 11/03/2014     Priority: Medium     Paroxysmal atrial fibrillation (H) 11/03/2014     Priority: Medium     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 11/01/2014     Priority: Medium     In 2014       Recent major surgery - exploratory lap with small bowel resection 10/20 11/01/2014     Priority: Medium     Pleural effusion on right 11/01/2014     Priority: Medium     Hypertension goal BP (blood pressure) < 140/90 02/22/2013     Priority: Medium     Hyperlipidemia LDL goal <130 02/22/2013     Priority: Medium     Encounter for long-term current use of medication 02/22/2013     Priority: Medium     Problem list name updated by automated process. Provider to review       History of colonic polyps 02/22/2013     Priority: Medium     Problem list name updated by automated process. Provider to review       Advanced directives, counseling/discussion 02/22/2013     Priority: Medium     Pt states has Advance Directive at home, will bring in to be scanned into chart.        Past Medical History:   Diagnosis Date     Atrial fibrillation (H) 2014    related to colon surgery     Colon polyps      Diverticulosis of colon (without mention of hemorrhage)     Diverticulosis     DVT (deep vein thrombosis) in pregnancy 2014    after colon surgery     Other and unspecified hyperlipidemia      PE (pulmonary embolism) 2014    related to colon surgery     Unspecified essential hypertension      Past Surgical History:   Procedure Laterality Date     ARTHROPLASTY KNEE Right 1/8/2019    Procedure: Right total knee replacement;  Surgeon: Kaleb Pang DO;  Location:  OR     COLONOSCOPY  09, 10, 12    [facility]     COLONOSCOPY N/A 12/11/2017    Procedure: COLONOSCOPY;  colonoscopy;  Surgeon: Elvis Quezada  MD EVANGELINA;  Location: [facility] GI     FLEXIBLE SIGMOIDOSCOPY  09, 11     LAPAROTOMY EXPLORATORY N/A 10/20/2014    Procedure: LAPAROTOMY EXPLORATORY;  Surgeon: Miguel Oleary MD;  Location: [facility] OR     LAPAROTOMY, LYSIS ADHESIONS, COMBINED N/A 10/20/2014    Procedure: COMBINED LAPAROTOMY, LYSIS ADHESIONS;  Surgeon: Miguel Oleary MD;  Location: [facility] OR     OPEN REDUCTION INTERNAL FIXATION HIP BIPOLAR Left 3/16/2017    Procedure: OPEN REDUCTION INTERNAL FIXATION HIP BIPOLAR;  Surgeon: Kaleb Pang DO;  Location: [facility] OR     RELEASE TRIGGER FINGER Left 1/12/2021    Procedure: Left thumb trigger release;  Surgeon: Kaleb Pang DO;  Location: [facility] OR     RESECT SMALL BOWEL WITHOUT OSTOMY N/A 10/20/2014    Procedure: RESECT SMALL BOWEL WITHOUT OSTOMY;  Surgeon: Miguel Oleary MD;  Location: [facility] OR     Current Outpatient Medications   Medication Sig Dispense Refill     lovastatin (MEVACOR) 40 MG tablet TAKE 1 TABLET BY MOUTH ONCE DAILY AT BEDTIME 90 tablet 3     warfarin ANTICOAGULANT (COUMADIN) 5 MG tablet Take 5 mg Mon, Wed, and 2.5 mg all other days or as directed by coumadin clinic 60 tablet 0     acetaminophen (TYLENOL) 500 MG tablet Take 1-2 tablets (500-1,000 mg) by mouth every 6 hours as needed for pain or fever (Patient not taking: Reported on 10/28/2020) 60 tablet 1     enoxaparin ANTICOAGULANT (LOVENOX) 80 MG/0.8ML syringe Inject 0.8 mLs (80 mg) Subcutaneous every 12 hours (Patient not taking: Reported on 2/1/2021) 25 Syringe 0     enoxaparin ANTICOAGULANT (LOVENOX) 80 MG/0.8ML syringe Inject 0.8 mLs (80 mg) Subcutaneous 2 times daily (Patient not taking: Reported on 11/10/2020) 20 Syringe 0     HYDROcodone-acetaminophen (NORCO) 5-325 MG tablet Take 1-2 tablets by mouth every 6 hours as needed for moderate to severe pain (Patient not taking: Reported on 2/1/2021) 6 tablet 0       Allergies   Allergen Reactions     No Known Allergies         Social History     Tobacco Use     Smoking status:  Never Smoker     Smokeless tobacco: Never Used   Substance Use Topics     Alcohol use: No     Alcohol/week: 0.0 standard drinks     History   Drug Use No         Objective     /76   Pulse 66   Temp 97.4  F (36.3  C) (Temporal)   Resp 16   Wt 84.4 kg (186 lb)   SpO2 97%   BMI 32.18 kg/m      Physical Exam    GENERAL APPEARANCE: healthy, alert and no distress     HENT: ear canals and TM's normal and nose and mouth without ulcers or lesions     NECK: no adenopathy, no asymmetry, masses, or scars and thyroid normal to palpation     RESP: lungs clear to auscultation - no rales, rhonchi or wheezes     CV: regular rates and rhythm, normal S1 S2, no S3 or S4 and no murmur, click or rub     ABDOMEN:  soft, nontender, no HSM or masses and bowel sounds normal     MS: extremities normal- no gross deformities noted, no evidence of inflammation in joints, FROM in all extremities.     SKIN: no suspicious lesions or rashes     NEURO: Normal strength and tone, sensory exam grossly normal, mentation intact and speech normal     PSYCH: mentation appears normal. and affect normal/bright     LYMPHATICS: No cervical adenopathy    Recent Labs   Lab Test 02/01/21  1020 01/26/21  0837 11/04/20  0915 11/04/20  0915 08/06/20  0939 08/06/20  0939 08/05/20  1145   HGB  --   --   --  14.1  --   --  13.7   PLT  --   --   --  207  --   --  190   INR 1.8* 2.4*   < > 2.2*   < > 2.49* 2.51*   NA  --   --   --   --   --  138 144   POTASSIUM  --   --   --   --   --  4.2 3.7   CR  --   --   --   --   --  0.87 0.86    < > = values in this interval not displayed.        Diagnostics:  Recent Results (from the past 24 hour(s))   **CBC with platelets FUTURE 14d    Collection Time: 02/01/21 10:20 AM   Result Value Ref [place]    WBC 5.8 4.0 - 11.0 10e9/L    RBC Count 4.71 3.8 - 5.2 10e12/L    Hemoglobin 13.7 11.7 - 15.7 g/dL    Hematocrit 42.2 35.0 - 47.0 %    MCV 90 78 - 100 fl    MCH 29.1 26.5 - 33.0 pg    MCHC 32.5 31.5 - 36.5 g/dL    RDW 13.8  10.0 - 15.0 %    Platelet Count 235 150 - 450 10e9/L   INR point of care    Collection Time: 02/01/21 10:20 AM   Result Value Ref [facility]    INR Point of Care 1.8 (H) 0.86 - 1.14      No EKG this visit, completed in the last 90 days.    Revised Cardiac Risk Index (RCRI):  The patient has the following serious cardiovascular risks for perioperative complications:   - No serious cardiac risks = 0 points     RCRI Interpretation: 1 point: Class II (low risk - 0.9% complication rate)           Assessment & Plan   The proposed surgical procedure is considered INTERMEDIATE risk.    Preoperative examination  Doing well, ok for knee surgery, cbc was good today,   - **CBC with platelets FUTURE 14d  - *UA reflex to Microscopic and Culture ([place]; [facility]; [facility]; [facility]; [facility]; [facility]; [place]; [place])    Long term current use of anticoagulant therapy  Coumadin will hold for 5 days before procedure,  - INR point of care    Other pulmonary embolism without acute cor pulmonale, unspecified chronicity (H)  Needs bridging with lovenox.   - INR point of care    Preop general physical exam      Arthritis of left knee  Needs knee replaced,   - *UA reflex to Microscopic and Culture ([place]; [facility]; [facility]; [facility]; [facility]; [facility]; [place]; [place])       Risks and Recommendations:  The patient has the following additional risks and recommendations for perioperative complications:  Anemia/Bleeding/Clotting:    - History of DVT or PE, consider DVT prevention postoperatively   - She will need to have Lovenox bridging     Medication Instructions:   - warfarin: Bridging therapy will be coordinated by anticoagulation clinic    RECOMMENDATION:  APPROVAL GIVEN to proceed with proposed procedure, without further diagnostic evaluation.    Signed Electronically by: Chad Betts MD    Copy of this evaluation report is provided to requesting physician.    Preop  [facility] Preop Guidelines    Revised Cardiac Risk Index

## 2021-02-01 NOTE — PROGRESS NOTES
ANTICOAGULATION MANAGEMENT     Patient Name:  Tracy Palomo  Date:  2021    ASSESSMENT /SUBJECTIVE:    Today's INR result of 1.8 is subtherapeutic. Goal INR of 2.0-3.0      Warfarin dose taken: VM left    Diet: VM left    Medication changes/ interactions: VM left    Previous INR: Therapeutic     S/S of bleeding or thromboembolism: VM left    New injury or illness: VM left    Upcoming surgery, procedure or cardioversion:VM left    Additional findings: VM left      PLAN:    Detailed message left for Tracy regarding INR result and instructed:     Warfarin Dosing Instructions: Change your warfarin dose to 5 mg Mon, Wed, Fri and 2.5 mg ROW  . (11.1 % change)    Instructed patient to follow up no later than: 2 weeks  Lab visit scheduled    Education provided: None required      I left a detailed voicemail with the orders reflected in flowsheet. I have also requested a call back if there have been any missed doses, concerns, illness, fever, or if there have been any changes in medications, activity level, or diet      Instructed to call the Anticoagulation Clinic for any changes, questions or concerns. (#[phone])        Johanna Mckeon RN      OBJECTIVE:  Recent labs: (last 7 days)     21  0837 21  1020   INR 2.4* 1.8*         INR assessment SUB    Recheck INR In: 2 WEEKS    INR Location Outside lab      Anticoagulation Summary  As of 2021    INR goal:  2.0-3.0   TTR:  53.6 % (1 y)   INR used for dosin.8 (2021)   Warfarin maintenance plan:  5 mg (5 mg x 1) every Mon, Wed, Fri; 2.5 mg (5 mg x 0.5) all other days   Full warfarin instructions:  5 mg every Mon, Wed, Fri; 2.5 mg all other days   Weekly warfarin total:  25 mg   Plan last modified:  Johanna Mckeon RN (2021)   Next INR check:  2/15/2021   Priority:  Maintenance   Target end date:  Indefinite    Indications    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]  Long term current use of  anticoagulant therapy [Z79.01]  Other pulmonary embolism without acute cor pulmonale  unspecified chronicity (H) [I26.99]             Anticoagulation Episode Summary     INR check location:      Preferred lab:      Send INR reminders to:  [facility]    Comments:  5 mg tablets, book, PM dose       Anticoagulation Care Providers     Provider Role Specialty Phone number    Chad Betts MD Referring Internal Medicine [phone]    Gerard Medrano MD Responsible Family Medicine [phone]

## 2021-02-01 NOTE — PROGRESS NOTES
[address] [address]  Phone: [phone]  Primary Provider: Chad Betts  Pre-op Performing Provider: CHAD BETTS    PREOPERATIVE EVALUATION:  Today's date: 2/1/2021    Tracy Palomo is a 80 year old female who presents for a preoperative evaluation.    Surgical Information:  Surgery/Procedure: Left total knee replacement  Surgery Location: [facility]  Surgeon: Dr. Pang  Surgery Date: 2/16/21  Time of Surgery: 7:30  Where patient plans to recover: At home with family  Fax number for surgical facility: Note does not need to be faxed, will be available electronically in Epic.    Type of Anesthesia Anticipated: Spinal    Subjective     HPI related to upcoming procedure:Left knee arthritis and pain, limiting her walking affecting her health.     Preop Questions 2/1/2021   1. Have you ever had a heart attack or stroke? No   2. Have you ever had surgery on your heart or blood vessels, such as a stent placement, a coronary artery bypass, or surgery on an artery in your head, neck, heart, or legs? No   3. Do you have chest pain with activity? No   4. Do you have a history of  heart failure? No   5. Do you currently have a cold, bronchitis or symptoms of other infection? No   6. Do you have a cough, shortness of breath, or wheezing? No   7. Do you or anyone in your family have previous history of blood clots? YES - personal history and on coumadin,    8. Do you or does anyone in your family have a serious bleeding problem such as prolonged bleeding following surgeries or cuts? No   9. Have you ever had problems with anemia or been told to take iron pills? No   10. Have you had any abnormal blood loss such as black, tarry or bloody stools, or abnormal vaginal bleeding? No   11. Have you ever had a blood transfusion? No   12. Are you willing to have a blood transfusion if it is medically needed before, during, or after your surgery? Yes   13. Have you or any  of your relatives ever had problems with anesthesia? No   14. Do you have sleep apnea, excessive snoring or daytime drowsiness? No   15. Do you have any artifical heart valves or other implanted medical devices like a pacemaker, defibrillator, or continuous glucose monitor? No   16. Do you have artificial joints? YES - right knee replaced.    17. Are you allergic to latex? No       Health Care Directive:  Patient has a Health Care Directive on file      Preoperative Review of :  ONly took a day of hydrocodone after thumb surgery.       Status of Chronic Conditions:  HYPERLIPIDEMIA - Patient has a long history of significant Hyperlipidemia requiring medication for treatment with recent good control. Patient reports no problems or side effects with the medication.     Pulmonary embolus and does lovenox bridging for all surgeries.     Review of Systems  Constitutional, neuro, ENT, endocrine, pulmonary, cardiac, gastrointestinal, genitourinary, musculoskeletal, integument and psychiatric systems are negative, except as otherwise noted.    Patient Active Problem List    Diagnosis Date Noted     Pain of right sacroiliac joint 01/06/2021     Priority: Medium     Primary osteoarthritis of right hip 01/06/2021     Priority: Medium     Trigger finger of left thumb 01/06/2021     Priority: Medium     Subarachnoid hematoma (H) 08/05/2020     Priority: Medium     SAH (subarachnoid hemorrhage) (H) 08/05/2020     Priority: Medium     Other pulmonary embolism without acute cor pulmonale, unspecified chronicity (H) 05/27/2020     Priority: Medium     RVF (right ventricular failure) (H) - per Echo 2019 01/09/2019     Priority: Medium     Peripheral edema 01/09/2019     Priority: Medium     S/P total knee replacement using cement, right 01/08/2019     Priority: Medium     Other chest pain 01/08/2019     Priority: Medium     Primary osteoarthritis of right knee 11/15/2018     Priority: Medium     Primary osteoarthritis of left knee  11/15/2018     Priority: Medium     Closed left hip fracture (H) 03/15/2017     Priority: Medium     Long term current use of anticoagulant therapy 03/22/2016     Priority: Medium     Anemia 11/03/2014     Priority: Medium     Paroxysmal atrial fibrillation (H) 11/03/2014     Priority: Medium     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 11/01/2014     Priority: Medium     In 2014       Recent major surgery - exploratory lap with small bowel resection 10/20 11/01/2014     Priority: Medium     Pleural effusion on right 11/01/2014     Priority: Medium     Hypertension goal BP (blood pressure) < 140/90 02/22/2013     Priority: Medium     Hyperlipidemia LDL goal <130 02/22/2013     Priority: Medium     Encounter for long-term current use of medication 02/22/2013     Priority: Medium     Problem list name updated by automated process. Provider to review       History of colonic polyps 02/22/2013     Priority: Medium     Problem list name updated by automated process. Provider to review       Advanced directives, counseling/discussion 02/22/2013     Priority: Medium     Pt states has Advance Directive at home, will bring in to be scanned into chart.        Past Medical History:   Diagnosis Date     Atrial fibrillation (H) 2014    related to colon surgery     Colon polyps      Diverticulosis of colon (without mention of hemorrhage)     Diverticulosis     DVT (deep vein thrombosis) in pregnancy 2014    after colon surgery     Other and unspecified hyperlipidemia      PE (pulmonary embolism) 2014    related to colon surgery     Unspecified essential hypertension      Past Surgical History:   Procedure Laterality Date     ARTHROPLASTY KNEE Right 1/8/2019    Procedure: Right total knee replacement;  Surgeon: Kaleb Pang DO;  Location:  OR     COLONOSCOPY  09, 10, 12    [facility]     COLONOSCOPY N/A 12/11/2017    Procedure: COLONOSCOPY;  colonoscopy;  Surgeon: Elvis Quezada  MD EVANGELINA;  Location: [facility]     FLEXIBLE SIGMOIDOSCOPY  09, 11     LAPAROTOMY EXPLORATORY N/A 10/20/2014    Procedure: LAPAROTOMY EXPLORATORY;  Surgeon: Miguel Oleary MD;  Location: [facility]     LAPAROTOMY, LYSIS ADHESIONS, COMBINED N/A 10/20/2014    Procedure: COMBINED LAPAROTOMY, LYSIS ADHESIONS;  Surgeon: Miguel Oleary MD;  Location[facility]     OPEN REDUCTION INTERNAL FIXATION HIP BIPOLAR Left 3/16/2017    Procedure: OPEN REDUCTION INTERNAL FIXATION HIP BIPOLAR;  Surgeon: Kaleb Pang DO;  Location: [facility]     RELEASE TRIGGER FINGER Left 1/12/2021    Procedure: Left thumb trigger release;  Surgeon: Kaleb Pang DO;  Location: [facility]     RESECT SMALL BOWEL WITHOUT OSTOMY N/A 10/20/2014    Procedure: RESECT SMALL BOWEL WITHOUT OSTOMY;  Surgeon: Miguel Oleary MD;  Location: [facility]     Current Outpatient Medications   Medication Sig Dispense Refill     lovastatin (MEVACOR) 40 MG tablet TAKE 1 TABLET BY MOUTH ONCE DAILY AT BEDTIME 90 tablet 3     warfarin ANTICOAGULANT (COUMADIN) 5 MG tablet Take 5 mg Mon, Wed, and 2.5 mg all other days or as directed by coumadin clinic 60 tablet 0     acetaminophen (TYLENOL) 500 MG tablet Take 1-2 tablets (500-1,000 mg) by mouth every 6 hours as needed for pain or fever (Patient not taking: Reported on 10/28/2020) 60 tablet 1     enoxaparin ANTICOAGULANT (LOVENOX) 80 MG/0.8ML syringe Inject 0.8 mLs (80 mg) Subcutaneous every 12 hours (Patient not taking: Reported on 2/1/2021) 25 Syringe 0     enoxaparin ANTICOAGULANT (LOVENOX) 80 MG/0.8ML syringe Inject 0.8 mLs (80 mg) Subcutaneous 2 times daily (Patient not taking: Reported on 11/10/2020) 20 Syringe 0     HYDROcodone-acetaminophen (NORCO) 5-325 MG tablet Take 1-2 tablets by mouth every 6 hours as needed for moderate to severe pain (Patient not taking: Reported on 2/1/2021) 6 tablet 0       Allergies   Allergen Reactions     No Known Allergies         Social History     Tobacco Use     Smoking status:  Never Smoker     Smokeless tobacco: Never Used   Substance Use Topics     Alcohol use: No     Alcohol/week: 0.0 standard drinks     History   Drug Use No         Objective     /76   Pulse 66   Temp 97.4  F (36.3  C) (Temporal)   Resp 16   Wt 84.4 kg (186 lb)   SpO2 97%   BMI 32.18 kg/m      Physical Exam    GENERAL APPEARANCE: healthy, alert and no distress     HENT: ear canals and TM's normal and nose and mouth without ulcers or lesions     NECK: no adenopathy, no asymmetry, masses, or scars and thyroid normal to palpation     RESP: lungs clear to auscultation - no rales, rhonchi or wheezes     CV: regular rates and rhythm, normal S1 S2, no S3 or S4 and no murmur, click or rub     ABDOMEN:  soft, nontender, no HSM or masses and bowel sounds normal     MS: extremities normal- no gross deformities noted, no evidence of inflammation in joints, FROM in all extremities.     SKIN: no suspicious lesions or rashes     NEURO: Normal strength and tone, sensory exam grossly normal, mentation intact and speech normal     PSYCH: mentation appears normal. and affect normal/bright     LYMPHATICS: No cervical adenopathy    Recent Labs   Lab Test 02/01/21  1020 01/26/21  0837 11/04/20  0915 11/04/20  0915 08/06/20  0939 08/06/20  0939 08/05/20  1145   HGB  --   --   --  14.1  --   --  13.7   PLT  --   --   --  207  --   --  190   INR 1.8* 2.4*   < > 2.2*   < > 2.49* 2.51*   NA  --   --   --   --   --  138 144   POTASSIUM  --   --   --   --   --  4.2 3.7   CR  --   --   --   --   --  0.87 0.86    < > = values in this interval not displayed.        Diagnostics:  Recent Results (from the past 24 hour(s))   **CBC with platelets FUTURE 14d    Collection Time: 02/01/21 10:20 AM   Result Value Ref [place]    WBC 5.8 4.0 - 11.0 10e9/L    RBC Count 4.71 3.8 - 5.2 10e12/L    Hemoglobin 13.7 11.7 - 15.7 g/dL    Hematocrit 42.2 35.0 - 47.0 %    MCV 90 78 - 100 fl    MCH 29.1 26.5 - 33.0 pg    MCHC 32.5 31.5 - 36.5 g/dL    RDW 13.8  10.0 - 15.0 %    Platelet Count 235 150 - 450 10e9/L   INR point of care    Collection Time: 02/01/21 10:20 AM   Result Value Ref [place]    INR Point of Care 1.8 (H) 0.86 - 1.14      No EKG this visit, completed in the last 90 days.    Revised Cardiac Risk Index (RCRI):  The patient has the following serious cardiovascular risks for perioperative complications:   - No serious cardiac risks = 0 points     RCRI Interpretation: 1 point: Class II (low risk - 0.9% complication rate)           Assessment & Plan   The proposed surgical procedure is considered INTERMEDIATE risk.    Preoperative examination  Doing well, ok for knee surgery, cbc was good today,   - **CBC with platelets FUTURE 14d  - *UA reflex to Microscopic and Culture ([place]; [facility]; [facility]; [facility]; [facility]; [facility]; [place]; [place])    Long term current use of anticoagulant therapy  Coumadin will hold for 5 days before procedure,  - INR point of care    Other pulmonary embolism without acute cor pulmonale, unspecified chronicity (H)  Needs bridging with lovenox.   - INR point of care    Preop general physical exam      Arthritis of left knee  Needs knee replaced,   - *UA reflex to Microscopic and Culture ([place]; [facility]; [facility]; [facility]; [facility]; [facility]; [place]; [place])       Risks and Recommendations:  The patient has the following additional risks and recommendations for perioperative complications:  Anemia/Bleeding/Clotting:    - History of DVT or PE, consider DVT prevention postoperatively   - She will need to have Lovenox bridging     Medication Instructions:   - warfarin: Bridging therapy will be coordinated by anticoagulation clinic    RECOMMENDATION:  APPROVAL GIVEN to proceed with proposed procedure, without further diagnostic evaluation.    Signed Electronically by: Chad Betts MD    Copy of this evaluation report is provided to requesting physician.    Preop  Critical access hospital Preop Guidelines    Revised Cardiac Risk Index

## 2021-02-02 ENCOUNTER — TELEPHONE (OUTPATIENT)
Dept: INTERNAL MEDICINE | Facility: CLINIC | Age: 81
End: 2021-02-02

## 2021-02-02 DIAGNOSIS — B96.20 E. COLI UTI: Primary | ICD-10-CM

## 2021-02-02 DIAGNOSIS — N39.0 E. COLI UTI: Primary | ICD-10-CM

## 2021-02-02 LAB
BACTERIA SPEC CULT: ABNORMAL
Lab: ABNORMAL
SPECIMEN SOURCE: ABNORMAL

## 2021-02-02 RX ORDER — CEPHALEXIN 500 MG/1
500 CAPSULE ORAL 2 TIMES DAILY
Qty: 14 CAPSULE | Refills: 0 | Status: SHIPPED | OUTPATIENT
Start: 2021-02-02 | End: 2021-03-08

## 2021-02-02 NOTE — TELEPHONE ENCOUNTER
----- Message from Chad Betts MD sent at 2/2/2021  4:33 PM CST -----  Please call let her know that her urine did grow out E. coli.  I would treat her with Keflex 500 mg twice a day for 7 days before her surgery.  Please set up the prescription once we know which pharmacy she wants associated with E. coli UTI.

## 2021-02-08 ENCOUNTER — OFFICE VISIT (OUTPATIENT)
Dept: ORTHOPEDICS | Facility: CLINIC | Age: 81
End: 2021-02-08
Payer: MEDICARE

## 2021-02-08 VITALS
HEIGHT: 64 IN | WEIGHT: 186.5 LBS | SYSTOLIC BLOOD PRESSURE: 130 MMHG | DIASTOLIC BLOOD PRESSURE: 82 MMHG | BODY MASS INDEX: 31.84 KG/M2

## 2021-02-08 DIAGNOSIS — Z01.818 PREOPERATIVE EXAMINATION: Primary | ICD-10-CM

## 2021-02-08 PROCEDURE — 99207 PR PREOP VISIT IN GLOBAL PKG: CPT | Performed by: ORTHOPAEDIC SURGERY

## 2021-02-08 ASSESSMENT — KOOS JR
HOW SEVERE IS YOUR KNEE STIFFNESS AFTER FIRST WAKING IN MORNING: MODERATE
GOING UP OR DOWN STAIRS: MODERATE
KOOS JR SCORING: 61.58
TWISING OR PIVOTING ON KNEE: MODERATE
RISING FROM SITTING: MODERATE
BENDING TO THE FLOOR TO PICK UP OBJECT: MODERATE

## 2021-02-08 ASSESSMENT — MIFFLIN-ST. JEOR: SCORE: 1301.99

## 2021-02-08 ASSESSMENT — PAIN SCALES - GENERAL: PAINLEVEL: NO PAIN (0)

## 2021-02-08 NOTE — PROGRESS NOTES
One Week Prior to Total Joint    1. Surgery: Left total knee arthroplasty scheduled for 2/16/2021 by Dr. Pang.  2. Labs: Within normal limits except patient has a UTI and is on Keflex for this and has 2 more days of Keflex.  3. H&P: Cleared by Dr. Betts on 2/1/2021.  Patient has history of anticoagulation with Coumadin INR goal 2-3, history of A. fib, history of pulmonary embolism, hypertension, right ventricular failure, hyperlipidemia, subarachnoid hemorrhage, status post right total knee arthroplasty.  4. Covid test: To be done on 2/13/2021.  5. DVT prophylaxis: Per Coumadin clinic orders which can be seen specifically below.  6. Cement: Antibiotic cement   7. Dispo: Plan for discharge postop day 1 to home where her 2 daughters will be taking turns taking care of her.  Plan for in-home physical therapy and then transition to outpatient physical therapy.    Anticoagulation plan per Coumadin clinic:    For procedure on 2/16/2010 Thursday 2/11/21        5 days before surgery: Stop warfarin.  No Lovenox.   Friday 2/12/21              4 days before surgery: No warfarin.  No Lovenox.  Saturday 2/13/21         3 days before surgery: No warfarin, begin Lovenox shots 80 mg  SQ every 12 hours.  Sunday 2/14/21           2 days before surgery: No warfarin, Lovenox shots 80 mg SQ every 12 hours.  Monday 2/15/21          1 day before surgery: No warfarin, Lovenox shot in AM only. No PM dose.  Check INR to be sure INR is low enough for procedure      Tuesday 2/16/21          Day of surgery: No Lovenox, warfarin 7.5 mg in the PM after surgery/procedure (first dose of coumadin should be taken 12-24 hours after procedure to reduce risk of bleeding)  Wednesday 2/17/21     POD 1: warfarin 5 mg, Lovenox 80 mg SQ every 12 hours.  Thursday 2/18/21        POD 2: warfarin 2.5 mg, Lovenox 80 mg SQ every 12 hours.  Friday 2/19/21              POD 3: warfarin 5 mg, Lovenox 80 mg SQ every 12 hours.  Saturday 2/20/21         POD 4:  warfarin 2.5 mg, Lovenox 80 mg SQ every 12 hours.  Sunday 2/21/21           POD 5: warfarin 2.5 mg, Lovenox 80 mg SQ every 12 hours.  Monday 2/22/21          POD 6: Lovenox 80 mg SQ in the AM only - further instructions with INR check     This note was dictated with Zhilian Zhaopin.    Abimael Dodson PA-C

## 2021-02-08 NOTE — LETTER
2/8/2021         RE: Tracy Palomo  [address] [facility] [address]        Dear Colleague,    Thank you for referring your patient, Tracy Palomo, to the [facility]. Please see a copy of my visit note below.    Discussed dental precautions.   handi cap form given for 6 months.    K.Kluge/RMA     One Week Prior to Total Joint    1. Surgery: Left total knee arthroplasty scheduled for 2/16/2021 by Dr. Pang.  2. Labs: Within normal limits except patient has a UTI and is on Keflex for this and has 2 more days of Keflex.  3. H&P: Cleared by Dr. Betts on 2/1/2021.  Patient has history of anticoagulation with Coumadin INR goal 2-3, history of A. fib, history of pulmonary embolism, hypertension, right ventricular failure, hyperlipidemia, subarachnoid hemorrhage, status post right total knee arthroplasty.  4. Covid test: To be done on 2/13/2021.  5. DVT prophylaxis: Per Coumadin clinic orders.  She will be on Lovenox twice daily prior to surgery and after surgery until she follows up with Coumadin clinic on Monday after surgery.  She has the detailed regiment at home.  Coumadin clinic to follow when she is in the hospital.  6. Cement: Antibiotic cement   7. Dispo: Plan for discharge postop day 1 to home where her 2 daughters will be taking turns taking care of her.  Plan for in-home physical therapy and then transition to outpatient physical therapy.      This note was dictated with [facility].    Abimael Dodson PA-C          Again, thank you for allowing me to participate in the care of your patient.        Sincerely,        Kaleb Pang, DO

## 2021-02-13 DIAGNOSIS — Z11.59 ENCOUNTER FOR SCREENING FOR OTHER VIRAL DISEASES: ICD-10-CM

## 2021-02-13 DIAGNOSIS — H90.6 MIXED CONDUCTIVE AND SENSORINEURAL HEARING LOSS OF BOTH EARS: ICD-10-CM

## 2021-02-13 LAB
SARS-COV-2 RNA RESP QL NAA+PROBE: NORMAL
SPECIMEN SOURCE: NORMAL

## 2021-02-13 PROCEDURE — U0003 INFECTIOUS AGENT DETECTION BY NUCLEIC ACID (DNA OR RNA); SEVERE ACUTE RESPIRATORY SYNDROME CORONAVIRUS 2 (SARS-COV-2) (CORONAVIRUS DISEASE [COVID-19]), AMPLIFIED PROBE TECHNIQUE, MAKING USE OF HIGH THROUGHPUT TECHNOLOGIES AS DESCRIBED BY CMS-2020-01-R: HCPCS | Performed by: ORTHOPAEDIC SURGERY

## 2021-02-13 PROCEDURE — U0005 INFEC AGEN DETEC AMPLI PROBE: HCPCS | Performed by: ORTHOPAEDIC SURGERY

## 2021-02-15 ENCOUNTER — ANESTHESIA EVENT (OUTPATIENT)
Dept: SURGERY | Facility: CLINIC | Age: 81
End: 2021-02-15
Payer: MEDICARE

## 2021-02-15 ENCOUNTER — ANTICOAGULATION THERAPY VISIT (OUTPATIENT)
Dept: ANTICOAGULATION | Facility: CLINIC | Age: 81
End: 2021-02-15

## 2021-02-15 DIAGNOSIS — I26.99 OTHER PULMONARY EMBOLISM WITHOUT ACUTE COR PULMONALE, UNSPECIFIED CHRONICITY (H): ICD-10-CM

## 2021-02-15 DIAGNOSIS — Z79.01 LONG TERM CURRENT USE OF ANTICOAGULANT THERAPY: ICD-10-CM

## 2021-02-15 LAB
CAPILLARY BLOOD COLLECTION: NORMAL
INR BLD: 1.3 (ref 0.86–1.14)

## 2021-02-15 PROCEDURE — 85610 PROTHROMBIN TIME: CPT | Performed by: INTERNAL MEDICINE

## 2021-02-15 PROCEDURE — 36416 COLLJ CAPILLARY BLOOD SPEC: CPT | Performed by: INTERNAL MEDICINE

## 2021-02-15 ASSESSMENT — ENCOUNTER SYMPTOMS: DYSRHYTHMIAS: 1

## 2021-02-15 NOTE — PROGRESS NOTES
ANTICOAGULATION MANAGEMENT     Patient Name:  Tracy Palomo  Date:  2/15/2021    ASSESSMENT /SUBJECTIVE:    Today's INR result of 1.3 is subtherapeutic. Goal INR of 2.0-3.0      Warfarin dose taken: Warfarin recently held for procedure tomorrow which may be affecting INR    Diet: No new diet changes affecting INR    Medication changes/ interactions: No new medications/supplements affecting INR    Previous INR: Therapeutic     S/S of bleeding or thromboembolism: No    New injury or illness: No    Upcoming surgery, procedure or cardioversion: Yes: tomorrow - bridging with Lovenox     Additional findings: None      PLAN:    Telephone call with Tracy regarding INR result and instructed:     Warfarin Dosing Instructions: see calendar regarding dosing. Pt will be inpatient for at least Tues evening, so will need to check inpt dosing to see what they give her     Instructed patient to follow up no later than: 1 week     Education provided: None required      Pt verbalizes understanding and agrees to warfarin dosing plan.    Instructed to call the Anticoagulation Clinic for any changes, questions or concerns. (#[phone])        Johanna Mckeon RN      OBJECTIVE:  Recent labs: (last 7 days)     02/15/21  0856   INR 1.3*         INR assessment SUB    Recheck INR In: 1 WEEK    INR Location Outside lab      Anticoagulation Summary  As of 2/15/2021    INR goal:  2.0-3.0   TTR:  49.8 % (1 y)   INR used for dosin.3 (2/15/2021)   Warfarin maintenance plan:  5 mg (5 mg x 1) every Mon, Wed, Fri; 2.5 mg (5 mg x 0.5) all other days   Full warfarin instructions:  2/15: Hold; : 7.5 mg; Otherwise 5 mg every Mon, Wed, Fri; 2.5 mg all other days   Weekly warfarin total:  25 mg   Plan last modified:  Johanna Mckeon RN (2021)   Next INR check:  3/22/2021   Priority:  Maintenance   Target end date:  Indefinite    Indications    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]  Long term  current use of anticoagulant therapy [Z79.01]  Other pulmonary embolism without acute cor pulmonale  unspecified chronicity (H) [I26.99]             Anticoagulation Episode Summary     INR check location:      Preferred lab:      Send INR reminders to:  [facility] [place]    Comments:  5 mg tablets, book, PM dose       Anticoagulation Care Providers     Provider Role Specialty Phone number    Chad Betts MD Referring Internal Medicine [phone]    Gerard Medrano MD Responsible Family Medicine [phone]

## 2021-02-16 ENCOUNTER — APPOINTMENT (OUTPATIENT)
Dept: PHYSICAL THERAPY | Facility: CLINIC | Age: 81
End: 2021-02-16
Attending: NURSE PRACTITIONER
Payer: MEDICARE

## 2021-02-16 ENCOUNTER — ANCILLARY PROCEDURE (OUTPATIENT)
Dept: ULTRASOUND IMAGING | Facility: CLINIC | Age: 81
End: 2021-02-16
Payer: MEDICARE

## 2021-02-16 ENCOUNTER — ANESTHESIA (OUTPATIENT)
Dept: SURGERY | Facility: CLINIC | Age: 81
End: 2021-02-16
Payer: MEDICARE

## 2021-02-16 ENCOUNTER — HOSPITAL ENCOUNTER (OUTPATIENT)
Facility: CLINIC | Age: 81
Discharge: HOME | End: 2021-02-17
Attending: ORTHOPAEDIC SURGERY | Admitting: ORTHOPAEDIC SURGERY
Payer: MEDICARE

## 2021-02-16 ENCOUNTER — APPOINTMENT (OUTPATIENT)
Dept: GENERAL RADIOLOGY | Facility: CLINIC | Age: 81
End: 2021-02-16
Attending: NURSE PRACTITIONER
Payer: MEDICARE

## 2021-02-16 DIAGNOSIS — Z96.652 S/P TOTAL KNEE REPLACEMENT USING CEMENT, LEFT: Primary | ICD-10-CM

## 2021-02-16 DIAGNOSIS — M17.12 PRIMARY OSTEOARTHRITIS OF LEFT KNEE: ICD-10-CM

## 2021-02-16 LAB
INR PPP: 1.12 (ref 0.86–1.14)
PLATELET # BLD AUTO: 196 10E9/L (ref 150–450)

## 2021-02-16 PROCEDURE — 85049 AUTOMATED PLATELET COUNT: CPT | Performed by: ORTHOPAEDIC SURGERY

## 2021-02-16 PROCEDURE — 999N000065 XR KNEE PORT LT 1/2 VW: Mod: LT

## 2021-02-16 PROCEDURE — 250N000009 HC RX 250: Performed by: NURSE ANESTHETIST, CERTIFIED REGISTERED

## 2021-02-16 PROCEDURE — 85610 PROTHROMBIN TIME: CPT | Performed by: ORTHOPAEDIC SURGERY

## 2021-02-16 PROCEDURE — 250N000013 HC RX MED GY IP 250 OP 250 PS 637: Mod: GY | Performed by: ORTHOPAEDIC SURGERY

## 2021-02-16 PROCEDURE — 258N000003 HC RX IP 258 OP 636: Performed by: NURSE ANESTHETIST, CERTIFIED REGISTERED

## 2021-02-16 PROCEDURE — 36415 COLL VENOUS BLD VENIPUNCTURE: CPT | Performed by: ORTHOPAEDIC SURGERY

## 2021-02-16 PROCEDURE — 250N000011 HC RX IP 250 OP 636: Performed by: NURSE ANESTHETIST, CERTIFIED REGISTERED

## 2021-02-16 PROCEDURE — 97530 THERAPEUTIC ACTIVITIES: CPT | Mod: GP | Performed by: PHYSICAL THERAPIST

## 2021-02-16 PROCEDURE — 370N000017 HC ANESTHESIA TECHNICAL FEE, PER MIN: Performed by: ORTHOPAEDIC SURGERY

## 2021-02-16 PROCEDURE — 272N000001 HC OR GENERAL SUPPLY STERILE: Performed by: ORTHOPAEDIC SURGERY

## 2021-02-16 PROCEDURE — 258N000003 HC RX IP 258 OP 636: Performed by: ORTHOPAEDIC SURGERY

## 2021-02-16 PROCEDURE — 250N000011 HC RX IP 250 OP 636: Performed by: ORTHOPAEDIC SURGERY

## 2021-02-16 PROCEDURE — 710N000010 HC RECOVERY PHASE 1, LEVEL 2, PER MIN: Performed by: ORTHOPAEDIC SURGERY

## 2021-02-16 PROCEDURE — 97116 GAIT TRAINING THERAPY: CPT | Mod: GP | Performed by: PHYSICAL THERAPIST

## 2021-02-16 PROCEDURE — 97110 THERAPEUTIC EXERCISES: CPT | Mod: GP | Performed by: PHYSICAL THERAPIST

## 2021-02-16 PROCEDURE — 278N000051 HC OR IMPLANT GENERAL: Performed by: ORTHOPAEDIC SURGERY

## 2021-02-16 PROCEDURE — 250N000009 HC RX 250: Performed by: ORTHOPAEDIC SURGERY

## 2021-02-16 PROCEDURE — 27447 TOTAL KNEE ARTHROPLASTY: CPT | Mod: 79 | Performed by: ORTHOPAEDIC SURGERY

## 2021-02-16 PROCEDURE — C1776 JOINT DEVICE (IMPLANTABLE): HCPCS | Performed by: ORTHOPAEDIC SURGERY

## 2021-02-16 PROCEDURE — 250N000013 HC RX MED GY IP 250 OP 250 PS 637: Performed by: NURSE PRACTITIONER

## 2021-02-16 PROCEDURE — 250N000011 HC RX IP 250 OP 636: Performed by: NURSE PRACTITIONER

## 2021-02-16 PROCEDURE — 27447 TOTAL KNEE ARTHROPLASTY: CPT | Mod: AS | Performed by: NURSE PRACTITIONER

## 2021-02-16 PROCEDURE — 97162 PT EVAL MOD COMPLEX 30 MIN: CPT | Mod: GP | Performed by: PHYSICAL THERAPIST

## 2021-02-16 PROCEDURE — 360N000077 HC SURGERY LEVEL 4, PER MIN: Performed by: ORTHOPAEDIC SURGERY

## 2021-02-16 PROCEDURE — 999N000141 HC STATISTIC PRE-PROCEDURE NURSING ASSESSMENT: Performed by: ORTHOPAEDIC SURGERY

## 2021-02-16 DEVICE — BONE CEMENT GENTAMYCIN SMART SET GHV 5450-35-500: Type: IMPLANTABLE DEVICE | Site: KNEE | Status: FUNCTIONAL

## 2021-02-16 DEVICE — IMPLANTABLE DEVICE: Type: IMPLANTABLE DEVICE | Site: KNEE | Status: FUNCTIONAL

## 2021-02-16 RX ORDER — DEXAMETHASONE SODIUM PHOSPHATE 10 MG/ML
INJECTION, SOLUTION INTRAMUSCULAR; INTRAVENOUS PRN
Status: DISCONTINUED | OUTPATIENT
Start: 2021-02-16 | End: 2021-02-16

## 2021-02-16 RX ORDER — TRANEXAMIC ACID 650 MG/1
1950 TABLET ORAL ONCE
Status: COMPLETED | OUTPATIENT
Start: 2021-02-16 | End: 2021-02-16

## 2021-02-16 RX ORDER — LIDOCAINE 40 MG/G
CREAM TOPICAL
Status: DISCONTINUED | OUTPATIENT
Start: 2021-02-16 | End: 2021-02-17 | Stop reason: HOSPADM

## 2021-02-16 RX ORDER — TIZANIDINE 2 MG/1
2-4 TABLET ORAL EVERY 6 HOURS PRN
Status: DISCONTINUED | OUTPATIENT
Start: 2021-02-16 | End: 2021-02-17 | Stop reason: HOSPADM

## 2021-02-16 RX ORDER — ONDANSETRON 2 MG/ML
INJECTION INTRAMUSCULAR; INTRAVENOUS PRN
Status: DISCONTINUED | OUTPATIENT
Start: 2021-02-16 | End: 2021-02-16

## 2021-02-16 RX ORDER — ACETAMINOPHEN 325 MG/1
975 TABLET ORAL EVERY 8 HOURS PRN
Qty: 100 TABLET | Refills: 1 | Status: SHIPPED | OUTPATIENT
Start: 2021-02-16

## 2021-02-16 RX ORDER — HYDROXYZINE HYDROCHLORIDE 10 MG/1
10 TABLET, FILM COATED ORAL EVERY 6 HOURS PRN
Qty: 30 TABLET | Refills: 0 | Status: SHIPPED | OUTPATIENT
Start: 2021-02-16 | End: 2021-04-08

## 2021-02-16 RX ORDER — BUPIVACAINE HYDROCHLORIDE AND EPINEPHRINE 5; 5 MG/ML; UG/ML
INJECTION, SOLUTION PERINEURAL PRN
Status: DISCONTINUED | OUTPATIENT
Start: 2021-02-16 | End: 2021-02-16

## 2021-02-16 RX ORDER — FENTANYL CITRATE 50 UG/ML
25-50 INJECTION, SOLUTION INTRAMUSCULAR; INTRAVENOUS
Status: DISCONTINUED | OUTPATIENT
Start: 2021-02-16 | End: 2021-02-16 | Stop reason: HOSPADM

## 2021-02-16 RX ORDER — NALOXONE HYDROCHLORIDE 0.4 MG/ML
0.2 INJECTION, SOLUTION INTRAMUSCULAR; INTRAVENOUS; SUBCUTANEOUS
Status: DISCONTINUED | OUTPATIENT
Start: 2021-02-16 | End: 2021-02-17 | Stop reason: HOSPADM

## 2021-02-16 RX ORDER — POLYETHYLENE GLYCOL 3350 17 G/17G
17 POWDER, FOR SOLUTION ORAL DAILY
Status: DISCONTINUED | OUTPATIENT
Start: 2021-02-17 | End: 2021-02-17 | Stop reason: HOSPADM

## 2021-02-16 RX ORDER — OXYCODONE HYDROCHLORIDE 5 MG/1
5-10 TABLET ORAL EVERY 4 HOURS PRN
Status: DISCONTINUED | OUTPATIENT
Start: 2021-02-16 | End: 2021-02-17 | Stop reason: HOSPADM

## 2021-02-16 RX ORDER — ONDANSETRON 2 MG/ML
4 INJECTION INTRAMUSCULAR; INTRAVENOUS EVERY 30 MIN PRN
Status: DISCONTINUED | OUTPATIENT
Start: 2021-02-16 | End: 2021-02-16 | Stop reason: HOSPADM

## 2021-02-16 RX ORDER — SODIUM CHLORIDE, SODIUM LACTATE, POTASSIUM CHLORIDE, CALCIUM CHLORIDE 600; 310; 30; 20 MG/100ML; MG/100ML; MG/100ML; MG/100ML
INJECTION, SOLUTION INTRAVENOUS CONTINUOUS
Status: DISCONTINUED | OUTPATIENT
Start: 2021-02-16 | End: 2021-02-16 | Stop reason: HOSPADM

## 2021-02-16 RX ORDER — ONDANSETRON 4 MG/1
4 TABLET, ORALLY DISINTEGRATING ORAL EVERY 30 MIN PRN
Status: DISCONTINUED | OUTPATIENT
Start: 2021-02-16 | End: 2021-02-16 | Stop reason: HOSPADM

## 2021-02-16 RX ORDER — SODIUM CHLORIDE, SODIUM LACTATE, POTASSIUM CHLORIDE, CALCIUM CHLORIDE 600; 310; 30; 20 MG/100ML; MG/100ML; MG/100ML; MG/100ML
INJECTION, SOLUTION INTRAVENOUS CONTINUOUS
Status: DISCONTINUED | OUTPATIENT
Start: 2021-02-16 | End: 2021-02-17 | Stop reason: HOSPADM

## 2021-02-16 RX ORDER — ONDANSETRON 2 MG/ML
4 INJECTION INTRAMUSCULAR; INTRAVENOUS EVERY 6 HOURS PRN
Status: DISCONTINUED | OUTPATIENT
Start: 2021-02-16 | End: 2021-02-17 | Stop reason: HOSPADM

## 2021-02-16 RX ORDER — NALOXONE HYDROCHLORIDE 0.4 MG/ML
0.2 INJECTION, SOLUTION INTRAMUSCULAR; INTRAVENOUS; SUBCUTANEOUS
Status: DISCONTINUED | OUTPATIENT
Start: 2021-02-16 | End: 2021-02-16

## 2021-02-16 RX ORDER — NALOXONE HYDROCHLORIDE 0.4 MG/ML
0.4 INJECTION, SOLUTION INTRAMUSCULAR; INTRAVENOUS; SUBCUTANEOUS
Status: DISCONTINUED | OUTPATIENT
Start: 2021-02-16 | End: 2021-02-17 | Stop reason: HOSPADM

## 2021-02-16 RX ORDER — NALOXONE HYDROCHLORIDE 0.4 MG/ML
0.4 INJECTION, SOLUTION INTRAMUSCULAR; INTRAVENOUS; SUBCUTANEOUS
Status: DISCONTINUED | OUTPATIENT
Start: 2021-02-16 | End: 2021-02-16

## 2021-02-16 RX ORDER — BUPIVACAINE HYDROCHLORIDE 7.5 MG/ML
INJECTION, SOLUTION INTRASPINAL PRN
Status: DISCONTINUED | OUTPATIENT
Start: 2021-02-16 | End: 2021-02-16

## 2021-02-16 RX ORDER — HYDROXYZINE HYDROCHLORIDE 10 MG/1
10 TABLET, FILM COATED ORAL EVERY 6 HOURS PRN
Status: DISCONTINUED | OUTPATIENT
Start: 2021-02-16 | End: 2021-02-17 | Stop reason: HOSPADM

## 2021-02-16 RX ORDER — DIMENHYDRINATE 50 MG/ML
25 INJECTION, SOLUTION INTRAMUSCULAR; INTRAVENOUS
Status: DISCONTINUED | OUTPATIENT
Start: 2021-02-16 | End: 2021-02-16 | Stop reason: HOSPADM

## 2021-02-16 RX ORDER — ONDANSETRON 4 MG/1
4 TABLET, ORALLY DISINTEGRATING ORAL EVERY 6 HOURS PRN
Status: DISCONTINUED | OUTPATIENT
Start: 2021-02-16 | End: 2021-02-17 | Stop reason: HOSPADM

## 2021-02-16 RX ORDER — HYDROMORPHONE HCL IN WATER/PF 6 MG/30 ML
0.2 PATIENT CONTROLLED ANALGESIA SYRINGE INTRAVENOUS
Status: DISCONTINUED | OUTPATIENT
Start: 2021-02-16 | End: 2021-02-17 | Stop reason: HOSPADM

## 2021-02-16 RX ORDER — CEFAZOLIN SODIUM 2 G/100ML
2 INJECTION, SOLUTION INTRAVENOUS EVERY 8 HOURS
Status: COMPLETED | OUTPATIENT
Start: 2021-02-16 | End: 2021-02-16

## 2021-02-16 RX ORDER — CEFAZOLIN SODIUM 1 G/3ML
1 INJECTION, POWDER, FOR SOLUTION INTRAMUSCULAR; INTRAVENOUS SEE ADMIN INSTRUCTIONS
Status: DISCONTINUED | OUTPATIENT
Start: 2021-02-16 | End: 2021-02-16 | Stop reason: HOSPADM

## 2021-02-16 RX ORDER — HYDROMORPHONE HYDROCHLORIDE 1 MG/ML
.3-.5 INJECTION, SOLUTION INTRAMUSCULAR; INTRAVENOUS; SUBCUTANEOUS EVERY 5 MIN PRN
Status: DISCONTINUED | OUTPATIENT
Start: 2021-02-16 | End: 2021-02-16 | Stop reason: HOSPADM

## 2021-02-16 RX ORDER — ACETAMINOPHEN 325 MG/1
975 TABLET ORAL EVERY 8 HOURS
Status: DISCONTINUED | OUTPATIENT
Start: 2021-02-16 | End: 2021-02-17 | Stop reason: HOSPADM

## 2021-02-16 RX ORDER — OXYCODONE HYDROCHLORIDE 5 MG/1
5-10 TABLET ORAL EVERY 4 HOURS PRN
Qty: 45 TABLET | Refills: 0 | Status: SHIPPED | OUTPATIENT
Start: 2021-02-16 | End: 2021-02-25

## 2021-02-16 RX ORDER — FAMOTIDINE 20 MG/1
20 TABLET, FILM COATED ORAL 2 TIMES DAILY
Status: DISCONTINUED | OUTPATIENT
Start: 2021-02-16 | End: 2021-02-17 | Stop reason: HOSPADM

## 2021-02-16 RX ORDER — FENTANYL CITRATE 50 UG/ML
INJECTION, SOLUTION INTRAMUSCULAR; INTRAVENOUS PRN
Status: DISCONTINUED | OUTPATIENT
Start: 2021-02-16 | End: 2021-02-16

## 2021-02-16 RX ORDER — AMOXICILLIN 250 MG
1 CAPSULE ORAL 2 TIMES DAILY
Status: DISCONTINUED | OUTPATIENT
Start: 2021-02-16 | End: 2021-02-17 | Stop reason: HOSPADM

## 2021-02-16 RX ORDER — HYDROMORPHONE HYDROCHLORIDE 1 MG/ML
0.4 INJECTION, SOLUTION INTRAMUSCULAR; INTRAVENOUS; SUBCUTANEOUS
Status: DISCONTINUED | OUTPATIENT
Start: 2021-02-16 | End: 2021-02-17 | Stop reason: HOSPADM

## 2021-02-16 RX ORDER — PROCHLORPERAZINE MALEATE 5 MG
5 TABLET ORAL EVERY 6 HOURS PRN
Status: DISCONTINUED | OUTPATIENT
Start: 2021-02-16 | End: 2021-02-17 | Stop reason: HOSPADM

## 2021-02-16 RX ORDER — AMOXICILLIN 250 MG
1-2 CAPSULE ORAL 2 TIMES DAILY
Qty: 30 TABLET | Refills: 0 | Status: SHIPPED | OUTPATIENT
Start: 2021-02-16 | End: 2021-04-08

## 2021-02-16 RX ORDER — POLYETHYLENE GLYCOL 3350 17 G/17G
1 POWDER, FOR SOLUTION ORAL DAILY
Qty: 7 PACKET | Refills: 0 | Status: SHIPPED | OUTPATIENT
Start: 2021-02-16 | End: 2021-08-02

## 2021-02-16 RX ORDER — CEFAZOLIN SODIUM 2 G/100ML
2 INJECTION, SOLUTION INTRAVENOUS
Status: COMPLETED | OUTPATIENT
Start: 2021-02-16 | End: 2021-02-16

## 2021-02-16 RX ORDER — PROPOFOL 10 MG/ML
INJECTION, EMULSION INTRAVENOUS CONTINUOUS PRN
Status: DISCONTINUED | OUTPATIENT
Start: 2021-02-16 | End: 2021-02-16

## 2021-02-16 RX ADMIN — FENTANYL CITRATE 25 MCG: 50 INJECTION, SOLUTION INTRAMUSCULAR; INTRAVENOUS at 07:17

## 2021-02-16 RX ADMIN — FAMOTIDINE 20 MG: 20 TABLET, FILM COATED ORAL at 12:31

## 2021-02-16 RX ADMIN — TRANEXAMIC ACID 1950 MG: 650 TABLET ORAL at 06:15

## 2021-02-16 RX ADMIN — FENTANYL CITRATE 25 MCG: 50 INJECTION, SOLUTION INTRAMUSCULAR; INTRAVENOUS at 07:30

## 2021-02-16 RX ADMIN — SODIUM CHLORIDE, POTASSIUM CHLORIDE, SODIUM LACTATE AND CALCIUM CHLORIDE: 600; 310; 30; 20 INJECTION, SOLUTION INTRAVENOUS at 10:13

## 2021-02-16 RX ADMIN — SODIUM CHLORIDE, POTASSIUM CHLORIDE, SODIUM LACTATE AND CALCIUM CHLORIDE: 600; 310; 30; 20 INJECTION, SOLUTION INTRAVENOUS at 06:34

## 2021-02-16 RX ADMIN — TIZANIDINE 2 MG: 2 TABLET ORAL at 15:06

## 2021-02-16 RX ADMIN — FENTANYL CITRATE 25 MCG: 50 INJECTION, SOLUTION INTRAMUSCULAR; INTRAVENOUS at 07:19

## 2021-02-16 RX ADMIN — CEFAZOLIN SODIUM 2 G: 2 INJECTION, SOLUTION INTRAVENOUS at 07:50

## 2021-02-16 RX ADMIN — OXYCODONE HYDROCHLORIDE 5 MG: 5 TABLET ORAL at 12:30

## 2021-02-16 RX ADMIN — ACETAMINOPHEN 975 MG: 325 TABLET, FILM COATED ORAL at 12:30

## 2021-02-16 RX ADMIN — WARFARIN SODIUM 7.5 MG: 5 TABLET ORAL at 17:36

## 2021-02-16 RX ADMIN — PROPOFOL 25 MCG/KG/MIN: 10 INJECTION, EMULSION INTRAVENOUS at 07:25

## 2021-02-16 RX ADMIN — OXYCODONE HYDROCHLORIDE 5 MG: 5 TABLET ORAL at 15:11

## 2021-02-16 RX ADMIN — LIDOCAINE HYDROCHLORIDE 1 ML: 10 INJECTION, SOLUTION EPIDURAL; INFILTRATION; INTRACAUDAL; PERINEURAL at 06:34

## 2021-02-16 RX ADMIN — CEFAZOLIN SODIUM 2 G: 2 INJECTION, SOLUTION INTRAVENOUS at 23:15

## 2021-02-16 RX ADMIN — OXYCODONE HYDROCHLORIDE 10 MG: 5 TABLET ORAL at 21:32

## 2021-02-16 RX ADMIN — TIZANIDINE 2 MG: 2 TABLET ORAL at 21:32

## 2021-02-16 RX ADMIN — CEFAZOLIN SODIUM 2 G: 2 INJECTION, SOLUTION INTRAVENOUS at 15:30

## 2021-02-16 RX ADMIN — DEXAMETHASONE SODIUM PHOSPHATE 10 MG: 10 INJECTION, SOLUTION INTRAMUSCULAR; INTRAVENOUS at 09:31

## 2021-02-16 RX ADMIN — ACETAMINOPHEN 975 MG: 325 TABLET, FILM COATED ORAL at 21:31

## 2021-02-16 RX ADMIN — ONDANSETRON 4 MG: 2 INJECTION INTRAMUSCULAR; INTRAVENOUS at 08:50

## 2021-02-16 RX ADMIN — BUPIVACAINE HYDROCHLORIDE IN DEXTROSE 1.8 ML: 7.5 INJECTION, SOLUTION SUBARACHNOID at 07:30

## 2021-02-16 RX ADMIN — DOCUSATE SODIUM AND SENNOSIDES 1 TABLET: 8.6; 5 TABLET ORAL at 12:30

## 2021-02-16 RX ADMIN — FAMOTIDINE 20 MG: 20 TABLET, FILM COATED ORAL at 21:32

## 2021-02-16 RX ADMIN — DOCUSATE SODIUM AND SENNOSIDES 1 TABLET: 8.6; 5 TABLET ORAL at 21:32

## 2021-02-16 RX ADMIN — Medication 10 ML: at 09:31

## 2021-02-16 RX ADMIN — Medication 10 ML: at 09:30

## 2021-02-16 RX ADMIN — TIZANIDINE 2 MG: 2 TABLET ORAL at 12:30

## 2021-02-16 RX ADMIN — PHENYLEPHRINE HYDROCHLORIDE 0.2 MCG/KG/MIN: 10 INJECTION INTRAVENOUS at 07:40

## 2021-02-16 ASSESSMENT — MIFFLIN-ST. JEOR
SCORE: 1328.64
SCORE: 1301.87

## 2021-02-16 NOTE — PROGRESS NOTES
"Saw and examined patient.  POD0 s/p total knee replacement, left  Per patient doing well. Did have significant pain (10/10) with first ambulation, however once back to bed then pain resolved.  Pain was to the knee.  Currently at rest, mild pain.   No current nausea toleraing oral intact, will be advancing diet as tolerated.  Has not voided yet, DTV. Has stood at bedside. Numbness wearing off from spinal.    Did not do well with therapy initially due to pain with ambulation.  physical therapy plans to see patient twice.  Patient after last knee replacement discharged to TCU, however this time has daughters that will be staying with patient, planning on discharge home pending on physical therapy re-evaluation and progress.   Patient has 24/7 help available upon discharge.  No current concerns.    Answered all questions.       /63   Pulse 59   Temp 99.9  F (37.7  C) (Oral)   Resp 18   Ht 1.626 m (5' 4\")   Wt 86.9 kg (191 lb 8 oz)   SpO2 93%   BMI 32.87 kg/m      Alert and orient x 4, sitting up in chair, eating, in NAD.   Dressing CDI.  Immobilizer in place.   Sensation, motor intact to foot.  Toes well perfused. D.p. Pulse 2+    Plan: will see again tomorrow Am.   Plan for discharge tomorrow afternoon pending progress.    BILLY Oh, CNP  Orthopedic Surgery      "

## 2021-02-16 NOTE — DISCHARGE INSTRUCTIONS
Total Knee Replacement Discharge Instructions                                     [phone]  Bone and Joint Service Line for issues or concerns    The coumadin clinic has recommended the following for dosing once you go home:  Wednesday 2/17/21     POD 1: warfarin 5 mg, Lovenox 80 mg SQ every 12 hours.  Thursday 2/18/21        POD 2: warfarin 2.5 mg, Lovenox 80 mg SQ every 12 hours.  Friday 2/19/21              POD 3: warfarin 5 mg, Lovenox 80 mg SQ every 12 hours.  Saturday 2/20/21         POD 4: warfarin 2.5 mg, Lovenox 80 mg SQ every 12 hours.  Sunday 2/21/21           POD 5: warfarin 2.5 mg, Lovenox 80 mg SQ every 12 hours.  Monday 2/22/21          POD 6: Lovenox 80 mg SQ in the AM only - further instructions with INR check         General Care:  After surgery you may feel tired/sleepy. This is normal. If you have any question along the way please contact the office. If you feel it is an issue cannot wait for normal office hours, contact the 24 hour bone and joint line at [phone]. You should not drive or operate machines/equipment until released by your physician to do so.     Wound Care:  Keep incision covered with hospital dressing (Aquacel) for one week. It is okay to shower with this dressing on. However, do not submerge your dressing and incision in water for roughly 2 weeks. After one week remove this dressing and then keep it clean, dry, and covered. If this dressing become looses or falls off it is ok to cover with gauze and tape.  Wash the incision gently with warm soapy water after removing your hospital dressing and lightly pat dry.       Diet:  Start with non-alcoholic liquids at first, particularly water or sports drinks after surgery. Progress to bland foods such as crackers and bread and finally to your normal diet if you have no problems. Avoid alcohol when taking narcotic pain medications.      Pain control:  Take your pain medications as prescribed. These medications may make you  sleepy. Do not drive, operate equipment, or drink alcohol when taking these.  You may take Tylenol (Generic name is acetaminophen) as directed on the bottle for additional relief or in place of the prescribed pain medications as your pain gets better. Do not take any other NSAIDs (Motrin, Ibuprofen, Aleve, Naproxen) while taking the blood thinner. If the medications cause a reaction such as nausea or skin rash, stop taking them and contact your doctor. Please plan accordingly, pain medications will not be re-filled on the weekends or at night. Call the office during the day if you need more medications.    Blood thinner:  It is very important to take it as prescribed. It is a medication to help prevent blood clots in your legs or lungs. No medication is perfect, so if you notice a sudden onset of pain/swelling in your calf area call your doctor. If you notice a sudden onset of troubles breathing and/or chest pain call 911.     Swelling (edema) control:  Preventing swelling (edema) in your legs after surgery is very important. It is helpful for achieving optimal range of motion as well as preventing blood clots.  It starts with simple things such as elevation of your legs and icing. Elevate your legs above your heart.    Do this for 20 minutes every couple hours the first few days after surgery. We also recommend MACKENZIE hose (compression hose) to be worn. Wear on both legs during the day. You may remove at night. Wear these until directed to stop.      Icing:  It is common for some swelling, aching and stiffness to occur for up to 6-9 months after a knee replacement. If you knee swells, get off your feet, elevate your leg and apply some ice packs. Apply for 20 minutes at a time. For the first 1-2 weeks apply ice 2-3 x day or more after therapy.    Walker/crutches:  Use a walker/crutches when you go home. You will transition to the use of a cane and finally to no additional support.     Braces:  You will go home with a  knee immobilizer. You can take it off when you are lying or sitting down. Wear it when you are walking. This immobilizer can come off after you are doing a straight leg raise. Your physician and or physical therapist will help to determine when to stop wearing it.     Physical Therapy:  The success of your knee replacement is based on doing physical therapy. You will have some pain and discomfort along the way. If you feel your pain is limiting your progress make sure to take some pain medication prior to your therapy session. If you pain medications are not working talk to your surgeon.   For the first 2 weeks after going home you will have in-home physical therapy. The goal is to work on range of motion. While it is important to working on bending your knee, it is equally important to make sure you knee comes out all the way straight. To assist in this do not place any bumps, pillows and or blankets under your knee when you are lying down. You should have an outpatient physical therapy appointment scheduled for about 2 weeks after surgery.     Activity:  Unless otherwise instructed, you can weight bear as tolerated. While laying or sitting down you should straighten your knee all the way out and then gently work on bending the knee back. Do not worry at first if your knee feels stiff and will not bend like normal, this will get better. Never put a pillow or bump under you knee, instead put a pillow under your ankle so your knee will straighten out all the way.     Normal findings after surgery:  Numbness and tenderness around the incisions and to the outside of the incision is normal.  You may have bruising around the incisions and down the lower leg.   Your knee will be swollen for months after surgery. It will feel  tight  to move.   Low grade fevers less than 100.5 degrees Fahrenheit are normal.   You may have some minor swelling in the leg/calf area.  You will have some increased pain after your therapy  sessions.     When to call the Office:  Temperature greater than 101.5 degrees Fahreheit.  Fever, chills, and increasing pain in the knee.  Excessive drainage from the incisions that include bright red blood.  Drainage from the incisions sites that appear yellow, pus-like, or foul smelling.  Increasing pain the knee not relieved by the prescribed pain medications or ice.  Persistent nausea or vomiting not helped by the Zofran.  Increased pain or swelling in your calf area (in back above your ankle) that wasn t there when in the hospital.  Any other effects you feel are significant.  Call 911 if you experience any chest pain and/or shortness or breath.

## 2021-02-16 NOTE — PROGRESS NOTES
S-(situation): Patient registered to Outpt in a bed. Patient arrived to room 272 via cart from PACU @ 1015.    B-(background): S/P Left TKA    A-(assessment): pt A/Ox4. Left knee with ace wrapped & immobilizer, C/D/I. Denies pain at this time. Will continue with POC.    R-(recommendations): Orders and observation goals reviewed with pt.    Nursing Observation criteria listed below was met:    Skin issues/needs documented:NA  Isolation needs addressed and Signage up: NA  Fall Prevention: Education given and documented: Yes  Education Assessment documented:Yes  Education Documented: Yes  OBS video/handout Reviewed & Documented: Yes  Allergies Reviewed: Yes  Medication Reconciliation Complete: Yes  New medication patient education completed and documented (Possible Side Effects of Common Medications handout): Yes  Home medications if not able to send immediately home with family stored here: NA  Reminder note placed in discharge instructions: NA  Patient has discharge needs (If yes, please explain): Yes

## 2021-02-16 NOTE — PHARMACY-ANTICOAGULATION SERVICE
Clinical Pharmacy - Warfarin Dosing Consult     Pharmacy has been consulted to manage this patient s warfarin therapy.  Indication: Atrial Fibrillation;DVT/ PE Treatment(history of PE)  Therapy Goal: INR 2-3  Provider/Team: Dr. Chad ARAGON [facility]: [facility]  Warfarin Prior to Admission: Yes  Warfarin PTA Regimen: 5 mg M/W/F & 2.5 mg ROW  Significant drug interactions: Lovenox 80 mg BID for bridging due to PE history  Recent documented change in oral intake/nutrition: Unknown    INR   Date Value Ref Range Status   02/16/2021 1.12 0.86 - 1.14 Final     INR Point of Care   Date Value Ref Range Status   02/15/2021 1.3 (H) 0.86 - 1.14 Final     Comment:     This test is intended for monitoring Coumadin therapy.  Results are not   accurate in patients with prolonged INR due to factor deficiency.         Recommend warfarin 7.5 mg today.  Pharmacy will monitor Tracy Palomo daily and order warfarin doses to achieve specified goal.      Please contact pharmacy as soon as possible if the warfarin needs to be held for a procedure or if the warfarin goals change.    Paradise Recio RPH on 2/16/2021 at 12:59 PM

## 2021-02-16 NOTE — PROGRESS NOTES
Transfer from  PACU to Room 272  Transferred to bed via on Airpal (Glyder Mat,Transfer Boar,Slider Sheet)    S: 81 y/o FM  S/P left total knee replacement       Anesthesia Type:  general       Surgeon:  Dr. Pang       Allergies:  See Medication Reconciliation Record       DNR: no  (Yes,No)    B:  Pertinent Medical History:   Past Medical History:   Diagnosis Date     Atrial fibrillation (H) 2014    related to colon surgery     Colon polyps      Diverticulosis of colon (without mention of hemorrhage)     Diverticulosis     DVT (deep vein thrombosis) in pregnancy 2014    after colon surgery     Other and unspecified hyperlipidemia      PE (pulmonary embolism) 2014    related to colon surgery     Unspecified essential hypertension         (CHF; Heart Disease; Lung Disease; Chronic Pain; Diabetes; Other (Comment)          Surgical History:    Past Surgical History:   Procedure Laterality Date     ARTHROPLASTY KNEE Right 1/8/2019    Procedure: Right total knee replacement;  Surgeon: Kaleb Pang DO;  Location: [facility] OR     COLONOSCOPY  09, 10, 12    [facility]     COLONOSCOPY N/A 12/11/2017    Procedure: COLONOSCOPY;  colonoscopy;  Surgeon: Elvis Quezada MD;  Location:  GI     FLEXIBLE SIGMOIDOSCOPY  09, 11     LAPAROTOMY EXPLORATORY N/A 10/20/2014    Procedure: LAPAROTOMY EXPLORATORY;  Surgeon: Miguel Oleary MD;  Location: [facility] OR     LAPAROTOMY, LYSIS ADHESIONS, COMBINED N/A 10/20/2014    Procedure: COMBINED LAPAROTOMY, LYSIS ADHESIONS;  Surgeon: Miguel Oleary MD;  Location: [facility] OR     OPEN REDUCTION INTERNAL FIXATION HIP BIPOLAR Left 3/16/2017    Procedure: OPEN REDUCTION INTERNAL FIXATION HIP BIPOLAR;  Surgeon: Kaleb Pang DO;  Location: [facility] OR     RELEASE TRIGGER FINGER Left 1/12/2021    Procedure: Left thumb trigger release;  Surgeon: Kaleb Pang DO;  Location: [facility] OR     RESECT SMALL BOWEL WITHOUT OSTOMY N/A 10/20/2014    Procedure: RESECT SMALL  BOWEL WITHOUT OSTOMY;  Surgeon: Miguel Oleary MD;  Location: [facility] OR         A:  EBL: 10 ml        IVF:  1 liter LR        UOP:  Bladder scan 128 ml, patient padded        NPO:  ___Yes ___No         Vomiting:  ___Yes ___No         Drainage: none        Skin Integrity: intact (Normal; Pressure Ulcer (Location)                Brace/sling/equipment:   X Yes ace wrap and immobilizer left leg         See PACU record for ongoing assessment, vital signs and pain assessment.    R: Post-Op vitals and assessments as ordered/indicated per patient's condition.       Follow Post-Op orders and notify Physician prn.       Continue to involve patient/family in plan of care and discharge planning.       Reinforce Pre-Operative education.       Implement skin safety interventions as appropriate.    Name: Flory Johnson RN, BSN, CCRN

## 2021-02-16 NOTE — OP NOTE
Procedure Date: 02/16/2021      PREOPERATIVE DIAGNOSIS:  Left knee primary osteoarthritis.      POSTOPERATIVE DIAGNOSIS:  Left knee primary osteoarthritis.      PROCEDURE:  Left total knee arthroplasty (patella was not resurfaced).      SURGEON:  Kaleb Pang DO.      FIRST ASSISTANT:  Jared Montiel NP (utilized throughout the procedure, assisting with soft tissue retraction, assisting with trial and final implant placement, and he provided skin closure).      ANESTHESIA:  Spinal with IV sedation.      COMPLICATIONS:  None.      ESTIMATED BLOOD LOSS:  10 mL      FLUIDS:  600 mL crystalloids.      COUNTS:  Correct.      DISPOSITION:  To PACU in stable condition.      TOURNIQUET TIME PRESSURE:  68 minutes at 300 mmHg.      IMPLANTS:  Includes DePuy size 6 posterior stabilized femoral component, a size 5 rotating platform tibial baseplate, and a 5 mm polyethylene insert.      GROSS FINDINGS:  Preoperative motion was approximately 7-115 degrees.  There was extensive wear through the medial and lateral compartments.  There were some rimming osteophytes of the medial compartment.  The patellofemoral showed advanced wear.  Lateral facet had worn down to approximately 10 mm in thickness.  She had some scalloping of the anterior cortex next to the trochlea.  Given the degree of wear to the patella, I removed the osteophytes, but elected not to perform a resurfacing.      INDICATIONS:  This is a pleasant 80-year-old female well known to myself with longstanding left knee pain.  She underwent a contralateral knee replacement and has done well.  Continued to have pain in the left knee, impacting the quality of her life.  Radiographs are consistent with clinical exam.  Given her continued pain refractory to conservative care, we discussed proceeding with knee replacement.  Risks, benefits, complications were discussed.  Postoperative timeframe for recovery reviewed.  Once thoroughly discussed with her, she opted to proceed.       DETAILS OF PROCEDURE:  She was met preoperatively and informed consent was verified, appropriate site was marked, and she was wheeled to operative suite #1.  Transferred to the OR table without any issues.  When deemed appropriate by Anesthesia, left lower extremity was sterilely prepped and draped in normal manner.  Prior to incision, a timeout was performed.  Again, the appropriate patient, surgery and extremity were verified and antibiotics had been administered.  An Esmarch was utilized to exsanguinate the extremity, tourniquet was inflated on her upper thigh to 300 mmHg.      A midline skin incision was followed with a medial parapatellar arthrotomy.  The anterior fat pad was excised.  The medial tibia was skeletonized.  Rimming osteophytes were removed.  The knee was flexed up.  Collateral ligament retractors were placed and maintained through the key components of the procedure.  A drill was utilized to enter the distal intramedullary canal of the femur with no issues.  The distal guide was very gradually slowly and easily inserted.  It was set to take 10 mm off distal and 5 degrees in valgus.  With this pinned, the cut on the medial lateral aspect of the distal femur was performed with no issues.  Attention was turned to the tibia.  The ACL excised.  A posterior retractor was placed and the tibia was presented.  Using the extramedullary tibia device set to take 4 mm off the medial side, with care to recreate the slope and varus and valgus alignment, the jig was pinned into position.  With this completed, the proximal tibia was cut with no issues.  The knee was brought out to extension.  A spacer block was placed.  It showed a balanced resection in full extension.  A drop cholo was placed, which showed normal alignment.  The knee was flexed up to 90 degrees.  A tibial baseplate was placed on the tibia.  Again, a drop cholo was placed, which showed normal alignment.  The retractors were placed.  Attention was  turned to the distal femur.  The sizing guide was placed.  Rotation was based on the Pascual line and the epicondylar axis.  The pins were placed and measured a size 6.  The size 6, 4-in-1 block was placed.  At 90 degrees of flexion, the flexion space under the block was consistent with her extension space.  The 4-in-1 cuts were then performed with no issues.  This was followed with the box cut.  Excess bone and meniscal remnants were removed.  Trial components were placed.  She had 0-130 degrees of motion.  The patella tracked with no thumbs technique through the full arc of motion.  Patellar rimming osteophytes had been removed earlier.  Here it was closely evaluated utilizing a caliper.  As noted in the lateral facet had worn to approximately thickness of 10 mm with extensive sclerosis.  Given the degree of wear and the concern for any fracture with a resurfacing and making it thinner, I opted not to resurface it.  At this point, the tibia and femur were then prepared.  The final components as listed above were cemented approximately 20 degrees of flexion until the cement cured.  During this process, a 3-minute dilute Betadine lavage was performed followed with pulse lavage.  The knee was inspected.  Excess bone and cement fragments had been removed.  The arthrotomy was closed with 0 Vicryl, followed with 2-0 Vicryl subcutaneous, followed with a running Monocryl suture with skin glue.  A sterile bandage was applied.  She subsequently transferred to PACU in stable condition.  She will be brought to the hospital for orthopedic care, DVT prophylaxis, pain management, and physical therapy.         SULAIMAN HERNANDEZ DO             D: 2021   T: 2021   MT: DILLON      Name:     YOLANDA WOODARD   MRN:      [id number]        Account:        [id number]   :      1940           Procedure Date: 2021      Document: [id number]

## 2021-02-16 NOTE — ANESTHESIA PROCEDURE NOTES
Pre-Procedure   Staff -   CRNA: Rose Sauceda APRN CRNA  Performed By: CRNA  Location: post-op  Pre-Anesthestic Checklist: patient identified, IV checked, site marked, risks and benefits discussed, informed consent, monitors and equipment checked, pre-op evaluation, at physician/surgeon's request and post-op pain management  Timeout:  Correct Patient: Yes   Correct Procedure: Yes   Correct Site: Yes   Correct Position: Yes   Correct Laterality: Yes   Site Marked: Yes    Procedure Documentation  Procedure: femoral  Patient Position:supine  Patient Prep/Sterile Barriers: sterile gloves, mask, Chloraprep  Local skin infiltrated with 1 mL of 1% lidocaine.   Needle type: insulated  Needle Gauge: 22.   Needle Length (millimeters) 100   Ultrasound guided, Ultrasound used to identify targeted nerve, plexus, vascular marker, or fascial plane and place a needle adjacent to it in real-time, Ultrasound was used to visualize the spread of anesthetic in close proximity to the above referenced structure  A permanent image is entered into the patient's record.  Nerve Stim: Initial Level 0.05 mA. Lowest motor response mA.    Assessment/Narrative      The placement was negative for: blood aspirated, painful injection and site bleeding  Paresthesias: No.  Test dose of mL at.   Test dose negative, 3 minutes after injection, for signs of intravascular, subdural, or intrathecal injection.  Bolus given via needle..   Secured via.   Insertion/Infusion Method: Single Shot  Complications: none  Injection made incrementally with aspirations every 5 mL.  Comments:  Pt tolerated procedure well. Will follow as necessary. She is currently resting without complaint.

## 2021-02-16 NOTE — ANESTHESIA CARE TRANSFER NOTE
Patient: Tracy Palomo    Procedure(s):  left total knee replacement    Diagnosis: Primary osteoarthritis of left knee [M17.12]  Diagnosis Additional Information: No value filed.    Anesthesia Type:   Spinal     Note:    Oropharynx: spontaneously breathing  Level of Consciousness: awake  Oxygen Supplementation: face mask    Independent Airway: airway patency not satisfactory and stable  Dentition: dentition unchanged    Report to RN Given: handoff report given  Patient transferred to: PACU    Handoff Report: Identifed the Patient, Identified the Reponsible Provider, Reviewed the pertinent medical history, Discussed the surgical course, Reviewed Intra-OP anesthesia mangement and issues during anesthesia, Set expectations for post-procedure period and Allowed opportunity for questions and acknowledgement of understanding      Vitals: (Last set prior to Anesthesia Care Transfer)  CRNA VITALS  2/16/2021 0848 - 2/16/2021 0935      2/16/2021             Pulse:  61    SpO2:  98 %    Resp Rate (observed):  15        Electronically Signed By: BILLY Scott CRNA  February 16, 2021  9:35 AM

## 2021-02-16 NOTE — ANESTHESIA PROCEDURE NOTES
Pre-Procedure   Staff -   CRNA: Rose Sauceda APRN CRNA  Performed By: CRNA  Location: OR  Pre-Anesthestic Checklist: patient identified, IV checked, risks and benefits discussed, informed consent, monitors and equipment checked and pre-op evaluation  Timeout:  Correct Patient: Yes   Correct Procedure: Yes   Correct Site: Yes   Correct Position: Yes   Correct Laterality: N/A   Site Marked: N/A    Procedure Documentation  Procedure: intrathecal  Patient Position:sitting  Patient Prep/Sterile Barriers: sterile gloves, mask, Betadine, patient draped  Insertion Site: L3-4. (midline approach).  Spinal Needle (gauge): 25   Spinal/LP Needle Length (inches): 3.5  Spinal Needle Type: Donna tipIntroducer used  Introducer: 20 G  # of attempts: 1 and  # of redirects:  2    Assessment/Narrative      Paresthesias: No.  CSF fluid: clear.  Opening pressure was cmH2O while sitting.

## 2021-02-16 NOTE — BRIEF OP NOTE
[facility] Brief Operative Note    Pre-operative diagnosis: Primary osteoarthritis of left knee    Post-operative diagnosis: Same   Procedure: Procedure(s):  left total knee replacement (patella not resurfaced)   Surgeon:  Anesthesia: Kaleb Pang DO  Spinal   Assistant(s): Jared Montiel APRN, CNP, DNP (A advanced practice provider was necessary for his expertise, exposure and surgical assistance throughout the case.)   Estimated blood loss:  Tourniquet time/pressure:  Urine output:  Fluids: Less than 10 ml  68 minutes at 300 mmHg  na  600 ml Crystalloids   Specimens: None   Findings: left knee primary osteoarthritis [id number]     Vitaly Pang D.O.      Implants:   Implant Name Type Inv. Item Serial No.  Lot No. LRB No. Used Action   BONE CEMENT GENTAMYCIN SMART SET [facility] 5450- Cement, Bone BONE CEMENT GENTAMYCIN SMART SET [employer] 5450-  J [id number] Left 2 Implanted   Attune Posterior Stabilized Size 6 Left Cemented    Depuy [id number] Left 1 Implanted   Attune Tibial Insert Rotating Platform Posterior Stabilized Size 6    Depuy [id number] Left 1 Implanted   Attune Knee System Tibial Base Rotating Platform Size 5 Cemented    Depuy [id number] Left 1 Implanted

## 2021-02-16 NOTE — PROGRESS NOTES
02/16/21 1425   Quick Adds   Type of Visit Initial PT Evaluation       Present no   Living Environment   People in home alone   Current Living Arrangements house   Home Accessibility wheelchair accessible  (ramp entrance)   Transportation Anticipated agency   Living Environment Comments has a plan for family assistance in the home for the first week. walk in shower. flat bed.   Self-Care   Usual Activity Tolerance good   Current Activity Tolerance moderate   Regular Exercise No  (HEP)   Equipment Currently Used at Home walker, standard;raised toilet seat;cane, straight;grab bar, toilet;grab bar, tub/shower;shower chair   Disability/Function   Hearing Difficulty or Deaf no   Wear Glasses or Blind no   Concentrating, Remembering or Making Decisions Difficulty no   Difficulty Communicating no   Difficulty Eating/Swallowing no   Walking or Climbing Stairs Difficulty no   Dressing/Bathing Difficulty no   Toileting issues no   Doing Errands Independently Difficulty (such as shopping) yes   Fall history within last six months no   General Information   Onset of Illness/Injury or Date of Surgery 02/16/21   Referring Physician Dr. Pang   Patient/Family Therapy Goals Statement (PT) home with HHPT   Pertinent History of Current Problem (include personal factors and/or comorbidities that impact the POC) Patient is a 80 year old female, day 0 status post left total knee arthroplasty by Dr. Pang with 10mL EBL, spinal and adductor nerve block. Patient previously had a right TKA 1/2019. Patient with a previous medical history of AFib, on chronic anticoagulation therapy, HTN, hyperlipidemia, small bowel resection 2014, subarachnoid hematoma and hemorrhage 8/2020.    Existing Precautions/Restrictions brace worn when out of bed;fall   Weight-Bearing Status - LUE full weight-bearing   Weight-Bearing Status - RUE full weight-bearing   Weight-Bearing Status - LLE weight-bearing as tolerated   Weight-Bearing  Status - RLE full weight-bearing   General Observations PT orders: eval and treat post operative knee. Activity order: ambulate, knee immobilizer, elevate, up in chair   Cognition   Orientation Status (Cognition) oriented x 4   Affect/Mental Status (Cognition) WNL   Follows Commands (Cognition) WFL   Pain Assessment   Patient Currently in Pain Yes, see Vital Sign flowsheet  (2/10 minimal )   Integumentary/Edema   Integumentary/Edema Comments post operative dressing CDI   Posture    Posture Forward head position;Kyphosis   Range of Motion (ROM)   ROM Comment left knee limited by pain 0-15 degrees, able to palpate crepitus and fluid movement with PROM, severe pain with PROM to 30 degrees   Strength   Strength Comments poor quad strength, unable to SLR flexion or complete SAQ.   Bed Mobility   Bed Mobility scooting/bridging;supine-sit;sit-supine;rolling right   Rolling Right Howell (Bed Mobility) supervision;verbal cues;contact guard   Scooting/Bridging Howell (Bed Mobility) verbal cues   Supine-Sit Howell (Bed Mobility) minimum assist (75% patient effort)   Sit-Supine Howell (Bed Mobility) minimum assist (75% patient effort)   Bed Mobility Limitations decreased ability to use legs for bridging/pushing;impaired ability to control trunk for mobility   Impairments Contributing to Impaired Bed Mobility pain;decreased ROM;decreased strength   Assistive Device (Bed Mobility) bed rails   Transfers   Transfers sit-stand transfer;toilet transfer   Transfer Safety Concerns Noted decreased balance during turns;losing balance backward;decreased sequencing ability;decreased step length   Impairments Contributing to Impaired Transfers pain;impaired balance;decreased strength;decreased ROM   Sit-Stand Transfer   Sit-Stand Howell (Transfers) minimum assist (75% patient effort)   Assistive Device (Sit-Stand Transfers) walker, front-wheeled   Sit/Stand Transfer Comments strength as well as pain impacting  transfer   Toilet Transfer   Type (Toilet Transfer) sit-stand;stand-sit   Bow [place] (Toilet Transfer) minimum assist (75% patient effort)   Assistive Device (Toilet Transfer) walker, front-wheeled;grab bars/safety frame   Gait/Stairs (Locomotion)   [place] (Gait) contact guard;minimum assist (75% patient effort);verbal cues   Assistive Device (Gait) walker, front-wheeled   Distance in Feet (Required for LE Total Joints) 20'   Pattern (Gait) step-to   Deviations/Abnormal Patterns (Gait) stride length decreased;weight shifting decreased;gait speed decreased;john decreased;base of support, narrow;antalgic   Maintains Weight-bearing Status (Gait) able to maintain   Balance   Balance Comments loosing balance backwards with transitional movements, poor knee control with gait resulting in two LOB and assistance to prevent fall. given reliance on device and immobilizer and assistance bruceetn is at an increased risk of falling   Sensory Examination   Sensory Perception patient reports no sensory changes   Clinical Impression   Criteria for Skilled Therapeutic Intervention yes, treatment indicated   PT Diagnosis (PT) muscle weakness, joint stiffness, impaired gait, impaired balance   Influenced by the following impairments day 0 status post TKA    Functional limitations due to impairments pain, decreased activity tolerance, physical assistance and use of walker for safe mobiliyt   Clinical Presentation Evolving/Changing   Clinical Presentation Rationale medical status, clinical jugdement, post operative status, pain control and previous TKA experience   Clinical Decision Making (Complexity) moderate complexity   Therapy Frequency (PT) 2x/day   Predicted Duration of Therapy Intervention (days/wks) 2-3 days   Planned Therapy Interventions (PT) balance training;bed mobility training;gait training;home exercise program;patient/family education;ROM (range of motion);strengthening;transfer training    Anticipated Equipment Needs at Discharge (PT)   (none)   Risk & Benefits of therapy have been explained evaluation/treatment results reviewed;care plan/treatment goals reviewed;risks/benefits reviewed;participants included;patient   Clinical Impression Comments Patient presents with signs and symptoms consistent with post operative TKA. Patient in severe pain and having difficulty mobilizing this date. Anticipate with continued medical intervention patient's pain and mobility will improve however at this time concerns of patient's safety. Currently patietn agreeable to discharge home with family and HHPT, however if unable to progress TCU is warranted and this will be assessed moving forward. She would benefit from skilled PT intervention to address post operative impairements in order to progress towards desired level of function and safety in accordance with her surgeon's protocol.   PT Discharge Planning    PT Discharge Recommendation (DC Rec) home with assist;home with home care physical therapy  (patient requesting [facility] transport home tomorrow)   PT Rationale for DC Rec Patient with assistance available at home, safe home environment and is mobilizing with good control. Anticipate with continued medical management and progress in rehab towards greater mobility tolerance and IND patient to do well with discharge home with family assistance, given post operative status assistance is adviced for safety. Patient would benefit from continued skilled PT intervention to address post opertive impairments in order to progress her towards baseline mobility and IND in accordance with her surgeon's protocol.   PT Brief overview of current status  MIN assist bed mobility. MIN Assist sit to/from stand and toilet transfer. Poor quad control. Amb 20 ft with 10/10 pain using 2WW and knee immobilizer   Total Evaluation Time   Total Evaluation Time (Minutes) 10     Thank you for your referral.  Anna Mortensen, PT, DPT,  WHIT    [facility]  O: [phone]  E: [email or web address]

## 2021-02-17 ENCOUNTER — TELEPHONE (OUTPATIENT)
Dept: INTERNAL MEDICINE | Facility: CLINIC | Age: 81
End: 2021-02-17

## 2021-02-17 ENCOUNTER — APPOINTMENT (OUTPATIENT)
Dept: PHYSICAL THERAPY | Facility: CLINIC | Age: 81
End: 2021-02-17
Attending: ORTHOPAEDIC SURGERY
Payer: MEDICARE

## 2021-02-17 VITALS
BODY MASS INDEX: 32.69 KG/M2 | HEART RATE: 51 BPM | RESPIRATION RATE: 15 BRPM | OXYGEN SATURATION: 94 % | DIASTOLIC BLOOD PRESSURE: 59 MMHG | SYSTOLIC BLOOD PRESSURE: 122 MMHG | WEIGHT: 191.5 LBS | HEIGHT: 64 IN | TEMPERATURE: 96.9 F

## 2021-02-17 DIAGNOSIS — Z79.01 LONG TERM CURRENT USE OF ANTICOAGULANT THERAPY: ICD-10-CM

## 2021-02-17 DIAGNOSIS — I26.99 OTHER PULMONARY EMBOLISM WITHOUT ACUTE COR PULMONALE, UNSPECIFIED CHRONICITY (H): ICD-10-CM

## 2021-02-17 DIAGNOSIS — I26.99 PULMONARY EMBOLI (H): ICD-10-CM

## 2021-02-17 LAB
GLUCOSE SERPL-MCNC: 113 MG/DL (ref 70–99)
HGB BLD-MCNC: 11.3 G/DL (ref 11.7–15.7)
INR PPP: 1.12 (ref 0.86–1.14)
PLATELET # BLD AUTO: 206 10E9/L (ref 150–450)

## 2021-02-17 PROCEDURE — 250N000013 HC RX MED GY IP 250 OP 250 PS 637: Performed by: NURSE PRACTITIONER

## 2021-02-17 PROCEDURE — 85018 HEMOGLOBIN: CPT | Performed by: NURSE PRACTITIONER

## 2021-02-17 PROCEDURE — 97110 THERAPEUTIC EXERCISES: CPT | Mod: GP | Performed by: PHYSICAL THERAPIST

## 2021-02-17 PROCEDURE — 96372 THER/PROPH/DIAG INJ SC/IM: CPT | Performed by: NURSE PRACTITIONER

## 2021-02-17 PROCEDURE — 97116 GAIT TRAINING THERAPY: CPT | Mod: GP | Performed by: PHYSICAL THERAPIST

## 2021-02-17 PROCEDURE — 85049 AUTOMATED PLATELET COUNT: CPT | Performed by: NURSE PRACTITIONER

## 2021-02-17 PROCEDURE — 36415 COLL VENOUS BLD VENIPUNCTURE: CPT | Performed by: NURSE PRACTITIONER

## 2021-02-17 PROCEDURE — 85610 PROTHROMBIN TIME: CPT | Performed by: NURSE PRACTITIONER

## 2021-02-17 PROCEDURE — 250N000011 HC RX IP 250 OP 636: Performed by: NURSE PRACTITIONER

## 2021-02-17 PROCEDURE — 82947 ASSAY GLUCOSE BLOOD QUANT: CPT | Performed by: NURSE PRACTITIONER

## 2021-02-17 PROCEDURE — 97530 THERAPEUTIC ACTIVITIES: CPT | Mod: GP | Performed by: PHYSICAL THERAPIST

## 2021-02-17 RX ORDER — TIZANIDINE 2 MG/1
2 TABLET ORAL EVERY 6 HOURS PRN
Qty: 40 TABLET | Refills: 1 | Status: SHIPPED | OUTPATIENT
Start: 2021-02-17 | End: 2021-04-08

## 2021-02-17 RX ADMIN — OXYCODONE HYDROCHLORIDE 10 MG: 5 TABLET ORAL at 14:22

## 2021-02-17 RX ADMIN — OXYCODONE HYDROCHLORIDE 5 MG: 5 TABLET ORAL at 09:53

## 2021-02-17 RX ADMIN — POLYETHYLENE GLYCOL 3350 17 G: 17 POWDER, FOR SOLUTION ORAL at 08:53

## 2021-02-17 RX ADMIN — FAMOTIDINE 20 MG: 20 TABLET, FILM COATED ORAL at 08:52

## 2021-02-17 RX ADMIN — ACETAMINOPHEN 975 MG: 325 TABLET, FILM COATED ORAL at 13:02

## 2021-02-17 RX ADMIN — ACETAMINOPHEN 975 MG: 325 TABLET, FILM COATED ORAL at 05:56

## 2021-02-17 RX ADMIN — DOCUSATE SODIUM AND SENNOSIDES 1 TABLET: 8.6; 5 TABLET ORAL at 08:52

## 2021-02-17 RX ADMIN — ENOXAPARIN SODIUM 80 MG: 80 INJECTION SUBCUTANEOUS at 08:53

## 2021-02-17 ASSESSMENT — ACTIVITIES OF DAILY LIVING (ADL): DEPENDENT_IADLS:: INDEPENDENT

## 2021-02-17 NOTE — PROGRESS NOTES
[facility]  Orthopedics Progress Note           Assessment and Plan:    Assessment:   Post-operative day #1 Procedure(s):  left total knee replacement  Hx of afib and previous DVT - on coumadin and bridging with lovenox.   Bradycardia - asymptomatic         Plan:   Encourage IS  Start or continue physical therapy  Activity as tolerated  Weight-bear as tolerated.  Discharge from hospital today.  Pain control measures  Advance diet as tolerated  Routine wound care  DVT Prophylaxis: Coumadin, Lovenox, Compression Hose, SCD  No acute medical issues.            Interval History:   Doing well.  Continues to improve.  Pain is well-controlled.  No fevers.  Initially did not do well with physical therapy last afternoon due to pain, however did ambulate a few times last night and this AM, and reports no pain and did much better. Patient confident this AM will do better with physical therapy and go home as previously planned with daughters.  Patient also had heart rates running in the 40s.  Is not on any blood pressure or betablockers. Asymptomatic with this.  She does report in the AM, chronically, when she first gets up she has to sit at the side of her bed for some time to prevent dizziness and lightheadedness when walking. She plans to speak with her PCP about this soon.  Likely bradycardia chronic for patient.  Otherwise states feeling very well this AM.  She plans to have 2 sessions with physical therapy and if continues to progress will discharge home. She has a coumadin/lovenox plan from coumadin clinic, chronically on coumadin.            Review of Systems:    The patient denies any chest pain, shortness of breath, excessive pain, fever, chills, purulent drainage from the wound, nausea or vomiting.               Physical Exam:   General: awake, alert, appropriate and in no acute distress  Blood pressure 122/59, pulse 51, temperature 96.9  F (36.1  C), temperature source Oral, resp. rate 15, height  "1.626 m (5' 4\"), weight 86.9 kg (191 lb 8 oz), SpO2 94 %.  Left knee:  Dressing clean, dry and intact. Surrounding skin intact, no breakdown. Calf is soft, nontender with no significant swelling. Distal neurovascular grossly intact.  Compartments soft and non-tender.  2+ distal pulse, sensation intact to foot, able to df/pf against resistance. Brisk cap refill.            Data:     Results for orders placed or performed during the hospital encounter of 02/16/21 (from the past 24 hour(s))   XR Knee Port Left 1/2 Views    Narrative    XR KNEE PORT LT 1/2 VW  2/16/2021 9:58 AM     HISTORY: Post-Op Total Knee    COMPARISON: 10/28/2020      Impression    IMPRESSION:  TKA. Postoperative air is present. Findings are new. No  fractures are identified.     KAELYN WELLS MD   Platelet count - Routine   Result Value Ref Range    Platelet Count 196 150 - 450 10e9/L   INR   Result Value Ref Range    INR 1.12 0.86 - 1.14   Hemoglobin   Result Value Ref Range    Hemoglobin 11.3 (L) 11.7 - 15.7 g/dL   INR   Result Value Ref Range    INR 1.12 0.86 - 1.14   Platelet count - AM Draw (tomorrow)   Result Value Ref Range    Platelet Count 206 150 - 450 10e9/L   Glucose   Result Value Ref Range    Glucose 113 (H) 70 - 99 mg/dL     BILLY Oh, CNP  Orthopedic Surgery      "

## 2021-02-17 NOTE — PLAN OF CARE
Physical Therapy Discharge Summary    Reason for therapy discharge:    Discharged to home with home therapy.    Progress towards therapy goal(s). See goals on Care Plan in [facility] electronic health record for goal details.  Goals met    Therapy recommendation(s):    Continued therapy is recommended.  Rationale/Recommendations:   . Patient would benefit from continued skilled therapeutic intervention in order to progress them towards a higher level of function in accordance with their surgeon's protocol.    Thank you for your referral.  Anna Mortensen, PT, DPT, ATC    [facility]  O: [phone]  E: [email or web address]

## 2021-02-17 NOTE — CONSULTS
Care Management Initial Consult    General Information  Assessment completed with: Patient, Children(daughter, Iesha),    Type of CM/SW Visit: Initial Assessment    Primary Care Provider verified and updated as needed: Yes   Readmission within the last 30 days:        Reason for Consult: discharge planning  Advance Care Planning: Advance Care Planning Reviewed: present on chart  Patient has a POLST        Communication Assessment  Patient's communication style: spoken language (English or Bilingual)    Hearing Difficulty or Deaf: no   Wear Glasses or Blind: no    Cognitive  Cognitive/Neuro/Behavioral: WDL                      Living Environment:   People in home: alone     Current living Arrangements: house      Able to return to prior arrangements: yes       Family/Social Support:  Care provided by: self, child(suri)  Provides care for: no one  Marital Status:   Children          Description of Support System: Supportive, Involved    Support Assessment: Adequate family and caregiver support, Adequate social supports    Current Resources:   Patient receiving home care services: No     Community Resources: None  Equipment currently used at home: walker, standard, raised toilet seat, cane, straight, grab bar, toilet, grab bar, tub/shower, shower chair  Supplies currently used at home: None    Employment/Financial:  Employment Status: retired        Financial Concerns: No concerns identified           Lifestyle & Psychosocial Needs:        Socioeconomic History     Marital status:      Spouse name: Not on file     Number of children: Not on file     Years of education: Not on file     Highest education level: Not on file     Tobacco Use     Smoking status: Never Smoker     Smokeless tobacco: Never Used   Substance and Sexual Activity     Alcohol use: No     Alcohol/week: 0.0 standard drinks     Drug use: No     Sexual activity: Not Currently     Partners: Male       Functional Status:  Prior to admission  patient needed assistance:   Dependent ADLs:: Ambulation-walker  Dependent IADLs:: Independent       Mental Health Status:  Mental Health Status: No Current Concerns       Chemical Dependency Status:  Chemical Dependency Status: No Current Concerns             Values/Beliefs:  Spiritual, Cultural Beliefs, Holiness Practices, Values that affect care:            Values/Beliefs Comment: unknown     Additional Information:  Care Management consulted for discharge planning.  Writer visited with patient and her daughter, Iesha.  Patient plans to return to her home today. She lives alone, but her two daughters, Iesha and Jenny, plan to help take care of her.      Discussed recommendation for home PT services. Pt was provided with Medicare certified home care list. Pt chooses to use [facility] for PT and has requested an RN for INR draws.  Writer has made the referral to home care.     Writer provided patient and daughter with meals on wheels information and phone number, per their request.     Writer has requested [facility] for transport home. Writer has set up the transport with [facility] at 2:30 today.  Patient is aware of the up front private pay cost for the transport.      HONEY Thao  [facility]   [phone]

## 2021-02-17 NOTE — PROGRESS NOTES
"Patient vital signs are at baseline: Yes  Patient able to ambulate as they were prior to admission or with assist devices provided by therapies during their stay:  Yes, patient walked in hallway, assist of 1 gait belt, walker.   Patient MUST void prior to discharge:  Yes  Patient able to tolerate oral intake:  Yes  Pain has adequate pain control using Oral analgesics:  Yes     Patient alert and oriented.  Denies pain.  Lung sounds clear.  Bowel sounds audible.  CMS and neuros's intact.  Left LE surgical/compression wrap CDI.  /61 (BP Location: Left arm)   Pulse 53   Temp 96.4  F (35.8  C) (Oral)   Resp 18   Ht 1.626 m (5' 4\")   Wt 86.9 kg (191 lb 8 oz)   SpO2 94%   BMI 32.87 kg/m    Will continue to monitor per plan of care.     Addendum: Surgeon updated patient HR high 40's this morning, patient asymptomatic.  No new orders.  "

## 2021-02-17 NOTE — ANESTHESIA POSTPROCEDURE EVALUATION
Patient: Tracy Palomo    Procedure(s):  left total knee replacement    Diagnosis:Primary osteoarthritis of left knee [M17.12]  Diagnosis Additional Information: No value filed.    Anesthesia Type:  Spinal    Note:  Disposition: Outpatient   Postop Pain Control: Uneventful            Sign Out: Well controlled pain   PONV: No   Neuro/Psych:    Airway/Respiratory: Uneventful            Sign Out: Acceptable/Baseline resp. status   CV/Hemodynamics: Uneventful            Sign Out: Acceptable CV status   Other NRE: NONE   DID A NON-ROUTINE EVENT OCCUR? No    Event details/Postop Comments:  Patient was very pleased with her anesthesia.  Her sensory/motor function has returned to baseline.  She has been up walking with PT.  She did state last evening while walking with PT she had a lot of pain.  That pain has since subsided, she is doing straight leg rases without pain and was up to the bathroom without much discomfort.  She plans on being discharged to home later today.  No concerns. Will follow as necessary.          Last vitals:  Vitals:    02/16/21 2315 02/17/21 0556 02/17/21 0628   BP: 117/61  122/59   Pulse: 53 (!) 47 51   Resp: 18  15   Temp: 96.4  F (35.8  C)  96.9  F (36.1  C)   SpO2: 94%  94%       Last vitals prior to Anesthesia Care Transfer:  CRNA VITALS  2/16/2021 0848 - 2/16/2021 0948      2/16/2021             Pulse:  61    SpO2:  98 %    Resp Rate (observed):  15          Electronically Signed By: BILLY Scott CRNA  February 17, 2021  10:41 AM

## 2021-02-17 NOTE — TELEPHONE ENCOUNTER
GLORIA Oakes from [facility] calling ([phone]) with a question regarding patient's next INR. Patient is discharging [facility] today and homecare RN wondering if INR to be done Friday (per patient) or Monday. Patient is currently bridging with Lovenox. Per hospital PharmD note from yesterday, she was given 7.5 mg warfarin 2/17. However, no Pharm D note from today and discharge summary is incomplete as to home warfarin dosing.  ACC RN advised HC RN patient should take warfarin as advised on discharge and continue Lovenox bridging as advised until INR is back within therapeutic range. We like to check INR every 3-4 days so we can give appropriate warfarin boost if needed to get patient off Lovenox as soon as possible. Violette stated understanding and they should be able to visit patient Friday for INR.    Morena MAYA RN  Anticoagulation Team

## 2021-02-17 NOTE — DISCHARGE SUMMARY
"[facility] Discharge Summary     Tracy Palomo MRN# [id number]   YOB: 1940 Age: 80 year old     Date of Admission:  2/16/2021  Date of Discharge:  2/17/2021  2:30 PM  Admitting Physician:  Kaleb Pang DO  Discharge Physician:  BILLY Espinosa CNP  Discharging Service:  Orthopedics     Primary Provider: Chad Betts          Admission Diagnoses:   Primary osteoarthritis of left knee [M17.12]  S/P total knee replacement using cement, left [Z96.652]          Discharge Diagnosis:     Principal Problem:    Primary osteoarthritis of left knee  Active Problems:    S/P total knee replacement using cement, left               Discharge Disposition:     Discharged to home           Condition on Discharge:     Discharge condition: Stable   Discharge vitals: Blood pressure 122/59, pulse 51, temperature 96.9  F (36.1  C), temperature source Oral, resp. rate 15, height 1.626 m (5' 4\"), weight 86.9 kg (191 lb 8 oz), SpO2 94 %.   Code status on discharge: Full Code           Procedures / Labs / Imaging:   No other procedures performed during this admission          Medications Prior to Admission:     Medications Prior to Admission   Medication Sig Dispense Refill Last Dose     acetaminophen (TYLENOL) 500 MG tablet Take 1-2 tablets (500-1,000 mg) by mouth every 6 hours as needed for pain or fever 60 tablet 1 2/15/2021 at Unknown time     enoxaparin ANTICOAGULANT (LOVENOX) 80 MG/0.8ML syringe Inject 0.8 mLs (80 mg) Subcutaneous every 12 hours 25 Syringe 0 2/15/2021 at Unknown time     enoxaparin ANTICOAGULANT (LOVENOX) 80 MG/0.8ML syringe Inject 0.8 mLs (80 mg) Subcutaneous 2 times daily 20 Syringe 0 2/15/2021 at 0800     HYDROcodone-acetaminophen (NORCO) 5-325 MG tablet Take 1-2 tablets by mouth every 6 hours as needed for moderate to severe pain 6 tablet 0 2/12/2021 at Unknown time     lovastatin (MEVACOR) 40 MG tablet TAKE 1 TABLET BY MOUTH ONCE DAILY AT BEDTIME 90 tablet 3 " 2/15/2021 at 0800     warfarin ANTICOAGULANT (COUMADIN) 5 MG tablet Take 5 mg Mon, Wed, and 2.5 mg all other days or as directed by coumadin clinic 60 tablet 0 2/10/2021 at Unknown time     cephALEXin (KEFLEX) 500 MG capsule Take 1 capsule (500 mg) by mouth 2 times daily 14 capsule 0 Unknown at Unknown time             Discharge Medications:     Current Discharge Medication List      START taking these medications    Details   !! acetaminophen (TYLENOL) 325 MG tablet Take 3 tablets (975 mg) by mouth every 8 hours as needed for pain  Qty: 100 tablet, Refills: 1    Associated Diagnoses: S/P total knee replacement using cement, left      hydrOXYzine (ATARAX) 10 MG tablet Take 1 tablet (10 mg) by mouth every 6 hours as needed for itching or anxiety (with pain, moderate pain)  Qty: 30 tablet, Refills: 0    Associated Diagnoses: S/P total knee replacement using cement, left      oxyCODONE (ROXICODONE) 5 MG tablet Take 1-2 tablets (5-10 mg) by mouth every 4 hours as needed for pain (Moderate to Severe)  Qty: 45 tablet, Refills: 0    Associated Diagnoses: S/P total knee replacement using cement, left      polyethylene glycol (MIRALAX) 17 g packet Take 17 g by mouth daily  Qty: 7 packet, Refills: 0    Associated Diagnoses: S/P total knee replacement using cement, left      senna-docusate (SENOKOT-S/PERICOLACE) 8.6-50 MG tablet Take 1-2 tablets by mouth 2 times daily Take while on oral narcotics to prevent or treat constipation.  Qty: 30 tablet, Refills: 0    Comments: While taking narcotics  Associated Diagnoses: S/P total knee replacement using cement, left       !! - Potential duplicate medications found. Please discuss with provider.      CONTINUE these medications which have NOT CHANGED    Details   !! acetaminophen (TYLENOL) 500 MG tablet Take 1-2 tablets (500-1,000 mg) by mouth every 6 hours as needed for pain or fever  Qty: 60 tablet, Refills: 1    Comments: Maximum 4 grams/day of acetaminophen from all  sources.  Associated Diagnoses: Fall, initial encounter      !! enoxaparin ANTICOAGULANT (LOVENOX) 80 MG/0.8ML syringe Inject 0.8 mLs (80 mg) Subcutaneous every 12 hours  Qty: 25 Syringe, Refills: 0    Associated Diagnoses: Long term current use of anticoagulants with INR goal of 2.0-3.0      !! enoxaparin ANTICOAGULANT (LOVENOX) 80 MG/0.8ML syringe Inject 0.8 mLs (80 mg) Subcutaneous 2 times daily  Qty: 20 Syringe, Refills: 0    Associated Diagnoses: Other pulmonary embolism without acute cor pulmonale, unspecified chronicity (H); Long term current use of anticoagulant therapy      HYDROcodone-acetaminophen (NORCO) 5-325 MG tablet Take 1-2 tablets by mouth every 6 hours as needed for moderate to severe pain  Qty: 6 tablet, Refills: 0    Associated Diagnoses: Trigger finger of left thumb      lovastatin (MEVACOR) 40 MG tablet TAKE 1 TABLET BY MOUTH ONCE DAILY AT BEDTIME  Qty: 90 tablet, Refills: 3    Comments: Profile Rx: patient will contact pharmacy when needed  Associated Diagnoses: Hyperlipidemia LDL goal <130      warfarin ANTICOAGULANT (COUMADIN) 5 MG tablet Take 5 mg Mon, Wed, and 2.5 mg all other days or as directed by coumadin clinic  Qty: 60 tablet, Refills: 0    Associated Diagnoses: Other pulmonary embolism without acute cor pulmonale, unspecified chronicity (H); Long term current use of anticoagulant therapy      cephALEXin (KEFLEX) 500 MG capsule Take 1 capsule (500 mg) by mouth 2 times daily  Qty: 14 capsule, Refills: 0    Associated Diagnoses: E. coli UTI       !! - Potential duplicate medications found. Please discuss with provider.                Consultations:     No consultations were requested during this admission             Brief History of Illness:   See operative report          Hospital Course:     Patient admitted after routine elective surgery. Initially on POD0 struggled with pain control and with physical therapy, however by AM of POD1 this had improved quite a bit. Upon re-evaluation  by physical therapy was cleared for home with help.  Patient discharge POD1 without incident.  By day of discharge. Did well. Per patient felt good.    Continued to improve.  Pain was well-controlled with oral medications.  No fevers.   denied N/v. tolerated oral intake. voiding adequate.  Per patient PT went well. Patient expressed desire to discharge this day.  Has family at home to help.  No concerns, questions, or new issues.      Chronically on coumadin. Discharged with directions from coumadin clinic on bridging dosing of coumadin.                 Significant Results:     None             Pending Results:     None           Discharge Instructions and Follow-Up:     Discharge diet: Regular   Discharge activity: Activity as tolerated   Discharge follow-up: 14 days with orthopedics   Effected Extremity Left leg       Weight Bearing status Weight bearing as tolerated       Lines and drains: None    Wound care: Keep incision covered with hospital dressing (Aquacel) for one week. It is okay to shower with this dressing on. However, do not submerge your dressing and incision in water for roughly 2 weeks. After one week remove this dressing and then keep it clean, dry, and covered. If this dressing become looses or falls off it is ok to cover with gauze and tape.  Wash the incision gently with warm soapy water after removing your hospital dressing and lightly pat dry.

## 2021-02-17 NOTE — PROGRESS NOTES
S-(situation): Patient discharged to home via [facility] with     B-(background): LTKA    A-(assessment): Patient is alert and oriented x4. Patient reports pain in left and states oxycodone is effective for pain control. Dressing changed and patient had a scant amount of bloody drainage. Vital signs are stable, afebrile.    R-(recommendations): Discharge instructions reviewed with patient and daughter. Listed belongings gathered and returned to patient. yes         Discharge Nursing Criteria:     Care Plan and Patient education resolved: Yes    New Medications- pt has been educated about purpose and side effects: Yes    Vaccines  Influenza status verified at discharge:  Yes    MISC  Prescriptions if needed, hard copies sent with patient  Yes  Home medications returned to patient: NA  Medication Bin checked and emptied on discharge Yes  Patient reports post-discharge pain management plan is effective: Yes    TKA/JUANCARLOS ONLY:   Discharged with [employer] Stockings Yes  [employer] stocking Education Completed Yes

## 2021-02-18 NOTE — OR NURSING
[facility]  Discharge Call Back  Tracy Palomo  1940  MRN: [id number]  Home: [phone] (home)   PCP: Chad Betts    We are calling to see how you're doing since your surgery/procedure with us?   Comments: Doing well  Clinical Questions  1. Have you had time to look at your discharge instructions? Do you have any questions in regards to the instructions?   Comment: no - had home care nurse contact today  2. Do you feel your pain is being controlled with the regimen the surgeon sent you home on? (ie: prescription medications, over the counter pain medications, ice packs)   Comments: yes  3. Have you noticed any drainage on your dressing? Do you know what to do if you have bleeding as a result of your procedure?   Comments: n/y  4. Have you had any nausea/vomiting? Do you know how to treat this?   Comment: n/y  5. Have you had any signs/symptoms of infection? (ie: fever, swelling, heat, drainage or redness) Do you know what to do if you have?   Comment: n/y  6. Do you have a follow up appointment made with your surgeon? Do you have a number to contact them at if you need it?   Comment: y/n        You may be randomly selected to fill out a [facility] survey. We would appreciate you taking the time to fill this out. It is important to us if you would answer all of the questions on the survey.

## 2021-02-19 ENCOUNTER — ANTICOAGULATION THERAPY VISIT (OUTPATIENT)
Dept: ANTICOAGULATION | Facility: CLINIC | Age: 81
End: 2021-02-19

## 2021-02-19 ENCOUNTER — TELEPHONE (OUTPATIENT)
Dept: ORTHOPEDICS | Facility: OTHER | Age: 81
End: 2021-02-19

## 2021-02-19 DIAGNOSIS — I26.99 OTHER PULMONARY EMBOLISM WITHOUT ACUTE COR PULMONALE, UNSPECIFIED CHRONICITY (H): ICD-10-CM

## 2021-02-19 DIAGNOSIS — Z79.01 LONG TERM CURRENT USE OF ANTICOAGULANT THERAPY: ICD-10-CM

## 2021-02-19 DIAGNOSIS — I26.99 PULMONARY EMBOLI (H): ICD-10-CM

## 2021-02-19 LAB — INR PPP: 1.3 (ref 0.9–1.1)

## 2021-02-19 NOTE — PROGRESS NOTES
ANTICOAGULATION MANAGEMENT     Patient Name:  Tracy Palomo  Date:  2021    ASSESSMENT /SUBJECTIVE:    Today's INR result of 1.3 is subtherapeutic. Goal INR of 2.0-3.0      Warfarin dose taken: While hospitalized on 2/15 -  due to surgery: pt was given 7.5 mg on Tues and then she followed what the Lovenox that she was given said - reflected in calendar .  Discharged on:     Diet: No new diet changes affecting INR    Medication changes/ interactions: No new medications/supplements affecting INR    Previous INR: Subtherapeutic     S/S of bleeding or thromboembolism: No    New injury or illness: No    Upcoming surgery, procedure or cardioversion: No    Additional findings: None      PLAN:    Telephone call with Tracy regarding INR result and instructed:     Warfarin Dosing Instructions: continue Lovenox and take 7.5 mg Fri and 2.5 mg Sat and Sun    Instructed patient to follow up no later than: 3 days  Orders given to  Homecare nurse/facility to recheck    Education provided: None required      Veronica verbalizes understanding and agrees to warfarin dosing plan.    Instructed to call the Anticoagulation Clinic for any changes, questions or concerns. (#[phone])        Johanna Mckeon RN      OBJECTIVE:  Recent labs: (last 7 days)     02/15/21  0856 21  1226 21  0535 21   INR 1.3* 1.12 1.12 1.3*         INR assessment SUB    Recheck INR In: 3 DAYS    INR Location Homecare INR      Anticoagulation Summary  As of 2021    INR goal:  2.0-3.0   TTR:  48.7 % (1 y)   INR used for dosin.3 (2021)   Warfarin maintenance plan:  5 mg (5 mg x 1) every Mon, Wed, Fri; 2.5 mg (5 mg x 0.5) all other days   Full warfarin instructions:  : 7.5 mg; Otherwise 5 mg every Mon, Wed, Fri; 2.5 mg all other days   Weekly warfarin total:  25 mg   Plan last modified:  Johanna Mckeon RN (2021)   Next INR check:  2021   Priority:  High   Target end date:  Indefinite     Indications    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

## 2021-02-19 NOTE — TELEPHONE ENCOUNTER
Writing nurse returned call to Veronica. No answer. LVM to return call to clinic.  Klaudia Vera RN on 2/19/2021 at 9:46 AM

## 2021-02-22 ENCOUNTER — ANTICOAGULATION THERAPY VISIT (OUTPATIENT)
Dept: INTERNAL MEDICINE | Facility: CLINIC | Age: 81
End: 2021-02-22

## 2021-02-22 DIAGNOSIS — I26.99 PULMONARY EMBOLI (H): ICD-10-CM

## 2021-02-22 DIAGNOSIS — I26.99 OTHER PULMONARY EMBOLISM WITHOUT ACUTE COR PULMONALE, UNSPECIFIED CHRONICITY (H): ICD-10-CM

## 2021-02-22 DIAGNOSIS — Z79.01 LONG TERM CURRENT USE OF ANTICOAGULANT THERAPY: ICD-10-CM

## 2021-02-22 LAB — INR PPP: 1.6 (ref 0.9–1.1)

## 2021-02-22 NOTE — PROGRESS NOTES
ANTICOAGULATION MANAGEMENT     Patient Name:  Tracy Palomo  Date:  2021    ASSESSMENT /SUBJECTIVE:    Today's INR result of 1.6 is subtherapeutic. Goal INR of 2.0-3.0      Warfarin dose taken: Warfarin taken as instructed    Diet: No new diet changes affecting INR    Medication changes/ interactions: No new medications/supplements affecting INR    Previous INR: Subtherapeutic     S/S of bleeding or thromboembolism: No    New injury or illness: No    Upcoming surgery, procedure or cardioversion: No    Additional findings: S/P total knee replacement L on 21    Bridging with enoxaparin; additional script sent to preferred pharmacy      PLAN:    Telephone call with home care nurse Bernadette regarding INR result and instructed:     Warfarin Dosing Instructions: 7.5 mg today, 2. 5 mg tomorrow    Instructed patient to follow up no later than: 2 days; Wednesday  Orders given to  Homecare nurse/facility to recheck    Education provided: Monitoring for bleeding signs and symptoms, Monitoring for clotting signs and symptoms and When to seek medical attention/emergency care      Bernadette verbalizes understanding and agrees to warfarin dosing plan.    Instructed to call the Anticoagulation Clinic for any changes, questions or concerns. (#[phone])        Christina Mcknight RN      OBJECTIVE:  Recent labs: (last 7 days)     21  1226 21  0535 21   INR 1.12 1.12 1.3* 1.6*         No question data found.  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

## 2021-02-22 NOTE — PROGRESS NOTES
Enoxaparin prescription approved per [facility] Refill Protocol. Diamond Vasquez RN February 22, 2021 10:34 AM

## 2021-02-24 ENCOUNTER — TELEPHONE (OUTPATIENT)
Dept: ORTHOPEDICS | Facility: OTHER | Age: 81
End: 2021-02-24

## 2021-02-24 ENCOUNTER — ANTICOAGULATION THERAPY VISIT (OUTPATIENT)
Dept: ANTICOAGULATION | Facility: CLINIC | Age: 81
End: 2021-02-24

## 2021-02-24 DIAGNOSIS — Z79.01 LONG TERM CURRENT USE OF ANTICOAGULANT THERAPY: ICD-10-CM

## 2021-02-24 DIAGNOSIS — I26.99 PULMONARY EMBOLI (H): ICD-10-CM

## 2021-02-24 DIAGNOSIS — I26.99 OTHER PULMONARY EMBOLISM WITHOUT ACUTE COR PULMONALE, UNSPECIFIED CHRONICITY (H): ICD-10-CM

## 2021-02-24 LAB — INR PPP: 1.9 (ref 0.9–1.1)

## 2021-02-24 NOTE — TELEPHONE ENCOUNTER
Writing nurse contacted home care to give wound care instructions for patient who is S/P left knee TKA 2/16/2021.   Home care to remove Aquacel dressing. Instructed it is ok to shower, but do not submerge until cleared by provider at follow up appointment. Cover with gauze and tape as needed, keep clean and dry. Wash with mild soap, water, and pat dry.   Instructed to encourage SARAH Vera RN on 2/24/2021 at 10:52 AM

## 2021-02-24 NOTE — PROGRESS NOTES
ANTICOAGULATION MANAGEMENT     Patient Name:  Tracy Palomo  Date:  2021    ASSESSMENT /SUBJECTIVE:    Today's INR result of 1.9 is subtherapeutic. Goal INR of 2.0-3.0      Warfarin dose taken: Warfarin taken as instructed    Diet: No new diet changes affecting INR    Medication changes/ interactions: No new medications/supplements affecting INR    Previous INR: Subtherapeutic     S/S of bleeding or thromboembolism: No    New injury or illness: No    Upcoming surgery, procedure or cardioversion: No    Additional findings: None      PLAN:    [facility]: [phone] regarding INR result and instructed:     Warfarin Dosing Instructions: Take 5 mg Wed, Fri, Sun and 2.5 mg TH, Sat    Instructed patient to follow up no later than: 5 day  Orders given to  Homecare nurse/facility to recheck    Education provided: None required    .    Instructed to call the Anticoagulation Clinic for any changes, questions or concerns. (#[phone])        Radha Rodriguez RN      OBJECTIVE:  Recent labs: (last 7 days)     21   INR 1.3* 1.6* 1.9*         No question data found.  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

## 2021-02-24 NOTE — TELEPHONE ENCOUNTER
Reason for Call:  Other appointment    Detailed comments: Tisha from [facility] calling because they are going to patients house today to remove the aquacell from left knee (pt had surgery 02/16) and they are needing new orders for after it's removed. Please call Tisha at [phone]    Phone Number Patient can be reached at    Best Time: any    Can we leave a detailed message on this number? YES    Call taken on 2/24/2021 at 10:25 AM by Pauline Dean

## 2021-02-25 ENCOUNTER — MYC REFILL (OUTPATIENT)
Dept: INTERNAL MEDICINE | Facility: CLINIC | Age: 81
End: 2021-02-25

## 2021-02-25 DIAGNOSIS — Z96.652 S/P TOTAL KNEE REPLACEMENT USING CEMENT, LEFT: ICD-10-CM

## 2021-02-25 RX ORDER — OXYCODONE HYDROCHLORIDE 5 MG/1
5-10 TABLET ORAL EVERY 4 HOURS PRN
Qty: 45 TABLET | Refills: 0 | Status: CANCELLED | OUTPATIENT
Start: 2021-02-25

## 2021-02-25 NOTE — TELEPHONE ENCOUNTER
Unable to reach, will triage refill tomorrow.     Kaley GARCIA, Lead RN, BSN. . .  2/25/2021, 4:41 PM

## 2021-02-26 ENCOUNTER — TELEPHONE (OUTPATIENT)
Dept: ORTHOPEDICS | Facility: OTHER | Age: 81
End: 2021-02-26

## 2021-02-26 RX ORDER — OXYCODONE HYDROCHLORIDE 5 MG/1
5-10 TABLET ORAL EVERY 6 HOURS PRN
Qty: 35 TABLET | Refills: 0 | Status: SHIPPED | OUTPATIENT
Start: 2021-02-26 | End: 2021-03-08

## 2021-02-26 NOTE — TELEPHONE ENCOUNTER
Patient calling today requesting a refill of pain medication.      Patient underwent Left total Knee on 2/16/2021 with Dr. Pang.      Symptoms: Denies any abnormal symptoms or signs of infection. No recent injury.     Patient reports pain is currently 5/10 With pain medicine last at bedtime last night. Tylenol this AM.     Patient currently taking:   Tizanidine 2 mg at bed time  Tylenol 975 mg Q8H  Hydroxyzine 10 mg BID - for sure at night  Oxycodone 5-10 mg every 4-8 hours.     In addition to all other unrelated medications as reflected on their medication list.     Patient utilizing the following at home interventions:   RICE     History of narcotic fills for this issue:   2/16/2021 #45    Patient has 4 tabs remaining.     Patient uses [facility] pharmacy.     Next follow up appointment: 3/1/2021    Kaley GARCIA Lead RN, BSN. . .  2/26/2021, 8:33 AM

## 2021-02-26 NOTE — TELEPHONE ENCOUNTER
Reason for Call:  Other call back    Detailed comments: Patient states she will be in on Monday to see Jared Montiel and would like to ask if Dr. Pang wants her INR checked at that time?  She will come in early if needed.  Please call    Phone Number Patient can be reached at: Home number on file [phone] (home) or Cell number on file:    Telephone Information:   Mobile [phone]       Best Time: any    Can we leave a detailed message on this number? YES    Call taken on 2/26/2021 at 8:35 AM by Mona Mckeon

## 2021-02-26 NOTE — TELEPHONE ENCOUNTER
I called the patient and discussed her INR with her.  It is Coumadin clinic that is following her INR and I told her to give them a call and see if they want it drawn while she is in to save her a trip.  She had a draw on 2/24/2021 and it was 1.9.  It will be there call if they want one drawn again on Monday.

## 2021-03-01 ENCOUNTER — OFFICE VISIT (OUTPATIENT)
Dept: ORTHOPEDICS | Facility: CLINIC | Age: 81
End: 2021-03-01
Payer: MEDICARE

## 2021-03-01 ENCOUNTER — ANCILLARY PROCEDURE (OUTPATIENT)
Dept: GENERAL RADIOLOGY | Facility: CLINIC | Age: 81
End: 2021-03-01
Attending: NURSE PRACTITIONER
Payer: MEDICARE

## 2021-03-01 VITALS
SYSTOLIC BLOOD PRESSURE: 126 MMHG | DIASTOLIC BLOOD PRESSURE: 68 MMHG | HEIGHT: 64 IN | WEIGHT: 191 LBS | BODY MASS INDEX: 32.61 KG/M2

## 2021-03-01 DIAGNOSIS — Z96.652 STATUS POST TOTAL LEFT KNEE REPLACEMENT: ICD-10-CM

## 2021-03-01 DIAGNOSIS — Z96.652 STATUS POST TOTAL LEFT KNEE REPLACEMENT: Primary | ICD-10-CM

## 2021-03-01 PROCEDURE — 99024 POSTOP FOLLOW-UP VISIT: CPT | Performed by: NURSE PRACTITIONER

## 2021-03-01 PROCEDURE — 73562 X-RAY EXAM OF KNEE 3: CPT | Mod: TC | Performed by: RADIOLOGY

## 2021-03-01 ASSESSMENT — PAIN SCALES - GENERAL: PAINLEVEL: EXTREME PAIN (8)

## 2021-03-01 ASSESSMENT — MIFFLIN-ST. JEOR: SCORE: 1322.4

## 2021-03-01 NOTE — PROGRESS NOTES
"Orthopedic Clinic Post-Operative Note    CHIEF COMPLAINT:   Chief Complaint   Patient presents with     Surgical Followup     DOS:2/16/2021~Left total knee arthroplasty (patella was not resurfaced). ~13 days postop       HISTORY OF PRESENT ILLNESS  2 weeks post-op. States some good days some bad with pain, today is a \"bad day\". Noticing though overall is improving with pain.  No new injuries. Did report on Thursday (about post op day 10), a small area to distal pole of the incision opened up and bleed. Placed butterfly strips and dressings over this, stopped bleeding, then noticed it started to bleed again yesterday during the shower.  No erythema, no fevers, drainage has been bloody.  No new injuries.  Using walker, has been discontinuing the knee immobilizer. Takes coumadin chronically.  No fevers. No systemic symptoms.  Using timoteo stocking, though causing thigh pain at the top of the stocking. Attending home therapy will be transitioning to outpatient physical therapy.      Patient's past medical, surgical, social and family histories reviewed.     Past Medical History:   Diagnosis Date     Atrial fibrillation (H) 2014    related to colon surgery     Colon polyps      Diverticulosis of colon (without mention of hemorrhage)     Diverticulosis     DVT (deep vein thrombosis) in pregnancy 2014    after colon surgery     Other and unspecified hyperlipidemia      PE (pulmonary embolism) 2014    related to colon surgery     Unspecified essential hypertension        Past Surgical History:   Procedure Laterality Date     ARTHROPLASTY KNEE Right 1/8/2019    Procedure: Right total knee replacement;  Surgeon: Kaleb Pang DO;  [facility]     ARTHROPLASTY KNEE Left 2/16/2021    Procedure: left total knee replacement;  Surgeon: Kaleb Pang DO;  [facility]     COLONOSCOPY  09, 10, 12    [facility]     COLONOSCOPY N/A 12/11/2017    Procedure: COLONOSCOPY;  colonoscopy;  Surgeon: " Elvis Quezada MD;  [facility]     FLEXIBLE SIGMOIDOSCOPY  09, 11     LAPAROTOMY EXPLORATORY N/A 10/20/2014    Procedure: LAPAROTOMY EXPLORATORY;  Surgeon: Miguel Oleary MD;  [facility]     LAPAROTOMY, LYSIS ADHESIONS, COMBINED N/A 10/20/2014    Procedure: COMBINED LAPAROTOMY, LYSIS ADHESIONS;  Surgeon: Miguel Oleary MD;  [facility]     OPEN REDUCTION INTERNAL FIXATION HIP BIPOLAR Left 3/16/2017    Procedure: OPEN REDUCTION INTERNAL FIXATION HIP BIPOLAR;  Surgeon: Kaleb Pang DO;  [facility]     RELEASE TRIGGER FINGER Left 1/12/2021    Procedure: Left thumb trigger release;  Surgeon: Kaleb Pang DO;  [facility]     RESECT SMALL BOWEL WITHOUT OSTOMY N/A 10/20/2014    Procedure: RESECT SMALL BOWEL WITHOUT OSTOMY;  Surgeon: Miguel Oleary MD;  [facility]       Medications:  acetaminophen (TYLENOL) 325 MG tablet, Take 3 tablets (975 mg) by mouth every 8 hours as needed for pain  cephALEXin (KEFLEX) 500 MG capsule, Take 1 capsule (500 mg) by mouth 2 times daily  enoxaparin ANTICOAGULANT (LOVENOX) 80 MG/0.8ML syringe, Inject 0.8 mLs (80 mg) Subcutaneous 2 times daily  hydrOXYzine (ATARAX) 10 MG tablet, Take 1 tablet (10 mg) by mouth every 6 hours as needed for itching or anxiety (with pain, moderate pain)  lovastatin (MEVACOR) 40 MG tablet, TAKE 1 TABLET BY MOUTH ONCE DAILY AT BEDTIME  oxyCODONE (ROXICODONE) 5 MG tablet, Take 1-2 tablets (5-10 mg) by mouth every 6 hours as needed for pain (Moderate to Severe)  polyethylene glycol (MIRALAX) 17 g packet, Take 17 g by mouth daily  senna-docusate (SENOKOT-S/PERICOLACE) 8.6-50 MG tablet, Take 1-2 tablets by mouth 2 times daily Take while on oral narcotics to prevent or treat constipation.  tiZANidine (ZANAFLEX) 2 MG tablet, Take 1 tablet (2 mg) by mouth every 6 hours as needed for muscle spasms (pain)  warfarin ANTICOAGULANT (COUMADIN) 5 MG tablet, Take 5 mg Mon, Wed, and 2.5 mg all other days or as  "directed by coumadin clinic    No current facility-administered medications on file prior to visit.       Allergies   Allergen Reactions     No Known Allergies        Social History     Occupational History     Not on file   Tobacco Use     Smoking status: Never Smoker     Smokeless tobacco: Never Used   Substance and Sexual Activity     Alcohol use: No     Alcohol/week: 0.0 standard drinks     Drug use: No     Sexual activity: Not Currently     Partners: Male       Family History   Problem Relation Age of Onset     Blood Disease No family hx of         blood clots       REVIEW OF SYSTEMS  General: negative for, night sweats, dizziness, fatigue  Resp: No shortness of breath and no cough  CV: negative for chest pain, syncope or near-syncope  GI: negative for nausea, vomiting and diarrhea  : negative for dysuria and hematuria  Musculoskeletal: as above  Neurologic: negative for syncope   Hematologic: negative for bleeding disorder    Physical Exam:  Vitals: /68 (BP Location: Left arm, Patient Position: Sitting, Cuff Size: Adult Large)   Ht 1.619 m (5' 3.75\")   Wt 86.6 kg (191 lb)   BMI 33.04 kg/m    BMI= Body mass index is 33.04 kg/m .  Constitutional: healthy, alert and no acute distress   Psychiatric: mentation appears normal and affect normal/bright  NEURO: no focal deficits  SKIN: .healing, no erythema, no incision breakdown and no drainage, overall approximated except one area approximately <0.5cm in length near the proximal pole is superficial dehiscence, no underlying structures visible does have mild bloody drainage. Easily stopped with direct pressure.  No other areas are open. No evidence of infection around this.    JOINT/EXTREMITIES: left knee: moderate swelling to the knee and lower leg with bruising. No focal areas of swelling to the calf. No areas of tenderness to the calf. No increased warmth to the calf.  Some bruising at the site of the turnoquiet and top end of stocking.  Extensor is " intact. No instability. Motion not tested given area of opening on the wound.  Distal neurovascular grossly intact.   GAIT: not tested     Diagnostic Modalities:  left knee X-ray: The prosthesis has acceptable alignment. No fractures or dislocations. Prosthesis is well seated with no evidence of loosening  Independent visualization of the images was performed.      Impression:   Chief Complaint   Patient presents with     Surgical Followup     DOS:2/16/2021~Left total knee arthroplasty (patella was not resurfaced). ~13 days postop   overall doing well  Small area of superficial tissue separation.     Plan:   There is one small area of superficial tissue separation, no underlying structures, no evidence of infection, much smaller area than was originally described.  Pressure did stop the oozing. She is on coumadin chronically.  Recommend knee immobilizer at all times, ok to remove for hygiene, and check skin but to keep the knee in extension. No flexion or ROM to the knee.  Continue with bulky dressing, return next week for wound check.  Did discuss red flag findings, like the opening increasing in size, erythema, increased drainage or not improving drainage, purulent drainage, fevers, or other systemic symptoms to get seen immediately.      Removed butterfly dressings. No further separation after this.   Placed sterile non-stick dressing, then sterile 4x4, abd, kerlix wrap then a double 6 inch ace wrap.    Continue to ice and elevate.  Anticipate the global knee to foot swelling and bruising will continue to improve with icing, elevation, compression.  Switch to an ace wrap as the MACKENZIE compression stocking was causing pain at the top end of the elastic.  Demonstrated proper ACE wrap placement.      Can continue physical therapy but avoid any ROM to knee.       Return to clinic 1 week, or sooner as needed for changes.    Re-x-ray on return: No    BILLY Oh, CNP  Orthopedic Surgery

## 2021-03-01 NOTE — LETTER
"    3/1/2021         RE: Tracy Palomo  [address] [place] [id number]        Dear Colleague,    Thank you for referring your patient, Tracy Palomo, to the [facility]. Please see a copy of my visit note below.    Orthopedic Clinic Post-Operative Note    CHIEF COMPLAINT:   Chief Complaint   Patient presents with     Surgical Followup     DOS:2/16/2021~Left total knee arthroplasty (patella was not resurfaced). ~13 days postop       HISTORY OF PRESENT ILLNESS  2 weeks post-op. States some good days some bad with pain, today is a \"bad day\". Noticing though overall is improving with pain.  No new injuries. Did report on Thursday (about post op day 10), a small area to distal pole of the incision opened up and bleed. Placed butterfly strips and dressings over this, stopped bleeding, then noticed it started to bleed again yesterday during the shower.  No erythema, no fevers, drainage has been bloody.  No new injuries.  Using walker, has been discontinuing the knee immobilizer. Takes coumadin chronically.  No fevers. No systemic symptoms.  Using timoteo stocking, though causing thigh pain at the top of the stocking. Attending home therapy will be transitioning to outpatient physical therapy.      Patient's past medical, surgical, social and family histories reviewed.     Past Medical History:   Diagnosis Date     Atrial fibrillation (H) 2014    related to colon surgery     Colon polyps      Diverticulosis of colon (without mention of hemorrhage)     Diverticulosis     DVT (deep vein thrombosis) in pregnancy 2014    after colon surgery     Other and unspecified hyperlipidemia      PE (pulmonary embolism) 2014    related to colon surgery     Unspecified essential hypertension        Past Surgical History:   Procedure Laterality Date     ARTHROPLASTY KNEE Right 1/8/2019    Procedure: Right total knee replacement;  Surgeon: Kaleb Pang DO;  [facility]     ARTHROPLASTY KNEE " Left 2/16/2021    Procedure: left total knee replacement;  Surgeon: Kaleb Pang DO;  [facility]     COLONOSCOPY  09, 10, 12    [facility]     COLONOSCOPY N/A 12/11/2017    Procedure: COLONOSCOPY;  colonoscopy;  Surgeon: Elvis Quezada MD;  [facility]     FLEXIBLE SIGMOIDOSCOPY  09, 11     LAPAROTOMY EXPLORATORY N/A 10/20/2014    Procedure: LAPAROTOMY EXPLORATORY;  Surgeon: Miguel Oleary MD;  [facility]     LAPAROTOMY, LYSIS ADHESIONS, COMBINED N/A 10/20/2014    Procedure: COMBINED LAPAROTOMY, LYSIS ADHESIONS;  Surgeon: Miguel Oleary MD;  [facility]     OPEN REDUCTION INTERNAL FIXATION HIP BIPOLAR Left 3/16/2017    Procedure: OPEN REDUCTION INTERNAL FIXATION HIP BIPOLAR;  Surgeon: Kaleb Pang DO;  [facility]     RELEASE TRIGGER FINGER Left 1/12/2021    Procedure: Left thumb trigger release;  Surgeon: Kaleb Pang DO;  [facility]     RESECT SMALL BOWEL WITHOUT OSTOMY N/A 10/20/2014    Procedure: RESECT SMALL BOWEL WITHOUT OSTOMY;  Surgeon: Miguel Oleary MD;  [facility]       Medications:  acetaminophen (TYLENOL) 325 MG tablet, Take 3 tablets (975 mg) by mouth every 8 hours as needed for pain  cephALEXin (KEFLEX) 500 MG capsule, Take 1 capsule (500 mg) by mouth 2 times daily  enoxaparin ANTICOAGULANT (LOVENOX) 80 MG/0.8ML syringe, Inject 0.8 mLs (80 mg) Subcutaneous 2 times daily  hydrOXYzine (ATARAX) 10 MG tablet, Take 1 tablet (10 mg) by mouth every 6 hours as needed for itching or anxiety (with pain, moderate pain)  lovastatin (MEVACOR) 40 MG tablet, TAKE 1 TABLET BY MOUTH ONCE DAILY AT BEDTIME  oxyCODONE (ROXICODONE) 5 MG tablet, Take 1-2 tablets (5-10 mg) by mouth every 6 hours as needed for pain (Moderate to Severe)  polyethylene glycol (MIRALAX) 17 g packet, Take 17 g by mouth daily  senna-docusate (SENOKOT-S/PERICOLACE) 8.6-50 MG tablet, Take 1-2 tablets by mouth 2 times daily Take while on oral  "narcotics to prevent or treat constipation.  tiZANidine (ZANAFLEX) 2 MG tablet, Take 1 tablet (2 mg) by mouth every 6 hours as needed for muscle spasms (pain)  warfarin ANTICOAGULANT (COUMADIN) 5 MG tablet, Take 5 mg Mon, Wed, and 2.5 mg all other days or as directed by coumadin clinic    No current facility-administered medications on file prior to visit.       Allergies   Allergen Reactions     No Known Allergies        Social History     Occupational History     Not on file   Tobacco Use     Smoking status: Never Smoker     Smokeless tobacco: Never Used   Substance and Sexual Activity     Alcohol use: No     Alcohol/week: 0.0 standard drinks     Drug use: No     Sexual activity: Not Currently     Partners: Male       Family History   Problem Relation Age of Onset     Blood Disease No family hx of         blood clots       REVIEW OF SYSTEMS  General: negative for, night sweats, dizziness, fatigue  Resp: No shortness of breath and no cough  CV: negative for chest pain, syncope or near-syncope  GI: negative for nausea, vomiting and diarrhea  : negative for dysuria and hematuria  Musculoskeletal: as above  Neurologic: negative for syncope   Hematologic: negative for bleeding disorder    Physical Exam:  Vitals: /68 (BP Location: Left arm, Patient Position: Sitting, Cuff Size: Adult Large)   Ht 1.619 m (5' 3.75\")   Wt 86.6 kg (191 lb)   BMI 33.04 kg/m    BMI= Body mass index is 33.04 kg/m .  Constitutional: healthy, alert and no acute distress   Psychiatric: mentation appears normal and affect normal/bright  NEURO: no focal deficits  SKIN: .healing, no erythema, no incision breakdown and no drainage, overall approximated except one area approximately <0.5cm in length near the proximal pole is superficial dehiscence, no underlying structures visible does have mild bloody drainage. Easily stopped with direct pressure.  No other areas are open. No evidence of infection around this.    JOINT/EXTREMITIES: left " knee: moderate swelling to the knee and lower leg with bruising. No focal areas of swelling to the calf. No areas of tenderness to the calf. No increased warmth to the calf.  Some bruising at the site of the [place] and top end of stocking.  Extensor is intact. No instability. Motion not tested given area of opening on the wound.  Distal neurovascular grossly intact.   GAIT: not tested     Diagnostic Modalities:  left knee X-ray: The prosthesis has acceptable alignment. No fractures or dislocations. Prosthesis is well seated with no evidence of loosening  Independent visualization of the images was performed.      Impression:   Chief Complaint   Patient presents with     Surgical Followup     DOS:2/16/2021~Left total knee arthroplasty (patella was not resurfaced). ~13 days postop   overall doing well  Small area of superficial tissue separation.     Plan:   There is one small area of superficial tissue separation, no underlying structures, no evidence of infection, much smaller area than was originally described.  Pressure did stop the oozing. She is on coumadin chronically.  Recommend knee immobilizer at all times, ok to remove for hygiene, and check skin but to keep the knee in extension. No flexion or ROM to the knee.  Continue with bulky dressing, return next week for wound check.  Did discuss red flag findings, like the opening increasing in size, erythema, increased drainage or not improving drainage, purulent drainage, fevers, or other systemic symptoms to get seen immediately.      Removed butterfly dressings. No further separation after this.   Placed sterile non-stick dressing, then sterile 4x4, abd, kerlix wrap then a double 6 inch ace wrap.    Continue to ice and elevate.  Anticipate the global knee to foot swelling and bruising will continue to improve with icing, elevation, compression.  Switch to an ace wrap as the MACKENZIE compression stocking was causing pain at the top end of the elastic.   Demonstrated proper ACE wrap placement.      Can continue physical therapy but avoid any ROM to knee.       Return to clinic 1 week, or sooner as needed for changes.    Re-x-ray on return: No    BILLY Oh, CNP  Orthopedic Surgery          Again, thank you for allowing me to participate in the care of your patient.        Sincerely,        BILLY Espinosa CNP

## 2021-03-02 ENCOUNTER — TELEPHONE (OUTPATIENT)
Dept: ORTHOPEDICS | Facility: CLINIC | Age: 81
End: 2021-03-02

## 2021-03-02 ENCOUNTER — ANTICOAGULATION THERAPY VISIT (OUTPATIENT)
Dept: INTERNAL MEDICINE | Facility: CLINIC | Age: 81
End: 2021-03-02

## 2021-03-02 DIAGNOSIS — Z79.01 LONG TERM CURRENT USE OF ANTICOAGULANT THERAPY: ICD-10-CM

## 2021-03-02 DIAGNOSIS — I26.99 OTHER PULMONARY EMBOLISM WITHOUT ACUTE COR PULMONALE, UNSPECIFIED CHRONICITY (H): ICD-10-CM

## 2021-03-02 DIAGNOSIS — I26.99 PULMONARY EMBOLI (H): ICD-10-CM

## 2021-03-02 LAB — INR PPP: 1.8 (ref 0.9–1.1)

## 2021-03-02 NOTE — PROGRESS NOTES
ANTICOAGULATION MANAGEMENT     Patient Name:  Tracy Palomo  Date:  3/2/2021    ASSESSMENT /SUBJECTIVE:    Today's INR result of 1.8 is subtherapeutic. Goal INR of 2.0-3.0      Warfarin dose taken: Warfarin taken as instructed    Diet: Increased greens/vitamin K in diet; ongoing change - pt would like to start incorporating greens/Julianne K into her diet again    Medication changes/ interactions: No new medications/supplements affecting INR    Previous INR: Subtherapeutic     S/S of bleeding or thromboembolism: No    New injury or illness: No    Upcoming surgery, procedure or cardioversion: No    Additional findings: None      PLAN:    Telephone call with home care nurse Kwesi Fleming , [phone] regarding INR result and instructed:     Warfarin Dosing Instructions: Change your warfarin dose to 2.5 mg Mon, Fri and 5 mg ROW   . (9.1 % change)    Instructed patient to follow up no later than: 1 week  Orders given to  Homecare nurse/facility to recheck    Education provided: None required      Bernadette verbalizes understanding and agrees to warfarin dosing plan.    Instructed to call the Anticoagulation Clinic for any changes, questions or concerns. (#[phone])        Johanna Mckeon, RN      OBJECTIVE:  Recent labs: (last 7 days)     21   INR 1.9* 1.8*         INR assessment SUB    Recheck INR In: 6 DAYS    INR Location Homecare INR      Anticoagulation Summary  As of 3/2/2021    INR goal:  2.0-3.0   TTR:  45.7 % (1 y)   INR used for dosin.8 (3/2/2021)   Warfarin maintenance plan:  2.5 mg (5 mg x 0.5) every Mon, Fri; 5 mg (5 mg x 1) all other days   Full warfarin instructions:  2.5 mg every Mon, Fri; 5 mg all other days   Weekly warfarin total:  30 mg   Plan last modified:  Johanna Mckeon RN (3/2/2021)   Next INR check:  3/8/2021   Priority:  High   Target end date:  Indefinite    Indications    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

## 2021-03-02 NOTE — TELEPHONE ENCOUNTER
Reason for Call:  Other call back    Detailed comments: Janice with [facility] called to request approval for physical therapy next week. Pt had appt with Tiana 3/1 and Jared didn't approve physical therapy for this week because of pt set back. Janice would like to have a visit with pt next week but needs approval.    Phone Number Patient can be reached at: Other phone number:  Janice [phone]    Best Time: any    Can we leave a detailed message on this number? YES    Call taken on 3/2/2021 at 8:31 AM by Maame Presley

## 2021-03-03 NOTE — PROGRESS NOTES
[unfilled]                                                                              [facility]      OUTPATIENT PHYSICAL THERAPY EVALUATION  PLAN OF TREATMENT FOR OUTPATIENT REHABILITATION  (COMPLETE FOR INITIAL CLAIMS ONLY)  Patient's Last Name, First Name, M.I.  YOB: 1940  Tracy Palomo                        Provider's Name  [facility] Medical Record No.  [id number]                               Onset Date:  02/16/21   Start of Care Date:   02/16/21      Type:     _X_PT   ___OT   ___SLP Medical Diagnosis:   S/P L TKA                        PT Diagnosis:  muscle weakness, joint stiffness, impaired gait, impaired balance   Visits from SOC:  1   _________________________________________________________________________________  Plan of Treatment/Functional Goals    Planned Interventions: balance training, bed mobility training, gait training, home exercise program, patient/family education, ROM (range of motion), strengthening, transfer training     Goals: See Physical Therapy Goals on Care Plan in [facility] electronic health record.    Therapy Frequency: 2x/day  Predicted Duration of Therapy Intervention: 2-3 days  _________________________________________________________________________________    I CERTIFY THE NEED FOR THESE SERVICES FURNISHED UNDER        THIS PLAN OF TREATMENT AND WHILE UNDER MY CARE     (Physician co-signature of this document indicates review and certification of the therapy plan).                Certification date from:  02/16/21, Certification date to:  02/18/21    Referring Physician: Dr. Pang            Initial Assessment        See Physical Therapy evaluation dated (P) 02/16/21 in Epic electronic health record.    Thank you for your referral.  Anna Mortensen, PT, DPT, ATC    [facility]  O: [phone]  E: [email or web address]

## 2021-03-08 ENCOUNTER — OFFICE VISIT (OUTPATIENT)
Dept: ORTHOPEDICS | Facility: CLINIC | Age: 81
End: 2021-03-08
Payer: MEDICARE

## 2021-03-08 ENCOUNTER — MYC MEDICAL ADVICE (OUTPATIENT)
Dept: INTERNAL MEDICINE | Facility: CLINIC | Age: 81
End: 2021-03-08

## 2021-03-08 ENCOUNTER — ANTICOAGULATION THERAPY VISIT (OUTPATIENT)
Dept: ANTICOAGULATION | Facility: CLINIC | Age: 81
End: 2021-03-08

## 2021-03-08 VITALS
HEIGHT: 64 IN | WEIGHT: 191 LBS | DIASTOLIC BLOOD PRESSURE: 64 MMHG | BODY MASS INDEX: 32.61 KG/M2 | SYSTOLIC BLOOD PRESSURE: 122 MMHG

## 2021-03-08 DIAGNOSIS — Z96.652 STATUS POST TOTAL LEFT KNEE REPLACEMENT: Primary | ICD-10-CM

## 2021-03-08 DIAGNOSIS — I26.99 PULMONARY EMBOLI (H): ICD-10-CM

## 2021-03-08 DIAGNOSIS — Z79.01 LONG TERM CURRENT USE OF ANTICOAGULANT THERAPY: ICD-10-CM

## 2021-03-08 DIAGNOSIS — Z96.652 S/P TOTAL KNEE REPLACEMENT USING CEMENT, LEFT: ICD-10-CM

## 2021-03-08 DIAGNOSIS — I26.99 OTHER PULMONARY EMBOLISM WITHOUT ACUTE COR PULMONALE, UNSPECIFIED CHRONICITY (H): ICD-10-CM

## 2021-03-08 LAB — INR BLD: 3 (ref 0.86–1.14)

## 2021-03-08 PROCEDURE — 99024 POSTOP FOLLOW-UP VISIT: CPT | Performed by: ORTHOPAEDIC SURGERY

## 2021-03-08 PROCEDURE — 36416 COLLJ CAPILLARY BLOOD SPEC: CPT | Performed by: INTERNAL MEDICINE

## 2021-03-08 PROCEDURE — 85610 PROTHROMBIN TIME: CPT | Performed by: INTERNAL MEDICINE

## 2021-03-08 RX ORDER — OXYCODONE HYDROCHLORIDE 5 MG/1
5 TABLET ORAL 2 TIMES DAILY PRN
Qty: 20 TABLET | Refills: 0 | Status: SHIPPED | OUTPATIENT
Start: 2021-03-08 | End: 2021-05-20

## 2021-03-08 ASSESSMENT — PAIN SCALES - GENERAL: PAINLEVEL: NO PAIN (0)

## 2021-03-08 ASSESSMENT — MIFFLIN-ST. JEOR: SCORE: 1322.4

## 2021-03-08 NOTE — PROGRESS NOTES
Orthopedic Clinic Post-Operative Note    CHIEF COMPLAINT:   Chief Complaint   Patient presents with     RECHECK     left knee follow up     Surgical Followup     DOS:2/16/2021~Left total knee arthroplasty (patella was not resurfaced). ~20 days postop       HISTORY OF PRESENT ILLNESS  Here with her daughter to see a wound check.  On her last visit with noted inferior pole at small area of separation.  She reports no significant bleeding or drainage.  She is been faithful with her instructions.  Pain is been well controlled.    Patient's past medical, surgical, social and family histories reviewed.     Past Medical History:   Diagnosis Date     Atrial fibrillation (H) 2014    related to colon surgery     Colon polyps      Diverticulosis of colon (without mention of hemorrhage)     Diverticulosis     DVT (deep vein thrombosis) in pregnancy 2014    after colon surgery     Other and unspecified hyperlipidemia      PE (pulmonary embolism) 2014    related to colon surgery     Unspecified essential hypertension        Past Surgical History:   Procedure Laterality Date     ARTHROPLASTY KNEE Right 1/8/2019    Procedure: Right total knee replacement;  Surgeon: Kaleb Pang DO;  [facility]: [facility]     ARTHROPLASTY KNEE Left 2/16/2021    Procedure: left total knee replacement;  Surgeon: Kaleb Pang DO;  Location:  OR     COLONOSCOPY  09, 10, 12    [facility]     COLONOSCOPY N/A 12/11/2017    Procedure: COLONOSCOPY;  colonoscopy;  Surgeon: Elvis Quezada MD;  Location:  GI     FLEXIBLE SIGMOIDOSCOPY  09, 11     LAPAROTOMY EXPLORATORY N/A 10/20/2014    Procedure: LAPAROTOMY EXPLORATORY;  Surgeon: Miguel Oleary MD;  [facility]     LAPAROTOMY, LYSIS ADHESIONS, COMBINED N/A 10/20/2014    Procedure: COMBINED LAPAROTOMY, LYSIS ADHESIONS;  Surgeon: Miguel Oleary MD;  [facility]: [facility]     OPEN REDUCTION INTERNAL FIXATION HIP BIPOLAR Left 3/16/2017    Procedure: OPEN REDUCTION  INTERNAL FIXATION HIP BIPOLAR;  Surgeon: Kaleb Pang DO;  [facility]     RELEASE TRIGGER FINGER Left 1/12/2021    Procedure: Left thumb trigger release;  Surgeon: Kaleb Pang DO;  [facility]     RESECT SMALL BOWEL WITHOUT OSTOMY N/A 10/20/2014    Procedure: RESECT SMALL BOWEL WITHOUT OSTOMY;  Surgeon: Miguel Oleary MD;  [facility]       Medications:  acetaminophen (TYLENOL) 325 MG tablet, Take 3 tablets (975 mg) by mouth every 8 hours as needed for pain  hydrOXYzine (ATARAX) 10 MG tablet, Take 1 tablet (10 mg) by mouth every 6 hours as needed for itching or anxiety (with pain, moderate pain)  lovastatin (MEVACOR) 40 MG tablet, TAKE 1 TABLET BY MOUTH ONCE DAILY AT BEDTIME  polyethylene glycol (MIRALAX) 17 g packet, Take 17 g by mouth daily  senna-docusate (SENOKOT-S/PERICOLACE) 8.6-50 MG tablet, Take 1-2 tablets by mouth 2 times daily Take while on oral narcotics to prevent or treat constipation.  tiZANidine (ZANAFLEX) 2 MG tablet, Take 1 tablet (2 mg) by mouth every 6 hours as needed for muscle spasms (pain)  warfarin ANTICOAGULANT (COUMADIN) 5 MG tablet, Take 5 mg Mon, Wed, and 2.5 mg all other days or as directed by coumadin clinic    No current facility-administered medications on file prior to visit.       Allergies   Allergen Reactions     No Known Allergies        Social History     Occupational History     Not on file   Tobacco Use     Smoking status: Never Smoker     Smokeless tobacco: Never Used   Substance and Sexual Activity     Alcohol use: No     Alcohol/week: 0.0 standard drinks     Drug use: No     Sexual activity: Not Currently     Partners: Male       Family History   Problem Relation Age of Onset     Blood Disease No family hx of         blood clots       REVIEW OF SYSTEMS  General: negative for, night sweats, dizziness, fatigue  Resp: No shortness of breath and no cough  CV: negative for chest pain, syncope or near-syncope  GI: negative for nausea,  "vomiting and diarrhea  : negative for dysuria and hematuria  Musculoskeletal: as above  Neurologic: negative for syncope   Hematologic: negative for bleeding disorder    Physical Exam:  Vitals: /64   Ht 1.619 m (5' 3.75\")   Wt 86.6 kg (191 lb)   BMI 33.04 kg/m    BMI= Body mass index is 33.04 kg/m .  Constitutional: healthy, alert and no acute distress   Psychiatric: mentation appears normal and affect normal/bright  NEURO: no focal deficits  SKIN: .  Inferior pole shows a small area less than 5 mm partial superficial dehiscence.  There is no erythema.  There is no drainage.  Remainder the incisions pristine.  JOINT/EXTREMITIES: Distal neurovascular tact.  No peripheral edema.  GAIT: not tested     Diagnostic Modalities:  Previous imaging reviewed.  Independent visualization of the images was performed.      Impression:   Chief Complaint   Patient presents with     RECHECK     left knee follow up     Surgical Followup     DOS:2/16/2021~Left total knee arthroplasty (patella was not resurfaced). ~20 days postop   Small wound superficial dehiscence    Plan:   We discussed the area.  Keep it covered with a Band-Aid.  Local wound care clean with warm soapy water do not start underwater.  Okay to discontinue knee immobilizer as she has straight leg.  Weightbearing as tolerated.  Advance physical therapy as she tolerates.    Red flag symptoms discussed.    Return to clinic 4, week(s), or sooner as needed for changes.    Re-x-ray on return: No    Vitaly Pang D.O.  "

## 2021-03-08 NOTE — LETTER
3/8/2021         RE: Tracy Palomo  [address] [facility] [address]        Dear Colleague,    Thank you for referring your patient, Tracy Palomo, to the [facility]. Please see a copy of my visit note below.    Orthopedic Clinic Post-Operative Note    CHIEF COMPLAINT:   Chief Complaint   Patient presents with     RECHECK     left knee follow up     Surgical Followup     DOS:2/16/2021~Left total knee arthroplasty (patella was not resurfaced). ~20 days postop       HISTORY OF PRESENT ILLNESS  Here with her daughter to see a wound check.  On her last visit with noted inferior pole at small area of separation.  She reports no significant bleeding or drainage.  She is been faithful with her instructions.  Pain is been well controlled.    Patient's past medical, surgical, social and family histories reviewed.     Past Medical History:   Diagnosis Date     Atrial fibrillation (H) 2014    related to colon surgery     Colon polyps      Diverticulosis of colon (without mention of hemorrhage)     Diverticulosis     DVT (deep vein thrombosis) in pregnancy 2014    after colon surgery     Other and unspecified hyperlipidemia      PE (pulmonary embolism) 2014    related to colon surgery     Unspecified essential hypertension        Past Surgical History:   Procedure Laterality Date     ARTHROPLASTY KNEE Right 1/8/2019    Procedure: Right total knee replacement;  Surgeon: Kaleb Pang DO;  [facility]     ARTHROPLASTY KNEE Left 2/16/2021    Procedure: left total knee replacement;  Surgeon: Kaleb Pang DO;  Location:  OR     COLONOSCOPY  09, 10, 12    [facility]     COLONOSCOPY N/A 12/11/2017    Procedure: COLONOSCOPY;  colonoscopy;  Surgeon: Elvis Quezada MD;  Location:  GI     FLEXIBLE SIGMOIDOSCOPY  09, 11     LAPAROTOMY EXPLORATORY N/A 10/20/2014    Procedure: LAPAROTOMY EXPLORATORY;  Surgeon: Miguel Oleary MD;  Location:  OR      LAPAROTOMY, LYSIS ADHESIONS, COMBINED N/A 10/20/2014    Procedure: COMBINED LAPAROTOMY, LYSIS ADHESIONS;  Surgeon: Miguel Oleary MD;  [facility]     OPEN REDUCTION INTERNAL FIXATION HIP BIPOLAR Left 3/16/2017    Procedure: OPEN REDUCTION INTERNAL FIXATION HIP BIPOLAR;  Surgeon: Kaleb Pang DO;  [facility]     RELEASE TRIGGER FINGER Left 1/12/2021    Procedure: Left thumb trigger release;  Surgeon: Kaleb Pang DO;  [facility]     RESECT SMALL BOWEL WITHOUT OSTOMY N/A 10/20/2014    Procedure: RESECT SMALL BOWEL WITHOUT OSTOMY;  Surgeon: Miguel Oleary MD;  [facility]       Medications:  acetaminophen (TYLENOL) 325 MG tablet, Take 3 tablets (975 mg) by mouth every 8 hours as needed for pain  hydrOXYzine (ATARAX) 10 MG tablet, Take 1 tablet (10 mg) by mouth every 6 hours as needed for itching or anxiety (with pain, moderate pain)  lovastatin (MEVACOR) 40 MG tablet, TAKE 1 TABLET BY MOUTH ONCE DAILY AT BEDTIME  polyethylene glycol (MIRALAX) 17 g packet, Take 17 g by mouth daily  senna-docusate (SENOKOT-S/PERICOLACE) 8.6-50 MG tablet, Take 1-2 tablets by mouth 2 times daily Take while on oral narcotics to prevent or treat constipation.  tiZANidine (ZANAFLEX) 2 MG tablet, Take 1 tablet (2 mg) by mouth every 6 hours as needed for muscle spasms (pain)  warfarin ANTICOAGULANT (COUMADIN) 5 MG tablet, Take 5 mg Mon, Wed, and 2.5 mg all other days or as directed by coumadin clinic    No current facility-administered medications on file prior to visit.       Allergies   Allergen Reactions     No Known Allergies        Social History     Occupational History     Not on file   Tobacco Use     Smoking status: Never Smoker     Smokeless tobacco: Never Used   Substance and Sexual Activity     Alcohol use: No     Alcohol/week: 0.0 standard drinks     Drug use: No     Sexual activity: Not Currently     Partners: Male       Family History   Problem Relation Age of Onset      "Blood Disease No family hx of         blood clots       REVIEW OF SYSTEMS  General: negative for, night sweats, dizziness, fatigue  Resp: No shortness of breath and no cough  CV: negative for chest pain, syncope or near-syncope  GI: negative for nausea, vomiting and diarrhea  : negative for dysuria and hematuria  Musculoskeletal: as above  Neurologic: negative for syncope   Hematologic: negative for bleeding disorder    Physical Exam:  Vitals: /64   Ht 1.619 m (5' 3.75\")   Wt 86.6 kg (191 lb)   BMI 33.04 kg/m    BMI= Body mass index is 33.04 kg/m .  Constitutional: healthy, alert and no acute distress   Psychiatric: mentation appears normal and affect normal/bright  NEURO: no focal deficits  SKIN: .  Inferior pole shows a small area less than 5 mm partial superficial dehiscence.  There is no erythema.  There is no drainage.  Remainder the incisions pristine.  JOINT/EXTREMITIES: Distal neurovascular tact.  No peripheral edema.  GAIT: not tested     Diagnostic Modalities:  Previous imaging reviewed.  Independent visualization of the images was performed.      Impression:   Chief Complaint   Patient presents with     RECHECK     left knee follow up     Surgical Followup     DOS:2/16/2021~Left total knee arthroplasty (patella was not resurfaced). ~20 days postop   Small wound superficial dehiscence    Plan:   We discussed the area.  Keep it covered with a Band-Aid.  Local wound care clean with warm soapy water do not start underwater.  Okay to discontinue knee immobilizer as she has straight leg.  Weightbearing as tolerated.  Advance physical therapy as she tolerates.    Red flag symptoms discussed.    Return to clinic 4, week(s), or sooner as needed for changes.    Re-x-ray on return: No    Vitaly Pang D.O.      Again, thank you for allowing me to participate in the care of your patient.        Sincerely,        Kaleb Pang, DO    "

## 2021-03-08 NOTE — PROGRESS NOTES
ANTICOAGULATION MANAGEMENT     Patient Name:  Tracy Palomo  Date:  3/8/2021    ASSESSMENT /SUBJECTIVE:    Today's INR result of 3.0 is therapeutic. Goal INR of 2.0-3.0      Warfarin dose taken: Warfarin taken as instructed    Diet: Pt was going to be resuming her previous green intake    Medication changes/ interactions: No new medications/supplements affecting INR    Previous INR: Subtherapeutic     S/S of bleeding or thromboembolism: No    New injury or illness: No    Upcoming surgery, procedure or cardioversion: No    Additional findings: None      PLAN:    Telephone call with Tracy (and [facility] nurse) regarding INR result and instructed:     Warfarin Dosing Instructions: Continue your current warfarin dose 2.5 mg Mon, Fri and 5 mg all other days    Instructed patient to follow up no later than: 3 days (HC visit- may be discharging).  Orders given to  Homecare nurse/facility to recheck    Education provided: Importance of consistent vitamin K intake      Pt verbalizes understanding and agrees to warfarin dosing plan.    Instructed to call the Anticoagulation Clinic for any changes, questions or concerns. (#[phone])        Radha Rodriguez RN      OBJECTIVE:  Recent labs: (last 7 days)     03/02/21 03/08/21  0905   INR 1.8* 3.0*         No question data found.  Anticoagulation Summary  As of 3/8/2021    INR goal:  2.0-3.0   TTR:  45.9 % (1 y)   INR used for dosing:  3.0 (3/8/2021)   Warfarin maintenance plan:  2.5 mg (5 mg x 0.5) every Mon, Fri; 5 mg (5 mg x 1) all other days   Full warfarin instructions:  2.5 mg every Mon, Fri; 5 mg all other days   Weekly warfarin total:  30 mg   No change documented:  Radha Rodriguez, RN   Plan last modified:  Johanna Mckeon RN (3/2/2021)   Next INR check:     Priority:  High   Target end date:  Indefinite    Indications    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]  Long term current use of anticoagulant therapy [Z79.01]  Other  pulmonary embolism without acute cor pulmonale  unspecified chronicity (H) [I26.99]             Anticoagulation Episode Summary     INR check location:      Preferred lab:      Send INR reminders to:  [facility]    Comments:  5 mg tablets, book, PM dose       Anticoagulation Care Providers     Provider Role Specialty Phone number    Chad Betts MD Referring Internal Medicine [phone]    Gearrd Medrano MD Responsible Family Medicine [phone]

## 2021-03-09 ENCOUNTER — TELEPHONE (OUTPATIENT)
Dept: INTERNAL MEDICINE | Facility: CLINIC | Age: 81
End: 2021-03-09

## 2021-03-09 NOTE — TELEPHONE ENCOUNTER
Patient is interested in checking his/her INR at home with a home monitor. The company we use is [employer]. [employer] will contact patients insurance to check coverage and will call pt to see if he/she wants to proceed or not. If so, patient will be trained in on the machine virtually by [employer] staff. Pt will then start checking his/her INRs at home and will call them into [employer]. They will fax the results to the [facility] and we will continue to manage the INR via phone.     I have filled out the [employer] form, which just needs review and signature by PCP.   When this is signed,  it should be faxed to [phone], along with demographics and insurance info. Otherwise, it can be put in the INR folder in the med room and I can take care of it.     Form placed on PCP's desk.         Johanna Mckeon RN

## 2021-03-09 NOTE — TELEPHONE ENCOUNTER
Patient is being discharged from home care and is looking for an INR machine.  Maria G Called and stated that patient is looking for one and is requesting this message be send to the coumadin clinic.     Mona VENCES

## 2021-03-11 ENCOUNTER — ANTICOAGULATION THERAPY VISIT (OUTPATIENT)
Dept: ANTICOAGULATION | Facility: CLINIC | Age: 81
End: 2021-03-11

## 2021-03-11 DIAGNOSIS — Z79.01 LONG TERM CURRENT USE OF ANTICOAGULANT THERAPY: ICD-10-CM

## 2021-03-11 DIAGNOSIS — I26.99 OTHER PULMONARY EMBOLISM WITHOUT ACUTE COR PULMONALE, UNSPECIFIED CHRONICITY (H): ICD-10-CM

## 2021-03-11 DIAGNOSIS — I26.99 PULMONARY EMBOLI (H): ICD-10-CM

## 2021-03-11 LAB
CAPILLARY BLOOD COLLECTION: NORMAL
INR BLD: 3.4 (ref 0.86–1.14)

## 2021-03-11 PROCEDURE — 85610 PROTHROMBIN TIME: CPT | Performed by: INTERNAL MEDICINE

## 2021-03-11 PROCEDURE — 36416 COLLJ CAPILLARY BLOOD SPEC: CPT | Performed by: INTERNAL MEDICINE

## 2021-03-11 NOTE — PROGRESS NOTES
ANTICOAGULATION MANAGEMENT     Patient Name:  Tracy Palomo  Date:  3/11/2021    ASSESSMENT /SUBJECTIVE:    Today's INR result of 3.4 is supratherapeutic. Goal INR of 2.0-3.0      Warfarin dose taken: Warfarin taken as instructed    Diet: No new diet changes affecting INR    Medication changes/ interactions: No new medications/supplements affecting INR    Previous INR: Therapeutic     S/S of bleeding or thromboembolism: No    New injury or illness: No    Upcoming surgery, procedure or cardioversion: No    Additional findings: Pt did state she will try and be more consistent with her greens. She did just have a cup and a half of broccoli when she got home      PLAN:    Telephone call with Tracy regarding INR result and instructed:     Warfarin Dosing Instructions: Continue your current warfarin dose 2.5 mg Mon, Fri and 5 mg all other days    Instructed patient to follow up no later than: 1 week  Lab visit scheduled    Education provided: Please call back if any changes to your diet, medications or how you've been taking warfarin, Importance of consistent vitamin K intake, Impact of vitamin K foods on INR and Vitamin K content of foods      Tracy verbalizes understanding and agrees to warfarin dosing plan.    Instructed to call the Anticoagulation Clinic for any changes, questions or concerns. (#[phone])        Arti Ruelas RN      OBJECTIVE:  Recent labs: (last 7 days)     03/08/21  0905 03/11/21  0959   INR 3.0* 3.4*         No question data found.  Anticoagulation Summary  As of 3/11/2021    INR goal:  2.0-3.0   TTR:  45.9 % (1 y)   INR used for dosing:  3.4 (3/11/2021)   Warfarin maintenance plan:  2.5 mg (5 mg x 0.5) every Mon, Fri; 5 mg (5 mg x 1) all other days   Full warfarin instructions:  2.5 mg every Mon, Fri; 5 mg all other days   Weekly warfarin total:  30 mg   No change documented:  Arti Ruelas RN   Plan last modified:  Johanna Mckeon, RN (3/2/2021)   Next INR check:  3/18/2021    Priority:  High   Target end date:  Indefinite    Indications    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]  Long term current use of anticoagulant therapy [Z79.01]  Other pulmonary embolism without acute cor pulmonale  unspecified chronicity (H) [I26.99]             Anticoagulation Episode Summary     INR check location:      Preferred lab:      Send INR reminders to:  [facility]    Comments:  5 mg tablets, book, PM dose       Anticoagulation Care Providers     Provider Role Specialty Phone number    Chad Betts MD Referring Internal Medicine [phone]    Gerard Medrano MD Responsible Family Medicine [phone]

## 2021-03-12 NOTE — TELEPHONE ENCOUNTER
Please call Tracy, she has some concerns about her INR.    Patient requests INR results be sent in [place]

## 2021-03-16 ENCOUNTER — HOSPITAL ENCOUNTER (OUTPATIENT)
Dept: PHYSICAL THERAPY | Facility: CLINIC | Age: 81
Setting detail: THERAPIES SERIES
End: 2021-03-16
Attending: NURSE PRACTITIONER
Payer: MEDICARE

## 2021-03-16 ENCOUNTER — MEDICAL CORRESPONDENCE (OUTPATIENT)
Dept: HEALTH INFORMATION MANAGEMENT | Facility: CLINIC | Age: 81
End: 2021-03-16

## 2021-03-16 DIAGNOSIS — Z96.652 S/P TOTAL KNEE REPLACEMENT USING CEMENT, LEFT: ICD-10-CM

## 2021-03-16 PROCEDURE — 97110 THERAPEUTIC EXERCISES: CPT | Mod: GP | Performed by: PHYSICAL THERAPIST

## 2021-03-16 PROCEDURE — 97161 PT EVAL LOW COMPLEX 20 MIN: CPT | Mod: GP | Performed by: PHYSICAL THERAPIST

## 2021-03-16 NOTE — PROGRESS NOTES
03/16/21 1000   General Information   Type of Visit Initial OP Ortho PT Evaluation   Start of Care Date 03/16/21   Referring Physician BILLY Oh, CNP   Patient/Family Goals Statement improve knee ROM, normalize walking, improve balance, go camping by May   Orders Evaluate and Treat   Date of Order 02/16/21   Certification Required? Yes   Medical Diagnosis Left total knee replacement   Surgical/Medical history reviewed Yes   Precautions/Limitations no known precautions/limitations   Weight-Bearing Status - LLE weight-bearing as tolerated   Body Part(s)   Body Part(s) Knee   Presentation and Etiology   Pertinent history of current problem (include personal factors and/or comorbidities that impact the POC) Pt had L TKA on 2/16/21, home the next day. Had some home PT for 1 week but then pt had to not move L knee for 1 week due to incision open on the bottom. No longer any drainage. Takes tyelonol for L knee, no other pain meds x 4-5 days. Ices 4x per day. Had R TKA 1/20/19 and rehab went well but did not get full extension and was told L knee may not get fully straight either.    Impairments A. Pain;B. Decreased WB tolerance;C. Swelling;D. Decreased ROM;F. Decreased strength and endurance;G. Impaired balance;H. Impaired gait   Functional Limitations perform activities of daily living;perform required work activities;perform desired leisure / sports activities   Symptom Location L knee   How/Where did it occur From Degenerative Joint Disease   Onset date of current episode/exacerbation 02/16/21   Chronicity Chronic   Pain rating (0-10 point scale) Best (/10);Worst (/10)   Best (/10) 4-5/10   Worst (/10) 8/10   Pain quality A. Sharp;B. Dull;C. Aching   Frequency of pain/symptoms B. Intermittent   Pain/symptoms are: Other   Pain symptoms comment worst at the end of the day   Pain/symptoms exacerbated by B. Walking;G. Certain positions;I. Bending;J. ADL;K. Home tasks;L. Work tasks;M. Other   Pain  exacerbation comment standing, exercise   Pain/symptoms eased by A. Sitting;C. Rest;E. Changing positions;H. Cold;I. OTC medication(s)   Progression of symptoms since onset: Improved   Prior Level of Function   Prior Level of Function-Mobility ind prior   Prior Level of Function-ADLs ind prior   Functional Level Prior Comment walking 1 mile prior, [place], [place] riding, busy   Current Level of Function   Patient role/employment history F. Retired  (daughter lives 5 blocks away)   [facility]   Home/community accessibility no stairs   Current equipment-Gait/Locomotion Walker  (wheeled walker)   Fall Risk Screen   Fall screen completed by PT   Have you fallen 2 or more times in the past year? No   Have you fallen and had an injury in the past year? Yes   Is patient a fall risk? No   [place]   Abuse Screen (yes response referral indicated)   Feels Unsafe at Home or Work/School no   Feels Threatened by Someone no   Does Anyone Try to Keep You From Having Contact with Others or Doing Things Outside Your Home? no   Physical Signs of Abuse Present no   Functional Scales   Functional Scales Other   Other Scales  22/80 LEFS   Knee Objective Findings   Side (if bilateral, select both right and left) Right;Left   Observation daugher present, pt wearing L thigh high sock   Integumentary  L knee swelling and warmth present, incision healing well   Posture fair sitting and standing posture   Gait/Locomotion using 4WW, trunk flexed, wide LIDA, slow gait, lateral lurch, does not get full knee extension   Right Knee Extension AROM 17 degrees   Right Knee Flexion AROM 115 degrees   Right Quad Set Strength poor   R VMO Strength poor   Left Knee Extension AROM 18 degrees   Left Knee Flexion AROM 75 degrees supine, 82 degrees seated   Left Knee Extension Strength 20 degree extensor lag   Left Quad Set Strength poor   L VMO  Strength poor   Planned Therapy Interventions   Planned Therapy Interventions balance training;gait training;manual therapy;neuromuscular re-education;ROM;strengthening;stretching;transfer training   Planned Modality Interventions   Planned Modality Interventions Electrical stimulation   Planned Modality Interventions Comments if needed   Clinical Impression   Criteria for Skilled Therapeutic Interventions Met yes, treatment indicated   PT Diagnosis Left total knee replacement 2/16/21   Influenced by the following impairments pain, swelling, ROM, weakness, balance   Functional limitations due to impairments ambulation, general mobility, adl's, hobbies, poor sleep   Clinical Presentation Stable/Uncomplicated   Clinical Presentation Rationale clinical judgement, pt noting improvement   Clinical Decision Making (Complexity) Low complexity   Therapy Frequency 2 times/Week   Predicted Duration of Therapy Intervention (days/wks) 8 weeks, decrease as able   Risk & Benefits of therapy have been explained Yes   Patient, Family & other staff in agreement with plan of care Yes   Education Assessment   Preferred Learning Style Listening   Barriers to Learning No barriers   ORTHO GOALS   PT Ortho Eval Goals 1;2   Ortho Goal 1   Goal Identifier ROM   Goal Description pt will improve L knee extension to flexion 10 degrees to 115 degrees or better to assist with easier car transfers   Target Date 04/13/21   Ortho Goal 2   Goal Identifier function   Goal Description pt will improve L knee ROM and strength and carry over to improved functional level as measured by LEFS score increase from 22/80 to 30/80 or better   Target Date 04/16/21   Total Evaluation Time   PT Eval, Low Complexity Minutes (11431) 35   Therapy Certification   Certification date from 03/16/21   Certification date to 06/14/21   Medical Diagnosis Left total knee replacement

## 2021-03-16 NOTE — TELEPHONE ENCOUNTER
New form filled out. I have printed off demos and insurance card info. Form has PCP signature and I have faxed to [phone]. Put into scanning  Johanna Mckeon RN

## 2021-03-16 NOTE — PROGRESS NOTES
[facility]          OUTPATIENT PHYSICAL THERAPY ORTHOPEDIC EVALUATION  PLAN OF TREATMENT FOR OUTPATIENT REHABILITATION  (COMPLETE FOR INITIAL CLAIMS ONLY)  Patient's Last Name, First Name, M.I.  YOB: 1940  Tracy Palomo    Provider s Name:  [facility]   Medical Record No.  [id number]   Start of Care Date:  03/16/21   Onset Date:  02/16/21   Type:     _X__PT   ___OT   ___SLP Medical Diagnosis:  Left total knee replacement     PT Diagnosis:  Left total knee replacement 2/16/21   Visits from SOC:  1      _________________________________________________________________________________  Plan of Treatment/Functional Goals:  balance training, gait training, manual therapy, neuromuscular re-education, ROM, strengthening, stretching, transfer training     Electrical stimulation  if needed  Goals  Goal Identifier: ROM  Goal Description: pt will improve L knee extension to flexion 10 degrees to 115 degrees or better to assist with easier car transfers  Target Date: 04/13/21    Goal Identifier: function  Goal Description: pt will improve L knee ROM and strength and carry over to improved functional level as measured by LEFS score increase from 22/80 to 30/80 or better  Target Date: 04/16/21    Therapy Frequency:  2 times/Week  Predicted Duration of Therapy Intervention:  8 weeks, decrease as able    Rohan Patton, PT                 I CERTIFY THE NEED FOR THESE SERVICES FURNISHED UNDER        THIS PLAN OF TREATMENT AND WHILE UNDER MY CARE     (Physician co-signature of this document indicates review and certification of the therapy plan).                       Certification Date From:  03/16/21   Certification Date To:  06/14/21    Referring Provider:  BILLY Oh CNP    Initial Assessment        See Epic Evaluation Start of Care Date: 03/16/21

## 2021-03-18 ENCOUNTER — HOSPITAL ENCOUNTER (OUTPATIENT)
Dept: PHYSICAL THERAPY | Facility: CLINIC | Age: 81
Setting detail: THERAPIES SERIES
End: 2021-03-18
Attending: NURSE PRACTITIONER
Payer: MEDICARE

## 2021-03-18 ENCOUNTER — ANTICOAGULATION THERAPY VISIT (OUTPATIENT)
Dept: ANTICOAGULATION | Facility: CLINIC | Age: 81
End: 2021-03-18

## 2021-03-18 DIAGNOSIS — Z79.01 LONG TERM CURRENT USE OF ANTICOAGULANT THERAPY: ICD-10-CM

## 2021-03-18 DIAGNOSIS — I26.99 OTHER PULMONARY EMBOLISM WITHOUT ACUTE COR PULMONALE, UNSPECIFIED CHRONICITY (H): ICD-10-CM

## 2021-03-18 DIAGNOSIS — I26.99 PULMONARY EMBOLI (H): ICD-10-CM

## 2021-03-18 LAB
CAPILLARY BLOOD COLLECTION: NORMAL
INR BLD: 3.7 (ref 0.86–1.14)

## 2021-03-18 PROCEDURE — 97110 THERAPEUTIC EXERCISES: CPT | Mod: GP | Performed by: PHYSICAL THERAPIST

## 2021-03-18 PROCEDURE — 97140 MANUAL THERAPY 1/> REGIONS: CPT | Mod: GP | Performed by: PHYSICAL THERAPIST

## 2021-03-18 PROCEDURE — 97530 THERAPEUTIC ACTIVITIES: CPT | Mod: GP | Performed by: PHYSICAL THERAPIST

## 2021-03-18 PROCEDURE — 36416 COLLJ CAPILLARY BLOOD SPEC: CPT | Performed by: INTERNAL MEDICINE

## 2021-03-18 PROCEDURE — 85610 PROTHROMBIN TIME: CPT | Performed by: INTERNAL MEDICINE

## 2021-03-18 NOTE — PROGRESS NOTES
ANTICOAGULATION MANAGEMENT     Patient Name:  Tracy Palomo  Date:  3/18/2021    ASSESSMENT /SUBJECTIVE:    Today's INR result of 3.7 is supratherapeutic. Goal INR of 2.0-3.0      Warfarin dose taken: Pt took the wrong dosing last week (see calendar)    Diet: No new diet changes affecting INR She was going to increase her greens, but did not as expected.    Medication changes/ interactions: No new medications/supplements affecting INR    Previous INR: Supratherapeutic     S/S of bleeding or thromboembolism: No    New injury or illness: No    Upcoming surgery, procedure or cardioversion: No    Additional findings: Mychart sent with dosing      PLAN:    Telephone call with Tracy regarding INR result and instructed:     Warfarin Dosing Instructions: Take 5 mg Mon, Fri and 2.5 mg all other days    Instructed patient to follow up no later than: 6 days  Lab visit scheduled    Education provided: None required      Pt verbalizes understanding and agrees to warfarin dosing plan.    Instructed to call the Anticoagulation Clinic for any changes, questions or concerns. (#[phone])        Radha Rodriguez RN      OBJECTIVE:  Recent labs: (last 7 days)     03/18/21  0853   INR 3.7*         INR assessment SUPRA    Recheck INR In: 6 DAYS    INR Location Outside lab      Anticoagulation Summary  As of 3/18/2021    INR goal:  2.0-3.0   TTR:  45.9 % (1 y)   INR used for dosing:  3.7 (3/18/2021)   Warfarin maintenance plan:  5 mg (5 mg x 1) every Mon, Wed, Fri; 2.5 mg (5 mg x 0.5) all other days   Full warfarin instructions:  5 mg every Mon, Wed, Fri; 2.5 mg all other days   Weekly warfarin total:  25 mg   Plan last modified:  Radha Rodriguez, RN (3/18/2021)   Next INR check:  3/24/2021   Priority:  High   Target end date:  Indefinite    Indications    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]  Long term current use of anticoagulant therapy [Z79.01]  Other pulmonary embolism without acute cor  pulmonale  unspecified chronicity (H) [I26.99]             Anticoagulation Episode Summary     INR check location:      Preferred lab:      Send INR reminders to:  [facility]    Comments:  5 mg tablets, book, PM dose - Per daughter, please send updates on dosing each week [facility]      Anticoagulation Care Providers     Provider Role Specialty Phone number    Chad Betts MD Referring Internal Medicine [phone]    Gerard Medrano MD Responsible Family Medicine [phone]

## 2021-03-24 ENCOUNTER — ANTICOAGULATION THERAPY VISIT (OUTPATIENT)
Dept: ANTICOAGULATION | Facility: CLINIC | Age: 81
End: 2021-03-24

## 2021-03-24 ENCOUNTER — HOSPITAL ENCOUNTER (OUTPATIENT)
Dept: PHYSICAL THERAPY | Facility: CLINIC | Age: 81
Setting detail: THERAPIES SERIES
End: 2021-03-24
Attending: NURSE PRACTITIONER
Payer: MEDICARE

## 2021-03-24 DIAGNOSIS — I26.99 OTHER PULMONARY EMBOLISM WITHOUT ACUTE COR PULMONALE, UNSPECIFIED CHRONICITY (H): ICD-10-CM

## 2021-03-24 DIAGNOSIS — Z79.01 LONG TERM CURRENT USE OF ANTICOAGULANT THERAPY: ICD-10-CM

## 2021-03-24 DIAGNOSIS — I26.99 PULMONARY EMBOLI (H): ICD-10-CM

## 2021-03-24 LAB
CAPILLARY BLOOD COLLECTION: NORMAL
INR BLD: 2.6 (ref 0.86–1.14)

## 2021-03-24 PROCEDURE — 36416 COLLJ CAPILLARY BLOOD SPEC: CPT | Performed by: INTERNAL MEDICINE

## 2021-03-24 PROCEDURE — 97110 THERAPEUTIC EXERCISES: CPT | Mod: GP | Performed by: PHYSICAL THERAPIST

## 2021-03-24 PROCEDURE — 97140 MANUAL THERAPY 1/> REGIONS: CPT | Mod: GP | Performed by: PHYSICAL THERAPIST

## 2021-03-24 PROCEDURE — 85610 PROTHROMBIN TIME: CPT | Performed by: INTERNAL MEDICINE

## 2021-03-24 NOTE — PROGRESS NOTES
ANTICOAGULATION MANAGEMENT     Patient Name:  Tracy Palomo  Date:  3/24/2021    ASSESSMENT /SUBJECTIVE:    Today's INR result of 2.6 is therapeutic. Goal INR of 2.0-3.0      Warfarin dose taken: Warfarin taken as instructed    Diet: No new diet changes affecting INR    Medication changes/ interactions: No new medications/supplements affecting INR    Previous INR: Supratherapeutic     S/S of bleeding or thromboembolism: No    New injury or illness: No    Upcoming surgery, procedure or cardioversion: No    Additional findings: None      PLAN:    Telephone call with Tracy regarding INR result and instructed:     Warfarin Dosing Instructions: Continue your current warfarin dose 5 mg Mon, Wed, Fri and 2.5 mg all other days Directions sent via [employer] per pt request.    Instructed patient to follow up no later than: 2 weeks  Lab visit scheduled    Education provided: None required      Pt verbalizes understanding and agrees to warfarin dosing plan.    Instructed to call the Anticoagulation Clinic for any changes, questions or concerns. (#[phone])        Radha Rodriguez RN      OBJECTIVE:  Recent labs: (last 7 days)     21  0853 21  0853   INR 3.7* 2.6*         No question data found.  Anticoagulation Summary  As of 3/24/2021    INR goal:  2.0-3.0   TTR:  44.9 % (1 y)   INR used for dosin.6 (3/24/2021)   Warfarin maintenance plan:  5 mg (5 mg x 1) every Mon, Wed, Fri; 2.5 mg (5 mg x 0.5) all other days   Full warfarin instructions:  5 mg every Mon, Wed, Fri; 2.5 mg all other days   Weekly warfarin total:  25 mg   No change documented:  Radha Rodriguez RN   Plan last modified:  Radha Rodriguez, RN (3/18/2021)   Next INR check:  2021   Priority:  High   Target end date:  Indefinite    Indications    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]  Long term current use of anticoagulant therapy [Z79.01]  Other pulmonary embolism without acute cor pulmonale  unspecified  chronicity (H) [I26.99]             Anticoagulation Episode Summary     INR check location:      Preferred lab:      Send INR reminders to:  [facility] [facility]    Comments:  5 mg tablets, book, PM dose - Per daughter, please [facility] updates on dosing each week [facility]      Anticoagulation Care Providers     Provider Role Specialty Phone number    Chad Betts MD Referring Internal Medicine [phone]    Gerard Medrano MD Responsible Family Medicine [phone]

## 2021-03-25 DIAGNOSIS — Z79.01 LONG TERM CURRENT USE OF ANTICOAGULANT THERAPY: ICD-10-CM

## 2021-03-25 DIAGNOSIS — I26.99 OTHER PULMONARY EMBOLISM WITHOUT ACUTE COR PULMONALE, UNSPECIFIED CHRONICITY (H): ICD-10-CM

## 2021-03-25 RX ORDER — WARFARIN SODIUM 5 MG/1
TABLET ORAL
Qty: 70 TABLET | Refills: 1 | Status: SHIPPED | OUTPATIENT
Start: 2021-03-25 | End: 2021-09-27

## 2021-03-26 ENCOUNTER — HOSPITAL ENCOUNTER (OUTPATIENT)
Dept: PHYSICAL THERAPY | Facility: CLINIC | Age: 81
Setting detail: THERAPIES SERIES
End: 2021-03-26
Attending: NURSE PRACTITIONER
Payer: MEDICARE

## 2021-03-26 PROCEDURE — 97110 THERAPEUTIC EXERCISES: CPT | Mod: GP | Performed by: PHYSICAL THERAPIST

## 2021-03-26 PROCEDURE — 97140 MANUAL THERAPY 1/> REGIONS: CPT | Mod: GP | Performed by: PHYSICAL THERAPIST

## 2021-03-29 ENCOUNTER — HOSPITAL ENCOUNTER (OUTPATIENT)
Dept: PHYSICAL THERAPY | Facility: CLINIC | Age: 81
Setting detail: THERAPIES SERIES
End: 2021-03-29
Attending: NURSE PRACTITIONER
Payer: MEDICARE

## 2021-03-29 PROCEDURE — 97140 MANUAL THERAPY 1/> REGIONS: CPT | Mod: GP | Performed by: PHYSICAL THERAPIST

## 2021-03-29 PROCEDURE — 97110 THERAPEUTIC EXERCISES: CPT | Mod: GP | Performed by: PHYSICAL THERAPIST

## 2021-03-31 ENCOUNTER — IMMUNIZATION (OUTPATIENT)
Dept: FAMILY MEDICINE | Facility: CLINIC | Age: 81
End: 2021-03-31
Payer: MEDICARE

## 2021-03-31 PROCEDURE — 0011A PR COVID VAC MODERNA 100 MCG/0.5 ML IM: CPT

## 2021-03-31 PROCEDURE — 91301 PR COVID VAC MODERNA 100 MCG/0.5 ML IM: CPT

## 2021-04-01 ENCOUNTER — HOSPITAL ENCOUNTER (OUTPATIENT)
Dept: PHYSICAL THERAPY | Facility: CLINIC | Age: 81
Setting detail: THERAPIES SERIES
End: 2021-04-01
Attending: NURSE PRACTITIONER
Payer: MEDICARE

## 2021-04-01 PROCEDURE — 97140 MANUAL THERAPY 1/> REGIONS: CPT | Mod: GP | Performed by: PHYSICAL THERAPIST

## 2021-04-01 PROCEDURE — 97110 THERAPEUTIC EXERCISES: CPT | Mod: GP | Performed by: PHYSICAL THERAPIST

## 2021-04-05 ENCOUNTER — HOSPITAL ENCOUNTER (OUTPATIENT)
Dept: PHYSICAL THERAPY | Facility: CLINIC | Age: 81
Setting detail: THERAPIES SERIES
End: 2021-04-05
Attending: NURSE PRACTITIONER
Payer: MEDICARE

## 2021-04-05 PROCEDURE — 97110 THERAPEUTIC EXERCISES: CPT | Mod: GP | Performed by: PHYSICAL THERAPIST

## 2021-04-05 PROCEDURE — 97140 MANUAL THERAPY 1/> REGIONS: CPT | Mod: GP | Performed by: PHYSICAL THERAPIST

## 2021-04-08 ENCOUNTER — ANTICOAGULATION THERAPY VISIT (OUTPATIENT)
Dept: ANTICOAGULATION | Facility: CLINIC | Age: 81
End: 2021-04-08

## 2021-04-08 ENCOUNTER — HOSPITAL ENCOUNTER (OUTPATIENT)
Dept: PHYSICAL THERAPY | Facility: CLINIC | Age: 81
Setting detail: THERAPIES SERIES
End: 2021-04-08
Attending: NURSE PRACTITIONER
Payer: MEDICARE

## 2021-04-08 ENCOUNTER — OFFICE VISIT (OUTPATIENT)
Dept: ORTHOPEDICS | Facility: CLINIC | Age: 81
End: 2021-04-08
Payer: MEDICARE

## 2021-04-08 VITALS
WEIGHT: 181.5 LBS | HEIGHT: 64 IN | BODY MASS INDEX: 30.99 KG/M2 | SYSTOLIC BLOOD PRESSURE: 120 MMHG | DIASTOLIC BLOOD PRESSURE: 64 MMHG

## 2021-04-08 DIAGNOSIS — I26.99 PULMONARY EMBOLI (H): ICD-10-CM

## 2021-04-08 DIAGNOSIS — I26.99 OTHER PULMONARY EMBOLISM WITHOUT ACUTE COR PULMONALE, UNSPECIFIED CHRONICITY (H): ICD-10-CM

## 2021-04-08 DIAGNOSIS — Z79.01 LONG TERM CURRENT USE OF ANTICOAGULANT THERAPY: ICD-10-CM

## 2021-04-08 DIAGNOSIS — Z96.652 STATUS POST TOTAL LEFT KNEE REPLACEMENT: Primary | ICD-10-CM

## 2021-04-08 LAB
CAPILLARY BLOOD COLLECTION: NORMAL
INR BLD: 3.9 (ref 0.86–1.14)

## 2021-04-08 PROCEDURE — 85610 PROTHROMBIN TIME: CPT | Performed by: INTERNAL MEDICINE

## 2021-04-08 PROCEDURE — 97110 THERAPEUTIC EXERCISES: CPT | Mod: GP | Performed by: PHYSICAL THERAPIST

## 2021-04-08 PROCEDURE — 36416 COLLJ CAPILLARY BLOOD SPEC: CPT | Performed by: INTERNAL MEDICINE

## 2021-04-08 PROCEDURE — 97140 MANUAL THERAPY 1/> REGIONS: CPT | Mod: GP | Performed by: PHYSICAL THERAPIST

## 2021-04-08 PROCEDURE — 99024 POSTOP FOLLOW-UP VISIT: CPT | Performed by: ORTHOPAEDIC SURGERY

## 2021-04-08 ASSESSMENT — PAIN SCALES - GENERAL: PAINLEVEL: MILD PAIN (3)

## 2021-04-08 ASSESSMENT — MIFFLIN-ST. JEOR: SCORE: 1279.31

## 2021-04-08 NOTE — PROGRESS NOTES
ANTICOAGULATION MANAGEMENT     Patient Name:  Tracy Palomo  Date:  4/8/2021    ASSESSMENT /SUBJECTIVE:    Today's INR result of 3.9 is supratherapeutic. Goal INR of 2.0-3.0      Warfarin dose taken: Warfarin taken as instructed    Diet: No new diet changes affecting INR    Medication changes/ interactions: No new medications/supplements affecting INR    Previous INR: Therapeutic     S/S of bleeding or thromboembolism: No    New injury or illness: No    Upcoming surgery, procedure or cardioversion: No    Additional findings: Pt reports more swelling in her knee (from surgery) with increased activity, may be increasing INR      PLAN:    Telephone call with Tracy regarding INR result and instructed:     Warfarin Dosing Instructions: Hold today then change your warfarin dose to 5 mg Mon, Fri and 2.5 mg all other days  . (10 % change) Sent inst via [employer] as well    Instructed patient to follow up no later than: 10 days    Lab visit scheduled    Education provided: None required      Pt verbalizes understanding and agrees to warfarin dosing plan.    Instructed to call the Anticoagulation Clinic for any changes, questions or concerns. (#[phone])        Radha Rodriguez RN      OBJECTIVE:  Recent labs: (last 7 days)     04/08/21  0828   INR 3.9*         INR assessment SUPRA    Recheck INR In: 10 DAYS    INR Location Outside lab      Anticoagulation Summary  As of 4/8/2021    INR goal:  2.0-3.0   TTR:  42.1 % (1 y)   INR used for dosing:  3.9 (4/8/2021)   Warfarin maintenance plan:  5 mg (5 mg x 1) every Mon, Fri; 2.5 mg (5 mg x 0.5) all other days   Full warfarin instructions:  4/8: Hold; Otherwise 5 mg every Mon, Fri; 2.5 mg all other days   Weekly warfarin total:  22.5 mg   Plan last modified:  Radha Rodriguez, RN (4/8/2021)   Next INR check:  4/19/2021   Priority:  High   Target end date:  Indefinite    Indications    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]  Long term current use  of anticoagulant therapy [Z79.01]  Other pulmonary embolism without acute cor pulmonale  unspecified chronicity (H) [I26.99]             Anticoagulation Episode Summary     INR check location:      Preferred lab:      Send INR reminders to:  [facility]    Comments:  5 mg tablets, book, PM dose - Per daughter, please send updates on dosing each week via [employer]      Anticoagulation Care Providers     Provider Role Specialty Phone number    Chad Betts MD Referring Internal Medicine [phone]    Gerard Medrano MD Responsible Family Medicine [phone]

## 2021-04-08 NOTE — PROGRESS NOTES
Orthopedic Clinic Post-Operative Note    CHIEF COMPLAINT:   Chief Complaint   Patient presents with     RECHECK     left knee follow up     Surgical Followup     DOS:2/16/2021~Left total knee arthroplasty (patella was not resurfaced). ~7 weeks postop       HISTORY OF PRESENT ILLNESS  Here with her daughter.  Things are going well.    Patient's past medical, surgical, social and family histories reviewed.     Past Medical History:   Diagnosis Date     Atrial fibrillation (H) 2014    related to colon surgery     Colon polyps      Diverticulosis of colon (without mention of hemorrhage)     Diverticulosis     DVT (deep vein thrombosis) in pregnancy 2014    after colon surgery     Other and unspecified hyperlipidemia      PE (pulmonary embolism) 2014    related to colon surgery     Unspecified essential hypertension        Past Surgical History:   Procedure Laterality Date     ARTHROPLASTY KNEE Right 1/8/2019    Procedure: Right total knee replacement;  Surgeon: Kaleb Pang DO;  Location: [facility]     ARTHROPLASTY KNEE Left 2/16/2021    Procedure: left total knee replacement;  Surgeon: Kaleb Pang DO;  Location:  OR     COLONOSCOPY  09, 10, 12    [facility]     COLONOSCOPY N/A 12/11/2017    Procedure: COLONOSCOPY;  colonoscopy;  Surgeon: Elvis Quezada MD;  Location:  GI     FLEXIBLE SIGMOIDOSCOPY  09, 11     LAPAROTOMY EXPLORATORY N/A 10/20/2014    Procedure: LAPAROTOMY EXPLORATORY;  Surgeon: Miguel Oleary MD;  [facility]: [facility]     LAPAROTOMY, LYSIS ADHESIONS, COMBINED N/A 10/20/2014    Procedure: COMBINED LAPAROTOMY, LYSIS ADHESIONS;  Surgeon: Miguel Oleary MD;  [facility]     OPEN REDUCTION INTERNAL FIXATION HIP BIPOLAR Left 3/16/2017    Procedure: OPEN REDUCTION INTERNAL FIXATION HIP BIPOLAR;  Surgeon: Kaleb Pang DO;  [facility]: [facility]     RELEASE TRIGGER FINGER Left 1/12/2021    Procedure: Left thumb trigger release;  Surgeon: Kaleb Pang  "DO Al;  Location: [facility]     RESECT SMALL BOWEL WITHOUT OSTOMY N/A 10/20/2014    Procedure: RESECT SMALL BOWEL WITHOUT OSTOMY;  Surgeon: Miguel Oleary MD;  Location: [facility]       Medications:  acetaminophen (TYLENOL) 325 MG tablet, Take 3 tablets (975 mg) by mouth every 8 hours as needed for pain  lovastatin (MEVACOR) 40 MG tablet, TAKE 1 TABLET BY MOUTH ONCE DAILY AT BEDTIME  oxyCODONE (ROXICODONE) 5 MG tablet, Take 1 tablet (5 mg) by mouth 2 times daily as needed for pain (Moderate to Severe)  polyethylene glycol (MIRALAX) 17 g packet, Take 17 g by mouth daily  warfarin ANTICOAGULANT (COUMADIN) 5 MG tablet, Take 5 mg Mon, Wed, Fri and 2.5 mg all other days or as directed by coumadin clinic    No current facility-administered medications on file prior to visit.       Allergies   Allergen Reactions     No Known Allergies        Social History     Occupational History     Not on file   Tobacco Use     Smoking status: Never Smoker     Smokeless tobacco: Never Used   Substance and Sexual Activity     Alcohol use: No     Alcohol/week: 0.0 standard drinks     Drug use: No     Sexual activity: Not Currently     Partners: Male       Family History   Problem Relation Age of Onset     Blood Disease No family hx of         blood clots       REVIEW OF SYSTEMS  General: negative for, night sweats, dizziness, fatigue  Resp: No shortness of breath and no cough  CV: negative for chest pain, syncope or near-syncope  GI: negative for nausea, vomiting and diarrhea  : negative for dysuria and hematuria  Musculoskeletal: as above  Neurologic: negative for syncope   Hematologic: negative for bleeding disorder    Physical Exam:  Vitals: /64   Ht 1.619 m (5' 3.75\")   Wt 82.3 kg (181 lb 8 oz)   BMI 31.40 kg/m    BMI= Body mass index is 31.4 kg/m .  Constitutional: healthy, alert and no acute distress   Psychiatric: mentation appears normal and affect normal/bright  NEURO: no focal deficits  SKIN: .well healed, no " erythema, no incision breakdown and no drainage.  JOINT/EXTREMITIES: Active motion 0-100 degrees.  No instability.  No peripheral edema.  Distal neurovascular intact  GAIT: not tested     Diagnostic Modalities:  None today.  Independent visualization of the images was performed.      Impression:   Chief Complaint   Patient presents with     RECHECK     left knee follow up     Surgical Followup     DOS:2/16/2021~Left total knee arthroplasty (patella was not resurfaced). ~7 weeks postop   Doing as expected.    Plan:   Continue with therapy.  Happy to reorder more if needed.  May discontinue MACKENZIE hose.  Plan to see her back in 6 weeks  Return to clinic 6, week(s), or sooner as needed for changes.    Re-x-ray on return: Yes.    Vitaly Pang D.O.

## 2021-04-08 NOTE — LETTER
4/8/2021         RE: Tracy Palomo  [address] [facility] [id number]        Dear Colleague,    Thank you for referring your patient, Tracy Palomo, to the [facility]. Please see a copy of my visit note below.    Orthopedic Clinic Post-Operative Note    CHIEF COMPLAINT:   Chief Complaint   Patient presents with     RECHECK     left knee follow up     Surgical Followup     DOS:2/16/2021~Left total knee arthroplasty (patella was not resurfaced). ~7 weeks postop       HISTORY OF PRESENT ILLNESS  Here with her daughter.  Things are going well.    Patient's past medical, surgical, social and family histories reviewed.     Past Medical History:   Diagnosis Date     Atrial fibrillation (H) 2014    related to colon surgery     Colon polyps      Diverticulosis of colon (without mention of hemorrhage)     Diverticulosis     DVT (deep vein thrombosis) in pregnancy 2014    after colon surgery     Other and unspecified hyperlipidemia      PE (pulmonary embolism) 2014    related to colon surgery     Unspecified essential hypertension        Past Surgical History:   Procedure Laterality Date     ARTHROPLASTY KNEE Right 1/8/2019    Procedure: Right total knee replacement;  Surgeon: Kaleb Pang DO;  Location:  OR     ARTHROPLASTY KNEE Left 2/16/2021    Procedure: left total knee replacement;  Surgeon: Kaleb Pang DO;  Location:  OR     COLONOSCOPY  09, 10, 12    [facility]     COLONOSCOPY N/A 12/11/2017    Procedure: COLONOSCOPY;  colonoscopy;  Surgeon: Elvis Quezada MD;  Location:  GI     FLEXIBLE SIGMOIDOSCOPY  09, 11     LAPAROTOMY EXPLORATORY N/A 10/20/2014    Procedure: LAPAROTOMY EXPLORATORY;  Surgeon: Miguel Oleary MD;  Location:  OR     LAPAROTOMY, LYSIS ADHESIONS, COMBINED N/A 10/20/2014    Procedure: COMBINED LAPAROTOMY, LYSIS ADHESIONS;  Surgeon: Miguel Oleary MD;  Location:  OR     OPEN REDUCTION INTERNAL FIXATION  "HIP BIPOLAR Left 3/16/2017    Procedure: OPEN REDUCTION INTERNAL FIXATION HIP BIPOLAR;  Surgeon: Kaleb Pang DO;  [facility]     RELEASE TRIGGER FINGER Left 1/12/2021    Procedure: Left thumb trigger release;  Surgeon: Kaleb Pang DO;  [facility]     RESECT SMALL BOWEL WITHOUT OSTOMY N/A 10/20/2014    Procedure: RESECT SMALL BOWEL WITHOUT OSTOMY;  Surgeon: Miguel Oleary MD;  [facility]       Medications:  acetaminophen (TYLENOL) 325 MG tablet, Take 3 tablets (975 mg) by mouth every 8 hours as needed for pain  lovastatin (MEVACOR) 40 MG tablet, TAKE 1 TABLET BY MOUTH ONCE DAILY AT BEDTIME  oxyCODONE (ROXICODONE) 5 MG tablet, Take 1 tablet (5 mg) by mouth 2 times daily as needed for pain (Moderate to Severe)  polyethylene glycol (MIRALAX) 17 g packet, Take 17 g by mouth daily  warfarin ANTICOAGULANT (COUMADIN) 5 MG tablet, Take 5 mg Mon, Wed, Fri and 2.5 mg all other days or as directed by coumadin clinic    No current facility-administered medications on file prior to visit.       Allergies   Allergen Reactions     No Known Allergies        Social History     Occupational History     Not on file   Tobacco Use     Smoking status: Never Smoker     Smokeless tobacco: Never Used   Substance and Sexual Activity     Alcohol use: No     Alcohol/week: 0.0 standard drinks     Drug use: No     Sexual activity: Not Currently     Partners: Male       Family History   Problem Relation Age of Onset     Blood Disease No family hx of         blood clots       REVIEW OF SYSTEMS  General: negative for, night sweats, dizziness, fatigue  Resp: No shortness of breath and no cough  CV: negative for chest pain, syncope or near-syncope  GI: negative for nausea, vomiting and diarrhea  : negative for dysuria and hematuria  Musculoskeletal: as above  Neurologic: negative for syncope   Hematologic: negative for bleeding disorder    Physical Exam:  Vitals: /64   Ht 1.619 m (5' 3.75\")   " Wt 82.3 kg (181 lb 8 oz)   BMI 31.40 kg/m    BMI= Body mass index is 31.4 kg/m .  Constitutional: healthy, alert and no acute distress   Psychiatric: mentation appears normal and affect normal/bright  NEURO: no focal deficits  SKIN: .well healed, no erythema, no incision breakdown and no drainage.  JOINT/EXTREMITIES: Active motion 0-100 degrees.  No instability.  No peripheral edema.  Distal neurovascular intact  GAIT: not tested     Diagnostic Modalities:  None today.  Independent visualization of the images was performed.      Impression:   Chief Complaint   Patient presents with     RECHECK     left knee follow up     Surgical Followup     DOS:2/16/2021~Left total knee arthroplasty (patella was not resurfaced). ~7 weeks postop   Doing as expected.    Plan:   Continue with therapy.  Happy to reorder more if needed.  May discontinue MACKENZIE hose.  Plan to see her back in 6 weeks  Return to clinic 6, week(s), or sooner as needed for changes.    Re-x-ray on return: Yes.    Vitaly Pang D.O.      Again, thank you for allowing me to participate in the care of your patient.        Sincerely,        Kaleb Pang, DO

## 2021-04-12 ENCOUNTER — HOSPITAL ENCOUNTER (OUTPATIENT)
Dept: PHYSICAL THERAPY | Facility: CLINIC | Age: 81
Setting detail: THERAPIES SERIES
End: 2021-04-12
Attending: NURSE PRACTITIONER
Payer: MEDICARE

## 2021-04-12 PROCEDURE — 97110 THERAPEUTIC EXERCISES: CPT | Mod: GP | Performed by: PHYSICAL THERAPIST

## 2021-04-12 PROCEDURE — 97530 THERAPEUTIC ACTIVITIES: CPT | Mod: GP | Performed by: PHYSICAL THERAPIST

## 2021-04-17 DIAGNOSIS — E78.5 HYPERLIPIDEMIA LDL GOAL <130: ICD-10-CM

## 2021-04-18 ENCOUNTER — HEALTH MAINTENANCE LETTER (OUTPATIENT)
Age: 81
End: 2021-04-18

## 2021-04-19 ENCOUNTER — HOSPITAL ENCOUNTER (OUTPATIENT)
Dept: PHYSICAL THERAPY | Facility: CLINIC | Age: 81
Setting detail: THERAPIES SERIES
End: 2021-04-19
Attending: NURSE PRACTITIONER
Payer: MEDICARE

## 2021-04-19 ENCOUNTER — TELEPHONE (OUTPATIENT)
Dept: ANTICOAGULATION | Facility: CLINIC | Age: 81
End: 2021-04-19

## 2021-04-19 ENCOUNTER — ANTICOAGULATION THERAPY VISIT (OUTPATIENT)
Dept: ANTICOAGULATION | Facility: CLINIC | Age: 81
End: 2021-04-19

## 2021-04-19 DIAGNOSIS — I26.99 OTHER PULMONARY EMBOLISM WITHOUT ACUTE COR PULMONALE, UNSPECIFIED CHRONICITY (H): ICD-10-CM

## 2021-04-19 DIAGNOSIS — Z79.01 LONG TERM CURRENT USE OF ANTICOAGULANT THERAPY: ICD-10-CM

## 2021-04-19 DIAGNOSIS — I26.99 OTHER PULMONARY EMBOLISM WITHOUT ACUTE COR PULMONALE, UNSPECIFIED CHRONICITY (H): Primary | ICD-10-CM

## 2021-04-19 DIAGNOSIS — I26.99 PULMONARY EMBOLI (H): ICD-10-CM

## 2021-04-19 LAB
CAPILLARY BLOOD COLLECTION: NORMAL
INR BLD: 1.9 (ref 0.86–1.14)

## 2021-04-19 PROCEDURE — 97110 THERAPEUTIC EXERCISES: CPT | Mod: GP | Performed by: PHYSICAL THERAPIST

## 2021-04-19 PROCEDURE — 36416 COLLJ CAPILLARY BLOOD SPEC: CPT | Performed by: INTERNAL MEDICINE

## 2021-04-19 PROCEDURE — 85610 PROTHROMBIN TIME: CPT | Performed by: INTERNAL MEDICINE

## 2021-04-19 PROCEDURE — 97140 MANUAL THERAPY 1/> REGIONS: CPT | Mod: GP | Performed by: PHYSICAL THERAPIST

## 2021-04-19 NOTE — TELEPHONE ENCOUNTER
ANTICOAGULATION MANAGEMENT      Tracy SAV Palomo due for annual renewal of referral to anticoagulation monitoring. Order pended for your review and signature.      ANTICOAGULATION SUMMARY      Warfarin indication(s)     Atrial fibrillation  DVT    Heart valve present?  NO       Current goal range   INR: 2.0-3.0     Goal appropriate for indication? Yes, INR 2-3 appropriate for hx of DVT, PE, hypercoagulable state, Afib, LVAD, or bileaflet AVR without risk factors     Current duration of therapy Indefinite/long term therapy   Time in Therapeutic Range (TTR)  (Goal > 60%) 40.6 %       Office visit with referring provider's group within last year yes on 4/19/21       rAti Ruelas RN

## 2021-04-19 NOTE — PROGRESS NOTES
ANTICOAGULATION MANAGEMENT     Patient Name:  Tracy Palomo  Date:  2021    ASSESSMENT /SUBJECTIVE:    Today's INR result of 1.9 is subtherapeutic. Goal INR of 2.0-3.0      Warfarin dose taken: Warfarin taken as instructed    Diet: Increased greens/vitamin K in diet; plans to resume previous intake    Medication changes/ interactions: No new medications/supplements affecting INR    Previous INR: Supratherapeutic     S/S of bleeding or thromboembolism: No    New injury or illness: No    Upcoming surgery, procedure or cardioversion: No    Additional findings: None      PLAN:    Telephone call with Tracy regarding INR result and instructed:     Warfarin Dosing Instructions: Continue your current warfarin dose 5 mg Mon, Fri and 2.5 mg all other days    Instructed patient to follow up no later than: 2 weeks  Lab visit scheduled    Education provided: Importance of consistent vitamin K intake, Impact of vitamin K foods on INR and Vitamin K content of foods      Tracy verbalizes understanding and agrees to warfarin dosing plan.    Instructed to call the Anticoagulation Clinic for any changes, questions or concerns. (#[phone])        Arti Ruelas RN      OBJECTIVE:  Recent labs: (last 7 days)     21  0825   INR 1.9*         INR assessment SUB    Recheck INR In: 2 WEEKS    INR Location Outside lab      Anticoagulation Summary  As of 2021    INR goal:  2.0-3.0   TTR:  40.6 % (1 y)   INR used for dosin.9 (2021)   Warfarin maintenance plan:  5 mg (5 mg x 1) every Mon, Fri; 2.5 mg (5 mg x 0.5) all other days   Full warfarin instructions:  5 mg every Mon, Fri; 2.5 mg all other days   Weekly warfarin total:  22.5 mg   No change documented:  Arti Ruelas RN   Plan last modified:  Radha Rodriguez RN (2021)   Next INR check:  5/3/2021   Priority:  High   Target end date:  Indefinite    Indications    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]  Long term  current use of anticoagulant therapy [Z79.01]  Other pulmonary embolism without acute cor pulmonale  unspecified chronicity (H) [I26.99]             Anticoagulation Episode Summary     INR check location:      Preferred lab:      Send INR reminders to:  [facility]    Comments:  5 mg tablets, book, PM dose - Per daughter, please send updates on dosing each week via [employer]      Anticoagulation Care Providers     Provider Role Specialty Phone number    Chad Betts MD Referring Internal Medicine [phone]    Gerard Medrano MD Responsible Family Medicine [phone]

## 2021-04-20 RX ORDER — LOVASTATIN 40 MG
TABLET ORAL
Qty: 90 TABLET | Refills: 0 | Status: SHIPPED | OUTPATIENT
Start: 2021-04-20 | End: 2021-07-15

## 2021-04-20 NOTE — TELEPHONE ENCOUNTER
"Requested Prescriptions   Pending Prescriptions Disp Refills    lovastatin (MEVACOR) 40 MG tablet [Pharmacy Med Name: Lovastatin 40 MG Oral Tablet] 90 tablet 0     Sig: TAKE 1 TABLET BY MOUTH ONCE DAILY AT BEDTIME       Statins Protocol Failed - 4/20/2021 10:33 AM        Failed - LDL on file in past 12 months     Recent Labs   Lab Test 01/31/20  0859   LDL 56             Passed - No abnormal creatine kinase in past 12 months     No lab results found.             Passed - Recent (12 mo) or future (30 days) visit within the authorizing provider's specialty     Patient has had an office visit with the authorizing provider or a provider within the authorizing providers department within the previous 12 mos or has a future within next 30 days. See \"Patient Info\" tab in inbasket, or \"Choose Columns\" in Meds & Orders section of the refill encounter.              Passed - Medication is active on med list        Passed - Patient is age 18 or older        Passed - No active pregnancy on record        Passed - No positive pregnancy test in past 12 months           Last Written Prescription Date:  1/31/2021  Last Fill Quantity: 90,  # refills: 3   Last office visit: 2/1/2021 with prescribing provider:  Alpesh follow up around 5/1/2021   Future Office Visit:   Next 5 appointments (look out 90 days)    May 20, 2021  9:30 AM  Return Visit with Kaleb Pang DO  [facility] [address] [place] [id number] [phone]               Routing refill request to provider for review/approval because:  Labs not current:  LDL        Renee Chaves RN  [facility]                   "

## 2021-04-28 ENCOUNTER — IMMUNIZATION (OUTPATIENT)
Dept: FAMILY MEDICINE | Facility: CLINIC | Age: 81
End: 2021-04-28
Attending: FAMILY MEDICINE
Payer: MEDICARE

## 2021-04-28 PROCEDURE — 0012A PR COVID VAC MODERNA 100 MCG/0.5 ML IM: CPT

## 2021-04-28 PROCEDURE — 91301 PR COVID VAC MODERNA 100 MCG/0.5 ML IM: CPT

## 2021-05-03 ENCOUNTER — ANTICOAGULATION THERAPY VISIT (OUTPATIENT)
Dept: ANTICOAGULATION | Facility: CLINIC | Age: 81
End: 2021-05-03

## 2021-05-03 DIAGNOSIS — Z79.01 LONG TERM CURRENT USE OF ANTICOAGULANT THERAPY: ICD-10-CM

## 2021-05-03 DIAGNOSIS — I26.99 PULMONARY EMBOLI (H): ICD-10-CM

## 2021-05-03 DIAGNOSIS — I26.99 OTHER PULMONARY EMBOLISM WITHOUT ACUTE COR PULMONALE, UNSPECIFIED CHRONICITY (H): ICD-10-CM

## 2021-05-03 LAB
CAPILLARY BLOOD COLLECTION: NORMAL
INR BLD: 2.2 (ref 0.86–1.14)

## 2021-05-03 PROCEDURE — 36416 COLLJ CAPILLARY BLOOD SPEC: CPT | Performed by: INTERNAL MEDICINE

## 2021-05-03 PROCEDURE — 85610 PROTHROMBIN TIME: CPT | Performed by: INTERNAL MEDICINE

## 2021-05-03 NOTE — PROGRESS NOTES
ANTICOAGULATION MANAGEMENT     Patient Name:  Tracy Palomo  Date:  5/3/2021    ASSESSMENT /SUBJECTIVE:    Today's INR result of 2.2 is therapeutic. Goal INR of 2.0-3.0      Warfarin dose taken: Warfarin taken as instructed    Diet: No new diet changes affecting INR    Medication changes/ interactions: No new medications/supplements affecting INR    Previous INR: Therapeutic     S/S of bleeding or thromboembolism: No    New injury or illness: No    Upcoming surgery, procedure or cardioversion: No    Additional findings: None      PLAN:    Telephone call with Tracy regarding INR result and instructed:     Warfarin Dosing Instructions: Continue your current warfarin dose 5 mg Mon, Fri and 2.5 mg all other days    Instructed patient to follow up no later than: 3 weeks  Lab visit scheduled    Education provided: None required      Pt verbalizes understanding and agrees to warfarin dosing plan.    Instructed to call the Anticoagulation Clinic for any changes, questions or concerns. (#[phone])        Radha Rodriguez RN      OBJECTIVE:  Recent labs: (last 7 days)     21  0920   INR 2.2*         INR assessment THER    Recheck INR In: 3 WEEKS    INR Location Outside lab      Anticoagulation Summary  As of 5/3/2021    INR goal:  2.0-3.0   TTR:  39.3 % (1 y)   INR used for dosin.2 (5/3/2021)   Warfarin maintenance plan:  5 mg (5 mg x 1) every Mon, Fri; 2.5 mg (5 mg x 0.5) all other days   Full warfarin instructions:  5 mg every Mon, Fri; 2.5 mg all other days   Weekly warfarin total:  22.5 mg   No change documented:  Radha Rodriguez RN   Plan last modified:  Radha Rodriguez RN (2021)   Next INR check:  2021   Priority:  High   Target end date:  Indefinite    Indications    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]  Long term current use of anticoagulant therapy [Z79.01]  Other pulmonary embolism without acute cor pulmonale  unspecified chronicity (H) [I26.99]              Anticoagulation Episode Summary     INR check location:      Preferred lab:      Send INR reminders to:  [facility]    Comments:  5 mg tablets, book, PM dose - Per daughter, please send updates on dosing each week via [facility]      Anticoagulation Care Providers     Provider Role Specialty Phone number    Chad Betts MD Referring Internal Medicine [phone]    Gerard Medrano MD Responsible Family Medicine [phone]

## 2021-05-12 NOTE — PROGRESS NOTES
"Outpatient Physical Therapy Progress Note and Discharge Note     Patient: Tracy Palomo  : 1940    Beginning/End Dates of Reporting Period:  3/16/21 to 2021 (pt seen for 10 PT visits from 3/16 to 21)    Referring Provider: BILLY Oh, CNP    Therapy Diagnosis: Left total knee replacement 21     Client Self Report: Pt did do some garden work over the weekend, 1.5 hours or so when reporting on last visit on 21. Pt ready to try things on her own but keep chart open until ortho follow up on 21. Noting gradual improvement.     Objective Measurements:21  Objective Measure: LEFS ( at eval on 3/16/22)  Details: 48 on 21  Objective Measure: L knee extension to flexion (18 to 75 degrees AROM supine at eval, seated 82 degrees flexion AAROM)  Details: PROM 0 to 110 degres by PT, pt 5 to 105 degrees AAROM  Objective Measure: gait (FWW at evaluation)  Details: with and without cane. Pt using cane only 10% of day  Objective Measure: mid patellar circumference  Details: L 16 3/4\", R 15 \" on 21        Goals:  Goal Identifier ROM   Goal Description pt will improve L knee extension to flexion 10 degrees to 115 degrees or better to assist with easier car transfers   Target Date 21   Date Met      Progress:     Goal Identifier function   Goal Description pt will improve L knee ROM and strength and carry over to improved functional level as measured by LEFS score increase from 22/80 to 30/80 or better   Target Date 21   Date Met  21   Progress:       Progress Toward Goals:   Progress this reporting period: pt meeting goal 1 and progressed well toward goal 1, near ROM goal.    Plan:  Other: Plan to keep patient's chart open another month or so and if no further PT appointments in that time then this note will become the discharge summary.  Pt to continue with TKA exercises.    Discharge:  No. But, If no more PT is scheduled in the next month or so " then this note will become the discharge summary.

## 2021-05-20 ENCOUNTER — ANCILLARY PROCEDURE (OUTPATIENT)
Dept: GENERAL RADIOLOGY | Facility: CLINIC | Age: 81
End: 2021-05-20
Attending: ORTHOPAEDIC SURGERY
Payer: MEDICARE

## 2021-05-20 ENCOUNTER — ANTICOAGULATION THERAPY VISIT (OUTPATIENT)
Dept: ANTICOAGULATION | Facility: CLINIC | Age: 81
End: 2021-05-20

## 2021-05-20 ENCOUNTER — OFFICE VISIT (OUTPATIENT)
Dept: ORTHOPEDICS | Facility: CLINIC | Age: 81
End: 2021-05-20
Payer: MEDICARE

## 2021-05-20 VITALS
BODY MASS INDEX: 31.1 KG/M2 | WEIGHT: 182.2 LBS | SYSTOLIC BLOOD PRESSURE: 112 MMHG | DIASTOLIC BLOOD PRESSURE: 70 MMHG | HEIGHT: 64 IN

## 2021-05-20 DIAGNOSIS — Z79.01 LONG TERM CURRENT USE OF ANTICOAGULANT THERAPY: ICD-10-CM

## 2021-05-20 DIAGNOSIS — I26.99 OTHER PULMONARY EMBOLISM WITHOUT ACUTE COR PULMONALE, UNSPECIFIED CHRONICITY (H): ICD-10-CM

## 2021-05-20 DIAGNOSIS — I26.99 PULMONARY EMBOLI (H): ICD-10-CM

## 2021-05-20 DIAGNOSIS — Z96.652 STATUS POST TOTAL LEFT KNEE REPLACEMENT: Primary | ICD-10-CM

## 2021-05-20 DIAGNOSIS — Z96.652 STATUS POST TOTAL LEFT KNEE REPLACEMENT: ICD-10-CM

## 2021-05-20 LAB
CAPILLARY BLOOD COLLECTION: NORMAL
INR BLD: 2.7 (ref 0.86–1.14)

## 2021-05-20 PROCEDURE — 73562 X-RAY EXAM OF KNEE 3: CPT | Mod: LT | Performed by: RADIOLOGY

## 2021-05-20 PROCEDURE — 99024 POSTOP FOLLOW-UP VISIT: CPT | Performed by: NURSE PRACTITIONER

## 2021-05-20 PROCEDURE — 85610 PROTHROMBIN TIME: CPT | Performed by: INTERNAL MEDICINE

## 2021-05-20 PROCEDURE — 36416 COLLJ CAPILLARY BLOOD SPEC: CPT | Performed by: INTERNAL MEDICINE

## 2021-05-20 RX ORDER — AMOXICILLIN 500 MG/1
CAPSULE ORAL
Qty: 4 CAPSULE | Refills: 1 | Status: SHIPPED | OUTPATIENT
Start: 2021-05-20 | End: 2022-02-09

## 2021-05-20 ASSESSMENT — PAIN SCALES - GENERAL: PAINLEVEL: NO PAIN (0)

## 2021-05-20 ASSESSMENT — MIFFLIN-ST. JEOR: SCORE: 1282.48

## 2021-05-20 NOTE — LETTER
5/20/2021         RE: Tracy Palomo  [address] [facility] [address]        Dear Colleague,    Thank you for referring your patient, Tracy Palomo, to the [facility]. Please see a copy of my visit note below.    Orthopedic Clinic Post-Operative Note    CHIEF COMPLAINT:   Chief Complaint   Patient presents with     RECHECK     left knee follow up     Surgical Followup     DOS:2/16/2021~Left total knee arthroplasty (patella was not resurfaced). ~ 3 months postop       HISTORY OF PRESENT ILLNESS  Returns to clinic. Doing very well. No pain at rest or with walking.  Did note stepped in a hole 4 days ago, twisted the knee had immediate pain but this pain resolved quickly with some icing and elevation. No current pain, no issues with ambulation.  No incision concerns. Increasing activity. Graduated from physical therapy, is still working on home exercises. Very happy with outcomes and progress. States able to walk and stand on cement without pain, work in the garage without pain, and feels her gait is smooth. This would all have been impossible prior to surgery.     Patient's past medical, surgical, social and family histories reviewed.     Past Medical History:   Diagnosis Date     Atrial fibrillation (H) 2014    related to colon surgery     Colon polyps      Diverticulosis of colon (without mention of hemorrhage)     Diverticulosis     DVT (deep vein thrombosis) in pregnancy 2014    after colon surgery     Other and unspecified hyperlipidemia      PE (pulmonary embolism) 2014    related to colon surgery     Unspecified essential hypertension        Past Surgical History:   Procedure Laterality Date     ARTHROPLASTY KNEE Right 1/8/2019    Procedure: Right total knee replacement;  Surgeon: Kaleb Pang DO;  Location: [facility]     ARTHROPLASTY KNEE Left 2/16/2021    Procedure: left total knee replacement;  Surgeon: Kaleb Pang DO;  Location:  OR      COLONOSCOPY  09, 10, 12    [facility]     COLONOSCOPY N/A 12/11/2017    Procedure: COLONOSCOPY;  colonoscopy;  Surgeon: Elvis Quezada MD;  Location: [facility]     FLEXIBLE SIGMOIDOSCOPY  09, 11     LAPAROTOMY EXPLORATORY N/A 10/20/2014    Procedure: LAPAROTOMY EXPLORATORY;  Surgeon: Miguel Oleary MD;  [facility]     LAPAROTOMY, LYSIS ADHESIONS, COMBINED N/A 10/20/2014    Procedure: COMBINED LAPAROTOMY, LYSIS ADHESIONS;  Surgeon: Miguel Oleary MD;  [facility]     OPEN REDUCTION INTERNAL FIXATION HIP BIPOLAR Left 3/16/2017    Procedure: OPEN REDUCTION INTERNAL FIXATION HIP BIPOLAR;  Surgeon: Kaleb Pang DO;  [facility]     RELEASE TRIGGER FINGER Left 1/12/2021    Procedure: Left thumb trigger release;  Surgeon: Kaleb Pang DO;  [facility]     RESECT SMALL BOWEL WITHOUT OSTOMY N/A 10/20/2014    Procedure: RESECT SMALL BOWEL WITHOUT OSTOMY;  Surgeon: Miguel Oleary MD;  [facility]       Medications:  acetaminophen (TYLENOL) 325 MG tablet, Take 3 tablets (975 mg) by mouth every 8 hours as needed for pain  lovastatin (MEVACOR) 40 MG tablet, TAKE 1 TABLET BY MOUTH ONCE DAILY AT BEDTIME  warfarin ANTICOAGULANT (COUMADIN) 5 MG tablet, Take 5 mg Mon, Wed, Fri and 2.5 mg all other days or as directed by coumadin clinic  polyethylene glycol (MIRALAX) 17 g packet, Take 17 g by mouth daily (Patient not taking: Reported on 5/20/2021)    No current facility-administered medications on file prior to visit.       Allergies   Allergen Reactions     No Known Allergies        Social History     Occupational History     Not on file   Tobacco Use     Smoking status: Never Smoker     Smokeless tobacco: Never Used   Substance and Sexual Activity     Alcohol use: No     Alcohol/week: 0.0 standard drinks     Drug use: No     Sexual activity: Not Currently     Partners: Male       Family History   Problem Relation Age of Onset     Blood Disease No family hx  "of         blood clots       REVIEW OF SYSTEMS  General: negative for, night sweats, dizziness, fatigue  Resp: No shortness of breath and no cough  CV: negative for chest pain, syncope or near-syncope  GI: negative for nausea, vomiting and diarrhea  : negative for dysuria and hematuria  Musculoskeletal: as above  Neurologic: negative for syncope   Hematologic: negative for bleeding disorder    Physical Exam:  Vitals: /70   Ht 1.619 m (5' 3.75\")   Wt 82.6 kg (182 lb 3.2 oz)   BMI 31.52 kg/m    BMI= Body mass index is 31.52 kg/m .  Constitutional: healthy, alert and no acute distress   Psychiatric: mentation appears normal and affect normal/bright  NEURO: no focal deficits  SKIN: .well healed, no erythema, no incision breakdown and no drainage.  JOINT/EXTREMITIES: left knee: mild soft tissue swelling. No effusion. AROM 3-120. PROM 1-125. Extensor intact. No valgus or varus instability. No focal or bony tenderness. Calf soft non-tender. Distal neurovascular grossly intact.   GAIT: non-antalgic    Diagnostic Modalities:  left knee X-ray: The prosthesis has acceptable alignment. No fractures or dislocations. Prosthesis is well seated with no evidence of loosening, patella not resurfaced  Independent visualization of the images was performed.      Impression:   Chief Complaint   Patient presents with     RECHECK     left knee follow up     Surgical Followup     DOS:2/16/2021~Left total knee arthroplasty (patella was not resurfaced). ~ 3 months postop   doing well     Plan:   Continue to work on home exercises especially with extension.  Her ROM was 3-115 prior to surgery, and she has exceeded this with flexion. She is happy with extension and feels it will continue to improve.  Did discuss again the lack of surfacing on the patella and what that could mean term.  Currently she is very happy with outcomes and progress and what she can do. In terms of the twisting incident, resolved quickly.  Exam is reassuring.  " Continue to increase as tolerated. Will plan to see her at 1 year post-op taty,. Sooner if needed.   Antibiotic sent for upcoming dental work. Do this for the first year following surgery.        Return to clinic 1 year post-op, or sooner as needed for changes.    Re-x-ray on return: No    BILLY Oh, CNP  Orthopedic Surgery          Again, thank you for allowing me to participate in the care of your patient.        Sincerely,        BILLY Espinosa CNP

## 2021-05-20 NOTE — PROGRESS NOTES
Orthopedic Clinic Post-Operative Note    CHIEF COMPLAINT:   Chief Complaint   Patient presents with     RECHECK     left knee follow up     Surgical Followup     DOS:2/16/2021~Left total knee arthroplasty (patella was not resurfaced). ~ 3 months postop       HISTORY OF PRESENT ILLNESS  Returns to clinic. Doing very well. No pain at rest or with walking.  Did note stepped in a hole 4 days ago, twisted the knee had immediate pain but this pain resolved quickly with some icing and elevation. No current pain, no issues with ambulation.  No incision concerns. Increasing activity. Graduated from physical therapy, is still working on home exercises. Very happy with outcomes and progress. States able to walk and stand on cement without pain, work in the garage without pain, and feels her gait is smooth. This would all have been impossible prior to surgery.     Patient's past medical, surgical, social and family histories reviewed.     Past Medical History:   Diagnosis Date     Atrial fibrillation (H) 2014    related to colon surgery     Colon polyps      Diverticulosis of colon (without mention of hemorrhage)     Diverticulosis     DVT (deep vein thrombosis) in pregnancy 2014    after colon surgery     Other and unspecified hyperlipidemia      PE (pulmonary embolism) 2014    related to colon surgery     Unspecified essential hypertension        Past Surgical History:   Procedure Laterality Date     ARTHROPLASTY KNEE Right 1/8/2019    Procedure: Right total knee replacement;  Surgeon: Kaleb Pang DO;  Location:  OR     ARTHROPLASTY KNEE Left 2/16/2021    Procedure: left total knee replacement;  Surgeon: Kaleb Pang DO;  Location:  OR     COLONOSCOPY  09, 10, 12    [facility]     COLONOSCOPY N/A 12/11/2017    Procedure: COLONOSCOPY;  colonoscopy;  Surgeon: Elvis Quezada MD;  Location:  GI     FLEXIBLE SIGMOIDOSCOPY  09, 11     LAPAROTOMY EXPLORATORY N/A 10/20/2014     Procedure: LAPAROTOMY EXPLORATORY;  Surgeon: Miguel Oleary MD;  [facility]     LAPAROTOMY, LYSIS ADHESIONS, COMBINED N/A 10/20/2014    Procedure: COMBINED LAPAROTOMY, LYSIS ADHESIONS;  Surgeon: Miguel Oleary MD;  [facility]     OPEN REDUCTION INTERNAL FIXATION HIP BIPOLAR Left 3/16/2017    Procedure: OPEN REDUCTION INTERNAL FIXATION HIP BIPOLAR;  Surgeon: Kaleb Pang DO;  [facility]     RELEASE TRIGGER FINGER Left 1/12/2021    Procedure: Left thumb trigger release;  Surgeon: Kaleb Pang DO;  [facility]     RESECT SMALL BOWEL WITHOUT OSTOMY N/A 10/20/2014    Procedure: RESECT SMALL BOWEL WITHOUT OSTOMY;  Surgeon: Miguel Oleary MD;  [facility]       Medications:  acetaminophen (TYLENOL) 325 MG tablet, Take 3 tablets (975 mg) by mouth every 8 hours as needed for pain  lovastatin (MEVACOR) 40 MG tablet, TAKE 1 TABLET BY MOUTH ONCE DAILY AT BEDTIME  warfarin ANTICOAGULANT (COUMADIN) 5 MG tablet, Take 5 mg Mon, Wed, Fri and 2.5 mg all other days or as directed by coumadin clinic  polyethylene glycol (MIRALAX) 17 g packet, Take 17 g by mouth daily (Patient not taking: Reported on 5/20/2021)    No current facility-administered medications on file prior to visit.       Allergies   Allergen Reactions     No Known Allergies        Social History     Occupational History     Not on file   Tobacco Use     Smoking status: Never Smoker     Smokeless tobacco: Never Used   Substance and Sexual Activity     Alcohol use: No     Alcohol/week: 0.0 standard drinks     Drug use: No     Sexual activity: Not Currently     Partners: Male       Family History   Problem Relation Age of Onset     Blood Disease No family hx of         blood clots       REVIEW OF SYSTEMS  General: negative for, night sweats, dizziness, fatigue  Resp: No shortness of breath and no cough  CV: negative for chest pain, syncope or near-syncope  GI: negative for nausea, vomiting and diarrhea  :  "negative for dysuria and hematuria  Musculoskeletal: as above  Neurologic: negative for syncope   Hematologic: negative for bleeding disorder    Physical Exam:  Vitals: /70   Ht 1.619 m (5' 3.75\")   Wt 82.6 kg (182 lb 3.2 oz)   BMI 31.52 kg/m    BMI= Body mass index is 31.52 kg/m .  Constitutional: healthy, alert and no acute distress   Psychiatric: mentation appears normal and affect normal/bright  NEURO: no focal deficits  SKIN: .well healed, no erythema, no incision breakdown and no drainage.  JOINT/EXTREMITIES: left knee: mild soft tissue swelling. No effusion. AROM 3-120. PROM 1-125. Extensor intact. No valgus or varus instability. No focal or bony tenderness. Calf soft non-tender. Distal neurovascular grossly intact.   GAIT: non-antalgic    Diagnostic Modalities:  left knee X-ray: The prosthesis has acceptable alignment. No fractures or dislocations. Prosthesis is well seated with no evidence of loosening, patella not resurfaced  Independent visualization of the images was performed.      Impression:   Chief Complaint   Patient presents with     RECHECK     left knee follow up     Surgical Followup     DOS:2/16/2021~Left total knee arthroplasty (patella was not resurfaced). ~ 3 months postop   doing well     Plan:   Continue to work on home exercises especially with extension.  Her ROM was 3-115 prior to surgery, and she has exceeded this with flexion. She is happy with extension and feels it will continue to improve.  Did discuss again the lack of surfacing on the patella and what that could mean term.  Currently she is very happy with outcomes and progress and what she can do. In terms of the twisting incident, resolved quickly.  Exam is reassuring.  Continue to increase as tolerated. Will plan to see her at 1 year post-op taty,. Sooner if needed.   Antibiotic sent for upcoming dental work. Do this for the first year following surgery.        Return to clinic 1 year post-op, or sooner as needed for " changes.    Re-x-ray on return: No    BILLY Oh, CNP  Orthopedic Surgery

## 2021-05-20 NOTE — PROGRESS NOTES
ANTICOAGULATION MANAGEMENT     Patient Name:  Tracy Palmoo  Date:  2021    ASSESSMENT /SUBJECTIVE:    Today's INR result of 2.7 is therapeutic. Goal INR of 2.0-3.0      Warfarin dose taken: Warfarin taken as instructed    Diet: No new diet changes affecting INR    Medication changes/ interactions: No new medications/supplements affecting INR    Previous INR: Therapeutic     S/S of bleeding or thromboembolism: No    New injury or illness: No    Upcoming surgery, procedure or cardioversion: No    Additional findings: None      PLAN:    Telephone call with Tracy regarding INR result and instructed:     Warfarin Dosing Instructions: Continue your current warfarin dose 5 mg MF and 2.5 mg all other days    Instructed patient to follow up no later than: 4 weeks  Lab visit scheduled    Education provided: Please call back if any changes to your diet, medications or how you've been taking warfarin      Tracy verbalizes understanding and agrees to warfarin dosing plan.    Instructed to call the Anticoagulation Clinic for any changes, questions or concerns. (#[phone])        Arti Ruelas RN      OBJECTIVE:  Recent labs: (last 7 days)     21  0857   INR 2.7*         No question data found.  Anticoagulation Summary  As of 2021    INR goal:  2.0-3.0   TTR:  44.0 % (1 y)   INR used for dosin.7 (2021)   Warfarin maintenance plan:  5 mg (5 mg x 1) every Mon, Fri; 2.5 mg (5 mg x 0.5) all other days   Full warfarin instructions:  5 mg every Mon, Fri; 2.5 mg all other days   Weekly warfarin total:  22.5 mg   No change documented:  Arti Ruelas RN   Plan last modified:  Radha Rodriguez RN (2021)   Next INR check:  2021   Priority:  Maintenance   Target end date:  Indefinite    Indications    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]  Long term current use of anticoagulant therapy [Z79.01]  Other pulmonary embolism without acute cor pulmonale  unspecified  chronicity (H) [I26.99]             Anticoagulation Episode Summary     INR check location:      Preferred lab:      Send INR reminders to:  [facility]    Comments:  5 mg tablets, book, PM dose - Per daughter, please [facility] on dosing [facility]      Anticoagulation Care Providers     Provider Role Specialty Phone number    Chad Betts MD Referring Internal Medicine [phone]    Gerard Medrano MD Responsible Family Medicine [phone]

## 2021-06-17 ENCOUNTER — ANTICOAGULATION THERAPY VISIT (OUTPATIENT)
Dept: ANTICOAGULATION | Facility: CLINIC | Age: 81
End: 2021-06-17

## 2021-06-17 DIAGNOSIS — I26.99 OTHER PULMONARY EMBOLISM WITHOUT ACUTE COR PULMONALE, UNSPECIFIED CHRONICITY (H): ICD-10-CM

## 2021-06-17 DIAGNOSIS — I26.99 PULMONARY EMBOLI (H): ICD-10-CM

## 2021-06-17 DIAGNOSIS — Z79.01 LONG TERM CURRENT USE OF ANTICOAGULANT THERAPY: ICD-10-CM

## 2021-06-17 LAB
CAPILLARY BLOOD COLLECTION: NORMAL
INR BLD: 2.2 (ref 0.86–1.14)

## 2021-06-17 PROCEDURE — 36416 COLLJ CAPILLARY BLOOD SPEC: CPT | Performed by: INTERNAL MEDICINE

## 2021-06-17 PROCEDURE — 85610 PROTHROMBIN TIME: CPT | Performed by: INTERNAL MEDICINE

## 2021-06-17 NOTE — PROGRESS NOTES
ANTICOAGULATION MANAGEMENT     Patient Name:  Tracy Palomo  Date:  2021    ASSESSMENT /SUBJECTIVE:    Today's INR result of 2.2 is therapeutic. Goal INR of 2.0-3.0      Warfarin dose taken: Warfarin taken as instructed    Diet: No new diet changes identified    Medication changes/ interactions: No new medications/supplements affecting INR    Previous INR: Therapeutic     S/S of bleeding or thromboembolism: No    New injury or illness: No    Upcoming surgery, procedure or cardioversion: No    Additional findings: None      PLAN:    Warfarin Dosing Instructions: Continue your current warfarin dose    Instructed patient to follow up no later than: 5 weeks  Lab visit scheduled    Education provided: None required    Telephone call with Tracy whom verbalizes understanding and agrees to plan    Instructed to call the Anticoagulation Clinic for any changes, questions or concerns. (#[phone])        Radha Rodriguez RN      OBJECTIVE:  Recent labs: (last 7 days)     21  0922   INR 2.2*         INR assessment THER    Recheck INR In: 5 WEEKS    INR Location Outside lab      Anticoagulation Summary  As of 2021    INR goal:  2.0-3.0   TTR:  51.7 % (1 y)   INR used for dosin.2 (2021)   Warfarin maintenance plan:  5 mg (5 mg x 1) every Mon, Fri; 2.5 mg (5 mg x 0.5) all other days   Full warfarin instructions:  5 mg every Mon, Fri; 2.5 mg all other days   Weekly warfarin total:  22.5 mg   No change documented:  Radha Rodriguez RN   Plan last modified:  Radha Rodriguez RN (2021)   Next INR check:  2021   Priority:  Maintenance   Target end date:  Indefinite    Indications    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]  Long term current use of anticoagulant therapy [Z79.01]  Other pulmonary embolism without acute cor pulmonale  unspecified chronicity (H) [I26.99]             Anticoagulation Episode Summary     INR check location:      Preferred lab:      Send INR  reminders to:  [facility]    Comments:  5 mg tablets, book, PM dose - Per daughter, please [facility] on dosing [facility]      Anticoagulation Care Providers     Provider Role Specialty Phone number    Chad Betts MD Referring Internal Medicine [phone]    Gerard Medrano MD Responsible Family Medicine [phone]

## 2021-07-14 DIAGNOSIS — E78.5 HYPERLIPIDEMIA LDL GOAL <130: ICD-10-CM

## 2021-07-15 RX ORDER — LOVASTATIN 40 MG
TABLET ORAL
Qty: 90 TABLET | Refills: 0 | Status: SHIPPED | OUTPATIENT
Start: 2021-07-15 | End: 2021-10-22

## 2021-07-15 NOTE — TELEPHONE ENCOUNTER
Routing refill request to provider for review/approval because:  Labs not current:  MIGDALIA Christianson RN

## 2021-07-20 ENCOUNTER — ANTICOAGULATION THERAPY VISIT (OUTPATIENT)
Dept: ANTICOAGULATION | Facility: CLINIC | Age: 81
End: 2021-07-20

## 2021-07-20 ENCOUNTER — LAB (OUTPATIENT)
Dept: LAB | Facility: CLINIC | Age: 81
End: 2021-07-20
Payer: MEDICARE

## 2021-07-20 DIAGNOSIS — I26.99 OTHER PULMONARY EMBOLISM WITHOUT ACUTE COR PULMONALE, UNSPECIFIED CHRONICITY (H): ICD-10-CM

## 2021-07-20 DIAGNOSIS — I26.99 PULMONARY EMBOLI (H): Primary | ICD-10-CM

## 2021-07-20 DIAGNOSIS — Z79.01 LONG TERM CURRENT USE OF ANTICOAGULANT THERAPY: ICD-10-CM

## 2021-07-20 DIAGNOSIS — I26.99 PULMONARY EMBOLI (H): ICD-10-CM

## 2021-07-20 LAB — INR BLD: 2 (ref 0.9–1.1)

## 2021-07-20 PROCEDURE — 36416 COLLJ CAPILLARY BLOOD SPEC: CPT

## 2021-07-20 PROCEDURE — 85610 PROTHROMBIN TIME: CPT

## 2021-07-20 NOTE — PROGRESS NOTES
ANTICOAGULATION MANAGEMENT     Tracy Palomo 81 year old female is on warfarin with therapeutic INR result. (Goal INR 2.0-3.0)    Recent labs: (last 7 days)     07/20/21  0855   INR 2.0*       ASSESSMENT     Source(s): Patient/Caregiver Call       Warfarin doses taken: Missed dose(s) may be affecting INR    Diet: No new diet changes identified    New illness, injury, or hospitalization: No    Medication/supplement changes: None noted    Signs or symptoms of bleeding or clotting: No    Previous INR: Therapeutic last 2(+) visits    Additional findings: None     PLAN     Recommended plan for no diet, medication or health factor changes affecting INR     Dosing Instructions: Continue your current warfarin dose with next INR in 5 weeks       Summary  As of 7/20/2021    Full warfarin instructions:  5 mg every Mon, Fri; 2.5 mg all other days   Next INR check:  8/24/2021             Telephone call with Tracy who verbalizes understanding and agrees to plan    Lab visit scheduled    Education provided: None required    Plan made per [facility] anticoagulation protocol    Radha Rodriguez, RN  Anticoagulation Clinic  7/20/2021    _______________________________________________________________________     Anticoagulation Episode Summary     Current INR goal:  2.0-3.0   TTR:  57.2 % (1 y)   Target end date:  Indefinite   Send INR reminders to:  [facility]    Indications    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

## 2021-08-02 ENCOUNTER — ANCILLARY PROCEDURE (OUTPATIENT)
Dept: GENERAL RADIOLOGY | Facility: CLINIC | Age: 81
End: 2021-08-02
Attending: ORTHOPAEDIC SURGERY
Payer: MEDICARE

## 2021-08-02 ENCOUNTER — OFFICE VISIT (OUTPATIENT)
Dept: ORTHOPEDICS | Facility: CLINIC | Age: 81
End: 2021-08-02
Payer: MEDICARE

## 2021-08-02 VITALS
HEIGHT: 64 IN | DIASTOLIC BLOOD PRESSURE: 74 MMHG | WEIGHT: 179.4 LBS | BODY MASS INDEX: 30.63 KG/M2 | SYSTOLIC BLOOD PRESSURE: 130 MMHG

## 2021-08-02 DIAGNOSIS — M25.511 ACUTE PAIN OF RIGHT SHOULDER: Primary | ICD-10-CM

## 2021-08-02 DIAGNOSIS — M25.511 RIGHT SHOULDER PAIN: ICD-10-CM

## 2021-08-02 PROCEDURE — 73030 X-RAY EXAM OF SHOULDER: CPT | Mod: TC | Performed by: RADIOLOGY

## 2021-08-02 PROCEDURE — 99214 OFFICE O/P EST MOD 30 MIN: CPT | Performed by: ORTHOPAEDIC SURGERY

## 2021-08-02 ASSESSMENT — PAIN SCALES - GENERAL: PAINLEVEL: MILD PAIN (2)

## 2021-08-02 ASSESSMENT — MIFFLIN-ST. JEOR: SCORE: 1264.78

## 2021-08-02 NOTE — PROGRESS NOTES
"Office Visit-Follow up    Chief Complaint: Tracy Palomo is a 81 year old female who is being seen for   Chief Complaint   Patient presents with     Shoulder Pain     right shoulder injury       History of Present Illness:   Seen and treated for other issues in the past.  Today presents with right shoulder pain.    She was playing ball/fetch with a dog.  The dog got excited jumped up towards her causing her to fall directly striking her right shoulder.  No loss of consciousness.  Complains of immediate pain to the shoulder.  Nonradiating.  This occurred July 31.  She is been taken Tylenol.  Worse with any attempted motion.      Social History     Occupational History     Not on file   Tobacco Use     Smoking status: Never Smoker     Smokeless tobacco: Never Used   Substance and Sexual Activity     Alcohol use: No     Alcohol/week: 0.0 standard drinks     Drug use: No     Sexual activity: Not Currently     Partners: Male       REVIEW OF SYSTEMS  General: negative for, night sweats, dizziness, fatigue  Resp: No shortness of breath and no cough  CV: negative for chest pain, syncope or near-syncope  GI: negative for nausea, vomiting and diarrhea  : negative for dysuria and hematuria  Musculoskeletal: as above  Neurologic: negative for syncope   Hematologic: negative for bleeding disorder    Physical Exam:  Vitals: /74   Ht 1.619 m (5' 3.75\")   Wt 81.4 kg (179 lb 6.4 oz)   BMI 31.04 kg/m    BMI= Body mass index is 31.04 kg/m .  Constitutional: healthy, alert and no acute distress   Psychiatric: mentation appears normal and affect normal/bright  NEURO: no focal deficits  RESP: Normal with easy respirations and no use of accessory muscles to breathe, no audible wheezing or retractions  CV: RUE:  no edema         Regular rate and rhythm by palpation  SKIN: No erythema, rashes, excoriation, or breakdown. No evidence of infection.   JOINT/EXTREMITIES:right shoulder: Shows no gross deformity although there is " some soft tissue swelling along the anterior aspect of the shoulder.  Global tenderness through this region.  Actively tolerates approximately 30/30 motion.  Passively I can take her approximately 100 degrees / 100 degrees then limited secondary to pain.  Positive drop arm.  GAIT: not tested             Diagnostic Modalities:  right shoulder X-ray: Glenohumeral joint is concentric with no evidence of subluxation or dislocation.  There is subtle irregularity along the inferior glenoid and humeral head questionable arthritic changes.  She also has some irregularity along the greater tuberosity.  No definitive fracture line can be visualized  Independent visualization of the images was performed.      Impression: right shoulder pain status post fall    Plan:  All of the above pertinent physical exam and imaging modalities findings was reviewed with Tracy.    She reports no pain prior to this fall.  Exquisite pain now.  Subtle irregularity along the inferior glenohumeral joint which is probably degenerative changes.  However given her recent trauma recommend MRI to rule out occult fracture and rotator cuff injury      Return to clinic to discuss test results, or sooner as needed for changes.  Re-x-ray on return: Becky Pang D.O.

## 2021-08-02 NOTE — LETTER
"    8/2/2021         RE: Tracy Palomo  [address] [facility] [address]        Dear Colleague,    Thank you for referring your patient, Tracy Palomo, to the [facility]. Please see a copy of my visit note below.    Office Visit-Follow up    Chief Complaint: Tracy Palomo is a 81 year old female who is being seen for   Chief Complaint   Patient presents with     Shoulder Pain     right shoulder injury       History of Present Illness:   Seen and treated for other issues in the past.  Today presents with right shoulder pain.    She was playing ball/fetch with a dog.  The dog got excited jumped up towards her causing her to fall directly striking her right shoulder.  No loss of consciousness.  Complains of immediate pain to the shoulder.  Nonradiating.  This occurred July 31.  She is been taken Tylenol.  Worse with any attempted motion.      Social History     Occupational History     Not on file   Tobacco Use     Smoking status: Never Smoker     Smokeless tobacco: Never Used   Substance and Sexual Activity     Alcohol use: No     Alcohol/week: 0.0 standard drinks     Drug use: No     Sexual activity: Not Currently     Partners: Male       REVIEW OF SYSTEMS  General: negative for, night sweats, dizziness, fatigue  Resp: No shortness of breath and no cough  CV: negative for chest pain, syncope or near-syncope  GI: negative for nausea, vomiting and diarrhea  : negative for dysuria and hematuria  Musculoskeletal: as above  Neurologic: negative for syncope   Hematologic: negative for bleeding disorder    Physical Exam:  Vitals: /74   Ht 1.619 m (5' 3.75\")   Wt 81.4 kg (179 lb 6.4 oz)   BMI 31.04 kg/m    BMI= Body mass index is 31.04 kg/m .  Constitutional: healthy, alert and no acute distress   Psychiatric: mentation appears normal and affect normal/bright  NEURO: no focal deficits  RESP: Normal with easy respirations and no use of accessory muscles to breathe, no audible " wheezing or retractions  CV: RUE:  no edema         Regular rate and rhythm by palpation  SKIN: No erythema, rashes, excoriation, or breakdown. No evidence of infection.   JOINT/EXTREMITIES:right shoulder: Shows no gross deformity although there is some soft tissue swelling along the anterior aspect of the shoulder.  Global tenderness through this region.  Actively tolerates approximately 30/30 motion.  Passively I can take her approximately 100 degrees / 100 degrees then limited secondary to pain.  Positive drop arm.  GAIT: not tested             Diagnostic Modalities:  right shoulder X-ray: Glenohumeral joint is concentric with no evidence of subluxation or dislocation.  There is subtle irregularity along the inferior glenoid and humeral head questionable arthritic changes.  She also has some irregularity along the greater tuberosity.  No definitive fracture line can be visualized  Independent visualization of the images was performed.      Impression: right shoulder pain status post fall    Plan:  All of the above pertinent physical exam and imaging modalities findings was reviewed with Tracy.    She reports no pain prior to this fall.  Exquisite pain now.  Subtle irregularity along the inferior glenohumeral joint which is probably degenerative changes.  However given her recent trauma recommend MRI to rule out occult fracture and rotator cuff injury      Return to clinic to discuss test results, or sooner as needed for changes.  Re-x-ray on return: No    Vitaly Pang D.O.            Again, thank you for allowing me to participate in the care of your patient.        Sincerely,        Kaleb Pang, DO

## 2021-08-05 ENCOUNTER — HOSPITAL ENCOUNTER (OUTPATIENT)
Dept: MRI IMAGING | Facility: CLINIC | Age: 81
Discharge: HOME | End: 2021-08-05
Attending: ORTHOPAEDIC SURGERY | Admitting: ORTHOPAEDIC SURGERY
Payer: MEDICARE

## 2021-08-05 ENCOUNTER — MYC MEDICAL ADVICE (OUTPATIENT)
Dept: ORTHOPEDICS | Facility: CLINIC | Age: 81
End: 2021-08-05

## 2021-08-05 DIAGNOSIS — M25.511 ACUTE PAIN OF RIGHT SHOULDER: ICD-10-CM

## 2021-08-05 PROCEDURE — 73221 MRI JOINT UPR EXTREM W/O DYE: CPT | Mod: 26 | Performed by: RADIOLOGY

## 2021-08-05 PROCEDURE — 73221 MRI JOINT UPR EXTREM W/O DYE: CPT | Mod: RT

## 2021-08-06 NOTE — TELEPHONE ENCOUNTER
Dr. Pang is out of office until 8/16/2021.  I reviewed the last OV notes/plan and the MRI results. The MRI does suggest some type of RTC injury. I called the patient and she feels her pain is well managed with tylenol. We discussed limited use of her affected arm (limited lift, push, pull.). She was questioning if a sling would be needed at this point. I asked Dr. Sauceda ([facility]) and he said no not at this point.     Advised patient to keep appt with Dr. Pang and call if she develops any worsening symptoms in the mean time.     Kaley GARCIA, Lead RN, BSN. . .  8/6/2021, 9:11 AM

## 2021-08-18 ENCOUNTER — OFFICE VISIT (OUTPATIENT)
Dept: ORTHOPEDICS | Facility: CLINIC | Age: 81
End: 2021-08-18
Payer: MEDICARE

## 2021-08-18 VITALS
SYSTOLIC BLOOD PRESSURE: 120 MMHG | DIASTOLIC BLOOD PRESSURE: 70 MMHG | WEIGHT: 179.4 LBS | HEIGHT: 64 IN | BODY MASS INDEX: 30.63 KG/M2

## 2021-08-18 DIAGNOSIS — Z96.652 S/P TOTAL KNEE REPLACEMENT USING CEMENT, LEFT: ICD-10-CM

## 2021-08-18 DIAGNOSIS — S46.011A TRAUMATIC COMPLETE TEAR OF RIGHT ROTATOR CUFF, INITIAL ENCOUNTER: Primary | ICD-10-CM

## 2021-08-18 PROCEDURE — 99213 OFFICE O/P EST LOW 20 MIN: CPT | Performed by: ORTHOPAEDIC SURGERY

## 2021-08-18 ASSESSMENT — PAIN SCALES - GENERAL: PAINLEVEL: NO PAIN (0)

## 2021-08-18 ASSESSMENT — MIFFLIN-ST. JEOR: SCORE: 1264.78

## 2021-08-18 NOTE — PROGRESS NOTES
"Office Visit-Follow up    Chief Complaint: Tracy Palomo is a 81 year old female who is being seen for   Chief Complaint   Patient presents with     RECHECK     mri results right shoulder       History of Present Illness:   Today's visit:  Returns for MRI results to her right shoulder.  Here with her daughter.  Shoulder is feeling better.  Still has difficulty reaching overhead though.  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  Pain is much more tolerable.  Doing basic activities around the house.    Also describes some issue with the left knee.  Daughter and her describe a hip swing type gait.  She has occasional some pain along the anterior aspect of the knee.      August 2, 2021 visit:  Seen and treated for other issues in the past.  Today presents with right shoulder pain.     She was playing ball/fetch with a dog.  The dog got excited jumped up towards her causing her to fall directly striking her right shoulder.  No loss of consciousness.  Complains of immediate pain to the shoulder.  Nonradiating.  This occurred July 31.  She is been taken Tylenol.  Worse with any attempted motion.       Social History     Occupational History     Not on file   Tobacco Use     Smoking status: Never Smoker     Smokeless tobacco: Never Used   Substance and Sexual Activity     Alcohol use: No     Alcohol/week: 0.0 standard drinks     Drug use: No     Sexual activity: Not Currently     Partners: Male       REVIEW OF SYSTEMS  General: negative for, night sweats, dizziness, fatigue  Resp: No shortness of breath and no cough  CV: negative for chest pain, syncope or near-syncope  GI: negative for nausea, vomiting and diarrhea  : negative for dysuria and hematuria  Musculoskeletal: as above  Neurologic: negative for syncope   Hematologic: negative for bleeding disorder    Physical Exam:  Vitals: /70   Ht 1.619 m (5' 3.75\")   Wt 81.4 kg (179 lb 6.4 oz)   BMI 31.04 kg/m    BMI= Body mass index is " 31.04 kg/m .  Constitutional: healthy, alert and no acute distress   Psychiatric: mentation appears normal and affect normal/bright  NEURO: no focal deficits  RESP: Normal with easy respirations and no use of accessory muscles to breathe, no audible wheezing or retractions  CV: RUE:  no edema           SKIN: No erythema, rashes, excoriation, or breakdown. No evidence of infection.   JOINT/EXTREMITIES:right shoulder: Able to bring it to approximately 90/90.  GAIT: not tested             Diagnostic Modalities:  right shoulder MRI:    1. On a background of severe tendinosis, full-thickness tear of the  supraspinatus anterior fibers at the footprint approximately 14 mm in  the anterior-posterior dimension with posterior extension of the  high-grade articular sided tear involving the junctional fibers with  infraspinatus distal to the myotendinous junction.  2. On a background of severe tendinosis, high-grade articular sided  tear of the upper fibers of subscapularis adjacent to the footprint  and likely additionally bursal sided tear of the upper fibers at the  footprint allowing medial subluxation of the long head of biceps  tendon.  3. Severe acromioclavicular joint osteoarthritis.  4. Severe long head of biceps tendinosis.  5. Suspected tear of the superior, posterior superior labrum.  6. Moderate to large subcoracoid bursal fluid leaking caudally.        Independent visualization of the images was performed.      Impression: right shoulder full-thickness supraspinatus rotator cuff tendon tear  Right shoulder high-grade articular sided subscapularis tear with medial subluxation long head biceps  High-grade articular sided tear infraspinatus  Severe long head bicep tendinosis  Severe AC joint osteoarthritis    Plan:  All of the above pertinent physical exam and imaging modalities findings was reviewed with Tracy and her daughter.    We reviewed the shoulder findings.  Based on her age and underlying rotator cuff  integrity and tears but more importantly based on her current clinical function I recommend nonoperative care.  I did provide her a therapy referral.  I discussed rotator cuff repair versus arthroplasty.  With her age and underlying tissue quality rotator cuff repair may not be in her best interest.  However, currently doing well.  We also discussed her left knee.  She is occasionally get some achiness along the anterior aspect.  She feels like she is not moving her hip as well.  She also reports full body achiness.  She is been taking a couple of Tylenol a day which takes care of it.    I recommend she speak with her family doctor for taking anything on a constant consistent basis but to regular strength Tylenol a day is no issue from my standpoint.    Return to clinic PRN, or sooner as needed for changes.  Re-x-ray on return: No    Vitaly Pang D.O.

## 2021-08-18 NOTE — LETTER
8/18/2021         RE: Tracy Palomo  [address] [facility] [address]        Dear Colleague,    Thank you for referring your patient, Tracy Palomo, to the [facility]. Please see a copy of my visit note below.    Office Visit-Follow up    Chief Complaint: Tracy Palomo is a 81 year old female who is being seen for   Chief Complaint   Patient presents with     RECHECK     mri results right shoulder       History of Present Illness:   Today's visit:  Returns for MRI results to her right shoulder.  Here with her daughter.  Shoulder is feeling better.  Still has difficulty reaching overhead though.  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  Pain is much more tolerable.  Doing basic activities around the house.    Also describes some issue with the left knee.  Daughter and her describe a hip swing type gait.  She has occasional some pain along the anterior aspect of the knee.      August 2, 2021 visit:  Seen and treated for other issues in the past.  Today presents with right shoulder pain.     She was playing ball/fetch with a dog.  The dog got excited jumped up towards her causing her to fall directly striking her right shoulder.  No loss of consciousness.  Complains of immediate pain to the shoulder.  Nonradiating.  This occurred July 31.  She is been taken Tylenol.  Worse with any attempted motion.       Social History     Occupational History     Not on file   Tobacco Use     Smoking status: Never Smoker     Smokeless tobacco: Never Used   Substance and Sexual Activity     Alcohol use: No     Alcohol/week: 0.0 standard drinks     Drug use: No     Sexual activity: Not Currently     Partners: Male       REVIEW OF SYSTEMS  General: negative for, night sweats, dizziness, fatigue  Resp: No shortness of breath and no cough  CV: negative for chest pain, syncope or near-syncope  GI: negative for nausea, vomiting and diarrhea  : negative for dysuria and  "hematuria  Musculoskeletal: as above  Neurologic: negative for syncope   Hematologic: negative for bleeding disorder    Physical Exam:  Vitals: /70   Ht 1.619 m (5' 3.75\")   Wt 81.4 kg (179 lb 6.4 oz)   BMI 31.04 kg/m    BMI= Body mass index is 31.04 kg/m .  Constitutional: healthy, alert and no acute distress   Psychiatric: mentation appears normal and affect normal/bright  NEURO: no focal deficits  RESP: Normal with easy respirations and no use of accessory muscles to breathe, no audible wheezing or retractions  CV: RUE:  no edema           SKIN: No erythema, rashes, excoriation, or breakdown. No evidence of infection.   JOINT/EXTREMITIES:right shoulder: Able to bring it to approximately 90/90.  GAIT: not tested             Diagnostic Modalities:  right shoulder MRI:    1. On a background of severe tendinosis, full-thickness tear of the  supraspinatus anterior fibers at the footprint approximately 14 mm in  the anterior-posterior dimension with posterior extension of the  high-grade articular sided tear involving the junctional fibers with  infraspinatus distal to the myotendinous junction.  2. On a background of severe tendinosis, high-grade articular sided  tear of the upper fibers of subscapularis adjacent to the footprint  and likely additionally bursal sided tear of the upper fibers at the  footprint allowing medial subluxation of the long head of biceps  tendon.  3. Severe acromioclavicular joint osteoarthritis.  4. Severe long head of biceps tendinosis.  5. Suspected tear of the superior, posterior superior labrum.  6. Moderate to large subcoracoid bursal fluid leaking caudally.        Independent visualization of the images was performed.      Impression: right shoulder full-thickness supraspinatus rotator cuff tendon tear  Right shoulder high-grade articular sided subscapularis tear with medial subluxation long head biceps  High-grade articular sided tear infraspinatus  Severe long head bicep " tendinosis  Severe AC joint osteoarthritis    Plan:  All of the above pertinent physical exam and imaging modalities findings was reviewed with Tracy and her daughter.    We reviewed the shoulder findings.  Based on her age and underlying rotator cuff integrity and tears but more importantly based on her current clinical function I recommend nonoperative care.  I did provide her a therapy referral.  I discussed rotator cuff repair versus arthroplasty.  With her age and underlying tissue quality rotator cuff repair may not be in her best interest.  However, currently doing well.  We also discussed her left knee.  She is occasionally get some achiness along the anterior aspect.  She feels like she is not moving her hip as well.  She also reports full body achiness.  She is been taking a couple of Tylenol a day which takes care of it.    I recommend she speak with her family doctor for taking anything on a constant consistent basis but to regular strength Tylenol a day is no issue from my standpoint.    Return to clinic PRN, or sooner as needed for changes.  Re-x-ray on return: No    Vitaly Pang D.O.            Again, thank you for allowing me to participate in the care of your patient.        Sincerely,        Kaleb Pang, DO

## 2021-08-24 ENCOUNTER — ANTICOAGULATION THERAPY VISIT (OUTPATIENT)
Dept: ANTICOAGULATION | Facility: CLINIC | Age: 81
End: 2021-08-24

## 2021-08-24 ENCOUNTER — LAB (OUTPATIENT)
Dept: LAB | Facility: CLINIC | Age: 81
End: 2021-08-24
Payer: MEDICARE

## 2021-08-24 DIAGNOSIS — I26.99 PULMONARY EMBOLI (H): Primary | ICD-10-CM

## 2021-08-24 DIAGNOSIS — I26.99 PULMONARY EMBOLI (H): ICD-10-CM

## 2021-08-24 DIAGNOSIS — Z79.01 LONG TERM CURRENT USE OF ANTICOAGULANT THERAPY: ICD-10-CM

## 2021-08-24 DIAGNOSIS — I26.99 OTHER PULMONARY EMBOLISM WITHOUT ACUTE COR PULMONALE, UNSPECIFIED CHRONICITY (H): ICD-10-CM

## 2021-08-24 LAB — INR BLD: 2.1 (ref 0.9–1.1)

## 2021-08-24 PROCEDURE — 85610 PROTHROMBIN TIME: CPT

## 2021-08-24 PROCEDURE — 36416 COLLJ CAPILLARY BLOOD SPEC: CPT

## 2021-08-24 NOTE — PROGRESS NOTES
ANTICOAGULATION MANAGEMENT     Tracy Palomo 81 year old female is on warfarin with therapeutic INR result. (Goal INR 2.0-3.0)    Recent labs: (last 7 days)     08/24/21  0842   INR 2.1*       ASSESSMENT     Source(s): Chart Review and Patient/Caregiver Call       Warfarin doses taken: Warfarin taken as instructed    Diet: No new diet changes identified    New illness, injury, or hospitalization: Yes: Pt fell and injured her shoulder, she will be having PT    Medication/supplement changes: using tylenol for pain    Signs or symptoms of bleeding or clotting: No    Previous INR: Therapeutic last 2(+) visits    Additional findings: None     PLAN     Recommended plan for no diet, medication or health factor changes affecting INR     Dosing Instructions: Continue your current warfarin dose with next INR in 5 weeks       Summary  As of 8/24/2021    Full warfarin instructions:  5 mg every Mon, Fri; 2.5 mg all other days   Next INR check:  9/28/2021             Telephone call with Tracy who verbalizes understanding and agrees to plan    Lab visit scheduled    Education provided: None required    Plan made per [facility] anticoagulation protocol    Radha Rodriguez RN  Anticoagulation Clinic  8/24/2021    _______________________________________________________________________     Anticoagulation Episode Summary     Current INR goal:  2.0-3.0   TTR:  64.2 % (1 y)   Target end date:  Indefinite   Send INR reminders to:  GURVINDER SOARES    Indications    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         Anticoagulation Care Providers     Provider Role Specialty Phone number    Chad Betts MD Referring Internal Medicine [phone]     Gerard Medrano MD [facility] [phone]

## 2021-08-25 ENCOUNTER — HOSPITAL ENCOUNTER (OUTPATIENT)
Dept: PHYSICAL THERAPY | Facility: CLINIC | Age: 81
Setting detail: THERAPIES SERIES
End: 2021-08-25
Attending: ORTHOPAEDIC SURGERY
Payer: MEDICARE

## 2021-08-25 DIAGNOSIS — S46.011A TRAUMATIC COMPLETE TEAR OF RIGHT ROTATOR CUFF, INITIAL ENCOUNTER: ICD-10-CM

## 2021-08-25 DIAGNOSIS — Z96.652 S/P TOTAL KNEE REPLACEMENT USING CEMENT, LEFT: ICD-10-CM

## 2021-08-25 PROCEDURE — 97162 PT EVAL MOD COMPLEX 30 MIN: CPT | Mod: GP

## 2021-08-25 PROCEDURE — 97110 THERAPEUTIC EXERCISES: CPT | Mod: GP

## 2021-08-25 NOTE — PROGRESS NOTES
08/25/21 0821   General Information   Type of Visit Initial OP Ortho PT Evaluation   Start of Care Date 08/25/21   Referring Physician Kaleb Pang, DO   Patient/Family Goals Statement She likes to work in the yard, so her goal is to be able to  sticks in the yard.   Orders Evaluate and Treat   Date of Order 08/18/21   Certification Required? Yes   Medical Diagnosis Right rotator cuff tear   Surgical/Medical history reviewed Yes   Precautions/Limitations no known precautions/limitations   General Information Comments Patient reports that the dog ran into her and she fell on her right shoulder on 7/31.  Her left knee had no trauma, but she tore her right shoulder. She was able to get up into a chair from the ground.  She was able to ice it, but she couldn't raise it in front of her. She found ways to dress without reaching her arm over her.  When she doesn't keep her arm by her side it hurts. She can put the dishes away, but it's not comfortable to do so.   In regards to the patients left knee, she reports having pain in the anterior aspect  of the patella which the surgeon reports is patella.  She reports her knee stiffness up after sitting. She reports adductor pain. She walks 3x a day with the evening walk being the most painful with adductor pain.    Body Part(s)   Body Part(s) Shoulder   Presentation and Etiology   Pertinent history of current problem (include personal factors and/or comorbidities that impact the POC) Patient is an 81 year old female referred to PT for right traumatic RC tear without operative repair (14 mm classified as Small/Medium tear). Patient injured this as she was knocked down by her dog on 7/31. PMHx of left knee replacement that is giving her some pain, bilateral knee OA, cardiac history   Impairments A. Pain;D. Decreased ROM;E. Decreased flexibility;K. Numbness   Functional Limitations perform activities of daily living;perform required work activities;perform desired leisure  / sports activities   Symptom Location Right anterior shoulder   How/Where did it occur With a fall   Onset date of current episode/exacerbation 07/31/21   Chronicity New   Pain rating (0-10 point scale) Best (/10)   Best (/10) 0   Pain quality D. Burning   Frequency of pain/symptoms C. With activity   Pain/symptoms are: Worse during the day   Pain/symptoms exacerbated by H. Overhead reach;G. Certain positions;C. Lifting;K. Home tasks;L. Work tasks   Pain/symptoms eased by C. Rest;A. Sitting;F. Certain positions;I. OTC medication(s);H. Cold   Progression of symptoms since onset: Improved   Current / Previous Interventions   Diagnostic Tests: MRI   MRI Results Results   MRI results 14 mm full thickness RCR   Prior Level of Function   Prior Level of Function-Mobility Independent   Prior Level of Function-ADLs Independent   Fall Risk Screen   Fall screen completed by PT   Have you fallen 2 or more times in the past year? No   Have you fallen and had an injury in the past year? Yes   Is patient a fall risk? No   Fall screen comments Fell only because dog jumped into her and knocked her over   Abuse Screen (yes response referral indicated)   Feels Unsafe at Home or Work/School no   Feels Threatened by Someone no   Does Anyone Try to Keep You From Having Contact with Others or Doing Things Outside Your Home? no   Physical Signs of Abuse Present no   Shoulder Objective Findings   Side (if bilateral, select both right and left) Right   Observation Unremarkable   Integumentary  Unremarkable   Posture Unremarkable   Cervical Screen (ROM, quadrant) Limited in all direction, but does stretch every day   Shoulder ROM Comment Behind Low Back: T12, Behind Neck T3   Palpation Mild TTP to anterior GH   Right Shoulder Flexion AROM 91   Right Shoulder Flexion PROM 145   Right Shoulder Abduction AROM 108   Right Shoulder Abduction PROM 150   Right Shoulder ER AROM 60   Right Shoulder ER PROM 60   Right Shoulder IR AROM Mild limitations    Right Shoulder IR PROM Full and painfree   Right Shoulder Flexion Strength 4+/5 (Very Painful)   Right Shoulder Abduction Strength 4+/5 (Very Painful)   Right Shoulder ER Strength 5/5   Right Shoulder IR Strength 5/5   Right Shoulder Extension Strength 5/5   Planned Therapy Interventions   Planned Therapy Interventions ROM;strengthening;stretching;manual therapy;joint mobilization;neuromuscular re-education   Planned Therapy Interventions Comment Emphasis on restoring mobility initially and once mobility and pain improves progress to light strengthening   Planned Modality Interventions   Planned Modality Interventions Comments PRN Only   Clinical Impression   Criteria for Skilled Therapeutic Interventions Met yes, treatment indicated   PT Diagnosis Right shoulder pain   Influenced by the following impairments Pain, AROM limitations, weakness   Functional limitations due to impairments Patient is unable to reach above her head or perform lawn care activities without pain.   Clinical Presentation Evolving/Changing   Clinical Presentation Rationale Based on pmhx, systems involved, observation, evaluation and clinical reasoning   Clinical Decision Making (Complexity) Moderate complexity   Therapy Frequency 2 times/Week   Predicted Duration of Therapy Intervention (days/wks) 8-16 weeks   Risk & Benefits of therapy have been explained Yes   Patient, Family & other staff in agreement with plan of care Yes   Clinical Impression Comments Non operative treatment for 14 mm (small/medium) RC tear   ORTHO GOALS   PT Ortho Eval Goals 1;2;3   Ortho Goal 1   Goal Identifier SPADI   Goal Description Patient will improve score on SPADI to 40 or less demonstrating improved shoulder function and less limitations   Target Date 11/02/21   Ortho Goal 2   Goal Identifier Shoulder AROM   Goal Description Patient will be able reach above her head with shoulder flexion and abduction exceeded 130 degrees in her right shoulder   Target Date  11/02/21   Ortho Goal 3   Goal Identifier Shoulder Strength   Goal Description Patient will have 5/5 MMT without pain indicating improved shoulder strength and function allowing her to perform yard work and house work without limitations   Target Date 11/02/21   Total Evaluation Time   PT Eval, Moderate Complexity Minutes (02068) 19   Therapy Certification   Certification date from 08/25/21   Certification date to 11/02/21   Medical Diagnosis Traumatic Right Rotator Cuff Tear

## 2021-08-25 NOTE — PROGRESS NOTES
[facility]          OUTPATIENT PHYSICAL THERAPY ORTHOPEDIC EVALUATION  PLAN OF TREATMENT FOR OUTPATIENT REHABILITATION  (COMPLETE FOR INITIAL CLAIMS ONLY)  Patient's Last Name, First Name, M.I.  YOB: 1940  PalomoTracy ramirez  SAV    Provider s Name:  [facility]   Medical Record No.  [id number]   Start of Care Date:  08/25/21   Onset Date:  07/31/21   Type:     _X__PT   ___OT   ___SLP Medical Diagnosis:  Traumatic Right Rotator Cuff Tear     PT Diagnosis:  Right shoulder pain   Visits from SOC:  1      _________________________________________________________________________________  Plan of Treatment/Functional Goals:  ROM, strengthening, stretching, manual therapy, joint mobilization, neuromuscular re-education  Emphasis on restoring mobility initially and once mobility and pain improves progress to light strengthening     PRN Only  Goals  Goal Identifier: SPADI  Goal Description: Patient will improve score on SPADI to 40 or less demonstrating improved shoulder function and less limitations  Target Date: 11/02/21    Goal Identifier: Shoulder AROM  Goal Description: Patient will be able reach above her head with shoulder flexion and abduction exceeded 130 degrees in her right shoulder  Target Date: 11/02/21    Goal Identifier: Shoulder Strength  Goal Description: Patient will have 5/5 MMT without pain indicating improved shoulder strength and function allowing her to perform yard work and house work without limitations  Target Date: 11/02/21                                                           Therapy Frequency:  2 times/Week  Predicted Duration of Therapy Intervention:  8-16 weeks    Jay Jay Gutierrez, PT                 I CERTIFY THE NEED FOR THESE SERVICES FURNISHED UNDER        THIS PLAN OF TREATMENT AND WHILE UNDER MY CARE     (Physician co-signature of this document indicates review and certification of the therapy plan).                        Certification Date From:  08/25/21   Certification Date To:  11/02/21    Referring Provider:  Kaleb Pang DO    Initial Assessment        See Epic Evaluation Start of Care Date: 08/25/21

## 2021-08-27 ENCOUNTER — HOSPITAL ENCOUNTER (OUTPATIENT)
Dept: PHYSICAL THERAPY | Facility: CLINIC | Age: 81
Setting detail: THERAPIES SERIES
End: 2021-08-27
Attending: ORTHOPAEDIC SURGERY
Payer: MEDICARE

## 2021-08-27 PROCEDURE — 97110 THERAPEUTIC EXERCISES: CPT | Mod: GP | Performed by: PHYSICAL THERAPIST

## 2021-08-31 ENCOUNTER — HOSPITAL ENCOUNTER (OUTPATIENT)
Dept: PHYSICAL THERAPY | Facility: CLINIC | Age: 81
Setting detail: THERAPIES SERIES
End: 2021-08-31
Attending: ORTHOPAEDIC SURGERY
Payer: MEDICARE

## 2021-08-31 PROCEDURE — 97110 THERAPEUTIC EXERCISES: CPT | Mod: GP | Performed by: PHYSICAL THERAPIST

## 2021-09-02 ENCOUNTER — HOSPITAL ENCOUNTER (OUTPATIENT)
Dept: PHYSICAL THERAPY | Facility: CLINIC | Age: 81
Setting detail: THERAPIES SERIES
End: 2021-09-02
Attending: ORTHOPAEDIC SURGERY
Payer: MEDICARE

## 2021-09-02 PROCEDURE — 97110 THERAPEUTIC EXERCISES: CPT | Mod: GP

## 2021-09-07 ENCOUNTER — HOSPITAL ENCOUNTER (OUTPATIENT)
Dept: PHYSICAL THERAPY | Facility: CLINIC | Age: 81
Setting detail: THERAPIES SERIES
End: 2021-09-07
Attending: ORTHOPAEDIC SURGERY
Payer: MEDICARE

## 2021-09-07 PROCEDURE — 97110 THERAPEUTIC EXERCISES: CPT | Mod: GP

## 2021-09-09 ENCOUNTER — HOSPITAL ENCOUNTER (OUTPATIENT)
Dept: PHYSICAL THERAPY | Facility: CLINIC | Age: 81
Setting detail: THERAPIES SERIES
End: 2021-09-09
Attending: ORTHOPAEDIC SURGERY
Payer: MEDICARE

## 2021-09-09 PROCEDURE — 97110 THERAPEUTIC EXERCISES: CPT | Mod: GP

## 2021-09-14 ENCOUNTER — HOSPITAL ENCOUNTER (OUTPATIENT)
Dept: PHYSICAL THERAPY | Facility: CLINIC | Age: 81
Setting detail: THERAPIES SERIES
End: 2021-09-14
Attending: ORTHOPAEDIC SURGERY
Payer: MEDICARE

## 2021-09-14 PROCEDURE — 97110 THERAPEUTIC EXERCISES: CPT | Mod: GP | Performed by: PHYSICAL THERAPIST

## 2021-09-17 ENCOUNTER — HOSPITAL ENCOUNTER (OUTPATIENT)
Dept: PHYSICAL THERAPY | Facility: CLINIC | Age: 81
Setting detail: THERAPIES SERIES
End: 2021-09-17
Attending: ORTHOPAEDIC SURGERY
Payer: MEDICARE

## 2021-09-17 PROCEDURE — 97110 THERAPEUTIC EXERCISES: CPT | Mod: GP

## 2021-09-21 ENCOUNTER — HOSPITAL ENCOUNTER (OUTPATIENT)
Dept: PHYSICAL THERAPY | Facility: CLINIC | Age: 81
Setting detail: THERAPIES SERIES
End: 2021-09-21
Attending: ORTHOPAEDIC SURGERY
Payer: MEDICARE

## 2021-09-21 PROCEDURE — 97110 THERAPEUTIC EXERCISES: CPT | Mod: GP

## 2021-09-23 ENCOUNTER — HOSPITAL ENCOUNTER (OUTPATIENT)
Dept: PHYSICAL THERAPY | Facility: CLINIC | Age: 81
Setting detail: THERAPIES SERIES
End: 2021-09-23
Attending: ORTHOPAEDIC SURGERY
Payer: MEDICARE

## 2021-09-23 PROCEDURE — 97110 THERAPEUTIC EXERCISES: CPT | Mod: GP,KX

## 2021-09-23 NOTE — PROGRESS NOTES
Outpatient Physical Therapy Progress Note     Patient: Trayc Palomo  : 1940    Beginning/End Dates of Reporting Period:  2021 to 2021    Referring Provider: DO Seth Romero Diagnosis: Right shoulder pain     Client Self Report: Patient reports the recommendation for a hot pack on her groin worked well and reduced her pain.  Her shoulder is doing well.  She no longer has cracking in her shoulder and no pain in the shoulder    Objective Measurements:  Objective Measure: Pain  Details: 0/10 (Has not had pain fore 2 weeks)  Objective Measure: AROM  Details: R standing shoulder flexion: 160, , ER 70  Objective Measure: MMT  Details: Flexion: 4/5, Abduction: 4/5, ER: 4+/5, IR: 4+/5  Objective Measure: SPADI  Details: 10       Goals:  Goal Identifier SPADI   Goal Description Patient will improve score on SPADI to 40 or less demonstrating improved shoulder function and less limitations   Target Date 21   Date Met  21   Progress (detail required for progress note): Patient scored 10 (7% disability of the shoulder) which hits her goal of 40 or less     Goal Identifier Shoulder AROM   Goal Description Patient will be able reach above her head with shoulder flexion and abduction exceeded 130 degrees in her right shoulder   Target Date 21   Date Met  21   Progress (detail required for progress note): Patient has 160 degrees of flexion and abduction in standing.  She has easily exceeded 130 degrees. The concentric  portion is pain free, but the eccentric portion has limited control and is painful     Goal Identifier Shoulder Strength   Goal Description Patient will have 5/5 MMT without pain indicating improved shoulder strength and function allowing her to perform yard work and house work without limitations   Target Date 21   Date Met   Currently her pains and dysfunction come for weakness in her shoulder. Her ROM has been restored and is now ready to be  progressed to strengthening and stronger emphasis on eccentrics.   Progress (detail required for progress note):           Plan:  Continue therapy per current plan of care.    Discharge:  No

## 2021-09-25 ENCOUNTER — NURSE TRIAGE (OUTPATIENT)
Dept: NURSING | Facility: CLINIC | Age: 81
End: 2021-09-25

## 2021-09-25 ENCOUNTER — OFFICE VISIT (OUTPATIENT)
Dept: URGENT CARE | Facility: URGENT CARE | Age: 81
End: 2021-09-25
Payer: MEDICARE

## 2021-09-25 VITALS
OXYGEN SATURATION: 98 % | SYSTOLIC BLOOD PRESSURE: 157 MMHG | HEART RATE: 60 BPM | TEMPERATURE: 97.1 F | DIASTOLIC BLOOD PRESSURE: 70 MMHG

## 2021-09-25 DIAGNOSIS — R42 DIZZINESS: Primary | ICD-10-CM

## 2021-09-25 DIAGNOSIS — I10 HYPERTENSION GOAL BP (BLOOD PRESSURE) < 140/90: ICD-10-CM

## 2021-09-25 DIAGNOSIS — H81.12 BENIGN PAROXYSMAL POSITIONAL VERTIGO, LEFT: ICD-10-CM

## 2021-09-25 PROCEDURE — 99214 OFFICE O/P EST MOD 30 MIN: CPT | Performed by: FAMILY MEDICINE

## 2021-09-25 RX ORDER — MECLIZINE HYDROCHLORIDE 25 MG/1
25 TABLET ORAL 3 TIMES DAILY PRN
Qty: 30 TABLET | Refills: 0 | Status: SHIPPED | OUTPATIENT
Start: 2021-09-25 | End: 2023-03-20

## 2021-09-25 NOTE — TELEPHONE ENCOUNTER
"[place] Pt woke up dizzy in middle of night, 4:00am approx.     BP now: 139/72, HR 56. Breathing normally.     Pt reports around 1030am today, after breakfast, as she put dishes in  she got dizzy again. Nauseated to point of vomiting. Room spinning.     Pt states when she moves her head she can become dizzy.    Pt reports she rode home all the way from cabin without the dizziness occurring.    No weakness. No headache. No chest  pain. No shortness of breath. No lightheadedness.    The episodes of dizziness have been coming and going and right now pt reports she can move her head and the vertigo/dizziness does not appear.     Pt states she may be dehydrated.     Unsteady when walking, but can walk without holding onto anything to keep her steady.    Pt on warfarin due to blood clot history, no abnormal INRs recently .   Not diabetic.    Pt reports she has had pulse in 40s in past.     Pt reports she has not had this vertigo/dizziness in past.     Per RN Triage protocol, RN advised pt to go to urgent care today for evaluation. If any life threatening symptoms appear, go to ED. Caller given care advice per RN triage protocol. Caller verbalizes understanding and agreement of plan. Caller instructed to call back with questions or if worsening symptoms.     Brigida Downs RN   09/25/21 3:43 PM  [facility] Nurse Advisor    Reason for Disposition    [1] MODERATE dizziness (e.g., vertigo; feels very unsteady, interferes with normal activities) AND [2] has NOT been evaluated by physician for this    Additional Information    Negative: [1] Weakness (i.e., paralysis, loss of muscle strength) of the face, arm or leg on one side of the body AND [2] sudden onset AND [3] present now    Negative: [1] Numbness (i.e., loss of sensation) of the face, arm or leg on one side of the body AND [2] sudden onset AND [3] present now    Negative: [1] Loss of speech or garbled speech " AND [2] sudden onset AND [3] present now    Negative: Difficult to awaken or acting confused (e.g., disoriented, slurred speech)    Negative: Sounds like a life-threatening emergency to the triager    Negative: Followed a head injury    Negative: Followed an ear injury    Negative: Localized weakness or numbness is main symptom    Negative: Dizziness relates to riding in a car, going to an amusement park, etc.    Negative: [1] Dizziness is main symptom AND [2] NO spinning sensation (i.e., vertigo)    Negative: SEVERE dizziness (vertigo) (e.g., unable to walk without assistance)    Negative: [1] Dizziness (vertigo) present now AND [2] one or more stroke risk factors (i.e., hypertension, diabetes, prior stroke/TIA, heart attack)  (Exception: prior physician evaluation for this AND no different/worse than usual)    Negative: [1] Dizziness (vertigo) present now AND [2] age > 60  (Exception: prior physician evaluation for this AND no different/worse than usual)    Negative: Severe headache (e.g., excruciating)  (Exception: similar to previous migraines)    Negative: Patient sounds very sick or weak to the triager    Negative: Taking a medicine that could cause dizziness (e.g., phenytoin [Dilantin], carbamazepine [Tegretol], primidone [Mysoline])    Protocols used: DIZZINESS - VERTIGO-A-AH    COVID 19 Nurse Triage Plan/Patient Instructions    Please be aware that novel coronavirus (COVID-19) may be circulating in the community. If you develop symptoms such as fever, cough, or SOB or if you have concerns about the presence of another infection including coronavirus (COVID-19), please contact your health care provider or visit [email or web address].     Disposition/Instructions    In-Person Visit with provider recommended. Reference Visit Selection Guide.    Thank you for taking steps to prevent the spread of this virus.  o Limit your contact with others.  o Wear a simple mask to cover your cough.  o Wash your hands  well and often.    Resources    [facility]: About COVID-19: [email or web address]    CDC: What to Do If You're Sick: www.cdc.gov/coronavirus/2019-ncov/about/steps-when-sick.html    CDC: Ending Home Isolation: www.cdc.gov/coronavirus/2019-ncov/hcp/disposition-in-home-patients.html     CDC: Caring for Someone: www.cdc.gov/coronavirus/2019-ncov/if-you-are-sick/care-for-someone.html     [facility]: Interim Guidance for Hospital Discharge to Home: [email or web address]    [facility] clinical trials (COVID-19 research studies): [email or web address]     Below are the COVID-19 hotlines at the [place] Department of [facility] ([facility]). Interpreters are available.   o For health questions: Call 641-774-9112 or 1-456.683.4281 (7 a.m. to 7 p.m.)  o For questions about schools and childcare: Call 123-185-8189 or 1-563.513.9202 (7 a.m. to 7 p.m.)

## 2021-09-25 NOTE — PROGRESS NOTES
Chief complaint: dizzy    Accompanied by daughter    Patient states when she gets up in the morning if she gets up too fast she will get a dizzy spell has been going on for 1-2 years    What she usually does is she would turn her head around   And would be good    Last night was sleeping and woke up and noticed the room was sleeping so patient stayed still and got better  Patient went back to sleep    Patient woke up 730 am patient did her head turning ritual that usually helps and she did not get a dizzy spell     However later as she was cooking she was putting the dishes into the  patient then felt dizzy and had to hold on to something and did not fall    Patient was still feeling a bit dizzy and 15 minutes later patient threw up couple of times and felt better    Coming and going     Right now feeling better  Description: room spinning   Aggravating factors: turning the head   Relieving factors: staying still and focusing seems to help   Chest pain or palpitation: No  Syncope or presyncope: No  Motor,sensory weakness, or slurring of speech: No  Headache: No  Blurring of vision : No  Nausea: YES  Vomiting: Yes  Abdominal pain: No  Recent trauma: No    Tried supportive treatment  At home no relief  Additional Information: none    Problem list and histories reviewed & adjusted, as indicated.  Additional history: as documented    Problem list, Medication list, Allergies, and Medical/Social/Surgical histories reviewed in EPIC and updated as appropriate.    ROS:  Constitutional, HEENT, cardiovascular, pulmonary, gi and gu systems are negative, except as otherwise noted.    OBJECTIVE:                                                    BP (!) 165/85   Pulse 60   Temp 97.1  F (36.2  C) (Tympanic)   SpO2 98%   There is no height or weight on file to calculate BMI.  GENERAL: healthy, alert and no distress  EYES: Eyes grossly normal to inspection, PERRL and conjunctivae and sclerae normal  HENT: ear canals and  TM's normal, nose and mouth without ulcers or lesions  NECK: no adenopathy, no asymmetry, masses, or scars and thyroid normal to palpation  RESP: lungs clear to auscultation - no rales, rhonchi or wheezes  CV: regular rate and rhythm, normal S1 S2, no S3 or S4, no murmur, click or rub, no peripheral edema and peripheral pulses strong  ABDOMEN: soft, nontender, no hepatosplenomegaly, no masses and bowel sounds normal  MS: no gross musculoskeletal defects noted, no edema  SKIN: no suspicious lesions or rashes  NEURO: Normal strength and tone, mentation intact and speech normal  PSYCH: mentation appears normal, affect normal/bright    Diagnostic Test Results:  none      ASSESSMENT/PLAN:                                                        ICD-10-CM    1. Dizziness  R42 meclizine (ANTIVERT) 25 MG tablet     Physical Therapy Referral   2. Hypertension goal BP (blood pressure) < 140/90  I10        Dizziness is likely peripheral likely BPPV  Trial meclizine. Warned sedating  Referred to PT      See patient instructions.  Signs and symptoms of worsening dizziness discussed. Alarm symptoms of possible CVA or cardiac events discussed. If with any of these advised to go to ER.    No driving and avoid being on any unprotected heights until dizziness is resolved.    Recommend follow up with primary care provider if no relief in 3 days, sooner if worse  Adverse reactions of medications discussed.  Over the counter medications discussed.   Aware to come back in if with worsening symptoms or if no relief despite treatment plan  Patient voiced understanding and had no further questions.     BP elevated - patient reports white coat hypertension and that they checked it earlier and was 130/80  They plan to observe and recheck at home  Alarm signs or symptoms discussed, if present recommend go to ER       MD Nancy Dobbins MD  [facility] CARE [place]

## 2021-09-27 DIAGNOSIS — Z79.01 LONG TERM CURRENT USE OF ANTICOAGULANT THERAPY: ICD-10-CM

## 2021-09-27 DIAGNOSIS — I26.99 OTHER PULMONARY EMBOLISM WITHOUT ACUTE COR PULMONALE, UNSPECIFIED CHRONICITY (H): ICD-10-CM

## 2021-09-27 RX ORDER — WARFARIN SODIUM 5 MG/1
TABLET ORAL
Qty: 70 TABLET | Refills: 1 | Status: SHIPPED | OUTPATIENT
Start: 2021-09-27 | End: 2022-04-26

## 2021-09-27 NOTE — TELEPHONE ENCOUNTER
Requested Prescriptions   Pending Prescriptions Disp Refills     warfarin ANTICOAGULANT (COUMADIN) 5 MG tablet 70 tablet 1     Sig: Take 5 mg Mon, Wed, Fri and 2.5 mg all other days or as directed by coumadin clinic

## 2021-09-28 ENCOUNTER — ANTICOAGULATION THERAPY VISIT (OUTPATIENT)
Dept: ANTICOAGULATION | Facility: CLINIC | Age: 81
End: 2021-09-28

## 2021-09-28 ENCOUNTER — LAB (OUTPATIENT)
Dept: LAB | Facility: CLINIC | Age: 81
End: 2021-09-28
Payer: MEDICARE

## 2021-09-28 ENCOUNTER — HOSPITAL ENCOUNTER (OUTPATIENT)
Dept: PHYSICAL THERAPY | Facility: CLINIC | Age: 81
Setting detail: THERAPIES SERIES
End: 2021-09-28
Attending: ORTHOPAEDIC SURGERY
Payer: MEDICARE

## 2021-09-28 DIAGNOSIS — I26.99 PULMONARY EMBOLI (H): ICD-10-CM

## 2021-09-28 DIAGNOSIS — I26.99 OTHER PULMONARY EMBOLISM WITHOUT ACUTE COR PULMONALE, UNSPECIFIED CHRONICITY (H): ICD-10-CM

## 2021-09-28 DIAGNOSIS — Z79.01 LONG TERM CURRENT USE OF ANTICOAGULANT THERAPY: ICD-10-CM

## 2021-09-28 DIAGNOSIS — I26.99 PULMONARY EMBOLI (H): Primary | ICD-10-CM

## 2021-09-28 LAB — INR BLD: 2.7 (ref 0.9–1.1)

## 2021-09-28 PROCEDURE — 85610 PROTHROMBIN TIME: CPT

## 2021-09-28 PROCEDURE — 36416 COLLJ CAPILLARY BLOOD SPEC: CPT

## 2021-09-28 PROCEDURE — 97110 THERAPEUTIC EXERCISES: CPT | Mod: GP,KX

## 2021-09-28 NOTE — PROGRESS NOTES
ANTICOAGULATION MANAGEMENT     Tracy Palomo 81 year old female is on warfarin with therapeutic INR result. (Goal INR 2.0-3.0)    Recent labs: (last 7 days)     09/28/21  0907   INR 2.7*       ASSESSMENT     Source(s): Chart Review and Patient/Caregiver Call       Warfarin doses taken: Warfarin taken as instructed    Diet: No new diet changes identified    New illness, injury, or hospitalization: No    Medication/supplement changes: None noted    Signs or symptoms of bleeding or clotting: No    Previous INR: Therapeutic last 2(+) visits    Additional findings: None     PLAN     Recommended plan for no diet, medication or health factor changes affecting INR     Dosing Instructions: Continue your current warfarin dose with next INR in 5 weeks       Summary  As of 9/28/2021    Full warfarin instructions:  5 mg every Mon, Fri; 2.5 mg all other days   Next INR check:  11/2/2021             Telephone call with Tracy who verbalizes understanding and agrees to plan and who agrees to plan and repeated back plan correctly    Lab visit scheduled    Education provided: None required    Plan made per [facility] anticoagulation protocol    Radha Rodriguez, RN  Anticoagulation Clinic  9/28/2021    _______________________________________________________________________     Anticoagulation Episode Summary     Current INR goal:  2.0-3.0   TTR:  72.5 % (1 y)   Target end date:  Indefinite   Send INR reminders to:  TOSHA FANY    Indications    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

## 2021-09-30 ENCOUNTER — HOSPITAL ENCOUNTER (OUTPATIENT)
Dept: PHYSICAL THERAPY | Facility: CLINIC | Age: 81
Setting detail: THERAPIES SERIES
End: 2021-09-30
Attending: ORTHOPAEDIC SURGERY
Payer: MEDICARE

## 2021-09-30 PROCEDURE — 97110 THERAPEUTIC EXERCISES: CPT | Mod: GP

## 2021-10-02 ENCOUNTER — HEALTH MAINTENANCE LETTER (OUTPATIENT)
Age: 81
End: 2021-10-02

## 2021-10-05 ENCOUNTER — HOSPITAL ENCOUNTER (OUTPATIENT)
Dept: PHYSICAL THERAPY | Facility: CLINIC | Age: 81
Setting detail: THERAPIES SERIES
End: 2021-10-05
Attending: ORTHOPAEDIC SURGERY
Payer: MEDICARE

## 2021-10-05 PROCEDURE — 97110 THERAPEUTIC EXERCISES: CPT | Mod: GP,KX | Performed by: PHYSICAL THERAPIST

## 2021-10-07 ENCOUNTER — HOSPITAL ENCOUNTER (OUTPATIENT)
Dept: PHYSICAL THERAPY | Facility: CLINIC | Age: 81
Setting detail: THERAPIES SERIES
End: 2021-10-07
Attending: ORTHOPAEDIC SURGERY
Payer: MEDICARE

## 2021-10-07 PROCEDURE — 97110 THERAPEUTIC EXERCISES: CPT | Mod: GP | Performed by: PHYSICAL THERAPIST

## 2021-10-08 ENCOUNTER — HOSPITAL ENCOUNTER (OUTPATIENT)
Dept: PHYSICAL THERAPY | Facility: CLINIC | Age: 81
Setting detail: THERAPIES SERIES
End: 2021-10-08
Attending: FAMILY MEDICINE
Payer: MEDICARE

## 2021-10-08 DIAGNOSIS — R42 DIZZINESS: ICD-10-CM

## 2021-10-08 PROCEDURE — 97161 PT EVAL LOW COMPLEX 20 MIN: CPT | Mod: GP | Performed by: PHYSICAL THERAPIST

## 2021-10-08 PROCEDURE — 97112 NEUROMUSCULAR REEDUCATION: CPT | Mod: GP,59 | Performed by: PHYSICAL THERAPIST

## 2021-10-08 PROCEDURE — 95992 CANALITH REPOSITIONING PROC: CPT | Mod: GP,KX | Performed by: PHYSICAL THERAPIST

## 2021-10-08 NOTE — PROGRESS NOTES
[facility]        OUTPATIENT PHYSICAL THERAPY FUNCTIONAL EVALUATION  PLAN OF TREATMENT FOR OUTPATIENT REHABILITATION  (COMPLETE FOR INITIAL CLAIMS ONLY)  Patient's Last Name, First Name, M.I.  YOB: 1940  Tracy Palomo     Provider's Name   [facility]   Medical Record No.  [id number]     Start of Care Date:  10/08/21   Onset Date:  01/08/21   Type:     _X__PT   ____OT  ____SLP Medical Diagnosis:  (L) BPPV     PT Diagnosis:  BPPV (L) Visits from SOC:  1                              __________________________________________________________________________________  Plan of Treatment/Functional Goals:  neuromuscular re-education (canalith repositioning)           GOALS  1  The patient will deny dizziness getting out of bed on left side in order to return to previous confidence with tolerance.  11/19/21    2  The patient will deny dizziness for 1 week in order to return to prior confidence with transfers.  11/19/21                                                                      Therapy Frequency:  1 time/week   Predicted Duration of Therapy Intervention:  6 weeks    Adilene Ellsworth, PT                                    I CERTIFY THE NEED FOR THESE SERVICES FURNISHED UNDER        THIS PLAN OF TREATMENT AND WHILE UNDER MY CARE     (Physician co-signature of this document indicates review and certification of the therapy plan).                Certification Date From:  10/08/21   Certification Date To:  11/19/21    Referring Provider:  Nancy Mckinney MD    Initial Assessment  See Epic Evaluation- Start of Care Date: 10/08/21

## 2021-10-08 NOTE — PROGRESS NOTES
10/08/21 1003   Quick Adds   Quick Adds Vestibular Eval;Certification   Type of Visit Initial OP PT Evaluation       Present No   General Information   Start of Care Date 10/08/21   Referring Physician Nancy Mckinney MD   Orders Evaluate and Treat as Indicated   Order Date 09/25/21   Medical Diagnosis BPPV (L)   Onset of illness/injury or Date of Surgery 01/08/21   Precautions/Limitations no known precautions/limitations   Surgical/Medical history reviewed Yes   Pertinent history of current problem (include personal factors and/or comorbidities that impact the POC) Getting into bed on (L) side or sitting up laying in (L) position.  Pt believes it is only when turning onto (L) shoulder.  Pt reports after getting up from laying down she will look (L) and right until dizziness stops and then will look up. Dizziness will last for approximately 10 seconds. These symptoms have been going on since January or Feb, reports was in the MD office and they instructed her to look online for exercise to correct, she was unable to find this activity and started with head turns. Pt hurt Right shoulder and is currently in therapy; she has since then stopping sleeping on her (R) shoulder   Prior level of functional mobility Transfers;Ambulation   Patient role/Employment history Retired   Living environment [place]   Patient/Family Goals Statement to get rid of dizziness   Fall Risk Screen   Fall screen completed by PT   Have you fallen 2 or more times in the past year? Yes   Have you fallen and had an injury in the past year? Yes   Timed Up and Go score (seconds) 14.3   Is patient a fall risk? Yes   Fall screen comments patients most recent fall resulted in shoulder injury that she is subsequently being treated for   Abuse Screen (yes response referral indicated)   Feels Unsafe at Home or Work/School no   Feels Threatened by Someone no   Does Anyone Try to Keep You From Having Contact with Others or  Doing Things Outside Your Home? no   Physical Signs of Abuse Present no   System Outcome Measures   Outcome Measures BPPV   Dizziness Handicap Inventory (score out of 100) A decrease in score by 17.18 or greater indicates a clinically significant change in symptoms. 8   Pain   Patient currently in pain Yes;Other   Pain location being treated for shoulder pain by PT   Observation   Observation increased thoracic kyphosis and forward head position; limited ROM   Posture   Posture Kyphosis   Range of Motion (ROM)   ROM Quick Adds Neck   ROM Comment cervical ROM limited, bilaterally, pain free   Cervicogenic Screen   Neck ROM limited   Vertebral Artery Test Normal   Alar Ligament Test Normal   Transverse Ligament Test Normal   Oculomotor Exam   Smooth Pursuit Normal   Infrared Goggle Exam or Frenzel Lense Exam   Vestibular Suppressant in Last 24 Hours? No   Exam completed with Room Light   Spontaneous Nystagmus Negative   Gaze Evoked Nystagmus Negative   Head Shake Horizontal Nystagmus comments unable to complete without relaxation of patient   Positional testing Upbeating L torsional   [place]-Hallpike (right) Negative   [place]-Hallpike (Left) Upbeating L torsional   BPPV Canal(s) L Posterior   BPPV Type Canalithasis   Planned Therapy Interventions   Planned Therapy Interventions neuromuscular re-education  (canalith repositioning)   Clinical Impression   Criteria for Skilled Therapeutic Interventions Met yes, treatment indicated   PT Diagnosis BPPV (L)   Influenced by the following impairments dizziness   Functional limitations due to impairments getting out of bed   Clinical Presentation Stable/Uncomplicated   Clinical Presentation Rationale The patient is a 82 yo female who was referred to outpatient physical therapy for dizziness; subjective and objective data suggest (L) BPPV.  The patient reports dizziness when getting up from bed that has been going on for 8-9months.  Skilled physical therapy is required in order to  improve safety and confidence with transfers.   Clinical Decision Making (Complexity) Low complexity   Therapy Frequency 1 time/week   Predicted Duration of Therapy Intervention (days/wks) 6 weeks   Risk & Benefits of therapy have been explained Yes   Patient, Family & other staff in agreement with plan of care Yes   Education Assessment   Preferred Learning Style Listening;Demonstration;Pictures/video   Barriers to Learning No barriers   GOALS   PT Eval Goals 1;2   Goal 1   Goal Identifier 1   Goal Description The patient will deny dizziness getting out of bed on left side in order to return to previous confidence with tolerance.   Target Date 11/19/21   Goal 2   Goal Identifier 2   Goal Description The patient will deny dizziness for 1 week in order to return to prior confidence with transfers.   Target Date 11/19/21   Total Evaluation Time   PT Eval, Low Complexity Minutes (54187) 20   Therapy Certification   Certification date from 10/08/21   Certification date to 11/19/21   Medical Diagnosis (L) BPPV   Certification I certify the need for these services furnished under this plan of treatment and while under my care.  (Physician co-signature of this document indicates review and certification of the therapy plan).

## 2021-10-12 ENCOUNTER — HOSPITAL ENCOUNTER (OUTPATIENT)
Dept: PHYSICAL THERAPY | Facility: CLINIC | Age: 81
Setting detail: THERAPIES SERIES
End: 2021-10-12
Attending: ORTHOPAEDIC SURGERY
Payer: MEDICARE

## 2021-10-12 ENCOUNTER — HOSPITAL ENCOUNTER (OUTPATIENT)
Dept: PHYSICAL THERAPY | Facility: CLINIC | Age: 81
Setting detail: THERAPIES SERIES
End: 2021-10-12
Attending: FAMILY MEDICINE
Payer: MEDICARE

## 2021-10-12 PROCEDURE — 95992 CANALITH REPOSITIONING PROC: CPT | Mod: GP | Performed by: PHYSICAL THERAPIST

## 2021-10-12 PROCEDURE — 97112 NEUROMUSCULAR REEDUCATION: CPT | Mod: GP,59,KX | Performed by: PHYSICAL THERAPIST

## 2021-10-12 PROCEDURE — 97110 THERAPEUTIC EXERCISES: CPT | Mod: GP,KX | Performed by: PHYSICAL THERAPIST

## 2021-10-12 NOTE — PROGRESS NOTES
Outpatient Physical Therapy Discharge Note     Patient: Tracy Palomo  : 1940    Beginning/End Dates of Reporting Period:  21 to 10/12/21    Referring Provider: Dr. Pang    Therapy Diagnosis: R shoulder pain.     Client Self Report: Pt reports performing most household tasks without a significant increase in pain.   Pt will get a light twinge of pain with reaching.    Objective Measurements:  Objective Measure: Pain  Details: 0/10  Objective Measure: SPADI  Details: 0.77                              Outcome Measures (most recent score):  SPADI: 0.77    Goals:  Goal Identifier SPADI   Goal Description Patient will improve score on SPADI to 40 or less demonstrating improved shoulder function and less limitations   Target Date 21   Date Met  21   Progress (detail required for progress note): Patient scored 10 (7% disability of the shoulder) which hits her goal of 40 or less     Goal Identifier Shoulder AROM   Goal Description Patient will be able reach above her head with shoulder flexion and abduction exceeded 130 degrees in her right shoulder   Target Date 21   Date Met  21   Progress (detail required for progress note): Patient has 160 degrees of flexion and abduction in standing.  She has easily exceeded 130 degrees. The concentric  portion is pain free, but the eccentric portion has limited control and is painful     Goal Identifier Shoulder Strength   Goal Description Patient will have 5/5 MMT without pain indicating improved shoulder strength and function allowing her to perform yard work and house work without limitations   Target Date 21   Date Met  10/12/21   Progress (detail required for progress note):       Goal Identifier     Goal Description     Target Date     Date Met      Progress (detail required for progress note):       Goal Identifier     Goal Description     Target Date     Date Met      Progress (detail required for progress note):       Goal  Identifier     Goal Description     Target Date     Date Met      Progress (detail required for progress note):       Goal Identifier     Goal Description     Target Date     Date Met      Progress (detail required for progress note):       Goal Identifier     Goal Description     Target Date     Date Met      Progress (detail required for progress note):             Plan:  Discharge from therapy.    Discharge:    Reason for Discharge: Patient has met all goals.    Equipment Issued: none    Discharge Plan: Patient to continue home program.

## 2021-10-19 ENCOUNTER — HOSPITAL ENCOUNTER (OUTPATIENT)
Dept: PHYSICAL THERAPY | Facility: CLINIC | Age: 81
Setting detail: THERAPIES SERIES
End: 2021-10-19
Attending: FAMILY MEDICINE
Payer: MEDICARE

## 2021-10-19 DIAGNOSIS — E78.5 HYPERLIPIDEMIA LDL GOAL <130: ICD-10-CM

## 2021-10-19 PROCEDURE — 97112 NEUROMUSCULAR REEDUCATION: CPT | Mod: GP,KX | Performed by: PHYSICAL THERAPIST

## 2021-10-21 NOTE — TELEPHONE ENCOUNTER
Pending Prescriptions:                       Disp   Refills    lovastatin (MEVACOR) 40 MG tablet [Pharmac*90 tab*0        Sig: TAKE 1 TABLET BY MOUTH ONCE DAILY AT BEDTIME    Routing refill request to provider for review/approval because:  Labs not current:    LDL Cholesterol Calculated   Date Value Ref Range Status   01/31/2020 56 <100 mg/dL Final     Comment:     Desirable:       <100 mg/dl

## 2021-10-22 RX ORDER — LOVASTATIN 40 MG
TABLET ORAL
Qty: 90 TABLET | Refills: 0 | Status: SHIPPED | OUTPATIENT
Start: 2021-10-22 | End: 2021-12-27

## 2021-10-25 NOTE — PROGRESS NOTES
Outpatient Physical Therapy Discharge Note     Patient: Tracy Palomo  : 1940    Beginning/End Dates of Reporting Period:  10/8/21 to 10/19/21    Referring Provider: Dr. Nancy Dixon    Therapy Diagnosis: BPPV.     Client Self Report: Pt reports doing well and not being dizziness; patient reports not doing Eply for the last 2 days.     Objective Measurements:   Negative [place]-Hallpike.      Outcome Measures (most recent score):  Dizziness and Handicap Inventory: 0    Goals:  Goal Identifier 1   Goal Description The patient will deny dizziness getting out of bed on left side in order to return to previous confidence with tolerance.   Target Date 21   Date Met   10/19/21   Progress (detail required for progress note):       Goal Identifier 2   Goal Description The patient will deny dizziness for 1 week in order to return to prior confidence with transfers.   Target Date 21   Date Met   10/19/21   Progress (detail required for progress note):         Plan:  Discharge from therapy.    Discharge:    Reason for Discharge: Patient has met all goals.    Equipment Issued: none    Discharge Plan: Patient to continue home program.

## 2021-11-02 ENCOUNTER — LAB (OUTPATIENT)
Dept: LAB | Facility: CLINIC | Age: 81
End: 2021-11-02
Payer: MEDICARE

## 2021-11-02 ENCOUNTER — ANTICOAGULATION THERAPY VISIT (OUTPATIENT)
Dept: ANTICOAGULATION | Facility: CLINIC | Age: 81
End: 2021-11-02

## 2021-11-02 DIAGNOSIS — I26.99 PULMONARY EMBOLI (H): ICD-10-CM

## 2021-11-02 DIAGNOSIS — I26.99 OTHER PULMONARY EMBOLISM WITHOUT ACUTE COR PULMONALE, UNSPECIFIED CHRONICITY (H): ICD-10-CM

## 2021-11-02 DIAGNOSIS — Z79.01 LONG TERM CURRENT USE OF ANTICOAGULANT THERAPY: ICD-10-CM

## 2021-11-02 DIAGNOSIS — I26.99 PULMONARY EMBOLI (H): Primary | ICD-10-CM

## 2021-11-02 LAB — INR BLD: 2.6 (ref 0.9–1.1)

## 2021-11-02 PROCEDURE — 85610 PROTHROMBIN TIME: CPT

## 2021-11-02 PROCEDURE — 36416 COLLJ CAPILLARY BLOOD SPEC: CPT

## 2021-11-02 NOTE — PROGRESS NOTES
ANTICOAGULATION MANAGEMENT     Tracy Palomo 81 year old female is on warfarin with therapeutic INR result. (Goal INR 2.0-3.0)    Recent labs: (last 7 days)     11/02/21  1049   INR 2.6*       ASSESSMENT     Source(s): Chart Review and Patient/Caregiver Call       Warfarin doses taken: Warfarin taken as instructed    Diet: No new diet changes identified    New illness, injury, or hospitalization: No    Medication/supplement changes: None noted    Signs or symptoms of bleeding or clotting: No    Previous INR: Therapeutic last 2(+) visits    Additional findings: None     PLAN     Recommended plan for no diet, medication or health factor changes affecting INR     Dosing Instructions: Continue your current warfarin dose with next INR in 6 weeks       Summary  As of 11/2/2021    Full warfarin instructions:  5 mg every Mon, Fri; 2.5 mg all other days   Next INR check:  12/14/2021             Telephone call with Tracy who verbalizes understanding and agrees to plan    Lab visit scheduled    Education provided: Please call back if any changes to your diet, medications or how you've been taking warfarin    Plan made per [facility] anticoagulation protocol    Johanna Mckeon, RN  Anticoagulation Clinic  11/2/2021    _______________________________________________________________________     Anticoagulation Episode Summary     Current INR goal:  2.0-3.0   TTR:  72.6 % (1 y)   Target end date:  Indefinite   Send INR reminders to:  GURVINDER SOARES    Indications    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

## 2021-11-30 ENCOUNTER — IMMUNIZATION (OUTPATIENT)
Dept: FAMILY MEDICINE | Facility: CLINIC | Age: 81
End: 2021-11-30
Payer: MEDICARE

## 2021-11-30 DIAGNOSIS — Z23 HIGH PRIORITY FOR 2019-NCOV VACCINE: Primary | ICD-10-CM

## 2021-11-30 PROCEDURE — 0013A COVID-19,PF,MODERNA (18+ YRS PRIMARY SERIES .5ML): CPT

## 2021-11-30 PROCEDURE — 91301 COVID-19,PF,MODERNA (18+ YRS PRIMARY SERIES .5ML): CPT

## 2021-11-30 PROCEDURE — 99207 PR NO CHARGE NURSE ONLY: CPT

## 2021-12-03 ENCOUNTER — NURSE TRIAGE (OUTPATIENT)
Dept: NURSING | Facility: CLINIC | Age: 81
End: 2021-12-03
Payer: MEDICARE

## 2021-12-03 NOTE — TELEPHONE ENCOUNTER
"Caller reports she  Had Moderna  Booster  3 days ago and arm  Is red and puffy and tender   Triage protocol revieiwed ; meets criteria fo  \"Covid arm\" per protocol   advised in home care and to follow up if  It does not start to improve or worsens orshe develops fever  Or any other new or worseninsg symptoms   Caller is reassured and understsnds   Suzanne Sheth RN  FNA      Reason for Disposition    COVID-19 vaccine, injection site reaction (e.g., pain, redness, swelling), question about    Additional Information    Negative: [1] Difficulty breathing or swallowing AND [2] starts within 2 hours after injection    Negative: Sounds like a life-threatening emergency to the triager    Negative: [1] Symptoms of COVID-19 (e.g., cough, fever, SOB, or others) AND [2] within 14 days of EXPOSURE (close contact) with diagnosed or suspected COVID-19 patient    Negative: [1] Symptoms of COVID-19 (e.g., cough, fever, SOB, or others) AND [2] within 14 days of being at a crowded indoor or outdoor event (e.g., concert, festival, rally, wedding)    Negative: Typical COVID-19 symptoms (e.g., cough, difficulty breathing, loss of taste or smell, runny nose, sore throat) that are NOT expected from vaccine    Negative: [1] COVID-19 exposure AND [2] no symptoms, or symptoms not typical of COVID-19    Negative: Fever > 104 F (40 C)    Negative: Sounds like a severe, unusual reaction to the triager    Negative: [1] Redness or red streak around the injection site AND [2] started > 48 hours after getting vaccine AND [3] fever    Negative: [1] Fever > 101 F (38.3 C) AND [2] age > 60 years AND [3] started > 48 hours after getting vaccine    Negative: [1] Fever > 100.0 F (37.8 C) AND [2] bedridden (e.g., nursing home patient, CVA, chronic illness, recovering from surgery) AND [3] started > 48 hours after getting vaccine    Negative: [1] Fever > 100.0 F (37.8 C) AND [2] diabetes mellitus or weak immune system (e.g., HIV positive, cancer chemo, " splenectomy, organ transplant, chronic steroids) AND [3] started > 48 hours after getting vaccine    Negative: Fever > 100.0 F (37.8 C) present > 3 days (72 hours)    Protocols used: CORONAVIRUS (COVID-19) VACCINE QUESTIONS AND [id number] 8.25.2021

## 2021-12-06 DIAGNOSIS — E78.5 HYPERLIPIDEMIA LDL GOAL <130: ICD-10-CM

## 2021-12-08 RX ORDER — LOVASTATIN 40 MG
TABLET ORAL
Qty: 90 TABLET | Refills: 0 | OUTPATIENT
Start: 2021-12-08

## 2021-12-08 NOTE — TELEPHONE ENCOUNTER
Prescription was sent 10/22/21 for #90 with 0 refills.  Pharmacy notified via E-Prescribe refusal.     Maria Del Rosario Wright RN on 12/8/2021 at 9:48 AM

## 2021-12-14 ENCOUNTER — LAB (OUTPATIENT)
Dept: LAB | Facility: CLINIC | Age: 81
End: 2021-12-14
Payer: MEDICARE

## 2021-12-14 ENCOUNTER — ANTICOAGULATION THERAPY VISIT (OUTPATIENT)
Dept: ANTICOAGULATION | Facility: CLINIC | Age: 81
End: 2021-12-14

## 2021-12-14 DIAGNOSIS — I26.99 OTHER PULMONARY EMBOLISM WITHOUT ACUTE COR PULMONALE, UNSPECIFIED CHRONICITY (H): ICD-10-CM

## 2021-12-14 DIAGNOSIS — I26.99 PULMONARY EMBOLI (H): ICD-10-CM

## 2021-12-14 DIAGNOSIS — Z79.01 LONG TERM CURRENT USE OF ANTICOAGULANT THERAPY: ICD-10-CM

## 2021-12-14 DIAGNOSIS — I26.99 PULMONARY EMBOLI (H): Primary | ICD-10-CM

## 2021-12-14 LAB — INR BLD: 2.2 (ref 0.9–1.1)

## 2021-12-14 PROCEDURE — 36416 COLLJ CAPILLARY BLOOD SPEC: CPT

## 2021-12-14 PROCEDURE — 85610 PROTHROMBIN TIME: CPT

## 2021-12-14 NOTE — PROGRESS NOTES
ANTICOAGULATION MANAGEMENT     Tracy Palomo 81 year old female is on warfarin with therapeutic INR result. (Goal INR 2.0-3.0)    Recent labs: (last 7 days)     12/14/21  1031   INR 2.2*       ASSESSMENT     Source(s): Chart Review and Patient/Caregiver Call       Warfarin doses taken: Warfarin taken as instructed    Diet: No new diet changes identified    New illness, injury, or hospitalization: No    Medication/supplement changes: None noted    Signs or symptoms of bleeding or clotting: No    Previous INR: Therapeutic last 2(+) visits    Additional findings: None     PLAN     Recommended plan for no diet, medication or health factor changes affecting INR     Dosing Instructions: Continue your current warfarin dose with next INR in 6 weeks       Summary  As of 12/14/2021    Full warfarin instructions:  5 mg every Mon, Fri; 2.5 mg all other days   Next INR check:  1/25/2022             Telephone call with Tracy who verbalizes understanding and agrees to plan    Lab visit scheduled    Education provided: Please call back if any changes to your diet, medications or how you've been taking warfarin    Plan made per [facility] anticoagulation protocol    Johanna Mckeon, RN  Anticoagulation Clinic  12/14/2021    _______________________________________________________________________     Anticoagulation Episode Summary     Current INR goal:  2.0-3.0   TTR:  76.5 % (1 y)   Target end date:  Indefinite   Send INR reminders to:  GURVINDER SOARES    Indications    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

## 2021-12-27 DIAGNOSIS — E78.5 HYPERLIPIDEMIA LDL GOAL <130: ICD-10-CM

## 2021-12-28 NOTE — TELEPHONE ENCOUNTER
Routing refill request to provider for review/approval because:  Labs not current:  LDL    ABDIEL ReynagaN, RN  [facility]

## 2021-12-29 RX ORDER — LOVASTATIN 40 MG
TABLET ORAL
Qty: 90 TABLET | Refills: 0 | Status: SHIPPED | OUTPATIENT
Start: 2021-12-29 | End: 2022-04-26

## 2022-01-25 ENCOUNTER — ANTICOAGULATION THERAPY VISIT (OUTPATIENT)
Dept: ANTICOAGULATION | Facility: CLINIC | Age: 82
End: 2022-01-25

## 2022-01-25 ENCOUNTER — LAB (OUTPATIENT)
Dept: LAB | Facility: CLINIC | Age: 82
End: 2022-01-25
Payer: MEDICARE

## 2022-01-25 DIAGNOSIS — I26.99 OTHER PULMONARY EMBOLISM WITHOUT ACUTE COR PULMONALE, UNSPECIFIED CHRONICITY (H): ICD-10-CM

## 2022-01-25 DIAGNOSIS — Z79.01 LONG TERM CURRENT USE OF ANTICOAGULANT THERAPY: ICD-10-CM

## 2022-01-25 DIAGNOSIS — I26.99 PULMONARY EMBOLI (H): Primary | ICD-10-CM

## 2022-01-25 DIAGNOSIS — I26.99 PULMONARY EMBOLI (H): ICD-10-CM

## 2022-01-25 LAB — INR BLD: 1.9 (ref 0.9–1.1)

## 2022-01-25 PROCEDURE — 36416 COLLJ CAPILLARY BLOOD SPEC: CPT

## 2022-01-25 PROCEDURE — 85610 PROTHROMBIN TIME: CPT

## 2022-01-25 NOTE — PROGRESS NOTES
ANTICOAGULATION MANAGEMENT     Tracy Palomo 81 year old female is on warfarin with subtherapeutic INR result. (Goal INR 2.0-3.0)    Recent labs: (last 7 days)     01/25/22  1042   INR 1.9*       ASSESSMENT     Source(s): Chart Review and Patient/Caregiver Call       Warfarin doses taken: Warfarin taken as instructed    Diet: Increased greens/vitamin K in diet; plans to resume previous intake    New illness, injury, or hospitalization: No    Medication/supplement changes: None noted    Signs or symptoms of bleeding or clotting: No    Previous INR: Therapeutic last 2(+) visits    Additional findings: None     PLAN     Recommended plan for temporary change(s) affecting INR     Dosing Instructions: Continue your current warfarin dose with next INR in 2 weeks       Summary  As of 1/25/2022    Full warfarin instructions:  5 mg every Mon, Fri; 2.5 mg all other days   Next INR check:  2/8/2022             Telephone call with Tracy who verbalizes understanding and agrees to plan    Lab visit scheduled    Education provided: Please call back if any changes to your diet, medications or how you've been taking warfarin, Importance of consistent vitamin K intake, Impact of vitamin K foods on INR and Contact [phone]  with any changes, questions or concerns.     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

## 2022-02-09 ENCOUNTER — OFFICE VISIT (OUTPATIENT)
Dept: ORTHOPEDICS | Facility: CLINIC | Age: 82
End: 2022-02-09
Payer: MEDICARE

## 2022-02-09 ENCOUNTER — LAB (OUTPATIENT)
Dept: LAB | Facility: CLINIC | Age: 82
End: 2022-02-09
Payer: MEDICARE

## 2022-02-09 ENCOUNTER — ANTICOAGULATION THERAPY VISIT (OUTPATIENT)
Dept: ANTICOAGULATION | Facility: CLINIC | Age: 82
End: 2022-02-09

## 2022-02-09 ENCOUNTER — ANCILLARY PROCEDURE (OUTPATIENT)
Dept: GENERAL RADIOLOGY | Facility: CLINIC | Age: 82
End: 2022-02-09
Attending: ORTHOPAEDIC SURGERY
Payer: MEDICARE

## 2022-02-09 VITALS
HEIGHT: 64 IN | WEIGHT: 182.8 LBS | BODY MASS INDEX: 31.21 KG/M2 | DIASTOLIC BLOOD PRESSURE: 70 MMHG | SYSTOLIC BLOOD PRESSURE: 124 MMHG

## 2022-02-09 DIAGNOSIS — Z96.652 S/P TOTAL KNEE REPLACEMENT USING CEMENT, LEFT: Primary | ICD-10-CM

## 2022-02-09 DIAGNOSIS — Z96.652 S/P TOTAL KNEE REPLACEMENT USING CEMENT, LEFT: ICD-10-CM

## 2022-02-09 DIAGNOSIS — I26.99 OTHER PULMONARY EMBOLISM WITHOUT ACUTE COR PULMONALE, UNSPECIFIED CHRONICITY (H): ICD-10-CM

## 2022-02-09 DIAGNOSIS — I26.99 PULMONARY EMBOLI (H): Primary | ICD-10-CM

## 2022-02-09 DIAGNOSIS — Z79.01 LONG TERM CURRENT USE OF ANTICOAGULANT THERAPY: ICD-10-CM

## 2022-02-09 DIAGNOSIS — I26.99 PULMONARY EMBOLI (H): ICD-10-CM

## 2022-02-09 LAB — INR BLD: 2.9 (ref 0.9–1.1)

## 2022-02-09 PROCEDURE — 36416 COLLJ CAPILLARY BLOOD SPEC: CPT

## 2022-02-09 PROCEDURE — 73562 X-RAY EXAM OF KNEE 3: CPT | Mod: TC | Performed by: RADIOLOGY

## 2022-02-09 PROCEDURE — 99213 OFFICE O/P EST LOW 20 MIN: CPT | Performed by: ORTHOPAEDIC SURGERY

## 2022-02-09 PROCEDURE — 85610 PROTHROMBIN TIME: CPT

## 2022-02-09 ASSESSMENT — KOOS JR
TWISING OR PIVOTING ON KNEE: MILD
GOING UP OR DOWN STAIRS: MODERATE
RISING FROM SITTING: MODERATE
HOW SEVERE IS YOUR KNEE STIFFNESS AFTER FIRST WAKING IN MORNING: MODERATE
KOOS JR SCORING: 68.28

## 2022-02-09 ASSESSMENT — MIFFLIN-ST. JEOR: SCORE: 1276.24

## 2022-02-09 ASSESSMENT — PAIN SCALES - GENERAL: PAINLEVEL: NO PAIN (0)

## 2022-02-09 NOTE — LETTER
"    2/9/2022         RE: Tracy Palomo  [address] [facility] [id number]        Dear Colleague,    Thank you for referring your patient, Tracy Palomo, to the [facility]. Please see a copy of my visit note below.    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 ~ 11 months postop       History of Present Illness:   Doing well.  She is very happy.  Pain is much better than presurgery.  She does have some achiness after she has been sitting for a while.    Social History     Occupational History     Not on file   Tobacco Use     Smoking status: Never Smoker     Smokeless tobacco: Never Used   Substance and Sexual Activity     Alcohol use: No     Alcohol/week: 0.0 standard drinks     Drug use: No     Sexual activity: Not Currently     Partners: Male           Physical Exam:  Vitals: /70   Ht 1.613 m (5' 3.5\")   Wt 82.9 kg (182 lb 12.8 oz)   BMI 31.87 kg/m    BMI= Body mass index is 31.87 kg/m .  Constitutional: healthy, alert and no acute distress   Psychiatric: mentation appears normal and affect normal/bright  NEURO: no focal deficits  RESP: Normal with easy respirations and no use of accessory muscles to breathe, no audible wheezing or retractions  CV: LLE:  no edema           SKIN: No erythema, rashes, excoriation, or breakdown. No evidence of infection.   JOINT/EXTREMITIES:left knee: No effusion.  No focal areas of tenderness.  Active motions 3-100 degrees.  No instability with varus and valgus testing.  Able to get up from a seated to standing position with no difficulties  GAIT: not tested             Diagnostic Modalities:  left knee X-ray: The prosthesis has acceptable alignment. No fractures or dislocations. Prosthesis is well seated " with no evidence of loosening.  Patella not resurfaced.  Independent visualization of the images was performed.      Impression: left total knee arthroplasty-year check  (Patella not resurfaced)  Plan:  All of the above pertinent physical exam and imaging modalities findings was reviewed with Tracy.    Doing well.  She is very happy with with her results.  Recommend she continue activity.  Follow-up as needed      Return to clinic PRN, or sooner as needed for changes.  Re-x-ray on return: No    Vitaly Pang D.O.            Again, thank you for allowing me to participate in the care of your patient.        Sincerely,        Kaleb Pang, DO

## 2022-02-09 NOTE — PROGRESS NOTES
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 ~ 11 months postop       History of Present Illness:   Doing well.  She is very happy.  Pain is much better than presurgery.  She does have some achiness after she has been sitting for a while.    Social History     Occupational History     Not on file   Tobacco Use     Smoking status: Never Smoker     Smokeless tobacco: Never Used   Substance and Sexual Activity     Alcohol use: No     Alcohol/week: 0.0 standard drinks     Drug use: No     Sexual activity: Not Currently     Partners: Male           Physical Exam:  Vitals: /70   Ht 1.613 m (5' 3.5\")   Wt 82.9 kg (182 lb 12.8 oz)   BMI 31.87 kg/m    BMI= Body mass index is 31.87 kg/m .  Constitutional: healthy, alert and no acute distress   Psychiatric: mentation appears normal and affect normal/bright  NEURO: no focal deficits  RESP: Normal with easy respirations and no use of accessory muscles to breathe, no audible wheezing or retractions  CV: LLE:  no edema           SKIN: No erythema, rashes, excoriation, or breakdown. No evidence of infection.   JOINT/EXTREMITIES:left knee: No effusion.  No focal areas of tenderness.  Active motions 3-100 degrees.  No instability with varus and valgus testing.  Able to get up from a seated to standing position with no difficulties  GAIT: not tested             Diagnostic Modalities:  left knee X-ray: The prosthesis has acceptable alignment. No fractures or dislocations. Prosthesis is well seated with no evidence of loosening.  Patella not resurfaced.  Independent visualization of the images was performed.      Impression: left total knee arthroplasty-year check  (Patella not resurfaced)  Plan:  All of the above pertinent physical exam and " imaging modalities findings was reviewed with Tracy.    Doing well.  She is very happy with with her results.  Recommend she continue activity.  Follow-up as needed      Return to clinic PRN, or sooner as needed for changes.  Re-x-ray on return: No    Vitaly Pang D.O.

## 2022-02-09 NOTE — PROGRESS NOTES
ANTICOAGULATION MANAGEMENT     Tracy Palomo 81 year old female is on warfarin with therapeutic INR result. (Goal INR 2.0-3.0)    Recent labs: (last 7 days)     02/09/22  1310   INR 2.9*       ASSESSMENT     Source(s): Chart Review and Patient/Caregiver Call       Warfarin doses taken: Warfarin taken as instructed    Diet: No new diet changes identified    New illness, injury, or hospitalization: No    Medication/supplement changes: None noted    Signs or symptoms of bleeding or clotting: No    Previous INR: Subtherapeutic    Additional findings: None     PLAN     Recommended plan for no diet, medication or health factor changes affecting INR     Dosing Instructions: Continue your current warfarin dose with next INR in 6 weeks       Summary  As of 2/9/2022    Full warfarin instructions:  5 mg every Mon, Fri; 2.5 mg all other days   Next INR check:  3/2/2022             Telephone call with Tracy who verbalizes understanding and agrees to plan    Lab visit scheduled    Education provided: Please call back if any changes to your diet, medications or how you've been taking warfarin    Plan made per [facility] anticoagulation protocol    Johanna Mckeon, RN  Anticoagulation Clinic  2/9/2022    _______________________________________________________________________     Anticoagulation Episode Summary     Current INR goal:  2.0-3.0   TTR:  80.3 % (1 y)   Target end date:  Indefinite   Send INR reminders to:  GURVINDER SOARES    Indications    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]  Long term current use of anticoagulant therapy [Z79.01]  Other pulmonary embolism without acute cor pulmonale  unspecified chronicity (H) [I26.99]           Comments:           Anticoagulation Care Providers     Provider Role Specialty Phone number    Chad Betts MD Referring Internal Medicine [phone]    Gerard Medrano MD Responsible Family Medicine [phone]

## 2022-02-23 DIAGNOSIS — I26.99 PULMONARY EMBOLI (H): Primary | ICD-10-CM

## 2022-02-23 DIAGNOSIS — Z79.01 LONG TERM CURRENT USE OF ANTICOAGULANT THERAPY: ICD-10-CM

## 2022-03-23 ENCOUNTER — LAB (OUTPATIENT)
Dept: LAB | Facility: CLINIC | Age: 82
End: 2022-03-23
Payer: MEDICARE

## 2022-03-23 ENCOUNTER — ANTICOAGULATION THERAPY VISIT (OUTPATIENT)
Dept: ANTICOAGULATION | Facility: CLINIC | Age: 82
End: 2022-03-23

## 2022-03-23 DIAGNOSIS — I26.99 PULMONARY EMBOLI (H): Primary | ICD-10-CM

## 2022-03-23 DIAGNOSIS — Z79.01 LONG TERM CURRENT USE OF ANTICOAGULANT THERAPY: ICD-10-CM

## 2022-03-23 DIAGNOSIS — I26.99 OTHER PULMONARY EMBOLISM WITHOUT ACUTE COR PULMONALE, UNSPECIFIED CHRONICITY (H): ICD-10-CM

## 2022-03-23 DIAGNOSIS — I26.99 PULMONARY EMBOLI (H): ICD-10-CM

## 2022-03-23 LAB — INR BLD: 1.9 (ref 0.9–1.1)

## 2022-03-23 PROCEDURE — 36416 COLLJ CAPILLARY BLOOD SPEC: CPT

## 2022-03-23 PROCEDURE — 85610 PROTHROMBIN TIME: CPT

## 2022-03-23 NOTE — PROGRESS NOTES
ANTICOAGULATION MANAGEMENT     Tracy Palomo 81 year old female is on warfarin with subtherapeutic INR result. (Goal INR 2.0-3.0)    Recent labs: (last 7 days)     03/23/22  0816   INR 1.9*       ASSESSMENT       Source(s): Chart Review and Patient/Caregiver Call       Warfarin doses taken: Less warfarin taken than planned which may be affecting INR Missed dose on Mon, took 5 mg TU    Diet: No new diet changes identified    New illness, injury, or hospitalization: No    Medication/supplement changes: None noted    Signs or symptoms of bleeding or clotting: No    Previous INR: Therapeutic last 2(+) visits    Additional findings: None       PLAN     Recommended plan for temporary change(s) affecting INR     Dosing Instructions: Booster dose then continue your current warfarin dose with next INR in 2 weeks       Summary  As of 3/23/2022    Full warfarin instructions:  3/23: 5 mg; Otherwise 5 mg every Mon, Fri; 2.5 mg all other days   Next INR check:               Telephone call with Tracy who verbalizes understanding and agrees to plan    Lab visit scheduled    Education provided: Importance of taking warfarin as instructed and Monitoring for clotting signs and symptoms    Plan made per [facility] anticoagulation protocol    Radha Rodriguez RN  Anticoagulation Clinic  3/23/2022    _______________________________________________________________________     Anticoagulation Episode Summary     Current INR goal:  2.0-3.0   TTR:  89.0 % (1 y)   Target end date:  Indefinite   Send INR reminders to:  GURVINDER SOARES    Indications    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]  Long term current use of anticoagulant therapy [Z79.01]  Other pulmonary embolism without acute cor pulmonale  unspecified chronicity (H) [I26.99]           Comments:           Anticoagulation Care Providers     Provider Role Specialty Phone number    Chad Betts MD Referring Internal Medicine [phone]    Gerard Medrano  MD [facility] [phone]

## 2022-04-06 ENCOUNTER — LAB (OUTPATIENT)
Dept: LAB | Facility: CLINIC | Age: 82
End: 2022-04-06
Payer: MEDICARE

## 2022-04-06 ENCOUNTER — ANTICOAGULATION THERAPY VISIT (OUTPATIENT)
Dept: ANTICOAGULATION | Facility: CLINIC | Age: 82
End: 2022-04-06

## 2022-04-06 DIAGNOSIS — I26.99 PULMONARY EMBOLI (H): ICD-10-CM

## 2022-04-06 DIAGNOSIS — Z79.01 LONG TERM CURRENT USE OF ANTICOAGULANT THERAPY: ICD-10-CM

## 2022-04-06 DIAGNOSIS — I26.99 PULMONARY EMBOLI (H): Primary | ICD-10-CM

## 2022-04-06 DIAGNOSIS — I26.99 OTHER PULMONARY EMBOLISM WITHOUT ACUTE COR PULMONALE, UNSPECIFIED CHRONICITY (H): ICD-10-CM

## 2022-04-06 LAB — INR BLD: 3.4 (ref 0.9–1.1)

## 2022-04-06 PROCEDURE — 85610 PROTHROMBIN TIME: CPT

## 2022-04-06 PROCEDURE — 36416 COLLJ CAPILLARY BLOOD SPEC: CPT

## 2022-04-06 NOTE — PROGRESS NOTES
ANTICOAGULATION MANAGEMENT     Tracy Palomo 81 year old female is on warfarin with supratherapeutic INR result. (Goal INR 2.0-3.0)    Recent labs: (last 7 days)     04/06/22  0856   INR 3.4*       ASSESSMENT       Source(s): Chart Review and Patient/Caregiver Call       Warfarin doses taken: Warfarin taken as instructed    Diet: Decreased greens/vitamin K in diet; plans to resume previous intake     New illness, injury, or hospitalization: No    Medication/supplement changes: None noted    Signs or symptoms of bleeding or clotting: No    Previous INR: Subtherapeutic    Additional findings: None       PLAN     Recommended plan for temporary change(s) affecting INR     Dosing Instructions: continue your current warfarin dose with next INR in 2 weeks, is going to eat some spinach today.      Summary  As of 4/6/2022    Full warfarin instructions:  5 mg every Mon, Fri; 2.5 mg all other days   Next INR check:  4/20/2022             Telephone call with Tracy who verbalizes understanding and agrees to plan    Lab visit scheduled    Education provided: Importance of consistent vitamin K intake and Impact of vitamin K foods on INR    Plan made per [facility] anticoagulation protocol    Radha Rodriguez, RN  Anticoagulation Clinic  4/6/2022    _______________________________________________________________________     Anticoagulation Episode Summary     Current INR goal:  2.0-3.0   TTR:  89.9 % (1 y)   Target end date:  Indefinite   Send INR reminders to:  GURVINDER SOARES    Indications    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]  Long term current use of anticoagulant therapy [Z79.01]  Other pulmonary embolism without acute cor pulmonale  unspecified chronicity (H) [I26.99]           Comments:           Anticoagulation Care Providers     Provider Role Specialty Phone number    Chad Betts MD Referring Internal Medicine [phone]    Gerard Medrano MD Responsible Family Medicine  [phone]

## 2022-04-20 ENCOUNTER — ANTICOAGULATION THERAPY VISIT (OUTPATIENT)
Dept: ANTICOAGULATION | Facility: CLINIC | Age: 82
End: 2022-04-20

## 2022-04-20 ENCOUNTER — LAB (OUTPATIENT)
Dept: LAB | Facility: CLINIC | Age: 82
End: 2022-04-20
Payer: MEDICARE

## 2022-04-20 ENCOUNTER — TELEPHONE (OUTPATIENT)
Dept: ANTICOAGULATION | Facility: CLINIC | Age: 82
End: 2022-04-20

## 2022-04-20 DIAGNOSIS — I26.99 OTHER PULMONARY EMBOLISM WITHOUT ACUTE COR PULMONALE, UNSPECIFIED CHRONICITY (H): ICD-10-CM

## 2022-04-20 DIAGNOSIS — I26.99 PULMONARY EMBOLI (H): ICD-10-CM

## 2022-04-20 DIAGNOSIS — Z79.01 LONG TERM CURRENT USE OF ANTICOAGULANT THERAPY: ICD-10-CM

## 2022-04-20 DIAGNOSIS — I26.99 PULMONARY EMBOLI (H): Primary | ICD-10-CM

## 2022-04-20 DIAGNOSIS — I27.82 CHRONIC PULMONARY EMBOLISM WITHOUT ACUTE COR PULMONALE, UNSPECIFIED PULMONARY EMBOLISM TYPE (H): Primary | ICD-10-CM

## 2022-04-20 LAB — INR BLD: 2.6 (ref 0.9–1.1)

## 2022-04-20 PROCEDURE — 85610 PROTHROMBIN TIME: CPT

## 2022-04-20 PROCEDURE — 36416 COLLJ CAPILLARY BLOOD SPEC: CPT

## 2022-04-20 NOTE — PROGRESS NOTES
ANTICOAGULATION MANAGEMENT     Tracy Palomo 81 year old female is on warfarin with therapeutic INR result. (Goal INR 2.0-3.0)    Recent labs: (last 7 days)     04/20/22  0854   INR 2.6*       ASSESSMENT       Source(s): Chart Review and Patient/Caregiver Call       Warfarin doses taken: Warfarin taken as instructed    Diet: No new diet changes identified    New illness, injury, or hospitalization: No    Medication/supplement changes: None noted    Signs or symptoms of bleeding or clotting: No    Previous INR: Supratherapeutic    Additional findings: None       PLAN     Recommended plan for no diet, medication or health factor changes affecting INR     Dosing Instructions: continue your current warfarin dose with next INR in 3 weeks       Summary  As of 4/20/2022    Full warfarin instructions:  5 mg every Mon, Fri; 2.5 mg all other days   Next INR check:  5/11/2022             Telephone call with Tracy who verbalizes understanding and agrees to plan    Lab visit scheduled    Education provided: Please call back if any changes to your diet, medications or how you've been taking warfarin    Plan made per [facility] anticoagulation protocol    Johanna Mckeon, RN  Anticoagulation Clinic  4/20/2022    _______________________________________________________________________     Anticoagulation Episode Summary     Current INR goal:  2.0-3.0   TTR:  90.4 % (1 y)   Target end date:  Indefinite   Send INR reminders to:  GURVINDER SOARES    Indications    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]  Long term current use of anticoagulant therapy [Z79.01]  Other pulmonary embolism without acute cor pulmonale  unspecified chronicity (H) [I26.99]           Comments:           Anticoagulation Care Providers     Provider Role Specialty Phone number    Chad Betts MD Referring Internal Medicine [phone]    Gerard Medrano MD Responsible Family Medicine [phone]

## 2022-04-20 NOTE — TELEPHONE ENCOUNTER
ANTICOAGULATION MANAGEMENT      Tracy SAV Palomo due for annual renewal of referral to anticoagulation monitoring. Order pended for your review and signature.      ANTICOAGULATION SUMMARY      Warfarin indication(s)     PE    Heart valve present?  NO       Current goal range   INR: 2.0-3.0     Goal appropriate for indication? Yes, INR 2-3 appropriate for hx of DVT, PE, hypercoagulable state, Afib, LVAD, or bileaflet AVR without risk factors     Current duration of therapy Indefinite/long term therapy   Time in Therapeutic Range (TTR)  (Goal > 60%) 90.4%       Office visit with referring provider's group within last year no on 2/21/21       Johanna Mckeon RN

## 2022-04-25 DIAGNOSIS — I26.99 OTHER PULMONARY EMBOLISM WITHOUT ACUTE COR PULMONALE, UNSPECIFIED CHRONICITY (H): ICD-10-CM

## 2022-04-25 DIAGNOSIS — E78.5 HYPERLIPIDEMIA LDL GOAL <130: ICD-10-CM

## 2022-04-25 DIAGNOSIS — Z79.01 LONG TERM CURRENT USE OF ANTICOAGULANT THERAPY: ICD-10-CM

## 2022-04-26 RX ORDER — LOVASTATIN 40 MG
TABLET ORAL
Qty: 90 TABLET | Refills: 0 | Status: SHIPPED | OUTPATIENT
Start: 2022-04-26 | End: 2022-05-16

## 2022-04-26 RX ORDER — WARFARIN SODIUM 5 MG/1
TABLET ORAL
Qty: 30 TABLET | Refills: 0 | Status: SHIPPED | OUTPATIENT
Start: 2022-04-26 | End: 2022-05-16

## 2022-04-26 NOTE — TELEPHONE ENCOUNTER
Routing refill request to provider for review/approval because:  Sent to anticoagulant clinic        Elzbieta Terrazas RN

## 2022-04-26 NOTE — TELEPHONE ENCOUNTER
Lovastatin  Routing refill request to provider for review/approval because:  Labs not current:  LDL      Elzbieta TerrazasRN

## 2022-04-26 NOTE — TELEPHONE ENCOUNTER
ANTICOAGULATION MANAGEMENT:  Medication Refill    Anticoagulation Summary  As of 4/20/2022    Warfarin maintenance plan:  5 mg (5 mg x 1) every Mon, Fri; 2.5 mg (5 mg x 0.5) all other days   Next INR check:  5/11/2022   Target end date:  Indefinite    Indications    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]  Long term current use of anticoagulant therapy [Z79.01]  Other pulmonary embolism without acute cor pulmonale  unspecified chronicity (H) [I26.99]             Anticoagulation Care Providers     Provider Role Specialty Phone number    Chad Betts MD Referring Internal Medicine [phone]    Gerard Medrano MD Responsible Family Medicine [phone]          Visit with referring provider/group within last year: No, last visit date: 2/21/21    [facility] referral signed within last year: Yes    Tracy does NOT meet all criteria for refill: Office visit with referring provider's group was >= 1 year ago. 30 day candido fill approved; patient notified to schedule per [facility] protocol    Hakan Graham RN  Anticoagulation Clinic

## 2022-05-11 ENCOUNTER — ANTICOAGULATION THERAPY VISIT (OUTPATIENT)
Dept: ANTICOAGULATION | Facility: CLINIC | Age: 82
End: 2022-05-11

## 2022-05-11 ENCOUNTER — LAB (OUTPATIENT)
Dept: LAB | Facility: CLINIC | Age: 82
End: 2022-05-11
Payer: MEDICARE

## 2022-05-11 DIAGNOSIS — I26.99 PULMONARY EMBOLI (H): ICD-10-CM

## 2022-05-11 DIAGNOSIS — Z79.01 LONG TERM CURRENT USE OF ANTICOAGULANT THERAPY: ICD-10-CM

## 2022-05-11 DIAGNOSIS — I26.99 PULMONARY EMBOLI (H): Primary | ICD-10-CM

## 2022-05-11 DIAGNOSIS — I27.82 CHRONIC PULMONARY EMBOLISM WITHOUT ACUTE COR PULMONALE, UNSPECIFIED PULMONARY EMBOLISM TYPE (H): ICD-10-CM

## 2022-05-11 DIAGNOSIS — I26.99 OTHER PULMONARY EMBOLISM WITHOUT ACUTE COR PULMONALE, UNSPECIFIED CHRONICITY (H): ICD-10-CM

## 2022-05-11 LAB — INR BLD: 2.5 (ref 0.9–1.1)

## 2022-05-11 PROCEDURE — 36416 COLLJ CAPILLARY BLOOD SPEC: CPT

## 2022-05-11 PROCEDURE — 85610 PROTHROMBIN TIME: CPT

## 2022-05-11 NOTE — PROGRESS NOTES
ANTICOAGULATION MANAGEMENT     Tracy Palomo 81 year old female is on warfarin with therapeutic INR result. (Goal INR 2.0-3.0)    Recent labs: (last 7 days)     05/11/22  0856   INR 2.5*       ASSESSMENT       Source(s): Chart Review and Patient/Caregiver Call       Warfarin doses taken: Warfarin taken as instructed    Diet: No new diet changes identified    New illness, injury, or hospitalization: No    Medication/supplement changes: None noted    Signs or symptoms of bleeding or clotting: No    Previous INR: Therapeutic last visit; previously outside of goal range    Additional findings: None       PLAN     Recommended plan for no diet, medication or health factor changes affecting INR     Dosing Instructions: continue your current warfarin dose with next INR in 4 weeks       Summary  As of 5/11/2022    Full warfarin instructions:  5 mg every Mon, Fri; 2.5 mg all other days   Next INR check:  6/8/2022             Telephone call with Tracy who verbalizes understanding and agrees to plan and who agrees to plan and repeated back plan correctly    Lab visit scheduled    Education provided: Please call back if any changes to your diet, medications or how you've been taking warfarin    Plan made per [facility] anticoagulation protocol    Arti Ruelas, RN  Anticoagulation Clinic  5/11/2022    _______________________________________________________________________     Anticoagulation Episode Summary     Current INR goal:  2.0-3.0   TTR:  91.4 % (1 y)   Target end date:  Indefinite   Send INR reminders to:  GURVINDER SOARES    Indications    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]  Long term current use of anticoagulant therapy [Z79.01]  Other pulmonary embolism without acute cor pulmonale  unspecified chronicity (H) [I26.99]  Chronic pulmonary embolism without acute cor pulmonale  unspecified pulmonary embolism type (H) [I27.82]           Comments:           Anticoagulation Care Providers      Provider Role Specialty Phone number    Chad Betts MD Referring Internal Medicine [phone]    Gerard Medrano MD Responsible Family Medicine [phone]

## 2022-05-14 ENCOUNTER — HEALTH MAINTENANCE LETTER (OUTPATIENT)
Age: 82
End: 2022-05-14

## 2022-05-16 ENCOUNTER — OFFICE VISIT (OUTPATIENT)
Dept: INTERNAL MEDICINE | Facility: CLINIC | Age: 82
End: 2022-05-16
Payer: MEDICARE

## 2022-05-16 VITALS
OXYGEN SATURATION: 96 % | HEART RATE: 74 BPM | SYSTOLIC BLOOD PRESSURE: 126 MMHG | WEIGHT: 184 LBS | BODY MASS INDEX: 32.08 KG/M2 | TEMPERATURE: 97.6 F | RESPIRATION RATE: 16 BRPM | DIASTOLIC BLOOD PRESSURE: 70 MMHG

## 2022-05-16 DIAGNOSIS — I26.99 OTHER PULMONARY EMBOLISM WITHOUT ACUTE COR PULMONALE, UNSPECIFIED CHRONICITY (H): ICD-10-CM

## 2022-05-16 DIAGNOSIS — I48.0 PAROXYSMAL ATRIAL FIBRILLATION (H): ICD-10-CM

## 2022-05-16 DIAGNOSIS — E78.5 HYPERLIPIDEMIA LDL GOAL <130: ICD-10-CM

## 2022-05-16 DIAGNOSIS — Z00.00 MEDICARE ANNUAL WELLNESS VISIT, SUBSEQUENT: ICD-10-CM

## 2022-05-16 DIAGNOSIS — M25.50 ARTHRALGIA, UNSPECIFIED JOINT: ICD-10-CM

## 2022-05-16 DIAGNOSIS — Z79.01 LONG TERM CURRENT USE OF ANTICOAGULANT THERAPY: ICD-10-CM

## 2022-05-16 DIAGNOSIS — Z23 HIGH PRIORITY FOR 2019-NCOV VACCINE: ICD-10-CM

## 2022-05-16 DIAGNOSIS — M25.552 HIP PAIN, LEFT: Primary | ICD-10-CM

## 2022-05-16 PROBLEM — I50.810 RVF (RIGHT VENTRICULAR FAILURE) (H): Status: RESOLVED | Noted: 2019-01-09 | Resolved: 2022-05-16

## 2022-05-16 PROBLEM — S06.6XAA SUBARACHNOID HEMATOMA (H): Status: RESOLVED | Noted: 2020-08-05 | Resolved: 2022-05-16

## 2022-05-16 LAB
ALBUMIN SERPL-MCNC: 3.4 G/DL (ref 3.4–5)
ALP SERPL-CCNC: 98 U/L (ref 40–150)
ALT SERPL W P-5'-P-CCNC: 24 U/L (ref 0–50)
ANION GAP SERPL CALCULATED.3IONS-SCNC: 3 MMOL/L (ref 3–14)
AST SERPL W P-5'-P-CCNC: 22 U/L (ref 0–45)
BILIRUB SERPL-MCNC: 1.2 MG/DL (ref 0.2–1.3)
BUN SERPL-MCNC: 16 MG/DL (ref 7–30)
CALCIUM SERPL-MCNC: 9.3 MG/DL (ref 8.5–10.1)
CHLORIDE BLD-SCNC: 114 MMOL/L (ref 94–109)
CHOLEST SERPL-MCNC: 142 MG/DL
CK SERPL-CCNC: 110 U/L (ref 30–225)
CO2 SERPL-SCNC: 28 MMOL/L (ref 20–32)
CREAT SERPL-MCNC: 0.83 MG/DL (ref 0.52–1.04)
CRP SERPL-MCNC: <2.9 MG/L (ref 0–8)
ERYTHROCYTE [DISTWIDTH] IN BLOOD BY AUTOMATED COUNT: 14.1 % (ref 10–15)
ERYTHROCYTE [SEDIMENTATION RATE] IN BLOOD BY WESTERGREN METHOD: 9 MM/HR (ref 0–30)
FASTING STATUS PATIENT QL REPORTED: YES
GFR SERPL CREATININE-BSD FRML MDRD: 71 ML/MIN/1.73M2
GLUCOSE BLD-MCNC: 90 MG/DL (ref 70–99)
HCT VFR BLD AUTO: 43.2 % (ref 35–47)
HDLC SERPL-MCNC: 52 MG/DL
HGB BLD-MCNC: 13.7 G/DL (ref 11.7–15.7)
LDLC SERPL CALC-MCNC: 49 MG/DL
MCH RBC QN AUTO: 29.1 PG (ref 26.5–33)
MCHC RBC AUTO-ENTMCNC: 31.7 G/DL (ref 31.5–36.5)
MCV RBC AUTO: 92 FL (ref 78–100)
NONHDLC SERPL-MCNC: 90 MG/DL
PLATELET # BLD AUTO: 199 10E3/UL (ref 150–450)
POTASSIUM BLD-SCNC: 4.4 MMOL/L (ref 3.4–5.3)
PROT SERPL-MCNC: 6.6 G/DL (ref 6.8–8.8)
RBC # BLD AUTO: 4.71 10E6/UL (ref 3.8–5.2)
SODIUM SERPL-SCNC: 145 MMOL/L (ref 133–144)
TRIGL SERPL-MCNC: 203 MG/DL
WBC # BLD AUTO: 4.9 10E3/UL (ref 4–11)

## 2022-05-16 PROCEDURE — 85652 RBC SED RATE AUTOMATED: CPT | Performed by: INTERNAL MEDICINE

## 2022-05-16 PROCEDURE — 91306 COVID-19,PF,MODERNA (18+ YRS BOOSTER .25ML): CPT | Performed by: INTERNAL MEDICINE

## 2022-05-16 PROCEDURE — 80053 COMPREHEN METABOLIC PANEL: CPT | Performed by: INTERNAL MEDICINE

## 2022-05-16 PROCEDURE — 80061 LIPID PANEL: CPT | Performed by: INTERNAL MEDICINE

## 2022-05-16 PROCEDURE — G0439 PPPS, SUBSEQ VISIT: HCPCS | Performed by: INTERNAL MEDICINE

## 2022-05-16 PROCEDURE — 86431 RHEUMATOID FACTOR QUANT: CPT | Performed by: INTERNAL MEDICINE

## 2022-05-16 PROCEDURE — 36415 COLL VENOUS BLD VENIPUNCTURE: CPT | Performed by: INTERNAL MEDICINE

## 2022-05-16 PROCEDURE — 86140 C-REACTIVE PROTEIN: CPT | Performed by: INTERNAL MEDICINE

## 2022-05-16 PROCEDURE — 85027 COMPLETE CBC AUTOMATED: CPT | Performed by: INTERNAL MEDICINE

## 2022-05-16 PROCEDURE — 82550 ASSAY OF CK (CPK): CPT | Performed by: INTERNAL MEDICINE

## 2022-05-16 PROCEDURE — 0064A COVID-19,PF,MODERNA (18+ YRS BOOSTER .25ML): CPT | Performed by: INTERNAL MEDICINE

## 2022-05-16 RX ORDER — WARFARIN SODIUM 5 MG/1
TABLET ORAL
Qty: 90 TABLET | Refills: 3 | Status: SHIPPED | OUTPATIENT
Start: 2022-05-16 | End: 2023-03-20

## 2022-05-16 RX ORDER — LOVASTATIN 40 MG
TABLET ORAL
Qty: 90 TABLET | Refills: 3 | Status: SHIPPED | OUTPATIENT
Start: 2022-05-16 | End: 2023-03-20

## 2022-05-16 ASSESSMENT — PATIENT HEALTH QUESTIONNAIRE - PHQ9
SUM OF ALL RESPONSES TO PHQ QUESTIONS 1-9: 1
SUM OF ALL RESPONSES TO PHQ QUESTIONS 1-9: 1
10. IF YOU CHECKED OFF ANY PROBLEMS, HOW DIFFICULT HAVE THESE PROBLEMS MADE IT FOR YOU TO DO YOUR WORK, TAKE CARE OF THINGS AT HOME, OR GET ALONG WITH OTHER PEOPLE: NOT DIFFICULT AT ALL

## 2022-05-16 ASSESSMENT — ENCOUNTER SYMPTOMS
CONSTIPATION: 0
MYALGIAS: 1
DIARRHEA: 0
NERVOUS/ANXIOUS: 0
DYSURIA: 0
ARTHRALGIAS: 1
DIZZINESS: 0
BREAST MASS: 0
FEVER: 0
NAUSEA: 0
HEADACHES: 0
EYE PAIN: 0
ABDOMINAL PAIN: 0
SORE THROAT: 0
COUGH: 0
FREQUENCY: 0
CHILLS: 0
HEARTBURN: 0
HEMATURIA: 0
HEMATOCHEZIA: 0
PARESTHESIAS: 0
PALPITATIONS: 0

## 2022-05-16 ASSESSMENT — ACTIVITIES OF DAILY LIVING (ADL): CURRENT_FUNCTION: NO ASSISTANCE NEEDED

## 2022-05-16 NOTE — PROGRESS NOTES
"SUBJECTIVE:   Tracy Palomo is a 81 year old female who presents for Preventive Visit.    Patient has been advised of split billing requirements and indicates understanding: Yes  Are you in the first 12 months of your Medicare coverage?  No    Healthy Habits:     In general, how would you rate your overall health?  Good    Duration of exercise:  15-30 minutes    Do you usually eat at least 4 servings of fruit and vegetables a day, include whole grains    & fiber and avoid regularly eating high fat or \"junk\" foods?  No    Taking medications regularly:  Yes    Ability to successfully perform activities of daily living:  No assistance needed    Home Safety:  No safety concerns identified    Hearing Impairment:  No hearing concerns    In the past 6 months, have you been bothered by leaking of urine? Yes    In general, how would you rate your overall mental or emotional health?  Excellent      PHQ-2 Total Score: 3    Additional concerns today:  Yes    Doing ok.    Had knee replaced last year, left is still tighter.  Did therapy. Occasional sharp pain in the left upper leg/hip and has partial left hip replacement.      Walking for exercise.      Coumadin is doing well, no bleeding.     Lovastatin is ok.     Do you feel safe in your environment? Yes    Have you ever done Advance Care Planning? (For example, a Health Directive, POLST, or a discussion with a medical provider or your loved ones about your wishes): Yes, advance care planning is on file.       Fall risk  Fallen 2 or more times in the past year?: No  Any fall with injury in the past year?: No    Cognitive Screening   1) Repeat 3 items (Leader, Season, Table)    2) Clock draw: ABNORMAL pretty close  3) 3 item recall: Recalls 2 objects   Results: ABNORMAL clock, 1-2 items recalled: PROBABLE COGNITIVE IMPAIRMENT, **INFORM PROVIDER**    Mini-CogTM Copyright S [employer]. Licensed by the author for use in [facility]; reprinted with permission " (edward@[facility]). All rights reserved.      Do you have sleep apnea, excessive snoring or daytime drowsiness?: no    Reviewed and updated as needed this visit by clinical staff   Tobacco  Allergies  Meds                Reviewed and updated as needed this visit by Provider                   Social History     Tobacco Use     Smoking status: Never Smoker     Smokeless tobacco: Never Used   Substance Use Topics     Alcohol use: No     Alcohol/week: 0.0 standard drinks       Alcohol Use 5/16/2022   Prescreen: >3 drinks/day or >7 drinks/week? Not Applicable     Current providers sharing in care for this patient include:   Patient Care Team:  Chad Betts MD as PCP - General (Internal Medicine)  Chad Betts MD as Assigned PCP  Kaleb Pang DO as Assigned Musculoskeletal Provider    The following health maintenance items are reviewed in Epic and correct as of today:  Health Maintenance Due   Topic Date Due     ANNUAL REVIEW OF  ORDERS  Never done     ZOSTER IMMUNIZATION (1 of 2) Never done     MEDICARE ANNUAL WELLNESS VISIT  11/14/2015     FALL RISK ASSESSMENT  10/10/2019     COVID-19 Vaccine (4 - Booster for Moderna series) 03/30/2022     Lab work is in process  Pneumonia Vaccine: up to date    Mammogram Screening - Patient over age 75, has elected to discontinue screenings.  Pertinent mammograms are reviewed under the imaging tab.    Review of Systems   Constitutional: Negative for chills and fever.   HENT: Negative for congestion, ear pain, hearing loss and sore throat.    Eyes: Negative for pain.   Respiratory: Negative for cough.    Cardiovascular: Negative for chest pain and palpitations.   Gastrointestinal: Negative for abdominal pain, constipation, diarrhea, heartburn, hematochezia and nausea.   Breasts:  Negative for tenderness, breast mass and discharge.   Genitourinary: Negative for dysuria, frequency, hematuria, pelvic pain and vaginal bleeding.   Musculoskeletal: Positive for arthralgias  "and myalgias.   Skin: Negative for rash.   Neurological: Negative for dizziness, headaches and paresthesias.   Psychiatric/Behavioral: Negative for mood changes. The patient is not nervous/anxious.          OBJECTIVE:   BP (!) 144/76   Pulse 74   Temp 97.6  F (36.4  C) (Temporal)   Resp 16   Wt 83.5 kg (184 lb)   SpO2 96%   BMI 32.08 kg/m   Estimated body mass index is 32.08 kg/m  as calculated from the following:    Height as of 2/9/22: 1.613 m (5' 3.5\").    Weight as of this encounter: 83.5 kg (184 lb).  Physical Exam  GENERAL: healthy, alert and no distress  EYES: Eyes grossly normal to inspection, PERRL and conjunctivae and sclerae normal  HENT: ear canals and TM's normal, nose and mouth without ulcers or lesions  NECK: no adenopathy, no asymmetry, masses, or scars and thyroid normal to palpation  RESP: lungs clear to auscultation - no rales, rhonchi or wheezes  CV: regular rate and rhythm, normal S1 S2, no S3 or S4, no murmur, click or rub, no peripheral edema and peripheral pulses strong  ABDOMEN: soft, nontender, no hepatosplenomegaly, no masses and bowel sounds normal  MS: no gross musculoskeletal defects noted, no edema  SKIN: no suspicious lesions or rashes  NEURO: Normal strength and tone, mentation intact and speech normal  PSYCH: mentation appears normal, affect normal/bright  Mild swelling of her hands and some in the extremities.       ASSESSMENT / PLAN:       ICD-10-CM    1. Hip pain, left  M25.552 Orthopedic  Referral   2. Hyperlipidemia LDL goal <130  E78.5 lovastatin (MEVACOR) 40 MG tablet     Comprehensive metabolic panel (BMP + Alb, Alk Phos, ALT, AST, Total. Bili, TP)     Lipid panel reflex to direct LDL Fasting     Comprehensive metabolic panel (BMP + Alb, Alk Phos, ALT, AST, Total. Bili, TP)     Lipid panel reflex to direct LDL Fasting   3. Other pulmonary embolism without acute cor pulmonale, unspecified chronicity (H)  I26.99 warfarin ANTICOAGULANT (COUMADIN) 5 MG tablet     " "CBC with platelets     CBC with platelets   4. Long term current use of anticoagulant therapy  Z79.01 warfarin ANTICOAGULANT (COUMADIN) 5 MG tablet   5. Medicare annual wellness visit, subsequent  Z00.00    6. Paroxysmal atrial fibrillation (H)  I48.0    7. Arthralgia, unspecified joint  M25.50 Rheumatoid factor     CRP, inflammation     ESR: Erythrocyte sedimentation rate     CK total     Rheumatoid factor     CRP, inflammation     ESR: Erythrocyte sedimentation rate     CK total   8. High priority for 2019-nCoV vaccine  Z23 COVID-19,PF,MODERNA (18+ Yrs BOOSTER .25mL)     Overall patient is doing quite well.  She is active, walks.  She just had her knee replaced last year.  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  She does get some left leg pains and we will refer to orthopedics to follow that up.    She will get her COVID booster today    Blood pressure is fine on recheck    Joint stiffness in her hands, legs we will hold her lovastatin for 2 weeks and see if this helps.  We will check rheumatoid factor sed rate and CRP.  Consider in the future where this could be a parkinsonian disease and could try Sinemet or could try prednisone if stiffness continues.          Patient has been advised of split billing requirements and indicates understanding: Yes    COUNSELING:  Reviewed preventive health counseling, as reflected in patient instructions       Regular exercise       Healthy diet/nutrition       Immunizations    Vaccinated for: covid          Estimated body mass index is 32.08 kg/m  as calculated from the following:    Height as of 2/9/22: 1.613 m (5' 3.5\").    Weight as of this encounter: 83.5 kg (184 lb).    Weight management plan: Discussed healthy diet and exercise guidelines    She reports that she has never smoked. She has never used smokeless tobacco.      Appropriate preventive services were discussed with this patient, including applicable screening as appropriate for cardiovascular disease, " diabetes, osteopenia/osteoporosis, and glaucoma.  As appropriate for age/gender, discussed screening for colorectal cancer, prostate cancer, breast cancer, and cervical cancer. Checklist reviewing preventive services available has been given to the patient.    Reviewed patients plan of care and provided an AVS. The Basic Care Plan (routine screening as documented in Health Maintenance) for Tracy meets the Care Plan requirement. This Care Plan has been established and reviewed with the Patient.    Counseling Resources:  ATP IV Guidelines  Pooled Cohorts Equation Calculator  Breast Cancer Risk Calculator  Breast Cancer: Medication to Reduce Risk  FRAX Risk Assessment  ICSI Preventive Guidelines  Dietary Guidelines for Americans, 2010  USDA's MyPlate  ASA Prophylaxis  Lung CA Screening    Chad Betts MD  [facility]    Identified Health Risks:

## 2022-05-17 LAB — RHEUMATOID FACT SER NEPH-ACNC: <6 IU/ML

## 2022-05-17 ASSESSMENT — PATIENT HEALTH QUESTIONNAIRE - PHQ9: SUM OF ALL RESPONSES TO PHQ QUESTIONS 1-9: 1

## 2022-05-31 ASSESSMENT — KOOS JR
HOW SEVERE IS YOUR KNEE STIFFNESS AFTER FIRST WAKING IN MORNING: MODERATE
TWISING OR PIVOTING ON KNEE: EXTREME
KOOS JR SCORING: 54.84
STANDING UPRIGHT: MODERATE
RISING FROM SITTING: SEVERE
GOING UP OR DOWN STAIRS: MODERATE

## 2022-06-02 ENCOUNTER — ANCILLARY PROCEDURE (OUTPATIENT)
Dept: GENERAL RADIOLOGY | Facility: CLINIC | Age: 82
End: 2022-06-02
Attending: ORTHOPAEDIC SURGERY
Payer: MEDICARE

## 2022-06-02 ENCOUNTER — OFFICE VISIT (OUTPATIENT)
Dept: ORTHOPEDICS | Facility: CLINIC | Age: 82
End: 2022-06-02

## 2022-06-02 VITALS — WEIGHT: 180 LBS | BODY MASS INDEX: 30.73 KG/M2 | RESPIRATION RATE: 22 BRPM | HEIGHT: 64 IN

## 2022-06-02 DIAGNOSIS — Z96.649 S/P HIP HEMIARTHROPLASTY: ICD-10-CM

## 2022-06-02 DIAGNOSIS — M25.552 HIP PAIN, LEFT: ICD-10-CM

## 2022-06-02 DIAGNOSIS — Z96.652 STATUS POST TOTAL LEFT KNEE REPLACEMENT: Primary | ICD-10-CM

## 2022-06-02 PROCEDURE — 99212 OFFICE O/P EST SF 10 MIN: CPT | Performed by: ORTHOPAEDIC SURGERY

## 2022-06-02 PROCEDURE — 73502 X-RAY EXAM HIP UNI 2-3 VIEWS: CPT | Mod: TC | Performed by: RADIOLOGY

## 2022-06-02 NOTE — LETTER
6/2/2022         RE: Tracy Palomo  [address] [place] [id number]        Dear Colleague,    Thank you for referring your patient, Tracy Palomo, to the [facility]. Please see a copy of my visit note below.    Tracy Palomo is a 81 year old female who is seen in consultation at the request of Dr. Chad Betts  For left leg pain.  She has this pain primarily in the medial thigh.  She has had a left hip fracture and underwent hemiarthroplasty in March 2017.  She has had left knee replacement in February 2021.  She has had this pain for the past year pretty much ever since the knee replacement.  She has occasional sharp shooting pains currently 0 out of 10.  It is worse with cold weather.  She is also had the right knee replaced in the past.  X-rays of the knees show good position of components.  Neither patella has been replaced and both are showing lateral wear.  The hip hemiarthroplasty appears to be subsiding and eroding into the calcar.  Leg length on this hip was originally about 6 mm short, and now measures 15 mm short.    Past Medical History:   Diagnosis Date     Atrial fibrillation (H) 2014    related to colon surgery     Colon polyps      Diverticulosis of colon (without mention of hemorrhage)     Diverticulosis     DVT (deep vein thrombosis) in pregnancy 2014    after colon surgery     Other and unspecified hyperlipidemia      PE (pulmonary embolism) 2014    related to colon surgery     Unspecified essential hypertension        Past Surgical History:   Procedure Laterality Date     ARTHROPLASTY KNEE Right 1/8/2019    Procedure: Right total knee replacement;  Surgeon: Kaleb Pang DO;  [facility]     ARTHROPLASTY KNEE Left 2/16/2021    Procedure: left total knee replacement;  Surgeon: Kaleb Pang DO;  [facility]     COLONOSCOPY  09, 10, 12    [facility]     COLONOSCOPY N/A 12/11/2017    Procedure: COLONOSCOPY;   colonoscopy;  Surgeon: Elvis Quezada MD;  [facility]     FLEXIBLE SIGMOIDOSCOPY  09, 11     LAPAROTOMY EXPLORATORY N/A 10/20/2014    Procedure: LAPAROTOMY EXPLORATORY;  Surgeon: Miguel Oleary MD;  [facility]     LAPAROTOMY, LYSIS ADHESIONS, COMBINED N/A 10/20/2014    Procedure: COMBINED LAPAROTOMY, LYSIS ADHESIONS;  Surgeon: Miguel Oleary MD;  [facility]     OPEN REDUCTION INTERNAL FIXATION HIP BIPOLAR Left 3/16/2017    Procedure: OPEN REDUCTION INTERNAL FIXATION HIP BIPOLAR;  Surgeon: Kaleb Pang DO;  [facility]     RELEASE TRIGGER FINGER Left 1/12/2021    Procedure: Left thumb trigger release;  Surgeon: Kaleb Pang DO;  [facility]     RESECT SMALL BOWEL WITHOUT OSTOMY N/A 10/20/2014    Procedure: RESECT SMALL BOWEL WITHOUT OSTOMY;  Surgeon: Miguel Oleary MD;  [facility]       Family History   Problem Relation Age of Onset     Blood Disease No family hx of         blood clots       Social History     Socioeconomic History     Marital status:      Spouse name: Not on file     Number of children: Not on file     Years of education: Not on file     Highest education level: Not on file   Occupational History     Not on file   Tobacco Use     Smoking status: Never Smoker     Smokeless tobacco: Never Used   Substance and Sexual Activity     Alcohol use: No     Alcohol/week: 0.0 standard drinks     Drug use: No     Sexual activity: Not Currently     Partners: Male   Other Topics Concern     Parent/sibling w/ CABG, MI or angioplasty before 65F 55M? No   Social History Narrative     Not on file     Social Determinants of Health     Financial Resource Strain: Not on file   Food Insecurity: Not on file   Transportation Needs: Not on file   Physical Activity: Not on file   Stress: Not on file   Social Connections: Not on file   Intimate Partner Violence: Not on file   Housing Stability: Not on file       Current Outpatient Medications  "  Medication Sig Dispense Refill     acetaminophen (TYLENOL) 325 MG tablet Take 3 tablets (975 mg) by mouth every 8 hours as needed for pain 100 tablet 1     lovastatin (MEVACOR) 40 MG tablet TAKE ONE TABLET BY MOUTH AT BEDTIME 90 tablet 3     meclizine (ANTIVERT) 25 MG tablet Take 1 tablet (25 mg) by mouth 3 times daily as needed for dizziness (Patient not taking: Reported on 5/16/2022) 30 tablet 0     warfarin ANTICOAGULANT (COUMADIN) 5 MG tablet TAKE ONE TABLET BY MOUTH ONCE DAILY ON MONDAY AND FRIDAY AND 1/2 TABLET ALL OTHER DAYS OF THE WEEK OR AS DIRECTED BY THE COUMADIN CLINIC - 30 day candido refill, patient needs to schedule annual visit with provider for future refills 90 tablet 3       Allergies   Allergen Reactions     No Known Allergies        REVIEW OF SYSTEMS:  CONSTITUTIONAL:  NEGATIVE for fever, chills, change in weight, not feeling tired  SKIN:  NEGATIVE for worrisome rashes, no skin lumps, no skin ulcers and no non-healing wounds  EYES:  NEGATIVE for vision changes or irritation.  ENT/MOUTH:  NEGATIVE.  No hearing loss, no hoarseness, no difficulty swallowing.  RESP:  NEGATIVE. No cough or shortness of breath.  CV:  NEGATIVE for chest pain, palpitations or peripheral edema  GI:  NEGATIVE for nausea, abdominal pain, heartburn, or change in bowel habits  :  Negative. No dysuria, no hematuria  MUSCULOSKELETAL:  See HPI above  NEURO:  NEGATIVE . No headaches, no dizziness,  no numbness  ENDOCRINE:  NEGATIVE for temperature intolerance, skin/hair changes  HEME/ALLERGY/IMMUNE:  NEGATIVE for bleeding problems  PSYCHIATRIC:  NEGATIVE. no anxiety, no depression.     Exam:  Vitals: Resp 22   Ht 1.613 m (5' 3.5\")   Wt 81.6 kg (180 lb)   BMI 31.39 kg/m    BMI= Body mass index is 31.39 kg/m .  Constitutional:  healthy, alert and no distress  Neuro: Alert and Oriented x 3, no focal defects.  Psych: Affect normal   Respiratory: Breathing not labored.  Cardiovascular: normal peripheral pulses  Lymph: no " adenopathy  Skin: No rashes,worrisome lesions or skin problems  She has no significant pain with hip rotation.  She does say she has some left knee pain with hip rotation, but nothing in the groin.  She has good mobility of both knees.  She does have mild tenderness under the patella with attempted patellar grind.  This does not seem to duplicate her pain however.  She had negative straight leg raise to 70 degrees bilaterally.  She is a bit tight in the hamstrings.    Assessment:  Left thigh pain of undetermined cause.  There is evidence that the left hip hemiarthroplasty may be subsiding, but does not appear to be causing pain on exam.  She reports most pain is with pivoting, so it may take more weightbearing to bring this out.  The knee looks good but there is wear on the lateral patella on both knees where they have not been replaced.  It is not clear if this is contributing to her current pain.  She expresses a refusal to currently consider surgeries, so we will just have her observe the pain and see if she can document  better when it occurs and where it occurs, whether it is in the groin, thigh, or knee.      Again, thank you for allowing me to participate in the care of your patient.        Sincerely,        Gerard Colon MD

## 2022-06-03 NOTE — PROGRESS NOTES
Tracy Palomo is a 81 year old female who is seen in consultation at the request of Dr. Chad Betts  For left leg pain.  She has this pain primarily in the medial thigh.  She has had a left hip fracture and underwent hemiarthroplasty in March 2017.  She has had left knee replacement in February 2021.  She has had this pain for the past year pretty much ever since the knee replacement.  She has occasional sharp shooting pains currently 0 out of 10.  It is worse with cold weather.  She is also had the right knee replaced in the past.  X-rays of the knees show good position of components.  Neither patella has been replaced and both are showing lateral wear.  The hip hemiarthroplasty appears to be subsiding and eroding into the calcar.  Leg length on this hip was originally about 6 mm short, and now measures 15 mm short.    Past Medical History:   Diagnosis Date     Atrial fibrillation (H) 2014    related to colon surgery     Colon polyps      Diverticulosis of colon (without mention of hemorrhage)     Diverticulosis     DVT (deep vein thrombosis) in pregnancy 2014    after colon surgery     Other and unspecified hyperlipidemia      PE (pulmonary embolism) 2014    related to colon surgery     Unspecified essential hypertension        Past Surgical History:   Procedure Laterality Date     ARTHROPLASTY KNEE Right 1/8/2019    Procedure: Right total knee replacement;  Surgeon: Kaleb Pang DO;  [facility]     ARTHROPLASTY KNEE Left 2/16/2021    Procedure: left total knee replacement;  Surgeon: Kaleb Pang DO;  Location:  OR     COLONOSCOPY  09, 10, 12    [facility]     COLONOSCOPY N/A 12/11/2017    Procedure: COLONOSCOPY;  colonoscopy;  Surgeon: Elvis Quezada MD;  Location:  GI     FLEXIBLE SIGMOIDOSCOPY  09, 11     LAPAROTOMY EXPLORATORY N/A 10/20/2014    Procedure: LAPAROTOMY EXPLORATORY;  Surgeon: Miguel Oleary MD;  Location:  OR     LAPAROTOMY, LYSIS  ADHESIONS, COMBINED N/A 10/20/2014    Procedure: COMBINED LAPAROTOMY, LYSIS ADHESIONS;  Surgeon: Miguel Oleary MD;  [facility]     OPEN REDUCTION INTERNAL FIXATION HIP BIPOLAR Left 3/16/2017    Procedure: OPEN REDUCTION INTERNAL FIXATION HIP BIPOLAR;  Surgeon: Kaleb Pang DO;  [facility]     RELEASE TRIGGER FINGER Left 1/12/2021    Procedure: Left thumb trigger release;  Surgeon: Kaleb Pang DO;  [facility]     RESECT SMALL BOWEL WITHOUT OSTOMY N/A 10/20/2014    Procedure: RESECT SMALL BOWEL WITHOUT OSTOMY;  Surgeon: Miguel Oleary MD;  [facility]       Family History   Problem Relation Age of Onset     Blood Disease No family hx of         blood clots       Social History     Socioeconomic History     Marital status:      Spouse name: Not on file     Number of children: Not on file     Years of education: Not on file     Highest education level: Not on file   Occupational History     Not on file   Tobacco Use     Smoking status: Never Smoker     Smokeless tobacco: Never Used   Substance and Sexual Activity     Alcohol use: No     Alcohol/week: 0.0 standard drinks     Drug use: No     Sexual activity: Not Currently     Partners: Male   Other Topics Concern     Parent/sibling w/ CABG, MI or angioplasty before 65F 55M? No   Social History Narrative     Not on file     Social Determinants of Health     Financial Resource Strain: Not on file   Food Insecurity: Not on file   Transportation Needs: Not on file   Physical Activity: Not on file   Stress: Not on file   Social Connections: Not on file   Intimate Partner Violence: Not on file   Housing Stability: Not on file       Current Outpatient Medications   Medication Sig Dispense Refill     acetaminophen (TYLENOL) 325 MG tablet Take 3 tablets (975 mg) by mouth every 8 hours as needed for pain 100 tablet 1     lovastatin (MEVACOR) 40 MG tablet TAKE ONE TABLET BY MOUTH AT BEDTIME 90 tablet 3     meclizine  "(ANTIVERT) 25 MG tablet Take 1 tablet (25 mg) by mouth 3 times daily as needed for dizziness (Patient not taking: Reported on 5/16/2022) 30 tablet 0     warfarin ANTICOAGULANT (COUMADIN) 5 MG tablet TAKE ONE TABLET BY MOUTH ONCE DAILY ON MONDAY AND FRIDAY AND 1/2 TABLET ALL OTHER DAYS OF THE WEEK OR AS DIRECTED BY THE COUMADIN CLINIC - 30 day candido refill, patient needs to schedule annual visit with provider for future refills 90 tablet 3       Allergies   Allergen Reactions     No Known Allergies        REVIEW OF SYSTEMS:  CONSTITUTIONAL:  NEGATIVE for fever, chills, change in weight, not feeling tired  SKIN:  NEGATIVE for worrisome rashes, no skin lumps, no skin ulcers and no non-healing wounds  EYES:  NEGATIVE for vision changes or irritation.  ENT/MOUTH:  NEGATIVE.  No hearing loss, no hoarseness, no difficulty swallowing.  RESP:  NEGATIVE. No cough or shortness of breath.  CV:  NEGATIVE for chest pain, palpitations or peripheral edema  GI:  NEGATIVE for nausea, abdominal pain, heartburn, or change in bowel habits  :  Negative. No dysuria, no hematuria  MUSCULOSKELETAL:  See HPI above  NEURO:  NEGATIVE . No headaches, no dizziness,  no numbness  ENDOCRINE:  NEGATIVE for temperature intolerance, skin/hair changes  HEME/ALLERGY/IMMUNE:  NEGATIVE for bleeding problems  PSYCHIATRIC:  NEGATIVE. no anxiety, no depression.     Exam:  Vitals: Resp 22   Ht 1.613 m (5' 3.5\")   Wt 81.6 kg (180 lb)   BMI 31.39 kg/m    BMI= Body mass index is 31.39 kg/m .  Constitutional:  healthy, alert and no distress  Neuro: Alert and Oriented x 3, no focal defects.  Psych: Affect normal   Respiratory: Breathing not labored.  Cardiovascular: normal peripheral pulses  Lymph: no adenopathy  Skin: No rashes,worrisome lesions or skin problems  She has no significant pain with hip rotation.  She does say she has some left knee pain with hip rotation, but nothing in the groin.  She has good mobility of both knees.  She does have mild " tenderness under the patella with attempted patellar grind.  This does not seem to duplicate her pain however.  She had negative straight leg raise to 70 degrees bilaterally.  She is a bit tight in the hamstrings.    Assessment:  Left thigh pain of undetermined cause.  There is evidence that the left hip hemiarthroplasty may be subsiding, but does not appear to be causing pain on exam.  She reports most pain is with pivoting, so it may take more weightbearing to bring this out.  The knee looks good but there is wear on the lateral patella on both knees where they have not been replaced.  It is not clear if this is contributing to her current pain.  She expresses a refusal to currently consider surgeries, so we will just have her observe the pain and see if she can document  better when it occurs and where it occurs, whether it is in the groin, thigh, or knee.

## 2022-06-08 ENCOUNTER — ANTICOAGULATION THERAPY VISIT (OUTPATIENT)
Dept: ANTICOAGULATION | Facility: CLINIC | Age: 82
End: 2022-06-08

## 2022-06-08 ENCOUNTER — LAB (OUTPATIENT)
Dept: LAB | Facility: CLINIC | Age: 82
End: 2022-06-08
Payer: MEDICARE

## 2022-06-08 DIAGNOSIS — I26.99 OTHER PULMONARY EMBOLISM WITHOUT ACUTE COR PULMONALE, UNSPECIFIED CHRONICITY (H): ICD-10-CM

## 2022-06-08 DIAGNOSIS — I27.82 CHRONIC PULMONARY EMBOLISM WITHOUT ACUTE COR PULMONALE, UNSPECIFIED PULMONARY EMBOLISM TYPE (H): ICD-10-CM

## 2022-06-08 DIAGNOSIS — Z79.01 LONG TERM CURRENT USE OF ANTICOAGULANT THERAPY: ICD-10-CM

## 2022-06-08 DIAGNOSIS — I26.99 PULMONARY EMBOLI (H): ICD-10-CM

## 2022-06-08 DIAGNOSIS — Z79.01 LONG TERM CURRENT USE OF ANTICOAGULANT THERAPY: Primary | ICD-10-CM

## 2022-06-08 LAB — INR BLD: 3.3 (ref 0.9–1.1)

## 2022-06-08 PROCEDURE — 36416 COLLJ CAPILLARY BLOOD SPEC: CPT

## 2022-06-08 PROCEDURE — 85610 PROTHROMBIN TIME: CPT

## 2022-06-08 NOTE — PROGRESS NOTES
ANTICOAGULATION MANAGEMENT     Tracy Palomo 82 year old female is on warfarin with supratherapeutic INR result. (Goal INR 2.0-3.0)    Recent labs: (last 7 days)     06/08/22  0855   INR 3.3*       ASSESSMENT       Source(s): Chart Review and Patient/Caregiver Call       Warfarin doses taken: Warfarin taken as instructed    Diet: No new diet changes identified    New illness, injury, or hospitalization: No    Medication/supplement changes: needing to take Tylenol for hip pain, but she reports only 1-2 tablets a day if she needs them    Signs or symptoms of bleeding or clotting: No    Previous INR: Therapeutic last 2(+) visits    Additional findings: None       PLAN     Recommended plan for temporary change(s) affecting INR     Dosing Instructions: continue your current warfarin dose (will eat something with Julianne K in it tonight) with next INR in 2 weeks       Summary  As of 6/8/2022    Full warfarin instructions:  5 mg every Mon, Fri; 2.5 mg all other days   Next INR check:  6/22/2022             Telephone call with Tracy who verbalizes understanding and agrees to plan    Lab visit scheduled    Education provided: Please call back if any changes to your diet, medications or how you've been taking warfarin, Importance of consistent vitamin K intake, Goal range and significance of current result, Potential interaction between warfarin and Tylenol  and Monitoring for bleeding signs and symptoms    Plan made per [facility] anticoagulation protocol    Johanna Mckeon, RN  Anticoagulation Clinic  6/8/2022    _______________________________________________________________________     Anticoagulation Episode Summary     Current INR goal:  2.0-3.0   TTR:  88.5 % (1 y)   Target end date:  Indefinite   Send INR reminders to:  GURVINDER SOARES    Indications    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]  Long term current use of anticoagulant therapy [Z79.01]  Other pulmonary embolism without acute  cor pulmonale  unspecified chronicity (H) [I26.99]  Chronic pulmonary embolism without acute cor pulmonale  unspecified pulmonary embolism type (H) [I27.82]           Comments:           Anticoagulation Care Providers     Provider Role Specialty Phone number    Chad Betts MD Referring Internal Medicine [phone]    Gerard Medrano MD Responsible Family Medicine [phone]

## 2022-06-22 ENCOUNTER — ANTICOAGULATION THERAPY VISIT (OUTPATIENT)
Dept: ANTICOAGULATION | Facility: CLINIC | Age: 82
End: 2022-06-22

## 2022-06-22 ENCOUNTER — LAB (OUTPATIENT)
Dept: LAB | Facility: CLINIC | Age: 82
End: 2022-06-22
Payer: MEDICARE

## 2022-06-22 DIAGNOSIS — Z79.01 LONG TERM CURRENT USE OF ANTICOAGULANT THERAPY: ICD-10-CM

## 2022-06-22 DIAGNOSIS — I26.99 PULMONARY EMBOLI (H): ICD-10-CM

## 2022-06-22 DIAGNOSIS — I26.99 PULMONARY EMBOLI (H): Primary | ICD-10-CM

## 2022-06-22 DIAGNOSIS — I27.82 CHRONIC PULMONARY EMBOLISM WITHOUT ACUTE COR PULMONALE, UNSPECIFIED PULMONARY EMBOLISM TYPE (H): ICD-10-CM

## 2022-06-22 DIAGNOSIS — I26.99 OTHER PULMONARY EMBOLISM WITHOUT ACUTE COR PULMONALE, UNSPECIFIED CHRONICITY (H): ICD-10-CM

## 2022-06-22 LAB — INR BLD: 2.1 (ref 0.9–1.1)

## 2022-06-22 PROCEDURE — 36416 COLLJ CAPILLARY BLOOD SPEC: CPT

## 2022-06-22 PROCEDURE — 85610 PROTHROMBIN TIME: CPT

## 2022-06-22 NOTE — PROGRESS NOTES
ANTICOAGULATION MANAGEMENT     Tracy Palomo 82 year old female is on warfarin with therapeutic INR result. (Goal INR 2.0-3.0)    Recent labs: (last 7 days)     06/22/22  0844   INR 2.1*       ASSESSMENT       Source(s): Chart Review and Patient/Caregiver Call       Warfarin doses taken: Warfarin taken as instructed    Diet: No new diet changes identified    New illness, injury, or hospitalization: No    Medication/supplement changes: None noted    Signs or symptoms of bleeding or clotting: No    Previous INR: Supratherapeutic    Additional findings: None       PLAN     Recommended plan for no diet, medication or health factor changes affecting INR     Dosing Instructions: continue your current warfarin dose with next INR in 4 weeks       Summary  As of 6/22/2022    Full warfarin instructions:  5 mg every Mon, Fri; 2.5 mg all other days   Next INR check:  7/20/2022             Telephone call with Tracy who verbalizes understanding and agrees to plan and who agrees to plan and repeated back plan correctly    Lab visit scheduled    Education provided: Please call back if any changes to your diet, medications or how you've been taking warfarin    Plan made per [facility] anticoagulation protocol    Arti Ruelas, RN  Anticoagulation Clinic  6/22/2022    _______________________________________________________________________     Anticoagulation Episode Summary     Current INR goal:  2.0-3.0   TTR:  87.6 % (1 y)   Target end date:  Indefinite   Send INR reminders to:  GURVINDER SOARES    Indications    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

## 2022-07-20 ENCOUNTER — ANTICOAGULATION THERAPY VISIT (OUTPATIENT)
Dept: ANTICOAGULATION | Facility: CLINIC | Age: 82
End: 2022-07-20

## 2022-07-20 ENCOUNTER — LAB (OUTPATIENT)
Dept: LAB | Facility: CLINIC | Age: 82
End: 2022-07-20
Payer: MEDICARE

## 2022-07-20 DIAGNOSIS — I26.99 PULMONARY EMBOLI (H): ICD-10-CM

## 2022-07-20 DIAGNOSIS — I26.99 OTHER PULMONARY EMBOLISM WITHOUT ACUTE COR PULMONALE, UNSPECIFIED CHRONICITY (H): ICD-10-CM

## 2022-07-20 DIAGNOSIS — Z79.01 LONG TERM CURRENT USE OF ANTICOAGULANT THERAPY: Primary | ICD-10-CM

## 2022-07-20 DIAGNOSIS — I27.82 CHRONIC PULMONARY EMBOLISM WITHOUT ACUTE COR PULMONALE, UNSPECIFIED PULMONARY EMBOLISM TYPE (H): ICD-10-CM

## 2022-07-20 DIAGNOSIS — Z79.01 LONG TERM CURRENT USE OF ANTICOAGULANT THERAPY: ICD-10-CM

## 2022-07-20 LAB — INR BLD: 2 (ref 0.9–1.1)

## 2022-07-20 PROCEDURE — 85610 PROTHROMBIN TIME: CPT

## 2022-07-20 PROCEDURE — 36416 COLLJ CAPILLARY BLOOD SPEC: CPT

## 2022-07-20 NOTE — PROGRESS NOTES
ANTICOAGULATION MANAGEMENT     Tracy Palomo 82 year old female is on warfarin with therapeutic INR result. (Goal INR 2.0-3.0)    Recent labs: (last 7 days)     07/20/22  0847   INR 2.0*       ASSESSMENT       Source(s): Chart Review and Patient/Caregiver Call       Warfarin doses taken: Warfarin taken as instructed    Diet: pt started drinking 1 Ensure a week. She is aware of the effects of the Julianne K has on the INR. She will keep her intake at 1x/week. If she wants to increase it, she will do it a week before her next INR.     New illness, injury, or hospitalization: No    Medication/supplement changes: None noted    Signs or symptoms of bleeding or clotting: No    Previous INR: Therapeutic last 2(+) visits    Additional findings: None       PLAN     Recommended plan for ongoing change(s) affecting INR     Dosing Instructions: continue your current warfarin dose with next INR in 4 weeks       Summary  As of 7/20/2022    Full warfarin instructions:  5 mg every Mon, Fri; 2.5 mg all other days   Next INR check:  8/17/2022             Telephone call with Tracy who verbalizes understanding and agrees to plan    Lab visit scheduled    Education provided: Please call back if any changes to your diet, medications or how you've been taking warfarin, Importance of consistent vitamin K intake, Goal range and significance of current result, Importance of therapeutic range and Potential interaction between warfarin and Ensure     Plan made per [facility] anticoagulation protocol    Johanna Mckeon, RN  Anticoagulation Clinic  7/20/2022    _______________________________________________________________________     Anticoagulation Episode Summary     Current INR goal:  2.0-3.0   TTR:  87.6 % (1 y)   Target end date:  Indefinite   Send INR reminders to:  GURVINDER SOARES    Indications    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]  Long term current use of anticoagulant therapy [Z79.01]  Other  pulmonary embolism without acute cor pulmonale  unspecified chronicity (H) [I26.99]  Chronic pulmonary embolism without acute cor pulmonale  unspecified pulmonary embolism type (H) [I27.82]           Comments:           Anticoagulation Care Providers     Provider Role Specialty Phone number    Chad Betts MD Referring Internal Medicine [phone]    Gerard Medrano MD Responsible Family Medicine [phone]

## 2022-08-15 ASSESSMENT — KOOS JR
RISING FROM SITTING: MODERATE
TWISING OR PIVOTING ON KNEE: MODERATE
STANDING UPRIGHT: MODERATE
KOOS JR SCORING: 59.38
GOING UP OR DOWN STAIRS: MODERATE
HOW SEVERE IS YOUR KNEE STIFFNESS AFTER FIRST WAKING IN MORNING: MODERATE
STRAIGHTENING KNEE FULLY: MILD

## 2022-08-17 ENCOUNTER — LAB (OUTPATIENT)
Dept: LAB | Facility: CLINIC | Age: 82
End: 2022-08-17
Payer: MEDICARE

## 2022-08-17 ENCOUNTER — ANTICOAGULATION THERAPY VISIT (OUTPATIENT)
Dept: ANTICOAGULATION | Facility: CLINIC | Age: 82
End: 2022-08-17

## 2022-08-17 DIAGNOSIS — Z79.01 LONG TERM CURRENT USE OF ANTICOAGULANT THERAPY: Primary | ICD-10-CM

## 2022-08-17 DIAGNOSIS — Z79.01 LONG TERM CURRENT USE OF ANTICOAGULANT THERAPY: ICD-10-CM

## 2022-08-17 DIAGNOSIS — I26.99 OTHER PULMONARY EMBOLISM WITHOUT ACUTE COR PULMONALE, UNSPECIFIED CHRONICITY (H): ICD-10-CM

## 2022-08-17 DIAGNOSIS — I27.82 CHRONIC PULMONARY EMBOLISM WITHOUT ACUTE COR PULMONALE, UNSPECIFIED PULMONARY EMBOLISM TYPE (H): ICD-10-CM

## 2022-08-17 DIAGNOSIS — I26.99 PULMONARY EMBOLI (H): ICD-10-CM

## 2022-08-17 LAB — INR BLD: 1.7 (ref 0.9–1.1)

## 2022-08-17 PROCEDURE — 85610 PROTHROMBIN TIME: CPT

## 2022-08-17 PROCEDURE — 36416 COLLJ CAPILLARY BLOOD SPEC: CPT

## 2022-08-17 NOTE — PROGRESS NOTES
ANTICOAGULATION MANAGEMENT     Tracy Palomo 82 year old female is on warfarin with subtherapeutic INR result. (Goal INR 2.0-3.0)    Recent labs: (last 7 days)     08/17/22  0859   INR 1.7*       ASSESSMENT       Source(s): Chart Review and Patient/Caregiver Call       Warfarin doses taken: Warfarin taken as instructed    Diet: Increased greens/vitamin K in diet; plans to resume previous intake. She has more greens in her diet lately. Also is drinking 2 Ensures a week (was drinking 1 a week) .     New illness, injury, or hospitalization: No    Medication/supplement changes: None noted    Signs or symptoms of bleeding or clotting: No    Previous INR: Therapeutic last 2(+) visits    Additional findings: None       PLAN     Recommended plan for ongoing change(s) affecting INR     Dosing Instructions: booster dose then continue your current warfarin dose with next INR in 2 weeks   - I did suggest  5 mg Mon, Wed, Fri and 2.5 mg ROW, but she would like to stay on the same dose until next check and if INR is still low, we can increase dose.     Summary  As of 8/17/2022    Full warfarin instructions:  8/17: 5 mg; Otherwise 5 mg every Mon, Fri; 2.5 mg all other days   Next INR check:  8/31/2022             Telephone call with Tracy who verbalizes understanding and agrees to plan    Lab visit scheduled    Education provided: Please call back if any changes to your diet, medications or how you've been taking warfarin, Importance of consistent vitamin K intake, Impact of vitamin K foods on INR and Monitoring for clotting signs and symptoms    Plan made per [facility] anticoagulation protocol    Johanna Mckeon, RN  Anticoagulation Clinic  8/17/2022    _______________________________________________________________________     Anticoagulation Episode Summary     Current INR goal:  2.0-3.0   TTR:  79.9 % (1 y)   Target end date:  Indefinite   Send INR reminders to:  GURVINDER SOARES    Indications    History of Pulmonary emboli  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 [I26.99]  Long term current use of anticoagulant therapy [Z79.01]  Other pulmonary embolism without acute cor pulmonale  unspecified chronicity (H) [I26.99]  Chronic pulmonary embolism without acute cor pulmonale  unspecified pulmonary embolism type (H) [I27.82]           Comments:           Anticoagulation Care Providers     Provider Role Specialty Phone number    Chad Betts MD Referring Internal Medicine [phone]    Gerard Medrano MD Responsible Family Medicine [phone]

## 2022-08-18 ENCOUNTER — OFFICE VISIT (OUTPATIENT)
Dept: ORTHOPEDICS | Facility: CLINIC | Age: 82
End: 2022-08-18
Payer: MEDICARE

## 2022-08-18 VITALS
RESPIRATION RATE: 20 BRPM | HEART RATE: 57 BPM | DIASTOLIC BLOOD PRESSURE: 84 MMHG | HEIGHT: 63 IN | WEIGHT: 180 LBS | BODY MASS INDEX: 31.89 KG/M2 | SYSTOLIC BLOOD PRESSURE: 148 MMHG

## 2022-08-18 DIAGNOSIS — Z96.651 HISTORY OF RIGHT KNEE JOINT REPLACEMENT: Primary | ICD-10-CM

## 2022-08-18 DIAGNOSIS — Z96.652 STATUS POST TOTAL LEFT KNEE REPLACEMENT: ICD-10-CM

## 2022-08-18 DIAGNOSIS — M48.061 SPINAL STENOSIS OF LUMBAR REGION WITHOUT NEUROGENIC CLAUDICATION: ICD-10-CM

## 2022-08-18 DIAGNOSIS — Z96.649 S/P HIP HEMIARTHROPLASTY: ICD-10-CM

## 2022-08-18 PROCEDURE — 99214 OFFICE O/P EST MOD 30 MIN: CPT | Performed by: ORTHOPAEDIC SURGERY

## 2022-08-18 NOTE — LETTER
8/18/2022         RE: Tracy Palomo  [address] [place] [id number]        Dear Colleague,    Thank you for referring your patient, Tracy Palomo, to the [facility]. Please see a copy of my visit note below.    Tracy Palomo is a 82 year old female who is seen with her daughter to discuss left leg pain.  She has this pain primarily in the medial thigh.  She has had a left hip fracture and underwent hemiarthroplasty in March 2017.  She has had left knee replacement in February 2021.  She has had this pain for the past year pretty much ever since the knee replacement.  She has occasional sharp shooting pains.   It is worse with cold weather.  She is also had the right knee replaced in the past.  X-rays of the knees show good position of components.  Neither patella has been replaced and both are showing lateral wear.  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  Patient reports the patella was not replaced because the patella was too thin.  The hip hemiarthroplasty appears to be subsiding and eroding into the calcar.  Leg length on this hip was originally about 6 mm short, and now measures 15 mm short.  The position appears stable since early 2021 however.  Ms. Palomo and her daughter had many questions about the pain in the leg, a feeling of instability, and various options for solving this.  One of her big concerns is that to get in and out of a car she has to physically lift up the left leg with her hands as she appears to have too much trouble with weakness to lifted up.  They are wondering if this is a muscle issue.  She also reports a feeling of instability in the leg at times if she pivots.  When it feels unstable she tends to fall backwards.  She also reported pain in the lower leg when she was leaning over using a hand saw to cut a branch.  She was stepping on the wood with her left leg with the knee bent  and bending over at the waist.  It is not clear if this was a knee issue or a back issue with her positioning.    X-rays of the knees today again show no change in position of knee complements.  The patella on both sides has not been replaced.    Past Medical History:   Diagnosis Date     Atrial fibrillation (H) 2014    related to colon surgery     Colon polyps      Diverticulosis of colon (without mention of hemorrhage)     Diverticulosis     DVT (deep vein thrombosis) in pregnancy 2014    after colon surgery     Other and unspecified hyperlipidemia      PE (pulmonary embolism) 2014    related to colon surgery     Unspecified essential hypertension        Past Surgical History:   Procedure Laterality Date     ARTHROPLASTY KNEE Right 1/8/2019    Procedure: Right total knee replacement;  Surgeon: Kaleb Pang DO;  [facility]     ARTHROPLASTY KNEE Left 2/16/2021    Procedure: left total knee replacement;  Surgeon: Kaleb Pang DO;  [facility]     COLONOSCOPY  09, 10, 12    [facility]     COLONOSCOPY N/A 12/11/2017    Procedure: COLONOSCOPY;  colonoscopy;  Surgeon: Elvis Quezada MD;  Location:  GI     FLEXIBLE SIGMOIDOSCOPY  09, 11     LAPAROTOMY EXPLORATORY N/A 10/20/2014    Procedure: LAPAROTOMY EXPLORATORY;  Surgeon: Miguel Oleary MD;  [facility]     LAPAROTOMY, LYSIS ADHESIONS, COMBINED N/A 10/20/2014    Procedure: COMBINED LAPAROTOMY, LYSIS ADHESIONS;  Surgeon: Miguel Oleary MD;  [facility]     OPEN REDUCTION INTERNAL FIXATION HIP BIPOLAR Left 3/16/2017    Procedure: OPEN REDUCTION INTERNAL FIXATION HIP BIPOLAR;  Surgeon: Kaleb Pang DO;  [facility]     RELEASE TRIGGER FINGER Left 1/12/2021    Procedure: Left thumb trigger release;  Surgeon: Kaleb Pang DO;  [facility]     RESECT SMALL BOWEL WITHOUT OSTOMY N/A 10/20/2014    Procedure: RESECT SMALL BOWEL WITHOUT OSTOMY;  Surgeon: Miguel Oleary  MD;  [facility]       Family History   Problem Relation Age of Onset     Blood Disease No family hx of         blood clots       Social History     Socioeconomic History     Marital status:      Spouse name: Not on file     Number of children: Not on file     Years of education: Not on file     Highest education level: Not on file   Occupational History     Not on file   Tobacco Use     Smoking status: Never Smoker     Smokeless tobacco: Never Used   Substance and Sexual Activity     Alcohol use: No     Alcohol/week: 0.0 standard drinks     Drug use: No     Sexual activity: Not Currently     Partners: Male   Other Topics Concern     Parent/sibling w/ CABG, MI or angioplasty before 65F 55M? No   Social History Narrative     Not on file     Social Determinants of Health     Financial Resource Strain: Not on file   Food Insecurity: Not on file   Transportation Needs: Not on file   Physical Activity: Not on file   Stress: Not on file   Social Connections: Not on file   Intimate Partner Violence: Not on file   Housing Stability: Not on file       Current Outpatient Medications   Medication Sig Dispense Refill     acetaminophen (TYLENOL) 325 MG tablet Take 3 tablets (975 mg) by mouth every 8 hours as needed for pain 100 tablet 1     lovastatin (MEVACOR) 40 MG tablet TAKE ONE TABLET BY MOUTH AT BEDTIME 90 tablet 3     meclizine (ANTIVERT) 25 MG tablet Take 1 tablet (25 mg) by mouth 3 times daily as needed for dizziness (Patient not taking: Reported on 5/16/2022) 30 tablet 0     warfarin ANTICOAGULANT (COUMADIN) 5 MG tablet TAKE ONE TABLET BY MOUTH ONCE DAILY ON MONDAY AND FRIDAY AND 1/2 TABLET ALL OTHER DAYS OF THE WEEK OR AS DIRECTED BY THE COUMADIN CLINIC - 30 day candido refill, patient needs to schedule annual visit with provider for future refills 90 tablet 3       Allergies   Allergen Reactions     No Known Allergies        REVIEW OF SYSTEMS:  CONSTITUTIONAL:  NEGATIVE for fever, chills, change in weight,  "not feeling tired  SKIN:  NEGATIVE for worrisome rashes, no skin lumps, no skin ulcers and no non-healing wounds  EYES:  NEGATIVE for vision changes or irritation.  ENT/MOUTH:  NEGATIVE.  No hearing loss, no hoarseness, no difficulty swallowing.  RESP:  NEGATIVE. No cough or shortness of breath.  CV:  NEGATIVE for chest pain, palpitations or peripheral edema  GI:  NEGATIVE for nausea, abdominal pain, heartburn, or change in bowel habits  :  Negative. No dysuria, no hematuria  MUSCULOSKELETAL:  See HPI above  NEURO:  NEGATIVE . No headaches, no dizziness,  no numbness  ENDOCRINE:  NEGATIVE for temperature intolerance, skin/hair changes  HEME/ALLERGY/IMMUNE:  NEGATIVE for bleeding problems  PSYCHIATRIC:  NEGATIVE. no anxiety, no depression.     Exam:  Vitals: BP (!) 148/84   Pulse 57   Resp 20   Ht 1.6 m (5' 3\")   Wt 81.6 kg (180 lb)   BMI 31.89 kg/m    BMI= Body mass index is 31.89 kg/m .  Constitutional:  healthy, alert and no distress  Neuro: Alert and Oriented x 3, no focal defects.  Psych: Affect normal   Respiratory: Breathing not labored.  Cardiovascular: normal peripheral pulses  Lymph: no adenopathy  Skin: No rashes,worrisome lesions or skin problems  She has no significant pain with hip rotation.  She does say she has some left knee pain with hip rotation, but nothing in the groin.  She has good mobility of both knees.  She does have mild tenderness under the patella with attempted patellar grind.  This does not seem to duplicate her pain however.  She had negative straight leg raise to 70 degrees bilaterally.  She is a bit tight in the hamstrings.    Assessment:  Left thigh pain of undetermined cause.  There is evidence that the left hip hemiarthroplasty may be subsiding, but does not appear to be causing pain on exam.   She reports most pain is with pivoting, so it may take more weightbearing to bring this out.   It is also stable since early 2021 by x-ray.  The knee looks good but there is wear on " the lateral patella on both knees where they have not been replaced.  It is not clear if this is contributing to her current pain.  She also appears to be experiencing some sciatic type pain or possibly spinal stenosis from the back.  She reports she has problems with her legs when she goes swimming and does kicking on her stomach.  If she kicks on her back she has no trouble, and this is with the spine more flexed as opposed to extended when she is on her stomach.  I extensively discussed with Ms. Palomo and her daughter possibility of revising the knees with patellar arthroplasty.  By x-ray I think there is enough bone for this to work, but we might have to be prepared for trabecular implant just in case.  I am not sure with the hip and spine where the source of the more proximal thigh pain is coming.  Issue continue to monitor symptoms and activities to see what brings this on.  Will refer to physical therapy for strengthening to see if this helps.  Visit today was approximately 45 minutes.      Again, thank you for allowing me to participate in the care of your patient.        Sincerely,        Gerard Colon MD

## 2022-08-19 NOTE — PROGRESS NOTES
Tracy Palomo is a 82 year old female who is seen with her daughter to discuss left leg pain.  She has this pain primarily in the medial thigh.  She has had a left hip fracture and underwent hemiarthroplasty in March 2017.  She has had left knee replacement in February 2021.  She has had this pain for the past year pretty much ever since the knee replacement.  She has occasional sharp shooting pains.   It is worse with cold weather.  She is also had the right knee replaced in the past.  X-rays of the knees show good position of components.  Neither patella has been replaced and both are showing lateral wear.  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  Patient reports the patella was not replaced because the patella was too thin.  The hip hemiarthroplasty appears to be subsiding and eroding into the calcar.  Leg length on this hip was originally about 6 mm short, and now measures 15 mm short.  The position appears stable since early 2021 however.  Ms. Palomo and her daughter had many questions about the pain in the leg, a feeling of instability, and various options for solving this.  One of her big concerns is that to get in and out of a car she has to physically lift up the left leg with her hands as she appears to have too much trouble with weakness to lifted up.  They are wondering if this is a muscle issue.  She also reports a feeling of instability in the leg at times if she pivots.  When it feels unstable she tends to fall backwards.  She also reported pain in the lower leg when she was leaning over using a hand saw to cut a branch.  She was stepping on the wood with her left leg with the knee bent and bending over at the waist.  It is not clear if this was a knee issue or a back issue with her positioning.    X-rays of the knees today again show no change in position of knee complements.  The patella on both sides has not been replaced.    Past  Medical History:   Diagnosis Date     Atrial fibrillation (H) 2014    related to colon surgery     Colon polyps      Diverticulosis of colon (without mention of hemorrhage)     Diverticulosis     DVT (deep vein thrombosis) in pregnancy 2014    after colon surgery     Other and unspecified hyperlipidemia      PE (pulmonary embolism) 2014    related to colon surgery     Unspecified essential hypertension        Past Surgical History:   Procedure Laterality Date     ARTHROPLASTY KNEE Right 1/8/2019    Procedure: Right total knee replacement;  Surgeon: Kaleb Pang DO;  [facility]     ARTHROPLASTY KNEE Left 2/16/2021    Procedure: left total knee replacement;  Surgeon: Kaleb Pang DO;  [facility]     COLONOSCOPY  09, 10, 12    [facility]     COLONOSCOPY N/A 12/11/2017    Procedure: COLONOSCOPY;  colonoscopy;  Surgeon: Elvis Quezada MD;  [facility]     FLEXIBLE SIGMOIDOSCOPY  09, 11     LAPAROTOMY EXPLORATORY N/A 10/20/2014    Procedure: LAPAROTOMY EXPLORATORY;  Surgeon: Miguel Oleary MD;  [facility]     LAPAROTOMY, LYSIS ADHESIONS, COMBINED N/A 10/20/2014    Procedure: COMBINED LAPAROTOMY, LYSIS ADHESIONS;  Surgeon: Miguel Oleary MD;  [facility]     OPEN REDUCTION INTERNAL FIXATION HIP BIPOLAR Left 3/16/2017    Procedure: OPEN REDUCTION INTERNAL FIXATION HIP BIPOLAR;  Surgeon: Kaleb Pang DO;  [facility]     RELEASE TRIGGER FINGER Left 1/12/2021    Procedure: Left thumb trigger release;  Surgeon: Kaleb Pang DO;  [facility]     RESECT SMALL BOWEL WITHOUT OSTOMY N/A 10/20/2014    Procedure: RESECT SMALL BOWEL WITHOUT OSTOMY;  Surgeon: Miguel Oleary MD;  [facility]       Family History   Problem Relation Age of Onset     Blood Disease No family hx of         blood clots       Social History     Socioeconomic History     Marital status:      Spouse name: Not on file     Number of  children: Not on file     Years of education: Not on file     Highest education level: Not on file   Occupational History     Not on file   Tobacco Use     Smoking status: Never Smoker     Smokeless tobacco: Never Used   Substance and Sexual Activity     Alcohol use: No     Alcohol/week: 0.0 standard drinks     Drug use: No     Sexual activity: Not Currently     Partners: Male   Other Topics Concern     Parent/sibling w/ CABG, MI or angioplasty before 65F 55M? No   Social History Narrative     Not on file     Social Determinants of Health     Financial Resource Strain: Not on file   Food Insecurity: Not on file   Transportation Needs: Not on file   Physical Activity: Not on file   Stress: Not on file   Social Connections: Not on file   Intimate Partner Violence: Not on file   Housing Stability: Not on file       Current Outpatient Medications   Medication Sig Dispense Refill     acetaminophen (TYLENOL) 325 MG tablet Take 3 tablets (975 mg) by mouth every 8 hours as needed for pain 100 tablet 1     lovastatin (MEVACOR) 40 MG tablet TAKE ONE TABLET BY MOUTH AT BEDTIME 90 tablet 3     meclizine (ANTIVERT) 25 MG tablet Take 1 tablet (25 mg) by mouth 3 times daily as needed for dizziness (Patient not taking: Reported on 5/16/2022) 30 tablet 0     warfarin ANTICOAGULANT (COUMADIN) 5 MG tablet TAKE ONE TABLET BY MOUTH ONCE DAILY ON MONDAY AND FRIDAY AND 1/2 TABLET ALL OTHER DAYS OF THE WEEK OR AS DIRECTED BY THE COUMADIN CLINIC - 30 day candido refill, patient needs to schedule annual visit with provider for future refills 90 tablet 3       Allergies   Allergen Reactions     No Known Allergies        REVIEW OF SYSTEMS:  CONSTITUTIONAL:  NEGATIVE for fever, chills, change in weight, not feeling tired  SKIN:  NEGATIVE for worrisome rashes, no skin lumps, no skin ulcers and no non-healing wounds  EYES:  NEGATIVE for vision changes or irritation.  ENT/MOUTH:  NEGATIVE.  No hearing loss, no hoarseness, no difficulty  "swallowing.  RESP:  NEGATIVE. No cough or shortness of breath.  CV:  NEGATIVE for chest pain, palpitations or peripheral edema  GI:  NEGATIVE for nausea, abdominal pain, heartburn, or change in bowel habits  :  Negative. No dysuria, no hematuria  MUSCULOSKELETAL:  See HPI above  NEURO:  NEGATIVE . No headaches, no dizziness,  no numbness  ENDOCRINE:  NEGATIVE for temperature intolerance, skin/hair changes  HEME/ALLERGY/IMMUNE:  NEGATIVE for bleeding problems  PSYCHIATRIC:  NEGATIVE. no anxiety, no depression.     Exam:  Vitals: BP (!) 148/84   Pulse 57   Resp 20   Ht 1.6 m (5' 3\")   Wt 81.6 kg (180 lb)   BMI 31.89 kg/m    BMI= Body mass index is 31.89 kg/m .  Constitutional:  healthy, alert and no distress  Neuro: Alert and Oriented x 3, no focal defects.  Psych: Affect normal   Respiratory: Breathing not labored.  Cardiovascular: normal peripheral pulses  Lymph: no adenopathy  Skin: No rashes,worrisome lesions or skin problems  She has no significant pain with hip rotation.  She does say she has some left knee pain with hip rotation, but nothing in the groin.  She has good mobility of both knees.  She does have mild tenderness under the patella with attempted patellar grind.  This does not seem to duplicate her pain however.  She had negative straight leg raise to 70 degrees bilaterally.  She is a bit tight in the hamstrings.    Assessment:  Left thigh pain of undetermined cause.  There is evidence that the left hip hemiarthroplasty may be subsiding, but does not appear to be causing pain on exam.   She reports most pain is with pivoting, so it may take more weightbearing to bring this out.   It is also stable since early 2021 by x-ray.  The knee looks good but there is wear on the lateral patella on both knees where they have not been replaced.  It is not clear if this is contributing to her current pain.  She also appears to be experiencing some sciatic type pain or possibly spinal stenosis from the back.  " She reports she has problems with her legs when she goes swimming and does kicking on her stomach.  If she kicks on her back she has no trouble, and this is with the spine more flexed as opposed to extended when she is on her stomach.  I extensively discussed with Ms. Palomo and her daughter possibility of revising the knees with patellar arthroplasty.  By x-ray I think there is enough bone for this to work, but we might have to be prepared for trabecular implant just in case.  I am not sure with the hip and spine where the source of the more proximal thigh pain is coming.  Issue continue to monitor symptoms and activities to see what brings this on.  Will refer to physical therapy for strengthening to see if this helps.  Visit today was approximately 45 minutes.

## 2022-08-25 ENCOUNTER — HOSPITAL ENCOUNTER (OUTPATIENT)
Dept: PHYSICAL THERAPY | Facility: CLINIC | Age: 82
Setting detail: THERAPIES SERIES
Discharge: HOME | End: 2022-08-25
Attending: ORTHOPAEDIC SURGERY
Payer: MEDICARE

## 2022-08-25 DIAGNOSIS — Z96.652 STATUS POST TOTAL LEFT KNEE REPLACEMENT: ICD-10-CM

## 2022-08-25 DIAGNOSIS — Z96.651 HISTORY OF RIGHT KNEE JOINT REPLACEMENT: ICD-10-CM

## 2022-08-25 PROCEDURE — 97110 THERAPEUTIC EXERCISES: CPT | Mod: GP | Performed by: PHYSICAL THERAPIST

## 2022-08-25 PROCEDURE — 97162 PT EVAL MOD COMPLEX 30 MIN: CPT | Mod: GP | Performed by: PHYSICAL THERAPIST

## 2022-08-25 PROCEDURE — 97140 MANUAL THERAPY 1/> REGIONS: CPT | Mod: GP | Performed by: PHYSICAL THERAPIST

## 2022-08-25 NOTE — PROGRESS NOTES
08/25/22 0745   General Information   Type of Visit Initial OP Ortho PT Evaluation   Start of Care Date 08/25/22   Referring Physician Gerard Colon MD   Patient/Family Goals Statement Pt wanting to reduce L knee pain, increase her confidence in walking   Orders Evaluate and Treat   Date of Order 08/18/22   Certification Required? Yes   Medical Diagnosis L TKA (Z96.652)   Body Part(s)   Body Part(s) Knee   Presentation and Etiology   Pertinent history of current problem (include personal factors and/or comorbidities that impact the POC) Pt is a 81 yo female who presents to PT with L knee pain. Pt reports having L TKA in 2021 with initial success in recovery and rehab, but has been experiencing pain and limited ROM over past 7-8 months. Pt presents with L knee ROM >90 degrees, but experiences pain in VMO toward end range. Pt is tender to palpation at VMO. Pt is limited in her entering/exiting her car and reports increased stifness when working in the garage and yard.   Impairments A. Pain   Symptom Location L knee   Onset date of current episode/exacerbation 01/12/22   Pain rating (0-10 point scale) Best (/10);Worst (/10)   Best (/10) 0/10   Worst (/10) 7+/10   Pain quality A. Sharp;C. Aching   Frequency of pain/symptoms C. With activity   Prior Level of Function   Prior Level of Function-Mobility Independent without AD   Prior Level of Function-ADLs Independent   Current Level of Function   Current Community Support Family/friend caregiver   Living environment [place]   Current equipment-Gait/Locomotion Front wheeled walker;Standard cane   Fall Risk Screen   Fall screen completed by PT   Have you fallen 2 or more times in the past year? Yes   Have you fallen and had an injury in the past year? Yes   Timed Up and Go score (seconds) 14.5   Is patient a fall risk? Yes;Department fall risk interventions implemented   Fall screen comments Pt reports falls while playing with dogs. Not confident in turning to  her L.   Abuse Screen (yes response referral indicated)   Feels Unsafe at Home or Work/School no   Feels Threatened by Someone no   Does Anyone Try to Keep You From Having Contact with Others or Doing Things Outside Your Home? no   Physical Signs of Abuse Present no   Patient needs abuse support services and resources No   Knee Objective Findings   Gait/Locomotion Narrow LIDA, fair speed, antalgic with apprehension about stability and pain in L knee.   Balance/Proprioception (Single Leg Stance) R = 7 sec; L = 3 sec   Foot Position In Standing ER B   Knee ROM Comment L knee limited to 90 deg comfortably, able to attain about 95 degrees with some pain in VMO   Palpation Pt tender over L VMO, pain noted when reaching end range flex   Planned Therapy Interventions   Planned Therapy Interventions balance training;gait training;manual therapy;neuromuscular re-education;strengthening;stretching   Planned Modality Interventions   Planned Modality Interventions Comments Modalities for pain mgmt and strength as needed   Clinical Impression   Criteria for Skilled Therapeutic Interventions Met yes, treatment indicated   PT Diagnosis L VMO pain and dysfunction, impaired balance and gait   Influenced by the following impairments Pain, weakness   Functional limitations due to impairments Pt limited in her home mobility, comfort in gait   Clinical Presentation Evolving/Changing   Clinical Presentation Rationale Pt with pain, decreased ROM, weakness, impaired balance   Clinical Decision Making (Complexity) Moderate complexity   Therapy Frequency 2 times/Week   Predicted Duration of Therapy Intervention (days/wks) 4-5 weeks   Risk & Benefits of therapy have been explained Yes   Patient, Family & other staff in agreement with plan of care Yes   Clinical Impression Comments Pt presents with L knee pain most problematic with mobility and end range flex during functional tasks. Pt presents with pain in VMO. Pt is also at increased risk of  falls with pain and mobility apprehension 2/2 pain with TUG score of 14.5 seconds and SLS time of 3 seconds on involved LE. Pt would benefit from skilled PT interventions in order to address her pain, weakness, and impaired balance and mobility in order to return to her PLOF.   Education Assessment   Preferred Learning Style Listening;Demonstration;Pictures/video   Barriers to Learning No barriers   ORTHO GOALS   PT Ortho Eval Goals 1;2;3   Ortho Goal 1   Goal Identifier HEP   Goal Description Pt will demo independence in performance and progression of stretching and strengthening HEP in order to self manage her sx.   Goal Progress Issued strength HEP   Target Date 09/22/22   Ortho Goal 2   Goal Identifier Pain   Goal Description Pt will demo pain no greater than 2/10 while performing functional mobility tasks so that she may have greater confidence and reduced falls risk.   Goal Progress Pt rates 7+/10 at times   Target Date 09/22/22   Ortho Goal 3   Goal Identifier LEFS   Goal Description Pt will demo improved leg and knee pain ratings and function as shown by increased LEFS score of at least 9 points in order to show significant improvement and greater to previous function.   Goal Progress 36/80   Target Date 09/22/22   Total Evaluation Time   PT Eval, Moderate Complexity Minutes (37502) 15   Therapy Certification   Certification date from 08/25/22   Certification date to 09/29/22   Medical Diagnosis L TKA (Z96.652)

## 2022-08-25 NOTE — PROGRESS NOTES
[facility]    OUTPATIENT PHYSICAL THERAPY ORTHOPEDIC EVALUATION  PLAN OF TREATMENT FOR OUTPATIENT REHABILITATION  (COMPLETE FOR INITIAL CLAIMS ONLY)  Patient's Last Name, First Name, M.I.  YOB: 1940  Tracy Palomo    Provider s Name:  [facility]   Medical Record No.  [id number]   Start of Care Date:  08/25/22   Onset Date:  01/12/22   Type:     _X__PT   ___OT   ___SLP Medical Diagnosis:  L TKA (Z96.652)     PT Diagnosis:  L VMO pain and dysfunction, impaired balance and gait   Visits from SOC:  1      _________________________________________________________________________________  Plan of Treatment/Functional Goals:  balance training, gait training, manual therapy, neuromuscular re-education, strengthening, stretching        Modalities for pain mgmt and strength as needed  Goals  Goal Identifier: HEP  Goal Description: Pt will demo independence in performance and progression of stretching and strengthening HEP in order to self manage her sx.  Target Date: 09/22/22    Goal Identifier: Pain  Goal Description: Pt will demo pain no greater than 2/10 while performing functional mobility tasks so that she may have greater confidence and reduced falls risk.  Target Date: 09/22/22    Goal Identifier: LEFS  Goal Description: Pt will demo improved leg and knee pain ratings and function as shown by increased LEFS score of at least 9 points in order to show significant improvement and greater to previous function.  Target Date: 09/22/22                                                           Therapy Frequency:  2 times/Week  Predicted Duration of Therapy Intervention:  4-5 weeks    Shaheen Almazan, PT                 I CERTIFY THE NEED FOR THESE SERVICES FURNISHED UNDER        THIS PLAN OF TREATMENT AND WHILE UNDER MY CARE     (Physician co-signature of this document  indicates review and certification of the therapy plan).                     Certification Date From:  08/25/22   Certification Date To:  09/29/22    Referring Provider:  Gerard Colon MD    Initial Assessment        See Epic Evaluation Start of Care Date: 08/25/22

## 2022-08-31 ENCOUNTER — LAB (OUTPATIENT)
Dept: LAB | Facility: CLINIC | Age: 82
End: 2022-08-31
Payer: MEDICARE

## 2022-08-31 ENCOUNTER — ANTICOAGULATION THERAPY VISIT (OUTPATIENT)
Dept: ANTICOAGULATION | Facility: CLINIC | Age: 82
End: 2022-08-31

## 2022-08-31 DIAGNOSIS — I26.99 PULMONARY EMBOLI (H): ICD-10-CM

## 2022-08-31 DIAGNOSIS — I26.99 OTHER PULMONARY EMBOLISM WITHOUT ACUTE COR PULMONALE, UNSPECIFIED CHRONICITY (H): ICD-10-CM

## 2022-08-31 DIAGNOSIS — I27.82 CHRONIC PULMONARY EMBOLISM WITHOUT ACUTE COR PULMONALE, UNSPECIFIED PULMONARY EMBOLISM TYPE (H): ICD-10-CM

## 2022-08-31 DIAGNOSIS — I26.99 PULMONARY EMBOLI (H): Primary | ICD-10-CM

## 2022-08-31 DIAGNOSIS — Z79.01 LONG TERM CURRENT USE OF ANTICOAGULANT THERAPY: ICD-10-CM

## 2022-08-31 LAB — INR BLD: 2.8 (ref 0.9–1.1)

## 2022-08-31 PROCEDURE — 85610 PROTHROMBIN TIME: CPT

## 2022-08-31 PROCEDURE — 36416 COLLJ CAPILLARY BLOOD SPEC: CPT

## 2022-08-31 NOTE — PROGRESS NOTES
ANTICOAGULATION MANAGEMENT     Tracy Palomo 82 year old female is on warfarin with therapeutic INR result. (Goal INR 2.0-3.0)    Recent labs: (last 7 days)     08/31/22  0811   INR 2.8*       ASSESSMENT       Source(s): Chart Review and Patient/Caregiver Call       Warfarin doses taken: Warfarin taken as instructed    Diet: No new diet changes identified    New illness, injury, or hospitalization: No    Medication/supplement changes: None noted    Signs or symptoms of bleeding or clotting: No    Previous INR: Subtherapeutic    Additional findings: None       PLAN     Recommended plan for no diet, medication or health factor changes affecting INR     Dosing Instructions: Continue your current warfarin dose with next INR in 4 weeks       Summary  As of 8/31/2022    Full warfarin instructions:  5 mg every Mon, Fri; 2.5 mg all other days   Next INR check:  9/28/2022             Telephone call with Tracy who verbalizes understanding and agrees to plan, who agrees to plan and repeated back plan correctly and ; made aware of patient survey and encouraged to participate.    Lab visit scheduled    Education provided: Please call back if any changes to your diet, medications or how you've been taking warfarin    Plan made per [facility] anticoagulation protocol    Arti Ruelas, RN  Anticoagulation Clinic  8/31/2022    _______________________________________________________________________     Anticoagulation Episode Summary     Current INR goal:  2.0-3.0   TTR:  78.9 % (1 y)   Target end date:  Indefinite   Send INR reminders to:  GURVINDER SOARES    Indications    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]  Long term current use of anticoagulant therapy [Z79.01]  Other pulmonary embolism without acute cor pulmonale  unspecified chronicity (H) [I26.99]  Chronic pulmonary embolism without acute cor pulmonale  unspecified pulmonary embolism type (H) [I27.82]           Comments:            Anticoagulation Care Providers     Provider Role Specialty Phone number    Chad Betts MD Referring Internal Medicine [phone]    Gerard Medrano MD Responsible Family Medicine [phone]

## 2022-09-01 ENCOUNTER — HOSPITAL ENCOUNTER (OUTPATIENT)
Dept: PHYSICAL THERAPY | Facility: CLINIC | Age: 82
Setting detail: THERAPIES SERIES
Discharge: HOME | End: 2022-09-01
Attending: ORTHOPAEDIC SURGERY
Payer: MEDICARE

## 2022-09-01 PROCEDURE — 97110 THERAPEUTIC EXERCISES: CPT | Mod: GP | Performed by: PHYSICAL THERAPIST

## 2022-09-01 PROCEDURE — 97140 MANUAL THERAPY 1/> REGIONS: CPT | Mod: GP | Performed by: PHYSICAL THERAPIST

## 2022-09-04 ENCOUNTER — HEALTH MAINTENANCE LETTER (OUTPATIENT)
Age: 82
End: 2022-09-04

## 2022-09-08 ENCOUNTER — HOSPITAL ENCOUNTER (OUTPATIENT)
Dept: PHYSICAL THERAPY | Facility: CLINIC | Age: 82
Setting detail: THERAPIES SERIES
Discharge: HOME | End: 2022-09-08
Attending: ORTHOPAEDIC SURGERY
Payer: MEDICARE

## 2022-09-08 PROCEDURE — 97110 THERAPEUTIC EXERCISES: CPT | Mod: GP | Performed by: PHYSICAL THERAPIST

## 2022-09-08 PROCEDURE — 97112 NEUROMUSCULAR REEDUCATION: CPT | Mod: GP | Performed by: PHYSICAL THERAPIST

## 2022-09-15 ENCOUNTER — HOSPITAL ENCOUNTER (OUTPATIENT)
Dept: PHYSICAL THERAPY | Facility: CLINIC | Age: 82
Setting detail: THERAPIES SERIES
Discharge: HOME | End: 2022-09-15
Attending: ORTHOPAEDIC SURGERY
Payer: MEDICARE

## 2022-09-15 PROCEDURE — 97112 NEUROMUSCULAR REEDUCATION: CPT | Mod: GP | Performed by: PHYSICAL THERAPIST

## 2022-09-15 PROCEDURE — 97110 THERAPEUTIC EXERCISES: CPT | Mod: GP | Performed by: PHYSICAL THERAPIST

## 2022-09-22 ENCOUNTER — HOSPITAL ENCOUNTER (OUTPATIENT)
Dept: PHYSICAL THERAPY | Facility: CLINIC | Age: 82
Setting detail: THERAPIES SERIES
Discharge: HOME | End: 2022-09-22
Attending: ORTHOPAEDIC SURGERY
Payer: MEDICARE

## 2022-09-22 PROCEDURE — 97112 NEUROMUSCULAR REEDUCATION: CPT | Mod: GP | Performed by: PHYSICAL THERAPIST

## 2022-09-22 PROCEDURE — 97110 THERAPEUTIC EXERCISES: CPT | Mod: GP | Performed by: PHYSICAL THERAPIST

## 2022-09-28 ENCOUNTER — ANTICOAGULATION THERAPY VISIT (OUTPATIENT)
Dept: ANTICOAGULATION | Facility: CLINIC | Age: 82
End: 2022-09-28

## 2022-09-28 ENCOUNTER — LAB (OUTPATIENT)
Dept: LAB | Facility: CLINIC | Age: 82
End: 2022-09-28
Payer: MEDICARE

## 2022-09-28 DIAGNOSIS — I27.82 CHRONIC PULMONARY EMBOLISM WITHOUT ACUTE COR PULMONALE, UNSPECIFIED PULMONARY EMBOLISM TYPE (H): ICD-10-CM

## 2022-09-28 DIAGNOSIS — I26.99 OTHER PULMONARY EMBOLISM WITHOUT ACUTE COR PULMONALE, UNSPECIFIED CHRONICITY (H): ICD-10-CM

## 2022-09-28 DIAGNOSIS — I26.99 PULMONARY EMBOLI (H): ICD-10-CM

## 2022-09-28 DIAGNOSIS — Z79.01 LONG TERM CURRENT USE OF ANTICOAGULANT THERAPY: ICD-10-CM

## 2022-09-28 DIAGNOSIS — Z79.01 LONG TERM CURRENT USE OF ANTICOAGULANT THERAPY: Primary | ICD-10-CM

## 2022-09-28 LAB — INR BLD: 3.8 (ref 0.9–1.1)

## 2022-09-28 PROCEDURE — 36416 COLLJ CAPILLARY BLOOD SPEC: CPT

## 2022-09-28 PROCEDURE — 85610 PROTHROMBIN TIME: CPT

## 2022-09-28 NOTE — PROGRESS NOTES
ANTICOAGULATION MANAGEMENT     Tracy Palomo 82 year old female is on warfarin with supratherapeutic INR result. (Goal INR 2.0-3.0)    Recent labs: (last 7 days)     09/28/22  0901   INR 3.8*       ASSESSMENT       Source(s): Chart Review and Patient/Caregiver Call       Warfarin doses taken: Warfarin taken as instructed    Diet: Decreased greens/vitamin K in diet; plans to resume previous intake    New illness, injury, or hospitalization: No    Medication/supplement changes: None noted    Signs or symptoms of bleeding or clotting: No    Previous INR: Therapeutic last visit; previously outside of goal range    Additional findings: None       PLAN     Recommended plan for temporary change(s) affecting INR     Dosing Instructions: hold dose then continue your current warfarin dose with next INR in 8 days       Summary  As of 9/28/2022    Full warfarin instructions:  9/28: Hold; Otherwise 5 mg every Mon, Fri; 2.5 mg all other days   Next INR check:  10/5/2022             Telephone call with Tracy who verbalizes understanding and agrees to plan    Lab visit scheduled    Education provided: Please call back if any changes to your diet, medications or how you've been taking warfarin, Importance of consistent vitamin K intake, Goal range and significance of current result, Importance of therapeutic range and Monitoring for bleeding signs and symptoms    Plan made per [facility] anticoagulation protocol    Johanna Mckeon, RN  Anticoagulation Clinic  9/28/2022    _______________________________________________________________________     Anticoagulation Episode Summary     Current INR goal:  2.0-3.0   TTR:  72.7 % (1 y)   Target end date:  Indefinite   Send INR reminders to:  GURVINDER SOARES    Indications    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]  Long term current use of anticoagulant therapy [Z79.01]  Other pulmonary embolism without acute cor pulmonale  unspecified chronicity (H)  [I26.99]  Chronic pulmonary embolism without acute cor pulmonale  unspecified pulmonary embolism type (H) [I27.82]           Comments:           Anticoagulation Care Providers     Provider Role Specialty Phone number    Chad Betts MD Referring Internal Medicine [phone]    Gerard Medrano MD Responsible Family Medicine [phone]

## 2022-09-29 ENCOUNTER — HOSPITAL ENCOUNTER (OUTPATIENT)
Dept: PHYSICAL THERAPY | Facility: CLINIC | Age: 82
Setting detail: THERAPIES SERIES
Discharge: HOME | End: 2022-09-29
Attending: ORTHOPAEDIC SURGERY
Payer: MEDICARE

## 2022-09-29 PROCEDURE — 97112 NEUROMUSCULAR REEDUCATION: CPT | Mod: GP | Performed by: PHYSICAL THERAPIST

## 2022-09-29 PROCEDURE — 97110 THERAPEUTIC EXERCISES: CPT | Mod: GP | Performed by: PHYSICAL THERAPIST

## 2022-09-29 NOTE — PROGRESS NOTES
"[facility] Service    Outpatient Physical Therapy Discharge Note  Patient: Tracy Palomo  : 1940    Beginning/End Dates of Reporting Period:  22 to 22    Referring Provider: Gerard Colon MD    Therapy Diagnosis: L VMO pain and dysfunction, impaired balance     Client Self Report: Pt reports she has been able to get off the floor, is overall feeling good with her LE.    Objective Measurements:  Objective Measure: LEFS  Details:                                     Outcome Measures (most recent score):      Goals:  Goal Identifier HEP   Goal Description Pt will demo independence in performance and progression of stretching and strengthening HEP in order to self manage her sx.   Target Date 22   Date Met  09/15/22   Progress (detail required for progress note): Pt shows independence in her HEP, able to manage her sx, self advance as needed     Goal Identifier Pain   Goal Description Pt will demo pain no greater than 2/10 while performing functional mobility tasks so that she may have greater confidence and reduced falls risk.   Target Date 22   Date Met  22   Progress (detail required for progress note): Pt reports pain is 2-3/10 when she is doing things she \"shouldn't be\" but rates pain as 1-2/10 with her normal walking around the home and community.     Goal Identifier LEFS   Goal Description Pt will demo improved leg and knee pain ratings and function as shown by increased LEFS score of at least 9 points in order to show significant improvement and greater to previous function.   Target Date 22   Date Met  22   Progress (detail required for progress note): ; pt able to report she is walking better, tolerating walking over longer distances with decreased levels of pain, she feels more mobile overall       Plan:  Discharge from therapy.    Discharge:    Reason " for Discharge: Patient has met all goals.    Equipment Issued: None    Discharge Plan: Patient to continue home program.

## 2022-10-06 ENCOUNTER — LAB (OUTPATIENT)
Dept: LAB | Facility: CLINIC | Age: 82
End: 2022-10-06
Payer: MEDICARE

## 2022-10-06 ENCOUNTER — ANTICOAGULATION THERAPY VISIT (OUTPATIENT)
Dept: ANTICOAGULATION | Facility: CLINIC | Age: 82
End: 2022-10-06

## 2022-10-06 DIAGNOSIS — I27.82 CHRONIC PULMONARY EMBOLISM WITHOUT ACUTE COR PULMONALE, UNSPECIFIED PULMONARY EMBOLISM TYPE (H): ICD-10-CM

## 2022-10-06 DIAGNOSIS — I26.99 OTHER PULMONARY EMBOLISM WITHOUT ACUTE COR PULMONALE, UNSPECIFIED CHRONICITY (H): ICD-10-CM

## 2022-10-06 DIAGNOSIS — I26.99 PULMONARY EMBOLI (H): Primary | ICD-10-CM

## 2022-10-06 DIAGNOSIS — Z79.01 LONG TERM CURRENT USE OF ANTICOAGULANT THERAPY: ICD-10-CM

## 2022-10-06 DIAGNOSIS — I26.99 PULMONARY EMBOLI (H): ICD-10-CM

## 2022-10-06 LAB — INR BLD: 2.9 (ref 0.9–1.1)

## 2022-10-06 PROCEDURE — 36416 COLLJ CAPILLARY BLOOD SPEC: CPT

## 2022-10-06 PROCEDURE — 85610 PROTHROMBIN TIME: CPT

## 2022-10-06 NOTE — PROGRESS NOTES
ANTICOAGULATION MANAGEMENT     Tracy Palomo 82 year old female is on warfarin with therapeutic INR result. (Goal INR 2.0-3.0)    Recent labs: (last 7 days)     10/06/22  1004   INR 2.9*       ASSESSMENT       Source(s): Chart Review and Patient/Caregiver Call       Warfarin doses taken: Warfarin taken as instructed    Diet: Pt is going to increase greens slightly    New illness, injury, or hospitalization: No    Medication/supplement changes: None noted    Signs or symptoms of bleeding or clotting: No    Previous INR: Supratherapeutic    Additional findings: None       PLAN     Recommended plan for no diet, medication or health factor changes affecting INR     Dosing Instructions: Continue your current warfarin dose with next INR in 2 weeks       Summary  As of 10/6/2022    Full warfarin instructions:  5 mg every Mon, Fri; 2.5 mg all other days   Next INR check:  10/20/2022             Telephone call with Tracy who verbalizes understanding and agrees to plan and who agrees to plan and repeated back plan correctly    Lab visit scheduled    Education provided: Importance of consistent vitamin K intake and Impact of vitamin K foods on INR    Plan made per [facility] anticoagulation protocol    Radha Rodriguez RN  Anticoagulation Clinic  10/6/2022    _______________________________________________________________________     Anticoagulation Episode Summary     Current INR goal:  2.0-3.0   TTR:  70.8 % (1 y)   Target end date:  Indefinite   Send INR reminders to:  GURVINDER SOARES    Indications    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]  Long term current use of anticoagulant therapy [Z79.01]  Other pulmonary embolism without acute cor pulmonale  unspecified chronicity (H) [I26.99]  Chronic pulmonary embolism without acute cor pulmonale  unspecified pulmonary embolism type (H) [I27.82]           Comments:           Anticoagulation Care Providers     Provider Role Specialty Phone number     Chad Betts MD [facility] Internal Medicine [phone]    Gerard Medrano MD [facility] Medicine [phone]

## 2022-10-19 ENCOUNTER — LAB (OUTPATIENT)
Dept: LAB | Facility: CLINIC | Age: 82
End: 2022-10-19
Payer: MEDICARE

## 2022-10-19 ENCOUNTER — ANTICOAGULATION THERAPY VISIT (OUTPATIENT)
Dept: ANTICOAGULATION | Facility: CLINIC | Age: 82
End: 2022-10-19

## 2022-10-19 DIAGNOSIS — I26.99 PULMONARY EMBOLI (H): Primary | ICD-10-CM

## 2022-10-19 DIAGNOSIS — I26.99 PULMONARY EMBOLI (H): ICD-10-CM

## 2022-10-19 DIAGNOSIS — Z79.01 LONG TERM CURRENT USE OF ANTICOAGULANT THERAPY: ICD-10-CM

## 2022-10-19 DIAGNOSIS — I26.99 OTHER PULMONARY EMBOLISM WITHOUT ACUTE COR PULMONALE, UNSPECIFIED CHRONICITY (H): ICD-10-CM

## 2022-10-19 DIAGNOSIS — I27.82 CHRONIC PULMONARY EMBOLISM WITHOUT ACUTE COR PULMONALE, UNSPECIFIED PULMONARY EMBOLISM TYPE (H): ICD-10-CM

## 2022-10-19 LAB — INR BLD: 2.8 (ref 0.9–1.1)

## 2022-10-19 PROCEDURE — 85610 PROTHROMBIN TIME: CPT

## 2022-10-19 PROCEDURE — 36416 COLLJ CAPILLARY BLOOD SPEC: CPT

## 2022-10-19 NOTE — PROGRESS NOTES
ANTICOAGULATION MANAGEMENT     Tracy Palomo 82 year old female is on warfarin with therapeutic INR result. (Goal INR 2.0-3.0)    Recent labs: (last 7 days)     10/19/22  0906   INR 2.8*       ASSESSMENT       Source(s): Chart Review and Patient/Caregiver Call       Warfarin doses taken: Warfarin taken as instructed    Diet: No new diet changes identified    New illness, injury, or hospitalization: No    Medication/supplement changes: None noted    Signs or symptoms of bleeding or clotting: No    Previous INR: Therapeutic last visit; previously outside of goal range    Additional findings: None       PLAN     Recommended plan for no diet, medication or health factor changes affecting INR     Dosing Instructions: Continue your current warfarin dose with next INR in 3 weeks       Summary  As of 10/19/2022    Full warfarin instructions:  5 mg every Mon, Fri; 2.5 mg all other days   Next INR check:  11/9/2022             Telephone call with Tracy who verbalizes understanding and agrees to plan    Lab visit scheduled    Education provided: Please call back if any changes to your diet, medications or how you've been taking warfarin, Importance of consistent vitamin K intake and Contact [phone]  with any changes, questions or concerns.     Plan made per [facility] anticoagulation protocol    Morena Putnam RN  Anticoagulation Clinic  10/19/2022    _______________________________________________________________________     Anticoagulation Episode Summary     Current INR goal:  2.0-3.0   TTR:  70.8 % (1 y)   Target end date:  Indefinite   Send INR reminders to:  [facility]    Indications    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]  Long term current use of anticoagulant therapy [Z79.01]  Other pulmonary embolism without acute cor pulmonale  unspecified chronicity (H) [I26.99]  Chronic pulmonary embolism without acute cor pulmonale  unspecified pulmonary embolism type (H)  [I27.82]           Comments:           Anticoagulation Care Providers     Provider Role Specialty Phone number    Chad Betts MD Referring Internal Medicine [phone]    Gerard Medrano MD Responsible Family Medicine [phone]

## 2022-10-31 NOTE — TELEPHONE ENCOUNTER
Amoxicillin sent to [facility]    
Reason for Call:  Medication or medication refill:    Do you use a [facility]?  Name of the pharmacy and phone number for the current request:  [facility] [place] - [phone]    Name of the medication requested: Antibiotic to use before dentist appt on 10.24. Please advise.    Other request:     Can we leave a detailed message on this number? YES    Phone number patient can be reached at: Home number on file [phone] (home)    Best Time:     Call taken on 10/18/2019 at 9:19 AM by Lisset Yousif    
Spoke to patient and gave message. She said thank you so much. No further questions.  
3

## 2022-11-02 NOTE — ADDENDUM NOTE
Addended by: NERY TERAN on: 3/22/2019 01:04 PM     Modules accepted: Orders     Encounter addended by: Lashanda Antonio RN on: 11/2/2022 9:44 AM   Actions taken: Charge Capture section accepted, Flowsheet accepted

## 2022-11-09 ENCOUNTER — ANTICOAGULATION THERAPY VISIT (OUTPATIENT)
Dept: ANTICOAGULATION | Facility: CLINIC | Age: 82
End: 2022-11-09

## 2022-11-09 ENCOUNTER — LAB (OUTPATIENT)
Dept: LAB | Facility: CLINIC | Age: 82
End: 2022-11-09
Payer: MEDICARE

## 2022-11-09 DIAGNOSIS — I26.99 PULMONARY EMBOLI (H): ICD-10-CM

## 2022-11-09 DIAGNOSIS — I27.82 CHRONIC PULMONARY EMBOLISM WITHOUT ACUTE COR PULMONALE, UNSPECIFIED PULMONARY EMBOLISM TYPE (H): Primary | ICD-10-CM

## 2022-11-09 DIAGNOSIS — I26.99 OTHER PULMONARY EMBOLISM WITHOUT ACUTE COR PULMONALE, UNSPECIFIED CHRONICITY (H): ICD-10-CM

## 2022-11-09 DIAGNOSIS — Z79.01 LONG TERM CURRENT USE OF ANTICOAGULANT THERAPY: ICD-10-CM

## 2022-11-09 LAB — INR BLD: 2.7 (ref 0.9–1.1)

## 2022-11-09 PROCEDURE — 85610 PROTHROMBIN TIME: CPT

## 2022-11-09 PROCEDURE — 36416 COLLJ CAPILLARY BLOOD SPEC: CPT

## 2022-11-09 NOTE — PROGRESS NOTES
ANTICOAGULATION MANAGEMENT     Tracy Palomo 82 year old female is on warfarin with therapeutic INR result. (Goal INR 2.0-3.0)    Recent labs: (last 7 days)     11/09/22  0938   INR 2.7*       ASSESSMENT       Source(s): Chart Review and Patient/Caregiver Call       Warfarin doses taken: Missed dose(s) may be affecting INR    Diet: No new diet changes identified    New illness, injury, or hospitalization: No    Medication/supplement changes: None noted    Signs or symptoms of bleeding or clotting: No    Previous INR: Therapeutic last 2(+) visits    Additional findings: None       PLAN     Recommended plan for temporary change(s) affecting INR     Dosing Instructions: Continue your current warfarin dose with next INR in 4 weeks       Summary  As of 11/9/2022    Full warfarin instructions:  5 mg every Mon, Fri; 2.5 mg all other days; Starting 11/9/2022   Next INR check:  12/7/2022             Telephone call with Tracy who verbalizes understanding and agrees to plan and who agrees to plan and repeated back plan correctly    Lab visit scheduled    Education provided:     Please call back if any changes to your diet, medications or how you've been taking warfarin    Plan made per [facility] anticoagulation protocol    Arti Ruelas, RN  Anticoagulation Clinic  11/9/2022    _______________________________________________________________________     Anticoagulation Episode Summary     Current INR goal:  2.0-3.0   TTR:  70.8 % (1 y)   Target end date:  Indefinite   Send INR reminders to:  GURVINDER SOARES    Indications    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]  Long term current use of anticoagulant therapy [Z79.01]  Other pulmonary embolism without acute cor pulmonale  unspecified chronicity (H) [I26.99]  Chronic pulmonary embolism without acute cor pulmonale  unspecified pulmonary embolism type (H) [I27.82]           Comments:           Anticoagulation Care Providers     Provider Role  Specialty Phone number    Chad Betts MD Referring Internal Medicine [phone]    Gerard Medrano MD Responsible Family Medicine [phone]

## 2022-11-26 ENCOUNTER — APPOINTMENT (OUTPATIENT)
Dept: GENERAL RADIOLOGY | Facility: CLINIC | Age: 82
End: 2022-11-26
Attending: EMERGENCY MEDICINE
Payer: MEDICARE

## 2022-11-26 ENCOUNTER — HOSPITAL ENCOUNTER (EMERGENCY)
Facility: CLINIC | Age: 82
Discharge: HOME | End: 2022-11-26
Attending: NURSE PRACTITIONER | Admitting: NURSE PRACTITIONER
Payer: MEDICARE

## 2022-11-26 VITALS
SYSTOLIC BLOOD PRESSURE: 178 MMHG | HEART RATE: 88 BPM | DIASTOLIC BLOOD PRESSURE: 82 MMHG | RESPIRATION RATE: 18 BRPM | TEMPERATURE: 97.6 F | OXYGEN SATURATION: 97 %

## 2022-11-26 DIAGNOSIS — M79.671 RIGHT FOOT PAIN: ICD-10-CM

## 2022-11-26 PROCEDURE — 99283 EMERGENCY DEPT VISIT LOW MDM: CPT | Performed by: NURSE PRACTITIONER

## 2022-11-26 PROCEDURE — 99284 EMERGENCY DEPT VISIT MOD MDM: CPT | Performed by: NURSE PRACTITIONER

## 2022-11-26 PROCEDURE — 73630 X-RAY EXAM OF FOOT: CPT | Mod: RT

## 2022-11-26 NOTE — ED PROVIDER NOTES
History     Chief Complaint   Patient presents with     Foot Pain     HPI  Tracy Palomo is a 82 year old female who presents for evaluation of right foot pain.  Patient states she dropped a dumbbell weight on her foot 2 weeks ago.  Over the last few days she has been on her feet more than usual and doing a lot of shopping.  Today while she was walking out of the store she had increased pain along the lateral aspect of her right foot making it extremely painful to weight-bear.        Allergies:  Allergies   Allergen Reactions     No Known Allergies        Problem List:    Patient Active Problem List    Diagnosis Date Noted     Spinal stenosis of lumbar region without neurogenic claudication 08/18/2022     Priority: Medium     S/P hip hemiarthroplasty 06/02/2022     Priority: Medium     Chronic pulmonary embolism without acute cor pulmonale, unspecified pulmonary embolism type (H) 04/20/2022     Priority: Medium     Status post total left knee replacement 03/01/2021     Priority: Medium     S/P total knee replacement using cement, left 02/16/2021     Priority: Medium     Pain of right sacroiliac joint 01/06/2021     Priority: Medium     Primary osteoarthritis of right hip 01/06/2021     Priority: Medium     Trigger finger of left thumb 01/06/2021     Priority: Medium     SAH (subarachnoid hemorrhage) (H) 08/05/2020     Priority: Medium     Other pulmonary embolism without acute cor pulmonale, unspecified chronicity (H) 05/27/2020     Priority: Medium     Peripheral edema 01/09/2019     Priority: Medium     S/P total knee replacement using cement, right 01/08/2019     Priority: Medium     Other chest pain 01/08/2019     Priority: Medium     Primary osteoarthritis of right knee 11/15/2018     Priority: Medium     Primary osteoarthritis of left knee 11/15/2018     Priority: Medium     Closed left hip fracture (H) 03/15/2017     Priority: Medium     Long term current use of anticoagulant therapy 03/22/2016      Priority: Medium     Anemia 11/03/2014     Priority: Medium     Paroxysmal atrial fibrillation (H) 11/03/2014     Priority: Medium     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 11/01/2014     Priority: Medium     In 2014       Recent major surgery - exploratory lap with small bowel resection 10/20 11/01/2014     Priority: Medium     Pleural effusion on right 11/01/2014     Priority: Medium     Hypertension goal BP (blood pressure) < 140/90 02/22/2013     Priority: Medium     Hyperlipidemia LDL goal <130 02/22/2013     Priority: Medium     Encounter for long-term current use of medication 02/22/2013     Priority: Medium     Problem list name updated by automated process. Provider to review       History of colonic polyps 02/22/2013     Priority: Medium     Problem list name updated by automated process. Provider to review       Advanced directives, counseling/discussion 02/22/2013     Priority: Medium     Pt states has Advance Directive at home, will bring in to be scanned into chart.          Past Medical History:    Past Medical History:   Diagnosis Date     Atrial fibrillation (H) 2014     Colon polyps      Diverticulosis of colon (without mention of hemorrhage)      DVT (deep vein thrombosis) in pregnancy 2014     Other and unspecified hyperlipidemia      PE (pulmonary embolism) 2014     Unspecified essential hypertension        Past Surgical History:    Past Surgical History:   Procedure Laterality Date     ARTHROPLASTY KNEE Right 1/8/2019    Procedure: Right total knee replacement;  Surgeon: Kaleb Pang DO;  Location:  OR     ARTHROPLASTY KNEE Left 2/16/2021    Procedure: left total knee replacement;  Surgeon: Kaleb Pang DO;  Location:  OR     COLONOSCOPY  09, 10, 12    [facility]     COLONOSCOPY N/A 12/11/2017    Procedure: COLONOSCOPY;  colonoscopy;  Surgeon: Elvis Quezada MD;  Location:  GI     FLEXIBLE SIGMOIDOSCOPY  09, 11      LAPAROTOMY EXPLORATORY N/A 10/20/2014    Procedure: LAPAROTOMY EXPLORATORY;  Surgeon: Miguel Oleary MD;  Location: [facility] OR     LAPAROTOMY, LYSIS ADHESIONS, COMBINED N/A 10/20/2014    Procedure: COMBINED LAPAROTOMY, LYSIS ADHESIONS;  Surgeon: Miguel Oleary MD;  Location: [facility] OR     OPEN REDUCTION INTERNAL FIXATION HIP BIPOLAR Left 3/16/2017    Procedure: OPEN REDUCTION INTERNAL FIXATION HIP BIPOLAR;  Surgeon: Kaleb Pang DO;  Location: [facility] OR     RELEASE TRIGGER FINGER Left 1/12/2021    Procedure: Left thumb trigger release;  Surgeon: Kaleb Pang DO;  Location: [facility] OR     RESECT SMALL BOWEL WITHOUT OSTOMY N/A 10/20/2014    Procedure: RESECT SMALL BOWEL WITHOUT OSTOMY;  Surgeon: Miguel Oleary MD;  Location: [facility] OR       Family History:    Family History   Problem Relation Age of Onset     Blood Disease No family hx of         blood clots       Social History:  Marital Status:   [5]  Social History     Tobacco Use     Smoking status: Never     Smokeless tobacco: Never   Substance Use Topics     Alcohol use: No     Alcohol/week: 0.0 standard drinks     Drug use: No        Medications:    acetaminophen (TYLENOL) 325 MG tablet  lovastatin (MEVACOR) 40 MG tablet  meclizine (ANTIVERT) 25 MG tablet  warfarin ANTICOAGULANT (COUMADIN) 5 MG tablet          Review of Systems  As mentioned above in the history present illness. All other systems were reviewed and are negative.    Physical Exam   BP: (!) 178/82  Pulse: 88  Temp: 97.6  F (36.4  C)  Resp: 18  SpO2: 97 %      Physical Exam  Appearance: Alert and oriented.  Pleasant.  Mild edema in both lower extremities.  Nonpitting.  Right foot:   --There is ecchymosis to the right middle toe.    -- Tenderness with palpation to the distal lateral aspect.  -- No tenderness to the base of the 5th metatarsal. No ankle tenderness.     ED Course                 Procedures              Results for orders placed or performed during the hospital  encounter of 11/26/22 (from the past 24 hour(s))   Foot  XR, G/E 3 views, right    Narrative    EXAM: XR FOOT RIGHT G/E 3 VIEWS  LOCATION: [facility]  DATE/TIME: 11/26/2022 2:50 PM    INDICATION: swelling and pain.  COMPARISON: None.      Impression    IMPRESSION: No evidence for displaced fracture. Degenerative change first MTP joint. Plantar and Achilles calcaneal spurring.       Medications - No data to display    Assessments & Plan (with Medical Decision Making)     82-year-old female with right lateral foot pain.  She dropped a weight on her foot a couple weeks ago.  Patient has been on her feet doing a lot of walking and shopping over the last few days.  Today the pain in her right foot is worse.  On exam she has tenderness to the distal right fifth metatarsal.  No tenderness along the base of the fifth metatarsal.  No ankle tenderness.  She has mild edema in both legs, nonpitting.  There is ecchymosis to the middle toe on the right foot.  X-ray is negative for evidence of fracture.  She has degenerative changes to the MTP joint and calcaneal spurring.  Likely her pain is from overdoing it with walking and shopping.  I recommend she rest, ice packs intermittently, elevate the foot is much as possible, and compression during the day.  We placed an Ace wrap to her right foot and ankle here today.  Recheck if not improving in the next week.    New Prescriptions    No medications on file       Final diagnoses:   Right foot pain       11/26/2022   [facility] EMERGENCY DEPT     Vanesa Storm APRN CNP  11/26/22 [id number]

## 2022-11-26 NOTE — DISCHARGE INSTRUCTIONS
Ace wrap during the day.  Elevate leg as much as possible.  Ice packs intermittently if needed.  Tylenol 650 mg every 4-6 hours as needed for pain.  Recheck if not improving in 1 week.

## 2022-11-28 ENCOUNTER — DOCUMENTATION ONLY (OUTPATIENT)
Dept: ANTICOAGULATION | Facility: CLINIC | Age: 82
End: 2022-11-28

## 2022-11-28 NOTE — PROGRESS NOTES
ANTICOAGULATION  MANAGEMENT: Discharge Review    Tracy Palomo chart reviewed for anticoagulation continuity of care    Emergency room visit on 11/26/22 for Right foot pain.    Discharge disposition: Home    Results:    No results for input(s): INR, [id number], F2, ALMWH, AAUFH in the last 168 hours.  Anticoagulation inpatient management:     not applicable     Anticoagulation discharge instructions:     Warfarin dosing: home regimen continued   Bridging: No   INR goal change: No      Medication changes affecting anticoagulation: No    Additional factors affecting anticoagulation: Yes: foot pain     PLAN     No adjustment to anticoagulation plan needed    Patient not contacted    No adjustment to Anticoagulation Calendar was required    Arti Ruelas RN

## 2022-12-07 ENCOUNTER — LAB (OUTPATIENT)
Dept: LAB | Facility: CLINIC | Age: 82
End: 2022-12-07
Payer: MEDICARE

## 2022-12-07 ENCOUNTER — ANTICOAGULATION THERAPY VISIT (OUTPATIENT)
Dept: ANTICOAGULATION | Facility: CLINIC | Age: 82
End: 2022-12-07

## 2022-12-07 DIAGNOSIS — Z79.01 LONG TERM CURRENT USE OF ANTICOAGULANT THERAPY: ICD-10-CM

## 2022-12-07 DIAGNOSIS — I27.82 CHRONIC PULMONARY EMBOLISM WITHOUT ACUTE COR PULMONALE, UNSPECIFIED PULMONARY EMBOLISM TYPE (H): Primary | ICD-10-CM

## 2022-12-07 DIAGNOSIS — I26.99 OTHER PULMONARY EMBOLISM WITHOUT ACUTE COR PULMONALE, UNSPECIFIED CHRONICITY (H): ICD-10-CM

## 2022-12-07 DIAGNOSIS — I26.99 PULMONARY EMBOLI (H): ICD-10-CM

## 2022-12-07 LAB — INR BLD: 2.4 (ref 0.9–1.1)

## 2022-12-07 PROCEDURE — 85610 PROTHROMBIN TIME: CPT

## 2022-12-07 PROCEDURE — 36416 COLLJ CAPILLARY BLOOD SPEC: CPT

## 2022-12-07 NOTE — PROGRESS NOTES
ANTICOAGULATION MANAGEMENT     Tracy Palomo 82 year old female is on warfarin with therapeutic INR result. (Goal INR 2.0-3.0)    Recent labs: (last 7 days)     12/07/22  0958   INR 2.4*       ASSESSMENT       Source(s): Chart Review and Patient/Caregiver Call       Warfarin doses taken: Warfarin taken as instructed    Diet: No new diet changes identified    New illness, injury, or hospitalization: No    Medication/supplement changes: None noted    Signs or symptoms of bleeding or clotting: No    Previous INR: Therapeutic last 2(+) visits    Additional findings: None     PLAN     Recommended plan for no diet, medication or health factor changes affecting INR     Dosing Instructions: Continue your current warfarin dose with next INR in 5 weeks       Summary  As of 12/7/2022    Full warfarin instructions:  5 mg every Mon, Fri; 2.5 mg all other days; Starting 12/7/2022   Next INR check:  1/11/2023             Telephone call with Tracy who verbalizes understanding and agrees to plan    Lab visit scheduled    Education provided:     Please call back if any changes to your diet, medications or how you've been taking warfarin    Plan made per [facility] anticoagulation protocol    Preeti Bay RN  Anticoagulation Clinic  12/7/2022    _______________________________________________________________________     Anticoagulation Episode Summary     Current INR goal:  2.0-3.0   TTR:  70.8 % (1 y)   Target end date:  Indefinite   Send INR reminders to:  GURVINDER SOARES    Indications    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]  Long term current use of anticoagulant therapy [Z79.01]  Other pulmonary embolism without acute cor pulmonale  unspecified chronicity (H) [I26.99]  Chronic pulmonary embolism without acute cor pulmonale  unspecified pulmonary embolism type (H) [I27.82]           Comments:           Anticoagulation Care Providers     Provider Role Specialty Phone number    Chad Betts MD  Referring Internal Medicine [phone]    Gerard Medrano MD Responsible [facility] Medicine [phone]

## 2023-01-11 ENCOUNTER — LAB (OUTPATIENT)
Dept: LAB | Facility: CLINIC | Age: 83
End: 2023-01-11
Payer: MEDICARE

## 2023-01-11 ENCOUNTER — ANTICOAGULATION THERAPY VISIT (OUTPATIENT)
Dept: ANTICOAGULATION | Facility: CLINIC | Age: 83
End: 2023-01-11

## 2023-01-11 DIAGNOSIS — I26.99 PULMONARY EMBOLI (H): ICD-10-CM

## 2023-01-11 DIAGNOSIS — I26.99 OTHER PULMONARY EMBOLISM WITHOUT ACUTE COR PULMONALE, UNSPECIFIED CHRONICITY (H): ICD-10-CM

## 2023-01-11 DIAGNOSIS — Z79.01 LONG TERM CURRENT USE OF ANTICOAGULANT THERAPY: ICD-10-CM

## 2023-01-11 DIAGNOSIS — I27.82 CHRONIC PULMONARY EMBOLISM WITHOUT ACUTE COR PULMONALE, UNSPECIFIED PULMONARY EMBOLISM TYPE (H): Primary | ICD-10-CM

## 2023-01-11 LAB — INR BLD: 3 (ref 0.9–1.1)

## 2023-01-11 PROCEDURE — 36416 COLLJ CAPILLARY BLOOD SPEC: CPT

## 2023-01-11 PROCEDURE — 85610 PROTHROMBIN TIME: CPT

## 2023-01-11 NOTE — PROGRESS NOTES
ANTICOAGULATION MANAGEMENT     Tracy Palomo 82 year old female is on warfarin with therapeutic INR result. (Goal INR 2.0-3.0)    Recent labs: (last 7 days)     01/11/23  1001   INR 3.0*       ASSESSMENT       Source(s): Chart Review and Patient/Caregiver Call       Warfarin doses taken: Warfarin taken as instructed    Diet: No new diet changes identified    New illness, injury, or hospitalization: No    Medication/supplement changes: None noted    Signs or symptoms of bleeding or clotting: No    Previous INR: Therapeutic last 2(+) visits    Additional findings: None       PLAN     Recommended plan for no diet, medication or health factor changes affecting INR     Dosing Instructions: Continue your current warfarin dose with next INR in 6 weeks       Summary  As of 1/11/2023    Full warfarin instructions:  5 mg every Mon, Fri; 2.5 mg all other days   Next INR check:  2/22/2023             Telephone call with Tracy who verbalizes understanding and agrees to plan    Lab visit scheduled    Education provided:     Please call back if any changes to your diet, medications or how you've been taking warfarin    Plan made per [facility] anticoagulation protocol    Johanna Mckeon, RN  Anticoagulation Clinic  1/11/2023    _______________________________________________________________________     Anticoagulation Episode Summary     Current INR goal:  2.0-3.0   TTR:  70.8 % (1 y)   Target end date:  Indefinite   Send INR reminders to:  GURVINDER SOARES    Indications    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

## 2023-02-17 ENCOUNTER — TELEPHONE (OUTPATIENT)
Dept: ANTICOAGULATION | Facility: CLINIC | Age: 83
End: 2023-02-17
Payer: MEDICARE

## 2023-02-17 DIAGNOSIS — Z79.01 LONG TERM CURRENT USE OF ANTICOAGULANT THERAPY: ICD-10-CM

## 2023-02-17 DIAGNOSIS — I27.82 CHRONIC PULMONARY EMBOLISM WITHOUT ACUTE COR PULMONALE, UNSPECIFIED PULMONARY EMBOLISM TYPE (H): Primary | ICD-10-CM

## 2023-02-17 DIAGNOSIS — I26.99 OTHER PULMONARY EMBOLISM WITHOUT ACUTE COR PULMONALE, UNSPECIFIED CHRONICITY (H): ICD-10-CM

## 2023-02-20 NOTE — TELEPHONE ENCOUNTER
ANTICOAGULATION CLINIC REFERRAL RENEWAL REQUEST       An annual renewal order is required for all patients referred to [facility].?  Please review and sign the pended referral order for Tracy Palomo.       ANTICOAGULATION SUMMARY      Warfarin indication(s)   PE    Mechanical heart valve present?  NO       Current goal range   INR: 2.0-3.0     Goal appropriate for indication? Goal INR 2-3, standard for indication(s) above     Time in Therapeutic Range (TTR)  (Goal > 60%) 70.8%       Office visit with referring provider's group within last year yes on 5/16/22       Radha Rodriguez RN  [facility]

## 2023-02-22 ENCOUNTER — LAB (OUTPATIENT)
Dept: LAB | Facility: CLINIC | Age: 83
End: 2023-02-22
Payer: MEDICARE

## 2023-02-22 ENCOUNTER — TELEPHONE (OUTPATIENT)
Dept: INTERNAL MEDICINE | Facility: CLINIC | Age: 83
End: 2023-02-22

## 2023-02-22 ENCOUNTER — ANTICOAGULATION THERAPY VISIT (OUTPATIENT)
Dept: ANTICOAGULATION | Facility: CLINIC | Age: 83
End: 2023-02-22

## 2023-02-22 DIAGNOSIS — I27.82 CHRONIC PULMONARY EMBOLISM WITHOUT ACUTE COR PULMONALE, UNSPECIFIED PULMONARY EMBOLISM TYPE (H): ICD-10-CM

## 2023-02-22 DIAGNOSIS — I27.82 CHRONIC PULMONARY EMBOLISM WITHOUT ACUTE COR PULMONALE, UNSPECIFIED PULMONARY EMBOLISM TYPE (H): Primary | ICD-10-CM

## 2023-02-22 DIAGNOSIS — Z79.01 LONG TERM CURRENT USE OF ANTICOAGULANT THERAPY: ICD-10-CM

## 2023-02-22 DIAGNOSIS — I26.99 OTHER PULMONARY EMBOLISM WITHOUT ACUTE COR PULMONALE, UNSPECIFIED CHRONICITY (H): ICD-10-CM

## 2023-02-22 LAB — INR BLD: 2.3 (ref 0.9–1.1)

## 2023-02-22 PROCEDURE — 36416 COLLJ CAPILLARY BLOOD SPEC: CPT

## 2023-02-22 PROCEDURE — 85610 PROTHROMBIN TIME: CPT

## 2023-02-22 NOTE — PROGRESS NOTES
ANTICOAGULATION MANAGEMENT     Tracy Palomo 82 year old female is on warfarin with therapeutic INR result. (Goal INR 2.0-3.0)    Recent labs: (last 7 days)     02/22/23  0958   INR 2.3*       ASSESSMENT       Source(s): Chart Review    Previous INR was Therapeutic last 2(+) visits    Medication, diet, health changes since last INR chart reviewed; none identified             PLAN     Recommended plan for no diet, medication or health factor changes affecting INR     Dosing Instructions: Continue your current warfarin dose with next INR in 6 weeks       Summary  As of 2/22/2023    Full warfarin instructions:  5 mg every Mon, Fri; 2.5 mg all other days   Next INR check:  4/5/2023             Detailed voice message left for Tracy with dosing instructions and follow up date.  My chart message also sent.     Contact [phone]  to schedule and with any changes, questions or concerns.     Education provided:     Contact [phone]  with any changes, questions or concerns.     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

## 2023-02-22 NOTE — TELEPHONE ENCOUNTER
Spoke with patient. Next INR scheduled.    Belle Berg RN  [facility]

## 2023-02-22 NOTE — TELEPHONE ENCOUNTER
General Call    Contacts       Type Contact Phone/Fax    02/22/2023 12:30 PM CST Phone (Incoming) Tracy Palomo (Self) [phone] (H)        Reason for Call:  Patient is returning call for Belle in Anticoagulation/unable to reach Belle/please return call to patient for next route of care    What are your questions or concerns:  Needing next route of care    Date of last appointment with provider: N/A    Could we send this information to you in [facility] or would you prefer to receive a phone call?:   Patient would prefer a phone call   Okay to leave a detailed message?: No at Cell number on file:    Telephone Information:   Mobile [phone]

## 2023-03-20 ENCOUNTER — OFFICE VISIT (OUTPATIENT)
Dept: INTERNAL MEDICINE | Facility: CLINIC | Age: 83
End: 2023-03-20
Payer: MEDICARE

## 2023-03-20 ENCOUNTER — ANTICOAGULATION THERAPY VISIT (OUTPATIENT)
Dept: ANTICOAGULATION | Facility: CLINIC | Age: 83
End: 2023-03-20

## 2023-03-20 VITALS
OXYGEN SATURATION: 97 % | WEIGHT: 181 LBS | RESPIRATION RATE: 18 BRPM | SYSTOLIC BLOOD PRESSURE: 138 MMHG | BODY MASS INDEX: 30.9 KG/M2 | DIASTOLIC BLOOD PRESSURE: 76 MMHG | TEMPERATURE: 98.3 F | HEART RATE: 66 BPM | HEIGHT: 64 IN

## 2023-03-20 DIAGNOSIS — M79.89 SWELLING OF BOTH HANDS: Primary | ICD-10-CM

## 2023-03-20 DIAGNOSIS — I27.82 CHRONIC PULMONARY EMBOLISM WITHOUT ACUTE COR PULMONALE, UNSPECIFIED PULMONARY EMBOLISM TYPE (H): Primary | ICD-10-CM

## 2023-03-20 DIAGNOSIS — R29.898 LEFT LEG WEAKNESS: ICD-10-CM

## 2023-03-20 DIAGNOSIS — Z79.01 LONG TERM CURRENT USE OF ANTICOAGULANT THERAPY: ICD-10-CM

## 2023-03-20 DIAGNOSIS — I26.99 OTHER PULMONARY EMBOLISM WITHOUT ACUTE COR PULMONALE, UNSPECIFIED CHRONICITY (H): ICD-10-CM

## 2023-03-20 DIAGNOSIS — I48.0 PAROXYSMAL ATRIAL FIBRILLATION (H): ICD-10-CM

## 2023-03-20 DIAGNOSIS — M25.669 STIFFNESS OF KNEE JOINT, UNSPECIFIED LATERALITY: ICD-10-CM

## 2023-03-20 DIAGNOSIS — E78.5 HYPERLIPIDEMIA LDL GOAL <130: ICD-10-CM

## 2023-03-20 LAB
ALBUMIN SERPL BCG-MCNC: 4.1 G/DL (ref 3.5–5.2)
ALP SERPL-CCNC: 127 U/L (ref 35–104)
ALT SERPL W P-5'-P-CCNC: 25 U/L (ref 10–35)
ANION GAP SERPL CALCULATED.3IONS-SCNC: 9 MMOL/L (ref 7–15)
AST SERPL W P-5'-P-CCNC: 30 U/L (ref 10–35)
BILIRUB SERPL-MCNC: 1 MG/DL
BUN SERPL-MCNC: 12.7 MG/DL (ref 8–23)
CALCIUM SERPL-MCNC: 9.7 MG/DL (ref 8.8–10.2)
CHLORIDE SERPL-SCNC: 105 MMOL/L (ref 98–107)
CREAT SERPL-MCNC: 0.89 MG/DL (ref 0.51–0.95)
CRP SERPL-MCNC: <3 MG/L
DEPRECATED HCO3 PLAS-SCNC: 27 MMOL/L (ref 22–29)
ERYTHROCYTE [DISTWIDTH] IN BLOOD BY AUTOMATED COUNT: 13.9 % (ref 10–15)
ERYTHROCYTE [SEDIMENTATION RATE] IN BLOOD BY WESTERGREN METHOD: 9 MM/HR (ref 0–30)
GFR SERPL CREATININE-BSD FRML MDRD: 65 ML/MIN/1.73M2
GLUCOSE SERPL-MCNC: 90 MG/DL (ref 70–99)
HCT VFR BLD AUTO: 45.3 % (ref 35–47)
HGB BLD-MCNC: 14.4 G/DL (ref 11.7–15.7)
INR PPP: 2.17 (ref 0.85–1.15)
MCH RBC QN AUTO: 28.8 PG (ref 26.5–33)
MCHC RBC AUTO-ENTMCNC: 31.8 G/DL (ref 31.5–36.5)
MCV RBC AUTO: 91 FL (ref 78–100)
PLATELET # BLD AUTO: 234 10E3/UL (ref 150–450)
POTASSIUM SERPL-SCNC: 4.5 MMOL/L (ref 3.4–5.3)
PROT SERPL-MCNC: 6.8 G/DL (ref 6.4–8.3)
RBC # BLD AUTO: 5 10E6/UL (ref 3.8–5.2)
SODIUM SERPL-SCNC: 141 MMOL/L (ref 136–145)
TSH SERPL DL<=0.005 MIU/L-ACNC: 1.47 UIU/ML (ref 0.3–4.2)
WBC # BLD AUTO: 6 10E3/UL (ref 4–11)

## 2023-03-20 PROCEDURE — 84443 ASSAY THYROID STIM HORMONE: CPT | Performed by: INTERNAL MEDICINE

## 2023-03-20 PROCEDURE — 85610 PROTHROMBIN TIME: CPT | Performed by: INTERNAL MEDICINE

## 2023-03-20 PROCEDURE — 85652 RBC SED RATE AUTOMATED: CPT | Performed by: INTERNAL MEDICINE

## 2023-03-20 PROCEDURE — 86140 C-REACTIVE PROTEIN: CPT | Performed by: INTERNAL MEDICINE

## 2023-03-20 PROCEDURE — 80053 COMPREHEN METABOLIC PANEL: CPT | Performed by: INTERNAL MEDICINE

## 2023-03-20 PROCEDURE — 85027 COMPLETE CBC AUTOMATED: CPT | Performed by: INTERNAL MEDICINE

## 2023-03-20 PROCEDURE — 36415 COLL VENOUS BLD VENIPUNCTURE: CPT | Performed by: INTERNAL MEDICINE

## 2023-03-20 PROCEDURE — 99214 OFFICE O/P EST MOD 30 MIN: CPT | Performed by: INTERNAL MEDICINE

## 2023-03-20 RX ORDER — WARFARIN SODIUM 5 MG/1
TABLET ORAL
Qty: 90 TABLET | Refills: 3 | Status: SHIPPED | OUTPATIENT
Start: 2023-03-20 | End: 2024-05-07

## 2023-03-20 RX ORDER — LOVASTATIN 40 MG
TABLET ORAL
Qty: 90 TABLET | Refills: 3 | Status: SHIPPED | OUTPATIENT
Start: 2023-03-20 | End: 2024-04-05

## 2023-03-20 ASSESSMENT — PAIN SCALES - GENERAL: PAINLEVEL: NO PAIN (0)

## 2023-03-20 NOTE — PROGRESS NOTES
ANTICOAGULATION MANAGEMENT     Tracy Palomo 82 year old female is on warfarin with therapeutic INR result. (Goal INR 2.0-3.0)    Recent labs: (last 7 days)     03/20/23  1338   INR 2.17*       ASSESSMENT       Source(s): Chart Review and Patient/Caregiver Call       Warfarin doses taken: Warfarin taken as instructed    Diet: No new diet changes identified    New illness, injury, or hospitalization: No    Medication/supplement changes: None noted Pt was seen today & has been having some inflammation issues. If she's started on any new medications she will call and let us know.     Signs or symptoms of bleeding or clotting: No    Previous INR: Therapeutic last 2(+) visits    Additional findings: None         PLAN     Recommended plan for no diet, medication or health factor changes affecting INR     Dosing Instructions: Continue your current warfarin dose with next INR in 6 weeks       Summary  As of 3/20/2023    Full warfarin instructions:  5 mg every Mon, Fri; 2.5 mg all other days   Next INR check:  5/1/2023             Telephone call with Tracy who verbalizes understanding and agrees to plan and who agrees to plan and repeated back plan correctly    Lab visit scheduled    Education provided:     Importance of notifying anticoagulation clinic for: changes in medications; a sooner lab recheck maybe needed    Plan made per [facility] anticoagulation protocol    Radha Rodriguez RN  Anticoagulation Clinic  3/20/2023    _______________________________________________________________________     Anticoagulation Episode Summary     Current INR goal:  2.0-3.0   TTR:  75.3 % (1 y)   Target end date:  Indefinite   Send INR reminders to:  GURVINEDR SOARES    Indications    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

## 2023-03-20 NOTE — PROGRESS NOTES
"      Sydnee Molina is a 82 year old accompanied by her daughter, presenting for the following health issues:  Atrial Fib (/), Mass (Middle of chest), Hypertension, and Leg Stiffness      Mass    History of Present Illness       Reason for visit:  Lump in chest   afib   knee stiffness  mobility    She eats 2-3 servings of fruits and vegetables daily.She consumes 0 sweetened beverage(s) daily.She exercises with enough effort to increase her heart rate 10 to 19 minutes per day.  She exercises with enough effort to increase her heart rate 7 days per week.   She is taking medications regularly.     Living in her house alone,  Does lots of activity, shoveling, walking if no ice.  Biking on stationary bike as well.     Swelling in her hands at night, painful, wakes her up.     A fib episode in the middle of the night, a month ago, no chest pains.  Rate was going fast.  Less then half hour.      Review of Systems         Objective    BP (!) 140/76   Pulse 66   Temp 98.3  F (36.8  C) (Temporal)   Resp 18   Ht 1.615 m (5' 3.6\")   Wt 82.1 kg (181 lb)   SpO2 97%   BMI 31.46 kg/m    Body mass index is 31.46 kg/m .  Physical Exam   GENERAL: healthy, alert and no distress  NECK: no adenopathy, no asymmetry, masses, or scars and thyroid normal to palpation  RESP: lungs clear to auscultation - no rales, rhonchi or wheezes  CV: regular rate and rhythm, normal S1 S2, no S3 or S4, no murmur, click or rub, no peripheral edema and peripheral pulses strong    Gait is stiff, left leg doesn't lift up as well.   Hands have no swelling now.   Ankles trace edema.   Skin lipoma over the sternum         Assessment and plan-this is an 82-year-old female I know well.  She has not been in for 10 months.  She is accompanied by her daughter.  The patient lives independently she continues to drive, shovels and snow blows.  She goes on her exercise bike and walks regularly.    History of pulmonary embolism and paroxysmal A-fib she is on " Coumadin follows with the Coumadin clinic we will check her INR today.  I did offer her the DOACs but she wants to stay with Coumadin.    Stiffness of her knee we will refer to physical therapy to try to get her left leg and gait better.    Swelling of her hand she thinks there is inflammation I wonder if it is at night she can try wrist splints to keep her hands in a neutral position we will check her labs for thyroid, inflammatory markers.  New.  Hyperlipidemia continue her statin therapy    Patient is overall doing well, she is reassured about the lipoma on her sternum.  If it is changing she will let us know.

## 2023-04-10 ENCOUNTER — HOSPITAL ENCOUNTER (OUTPATIENT)
Dept: PHYSICAL THERAPY | Facility: CLINIC | Age: 83
Setting detail: THERAPIES SERIES
Discharge: HOME | End: 2023-04-10
Attending: INTERNAL MEDICINE
Payer: MEDICARE

## 2023-04-10 DIAGNOSIS — R29.898 LEFT LEG WEAKNESS: ICD-10-CM

## 2023-04-10 DIAGNOSIS — M25.669 STIFFNESS OF KNEE JOINT, UNSPECIFIED LATERALITY: ICD-10-CM

## 2023-04-10 PROCEDURE — 97110 THERAPEUTIC EXERCISES: CPT | Mod: GP

## 2023-04-10 PROCEDURE — 97161 PT EVAL LOW COMPLEX 20 MIN: CPT | Mod: GP

## 2023-04-10 NOTE — PROGRESS NOTES
04/10/23 1000   Quick Adds   Quick Adds Certification   Type of Visit Initial OP PT Evaluation       Present No   General Information   Start of Care Date 04/10/23   Referring Physician Chad Betts MD   Orders Evaluate and Treat as Indicated   Order Date 03/20/23   Medical Diagnosis Stiffness of knee joint, unspecified laterality (M25.669)     Left leg weakness (R29.898)   Onset of illness/injury or Date of Surgery 04/10/22   Precautions/Limitations fall precautions   Surgical/Medical history reviewed Yes   Pertinent history of current problem (include personal factors and/or comorbidities that impact the POC) Patient is a 82 year old female who reports to therapy without pain complaint, stating she has had chronic LLE weakness since her LLE TKA procedure in 2021. Patient reports primary deficits with ambulation, standing, and with going up and down stairs. Patient's past medical history includes LLE TKA in 2021, RLE TKA in 2019, LLE hip ORIF in 2017, peripheral edema, anemia, RLE hip OA, lumbar spinal stenosis, right SIJ pain, and subarachnoid hemorrhage.   Pertinent Visual History  Glasses   Prior level of functional mobility Transfers;Ambulation;ADL   Prior level of function comment Patient reports independence with mobility and ADL's after her current symptom onset.   Diagnostic Tests X-ray   X-ray Results Results   X-ray results IMPRESSION:     Right: There is a knee arthroplasty without a patellar component.  Excellent position and alignment of components without evidence of  hardware loosening or other complication. Diffuse bone  demineralization again noted. No new findings.     Left: Total knee arthroplasty without a patellar component. Excellent  position and alignment of components. No evidence of complication or  change. Diffuse bone demineralization again noted. No new findings.  IMPRESSION: Diffuse bone demineralization which limits sensitivity to  detect nondisplaced  fractures. Left hip hemiarthroplasty remains in  place. No acute displaced periprosthetic fracture identified.  Unchanged mild right hip osteoarthritis. No displaced pelvic ring  fracture identified.   Previous/Current Treatment Physical Therapy   Improvement after PT Significant   Current Community Support Family/friend caregiver   Patient role/Employment history Retired   Living environment House/[facility]   Home/Community Accessibility Comments Ramp to enter house with 2 railings.   Current Assistive Devices Front Wheeled Walker;Four Wheeled Walker;Standard Cane;Axillary Crutches   ADL Devices Shower/Tub Chair;Shower/Tub Grab Bar;Reacher;Other  (shoe horn)   Assistive Devices Comments Patient reports access to above ambulatory assistive devices but doesn't use them if she doesn't have to.   Patient/Family Goals Statement Cure her LLE hip strength.   Fall Risk Screen   Fall screen completed by PT   Have you fallen 2 or more times in the past year? No   Have you fallen and had an injury in the past year? No   Is patient a fall risk? Yes;Department fall risk interventions implemented   Fall screen comments Reports slipping on wet surface with boots on into the snow without injury.   Abuse Screen (yes response referral indicated)   Feels Unsafe at Home or Work/School no   Feels Threatened by Someone no   Does Anyone Try to Keep You From Having Contact with Others or Doing Things Outside Your Home? no   Physical Signs of Abuse Present no   Patient needs abuse support services and resources No   Functional Scales   Functional Scales and Outcomes LEFS   Pain   Patient currently in pain No   Pain comments Patient reports medial thigh pain with coming to standing position, isn't able to articulate radiation pattern.   Vitals Signs   Heart Rate 53   SpO2 95   Blood Pressure 131/79   Vital Signs Comments All WNL's for patient's prior vital sign norms.   Cognitive Status Examination   Orientation orientation to person, place  and time   Level of Consciousness alert   Follows Commands and Answers Questions 100% of the time   Personal Safety and Judgment intact   Memory intact   Integumentary   Integumentary No deficits were identified   Posture   Posture Forward head position;Kyphosis   Posture Comments Mild postural deficits identified in seated position during today's session.   Palpation   Palpation Patient non tender to palpation along LLE medial thigh during today's session.   Range of Motion (ROM)   ROM Comment Significant BLE knee flexion, hip ER, and hip IR PROM deficit in supine during today's session. Patient's BLE ankle AROM is WNL's in seated position during today's session. Patient's BLE hip ROM grossly limited during today's session due to BLE knee pain exacerbation.   Strength   Manual Muscle Testing Quick Adds MMT: Hip;MMT: Knee;MMT: Ankle   MMT: Hip, Rehab Eval   Hip Flexion - Left Side (3+/5) fair plus, left   Hip Extension - Left Side (4-/5) good minus, left   Hip ABduction - Left Side (4/5) good, left   Hip ADduction - Left Side (4/5) good, left   Hip Internal Rotation - Left Side (3+/5) fair plus, left   Hip External Rotation - Left Side (3/5) fair, left   Hip Flexion - Right Side (4/5) good, right   Hip Extension - Right Side (4/5) good, right   Hip ABduction - Right Side (4/5) good, right   Hip ADduction - Right Side (4/5) good, right   Hip Internal Rotation - Right Side (4-/5) good minus, right   Hip External Rotation - Right Side (4-/5) good minus, right   MMT: Knee, Rehab Eval   Knee Flexion - Left Side (4-/5) good minus, left   Knee Extension - Left Side (4-/5) good minus, left  (LLE knee pain exacerbation.)   Knee Flexion - Right Side (4/5) good, right   Knee Extension - Right Side (4/5) good, right   MMT: Ankle, Rehab Eval   Ankle Dorsiflexion - Left Side (4-/5) good minus, left   Ankle Plantarflexion - Left Side (4-/5) good minus, left   Ankle Dorsiflexion - Right Side (4/5) good, right   Ankle Plantarflexion  - Right Side (4/5) good, right   Musculoskeletal Special Tests   Musculoskeletal Special Tests Patient BLE SLR and 90-90 hamstring length tests demonstrate significant hamstring tightness.   Bed Mobility   Bed Mobility Comments Patient IND with bed mobility.   Transfer Skills   Transfer Comments Patient demonstrates SBA x1 for STS and stand to sit transfers during today's session.   Locomotion   Wheel Chair Mobility Comments Not applicable.   Gait   Gait Comments Patient demonstrates mild left antalgic gait with shortend BLE step and stride length during today's session.   Gait Special Tests   Gait Special Tests OTHER  (Assess FGA at future treatment session as indicated by patient's progress with safe mobility.)   Balance   Balance other (describe)   Sitting Balance: Static normal balance   Sit-to-Stand Balance good balance   Standing Balance: Static fair balance   Balance Comments Patient's moderate LLE gross strength deficit giver her reduced static standing safety performance with today's selected tasks.   Balance Quick Add Sitting balance: Static;Sit to stand balance;Standing balance: static   Balance Special Tests   Balance Special Tests Timed up and go;Other   Balance special tests other TUG Score: 13.33 seconds; Semi tandem stance x30 seconds each foot forward with SBA x1 DPT for stability.   Balance Special Tests Timed Up and Go   Seconds 13.33 Seconds   Comments Patient's score indicates fall risk with upright mobility and ambulatory demands for a woman her age.   Sensory Examination   Sensory Perception no deficits were identified   Sensory Perception Comments Patient normal and equal with seated BLE sensation testing.   Coordination   Coordination no deficits were identified   Muscle Tone   Muscle Tone no deficits were identified   Modality Interventions   Planned Modality Interventions TENS;Electrical Stimulation/Russion Stimulation;Ultrasound;Cryotherapy;Thermotherapy: Hydrocollator Packs   Planned  Modality Interventions Comments For soft tissue pain modulation.   Planned Therapy Interventions   Planned Therapy Interventions ADL retraining;balance training;gait training;joint mobilization;motor coordination training;neuromuscular re-education;ROM;strengthening;stretching;transfer training;manual therapy   Planned Therapy Interventions Comment Interventions for return to safe mobility with improved strengthening, bilateral hip and knee range of motion, and improved control of strength and range of motion gains with optimal biomechanics for safe mobility with these upright mobility demands.   Clinical Impression   Criteria for Skilled Therapeutic Interventions Met yes, treatment indicated   PT Diagnosis Gait Deficits, Left Lower Extremity Strength Deficit, Bilateral Lower Extremity Range of Motion Deficits: Balance Deficits   Influenced by the following impairments Stiffness of knee joint, unspecified laterality (M25.669)     Left leg weakness (R29.898)   Functional limitations due to impairments Patient unable to ambulate, climb stairs, and perform other upright mobility demands without increased fall risk due to the above listed deficits.   Clinical Presentation Stable/Uncomplicated   Clinical Presentation Rationale Patient reports independence with ADL's and mobility as of this morning's session. Patient demonstrates gross BLE range of motion deficits and LLE strength deficits with seated and supine testing during today's session. Patient is motivated to improve and has track record of effective skilled physical therapy participation and success with prior episodes of care. Patient is intact with BLE light touch sensation testing during today's session.   Clinical Decision Making (Complexity) Low complexity   Therapy Frequency 1 time/week   Predicted Duration of Therapy Intervention (days/wks) 8 weeks   Risk & Benefits of therapy have been explained Yes   Patient, Family & other staff in agreement with plan  of care Yes   Clinical Impression Comments Patient in good spirits during today's session. Patient reports to therapy with chronic LLE weakness post-operative her LLE TKA procedure in 2021 with associated deficits with ambulating and navigating stairs. Patient reports no pain complaints in resting during today's session but up to 5/10 left hip adductor pain when coming to standing position. Patient reports wishing to participate in skilled physical therapy services for improved ability and safety to perform upright functional mobility demands and increased left lower extremity strengthening. Patient will benefit from skilled physical therapy services and has sufficient social support.   Education Assessment   Preferred Learning Style Listening;Demonstration   Barriers to Learning No barriers   GOALS   PT Eval Goals 1;2;3   Goal 1   Goal Identifier Home Exercise Program   Goal Description Patient will demonstrate good adherence to and proper performance of her HEP's for 8 weeks at 5 out of 7 days a week to demonstrate improved long-term independence with management of her functional mobility and left lower extremity weakness deficits.   Goal Progress Patient tolerated and demonstrated proper form of her initial HEP during today's session.   Target Date 06/05/23   Goal 2   Goal Identifier Lower Extremity Functional Scale   Goal Description Patient will improve her initial LEFS assessment score by 10 points or greater to demonstrate reduced restriction of her bilateral lower extremity range of motion and left lower extremity weakness deficits with safe performance of her desired functional mobility demands.   Goal Progress See initial LEFS assessment score.   Target Date 06/05/23   Goal 3   Goal Identifier Stairs   Goal Description Patient will ascend and descend 3 stairs with one railing with modified independence to demonstrate improved independence and safety with this functional mobility demands to enter and exit  her home with reduced fall risk.   Goal Progress Patient able to perform step up and downs while leading with her left leg but with significant left knee pain exacerbation during today's session.   Target Date 06/05/23   Total Evaluation Time   PT Eval, Low Complexity Minutes (75719) 30   Therapy Certification   Certification date from 04/10/23   Certification date to 06/05/23   Medical Diagnosis Stiffness of knee joint, unspecified laterality (M25.669)     Left leg weakness (R29.898)   Certification I certify the need for these services furnished under this plan of treatment and while under my care.  (Physician co-signature of this document indicates review and certification of the therapy plan).     Thank you for your referral,  Alhaji Carr PT, DPT    [facility]  O: [phone]  E: [email or web address]

## 2023-04-10 NOTE — PROGRESS NOTES
[facility]                                                                                   OUTPATIENT PHYSICAL THERAPY FUNCTIONAL EVALUATION  PLAN OF TREATMENT FOR OUTPATIENT REHABILITATION  (COMPLETE FOR INITIAL CLAIMS ONLY)  Patient's Last Name, First Name, M.I.  YOB: 1940  Tracy Palomo     Provider's Name   [facility]   Medical Record No.  [id number]     Start of Care Date:  04/10/23   Onset Date:  04/10/22   Type:     _X__PT   ____OT  ____SLP Medical Diagnosis:  (P) Stiffness of knee joint, unspecified laterality (M25.669)     Left leg weakness (R29.898)     PT Diagnosis:  (P) Gait Deficits, Left Lower Extremity Strength Deficit, Bilateral Lower Extremity Range of Motion Deficits: Balance Deficits Visits from SOC:  1                              __________________________________________________________________________________  Plan of Treatment/Functional Goals:  (P) ADL retraining, balance training, gait training, joint mobilization, motor coordination training, neuromuscular re-education, ROM, strengthening, stretching, transfer training, manual therapy  (P) Interventions for return to safe mobility with improved strengthening, bilateral hip and knee range of motion, and improved control of strength and range of motion gains with optimal biomechanics for safe mobility with these upright mobility demands.  (P) TENS, Electrical Stimulation/Russion Stimulation, Ultrasound, Cryotherapy, Thermotherapy: Hydrocollator Packs     GOALS  (P) Home Exercise Program  (P) Patient will demonstrate good adherence to and proper performance of her HEP's for 8 weeks at 5 out of 7 days a week to demonstrate improved long-term independence with management of her functional mobility and left lower extremity weakness deficits.  (P) 06/05/23    (P) Lower Extremity Functional  Scale  (P) Patient will improve her initial LEFS assessment score by 10 points or greater to demonstrate reduced restriction of her bilateral lower extremity range of motion and left lower extremity weakness deficits with safe performance of her desired functional mobility demands.  (P) 06/05/23    (P) Stairs  (P) Patient will ascend and descend 3 stairs with one railing with modified independence to demonstrate improved independence and safety with this functional mobility demands to enter and exit her home with reduced fall risk.  (P) 06/05/23    Therapy Frequency:  (P) 1 time/week   Predicted Duration of Therapy Intervention:  (P) 8 weeks    Alhaji Carr, PT, DPT    [facility]  O: [phone]  E: [email or web address]                                     I CERTIFY THE NEED FOR THESE SERVICES FURNISHED UNDER        THIS PLAN OF TREATMENT AND WHILE UNDER MY CARE     (Physician co-signature of this document indicates review and certification of the therapy plan).              Certification Date From:  (P) 04/10/23   Certification Date To:  (P) 06/05/23    Referring Provider:  Chad Betts MD    Initial Assessment  See Epic Evaluation- Start of Care Date: 04/10/23

## 2023-04-17 ENCOUNTER — HOSPITAL ENCOUNTER (OUTPATIENT)
Dept: PHYSICAL THERAPY | Facility: CLINIC | Age: 83
Setting detail: THERAPIES SERIES
Discharge: HOME | End: 2023-04-17
Attending: INTERNAL MEDICINE
Payer: MEDICARE

## 2023-04-17 PROCEDURE — 97110 THERAPEUTIC EXERCISES: CPT | Mod: GP

## 2023-04-17 PROCEDURE — 97112 NEUROMUSCULAR REEDUCATION: CPT | Mod: GP

## 2023-04-20 ENCOUNTER — PATIENT OUTREACH (OUTPATIENT)
Dept: CARE COORDINATION | Facility: CLINIC | Age: 83
End: 2023-04-20
Payer: MEDICARE

## 2023-04-24 ENCOUNTER — HOSPITAL ENCOUNTER (OUTPATIENT)
Dept: PHYSICAL THERAPY | Facility: CLINIC | Age: 83
Setting detail: THERAPIES SERIES
Discharge: HOME | End: 2023-04-24
Attending: INTERNAL MEDICINE
Payer: MEDICARE

## 2023-04-24 PROCEDURE — 97110 THERAPEUTIC EXERCISES: CPT | Mod: GP

## 2023-04-24 PROCEDURE — 97112 NEUROMUSCULAR REEDUCATION: CPT | Mod: GP

## 2023-05-03 ENCOUNTER — HOSPITAL ENCOUNTER (OUTPATIENT)
Dept: PHYSICAL THERAPY | Facility: CLINIC | Age: 83
Setting detail: THERAPIES SERIES
Discharge: HOME | End: 2023-05-03
Attending: INTERNAL MEDICINE
Payer: MEDICARE

## 2023-05-03 ENCOUNTER — ANTICOAGULATION THERAPY VISIT (OUTPATIENT)
Dept: ANTICOAGULATION | Facility: CLINIC | Age: 83
End: 2023-05-03

## 2023-05-03 ENCOUNTER — LAB (OUTPATIENT)
Dept: LAB | Facility: CLINIC | Age: 83
End: 2023-05-03
Payer: MEDICARE

## 2023-05-03 DIAGNOSIS — I27.82 CHRONIC PULMONARY EMBOLISM WITHOUT ACUTE COR PULMONALE, UNSPECIFIED PULMONARY EMBOLISM TYPE (H): Primary | ICD-10-CM

## 2023-05-03 DIAGNOSIS — Z79.01 LONG TERM CURRENT USE OF ANTICOAGULANT THERAPY: ICD-10-CM

## 2023-05-03 DIAGNOSIS — I26.99 OTHER PULMONARY EMBOLISM WITHOUT ACUTE COR PULMONALE, UNSPECIFIED CHRONICITY (H): ICD-10-CM

## 2023-05-03 DIAGNOSIS — I27.82 CHRONIC PULMONARY EMBOLISM WITHOUT ACUTE COR PULMONALE, UNSPECIFIED PULMONARY EMBOLISM TYPE (H): ICD-10-CM

## 2023-05-03 LAB — INR BLD: 3.6 (ref 0.9–1.1)

## 2023-05-03 PROCEDURE — 85610 PROTHROMBIN TIME: CPT

## 2023-05-03 PROCEDURE — 97530 THERAPEUTIC ACTIVITIES: CPT | Mod: GP

## 2023-05-03 PROCEDURE — 36416 COLLJ CAPILLARY BLOOD SPEC: CPT

## 2023-05-03 PROCEDURE — 97110 THERAPEUTIC EXERCISES: CPT | Mod: GP

## 2023-05-03 NOTE — PROGRESS NOTES
ANTICOAGULATION MANAGEMENT     Tracy Palomo 82 year old female is on warfarin with supratherapeutic INR result. (Goal INR 2.0-3.0)    Recent labs: (last 7 days)     05/03/23  1224   INR 3.6*       ASSESSMENT       Source(s): Chart Review and Patient/Caregiver Call       Warfarin doses taken: Warfarin taken as instructed    Diet: No new diet changes identified    Medication/supplement changes: None noted    New illness, injury, or hospitalization: Yes: Getting PT for knee pain.     Signs or symptoms of bleeding or clotting: No    Previous result: Therapeutic last 2(+) visits    Additional findings: Patient was told she was overdoing it. She was doing a lot of steps in the day and caused her knee irritation. She is in PT until the end of May but may need to go longer.          PLAN     Recommended plan for ongoing change(s) affecting INR     Dosing Instructions: hold dose then decrease your warfarin dose (11.1% change) with next INR in 2 weeks       Summary  As of 5/3/2023    Full warfarin instructions:  5/3: Hold; Otherwise 5 mg every Mon; 2.5 mg all other days   Next INR check:  5/17/2023             Telephone call with Tracy who verbalizes understanding and agrees to plan    Lab visit scheduled    Education provided:     Please call back if any changes to your diet, medications or how you've been taking warfarin    Symptom monitoring: monitoring for bleeding signs and symptoms and when to seek medical attention/emergency care    Contact [phone]  with any changes, questions or concerns.     Plan made per [facility] anticoagulation protocol    Randall QUIROGA RN  Anticoagulation Clinic  5/3/2023    _______________________________________________________________________     Anticoagulation Episode Summary     Current INR goal:  2.0-3.0   TTR:  74.3 % (1 y)   Target end date:  Indefinite   Send INR reminders to:  GURVINDER SOARES    Indications    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  [I26.99]  Long term current use of anticoagulant therapy [Z79.01]  Other pulmonary embolism without acute cor pulmonale  unspecified chronicity (H) [I26.99]  Chronic pulmonary embolism without acute cor pulmonale  unspecified pulmonary embolism type (H) [I27.82]           Comments:           Anticoagulation Care Providers     Provider Role Specialty Phone number    Chad Betts MD Referring Internal Medicine [phone]    Gerard Medrano MD Responsible Family Medicine [phone]

## 2023-05-04 ENCOUNTER — PATIENT OUTREACH (OUTPATIENT)
Dept: CARE COORDINATION | Facility: CLINIC | Age: 83
End: 2023-05-04
Payer: MEDICARE

## 2023-05-10 ENCOUNTER — HOSPITAL ENCOUNTER (OUTPATIENT)
Dept: PHYSICAL THERAPY | Facility: CLINIC | Age: 83
Setting detail: THERAPIES SERIES
Discharge: HOME | End: 2023-05-10
Attending: INTERNAL MEDICINE
Payer: MEDICARE

## 2023-05-10 PROCEDURE — 97112 NEUROMUSCULAR REEDUCATION: CPT | Mod: GP

## 2023-05-10 PROCEDURE — 97110 THERAPEUTIC EXERCISES: CPT | Mod: GP

## 2023-05-17 ENCOUNTER — THERAPY VISIT (OUTPATIENT)
Dept: PHYSICAL THERAPY | Facility: CLINIC | Age: 83
End: 2023-05-17
Attending: INTERNAL MEDICINE
Payer: MEDICARE

## 2023-05-17 ENCOUNTER — LAB (OUTPATIENT)
Dept: LAB | Facility: CLINIC | Age: 83
End: 2023-05-17
Payer: MEDICARE

## 2023-05-17 ENCOUNTER — ANTICOAGULATION THERAPY VISIT (OUTPATIENT)
Dept: ANTICOAGULATION | Facility: CLINIC | Age: 83
End: 2023-05-17

## 2023-05-17 DIAGNOSIS — Z79.01 LONG TERM CURRENT USE OF ANTICOAGULANT THERAPY: ICD-10-CM

## 2023-05-17 DIAGNOSIS — I27.82 CHRONIC PULMONARY EMBOLISM WITHOUT ACUTE COR PULMONALE, UNSPECIFIED PULMONARY EMBOLISM TYPE (H): Primary | ICD-10-CM

## 2023-05-17 DIAGNOSIS — I26.99 OTHER PULMONARY EMBOLISM WITHOUT ACUTE COR PULMONALE, UNSPECIFIED CHRONICITY (H): ICD-10-CM

## 2023-05-17 DIAGNOSIS — I27.82 CHRONIC PULMONARY EMBOLISM WITHOUT ACUTE COR PULMONALE, UNSPECIFIED PULMONARY EMBOLISM TYPE (H): ICD-10-CM

## 2023-05-17 DIAGNOSIS — R29.898 LEFT LEG WEAKNESS: ICD-10-CM

## 2023-05-17 DIAGNOSIS — M25.669 STIFFNESS OF KNEE JOINT: Primary | ICD-10-CM

## 2023-05-17 LAB — INR BLD: 2.3 (ref 0.9–1.1)

## 2023-05-17 PROCEDURE — 97530 THERAPEUTIC ACTIVITIES: CPT | Mod: GP

## 2023-05-17 PROCEDURE — 85610 PROTHROMBIN TIME: CPT

## 2023-05-17 PROCEDURE — 36416 COLLJ CAPILLARY BLOOD SPEC: CPT

## 2023-05-17 PROCEDURE — 97110 THERAPEUTIC EXERCISES: CPT | Mod: GP

## 2023-05-17 NOTE — PROGRESS NOTES
ANTICOAGULATION MANAGEMENT     Tracy Palomo 82 year old female is on warfarin with therapeutic INR result. (Goal INR 2.0-3.0)    Recent labs: (last 7 days)     05/17/23  0845   INR 2.3*       ASSESSMENT       Source(s): Chart Review and Patient/Caregiver Call       Warfarin doses taken: Warfarin taken as instructed    Diet: No new diet changes identified    Medication/supplement changes: None noted    New illness, injury, or hospitalization: No- continues with PT for knee pain    Signs or symptoms of bleeding or clotting: No    Previous result: Supratherapeutic    Additional findings: None         PLAN     Recommended plan for no diet, medication or health factor changes affecting INR     Dosing Instructions: Continue your current warfarin dose with next INR in 1 week       Summary  As of 5/17/2023    Full warfarin instructions:  5 mg every Mon; 2.5 mg all other days   Next INR check:  5/24/2023             Telephone call with Tracy who verbalizes understanding and agrees to plan and who agrees to plan and repeated back plan correctly    Lab visit scheduled    Education provided:     None required    Plan made per [facility] anticoagulation protocol    Radha Rodriguez RN  Anticoagulation Clinic  5/17/2023    _______________________________________________________________________     Anticoagulation Episode Summary     Current INR goal:  2.0-3.0   TTR:  72.6 % (1 y)   Target end date:  Indefinite   Send INR reminders to:  GURVINDER SOARES    Indications    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]  Long term current use of anticoagulant therapy [Z79.01]  Other pulmonary embolism without acute cor pulmonale  unspecified chronicity (H) [I26.99]  Chronic pulmonary embolism without acute cor pulmonale  unspecified pulmonary embolism type (H) [I27.82]           Comments:           Anticoagulation Care Providers     Provider Role Specialty Phone number    Chad Betts MD Referring Internal  Medicine [phone]    Gerard Medrano MD [facility] [phone]

## 2023-05-24 ENCOUNTER — ANTICOAGULATION THERAPY VISIT (OUTPATIENT)
Dept: ANTICOAGULATION | Facility: CLINIC | Age: 83
End: 2023-05-24

## 2023-05-24 ENCOUNTER — TELEPHONE (OUTPATIENT)
Dept: INTERNAL MEDICINE | Facility: CLINIC | Age: 83
End: 2023-05-24

## 2023-05-24 ENCOUNTER — OFFICE VISIT (OUTPATIENT)
Dept: INTERNAL MEDICINE | Facility: CLINIC | Age: 83
End: 2023-05-24
Payer: MEDICARE

## 2023-05-24 ENCOUNTER — THERAPY VISIT (OUTPATIENT)
Dept: PHYSICAL THERAPY | Facility: CLINIC | Age: 83
End: 2023-05-24
Attending: INTERNAL MEDICINE
Payer: MEDICARE

## 2023-05-24 ENCOUNTER — LAB (OUTPATIENT)
Dept: LAB | Facility: CLINIC | Age: 83
End: 2023-05-24
Payer: MEDICARE

## 2023-05-24 VITALS
HEART RATE: 66 BPM | RESPIRATION RATE: 16 BRPM | SYSTOLIC BLOOD PRESSURE: 130 MMHG | BODY MASS INDEX: 31.63 KG/M2 | TEMPERATURE: 97 F | DIASTOLIC BLOOD PRESSURE: 84 MMHG | WEIGHT: 182 LBS | OXYGEN SATURATION: 96 %

## 2023-05-24 DIAGNOSIS — Z79.01 LONG TERM CURRENT USE OF ANTICOAGULANT THERAPY: ICD-10-CM

## 2023-05-24 DIAGNOSIS — S30.861A TICK BITE OF ABDOMINAL WALL, INITIAL ENCOUNTER: Primary | ICD-10-CM

## 2023-05-24 DIAGNOSIS — I26.99 OTHER PULMONARY EMBOLISM WITHOUT ACUTE COR PULMONALE, UNSPECIFIED CHRONICITY (H): ICD-10-CM
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
## 2023-05-24 DIAGNOSIS — I27.82 CHRONIC PULMONARY EMBOLISM WITHOUT ACUTE COR PULMONALE, UNSPECIFIED PULMONARY EMBOLISM TYPE (H): ICD-10-CM

## 2023-05-24 DIAGNOSIS — R29.898 LEFT LEG WEAKNESS: ICD-10-CM

## 2023-05-24 DIAGNOSIS — M25.669 KNEE STIFFNESS, UNSPECIFIED LATERALITY: Primary | ICD-10-CM

## 2023-05-24 DIAGNOSIS — I27.82 CHRONIC PULMONARY EMBOLISM WITHOUT ACUTE COR PULMONALE, UNSPECIFIED PULMONARY EMBOLISM TYPE (H): Primary | ICD-10-CM

## 2023-05-24 LAB — INR BLD: 1.5 (ref 0.9–1.1)

## 2023-05-24 PROCEDURE — 97530 THERAPEUTIC ACTIVITIES: CPT | Mod: GP

## 2023-05-24 PROCEDURE — 99213 OFFICE O/P EST LOW 20 MIN: CPT | Performed by: INTERNAL MEDICINE

## 2023-05-24 PROCEDURE — 85610 PROTHROMBIN TIME: CPT

## 2023-05-24 PROCEDURE — 36416 COLLJ CAPILLARY BLOOD SPEC: CPT

## 2023-05-24 NOTE — PROGRESS NOTES
"  Assessment & Plan     Tick bite of abdominal wall, initial encounter  Tick bite appears to be a regular wood tick, the head was left in the bite on the abdomen.  There is a centimeter of erythema around it.  Using magnifying glass I was able to identify the head and remove it with a scalpel.  Patient should do fine.  If this gets worsening redness or pain would then do antibiotics but at this point is not needed.               BMI:   Estimated body mass index is 31.63 kg/m  as calculated from the following:    Height as of 3/20/23: 1.615 m (5' 3.6\").    Weight as of this encounter: 82.6 kg (182 lb).           Chad Betts MD  [facility] is a 82 year old, presenting for the following health issues:  Tick Bite        5/24/2023    10:51 AM   Additional Questions   Roomed by Jo GARCIA     Chief Complaint   Patient presents with     Tick Bite     Tick bite on her abdomen, 3 days ago.  Tried to remove with a tewissors, tick was gray.  Worried the head was stuck in there.          Review of Systems         Objective    /84   Pulse 66   Temp 97  F (36.1  C) (Temporal)   Resp 16   Wt 82.6 kg (182 lb)   SpO2 96%   BMI 31.63 kg/m    Body mass index is 31.63 kg/m .  Physical Exam   NAD  abd with a stuck tick head, mild erythema,                     "

## 2023-05-24 NOTE — PROGRESS NOTES
ANTICOAGULATION MANAGEMENT     Tracy Palomo 82 year old female is on warfarin with subtherapeutic INR result. (Goal INR 2.0-3.0)    Recent labs: (last 7 days)     05/24/23  0911   INR 1.5*       ASSESSMENT       Source(s): Chart Review and Patient/Caregiver Call       Warfarin doses taken: Warfarin taken as instructed    Diet: No new diet changes identified    Medication/supplement changes: None noted    New illness, injury, or hospitalization: Yes: tick bite, seen today and head removed, no antibiotics needed.      Signs or symptoms of bleeding or clotting: No    Previous result: Therapeutic last visit; previously outside of goal range    Additional findings: states that her knee is getting better, is more active and walking more          PLAN     Recommended plan for ongoing change(s) affecting INR     Dosing Instructions: booster dose then Increase your warfarin dose (12% change) with next INR in 2 weeks       Summary  As of 5/24/2023    Full warfarin instructions:  5/24: 5 mg; Otherwise 5 mg every Mon, Fri; 2.5 mg all other days   Next INR check:  6/7/2023             Telephone call with Tracy who verbalizes understanding and agrees to plan    Lab visit scheduled    Education provided:     Goal range and lab monitoring: goal range and significance of current result    Contact [phone]  with any changes, questions or concerns.     Plan made per [facility] anticoagulation protocol    Belle Berg RN  Anticoagulation Clinic  5/24/2023    _______________________________________________________________________     Anticoagulation Episode Summary     Current INR goal:  2.0-3.0   TTR:  71.4 % (1 y)   Target end date:  Indefinite   Send INR reminders to:  GURVINDER SOARES    Indications    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]  Long term current use of anticoagulant therapy [Z79.01]  Other pulmonary embolism without acute cor pulmonale  unspecified chronicity (H)  [I26.99]  Chronic pulmonary embolism without acute cor pulmonale  unspecified pulmonary embolism type (H) [I27.82]           Comments:           Anticoagulation Care Providers     Provider Role Specialty Phone number    Chad Betts MD Referring Internal Medicine [phone]    Gerard Medrano MD Responsible Family Medicine [phone]

## 2023-05-24 NOTE — TELEPHONE ENCOUNTER
Reason for Call:  Appointment Request    Patient requesting this type of appt:  Same day     Requested provider: ERICK    Reason patient unable to be scheduled: Not within requested timeframe    When does patient want to be seen/preferred time: Same day    Comments: Patient has a wood tick head still embedded in her stomach    [facility] or would you prefer to receive a phone call?:   Patient would prefer a phone call   Okay to leave a detailed message?: Yes at Home number on file [phone] (home)    Call taken on 5/24/2023 at 8:10 AM by Rina Alexander

## 2023-06-07 ENCOUNTER — LAB (OUTPATIENT)
Dept: LAB | Facility: CLINIC | Age: 83
End: 2023-06-07
Payer: MEDICARE

## 2023-06-07 ENCOUNTER — ANTICOAGULATION THERAPY VISIT (OUTPATIENT)
Dept: ANTICOAGULATION | Facility: CLINIC | Age: 83
End: 2023-06-07

## 2023-06-07 DIAGNOSIS — I26.99 OTHER PULMONARY EMBOLISM WITHOUT ACUTE COR PULMONALE, UNSPECIFIED CHRONICITY (H): ICD-10-CM

## 2023-06-07 DIAGNOSIS — Z79.01 LONG TERM CURRENT USE OF ANTICOAGULANT THERAPY: ICD-10-CM

## 2023-06-07 DIAGNOSIS — I27.82 CHRONIC PULMONARY EMBOLISM WITHOUT ACUTE COR PULMONALE, UNSPECIFIED PULMONARY EMBOLISM TYPE (H): Primary | ICD-10-CM

## 2023-06-07 DIAGNOSIS — I27.82 CHRONIC PULMONARY EMBOLISM WITHOUT ACUTE COR PULMONALE, UNSPECIFIED PULMONARY EMBOLISM TYPE (H): ICD-10-CM

## 2023-06-07 LAB — INR BLD: 1.5 (ref 0.9–1.1)

## 2023-06-07 PROCEDURE — 85610 PROTHROMBIN TIME: CPT

## 2023-06-07 PROCEDURE — 36416 COLLJ CAPILLARY BLOOD SPEC: CPT

## 2023-06-10 ENCOUNTER — APPOINTMENT (OUTPATIENT)
Dept: CT IMAGING | Facility: CLINIC | Age: 83
DRG: 389 | End: 2023-06-10
Attending: FAMILY MEDICINE
Payer: MEDICARE

## 2023-06-10 ENCOUNTER — HOSPITAL ENCOUNTER (INPATIENT)
Facility: CLINIC | Age: 83
LOS: 4 days | Discharge: HOME | DRG: 389 | End: 2023-06-14
Attending: FAMILY MEDICINE | Admitting: INTERNAL MEDICINE
Payer: MEDICARE

## 2023-06-10 ENCOUNTER — APPOINTMENT (OUTPATIENT)
Dept: GENERAL RADIOLOGY | Facility: CLINIC | Age: 83
DRG: 389 | End: 2023-06-10
Attending: FAMILY MEDICINE
Payer: MEDICARE

## 2023-06-10 DIAGNOSIS — R79.1 SUBTHERAPEUTIC INTERNATIONAL NORMALIZED RATIO (INR): ICD-10-CM

## 2023-06-10 DIAGNOSIS — K56.609 SMALL BOWEL OBSTRUCTION (H): ICD-10-CM

## 2023-06-10 PROBLEM — K80.20 CHOLELITHIASIS: Status: ACTIVE | Noted: 2023-06-10

## 2023-06-10 LAB
ALBUMIN SERPL BCG-MCNC: 4 G/DL (ref 3.5–5.2)
ALP SERPL-CCNC: 117 U/L (ref 35–104)
ALT SERPL W P-5'-P-CCNC: 23 U/L (ref 10–35)
ANION GAP SERPL CALCULATED.3IONS-SCNC: 11 MMOL/L (ref 7–15)
AST SERPL W P-5'-P-CCNC: 22 U/L (ref 10–35)
BASOPHILS # BLD AUTO: 0 10E3/UL (ref 0–0.2)
BASOPHILS NFR BLD AUTO: 0 %
BILIRUB SERPL-MCNC: 1.7 MG/DL
BUN SERPL-MCNC: 17.6 MG/DL (ref 8–23)
CALCIUM SERPL-MCNC: 9.8 MG/DL (ref 8.8–10.2)
CHLORIDE SERPL-SCNC: 102 MMOL/L (ref 98–107)
CREAT SERPL-MCNC: 0.81 MG/DL (ref 0.51–0.95)
DEPRECATED HCO3 PLAS-SCNC: 25 MMOL/L (ref 22–29)
EOSINOPHIL # BLD AUTO: 0 10E3/UL (ref 0–0.7)
EOSINOPHIL NFR BLD AUTO: 0 %
ERYTHROCYTE [DISTWIDTH] IN BLOOD BY AUTOMATED COUNT: 13.9 % (ref 10–15)
GFR SERPL CREATININE-BSD FRML MDRD: 72 ML/MIN/1.73M2
GLUCOSE SERPL-MCNC: 120 MG/DL (ref 70–99)
HCT VFR BLD AUTO: 42.9 % (ref 35–47)
HGB BLD-MCNC: 13.7 G/DL (ref 11.7–15.7)
IMM GRANULOCYTES # BLD: 0 10E3/UL
IMM GRANULOCYTES NFR BLD: 1 %
INR PPP: 1.79 (ref 0.85–1.15)
LIPASE SERPL-CCNC: 14 U/L (ref 13–60)
LYMPHOCYTES # BLD AUTO: 1 10E3/UL (ref 0.8–5.3)
LYMPHOCYTES NFR BLD AUTO: 13 %
MCH RBC QN AUTO: 28.5 PG (ref 26.5–33)
MCHC RBC AUTO-ENTMCNC: 31.9 G/DL (ref 31.5–36.5)
MCV RBC AUTO: 89 FL (ref 78–100)
MONOCYTES # BLD AUTO: 0.5 10E3/UL (ref 0–1.3)
MONOCYTES NFR BLD AUTO: 6 %
NEUTROPHILS # BLD AUTO: 6.2 10E3/UL (ref 1.6–8.3)
NEUTROPHILS NFR BLD AUTO: 80 %
NRBC # BLD AUTO: 0 10E3/UL
NRBC BLD AUTO-RTO: 0 /100
PLATELET # BLD AUTO: 213 10E3/UL (ref 150–450)
POTASSIUM SERPL-SCNC: 4 MMOL/L (ref 3.4–5.3)
PROT SERPL-MCNC: 6.6 G/DL (ref 6.4–8.3)
RBC # BLD AUTO: 4.81 10E6/UL (ref 3.8–5.2)
SODIUM SERPL-SCNC: 138 MMOL/L (ref 136–145)
WBC # BLD AUTO: 7.8 10E3/UL (ref 4–11)

## 2023-06-10 PROCEDURE — 85025 COMPLETE CBC W/AUTO DIFF WBC: CPT | Performed by: FAMILY MEDICINE

## 2023-06-10 PROCEDURE — 83690 ASSAY OF LIPASE: CPT | Performed by: FAMILY MEDICINE

## 2023-06-10 PROCEDURE — 250N000009 HC RX 250: Performed by: FAMILY MEDICINE

## 2023-06-10 PROCEDURE — 96361 HYDRATE IV INFUSION ADD-ON: CPT | Performed by: FAMILY MEDICINE

## 2023-06-10 PROCEDURE — 258N000003 HC RX IP 258 OP 636: Performed by: FAMILY MEDICINE

## 2023-06-10 PROCEDURE — 74177 CT ABD & PELVIS W/CONTRAST: CPT | Mod: MG

## 2023-06-10 PROCEDURE — 99285 EMERGENCY DEPT VISIT HI MDM: CPT | Mod: 25 | Performed by: FAMILY MEDICINE

## 2023-06-10 PROCEDURE — 96374 THER/PROPH/DIAG INJ IV PUSH: CPT | Mod: 59 | Performed by: FAMILY MEDICINE

## 2023-06-10 PROCEDURE — 999N000065 XR ABDOMEN 1 VIEW

## 2023-06-10 PROCEDURE — 120N000001 HC R&B MED SURG/OB

## 2023-06-10 PROCEDURE — 99285 EMERGENCY DEPT VISIT HI MDM: CPT | Performed by: FAMILY MEDICINE

## 2023-06-10 PROCEDURE — G1010 CDSM STANSON: HCPCS

## 2023-06-10 PROCEDURE — 250N000011 HC RX IP 250 OP 636: Performed by: FAMILY MEDICINE

## 2023-06-10 PROCEDURE — 85610 PROTHROMBIN TIME: CPT | Performed by: FAMILY MEDICINE

## 2023-06-10 PROCEDURE — 36415 COLL VENOUS BLD VENIPUNCTURE: CPT | Performed by: FAMILY MEDICINE

## 2023-06-10 PROCEDURE — 80053 COMPREHEN METABOLIC PANEL: CPT | Performed by: FAMILY MEDICINE

## 2023-06-10 PROCEDURE — 99222 1ST HOSP IP/OBS MODERATE 55: CPT | Mod: AI | Performed by: INTERNAL MEDICINE

## 2023-06-10 RX ORDER — SODIUM CHLORIDE 9 MG/ML
INJECTION, SOLUTION INTRAVENOUS CONTINUOUS
Status: DISCONTINUED | OUTPATIENT
Start: 2023-06-10 | End: 2023-06-11

## 2023-06-10 RX ORDER — ONDANSETRON 2 MG/ML
4 INJECTION INTRAMUSCULAR; INTRAVENOUS EVERY 30 MIN PRN
Status: DISCONTINUED | OUTPATIENT
Start: 2023-06-10 | End: 2023-06-11

## 2023-06-10 RX ORDER — IOPAMIDOL 755 MG/ML
500 INJECTION, SOLUTION INTRAVASCULAR ONCE
Status: COMPLETED | OUTPATIENT
Start: 2023-06-10 | End: 2023-06-10

## 2023-06-10 RX ADMIN — SODIUM CHLORIDE 1000 ML: 9 INJECTION, SOLUTION INTRAVENOUS at 20:51

## 2023-06-10 RX ADMIN — ONDANSETRON 4 MG: 2 INJECTION INTRAMUSCULAR; INTRAVENOUS at 20:58

## 2023-06-10 RX ADMIN — IOPAMIDOL 89 ML: 755 INJECTION, SOLUTION INTRAVENOUS at 21:10

## 2023-06-10 RX ADMIN — SODIUM CHLORIDE: 9 INJECTION, SOLUTION INTRAVENOUS at 23:07

## 2023-06-10 RX ADMIN — SODIUM CHLORIDE 61 ML: 9 INJECTION, SOLUTION INTRAVENOUS at 21:10

## 2023-06-10 ASSESSMENT — ACTIVITIES OF DAILY LIVING (ADL)
ADLS_ACUITY_SCORE: 37
ADLS_ACUITY_SCORE: 37

## 2023-06-11 ENCOUNTER — APPOINTMENT (OUTPATIENT)
Dept: ULTRASOUND IMAGING | Facility: CLINIC | Age: 83
DRG: 389 | End: 2023-06-11
Attending: FAMILY MEDICINE
Payer: MEDICARE

## 2023-06-11 LAB
GLUCOSE BLDC GLUCOMTR-MCNC: 126 MG/DL (ref 70–99)
GLUCOSE BLDC GLUCOMTR-MCNC: 87 MG/DL (ref 70–99)
GLUCOSE BLDC GLUCOMTR-MCNC: 92 MG/DL (ref 70–99)
HOLD SPECIMEN: NORMAL
HOLD SPECIMEN: NORMAL
INR PPP: 2.03 (ref 0.85–1.15)
MAGNESIUM SERPL-MCNC: 1.8 MG/DL (ref 1.7–2.3)

## 2023-06-11 PROCEDURE — 36415 COLL VENOUS BLD VENIPUNCTURE: CPT | Performed by: INTERNAL MEDICINE

## 2023-06-11 PROCEDURE — 99221 1ST HOSP IP/OBS SF/LOW 40: CPT | Performed by: SURGERY

## 2023-06-11 PROCEDURE — 120N000001 HC R&B MED SURG/OB

## 2023-06-11 PROCEDURE — 76705 ECHO EXAM OF ABDOMEN: CPT

## 2023-06-11 PROCEDURE — 99232 SBSQ HOSP IP/OBS MODERATE 35: CPT | Performed by: NURSE PRACTITIONER

## 2023-06-11 PROCEDURE — 85610 PROTHROMBIN TIME: CPT | Performed by: INTERNAL MEDICINE

## 2023-06-11 PROCEDURE — 250N000013 HC RX MED GY IP 250 OP 250 PS 637: Performed by: INTERNAL MEDICINE

## 2023-06-11 PROCEDURE — 83735 ASSAY OF MAGNESIUM: CPT | Performed by: NURSE PRACTITIONER

## 2023-06-11 PROCEDURE — 250N000011 HC RX IP 250 OP 636: Performed by: NURSE PRACTITIONER

## 2023-06-11 PROCEDURE — 250N000011 HC RX IP 250 OP 636: Performed by: INTERNAL MEDICINE

## 2023-06-11 RX ORDER — ONDANSETRON 4 MG/1
4 TABLET, ORALLY DISINTEGRATING ORAL EVERY 6 HOURS PRN
Status: DISCONTINUED | OUTPATIENT
Start: 2023-06-11 | End: 2023-06-14 | Stop reason: HOSPADM

## 2023-06-11 RX ORDER — SODIUM CHLORIDE AND POTASSIUM CHLORIDE 150; 450 MG/100ML; MG/100ML
INJECTION, SOLUTION INTRAVENOUS CONTINUOUS
Status: DISCONTINUED | OUTPATIENT
Start: 2023-06-11 | End: 2023-06-12

## 2023-06-11 RX ORDER — ONDANSETRON 2 MG/ML
4 INJECTION INTRAMUSCULAR; INTRAVENOUS EVERY 6 HOURS PRN
Status: DISCONTINUED | OUTPATIENT
Start: 2023-06-11 | End: 2023-06-14 | Stop reason: HOSPADM

## 2023-06-11 RX ORDER — SODIUM CHLORIDE 9 MG/ML
INJECTION, SOLUTION INTRAVENOUS CONTINUOUS
Status: DISCONTINUED | OUTPATIENT
Start: 2023-06-11 | End: 2023-06-11

## 2023-06-11 RX ORDER — WARFARIN SODIUM 2.5 MG/1
2.5 TABLET ORAL
Status: COMPLETED | OUTPATIENT
Start: 2023-06-11 | End: 2023-06-11

## 2023-06-11 RX ORDER — LIDOCAINE 40 MG/G
CREAM TOPICAL
Status: DISCONTINUED | OUTPATIENT
Start: 2023-06-11 | End: 2023-06-14 | Stop reason: HOSPADM

## 2023-06-11 RX ORDER — MAGNESIUM SULFATE HEPTAHYDRATE 40 MG/ML
2 INJECTION, SOLUTION INTRAVENOUS ONCE
Status: COMPLETED | OUTPATIENT
Start: 2023-06-11 | End: 2023-06-11

## 2023-06-11 RX ORDER — ATORVASTATIN CALCIUM 10 MG/1
10 TABLET, FILM COATED ORAL AT BEDTIME
Status: DISCONTINUED | OUTPATIENT
Start: 2023-06-11 | End: 2023-06-14 | Stop reason: HOSPADM

## 2023-06-11 RX ADMIN — POTASSIUM CHLORIDE AND SODIUM CHLORIDE: 450; 150 INJECTION, SOLUTION INTRAVENOUS at 12:18

## 2023-06-11 RX ADMIN — MAGNESIUM SULFATE HEPTAHYDRATE 2 G: 40 INJECTION, SOLUTION INTRAVENOUS at 13:18

## 2023-06-11 RX ADMIN — ONDANSETRON 4 MG: 2 INJECTION INTRAMUSCULAR; INTRAVENOUS at 05:36

## 2023-06-11 RX ADMIN — ATORVASTATIN CALCIUM 10 MG: 10 TABLET, FILM COATED ORAL at 20:36

## 2023-06-11 RX ADMIN — WARFARIN SODIUM 2.5 MG: 2.5 TABLET ORAL at 17:40

## 2023-06-11 ASSESSMENT — ACTIVITIES OF DAILY LIVING (ADL)
DRESSING/BATHING_DIFFICULTY: NO
CHANGE_IN_FUNCTIONAL_STATUS_SINCE_ONSET_OF_CURRENT_ILLNESS/INJURY: NO
DIFFICULTY_EATING/SWALLOWING: NO
ADLS_ACUITY_SCORE: 24
WEAR_GLASSES_OR_BLIND: YES
EQUIPMENT_CURRENTLY_USED_AT_HOME: CANE, STRAIGHT;GRAB BAR, TUB/SHOWER;GRAB BAR, TOILET
CONCENTRATING,_REMEMBERING_OR_MAKING_DECISIONS_DIFFICULTY: NO
ADLS_ACUITY_SCORE: 24
FALL_HISTORY_WITHIN_LAST_SIX_MONTHS: NO
ADLS_ACUITY_SCORE: 37
HEARING_DIFFICULTY_OR_DEAF: NO
VISION_MANAGEMENT: GLASSES
DOING_ERRANDS_INDEPENDENTLY_DIFFICULTY: NO
ADLS_ACUITY_SCORE: 24
ADLS_ACUITY_SCORE: 24
WALKING_OR_CLIMBING_STAIRS_DIFFICULTY: NO
TOILETING_ISSUES: NO
DIFFICULTY_COMMUNICATING: NO
ADLS_ACUITY_SCORE: 24
ADLS_ACUITY_SCORE: 24

## 2023-06-11 NOTE — PROGRESS NOTES
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  CT showed findings consistent with small bowel obstruction.  NG tube was placed and surgical consultation obtained.    # Small Bowel Obstruction:  Likely to be adhesive per surgeon.  Already improving after NG placement in ED.  Keep n.p.o. for now.  Maintenance IV fluids normal saline 0.45 with 20 of KCl.  Continue fingerstick blood glucose every 6 hours.  For hypoglycemia can add dextrose to IV fluids.  Continue NG decompression.  Monitor bowel function.  Once symptoms improved (hopefully tomorrow) can trial clamp and start clears per surgeon.    # Cholelithiasis: Noted on ultrasound.  No ductal dilation.  LFTs stable with bilirubin 1.7.    # Atrial Fibrillation:  # DVT/PE (H):  On admission found to have subtherapeutic INR.  INR currently 2.03.  Warfarin/INR management per pharmacy.                    Diet: NPO for Medical/Clinical Reasons Except for: Ice Chips    DVT Prophylaxis: Warfarin  Reyna Catheter: Not present  Lines: None     Cardiac Monitoring: ACTIVE order. Indication: Telemetry monitoring  Code Status: Full Code      Clinically Significant Risk Factors Present on Admission               # Drug Induced Coagulation Defect: home medication list includes an anticoagulant medication    # Hypertension: Noted on problem list      # Obesity: Estimated body mass index is 30.91 kg/m  as calculated from the following:    Height as of this encounter: 1.626 m (5' 4\").    Weight as of this encounter: 81.7 kg (180 lb 1.6 oz).            Disposition Plan      Expected Discharge Date: 06/13/2023                The " patient's care was discussed with the Attending Physician, Dr. Muniz, Bedside Nurse, Care Coordinator/ and Patient.    BILLY Pandya CNP  Hospitalist Service  [facility]  Securely message with Retty (more info)  Text page via [facility] Paging/Directory   ______________________________________________________________________    Interval History   Nausea earlier, feels better after nausea medication  Friday, felt abdomen hard at home, now feels soft.   Denies chest pain or shortness of breath  Not a diabetic    Physical Exam   Vital Signs: Temp: 98.6  F (37  C) Temp src: Oral BP: (!) 142/68 Pulse: 52   Resp: 18 SpO2: 92 % O2 Device: None (Room air)    Weight: 180 lbs 1.6 oz    General appearance: alert and cooperative, NG in place  Lungs: clear to auscultation bilaterally  Heart: regular rate and rhythm, S1, S2 normal  Abdomen: soft, non-tender  Extremities:  atraumatic or cyanosis  Neurologic: moving all 4 extremities spontaneously, no focal weakness.      Medical Decision Making     35 MINUTES SPENT BY ME on the date of service doing chart review, history, exam, documentation & further activities per the note.      Data     I have personally reviewed the following data over the past 24 hrs:    7.8  \   13.7   / 213     138 102 17.6 /  126 (H)   4.0 25 0.81 \       ALT: 23 AST: 22 AP: 117 (H) TBILI: 1.7 (H)   ALB: 4.0 TOT PROTEIN: 6.6 LIPASE: 14       INR:  2.03 (H) PTT:  N/A   D-dimer:  N/A Fibrinogen:  N/A       Imaging results reviewed over the past 24 hrs:   Recent Results (from the past 24 hour(s))   CT Abdomen Pelvis w Contrast    Narrative    EXAM: CT ABDOMEN PELVIS W CONTRAST  LOCATION: [facility]  DATE/TIME: 6/10/2023 9:27 PM CDT    INDICATION: Vomiting, abd pain.  COMPARISON: None.  TECHNIQUE: CT scan of the abdomen and pelvis was performed following injection of IV contrast. Multiplanar reformats were obtained. Dose  reduction techniques were used.  CONTRAST: Isovue 370, 89mL    FINDINGS:   LOWER CHEST: Normal.    HEPATOBILIARY: Cholelithiasis. Ill-defined focal regions of enhancement within the liver noted with one example measuring 1.9 cm posterior right hepatic dome series 3 image 39, and another at the anterior right liver measuring 1.5 cm image 45. There is   also a tiny hypodense nodule at the right hepatic dome image 28.    PANCREAS: Normal.    SPLEEN: Normal.    ADRENAL GLANDS: Normal.    KIDNEYS/BLADDER: Hydronephrosis. Parapelvic cysts on the left without specific imaging follow-up recommended. No visible stones. Bladder is unremarkable but partially obscured by streak artifact.    BOWEL: Multiple dilated mid small bowel loops identified leading to a small bowel anastomosis at the anterior upper pelvis level where there is fecalization. The more distal small bowel loops are decompressed past this level. Normal appendix. No abscess   or free air. A few colonic diverticula without focal diverticulitis. Postoperative change at the rectum. Low ventral midline abdominal wall hernia containing a herniated portion of the transverse colon without obstruction. The hernia sac also contains   fat. This sac is approximately 7.5 x 2.5 cm series 3 image 157.    LYMPH NODES: Normal.    VASCULATURE: Unremarkable.    PELVIC ORGANS: Small free pelvic fluid.    MUSCULOSKELETAL: Spine degenerative changes. Left hip arthroplasty.      Impression    IMPRESSION:   1.  Small bowel obstruction evidenced by dilated central small bowel leading to an anastomosis at the upper mid pelvic level. Beyond the anastomosis, the small bowel loops are decompressed. No free air or free fluid at this time. No abscess.  2.  Cholelithiasis.  3.  A few indeterminate hepatic nodular lesions. Recommend further imaging assessment with nonemergent MRI.  4.  Small free pelvic fluid.   XR Abdomen 1 View    Narrative    EXAM: XR ABDOMEN 1 VIEW  LOCATION: [facility]  DATE/TIME: 6/10/2023 10:26 PM CDT    INDICATION: Confirm NG tube placement.  COMPARISON: None.      Impression    IMPRESSION: Nasogastric tube with tip in the proximal midbody of the stomach. The proximal side-port is just beyond the GE junction. Tube could be advanced 3 or 4 cm. Residual contrast material within the intrarenal collecting systems and urinary   bladder. Left hip prosthesis.   Abdomen US, limited (RUQ only)    Narrative    EXAM: US ABDOMEN LIMITED  LOCATION: [facility]  DATE/TIME: 6/11/2023 12:27 AM CDT    INDICATION: Cholelithiasis, elevated total bili.  COMPARISON: CT abdomen and pelvis with IV contrast 06/10/2023.  TECHNIQUE: Limited abdominal ultrasound.    FINDINGS:    GALLBLADDER: Dependent mobile stones. No wall thickening, pericholecystic fluid or sonographic tenderness just ongoing acute cholecystitis.    BILE DUCTS: No intrahepatic biliary ectasia. Extrahepatic CBD is ectatic measuring 0.8 cm, without choledocholithiasis. Distal CBD obscured due to overlying bowel gas.     LIVER: Mildly echogenic hepatic parenchyma without discrete lesion. Smooth contour. Patent portal vein with normal directional flow.    RIGHT KIDNEY: No hydronephrosis. Right kidney measures 10.9 cm niom-ad-scia.    PANCREAS: Partially visualized due to overlying bowel gas, grossly normal where seen. Portosplenic confluence is normal.    No ascites.      Impression    IMPRESSION:  1.  Cholelithiasis. Extrahepatic biliary ectasia, the distal CBD is obscured due to overlying bowel gas. If there is continued clinical concern for a distal CBD stone, consider dedicated MRCP for further assessment, if technically feasible. Mildly   echogenic hepatic parenchyma.    2.  Pancreas partially visualized due to overlying bowel gas, grossly normal where seen. Remainder normal.

## 2023-06-11 NOTE — PLAN OF CARE
Goal Outcome Evaluation:      Plan of Care Reviewed With: patient    Overall Patient Progress: no changeOverall Patient Progress: no change    Outcome Evaluation: Pt. A&Ox4, VSS, maintaining 02 saturations on RA. NG remains to LIS, 850 mls output this shift. Continues NPO x ice, changed IVF to 0.45 NS + 20 mEq K @ 75 mls/hr. Ambulating in halls Ind. Denies pain and nausea. Abdomen soft, non-distended, non-tender. Mag low at 1.8 this AM, replaced via IV x1, AM recheck.

## 2023-06-11 NOTE — H&P
[facility]    History and Physical - Hospitalist Service       Date of Admission:  6/10/2023    Assessment & Plan      Tracy Palomo is a 83 year old female admitted on 6/10/2023 for SBO, cholelithiasis.    SBO. Admit to medical telemetry. CT abdomen confirmed SBO. S/P NGT placed in the ER. General surgery consulted and recommended conservative management for now and will see patient in AM. NPO, IVF, ambulate TID and pain control.    Cholelithiasis. CT showed evidence of gallstones. Bilirubin elevated 1.7. US abdomen showed no major obstruction. Monitor for now.    HTN. SBP in the 190s. Monitor and resume home medication. Hydralazine PRN for SBP > 180.    History of HFpEF. No sign of volume overload. Strict I/O, daily weight.    Subtherapeutic INR with history of PE. Patient on coumadin for PE. INR 1.79. Will have pharmacy to redose coumadin in AM. Recheck INR in AM and consider giving one dose of therapeutic lovenox in AM.    DVT PPx. Coumadin for now as above.    Code status. Full code.    Disposition. Home in 2-3 days.      The patient's care was discussed with the bedside nursing staff        Romero Cordon MD  [facility]  Securely message with the Vocera Web Console (learn more here)  Text page via [facility] Paging/Directory      Visit/Communication Style   Virtual (Video) communication was used to evaluate Tracy.  Tracy consented to the use of video communication: yes  Video START time: 2300, 6/10/2023  Video STOP time: 2330, 6/10/2023   Patient's location: [facility]   Provider's location during the visit: remote [place] [facility] site        ______________________________________________________________________    Chief Complaint   Nausea, vomiting.    History is obtained from the patient    History of Present Illness   Tracy Palomo is a 83 year old female with past medical history of HTN, HFpEF (not on any  diuretics), PE (on coumadin), presenting to the ER with complaint of nausea and vomiting. Since last night, the patient has had symptoms of nausea, vomiting and diarrhea, unable to eat or drink anything. Otherwise, the patient denies and fever, chills, chest pain, coughing or shortness of breath.    In the ER, the patient was hypertensive with SBP in the 190s. Labs came back with Bilirubin 1.7, INR 1.79. CT abdomen showed SBO and cholelithiasis. US abdomen came back and showed no major obstruction. NGT was placed in the ER. General surgery Dr. Terrell consulted and recommended conservative management and will see patient in AM.    Review of Systems      General: negative for fever, chills, sweats, weakness  Eyes: negative for blurred vision, loss of vision  Ear Nose and Throat: negative for pharyngitis, speech or swallowing difficulties  Respiratory:  negative for sputum production, wheezing, FRIED, pleuritic pain, sob or cough  Cardiology:  negative for chest pain, palpitations, orthopnea, PND, edema, syncope   Gastrointestinal: abdominal pain, nausea, vomiting, diarrhea, negative constipation, hematemesis, melena or hematochezia  Genitourinary: negative for frequency, urgency, dysuria, hematuria   Neurological: negative for focal weakness, paresthesia    Past Medical History    I have reviewed this patient's medical history and updated it with pertinent information if needed.   Past Medical History:   Diagnosis Date     Atrial fibrillation (H) 2014    related to colon surgery     Colon polyps      Diverticulosis of colon (without mention of hemorrhage)     Diverticulosis     DVT (deep vein thrombosis) in pregnancy 2014    after colon surgery     Other and unspecified hyperlipidemia      PE (pulmonary embolism) 2014    related to colon surgery     Unspecified essential hypertension        Past Surgical History   I have reviewed this patient's surgical history and updated it with pertinent information if  needed.  Past Surgical History:   Procedure Laterality Date     ARTHROPLASTY KNEE Right 1/8/2019    Procedure: Right total knee replacement;  Surgeon: Kaleb Pang DO;  Location: [facility]     ARTHROPLASTY KNEE Left 2/16/2021    Procedure: left total knee replacement;  Surgeon: Kaleb Pang DO;  Location: [facility]     COLONOSCOPY  09, 10, 12    [facility]     COLONOSCOPY N/A 12/11/2017    Procedure: COLONOSCOPY;  colonoscopy;  Surgeon: Elvis Quezada MD;  Location: [facility]     FLEXIBLE SIGMOIDOSCOPY  09, 11     LAPAROTOMY EXPLORATORY N/A 10/20/2014    Procedure: LAPAROTOMY EXPLORATORY;  Surgeon: Miguel Oleary MD;  Location: [facility]     LAPAROTOMY, LYSIS ADHESIONS, COMBINED N/A 10/20/2014    Procedure: COMBINED LAPAROTOMY, LYSIS ADHESIONS;  Surgeon: Miguel Oleary MD;  Location: [facility]     OPEN REDUCTION INTERNAL FIXATION HIP BIPOLAR Left 3/16/2017    Procedure: OPEN REDUCTION INTERNAL FIXATION HIP BIPOLAR;  Surgeon: Kaleb Pang DO;  Location: [facility]     RELEASE TRIGGER FINGER Left 1/12/2021    Procedure: Left thumb trigger release;  Surgeon: Kaleb Pang DO;  Location: [facility]     RESECT SMALL BOWEL WITHOUT OSTOMY N/A 10/20/2014    Procedure: RESECT SMALL BOWEL WITHOUT OSTOMY;  Surgeon: Miguel Oleary MD;  Location: [facility]       Social History   I have reviewed this patient's social history and updated it with pertinent information if needed.  Social History     Tobacco Use     Smoking status: Never     Smokeless tobacco: Never   Substance Use Topics     Alcohol use: No     Alcohol/week: 0.0 standard drinks of alcohol     Drug use: No       Family History     No significant family history, including no history of:     Prior to Admission Medications   Prior to Admission Medications   Prescriptions Last Dose Informant Patient Reported? Taking?   acetaminophen (TYLENOL) 325 MG tablet   No No   Sig: Take 3 tablets (975 mg) by mouth every 8 hours as  needed for pain   lovastatin (MEVACOR) 40 MG tablet   No No   Sig: TAKE ONE TABLET BY MOUTH AT BEDTIME   warfarin ANTICOAGULANT (COUMADIN) 5 MG tablet   No No   Sig: TAKE ONE TABLET BY MOUTH ONCE DAILY ON MONDAY AND FRIDAY AND 1/2 TABLET ALL OTHER DAYS OF THE WEEK OR AS DIRECTED BY THE COUMADIN CLINIC - 30 day candido refill, patient needs to schedule annual visit with provider for future refills      Facility-Administered Medications: None     Allergies   Allergies   Allergen Reactions     No Known Allergies        Physical Exam   Vital Signs: Temp: 98.1  F (36.7  C) Temp src: Temporal BP: (!) 193/85 Pulse: 57   Resp: 18 SpO2: 95 % O2 Device: None (Room air)    Weight: 181 lbs 0 oz      GENERAL: The patient is not in any acute distressed. Awake and alert.  HEENT: Nonicteric sclerae, PERRLA, EOMI. Oropharynx clear. Moist mucous membranes. Conjunctivae appear well perfused.  HEART: Regular rate and rhythm without murmurs. No lower extremities edema.  LUNGS: Clear to auscultation bilaterally. No wheezing, crackles or rhonchi  ABDOMEN: Soft, positive bowel sounds, nontender.  SKIN: No rash, no excessive bruising, petechiae, or purpura.  NEUROLOGIC: AxO x 3.  Cranial nerves II-XII intact without motor/sensory deficit.      Data     Recent Labs   Lab 06/10/23  2021 06/07/23  0951   WBC 7.8  --    HGB 13.7  --    MCV 89  --      --    INR 1.79* 1.5*     --    POTASSIUM 4.0  --    CHLORIDE 102  --    CO2 25  --    BUN 17.6  --    CR 0.81  --    ANIONGAP 11  --    HARVEY 9.8  --    *  --    ALBUMIN 4.0  --    PROTTOTAL 6.6  --    BILITOTAL 1.7*  --    ALKPHOS 117*  --    ALT 23  --    AST 22  --    LIPASE 14  --          Recent Results (from the past 24 hour(s))   CT Abdomen Pelvis w Contrast    Narrative    EXAM: CT ABDOMEN PELVIS W CONTRAST  LOCATION: [facility]  DATE/TIME: 6/10/2023 9:27 PM CDT    INDICATION: Vomiting, abd pain.  COMPARISON: None.  TECHNIQUE: CT scan of the  abdomen and pelvis was performed following injection of IV contrast. Multiplanar reformats were obtained. Dose reduction techniques were used.  CONTRAST: Isovue 370, 89mL    FINDINGS:   LOWER CHEST: Normal.    HEPATOBILIARY: Cholelithiasis. Ill-defined focal regions of enhancement within the liver noted with one example measuring 1.9 cm posterior right hepatic dome series 3 image 39, and another at the anterior right liver measuring 1.5 cm image 45. There is   also a tiny hypodense nodule at the right hepatic dome image 28.    PANCREAS: Normal.    SPLEEN: Normal.    ADRENAL GLANDS: Normal.    KIDNEYS/BLADDER: Hydronephrosis. Parapelvic cysts on the left without specific imaging follow-up recommended. No visible stones. Bladder is unremarkable but partially obscured by streak artifact.    BOWEL: Multiple dilated mid small bowel loops identified leading to a small bowel anastomosis at the anterior upper pelvis level where there is fecalization. The more distal small bowel loops are decompressed past this level. Normal appendix. No abscess   or free air. A few colonic diverticula without focal diverticulitis. Postoperative change at the rectum. Low ventral midline abdominal wall hernia containing a herniated portion of the transverse colon without obstruction. The hernia sac also contains   fat. This sac is approximately 7.5 x 2.5 cm series 3 image 157.    LYMPH NODES: Normal.    VASCULATURE: Unremarkable.    PELVIC ORGANS: Small free pelvic fluid.    MUSCULOSKELETAL: Spine degenerative changes. Left hip arthroplasty.      Impression    IMPRESSION:   1.  Small bowel obstruction evidenced by dilated central small bowel leading to an anastomosis at the upper mid pelvic level. Beyond the anastomosis, the small bowel loops are decompressed. No free air or free fluid at this time. No abscess.  2.  Cholelithiasis.  3.  A few indeterminate hepatic nodular lesions. Recommend further imaging assessment with nonemergent MRI.  4.   Small free pelvic fluid.   XR Abdomen 1 View    Narrative    EXAM: XR ABDOMEN 1 VIEW  LOCATION: [facility]  DATE/TIME: 6/10/2023 10:26 PM CDT    INDICATION: Confirm NG tube placement.  COMPARISON: None.      Impression    IMPRESSION: Nasogastric tube with tip in the proximal midbody of the stomach. The proximal side-port is just beyond the GE junction. Tube could be advanced 3 or 4 cm. Residual contrast material within the intrarenal collecting systems and urinary   bladder. Left hip prosthesis.

## 2023-06-11 NOTE — CONSULTS
[facility] Surgery Consultation    Tracy Palomo MRN# [id number]   Age: 83 year old YOB: 1940     Date of Admission:  6/10/2023    Reason for consult: Small bowel obstruction       Requesting physician: Ritesh Rivera       Level of consult: Consult, follow and place orders           Assessment and Recommendation:   Assessment:   SBO, likely adhesive, already with improvement after NG in ER  Cholelithiasis noted on imaging no ductal dilation, normal AlkP, transaminases but bilirubin 1.7. Doubt any current gallbladder issues such as choledocholithiasis      Recommendations:   Continue NG decompression for now, awaiting return of bowel function  Once symptoms improved (some already) can trial clamping and starting clears             Chief Complaint:   Small bowel obstruction     History is obtained from the patient. Daughter present and adds some details    Patient with history of laparotomy for internal hernia and small bowel resection in 2014 with previous history [place]. No other abdominal surgery. No issues with bowel obstructions since. Friday had onset of abdominal bloating, pain, nausea and vomiting. Had large volume diarrhea prior to this, no bowel movements since, not passing flatus. Presented to the ER where CT showed SBO and NG placed. Now already feeling better. Did have some nausea this morning improved with medications, no further emesis. Abdominal pain improved and now feels softer. On coumadin for afib, DVT/PE history.          Past Medical History:     Past Medical History:   Diagnosis Date     Atrial fibrillation (H) 2014    related to colon surgery     Colon polyps      Diverticulosis of colon (without mention of hemorrhage)     Diverticulosis     DVT (deep vein thrombosis) in pregnancy 2014    after colon surgery     Other and unspecified hyperlipidemia      PE (pulmonary embolism) 2014    related to colon surgery     Unspecified essential  hypertension              Past Surgical History:     Past Surgical History:   Procedure Laterality Date     ARTHROPLASTY KNEE Right 1/8/2019    Procedure: Right total knee replacement;  Surgeon: Kaleb Pang DO;  [facility]     ARTHROPLASTY KNEE Left 2/16/2021    Procedure: left total knee replacement;  Surgeon: Kaleb Pang DO;  [facility]     COLONOSCOPY  09, 10, 12    [facility]     COLONOSCOPY N/A 12/11/2017    Procedure: COLONOSCOPY;  colonoscopy;  Surgeon: Elvis Quezada MD;  [facility]     FLEXIBLE SIGMOIDOSCOPY  09, 11     LAPAROTOMY EXPLORATORY N/A 10/20/2014    Procedure: LAPAROTOMY EXPLORATORY;  Surgeon: Miguel Oleary MD;  [facility]     LAPAROTOMY, LYSIS ADHESIONS, COMBINED N/A 10/20/2014    Procedure: COMBINED LAPAROTOMY, LYSIS ADHESIONS;  Surgeon: Miguel Oleary MD;  [facility]     OPEN REDUCTION INTERNAL FIXATION HIP BIPOLAR Left 3/16/2017    Procedure: OPEN REDUCTION INTERNAL FIXATION HIP BIPOLAR;  Surgeon: Kaleb Pang DO;  [facility]     RELEASE TRIGGER FINGER Left 1/12/2021    Procedure: Left thumb trigger release;  Surgeon: Kaleb Pang DO;  [facility]     RESECT SMALL BOWEL WITHOUT OSTOMY N/A 10/20/2014    Procedure: RESECT SMALL BOWEL WITHOUT OSTOMY;  Surgeon: Miguel Oleary MD;  [facility]             Social History:     Social History     Tobacco Use     Smoking status: Never     Smokeless tobacco: Never   Vaping Use     Vaping status: Not on file   Substance Use Topics     Alcohol use: No     Alcohol/week: 0.0 standard drinks of alcohol             Family History:     Family History   Problem Relation Age of Onset     Blood Disease No family hx of         blood clots     Family history not discussed               Allergies:     Allergies   Allergen Reactions     No Known Allergies              Medications:     Medications Prior to Admission   Medication Sig Dispense  Refill Last Dose     lovastatin (MEVACOR) 40 MG tablet TAKE ONE TABLET BY MOUTH AT BEDTIME 90 tablet 3 6/10/2023     warfarin ANTICOAGULANT (COUMADIN) 5 MG tablet TAKE ONE TABLET BY MOUTH ONCE DAILY ON MONDAY AND FRIDAY AND 1/2 TABLET ALL OTHER DAYS OF THE WEEK OR AS DIRECTED BY THE COUMADIN CLINIC - 30 day candido refill, patient needs to schedule annual visit with provider for future refills 90 tablet 3 6/10/2023     acetaminophen (TYLENOL) 325 MG tablet Take 3 tablets (975 mg) by mouth every 8 hours as needed for pain 100 tablet 1              Review of Systems:   The Review of Systems is negative other than noted in the HPI          Physical Exam:   Vitals were reviewed  Temp: 98.6  F (37  C) Temp src: Oral BP: (!) 142/68 Pulse: 52   Resp: 18 SpO2: 92 % O2 Device: None (Room air)    Abdomen:   scars noted large midline scar, soft, mild distention, obese, mild tenderness noted midabdomen, rebound tenderness absent and involuntary guarding absent  Reducible incisional hernia     Resting comfortably in bed NG in place          Data:   All laboratory data reviewed  Lab Results   Component Value Date    WBC 7.8 06/10/2023    HGB 13.7 06/10/2023    HCT 42.9 06/10/2023     06/10/2023     06/10/2023    POTASSIUM 4.0 06/10/2023    CHLORIDE 102 06/10/2023    CO2 25 06/10/2023    BUN 17.6 06/10/2023    CR 0.81 06/10/2023     (H) 06/11/2023    SED 9 03/20/2023    DD 3.4 (H) 01/21/2019    NTBNPI 127 11/01/2014    TROPI <0.015 01/21/2019    AST 22 06/10/2023    ALT 23 06/10/2023    ALKPHOS 117 (H) 06/10/2023    BILITOTAL 1.7 (H) 06/10/2023    INR 2.03 (H) 06/11/2023     All imaging studies reviewed by me.  Incisional hernia- small fat filled above umbilicus, wider defect/diastasis in lower abdomen with non-obstructed loop of colon    EXAM: CT ABDOMEN PELVIS W CONTRAST  LOCATION: [facility]  DATE/TIME: 6/10/2023 9:27 PM CDT     INDICATION: Vomiting, abd pain.  COMPARISON:  None.  TECHNIQUE: CT scan of the abdomen and pelvis was performed following injection of IV contrast. Multiplanar reformats were obtained. Dose reduction techniques were used.  CONTRAST: Isovue 370, 89mL     FINDINGS:   LOWER CHEST: Normal.     HEPATOBILIARY: Cholelithiasis. Ill-defined focal regions of enhancement within the liver noted with one example measuring 1.9 cm posterior right hepatic dome series 3 image 39, and another at the anterior right liver measuring 1.5 cm image 45. There is   also a tiny hypodense nodule at the right hepatic dome image 28.     PANCREAS: Normal.     SPLEEN: Normal.     ADRENAL GLANDS: Normal.     KIDNEYS/BLADDER: Hydronephrosis. Parapelvic cysts on the left without specific imaging follow-up recommended. No visible stones. Bladder is unremarkable but partially obscured by streak artifact.     BOWEL: Multiple dilated mid small bowel loops identified leading to a small bowel anastomosis at the anterior upper pelvis level where there is fecalization. The more distal small bowel loops are decompressed past this level. Normal appendix. No abscess   or free air. A few colonic diverticula without focal diverticulitis. Postoperative change at the rectum. Low ventral midline abdominal wall hernia containing a herniated portion of the transverse colon without obstruction. The hernia sac also contains   fat. This sac is approximately 7.5 x 2.5 cm series 3 image 157.     LYMPH NODES: Normal.     VASCULATURE: Unremarkable.     PELVIC ORGANS: Small free pelvic fluid.     MUSCULOSKELETAL: Spine degenerative changes. Left hip arthroplasty.                                                                      IMPRESSION:   1.  Small bowel obstruction evidenced by dilated central small bowel leading to an anastomosis at the upper mid pelvic level. Beyond the anastomosis, the small bowel loops are decompressed. No free air or free fluid at this time. No abscess.  2.  Cholelithiasis.  3.  A few  indeterminate hepatic nodular lesions. Recommend further imaging assessment with nonemergent MRI.  4.  Small free pelvic fluid.     Attestation:  Amount of time performed on this consult: 30 minutes.    Juvencio Terrell MD

## 2023-06-11 NOTE — ED TRIAGE NOTES
Pt states since last night has been having loose stools, nausea and vomiting. Not tolerating PO food fluids today.      Triage Assessment     Row Name 06/10/23 1910       Triage Assessment (Adult)    Airway WDL WDL       Respiratory WDL    Respiratory WDL WDL       Cardiac WDL    Cardiac WDL WDL

## 2023-06-11 NOTE — ED PROVIDER NOTES
History     Chief Complaint   Patient presents with     Nausea, Vomiting, & Diarrhea     HPI  Tracy Palomo is a 83 year old female who presents with nausea vomiting and diarrhea and unable to really eat or drink anything.  This all started last night.  Patient is thrown up anytime she tried to eat any thing or drink anything.  She is thrown up about 5 times.  She has had 1 large loose watery stool.  Denies any fevers or chills.  Patient's had multiple surgeries on her colon before because of diverticulitis and partial colon removal.  Patient was admitted here back in 2014 for concerning for ischemic bowel and small bowel obstruction at that time.    Allergies:  Allergies   Allergen Reactions     No Known Allergies        Problem List:    Patient Active Problem List    Diagnosis Date Noted     Spinal stenosis of lumbar region without neurogenic claudication 08/18/2022     Priority: Medium     S/P hip hemiarthroplasty 06/02/2022     Priority: Medium     Chronic pulmonary embolism without acute cor pulmonale, unspecified pulmonary embolism type (H) 04/20/2022     Priority: Medium     Status post total left knee replacement 03/01/2021     Priority: Medium     S/P total knee replacement using cement, left 02/16/2021     Priority: Medium     Pain of right sacroiliac joint 01/06/2021     Priority: Medium     Primary osteoarthritis of right hip 01/06/2021     Priority: Medium     Trigger finger of left thumb 01/06/2021     Priority: Medium     SAH (subarachnoid hemorrhage) (H) 08/05/2020     Priority: Medium     Other pulmonary embolism without acute cor pulmonale, unspecified chronicity (H) 05/27/2020     Priority: Medium     Peripheral edema 01/09/2019     Priority: Medium     S/P total knee replacement using cement, right 01/08/2019     Priority: Medium     Other chest pain 01/08/2019     Priority: Medium     Primary osteoarthritis of right knee 11/15/2018     Priority: Medium     Primary osteoarthritis of left  knee 11/15/2018     Priority: Medium     Closed left hip fracture (H) 03/15/2017     Priority: Medium     Long term current use of anticoagulant therapy 03/22/2016     Priority: Medium     Anemia 11/03/2014     Priority: Medium     Paroxysmal atrial fibrillation (H) 11/03/2014     Priority: Medium     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 11/01/2014     Priority: Medium     In 2014       Recent major surgery - exploratory lap with small bowel resection 10/20 11/01/2014     Priority: Medium     Pleural effusion on right 11/01/2014     Priority: Medium     Hypertension goal BP (blood pressure) < 140/90 02/22/2013     Priority: Medium     Hyperlipidemia LDL goal <130 02/22/2013     Priority: Medium     Encounter for long-term current use of medication 02/22/2013     Priority: Medium     Problem list name updated by automated process. Provider to review       History of colonic polyps 02/22/2013     Priority: Medium     Problem list name updated by automated process. Provider to review       Advanced directives, counseling/discussion 02/22/2013     Priority: Medium     Pt states has Advance Directive at home, will bring in to be scanned into chart.          Past Medical History:    Past Medical History:   Diagnosis Date     Atrial fibrillation (H) 2014     Colon polyps      Diverticulosis of colon (without mention of hemorrhage)      DVT (deep vein thrombosis) in pregnancy 2014     Other and unspecified hyperlipidemia      PE (pulmonary embolism) 2014     Unspecified essential hypertension        Past Surgical History:    Past Surgical History:   Procedure Laterality Date     ARTHROPLASTY KNEE Right 1/8/2019    Procedure: Right total knee replacement;  Surgeon: Kaleb Pang DO;  Location:  OR     ARTHROPLASTY KNEE Left 2/16/2021    Procedure: left total knee replacement;  Surgeon: Kaleb Pang DO;  Location:  OR     COLONOSCOPY  09, 10, 12    [facility]  Services     COLONOSCOPY N/A 12/11/2017    Procedure: COLONOSCOPY;  colonoscopy;  Surgeon: Elvis Quezada MD;  [facility]     FLEXIBLE SIGMOIDOSCOPY  09, 11     LAPAROTOMY EXPLORATORY N/A 10/20/2014    Procedure: LAPAROTOMY EXPLORATORY;  Surgeon: Miguel Oleary MD;  [facility]     LAPAROTOMY, LYSIS ADHESIONS, COMBINED N/A 10/20/2014    Procedure: COMBINED LAPAROTOMY, LYSIS ADHESIONS;  Surgeon: Miguel Oleary MD;  [facility]     OPEN REDUCTION INTERNAL FIXATION HIP BIPOLAR Left 3/16/2017    Procedure: OPEN REDUCTION INTERNAL FIXATION HIP BIPOLAR;  Surgeon: Kaleb Pang DO;  [facility]     RELEASE TRIGGER FINGER Left 1/12/2021    Procedure: Left thumb trigger release;  Surgeon: Kaleb Pang DO;  [facility]     RESECT SMALL BOWEL WITHOUT OSTOMY N/A 10/20/2014    Procedure: RESECT SMALL BOWEL WITHOUT OSTOMY;  Surgeon: Miguel Oleary MD;  [facility]       Family History:    Family History   Problem Relation Age of Onset     Blood Disease No family hx of         blood clots       Social History:  Marital Status:   [5]  Social History     Tobacco Use     Smoking status: Never     Smokeless tobacco: Never   Substance Use Topics     Alcohol use: No     Alcohol/week: 0.0 standard drinks of alcohol     Drug use: No        Medications:    acetaminophen (TYLENOL) 325 MG tablet  lovastatin (MEVACOR) 40 MG tablet  warfarin ANTICOAGULANT (COUMADIN) 5 MG tablet          Review of Systems   All other systems reviewed and are negative.      Physical Exam   BP: (!) 193/85  Pulse: 57  Temp: 98.1  F (36.7  C)  Resp: 18  Weight: 82.1 kg (181 lb)  SpO2: 95 %      Physical Exam  Vitals and nursing note reviewed.   Constitutional:       General: She is not in acute distress.     Appearance: She is well-developed. She is not diaphoretic.   Eyes:      Conjunctiva/sclera: Conjunctivae normal.   Cardiovascular:      Rate and Rhythm: Normal rate and regular rhythm.       Heart sounds: Normal heart sounds. No murmur heard.     No friction rub. No gallop.   Pulmonary:      Effort: Pulmonary effort is normal. No respiratory distress.      Breath sounds: Normal breath sounds. No wheezing or rales.   Chest:      Chest wall: No tenderness.   Abdominal:      General: Bowel sounds are normal. There is no distension.      Palpations: Abdomen is soft. There is no mass.      Tenderness: There is no abdominal tenderness. There is no guarding.   Musculoskeletal:         General: No tenderness. Normal range of motion.      Cervical back: Normal range of motion and neck supple.   Skin:     General: Skin is warm and dry.      Findings: No rash.   Neurological:      Mental Status: She is alert and oriented to person, place, and time.   Psychiatric:         Judgment: Judgment normal.         ED Course                 Procedures        Results for orders placed or performed during the hospital encounter of 06/10/23 (from the past 24 hour(s))   CBC with platelets differential    Narrative    The following orders were created for panel order CBC with platelets differential.  Procedure                               Abnormality         Status                     ---------                               -----------         ------                     CBC with platelets and d...[[id number]]                      Final result                 Please view results for these tests on the individual orders.   INR   Result Value Ref Range    INR 1.79 (H) 0.85 - 1.15   Comprehensive metabolic panel   Result Value Ref Range    Sodium 138 136 - 145 mmol/L    Potassium 4.0 3.4 - 5.3 mmol/L    Chloride 102 98 - 107 mmol/L    Carbon Dioxide (CO2) 25 22 - 29 mmol/L    Anion Gap 11 7 - 15 mmol/L    Urea Nitrogen 17.6 8.0 - 23.0 mg/dL    Creatinine 0.81 0.51 - 0.95 mg/dL    Calcium 9.8 8.8 - 10.2 mg/dL    Glucose 120 (H) 70 - 99 mg/dL    Alkaline Phosphatase 117 (H) 35 - 104 U/L    AST 22 10 - 35 U/L    ALT 23 10 - 35 U/L     Protein Total 6.6 6.4 - 8.3 g/dL    Albumin 4.0 3.5 - 5.2 g/dL    Bilirubin Total 1.7 (H) <=1.2 mg/dL    GFR Estimate 72 >60 mL/min/1.73m2   Lipase   Result Value Ref Range    Lipase 14 13 - 60 U/L   CBC with platelets and differential   Result Value Ref Range    WBC Count 7.8 4.0 - 11.0 10e3/uL    RBC Count 4.81 3.80 - 5.20 10e6/uL    Hemoglobin 13.7 11.7 - 15.7 g/dL    Hematocrit 42.9 35.0 - 47.0 %    MCV 89 78 - 100 fL    MCH 28.5 26.5 - 33.0 pg    MCHC 31.9 31.5 - 36.5 g/dL    RDW 13.9 10.0 - 15.0 %    Platelet Count 213 150 - 450 10e3/uL    % Neutrophils 80 %    % Lymphocytes 13 %    % Monocytes 6 %    % Eosinophils 0 %    % Basophils 0 %    % Immature Granulocytes 1 %    NRBCs per 100 WBC 0 <1 /100    Absolute Neutrophils 6.2 1.6 - 8.3 10e3/uL    Absolute Lymphocytes 1.0 0.8 - 5.3 10e3/uL    Absolute Monocytes 0.5 0.0 - 1.3 10e3/uL    Absolute Eosinophils 0.0 0.0 - 0.7 10e3/uL    Absolute Basophils 0.0 0.0 - 0.2 10e3/uL    Absolute Immature Granulocytes 0.0 <=0.4 10e3/uL    Absolute NRBCs 0.0 10e3/uL   CT Abdomen Pelvis w Contrast    Narrative    EXAM: CT ABDOMEN PELVIS W CONTRAST  LOCATION: [facility]  DATE/TIME: 6/10/2023 9:27 PM CDT    INDICATION: Vomiting, abd pain.  COMPARISON: None.  TECHNIQUE: CT scan of the abdomen and pelvis was performed following injection of IV contrast. Multiplanar reformats were obtained. Dose reduction techniques were used.  CONTRAST: Isovue 370, 89mL    FINDINGS:   LOWER CHEST: Normal.    HEPATOBILIARY: Cholelithiasis. Ill-defined focal regions of enhancement within the liver noted with one example measuring 1.9 cm posterior right hepatic dome series 3 image 39, and another at the anterior right liver measuring 1.5 cm image 45. There is   also a tiny hypodense nodule at the right hepatic dome image 28.    PANCREAS: Normal.    SPLEEN: Normal.    ADRENAL GLANDS: Normal.    KIDNEYS/BLADDER: Hydronephrosis. Parapelvic cysts on the left without  specific imaging follow-up recommended. No visible stones. Bladder is unremarkable but partially obscured by streak artifact.    BOWEL: Multiple dilated mid small bowel loops identified leading to a small bowel anastomosis at the anterior upper pelvis level where there is fecalization. The more distal small bowel loops are decompressed past this level. Normal appendix. No abscess   or free air. A few colonic diverticula without focal diverticulitis. Postoperative change at the rectum. Low ventral midline abdominal wall hernia containing a herniated portion of the transverse colon without obstruction. The hernia sac also contains   fat. This sac is approximately 7.5 x 2.5 cm series 3 image 157.    LYMPH NODES: Normal.    VASCULATURE: Unremarkable.    PELVIC ORGANS: Small free pelvic fluid.    MUSCULOSKELETAL: Spine degenerative changes. Left hip arthroplasty.      Impression    IMPRESSION:   1.  Small bowel obstruction evidenced by dilated central small bowel leading to an anastomosis at the upper mid pelvic level. Beyond the anastomosis, the small bowel loops are decompressed. No free air or free fluid at this time. No abscess.  2.  Cholelithiasis.  3.  A few indeterminate hepatic nodular lesions. Recommend further imaging assessment with nonemergent MRI.  4.  Small free pelvic fluid.       Medications   0.9% sodium chloride BOLUS (1,000 mLs Intravenous $New Bag 6/10/23 2051)     Followed by   sodium chloride 0.9% infusion (has no administration in time range)   ondansetron (ZOFRAN) injection 4 mg (4 mg Intravenous $Given 6/10/23 2058)   sodium chloride 0.9 % bag 100mL for CT scan flush use (61 mLs Intravenous $Given 6/10/23 2110)   iopamidol (ISOVUE-370) solution 500 mL (89 mLs Intravenous $Given 6/10/23 2110)     Labs have come back and are mostly unremarkable, INR is subtherapeutic and patient is on Coumadin because of previous pulmonary embolisms.  As for the vomiting and abdominal discomfort, CT scan does show  findings consistent with a small bowel obstruction.  No other complications noted.  NG tube will be placed and we will plan on admission to the hospital for conservative care.  We will call the hospitalist to discuss care and admission.    Addendum: 10:45 PM: I consulted surgery per Dr. Cordon's request.  I spoke to Dr. Terrell with surgery, he had no other insights and will see the patient tomorrow.    Assessments & Plan (with Medical Decision Making)  Small bowel obstruction, subtherapeutic INR     I have reviewed the nursing notes.    I have reviewed the findings, diagnosis, plan and need for follow up with the patient.    6/10/2023   [facility] EMERGENCY DEPT     Pete Berg MD  06/10/23 9923       Pete Berg MD  06/10/23 [id number]

## 2023-06-11 NOTE — PHARMACY-ANTICOAGULATION SERVICE
Clinical Pharmacy - Warfarin Dosing Consult     Pharmacy has been consulted to manage this patient s warfarin therapy.  Indication: DVT/ PE Treatment  Therapy Goal: INR 2-3  Provider/Team: Chad Betts MD  [facility]: [facility]  Warfarin Prior to Admission: Yes  Warfarin PTA Regimen: 5 mg every Mon, Fri; 2.5 mg all other days    INR   Date Value Ref Range Status   06/11/2023 2.03 (H) 0.85 - 1.15 Final   06/10/2023 1.79 (H) 0.85 - 1.15 Final       Recommend warfarin 2.5 mg today.  Pharmacy will monitor Tracy Palomo daily and order warfarin doses to achieve specified goal.      Please contact pharmacy as soon as possible if the warfarin needs to be held for a procedure or if the warfarin goals change.

## 2023-06-11 NOTE — PROGRESS NOTES
S-(situation): Patient arrives to room 247 via cart from ED    B-(background): Small Bowel Obstructions    A-(assessment): Pt A&Ox4. VSS. Pt denies pain and nausea. NG at 58cm to the right nare, low-intermittent suction. Bowel sounds normoactive, abdomen firm and non-tender. Tele shows SB. Pt ambulating with SBA.     R-(recommendations): Orders reviewed with Patient. Will monitor patient per MD orders.     Inpatient nursing criteria listed below were met:    Health care directives status obtained and documented: Yes  VTE ordered/documented: Yes  Skin issues/needs documented:NA  Isolation addressed and Signage used: NA  Fall Prevention: Care plan updated Yes Education given and documented Yes  Care Plan initiated and Co-Morbidities added: Yes  Education Assessment documented:Yes  Admission Education Documented: Yes  If present CAUTI/CLABI Education done: NA  New medication patient education completed and documented (Possible Side Effects of Common Medications handout): Yes  Allergies Reviewed: Yes  Admission Medication Reconciliation completed: Yes  Home medications if not able to send immediately home with family stored here: NA  Reminder note placed in discharge instructions regarding home meds: NA  Individualized care needs/preferences addressed and charted: Yes  Provider Notified that patient has arrived to the unit: Yes

## 2023-06-12 ENCOUNTER — APPOINTMENT (OUTPATIENT)
Dept: GENERAL RADIOLOGY | Facility: CLINIC | Age: 83
DRG: 389 | End: 2023-06-12
Attending: SPECIALIST
Payer: MEDICARE

## 2023-06-12 LAB
ALBUMIN SERPL BCG-MCNC: 3.7 G/DL (ref 3.5–5.2)
ALP SERPL-CCNC: 108 U/L (ref 35–104)
ALT SERPL W P-5'-P-CCNC: 19 U/L (ref 10–35)
ANION GAP SERPL CALCULATED.3IONS-SCNC: 8 MMOL/L (ref 7–15)
AST SERPL W P-5'-P-CCNC: 23 U/L (ref 10–35)
BILIRUB SERPL-MCNC: 1.8 MG/DL
BUN SERPL-MCNC: 15.3 MG/DL (ref 8–23)
CALCIUM SERPL-MCNC: 9.2 MG/DL (ref 8.8–10.2)
CHLORIDE SERPL-SCNC: 101 MMOL/L (ref 98–107)
CREAT SERPL-MCNC: 0.93 MG/DL (ref 0.51–0.95)
DEPRECATED HCO3 PLAS-SCNC: 29 MMOL/L (ref 22–29)
ERYTHROCYTE [DISTWIDTH] IN BLOOD BY AUTOMATED COUNT: 14 % (ref 10–15)
GFR SERPL CREATININE-BSD FRML MDRD: 61 ML/MIN/1.73M2
GLUCOSE BLDC GLUCOMTR-MCNC: 100 MG/DL (ref 70–99)
GLUCOSE BLDC GLUCOMTR-MCNC: 76 MG/DL (ref 70–99)
GLUCOSE BLDC GLUCOMTR-MCNC: 86 MG/DL (ref 70–99)
GLUCOSE SERPL-MCNC: 91 MG/DL (ref 70–99)
HCT VFR BLD AUTO: 43.1 % (ref 35–47)
HGB BLD-MCNC: 13.6 G/DL (ref 11.7–15.7)
INR PPP: 1.86 (ref 0.85–1.15)
MAGNESIUM SERPL-MCNC: 2.1 MG/DL (ref 1.7–2.3)
MCH RBC QN AUTO: 28.8 PG (ref 26.5–33)
MCHC RBC AUTO-ENTMCNC: 31.6 G/DL (ref 31.5–36.5)
MCV RBC AUTO: 91 FL (ref 78–100)
PLATELET # BLD AUTO: 202 10E3/UL (ref 150–450)
POTASSIUM SERPL-SCNC: 4.1 MMOL/L (ref 3.4–5.3)
PROT SERPL-MCNC: 6.1 G/DL (ref 6.4–8.3)
RBC # BLD AUTO: 4.72 10E6/UL (ref 3.8–5.2)
SODIUM SERPL-SCNC: 138 MMOL/L (ref 136–145)
WBC # BLD AUTO: 6.7 10E3/UL (ref 4–11)

## 2023-06-12 PROCEDURE — 83735 ASSAY OF MAGNESIUM: CPT | Performed by: NURSE PRACTITIONER

## 2023-06-12 PROCEDURE — 85610 PROTHROMBIN TIME: CPT | Performed by: INTERNAL MEDICINE

## 2023-06-12 PROCEDURE — 258N000003 HC RX IP 258 OP 636: Performed by: NURSE PRACTITIONER

## 2023-06-12 PROCEDURE — 250N000013 HC RX MED GY IP 250 OP 250 PS 637: Performed by: INTERNAL MEDICINE

## 2023-06-12 PROCEDURE — 250N000011 HC RX IP 250 OP 636: Performed by: NURSE PRACTITIONER

## 2023-06-12 PROCEDURE — 36415 COLL VENOUS BLD VENIPUNCTURE: CPT | Performed by: INTERNAL MEDICINE

## 2023-06-12 PROCEDURE — 99232 SBSQ HOSP IP/OBS MODERATE 35: CPT | Performed by: SPECIALIST

## 2023-06-12 PROCEDURE — 99233 SBSQ HOSP IP/OBS HIGH 50: CPT | Performed by: NURSE PRACTITIONER

## 2023-06-12 PROCEDURE — 93005 ELECTROCARDIOGRAM TRACING: CPT

## 2023-06-12 PROCEDURE — 250N000013 HC RX MED GY IP 250 OP 250 PS 637: Performed by: NURSE PRACTITIONER

## 2023-06-12 PROCEDURE — 74018 RADEX ABDOMEN 1 VIEW: CPT

## 2023-06-12 PROCEDURE — 85027 COMPLETE CBC AUTOMATED: CPT | Performed by: INTERNAL MEDICINE

## 2023-06-12 PROCEDURE — 120N000001 HC R&B MED SURG/OB

## 2023-06-12 PROCEDURE — 80053 COMPREHEN METABOLIC PANEL: CPT | Performed by: INTERNAL MEDICINE

## 2023-06-12 RX ORDER — METOPROLOL TARTRATE 1 MG/ML
5 INJECTION, SOLUTION INTRAVENOUS ONCE
Status: DISCONTINUED | OUTPATIENT
Start: 2023-06-12 | End: 2023-06-12

## 2023-06-12 RX ORDER — WARFARIN SODIUM 5 MG/1
5 TABLET ORAL
Status: COMPLETED | OUTPATIENT
Start: 2023-06-12 | End: 2023-06-12

## 2023-06-12 RX ORDER — SODIUM CHLORIDE 9 MG/ML
INJECTION, SOLUTION INTRAVENOUS CONTINUOUS
Status: DISCONTINUED | OUTPATIENT
Start: 2023-06-12 | End: 2023-06-14 | Stop reason: HOSPADM

## 2023-06-12 RX ADMIN — ATORVASTATIN CALCIUM 10 MG: 10 TABLET, FILM COATED ORAL at 20:31

## 2023-06-12 RX ADMIN — WARFARIN SODIUM 5 MG: 5 TABLET ORAL at 17:03

## 2023-06-12 RX ADMIN — SODIUM CHLORIDE: 9 INJECTION, SOLUTION INTRAVENOUS at 21:44

## 2023-06-12 RX ADMIN — SODIUM CHLORIDE 500 ML: 9 INJECTION, SOLUTION INTRAVENOUS at 10:21

## 2023-06-12 RX ADMIN — DIATRIZOATE MEGLUMINE AND DIATRIZOATE SODIUM 1 ML: 660; 100 SOLUTION ORAL; RECTAL at 08:36

## 2023-06-12 RX ADMIN — POTASSIUM CHLORIDE AND SODIUM CHLORIDE: 450; 150 INJECTION, SOLUTION INTRAVENOUS at 01:35

## 2023-06-12 ASSESSMENT — ACTIVITIES OF DAILY LIVING (ADL)
ADLS_ACUITY_SCORE: 24

## 2023-06-12 NOTE — PLAN OF CARE
Goal Outcome Evaluation:      Plan of Care Reviewed With: patient    Overall Patient Progress: no changeOverall Patient Progress: no change    Outcome Evaluation: Pt alert and oriented. Pt denies pain and/or nausea. NG on low intermittent suction, 500 output this shift. BG at midnight was at 87. Morning BG at 86. Tele shows SB. Pt continues to get 0.45 NS + 20 mEq K @ 75 mls/hr.

## 2023-06-12 NOTE — PROGRESS NOTES
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  CT showed findings consistent with small bowel obstruction.  NG tube was placed and surgical consultation obtained.     # Small Bowel Obstruction:  Gastrografin enema per Dr. Sosa  NPO  Already passing loose stools and passing flatus, hopefully can clamp NG soon and start clears  Continue iv fluids for now. Can discharge iv fluids once tolerating po intake     # Cholelithiasis: Noted on ultrasound.  No ductal dilation.  LFTs stable with bilirubin 1.7.    # Transient Rapid Atrial Fibrillation:  Went into rapid a-fib this am, resolved before medication  See significant event note from earlier today.     # Atrial Fibrillation:  # DVT/PE (H):  # Subtherapeutic INR:    Warfarin/INR management per pharmacy.       Diet: NPO for Medical/Clinical Reasons Except for: Ice Chips    DVT Prophylaxis: Warfarin  Reyna Catheter: Not present  Lines: None     Cardiac Monitoring: ACTIVE order. Indication: SBBO, a-fib  Code Status: Full Code      Clinically Significant Risk Factors     # Hypertension: Noted on problem list        # Obesity: Estimated body mass index is 30.4 kg/m  as calculated from the following:    Height as of this encounter: 1.626 m (5' 4\").    Weight as of this encounter: 80.3 kg (177 lb 1.6 oz)., PRESENT ON ADMISSION          Disposition Plan     Expected Discharge Date: 06/13/2023                The patient's care was discussed with the Attending Physician, Dr. Muniz, Bedside Nurse, Care Coordinator/, Patient and Patient's Family.    BILLY Pandya CNP  Hospitalist Service  [facility] " [facility]  Securely message with [employer] (more info)  Text page via [employer] Paging/Directory   ______________________________________________________________________    Interval History   Went into rapid a-fib this am, resolved before medication intervention-see significant event note    Physical Exam   Vital Signs: Temp: 98.8  F (37.1  C) Temp src: Oral BP: (!) 154/67 Pulse: 54   Resp: 18 SpO2: 95 % O2 Device: None (Room air)    Weight: 177 lbs 1.6 oz    General appearance: alert and cooperative, NG in place  Lungs: clear to auscultation bilaterally  Heart: Apical heart rate 131, irregular  Abdomen: soft, non-tender  Extremities:  atraumatic or cyanosis  Neurologic: moving all 4 extremities spontaneously, no focal weakness.      Medical Decision Making     35 plus 30 minte critical care time management rapid atrial fibrillation MINUTES SPENT BY ME on the date of service doing chart review, history, exam, documentation & further activities per the note.      Data     I have personally reviewed the following data over the past 24 hrs:    6.7  \   13.6   / 202     138 101 15.3 /  76   4.1 29 0.93 \       ALT: 19 AST: 23 AP: 108 (H) TBILI: 1.8 (H)   ALB: 3.7 TOT PROTEIN: 6.1 (L) LIPASE: N/A       INR:  1.86 (H) PTT:  N/A   D-dimer:  N/A Fibrinogen:  N/A

## 2023-06-12 NOTE — PLAN OF CARE
Goal Outcome Evaluation:      Plan of Care Reviewed With: patient, child    Overall Patient Progress: improvingOverall Patient Progress: improving    Outcome Evaluation: 4 hour shift note. Pt is A & O x 4, VSS, afebrile, on room air, remains in a sinus julianne on telemetry. NG remains clamped with landmark unchanged at 58 cm. Denies any abdominal pain, denies any nausea. Tolerating sips of clears. Bowels normal active, pt continues to pass gas. Voiding well. Up via SBA. Started IV fluids this shift per orders. X-ray done this evening for gastrografin challenge, awaiting results. 1800 blood sugar of 100.

## 2023-06-12 NOTE — PHARMACY-ANTICOAGULATION SERVICE
Clinical Pharmacy - Warfarin Dosing Consult     Pharmacy has been consulted to manage this patient s warfarin therapy.  Indication: DVT/ PE Treatment  Therapy Goal: INR 2-3  Provider/Team: Chad Betts MD  [facility]: [facility]  Warfarin Prior to Admission: Yes  Warfarin PTA Regimen: 5 mg every Mon, Fri; 2.5 mg all other days    INR   Date Value Ref Range Status   06/12/2023 1.86 (H) 0.85 - 1.15 Final   06/11/2023 2.03 (H) 0.85 - 1.15 Final       Recommend warfarin 5 mg today.  Pharmacy will monitor Tracy Palomo daily and order warfarin doses to achieve specified goal.      Please contact pharmacy as soon as possible if the warfarin needs to be held for a procedure or if the warfarin goals change.

## 2023-06-12 NOTE — PROGRESS NOTES
Surgery    Subjective:  Patient is feeling much better.  Began passing a lot of gas this morning.  Had 4 bowel movements all loose this morning.  Had an episode of rapid A-fib this morning improved after intervention by hospitalist    Objective:  Vitals:    06/12/23 0546 06/12/23 0715 06/12/23 1020 06/12/23 1022   BP:  (!) 154/67 135/85    BP Location:   Left arm    Patient Position:   Semi-Brooks's    Cuff Size:   Adult Regular    Pulse:  54 (!) 136 110   Resp:  18 20    Temp:  98.8  F (37.1  C) 98.4  F (36.9  C)    TempSrc:  Oral Oral    SpO2:  95% 94%    Weight: 80.3 kg (177 lb 1.6 oz)      Height:           Intake/Output Summary (Last 24 hours) at 6/12/2023 1200  Last data filed at 6/12/2023 0554  Gross per 24 hour   Intake 1892 ml   Output 1450 ml   Net 442 ml         Abdomen: Soft, nontender, nondistended, reducible incisional hernia.    Results for orders placed or performed during the hospital encounter of 06/10/23 (from the past 24 hour(s))   Glucose by meter   Result Value Ref Range    GLUCOSE BY METER POCT 87 70 - 99 mg/dL   Glucose by meter   Result Value Ref Range    GLUCOSE BY METER POCT 86 70 - 99 mg/dL   Comprehensive metabolic panel   Result Value Ref Range    Sodium 138 136 - 145 mmol/L    Potassium 4.1 3.4 - 5.3 mmol/L    Chloride 101 98 - 107 mmol/L    Carbon Dioxide (CO2) 29 22 - 29 mmol/L    Anion Gap 8 7 - 15 mmol/L    Urea Nitrogen 15.3 8.0 - 23.0 mg/dL    Creatinine 0.93 0.51 - 0.95 mg/dL    Calcium 9.2 8.8 - 10.2 mg/dL    Glucose 91 70 - 99 mg/dL    Alkaline Phosphatase 108 (H) 35 - 104 U/L    AST 23 10 - 35 U/L    ALT 19 10 - 35 U/L    Protein Total 6.1 (L) 6.4 - 8.3 g/dL    Albumin 3.7 3.5 - 5.2 g/dL    Bilirubin Total 1.8 (H) <=1.2 mg/dL    GFR Estimate 61 >60 mL/min/1.73m2   CBC with platelets   Result Value Ref Range    WBC Count 6.7 4.0 - 11.0 10e3/uL    RBC Count 4.72 3.80 - 5.20 10e6/uL    Hemoglobin 13.6 11.7 - 15.7 g/dL    Hematocrit 43.1 35.0 - 47.0 %    MCV 91 78 - 100 fL    MCH  28.8 26.5 - 33.0 pg    MCHC 31.6 31.5 - 36.5 g/dL    RDW 14.0 10.0 - 15.0 %    Platelet Count 202 150 - 450 10e3/uL   INR   Result Value Ref Range    INR 1.86 (H) 0.85 - 1.15   Magnesium   Result Value Ref Range    Magnesium 2.1 1.7 - 2.3 mg/dL         Assessment/plan:  Patient with SBO now and much improved.  Did receive the Gastrografin's morning which may have contributed to her loose stools.  Will clamp NG tube and start sips of clears.    Lewis Sosa MD, FACS

## 2023-06-12 NOTE — SIGNIFICANT EVENT
Significant Event Note    Time of event: 10:00 AM June 12, 2023    Description of event:  Went into rapid atrial fibrillation -130's  Can feel heart racing. Denies chest pain or shortness of breath.     Alert, not in acute distress  Apical Heart Rate 131, irregular  Clear breath sounds bilaterally  Moving extremities freely    Temp: 98.4  F (36.9  C) Temp src: Oral BP: 135/85 Pulse: 110   Resp: 20 SpO2: 94 % O2 Device: None (Room air)      Plan:  Stat EKG  Continue telemetry monitoring, monitor vital signs  Give NS  mL bolus x 1- Could be dehydrated in setting of NPO, GI loss from NG tube  Keep K= > 4.0 and Mg > 2.0  Metoprolol 5 mg IV x 1    10:30: Heart rate slowed down 60's, atrial fibrillation.  Hold IV Metoprolol.  Repeat EKG    Discussed with: Patient, patient's family at bedside,   supervising physician, Dr. Flavio Rivera, BILLY CNP

## 2023-06-12 NOTE — PLAN OF CARE
Goal Outcome Evaluation:      Plan of Care Reviewed With: patient    Overall Patient Progress: improvingOverall Patient Progress: improving    Outcome Evaluation: Pt is A&Ox4. VSS now. Pt did go into afib with RVR this AM, which resolved without medication intervention. EKG done during and when pt went back into a sinus julianne rhythm. Pt has denied pain. Pt has active bowel sounds, passing flatus and has had bowel movements today. Pt is up with SBA and is steady. Pt's diet advanced to sips of clears which pt is tolerating. Pt had gastrografin and radiology updated when pt was done at 0915 this AM. NG has been clamped since this AM and pt is tolerating pt has denied abdominal pain or nausea this shift.

## 2023-06-13 LAB
GLUCOSE BLDC GLUCOMTR-MCNC: 137 MG/DL (ref 70–99)
GLUCOSE BLDC GLUCOMTR-MCNC: 69 MG/DL (ref 70–99)
GLUCOSE BLDC GLUCOMTR-MCNC: 74 MG/DL (ref 70–99)
GLUCOSE BLDC GLUCOMTR-MCNC: 76 MG/DL (ref 70–99)
GLUCOSE BLDC GLUCOMTR-MCNC: 99 MG/DL (ref 70–99)
HOLD SPECIMEN: NORMAL
INR PPP: 2.3 (ref 0.85–1.15)
MAGNESIUM SERPL-MCNC: 1.8 MG/DL (ref 1.7–2.3)
POTASSIUM SERPL-SCNC: 4 MMOL/L (ref 3.4–5.3)

## 2023-06-13 PROCEDURE — 36415 COLL VENOUS BLD VENIPUNCTURE: CPT | Performed by: INTERNAL MEDICINE

## 2023-06-13 PROCEDURE — 250N000013 HC RX MED GY IP 250 OP 250 PS 637: Performed by: NURSE PRACTITIONER

## 2023-06-13 PROCEDURE — 83735 ASSAY OF MAGNESIUM: CPT | Performed by: NURSE PRACTITIONER

## 2023-06-13 PROCEDURE — 250N000013 HC RX MED GY IP 250 OP 250 PS 637: Performed by: INTERNAL MEDICINE

## 2023-06-13 PROCEDURE — 120N000001 HC R&B MED SURG/OB

## 2023-06-13 PROCEDURE — 99232 SBSQ HOSP IP/OBS MODERATE 35: CPT | Performed by: SPECIALIST

## 2023-06-13 PROCEDURE — 99232 SBSQ HOSP IP/OBS MODERATE 35: CPT | Performed by: NURSE PRACTITIONER

## 2023-06-13 PROCEDURE — 85610 PROTHROMBIN TIME: CPT | Performed by: INTERNAL MEDICINE

## 2023-06-13 PROCEDURE — 84132 ASSAY OF SERUM POTASSIUM: CPT | Performed by: NURSE PRACTITIONER

## 2023-06-13 PROCEDURE — 258N000003 HC RX IP 258 OP 636: Performed by: NURSE PRACTITIONER

## 2023-06-13 PROCEDURE — 250N000011 HC RX IP 250 OP 636: Performed by: NURSE PRACTITIONER

## 2023-06-13 RX ORDER — NICOTINE POLACRILEX 4 MG
15-30 LOZENGE BUCCAL
Status: DISCONTINUED | OUTPATIENT
Start: 2023-06-13 | End: 2023-06-14 | Stop reason: HOSPADM

## 2023-06-13 RX ORDER — MAGNESIUM OXIDE 400 MG/1
400 TABLET ORAL EVERY 4 HOURS
Status: DISCONTINUED | OUTPATIENT
Start: 2023-06-13 | End: 2023-06-13

## 2023-06-13 RX ORDER — WARFARIN SODIUM 2.5 MG/1
2.5 TABLET ORAL
Status: COMPLETED | OUTPATIENT
Start: 2023-06-13 | End: 2023-06-13

## 2023-06-13 RX ORDER — HYDRALAZINE HYDROCHLORIDE 20 MG/ML
10 INJECTION INTRAMUSCULAR; INTRAVENOUS EVERY 6 HOURS PRN
Status: DISCONTINUED | OUTPATIENT
Start: 2023-06-13 | End: 2023-06-14 | Stop reason: HOSPADM

## 2023-06-13 RX ORDER — MAGNESIUM SULFATE HEPTAHYDRATE 40 MG/ML
2 INJECTION, SOLUTION INTRAVENOUS ONCE
Status: COMPLETED | OUTPATIENT
Start: 2023-06-13 | End: 2023-06-13

## 2023-06-13 RX ORDER — DEXTROSE MONOHYDRATE 25 G/50ML
25-50 INJECTION, SOLUTION INTRAVENOUS
Status: DISCONTINUED | OUTPATIENT
Start: 2023-06-13 | End: 2023-06-14 | Stop reason: HOSPADM

## 2023-06-13 RX ADMIN — ATORVASTATIN CALCIUM 10 MG: 10 TABLET, FILM COATED ORAL at 20:06

## 2023-06-13 RX ADMIN — SODIUM CHLORIDE: 9 INJECTION, SOLUTION INTRAVENOUS at 11:47

## 2023-06-13 RX ADMIN — WARFARIN SODIUM 2.5 MG: 2.5 TABLET ORAL at 17:33

## 2023-06-13 RX ADMIN — MAGNESIUM SULFATE HEPTAHYDRATE 2 G: 40 INJECTION, SOLUTION INTRAVENOUS at 09:16

## 2023-06-13 ASSESSMENT — ACTIVITIES OF DAILY LIVING (ADL)
ADLS_ACUITY_SCORE: 24

## 2023-06-13 NOTE — PLAN OF CARE
Goal Outcome Evaluation:      Plan of Care Reviewed With: patient, child    Overall Patient Progress: improvingOverall Patient Progress: improving    Outcome Evaluation: Pt. A&Ox4, VSS, maintaining 02 saturations on RA. Tele has remained NSR/SB. Bowel sounds active x4, abdomen non-distended, non-tender, passing gas, no further BMs this shift. IVF continue NS @ 75. Diet advanced to full liquids, NG tube removed.

## 2023-06-13 NOTE — PROGRESS NOTES
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  CT showed findings consistent with small bowel obstruction.     # Small Bowel Obstruction:  Gastrografin in colon.   Okay to remove NG and advance diet per Dr. Sosa.  Can discontinue IV fluids once tolerating ADAT po intake.    # Cholelithiasis: Noted on ultrasound.  No ductal dilation.  LFTs stable with bilirubin 1.7.    # Transient Rapid Atrial Fibrillation:  Resolved prior to medication intervention.  See significant event note from 06/14/2023.     # Atrial Fibrillation:  # DVT/PE (H):  # Subtherapeutic INR:    Warfarin/INR management per pharmacy.    # Primary Hypertension: not on antihypertensives at home  -high 170's. Recheck this afternoon at 15:15 systolic 150's. Hold off on scheduling BP medication.   PRN IV Hydralazine for SBP > 180  Could be situational, recent NG placement, monitor blood pressures.                 Diet: Advance Diet as Tolerated: Full Liquid Diet    DVT Prophylaxis: Warfarin  Reyna Catheter: Not present  Lines: None     Cardiac Monitoring: ACTIVE order. Indication: recent rapid a-fib  Code Status: Full Code      Clinically Significant Risk Factors                  # Hypertension: Noted on problem list        # Obesity: Estimated body mass index is 30.12 kg/m  as calculated from the following:    Height as of this encounter: 1.626 m (5' 4\").    Weight as of this encounter: 79.6 kg (175 lb 8 oz)., PRESENT ON ADMISSION          Disposition Plan     Expected Discharge Date: 06/13/2023           The patient's care was discussed with the Attending Physician, Dr. Muniz, Bedside Nurse, " Care Coordinator/, Patient and Patient's Family.    BILLY Pandya CNP  Hospitalist Service  [facility]  Securely message with [employer] (more info)  Text page via [employer] Paging/Directory   ______________________________________________________________________    Interval History   NG has been clamped per surgeon. gastrografin in colon  Okay to remove NG and advance diet per Dr. oSsa    Physical Exam   Vital Signs: Temp: 98.7  F (37.1  C) Temp src: Oral BP: (!) 167/77 Pulse: 51   Resp: 19 SpO2: 95 % O2 Device: None (Room air)    Weight: 175 lbs 8 oz    PHYSICAL EXAMINATION FINDINGS: Alert, not in acute distress, NG clamped. Engages in conversation, Respirations unlabored, Pulse rate regular, Abdomen soft and non-tender, Moving extremities freely.        Medical Decision Making     35 MINUTES SPENT BY ME on the date of service doing chart review, history, exam, documentation & further activities per the note.     Data     I have personally reviewed the following data over the past 24 hrs:    N/A  \   N/A   / N/A     N/A N/A N/A /  99   4.0 N/A N/A \       INR:  2.30 (H) PTT:  N/A   D-dimer:  N/A Fibrinogen:  N/A       Imaging results reviewed over the past 24 hrs:   Recent Results (from the past 24 hour(s))   XR Gastrografin Challenge    Narrative    XR GASTROGRAFIN CHALLENGE 6/12/2023 5:35 PM    HISTORY: Gastrografin challenge. Follow-up small bowel obstruction.    COMPARISON: CT and X-ray 6/10/2023      Impression    IMPRESSION: Gastrografin was administered via NG tube. Gastrografin is  noted in what appears to be small bowel loops in the mid and lower  abdomen. Slight interval decompression of small bowel loops compared  with prior exam.    FENG FRIED MD         SYSTEM ID:  [id number]

## 2023-06-13 NOTE — PHARMACY-ANTICOAGULATION SERVICE
Clinical Pharmacy - Warfarin Dosing Consult     Pharmacy has been consulted to manage this patient s warfarin therapy.  Indication: DVT/ PE Treatment  Therapy Goal: INR 2-3  Provider/Team: Chad Betts MD  [facility]: [facility]  Warfarin Prior to Admission: Yes  Warfarin PTA Regimen: 5 mg every Mon, Fri; 2.5 mg all other days    INR   Date Value Ref Range Status   06/13/2023 2.30 (H) 0.85 - 1.15 Final   06/12/2023 1.86 (H) 0.85 - 1.15 Final       Recommend warfarin 2.5 mg today to continue home dosing regimen.  Pharmacy will monitor Tracy Palomo daily and order warfarin doses to achieve specified goal.      Please contact pharmacy as soon as possible if the warfarin needs to be held for a procedure or if the warfarin goals change.      Thank you,   [place], PharmD

## 2023-06-13 NOTE — PROGRESS NOTES
Surgery    Subjective:  Patient is feeling much better.  Janine clamping and sips of clears.  Continues to pass gas and have BM.        Objective:  Vitals:    06/13/23 0237 06/13/23 0644 06/13/23 0724 06/13/23 1121   BP: (!) 155/69 (!) 173/77 (!) 167/77 (!) 179/74   BP Location: Right arm Left arm Left arm Left arm   Cuff Size: Adult Regular      Pulse: 52 51 51 52   Resp: 18 16 19 20   Temp: 98.6  F (37  C) 98  F (36.7  C) 98.7  F (37.1  C) 98.7  F (37.1  C)   TempSrc: Oral Oral Oral Oral   SpO2: 96% 95% 95% 95%   Weight:  79.6 kg (175 lb 8 oz)     Height:           Intake/Output Summary (Last 24 hours) at 6/13/2023 1134  Last data filed at 6/12/2023 2306  Gross per 24 hour   Intake 1234 ml   Output 1200 ml   Net 34 ml         Abdomen: Soft, nontender, nondistended, reducible incisional hernia.    Results for orders placed or performed during the hospital encounter of 06/10/23 (from the past 24 hour(s))   Glucose by meter   Result Value Ref Range    GLUCOSE BY METER POCT 76 70 - 99 mg/dL   XR Gastrografin Challenge    Narrative    XR GASTROGRAFIN CHALLENGE 6/12/2023 5:35 PM    HISTORY: Gastrografin challenge. Follow-up small bowel obstruction.    COMPARISON: CT and X-ray 6/10/2023      Impression    IMPRESSION: Gastrografin was administered via NG tube. Gastrografin is  noted in what appears to be small bowel loops in the mid and lower  abdomen. Slight interval decompression of small bowel loops compared  with prior exam.    FENG FRIED MD         SYSTEM ID:  [id number]   Glucose by meter   Result Value Ref Range    GLUCOSE BY METER POCT 100 (H) 70 - 99 mg/dL   Glucose by meter   Result Value Ref Range    GLUCOSE BY METER POCT 69 (L) 70 - 99 mg/dL   Glucose by meter   Result Value Ref Range    GLUCOSE BY METER POCT 99 70 - 99 mg/dL   INR   Result Value Ref Range    INR 2.30 (H) 0.85 - 1.15   Potassium   Result Value Ref Range    Potassium 4.0 3.4 - 5.3 mmol/L   Magnesium   Result Value Ref Range    Magnesium  1.8 1.7 - 2.3 mg/dL   Extra Tube    Narrative    The following orders were created for panel order Extra Tube.  Procedure                               Abnormality         Status                     ---------                               -----------         ------                     Extra Purple Top Tube[[id number]]                            Final result                 Please view results for these tests on the individual orders.   Extra Purple Top Tube   Result Value Ref Range    Hold Specimen JI          Assessment/plan:  Patient with SBO now and much improved.  Appears resolved.  Will discontinue NGT and ADAT.      Lewis Sosa MD, FACS

## 2023-06-13 NOTE — PLAN OF CARE
Goal Outcome Evaluation:      Plan of Care Reviewed With: patient    Overall Patient Progress: improvingOverall Patient Progress: improving    Outcome Evaluation: Tolerated full liquids for lunch without nausea or pain. Up independently. BP was 179/74 before lunch and provider is watching it. Had loose BM and is voiding well. Telemetry shows SR. Relaxing in bed.

## 2023-06-14 ENCOUNTER — DOCUMENTATION ONLY (OUTPATIENT)
Dept: ANTICOAGULATION | Facility: CLINIC | Age: 83
End: 2023-06-14
Payer: MEDICARE

## 2023-06-14 VITALS
DIASTOLIC BLOOD PRESSURE: 73 MMHG | HEIGHT: 64 IN | HEART RATE: 60 BPM | OXYGEN SATURATION: 92 % | RESPIRATION RATE: 16 BRPM | SYSTOLIC BLOOD PRESSURE: 133 MMHG | WEIGHT: 173 LBS | BODY MASS INDEX: 29.53 KG/M2 | TEMPERATURE: 98.9 F

## 2023-06-14 DIAGNOSIS — I26.99 OTHER PULMONARY EMBOLISM WITHOUT ACUTE COR PULMONALE, UNSPECIFIED CHRONICITY (H): ICD-10-CM

## 2023-06-14 DIAGNOSIS — I27.82 CHRONIC PULMONARY EMBOLISM WITHOUT ACUTE COR PULMONALE, UNSPECIFIED PULMONARY EMBOLISM TYPE (H): Primary | ICD-10-CM

## 2023-06-14 DIAGNOSIS — Z79.01 LONG TERM CURRENT USE OF ANTICOAGULANT THERAPY: ICD-10-CM

## 2023-06-14 LAB
GLUCOSE BLDC GLUCOMTR-MCNC: 92 MG/DL (ref 70–99)
HOLD SPECIMEN: NORMAL
INR PPP: 2.59 (ref 0.85–1.15)
MAGNESIUM SERPL-MCNC: 2.1 MG/DL (ref 1.7–2.3)
POTASSIUM SERPL-SCNC: 4.2 MMOL/L (ref 3.4–5.3)

## 2023-06-14 PROCEDURE — 99239 HOSP IP/OBS DSCHRG MGMT >30: CPT | Performed by: NURSE PRACTITIONER

## 2023-06-14 PROCEDURE — 83735 ASSAY OF MAGNESIUM: CPT | Performed by: NURSE PRACTITIONER

## 2023-06-14 PROCEDURE — 85610 PROTHROMBIN TIME: CPT | Performed by: INTERNAL MEDICINE

## 2023-06-14 PROCEDURE — 84132 ASSAY OF SERUM POTASSIUM: CPT | Performed by: NURSE PRACTITIONER

## 2023-06-14 PROCEDURE — 36415 COLL VENOUS BLD VENIPUNCTURE: CPT | Performed by: INTERNAL MEDICINE

## 2023-06-14 RX ORDER — WARFARIN SODIUM 2.5 MG/1
2.5 TABLET ORAL
Status: DISCONTINUED | OUTPATIENT
Start: 2023-06-14 | End: 2023-06-14 | Stop reason: HOSPADM

## 2023-06-14 ASSESSMENT — ACTIVITIES OF DAILY LIVING (ADL)
ADLS_ACUITY_SCORE: 24

## 2023-06-14 NOTE — PROGRESS NOTES
ANTICOAGULATION  MANAGEMENT: Discharge Review    Tracy Palomo chart reviewed for anticoagulation continuity of care    Hospital Admission on 6/10/23 for NVD. SBO.    Discharge disposition: Home    Results:    Recent labs: (last 7 days)     06/10/23  2021 06/11/23  0528 06/12/23  0549 06/13/23  0545 06/14/23  0552   INR 1.79* 2.03* 1.86* 2.30* 2.59*     Anticoagulation inpatient management:     home regimen continued    Anticoagulation discharge instructions:     Warfarin dosing: home regimen continued   Bridging: No   INR goal change: No      Medication changes affecting anticoagulation: No    Additional factors affecting anticoagulation: Yes: Post hospitalization.     PLAN     No adjustment to anticoagulation plan needed    Recommended follow up is scheduled  Patient not contacted    No adjustment to Anticoagulation Calendar was required    Randall QUIROGA RN

## 2023-06-14 NOTE — PROGRESS NOTES
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  CT showed findings consistent with small bowel obstruction    # Small Bowel Obstruction: Resolved with conservative measures NGT, GI rest and IVF  Gastrografin seen in colon.   Tolerating full diet, no N/V. Passing gas      # Cholelithiasis: Noted on ultrasound.  No ductal dilation.  LFTs stable with bilirubin 1.7> 1.8. Needs OP F/U      # Transient Rapid Atrial Fibrillation:  Monitor shows SR PACs   Resolved prior to medication intervention.  See significant event note from 06/14/2023.     # Atrial Fibrillation:  # DVT/PE (H):  # Therapeutic INR 2.59     Resume Warfarin regimen as OP      # Primary Hypertension: not on antihypertensives at home  B/P 133/73      Diet: Advance Diet as Tolerated: Regular Diet Adult    DVT Prophylaxis: Warfarin  Reyna Catheter: Not present  Lines: None     Cardiac Monitoring: ACTIVE order. Indication: recent rapid a-fib  Code Status: Full Code      Clinically Significant Risk Factors                  # Hypertension: Noted on problem list        # Overweight: Estimated body mass index is 29.7 kg/m  as calculated from the following:    Height as of this encounter: 1.626 m (5' 4\").    Weight as of this encounter: 78.5 kg (173 lb)., PRESENT ON ADMISSION          Disposition Plan         The patient's care was discussed with the Attending Physician, Dr. Muniz  and Patient    BILLY Burns CNP  Hospitalist Service  [facility]  Securely message with [employer] (more info)  Text page via [facility] Paging/Directory "   ______________________________________________________________________    Interval History   Reviewed overnight notes. No significant events     Physical Exam   Vital Signs: Temp: 98.9  F (37.2  C) Temp src: Oral BP: 133/73 Pulse: 60   Resp: 16 SpO2: 92 % O2 Device: None (Room air)    Weight: 173 lbs 0 oz    Constitutional: 82 yo female ambulatory in room on RA and not in dsitress  Eyes: sclera clear and conjunctiva normal  Hematologic / Lymphatic: No active bleeding or bruising   Respiratory: No increased work of breathing, good air exchange, clear to auscultation bilaterally, no crackles or wheezing  Cardiovascular: Normal apical impulse, regular rate and rhythm, normal S1 and S2, no S3 or S4, and no murmur noted  GI: hypoactive bowel sounds, soft, non-distended and non-tender  Skin: no bruising or bleeding, normal skin color, texture, turgor, no redness, warmth, or swelling and no rashes  Musculoskeletal: no lower extremity pitting edema present  Neurologic: A&Ox4, ALVA, no focal deficits   Neuropsychiatric: General: normal, calm and normal eye contact  Level of consciousness: alert / normal  Affect: normal  Orientation: oriented to self, place, time and situation  Memory and insight: normal, memory for past and recent events intact and thought process normal    Medical Decision Making       45  MINUTES SPENT BY ME on the date of service doing chart review, history, exam, documentation & further activities per the note.      Data     I have personally reviewed the following data over the past 24 hrs:    N/A  \   N/A   / N/A     N/A N/A N/A /  92   4.2 N/A N/A \       INR:  2.59 (H) PTT:  N/A   D-dimer:  N/A Fibrinogen:  N/A       Imaging results reviewed over the past 24 hrs:   No results found for this or any previous visit (from the past 24 hour(s)).

## 2023-06-14 NOTE — PLAN OF CARE
Goal Outcome Evaluation:      Plan of Care Reviewed With: patient    Overall Patient Progress: improvingOverall Patient Progress: improving    Outcome Evaluation: Pt A/O. VVS with elevated BP on RA. Pt is tolerating full liquids. Denies pain or nausea. Tele shows sinus dysrhythmia w/ PAC. Pt is indepedent. She has lost 2 lbs since yesterday.

## 2023-06-14 NOTE — DISCHARGE INSTRUCTIONS
Continue your Coumadin regimen. Continue with INR checks     You have gallstones. Recommend discussing this with your PCP. The bilirubin in your blood was slightly elevated. This may be related to bowel obstruction. Recommend you have a repeat liver panel at your follow up appointment

## 2023-06-14 NOTE — PROGRESS NOTES
S-(situation): Patient discharged to home via wheelchair with daughter.    B-(background): SBO.    A-(assessment): Pt. denies pain, abdomen soft, non-distended, non-tender. Passing gas and having bowel movements. Tolerated regular diet for breakfast w/o nausea.     R-(recommendations): Discharge instructions reviewed with patient and daughter. Listed belongings gathered and returned to patient.      Discharge Nursing Criteria:     Care Plan and Patient education resolved: Yes    New Medications- pt has been educated about purpose and side effects: Not Applicable    Vaccines  Influenza status verified at discharge:  Yes    MISC  Prescriptions if needed, hard copies sent with patient  NA  Home medications returned to patient: NA  Medication Bin checked and emptied on discharge Yes  Patient reports post-discharge pain management plan is effective: Yes

## 2023-06-14 NOTE — DISCHARGE SUMMARY
"[facility]  Hospitalist Discharge Summary      Date of Admission:  6/10/2023  Date of Discharge:  6/14/2023 10:30 AM  Discharging Provider: BILLY Burns CNP  Discharge Service: Hospitalist Service    Discharge Diagnoses   # Small Bowel Obstruction: Resolved with conservative measures NGT, GI rest and IVF  Gastrografin seen in colon.   Tolerating full diet, no N/V. Passing gas      # Cholelithiasis: Noted on ultrasound.  No ductal dilation.  LFTs stable with bilirubin 1.7> 1.8. Needs OP F/U      # Transient Rapid Atrial Fibrillation:  Monitor shows SR PACs   Resolved prior to medication intervention.  See significant event note from 06/14/2023.     # Atrial Fibrillation:  # DVT/PE (H):  # Therapeutic INR 2.59     Resume Warfarin regimen as OP      # Primary Hypertension: not on antihypertensives at home  B/P 133/73     Clinically Significant Risk Factors     # Overweight: Estimated body mass index is 29.7 kg/m  as calculated from the following:    Height as of this encounter: 1.626 m (5' 4\").    Weight as of this encounter: 78.5 kg (173 lb).       Follow-ups Needed After Discharge   Follow-up Appointments     Follow-up and recommended labs and tests       Follow up with primary care provider, Chad Betts, within 7 days for   hospital follow- up.  The following labs/tests are recommended: Liver   panel to check bilirubin. Discuss gallstones. Discuss Blood pressure as it   was elevated while in the hospital             Unresulted Labs Ordered in the Past 30 Days of this Admission     No orders found from 5/11/2023 to 6/11/2023.        Discharge Disposition   Discharged to home  Condition at discharge: Stable    Hospital Course   The patient is an 82 yo female who presented to the ED at [facility] 6-10-23 with CC N/V/diarrhea that started the evening prior to her admission. She had multiple episodes of vomiting especially after attempting to eat or drink. She has " history of multiple abdominal surgeries.    Work up in the ED revealed SBO and subtherapeutic INR.  NGT was placed in the ED. Surgery was consulted and recommended admission, NGT suction, NPO and pain management.      Pt was admitted to the Hospitalist service. She was kept NPO, continued on IVF and symptom management. Hydralazine PRN for B/P management.     Pt was seen in consultation by Dr Sosa-surgery service for SBO. Pt had received gastrografin. Await results. Clamp NGT, start sips of clears     6-13-23 SBO much improved was passing gas. Recommended discontinue NGT and advance diet.     On the day of dismissal, when I saw her, she was ambulatory in room and halls. She was passing gas. Denied any abdominal pain or nausea. She was tolerating full diet     Dismissal plan: Dismiss to home. Resume coumadin and INR checks    Consultants: Surgery    Procedures: None    Complications: None     Pending results: None       Consultations This Hospital Stay   PHARMACY TO DOSE WARFARIN  SURGERY GENERAL IP CONSULT    Code Status   Prior    Time Spent on this Encounter   I, BILLY Burns CNP, personally saw the patient today and spent greater than 30 minutes discharging this patient.       BILLY Burns CNP  [facility]  [address] [place] [address]  Phone: [phone]  ______________________________________________________________________    Physical Exam   Vital Signs: Temp: 98.9  F (37.2  C) Temp src: Oral BP: 133/73 Pulse: 60   Resp: 16 SpO2: 92 % O2 Device: None (Room air)    Weight: 173 lbs 0 oz  Constitutional: 84 yo female ambulatory in room on RA and not in dsitress  Eyes: sclera clear and conjunctiva normal  Hematologic / Lymphatic: No active bleeding or bruising   Respiratory: No increased work of breathing, good air exchange, clear to auscultation bilaterally, no crackles or wheezing  Cardiovascular: Normal apical impulse, regular rate and  rhythm, normal S1 and S2, no S3 or S4, and no murmur noted  GI: hypoactive bowel sounds, soft, non-distended and non-tender  Skin: no bruising or bleeding, normal skin color, texture, turgor, no redness, warmth, or swelling and no rashes  Musculoskeletal: no lower extremity pitting edema present  Neurologic: A&Ox4, ALVA, no focal deficits   Neuropsychiatric: General: normal, calm and normal eye contact  Level of consciousness: alert / normal  Affect: normal  Orientation: oriented to self, place, time and situation  Memory and insight: normal, memory for past and recent events intact and thought process normal       Primary Care Physician   Chad Betts    Discharge Orders      Reason for your hospital stay    Bowel obstruction     Follow-up and recommended labs and tests     Follow up with primary care provider, Chad Betts, within 7 days for hospital follow- up.  The following labs/tests are recommended: Liver panel to check bilirubin. Discuss gallstones. Discuss Blood pressure as it was elevated while in the hospital     Activity    Your activity upon discharge: activity as tolerated     Diet    Follow this diet upon discharge: Orders Placed This Encounter      Advance Diet as Tolerated: Regular Diet Adult       Significant Results and Procedures     Discharge Medications   Discharge Medication List as of 6/14/2023  9:31 AM      CONTINUE these medications which have NOT CHANGED    Details   lovastatin (MEVACOR) 40 MG tablet TAKE ONE TABLET BY MOUTH AT BEDTIME, Disp-90 tablet, R-3, E-PrescribeProfile Rx: patient will contact pharmacy when needed      warfarin ANTICOAGULANT (COUMADIN) 5 MG tablet TAKE ONE TABLET BY MOUTH ONCE DAILY ON MONDAY AND FRIDAY AND 1/2 TABLET ALL OTHER DAYS OF THE WEEK OR AS DIRECTED BY THE COUMADIN CLINIC - 30 day candido refill, patient needs to schedule annual visit with provider for future refills, Disp-90 tablet, R-3,  E-PrescribeProfile Rx: patient will contact pharmacy when needed       acetaminophen (TYLENOL) 325 MG tablet Take 3 tablets (975 mg) by mouth every 8 hours as needed for pain, Disp-100 tablet, R-1, E-Prescribe           Allergies   Allergies   Allergen Reactions     No Known Allergies

## 2023-06-14 NOTE — PHARMACY-ANTICOAGULATION SERVICE
Clinical Pharmacy - Warfarin Dosing Consult     Pharmacy has been consulted to manage this patient s warfarin therapy.  Indication: DVT/ PE Treatment  Therapy Goal: INR 2-3  Provider/Team: Chad Betts MD  [facility]: [facility]  Warfarin Prior to Admission: Yes  Warfarin PTA Regimen: 5 mg every Mon, Fri; 2.5 mg all other days    INR   Date Value Ref Range Status   06/14/2023 2.59 (H) 0.85 - 1.15 Final   06/13/2023 2.30 (H) 0.85 - 1.15 Final       Recommend warfarin 2.5 mg today.  Pharmacy will monitor Tracy Palomo daily and order warfarin doses to achieve specified goal.      Please contact pharmacy as soon as possible if the warfarin needs to be held for a procedure or if the warfarin goals change.

## 2023-06-21 ENCOUNTER — ANTICOAGULATION THERAPY VISIT (OUTPATIENT)
Dept: ANTICOAGULATION | Facility: CLINIC | Age: 83
End: 2023-06-21

## 2023-06-21 ENCOUNTER — LAB (OUTPATIENT)
Dept: LAB | Facility: CLINIC | Age: 83
End: 2023-06-21
Payer: MEDICARE

## 2023-06-21 DIAGNOSIS — I27.82 CHRONIC PULMONARY EMBOLISM WITHOUT ACUTE COR PULMONALE, UNSPECIFIED PULMONARY EMBOLISM TYPE (H): ICD-10-CM

## 2023-06-21 DIAGNOSIS — Z79.01 LONG TERM CURRENT USE OF ANTICOAGULANT THERAPY: ICD-10-CM

## 2023-06-21 DIAGNOSIS — I26.99 OTHER PULMONARY EMBOLISM WITHOUT ACUTE COR PULMONALE, UNSPECIFIED CHRONICITY (H): ICD-10-CM

## 2023-06-21 DIAGNOSIS — Z79.01 LONG TERM CURRENT USE OF ANTICOAGULANT THERAPY: Primary | ICD-10-CM

## 2023-06-21 LAB — INR BLD: 2.3 (ref 0.9–1.1)

## 2023-06-21 PROCEDURE — 36416 COLLJ CAPILLARY BLOOD SPEC: CPT

## 2023-06-21 PROCEDURE — 85610 PROTHROMBIN TIME: CPT

## 2023-06-21 NOTE — PROGRESS NOTES
ANTICOAGULATION MANAGEMENT     Tracy Palomo 83 year old female is on warfarin with therapeutic INR result. (Goal INR 2.0-3.0)    Recent labs: (last 7 days)     06/21/23  0902   INR 2.3*       ASSESSMENT       Source(s): Chart Review and Patient/Caregiver Call       Warfarin doses taken: Warfarin taken as instructed    Diet: No new diet changes identified    Medication/supplement changes: None noted    New illness, injury, or hospitalization: No    Signs or symptoms of bleeding or clotting: No    Previous result: Therapeutic last 2(+) visits    Additional findings: None         PLAN     Recommended plan for no diet, medication or health factor changes affecting INR     Dosing Instructions: Continue your current warfarin dose with next INR in 2 weeks       Summary  As of 6/21/2023    Full warfarin instructions:  5 mg every Mon, Fri; 2.5 mg all other days   Next INR check:  7/5/2023             Telephone call with Tracy who verbalizes understanding and agrees to plan    Lab visit scheduled    Education provided:     Please call back if any changes to your diet, medications or how you've been taking warfarin    Plan made per [facility] anticoagulation protocol    Johanna Mckeon, RN  Anticoagulation Clinic  6/21/2023    _______________________________________________________________________     Anticoagulation Episode Summary     Current INR goal:  2.0-3.0   TTR:  69.5 % (11.8 mo)   Target end date:  Indefinite   Send INR reminders to:  GURVINDER [place]    Indications    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]  Long term current use of anticoagulant therapy [Z79.01]  Other pulmonary embolism without acute cor pulmonale  unspecified chronicity (H) [I26.99]  Chronic pulmonary embolism without acute cor pulmonale  unspecified pulmonary embolism type (H) [I27.82]           Comments:           Anticoagulation Care Providers     Provider Role Specialty Phone number    Chad Betts MD  Referring Internal Medicine [phone]    Gerard Medrano MD Responsible [facility] Medicine [phone]

## 2023-06-28 ENCOUNTER — OFFICE VISIT (OUTPATIENT)
Dept: INTERNAL MEDICINE | Facility: CLINIC | Age: 83
End: 2023-06-28
Payer: MEDICARE

## 2023-06-28 VITALS
SYSTOLIC BLOOD PRESSURE: 110 MMHG | HEART RATE: 52 BPM | OXYGEN SATURATION: 100 % | DIASTOLIC BLOOD PRESSURE: 60 MMHG | RESPIRATION RATE: 12 BRPM | BODY MASS INDEX: 30.12 KG/M2 | TEMPERATURE: 97.7 F | WEIGHT: 175.5 LBS

## 2023-06-28 DIAGNOSIS — I48.0 PAROXYSMAL ATRIAL FIBRILLATION (H): ICD-10-CM

## 2023-06-28 DIAGNOSIS — K56.609 SMALL BOWEL OBSTRUCTION (H): Primary | ICD-10-CM

## 2023-06-28 PROCEDURE — 99214 OFFICE O/P EST MOD 30 MIN: CPT | Performed by: INTERNAL MEDICINE

## 2023-06-28 NOTE — PROGRESS NOTES
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  She has done well since then she did have a small bowel obstruction was hospitalized for 4 days.  She had no clear cause for this no narcotics no infection.  She is doing much better she had a NG tube, Gastrografin test and was having normal bowel movements at discharge.  She is on a low fiber diet would like to add some fiber to her diet.  Her bowels are stable now.    She will continue to walk around and monitor.  She will get stronger as her endurance is down from being hospitalized for 4 days and not eating.    Paroxysmal A-fib is stable she cannot go on any beta-blockers or calcium channel blockers as her blood pressure is already low.  She does have 8% burden of A-fib but will not do anything about that other than continue Coumadin for anticoagulation.       MED REC REQUIRED  Post Medication Reconciliation Status:   BMI:   Estimated body mass index is 30.12 kg/m  as calculated from the following:    Height as of 6/11/23: 1.626 m (5' 4\").    Weight as of this encounter: 79.6 kg (175 lb 8 oz).           Chad Betts MD  [facility] FANY Molina is a 83 year old, presenting for the following health issues:  Hospital F/U        6/28/2023     9:06 AM   Additional Questions   Roomed by Pearl GARCIA       Hospital Follow-up Visit:    Hospital/Nursing Home/IP Rehab Facility: [facility]  Date of Admission: 6/10/2023  Date of Discharge: 6/14/2023  Reason(s) for Admission: Small bowel obstruction    Was your hospitalization related to COVID-19? No   Problems taking medications regularly:  None  Medication changes since discharge: None  Problems adhering to non-medication therapy:  None    Summary of hospitalization:  [facility] " hospital discharge summary reviewed  Diagnostic Tests/Treatments reviewed.  Follow up needed: none  Other Healthcare Providers Involved in Patient s Care:         None    Plan of care communicated with patient and family     Doing better, stool is now firm and normal.     Wants to get off low fiber diet, get back to pumpernickel      8% of time a fib, no symptoms.                  Review of Systems         Objective    /60   Pulse 52   Temp 97.7  F (36.5  C)   Resp 12   Wt 79.6 kg (175 lb 8 oz)   SpO2 100%   BMI 30.12 kg/m    There is no height or weight on file to calculate BMI.  Physical Exam   No acute distress  Heart is regular  Lungs are clear  Abdomen soft benign nontender with positive bowel sounds.

## 2023-07-05 ENCOUNTER — LAB (OUTPATIENT)
Dept: LAB | Facility: CLINIC | Age: 83
End: 2023-07-05
Payer: MEDICARE

## 2023-07-05 ENCOUNTER — ANTICOAGULATION THERAPY VISIT (OUTPATIENT)
Dept: ANTICOAGULATION | Facility: CLINIC | Age: 83
End: 2023-07-05

## 2023-07-05 DIAGNOSIS — Z79.01 LONG TERM CURRENT USE OF ANTICOAGULANT THERAPY: ICD-10-CM

## 2023-07-05 DIAGNOSIS — I27.82 CHRONIC PULMONARY EMBOLISM WITHOUT ACUTE COR PULMONALE, UNSPECIFIED PULMONARY EMBOLISM TYPE (H): Primary | ICD-10-CM

## 2023-07-05 DIAGNOSIS — I27.82 CHRONIC PULMONARY EMBOLISM WITHOUT ACUTE COR PULMONALE, UNSPECIFIED PULMONARY EMBOLISM TYPE (H): ICD-10-CM

## 2023-07-05 DIAGNOSIS — I26.99 OTHER PULMONARY EMBOLISM WITHOUT ACUTE COR PULMONALE, UNSPECIFIED CHRONICITY (H): ICD-10-CM

## 2023-07-05 LAB — INR BLD: 3.2 (ref 0.9–1.1)

## 2023-07-05 PROCEDURE — 36416 COLLJ CAPILLARY BLOOD SPEC: CPT

## 2023-07-05 PROCEDURE — 85610 PROTHROMBIN TIME: CPT

## 2023-07-05 NOTE — PROGRESS NOTES
"ANTICOAGULATION MANAGEMENT     Tracy Palomo 83 year old female is on warfarin with supratherapeutic INR result. (Goal INR 2.0-3.0)    Recent labs: (last 7 days)     07/05/23  0906   INR 3.2*       ASSESSMENT       Source(s): Chart Review and Patient/Caregiver Call       Warfarin doses taken: Warfarin taken as instructed    Diet: No new diet changes identified    Medication/supplement changes: None noted    New illness, injury, or hospitalization: No    Signs or symptoms of bleeding or clotting: No    Previous result: Therapeutic last 2(+) visits    Additional findings: Recent hospital admission and pt is not back to \"normal\" her activity level has been decreased - this could be affecting her INR         PLAN     Recommended plan for temporary change(s) and ongoing change(s) affecting INR     Dosing Instructions: decrease your warfarin dose (11% change) with next INR in 2 weeks   - pt's activity level has been decreased and she does not see this improving for awhile. She also was on 5 mg Mon and 2.5 mg ROW prior to hospital admission so we have gone back to this dose and will recheck in 2 weeks.     Summary  As of 7/5/2023    Full warfarin instructions:  5 mg every Mon; 2.5 mg all other days   Next INR check:  7/19/2023             Telephone call with Tracy who verbalizes understanding and agrees to plan and who agrees to plan and repeated back plan correctly    Lab visit scheduled    Education provided:     Goal range and lab monitoring: goal range and significance of current result and Importance of therapeutic range    Plan made per [facility] anticoagulation protocol    Arti Ruelas, RN  Anticoagulation Clinic  7/5/2023    _______________________________________________________________________     Anticoagulation Episode Summary     Current INR goal:  2.0-3.0   TTR:  68.7 % (11.8 mo)   Target end date:  Indefinite   Send INR reminders to:  GURVINDER [place]    Indications    History of Pulmonary emboli with " 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 [I26.99]  Long term current use of anticoagulant therapy [Z79.01]  Other pulmonary embolism without acute cor pulmonale  unspecified chronicity (H) [I26.99]  Chronic pulmonary embolism without acute cor pulmonale  unspecified pulmonary embolism type (H) [I27.82]           Comments:           Anticoagulation Care Providers     Provider Role Specialty Phone number    Chad Betts MD Referring Internal Medicine [phone]    Gerard Medrano MD Responsible Family Medicine [phone]

## 2023-07-19 ENCOUNTER — LAB (OUTPATIENT)
Dept: LAB | Facility: CLINIC | Age: 83
End: 2023-07-19
Payer: MEDICARE

## 2023-07-19 ENCOUNTER — ANTICOAGULATION THERAPY VISIT (OUTPATIENT)
Dept: ANTICOAGULATION | Facility: CLINIC | Age: 83
End: 2023-07-19

## 2023-07-19 DIAGNOSIS — I27.82 CHRONIC PULMONARY EMBOLISM WITHOUT ACUTE COR PULMONALE, UNSPECIFIED PULMONARY EMBOLISM TYPE (H): ICD-10-CM

## 2023-07-19 DIAGNOSIS — I26.99 OTHER PULMONARY EMBOLISM WITHOUT ACUTE COR PULMONALE, UNSPECIFIED CHRONICITY (H): ICD-10-CM

## 2023-07-19 DIAGNOSIS — Z79.01 LONG TERM CURRENT USE OF ANTICOAGULANT THERAPY: ICD-10-CM

## 2023-07-19 DIAGNOSIS — I27.82 CHRONIC PULMONARY EMBOLISM WITHOUT ACUTE COR PULMONALE, UNSPECIFIED PULMONARY EMBOLISM TYPE (H): Primary | ICD-10-CM

## 2023-07-19 LAB — INR BLD: 2.5 (ref 0.9–1.1)

## 2023-07-19 PROCEDURE — 36416 COLLJ CAPILLARY BLOOD SPEC: CPT

## 2023-07-19 PROCEDURE — 85610 PROTHROMBIN TIME: CPT | Performed by: INTERNAL MEDICINE

## 2023-07-19 NOTE — PROGRESS NOTES
ANTICOAGULATION MANAGEMENT     Tracy Palomo 83 year old female is on warfarin with therapeutic INR result. (Goal INR 2.0-3.0)    Recent labs: (last 7 days)     07/19/23  0903   INR 2.5*       ASSESSMENT       Source(s): Chart Review and Patient/Caregiver Call       Warfarin doses taken: Warfarin taken as instructed    Diet: No new diet changes identified    Medication/supplement changes: None noted    New illness, injury, or hospitalization: No    Signs or symptoms of bleeding or clotting: No    Previous result: Supratherapeutic    Additional findings: None         PLAN     Recommended plan for no diet, medication or health factor changes affecting INR     Dosing Instructions: Continue your current warfarin dose with next INR in 3 weeks       Summary  As of 7/19/2023    Full warfarin instructions:  5 mg every Mon; 2.5 mg all other days   Next INR check:  8/9/2023             Telephone call with Tracy who verbalizes understanding and agrees to plan and who agrees to plan and repeated back plan correctly    Lab visit scheduled    Education provided:     Please call back if any changes to your diet, medications or how you've been taking warfarin    Plan made per [facility] anticoagulation protocol    Arti Ruelas, RN  Anticoagulation Clinic  7/19/2023    _______________________________________________________________________     Anticoagulation Episode Summary     Current INR goal:  2.0-3.0   TTR:  67.5 % (11.8 mo)   Target end date:  Indefinite   Send INR reminders to:  GURVINDER [place]    Indications    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]  Long term current use of anticoagulant therapy [Z79.01]  Other pulmonary embolism without acute cor pulmonale  unspecified chronicity (H) [I26.99]  Chronic pulmonary embolism without acute cor pulmonale  unspecified pulmonary embolism type (H) [I27.82]           Comments:           Anticoagulation Care Providers     Provider Role Specialty  Phone number    Chad Betts MD Referring Internal Medicine [phone]    Gerard Medrano MD Responsible Family Medicine [phone]

## 2023-07-22 ENCOUNTER — HEALTH MAINTENANCE LETTER (OUTPATIENT)
Age: 83
End: 2023-07-22

## 2023-08-09 ENCOUNTER — LAB (OUTPATIENT)
Dept: LAB | Facility: CLINIC | Age: 83
End: 2023-08-09
Payer: MEDICARE

## 2023-08-09 ENCOUNTER — ANTICOAGULATION THERAPY VISIT (OUTPATIENT)
Dept: ANTICOAGULATION | Facility: CLINIC | Age: 83
End: 2023-08-09

## 2023-08-09 DIAGNOSIS — I27.82 CHRONIC PULMONARY EMBOLISM WITHOUT ACUTE COR PULMONALE, UNSPECIFIED PULMONARY EMBOLISM TYPE (H): ICD-10-CM

## 2023-08-09 DIAGNOSIS — I27.82 CHRONIC PULMONARY EMBOLISM WITHOUT ACUTE COR PULMONALE, UNSPECIFIED PULMONARY EMBOLISM TYPE (H): Primary | ICD-10-CM

## 2023-08-09 DIAGNOSIS — I26.99 OTHER PULMONARY EMBOLISM WITHOUT ACUTE COR PULMONALE, UNSPECIFIED CHRONICITY (H): ICD-10-CM

## 2023-08-09 DIAGNOSIS — Z79.01 LONG TERM CURRENT USE OF ANTICOAGULANT THERAPY: ICD-10-CM

## 2023-08-09 LAB — INR BLD: 2.8 (ref 0.9–1.1)

## 2023-08-09 PROCEDURE — 85610 PROTHROMBIN TIME: CPT

## 2023-08-09 PROCEDURE — 36416 COLLJ CAPILLARY BLOOD SPEC: CPT

## 2023-08-09 NOTE — PROGRESS NOTES
ANTICOAGULATION MANAGEMENT     Tracy Palomo 83 year old female is on warfarin with therapeutic INR result. (Goal INR 2.0-3.0)    Recent labs: (last 7 days)     08/09/23  0930   INR 2.8*       ASSESSMENT     Source(s): Chart Review and Patient/Caregiver Call     Warfarin doses taken: Warfarin taken as instructed  Diet: No new diet changes identified  Medication/supplement changes: None noted  New illness, injury, or hospitalization: No  Signs or symptoms of bleeding or clotting: No  Previous result: Therapeutic last visit; previously outside of goal range  Additional findings: None       PLAN     Recommended plan for no diet, medication or health factor changes affecting INR     Dosing Instructions: Continue your current warfarin dose with next INR in 4 weeks       Summary  As of 8/9/2023      Full warfarin instructions:  5 mg every Mon; 2.5 mg all other days   Next INR check:  9/6/2023               Telephone call with Tracy who verbalizes understanding and agrees to plan and who agrees to plan and repeated back plan correctly    Lab visit scheduled    Education provided:   None required    Plan made per [facility] anticoagulation protocol    Radha Rodriguez, RN  Anticoagulation Clinic  8/9/2023    _______________________________________________________________________     Anticoagulation Episode Summary       Current INR goal:  2.0-3.0   TTR:  73.1 % (11.8 mo)   Target end date:  Indefinite   Send INR reminders to:  GURVINDER [place]    Indications    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]  Long term current use of anticoagulant therapy [Z79.01]  Other pulmonary embolism without acute cor pulmonale  unspecified chronicity (H) [I26.99]  Chronic pulmonary embolism without acute cor pulmonale  unspecified pulmonary embolism type (H) [I27.82]             Comments:               Anticoagulation Care Providers       Provider Role Specialty Phone number    Chad Betts MD Referring  Internal Medicine [phone]    Gerard Medrano MD [facility] [phone]

## 2023-09-06 ENCOUNTER — ANTICOAGULATION THERAPY VISIT (OUTPATIENT)
Dept: ANTICOAGULATION | Facility: CLINIC | Age: 83
End: 2023-09-06

## 2023-09-06 ENCOUNTER — LAB (OUTPATIENT)
Dept: LAB | Facility: CLINIC | Age: 83
End: 2023-09-06
Payer: MEDICARE

## 2023-09-06 DIAGNOSIS — I26.99 OTHER PULMONARY EMBOLISM WITHOUT ACUTE COR PULMONALE, UNSPECIFIED CHRONICITY (H): ICD-10-CM

## 2023-09-06 DIAGNOSIS — I27.82 CHRONIC PULMONARY EMBOLISM WITHOUT ACUTE COR PULMONALE, UNSPECIFIED PULMONARY EMBOLISM TYPE (H): Primary | ICD-10-CM

## 2023-09-06 DIAGNOSIS — I27.82 CHRONIC PULMONARY EMBOLISM WITHOUT ACUTE COR PULMONALE, UNSPECIFIED PULMONARY EMBOLISM TYPE (H): ICD-10-CM

## 2023-09-06 DIAGNOSIS — Z79.01 LONG TERM CURRENT USE OF ANTICOAGULANT THERAPY: ICD-10-CM

## 2023-09-06 LAB — INR BLD: 2.1 (ref 0.9–1.1)

## 2023-09-06 PROCEDURE — 36416 COLLJ CAPILLARY BLOOD SPEC: CPT

## 2023-09-06 PROCEDURE — 85610 PROTHROMBIN TIME: CPT

## 2023-09-06 NOTE — PROGRESS NOTES
ANTICOAGULATION MANAGEMENT     Tracy Palomo 83 year old female is on warfarin with therapeutic INR result. (Goal INR 2.0-3.0)    Recent labs: (last 7 days)     09/06/23  0905   INR 2.1*       ASSESSMENT     Source(s): Chart Review and Patient/Caregiver Call     Warfarin doses taken: Warfarin taken as instructed  Diet: No new diet changes identified  Medication/supplement changes: None noted  New illness, injury, or hospitalization: No  Signs or symptoms of bleeding or clotting: No  Previous result: Therapeutic last 2(+) visits  Additional findings: None       PLAN     Recommended plan for no diet, medication or health factor changes affecting INR     Dosing Instructions: Continue your current warfarin dose with next INR in 5 weeks       Summary  As of 9/6/2023      Full warfarin instructions:  5 mg every Mon; 2.5 mg all other days   Next INR check:  10/11/2023               Telephone call with Tracy who verbalizes understanding and agrees to plan and who agrees to plan and repeated back plan correctly    Lab visit scheduled    Education provided:   None required    Plan made per [facility] anticoagulation protocol    Radha Rodriguez RN  Anticoagulation Clinic  9/6/2023    _______________________________________________________________________     Anticoagulation Episode Summary       Current INR goal:  2.0-3.0   TTR:  76.5 % (11.8 mo)   Target end date:  Indefinite   Send INR reminders to:  GURVINDER [place]    Indications    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]  Long term current use of anticoagulant therapy [Z79.01]  Other pulmonary embolism without acute cor pulmonale  unspecified chronicity (H) [I26.99]  Chronic pulmonary embolism without acute cor pulmonale  unspecified pulmonary embolism type (H) [I27.82]             Comments:               Anticoagulation Care Providers       Provider Role Specialty Phone number    Chad Betts MD Referring Internal Medicine [phone]     Gerard Medrano MD [facility] [phone]

## 2023-10-11 ENCOUNTER — LAB (OUTPATIENT)
Dept: LAB | Facility: CLINIC | Age: 83
End: 2023-10-11
Payer: MEDICARE

## 2023-10-11 ENCOUNTER — ANTICOAGULATION THERAPY VISIT (OUTPATIENT)
Dept: ANTICOAGULATION | Facility: CLINIC | Age: 83
End: 2023-10-11

## 2023-10-11 DIAGNOSIS — Z79.01 LONG TERM CURRENT USE OF ANTICOAGULANT THERAPY: ICD-10-CM

## 2023-10-11 DIAGNOSIS — I26.99 OTHER PULMONARY EMBOLISM WITHOUT ACUTE COR PULMONALE, UNSPECIFIED CHRONICITY (H): ICD-10-CM

## 2023-10-11 DIAGNOSIS — I27.82 CHRONIC PULMONARY EMBOLISM WITHOUT ACUTE COR PULMONALE, UNSPECIFIED PULMONARY EMBOLISM TYPE (H): ICD-10-CM

## 2023-10-11 DIAGNOSIS — I27.82 CHRONIC PULMONARY EMBOLISM WITHOUT ACUTE COR PULMONALE, UNSPECIFIED PULMONARY EMBOLISM TYPE (H): Primary | ICD-10-CM

## 2023-10-11 LAB — INR BLD: 3.2 (ref 0.9–1.1)

## 2023-10-11 PROCEDURE — 85610 PROTHROMBIN TIME: CPT

## 2023-10-11 PROCEDURE — 36416 COLLJ CAPILLARY BLOOD SPEC: CPT

## 2023-10-11 NOTE — PROGRESS NOTES
ANTICOAGULATION MANAGEMENT     Tracy Palomo 83 year old female is on warfarin with supratherapeutic INR result. (Goal INR 2.0-3.0)    Recent labs: (last 7 days)     10/11/23  0906   INR 3.2*       ASSESSMENT     Source(s): Chart Review and Patient/Caregiver Call     Warfarin doses taken: Warfarin taken as instructed  Diet: No new diet changes identified  Medication/supplement changes: None noted  New illness, injury, or hospitalization: No  Signs or symptoms of bleeding or clotting: No  Previous result: Therapeutic last 2(+) visits  Additional findings: None       PLAN     Recommended plan for no diet, medication or health factor changes affecting INR     Dosing Instructions: Continue your current warfarin dose with next INR in 2 weeks       Summary  As of 10/11/2023      Full warfarin instructions:  5 mg every Mon; 2.5 mg all other days   Next INR check:  10/25/2023               Telephone call with Tracy who verbalizes understanding and agrees to plan    Lab visit scheduled    Education provided:   Goal range and lab monitoring: goal range and significance of current result  Contact [phone]  with any changes, questions or concerns.     Plan made per [facility] anticoagulation protocol    Belle Berg RN  Anticoagulation Clinic  10/11/2023    _______________________________________________________________________     Anticoagulation Episode Summary       Current INR goal:  2.0-3.0   TTR:  83.0% (11.8 mo)   Target end date:  Indefinite   Send INR reminders to:  GURVINDER [place]    Indications    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]  Long term current use of anticoagulant therapy [Z79.01]  Other pulmonary embolism without acute cor pulmonale  unspecified chronicity (H) [I26.99]  Chronic pulmonary embolism without acute cor pulmonale  unspecified pulmonary embolism type (H) [I27.82]             Comments:               Anticoagulation Care Providers       Provider Role  Specialty Phone number    Chad Betts MD Referring Internal Medicine [phone]    Gerard Medrano MD Responsible Family Medicine [phone]

## 2023-10-25 ENCOUNTER — ANTICOAGULATION THERAPY VISIT (OUTPATIENT)
Dept: ANTICOAGULATION | Facility: CLINIC | Age: 83
End: 2023-10-25

## 2023-10-25 ENCOUNTER — LAB (OUTPATIENT)
Dept: LAB | Facility: CLINIC | Age: 83
End: 2023-10-25
Payer: MEDICARE

## 2023-10-25 DIAGNOSIS — I27.82 CHRONIC PULMONARY EMBOLISM WITHOUT ACUTE COR PULMONALE, UNSPECIFIED PULMONARY EMBOLISM TYPE (H): ICD-10-CM

## 2023-10-25 DIAGNOSIS — Z79.01 LONG TERM CURRENT USE OF ANTICOAGULANT THERAPY: ICD-10-CM

## 2023-10-25 DIAGNOSIS — I27.82 CHRONIC PULMONARY EMBOLISM WITHOUT ACUTE COR PULMONALE, UNSPECIFIED PULMONARY EMBOLISM TYPE (H): Primary | ICD-10-CM

## 2023-10-25 DIAGNOSIS — I26.99 OTHER PULMONARY EMBOLISM WITHOUT ACUTE COR PULMONALE, UNSPECIFIED CHRONICITY (H): ICD-10-CM

## 2023-10-25 LAB — INR BLD: 4 (ref 0.9–1.1)

## 2023-10-25 PROCEDURE — 36416 COLLJ CAPILLARY BLOOD SPEC: CPT

## 2023-10-25 PROCEDURE — 85610 PROTHROMBIN TIME: CPT

## 2023-10-25 NOTE — PROGRESS NOTES
ANTICOAGULATION MANAGEMENT     Tracy Palomo 83 year old female is on warfarin with supratherapeutic INR result. (Goal INR 2.0-3.0)    Recent labs: (last 7 days)     10/25/23  0907   INR 4.0*       ASSESSMENT     Warfarin Lab Questionnaire    Warfarin Doses Last 7 Days      10/25/2023     9:14 AM   Dose in Tablet or Mg   TAB or MG? tablet (tab)     Pt Rptd Dose ROBYN MONDAY TUESDAY WED THURS FRIDAY SATURDAY   10/25/2023   9:14 AM 0.5 1 0.5 0.5 0.5 0.5 0.5         10/25/2023   Warfarin Lab Questionnaire   Missed doses within past 14 days? Yes   If yes; please list when: Robyn Oct 22nd   Changes in diet or alcohol within past 14 days? Yes, she has not had any greens at all and she normally has salads daily along with other servings of greens throughout the week. She plans to resume eating her normal diet    Medication changes since last result? No   Injuries or illness since last result? No   New shortness of breath, severe headaches or sudden changes in vision since last result? No   Abnormal bleeding since last result? No   Upcoming surgery, procedure? No   Best number to call with results? [phone]     Previous result: Supratherapeutic  Additional findings:  patient will incorporate greens back into her diet, if INR is still elevated next week, will need to discuss decreasing her maintenance dose.  She did eat a serving of broccoli already today.        PLAN     Recommended plan for temporary change(s) affecting INR     Dosing Instructions: hold dose then continue your current warfarin dose with next INR in 1 week       Summary  As of 10/25/2023      Full warfarin instructions:  10/25: Hold; Otherwise 5 mg every Mon; 2.5 mg all other days   Next INR check:  11/1/2023               Telephone call with Tracy who verbalizes understanding and agrees to plan    Lab visit scheduled    Education provided:   Goal range and lab monitoring: goal range and significance of current result  Dietary considerations:  importance of consistent vitamin K intake  Contact [phone]  with any changes, questions or concerns.     Plan made per [facility] anticoagulation protocol    Belle Berg RN  Anticoagulation Clinic  10/25/2023    _______________________________________________________________________     Anticoagulation Episode Summary       Current INR goal:  2.0-3.0   TTR:  79.1% (11.8 mo)   Target end date:  Indefinite   [facility]    Indications    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]  Long term current use of anticoagulant therapy [Z79.01]  Other pulmonary embolism without acute cor pulmonale  unspecified chronicity (H) [I26.99]  Chronic pulmonary embolism without acute cor pulmonale  unspecified pulmonary embolism type (H) [I27.82]             Comments:               Anticoagulation Care Providers       Provider Role Specialty Phone number    Chad Betts MD Referring Internal Medicine [phone]    Gerard Medrano MD Responsible Family Medicine [phone]

## 2023-11-01 ENCOUNTER — ANTICOAGULATION THERAPY VISIT (OUTPATIENT)
Dept: ANTICOAGULATION | Facility: CLINIC | Age: 83
End: 2023-11-01

## 2023-11-01 ENCOUNTER — LAB (OUTPATIENT)
Dept: LAB | Facility: CLINIC | Age: 83
End: 2023-11-01
Payer: MEDICARE

## 2023-11-01 DIAGNOSIS — I27.82 CHRONIC PULMONARY EMBOLISM WITHOUT ACUTE COR PULMONALE, UNSPECIFIED PULMONARY EMBOLISM TYPE (H): ICD-10-CM

## 2023-11-01 DIAGNOSIS — I27.82 CHRONIC PULMONARY EMBOLISM WITHOUT ACUTE COR PULMONALE, UNSPECIFIED PULMONARY EMBOLISM TYPE (H): Primary | ICD-10-CM

## 2023-11-01 DIAGNOSIS — Z79.01 LONG TERM CURRENT USE OF ANTICOAGULANT THERAPY: ICD-10-CM

## 2023-11-01 DIAGNOSIS — I26.99 OTHER PULMONARY EMBOLISM WITHOUT ACUTE COR PULMONALE, UNSPECIFIED CHRONICITY (H): ICD-10-CM

## 2023-11-01 LAB — INR BLD: 2.5 (ref 0.9–1.1)

## 2023-11-01 PROCEDURE — 85610 PROTHROMBIN TIME: CPT

## 2023-11-01 PROCEDURE — 36416 COLLJ CAPILLARY BLOOD SPEC: CPT

## 2023-11-01 NOTE — PROGRESS NOTES
ANTICOAGULATION MANAGEMENT     Tracy Palomo 83 year old female is on warfarin with therapeutic INR result. (Goal INR 2.0-3.0)    Recent labs: (last 7 days)     11/01/23  0859   INR 2.5*       ASSESSMENT     Source(s): Chart Review and Patient/Caregiver Call     Warfarin doses taken: Warfarin taken as instructed  Diet: No new diet changes identified  Medication/supplement changes: None noted  New illness, injury, or hospitalization: No  Signs or symptoms of bleeding or clotting: No  Previous result: Supratherapeutic  Additional findings: None       PLAN     Recommended plan for no diet, medication or health factor changes affecting INR     Dosing Instructions: Continue your current warfarin dose with next INR in 2 weeks       Summary  As of 11/1/2023      Full warfarin instructions:  5 mg every Mon; 2.5 mg all other days   Next INR check:  11/15/2023               Telephone call with Tracy who verbalizes understanding and agrees to plan    Lab visit scheduled    Education provided:   Contact [phone]  with any changes, questions or concerns.     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

## 2023-11-22 ENCOUNTER — LAB (OUTPATIENT)
Dept: LAB | Facility: CLINIC | Age: 83
End: 2023-11-22
Payer: MEDICARE

## 2023-11-22 ENCOUNTER — ANTICOAGULATION THERAPY VISIT (OUTPATIENT)
Dept: ANTICOAGULATION | Facility: CLINIC | Age: 83
End: 2023-11-22

## 2023-11-22 DIAGNOSIS — I27.82 CHRONIC PULMONARY EMBOLISM WITHOUT ACUTE COR PULMONALE, UNSPECIFIED PULMONARY EMBOLISM TYPE (H): ICD-10-CM

## 2023-11-22 DIAGNOSIS — Z79.01 LONG TERM CURRENT USE OF ANTICOAGULANT THERAPY: ICD-10-CM

## 2023-11-22 DIAGNOSIS — I27.82 CHRONIC PULMONARY EMBOLISM WITHOUT ACUTE COR PULMONALE, UNSPECIFIED PULMONARY EMBOLISM TYPE (H): Primary | ICD-10-CM

## 2023-11-22 DIAGNOSIS — I26.99 OTHER PULMONARY EMBOLISM WITHOUT ACUTE COR PULMONALE, UNSPECIFIED CHRONICITY (H): ICD-10-CM

## 2023-11-22 LAB — INR BLD: 2.5 (ref 0.9–1.1)

## 2023-11-22 PROCEDURE — 36416 COLLJ CAPILLARY BLOOD SPEC: CPT

## 2023-11-22 PROCEDURE — 85610 PROTHROMBIN TIME: CPT

## 2023-11-22 NOTE — PROGRESS NOTES
ANTICOAGULATION MANAGEMENT     Tracy Palomo 83 year old female is on warfarin with therapeutic INR result. (Goal INR 2.0-3.0)    Recent labs: (last 7 days)     11/22/23  0851   INR 2.5*       ASSESSMENT     Source(s): Chart Review and Patient/Caregiver Call     Warfarin doses taken: Warfarin taken as instructed  Diet: No new diet changes identified  Medication/supplement changes: None noted  New illness, injury, or hospitalization: No  Signs or symptoms of bleeding or clotting: No  Previous result: Therapeutic last visit; previously outside of goal range  Additional findings: None       PLAN     Recommended plan for no diet, medication or health factor changes affecting INR     Dosing Instructions: Continue your current warfarin dose with next INR in 3 weeks       Summary  As of 11/22/2023      Full warfarin instructions:  5 mg every Mon; 2.5 mg all other days   Next INR check:  12/13/2023               Telephone call with Tracy who verbalizes understanding and agrees to plan and who agrees to plan and repeated back plan correctly    Lab visit scheduled    Education provided:   Contact [phone]  with any changes, questions or concerns.     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

## 2023-11-27 NOTE — COMMUNITY RESOURCES LIST (ENGLISH)
11/27/2023    [employer]  N/A  For questions about this resource list or additional care needs, please contact your primary care clinic or care manager.  Phone: [phone]   Email: N/A   Address: [address] [place] [address]   Hours: N/A        Financial Stability       Utility payment assistance  1  [employer] ([employer]) - Emergency Assistance - Utility payment assistance Distance: 17 miles      In-Person   [address] [place] [address]  Language: English, Amharic  Hours: Mon 10:00 AM - 1:30 PM Appt. Only, Wed 10:00 AM - 1:30 PM Appt. Only, Thu 4:30 PM - 6:30 PM Appt. Only, Fri 10:00 AM - 1:30 PM Appt. Only  Fees: Free   Phone: (839) 152-9684 Email: info@[employer].makemyreturns.com Website: http://www.caerfoodshelf.org          Important Numbers & Websites       Emergency Services   911  [facility] Services   311  Poison Control   (941) 901-1613  Suicide Prevention Lifeline   (867) 313-6101 (TALK)  Child Abuse Hotline   (608) 185-7141 (4-A-Child)  Sexual Assault Hotline   (527) 710-3854 (HOPE)  National Runaway Safeline   (693) 507-3576 (RUNAWAY)  All-Options Talkline   (473) 735-8087  Substance Abuse Referral   (648) 925-8044 (HELP)

## 2023-11-29 ENCOUNTER — OFFICE VISIT (OUTPATIENT)
Dept: INTERNAL MEDICINE | Facility: CLINIC | Age: 83
End: 2023-11-29
Payer: MEDICARE

## 2023-11-29 VITALS
HEART RATE: 52 BPM | OXYGEN SATURATION: 96 % | RESPIRATION RATE: 16 BRPM | WEIGHT: 180 LBS | BODY MASS INDEX: 30.73 KG/M2 | TEMPERATURE: 98.3 F | DIASTOLIC BLOOD PRESSURE: 60 MMHG | SYSTOLIC BLOOD PRESSURE: 116 MMHG | HEIGHT: 64 IN

## 2023-11-29 DIAGNOSIS — I48.0 PAROXYSMAL ATRIAL FIBRILLATION (H): ICD-10-CM

## 2023-11-29 DIAGNOSIS — Z00.00 MEDICARE ANNUAL WELLNESS VISIT, SUBSEQUENT: Primary | ICD-10-CM

## 2023-11-29 DIAGNOSIS — I27.82 CHRONIC PULMONARY EMBOLISM WITHOUT ACUTE COR PULMONALE, UNSPECIFIED PULMONARY EMBOLISM TYPE (H): ICD-10-CM

## 2023-11-29 DIAGNOSIS — E78.5 HYPERLIPIDEMIA LDL GOAL <130: ICD-10-CM

## 2023-11-29 DIAGNOSIS — N39.46 MIXED STRESS AND URGE URINARY INCONTINENCE: ICD-10-CM

## 2023-11-29 PROCEDURE — 99214 OFFICE O/P EST MOD 30 MIN: CPT | Mod: 25 | Performed by: INTERNAL MEDICINE

## 2023-11-29 PROCEDURE — G0439 PPPS, SUBSEQ VISIT: HCPCS | Performed by: INTERNAL MEDICINE

## 2023-11-29 RX ORDER — MIRABEGRON 25 MG/1
25 TABLET, FILM COATED, EXTENDED RELEASE ORAL DAILY
Qty: 30 TABLET | Refills: 3 | Status: SHIPPED | OUTPATIENT
Start: 2023-11-29 | End: 2024-02-29

## 2023-11-29 NOTE — COMMUNITY RESOURCES LIST (ENGLISH)
11/29/2023   [facility] - Outpatient Clinics  N/A  For additional resource needs, please contact your health insurance member services or your primary care team.  Phone: [phone]   Email: N/A   Address: [facility] [place] [address]   Hours: N/A        Financial Stability       Utility payment assistance  1  [place] [facility] - Energy and Utilities Distance: 48.74 miles      In-Person, Phone/Virtual   [address] [place] [address]  Language: English  Hours: Mon - Fri 8:30 AM - 4:30 PM  Fees: Free   Phone: (304) 643-7493 Website: https://[place]/[employer]es[employer]/energy/consumer-assistance/energy-assistance-program/     2  [place] [facility] - [place]'s Telephone Assistance Plan (TAP) and [facility] Lifeline and Affordable Connectivity Program (ACP) Distance: 50.71 miles      Phone/Virtual   [address] [place] [address]  Language: English  Fees: Free   Phone: (382) 638-5569 Email: [email or web address].dominga@[email or web address]. Website: https://[place]/puc/consumers/telephone/          Important Numbers & Websites       [facility]   211 211unitedway.org  Poison Control   (687) 204-2628 [place]po[place]  Suicide and Crisis Lifeline   [address] [place], [place] [address]line.org  Childhelp [place] Child Abuse Hotline   350.749.2719 Childhelphotline.org  National Sexual Assault Hotline   (993) 676-7161 (HOPE) Rainn.org  National Runaway Safeline   (280) 315-1933 (RUNAWAY) 1800runaway.org  Pregnancy & Postpartum Support [place]   Call/text 606-016-7573 Ppsupportmn.org  Substance Abuse National Helpline ([facility]   279-855-HELP (4886) Findtreatment.gov  Emergency Services   911

## 2023-11-29 NOTE — PROGRESS NOTES
"  Assessment & Plan   Problem List Items Addressed This Visit       Hyperlipidemia LDL goal <130    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    Paroxysmal atrial fibrillation (H)     Other Visit Diagnoses       Medicare annual wellness visit, subsequent    -  Primary    Mixed stress and urge urinary incontinence        Relevant Medications    mirabegron (MYRBETRIQ) 25 MG 24 hr tablet           Patient is here for Medicare wellness exam.  Overall she is doing well no falls, memory is good.  Immunizations are up-to-date  No more mammograms or colonoscopies due to age.  She does have some mixed stress and urge incontinence we will try her on Myrbetriq.  She has left knee pain she has had that knee replaced she is seeing orthopedics reviewed her visit from August 2022 she is not interested in surgery as long as she is walking 4-5000 steps a day we will not pursue any surgery to replace her patella.    History of PE and paroxysmal atrial fibrillation rate is controlled and regular today, she is on anticoagulation of Coumadin.    Hyperlipidemia she has had LFTs done we will continue her lovastatin.  Other labs were stable in June when she had a small bowel obstruction which is resolved.         BMI:   Estimated body mass index is 31.39 kg/m  as calculated from the following:    Height as of this encounter: 1.613 m (5' 3.5\").    Weight as of this encounter: 81.6 kg (180 lb).           Chad Betts MD  [facility]    Sydnee Molina is a 83 year old, presenting for the following health issues:  Recheck Medication      11/29/2023    12:56 PM   Additional Questions   Roomed by Jo Ellsworth       History of Present Illness       Reason for visit:  Annual check up plus about my left knee for rehab    She eats 2-3 servings of fruits and vegetables daily.She consumes 0 sweetened beverage(s) daily.She exercises with enough effort to increase her heart rate 9 or less minutes " "per day.  She exercises with enough effort to increase her heart rate 3 or less days per week.   She is taking medications regularly.     Annual Wellness Visit    Patient has been advised of split billing requirements and indicates understanding: Yes     Feeling good overall,   Left knee problem, does lots of walking, 4000 steps a day.  Over 5000 steps then knee bothers her more. Hard to straighten her leg, slow and painful. Previously replaced. Continue to walk.  Ortho has seen her in August 2022 and has patella thinning and arthritis .   Doesn't want to have surgery yet.  No bleeding, coumadin is ok.   Lovastatin is fine for her.   Shots are up to date.     Labs were good in June with [facility].     Are you in the first 12 months of your Medicare Part B coverage?  No    Physical Health:  In general, how would you rate your overall physical health? excellent  Outside of work, how many days during the week do you exercise?6-7 days/week  Outside of work, approximately how many minutes a day do you exercise?15-30 minutes  If you drink alcohol do you typically have >3 drinks per day or >7 drinks per week? No  Do you usually eat at least 4 servings of fruit and vegetables a day, include whole grains & fiber and avoid regularly eating high fat or \"junk\" foods? Yes  Do you have any problems taking medications regularly? No  Do you have any side effects from medications? none  Needs assistance for the following daily activities: no assistance needed  Which of the following safety concerns are present in your home?  none identified   Hearing impairment: No - has hearing aids  In the past 6 months, have you been bothered by leaking of urine? yes    Mental Health:  In general, how would you rate your overall mental or emotional health? excellent    Today's PHQ-2 Score:       11/29/2023    12:57 PM   PHQ-2 ( 1999 Pfizer)   Q1: Little interest or pleasure in doing things 0   Q2: Feeling down, depressed or hopeless 0   PHQ-2 Score 0 "     Do you feel safe in your environment? Yes    Have you ever done Advance Care Planning? (For example, a Health Directive, POLST, or a discussion with a medical provider or your loved ones about your wishes)? Yes, advance care planning is on file.    Fall risk:  Fallen 2 or more times in the past year?: No  Any fall with injury in the past year?: No    Cognitive Screenin) Repeat 3 items (Leader, Season, Table)    2) Clock draw: NORMAL  3) 3 item recall: Recalls 3 objects  Results: 3 items recalled: COGNITIVE IMPAIRMENT LESS LIKELY    Mini-CogTM Copyright S Tao. Licensed by the author for use in [facility]; reprinted with permission (socorinna@[facility]). All rights reserved.      Do you have sleep apnea, excessive snoring or daytime drowsiness? : no    Social History     Tobacco Use    Smoking status: Never    Smokeless tobacco: Never   Substance Use Topics    Alcohol use: No     Alcohol/week: 0.0 standard drinks of alcohol         2022     7:28 AM   Alcohol Use   Prescreen: >3 drinks/day or >7 drinks/week? Not Applicable     Do you have a current opioid prescription? No  Do you use any other controlled substances or medications that are not prescribed by a provider? None    Current providers sharing in care for this patient include:   Patient Care Team:  Chad Betts MD as PCP - General (Internal Medicine)  Chad Betts MD as Assigned PCP  Gerard Colon MD as Assigned Musculoskeletal Provider    The following health maintenance items are reviewed in Epic and correct as of today:  Health Maintenance   Topic Date Due    RSV VACCINE (Pregnancy & 60+) (1 - 1-dose 60+ series) Never done    MEDICARE ANNUAL WELLNESS VISIT  2023    ANNUAL REVIEW OF HM ORDERS  2024    DEXA  2024    FALL RISK ASSESSMENT  2024    ADVANCE CARE PLANNING  2027    DTAP/TDAP/TD IMMUNIZATION (2 - Td or Tdap) 2030    PHQ-2 (once per calendar year)  Completed    INFLUENZA VACCINE  " Completed    Pneumococcal Vaccine: 65+ Years  Completed    COVID-19 Vaccine  Completed    IPV IMMUNIZATION  Aged Out    HPV IMMUNIZATION  Aged Out    MENINGITIS IMMUNIZATION  Aged Out    RSV MONOCLONAL ANTIBODY  Aged Out    MAMMO SCREENING  Discontinued    ZOSTER IMMUNIZATION  Discontinued       Patient has been advised of split billing requirements and indicates understanding: Yes    Appropriate preventive services were discussed with this patient, including applicable screening as appropriate for fall prevention, nutrition, physical activity, Tobacco-use cessation, weight loss and cognition.  Checklist reviewing preventive services available has been given to the patient.    Review of Systems   CONSTITUTIONAL: NEGATIVE for fever, chills, change in weight  INTEGUMENTARY/SKIN: NEGATIVE for worrisome rashes, moles or lesions  EYES: NEGATIVE for vision changes or irritation  ENT/MOUTH: NEGATIVE for ear, mouth and throat problems  RESP: NEGATIVE for significant cough or SOB  BREAST: NEGATIVE for masses, tenderness or discharge  CV: NEGATIVE for chest pain, palpitations or peripheral edema  GI: NEGATIVE for nausea, abdominal pain, heartburn, or change in bowel habits   female: incontinence-stress  MUSCULOSKELETAL:left knee pain as above  NEURO: NEGATIVE for weakness, dizziness or paresthesias  ENDOCRINE: NEGATIVE for temperature intolerance, skin/hair changes  HEME: NEGATIVE for bleeding problems  PSYCHIATRIC: NEGATIVE for changes in mood or affect      Objective    /60   Pulse 52   Temp 98.3  F (36.8  C) (Temporal)   Resp 16   Ht 1.613 m (5' 3.5\")   Wt 81.6 kg (180 lb)   SpO2 96%   BMI 31.39 kg/m    Body mass index is 31.39 kg/m .  Physical Exam   GENERAL: healthy, alert and no distress  EYES: Eyes grossly normal to inspection, PERRL and conjunctivae and sclerae normal  HENT: ear canals and TM's normal, nose and mouth without ulcers or lesions  NECK: no adenopathy, no asymmetry, masses, or scars and " thyroid normal to palpation  RESP: lungs clear to auscultation - no rales, rhonchi or wheezes  CV: regular rate and rhythm, normal S1 S2, no S3 or S4, no murmur, click or rub, no peripheral edema and peripheral pulses strong  ABDOMEN: soft, nontender, no hepatosplenomegaly, no masses and bowel sounds normal  MS: left knee is stiff, pain on the jointline, difficultly raising the foot up from bended position   SKIN: no suspicious lesions or rashes  NEURO: Normal strength and tone, mentation intact and speech normal  PSYCH: mentation appears normal, affect normal/bright

## 2023-11-29 NOTE — COMMUNITY RESOURCES LIST (ENGLISH)
11/29/2023   [facility] - Outpatient Clinics  N/A  For additional resource needs, please contact your health insurance member services or your primary care team.  Phone: [phone]   Email: N/A   Address: [facility] [place] [address]   Hours: N/A        Financial Stability       Utility payment assistance  1  [place] [facility] - Energy and Utilities Distance: 48.74 miles      In-Person, Phone/Virtual   [address] [place] [address]  Language: English  Hours: Mon - Fri 8:30 AM - 4:30 PM  Fees: Free   Phone: (765) 524-4923 Website: https://[place].gov/[employer]/energy/consumer-assistance/energy-assistance-program/     2  [place] Public [employer] [facility] - [place]'s Telephone Assistance Plan (TAP) and [facility] Lifeline and Affordable Connectivity Program (ACP) Distance: 50.71 miles      Phone/Virtual   [address] [place] [address]  Language: English  Fees: Free   Phone: (304) 243-7177 Email: [email or web address]. Website: https://[place].gov/puc/consumers/telephone/          Important Numbers & Websites       [facility]   211 211unitedway.org  Poison Control   (884) 585-7479 [place]  Suicide and Crisis Lifeline   [address] [place], [place] [address]line.org  Childhelp [place] Child Abuse Hotline   395.523.7370 Childhelphotline.org  National Sexual Assault Hotline   (457) 923-5391 (HOPE) Rainn.org  National Runaway Safeline   (436) 129-4395 (RUNAWAY) 1800runaway.org  Pregnancy & Postpartum Support [place]   Call/text 352-388-1909 Ppsupportmn.org  Substance Abuse National Helpline ([facility]   978-155-HELP (7806) Findtreatment.gov  Emergency Services   911

## 2023-12-01 ENCOUNTER — TELEPHONE (OUTPATIENT)
Dept: INTERNAL MEDICINE | Facility: CLINIC | Age: 83
End: 2023-12-01
Payer: MEDICARE

## 2023-12-01 DIAGNOSIS — M25.561 CHRONIC PAIN OF BOTH KNEES: Primary | ICD-10-CM

## 2023-12-01 DIAGNOSIS — G89.29 CHRONIC PAIN OF BOTH KNEES: Primary | ICD-10-CM

## 2023-12-01 DIAGNOSIS — M25.562 CHRONIC PAIN OF BOTH KNEES: Primary | ICD-10-CM

## 2023-12-01 NOTE — TELEPHONE ENCOUNTER
S-(situation): Patient is calling and stated that she would like to send a message to PCP regarding her left knee.  She stated she has constant pain that she would like to address and/or have a referral to orthopedics regarding replacing her left knee cap.      B-(background): Patient had knee replacement for both knees, most recent [id number].  She stated they did the surgery but put back her own kneecaps.  She verbalized that she is having issues know with her left kneecap.    A-(assessment): Patient may need in office assessment with PCP or orthopedics.    Patient verbalized a request for a referral to orthopedics.  Patient verbalized that she would like to have a question sent to PCP, in his opinion, would it be ok to ride her stationary bike at this time.    Will forward to PCP for review.    Carleen Villanueva RN

## 2023-12-07 ENCOUNTER — OFFICE VISIT (OUTPATIENT)
Dept: ORTHOPEDICS | Facility: CLINIC | Age: 83
End: 2023-12-07
Attending: INTERNAL MEDICINE
Payer: MEDICARE

## 2023-12-07 VITALS
HEIGHT: 64 IN | RESPIRATION RATE: 18 BRPM | BODY MASS INDEX: 30.73 KG/M2 | HEART RATE: 68 BPM | DIASTOLIC BLOOD PRESSURE: 83 MMHG | WEIGHT: 180 LBS | SYSTOLIC BLOOD PRESSURE: 180 MMHG

## 2023-12-07 DIAGNOSIS — M25.562 CHRONIC PAIN OF BOTH KNEES: ICD-10-CM

## 2023-12-07 DIAGNOSIS — Z96.652 S/P TOTAL KNEE REPLACEMENT USING CEMENT, LEFT: ICD-10-CM

## 2023-12-07 DIAGNOSIS — M54.32 LEFT SCIATIC NERVE PAIN: Primary | ICD-10-CM

## 2023-12-07 DIAGNOSIS — G89.29 CHRONIC PAIN OF BOTH KNEES: ICD-10-CM

## 2023-12-07 DIAGNOSIS — M25.561 CHRONIC PAIN OF BOTH KNEES: ICD-10-CM

## 2023-12-07 DIAGNOSIS — Z96.651 S/P TOTAL KNEE REPLACEMENT USING CEMENT, RIGHT: ICD-10-CM

## 2023-12-07 PROCEDURE — 99214 OFFICE O/P EST MOD 30 MIN: CPT | Performed by: ORTHOPAEDIC SURGERY

## 2023-12-07 NOTE — LETTER
12/7/2023         RE: Tracy Palomo  [address] [place] [id number]        Dear Colleague,    Thank you for referring your patient, Tracy Palomo, to the [facility]. Please see a copy of my visit note below.    Tracy Palomo is an 83 year old female who is seen with her daughter to discuss left leg pain.  She has this pain primarily in the medial thigh, but also anterior in both knees.    She has had a left hip fracture and underwent hemiarthroplasty in March 2017.  She has had left knee replacement in February 2021, right total knee arthroplasty on 1/8/2019.  She was told at that time that her patella on both sides was too soft to replace safely.  She has had this pain in the left leg pretty much ever since the knee replacement.  She has occasional sharp shooting pains.   It is worse with cold weather.  X-rays of the knees show good position of components.  Neither patella has been replaced and both are showing significant lateral wear.  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  Patient reports the patella was not replaced because the patella was too thin.  The hip hemiarthroplasty appears to be subsiding and eroding into the calcar.  Leg length on this hip was originally about 6 mm short, and now measures 15 mm short.  The position appears stable since early 2021 however.  She has had previous x-rays showing significant spinal osteoarthritis also.  Ms. Palomo and her daughter had many questions about the pain in the leg, a feeling of instability, and various options for solving this.  One of her big concerns is that to get in and out of a car she has to physically lift up the left leg with her hands as she appears to have too much trouble with weakness to lift it up.  They are wondering if this is a muscle issue.  She also reports a feeling of instability in the leg at times if she pivots.  When it feels  unstable she tends to fall backwards.  She also reported pain in the lower leg when she was leaning over using a hand saw to cut a branch.  She was stepping on the wood with her left leg with the knee bent and bending over at the waist.  It is not clear if this was a knee issue or a back issue with her positioning.    X-rays of the knees 8/18/2022 show no change in position of knee complements.  The patella on both sides has not been replaced.    Past Medical History:   Diagnosis Date     Atrial fibrillation (H) 2014    related to colon surgery     Colon polyps      Diverticulosis of colon (without mention of hemorrhage)     Diverticulosis     DVT (deep vein thrombosis) in pregnancy 2014    after colon surgery     Other and unspecified hyperlipidemia      PE (pulmonary embolism) 2014    related to colon surgery     Unspecified essential hypertension        Past Surgical History:   Procedure Laterality Date     ARTHROPLASTY KNEE Right 1/8/2019    Procedure: Right total knee replacement;  Surgeon: Kaleb Pang DO;  [facility]     ARTHROPLASTY KNEE Left 2/16/2021    Procedure: left total knee replacement;  Surgeon: Kaleb Pang DO;  [facility]     COLONOSCOPY  09, 10, 12    [facility]     COLONOSCOPY N/A 12/11/2017    Procedure: COLONOSCOPY;  colonoscopy;  Surgeon: Elvis Quezada MD;  Location:  GI     FLEXIBLE SIGMOIDOSCOPY  09, 11     LAPAROTOMY EXPLORATORY N/A 10/20/2014    Procedure: LAPAROTOMY EXPLORATORY;  Surgeon: Miguel Oleary MD;  [facility]     LAPAROTOMY, LYSIS ADHESIONS, COMBINED N/A 10/20/2014    Procedure: COMBINED LAPAROTOMY, LYSIS ADHESIONS;  Surgeon: Miguel Oleary MD;  [facility]     OPEN REDUCTION INTERNAL FIXATION HIP BIPOLAR Left 3/16/2017    Procedure: OPEN REDUCTION INTERNAL FIXATION HIP BIPOLAR;  Surgeon: Kaleb Pang DO;  [facility]     RELEASE TRIGGER FINGER Left 1/12/2021    Procedure: Left thumb  trigger release;  Surgeon: Kaleb Pang DO;  [facility]     RESECT SMALL BOWEL WITHOUT OSTOMY N/A 10/20/2014    Procedure: RESECT SMALL BOWEL WITHOUT OSTOMY;  Surgeon: Miguel Oleary MD;  [facility]       Family History   Problem Relation Age of Onset     Blood Disease No family hx of         blood clots       Social History     Socioeconomic History     Marital status:      Spouse name: Not on file     Number of children: Not on file     Years of education: Not on file     Highest education level: Not on file   Occupational History     Not on file   Tobacco Use     Smoking status: Never     Smokeless tobacco: Never   Substance and Sexual Activity     Alcohol use: No     Alcohol/week: 0.0 standard drinks of alcohol     Drug use: No     Sexual activity: Not Currently     Partners: Male   Other Topics Concern     Parent/sibling w/ CABG, MI or angioplasty before 65F 55M? No   Social History Narrative     Not on file     Social Determinants of Health     Financial Resource Strain: High Risk (11/26/2023)    Financial Resource Strain      Within the past 12 months, have you or your family members you live with been unable to get utilities (heat, electricity) when it was really needed?: Yes   Food Insecurity: Low Risk  (11/26/2023)    Food Insecurity      Within the past 12 months, did you worry that your food would run out before you got money to buy more?: No      Within the past 12 months, did the food you bought just not last and you didn t have money to get more?: No   Transportation Needs: Low Risk  (11/26/2023)    Transportation Needs      Within the past 12 months, has lack of transportation kept you from medical appointments, getting your medicines, non-medical meetings or appointments, work, or from getting things that you need?: No   Physical Activity: Not on file   Stress: Not on file   Social Connections: Not on file   Interpersonal Safety: Low Risk  (11/29/2023)     Interpersonal Safety      Do you feel physically and emotionally safe where you currently live?: Yes      Within the past 12 months, have you been hit, slapped, kicked or otherwise physically hurt by someone?: No      Within the past 12 months, have you been humiliated or emotionally abused in other ways by your partner or ex-partner?: No   Housing Stability: Low Risk  (11/26/2023)    Housing Stability      Do you have housing? : Yes      Are you worried about losing your housing?: No       Current Outpatient Medications   Medication Sig Dispense Refill     acetaminophen (TYLENOL) 325 MG tablet Take 3 tablets (975 mg) by mouth every 8 hours as needed for pain 100 tablet 1     lovastatin (MEVACOR) 40 MG tablet TAKE ONE TABLET BY MOUTH AT BEDTIME 90 tablet 3     mirabegron (MYRBETRIQ) 25 MG 24 hr tablet Take 1 tablet (25 mg) by mouth daily 30 tablet 3     warfarin ANTICOAGULANT (COUMADIN) 5 MG tablet TAKE ONE TABLET BY MOUTH ONCE DAILY ON MONDAY AND FRIDAY AND 1/2 TABLET ALL OTHER DAYS OF THE WEEK OR AS DIRECTED BY THE COUMADIN CLINIC - 30 day candido refill, patient needs to schedule annual visit with provider for future refills 90 tablet 3       Allergies   Allergen Reactions     No Known Allergies        REVIEW OF SYSTEMS:  CONSTITUTIONAL:  NEGATIVE for fever, chills, change in weight, not feeling tired  SKIN:  NEGATIVE for worrisome rashes, no skin lumps, no skin ulcers and no non-healing wounds  EYES:  NEGATIVE for vision changes or irritation.  ENT/MOUTH:  NEGATIVE.  No hearing loss, no hoarseness, no difficulty swallowing.  RESP:  NEGATIVE. No cough or shortness of breath.  CV:  NEGATIVE for chest pain, palpitations or peripheral edema  GI:  NEGATIVE for nausea, abdominal pain, heartburn, or change in bowel habits  :  Negative. No dysuria, no hematuria  MUSCULOSKELETAL:  See HPI above  NEURO:  NEGATIVE . No headaches, no dizziness,  no numbness  ENDOCRINE:  NEGATIVE for temperature intolerance, skin/hair  "changes  HEME/ALLERGY/IMMUNE:  NEGATIVE for bleeding problems  PSYCHIATRIC:  NEGATIVE. no anxiety, no depression.     Exam:  Vitals: BP (!) 180/83   Pulse 68   Resp 18   Ht 1.613 m (5' 3.5\")   Wt 81.6 kg (180 lb)   BMI 31.39 kg/m    BMI= Body mass index is 31.39 kg/m .  Constitutional:  healthy, alert and no distress  Neuro: Alert and Oriented x 3, no focal defects.  Psych: Affect normal   Respiratory: Breathing not labored.  Cardiovascular: normal peripheral pulses  Lymph: no adenopathy  Skin: No rashes,worrisome lesions or skin problems  She has no significant pain with hip rotation.  She does say she has some left knee pain with hip rotation, but nothing in the groin.  She has good mobility of both knees.  She does have mild tenderness under the patella with attempted patellar grind.  This does not seem to duplicate her pain however.  She had negative straight leg raise to 70 degrees bilaterally.  She is a bit tight in the hamstrings. She has positive bowstring on left.     Assessment:  Left thigh pain of undetermined cause.  There is evidence that the left hip hemiarthroplasty may be subsiding, but does not appear to be causing pain on exam.   She reports most pain is with pivoting, so it may take more weightbearing to bring this out.   It is also stable since early 2021 by x-ray.  The knee looks good but there is wear on the lateral patella on both knees where they have not been replaced.  It is not clear how much this is contributing to her current pain.  She also appears to be experiencing some sciatic type pain or possibly spinal stenosis from the back.  She reports she has problems with her legs when she goes swimming and does kicking on her stomach.  If she kicks on her back she has no trouble, and this is with the spine more flexed as opposed to extended when she is on her stomach.  I extensively discussed with Ms. Palomo and her daughter possibility of revising the knees with patellar arthroplasty. "  By x-ray I think there is enough bone for this to work, but we might have to be prepared for trabecular implant just in case.  The soft bone could get us in trouble here.    I am not sure with the hip and spine where the source of the more proximal thigh pain is coming.  Will refer to physical therapy for sciatica treatment.  Visit today was approximately 30 minutes.    Again, thank you for allowing me to participate in the care of your patient.        Sincerely,        Gerard Colon MD

## 2023-12-08 NOTE — PROGRESS NOTES
Tracy Palomo is an 83 year old female who is seen with her daughter to discuss left leg pain.  She has this pain primarily in the medial thigh, but also anterior in both knees.    She has had a left hip fracture and underwent hemiarthroplasty in March 2017.  She has had left knee replacement in February 2021, right total knee arthroplasty on 1/8/2019.  She was told at that time that her patella on both sides was too soft to replace safely.  She has had this pain in the left leg pretty much ever since the knee replacement.  She has occasional sharp shooting pains.   It is worse with cold weather.  X-rays of the knees show good position of components.  Neither patella has been replaced and both are showing significant lateral wear.  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  Patient reports the patella was not replaced because the patella was too thin.  The hip hemiarthroplasty appears to be subsiding and eroding into the calcar.  Leg length on this hip was originally about 6 mm short, and now measures 15 mm short.  The position appears stable since early 2021 however.  She has had previous x-rays showing significant spinal osteoarthritis also.  Ms. Palomo and her daughter had many questions about the pain in the leg, a feeling of instability, and various options for solving this.  One of her big concerns is that to get in and out of a car she has to physically lift up the left leg with her hands as she appears to have too much trouble with weakness to lift it up.  They are wondering if this is a muscle issue.  She also reports a feeling of instability in the leg at times if she pivots.  When it feels unstable she tends to fall backwards.  She also reported pain in the lower leg when she was leaning over using a hand saw to cut a branch.  She was stepping on the wood with her left leg with the knee bent and bending over at the waist.  It is not clear  if this was a knee issue or a back issue with her positioning.    X-rays of the knees 8/18/2022 show no change in position of knee complements.  The patella on both sides has not been replaced.    Past Medical History:   Diagnosis Date    Atrial fibrillation (H) 2014    related to colon surgery    Colon polyps     Diverticulosis of colon (without mention of hemorrhage)     Diverticulosis    DVT (deep vein thrombosis) in pregnancy 2014    after colon surgery    Other and unspecified hyperlipidemia     PE (pulmonary embolism) 2014    related to colon surgery    Unspecified essential hypertension        Past Surgical History:   Procedure Laterality Date    ARTHROPLASTY KNEE Right 1/8/2019    Procedure: Right total knee replacement;  Surgeon: Kaleb Pang DO;  [facility]    ARTHROPLASTY KNEE Left 2/16/2021    Procedure: left total knee replacement;  Surgeon: Kaleb Pang DO;  [facility]    [facility]COPY  09, 10, 12    [facility]    COLONOSCOPY N/A 12/11/2017    Procedure: COLONOSCOPY;  colonoscopy;  Surgeon: Elvis Quezada MD;  Location:  GI    FLEXIBLE SIGMOIDOSCOPY  09, 11    LAPAROTOMY EXPLORATORY N/A 10/20/2014    Procedure: LAPAROTOMY EXPLORATORY;  Surgeon: Miguel Oleary MD;  [facility]    LAPAROTOMY, LYSIS ADHESIONS, COMBINED N/A 10/20/2014    Procedure: COMBINED LAPAROTOMY, LYSIS ADHESIONS;  Surgeon: Miguel Oleary MD;  [facility]    OPEN REDUCTION INTERNAL FIXATION HIP BIPOLAR Left 3/16/2017    Procedure: OPEN REDUCTION INTERNAL FIXATION HIP BIPOLAR;  Surgeon: Kaleb Pang DO;  [facility]    RELEASE TRIGGER FINGER Left 1/12/2021    Procedure: Left thumb trigger release;  Surgeon: Kaleb Pang DO;  [facility]    RESECT SMALL BOWEL WITHOUT OSTOMY N/A 10/20/2014    Procedure: RESECT SMALL BOWEL WITHOUT OSTOMY;  Surgeon: Miguel Oleary MD;  [facility]       Family History   Problem Relation Age  of Onset    Blood Disease No family hx of         blood clots       Social History     Socioeconomic History    Marital status:      Spouse name: Not on file    Number of children: Not on file    Years of education: Not on file    Highest education level: Not on file   Occupational History    Not on file   Tobacco Use    Smoking status: Never    Smokeless tobacco: Never   Substance and Sexual Activity    Alcohol use: No     Alcohol/week: 0.0 standard drinks of alcohol    Drug use: No    Sexual activity: Not Currently     Partners: Male   Other Topics Concern    Parent/sibling w/ CABG, MI or angioplasty before 65F 55M? No   Social History Narrative    Not on file     Social Determinants of Health     Financial Resource Strain: High Risk (11/26/2023)    Financial Resource Strain     Within the past 12 months, have you or your family members you live with been unable to get utilities (heat, electricity) when it was really needed?: Yes   Food Insecurity: Low Risk  (11/26/2023)    Food Insecurity     Within the past 12 months, did you worry that your food would run out before you got money to buy more?: No     Within the past 12 months, did the food you bought just not last and you didn t have money to get more?: No   Transportation Needs: Low Risk  (11/26/2023)    Transportation Needs     Within the past 12 months, has lack of transportation kept you from medical appointments, getting your medicines, non-medical meetings or appointments, work, or from getting things that you need?: No   Physical Activity: Not on file   Stress: Not on file   Social Connections: Not on file   Interpersonal Safety: Low Risk  (11/29/2023)    Interpersonal Safety     Do you feel physically and emotionally safe where you currently live?: Yes     Within the past 12 months, have you been hit, slapped, kicked or otherwise physically hurt by someone?: No     Within the past 12 months, have you been humiliated or emotionally abused in other  "ways by your partner or ex-partner?: No   Housing Stability: Low Risk  (11/26/2023)    Housing Stability     Do you have housing? : Yes     Are you worried about losing your housing?: No       Current Outpatient Medications   Medication Sig Dispense Refill    acetaminophen (TYLENOL) 325 MG tablet Take 3 tablets (975 mg) by mouth every 8 hours as needed for pain 100 tablet 1    lovastatin (MEVACOR) 40 MG tablet TAKE ONE TABLET BY MOUTH AT BEDTIME 90 tablet 3    mirabegron (MYRBETRIQ) 25 MG 24 hr tablet Take 1 tablet (25 mg) by mouth daily 30 tablet 3    warfarin ANTICOAGULANT (COUMADIN) 5 MG tablet TAKE ONE TABLET BY MOUTH ONCE DAILY ON MONDAY AND FRIDAY AND 1/2 TABLET ALL OTHER DAYS OF THE WEEK OR AS DIRECTED BY THE COUMADIN CLINIC - 30 day candido refill, patient needs to schedule annual visit with provider for future refills 90 tablet 3       Allergies   Allergen Reactions    No Known Allergies        REVIEW OF SYSTEMS:  CONSTITUTIONAL:  NEGATIVE for fever, chills, change in weight, not feeling tired  SKIN:  NEGATIVE for worrisome rashes, no skin lumps, no skin ulcers and no non-healing wounds  EYES:  NEGATIVE for vision changes or irritation.  ENT/MOUTH:  NEGATIVE.  No hearing loss, no hoarseness, no difficulty swallowing.  RESP:  NEGATIVE. No cough or shortness of breath.  CV:  NEGATIVE for chest pain, palpitations or peripheral edema  GI:  NEGATIVE for nausea, abdominal pain, heartburn, or change in bowel habits  :  Negative. No dysuria, no hematuria  MUSCULOSKELETAL:  See HPI above  NEURO:  NEGATIVE . No headaches, no dizziness,  no numbness  ENDOCRINE:  NEGATIVE for temperature intolerance, skin/hair changes  HEME/ALLERGY/IMMUNE:  NEGATIVE for bleeding problems  PSYCHIATRIC:  NEGATIVE. no anxiety, no depression.     Exam:  Vitals: BP (!) 180/83   Pulse 68   Resp 18   Ht 1.613 m (5' 3.5\")   Wt 81.6 kg (180 lb)   BMI 31.39 kg/m    BMI= Body mass index is 31.39 kg/m .  Constitutional:  healthy, alert and no " distress  Neuro: Alert and Oriented x 3, no focal defects.  Psych: Affect normal   Respiratory: Breathing not labored.  Cardiovascular: normal peripheral pulses  Lymph: no adenopathy  Skin: No rashes,worrisome lesions or skin problems  She has no significant pain with hip rotation.  She does say she has some left knee pain with hip rotation, but nothing in the groin.  She has good mobility of both knees.  She does have mild tenderness under the patella with attempted patellar grind.  This does not seem to duplicate her pain however.  She had negative straight leg raise to 70 degrees bilaterally.  She is a bit tight in the hamstrings. She has positive bowstring on left.     Assessment:  Left thigh pain of undetermined cause.  There is evidence that the left hip hemiarthroplasty may be subsiding, but does not appear to be causing pain on exam.   She reports most pain is with pivoting, so it may take more weightbearing to bring this out.   It is also stable since early 2021 by x-ray.  The knee looks good but there is wear on the lateral patella on both knees where they have not been replaced.  It is not clear how much this is contributing to her current pain.  She also appears to be experiencing some sciatic type pain or possibly spinal stenosis from the back.  She reports she has problems with her legs when she goes swimming and does kicking on her stomach.  If she kicks on her back she has no trouble, and this is with the spine more flexed as opposed to extended when she is on her stomach.  I extensively discussed with Ms. Palomo and her daughter possibility of revising the knees with patellar arthroplasty.  By x-ray I think there is enough bone for this to work, but we might have to be prepared for trabecular implant just in case.  The soft bone could get us in trouble here.    I am not sure with the hip and spine where the source of the more proximal thigh pain is coming.  Will refer to physical therapy for  sciatica treatment.  Visit today was approximately 30 minutes.

## 2023-12-13 ENCOUNTER — ANTICOAGULATION THERAPY VISIT (OUTPATIENT)
Dept: ANTICOAGULATION | Facility: CLINIC | Age: 83
End: 2023-12-13

## 2023-12-13 ENCOUNTER — LAB (OUTPATIENT)
Dept: LAB | Facility: CLINIC | Age: 83
End: 2023-12-13
Payer: MEDICARE

## 2023-12-13 DIAGNOSIS — I27.82 CHRONIC PULMONARY EMBOLISM WITHOUT ACUTE COR PULMONALE, UNSPECIFIED PULMONARY EMBOLISM TYPE (H): ICD-10-CM

## 2023-12-13 DIAGNOSIS — I27.82 CHRONIC PULMONARY EMBOLISM WITHOUT ACUTE COR PULMONALE, UNSPECIFIED PULMONARY EMBOLISM TYPE (H): Primary | ICD-10-CM

## 2023-12-13 DIAGNOSIS — Z79.01 LONG TERM CURRENT USE OF ANTICOAGULANT THERAPY: ICD-10-CM

## 2023-12-13 DIAGNOSIS — I26.99 OTHER PULMONARY EMBOLISM WITHOUT ACUTE COR PULMONALE, UNSPECIFIED CHRONICITY (H): ICD-10-CM

## 2023-12-13 LAB — INR BLD: 1.9 (ref 0.9–1.1)

## 2023-12-13 PROCEDURE — 85610 PROTHROMBIN TIME: CPT

## 2023-12-13 PROCEDURE — 36416 COLLJ CAPILLARY BLOOD SPEC: CPT

## 2023-12-13 NOTE — PROGRESS NOTES
ANTICOAGULATION MANAGEMENT     Tracy Palomo 83 year old female is on warfarin with subtherapeutic INR result. (Goal INR 2.0-3.0)    Recent labs: (last 7 days)     12/13/23  0828   INR 1.9*       ASSESSMENT     Source(s): Chart Review and Patient/Caregiver Call     Warfarin doses taken: Warfarin taken as instructed  Diet: No new diet changes identified  Medication/supplement changes: None noted  New illness, injury, or hospitalization: No  Signs or symptoms of bleeding or clotting: No  Previous result: Therapeutic last 2(+) visits  Additional findings: None       PLAN     Recommended plan for no diet, medication or health factor changes affecting INR     Dosing Instructions: Continue your current warfarin dose with next INR in 2 weeks       Summary  As of 12/13/2023      Full warfarin instructions:  5 mg every Mon; 2.5 mg all other days   Next INR check:  12/27/2023               Telephone call with Tracy who verbalizes understanding and agrees to plan    Lab visit scheduled    Education provided:   Contact [phone]  with any changes, questions or concerns.     Plan made per [facility] anticoagulation protocol    Randall QUIROGA RN  Anticoagulation Clinic  12/13/2023    _______________________________________________________________________     Anticoagulation Episode Summary       Current INR goal:  2.0-3.0   TTR:  76.8% (11.8 mo)   Target end date:  Indefinite   Send INR reminders to:  GURVINDER SOARES    Indications    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]  Long term current use of anticoagulant therapy [Z79.01]  Other pulmonary embolism without acute cor pulmonale  unspecified chronicity (H) [I26.99]  Chronic pulmonary embolism without acute cor pulmonale  unspecified pulmonary embolism type (H) [I27.82]             Comments:               Anticoagulation Care Providers       Provider Role Specialty Phone number    Chad Betts MD Referring Internal Medicine [phone]     Gerard Medrano MD [facility] [phone]

## 2023-12-27 ENCOUNTER — ANTICOAGULATION THERAPY VISIT (OUTPATIENT)
Dept: ANTICOAGULATION | Facility: CLINIC | Age: 83
End: 2023-12-27

## 2023-12-27 ENCOUNTER — LAB (OUTPATIENT)
Dept: LAB | Facility: CLINIC | Age: 83
End: 2023-12-27
Payer: MEDICARE

## 2023-12-27 DIAGNOSIS — I27.82 CHRONIC PULMONARY EMBOLISM WITHOUT ACUTE COR PULMONALE, UNSPECIFIED PULMONARY EMBOLISM TYPE (H): ICD-10-CM

## 2023-12-27 DIAGNOSIS — I26.99 OTHER PULMONARY EMBOLISM WITHOUT ACUTE COR PULMONALE, UNSPECIFIED CHRONICITY (H): ICD-10-CM

## 2023-12-27 DIAGNOSIS — Z79.01 LONG TERM CURRENT USE OF ANTICOAGULANT THERAPY: ICD-10-CM

## 2023-12-27 DIAGNOSIS — I27.82 CHRONIC PULMONARY EMBOLISM WITHOUT ACUTE COR PULMONALE, UNSPECIFIED PULMONARY EMBOLISM TYPE (H): Primary | ICD-10-CM

## 2023-12-27 LAB — INR BLD: 3.7 (ref 0.9–1.1)

## 2023-12-27 PROCEDURE — 85610 PROTHROMBIN TIME: CPT

## 2023-12-27 PROCEDURE — 36416 COLLJ CAPILLARY BLOOD SPEC: CPT

## 2023-12-27 NOTE — PROGRESS NOTES
ANTICOAGULATION MANAGEMENT     Tracy Palomo 83 year old female is on warfarin with supratherapeutic INR result. (Goal INR 2.0-3.0)    Recent labs: (last 7 days)     12/27/23  1015   INR 3.7*       ASSESSMENT     Warfarin Lab Questionnaire    Warfarin Doses Last 7 Days      12/27/2023    10:05 AM   Dose in Tablet or Mg   TAB or MG? tablet (tab)     Pt Rptd Dose MONDAY 12/27/2023  10:05 AM 1 12/27/2023   Warfarin Lab Questionnaire   Missed doses within past 14 days? No   Changes in diet or alcohol within past 14 days? No--after talking with patient, she states that she did have less greens than usual and plans to resume previous intake    Medication changes since last result? No   Injuries or illness since last result? No   New shortness of breath, severe headaches or sudden changes in vision since last result? No   Abnormal bleeding since last result? No   Upcoming surgery, procedure? No     Previous result: Subtherapeutic  Additional findings: None       PLAN     Recommended plan for temporary change(s) affecting INR     Dosing Instructions: hold dose then continue your current warfarin dose with next INR in 2 weeks       Summary  As of 12/27/2023      Full warfarin instructions:  12/27: Hold; Otherwise 5 mg every Mon; 2.5 mg all other days   Next INR check:  1/10/2024               Telephone call with Tracy who verbalizes understanding and agrees to plan    Lab visit scheduled    Education provided:   Goal range and lab monitoring: goal range and significance of current result  Dietary considerations: importance of consistent vitamin K intake  Contact [phone]  with any changes, questions or concerns.     Plan made per [facility] anticoagulation protocol    Belle Berg, RN  Anticoagulation Clinic  12/27/2023    _______________________________________________________________________     Anticoagulation Episode Summary       Current INR goal:  2.0-3.0   TTR:  75.0% (11.8 mo)   Target end date:   Indefinite   Send INR reminders to:  [facility]    Indications    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]  Long term current use of anticoagulant therapy [Z79.01]  Other pulmonary embolism without acute cor pulmonale  unspecified chronicity (H) [I26.99]  Chronic pulmonary embolism without acute cor pulmonale  unspecified pulmonary embolism type (H) [I27.82]             Comments:               Anticoagulation Care Providers       Provider Role Specialty Phone number    Chad Betts MD Referring Internal Medicine [phone]    Gerard Medrano MD Responsible Family Medicine [phone]

## 2023-12-29 ENCOUNTER — THERAPY VISIT (OUTPATIENT)
Dept: PHYSICAL THERAPY | Facility: CLINIC | Age: 83
End: 2023-12-29
Attending: ORTHOPAEDIC SURGERY
Payer: MEDICARE

## 2023-12-29 DIAGNOSIS — M54.32 LEFT SCIATIC NERVE PAIN: ICD-10-CM

## 2023-12-29 PROCEDURE — 97162 PT EVAL MOD COMPLEX 30 MIN: CPT | Mod: GP | Performed by: PHYSICAL THERAPIST

## 2023-12-29 PROCEDURE — 97112 NEUROMUSCULAR REEDUCATION: CPT | Mod: GP | Performed by: PHYSICAL THERAPIST

## 2023-12-29 PROCEDURE — 97110 THERAPEUTIC EXERCISES: CPT | Mod: GP | Performed by: PHYSICAL THERAPIST

## 2023-12-29 NOTE — PROGRESS NOTES
PHYSICAL THERAPY EVALUATION  Type of Visit: Evaluation    See electronic medical record for Abuse and Falls Screening details.    Subjective       Presenting condition or subjective complaint: Dr believes pinched nerve causing issues with left leg 84 yo female referred to PT for sciatic nerve pain into her L knee.  No injury or trauma. History of L JUANCARLOS, bilateral TKAs. She has been walking frequently throughout the day as this decreases her pain. Feels her walking is normal and changes when negotiating steps (does not trust the L leg).  Likes to use her stationary bike x 3 songs/approx 10-15 minutes. Her main report of pain is pain distal to her L patella. This is interfering with standing to will her pants, descending steps reciprocally. She is also noting pain in the R forearm holding her books when she reads. No red flags identified today. Sleep is interrupted as she uses the bathroom at night and when she is laying on her sides with knees together (gets stiff). She sleeps bilateral sides and supine.   Date of onset: 11/20/23 (approximate date of increased L knee pain. Per provider notes had symptoms prior to this)    Relevant medical history: Arthritis; DVT (blood clot); High blood pressure   Dates & types of surgery: on file    Prior diagnostic imaging/testing results: CT scan; X-ray     Prior therapy history for the same diagnosis, illness or injury: Yes physical therapy at [place]    Prior Level of Function  Transfers: Independent  Ambulation: Independent  ADL: Independent  IADL: Driving, Finances, Housekeeping, Laundry, Meal preparation, Medication management, Yard work    Living Environment  Social support: Alone   Type of home: House   Stairs to enter the home: No       Ramp: Yes   Stairs inside the home: Yes 13 Is there a railing: Yes   Help at home: None  Equipment owned: Straight Cane; Walker; Crutches; Power wheelchair or scooter; Bedrail; Grab bars; Lift chair     Employment: No     Hobbies/Interests: fishing - walking, gardening, biking (stationary)    Patient goals for therapy: move easier - get up from chair easier - get dressed (pants) without issue - balance better  - move knee correctly, lift left leg without pain    Pain assessment:   Location: L anterior knee below patella, sometimes lateral knee, R forearm, sometimes tender L>R hip adductors and along L ITB  Description: stiffness in L knee  Aggravates: rising after sitting too long and reports shifting her L foot before rising, getting out of bed in the morning, standing too long in evenings, standing L leg march eg to will pants, walking on cement   Calms: cooking in the afternoon vs evening, moving, walking, lying down  Time of Day: stiff in the morning and tired and sore in the evening   History of low back pain yrs ago (approx 40-50 yrs ago) lifting daughters without recent symptoms.     Objective   LUMBAR SPINE EVALUATION  INTEGUMENTARY (edema, incisions): L knee slight larger compared to the R  POSTURE: Standing: R back shift, R hip ER and foot eversion more than L, elevation of the L compared to the R iliac crest with R knee flexion. Neutral lumbar spine. Tends to walk in flexion position.   ROM:  Standing low back AROM is moderately tight without symptoms into extension and hypomobility of the L SIJ. Flexion moderate loss without symptoms. Side gliding without symptoms bilaterally.   Supine R knee AROM: 15-0 degrees and L: 0 degrees  PELVIC/SI SCREEN:  Seated flexion: equal SIJ ht and mobility is equal with flexion and extension, negative ilial compression and distraction supine, negative Gaenslen's bilaterally supine with very tight end feel noted bilaterally and symptoms into L low back and R posterior hip with R Gaenslen's. Negative to palpation/spring test SIJ bilaterally and sacrum.  Appreciated L anterior rotation of innominant in supine.    FLEXIBILITY:  Reproduction of symptoms with passive R sidelying L hip flexor  stretch. Symptoms eliminated with knee extension. Hip flexion L lacking 20 degrees from neutral and R hip flexor sidelying stretch without reproduction of symptoms and 15 degrees from neutral.     Assessment & Plan   CLINICAL IMPRESSIONS  Medical Diagnosis: Left sciatic nerve pain (M54.32)    Treatment Diagnosis: Left pelvic and knee pain   Impression/Assessment:  Tracy is an 82 yo female with anterior L knee pain. Significant findings include tightness of hip flexors L more than R, tightness of the R hamstrings causing knee flexion with hip ER. No specific neural symptoms noted in assessment today for femoral or sciatic nerve.     Clinical Decision Making (Complexity):  Clinical Presentation: Evolving/Changing  Clinical Presentation Rationale: based on medical and personal factors listed in PT evaluation  Clinical Decision Making (Complexity): Moderate complexity    PLAN OF CARE  Treatment Interventions:  Interventions: Manual Therapy, Neuromuscular Re-education, Therapeutic Activity, Therapeutic Exercise    Long Term Goals     PT Goal 1  Goal Identifier: Aerobic activities  Goal Description: Tracy will be able to comtinue with her stationary biking (10-15 minutes) and walking (tracks on agnes) without L knee pain and improved mechanics.  Target Date: 02/09/24  PT Goal 2  Goal Identifier: Stairs  Goal Description: Tracy will have 80% or better trust in her L knee negotiating steps as demonstrated by normal gait pattern on steps without symptoms in L knee  Target Date: 01/26/24      Frequency of Treatment: 2 times per week x 2 weeks and the 1 x per week x 4 weeks  Duration of Treatment:      Recommended Referrals to Other Professionals:  Family felt chiropractor should be involved. This may be of benefit once the soft tissue tightness improves. She is planning to complete a release of information and PT will consult with chiro to determine best plan of care for her.   Education Assessment:   Learner/Method:  Patient;Listening;Reading;Demonstration;Pictures/Video;No Barriers to Learning  Education Comments: hip flexor stretch in standing, use pillows to support arms and books to eliminate her forearm pain, plan of care, goals, anatomy    Risks and benefits of evaluation/treatment have been explained.   Patient/Family/caregiver agrees with Plan of Care.     Evaluation Time:     PT Eval, Moderate Complexity Minutes (31972): 30       Signing Clinician: Carmen Cooper PT      [facility]                                                                                   OUTPATIENT PHYSICAL THERAPY      PLAN OF TREATMENT FOR OUTPATIENT REHABILITATION   Patient's Last Name, First Name, Tracy Denson YOB: 1940   Provider's Name   [facility]   Medical Record No.  [id number]     Onset Date: 11/20/23 (approximate date of increased L knee pain. Per provider notes had symptoms prior to this)  Start of Care Date: 12/29/23     Medical Diagnosis:  Left sciatic nerve pain (M54.32)      PT Treatment Diagnosis:  Left pelvic and knee pain Plan of Treatment  Frequency/Duration: 2 times per week x 2 weeks and the 1 x per week x 4 weeks/      Certification date from 12/29/23 to 02/09/24         See note for plan of treatment details and functional goals     Carmen Cooper, PT                         I CERTIFY THE NEED FOR THESE SERVICES FURNISHED UNDER        THIS PLAN OF TREATMENT AND WHILE UNDER MY CARE     (Physician attestation of this document indicates review and certification of the therapy plan).              Referring Provider:  Gerard Colon MD    Initial Assessment  See Epic Evaluation- Start of Care Date: 12/29/23             English

## 2024-01-05 ENCOUNTER — THERAPY VISIT (OUTPATIENT)
Dept: PHYSICAL THERAPY | Facility: CLINIC | Age: 84
End: 2024-01-05
Attending: ORTHOPAEDIC SURGERY
Payer: MEDICARE

## 2024-01-05 DIAGNOSIS — M54.32 LEFT SCIATIC NERVE PAIN: Primary | ICD-10-CM

## 2024-01-05 PROCEDURE — 97112 NEUROMUSCULAR REEDUCATION: CPT | Mod: GP | Performed by: PHYSICAL THERAPIST

## 2024-01-05 PROCEDURE — 97110 THERAPEUTIC EXERCISES: CPT | Mod: GP | Performed by: PHYSICAL THERAPIST

## 2024-01-10 ENCOUNTER — LAB (OUTPATIENT)
Dept: LAB | Facility: CLINIC | Age: 84
End: 2024-01-10
Payer: MEDICARE

## 2024-01-10 ENCOUNTER — DOCUMENTATION ONLY (OUTPATIENT)
Dept: ANTICOAGULATION | Facility: CLINIC | Age: 84
End: 2024-01-10

## 2024-01-10 ENCOUNTER — ANTICOAGULATION THERAPY VISIT (OUTPATIENT)
Dept: ANTICOAGULATION | Facility: CLINIC | Age: 84
End: 2024-01-10

## 2024-01-10 DIAGNOSIS — Z79.01 LONG TERM CURRENT USE OF ANTICOAGULANT THERAPY: ICD-10-CM

## 2024-01-10 DIAGNOSIS — I27.82 CHRONIC PULMONARY EMBOLISM WITHOUT ACUTE COR PULMONALE, UNSPECIFIED PULMONARY EMBOLISM TYPE (H): ICD-10-CM

## 2024-01-10 DIAGNOSIS — I26.99 OTHER PULMONARY EMBOLISM WITHOUT ACUTE COR PULMONALE, UNSPECIFIED CHRONICITY (H): Primary | ICD-10-CM

## 2024-01-10 DIAGNOSIS — I26.99 OTHER PULMONARY EMBOLISM WITHOUT ACUTE COR PULMONALE, UNSPECIFIED CHRONICITY (H): ICD-10-CM

## 2024-01-10 DIAGNOSIS — I27.82 CHRONIC PULMONARY EMBOLISM WITHOUT ACUTE COR PULMONALE, UNSPECIFIED PULMONARY EMBOLISM TYPE (H): Primary | ICD-10-CM

## 2024-01-10 LAB — INR BLD: 3.9 (ref 0.9–1.1)

## 2024-01-10 PROCEDURE — 85610 PROTHROMBIN TIME: CPT

## 2024-01-10 PROCEDURE — 36416 COLLJ CAPILLARY BLOOD SPEC: CPT

## 2024-01-10 NOTE — PROGRESS NOTES
ANTICOAGULATION CLINIC REFERRAL RENEWAL REQUEST       An annual renewal order is required for all patients referred to [facility].?  Please review and sign the pended referral order for Tracy Palomo.       ANTICOAGULATION SUMMARY      Warfarin indication(s)   PE    Mechanical heart valve present?  NO       Current goal range   INR: 2.0-3.0     Goal appropriate for indication? Goal INR 2-3, standard for indication(s) above     Time in Therapeutic Range (TTR)  (Goal > 60%) 75%       Office visit with referring provider's group within last year yes on 11/29/23         [facility]

## 2024-01-10 NOTE — PROGRESS NOTES
ANTICOAGULATION MANAGEMENT     Tracy Palomo 83 year old female is on warfarin with supratherapeutic INR result. (Goal INR 2.0-3.0)    Recent labs: (last 7 days)     01/10/24  0959   INR 3.9*       ASSESSMENT     Source(s): Chart Review and Patient/Caregiver Call     Warfarin doses taken: Warfarin taken as instructed  Diet: No new diet changes identified  Medication/supplement changes: None noted  New illness, injury, or hospitalization: Yes: Ongoing sciatica pain. She is going to PT.  Signs or symptoms of bleeding or clotting: No  Previous result: Supratherapeutic  Additional findings: None       PLAN     Recommended plan for ongoing change(s) affecting INR     Dosing Instructions: hold dose then decrease your warfarin dose (12.5% change) with next INR in 1 week       Summary  As of 1/10/2024      Full warfarin instructions:  1/10: Hold; Otherwise 2.5 mg every day   Next INR check:  1/16/2024               Telephone call with Tracy who verbalizes understanding and agrees to plan    Lab visit scheduled    Education provided:   Dietary considerations: importance of consistent vitamin K intake, impact of vitamin K foods on INR, and vitamin K content of foods  Symptom monitoring: monitoring for bleeding signs and symptoms  Contact [phone]  with any changes, questions or concerns.     Plan made per [facility] anticoagulation protocol    Randall QUIROGA RN  Anticoagulation Clinic  1/10/2024    _______________________________________________________________________     Anticoagulation Episode Summary       Current INR goal:  2.0-3.0   TTR:  71.1% (11.8 mo)   Target end date:  Indefinite   Send INR reminders to:  [facility]    Indications    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]  Long term current use of anticoagulant therapy [Z79.01]  Other pulmonary embolism without acute cor pulmonale  unspecified chronicity (H) [I26.99]  Chronic pulmonary embolism without acute cor  pulmonale  unspecified pulmonary embolism type (H) [I27.82]             Comments:               Anticoagulation Care Providers       Provider Role Specialty Phone number    Chad Betts MD Referring Internal Medicine [phone]    Gerard Medrano MD Responsible Family Medicine [phone]

## 2024-01-12 ENCOUNTER — THERAPY VISIT (OUTPATIENT)
Dept: PHYSICAL THERAPY | Facility: CLINIC | Age: 84
End: 2024-01-12
Attending: ORTHOPAEDIC SURGERY
Payer: MEDICARE

## 2024-01-12 DIAGNOSIS — M54.32 LEFT SCIATIC NERVE PAIN: Primary | ICD-10-CM

## 2024-01-12 PROCEDURE — 97110 THERAPEUTIC EXERCISES: CPT | Mod: GP | Performed by: PHYSICAL THERAPIST

## 2024-01-16 ENCOUNTER — THERAPY VISIT (OUTPATIENT)
Dept: PHYSICAL THERAPY | Facility: CLINIC | Age: 84
End: 2024-01-16
Attending: ORTHOPAEDIC SURGERY
Payer: MEDICARE

## 2024-01-16 DIAGNOSIS — M54.32 LEFT SCIATIC NERVE PAIN: Primary | ICD-10-CM

## 2024-01-16 PROCEDURE — 97110 THERAPEUTIC EXERCISES: CPT | Mod: GP

## 2024-01-19 ENCOUNTER — THERAPY VISIT (OUTPATIENT)
Dept: PHYSICAL THERAPY | Facility: CLINIC | Age: 84
End: 2024-01-19
Attending: ORTHOPAEDIC SURGERY
Payer: MEDICARE

## 2024-01-19 DIAGNOSIS — M54.32 LEFT SCIATIC NERVE PAIN: Primary | ICD-10-CM

## 2024-01-19 PROCEDURE — 97110 THERAPEUTIC EXERCISES: CPT | Mod: GP

## 2024-01-23 ENCOUNTER — THERAPY VISIT (OUTPATIENT)
Dept: PHYSICAL THERAPY | Facility: CLINIC | Age: 84
End: 2024-01-23
Attending: ORTHOPAEDIC SURGERY
Payer: MEDICARE

## 2024-01-23 ENCOUNTER — TELEPHONE (OUTPATIENT)
Dept: ANTICOAGULATION | Facility: CLINIC | Age: 84
End: 2024-01-23
Payer: MEDICARE

## 2024-01-23 DIAGNOSIS — M54.32 LEFT SCIATIC NERVE PAIN: Primary | ICD-10-CM

## 2024-01-23 PROCEDURE — 97110 THERAPEUTIC EXERCISES: CPT | Mod: GP | Performed by: PHYSICAL THERAPIST

## 2024-01-23 PROCEDURE — 97530 THERAPEUTIC ACTIVITIES: CPT | Mod: GP | Performed by: PHYSICAL THERAPIST

## 2024-01-23 NOTE — TELEPHONE ENCOUNTER
ANTICOAGULATION     Tracy Palomo is overdue for an INR check.     Left message for patient to call and schedule lab appointment as soon as possible. If returning call, please schedule.     Belle Berg RN

## 2024-01-24 ENCOUNTER — LAB (OUTPATIENT)
Dept: LAB | Facility: CLINIC | Age: 84
End: 2024-01-24
Payer: MEDICARE

## 2024-01-24 ENCOUNTER — ANTICOAGULATION THERAPY VISIT (OUTPATIENT)
Dept: ANTICOAGULATION | Facility: CLINIC | Age: 84
End: 2024-01-24

## 2024-01-24 DIAGNOSIS — I26.99 OTHER PULMONARY EMBOLISM WITHOUT ACUTE COR PULMONALE, UNSPECIFIED CHRONICITY (H): ICD-10-CM

## 2024-01-24 DIAGNOSIS — Z79.01 LONG TERM CURRENT USE OF ANTICOAGULANT THERAPY: Primary | ICD-10-CM

## 2024-01-24 DIAGNOSIS — I27.82 CHRONIC PULMONARY EMBOLISM WITHOUT ACUTE COR PULMONALE, UNSPECIFIED PULMONARY EMBOLISM TYPE (H): ICD-10-CM

## 2024-01-24 LAB — INR BLD: 2.3 (ref 0.9–1.1)

## 2024-01-24 PROCEDURE — 36416 COLLJ CAPILLARY BLOOD SPEC: CPT

## 2024-01-24 PROCEDURE — 85610 PROTHROMBIN TIME: CPT

## 2024-01-24 NOTE — PROGRESS NOTES
ANTICOAGULATION MANAGEMENT     Tracy Palomo 83 year old female is on warfarin with therapeutic INR result. (Goal INR 2.0-3.0)    Recent labs: (last 7 days)     01/24/24  1002   INR 2.3*       ASSESSMENT     Warfarin Lab Questionnaire    Warfarin Doses Last 7 Days      1/23/2024     8:26 PM   Dose in Tablet or Mg   TAB or MG? milligram (mg)     Pt Rptd Dose SUNDAY MONDAY TUESDAY WED THURS FRIDAY SATURDAY 1/23/2024   8:26 PM 2.5 5 2.5 2.5 2.5 2.5 2.5         1/23/2024   Warfarin Lab Questionnaire   Missed doses within past 14 days? No   Changes in diet or alcohol within past 14 days? No   Medication changes since last result? No   Injuries or illness since last result? No   New shortness of breath, severe headaches or sudden changes in vision since last result? No   Abnormal bleeding since last result? No   Upcoming surgery, procedure? No   Best number to call with results? [phone]     Previous result: Supratherapeutic  Additional findings: Patient did not follow dosing as directed. Patient followed previous warfarin dose. [facility] calendar updated. Advised patient to continue previous maintenance dose since INR today was therapeutic.     Patient reports having 10-15 cranberries on top of her cereal daily. Advised patient cranberries can elevate INR and to monitor. Will recheck INR in 2 weeks.     Patient reports she is still experiencing sciatica pain. Patient reports intermittently taking Tylenol when needed. Patient reports physical therapy is helping her pain.     If patient's INR remains labile, may consider ordering 2.5 mg tablets.      PLAN     Recommended plan for ongoing change(s) affecting INR     Dosing Instructions:  Take 5 mg Mon; 2.5 mg AOD  with next INR in 2 weeks       Summary  As of 1/24/2024      Full warfarin instructions:  5 mg every Mon; 2.5 mg all other days   Next INR check:  2/7/2024               Telephone call with Tracy who agrees to plan and repeated back plan correctly    Lab visit  scheduled    Education provided:   Contact [phone]  with any changes, questions or concerns.     Plan made per [facility] anticoagulation protocol    Jo Mason RN  Anticoagulation Clinic  1/24/2024    _______________________________________________________________________     Anticoagulation Episode Summary       Current INR goal:  2.0-3.0   TTR:  68.9% (11.8 mo)   Target end date:  Indefinite   Send INR reminders to:  [facility]    Indications    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]  Long term current use of anticoagulant therapy [Z79.01]  Other pulmonary embolism without acute cor pulmonale  unspecified chronicity (H) [I26.99]  Chronic pulmonary embolism without acute cor pulmonale  unspecified pulmonary embolism type (H) [I27.82]             Comments:               Anticoagulation Care Providers       Provider Role Specialty Phone number    Chad Betts MD Referring Internal Medicine [phone]    Gerard Medrano MD Responsible Family Medicine [phone]

## 2024-01-26 ENCOUNTER — THERAPY VISIT (OUTPATIENT)
Dept: PHYSICAL THERAPY | Facility: CLINIC | Age: 84
End: 2024-01-26
Attending: ORTHOPAEDIC SURGERY
Payer: MEDICARE

## 2024-01-26 DIAGNOSIS — M54.32 LEFT SCIATIC NERVE PAIN: Primary | ICD-10-CM

## 2024-01-26 PROCEDURE — 97530 THERAPEUTIC ACTIVITIES: CPT | Mod: GP

## 2024-01-26 PROCEDURE — 97110 THERAPEUTIC EXERCISES: CPT | Mod: GP

## 2024-01-29 ENCOUNTER — TELEPHONE (OUTPATIENT)
Dept: PHYSICAL THERAPY | Facility: CLINIC | Age: 84
End: 2024-01-29
Payer: MEDICARE

## 2024-01-29 DIAGNOSIS — M54.32 LEFT SCIATIC NERVE PAIN: Primary | ICD-10-CM

## 2024-02-07 ENCOUNTER — LAB (OUTPATIENT)
Dept: LAB | Facility: CLINIC | Age: 84
End: 2024-02-07
Payer: MEDICARE

## 2024-02-07 ENCOUNTER — ANTICOAGULATION THERAPY VISIT (OUTPATIENT)
Dept: ANTICOAGULATION | Facility: CLINIC | Age: 84
End: 2024-02-07

## 2024-02-07 DIAGNOSIS — I27.82 CHRONIC PULMONARY EMBOLISM WITHOUT ACUTE COR PULMONALE, UNSPECIFIED PULMONARY EMBOLISM TYPE (H): ICD-10-CM

## 2024-02-07 DIAGNOSIS — I26.99 OTHER PULMONARY EMBOLISM WITHOUT ACUTE COR PULMONALE, UNSPECIFIED CHRONICITY (H): ICD-10-CM

## 2024-02-07 DIAGNOSIS — Z79.01 LONG TERM CURRENT USE OF ANTICOAGULANT THERAPY: ICD-10-CM

## 2024-02-07 DIAGNOSIS — I27.82 CHRONIC PULMONARY EMBOLISM WITHOUT ACUTE COR PULMONALE, UNSPECIFIED PULMONARY EMBOLISM TYPE (H): Primary | ICD-10-CM

## 2024-02-07 LAB — INR BLD: 1.6 (ref 0.9–1.1)

## 2024-02-07 PROCEDURE — 85610 PROTHROMBIN TIME: CPT

## 2024-02-07 PROCEDURE — 36416 COLLJ CAPILLARY BLOOD SPEC: CPT

## 2024-02-07 NOTE — PROGRESS NOTES
ANTICOAGULATION MANAGEMENT     Tracy Palomo 83 year old female is on warfarin with subtherapeutic INR result. (Goal INR 2.0-3.0)    Recent labs: (last 7 days)     02/07/24  0957   INR 1.6*       ASSESSMENT     Source(s): Chart Review and Patient/Caregiver Call     Warfarin doses taken: Warfarin taken as instructed  Diet: Increased greens/vitamin K in diet; plans to resume previous intake  Medication/supplement changes: None noted  New illness, injury, or hospitalization: No  Signs or symptoms of bleeding or clotting: No  Previous result: Therapeutic last visit; previously outside of goal range  Additional findings:  she had increased servings of broccoli this past week and normally does not eat broccoli and does not plan to have any moving forward,        PLAN     Recommended plan for temporary change(s) affecting INR     Dosing Instructions: booster dose then continue your current warfarin dose with next INR in 2 weeks       Summary  As of 2/7/2024      Full warfarin instructions:  2/7: 5 mg; Otherwise 5 mg every Mon; 2.5 mg all other days   Next INR check:  2/21/2024               Telephone call with Tracy who verbalizes understanding and agrees to plan    Lab visit scheduled    Education provided:   Goal range and lab monitoring: goal range and significance of current result  Dietary considerations: importance of consistent vitamin K intake  Contact [phone]  with any changes, questions or concerns.     Plan made per [facility] anticoagulation protocol    Belle Berg RN  Anticoagulation Clinic  2/7/2024    _______________________________________________________________________     Anticoagulation Episode Summary       Current INR goal:  2.0-3.0   TTR:  66.6% (11.8 mo)   Target end date:  Indefinite   Send INR reminders to:  [facility]    Indications    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]  Long term current use of anticoagulant therapy [Z79.01]  Other pulmonary  embolism without acute cor pulmonale  unspecified chronicity (H) [I26.99]  Chronic pulmonary embolism without acute cor pulmonale  unspecified pulmonary embolism type (H) [I27.82]             Comments:               Anticoagulation Care Providers       Provider Role Specialty Phone number    Chad Betts MD Referring Internal Medicine [phone]    Gerard Medrano MD Responsible Family Medicine [phone]

## 2024-02-21 ENCOUNTER — LAB (OUTPATIENT)
Dept: LAB | Facility: CLINIC | Age: 84
End: 2024-02-21
Payer: MEDICARE

## 2024-02-21 ENCOUNTER — ANTICOAGULATION THERAPY VISIT (OUTPATIENT)
Dept: ANTICOAGULATION | Facility: CLINIC | Age: 84
End: 2024-02-21

## 2024-02-21 ENCOUNTER — THERAPY VISIT (OUTPATIENT)
Dept: PHYSICAL THERAPY | Facility: CLINIC | Age: 84
End: 2024-02-21
Attending: ORTHOPAEDIC SURGERY
Payer: MEDICARE

## 2024-02-21 DIAGNOSIS — M54.32 LEFT SCIATIC NERVE PAIN: Primary | ICD-10-CM

## 2024-02-21 DIAGNOSIS — I27.82 CHRONIC PULMONARY EMBOLISM WITHOUT ACUTE COR PULMONALE, UNSPECIFIED PULMONARY EMBOLISM TYPE (H): ICD-10-CM

## 2024-02-21 DIAGNOSIS — I26.99 OTHER PULMONARY EMBOLISM WITHOUT ACUTE COR PULMONALE, UNSPECIFIED CHRONICITY (H): ICD-10-CM

## 2024-02-21 DIAGNOSIS — I27.82 CHRONIC PULMONARY EMBOLISM WITHOUT ACUTE COR PULMONALE, UNSPECIFIED PULMONARY EMBOLISM TYPE (H): Primary | ICD-10-CM

## 2024-02-21 DIAGNOSIS — Z79.01 LONG TERM CURRENT USE OF ANTICOAGULANT THERAPY: ICD-10-CM

## 2024-02-21 LAB — INR BLD: 2.6 (ref 0.9–1.1)

## 2024-02-21 PROCEDURE — 97110 THERAPEUTIC EXERCISES: CPT | Mod: GP

## 2024-02-21 PROCEDURE — 36416 COLLJ CAPILLARY BLOOD SPEC: CPT

## 2024-02-21 PROCEDURE — 85610 PROTHROMBIN TIME: CPT

## 2024-02-21 NOTE — PROGRESS NOTES
ANTICOAGULATION MANAGEMENT     Tracy Palomo 83 year old female is on warfarin with therapeutic INR result. (Goal INR 2.0-3.0)    Recent labs: (last 7 days)     02/21/24  0854   INR 2.6*       ASSESSMENT     Warfarin Lab Questionnaire    Warfarin Doses Last 7 Days          2/21/2024   Warfarin Lab Questionnaire   Missed doses within past 14 days? No   Changes in diet or alcohol within past 14 days? No   Medication changes since last result? No   Injuries or illness since last result? No   New shortness of breath, severe headaches or sudden changes in vision since last result? No   Abnormal bleeding since last result? No   Upcoming surgery, procedure? No   Best number to call with results? [phone]     Previous result: Subtherapeutic  Additional findings: None       PLAN     Recommended plan for no diet, medication or health factor changes affecting INR     Dosing Instructions: Continue your current warfarin dose with next INR in 3 weeks       Summary  As of 2/21/2024      Full warfarin instructions:  5 mg every Mon; 2.5 mg all other days   Next INR check:  3/13/2024               Telephone call with Tracy who verbalizes understanding and agrees to plan    Lab visit scheduled    Education provided:   Contact [phone]  with any changes, questions or concerns.     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

## 2024-02-23 ENCOUNTER — NURSE TRIAGE (OUTPATIENT)
Dept: INTERNAL MEDICINE | Facility: CLINIC | Age: 84
End: 2024-02-23
Payer: MEDICARE

## 2024-02-23 NOTE — TELEPHONE ENCOUNTER
Patient updated with providers advice. Her heart rate is good right now and not elevated. Told her to call back with any heart rate issues.     Princess Coulter RN on 2/23/2024 at 1:07 PM

## 2024-02-23 NOTE — TELEPHONE ENCOUNTER
She should be fine on her coumadin, blood is thinned.      Just make sure her heart rate if not elevated and doing ok.  A fib and come and go,can make her tired but nothing to do if heartrate is ok now.

## 2024-02-23 NOTE — TELEPHONE ENCOUNTER
Patient states he was active this morning and her afib started acting up after her activity.    She states she has done 1592 steps.  She states when she sat down she had the extra beats during her resting.    She does not have any skipped beats happening right now.  No chest pain, but she is tired.  She is not short of breath.    She is wondering if she should be seen because she had some symptoms of afb that are now gone. She is wondering if she should be seen or if she should take more of her coumadin. Please advise.    Maria Del Rosario Wright RN on 2/23/2024 at 12:12 PM          Additional Information   Negative: Passed out (i.e., lost consciousness, collapsed and was not responding)   Negative: Shock suspected (e.g., cold/pale/clammy skin, too weak to stand, low BP, rapid pulse)   Negative: Difficult to awaken or acting confused (e.g., disoriented, slurred speech)   Negative: Visible sweat on face or sweat dripping down face   Negative: Unable to walk, or can only walk with assistance (e.g., requires support)   Negative: Received SHOCK from implantable cardiac defibrillator and has persisting symptoms (i.e., palpitations, lightheadedness)   Negative: Dizziness, lightheadedness, or weakness and heart beating very rapidly (e.g., > 140 / minute)   Negative: Dizziness, lightheadedness, or weakness and heart beating very slowly (e.g., < 50 / minute)   Negative: Sounds like a life-threatening emergency to the triager   Negative: Chest pain   Negative: Difficulty breathing   Negative: Dizziness, lightheadedness, or weakness   Negative: Heart beating very rapidly (e.g., > 140 / minute) and present now  (Exception: During exercise.)   Negative: Heart beating very slowly (e.g., < 50 / minute)  (Exception: Athlete and heart rate normal for caller.)   Negative: New or worsened shortness of breath with activity (dyspnea on exertion)   Negative: Patient sounds very sick or weak to the triager   Negative: Wearing a 'Holter monitor' or  'cardiac event monitor'   Negative: Received SHOCK from implantable cardiac defibrillator (and now feels well)   Negative: Heart beating very rapidly (e.g., > 140 / minute) and not present now  (Exception: During exercise.)   Negative: Skipped or extra beat(s) and increases with exercise or exertion   Negative: Skipped or extra beat(s) and occurs 4 or more times per minute   Negative: History of heart disease (i.e., heart attack, bypass surgery, angina, angioplasty)  (Exception: Brief heartbeat symptoms that went away and now feels well.)   Negative: Age > 60 years  (Exception: Brief heartbeat symptoms that went away and now feels well.)   Negative: Taking water pill (i.e., diuretic) or heart medication (e.g., digoxin)   Negative: Patient wants to be seen   Negative: Heart rhythm alert (e.g., 'you have irregular heartbeat') from personal wearable device (e.g., Apple Watch)   Negative: History of hyperthyroidism or taking thyroid medication   Negative: Substance use (drug use) or misuse, known or suspected   Negative: ADHD and taking stimulant medication   Negative: Palpitations and no improvement after following Care Advice    Protocols used: Heart Rate and Heartbeat [id number]

## 2024-02-29 ENCOUNTER — ORDERS ONLY (AUTO-RELEASED) (OUTPATIENT)
Dept: EMERGENCY MEDICINE | Facility: CLINIC | Age: 84
End: 2024-02-29
Payer: MEDICARE

## 2024-02-29 ENCOUNTER — HOSPITAL ENCOUNTER (EMERGENCY)
Facility: CLINIC | Age: 84
Discharge: HOME | End: 2024-02-29
Attending: EMERGENCY MEDICINE | Admitting: EMERGENCY MEDICINE
Payer: MEDICARE

## 2024-02-29 ENCOUNTER — APPOINTMENT (OUTPATIENT)
Dept: GENERAL RADIOLOGY | Facility: CLINIC | Age: 84
End: 2024-02-29
Attending: EMERGENCY MEDICINE
Payer: MEDICARE

## 2024-02-29 ENCOUNTER — NURSE TRIAGE (OUTPATIENT)
Dept: INTERNAL MEDICINE | Facility: CLINIC | Age: 84
End: 2024-02-29
Payer: MEDICARE

## 2024-02-29 VITALS
SYSTOLIC BLOOD PRESSURE: 139 MMHG | HEIGHT: 64 IN | WEIGHT: 190 LBS | TEMPERATURE: 98.7 F | BODY MASS INDEX: 32.44 KG/M2 | DIASTOLIC BLOOD PRESSURE: 79 MMHG | RESPIRATION RATE: 25 BRPM | OXYGEN SATURATION: 96 % | HEART RATE: 120 BPM

## 2024-02-29 DIAGNOSIS — I48.91 ATRIAL FIBRILLATION WITH RAPID VENTRICULAR RESPONSE (H): ICD-10-CM

## 2024-02-29 LAB
ANION GAP SERPL CALCULATED.3IONS-SCNC: 10 MMOL/L (ref 7–15)
BASOPHILS # BLD AUTO: 0 10E3/UL (ref 0–0.2)
BASOPHILS NFR BLD AUTO: 1 %
BUN SERPL-MCNC: 17.4 MG/DL (ref 8–23)
CALCIUM SERPL-MCNC: 9.9 MG/DL (ref 8.8–10.2)
CHLORIDE SERPL-SCNC: 106 MMOL/L (ref 98–107)
CREAT SERPL-MCNC: 0.94 MG/DL (ref 0.51–0.95)
DEPRECATED HCO3 PLAS-SCNC: 25 MMOL/L (ref 22–29)
EGFRCR SERPLBLD CKD-EPI 2021: 60 ML/MIN/1.73M2
EOSINOPHIL # BLD AUTO: 0.1 10E3/UL (ref 0–0.7)
EOSINOPHIL NFR BLD AUTO: 2 %
ERYTHROCYTE [DISTWIDTH] IN BLOOD BY AUTOMATED COUNT: 13.9 % (ref 10–15)
GLUCOSE SERPL-MCNC: 80 MG/DL (ref 70–99)
HCT VFR BLD AUTO: 45.6 % (ref 35–47)
HGB BLD-MCNC: 14.8 G/DL (ref 11.7–15.7)
HOLD SPECIMEN: NORMAL
IMM GRANULOCYTES # BLD: 0 10E3/UL
IMM GRANULOCYTES NFR BLD: 1 %
INR PPP: 2.2 (ref 0.85–1.15)
LYMPHOCYTES # BLD AUTO: 1.6 10E3/UL (ref 0–5.3)
LYMPHOCYTES NFR BLD AUTO: 27 %
MCH RBC QN AUTO: 29.1 PG (ref 26.5–33)
MCHC RBC AUTO-ENTMCNC: 32.5 G/DL (ref 31.5–36.5)
MCV RBC AUTO: 90 FL (ref 78–100)
MONOCYTES # BLD AUTO: 0.5 10E3/UL (ref 0–1.3)
MONOCYTES NFR BLD AUTO: 8 %
NEUTROPHILS # BLD AUTO: 3.6 10E3/UL (ref 1.6–8.3)
NEUTROPHILS NFR BLD AUTO: 62 %
NRBC # BLD AUTO: 0 10E3/UL
NRBC BLD AUTO-RTO: 0 /100
PLATELET # BLD AUTO: 215 10E3/UL (ref 150–450)
POTASSIUM SERPL-SCNC: 4.2 MMOL/L (ref 3.4–5.3)
RBC # BLD AUTO: 5.08 10E6/UL (ref 3.8–5.2)
SODIUM SERPL-SCNC: 141 MMOL/L (ref 135–145)
TROPONIN T SERPL HS-MCNC: 10 NG/L
TSH SERPL DL<=0.005 MIU/L-ACNC: 2.32 UIU/ML (ref 0.3–4.2)
WBC # BLD AUTO: 5.9 10E3/UL (ref 4–11)

## 2024-02-29 PROCEDURE — 93005 ELECTROCARDIOGRAM TRACING: CPT | Performed by: EMERGENCY MEDICINE

## 2024-02-29 PROCEDURE — 99284 EMERGENCY DEPT VISIT MOD MDM: CPT | Mod: 25 | Performed by: EMERGENCY MEDICINE

## 2024-02-29 PROCEDURE — 84484 ASSAY OF TROPONIN QUANT: CPT | Performed by: EMERGENCY MEDICINE

## 2024-02-29 PROCEDURE — 85025 COMPLETE CBC W/AUTO DIFF WBC: CPT | Performed by: EMERGENCY MEDICINE

## 2024-02-29 PROCEDURE — 36415 COLL VENOUS BLD VENIPUNCTURE: CPT | Performed by: EMERGENCY MEDICINE

## 2024-02-29 PROCEDURE — 93010 ELECTROCARDIOGRAM REPORT: CPT | Performed by: EMERGENCY MEDICINE

## 2024-02-29 PROCEDURE — 99285 EMERGENCY DEPT VISIT HI MDM: CPT | Mod: 25 | Performed by: EMERGENCY MEDICINE

## 2024-02-29 PROCEDURE — 85610 PROTHROMBIN TIME: CPT | Performed by: EMERGENCY MEDICINE

## 2024-02-29 PROCEDURE — 84443 ASSAY THYROID STIM HORMONE: CPT | Performed by: EMERGENCY MEDICINE

## 2024-02-29 PROCEDURE — 80048 BASIC METABOLIC PNL TOTAL CA: CPT | Performed by: EMERGENCY MEDICINE

## 2024-02-29 PROCEDURE — 71046 X-RAY EXAM CHEST 2 VIEWS: CPT

## 2024-02-29 RX ORDER — WARFARIN SODIUM 5 MG/1
2.5 TABLET ORAL
COMMUNITY
End: 2024-04-20

## 2024-02-29 ASSESSMENT — ACTIVITIES OF DAILY LIVING (ADL)
ADLS_ACUITY_SCORE: 39

## 2024-02-29 ASSESSMENT — COLUMBIA-SUICIDE SEVERITY RATING SCALE - C-SSRS
2. HAVE YOU ACTUALLY HAD ANY THOUGHTS OF KILLING YOURSELF IN THE PAST MONTH?: NO
6. HAVE YOU EVER DONE ANYTHING, STARTED TO DO ANYTHING, OR PREPARED TO DO ANYTHING TO END YOUR LIFE?: NO
1. IN THE PAST MONTH, HAVE YOU WISHED YOU WERE DEAD OR WISHED YOU COULD GO TO SLEEP AND NOT WAKE UP?: NO

## 2024-02-29 NOTE — TELEPHONE ENCOUNTER
Called patient back to make sure she was able to get a hold of . She states she did get a hold of  and they are on their way to her now.     Ange Mooney, BSN, RN

## 2024-02-29 NOTE — ED PROVIDER NOTES
History     Chief Complaint   Patient presents with    Irregular Heart Beat     HPI  Tracy Palomo is a 83 year old female with a history of small bowel obstruction, spinal stenosis, pulmonary embolism, subarachnoid hemorrhage, atrial fibrillation, long-term use of anticoagulants, who presents to the emergency department secondary to irregular heartbeat and heart rate up to the 140s.  She has noted this on her Apple Watch.  Also she woke up about midnight with right arm pain and has been feeling tired.  The right arm pain resolved after stretching her arm.  She was able to go back to sleep.  She did not have any chest pain.  She has been essentially asymptomatic.  Her Apple Watch reports that she spends about 15% of the day in atrial fibrillation.  That is up from previously around 5 to 10% of the time.  She walks up to 5000 steps a day and is healthy.  She does have a history of baseline sinus bradycardia.  Her Apple Watch reports that she has had episodes down into the 30s.  She does not feel it.  She contacted her doctor, Dr. Betts and it was recommended that she come to the emergency department.  She is currently asymptomatic.  She takes lovastatin and warfarin.  She does not have a cardiologist    Allergies:  Allergies   Allergen Reactions    No Known Allergies        Problem List:    Patient Active Problem List    Diagnosis Date Noted    Left sciatic nerve pain 12/29/2023     Priority: Medium    Small bowel obstruction (H) 06/10/2023     Priority: Medium    Subtherapeutic international normalized ratio (INR) 06/10/2023     Priority: Medium    Cholelithiasis 06/10/2023     Priority: Medium    Spinal stenosis of lumbar region without neurogenic claudication 08/18/2022     Priority: Medium    S/P hip hemiarthroplasty 06/02/2022     Priority: Medium    Chronic pulmonary embolism without acute cor pulmonale, unspecified pulmonary embolism type (H) 04/20/2022     Priority: Medium    Status post total left knee  replacement 03/01/2021     Priority: Medium    S/P total knee replacement using cement, left 02/16/2021     Priority: Medium    Pain of right sacroiliac joint 01/06/2021     Priority: Medium    Primary osteoarthritis of right hip 01/06/2021     Priority: Medium    Trigger finger of left thumb 01/06/2021     Priority: Medium    SAH (subarachnoid hemorrhage) (H) 08/05/2020     Priority: Medium    Other pulmonary embolism without acute cor pulmonale, unspecified chronicity (H) 05/27/2020     Priority: Medium    Peripheral edema 01/09/2019     Priority: Medium    S/P total knee replacement using cement, right 01/08/2019     Priority: Medium    Other chest pain 01/08/2019     Priority: Medium    Primary osteoarthritis of right knee 11/15/2018     Priority: Medium    Primary osteoarthritis of left knee 11/15/2018     Priority: Medium    Closed left hip fracture (H) 03/15/2017     Priority: Medium    Long term current use of anticoagulant therapy 03/22/2016     Priority: Medium    Anemia 11/03/2014     Priority: Medium    Paroxysmal atrial fibrillation (H) 11/03/2014     Priority: Medium    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 11/01/2014     Priority: Medium     In 2014      Recent major surgery - exploratory lap with small bowel resection 10/20 11/01/2014     Priority: Medium    Pleural effusion on right 11/01/2014     Priority: Medium    Hypertension goal BP (blood pressure) < 140/90 02/22/2013     Priority: Medium    Hyperlipidemia LDL goal <130 02/22/2013     Priority: Medium    Encounter for long-term current use of medication 02/22/2013     Priority: Medium     Problem list name updated by automated process. Provider to review      History of colonic polyps 02/22/2013     Priority: Medium     Problem list name updated by automated process. Provider to review      Advanced directives, counseling/discussion 02/22/2013     Priority: Medium     Pt states has Advance Directive at home, will bring  in to be scanned into chart.          Past Medical History:    Past Medical History:   Diagnosis Date    Atrial fibrillation (H) 2014    Colon polyps     Diverticulosis of colon (without mention of hemorrhage)     DVT (deep vein thrombosis) in pregnancy 2014    Other and unspecified hyperlipidemia     PE (pulmonary embolism) 2014    Unspecified essential hypertension        Past Surgical History:    Past Surgical History:   Procedure Laterality Date    ARTHROPLASTY KNEE Right 1/8/2019    Procedure: Right total knee replacement;  Surgeon: Kaleb Pang DO;  Location: [facility]    ARTHROPLASTY KNEE Left 2/16/2021    Procedure: left total knee replacement;  Surgeon: Kaleb Pang DO;  Location: [facility]    COLONOSCOPY  09, 10, 12    [facility]    COLONOSCOPY N/A 12/11/2017    Procedure: COLONOSCOPY;  colonoscopy;  Surgeon: Elvis Quezada MD;  Location:  GI    FLEXIBLE SIGMOIDOSCOPY  09, 11    LAPAROTOMY EXPLORATORY N/A 10/20/2014    Procedure: LAPAROTOMY EXPLORATORY;  Surgeon: Miguel Oleary MD;  Location: [facility]    LAPAROTOMY, LYSIS ADHESIONS, COMBINED N/A 10/20/2014    Procedure: COMBINED LAPAROTOMY, LYSIS ADHESIONS;  Surgeon: Miguel Oleary MD;  Location: [facility]    OPEN REDUCTION INTERNAL FIXATION HIP BIPOLAR Left 3/16/2017    Procedure: OPEN REDUCTION INTERNAL FIXATION HIP BIPOLAR;  Surgeon: Kaleb Pang DO;  Location: [facility]    RELEASE TRIGGER FINGER Left 1/12/2021    Procedure: Left thumb trigger release;  Surgeon: Kaleb Pang DO;  Location: [facility]    RESECT SMALL BOWEL WITHOUT OSTOMY N/A 10/20/2014    Procedure: RESECT SMALL BOWEL WITHOUT OSTOMY;  Surgeon: Miguel Oleary MD;  Location: [facility]       Family History:    Family History   Problem Relation Age of Onset    Blood Disease No family hx of         blood clots       Social History:  Marital Status:   [5]  Social History     Tobacco Use    Smoking status: Never    Smokeless  "tobacco: Never   Substance Use Topics    Alcohol use: No     Alcohol/week: 0.0 standard drinks of alcohol    Drug use: No        Medications:    acetaminophen (TYLENOL) 325 MG tablet  lovastatin (MEVACOR) 40 MG tablet  warfarin ANTICOAGULANT (COUMADIN) 5 MG tablet  warfarin ANTICOAGULANT (COUMADIN) 5 MG tablet          Review of Systems   All other systems reviewed and are negative.      Physical Exam   BP: (!) 176/116  Pulse: 104  Temp: 98.7  F (37.1  C)  Resp: 16  Height: 162.6 cm (5' 4\")  Weight: 86.2 kg (190 lb)  SpO2: 96 %      Physical Exam  Vitals and nursing note reviewed.   Constitutional:       General: She is not in acute distress.     Appearance: Normal appearance. She is well-developed.   HENT:      Head: Normocephalic and atraumatic.      Right Ear: External ear normal.      Left Ear: External ear normal.      Nose: Nose normal.   Eyes:      General: No scleral icterus.     Conjunctiva/sclera: Conjunctivae normal.   Cardiovascular:      Rate and Rhythm: Normal rate.      Comments: Sinus bradycardia on the monitor.  She has multiple episodes of premature atrial contractions on the monitor.  Pulmonary:      Effort: Pulmonary effort is normal. No respiratory distress.      Breath sounds: Normal breath sounds. No stridor. No wheezing or rhonchi.   Abdominal:      General: Abdomen is flat.   Musculoskeletal:         General: Normal range of motion.      Cervical back: Normal range of motion and neck supple.      Right lower leg: No edema.      Left lower leg: No edema.   Skin:     General: Skin is warm and dry.      Findings: No rash.   Neurological:      General: No focal deficit present.      Mental Status: She is alert and oriented to person, place, and time.   Psychiatric:         Mood and Affect: Mood normal.         Behavior: Behavior normal.         ED Course        Procedures              EKG Interpretation:      Interpreted by Rohan Glynn MD  Time reviewed: 1601  Symptoms at time of EKG: " palpitations   Rhythm: atrial fib/flutter  Rate: normal  Axis: normal  Ectopy: none  Conduction: atrial fib/flutter  ST Segments/ T Waves: No ST-T wave changes  Q Waves: none  Comparison to prior: changed from sinus but other ecgs show similar fib/flutter    Clinical Impression: atrial fibrillation/flutter             Results for orders placed or performed during the hospital encounter of 02/29/24 (from the past 24 hour(s))   [place] Draw    Narrative    The following orders were created for panel order [place] Draw.  Procedure                               Abnormality         Status                     ---------                               -----------         ------                     Extra Blue Top Tube[[id number]]                              Final result               Extra Green Top (Lithium...[[id number]]                      Final result               Extra Purple Top Tube[[id number]]                            Final result               Extra Heparinized Syringe[[id number]]                        Final result                 Please view results for these tests on the individual orders.   Extra Blue Top Tube   Result Value Ref Range    Hold Specimen JIC    Extra Green Top (Lithium Heparin) Tube   Result Value Ref Range    Hold Specimen JIC    Extra Purple Top Tube   Result Value Ref Range    Hold Specimen JIC    Extra Heparinized Syringe   Result Value Ref Range    Hold Specimen     CBC with platelets differential    Narrative    The following orders were created for panel order CBC with platelets differential.  Procedure                               Abnormality         Status                     ---------                               -----------         ------                     CBC with platelets and d...[[id number]]                      Final result                 Please view results for these tests on the individual orders.   Basic metabolic panel   Result Value Ref Range    Sodium 141 135 - 145 mmol/L     Potassium 4.2 3.4 - 5.3 mmol/L    Chloride 106 98 - 107 mmol/L    Carbon Dioxide (CO2) 25 22 - 29 mmol/L    Anion Gap 10 7 - 15 mmol/L    Urea Nitrogen 17.4 8.0 - 23.0 mg/dL    Creatinine 0.94 0.51 - 0.95 mg/dL    GFR Estimate 60 (L) >60 mL/min/1.73m2    Calcium 9.9 8.8 - 10.2 mg/dL    Glucose 80 70 - 99 mg/dL   INR   Result Value Ref Range    INR 2.20 (H) 0.85 - 1.15   TSH with free T4 reflex   Result Value Ref Range    TSH 2.32 0.30 - 4.20 uIU/mL   Troponin T, High Sensitivity (now)   Result Value Ref Range    Troponin T, High Sensitivity 10 <=14 ng/L   CBC with platelets and differential   Result Value Ref Range    WBC Count 5.9 4.0 - 11.0 10e3/uL    RBC Count 5.08 3.80 - 5.20 10e6/uL    Hemoglobin 14.8 11.7 - 15.7 g/dL    Hematocrit 45.6 35.0 - 47.0 %    MCV 90 78 - 100 fL    MCH 29.1 26.5 - 33.0 pg    MCHC 32.5 31.5 - 36.5 g/dL    RDW 13.9 10.0 - 15.0 %    Platelet Count 215 150 - 450 10e3/uL    % Neutrophils 62 %    % Lymphocytes 27 %    % Monocytes 8 %    % Eosinophils 2 %    % Basophils 1 %    % Immature Granulocytes 1 %    NRBCs per 100 WBC 0 <1 /100    Absolute Neutrophils 3.6 1.6 - 8.3 10e3/uL    Absolute Lymphocytes 1.6 0.0 - 5.3 10e3/uL    Absolute Monocytes 0.5 0.0 - 1.3 10e3/uL    Absolute Eosinophils 0.1 0.0 - 0.7 10e3/uL    Absolute Basophils 0.0 0.0 - 0.2 10e3/uL    Absolute Immature Granulocytes 0.0 <=0.4 10e3/uL    Absolute NRBCs 0.0 10e3/uL   XR Chest 2 Views    Narrative    EXAM: XR CHEST 2 VIEWS  LOCATION: [facility]  DATE: 2/29/2024    INDICATION: afib  COMPARISON: None.      Impression    IMPRESSION: Heart is normal in size. Lungs are clear.       Medications - No data to display    Assessments & Plan (with Medical Decision Making)  83-year-old female with paroxysmal atrial fibrillation.  She is essentially asymptomatic.  She seems to be spending more time in atrial fibs/flutter according to her Apple Watch that she was previously.  Again she does not  experience any symptoms.  She has not had any exercise intolerance.  She did have some right arm pain in the night that resolved after some stretching.  Labs including troponin, CBC, basic metabolic panel, EKG and chest x-ray ordered.  Patient may need outpatient echocardiogram and Zio patch.  I do not think she be a good candidate for beta-blocker given her episodes of bradycardia.  According to her Apple Watch she spent several times in the rate of 30s.  Patient remained stable here in the emergency department.  She was in normal sinus bradycardia most of the time here.  She is asymptomatic.  Troponin is negative.  Chest x-ray is clear.  Previous echocardiogram was reviewed.  She had a dilated right ventricle in 2019.  Outpatient cardiology consult ordered along with Zio patch and echocardiogram.  I discussed this with cardiology on-call, Dr. Yuong and he agrees with this plan.  I discussed this with the patient and her family and they agree with the plan.  Patient was discharged home in stable condition.     I have reviewed the nursing notes.    I have reviewed the findings, diagnosis, plan and need for follow up with the patient.          New Prescriptions    No medications on file       Final diagnoses:   Atrial fibrillation with rapid ventricular response (H)       2/29/2024   [facility] EMERGENCY DEPT       Rohan Glynn MD  02/29/24 [id number]       Rohan Glynn MD  02/29/24 [id number]

## 2024-02-29 NOTE — ED TRIAGE NOTES
Patient's apple watch has been notifying her today hat her heart beat is irregular and a rate of 140's. She woke about midnight with right arm pain and has been feeling fatigued.      Triage Assessment (Adult)       Row Name 02/29/24 1558          Triage Assessment    Airway WDL WDL        Respiratory WDL    Respiratory WDL WDL        Skin Circulation/Temperature WDL    Skin Circulation/Temperature WDL WDL        Cardiac WDL    Cardiac WDL X;rhythm     Cardiac Rhythm Atrial fibrillation

## 2024-02-29 NOTE — TELEPHONE ENCOUNTER
Nurse Triage SBAR    Is this a 2nd Level Triage? NO    Situation: Patient called in with concerns of her heart beat going from 130 to 72 to 64.     Background: Patient states she has a history of A-fib.     Assessment: Patient states her Apple Watch is showing her heart rate bouncing up and down, at 130 then 72 then 64. She states today she can feel her heart beat going up fast. She states she is having dizziness/lightheadedness, and does not feel like she should go for her walk today or that she should be driving.     Protocol Recommended Disposition:   Call  Now    Recommendation: Per protocol patient was advised to call  now. Patient verbalized understanding, states she will call  now. She had no further questions or concerns.      ABDIEL GarzaN, RN      Reason for Disposition   Dizziness, lightheadedness, or weakness and heart beating very rapidly (e.g., > 140 / minute)    Additional Information   Negative: Passed out (i.e., lost consciousness, collapsed and was not responding)   Negative: Shock suspected (e.g., cold/pale/clammy skin, too weak to stand, low BP, rapid pulse)   Negative: Difficult to awaken or acting confused (e.g., disoriented, slurred speech)   Negative: Visible sweat on face or sweat dripping down face   Negative: Unable to walk, or can only walk with assistance (e.g., requires support)   Negative: Received SHOCK from implantable cardiac defibrillator and has persisting symptoms (i.e., palpitations, lightheadedness)    Protocols used: Heart Rate and Heartbeat [id number]

## 2024-03-01 ENCOUNTER — ANTICOAGULATION THERAPY VISIT (OUTPATIENT)
Dept: ANTICOAGULATION | Facility: CLINIC | Age: 84
End: 2024-03-01
Payer: MEDICARE

## 2024-03-01 DIAGNOSIS — Z79.01 LONG TERM CURRENT USE OF ANTICOAGULANT THERAPY: ICD-10-CM

## 2024-03-01 DIAGNOSIS — I27.82 CHRONIC PULMONARY EMBOLISM WITHOUT ACUTE COR PULMONALE, UNSPECIFIED PULMONARY EMBOLISM TYPE (H): Primary | ICD-10-CM

## 2024-03-01 DIAGNOSIS — I27.82 CHRONIC PULMONARY EMBOLISM WITHOUT ACUTE COR PULMONALE, UNSPECIFIED PULMONARY EMBOLISM TYPE (H): ICD-10-CM

## 2024-03-01 DIAGNOSIS — I26.99 OTHER PULMONARY EMBOLISM WITHOUT ACUTE COR PULMONALE, UNSPECIFIED CHRONICITY (H): ICD-10-CM

## 2024-03-01 NOTE — PROGRESS NOTES
ANTICOAGULATION MANAGEMENT     Tracy Palomo 83 year old female is on warfarin with therapeutic INR result. (Goal INR 2.0-3.0)    Recent labs: (last 7 days)     02/29/24  1607   INR 2.20*       ASSESSMENT     Source(s): Chart Review  Previous INR was Therapeutic last visit; previously outside of goal range  Medication, diet, health changes since last INR: ED visit on 2/29/24. See note below.          PLAN     Recommended plan for temporary change(s) affecting INR     Dosing Instructions: Continue your current warfarin dose with next INR in 2 weeks       Summary  As of 3/1/2024      Full warfarin instructions:  5 mg every Mon; 2.5 mg all other days   Next INR check:                 Chart review only.     Lab visit scheduled    Education provided:   None required    Plan made per [facility] anticoagulation protocol    Randall QUIROGA RN  Anticoagulation Clinic  3/1/2024    _______________________________________________________________________     Anticoagulation Episode Summary       Current INR goal:  2.0-3.0   TTR:  65.0% (11.8 mo)   Target end date:  Indefinite   Send INR reminders to:  GURVINDER SOARES    Indications    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]  Long term current use of anticoagulant therapy [Z79.01]  Other pulmonary embolism without acute cor pulmonale  unspecified chronicity (H) [I26.99]  Chronic pulmonary embolism without acute cor pulmonale  unspecified pulmonary embolism type (H) [I27.82]             Comments:               Anticoagulation Care Providers       Provider Role Specialty Phone number    Chad Betts MD Referring Internal Medicine [phone]    Gerard Medrano MD Responsible Family Medicine [phone]

## 2024-03-01 NOTE — PROGRESS NOTES
ANTICOAGULATION  MANAGEMENT: Discharge Review    Tracy Palomo chart reviewed for anticoagulation continuity of care    Emergency room visit on 2/29/24 for Afib with RVR.    Discharge disposition: Home    Results:    Recent labs: (last 7 days)     02/29/24  1607   INR 2.20*     Anticoagulation inpatient management:     not applicable     Anticoagulation discharge instructions:     Warfarin dosing: home regimen continued   Bridging: No   INR goal change: No      Medication changes affecting anticoagulation: No    Additional factors affecting anticoagulation: No     PLAN     No adjustment to anticoagulation plan needed    Patient not contacted    No adjustment to Anticoagulation Calendar was required    Randall QUIROGA RN

## 2024-03-01 NOTE — DISCHARGE INSTRUCTIONS
Your labs and x-ray look good today.  You go back and forth between atrial fibrillation and normal sinus rhythm.  If he becomes symptomatic please return to the emergency department.  I put in a referral to cardiology, ordered a Zio patch which is an cardiac monitor as an outpatient, echocardiogram.  It was a pleasure to see you today.

## 2024-03-06 ENCOUNTER — THERAPY VISIT (OUTPATIENT)
Dept: PHYSICAL THERAPY | Facility: CLINIC | Age: 84
End: 2024-03-06
Attending: ORTHOPAEDIC SURGERY
Payer: MEDICARE

## 2024-03-06 ENCOUNTER — OFFICE VISIT (OUTPATIENT)
Dept: INTERNAL MEDICINE | Facility: CLINIC | Age: 84
End: 2024-03-06
Payer: MEDICARE

## 2024-03-06 VITALS
WEIGHT: 180 LBS | OXYGEN SATURATION: 97 % | HEART RATE: 56 BPM | TEMPERATURE: 98.1 F | RESPIRATION RATE: 16 BRPM | DIASTOLIC BLOOD PRESSURE: 84 MMHG | BODY MASS INDEX: 30.73 KG/M2 | HEIGHT: 64 IN | SYSTOLIC BLOOD PRESSURE: 148 MMHG

## 2024-03-06 DIAGNOSIS — M54.32 LEFT SCIATIC NERVE PAIN: Primary | ICD-10-CM

## 2024-03-06 DIAGNOSIS — R53.83 FATIGUE, UNSPECIFIED TYPE: ICD-10-CM

## 2024-03-06 DIAGNOSIS — I48.0 PAROXYSMAL ATRIAL FIBRILLATION (H): Primary | ICD-10-CM

## 2024-03-06 PROCEDURE — 99213 OFFICE O/P EST LOW 20 MIN: CPT | Performed by: INTERNAL MEDICINE

## 2024-03-06 PROCEDURE — 97110 THERAPEUTIC EXERCISES: CPT | Mod: GP | Performed by: PHYSICAL THERAPIST

## 2024-03-06 RX ORDER — RESPIRATORY SYNCYTIAL VIRUS VACCINE 120MCG/0.5
0.5 KIT INTRAMUSCULAR ONCE
Qty: 1 EACH | Refills: 0 | Status: CANCELLED | OUTPATIENT
Start: 2024-03-06 | End: 2024-03-06

## 2024-03-06 NOTE — PROGRESS NOTES
"  Assessment & Plan   Problem List Items Addressed This Visit       Paroxysmal atrial fibrillation (H) - Primary    Relevant Orders    Adult Cardiology Eval  Referral     Other Visit Diagnoses       Fatigue, unspecified type        Relevant Orders    Adult Cardiology Eval  Referral             Patient who is on Coumadin for history of pulmonary emboli and paroxysmal atrial fibrillation.  She has been found to have more episodes of A-fib running up to 20-25% of the day in A-fib.  Her heart rate goes from 39-1 40.  She is not on any rate controlling medications due to the bradycardia.  She was seen in the ER her labs are normal.  She is found to be in atrial flutter.  I think she is in a normal sinus rhythm today with a rate of 56.  Needs more of a cardiac workup in case she has had a tachycardia induced cardiomyopathy will get an echo and try to move this up sooner.  She is wearing a Zio patch which will come back for a week or 2 but since she is not having syncope I think that is okay instead of an event monitor.  They are tracking when she has the symptoms.  I will try to get her set up with electrophysiology to help manage this as she may need a pacemaker with her bradycardia Infergen to treat her atrial fibrillation and blood pressure with any rate controlling meds.  Or possibly ablation.               MED REC REQUIRED  Post Medication Reconciliation Status: discharge medications reconciled and changed, per note/orders  BMI  Estimated body mass index is 30.9 kg/m  as calculated from the following:    Height as of this encounter: 1.626 m (5' 4\").    Weight as of this encounter: 81.6 kg (180 lb).             Sydnee Molina is a 83 year old, presenting for the following health issues:  ER F/U      3/6/2024     3:22 PM   Additional Questions   Roomed by Jo     [facility]       ED/UC Followup:    Facility:  [facility]  Date of visit: 2/29/24  Reason for visit: atrial fib with rapid ventricular " "response  Current Status: follow up    Apple watch showed her she was in A fib 20% of the time, high rate 140 low to 39.      Went to ER and labs were ok, atrial flutter, got a ziopatch, can push button with symptoms. Couple episodes each day.     Feels more exhausted not walking as much, not doing exercise bike, feels exhausted easily.              Objective    BP (!) 148/84   Pulse 56   Temp 98.1  F (36.7  C) (Temporal)   Resp 16   Ht 1.626 m (5' 4\")   Wt 81.6 kg (180 lb)   SpO2 97%   BMI 30.90 kg/m    Body mass index is 30.9 kg/m .  Physical Exam   NAD  Heart rate slow and regular   Lungs clear   Ext no edema.               Signed Electronically by: Chad Betts MD    "

## 2024-03-12 ENCOUNTER — HOSPITAL ENCOUNTER (OUTPATIENT)
Dept: CARDIOLOGY | Facility: CLINIC | Age: 84
Discharge: HOME | End: 2024-03-12
Attending: EMERGENCY MEDICINE | Admitting: EMERGENCY MEDICINE
Payer: MEDICARE

## 2024-03-12 DIAGNOSIS — I48.91 ATRIAL FIBRILLATION WITH RAPID VENTRICULAR RESPONSE (H): ICD-10-CM

## 2024-03-12 LAB — LVEF ECHO: NORMAL

## 2024-03-12 PROCEDURE — 93306 TTE W/DOPPLER COMPLETE: CPT

## 2024-03-12 PROCEDURE — 93306 TTE W/DOPPLER COMPLETE: CPT | Mod: 26 | Performed by: INTERNAL MEDICINE

## 2024-03-13 ENCOUNTER — LAB (OUTPATIENT)
Dept: LAB | Facility: CLINIC | Age: 84
End: 2024-03-13
Payer: MEDICARE

## 2024-03-13 ENCOUNTER — ANTICOAGULATION THERAPY VISIT (OUTPATIENT)
Dept: ANTICOAGULATION | Facility: CLINIC | Age: 84
End: 2024-03-13

## 2024-03-13 DIAGNOSIS — I27.82 CHRONIC PULMONARY EMBOLISM WITHOUT ACUTE COR PULMONALE, UNSPECIFIED PULMONARY EMBOLISM TYPE (H): ICD-10-CM

## 2024-03-13 DIAGNOSIS — I27.82 CHRONIC PULMONARY EMBOLISM WITHOUT ACUTE COR PULMONALE, UNSPECIFIED PULMONARY EMBOLISM TYPE (H): Primary | ICD-10-CM

## 2024-03-13 DIAGNOSIS — I26.99 OTHER PULMONARY EMBOLISM WITHOUT ACUTE COR PULMONALE, UNSPECIFIED CHRONICITY (H): ICD-10-CM

## 2024-03-13 DIAGNOSIS — Z79.01 LONG TERM CURRENT USE OF ANTICOAGULANT THERAPY: ICD-10-CM

## 2024-03-13 LAB — INR BLD: 3.1 (ref 0.9–1.1)

## 2024-03-13 PROCEDURE — 36416 COLLJ CAPILLARY BLOOD SPEC: CPT

## 2024-03-13 PROCEDURE — 85610 PROTHROMBIN TIME: CPT

## 2024-03-13 NOTE — PROGRESS NOTES
ANTICOAGULATION MANAGEMENT     Tracy Palomo 83 year old female is on warfarin with supratherapeutic INR result. (Goal INR 2.0-3.0)    Recent labs: (last 7 days)     03/13/24  0858   INR 3.1*       ASSESSMENT     Source(s): Chart Review and Patient/Caregiver Call     Warfarin doses taken: Warfarin taken as instructed  Diet: Decreased greens/vitamin K in diet; plans to resume previous intake  Medication/supplement changes: None noted  New illness, injury, or hospitalization: No  Signs or symptoms of bleeding or clotting: No  Previous result: Therapeutic last 2(+) visits  Additional findings: None       PLAN     Recommended plan for temporary change(s) affecting INR     Dosing Instructions: Continue your current warfarin dose with next INR in 2 weeks       Summary  As of 3/13/2024      Full warfarin instructions:  5 mg every Mon; 2.5 mg all other days   Next INR check:  3/27/2024               Telephone call with Tracy who verbalizes understanding and agrees to plan and who agrees to plan and repeated back plan correctly    Lab visit scheduled    Education provided:   Goal range and lab monitoring: goal range and significance of current result, Importance of therapeutic range, and Importance of following up at instructed interval  Dietary considerations: importance of consistent vitamin K intake    Plan made per [facility] anticoagulation protocol    Arti Ruelas RN  Anticoagulation Clinic  3/13/2024    _______________________________________________________________________     Anticoagulation Episode Summary       Current INR goal:  2.0-3.0   TTR:  64.7% (11.8 mo)   Target end date:  Indefinite   Send INR reminders to:  GURVINDER SOARES    Indications    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]  Long term current use of anticoagulant therapy [Z79.01]  Other pulmonary embolism without acute cor pulmonale  unspecified chronicity (H) [I26.99]  Chronic pulmonary embolism without acute  cor pulmonale  unspecified pulmonary embolism type (H) [I27.82]             Comments:               Anticoagulation Care Providers       Provider Role Specialty Phone number    Chad Betts MD Referring Internal Medicine [phone]    Gerard Medrano MD Responsible Family Medicine [phone]

## 2024-03-25 PROCEDURE — 93244 EXT ECG>48HR<7D REV&INTERPJ: CPT | Performed by: INTERNAL MEDICINE

## 2024-03-26 NOTE — PROGRESS NOTES
DISCHARGE  Reason for Discharge: Patient has met all goals.  Goals sufficiently met for discharge from outpatient physical therapy services at this time.     Equipment Issued: Green and blue therabands.     Discharge Plan: Patient to continue home program.       05/24/23 1000   Appointment Info   Signing clinician's name / credentials Alhaji Carr, PT, DPT   Visits Used 7   Medical Diagnosis Stiffness of knee joint, unspecified laterality (M25.669)     Left leg weakness (R29.898)   PT Tx Diagnosis Gait Deficits, Left Lower Extremity Strength Deficit, Bilateral Lower Extremity Range of Motion Deficits: Balance Deficits   Quick Adds Certification   Progress Note/Certification   Start of Care Date 04/10/23   Onset of illness/injury or Date of Surgery 04/10/22   Therapy Frequency 1 visit per week   Predicted Duration 8 weeks   Certification date from 04/10/23   Certification date to 06/05/23   Progress Note Due Date 06/05/23       Present No    ID or First/Last Name No intepreter present today.   GOALS   PT Goals 2;3   PT Goal 1   Goal Identifier Home Exercise Program   Goal Description Patient will demonstrate good adherence to and proper performance of her HEP's for 8 weeks at 5 out of 7 days a week to demonstrate improved long-term independence with management of her functional mobility and left lower extremity weakness deficits.   Rationale to maximize safety and independence with performance of ADLs and functional tasks;to maximize safety and independence within the home;to maximize safety and independence within the community;to maximize safety and independence with self cares   Goal Progress Patient reports moderate adherence to her HEP since her previous session, ambulating up to 3600-7050 steps per day without significant LLE knee or groin discomfort.   Target Date 06/05/23   PT Goal 2   Goal Identifier Lower Extremity Functional Scale   Goal Description Patient will improve  her initial LEFS assessment score by 10 points or greater to demonstrate reduced restriction of her bilateral lower extremity range of motion and left lower extremity weakness deficits with safe performance of her desired functional mobility demands.   Rationale to maximize safety and independence with performance of ADLs and functional tasks;to maximize safety and independence within the home;to maximize safety and independence within the community   Goal Progress Patient scored 61/80 on her LEFS re-assessment score during today's session.   Target Date 06/05/23   PT Goal 3   Goal Identifier Stairs   Goal Description Patient will ascend and descend 3 stairs with one railing with modified independence to demonstrate improved independence and safety with this functional mobility demands to enter and exit her home with reduced fall risk.   Rationale to maximize safety and independence with performance of ADLs and functional tasks;to maximize safety and independence within the home;to maximize safety and independence within the community   Goal Progress Patient demonstrates SBA x1 for DPT for stability with stair climbing during today's session up and down two flights of 10 stairs.   Target Date 06/05/23   Subjective Report   Subjective Report Patient in good spirits during today's session. Patient reports moderate adherence to her HEP since her previous session, ambulating up to 0696-4462 steps per day without significant LLE knee or groin discomfort. Patient reports feeling indpenendent with her given education and current HEP at this time. Patient requests to end session 10 minutes early to attend another appointment scheduled after OP PT session today.   Objective Measures   Objective Measures Objective Measure 1;Objective Measure 2   Objective Measure 1   Objective Measure Lower Extremity Functional Scale   Details Patient scored 61/80 on her LEFS re-assessment during today's session.   Objective Measure 2    Objective Measure Pain   Details Patient reports strong discomfort in her lateral knees but is able to be alleviated with knees over toes quad loading mechanics during today's session.   Treatment Interventions (PT)   Interventions Therapeutic Activity   Therapeutic Activity   Therapeutic Activities: dynamic activities to improve functional performance minutes (51405) 30   Skilled Intervention Selection, instruction, and modification of selected exercises for optimal therapeutic benefit. Education on mechanics for standing and stair climbing demands with appropriate knees over toes quadriceps loading.   Patient Response/Progress No adverse response. Mild soreness with improper LE mechanics but abolished with corrections. Patient reports understanding of today's given education.   Treatment Detail Stair climbing 2x10 with BUE support on handrails; STS's 1x10; Mini squats 1x10 within parallel bars; Step up and overs on 6-inch step 1x10 leading with LLE.   Education   Learner/Method Patient   Education Comments Patient understanding of future therapeutic progression, proper stair climbing and ambulation techniques.   Plan   Homework Home Exercise Program   Home program Hooklying BLE hip flexion 1x10 with BLE's each using green theraband; Hooklying blue theraband hip ABD rollouts 1x10; Supine butterfly hip ADD stretch with BLE's x1 minute; Semi-tandem stance x30 seconds with each foot forward (progressing to uneven surface); Step ups 1x10 on 4 inch surface 1x10 leading with LLE; Supine hip flexion SLR's 1x10 for BLE's; sidelying BLE hip ABD SLR's 1x10; Standing blue theraband LLE TKE's 1x10. STAIRS EXERCISES: BLE marches 1x10; partial squat holds x3 repetitions of 20-30 seconds each; AAROM LLE knee flexion 1x10 with 5 second holds; step up and overs with 4-6 inch step 1x10 each. Each exercise performed 2 times per day.   Plan for next session Patient to be independent with [facility] for next 2 weeks and discharge from  formal OP PT services at that time if no LLE knee or functional mobility deficits arise by this time.   Total Session Time   Timed Code Treatment Minutes 30   Total Treatment Time (sum of timed and untimed services) 30   Medicare Claim Information   Medical Diagnosis Stiffness of knee joint, unspecified laterality (M25.669)     Left leg weakness (R29.898)   Medical Diagnosis Stiffness of knee joint, unspecified laterality (M25.669)     Left leg weakness (R29.898)   Start of Care Date 04/10/23   Session Number   Authorization status MEDICARE     Thank you for your referral,   Alhaji Carr, PT, DPT    [facility]  O: [phone]  E: [email or web address]     Referring Provider:  Chad Betts MD

## 2024-03-27 ENCOUNTER — LAB (OUTPATIENT)
Dept: LAB | Facility: CLINIC | Age: 84
End: 2024-03-27
Payer: MEDICARE

## 2024-03-27 ENCOUNTER — ANTICOAGULATION THERAPY VISIT (OUTPATIENT)
Dept: ANTICOAGULATION | Facility: CLINIC | Age: 84
End: 2024-03-27

## 2024-03-27 DIAGNOSIS — I26.99 OTHER PULMONARY EMBOLISM WITHOUT ACUTE COR PULMONALE, UNSPECIFIED CHRONICITY (H): ICD-10-CM

## 2024-03-27 DIAGNOSIS — I27.82 CHRONIC PULMONARY EMBOLISM WITHOUT ACUTE COR PULMONALE, UNSPECIFIED PULMONARY EMBOLISM TYPE (H): Primary | ICD-10-CM

## 2024-03-27 DIAGNOSIS — Z79.01 LONG TERM CURRENT USE OF ANTICOAGULANT THERAPY: ICD-10-CM

## 2024-03-27 DIAGNOSIS — I27.82 CHRONIC PULMONARY EMBOLISM WITHOUT ACUTE COR PULMONALE, UNSPECIFIED PULMONARY EMBOLISM TYPE (H): ICD-10-CM

## 2024-03-27 LAB — INR BLD: 3.2 (ref 0.9–1.1)

## 2024-03-27 PROCEDURE — 36416 COLLJ CAPILLARY BLOOD SPEC: CPT

## 2024-03-27 PROCEDURE — 85610 PROTHROMBIN TIME: CPT

## 2024-03-27 NOTE — PROGRESS NOTES
ANTICOAGULATION MANAGEMENT     Tracy Palomo 83 year old female is on warfarin with supratherapeutic INR result. (Goal INR 2.0-3.0)    Recent labs: (last 7 days)     03/27/24  0850   INR 3.2*       ASSESSMENT     Source(s): Chart Review and Patient/Caregiver Call     Warfarin doses taken: Warfarin taken as instructed  Diet: No new diet changes identified  Medication/supplement changes: None noted  New illness, injury, or hospitalization: No  Signs or symptoms of bleeding or clotting: No  Previous result: Supratherapeutic  Additional findings: None       PLAN     Recommended plan for no diet, medication or health factor changes affecting INR     Dosing Instructions: decrease your warfarin dose (12.5% change) with next INR in 2 weeks       Summary  As of 3/27/2024      Full warfarin instructions:  2.5 mg every day   Next INR check:  4/8/2024               Telephone call with Tracy who verbalizes understanding and agrees to plan    Lab visit scheduled    Education provided:   Dietary considerations: impact of vitamin K foods on INR    Plan made per [facility] anticoagulation protocol and per [facility] anticoagulation protocol; closest % change with current tablet size made per protocol for for INR 3.1-3.3  (goal 2-3)    Randall QUIROGA RN  Anticoagulation Clinic  3/27/2024    _______________________________________________________________________     Anticoagulation Episode Summary       Current INR goal:  2.0-3.0   TTR:  60.7% (11.8 mo)   Target end date:  Indefinite   Send INR reminders to:  GURVINDER SOARES    Indications    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]  Long term current use of anticoagulant therapy [Z79.01]  Other pulmonary embolism without acute cor pulmonale  unspecified chronicity (H) [I26.99]  Chronic pulmonary embolism without acute cor pulmonale  unspecified pulmonary embolism type (H) [I27.82]             Comments:               Anticoagulation Care Providers       Provider  Role Specialty Phone number    Chad Betts MD Referring Internal Medicine [phone]    Gerard Medrano MD Responsible Family Medicine [phone]

## 2024-04-04 ENCOUNTER — LAB (OUTPATIENT)
Dept: LAB | Facility: CLINIC | Age: 84
End: 2024-04-04
Payer: MEDICARE

## 2024-04-04 ENCOUNTER — ANTICOAGULATION THERAPY VISIT (OUTPATIENT)
Dept: ANTICOAGULATION | Facility: CLINIC | Age: 84
End: 2024-04-04

## 2024-04-04 DIAGNOSIS — I26.99 OTHER PULMONARY EMBOLISM WITHOUT ACUTE COR PULMONALE, UNSPECIFIED CHRONICITY (H): ICD-10-CM

## 2024-04-04 DIAGNOSIS — I27.82 CHRONIC PULMONARY EMBOLISM WITHOUT ACUTE COR PULMONALE, UNSPECIFIED PULMONARY EMBOLISM TYPE (H): ICD-10-CM

## 2024-04-04 DIAGNOSIS — I27.82 CHRONIC PULMONARY EMBOLISM WITHOUT ACUTE COR PULMONALE, UNSPECIFIED PULMONARY EMBOLISM TYPE (H): Primary | ICD-10-CM

## 2024-04-04 DIAGNOSIS — Z79.01 LONG TERM CURRENT USE OF ANTICOAGULANT THERAPY: ICD-10-CM

## 2024-04-04 LAB — INR BLD: 2.4 (ref 0.9–1.1)

## 2024-04-04 PROCEDURE — 85610 PROTHROMBIN TIME: CPT

## 2024-04-04 PROCEDURE — 36416 COLLJ CAPILLARY BLOOD SPEC: CPT

## 2024-04-04 NOTE — PROGRESS NOTES
"ANTICOAGULATION MANAGEMENT     Tracy Palomo 83 year old female is on warfarin with therapeutic INR result. (Goal INR 2.0-3.0)    Recent labs: (last 7 days)     04/04/24  1341   INR 2.4*       ASSESSMENT     Source(s): Chart Review and Patient/Caregiver Call     Warfarin doses taken: Warfarin taken as instructed  Diet: No new diet changes identified  Medication/supplement changes: None noted  New illness, injury, or hospitalization: No  Signs or symptoms of bleeding or clotting: Yes: patient scheduled her INR for today due to a bruise on her arm, she may have hit it on something she is unsure.  It is not bleeding, and has not increased in size.  Advised to watch for now and follow up if starts increasing in size otherwise educated on healing   Previous result: Supratherapeutic  Additional findings: None did state that she has some \"shaking\" today all over her body. Unable to describe any further. No other symptoms and it has since resolved.  Advised if symptoms return that she should follow up with her PCP.         PLAN     Recommended plan for no diet, medication or health factor changes affecting INR     Dosing Instructions: Continue your current warfarin dose with next INR in 2 weeks       Summary  As of 4/4/2024      Full warfarin instructions:  2.5 mg every day   Next INR check:  4/18/2024               Telephone call with Tracy who verbalizes understanding and agrees to plan    Lab visit scheduled    Education provided:   Symptom monitoring: monitoring for bleeding signs and symptoms and when to seek medical attention/emergency care  Contact [phone]  with any changes, questions or concerns.     Plan made per [facility] anticoagulation protocol    Belle Berg RN  Anticoagulation Clinic  4/4/2024    _______________________________________________________________________     Anticoagulation Episode Summary       Current INR goal:  2.0-3.0   TTR:  60.2% (11.8 mo)   Target end date:  Indefinite   Send INR " reminders to:  [facility]    Indications    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]  Long term current use of anticoagulant therapy [Z79.01]  Other pulmonary embolism without acute cor pulmonale  unspecified chronicity (H) [I26.99]  Chronic pulmonary embolism without acute cor pulmonale  unspecified pulmonary embolism type (H) [I27.82]             Comments:               Anticoagulation Care Providers       Provider Role Specialty Phone number    Chad Betts MD Referring Internal Medicine [phone]    Gerard Medrano MD Responsible Family Medicine [phone]

## 2024-04-05 DIAGNOSIS — E78.5 HYPERLIPIDEMIA LDL GOAL <130: ICD-10-CM

## 2024-04-05 RX ORDER — LOVASTATIN 40 MG
40 TABLET ORAL AT BEDTIME
Qty: 90 TABLET | Refills: 3 | Status: SHIPPED | OUTPATIENT
Start: 2024-04-05

## 2024-04-08 ENCOUNTER — OFFICE VISIT (OUTPATIENT)
Dept: CARDIOLOGY | Facility: CLINIC | Age: 84
End: 2024-04-08
Attending: EMERGENCY MEDICINE
Payer: MEDICARE

## 2024-04-08 VITALS
SYSTOLIC BLOOD PRESSURE: 148 MMHG | WEIGHT: 183 LBS | OXYGEN SATURATION: 96 % | HEIGHT: 64 IN | HEART RATE: 59 BPM | DIASTOLIC BLOOD PRESSURE: 84 MMHG | BODY MASS INDEX: 31.24 KG/M2 | RESPIRATION RATE: 18 BRPM

## 2024-04-08 DIAGNOSIS — I48.91 ATRIAL FIBRILLATION WITH RAPID VENTRICULAR RESPONSE (H): ICD-10-CM

## 2024-04-08 PROCEDURE — 99204 OFFICE O/P NEW MOD 45 MIN: CPT | Performed by: INTERNAL MEDICINE

## 2024-04-08 RX ORDER — FLECAINIDE ACETATE 50 MG/1
50 TABLET ORAL 2 TIMES DAILY
Qty: 60 TABLET | Refills: 11 | Status: SHIPPED | OUTPATIENT
Start: 2024-04-08 | End: 2024-06-11

## 2024-04-08 ASSESSMENT — PAIN SCALES - GENERAL: PAINLEVEL: NO PAIN (0)

## 2024-04-08 NOTE — PROGRESS NOTES
HISTORY:    Tracy Palomo is a pleasant 83-year-old female accompanied by her 2 daughters today.  She has a history of previous DVT and pulmonary embolism and has been on chronic anticoagulation for many years.  She also has a history of paroxysmal atrial fibrillation dating back about a decade.    Tracy reports that for the past 4 months she has had more frequent episodes of abrupt onset of fatigability, frequently associated with tachycardia and likely atrial fibrillation on her Apple Watch.  She presented to the emergency room February 29 with a prolonged episode of fast heart rate associated with an aching of her right arm (relieved after stretching).  Her EKG that day showed atrial flutter with elevated ventricular response.  Troponin and TSH were normal, INR was therapeutic.    Tracy's daughters report that for the past 4 months or so she has had much more frequent episodes of this fatigability.  She previously had been walking around the block 3 times per day.  She has given this up now, since taking a walk and having one of her spells that left her incapacitated and needing a neighbor to help her home.  She has now frightened to take long walks and restricts her activities because of this.  Most of the time her energy and stamina are normal.  She denies predictable exertional chest arm neck shoulder or jaw discomfort, PND/orthopnea, syncope or near syncope, claudication, peripheral edema, strokelike symptoms.  She gives as an example that most days she is able to do her daily housework without difficulty, but on one of her bad days she is unable to make her bed, do the dishes, etc.    An echocardiogram was done and showed a normal ejection fraction, normal left atrial size, and normal valves.  She also had a 7-day Zio patch done which showed paroxysmal atrial fibrillation with a 2% burden, the longest episode lasting 45 minutes.  Her heart rate was generally fairly well-controlled with atrial fib,  averaging 111 bpm.  Her overall heart rate is slow with an average heart rate of 57.  She is not on any AV blocking agents.      ASSESSMENT/PLAN:    1.  Paroxysmal atrial fibrillation.  This has gotten worse in the last few months and is causing of symptoms that it are frightening to her and are preventing her from doing her normal exercising and walking outdoors.  We talked about the possibility of watchful waiting, (which is safe considering that she is therapeutic with warfarin), but less than ideal because she is frightened to undertake daily physical exertions because of her fears of her A-fib symptoms.  We talked about possible approaches, which could include rate limiting medications (but these are not practical for her given her resting bradycardia).  Other alternative approaches would be antiarrhythmic therapy or ablation.  Given her advanced age, antiarrhythmic therapy would make the most sense.  We talked about initiating flecainide at low-dose and I reviewed potential side effects including proarrhythmia.  She wanted to give this some thought but we will get a stress echo done to exclude coronary disease, and I wrote her a prescription.  I have also asked her to get an EKG done a couple weeks after starting flecainide and we will plan on a follow-up visit in about 2 months.    Thank you for inviting me to participate in the care of your patient.  Please don't hesitate to call if I can be of further assistance.  45 minutes were spent today reviewing the chart and other records, interviewing and examining the patient, and documenting our visit.    Chart documentation was completed, in part, with [employer] voice-recognition software. Even though reviewed, some grammatical, spelling, and word errors may remain.       Orders Placed This Encounter   Procedures    Exercise Stress Echocardiogram     Orders Placed This Encounter   Medications    flecainide (TAMBOCOR) 50 MG tablet     Sig: Take 1 tablet (50 mg) by  mouth 2 times daily     Dispense:  60 tablet     Refill:  11     There are no discontinued medications.    10 year ASCVD risk: The ASCVD Risk score (Reinaldo DK, et al., 2019) failed to calculate for the following reasons:    The 2019 ASCVD risk score is only valid for ages 40 to 79    The patient has a prior MI or stroke diagnosis    Encounter Diagnosis   Name Primary?    Atrial fibrillation with rapid ventricular response (H)        CURRENT MEDICATIONS:  Current Outpatient Medications   Medication Sig Dispense Refill    acetaminophen (TYLENOL) 325 MG tablet Take 3 tablets (975 mg) by mouth every 8 hours as needed for pain 100 tablet 1    flecainide (TAMBOCOR) 50 MG tablet Take 1 tablet (50 mg) by mouth 2 times daily 60 tablet 11    lovastatin (MEVACOR) 40 MG tablet Take 1 tablet (40 mg) by mouth at bedtime 90 tablet 3    warfarin ANTICOAGULANT (COUMADIN) 5 MG tablet Take 2.5 mg by mouth BEDTIME: every evening EXCEPT Mondays take 5mg or as directed by anticoagulation clinic. Next INR 03/13/2024      warfarin ANTICOAGULANT (COUMADIN) 5 MG tablet TAKE ONE TABLET BY MOUTH ONCE DAILY ON MONDAY AND FRIDAY AND 1/2 TABLET ALL OTHER DAYS OF THE WEEK OR AS DIRECTED BY THE COUMADIN CLINIC - 30 day candido refill, patient needs to schedule annual visit with provider for future refills (Patient taking differently: Take 5 mg by mouth BEDTIME: ON MONDAY,  AND 1/2 TABLET (2.5mg) ALL OTHER DAYS OF THE WEEK OR AS DIRECTED BY THE COUMADIN CLINIC Next INR 03/13/2024) 90 tablet 3       ALLERGIES     Allergies   Allergen Reactions    No Known Allergies        PAST MEDICAL HISTORY:  Past Medical History:   Diagnosis Date    Atrial fibrillation (H) 2014    related to colon surgery    Colon polyps     Diverticulosis of colon (without mention of hemorrhage)     Diverticulosis    DVT (deep vein thrombosis) in pregnancy 2014    after colon surgery    Other and unspecified hyperlipidemia     PE (pulmonary embolism) 2014    related to colon  surgery    Unspecified essential hypertension        PAST SURGICAL HISTORY:  Past Surgical History:   Procedure Laterality Date    ARTHROPLASTY KNEE Right 1/8/2019    Procedure: Right total knee replacement;  Surgeon: Kaleb Pang DO;  Location: [facility]    ARTHROPLASTY KNEE Left 2/16/2021    Procedure: left total knee replacement;  Surgeon: Kaleb Pang DO;  Location: [facility]    COLONOSCOPY  09, 10, 12    [facility]    COLONOSCOPY N/A 12/11/2017    Procedure: COLONOSCOPY;  colonoscopy;  Surgeon: Elvis Quezada MD;  Location: [facility]    FLEXIBLE SIGMOIDOSCOPY  09, 11    LAPAROTOMY EXPLORATORY N/A 10/20/2014    Procedure: LAPAROTOMY EXPLORATORY;  Surgeon: Miguel Oleary MD;  Location: [facility]    LAPAROTOMY, LYSIS ADHESIONS, COMBINED N/A 10/20/2014    Procedure: COMBINED LAPAROTOMY, LYSIS ADHESIONS;  Surgeon: Miguel Oleary MD;  Location: [facility]    OPEN REDUCTION INTERNAL FIXATION HIP BIPOLAR Left 3/16/2017    Procedure: OPEN REDUCTION INTERNAL FIXATION HIP BIPOLAR;  Surgeon: Kaleb Pang DO;  Location: [facility]    RELEASE TRIGGER FINGER Left 1/12/2021    Procedure: Left thumb trigger release;  Surgeon: Kaleb Pang DO;  Location: [facility]    RESECT SMALL BOWEL WITHOUT OSTOMY N/A 10/20/2014    Procedure: RESECT SMALL BOWEL WITHOUT OSTOMY;  Surgeon: Miguel Oleary MD;  Location: [facility]       FAMILY HISTORY:  Family History   Problem Relation Age of Onset    Blood Disease No family hx of         blood clots       SOCIAL HISTORY:  Social History     Socioeconomic History    Marital status:      Spouse name: None    Number of children: None    Years of education: None    Highest education level: None   Tobacco Use    Smoking status: Never    Smokeless tobacco: Never   Substance and Sexual Activity    Alcohol use: No     Alcohol/week: 0.0 standard drinks of alcohol    Drug use: No    Sexual activity: Not Currently     Partners: Male   Other Topics  Concern    Parent/sibling w/ CABG, MI or angioplasty before 65F 55M? No     Social Determinants of Health     Financial Resource Strain: High Risk (11/26/2023)    Financial Resource Strain     Within the past 12 months, have you or your family members you live with been unable to get utilities (heat, electricity) when it was really needed?: Yes   Food Insecurity: Low Risk  (11/26/2023)    Food Insecurity     Within the past 12 months, did you worry that your food would run out before you got money to buy more?: No     Within the past 12 months, did the food you bought just not last and you didn t have money to get more?: No   Transportation Needs: Low Risk  (11/26/2023)    Transportation Needs     Within the past 12 months, has lack of transportation kept you from medical appointments, getting your medicines, non-medical meetings or appointments, work, or from getting things that you need?: No   Interpersonal Safety: Low Risk  (11/29/2023)    Interpersonal Safety     Do you feel physically and emotionally safe where you currently live?: Yes     Within the past 12 months, have you been hit, slapped, kicked or otherwise physically hurt by someone?: No     Within the past 12 months, have you been humiliated or emotionally abused in other ways by your partner or ex-partner?: No   Housing Stability: Low Risk  (11/26/2023)    Housing Stability     Do you have housing? : Yes     Are you worried about losing your housing?: No       Review of Systems:  Skin:        Eyes:       ENT:       Respiratory:  Positive for dyspnea on exertion  Cardiovascular:  Negative;chest pain;dizziness;syncope or near-syncope Positive for;palpitations;edema;lightheadedness;fatigue  Gastroenterology:      Genitourinary:       Musculoskeletal:       Neurologic:  Positive for tremors  Psychiatric:       Heme/Lymph/Imm:       Endocrine:         Physical Exam:  Vitals: BP (!) 148/84 (BP Location: Left arm, Patient Position: Sitting, Cuff Size: Adult  "Regular)   Pulse 59   Resp 18   Ht 1.626 m (5' 4\")   Wt 83 kg (183 lb)   SpO2 96%   BMI 31.41 kg/m      Constitutional:  cooperative, alert and oriented, well developed, well nourished, in no acute distress        Skin:  warm and dry to the touch, no apparent skin lesions or masses noted        Head:  normocephalic, no masses or lesions        Eyes:  pupils equal and round, conjunctivae and lids unremarkable, sclera white, no xanthalasma, EOMS intact, no nystagmus        ENT:  no pallor or cyanosis        Neck:  carotid pulses are full and equal bilaterally, JVP normal, no carotid bruit        Chest:  normal breath sounds, clear to auscultation, normal A-P diameter, normal symmetry, normal respiratory excursion, no use of accessory muscles        Cardiac: regular rhythm, normal S1/S2, no S3 or S4, apical impulse not displaced, no murmurs, gallops or rubs                  Abdomen:  abdomen soft;BS normoactive        Vascular: pulses full and equal                                      Extremities and Muscular Skeletal:  no edema           Neurological:  no gross motor deficits        Psych:  affect appropriate, oriented to time, person and place     Recent Lab Results:  LIPID RESULTS:  Lab Results   Component Value Date    CHOL 142 05/16/2022    CHOL 155 01/31/2020    HDL 52 05/16/2022    HDL 66 01/31/2020    LDL 49 05/16/2022    LDL 56 01/31/2020    TRIG 203 (H) 05/16/2022    TRIG 167 (H) 01/31/2020    CHOLHDLRATIO 3.7 08/24/2015       LIVER ENZYME RESULTS:  Lab Results   Component Value Date    AST 23 06/12/2023    AST 25 01/31/2020    ALT 19 06/12/2023    ALT 27 01/31/2020       CBC RESULTS:  Lab Results   Component Value Date    WBC 5.9 02/29/2024    WBC 5.8 02/01/2021    RBC 5.08 02/29/2024    RBC 4.71 02/01/2021    HGB 14.8 02/29/2024    HGB 11.3 (L) 02/17/2021    HCT 45.6 02/29/2024    HCT 42.2 02/01/2021    MCV 90 02/29/2024    MCV 90 02/01/2021    MCH 29.1 02/29/2024    MCH 29.1 02/01/2021    MCHC 32.5 " 02/29/2024    MCHC 32.5 02/01/2021    RDW 13.9 02/29/2024    RDW 13.8 02/01/2021     02/29/2024     02/17/2021       BMP RESULTS:  Lab Results   Component Value Date     02/29/2024     08/06/2020    POTASSIUM 4.2 02/29/2024    POTASSIUM 4.4 05/16/2022    POTASSIUM 4.2 08/06/2020    CHLORIDE 106 02/29/2024    CHLORIDE 114 (H) 05/16/2022    CHLORIDE 108 08/06/2020    CO2 25 02/29/2024    CO2 28 05/16/2022    CO2 28 08/06/2020    ANIONGAP 10 02/29/2024    ANIONGAP 3 05/16/2022    ANIONGAP 2 (L) 08/06/2020    GLC 80 02/29/2024    GLC 92 06/14/2023    GLC 90 05/16/2022     (H) 02/17/2021    BUN 17.4 02/29/2024    BUN 16 05/16/2022    BUN 16 08/06/2020    CR 0.94 02/29/2024    CR 0.87 08/06/2020    GFRESTIMATED 60 (L) 02/29/2024    GFRESTIMATED 64 08/06/2020    GFRESTBLACK 74 08/06/2020    HARVEY 9.9 02/29/2024    HARVEY 8.7 08/06/2020        A1C RESULTS:  Lab Results   Component Value Date    A1C 5.8 10/20/2014       INR RESULTS:  Lab Results   Component Value Date    INR 2.4 (H) 04/04/2024    INR 3.2 (H) 03/27/2024    INR 2.20 (H) 02/29/2024    INR 2.59 (H) 06/14/2023    INR 2.2 (H) 06/17/2021    INR 2.7 (H) 05/20/2021    INR 1.8 (A) 03/02/2021    INR 1.9 (A) 02/24/2021         Abimael Bass MD, FACC    CC  Rohan Glynn MD  [address] [place] [address]

## 2024-04-08 NOTE — LETTER
4/8/2024    Chad Betts MD  [address] [facility] [address]    RE: Tracy Palomo       Dear Colleague,     I had the pleasure of seeing Tracy Palomo in the [facility].  HISTORY:    Tracy Palomo is a pleasant 83-year-old female accompanied by her 2 daughters today.  She has a history of previous DVT and pulmonary embolism and has been on chronic anticoagulation for many years.  She also has a history of paroxysmal atrial fibrillation dating back about a decade.    Tracy reports that for the past 4 months she has had more frequent episodes of abrupt onset of fatigability, frequently associated with tachycardia and likely atrial fibrillation on her Apple Watch.  She presented to the emergency room February 29 with a prolonged episode of fast heart rate associated with an aching of her right arm (relieved after stretching).  Her EKG that day showed atrial flutter with elevated ventricular response.  Troponin and TSH were normal, INR was therapeutic.    Tracy's daughters report that for the past 4 months or so she has had much more frequent episodes of this fatigability and she previously had been walking around the block 3 times per day.  She has given this up since taking a walk and having one of her spells that left her incapacitated and needing a neighbor to help her home.  She has now frightened to take long walks and restricts her activities because of this.  Most of the time her energy and stamina are normal.  She denies predictable exertional chest arm neck shoulder or jaw discomfort, PND/orthopnea, syncope or near syncope, claudication, peripheral edema, strokelike symptoms.  She gives an example that most days she is able to do her daily housework without difficulty but on one of her bad days she is unable to make her bed, do the dishes, etc.    An echocardiogram was done and showed a normal ejection fraction, normal left atrial size, and normal valves.  She also had  a 7-day Zio patch done which showed paroxysmal atrial fibrillation with a 2% burden, the longest episode lasting 45 minutes.  Heart rate was generally fairly well-controlled with atrial fibs averaging 111 bpm.  Her overall heart rate is slow with an average heart rate of 57.  She is not on any AV blocking agents.      ASSESSMENT/PLAN:    1.  Paroxysmal atrial fibrillation.  This has gotten worse in the last few months and is causing of symptoms that it is frightening her and preventing her from doing her normal exercising and walking outdoors.  We talked about the possibility of watchful waiting, which is safe considering that she is therapeutic with warfarin, but less than ideal because of the limitations due to her being frightened of being stuck unable to care for herself if she has an episode of atrial fibrillation.  We talked about all curative cares which could include rate limiting medications but these are not practical for her given her resting slow heart rate.  Other alternative approaches would be antiarrhythmic therapy or ablation.  Given her advanced age, antiarrhythmic therapy would make the most sense.  We talked about initiating flecainide at low-dose and I reviewed potential side effects including proarrhythmia.  She wanted to give this some thought but we will get a stress echo done to exclude coronary disease and I wrote her a prescription.  I have also asked her to get an EKG done a couple weeks after starting flecainide and we will plan on a follow-up in about 2 months.    Thank you for inviting me to participate in the care of your patient.  Please don't hesitate to call if I can be of further assistance.  45 minutes were spent today reviewing the chart and other records, interviewing and examining the patient, and documenting our visit.    Chart documentation was completed, in part, with [employer] voice-recognition software. Even though reviewed, some grammatical, spelling, and word errors may  remain.       Orders Placed This Encounter   Procedures    Exercise Stress Echocardiogram     Orders Placed This Encounter   Medications    flecainide (TAMBOCOR) 50 MG tablet     Sig: Take 1 tablet (50 mg) by mouth 2 times daily     Dispense:  60 tablet     Refill:  11     There are no discontinued medications.    10 year ASCVD risk: The ASCVD Risk score (Reinaldo BIRD, et al., 2019) failed to calculate for the following reasons:    The 2019 ASCVD risk score is only valid for ages 40 to 79    The patient has a prior MI or stroke diagnosis    Encounter Diagnosis   Name Primary?    Atrial fibrillation with rapid ventricular response (H)        CURRENT MEDICATIONS:  Current Outpatient Medications   Medication Sig Dispense Refill    acetaminophen (TYLENOL) 325 MG tablet Take 3 tablets (975 mg) by mouth every 8 hours as needed for pain 100 tablet 1    flecainide (TAMBOCOR) 50 MG tablet Take 1 tablet (50 mg) by mouth 2 times daily 60 tablet 11    lovastatin (MEVACOR) 40 MG tablet Take 1 tablet (40 mg) by mouth at bedtime 90 tablet 3    warfarin ANTICOAGULANT (COUMADIN) 5 MG tablet Take 2.5 mg by mouth BEDTIME: every evening EXCEPT Mondays take 5mg or as directed by anticoagulation clinic. Next INR 03/13/2024      warfarin ANTICOAGULANT (COUMADIN) 5 MG tablet TAKE ONE TABLET BY MOUTH ONCE DAILY ON MONDAY AND FRIDAY AND 1/2 TABLET ALL OTHER DAYS OF THE WEEK OR AS DIRECTED BY THE COUMADIN CLINIC - 30 day candido refill, patient needs to schedule annual visit with provider for future refills (Patient taking differently: Take 5 mg by mouth BEDTIME: ON MONDAY,  AND 1/2 TABLET (2.5mg) ALL OTHER DAYS OF THE WEEK OR AS DIRECTED BY THE COUMADIN CLINIC Next INR 03/13/2024) 90 tablet 3       ALLERGIES     Allergies   Allergen Reactions    No Known Allergies        PAST MEDICAL HISTORY:  Past Medical History:   Diagnosis Date    Atrial fibrillation (H) 2014    related to colon surgery    Colon polyps     Diverticulosis of colon (without  mention of hemorrhage)     Diverticulosis    DVT (deep vein thrombosis) in pregnancy 2014    after colon surgery    Other and unspecified hyperlipidemia     PE (pulmonary embolism) 2014    related to colon surgery    Unspecified essential hypertension        PAST SURGICAL HISTORY:  Past Surgical History:   Procedure Laterality Date    ARTHROPLASTY KNEE Right 1/8/2019    Procedure: Right total knee replacement;  Surgeon: Kaleb Pang DO;  Location: [facility]    ARTHROPLASTY KNEE Left 2/16/2021    Procedure: left total knee replacement;  Surgeon: Kaleb Pang DO;  Location: [facility]    COLONOSCOPY  09, 10, 12    [facility]    COLONOSCOPY N/A 12/11/2017    Procedure: COLONOSCOPY;  colonoscopy;  Surgeon: Elvis Quezada MD;  Location: [facility]    FLEXIBLE SIGMOIDOSCOPY  09, 11    LAPAROTOMY EXPLORATORY N/A 10/20/2014    Procedure: LAPAROTOMY EXPLORATORY;  Surgeon: Miguel Oleary MD;  Location: [facility]    LAPAROTOMY, LYSIS ADHESIONS, COMBINED N/A 10/20/2014    Procedure: COMBINED LAPAROTOMY, LYSIS ADHESIONS;  Surgeon: Miguel Oleary MD;  [facility]    OPEN REDUCTION INTERNAL FIXATION HIP BIPOLAR Left 3/16/2017    Procedure: OPEN REDUCTION INTERNAL FIXATION HIP BIPOLAR;  Surgeon: Kaleb Pang DO;  Location[facility]    RELEASE TRIGGER FINGER Left 1/12/2021    Procedure: Left thumb trigger release;  Surgeon: Kaleb Pang DO;  Location[facility]    RESECT SMALL BOWEL WITHOUT OSTOMY N/A 10/20/2014    Procedure: RESECT SMALL BOWEL WITHOUT OSTOMY;  Surgeon: Miguel Oleary MD;  [facility]       FAMILY HISTORY:  Family History   Problem Relation Age of Onset    Blood Disease No family hx of         blood clots       SOCIAL HISTORY:  Social History     Socioeconomic History    Marital status:      Spouse name: None    Number of children: None    Years of education: None    Highest education level: None   Tobacco Use    Smoking status: Never     Smokeless tobacco: Never   Substance and Sexual Activity    Alcohol use: No     Alcohol/week: 0.0 standard drinks of alcohol    Drug use: No    Sexual activity: Not Currently     Partners: Male   Other Topics Concern    Parent/sibling w/ CABG, MI or angioplasty before 65F 55M? No     Social Determinants of Health     Financial Resource Strain: High Risk (11/26/2023)    Financial Resource Strain     Within the past 12 months, have you or your family members you live with been unable to get utilities (heat, electricity) when it was really needed?: Yes   Food Insecurity: Low Risk  (11/26/2023)    Food Insecurity     Within the past 12 months, did you worry that your food would run out before you got money to buy more?: No     Within the past 12 months, did the food you bought just not last and you didn t have money to get more?: No   Transportation Needs: Low Risk  (11/26/2023)    Transportation Needs     Within the past 12 months, has lack of transportation kept you from medical appointments, getting your medicines, non-medical meetings or appointments, work, or from getting things that you need?: No   Interpersonal Safety: Low Risk  (11/29/2023)    Interpersonal Safety     Do you feel physically and emotionally safe where you currently live?: Yes     Within the past 12 months, have you been hit, slapped, kicked or otherwise physically hurt by someone?: No     Within the past 12 months, have you been humiliated or emotionally abused in other ways by your partner or ex-partner?: No   Housing Stability: Low Risk  (11/26/2023)    Housing Stability     Do you have housing? : Yes     Are you worried about losing your housing?: No       Review of Systems:  Skin:        Eyes:       ENT:       Respiratory:  Positive for dyspnea on exertion  Cardiovascular:  Negative;chest pain;dizziness;syncope or near-syncope Positive for;palpitations;edema;lightheadedness;fatigue  Gastroenterology:      Genitourinary:       Musculoskeletal:  "      Neurologic:  Positive for tremors  Psychiatric:       Heme/Lymph/Imm:       Endocrine:         Physical Exam:  Vitals: BP (!) 148/84 (BP Location: Left arm, Patient Position: Sitting, Cuff Size: Adult Regular)   Pulse 59   Resp 18   Ht 1.626 m (5' 4\")   Wt 83 kg (183 lb)   SpO2 96%   BMI 31.41 kg/m      Constitutional:  cooperative, alert and oriented, well developed, well nourished, in no acute distress        Skin:  warm and dry to the touch, no apparent skin lesions or masses noted        Head:  normocephalic, no masses or lesions        Eyes:  pupils equal and round, conjunctivae and lids unremarkable, sclera white, no xanthalasma, EOMS intact, no nystagmus        ENT:  no pallor or cyanosis        Neck:  carotid pulses are full and equal bilaterally, JVP normal, no carotid bruit        Chest:  normal breath sounds, clear to auscultation, normal A-P diameter, normal symmetry, normal respiratory excursion, no use of accessory muscles        Cardiac: regular rhythm, normal S1/S2, no S3 or S4, apical impulse not displaced, no murmurs, gallops or rubs                  Abdomen:  abdomen soft;BS normoactive        Vascular: pulses full and equal                                      Extremities and Muscular Skeletal:  no edema           Neurological:  no gross motor deficits        Psych:  affect appropriate, oriented to time, person and place     Recent Lab Results:  LIPID RESULTS:  Lab Results   Component Value Date    CHOL 142 05/16/2022    CHOL 155 01/31/2020    HDL 52 05/16/2022    HDL 66 01/31/2020    LDL 49 05/16/2022    LDL 56 01/31/2020    TRIG 203 (H) 05/16/2022    TRIG 167 (H) 01/31/2020    CHOLHDLRATIO 3.7 08/24/2015       LIVER ENZYME RESULTS:  Lab Results   Component Value Date    AST 23 06/12/2023    AST 25 01/31/2020    ALT 19 06/12/2023    ALT 27 01/31/2020       CBC RESULTS:  Lab Results   Component Value Date    WBC 5.9 02/29/2024    WBC 5.8 02/01/2021    RBC 5.08 02/29/2024    RBC 4.71 " 02/01/2021    HGB 14.8 02/29/2024    HGB 11.3 (L) 02/17/2021    HCT 45.6 02/29/2024    HCT 42.2 02/01/2021    MCV 90 02/29/2024    MCV 90 02/01/2021    MCH 29.1 02/29/2024    MCH 29.1 02/01/2021    MCHC 32.5 02/29/2024    MCHC 32.5 02/01/2021    RDW 13.9 02/29/2024    RDW 13.8 02/01/2021     02/29/2024     02/17/2021       BMP RESULTS:  Lab Results   Component Value Date     02/29/2024     08/06/2020    POTASSIUM 4.2 02/29/2024    POTASSIUM 4.4 05/16/2022    POTASSIUM 4.2 08/06/2020    CHLORIDE 106 02/29/2024    CHLORIDE 114 (H) 05/16/2022    CHLORIDE 108 08/06/2020    CO2 25 02/29/2024    CO2 28 05/16/2022    CO2 28 08/06/2020    ANIONGAP 10 02/29/2024    ANIONGAP 3 05/16/2022    ANIONGAP 2 (L) 08/06/2020    GLC 80 02/29/2024    GLC 92 06/14/2023    GLC 90 05/16/2022     (H) 02/17/2021    BUN 17.4 02/29/2024    BUN 16 05/16/2022    BUN 16 08/06/2020    CR 0.94 02/29/2024    CR 0.87 08/06/2020    GFRESTIMATED 60 (L) 02/29/2024    GFRESTIMATED 64 08/06/2020    GFRESTBLACK 74 08/06/2020    HARVEY 9.9 02/29/2024    HARVEY 8.7 08/06/2020        A1C RESULTS:  Lab Results   Component Value Date    A1C 5.8 10/20/2014       INR RESULTS:  Lab Results   Component Value Date    INR 2.4 (H) 04/04/2024    INR 3.2 (H) 03/27/2024    INR 2.20 (H) 02/29/2024    INR 2.59 (H) 06/14/2023    INR 2.2 (H) 06/17/2021    INR 2.7 (H) 05/20/2021    INR 1.8 (A) 03/02/2021    INR 1.9 (A) 02/24/2021         Abimael Bass MD, [facility]    CC  Rohan Glynn MD  [address] [place] [id number]        Thank you for allowing me to participate in the care of your patient.      Sincerely,     Abimael Bass MD     [facility]

## 2024-04-15 ENCOUNTER — TELEPHONE (OUTPATIENT)
Dept: PHYSICAL THERAPY | Facility: CLINIC | Age: 84
End: 2024-04-15
Payer: MEDICARE

## 2024-04-15 ENCOUNTER — HOSPITAL ENCOUNTER (OUTPATIENT)
Dept: CARDIOLOGY | Facility: CLINIC | Age: 84
Discharge: HOME | End: 2024-04-15
Attending: INTERNAL MEDICINE | Admitting: INTERNAL MEDICINE
Payer: MEDICARE

## 2024-04-15 DIAGNOSIS — M54.32 LEFT SCIATIC NERVE PAIN: Primary | ICD-10-CM

## 2024-04-15 DIAGNOSIS — I48.91 ATRIAL FIBRILLATION WITH RAPID VENTRICULAR RESPONSE (H): ICD-10-CM

## 2024-04-15 PROCEDURE — 93321 DOPPLER ECHO F-UP/LMTD STD: CPT | Mod: 26 | Performed by: INTERNAL MEDICINE

## 2024-04-15 PROCEDURE — 255N000002 HC RX 255 OP 636: Performed by: INTERNAL MEDICINE

## 2024-04-15 PROCEDURE — 93350 STRESS TTE ONLY: CPT | Mod: 26 | Performed by: INTERNAL MEDICINE

## 2024-04-15 PROCEDURE — 93018 CV STRESS TEST I&R ONLY: CPT | Performed by: INTERNAL MEDICINE

## 2024-04-15 PROCEDURE — 93016 CV STRESS TEST SUPVJ ONLY: CPT | Performed by: INTERNAL MEDICINE

## 2024-04-15 PROCEDURE — 999N000208 ECHO STRESS ECHOCARDIOGRAM

## 2024-04-15 PROCEDURE — C8928 TTE W OR W/O FOL W/CON,STRES: HCPCS

## 2024-04-15 PROCEDURE — 93325 DOPPLER ECHO COLOR FLOW MAPG: CPT | Mod: 26 | Performed by: INTERNAL MEDICINE

## 2024-04-15 RX ADMIN — HUMAN ALBUMIN MICROSPHERES AND PERFLUTREN 9 ML: 10; .22 INJECTION, SOLUTION INTRAVENOUS at 10:42

## 2024-04-15 NOTE — PROGRESS NOTES
[facility]                                                                                   OUTPATIENT PHYSICAL THERAPY    PLAN OF TREATMENT FOR OUTPATIENT REHABILITATION   Patient's Last Name, First Name, Tracy Denson YOB: 1940   Provider's Name   [facility]   Medical Record No.  [id number]     Onset Date:   11- Start of Care Date:  12-     Medical Diagnosis:   Left sciatic nerve pain (M54.32)       PT Treatment Diagnosis:   Left pelvic and knee pain  Plan of Treatment  Frequency/Duration:  1 time per week/ 10 weeks   May miss appt as she has been having heart issues  Certification date from  3-6-2024 to    5-1-2024       See note for plan of treatment details and functional goals     Carmen Cooper, PT                         I CERTIFY THE NEED FOR THESE SERVICES FURNISHED UNDER        THIS PLAN OF TREATMENT AND WHILE UNDER MY CARE     (Physician attestation of this document indicates review and certification of the therapy plan).              Referring Provider:  Gerard Colon MD    Initial Assessment  See Epic Evaluation-             03/06/24 0500   Appointment Info   Signing clinician's name / credentials Carmen Cooper PT LAT [facility]PS   Total/Authorized Visits Medicare/[employer] [place]   Visits Used 9   Medical Diagnosis Left sciatic nerve pain (M54.32)   PT Tx Diagnosis Left pelvic and knee pain   Precautions/Limitations L JUANCARLOS, bilateral TKAs   Other pertinent information very active 82 yo female with L knee pain. Walks to calm pain and general movement feels best. Describes stiffness and symptoms mainly in the L patellar tendon area and difficulty getting good hip flexion especially in standing eg to get pants on.   Quick Adds Certification   Progress Note/Certification   Start of Care Date 12/29/23   Onset of illness/injury or Date of Surgery 11/20/23   Therapy Frequency 2 times per week x 2 weeks and  the 1 x per week x 4 weeks   Certification date from 03/06/24   Certification date to 04/30/24   Progress Note Due Date 04/30/24   Progress Note Completed Date 03/06/24       Present No   GOALS   PT Goals 2   PT Goal 1   Goal Identifier Aerobic activities   Goal Description Tracy will be able to comtinue with her stationary biking (10-15 minutes) and walking (tracks on agnes) without L knee pain and improved mechanics.   Rationale to maximize safety and independence with performance of ADLs and functional tasks;to maximize safety and independence within the home;to maximize safety and independence within the community   Goal Progress Patient reports walking [id number] steps daily without significant LE pain since her previous session.   Target Date 02/09/24   PT Goal 2   Goal Identifier Stairs   Goal Description Tracy will have 80% or better trust in her L knee negotiating steps as demonstrated by normal gait pattern on steps without symptoms in L knee   Rationale to maximize safety and independence with performance of ADLs and functional tasks;to maximize safety and independence within the home;to maximize safety and independence within the community   Goal Progress feels this is progressing   Target Date 01/26/24   Subjective Report   Subjective Report no hip, low back or R knee pain. L hamstring pain laterally.Was in ED for A fib and will be following up with Dr. Chad Betts and cardiologist. She would like to know guidelines for workout as she is tired today but has been working on 1-2 exercises as able and not riding her bike.   Objective Measures   Objective Measures Objective Measure 1;Objective Measure 2   Objective Measure 1   Objective Measure supine knee AROM   Objective Measure 2   Objective Measure hip flexor PROM -goal: neutral position   Details lacking 2-3 degrees   Treatment Interventions (PT)   Interventions Therapeutic Procedure/Exercise   Therapeutic Procedure/Exercise    Therapeutic Procedures: strength, endurance, ROM, flexibility minutes (06421) 34   Ther Proc 1 - Details Recommended checking vitals before completing her exercises, hold exercises if you feel tired, stretches okay as comfortable and added hamstring stretch with DF and hip IR 1 x 4 reps today, breathing during exercises. Asked her to discuss guidelines more thoroughly with her doctors. Was not given guidelines for activity after ED visit.   Skilled Intervention guidelines for exercise, added exercise to strech lateral L knee - hamstring   Patient Response/Progress no questions   Education   Learner/Method Patient;Listening;Reading;Demonstration;Pictures/Video;No Barriers to Learning   Education Comments hamstring stretch, plan of care   Plan   Home program Standing BLE hip flexor stretch x1 minute each; Seated cervical chin tucks x10 reps of 3-5 second holds; Supine BUE towel roll pectoral stretch x1 minute with x4 reps of 15 second holds for scapular retractions; Supine BLE bent knee fallouts x10 reps each; Supine below 90 BLE belted PF stretch x1 minute each; Daily walking and matrix bike on days she doesn't do HEP exercises; Exercises performed 2 times per day on days she performs them. hamstring stretch,   Updates to plan of care hold until she gets guidelines from provider as far as PT - sees cardiologist in April   Plan for next session Check AROM and strength, steps and squats. review cardiac guidelines   Comments   Comments Tracy was in the ED for her heart/a fib. We agreed to hold PT through 4- or until she has her heart condition under control and has guidelines for activity. She has been improving with the PT and exercise with decrease in her pain and ability to work out on her bike. Would continue PT for progression of hip flexibility and strengthening - functional.   Total Session Time   Timed Code Treatment Minutes 34   Total Treatment Time (sum of timed and untimed services) 34

## 2024-04-15 NOTE — PROGRESS NOTES
[facility]                                                                                   OUTPATIENT PHYSICAL THERAPY    PLAN OF TREATMENT FOR OUTPATIENT REHABILITATION   Patient's Last Name, First Name, Tracy Denson YOB: 1940   Provider's Name   [facility]   Medical Record No.  [id number]     Onset Date:   11/20/2023 Start of Care Date:  12/29/2023     Medical Diagnosis:   Left sciatic nerve pain (M54.32)      PT Treatment Diagnosis:   Left pelvic and knee pain Plan of Treatment  Frequency/Duration: 2 times per week x 2 weeks and then 1 x per week x 4 weeks    Certification date from  02/09/2024 to  03/05/2024         See note for plan of treatment details and functional goals        01/26/24 0500   Appointment Info   Signing clinician's name / credentials Alhaji Carr, PT, DPT   Visits Used 7   Medical Diagnosis Left sciatic nerve pain (M54.32)   PT Tx Diagnosis Left pelvic and knee pain   Precautions/Limitations L JUANCARLOS, bilateral TKAs   Other pertinent information very active 82 yo female with L knee pain. Walks to calm pain and general movement feels best. Describes stiffness and symptoms mainly in the L patellar tendon area and difficulty getting good hip flexion especially in standing eg to get pants on.   Quick Adds Certification   Progress Note/Certification   Start of Care Date 12/29/23   Onset of illness/injury or Date of Surgery 11/20/23   Therapy Frequency 2 times per week x 2 weeks and the 1 x per week x 4 weeks   Certification date from 02/09/24   Certification date to 03/05/24   Progress Note Due Date 03/05/24   Progress Note Completed Date 04/15/24       Present No   GOALS   PT Goals 2   PT Goal 1   Goal Identifier Aerobic activities   Goal Description Tracy will be able to comtinue with her stationary biking (10-15 minutes) and walking (tracks on agnes) without L knee pain and improved  mechanics.   Rationale to maximize safety and independence with performance of ADLs and functional tasks;to maximize safety and independence within the home;to maximize safety and independence within the community   Goal Progress Patient reports walking [id number] steps daily without significant LE pain since her previous session.   Target Date 03/05/24   PT Goal 2   Goal Identifier Stairs   Goal Description Tracy will have 80% or better trust in her L knee negotiating steps as demonstrated by normal gait pattern on steps without symptoms in L knee   Rationale to maximize safety and independence with performance of ADLs and functional tasks;to maximize safety and independence within the home;to maximize safety and independence within the community   Goal Progress not assessed today.   Target Date 03/05/24   Subjective Report   Subjective Report Patient in good spirits during today's session. Patient reports good adherence to her HEP since her previous session. Patient reports forgetting her HEP paperwork at home. Patient wishing to work on biking form and making HEP more concise today.   Objective Measures   Objective Measures Objective Measure 1;Objective Measure 2   Objective Measure 1   Objective Measure supine knee AROM   Details tightness left knee flexion in sitting   Objective Measure 2   Objective Measure hip flexor PROM -goal: neutral position   Details not tested today, standing more upright after session   Treatment Interventions (PT)   Interventions Therapeutic Procedure/Exercise;Therapeutic Activity   Therapeutic Procedure/Exercise   Therapeutic Procedures: strength, endurance, ROM, flexibility minutes (78309) 25   Ther Proc 1 - Details Review of more concise HEP for holistic cervicothoracic spine posture and improved BLE hip mobility: Seated cervical chin tucks x10 reps of 3-5 second holds with min TC for improved horizontal retraction movement direction, supine BUE towel roll pectoral stretch x1  minute with cueing to perform with BUE scapular retractions x4 reps of 15 second holds, and supine BLE bent knee fallouts x10 reps; Review of supine LE mobility and stretching exercises to not bend knee higher than hip height or past midline of her belly button to maintain proper adherence to her s/p left JUANCARLOS surgical precautions; Education to reduce frequency of her HEP to 3-4 times per week to reduce muscular fatigue with her biking and daily walking activities; Reminder to bring in her HEP paperwork to review as needed at her next OP PT treatment session in preparation for discharge if feeling independent with her HEP at this time.   Skilled Intervention Selection, instruction, and modification of selected exercises for optimal therapeutic benefit. Education and cueing as detailed above.   Patient Response/Progress Patient reports and demonstrates understanding of today's given education.   Therapeutic Activity   Therapeutic Activities: dynamic activities to improve functional performance minutes (26423) 15   Ther Act 1 - Details Matrix bike x14 minutes at seat height 11; Education for performing to tolerance on her off days from her HEP, to allow more of her RLE to do the work with the cycling to avoid lateral left knee pain exacerbation.   Skilled Intervention Selection, instruction, and modification of selected activities for optimal therapeutic benefit. Education and cueing as detailed above.   Patient Response/Progress Patient reports and demonstrates understanding of today's given education.   Education   Learner/Method Patient;Listening;Demonstration;No Barriers to Learning   Education Comments Patient reports and demonstrates understanding of today's given education.   Plan   Home program Seated cervical chin tucks x10 reps of 3-5 second holds; Supine BUE towel roll pectoral stretch x1 minute with x4 reps of 15 second holds for scapular retractions; Supine BLE bent knee fallouts x10 reps each; Supine  below 90 BLE belted PF stretch x1 minute each; Daily walking and matrix bike on days she doesn't do HEP exercises; Exercises performed 2 times per day on days she performs them.   Plan for next session Review home exercises with her and macro-micro cycle design to decrease time spent on exercise. Low back extension standing, sitting or laying to help with upright position in standing   Total Session Time   Timed Code Treatment Minutes 40   Total Treatment Time (sum of timed and untimed services) 40     Alhaji Carr, PT, DPT    [facility]  O: [phone]  E: [email or web address]                          I CERTIFY THE NEED FOR THESE SERVICES FURNISHED UNDER        THIS PLAN OF TREATMENT AND WHILE UNDER MY CARE .       Physician Signature               Date    X_____________________________________________________                Referring Provider:  Gerard Colon MD    Initial Assessment  See Epic Evaluation-  12/29/2023      PLAN  Continue therapy per current plan of care.    Beginning/End Dates of Progress Note Reporting Period:  12/29/2023 to 01/26/2024    Referring Provider:  Gerard Colon MD

## 2024-04-17 ENCOUNTER — TELEPHONE (OUTPATIENT)
Dept: CARDIOLOGY | Facility: CLINIC | Age: 84
End: 2024-04-17
Payer: MEDICARE

## 2024-04-17 NOTE — TELEPHONE ENCOUNTER
"Discussed with patient. Pt agrees to get EKG in 1-2 weeks. Pt will call scheduling. Pt already started the Flecainide on 4/16/24 and feels \"great!!\".     ---- Message from Abimael Bass MD sent at 4/17/2024  7:51 AM CDT -----  The stress echo was done to assure safety of flecainide, not because of a suspicion of angina/CAD.  There was no ischemia seen on ECG so I believe it is safe for her to begin her flecainide.  I wrote her an Rx.  Please have her start this medication and get follow up 12 lead in 1-2 weeks.    Marguerite CASTOR  [facility]    "

## 2024-04-18 ENCOUNTER — ANTICOAGULATION THERAPY VISIT (OUTPATIENT)
Dept: ANTICOAGULATION | Facility: CLINIC | Age: 84
End: 2024-04-18

## 2024-04-18 ENCOUNTER — LAB (OUTPATIENT)
Dept: LAB | Facility: CLINIC | Age: 84
End: 2024-04-18
Payer: MEDICARE

## 2024-04-18 DIAGNOSIS — Z79.01 LONG TERM CURRENT USE OF ANTICOAGULANT THERAPY: ICD-10-CM

## 2024-04-18 DIAGNOSIS — I27.82 CHRONIC PULMONARY EMBOLISM WITHOUT ACUTE COR PULMONALE, UNSPECIFIED PULMONARY EMBOLISM TYPE (H): ICD-10-CM

## 2024-04-18 DIAGNOSIS — I26.99 OTHER PULMONARY EMBOLISM WITHOUT ACUTE COR PULMONALE, UNSPECIFIED CHRONICITY (H): ICD-10-CM

## 2024-04-18 DIAGNOSIS — I27.82 CHRONIC PULMONARY EMBOLISM WITHOUT ACUTE COR PULMONALE, UNSPECIFIED PULMONARY EMBOLISM TYPE (H): Primary | ICD-10-CM

## 2024-04-18 LAB — INR BLD: 2.1 (ref 0.9–1.1)

## 2024-04-18 PROCEDURE — 85610 PROTHROMBIN TIME: CPT

## 2024-04-18 PROCEDURE — 36416 COLLJ CAPILLARY BLOOD SPEC: CPT

## 2024-04-18 NOTE — PROGRESS NOTES
ANTICOAGULATION MANAGEMENT     Tracy Palomo 83 year old female is on warfarin with therapeutic INR result. (Goal INR 2.0-3.0)    Recent labs: (last 7 days)     04/18/24  0810   INR 2.1*       ASSESSMENT     Source(s): Chart Review and Patient/Caregiver Call     Warfarin doses taken: Warfarin taken as instructed  Diet: No new diet changes identified  Medication/supplement changes:  Started on Flecainide for her flutter but per micromedex this should not affect her INR  New illness, injury, or hospitalization: No  Signs or symptoms of bleeding or clotting: No  Previous result: Therapeutic last visit; previously outside of goal range  Additional findings: None       PLAN     Recommended plan for no diet, medication or health factor changes affecting INR     Dosing Instructions: Continue your current warfarin dose with next INR in 3 weeks       Summary  As of 4/18/2024      Full warfarin instructions:  2.5 mg every day   Next INR check:  5/9/2024               Telephone call with Tracy who verbalizes understanding and agrees to plan and who agrees to plan and repeated back plan correctly    Lab visit scheduled    Education provided:   Contact [phone]  with any changes, questions or concerns.     Plan made per [facility] anticoagulation protocol    Arti Ruelas, RN  Anticoagulation Clinic  4/18/2024    _______________________________________________________________________     Anticoagulation Episode Summary       Current INR goal:  2.0-3.0   TTR:  61.2% (11.8 mo)   Target end date:  Indefinite   Send INR reminders to:  GURVINDER SOARES    Indications    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]  Long term current use of anticoagulant therapy [Z79.01]  Other pulmonary embolism without acute cor pulmonale  unspecified chronicity (H) [I26.99]  Chronic pulmonary embolism without acute cor pulmonale  unspecified pulmonary embolism type (H) [I27.82]             Comments:                Anticoagulation Care Providers       Provider Role Specialty Phone number    Chad Betts MD Referring Internal Medicine [phone]    Gerard Medrano MD Responsible Family Medicine [phone]

## 2024-04-20 ENCOUNTER — HOSPITAL ENCOUNTER (EMERGENCY)
Facility: CLINIC | Age: 84
Discharge: HOME | End: 2024-04-20
Attending: STUDENT IN AN ORGANIZED HEALTH CARE EDUCATION/TRAINING PROGRAM | Admitting: STUDENT IN AN ORGANIZED HEALTH CARE EDUCATION/TRAINING PROGRAM
Payer: MEDICARE

## 2024-04-20 VITALS
DIASTOLIC BLOOD PRESSURE: 82 MMHG | BODY MASS INDEX: 30.9 KG/M2 | WEIGHT: 180 LBS | RESPIRATION RATE: 15 BRPM | TEMPERATURE: 98.3 F | OXYGEN SATURATION: 95 % | HEART RATE: 49 BPM | SYSTOLIC BLOOD PRESSURE: 129 MMHG

## 2024-04-20 DIAGNOSIS — R42 DIZZINESS: ICD-10-CM

## 2024-04-20 LAB
ALBUMIN UR-MCNC: NEGATIVE MG/DL
APPEARANCE UR: CLEAR
BACTERIA #/AREA URNS HPF: ABNORMAL /HPF
BASOPHILS # BLD AUTO: 0 10E3/UL (ref 0–0.2)
BASOPHILS NFR BLD AUTO: 1 %
BILIRUB UR QL STRIP: NEGATIVE
COLOR UR AUTO: ABNORMAL
EOSINOPHIL # BLD AUTO: 0.1 10E3/UL (ref 0–0.7)
EOSINOPHIL NFR BLD AUTO: 2 %
ERYTHROCYTE [DISTWIDTH] IN BLOOD BY AUTOMATED COUNT: 13.8 % (ref 10–15)
GLUCOSE UR STRIP-MCNC: NEGATIVE MG/DL
HCT VFR BLD AUTO: 45 % (ref 35–47)
HGB BLD-MCNC: 14.4 G/DL (ref 11.7–15.7)
HGB UR QL STRIP: NEGATIVE
IMM GRANULOCYTES # BLD: 0 10E3/UL
IMM GRANULOCYTES NFR BLD: 1 %
KETONES UR STRIP-MCNC: NEGATIVE MG/DL
LEUKOCYTE ESTERASE UR QL STRIP: NEGATIVE
LYMPHOCYTES # BLD AUTO: 1.1 10E3/UL (ref 0.8–5.3)
LYMPHOCYTES NFR BLD AUTO: 22 %
MCH RBC QN AUTO: 29.1 PG (ref 26.5–33)
MCHC RBC AUTO-ENTMCNC: 32 G/DL (ref 31.5–36.5)
MCV RBC AUTO: 91 FL (ref 78–100)
MONOCYTES # BLD AUTO: 0.4 10E3/UL (ref 0–1.3)
MONOCYTES NFR BLD AUTO: 7 %
MUCOUS THREADS #/AREA URNS LPF: PRESENT /LPF
NEUTROPHILS # BLD AUTO: 3.4 10E3/UL (ref 1.6–8.3)
NEUTROPHILS NFR BLD AUTO: 67 %
NITRATE UR QL: NEGATIVE
NRBC # BLD AUTO: 0 10E3/UL
NRBC BLD AUTO-RTO: 0 /100
PH UR STRIP: 5 [PH] (ref 5–7)
PLATELET # BLD AUTO: 224 10E3/UL (ref 150–450)
RBC # BLD AUTO: 4.95 10E6/UL (ref 3.8–5.2)
RBC URINE: 0 /HPF
SP GR UR STRIP: 1.01 (ref 1–1.03)
SQUAMOUS EPITHELIAL: <1 /HPF
UROBILINOGEN UR STRIP-MCNC: NORMAL MG/DL
WBC # BLD AUTO: 5.1 10E3/UL (ref 4–11)
WBC URINE: 1 /HPF

## 2024-04-20 PROCEDURE — 99284 EMERGENCY DEPT VISIT MOD MDM: CPT | Mod: 25 | Performed by: STUDENT IN AN ORGANIZED HEALTH CARE EDUCATION/TRAINING PROGRAM

## 2024-04-20 PROCEDURE — 85025 COMPLETE CBC W/AUTO DIFF WBC: CPT | Performed by: STUDENT IN AN ORGANIZED HEALTH CARE EDUCATION/TRAINING PROGRAM

## 2024-04-20 PROCEDURE — 84484 ASSAY OF TROPONIN QUANT: CPT | Performed by: STUDENT IN AN ORGANIZED HEALTH CARE EDUCATION/TRAINING PROGRAM

## 2024-04-20 PROCEDURE — 258N000003 HC RX IP 258 OP 636: Performed by: STUDENT IN AN ORGANIZED HEALTH CARE EDUCATION/TRAINING PROGRAM

## 2024-04-20 PROCEDURE — 93010 ELECTROCARDIOGRAM REPORT: CPT | Performed by: STUDENT IN AN ORGANIZED HEALTH CARE EDUCATION/TRAINING PROGRAM

## 2024-04-20 PROCEDURE — 93005 ELECTROCARDIOGRAM TRACING: CPT | Performed by: STUDENT IN AN ORGANIZED HEALTH CARE EDUCATION/TRAINING PROGRAM

## 2024-04-20 PROCEDURE — 36415 COLL VENOUS BLD VENIPUNCTURE: CPT | Performed by: STUDENT IN AN ORGANIZED HEALTH CARE EDUCATION/TRAINING PROGRAM

## 2024-04-20 PROCEDURE — 81001 URINALYSIS AUTO W/SCOPE: CPT | Performed by: STUDENT IN AN ORGANIZED HEALTH CARE EDUCATION/TRAINING PROGRAM

## 2024-04-20 PROCEDURE — 80053 COMPREHEN METABOLIC PANEL: CPT | Performed by: STUDENT IN AN ORGANIZED HEALTH CARE EDUCATION/TRAINING PROGRAM

## 2024-04-20 RX ADMIN — SODIUM CHLORIDE 500 ML: 9 INJECTION, SOLUTION INTRAVENOUS at 16:09

## 2024-04-20 ASSESSMENT — ENCOUNTER SYMPTOMS
DYSURIA: 0
SHORTNESS OF BREATH: 0
NECK STIFFNESS: 0
FEVER: 0
APPETITE CHANGE: 0
ACTIVITY CHANGE: 0
RHINORRHEA: 0
EYE REDNESS: 0
HEADACHES: 0
CHILLS: 0
DIARRHEA: 0
SORE THROAT: 0
MYALGIAS: 0
ABDOMINAL PAIN: 0
ARTHRALGIAS: 0
FATIGUE: 0
NAUSEA: 0
DIZZINESS: 1
VOMITING: 0
HEMATURIA: 0
COUGH: 0

## 2024-04-20 ASSESSMENT — COLUMBIA-SUICIDE SEVERITY RATING SCALE - C-SSRS
6. HAVE YOU EVER DONE ANYTHING, STARTED TO DO ANYTHING, OR PREPARED TO DO ANYTHING TO END YOUR LIFE?: NO
2. HAVE YOU ACTUALLY HAD ANY THOUGHTS OF KILLING YOURSELF IN THE PAST MONTH?: NO
1. IN THE PAST MONTH, HAVE YOU WISHED YOU WERE DEAD OR WISHED YOU COULD GO TO SLEEP AND NOT WAKE UP?: NO

## 2024-04-20 ASSESSMENT — ACTIVITIES OF DAILY LIVING (ADL)
ADLS_ACUITY_SCORE: 39

## 2024-04-20 NOTE — ED TRIAGE NOTES
Intermittently today pt states that she has been having intermittent dizziness, lightheaded and faster HR with A fib hx and change in med for A fib this week on tues.      Triage Assessment (Adult)       Row Name 04/20/24 8300          Triage Assessment    Airway WDL WDL        Respiratory WDL    Respiratory WDL WDL        Cardiac WDL    Cardiac WDL WDL

## 2024-04-20 NOTE — DISCHARGE INSTRUCTIONS
Please reconsult your cardiologist and consider taking half a tablet dose until seen for possible contributing symptoms.  However if you feel comfortable taking the full dose this could also be appropriate at this time.  Please return if symptoms worsen or recur.

## 2024-04-20 NOTE — MEDICATION SCRIBE - ADMISSION MEDICATION HISTORY
Medication Scribe Admission Medication History    Admission medication history is complete. The information provided in this note is only as accurate as the sources available at the time of the update.    Information Source(s): Patient and CareEverywhere/SureScripts via in-person    Pertinent Information: granddaughter Joshua present    Changes made to PTA medication list:  Added: None  Deleted: None  Changed: updated warfarin dose    Allergies reviewed with patient and updates made in EHR: yes    Medication History Completed By: DENI MCCRARY 4/20/2024 5:21 PM    PTA Med List   Medication Sig Last Dose    acetaminophen (TYLENOL) 325 MG tablet Take 3 tablets (975 mg) by mouth every 8 hours as needed for pain Past Month at unkn    flecainide (TAMBOCOR) 50 MG tablet Take 1 tablet (50 mg) by mouth 2 times daily (Patient taking differently: Take 50 mg by mouth 2 times daily Breakfast and suppertime) 4/20/2024 at am    lovastatin (MEVACOR) 40 MG tablet Take 1 tablet (40 mg) by mouth at bedtime 4/19/2024 at hs    warfarin ANTICOAGULANT (COUMADIN) 5 MG tablet TAKE ONE TABLET BY MOUTH ONCE DAILY ON MONDAY AND FRIDAY AND 1/2 TABLET ALL OTHER DAYS OF THE WEEK OR AS DIRECTED BY THE COUMADIN CLINIC - 30 day candido refill, patient needs to schedule annual visit with provider for future refills (Patient taking differently: Take 2.5 mg by mouth at bedtime OR AS DIRECTED BY THE COUMADIN CLINIC Next INR 05/09/2024) 4/19/2024 at hs

## 2024-04-20 NOTE — ED PROVIDER NOTES
History     Chief Complaint   Patient presents with    Palpitations     HPI  Tracy Palomo is a 83 year old female mild dizziness working at home and placing things above her head.  She did not have any syncope any chest pain chills breathing shortness of breath cough she does have intermittent palpitations with a history of atrial fibrillation on warfarin with no abdominal pain new rashes difficulty urinating or stooling.  Patient's vitals are stable aside from mild bradycardia intermittently at 49 bpm lowest.  Patient otherwise been well with no recent illnesses.  Does note that she started flecainide recently this week at 50 mg twice daily.    Allergies:  Allergies   Allergen Reactions    No Known Allergies        Problem List:    Patient Active Problem List    Diagnosis Date Noted    Left sciatic nerve pain 12/29/2023     Priority: Medium    Small bowel obstruction (H) 06/10/2023     Priority: Medium    Subtherapeutic international normalized ratio (INR) 06/10/2023     Priority: Medium    Cholelithiasis 06/10/2023     Priority: Medium    Spinal stenosis of lumbar region without neurogenic claudication 08/18/2022     Priority: Medium    S/P hip hemiarthroplasty 06/02/2022     Priority: Medium    Chronic pulmonary embolism without acute cor pulmonale, unspecified pulmonary embolism type (H) 04/20/2022     Priority: Medium    Status post total left knee replacement 03/01/2021     Priority: Medium    S/P total knee replacement using cement, left 02/16/2021     Priority: Medium    Pain of right sacroiliac joint 01/06/2021     Priority: Medium    Primary osteoarthritis of right hip 01/06/2021     Priority: Medium    Trigger finger of left thumb 01/06/2021     Priority: Medium    SAH (subarachnoid hemorrhage) (H) 08/05/2020     Priority: Medium    Other pulmonary embolism without acute cor pulmonale, unspecified chronicity (H) 05/27/2020     Priority: Medium    Peripheral edema 01/09/2019     Priority: Medium     S/P total knee replacement using cement, right 01/08/2019     Priority: Medium    Other chest pain 01/08/2019     Priority: Medium    Primary osteoarthritis of right knee 11/15/2018     Priority: Medium    Primary osteoarthritis of left knee 11/15/2018     Priority: Medium    Closed left hip fracture (H) 03/15/2017     Priority: Medium    Long term current use of anticoagulant therapy 03/22/2016     Priority: Medium    Anemia 11/03/2014     Priority: Medium    Paroxysmal atrial fibrillation (H) 11/03/2014     Priority: Medium    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 11/01/2014     Priority: Medium     In 2014      Recent major surgery - exploratory lap with small bowel resection 10/20 11/01/2014     Priority: Medium    Pleural effusion on right 11/01/2014     Priority: Medium    Hypertension goal BP (blood pressure) < 140/90 02/22/2013     Priority: Medium    Hyperlipidemia LDL goal <130 02/22/2013     Priority: Medium    Encounter for long-term current use of medication 02/22/2013     Priority: Medium     Problem list name updated by automated process. Provider to review      History of colonic polyps 02/22/2013     Priority: Medium     Problem list name updated by automated process. Provider to review      Advanced directives, counseling/discussion 02/22/2013     Priority: Medium     Pt states has Advance Directive at home, will bring in to be scanned into chart.          Past Medical History:    Past Medical History:   Diagnosis Date    Atrial fibrillation (H) 2014    Colon polyps     Diverticulosis of colon (without mention of hemorrhage)     DVT (deep vein thrombosis) in pregnancy 2014    Other and unspecified hyperlipidemia     PE (pulmonary embolism) 2014    Unspecified essential hypertension        Past Surgical History:    Past Surgical History:   Procedure Laterality Date    ARTHROPLASTY KNEE Right 1/8/2019    Procedure: Right total knee replacement;  Surgeon: Kaleb Pang  DO Al;  Location: [facility]    ARTHROPLASTY KNEE Left 2/16/2021    Procedure: left total knee replacement;  Surgeon: Kaleb Pang DO;  Location: [facility]    COLONOSCOPY  09, 10, 12    [facility]    COLONOSCOPY N/A 12/11/2017    Procedure: COLONOSCOPY;  colonoscopy;  Surgeon: Elvis Quezada MD;  Location: [facility]    FLEXIBLE SIGMOIDOSCOPY  09, 11    LAPAROTOMY EXPLORATORY N/A 10/20/2014    Procedure: LAPAROTOMY EXPLORATORY;  Surgeon: Miguel Oleary MD;  Location[facility]    LAPAROTOMY, LYSIS ADHESIONS, COMBINED N/A 10/20/2014    Procedure: COMBINED LAPAROTOMY, LYSIS ADHESIONS;  Surgeon: Miguel Oleary MD;  [facility]    OPEN REDUCTION INTERNAL FIXATION HIP BIPOLAR Left 3/16/2017    Procedure: OPEN REDUCTION INTERNAL FIXATION HIP BIPOLAR;  Surgeon: Kaleb Pang DO;  Location[facility]    RELEASE TRIGGER FINGER Left 1/12/2021    Procedure: Left thumb trigger release;  Surgeon: Kaleb Pang DO;  Location[facility]    RESECT SMALL BOWEL WITHOUT OSTOMY N/A 10/20/2014    Procedure: RESECT SMALL BOWEL WITHOUT OSTOMY;  Surgeon: Miguel Oleary MD;  [facility]       Family History:    Family History   Problem Relation Age of Onset    Blood Disease No family hx of         blood clots       Social History:  Marital Status:   [5]  Social History     Tobacco Use    Smoking status: Never    Smokeless tobacco: Never   Substance Use Topics    Alcohol use: No     Alcohol/week: 0.0 standard drinks of alcohol    Drug use: No        Medications:    acetaminophen (TYLENOL) 325 MG tablet  flecainide (TAMBOCOR) 50 MG tablet  lovastatin (MEVACOR) 40 MG tablet  warfarin ANTICOAGULANT (COUMADIN) 5 MG tablet          Review of Systems   Constitutional:  Negative for activity change, appetite change, chills, fatigue and fever.   HENT:  Negative for congestion, rhinorrhea and sore throat.    Eyes:  Negative for redness.   Respiratory:  Negative for cough and  shortness of breath.    Cardiovascular:  Negative for chest pain.   Gastrointestinal:  Negative for abdominal pain, diarrhea, nausea and vomiting.   Genitourinary:  Negative for dysuria and hematuria.   Musculoskeletal:  Negative for arthralgias, myalgias and neck stiffness.   Skin:  Negative for rash.   Neurological:  Positive for dizziness. Negative for headaches.   All other systems reviewed and are negative.      Physical Exam   BP: (!) 193/100  Pulse: 87  Temp: 98.3  F (36.8  C)  Resp: 18  Weight: 81.6 kg (180 lb)  SpO2: 99 %      Physical Exam  Vitals and nursing note reviewed.   Constitutional:       General: She is not in acute distress.     Appearance: Normal appearance. She is normal weight. She is not ill-appearing, toxic-appearing or diaphoretic.   HENT:      Head: Normocephalic and atraumatic.   Cardiovascular:      Rate and Rhythm: Normal rate.      Pulses: Normal pulses.      Heart sounds: Normal heart sounds. No murmur heard.  Pulmonary:      Effort: Pulmonary effort is normal. No respiratory distress.      Breath sounds: Normal breath sounds. No wheezing.   Abdominal:      General: Abdomen is flat. Bowel sounds are normal.      Palpations: Abdomen is soft.      Tenderness: There is no abdominal tenderness.   Musculoskeletal:         General: Normal range of motion.      Cervical back: Normal range of motion.      Right lower leg: No edema.      Left lower leg: No edema.   Skin:     General: Skin is warm.      Capillary Refill: Capillary refill takes less than 2 seconds.   Neurological:      General: No focal deficit present.      Mental Status: She is alert and oriented to person, place, and time. Mental status is at baseline.      Cranial Nerves: No cranial nerve deficit.   Psychiatric:         Mood and Affect: Mood normal.         ED Course        Procedures         EKG self interpreted at 1524.  Normal sinus rhythm at 62 bpm, UT interval 196, QTc of 403 and QRS at 92.  No bundle-branch blocks or  T wave inversions.  Mild left atrial enlargement and left axis deviation.  Otherwise normal EKG       Results for orders placed or performed during the hospital encounter of 04/20/24 (from the past 24 hour(s))   CBC with platelets differential    Narrative    The following orders were created for panel order CBC with platelets differential.  Procedure                               Abnormality         Status                     ---------                               -----------         ------                     CBC with platelets and d...[[id number]]                      Final result                 Please view results for these tests on the individual orders.   [place] Draw    Narrative    The following orders were created for panel order [place] Draw.  Procedure                               Abnormality         Status                     ---------                               -----------         ------                     Extra Blue Top Tube[[id number]]                                                           Please view results for these tests on the individual orders.   CBC with platelets and differential   Result Value Ref Range    WBC Count 5.1 4.0 - 11.0 10e3/uL    RBC Count 4.95 3.80 - 5.20 10e6/uL    Hemoglobin 14.4 11.7 - 15.7 g/dL    Hematocrit 45.0 35.0 - 47.0 %    MCV 91 78 - 100 fL    MCH 29.1 26.5 - 33.0 pg    MCHC 32.0 31.5 - 36.5 g/dL    RDW 13.8 10.0 - 15.0 %    Platelet Count 224 150 - 450 10e3/uL    % Neutrophils 67 %    % Lymphocytes 22 %    % Monocytes 7 %    % Eosinophils 2 %    % Basophils 1 %    % Immature Granulocytes 1 %    NRBCs per 100 WBC 0 <1 /100    Absolute Neutrophils 3.4 1.6 - 8.3 10e3/uL    Absolute Lymphocytes 1.1 0.8 - 5.3 10e3/uL    Absolute Monocytes 0.4 0.0 - 1.3 10e3/uL    Absolute Eosinophils 0.1 0.0 - 0.7 10e3/uL    Absolute Basophils 0.0 0.0 - 0.2 10e3/uL    Absolute Immature Granulocytes 0.0 <=0.4 10e3/uL    Absolute NRBCs 0.0 10e3/uL   UA with Microscopic reflex to  Culture    Specimen: Urine, Clean Catch   Result Value Ref Range    Color Urine Straw Colorless, Straw, Light Yellow, Yellow    Appearance Urine Clear Clear    Glucose Urine Negative Negative mg/dL    Bilirubin Urine Negative Negative    Ketones Urine Negative Negative mg/dL    Specific Gravity Urine 1.006 1.003 - 1.035    Blood Urine Negative Negative    pH Urine 5.0 5.0 - 7.0    Protein Albumin Urine Negative Negative mg/dL    Urobilinogen Urine Normal Normal, 2.0 mg/dL    Nitrite Urine Negative Negative    Leukocyte Esterase Urine Negative Negative    Bacteria Urine Few (A) None Seen /HPF    Mucus Urine Present (A) None Seen /LPF    RBC Urine 0 <=2 /HPF    WBC Urine 1 <=5 /HPF    Squamous Epithelials Urine <1 <=1 /HPF    Narrative    Urine Culture not indicated       Medications   sodium chloride 0.9% BOLUS 500 mL (0 mLs Intravenous Stopped 4/20/24 1638)       Assessments & Plan (with Medical Decision Making)     I have reviewed the nursing notes.    I have reviewed the findings, diagnosis, plan and need for follow up with the patient.      Medical Decision Making  83-year-old female presenting with palpitations and dizziness.  He recently started flecainide medication.  No acute symptoms at this time.  Physical exam is otherwise benign for neuro cardio and abdominal.  No rashes joint pains or any other acute symptoms or recent illnesses.  Vitals are stable aside from mild intermittent bradycardia.  History of A-fib.  History of warfarin use.  Do not be fixation requires CT imaging of head as dizziness was intermittent and has not recurred.  Patient stable with no other neurological signs and NIHSS score of 0.  Lab work otherwise unremarkable EKG within normal limits.  Urinalysis negative.  At this time provided patient a 500 mill bolus of fluids for concerns of possible associated dehydration.  Discussed orthostatic hypotension patient felt that she significant proved and felt comfortable going home at this  time.  Discussed return precautions and outpatient follow-up with her primary care doctor she lives with her daughter therefore social concerns are addressed.  Discussed may be holding the new medication and or decreasing the dose until follow-up with her cardiologist.  Patient discharged home with daughter.    New Prescriptions    No medications on file       Final diagnoses:   Dizziness       4/20/2024   [facility] EMERGENCY DEPT       Sanjana Arzola MD  04/20/24 1560

## 2024-04-21 LAB
ALBUMIN SERPL BCG-MCNC: 4.2 G/DL (ref 3.5–5.2)
ALP SERPL-CCNC: 107 U/L (ref 40–150)
ALT SERPL W P-5'-P-CCNC: 22 U/L (ref 0–50)
ANION GAP SERPL CALCULATED.3IONS-SCNC: 12 MMOL/L (ref 7–15)
AST SERPL W P-5'-P-CCNC: 27 U/L (ref 0–45)
BILIRUB SERPL-MCNC: 1.1 MG/DL
BUN SERPL-MCNC: 18.5 MG/DL (ref 8–23)
CALCIUM SERPL-MCNC: 10 MG/DL (ref 8.8–10.2)
CHLORIDE SERPL-SCNC: 104 MMOL/L (ref 98–107)
CREAT SERPL-MCNC: 0.87 MG/DL (ref 0.51–0.95)
DEPRECATED HCO3 PLAS-SCNC: 21 MMOL/L (ref 22–29)
EGFRCR SERPLBLD CKD-EPI 2021: 66 ML/MIN/1.73M2
GLUCOSE SERPL-MCNC: 142 MG/DL (ref 70–99)
HOLD SPECIMEN: NORMAL
POTASSIUM SERPL-SCNC: 4.6 MMOL/L (ref 3.4–5.3)
PROT SERPL-MCNC: 6.7 G/DL (ref 6.4–8.3)
SODIUM SERPL-SCNC: 137 MMOL/L (ref 135–145)
TROPONIN T SERPL HS-MCNC: 11 NG/L

## 2024-04-22 ENCOUNTER — TELEPHONE (OUTPATIENT)
Dept: INTERNAL MEDICINE | Facility: CLINIC | Age: 84
End: 2024-04-22
Payer: MEDICARE

## 2024-04-22 ENCOUNTER — DOCUMENTATION ONLY (OUTPATIENT)
Dept: ANTICOAGULATION | Facility: CLINIC | Age: 84
End: 2024-04-22
Payer: MEDICARE

## 2024-04-22 DIAGNOSIS — M54.32 LEFT SCIATIC NERVE PAIN: Primary | ICD-10-CM

## 2024-04-22 NOTE — PROGRESS NOTES
ANTICOAGULATION  MANAGEMENT: Discharge Review    Tracy Palomo chart reviewed for anticoagulation continuity of care    Emergency room visit on 4/20/24 for dizziness.    Discharge disposition: Home    Results:    Recent labs: (last 7 days)     04/18/24  0810   INR 2.1*     Anticoagulation inpatient management:     not applicable     Anticoagulation discharge instructions:     Warfarin dosing: home regimen continued   Bridging: No   INR goal change: No      Medication changes affecting anticoagulation: No    Additional factors affecting anticoagulation: No     PLAN     No adjustment to anticoagulation plan needed    Patient not contacted    No adjustment to Anticoagulation Calendar was required    Arti Ruelas RN

## 2024-04-22 NOTE — TELEPHONE ENCOUNTER
Reason for Call:  Appointment Request    Patient requesting this type of appt:  Hospital/ED Follow-Up     Requested provider: Chad Betts    Reason patient unable to be scheduled: Not within requested timeframe    When does patient want to be seen/preferred time: 1-2 days    Comments: Medication reaction    [facility] or would you prefer to receive a phone call?:   No preference   Okay to leave a detailed message?: Yes at Cell number on file:    Telephone Information:   Mobile [phone]       Call taken on 4/22/2024 at 9:58 AM by Rosie Qiu

## 2024-04-23 ENCOUNTER — PATIENT OUTREACH (OUTPATIENT)
Dept: CARE COORDINATION | Facility: CLINIC | Age: 84
End: 2024-04-23
Payer: MEDICARE

## 2024-04-23 NOTE — PROGRESS NOTES
Clinic Care Coordination Contact  Community Health Worker Initial Outreach    CHW Initial Information Gathering:  Referral Source: ED Follow-Up  CHW Additional Questions  If ED/Hospital discharge, follow-up appointment scheduled as recommended?: Yes (Care team assisted in scheduling follow-up appointment for patient with PCP)  Nidat active?: Yes    Patient accepts CC: No, patient declined at this time. Patient will be sent Care Coordination introduction letter for future reference.       Natty Beyer  Community Health Worker  Connected [facility] Resource Center, [facility]    *Connected Care Resource Team does NOT follow patient ongoing. Referrals are identified based on internal discharge reports and the outreach is to ensure patient has an understanding of their discharge instructions.

## 2024-04-23 NOTE — LETTER
Tracy Palomo  [address] [place] [id number]    Dear Tracy Palomo,      I am a team member within the Connected Care Resource Center with  [facility]. I recently contacted you to ensure you are doing well following a visit within our health system. I also wanted to take this chance to introduce Clinic Care Coordination should you have any interest in this program in the future.    Below is a description of Clinic Care Coordination and how this team can further assist you:       The Clinic Care Coordination team is made up of a Registered Nurse, , Financial Resource Worker, and a Community Health Worker who understand and can help navigate the health care system. The goal of clinic care coordination is to help you manage your health, improve access to care, and achieve optimal health outcomes. They work alongside your provider to assist you in determining your health and social needs, obtain health care and community resources, and provide you with necessary information and education. Clinic Care Coordination can work with you through any barriers and develop a care plan that helps coordinate and strengthen the relationship between you and your care team.    If you wish to connect with the Clinic Care Coordination Team, please let your [facility] Primary Care Provider or Clinic Care Team know and they can place a referral. The Clinic Care Coordination team will then reach out by phone to further support you.    We are focused on providing you with the highest-quality healthcare experience possible.    Sincerely,   Your care team with [facility]

## 2024-04-23 NOTE — TELEPHONE ENCOUNTER
I called to schedule a appointment. Tracy states she is feeling better. She is scheduled to get an event monitor on 4/30/24. She would like to schedule her appointment with Dr Betts after the event monitor. Appointment scheduled with Dr Betts on 5/07/24.

## 2024-04-30 ENCOUNTER — HOSPITAL ENCOUNTER (OUTPATIENT)
Dept: CARDIOLOGY | Facility: CLINIC | Age: 84
Discharge: HOME | End: 2024-04-30
Attending: INTERNAL MEDICINE | Admitting: INTERNAL MEDICINE
Payer: MEDICARE

## 2024-04-30 ENCOUNTER — TELEPHONE (OUTPATIENT)
Dept: CARDIOLOGY | Facility: CLINIC | Age: 84
End: 2024-04-30

## 2024-04-30 DIAGNOSIS — I48.91 ATRIAL FIBRILLATION WITH RAPID VENTRICULAR RESPONSE (H): Primary | ICD-10-CM

## 2024-04-30 DIAGNOSIS — I48.92 ATRIAL FLUTTER, PAROXYSMAL (H): Primary | ICD-10-CM

## 2024-04-30 DIAGNOSIS — I48.91 ATRIAL FIBRILLATION WITH RAPID VENTRICULAR RESPONSE (H): ICD-10-CM

## 2024-04-30 PROCEDURE — 93005 ELECTROCARDIOGRAM TRACING: CPT | Performed by: REHABILITATION PRACTITIONER

## 2024-04-30 PROCEDURE — 93010 ELECTROCARDIOGRAM REPORT: CPT | Performed by: INTERNAL MEDICINE

## 2024-04-30 NOTE — TELEPHONE ENCOUNTER
Patient here for EKG post starting Flecainide 50mg BID per Dr. Bass on 4/8/24.     Patient was seen in the ED on 4/20/24 for an episode of dizziness and was told to decrease flecainide from 50mg to 25mg BID. Patient felt well on flecainide 50mg BID besides the one episode of dizziness that brought her into the ED. Patient's EKG's today showed A Flutter (122bpm) and then went into NSR (50bmp). Patient now wondering if she should go back up to the flecainide 50mg BID.     Will route to Dr. Bass to review and give further recommendation.     Patient has cardiology follow up with DEE DEE Andrea on 6/12/24 and will see her PCP Dr. Betts on 5/7/24.       ED note from 4/20/24:  Medical Decision Making  83-year-old female presenting with palpitations and dizziness.  He recently started flecainide medication.  No acute symptoms at this time.  Physical exam is otherwise benign for neuro cardio and abdominal.  No rashes joint pains or any other acute symptoms or recent illnesses.  Vitals are stable aside from mild intermittent bradycardia.  History of A-fib.  History of warfarin use.  Do not be fixation requires CT imaging of head as dizziness was intermittent and has not recurred.  Patient stable with no other neurological signs and NIHSS score of 0.  Lab work otherwise unremarkable EKG within normal limits.  Urinalysis negative.  At this time provided patient a 500 mill bolus of fluids for concerns of possible associated dehydration.  Discussed orthostatic hypotension patient felt that she significant proved and felt comfortable going home at this time.  Discussed return precautions and outpatient follow-up with her primary care doctor she lives with her daughter therefore social concerns are addressed.  Discussed may be holding the new medication and or decreasing the dose until follow-up with her cardiologist.  Patient discharged home with daughter.      Dr. Bass LOV note from 4/8/24:  ASSESSMENT/PLAN:  1.   Paroxysmal atrial fibrillation.  This has gotten worse in the last few months and is causing of symptoms that it are frightening to her and are preventing her from doing her normal exercising and walking outdoors.  We talked about the possibility of watchful waiting, (which is safe considering that she is therapeutic with warfarin), but less than ideal because she is frightened to undertake daily physical exertions because of her fears of her A-fib symptoms.  We talked about possible approaches, which could include rate limiting medications (but these are not practical for her given her resting bradycardia).  Other alternative approaches would be antiarrhythmic therapy or ablation.  Given her advanced age, antiarrhythmic therapy would make the most sense.  We talked about initiating flecainide at low-dose and I reviewed potential side effects including proarrhythmia.  She wanted to give this some thought but we will get a stress echo done to exclude coronary disease, and I wrote her a prescription.  I have also asked her to get an EKG done a couple weeks after starting flecainide and we will plan on a follow-up visit in about 2 months.

## 2024-05-02 NOTE — TELEPHONE ENCOUNTER
Shelia, MD Edgar Bee Brenna M RN  If her dizziness started with initiation of flecainide, we should have her hold it a week, see if sx resolve and then re-challenge her with the 50 mg dose to see if it is the cause.  If her dizziness did not change when flecainide was initiated, she should stay on the 50 mg dose.  25 mg flecainide is not practical, unlikely to be helpful.    Abimael Bass MD  5/2/2024  9:17 AM CDT       She is in aflutter at the time of todays ECG.  We should get an idea of burden.  Please arrange a 7 day ziopatch.       Patient said she felt great on Flecainide 50mg BID. Prior to ED visit patient was standing at a window with her arms up above head, looking up trying to get plastic off the window and at that time experienced the dizziness. Patient then went to the ED to be sure nothing was wrong after that dizzy spell. Explained to patient it could have just been her reaching above her head and looking up for some time that could have cause the dizziness, especially since she was feeling so good prior to that one episode. Since ED visit patient had been on lower dose of Flecainide 25mg BID and has not felt as good as being on 50mg BID and has noticed break through A Flutter. Patient agrees to wear 7 day ziopatch and will increase her Flecainide dose back to 50mg BID.

## 2024-05-03 ENCOUNTER — ALLIED HEALTH/NURSE VISIT (OUTPATIENT)
Dept: CARDIOLOGY | Facility: CLINIC | Age: 84
End: 2024-05-03
Attending: INTERNAL MEDICINE
Payer: MEDICARE

## 2024-05-03 DIAGNOSIS — I48.92 ATRIAL FLUTTER, PAROXYSMAL (H): ICD-10-CM

## 2024-05-03 PROCEDURE — 93242 EXT ECG>48HR<7D RECORDING: CPT

## 2024-05-03 NOTE — PROGRESS NOTES
Tracy Palomo arrived here on 5/3/2024 9:32 AM for 3-7 Days  Zio monitor placement per ordering provider Kai Bass for the diagnosis Atrial Fibrillation.  Supervising MD was Dr Chad Betts. Patient s skin was prepped per protocol.  Zio monitor was placed.  Instructions were reviewed with and given to the patient.  Patient verbalized understanding of wear, troubleshooting and monitor return instructions.

## 2024-05-07 ENCOUNTER — OFFICE VISIT (OUTPATIENT)
Dept: INTERNAL MEDICINE | Facility: CLINIC | Age: 84
End: 2024-05-07
Payer: MEDICARE

## 2024-05-07 VITALS
OXYGEN SATURATION: 95 % | HEART RATE: 62 BPM | WEIGHT: 183 LBS | TEMPERATURE: 98.3 F | SYSTOLIC BLOOD PRESSURE: 126 MMHG | DIASTOLIC BLOOD PRESSURE: 68 MMHG | BODY MASS INDEX: 31.24 KG/M2 | HEIGHT: 64 IN | RESPIRATION RATE: 16 BRPM

## 2024-05-07 DIAGNOSIS — I27.82 CHRONIC PULMONARY EMBOLISM WITHOUT ACUTE COR PULMONALE, UNSPECIFIED PULMONARY EMBOLISM TYPE (H): ICD-10-CM

## 2024-05-07 DIAGNOSIS — I48.0 PAROXYSMAL ATRIAL FIBRILLATION (H): Primary | ICD-10-CM

## 2024-05-07 DIAGNOSIS — Z79.01 LONG TERM CURRENT USE OF ANTICOAGULANT THERAPY: ICD-10-CM

## 2024-05-07 DIAGNOSIS — I26.99 OTHER PULMONARY EMBOLISM WITHOUT ACUTE COR PULMONALE, UNSPECIFIED CHRONICITY (H): ICD-10-CM

## 2024-05-07 PROCEDURE — 99214 OFFICE O/P EST MOD 30 MIN: CPT | Performed by: INTERNAL MEDICINE

## 2024-05-07 RX ORDER — RESPIRATORY SYNCYTIAL VIRUS VACCINE 120MCG/0.5
0.5 KIT INTRAMUSCULAR ONCE
Qty: 1 EACH | Refills: 0 | Status: CANCELLED | OUTPATIENT
Start: 2024-05-07 | End: 2024-05-07

## 2024-05-07 RX ORDER — WARFARIN SODIUM 5 MG/1
2.5 TABLET ORAL AT BEDTIME
Qty: 90 TABLET | Refills: 3 | Status: SHIPPED | OUTPATIENT
Start: 2024-05-07 | End: 2024-05-23 | Stop reason: DRUGHIGH

## 2024-05-07 NOTE — PROGRESS NOTES
Assessment & Plan   Problem List Items Addressed This Visit       Paroxysmal atrial fibrillation (H) - Primary    Long term current use of anticoagulant therapy    Relevant Medications    warfarin ANTICOAGULANT (COUMADIN) 5 MG tablet    Other pulmonary embolism without acute cor pulmonale, unspecified chronicity (H)    Relevant Medications    warfarin ANTICOAGULANT (COUMADIN) 5 MG tablet    Chronic pulmonary embolism without acute cor pulmonale, unspecified pulmonary embolism type (H)      Patient who has had a history of pulmonary emboli and is on Coumadin.  She also had atrial fibrillation and atrial flutter.  Had some dizziness when her heart rate goes fast.  Unfortunately at rest her heart rate is slow in the 50s to 60s range.  Therefore she cannot be on any beta-blockers or calcium channel blockers.  She did see cardiology and was placed on flecainide.  She had some dizziness and was in the ER.  ER held half her dose but now she is back on the regular dose.  She continues to have some fatigue and shortness of breath when she walks up hills and does activity we assume her heart rate is going fast at those times on her Apple Watch does appear she has some elevated pulse and ranges from .  She is currently wearing a Zio patch to monitor and see what the burden of atrial fibs flutter is and what her high heart rates are.  See if the flecainide is working.  She has had 2 events while she has been on the Zio patch.  She has follow-up with cardiology may need to have an ablation in the future if continues to be symptomatic.    Did ask her to stop and rest if she feels her heart rate going faster this pounding sensation.  Sort of but she should stop instead of pushing through it she agreed that no ladders or limited activities.     MED REC REQUIRED  Post Medication Reconciliation Status: discharge medications reconciled and changed, per note/orders  BMI  Estimated body mass index is 31.41 kg/m  as calculated  "from the following:    Height as of this encounter: 1.626 m (5' 4\").    Weight as of this encounter: 83 kg (183 lb).             Sydnee Molina is a 83 year old, presenting for the following health issues:  ER F/U      5/7/2024     2:33 PM   Additional Questions   Roomed by Jo     History of Present Illness       Reason for visit:  Warfarin [facility] CAN GIVE ME ONLY SO MANY PILLS UNTILL I SEE YOU    She eats 2-3 servings of fruits and vegetables daily.She consumes 0 sweetened beverage(s) daily.She exercises with enough effort to increase her heart rate 9 or less minutes per day.  She exercises with enough effort to increase her heart rate 3 or less days per week.   She is taking medications regularly.     ED/UC Followup:    Facility:  [facility]  Date of visit: 4/20/24  Reason for visit: Dizziness  Current Status: Follow up      Has a ziopatch, May 3rd, playing fetch with the dogs, felt lightheaded and thumbing, less then 10 minutes. Pressed the button.    Back on full dose of Flecainide    May 4th, at supper has thumbing in her back, 10 minutes pressed buttom 134/100 pulse 81.     Getting 7 day ziopatch to see burden of atrial flutter.  Pulse is all over the place.     She is still active.     Dizzy and was in the ER,     Drinking water 24 ounces.           Objective    BP (!) 146/84   Pulse 62   Temp 98.3  F (36.8  C) (Temporal)   Resp 16   Ht 1.626 m (5' 4\")   Wt 83 kg (183 lb)   SpO2 95%   BMI 31.41 kg/m    Body mass index is 31.41 kg/m .  Physical Exam   NAD   Heart is regular an slow today.               Signed Electronically by: Chad Betts MD    "

## 2024-05-09 ENCOUNTER — LAB (OUTPATIENT)
Dept: LAB | Facility: CLINIC | Age: 84
End: 2024-05-09
Payer: MEDICARE

## 2024-05-09 ENCOUNTER — ANTICOAGULATION THERAPY VISIT (OUTPATIENT)
Dept: ANTICOAGULATION | Facility: CLINIC | Age: 84
End: 2024-05-09

## 2024-05-09 DIAGNOSIS — Z79.01 LONG TERM CURRENT USE OF ANTICOAGULANT THERAPY: ICD-10-CM

## 2024-05-09 DIAGNOSIS — I27.82 CHRONIC PULMONARY EMBOLISM WITHOUT ACUTE COR PULMONALE, UNSPECIFIED PULMONARY EMBOLISM TYPE (H): Primary | ICD-10-CM

## 2024-05-09 DIAGNOSIS — I26.99 OTHER PULMONARY EMBOLISM WITHOUT ACUTE COR PULMONALE, UNSPECIFIED CHRONICITY (H): ICD-10-CM

## 2024-05-09 DIAGNOSIS — I27.82 CHRONIC PULMONARY EMBOLISM WITHOUT ACUTE COR PULMONALE, UNSPECIFIED PULMONARY EMBOLISM TYPE (H): ICD-10-CM

## 2024-05-09 LAB — INR BLD: 1.8 (ref 0.9–1.1)

## 2024-05-09 PROCEDURE — 36416 COLLJ CAPILLARY BLOOD SPEC: CPT

## 2024-05-09 PROCEDURE — 85610 PROTHROMBIN TIME: CPT

## 2024-05-09 NOTE — PROGRESS NOTES
ANTICOAGULATION MANAGEMENT     Tracy Palomo 83 year old female is on warfarin with subtherapeutic INR result. (Goal INR 2.0-3.0)    Recent labs: (last 7 days)     05/09/24  0851   INR 1.8*       ASSESSMENT     Source(s): Chart Review and Patient/Caregiver Call     Warfarin doses taken: Warfarin taken as instructed  Diet: Increased greens/vitamin K in diet; plans to resume previous intake  Medication/supplement changes: None noted  New illness, injury, or hospitalization: ED visit on 4/20/24 for dizziness.   Signs or symptoms of bleeding or clotting: No  Previous result: Therapeutic last 2(+) visits  Additional findings: None       PLAN     Recommended plan for no diet, medication or health factor changes affecting INR     Dosing Instructions: Continue your current warfarin dose with next INR in 2 weeks       Summary  As of 5/9/2024      Full warfarin instructions:  5/9: 5 mg; Otherwise 2.5 mg every day   Next INR check:  5/23/2024               Telephone call with Tracy who verbalizes understanding and agrees to plan    Lab visit scheduled    Education provided:   Contact [phone]  with any changes, questions or concerns.     Plan made per [facility] anticoagulation protocol    Randall QUIROGA RN  Anticoagulation Clinic  5/9/2024    _______________________________________________________________________     Anticoagulation Episode Summary       Current INR goal:  2.0-3.0   TTR:  63.2% (11.8 mo)   Target end date:  Indefinite   Send INR reminders to:  [facility]    Indications    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]  Long term current use of anticoagulant therapy [Z79.01]  Other pulmonary embolism without acute cor pulmonale  unspecified chronicity (H) [I26.99]  Chronic pulmonary embolism without acute cor pulmonale  unspecified pulmonary embolism type (H) [I27.82]             Comments:               Anticoagulation Care Providers       Provider Role Specialty Phone number     Chad Betts MD [facility] Internal Medicine [phone]    Gerard Medrano MD [facility] Medicine [phone]

## 2024-05-20 ENCOUNTER — TELEPHONE (OUTPATIENT)
Dept: PHYSICAL THERAPY | Facility: CLINIC | Age: 84
End: 2024-05-20
Payer: MEDICARE

## 2024-05-20 DIAGNOSIS — M54.32 LEFT SCIATIC NERVE PAIN: Primary | ICD-10-CM

## 2024-05-20 NOTE — PROGRESS NOTES
DISCHARGE  Reason for Discharge: Change in medical status.    Equipment Issued: has band    Discharge Plan: Patient to continue home program.  Other services: having heart issues and following with provider. She is trying to be as active as she can.    Referring Provider:  Gerard Colon MD     03/06/24 0500   Appointment Info   Signing clinician's name / credentials Carmen Cooper, PT LAT FPS   Total/Authorized Visits Medicare/[place]   Visits Used 9   Medical Diagnosis Left sciatic nerve pain (M54.32)   PT Tx Diagnosis Left pelvic and knee pain   Precautions/Limitations L JUANCARLOS, bilateral TKAs   Other pertinent information very active 82 yo female with L knee pain. Walks to calm pain and general movement feels best. Describes stiffness and symptoms mainly in the L patellar tendon area and difficulty getting good hip flexion especially in standing eg to get pants on.   Quick Adds Certification   Progress Note/Certification   Start of Care Date 12/29/23   Onset of illness/injury or Date of Surgery 11/20/23   Therapy Frequency 2 times per week x 2 weeks and the 1 x per week x 4 weeks   Certification date from 03/06/24   Certification date to 04/30/24   Progress Note Due Date 04/30/24   Progress Note Completed Date 03/06/24       Present No   GOALS   PT Goals 2   PT Goal 1   Goal Identifier Aerobic activities   Goal Description Tracy will be able to comtinue with her stationary biking (10-15 minutes) and walking (tracks on agnes) without L knee pain and improved mechanics.   Rationale to maximize safety and independence with performance of ADLs and functional tasks;to maximize safety and independence within the home;to maximize safety and independence within the community   Goal Progress Patient reports walking [id number] steps daily without significant LE pain since her previous session.   Target Date 02/09/24   PT Goal 2   Goal Identifier Stairs   Goal Description Tracy will have  80% or better trust in her L knee negotiating steps as demonstrated by normal gait pattern on steps without symptoms in L knee   Rationale to maximize safety and independence with performance of ADLs and functional tasks;to maximize safety and independence within the home;to maximize safety and independence within the community   Goal Progress feels this is progressing   Target Date 01/26/24   Subjective Report   Subjective Report no hip, low back or R knee pain. L hamstring pain laterally.Was in ED for A fib and will be following up with Dr. Chad Betts and cardiologist. She would like to know guidelines for workout as she is tired today but has been working on 1-2 exercises as able and not riding her bike.   Objective Measures   Objective Measures Objective Measure 1;Objective Measure 2   Objective Measure 1   Objective Measure supine knee AROM   Objective Measure 2   Objective Measure hip flexor PROM -goal: neutral position   Details lacking 2-3 degrees   Treatment Interventions (PT)   Interventions Therapeutic Procedure/Exercise   Therapeutic Procedure/Exercise   Therapeutic Procedures: strength, endurance, ROM, flexibility minutes (21046) 34   Ther Proc 1 - Details Recommended checking vitals before completing her exercises, hold exercises if you feel tired, stretches okay as comfortable and added hamstring stretch with DF and hip IR 1 x 4 reps today, breathing during exercises. Asked her to discuss guidelines more thoroughly with her doctors. Was not given guidelines for activity after ED visit.   Skilled Intervention guidelines for exercise, added exercise to strech lateral L knee - hamstring   Patient Response/Progress no questions   Education   Learner/Method Patient;Listening;Reading;Demonstration;Pictures/Video;No Barriers to Learning   Education Comments hamstring stretch, plan of care   Plan   Home program Standing BLE hip flexor stretch x1 minute each; Seated cervical chin tucks x10 reps of 3-5 second  holds; Supine BUE towel roll pectoral stretch x1 minute with x4 reps of 15 second holds for scapular retractions; Supine BLE bent knee fallouts x10 reps each; Supine below 90 BLE belted PF stretch x1 minute each; Daily walking and matrix bike on days she doesn't do HEP exercises; Exercises performed 2 times per day on days she performs them. hamstring stretch,   Updates to plan of care hold until she gets guidelines from provider as far as PT - sees cardiologist in April   Plan for next session Check AROM and strength, steps and squats. review cardiac guidelines   Comments   Comments Tracy was in the ED for her heart/a fib. We agreed to hold PT through 4- or until she has her heart condition under control and has guidelines for activity. She has been improving with the PT and exercise with decrease in her pain and ability to work out on her bike. Would continue PT for progression of hip flexibility and strengthening - functional.   Total Session Time   Timed Code Treatment Minutes 34   Total Treatment Time (sum of timed and untimed services) 34

## 2024-05-21 PROCEDURE — 93244 EXT ECG>48HR<7D REV&INTERPJ: CPT | Performed by: INTERNAL MEDICINE

## 2024-05-23 ENCOUNTER — ANTICOAGULATION THERAPY VISIT (OUTPATIENT)
Dept: ANTICOAGULATION | Facility: CLINIC | Age: 84
End: 2024-05-23

## 2024-05-23 ENCOUNTER — LAB (OUTPATIENT)
Dept: LAB | Facility: CLINIC | Age: 84
End: 2024-05-23
Payer: MEDICARE

## 2024-05-23 DIAGNOSIS — Z79.01 LONG TERM CURRENT USE OF ANTICOAGULANT THERAPY: ICD-10-CM

## 2024-05-23 DIAGNOSIS — I27.82 CHRONIC PULMONARY EMBOLISM WITHOUT ACUTE COR PULMONALE, UNSPECIFIED PULMONARY EMBOLISM TYPE (H): ICD-10-CM

## 2024-05-23 DIAGNOSIS — I26.99 OTHER PULMONARY EMBOLISM WITHOUT ACUTE COR PULMONALE, UNSPECIFIED CHRONICITY (H): ICD-10-CM

## 2024-05-23 DIAGNOSIS — I27.82 CHRONIC PULMONARY EMBOLISM WITHOUT ACUTE COR PULMONALE, UNSPECIFIED PULMONARY EMBOLISM TYPE (H): Primary | ICD-10-CM

## 2024-05-23 LAB — INR BLD: 1.7 (ref 0.9–1.1)

## 2024-05-23 PROCEDURE — 85610 PROTHROMBIN TIME: CPT

## 2024-05-23 PROCEDURE — 36416 COLLJ CAPILLARY BLOOD SPEC: CPT

## 2024-05-23 RX ORDER — WARFARIN SODIUM 2.5 MG/1
TABLET ORAL
Qty: 90 TABLET | Refills: 0 | Status: SHIPPED | OUTPATIENT
Start: 2024-05-23 | End: 2024-08-23

## 2024-05-23 NOTE — PROGRESS NOTES
ANTICOAGULATION MANAGEMENT     Tracy Palomo 83 year old female is on warfarin with subtherapeutic INR result. (Goal INR 2.0-3.0)    Recent labs: (last 7 days)     05/23/24  0856   INR 1.7*       ASSESSMENT     Warfarin Lab Questionnaire    Warfarin Doses Last 7 Days    Pt Rptd Dose SUNDAY MONDAY TUESDAY WED THURS FRIDAY SATURDAY 5/23/2024   8:53 AM 2.5 2.5 2.5 2.5 2.5 2.5 2.5         5/23/2024   Warfarin Lab Questionnaire   Missed doses within past 14 days? No   Changes in diet or alcohol within past 14 days? No- Pt had an equate supplement shake yesterday, she'd like to continue this 1/week, reminder to discuss if wanting to increase & to keep consistent- will need continued education regarding this.   Medication changes since last result? No   Injuries or illness since last result? No   New shortness of breath, severe headaches or sudden changes in vision since last result? No   Abnormal bleeding since last result? No   Upcoming surgery, procedure? No     Previous result: Subtherapeutic  Additional findings:  2.5 mg tablets ordered for small adjustments to dose. Advised pt to wait to use new tablets until next INR- then remove 5 mg tabs from dosing calendar.        PLAN     Recommended plan for ongoing change(s) affecting INR     Dosing Instructions: booster dose then Increase your warfarin dose (7.1% change) with next INR in 1 week       Summary  As of 5/23/2024      Full warfarin instructions:  5/23: 5 mg; Otherwise 3.75 mg every Thu; 2.5 mg all other days   Next INR check:  5/30/2024               Telephone call with Tracy who verbalizes understanding and agrees to plan and who agrees to plan and repeated back plan correctly    Lab visit scheduled    Education provided:   Taking warfarin: warfarin tablet strength change; remove and/or discard previous strength from medication supply  Dietary considerations: importance of consistent vitamin K intake  Symptom monitoring: monitoring for clotting signs and  symptoms, monitoring for stroke signs and symptoms, and when to seek medical attention/emergency care    Plan made per [facility] anticoagulation protocol    Radha Rodriguez RN  Anticoagulation Clinic  5/23/2024    _______________________________________________________________________     Anticoagulation Episode Summary       Current INR goal:  2.0-3.0   TTR:  60.4% (11.8 mo)   Target end date:  Indefinite   [facility]    Indications    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]  Long term current use of anticoagulant therapy [Z79.01]  Other pulmonary embolism without acute cor pulmonale  unspecified chronicity (H) [I26.99]  Chronic pulmonary embolism without acute cor pulmonale  unspecified pulmonary embolism type (H) [I27.82]             Comments:               Anticoagulation Care Providers       Provider Role Specialty Phone number    Chad Betts MD Referring Internal Medicine [phone]    Gerard Medrano MD Responsible Family Medicine [phone]

## 2024-05-30 ENCOUNTER — LAB (OUTPATIENT)
Dept: LAB | Facility: CLINIC | Age: 84
End: 2024-05-30
Payer: MEDICARE

## 2024-05-30 ENCOUNTER — ANTICOAGULATION THERAPY VISIT (OUTPATIENT)
Dept: ANTICOAGULATION | Facility: CLINIC | Age: 84
End: 2024-05-30

## 2024-05-30 DIAGNOSIS — I26.99 OTHER PULMONARY EMBOLISM WITHOUT ACUTE COR PULMONALE, UNSPECIFIED CHRONICITY (H): ICD-10-CM

## 2024-05-30 DIAGNOSIS — Z79.01 LONG TERM CURRENT USE OF ANTICOAGULANT THERAPY: ICD-10-CM

## 2024-05-30 DIAGNOSIS — I27.82 CHRONIC PULMONARY EMBOLISM WITHOUT ACUTE COR PULMONALE, UNSPECIFIED PULMONARY EMBOLISM TYPE (H): Primary | ICD-10-CM

## 2024-05-30 DIAGNOSIS — I27.82 CHRONIC PULMONARY EMBOLISM WITHOUT ACUTE COR PULMONALE, UNSPECIFIED PULMONARY EMBOLISM TYPE (H): ICD-10-CM

## 2024-05-30 LAB — INR BLD: 1.9 (ref 0.9–1.1)

## 2024-05-30 PROCEDURE — 85610 PROTHROMBIN TIME: CPT

## 2024-05-30 PROCEDURE — 36416 COLLJ CAPILLARY BLOOD SPEC: CPT

## 2024-05-30 NOTE — PROGRESS NOTES
ANTICOAGULATION MANAGEMENT     Tracy Palomo 83 year old female is on warfarin with subtherapeutic INR result. (Goal INR 2.0-3.0)    Recent labs: (last 7 days)     05/30/24  0857   INR 1.9*       ASSESSMENT     Warfarin Lab Questionnaire    Warfarin Doses Last 7 Days      5/30/2024     8:53 AM   Dose in Tablet or Mg   TAB or MG? milligram (mg)           5/30/2024   Warfarin Lab Questionnaire   Missed doses within past 14 days? No   Changes in diet or alcohol within past 14 days? No   Medication changes since last result? No   Injuries or illness since last result? No   New shortness of breath, severe headaches or sudden changes in vision since last result? No   Abnormal bleeding since last result? No   Upcoming surgery, procedure? No   Best number to call with results? [phone]     Previous result: Subtherapeutic  Additional findings: None       PLAN     Recommended plan for no diet, medication or health factor changes affecting INR     Dosing Instructions: Increase your warfarin dose (6% change) with next INR in 2 weeks       Summary  As of 5/30/2024      Full warfarin instructions:  3.75 mg every Mon, Thu; 2.5 mg all other days   Next INR check:  6/13/2024               Telephone call with Tracy who verbalizes understanding and agrees to plan  Sent [employer] message with dosing and follow up instructions    Lab visit scheduled    Education provided:   Goal range and lab monitoring: goal range and significance of current result  Dietary considerations: importance of consistent vitamin K intake and Impact of protein intake on INR   Contact [phone]  with any changes, questions or concerns.     Plan made per [facility] anticoagulation protocol    [place], RN  Anticoagulation Clinic  5/30/2024    _______________________________________________________________________     Anticoagulation Episode Summary       Current INR goal:  2.0-3.0   TTR:  60.4% (11.8 mo)   Target end date:  Indefinite   Send INR  reminders to:  [facility]    Indications    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]  Long term current use of anticoagulant therapy [Z79.01]  Other pulmonary embolism without acute cor pulmonale  unspecified chronicity (H) [I26.99]  Chronic pulmonary embolism without acute cor pulmonale  unspecified pulmonary embolism type (H) [I27.82]             Comments:               Anticoagulation Care Providers       Provider Role Specialty Phone number    Chad Betts MD Referring Internal Medicine [phone]    Gerard Medrano MD Responsible Family Medicine [phone]

## 2024-06-11 ENCOUNTER — DOCUMENTATION ONLY (OUTPATIENT)
Dept: ANTICOAGULATION | Facility: CLINIC | Age: 84
End: 2024-06-11

## 2024-06-11 ENCOUNTER — HOSPITAL ENCOUNTER (EMERGENCY)
Facility: CLINIC | Age: 84
Discharge: HOME | End: 2024-06-11
Attending: PHYSICIAN ASSISTANT | Admitting: PHYSICIAN ASSISTANT
Payer: MEDICARE

## 2024-06-11 VITALS
DIASTOLIC BLOOD PRESSURE: 94 MMHG | TEMPERATURE: 99.3 F | OXYGEN SATURATION: 94 % | HEART RATE: 66 BPM | RESPIRATION RATE: 18 BRPM | SYSTOLIC BLOOD PRESSURE: 129 MMHG

## 2024-06-11 DIAGNOSIS — I48.91 ATRIAL FIBRILLATION WITH RVR (H): ICD-10-CM

## 2024-06-11 DIAGNOSIS — I48.91 ATRIAL FIBRILLATION WITH RAPID VENTRICULAR RESPONSE (H): ICD-10-CM

## 2024-06-11 DIAGNOSIS — Z76.0 ENCOUNTER FOR MEDICATION REFILL: ICD-10-CM

## 2024-06-11 LAB
ANION GAP SERPL CALCULATED.3IONS-SCNC: 13 MMOL/L (ref 7–15)
BASOPHILS # BLD AUTO: 0 10E3/UL (ref 0–0.2)
BASOPHILS NFR BLD AUTO: 0 %
BUN SERPL-MCNC: 22.1 MG/DL (ref 8–23)
CALCIUM SERPL-MCNC: 10.1 MG/DL (ref 8.8–10.2)
CHLORIDE SERPL-SCNC: 104 MMOL/L (ref 98–107)
CREAT SERPL-MCNC: 0.91 MG/DL (ref 0.51–0.95)
DEPRECATED HCO3 PLAS-SCNC: 25 MMOL/L (ref 22–29)
EGFRCR SERPLBLD CKD-EPI 2021: 62 ML/MIN/1.73M2
EOSINOPHIL # BLD AUTO: 0.1 10E3/UL (ref 0–0.7)
EOSINOPHIL NFR BLD AUTO: 2 %
ERYTHROCYTE [DISTWIDTH] IN BLOOD BY AUTOMATED COUNT: 13.6 % (ref 10–15)
GLUCOSE SERPL-MCNC: 132 MG/DL (ref 70–99)
HCT VFR BLD AUTO: 45.6 % (ref 35–47)
HGB BLD-MCNC: 15 G/DL (ref 11.7–15.7)
IMM GRANULOCYTES # BLD: 0 10E3/UL
IMM GRANULOCYTES NFR BLD: 0 %
INR PPP: 3.12 (ref 0.85–1.15)
LYMPHOCYTES # BLD AUTO: 1 10E3/UL (ref 0.8–5.3)
LYMPHOCYTES NFR BLD AUTO: 20 %
MCH RBC QN AUTO: 29.3 PG (ref 26.5–33)
MCHC RBC AUTO-ENTMCNC: 32.9 G/DL (ref 31.5–36.5)
MCV RBC AUTO: 89 FL (ref 78–100)
MONOCYTES # BLD AUTO: 0.3 10E3/UL (ref 0–1.3)
MONOCYTES NFR BLD AUTO: 7 %
NEUTROPHILS # BLD AUTO: 3.8 10E3/UL (ref 1.6–8.3)
NEUTROPHILS NFR BLD AUTO: 71 %
NRBC # BLD AUTO: 0 10E3/UL
NRBC BLD AUTO-RTO: 0 /100
PLATELET # BLD AUTO: 217 10E3/UL (ref 150–450)
POTASSIUM SERPL-SCNC: 4.1 MMOL/L (ref 3.4–5.3)
RBC # BLD AUTO: 5.12 10E6/UL (ref 3.8–5.2)
SODIUM SERPL-SCNC: 142 MMOL/L (ref 135–145)
TROPONIN T SERPL HS-MCNC: 12 NG/L
WBC # BLD AUTO: 5.3 10E3/UL (ref 4–11)

## 2024-06-11 PROCEDURE — 93005 ELECTROCARDIOGRAM TRACING: CPT | Performed by: PHYSICIAN ASSISTANT

## 2024-06-11 PROCEDURE — 85610 PROTHROMBIN TIME: CPT | Performed by: PHYSICIAN ASSISTANT

## 2024-06-11 PROCEDURE — 85004 AUTOMATED DIFF WBC COUNT: CPT | Performed by: PHYSICIAN ASSISTANT

## 2024-06-11 PROCEDURE — 80048 BASIC METABOLIC PNL TOTAL CA: CPT | Performed by: PHYSICIAN ASSISTANT

## 2024-06-11 PROCEDURE — 99284 EMERGENCY DEPT VISIT MOD MDM: CPT | Performed by: PHYSICIAN ASSISTANT

## 2024-06-11 PROCEDURE — 93005 ELECTROCARDIOGRAM TRACING: CPT | Mod: 76 | Performed by: PHYSICIAN ASSISTANT

## 2024-06-11 PROCEDURE — 93010 ELECTROCARDIOGRAM REPORT: CPT | Performed by: PHYSICIAN ASSISTANT

## 2024-06-11 PROCEDURE — 93010 ELECTROCARDIOGRAM REPORT: CPT | Mod: 76 | Performed by: PHYSICIAN ASSISTANT

## 2024-06-11 PROCEDURE — 36415 COLL VENOUS BLD VENIPUNCTURE: CPT | Performed by: PHYSICIAN ASSISTANT

## 2024-06-11 PROCEDURE — 250N000013 HC RX MED GY IP 250 OP 250 PS 637: Performed by: PHYSICIAN ASSISTANT

## 2024-06-11 PROCEDURE — 84484 ASSAY OF TROPONIN QUANT: CPT | Performed by: PHYSICIAN ASSISTANT

## 2024-06-11 RX ORDER — FLECAINIDE ACETATE 50 MG/1
50 TABLET ORAL 2 TIMES DAILY
Qty: 14 TABLET | Refills: 0 | Status: SHIPPED | OUTPATIENT
Start: 2024-06-11 | End: 2024-06-12

## 2024-06-11 RX ORDER — FLECAINIDE ACETATE 50 MG/1
50 TABLET ORAL ONCE
Status: COMPLETED | OUTPATIENT
Start: 2024-06-11 | End: 2024-06-11

## 2024-06-11 RX ADMIN — FLECAINIDE ACETATE 50 MG: 50 TABLET ORAL at 13:50

## 2024-06-11 ASSESSMENT — COLUMBIA-SUICIDE SEVERITY RATING SCALE - C-SSRS
2. HAVE YOU ACTUALLY HAD ANY THOUGHTS OF KILLING YOURSELF IN THE PAST MONTH?: NO
1. IN THE PAST MONTH, HAVE YOU WISHED YOU WERE DEAD OR WISHED YOU COULD GO TO SLEEP AND NOT WAKE UP?: NO

## 2024-06-11 ASSESSMENT — ACTIVITIES OF DAILY LIVING (ADL)
ADLS_ACUITY_SCORE: 39
ADLS_ACUITY_SCORE: 39

## 2024-06-11 NOTE — ED PROVIDER NOTES
History     Chief Complaint   Patient presents with    Irregular Heart Beat     HPI  Tracy Palomo is a 84 year old female who presents to the emergency department complaining of palpitations.  The patient has any history of atrial fibrillation/flutter for which she takes flecainide and Coumadin.  The patient states she is always in atrial fibrillation and sometimes she notes that her heart rate goes up to around 100.  Overall she tolerates it well and is able to walk every day without difficulties.  She went on a walk yesterday and felt fine.  Today she felt her heart fluttering a bit different and when she checked her pulse it was up to 150 so she knew she had to come in.  She denies any chest pain, shortness of breath, nausea or vomiting.  She denies any dizziness or lightheadedness.  No increased leg swelling.  She ran out of her flecainide 2 weeks ago and has not been able to refill it but does have an appoint with cardiology tomorrow to see about getting a refill.        Allergies:  Allergies   Allergen Reactions    No Known Allergies        Problem List:    Patient Active Problem List    Diagnosis Date Noted    Left sciatic nerve pain 12/29/2023     Priority: Medium    Small bowel obstruction (H) 06/10/2023     Priority: Medium    Subtherapeutic international normalized ratio (INR) 06/10/2023     Priority: Medium    Cholelithiasis 06/10/2023     Priority: Medium    Spinal stenosis of lumbar region without neurogenic claudication 08/18/2022     Priority: Medium    S/P hip hemiarthroplasty 06/02/2022     Priority: Medium    Chronic pulmonary embolism without acute cor pulmonale, unspecified pulmonary embolism type (H) 04/20/2022     Priority: Medium    Status post total left knee replacement 03/01/2021     Priority: Medium    S/P total knee replacement using cement, left 02/16/2021     Priority: Medium    Pain of right sacroiliac joint 01/06/2021     Priority: Medium    Primary osteoarthritis of right hip  01/06/2021     Priority: Medium    Trigger finger of left thumb 01/06/2021     Priority: Medium    SAH (subarachnoid hemorrhage) (H) 08/05/2020     Priority: Medium    Other pulmonary embolism without acute cor pulmonale, unspecified chronicity (H) 05/27/2020     Priority: Medium    Peripheral edema 01/09/2019     Priority: Medium    S/P total knee replacement using cement, right 01/08/2019     Priority: Medium    Other chest pain 01/08/2019     Priority: Medium    Primary osteoarthritis of right knee 11/15/2018     Priority: Medium    Primary osteoarthritis of left knee 11/15/2018     Priority: Medium    Closed left hip fracture (H) 03/15/2017     Priority: Medium    Long term current use of anticoagulant therapy 03/22/2016     Priority: Medium    Anemia 11/03/2014     Priority: Medium    Paroxysmal atrial fibrillation (H) 11/03/2014     Priority: Medium    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 11/01/2014     Priority: Medium     In 2014      Recent major surgery - exploratory lap with small bowel resection 10/20 11/01/2014     Priority: Medium    Pleural effusion on right 11/01/2014     Priority: Medium    Hypertension goal BP (blood pressure) < 140/90 02/22/2013     Priority: Medium    Hyperlipidemia LDL goal <130 02/22/2013     Priority: Medium    Encounter for long-term current use of medication 02/22/2013     Priority: Medium     Problem list name updated by automated process. Provider to review      History of colonic polyps 02/22/2013     Priority: Medium     Problem list name updated by automated process. Provider to review      Advanced directives, counseling/discussion 02/22/2013     Priority: Medium     Pt states has Advance Directive at home, will bring in to be scanned into chart.          Past Medical History:    Past Medical History:   Diagnosis Date    Atrial fibrillation (H) 2014    Colon polyps     Diverticulosis of colon (without mention of hemorrhage)     DVT (deep vein  thrombosis) in pregnancy 2014    Other and unspecified hyperlipidemia     PE (pulmonary embolism) 2014    Unspecified essential hypertension        Past Surgical History:    Past Surgical History:   Procedure Laterality Date    ARTHROPLASTY KNEE Right 1/8/2019    Procedure: Right total knee replacement;  Surgeon: Kaleb Pang DO;  Location: [facility]    ARTHROPLASTY KNEE Left 2/16/2021    Procedure: left total knee replacement;  Surgeon: Kaleb Pang DO;  Location: [facility]    COLONOSCOPY  09, 10, 12    [facility]    COLONOSCOPY N/A 12/11/2017    Procedure: COLONOSCOPY;  colonoscopy;  Surgeon: Elvis Quezada MD;  Location: [facility]    FLEXIBLE SIGMOIDOSCOPY  09, 11    LAPAROTOMY EXPLORATORY N/A 10/20/2014    Procedure: LAPAROTOMY EXPLORATORY;  Surgeon: Miguel Oleary MD;  Location: [facility]    LAPAROTOMY, LYSIS ADHESIONS, COMBINED N/A 10/20/2014    Procedure: COMBINED LAPAROTOMY, LYSIS ADHESIONS;  Surgeon: Miguel Oleary MD;  Location: [facility]    OPEN REDUCTION INTERNAL FIXATION HIP BIPOLAR Left 3/16/2017    Procedure: OPEN REDUCTION INTERNAL FIXATION HIP BIPOLAR;  Surgeon: Kaleb Pang DO;  Location: [facility]    RELEASE TRIGGER FINGER Left 1/12/2021    Procedure: Left thumb trigger release;  Surgeon: Kaleb Pang DO;  Location: [facility]    RESECT SMALL BOWEL WITHOUT OSTOMY N/A 10/20/2014    Procedure: RESECT SMALL BOWEL WITHOUT OSTOMY;  Surgeon: Miguel Oleary MD;  Location: [facility]       Family History:    Family History   Problem Relation Age of Onset    Blood Disease No family hx of         blood clots       Social History:  Marital Status:   [5]  Social History     Tobacco Use    Smoking status: Never    Smokeless tobacco: Never   Substance Use Topics    Alcohol use: No     Alcohol/week: 0.0 standard drinks of alcohol    Drug use: No        Medications:    flecainide (TAMBOCOR) 50 MG tablet  acetaminophen (TYLENOL) 325 MG tablet  lovastatin  (MEVACOR) 40 MG tablet  warfarin ANTICOAGULANT (COUMADIN) 2.5 MG tablet          Review of Systems   All other systems reviewed and are negative.          Physical Exam   BP: (!) 144/101  Pulse: 63  Temp: 99.3  F (37.4  C)  Resp: 22  SpO2: 100 %      Physical Exam  Vitals and nursing note reviewed.   Constitutional:       General: She is not in acute distress.     Appearance: Normal appearance. She is well-developed. She is not ill-appearing, toxic-appearing or diaphoretic.   HENT:      Head: Normocephalic and atraumatic.      Nose: Nose normal.      Mouth/Throat:      Mouth: Mucous membranes are moist.   Eyes:      Conjunctiva/sclera: Conjunctivae normal.      Pupils: Pupils are equal, round, and reactive to light.   Cardiovascular:      Rate and Rhythm: Tachycardia present. Rhythm irregular.      Heart sounds: Normal heart sounds.   Pulmonary:      Effort: Pulmonary effort is normal. No respiratory distress.      Breath sounds: Normal breath sounds.   Abdominal:      General: Bowel sounds are normal. There is no distension.      Palpations: Abdomen is soft.      Tenderness: There is no abdominal tenderness.   Musculoskeletal:         General: No deformity.      Cervical back: Neck supple.   Skin:     General: Skin is warm and dry.   Neurological:      General: No focal deficit present.      Mental Status: She is alert and oriented to person, place, and time. Mental status is at baseline.      Coordination: Coordination normal.   Psychiatric:         Mood and Affect: Mood normal.             ED Course        Procedures              EKG Interpretation:      Interpreted by Radha Garcia PA-C  Time reviewed: 1340  Symptoms at time of EKG: Palpitations  Rhythm: atrial fibrillation - rapid  Rate: Tachycardia  Axis: Left Axis Deviation  Ectopy: occasional PVC  Conduction: normal  ST Segments/ T Waves: No acute ischemic changes  Q Waves: none  Comparison to prior: Now in atrial fibrillation with RVR compared to  4/20/24 sinus rhythm    Clinical Impression: atrial fibrillation (new onset)         EKG Interpretation:      EKG Number: 2.  Done at 1432 hours.  Interpreted by Radha Garcia PA-C  Symptoms at time of EKG: post flecainide    Rhythm: Normal sinus   Rate: 50-60  Axis: Left Axis Deviation  Ectopy: None  Conduction: Normal  ST Segments/ T Waves: No acute ischemic changes  Q Waves: None  Comparison to prior: converted to sinus rhythm    Clinical Impression: normal EKG        Results for orders placed or performed during the hospital encounter of 06/11/24 (from the past 24 hour(s))   CBC with platelets differential    Narrative    The following orders were created for panel order CBC with platelets differential.  Procedure                               Abnormality         Status                     ---------                               -----------         ------                     CBC with platelets and d...[[id number]]                      Final result                 Please view results for these tests on the individual orders.   Basic metabolic panel   Result Value Ref Range    Sodium 142 135 - 145 mmol/L    Potassium 4.1 3.4 - 5.3 mmol/L    Chloride 104 98 - 107 mmol/L    Carbon Dioxide (CO2) 25 22 - 29 mmol/L    Anion Gap 13 7 - 15 mmol/L    Urea Nitrogen 22.1 8.0 - 23.0 mg/dL    Creatinine 0.91 0.51 - 0.95 mg/dL    GFR Estimate 62 >60 mL/min/1.73m2    Calcium 10.1 8.8 - 10.2 mg/dL    Glucose 132 (H) 70 - 99 mg/dL   Troponin T, High Sensitivity   Result Value Ref Range    Troponin T, High Sensitivity 12 <=14 ng/L   INR   Result Value Ref Range    INR 3.12 (H) 0.85 - 1.15   CBC with platelets and differential   Result Value Ref Range    WBC Count 5.3 4.0 - 11.0 10e3/uL    RBC Count 5.12 3.80 - 5.20 10e6/uL    Hemoglobin 15.0 11.7 - 15.7 g/dL    Hematocrit 45.6 35.0 - 47.0 %    MCV 89 78 - 100 fL    MCH 29.3 26.5 - 33.0 pg    MCHC 32.9 31.5 - 36.5 g/dL    RDW 13.6 10.0 - 15.0 %    Platelet Count 217 150 - 450  10e3/uL    % Neutrophils 71 %    % Lymphocytes 20 %    % Monocytes 7 %    % Eosinophils 2 %    % Basophils 0 %    % Immature Granulocytes 0 %    NRBCs per 100 WBC 0 <1 /100    Absolute Neutrophils 3.8 1.6 - 8.3 10e3/uL    Absolute Lymphocytes 1.0 0.8 - 5.3 10e3/uL    Absolute Monocytes 0.3 0.0 - 1.3 10e3/uL    Absolute Eosinophils 0.1 0.0 - 0.7 10e3/uL    Absolute Basophils 0.0 0.0 - 0.2 10e3/uL    Absolute Immature Granulocytes 0.0 <=0.4 10e3/uL    Absolute NRBCs 0.0 10e3/uL       Medications   flecainide (TAMBOCOR) tablet 50 mg (50 mg Oral $Given 6/11/24 1350)         Assessments & Plan (with Medical Decision Making)  Tracy Palomo is a 84 year old female who presented to the ED complaining of palpitations.  She first noticed it this morning.  She has a history of atrial fibrillation for which she takes flecainide but has been out of this medication for the last 2 weeks.  She does have an appoint with cardiology tomorrow to discuss refills.  Denies any chest pain or shortness of breath with this.  On arrival to the ED she was hypertensive but oxygenation 100% on room air.  Heart rate on telemetry in the 120s.  She did not appear acutely ill or toxic and other than a tachycardic irregular rhythm on exam she had no acute abnormalities.  EKG today confirmed atrial fibrillation and RVR.  I ordered her dose of her home flecainide to take here which converted her back into a normal sinus rhythm.  Her INR is therapeutic at 3.12, troponin normal, and other labs unremarkable so I think she is stable to discharge home.  Suspect atrial fibrillation with RVR secondary to medication noncompliance.  I will give her a 1 week supply of her flecainide and she can get further refills through her cardiologist tomorrow at her appointment.  She was provided instructions on when to return to the ED.  All questions answered and patient discharged home in suitable condition.     I have reviewed the nursing notes.    I have reviewed  the findings, diagnosis, plan and need for follow up with the patient.      Current Discharge Medication List          Final diagnoses:   Atrial fibrillation with RVR (H)   Encounter for medication refill     Note: Chart documentation done in part with Dragon Voice Recognition software. Although reviewed after completion, some word and grammatical errors may remain.     6/11/2024   [facility] EMERGENCY DEPT       Radha Garcia PA-C  06/11/24 6092

## 2024-06-11 NOTE — PROGRESS NOTES
ANTICOAGULATION  MANAGEMENT: Discharge Review    Tracy Palomo chart reviewed for anticoagulation continuity of care    Emergency room visit on 6/11/24 for irregular heart rate, atrial fibrillation .    Discharge disposition: Home    Results:    Recent labs: (last 7 days)     06/11/24  1347   INR 3.12*     Anticoagulation inpatient management:     not applicable     Anticoagulation discharge instructions:     Warfarin dosing: home regimen continued   Bridging: No   INR goal change: No      Medication changes affecting anticoagulation: No    Additional factors affecting anticoagulation: Yes: had run out of flecainide and was having increased heart rate, a fib symptoms  is seeing cardiology tomorrow.  Will have her keep her INR appointment for 6/13/24 and adjust dosing if needed      PLAN     No adjustment to anticoagulation plan needed    Recommended follow up is scheduled  Patient not contacted    No adjustment to Anticoagulation Calendar was required    [place] RN

## 2024-06-11 NOTE — ED TRIAGE NOTES
Pt felt and was alerted by watch of irregular rhythm - more than her baseline.  Pt has Afib.  States that is feels different, not painful.  Irregular rhythm, unable to capture on Sp02 monitor, had to take physical radial pulse. 54 - 98 within a few seconds.  Daughter, Iesha, brought pt in.  Alert and oriented.     Triage Assessment (Adult)       Row Name 06/11/24 1321          Triage Assessment    Airway WDL WDL        Respiratory WDL    Respiratory WDL WDL        Cardiac WDL    Cardiac WDL X;rhythm     Pulse Rate & Regularity radial pulse irregular        Cognitive/Neuro/Behavioral WDL    Cognitive/Neuro/Behavioral WDL WDL

## 2024-06-11 NOTE — DISCHARGE INSTRUCTIONS
I am glad you are feeling better.  Please be sure you always take your heart medicines as prescribed and if you have issues getting refills or if you have any worsening symptoms please return to the emergency department.  Otherwise, follow-up with your cardiology team as scheduled.    Thank you for choosing [facility]'s Emergency Department. It was a pleasure taking care of you today. If you have any questions, please call [phone].    Alyssa Villalobos, PAJose AngelC

## 2024-06-12 ENCOUNTER — PATIENT OUTREACH (OUTPATIENT)
Dept: CARE COORDINATION | Facility: CLINIC | Age: 84
End: 2024-06-12

## 2024-06-12 ENCOUNTER — OFFICE VISIT (OUTPATIENT)
Dept: CARDIOLOGY | Facility: CLINIC | Age: 84
End: 2024-06-12
Payer: MEDICARE

## 2024-06-12 VITALS
RESPIRATION RATE: 16 BRPM | HEART RATE: 34 BPM | DIASTOLIC BLOOD PRESSURE: 64 MMHG | SYSTOLIC BLOOD PRESSURE: 118 MMHG | HEIGHT: 64 IN | WEIGHT: 182 LBS | BODY MASS INDEX: 31.07 KG/M2 | OXYGEN SATURATION: 95 %

## 2024-06-12 DIAGNOSIS — Z86.711 HISTORY OF PULMONARY EMBOLISM: ICD-10-CM

## 2024-06-12 DIAGNOSIS — Z86.718 HISTORY OF DEEP VENOUS THROMBOSIS: ICD-10-CM

## 2024-06-12 DIAGNOSIS — I48.0 PAROXYSMAL ATRIAL FIBRILLATION (H): Primary | ICD-10-CM

## 2024-06-12 PROCEDURE — 93000 ELECTROCARDIOGRAM COMPLETE: CPT

## 2024-06-12 PROCEDURE — 99214 OFFICE O/P EST MOD 30 MIN: CPT

## 2024-06-12 ASSESSMENT — PAIN SCALES - GENERAL: PAINLEVEL: NO PAIN (0)

## 2024-06-12 NOTE — PROGRESS NOTES
~Cardiology Clinic Visit~    Tracy Palomo MRN# [id number]   YOB: 1940 Age: 84 year old   Primary Cardiologist: Dr. Bass           Assessment and Plan:   Tracy Palomo is a very pleasant 84 year old female who is here today for follow up      Paroxysmal atrial fibrillation, anticoagulated with warfarin   [id number] Score 3 (age x2, sex)  Stress echo 4/2024- LVEF 55-60%, no WMA   Zio monitor 5/2024- min HR of 40 bpm, max HR of 174 bpm, and avg HR of 59 bpm. Predominant underlying rhythm was Sinus Rhythm. Atrial Fibrillation/Flutter occurred (2% burden), ranging from  bpm (avg of 96 bpm), the longest lasting 43 mins 38 secs with an avg rate of 110 bpm. 1 Pause occurred lasting 3.3 secs (18 bpm). Atrial Fibrillation/Flutter was detected within +/- 45 seconds of symptomatic patient event(s).  Rhythm control with flecainide 50 mg BID   History of DVT and PE   Bradycardia, not secondary to flecainide use, has known history     Patient stable from a cardiovascular standpoint. ECG obtained today, she is in a NSR with a heart rate of 53 bpm with normal AR interval, and QTc. Patient frustrated with her symptomatology when she returns to atrial fibrillation. Discussed patients office visit with primary cardiologist. Recommendation was either an EP referral or increase flecainide to 100 mg BID. If she fails the dose adjustment, would refer to EP.     Patient agreeable to increase flecainide to 100 mg BID. Repeat EKG in 1 week.     Follow up with AMBER in 3 months       Maday Ruiz PA-C  Physician Assistant   [facility]  Pager: [phone]          History of Presenting Illness:    Tracy Palomo is a very pleasant 84 year old female with a history of DVT and pulmonary embolism (anticoagulated with warfarin), and paroxysmal atrial fibrillation.     In brief, patient was last seen by Dr. Bass 4/2024. At that time, she reported for the past 4 months she has had  more frequent episodes of abrupt onset of fatigability, frequently associated with tachycardia and likely atrial fibrillation on her Apple Watch.  She presented to the emergency room 2/2024 with a prolonged episode of fast heart rate associated with an aching of her right arm (relieved after stretching). Telemetry in the ER noted patient to be in sinus bradycardia. Troponin and TSH were normal, INR was therapeutic.     Tracy's daughters reported that for the past 4 months or so she has had much more frequent episodes of this fatigability.  She previously had been walking around the block 3 times per day.  She has given this up now, since taking a walk and having one of her spells that left her incapacitated and needing a neighbor to help her home.  She has now become frightened to take long walks and restricts her activities because of this. At the conclusion of that visit, a stress echo was ordered and patient was started on flecainide.     Follow up EKG after starting flecainide noted patient to be in atrial flutter so a Zio monitor was ordered to know the atrial flutter burden. Zio monitor results noted above.     Of note, patient was evaluated in the emergency department yesterday for palpitations and noted her pulse to be 150 bpm.  She was found to be in atrial fibrillation with RVR. She was given flecainide 50 mg and converted back to NSR. Her INR was therapeutic at 3.12. Prior to presenting the the emergency department, she had not been taking her flecainide for the past 2 weeks as it ran out and she has not been able to refill it.     Patient reports feeling well after returning back to a normal rhythm after her visit in the emergency department yesterday. Yesterday, patient stated her palpitations were worse than normal. Her heart rate ranged from  bpm on her smart watch, and lasted about an hour prompting ER evaluation. Tracy lives an active lifestyle. She enjoys walking daily. She vacuums and  cleans her home daily. She is looking forward to going to the [employer] this summer and [place]. She endorses that atrial fibrillation isn't going to interfere with her daily living. Daughter is accompanying patient today.     Denies shortness of breath, orthopnea and PND. Denies chest pain, palpitations, lightheadedness, dizziness, near syncope and syncope. Denies epistaxis, ecchymosis, and melena.     Taking medications daily as prescribed.     Blood pressure 118/64.           Social History       Social History     Socioeconomic History    Marital status:      Spouse name: Not on file    Number of children: Not on file    Years of education: Not on file    Highest education level: Not on file   Occupational History    Not on file   Tobacco Use    Smoking status: Never    Smokeless tobacco: Never   Substance and Sexual Activity    Alcohol use: No     Alcohol/week: 0.0 standard drinks of alcohol    Drug use: No    Sexual activity: Not Currently     Partners: Male   Other Topics Concern    Parent/sibling w/ CABG, MI or angioplasty before 65F 55M? No   Social History Narrative    Not on file     Social Determinants of Health     Financial Resource Strain: High Risk (11/26/2023)    Financial Resource Strain     Within the past 12 months, have you or your family members you live with been unable to get utilities (heat, electricity) when it was really needed?: Yes   Food Insecurity: Low Risk  (11/26/2023)    Food Insecurity     Within the past 12 months, did you worry that your food would run out before you got money to buy more?: No     Within the past 12 months, did the food you bought just not last and you didn t have money to get more?: No   Transportation Needs: Low Risk  (11/26/2023)    Transportation Needs     Within the past 12 months, has lack of transportation kept you from medical appointments, getting your medicines, non-medical meetings or appointments, work, or from getting things  that you need?: No   Physical Activity: Not on file   Stress: Not on file   Social Connections: Not on file   Interpersonal Safety: Low Risk  (11/29/2023)    Interpersonal Safety     Do you feel physically and emotionally safe where you currently live?: Yes     Within the past 12 months, have you been hit, slapped, kicked or otherwise physically hurt by someone?: No     Within the past 12 months, have you been humiliated or emotionally abused in other ways by your partner or ex-partner?: No   Housing Stability: Low Risk  (11/26/2023)    Housing Stability     Do you have housing? : Yes     Are you worried about losing your housing?: No            Review of Systems:   Please see HPI         Physical Exam:   Vitals: There were no vitals taken for this visit.   Wt Readings from Last 4 Encounters:   05/07/24 83 kg (183 lb)   04/20/24 81.6 kg (180 lb)   04/08/24 83 kg (183 lb)   03/06/24 81.6 kg (180 lb)     GEN: well nourished, in no acute distress.  NECK: Supple. JVP was not appreciated.   C/V:  Regular rate and rhythm, no murmur, rub or gallop.    RESP: Respirations are unlabored. Clear to auscultation bilaterally without wheezing, rales, or rhonchi.  EXTREM: Bilateral lower extremities with no edema.   SKIN: Warm and dry.        Data:   LIPID RESULTS:  Lab Results   Component Value Date    CHOL 142 05/16/2022    CHOL 155 01/31/2020    HDL 52 05/16/2022    HDL 66 01/31/2020    LDL 49 05/16/2022    LDL 56 01/31/2020    TRIG 203 (H) 05/16/2022    TRIG 167 (H) 01/31/2020    CHOLHDLRATIO 3.7 08/24/2015     LIVER ENZYME RESULTS:  Lab Results   Component Value Date    AST 27 04/20/2024    AST 25 01/31/2020    ALT 22 04/20/2024    ALT 27 01/31/2020     CBC RESULTS:  Lab Results   Component Value Date    WBC 5.3 06/11/2024    WBC 5.8 02/01/2021    RBC 5.12 06/11/2024    RBC 4.71 02/01/2021    HGB 15.0 06/11/2024    HGB 11.3 (L) 02/17/2021    HCT 45.6 06/11/2024    HCT 42.2 02/01/2021    MCV 89 06/11/2024    MCV 90 02/01/2021     MCH 29.3 06/11/2024    MCH 29.1 02/01/2021    MCHC 32.9 06/11/2024    MCHC 32.5 02/01/2021    RDW 13.6 06/11/2024    RDW 13.8 02/01/2021     06/11/2024     02/17/2021     BMP RESULTS:  Lab Results   Component Value Date     06/11/2024     08/06/2020    POTASSIUM 4.1 06/11/2024    POTASSIUM 4.4 05/16/2022    POTASSIUM 4.2 08/06/2020    CHLORIDE 104 06/11/2024    CHLORIDE 114 (H) 05/16/2022    CHLORIDE 108 08/06/2020    CO2 25 06/11/2024    CO2 28 05/16/2022    CO2 28 08/06/2020    ANIONGAP 13 06/11/2024    ANIONGAP 3 05/16/2022    ANIONGAP 2 (L) 08/06/2020     (H) 06/11/2024    GLC 92 06/14/2023    GLC 90 05/16/2022     (H) 02/17/2021    BUN 22.1 06/11/2024    BUN 16 05/16/2022    BUN 16 08/06/2020    CR 0.91 06/11/2024    CR 0.87 08/06/2020    GFRESTIMATED 62 06/11/2024    GFRESTIMATED 64 08/06/2020    GFRESTBLACK 74 08/06/2020    HARVEY 10.1 06/11/2024    HARVEY 8.7 08/06/2020      A1C RESULTS:  Lab Results   Component Value Date    A1C 5.8 10/20/2014     INR RESULTS:  Lab Results   Component Value Date    INR 3.12 (H) 06/11/2024    INR 1.9 (H) 05/30/2024    INR 1.7 (H) 05/23/2024    INR 2.20 (H) 02/29/2024    INR 2.2 (H) 06/17/2021    INR 2.7 (H) 05/20/2021    INR 1.8 (A) 03/02/2021    INR 1.9 (A) 02/24/2021            Medications     Current Outpatient Medications   Medication Sig Dispense Refill    acetaminophen (TYLENOL) 325 MG tablet Take 3 tablets (975 mg) by mouth every 8 hours as needed for pain 100 tablet 1    flecainide (TAMBOCOR) 50 MG tablet Take 1 tablet (50 mg) by mouth 2 times daily for 7 days 14 tablet 0    lovastatin (MEVACOR) 40 MG tablet Take 1 tablet (40 mg) by mouth at bedtime 90 tablet 3    warfarin ANTICOAGULANT (COUMADIN) 2.5 MG tablet Take 1 1/2 tablets (3.75 mg) on Thursdays, and 1 tablet (2.5 mg) all other days or as directed by coumadin clinic 90 tablet 0          Past Medical History     Past Medical History:   Diagnosis Date    Atrial fibrillation  (H) 2014    related to colon surgery    Colon polyps     Diverticulosis of colon (without mention of hemorrhage)     Diverticulosis    DVT (deep vein thrombosis) in pregnancy 2014    after colon surgery    Other and unspecified hyperlipidemia     PE (pulmonary embolism) 2014    related to colon surgery    Unspecified essential hypertension      Past Surgical History:   Procedure Laterality Date    ARTHROPLASTY KNEE Right 1/8/2019    Procedure: Right total knee replacement;  Surgeon: Kaleb Pang DO;  [facility]    ARTHROPLASTY KNEE Left 2/16/2021    Procedure: left total knee replacement;  Surgeon: Kaleb Pang DO;  [facility]    COLONOSCOPY  09, 10, 12    [facility]    COLONOSCOPY N/A 12/11/2017    Procedure: COLONOSCOPY;  colonoscopy;  Surgeon: Elvis Quezada MD;  Location:  GI    FLEXIBLE SIGMOIDOSCOPY  09, 11    LAPAROTOMY EXPLORATORY N/A 10/20/2014    Procedure: LAPAROTOMY EXPLORATORY;  Surgeon: Miguel Oleary MD;  [facility]    LAPAROTOMY, LYSIS ADHESIONS, COMBINED N/A 10/20/2014    Procedure: COMBINED LAPAROTOMY, LYSIS ADHESIONS;  Surgeon: Miguel Oleary MD;  [facility]    OPEN REDUCTION INTERNAL FIXATION HIP BIPOLAR Left 3/16/2017    Procedure: OPEN REDUCTION INTERNAL FIXATION HIP BIPOLAR;  Surgeon: Kaleb Pang DO;  [facility]    RELEASE TRIGGER FINGER Left 1/12/2021    Procedure: Left thumb trigger release;  Surgeon: Kaleb Pang DO;  [facility]    RESECT SMALL BOWEL WITHOUT OSTOMY N/A 10/20/2014    Procedure: RESECT SMALL BOWEL WITHOUT OSTOMY;  Surgeon: Miguel Oleary MD;  [facility]     Family History   Problem Relation Age of Onset    Blood Disease No family hx of         blood clots            Allergies   No known allergies      This note was completed in part using dictation via the Dragon voice recognition software. Some word and grammatical errors may occur and must be  interpreted in the appropriate clinical context.  If there are any questions pertaining to this issue, please contact me for further clarification.

## 2024-06-12 NOTE — LETTER
6/12/2024    Chad Betst MD  [address] [facility] [address]    RE: Tracy Palomo       Dear Colleague,     I had the [facility] of seeing Tracy Palomo in the [facility].              ~Cardiology Clinic Visit~    Tracy Palomo MRN# [id number]   YOB: 1940 Age: 84 year old   Primary Cardiologist: Dr. Bass           Assessment and Plan:   Tracy Palomo is a very pleasant 84 year old female who is here today for follow up      Paroxysmal atrial fibrillation, anticoagulated with warfarin   [id number] Score 3 (age x2, sex)  Stress echo 4/2024- LVEF 55-60%, no WMA   Zio monitor 5/2024- min HR of 40 bpm, max HR of 174 bpm, and avg HR of 59 bpm. Predominant underlying rhythm was Sinus Rhythm. Atrial Fibrillation/Flutter occurred (2% burden), ranging from  bpm (avg of 96 bpm), the longest lasting 43 mins 38 secs with an avg rate of 110 bpm. 1 Pause occurred lasting 3.3 secs (18 bpm). Atrial Fibrillation/Flutter was detected within +/- 45 seconds of symptomatic patient event(s).  Rhythm control with flecainide 50 mg BID   History of DVT and PE   Bradycardia, not secondary to flecainide use, has known history     Patient stable from a cardiovascular standpoint. ECG obtained today, she is in a NSR with a heart rate of 53 bpm with normal PA interval, and QTc. Patient frustrated with her symptomatology when she returns to atrial fibrillation. Discussed patients office visit with primary cardiologist. Recommendation was either an EP referral or increase flecainide to 100 mg BID. If she fails the dose adjustment, would refer to EP.     Patient agreeable to increase flecainide to 100 mg BID. Repeat EKG in 1 week.     Follow up with AMBER in 3 months       Maday Ruiz PA-C  Physician Assistant   [facility]  Pager: [phone]          History of Presenting Illness:    Tracy Palomo is a very pleasant 84 year old female with a history of DVT and  pulmonary embolism (anticoagulated with warfarin), and paroxysmal atrial fibrillation.     In brief, patient was last seen by Dr. Bass 4/2024. At that time, she reported for the past 4 months she has had more frequent episodes of abrupt onset of fatigability, frequently associated with tachycardia and likely atrial fibrillation on her Apple Watch.  She presented to the emergency room 2/2024 with a prolonged episode of fast heart rate associated with an aching of her right arm (relieved after stretching). Telemetry in the ER noted patient to be in sinus bradycardia. Troponin and TSH were normal, INR was therapeutic.     Tracy's daughters reported that for the past 4 months or so she has had much more frequent episodes of this fatigability.  She previously had been walking around the block 3 times per day.  She has given this up now, since taking a walk and having one of her spells that left her incapacitated and needing a neighbor to help her home.  She has now become frightened to take long walks and restricts her activities because of this. At the conclusion of that visit, a stress echo was ordered and patient was started on flecainide.     Follow up EKG after starting flecainide noted patient to be in atrial flutter so a Zio monitor was ordered to know the atrial flutter burden. Zio monitor results noted above.     Of note, patient was evaluated in the emergency department yesterday for palpitations and noted her pulse to be 150 bpm.  She was found to be in atrial fibrillation with RVR. She was given flecainide 50 mg and converted back to NSR. Her INR was therapeutic at 3.12. Prior to presenting the the emergency department, she had not been taking her flecainide for the past 2 weeks as it ran out and she has not been able to refill it.     Patient reports feeling well after returning back to a normal rhythm after her visit in the emergency department yesterday. Yesterday, patient stated her palpitations were  worse than normal. Her heart rate ranged from  bpm on her smart watch, and lasted about an hour prompting ER evaluation. Tracy lives an active lifestyle. She enjoys walking daily. She vacuums and cleans her home daily. [place] and [place]. She endorses that atrial fibrillation isn't going to interfere with her daily living. Daughter is accompanying patient today.     Denies shortness of breath, orthopnea and PND. Denies chest pain, palpitations, lightheadedness, dizziness, near syncope and syncope. Denies epistaxis, ecchymosis, and melena.     Taking medications daily as prescribed.     Blood pressure 118/64.           Social History       Social History     Socioeconomic History    Marital status:      Spouse name: Not on file    Number of children: Not on file    Years of education: Not on file    Highest education level: Not on file   Occupational History    Not on file   Tobacco Use    Smoking status: Never    Smokeless tobacco: Never   Substance and Sexual Activity    Alcohol use: No     Alcohol/week: 0.0 standard drinks of alcohol    Drug use: No    Sexual activity: Not Currently     Partners: Male   Other Topics Concern    Parent/sibling w/ CABG, MI or angioplasty before 65F 55M? No   Social History Narrative    Not on file     Social Determinants of Health     Financial Resource Strain: High Risk (11/26/2023)    Financial Resource Strain     Within the past 12 months, have you or your family members you live with been unable to get utilities (heat, electricity) when it was really needed?: Yes   Food Insecurity: Low Risk  (11/26/2023)    Food Insecurity     Within the past 12 months, did you worry that your food would run out before you got money to buy more?: No     Within the past 12 months, did the food you bought just not last and you didn t have money to get more?: No   Transportation Needs: Low Risk  (11/26/2023)     Transportation Needs     Within the past 12 months, has lack of transportation kept you from medical appointments, getting your medicines, non-medical meetings or appointments, work, or from getting things that you need?: No   Physical Activity: Not on file   Stress: Not on file   Social Connections: Not on file   Interpersonal Safety: Low Risk  (11/29/2023)    Interpersonal Safety     Do you feel physically and emotionally safe where you currently live?: Yes     Within the past 12 months, have you been hit, slapped, kicked or otherwise physically hurt by someone?: No     Within the past 12 months, have you been humiliated or emotionally abused in other ways by your partner or ex-partner?: No   Housing Stability: Low Risk  (11/26/2023)    Housing Stability     Do you have housing? : Yes     Are you worried about losing your housing?: No            Review of Systems:   Please see HPI         Physical Exam:   Vitals: There were no vitals taken for this visit.   Wt Readings from Last 4 Encounters:   05/07/24 83 kg (183 lb)   04/20/24 81.6 kg (180 lb)   04/08/24 83 kg (183 lb)   03/06/24 81.6 kg (180 lb)     GEN: well nourished, in no acute distress.  NECK: Supple. JVP was not appreciated.   C/V:  Regular rate and rhythm, no murmur, rub or gallop.    RESP: Respirations are unlabored. Clear to auscultation bilaterally without wheezing, rales, or rhonchi.  EXTREM: Bilateral lower extremities with no edema.   SKIN: Warm and dry.        Data:   LIPID RESULTS:  Lab Results   Component Value Date    CHOL 142 05/16/2022    CHOL 155 01/31/2020    HDL 52 05/16/2022    HDL 66 01/31/2020    LDL 49 05/16/2022    LDL 56 01/31/2020    TRIG 203 (H) 05/16/2022    TRIG 167 (H) 01/31/2020    CHOLHDLRATIO 3.7 08/24/2015     LIVER ENZYME RESULTS:  Lab Results   Component Value Date    AST 27 04/20/2024    AST 25 01/31/2020    ALT 22 04/20/2024    ALT 27 01/31/2020     CBC RESULTS:  Lab Results   Component Value Date    WBC 5.3 06/11/2024     WBC 5.8 02/01/2021    RBC 5.12 06/11/2024    RBC 4.71 02/01/2021    HGB 15.0 06/11/2024    HGB 11.3 (L) 02/17/2021    HCT 45.6 06/11/2024    HCT 42.2 02/01/2021    MCV 89 06/11/2024    MCV 90 02/01/2021    MCH 29.3 06/11/2024    MCH 29.1 02/01/2021    MCHC 32.9 06/11/2024    MCHC 32.5 02/01/2021    RDW 13.6 06/11/2024    RDW 13.8 02/01/2021     06/11/2024     02/17/2021     BMP RESULTS:  Lab Results   Component Value Date     06/11/2024     08/06/2020    POTASSIUM 4.1 06/11/2024    POTASSIUM 4.4 05/16/2022    POTASSIUM 4.2 08/06/2020    CHLORIDE 104 06/11/2024    CHLORIDE 114 (H) 05/16/2022    CHLORIDE 108 08/06/2020    CO2 25 06/11/2024    CO2 28 05/16/2022    CO2 28 08/06/2020    ANIONGAP 13 06/11/2024    ANIONGAP 3 05/16/2022    ANIONGAP 2 (L) 08/06/2020     (H) 06/11/2024    GLC 92 06/14/2023    GLC 90 05/16/2022     (H) 02/17/2021    BUN 22.1 06/11/2024    BUN 16 05/16/2022    BUN 16 08/06/2020    CR 0.91 06/11/2024    CR 0.87 08/06/2020    GFRESTIMATED 62 06/11/2024    GFRESTIMATED 64 08/06/2020    GFRESTBLACK 74 08/06/2020    HARVEY 10.1 06/11/2024    HARVEY 8.7 08/06/2020      A1C RESULTS:  Lab Results   Component Value Date    A1C 5.8 10/20/2014     INR RESULTS:  Lab Results   Component Value Date    INR 3.12 (H) 06/11/2024    INR 1.9 (H) 05/30/2024    INR 1.7 (H) 05/23/2024    INR 2.20 (H) 02/29/2024    INR 2.2 (H) 06/17/2021    INR 2.7 (H) 05/20/2021    INR 1.8 (A) 03/02/2021    INR 1.9 (A) 02/24/2021            Medications     Current Outpatient Medications   Medication Sig Dispense Refill    acetaminophen (TYLENOL) 325 MG tablet Take 3 tablets (975 mg) by mouth every 8 hours as needed for pain 100 tablet 1    flecainide (TAMBOCOR) 50 MG tablet Take 1 tablet (50 mg) by mouth 2 times daily for 7 days 14 tablet 0    lovastatin (MEVACOR) 40 MG tablet Take 1 tablet (40 mg) by mouth at bedtime 90 tablet 3    warfarin ANTICOAGULANT (COUMADIN) 2.5 MG tablet Take 1 1/2  tablets (3.75 mg) on Thursdays, and 1 tablet (2.5 mg) all other days or as directed by coumadin clinic 90 tablet 0          Past Medical History     Past Medical History:   Diagnosis Date    Atrial fibrillation (H) 2014    related to colon surgery    Colon polyps     Diverticulosis of colon (without mention of hemorrhage)     Diverticulosis    DVT (deep vein thrombosis) in pregnancy 2014    after colon surgery    Other and unspecified hyperlipidemia     PE (pulmonary embolism) 2014    related to colon surgery    Unspecified essential hypertension      Past Surgical History:   Procedure Laterality Date    ARTHROPLASTY KNEE Right 1/8/2019    Procedure: Right total knee replacement;  Surgeon: Kaleb Pang DO;  [facility]    ARTHROPLASTY KNEE Left 2/16/2021    Procedure: left total knee replacement;  Surgeon: Kaleb Pang DO;  [facility]    COLONOSCOPY  09, 10, 12    [facility]    COLONOSCOPY N/A 12/11/2017    Procedure: COLONOSCOPY;  colonoscopy;  Surgeon: Elvis Quezada MD;  Location:  GI    FLEXIBLE SIGMOIDOSCOPY  09, 11    LAPAROTOMY EXPLORATORY N/A 10/20/2014    Procedure: LAPAROTOMY EXPLORATORY;  Surgeon: Miguel Oleary MD;  [facility]    LAPAROTOMY, LYSIS ADHESIONS, COMBINED N/A 10/20/2014    Procedure: COMBINED LAPAROTOMY, LYSIS ADHESIONS;  Surgeon: Miguel Oleary MD;  [facility]    OPEN REDUCTION INTERNAL FIXATION HIP BIPOLAR Left 3/16/2017    Procedure: OPEN REDUCTION INTERNAL FIXATION HIP BIPOLAR;  Surgeon: Kaleb Pang DO;  [facility]    RELEASE TRIGGER FINGER Left 1/12/2021    Procedure: Left thumb trigger release;  Surgeon: Kaleb Pang DO;  [facility]    RESECT SMALL BOWEL WITHOUT OSTOMY N/A 10/20/2014    Procedure: RESECT SMALL BOWEL WITHOUT OSTOMY;  Surgeon: Miguel Oleary MD;  [facility]     Family History   Problem Relation Age of Onset    Blood Disease No family hx of          blood clots            Allergies   No known allergies      This note was completed in part using dictation via the Dragon voice recognition software. Some word and grammatical errors may occur and must be interpreted in the appropriate clinical context.  If there are any questions pertaining to this issue, please contact me for further clarification.      Thank you for allowing me to participate in the care of your patient.      Sincerely,     RIKKI Andrea [facility]  cc:   Abimael Bass MD  [address] [place] [id number]

## 2024-06-12 NOTE — LETTER
Tracy Palomo  [address] [place] [id number]    Dear Tracy Palomo,      I am a team member within the [facility] with  [facility]. I recently contacted you to ensure you are doing well following a visit within our health system. I also wanted to take this chance to introduce Clinic Care Coordination should you have any interest in this program in the future.    Below is a description of Clinic Care Coordination and how this team can further assist you:       The Clinic Care Coordination team is made up of a Registered Nurse, , Financial Resource Worker, and a Community Health Worker who understand and can help navigate the health care system. The goal of clinic care coordination is to help you manage your health, improve access to care, and achieve optimal health outcomes. They work alongside your provider to assist you in determining your health and social needs, obtain health care and community resources, and provide you with necessary information and education. Clinic Care Coordination can work with you through any barriers and develop a care plan that helps coordinate and strengthen the relationship between you and your care team.    If you wish to connect with the Clinic Care Coordination Team, please let your [facility] Primary Care Provider or Clinic Care Team know and they can place a referral. The Clinic Care Coordination team will then reach out by phone to further support you.    We are focused on providing you with the highest-quality healthcare experience possible.    Sincerely,   Your care team with [facility]

## 2024-06-12 NOTE — PROGRESS NOTES
[facility] Resource [place]  Community Health Worker Initial Outreach    CHW Initial Information Gathering:  Referral Source: ED Follow-Up  CHW Additional Questions  If ED/Hospital discharge, follow-up appointment scheduled as recommended?: Yes (Pt had follow up with cardiology this morning)  Nidat active?: Yes    Patient accepts CC: No, patient declined at this time. Patient will be sent Care Coordination introduction letter for future reference.       Natty Beyer  Community Health Worker  [facility]    *Connected Care Resource Team does NOT follow patient ongoing. Referrals are identified based on internal discharge reports and the outreach is to ensure patient has an understanding of their discharge instructions.

## 2024-06-13 ENCOUNTER — ANTICOAGULATION THERAPY VISIT (OUTPATIENT)
Dept: ANTICOAGULATION | Facility: CLINIC | Age: 84
End: 2024-06-13

## 2024-06-13 ENCOUNTER — LAB (OUTPATIENT)
Dept: LAB | Facility: CLINIC | Age: 84
End: 2024-06-13
Payer: MEDICARE

## 2024-06-13 ENCOUNTER — TELEPHONE (OUTPATIENT)
Dept: CARDIOLOGY | Facility: CLINIC | Age: 84
End: 2024-06-13

## 2024-06-13 DIAGNOSIS — I48.0 PAROXYSMAL ATRIAL FIBRILLATION (H): Primary | ICD-10-CM

## 2024-06-13 DIAGNOSIS — I26.99 OTHER PULMONARY EMBOLISM WITHOUT ACUTE COR PULMONALE, UNSPECIFIED CHRONICITY (H): ICD-10-CM

## 2024-06-13 DIAGNOSIS — I27.82 CHRONIC PULMONARY EMBOLISM WITHOUT ACUTE COR PULMONALE, UNSPECIFIED PULMONARY EMBOLISM TYPE (H): ICD-10-CM

## 2024-06-13 DIAGNOSIS — Z79.01 LONG TERM CURRENT USE OF ANTICOAGULANT THERAPY: ICD-10-CM

## 2024-06-13 DIAGNOSIS — I27.82 CHRONIC PULMONARY EMBOLISM WITHOUT ACUTE COR PULMONALE, UNSPECIFIED PULMONARY EMBOLISM TYPE (H): Primary | ICD-10-CM

## 2024-06-13 PROBLEM — Z86.718 HISTORY OF DEEP VENOUS THROMBOSIS: Status: ACTIVE | Noted: 2024-06-13

## 2024-06-13 PROBLEM — Z86.711 HISTORY OF PULMONARY EMBOLISM: Status: ACTIVE | Noted: 2024-06-13

## 2024-06-13 LAB — INR BLD: 3.9 (ref 0.9–1.1)

## 2024-06-13 PROCEDURE — 85610 PROTHROMBIN TIME: CPT

## 2024-06-13 PROCEDURE — 36416 COLLJ CAPILLARY BLOOD SPEC: CPT

## 2024-06-13 RX ORDER — FLECAINIDE ACETATE 50 MG/1
50 TABLET ORAL 2 TIMES DAILY
Qty: 60 TABLET | Refills: 3 | Status: SHIPPED | OUTPATIENT
Start: 2024-06-13 | End: 2024-06-14

## 2024-06-13 NOTE — TELEPHONE ENCOUNTER
----- Message from Maday Ruiz PA-C sent at 6/13/2024  2:41 PM CDT -----  Can you please update patient that I chatted with her primary cardiologist, Dr. Bass about our office visit yesterday and patients symptoms. He recommended we could either proceed with an EP referral or we can increase her flecainide to 100 mg BID and see how she does. If she doesn't tolerate that after a two week trial we can refer to EP.     Thanks,   Maday

## 2024-06-13 NOTE — PROGRESS NOTES
ANTICOAGULATION MANAGEMENT     Tracy Palomo 84 year old female is on warfarin with supratherapeutic INR result. (Goal INR 2.0-3.0)    Recent labs: (last 7 days)     06/13/24  0855   INR 3.9*       ASSESSMENT     Source(s): Chart Review and Patient/Caregiver Call     Warfarin doses taken: Less warfarin taken than planned which may be affecting INR and missed dose over a week ago  reports she's been taking the dose on her bottle 3.75 mg Thursdays, 2.5 mg row.   Diet:  Has been having episodes of afib & fatigue may be affecting diet and INR  Medication/supplement changes:  Restarting her flecainide, had been off for a couple weeks  New illness, injury, or hospitalization: No  Signs or symptoms of bleeding or clotting: No  Previous result: Supratherapeutic  Additional findings: None       PLAN     Recommended plan for temporary change(s) and ongoing change(s) affecting INR     Dosing Instructions: partial hold then continue your current warfarin dose with next INR in 5 days       Summary  As of 6/13/2024      Full warfarin instructions:  6/13: 1.25 mg; Otherwise 3.75 mg every Thu; 2.5 mg all other days   Next INR check:  6/18/2024               Telephone call with Tracy who verbalizes understanding and agrees to plan and who agrees to plan and repeated back plan correctly    Lab visit scheduled    Education provided:   Dietary considerations: importance of consistent vitamin K intake  Symptom monitoring: monitoring for bleeding signs and symptoms    Plan made per [facility] anticoagulation protocol    Radha Rodriguez RN  Anticoagulation Clinic  6/13/2024    _______________________________________________________________________     Anticoagulation Episode Summary       Current INR goal:  2.0-3.0   TTR:  62.6% (11.9 mo)   Target end date:  Indefinite   Send INR reminders to:  GURVINDER SOARES    Indications    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]  Long term current use of anticoagulant  therapy [Z79.01]  Other pulmonary embolism without acute cor pulmonale  unspecified chronicity (H) [I26.99]  Chronic pulmonary embolism without acute cor pulmonale  unspecified pulmonary embolism type (H) [I27.82]             Comments:               Anticoagulation Care Providers       Provider Role Specialty Phone number    Chad Betts MD Referring Internal Medicine [phone]    Gerard Medrano MD Responsible Family Medicine [phone]

## 2024-06-13 NOTE — TELEPHONE ENCOUNTER
Patient notified of provider's message as written. She states that she would like to try the increased Flecainide dosing for the next two weeks prior to proceeding with EP referral. Patient verbalized understanding and has no further questions at this time. Will postpone encounter to reach back out to patient in two weeks.      Annie Larsen RN   [facility]

## 2024-06-14 RX ORDER — FLECAINIDE ACETATE 50 MG/1
100 TABLET ORAL 2 TIMES DAILY
Qty: 120 TABLET | Refills: 4 | Status: SHIPPED | OUTPATIENT
Start: 2024-06-14

## 2024-06-14 NOTE — TELEPHONE ENCOUNTER
"Called pt to schedule follow-up in Sept with cardiology AMBER per Maday Ruiz last visit notes. Pt stating that she has been taking the increased dosage of Flecainide at 8am and 8pm (12 hours apart) and experienced an \"episode\" of sorts that lasted about 15 minutes at midnight. Please call pt to discuss- routing to [place] and [place] as [place] does not have cardiology in on Fridays  "

## 2024-06-14 NOTE — TELEPHONE ENCOUNTER
Spoke with patient. She states she is still having rapid afib about midnight that is bothersome. She wondered if the 8AM and 8PM dosing schedule is correct.  Reviewed medication plan.  She did not remember the call from yesterday recommending that she increase the flecainide to 100mg BID. Patient is almost out of pills.  Rx refill sent for flecainide 50mg (2 tabs) BID. Sh wanted to try this for 30 days to see if it will help.

## 2024-06-14 NOTE — PATIENT INSTRUCTIONS
Thank you for your visit with the [facility] today.    Today's plan:   Medication changes: increase flecainide to 100 mg twice a day   Repeat EKG in 1 week   Follow up with AMBER in 3 months if tolerating the higher dose of flecainide.     If you have questions or concerns please call the nurse team at [phone] or send a [employer] message.     Scheduling phone number: [phone]    It was a pleasure seeing you today!     Maday Ruiz PA-C   Physician Assistant   [facility]

## 2024-06-18 ENCOUNTER — ANTICOAGULATION THERAPY VISIT (OUTPATIENT)
Dept: ANTICOAGULATION | Facility: CLINIC | Age: 84
End: 2024-06-18

## 2024-06-18 ENCOUNTER — LAB (OUTPATIENT)
Dept: LAB | Facility: CLINIC | Age: 84
End: 2024-06-18
Payer: MEDICARE

## 2024-06-18 ENCOUNTER — HOSPITAL ENCOUNTER (OUTPATIENT)
Dept: CARDIOLOGY | Facility: CLINIC | Age: 84
Discharge: HOME | End: 2024-06-18
Attending: INTERNAL MEDICINE | Admitting: INTERNAL MEDICINE
Payer: MEDICARE

## 2024-06-18 DIAGNOSIS — I48.0 PAROXYSMAL ATRIAL FIBRILLATION (H): Primary | ICD-10-CM

## 2024-06-18 DIAGNOSIS — I27.82 CHRONIC PULMONARY EMBOLISM WITHOUT ACUTE COR PULMONALE, UNSPECIFIED PULMONARY EMBOLISM TYPE (H): Primary | ICD-10-CM

## 2024-06-18 DIAGNOSIS — I27.82 CHRONIC PULMONARY EMBOLISM WITHOUT ACUTE COR PULMONALE, UNSPECIFIED PULMONARY EMBOLISM TYPE (H): ICD-10-CM

## 2024-06-18 DIAGNOSIS — I26.99 OTHER PULMONARY EMBOLISM WITHOUT ACUTE COR PULMONALE, UNSPECIFIED CHRONICITY (H): ICD-10-CM

## 2024-06-18 DIAGNOSIS — Z79.01 LONG TERM CURRENT USE OF ANTICOAGULANT THERAPY: ICD-10-CM

## 2024-06-18 LAB — INR BLD: 2.2 (ref 0.9–1.1)

## 2024-06-18 PROCEDURE — 36416 COLLJ CAPILLARY BLOOD SPEC: CPT

## 2024-06-18 PROCEDURE — 93005 ELECTROCARDIOGRAM TRACING: CPT | Performed by: REHABILITATION PRACTITIONER

## 2024-06-18 PROCEDURE — 85610 PROTHROMBIN TIME: CPT

## 2024-06-18 PROCEDURE — 93000 ELECTROCARDIOGRAM COMPLETE: CPT

## 2024-06-18 NOTE — PROGRESS NOTES
ANTICOAGULATION MANAGEMENT     Tracy Palomo 84 year old female is on warfarin with therapeutic INR result. (Goal INR 2.0-3.0)    Recent labs: (last 7 days)     06/18/24  1354   INR 2.2*       ASSESSMENT     Source(s): Chart Review and Patient/Caregiver Call     Warfarin doses taken:  Partial hold Thursday  Diet: No new diet changes identified  Medication/supplement changes: None noted  New illness, injury, or hospitalization: No  Signs or symptoms of bleeding or clotting: No  Previous result: Supratherapeutic  Additional findings: None       PLAN     Recommended plan for temporary change(s) affecting INR     Dosing Instructions: Continue your current warfarin dose with next INR in 1 week       Summary  As of 6/18/2024      Full warfarin instructions:  3.75 mg every Thu; 2.5 mg all other days   Next INR check:  6/25/2024               Telephone call with Tracy who verbalizes understanding and agrees to plan and who agrees to plan and repeated back plan correctly    Lab visit scheduled    Education provided:   None required    Plan made per [facility] anticoagulation protocol    Radha Rodriguez RN  Anticoagulation Clinic  6/18/2024    _______________________________________________________________________     Anticoagulation Episode Summary       Current INR goal:  2.0-3.0   TTR:  62.4% (1 y)   Target end date:  Indefinite   Send INR reminders to:  GURVINDER SOARES    Indications    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]  Long term current use of anticoagulant therapy [Z79.01]  Other pulmonary embolism without acute cor pulmonale  unspecified chronicity (H) [I26.99]  Chronic pulmonary embolism without acute cor pulmonale  unspecified pulmonary embolism type (H) [I27.82]             Comments:               Anticoagulation Care Providers       Provider Role Specialty Phone number    Chad Betts MD Referring Internal Medicine [phone]    Gerard Medrano MD Responsible Family  Medicine [phone]

## 2024-06-25 ENCOUNTER — ANTICOAGULATION THERAPY VISIT (OUTPATIENT)
Dept: ANTICOAGULATION | Facility: CLINIC | Age: 84
End: 2024-06-25

## 2024-06-25 ENCOUNTER — LAB (OUTPATIENT)
Dept: LAB | Facility: CLINIC | Age: 84
End: 2024-06-25
Payer: MEDICARE

## 2024-06-25 DIAGNOSIS — I26.99 OTHER PULMONARY EMBOLISM WITHOUT ACUTE COR PULMONALE, UNSPECIFIED CHRONICITY (H): ICD-10-CM

## 2024-06-25 DIAGNOSIS — I26.99 PULMONARY EMBOLI (H): Primary | ICD-10-CM

## 2024-06-25 DIAGNOSIS — I27.82 CHRONIC PULMONARY EMBOLISM WITHOUT ACUTE COR PULMONALE, UNSPECIFIED PULMONARY EMBOLISM TYPE (H): ICD-10-CM

## 2024-06-25 DIAGNOSIS — Z79.01 LONG TERM CURRENT USE OF ANTICOAGULANT THERAPY: ICD-10-CM

## 2024-06-25 LAB — INR BLD: 2.6 (ref 0.9–1.1)

## 2024-06-25 PROCEDURE — 36416 COLLJ CAPILLARY BLOOD SPEC: CPT

## 2024-06-25 PROCEDURE — 85610 PROTHROMBIN TIME: CPT

## 2024-06-25 NOTE — PROGRESS NOTES
ANTICOAGULATION MANAGEMENT     Tracy Palomo 84 year old female is on warfarin with therapeutic INR result. (Goal INR 2.0-3.0)    Recent labs: (last 7 days)     06/25/24  0922   INR 2.6*       ASSESSMENT     Source(s): Chart Review and Patient/Caregiver Call     Warfarin doses taken: Warfarin taken as instructed  Diet: No new diet changes identified  Medication/supplement changes: None noted  New illness, injury, or hospitalization: No  Signs or symptoms of bleeding or clotting: No  Previous result: Therapeutic last visit; previously outside of goal range  Additional findings: None       PLAN     Recommended plan for no diet, medication or health factor changes affecting INR     Dosing Instructions: Continue your current warfarin dose with next INR in 4 weeks       Summary  As of 6/25/2024      Full warfarin instructions:  3.75 mg every Thu; 2.5 mg all other days   Next INR check:  7/9/2024               Telephone call with Tracy who verbalizes understanding and agrees to plan    Lab visit scheduled    Education provided:   Please call back if any changes to your diet, medications or how you've been taking warfarin    Plan made per [facility] anticoagulation protocol    Kaley Mcduffie, RN  Anticoagulation Clinic  6/25/2024    _______________________________________________________________________     Anticoagulation Episode Summary       Current INR goal:  2.0-3.0   TTR:  62.6% (1 y)   Target end date:  Indefinite   Send INR reminders to:  GURVINDER SOARES    Indications    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]  Long term current use of anticoagulant therapy [Z79.01]  Other pulmonary embolism without acute cor pulmonale  unspecified chronicity (H) [I26.99]  Chronic pulmonary embolism without acute cor pulmonale  unspecified pulmonary embolism type (H) [I27.82]             Comments:               Anticoagulation Care Providers       Provider Role Specialty Phone number    Alpesh  Chad QUIROGA MD [facility] Internal Medicine [phone]    Gerard Medrano MD [facility] Family Medicine [phone]

## 2024-07-09 ENCOUNTER — ANTICOAGULATION THERAPY VISIT (OUTPATIENT)
Dept: ANTICOAGULATION | Facility: CLINIC | Age: 84
End: 2024-07-09

## 2024-07-09 ENCOUNTER — LAB (OUTPATIENT)
Dept: LAB | Facility: CLINIC | Age: 84
End: 2024-07-09
Payer: MEDICARE

## 2024-07-09 DIAGNOSIS — I27.82 CHRONIC PULMONARY EMBOLISM WITHOUT ACUTE COR PULMONALE, UNSPECIFIED PULMONARY EMBOLISM TYPE (H): ICD-10-CM

## 2024-07-09 DIAGNOSIS — I26.99 OTHER PULMONARY EMBOLISM WITHOUT ACUTE COR PULMONALE, UNSPECIFIED CHRONICITY (H): ICD-10-CM

## 2024-07-09 DIAGNOSIS — I26.99 PULMONARY EMBOLI (H): Primary | ICD-10-CM

## 2024-07-09 DIAGNOSIS — Z79.01 LONG TERM CURRENT USE OF ANTICOAGULANT THERAPY: ICD-10-CM

## 2024-07-09 LAB — INR BLD: 3.1 (ref 0.9–1.1)

## 2024-07-09 PROCEDURE — 36416 COLLJ CAPILLARY BLOOD SPEC: CPT

## 2024-07-09 PROCEDURE — 85610 PROTHROMBIN TIME: CPT

## 2024-07-09 NOTE — PROGRESS NOTES
ANTICOAGULATION MANAGEMENT     Tracy Palomo 84 year old female is on warfarin with supratherapeutic INR result. (Goal INR 2.0-3.0)    Recent labs: (last 7 days)     07/09/24  0852   INR 3.1*       ASSESSMENT     Source(s): Chart Review and Patient/Caregiver Call     Warfarin doses taken: Warfarin taken as instructed  Diet: No new diet changes identified  Medication/supplement changes: None noted  New illness, injury, or hospitalization: No  Signs or symptoms of bleeding or clotting: No  Previous result: Therapeutic last 2(+) visits  Additional findings: walking more but other than that reports no changes at all       PLAN     Recommended plan for no diet, medication or health factor changes affecting INR     Dosing Instructions: Continue your current warfarin dose with next INR in 2 weeks       Summary  As of 7/9/2024      Full warfarin instructions:  3.75 mg every Thu; 2.5 mg all other days   Next INR check:  7/23/2024               Telephone call with Tracy who verbalizes understanding and agrees to plan  Sent [employer] message with dosing and follow up instructions    Lab visit scheduled    Education provided: Contact [phone] with any changes, questions or concerns.     Plan made per [facility] anticoagulation protocol    Destinee Weathers RN  Anticoagulation Clinic  7/9/2024    _______________________________________________________________________     Anticoagulation Episode Summary       Current INR goal:  2.0-3.0   TTR:  63.8% (1 y)   Target end date:  Indefinite   Send INR reminders to:  GURVINDER SOARES    Indications    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]  Long term current use of anticoagulant therapy [Z79.01]  Other pulmonary embolism without acute cor pulmonale  unspecified chronicity (H) [I26.99]  Chronic pulmonary embolism without acute cor pulmonale  unspecified pulmonary embolism type (H) [I27.82]             Comments:               Anticoagulation Care  Providers       Provider Role Specialty Phone number    Chad Betts MD Referring Internal Medicine [phone]    Gerard Medrano MD Responsible Family Medicine [phone]

## 2024-07-23 ENCOUNTER — LAB (OUTPATIENT)
Dept: LAB | Facility: CLINIC | Age: 84
End: 2024-07-23
Payer: MEDICARE

## 2024-07-23 ENCOUNTER — ANTICOAGULATION THERAPY VISIT (OUTPATIENT)
Dept: ANTICOAGULATION | Facility: CLINIC | Age: 84
End: 2024-07-23

## 2024-07-23 DIAGNOSIS — I27.82 CHRONIC PULMONARY EMBOLISM WITHOUT ACUTE COR PULMONALE, UNSPECIFIED PULMONARY EMBOLISM TYPE (H): ICD-10-CM

## 2024-07-23 DIAGNOSIS — Z79.01 LONG TERM CURRENT USE OF ANTICOAGULANT THERAPY: ICD-10-CM

## 2024-07-23 DIAGNOSIS — I27.82 CHRONIC PULMONARY EMBOLISM WITHOUT ACUTE COR PULMONALE, UNSPECIFIED PULMONARY EMBOLISM TYPE (H): Primary | ICD-10-CM

## 2024-07-23 DIAGNOSIS — I26.99 OTHER PULMONARY EMBOLISM WITHOUT ACUTE COR PULMONALE, UNSPECIFIED CHRONICITY (H): ICD-10-CM

## 2024-07-23 LAB — INR BLD: 3 (ref 0.9–1.1)

## 2024-07-23 PROCEDURE — 36416 COLLJ CAPILLARY BLOOD SPEC: CPT

## 2024-07-23 PROCEDURE — 85610 PROTHROMBIN TIME: CPT

## 2024-07-23 NOTE — PROGRESS NOTES
ANTICOAGULATION MANAGEMENT     Tracy Palomo 84 year old female is on warfarin with therapeutic INR result. (Goal INR 2.0-3.0)    Recent labs: (last 7 days)     07/23/24  1001   INR 3.0*       ASSESSMENT     Source(s): Chart Review and Patient/Caregiver Call     Warfarin doses taken: Warfarin taken as instructed  Diet: Decreased greens/vitamin K in diet; plans to resume previous intake  Medication/supplement changes: None noted  New illness, injury, or hospitalization: No  Signs or symptoms of bleeding or clotting: No  Previous result: Supratherapeutic  Additional findings: None       PLAN     Recommended plan for temporary change(s) affecting INR     Dosing Instructions: Continue your current warfarin dose with next INR in 3 weeks       Summary  As of 7/23/2024      Full warfarin instructions:  3.75 mg every Thu; 2.5 mg all other days   Next INR check:  8/13/2024               Telephone call with Tracy who verbalizes understanding and agrees to plan and who agrees to plan and repeated back plan correctly    Lab visit scheduled    Education provided: None required    Plan made per [facility] anticoagulation protocol    Radha Rodriguez RN  Anticoagulation Clinic  7/23/2024    _______________________________________________________________________     Anticoagulation Episode Summary       Current INR goal:  2.0-3.0   TTR:  60.0% (1 y)   Target end date:  Indefinite   Send INR reminders to:  GURVINDER SOARES    Indications    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

## 2024-08-05 ENCOUNTER — TELEPHONE (OUTPATIENT)
Dept: CARDIOLOGY | Facility: CLINIC | Age: 84
End: 2024-08-05
Payer: MEDICARE

## 2024-08-05 DIAGNOSIS — I48.0 PAROXYSMAL ATRIAL FIBRILLATION (H): Primary | ICD-10-CM

## 2024-08-05 PROCEDURE — 93010 ELECTROCARDIOGRAM REPORT: CPT | Performed by: INTERNAL MEDICINE

## 2024-08-05 NOTE — TELEPHONE ENCOUNTER
Reason for call:  Patient reporting a symptom    Symptom or request: shortness of breath, fatigue, Afib increased to 57% per apple watch    Duration (how long have symptoms been present): approximately 1 month or longer    Have you been treated for this before? Yes    Additional comments: Tracy would like to speak to someone on [facility] team regarding symptoms she has been experiencing over the last month. Shortness of breath, fatigue, Afib increased to 57% per apple watch, she did mention that she is not experiencing any flutters. She also wants to know if she should be seen sooner then 9/10/24. Please call patient to advise.    Phone Number patient can be reached at:  Home number on file [phone] (home)    Best Time:      Can we leave a detailed message on this number:  YES    Call taken on 8/5/2024 at 1:44 PM by Jeannette Daniel

## 2024-08-05 NOTE — TELEPHONE ENCOUNTER
Patient called wanting to report symptoms she has been experiencing over the last month; Shortness of breath, extreme fatigue, Afib increased to 57% per apple watch. Patient said she is not experiencing any flutters, denies lightheadedness/dizziness. Patient has not been sleeping well. She continues on Flecainide 100 mg BID. Patient checked heart rate on her watch while on the phone with RN it was 91- 102 bpm , but have been averaging for High 107bpm and  low 47 bpm.     Patient scheduled to for follow up with DEE DEE Andrea on 9/10/24 and patient is wondering if she should be seen sooner then 9/10/24 or any further recommendations from Maday.         DEE DEE Andrea LOV note from 6/12/24:    Assessment and Plan:   Tracy Palomo is a very pleasant 84 year old female who is here today for follow up       Paroxysmal atrial fibrillation, anticoagulated with warfarin   [id number] Score 3 (age x2, sex)  Stress echo 4/2024- LVEF 55-60%, no WMA   Zio monitor 5/2024- min HR of 40 bpm, max HR of 174 bpm, and avg HR of 59 bpm. Predominant underlying rhythm was Sinus Rhythm. Atrial Fibrillation/Flutter occurred (2% burden), ranging from  bpm (avg of 96 bpm), the longest lasting 43 mins 38 secs with an avg rate of 110 bpm. 1 Pause occurred lasting 3.3 secs (18 bpm). Atrial Fibrillation/Flutter was detected within +/- 45 seconds of symptomatic patient event(s).  Rhythm control with flecainide 50 mg BID   History of DVT and PE   Bradycardia, not secondary to flecainide use, has known history      Patient stable from a cardiovascular standpoint. ECG obtained today, she is in a NSR with a heart rate of 53 bpm with normal MN interval, and QTc. Patient frustrated with her symptomatology when she returns to atrial fibrillation. Discussed patients office visit with primary cardiologist. Recommendation was either an EP referral or increase flecainide to 100 mg BID. If she fails the dose adjustment, would refer to EP.       Patient agreeable to increase flecainide to 100 mg BID. Repeat EKG in 1 week.      Follow up with AMBER in 3 months

## 2024-08-08 NOTE — TELEPHONE ENCOUNTER
"The following message was received from Maday Ruiz PA-C:    \"Can we have patient come into the clinic for an ECG as soon as she is available? If she is in atrial fibrillation, can discuss referral to EP.\"    Order has been placed. Please call patient to schedule.     Annie Larsen RN   [facility]    "

## 2024-08-09 ENCOUNTER — HOSPITAL ENCOUNTER (OUTPATIENT)
Dept: CARDIOLOGY | Facility: CLINIC | Age: 84
Discharge: HOME | End: 2024-08-09
Payer: MEDICARE

## 2024-08-09 PROCEDURE — 93005 ELECTROCARDIOGRAM TRACING: CPT | Performed by: INTERNAL MEDICINE

## 2024-08-13 ENCOUNTER — ANTICOAGULATION THERAPY VISIT (OUTPATIENT)
Dept: ANTICOAGULATION | Facility: CLINIC | Age: 84
End: 2024-08-13

## 2024-08-13 ENCOUNTER — LAB (OUTPATIENT)
Dept: LAB | Facility: CLINIC | Age: 84
End: 2024-08-13
Payer: MEDICARE

## 2024-08-13 DIAGNOSIS — I27.82 CHRONIC PULMONARY EMBOLISM WITHOUT ACUTE COR PULMONALE, UNSPECIFIED PULMONARY EMBOLISM TYPE (H): Primary | ICD-10-CM

## 2024-08-13 DIAGNOSIS — I26.99 OTHER PULMONARY EMBOLISM WITHOUT ACUTE COR PULMONALE, UNSPECIFIED CHRONICITY (H): ICD-10-CM

## 2024-08-13 DIAGNOSIS — Z79.01 LONG TERM CURRENT USE OF ANTICOAGULANT THERAPY: ICD-10-CM

## 2024-08-13 DIAGNOSIS — I27.82 CHRONIC PULMONARY EMBOLISM WITHOUT ACUTE COR PULMONALE, UNSPECIFIED PULMONARY EMBOLISM TYPE (H): ICD-10-CM

## 2024-08-13 LAB — INR BLD: 2.3 (ref 0.9–1.1)

## 2024-08-13 PROCEDURE — 36416 COLLJ CAPILLARY BLOOD SPEC: CPT

## 2024-08-13 PROCEDURE — 85610 PROTHROMBIN TIME: CPT

## 2024-08-13 NOTE — PROGRESS NOTES
ANTICOAGULATION MANAGEMENT     Tracy Palomo 84 year old female is on warfarin with therapeutic INR result. (Goal INR 2.0-3.0)    Recent labs: (last 7 days)     08/13/24  1010   INR 2.3*       ASSESSMENT     Source(s): Chart Review and Patient/Caregiver Call     Warfarin doses taken: Warfarin taken as instructed  Diet: No new diet changes identified  Medication/supplement changes: None noted  New illness, injury, or hospitalization: No  Signs or symptoms of bleeding or clotting: No  Previous result: Therapeutic last visit; previously outside of goal range  Additional findings: None       PLAN     Recommended plan for no diet, medication or health factor changes affecting INR     Dosing Instructions: Continue your current warfarin dose with next INR in 4 weeks       Summary  As of 8/13/2024      Full warfarin instructions:  3.75 mg every Thu; 2.5 mg all other days   Next INR check:  9/10/2024               Telephone call with Tracy who verbalizes understanding and agrees to plan    Lab visit scheduled    Education provided: Contact [phone] with any changes, questions or concerns.     Plan made per [facility] anticoagulation protocol    Randall QUIROGA RN  Anticoagulation Clinic  8/13/2024    _______________________________________________________________________     Anticoagulation Episode Summary       Current INR goal:  2.0-3.0   TTR:  60.0% (1 y)   Target end date:  Indefinite   Send INR reminders to:  GURVINDER SOARES    Indications    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]  Long term current use of anticoagulant therapy [Z79.01]  Other pulmonary embolism without acute cor pulmonale  unspecified chronicity (H) [I26.99]  Chronic pulmonary embolism without acute cor pulmonale  unspecified pulmonary embolism type (H) [I27.82]             Comments:               Anticoagulation Care Providers       Provider Role Specialty Phone number    Chad Betts MD Referring Internal Medicine  [phone]    Gerard Medrano MD [facility] [phone]

## 2024-08-23 DIAGNOSIS — I26.99 OTHER PULMONARY EMBOLISM WITHOUT ACUTE COR PULMONALE, UNSPECIFIED CHRONICITY (H): ICD-10-CM

## 2024-08-23 DIAGNOSIS — Z79.01 LONG TERM CURRENT USE OF ANTICOAGULANT THERAPY: ICD-10-CM

## 2024-08-23 DIAGNOSIS — I27.82 CHRONIC PULMONARY EMBOLISM WITHOUT ACUTE COR PULMONALE, UNSPECIFIED PULMONARY EMBOLISM TYPE (H): ICD-10-CM

## 2024-08-23 RX ORDER — WARFARIN SODIUM 2.5 MG/1
TABLET ORAL
Qty: 90 TABLET | Refills: 1 | Status: SHIPPED | OUTPATIENT
Start: 2024-08-23

## 2024-08-23 NOTE — TELEPHONE ENCOUNTER
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]  Long term current use of anticoagulant therapy [Z79.01]  Other pulmonary embolism without acute cor pulmonale  unspecified chronicity (H) [I26.99]  Chronic pulmonary embolism without acute cor pulmonale  unspecified pulmonary embolism type (H) [I27.82]                 Anticoagulation Care Providers       Provider Role Specialty Phone number    Chad Betts MD Referring Internal Medicine [phone]    Gerard Medrano MD Responsible Family Medicine [phone]            Refill Criteria    Visit with referring provider/group: Meets criteria: visit within referring provider group in the last 15 months on 5/7/24    [facility] referral last signed: 01/10/2024; within last year: Yes    Lab monitoring not exceeding 2 weeks overdue: No    Tracy meets all criteria for refill. Rx instructions and quantity match patient's current dosing plan. Warfarin 90 day supply with 1 refill granted per [facility] protocol     Randall QUIROGA RN  Anticoagulation Clinic

## 2024-09-10 ENCOUNTER — LAB (OUTPATIENT)
Dept: LAB | Facility: CLINIC | Age: 84
End: 2024-09-10
Payer: MEDICARE

## 2024-09-10 ENCOUNTER — ANTICOAGULATION THERAPY VISIT (OUTPATIENT)
Dept: ANTICOAGULATION | Facility: CLINIC | Age: 84
End: 2024-09-10

## 2024-09-10 DIAGNOSIS — I26.99 OTHER PULMONARY EMBOLISM WITHOUT ACUTE COR PULMONALE, UNSPECIFIED CHRONICITY (H): ICD-10-CM

## 2024-09-10 DIAGNOSIS — I27.82 CHRONIC PULMONARY EMBOLISM WITHOUT ACUTE COR PULMONALE, UNSPECIFIED PULMONARY EMBOLISM TYPE (H): Primary | ICD-10-CM

## 2024-09-10 DIAGNOSIS — I27.82 CHRONIC PULMONARY EMBOLISM WITHOUT ACUTE COR PULMONALE, UNSPECIFIED PULMONARY EMBOLISM TYPE (H): ICD-10-CM

## 2024-09-10 DIAGNOSIS — Z79.01 LONG TERM CURRENT USE OF ANTICOAGULANT THERAPY: ICD-10-CM

## 2024-09-10 LAB — INR BLD: 3.3 (ref 0.9–1.1)

## 2024-09-10 PROCEDURE — 36416 COLLJ CAPILLARY BLOOD SPEC: CPT

## 2024-09-10 PROCEDURE — 85610 PROTHROMBIN TIME: CPT

## 2024-09-10 NOTE — PROGRESS NOTES
ANTICOAGULATION MANAGEMENT     Tracy Palomo 84 year old female is on warfarin with supratherapeutic INR result. (Goal INR 2.0-3.0)    Recent labs: (last 7 days)     09/10/24  1039   INR 3.3*       ASSESSMENT     Source(s): Chart Review and Patient/Caregiver Call     Warfarin doses taken: Warfarin taken as instructed  Diet: Decreased greens/vitamin K in diet; ongoing change  Medication/supplement changes: None noted  New illness, injury, or hospitalization: No  Signs or symptoms of bleeding or clotting: No  Previous result: Therapeutic last visit; previously outside of goal range  Additional findings: None       PLAN     Recommended plan for ongoing change(s) affecting INR     Dosing Instructions: decrease your warfarin dose (6.7% change) with next INR in 2 weeks       Summary  As of 9/10/2024      Full warfarin instructions:  2.5 mg every day   Next INR check:  9/24/2024               Telephone call with Tracy who verbalizes understanding and agrees to plan    Lab visit scheduled    Education provided: Dietary considerations: importance of consistent vitamin K intake and vitamin K content of foods    Plan made per [facility] anticoagulation protocol    Randall QUIROGA RN  9/10/2024  Anticoagulation Clinic  eROI for routing messages: dewey SOARES  [facility] patient phone line: [phone]        _______________________________________________________________________     Anticoagulation Episode Summary       Current INR goal:  2.0-3.0   TTR:  57.7% (1 y)   Target end date:  Indefinite   Send INR reminders to:  GURVINDER SOARES    Indications    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]  Long term current use of anticoagulant therapy [Z79.01]  Other pulmonary embolism without acute cor pulmonale  unspecified chronicity (H) [I26.99]  Chronic pulmonary embolism without acute cor pulmonale  unspecified pulmonary embolism type (H) [I27.82]             Comments:               Anticoagulation  Care Providers       Provider Role Specialty Phone number    Chad Betts MD Referring Internal Medicine [phone]    Gerard Medrano MD Responsible Family Medicine [phone]

## 2024-09-12 ENCOUNTER — NURSE TRIAGE (OUTPATIENT)
Dept: INTERNAL MEDICINE | Facility: CLINIC | Age: 84
End: 2024-09-12

## 2024-09-12 ENCOUNTER — HOSPITAL ENCOUNTER (OUTPATIENT)
Dept: CT IMAGING | Facility: CLINIC | Age: 84
Discharge: HOME | End: 2024-09-12
Attending: INTERNAL MEDICINE
Payer: MEDICARE

## 2024-09-12 ENCOUNTER — ANTICOAGULATION THERAPY VISIT (OUTPATIENT)
Dept: ANTICOAGULATION | Facility: CLINIC | Age: 84
End: 2024-09-12

## 2024-09-12 ENCOUNTER — HOSPITAL ENCOUNTER (OUTPATIENT)
Dept: CARDIOLOGY | Facility: CLINIC | Age: 84
Discharge: HOME | End: 2024-09-12
Attending: INTERNAL MEDICINE
Payer: MEDICARE

## 2024-09-12 ENCOUNTER — OFFICE VISIT (OUTPATIENT)
Dept: INTERNAL MEDICINE | Facility: CLINIC | Age: 84
End: 2024-09-12
Payer: MEDICARE

## 2024-09-12 VITALS
BODY MASS INDEX: 31.31 KG/M2 | WEIGHT: 183.4 LBS | HEIGHT: 64 IN | TEMPERATURE: 96.5 F | DIASTOLIC BLOOD PRESSURE: 80 MMHG | RESPIRATION RATE: 14 BRPM | HEART RATE: 118 BPM | OXYGEN SATURATION: 97 % | SYSTOLIC BLOOD PRESSURE: 120 MMHG

## 2024-09-12 DIAGNOSIS — Z79.01 LONG TERM CURRENT USE OF ANTICOAGULANT THERAPY: ICD-10-CM

## 2024-09-12 DIAGNOSIS — I27.82 CHRONIC PULMONARY EMBOLISM WITHOUT ACUTE COR PULMONALE, UNSPECIFIED PULMONARY EMBOLISM TYPE (H): ICD-10-CM

## 2024-09-12 DIAGNOSIS — I26.99 OTHER PULMONARY EMBOLISM WITHOUT ACUTE COR PULMONALE, UNSPECIFIED CHRONICITY (H): ICD-10-CM

## 2024-09-12 DIAGNOSIS — I48.91 ATRIAL FIBRILLATION WITH RAPID VENTRICULAR RESPONSE (H): Primary | ICD-10-CM

## 2024-09-12 DIAGNOSIS — I27.82 CHRONIC PULMONARY EMBOLISM WITHOUT ACUTE COR PULMONALE, UNSPECIFIED PULMONARY EMBOLISM TYPE (H): Primary | ICD-10-CM

## 2024-09-12 DIAGNOSIS — Z29.11 NEED FOR VACCINATION AGAINST RESPIRATORY SYNCYTIAL VIRUS: ICD-10-CM

## 2024-09-12 DIAGNOSIS — I48.91 ATRIAL FIBRILLATION WITH RAPID VENTRICULAR RESPONSE (H): ICD-10-CM

## 2024-09-12 DIAGNOSIS — I48.0 PAROXYSMAL ATRIAL FIBRILLATION (H): ICD-10-CM

## 2024-09-12 DIAGNOSIS — E78.5 HYPERLIPIDEMIA LDL GOAL <130: Primary | ICD-10-CM

## 2024-09-12 DIAGNOSIS — H53.9 VISION CHANGES: Primary | ICD-10-CM

## 2024-09-12 DIAGNOSIS — H53.9 VISION CHANGES: ICD-10-CM

## 2024-09-12 DIAGNOSIS — E78.5 HYPERLIPIDEMIA LDL GOAL <130: ICD-10-CM

## 2024-09-12 LAB
ANION GAP SERPL CALCULATED.3IONS-SCNC: 8 MMOL/L (ref 7–15)
BUN SERPL-MCNC: 14.5 MG/DL (ref 8–23)
CALCIUM SERPL-MCNC: 9.5 MG/DL (ref 8.8–10.4)
CHLORIDE SERPL-SCNC: 105 MMOL/L (ref 98–107)
CHOLEST SERPL-MCNC: 173 MG/DL
CREAT SERPL-MCNC: 1 MG/DL (ref 0.51–0.95)
EGFRCR SERPLBLD CKD-EPI 2021: 56 ML/MIN/1.73M2
ERYTHROCYTE [DISTWIDTH] IN BLOOD BY AUTOMATED COUNT: 14.2 % (ref 10–15)
ERYTHROCYTE [SEDIMENTATION RATE] IN BLOOD BY WESTERGREN METHOD: 8 MM/HR (ref 0–30)
FASTING STATUS PATIENT QL REPORTED: NO
FASTING STATUS PATIENT QL REPORTED: NO
GLUCOSE SERPL-MCNC: 82 MG/DL (ref 70–99)
HCO3 SERPL-SCNC: 27 MMOL/L (ref 22–29)
HCT VFR BLD AUTO: 45.2 % (ref 35–47)
HDLC SERPL-MCNC: 50 MG/DL
HGB BLD-MCNC: 14.6 G/DL (ref 11.7–15.7)
INR PPP: 2.19 (ref 0.85–1.15)
LDLC SERPL CALC-MCNC: 66 MG/DL
MCH RBC QN AUTO: 29.3 PG (ref 26.5–33)
MCHC RBC AUTO-ENTMCNC: 32.3 G/DL (ref 31.5–36.5)
MCV RBC AUTO: 91 FL (ref 78–100)
NONHDLC SERPL-MCNC: 123 MG/DL
PLATELET # BLD AUTO: 205 10E3/UL (ref 150–450)
POTASSIUM SERPL-SCNC: 4.8 MMOL/L (ref 3.4–5.3)
RBC # BLD AUTO: 4.99 10E6/UL (ref 3.8–5.2)
SODIUM SERPL-SCNC: 140 MMOL/L (ref 135–145)
TRIGL SERPL-MCNC: 283 MG/DL
WBC # BLD AUTO: 5.5 10E3/UL (ref 4–11)

## 2024-09-12 PROCEDURE — 36415 COLL VENOUS BLD VENIPUNCTURE: CPT | Performed by: INTERNAL MEDICINE

## 2024-09-12 PROCEDURE — 93000 ELECTROCARDIOGRAM COMPLETE: CPT | Performed by: INTERNAL MEDICINE

## 2024-09-12 PROCEDURE — 93005 ELECTROCARDIOGRAM TRACING: CPT | Performed by: REHABILITATION PRACTITIONER

## 2024-09-12 PROCEDURE — 99214 OFFICE O/P EST MOD 30 MIN: CPT | Performed by: INTERNAL MEDICINE

## 2024-09-12 PROCEDURE — 85027 COMPLETE CBC AUTOMATED: CPT | Performed by: INTERNAL MEDICINE

## 2024-09-12 PROCEDURE — 80061 LIPID PANEL: CPT | Performed by: INTERNAL MEDICINE

## 2024-09-12 PROCEDURE — 85652 RBC SED RATE AUTOMATED: CPT | Performed by: INTERNAL MEDICINE

## 2024-09-12 PROCEDURE — 70450 CT HEAD/BRAIN W/O DYE: CPT | Mod: MG

## 2024-09-12 PROCEDURE — 85610 PROTHROMBIN TIME: CPT | Performed by: INTERNAL MEDICINE

## 2024-09-12 PROCEDURE — 80048 BASIC METABOLIC PNL TOTAL CA: CPT | Performed by: INTERNAL MEDICINE

## 2024-09-12 NOTE — TELEPHONE ENCOUNTER
Consent to communicate is on file to speak with daughter amee    Patient is having trouble seeing since Sunday.  She is having blurry vision.  Patient is going in for EKG at 3pm.  Daughter states she doesn't have any slurred vision.    Per protocol patient should be seen today, appointment made.  Maria Del Rosario Wright RN on 9/12/2024 at 11:00 AM    Reason for Disposition   Blurred vision or visual changes and present now and sudden onset or new (e.g., minutes, hours, days)  (Exception: Seeing floaters / black specks OR previously diagnosed migraine headaches with same symptoms.)    Additional Information   Negative: Weakness of the face, arm or leg on one side of the body   Negative: Followed getting substance in the eye   Negative: Foreign body stuck in the eye   Negative: Followed an eye injury   Negative: Followed sun lamp or sun exposure (UV keratitis)   Negative: Yellow or green discharge (pus) in the eye   Negative: Pregnant   Negative: Complete loss of vision in one or both eyes   Negative: SEVERE eye pain   Negative: SEVERE headache   Negative: Double vision    Protocols used: Vision Loss or Change-A-OH

## 2024-09-12 NOTE — PROGRESS NOTES
"  {PROVIDER CHARTING PREFERENCE:154959}    Subjective   Tracy is a 84 year old, presenting for the following health issues:  Eye Problem        9/12/2024     2:04 PM   Additional Questions   Roomed by eduarda billie   Accompanied by Iesha, daughter     History of Present Illness       Reason for visit:  Blurred vision started on sunday  Symptom onset:  3-7 days ago   She is taking medications regularly.       {MA/LPN/RN Pre-Provider Visit Orders- hCG/UA/Strep (Optional):245459}  {SUPERLIST (Optional):859516}  {additonal problems for provider to add (Optional):114726}    {ROS Picklists (Optional):751493}      Objective    /80   Pulse 118   Temp (!) 96.5  F (35.8  C)   Resp 14   Ht 1.615 m (5' 3.58\")   Wt 83.2 kg (183 lb 6.4 oz)   SpO2 97%   BMI 31.89 kg/m    Body mass index is 31.89 kg/m .  Physical Exam   {Exam List (Optional):725387}    {Diagnostic Test Results (Optional):481998}        Signed Electronically by: Blayne Chan DO  {Email feedback regarding this note to [email or web address]   :106346}  " "applicable history and applied review of systems have been performed.    Vital Signs:   /80   Pulse 118   Temp (!) 96.5  F (35.8  C)   Resp 14   Ht 1.615 m (5' 3.58\")   Wt 83.2 kg (183 lb 6.4 oz)   SpO2 97%   BMI 31.89 kg/m        Decision Making    Problem and Complexity     1. Vision changes (Primary)  Given the patient's history of atrial fibrillation and increased potential for embolic event (despite her current anticoagulated state) would like to get a CAT scan to rule out possible CVA.  Will also check ESR for potential temporal arteritis though less likely based on examination.  - CT Head w/o Contrast; Future  - ESR: Erythrocyte sedimentation rate; Future  - CBC with platelets; Future  - Basic metabolic panel  (Ca, Cl, CO2, Creat, Gluc, K, Na, BUN); Future  - CT Head w/o Contrast; Future  - ESR: Erythrocyte sedimentation rate  - CBC with platelets  - Basic metabolic panel  (Ca, Cl, CO2, Creat, Gluc, K, Na, BUN)    2. Paroxysmal atrial fibrillation (H)  Check INR.  Follow-up with cardiology  - INR; Future  - INR    3. Long term current use of anticoagulant therapy  As above      4. Hyperlipidemia LDL goal <130  Check lipid levels for atherosclerotic risk factors.  - Lipid panel reflex to direct LDL Non-fasting; Future  - Lipid panel reflex to direct LDL Non-fasting                                FOLLOW UP   I have asked the patient to make an appointment for followup with her primary care provider/Dr. Betts in the upcoming week.    Regarding routine vaccinations:  I have reviewed the patient's vaccination schedule and discussed the benefits of prophylactic vaccination in detail.  I recommend the patient contact their pharmacist for vaccinations.  Discussed that most insurance companies now favor reimbursement to the pharmacies and it will financially behoove the patient to have vaccinations performed at their pharmacy.        I have carefully explained the diagnosis and treatment options to the " patient.  The patient has displayed an understanding of the above, and all subsequent questions were answered.      DO SANTO Thompson    Portions of this note were produced using [employer]  Although every attempt at real-time proof reading has been made, occasional grammar/syntax errors may have been missed.

## 2024-09-12 NOTE — PROGRESS NOTES
ANTICOAGULATION MANAGEMENT     Tracy Palomo 84 year old female is on warfarin with therapeutic INR result. (Goal INR 2.0-3.0)    Recent labs: (last 7 days)     09/12/24  1541   INR 2.19*       ASSESSMENT     Source(s): Chart Review and Patient/Caregiver Call     Warfarin doses taken: Warfarin taken as instructed  Diet:  Had greens Tues due to high INR  Medication/supplement changes: None noted  New illness, injury, or hospitalization: No  Signs or symptoms of bleeding or clotting: No  Previous result: Supratherapeutic  Additional findings: None       PLAN     Recommended plan for no diet, medication or health factor changes affecting INR     Dosing Instructions: Continue your current warfarin dose with next INR in 2 weeks       Summary  As of 9/12/2024      Full warfarin instructions:  2.5 mg every day   Next INR check:  9/24/2024               Telephone call with Tracy who verbalizes understanding and agrees to plan and who agrees to plan and repeated back plan correctly    Lab visit scheduled    Education provided: Dietary considerations: importance of consistent vitamin K intake    Plan made per [facility] anticoagulation protocol    Radha Rodriguez RN  9/12/2024  Anticoagulation Clinic  [employer] for routing messages: dewey SOARES  [facility] patient phone line: [phone]        _______________________________________________________________________     Anticoagulation Episode Summary       Current INR goal:  2.0-3.0   TTR:  57.5% (1 y)   Target end date:  Indefinite   Send INR reminders to:  GURVINDER SOARES    Indications    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]  Long term current use of anticoagulant therapy [Z79.01]  Other pulmonary embolism without acute cor pulmonale  unspecified chronicity (H) [I26.99]  Chronic pulmonary embolism without acute cor pulmonale  unspecified pulmonary embolism type (H) [I27.82]             Comments:               Anticoagulation Care  Providers       Provider Role Specialty Phone number    Chad Betts MD Referring Internal Medicine [phone]    Gerard Medrano MD Responsible Family Medicine [phone]

## 2024-09-16 ENCOUNTER — TELEPHONE (OUTPATIENT)
Dept: CARDIOLOGY | Facility: CLINIC | Age: 84
End: 2024-09-16
Payer: MEDICARE

## 2024-09-16 DIAGNOSIS — I48.91 ATRIAL FIBRILLATION WITH RAPID VENTRICULAR RESPONSE (H): Primary | ICD-10-CM

## 2024-09-16 NOTE — TELEPHONE ENCOUNTER
3 day ziopatch monitor ordered. Will have scheduling reach to patient to help arrange placement.     ----- Message from Abimael Bass sent at 9/16/2024  2:32 PM CDT -----  Go ahead and arrange the monitor.  The results probably will not be back by the time she sees EP, but I am pretty certain he will want that information eventually.  ----- Message -----  From: Ashley Hernández RN  Sent: 9/16/2024   8:25 AM CDT  To: Abimael Bass MD    Patient is scheduled for EP consult with Dr. Napier on 9/26/24. Do you still want her to complete the 3 day ziopatch prior to the EP consult or wait for EP consult first? Routing back to Dr. Bass.

## 2024-09-17 ENCOUNTER — TELEPHONE (OUTPATIENT)
Dept: INTERNAL MEDICINE | Facility: CLINIC | Age: 84
End: 2024-09-17

## 2024-09-17 ENCOUNTER — ALLIED HEALTH/NURSE VISIT (OUTPATIENT)
Dept: CARDIOLOGY | Facility: CLINIC | Age: 84
End: 2024-09-17
Attending: INTERNAL MEDICINE
Payer: MEDICARE

## 2024-09-17 ENCOUNTER — OFFICE VISIT (OUTPATIENT)
Dept: INTERNAL MEDICINE | Facility: CLINIC | Age: 84
End: 2024-09-17
Payer: MEDICARE

## 2024-09-17 VITALS
RESPIRATION RATE: 14 BRPM | OXYGEN SATURATION: 99 % | HEART RATE: 51 BPM | HEIGHT: 65 IN | BODY MASS INDEX: 30.75 KG/M2 | TEMPERATURE: 97.5 F | SYSTOLIC BLOOD PRESSURE: 126 MMHG | WEIGHT: 184.6 LBS | DIASTOLIC BLOOD PRESSURE: 80 MMHG

## 2024-09-17 DIAGNOSIS — H53.8 BLURRED VISION: Primary | ICD-10-CM

## 2024-09-17 DIAGNOSIS — R53.83 FATIGUE, UNSPECIFIED TYPE: ICD-10-CM

## 2024-09-17 DIAGNOSIS — Z79.01 LONG TERM CURRENT USE OF ANTICOAGULANT THERAPY: ICD-10-CM

## 2024-09-17 DIAGNOSIS — I48.91 ATRIAL FIBRILLATION WITH RAPID VENTRICULAR RESPONSE (H): ICD-10-CM

## 2024-09-17 DIAGNOSIS — I48.0 PAROXYSMAL ATRIAL FIBRILLATION (H): ICD-10-CM

## 2024-09-17 PROCEDURE — G2211 COMPLEX E/M VISIT ADD ON: HCPCS | Performed by: INTERNAL MEDICINE

## 2024-09-17 PROCEDURE — 93242 EXT ECG>48HR<7D RECORDING: CPT

## 2024-09-17 PROCEDURE — 99213 OFFICE O/P EST LOW 20 MIN: CPT | Performed by: INTERNAL MEDICINE

## 2024-09-17 ASSESSMENT — PAIN SCALES - GENERAL: PAINLEVEL: NO PAIN (0)

## 2024-09-17 NOTE — TELEPHONE ENCOUNTER
"Patient with known exposure to covid over the weekend. She is asymptomatic and feels \"completely normal\". Advised okay to go to today's appointments as scheduled, recommended wearing a mask while in clinic.     Jana Merida RN, BSN    "

## 2024-09-17 NOTE — PROGRESS NOTES
Tracy Palomo arrived here on 9/17/2024 2:55 PM for 3-7 Days  Zio monitor placement per ordering provider Abimael Bass for the diagnosis I48.91.  Patient s skin was prepped per protocol. Dr. Man is the supervising MD.  Zio monitor was placed.  Instructions were reviewed with and given to the patient.  Patient verbalized understanding of wear, troubleshooting and monitor return instructions.  Monica Lenz RN

## 2024-09-17 NOTE — PROGRESS NOTES
"  Assessment & Plan   Problem List Items Addressed This Visit       Paroxysmal atrial fibrillation (H)    Long term current use of anticoagulant therapy     Other Visit Diagnoses       Blurred vision    -  Primary    Fatigue, unspecified type               Patient with some blurred vision on the TV. Nothing else, no other episodes. Negative head ct, normal labs.   Exam reassuring, no findings to suggest TIA or CVA. Probably from dry eyes or visual changes, seen ophthalmology later this week, may need glasses . Patient is reassured today     No change in medication, has cardiology next week for heart can discuss a fib and fatigue.     The longitudinal plan of care for the diagnosis(es)/condition(s) as documented were addressed during this visit. Due to the added complexity in care, I will continue to support Tracy in the subsequent management and with ongoing continuity of care.       BMI  Estimated body mass index is 30.94 kg/m  as calculated from the following:    Height as of this encounter: 1.645 m (5' 4.76\").    Weight as of this encounter: 83.7 kg (184 lb 9.6 oz).       FUTURE APPOINTMENTS:       - Follow-up for annual visit or as needed      Subjective   Tracy is a 84 year old, presenting for the following health issues:  CT Results and Follow Up (Blurred vision)        9/17/2024     1:20 PM   Additional Questions   Roomed by Corky   Accompanied by Jenny, daughter     History of Present Illness       Reason for visit:  Blurred vision started on sunday, vision has returned to normal  Symptom onset:  3-7 days ago   She is taking medications regularly.     Had some blurred vision on Sept 8th watching the [email or web address] game.    Hasn't happened since.   Had normal labs and head ct.     Going to the eye doctor this week.                 Objective    BP (!) 149/93 (BP Location: Right arm, Patient Position: Sitting, Cuff Size: Adult Regular)   Pulse 51   Temp 97.5  F (36.4  C) (Temporal)   Resp 14   Ht 1.645 m (5' " "4.76\")   Wt 83.7 kg (184 lb 9.6 oz)   SpO2 99%   BMI 30.94 kg/m    Body mass index is 30.94 kg/m .  Physical Exam   NAD  Neck no carotid bruits  Heart regular   Lungs clear  Gait is slow but stead            Signed Electronically by: Chad Betts MD    "

## 2024-09-24 ENCOUNTER — LAB (OUTPATIENT)
Dept: LAB | Facility: CLINIC | Age: 84
End: 2024-09-24
Payer: MEDICARE

## 2024-09-24 ENCOUNTER — ANTICOAGULATION THERAPY VISIT (OUTPATIENT)
Dept: ANTICOAGULATION | Facility: CLINIC | Age: 84
End: 2024-09-24

## 2024-09-24 DIAGNOSIS — I26.99 OTHER PULMONARY EMBOLISM WITHOUT ACUTE COR PULMONALE, UNSPECIFIED CHRONICITY (H): ICD-10-CM

## 2024-09-24 DIAGNOSIS — I26.99 PULMONARY EMBOLI (H): Primary | ICD-10-CM

## 2024-09-24 DIAGNOSIS — Z79.01 LONG TERM CURRENT USE OF ANTICOAGULANT THERAPY: ICD-10-CM

## 2024-09-24 DIAGNOSIS — I27.82 CHRONIC PULMONARY EMBOLISM WITHOUT ACUTE COR PULMONALE, UNSPECIFIED PULMONARY EMBOLISM TYPE (H): ICD-10-CM

## 2024-09-24 LAB — INR BLD: 2.3 (ref 0.9–1.1)

## 2024-09-24 PROCEDURE — 85610 PROTHROMBIN TIME: CPT

## 2024-09-24 PROCEDURE — 36416 COLLJ CAPILLARY BLOOD SPEC: CPT

## 2024-09-24 NOTE — PROGRESS NOTES
ANTICOAGULATION MANAGEMENT     Tracy Palomo 84 year old female is on warfarin with therapeutic INR result. (Goal INR 2.0-3.0)    Recent labs: (last 7 days)     09/24/24  1041   INR 2.3*       ASSESSMENT     Warfarin Lab Questionnaire    Warfarin Doses Last 7 Days      9/24/2024    10:35 AM   Dose in Tablet or Mg   TAB or MG? tablet (tab)           9/24/2024   Warfarin Lab Questionnaire   Missed doses within past 14 days? No   Changes in diet or alcohol within past 14 days? No   Medication changes since last result? No   Injuries or illness since last result? No   New shortness of breath, severe headaches or sudden changes in vision since last result? No   Abnormal bleeding since last result? No   Upcoming surgery, procedure? No   Best number to call with results? [phone]        Previous result: Supratherapeutic  Additional findings: None       PLAN     Recommended plan for no diet, medication or health factor changes affecting INR     Dosing Instructions: Continue your current warfarin dose with next INR in 3 weeks       Summary  As of 9/24/2024      Full warfarin instructions:  2.5 mg every day   Next INR check:  10/15/2024               Telephone call with Tracy who verbalizes understanding and agrees to plan    Lab visit scheduled    Education provided: Please call back if any changes to your diet, medications or how you've been taking warfarin    Plan made per [facility] anticoagulation protocol    Kaley Mcduffie RN  9/24/2024  Anticoagulation Clinic  [facility] for routing messages: dewey SOARES  [facility] patient phone line: [phone]        _______________________________________________________________________     Anticoagulation Episode Summary       Current INR goal:  2.0-3.0   TTR:  57.5% (1 y)   Target end date:  Indefinite   Send INR reminders to:  GURVINDER SOARES    Indications    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

## 2024-09-25 NOTE — PROGRESS NOTES
Tracy is a 84 year old who is being evaluated via a billable video visit.    How would you like to obtain your AVS? MyChart  If the video visit is dropped, the invitation should be resent by: Text to cell phone: [phone]  Will anyone else be joining your video visit? No    Video-Visit Details    Type of service:  Video Visit   Video End Time:4:01 PM  Originating Location (pt. Location): Home    Distant Location (provider location):  Off-site  Platform used for Video Visit: Jacked   A total of 30 minutes was spent with clinic visit, including chart review, including if available echo and cath images, and ECG, Holter, Event and coordinating care    General:  no apparent distress, normal body habitus, sitting upright.  ENT/Mouth:  membranes moist, no nasal discharge.  Normal head shape, no apparent injury or laceration.  Eyes:  no scleral icterus, normal conjunctivae.  No observed jaundice.  Neck:  no apparent neck swelling.   Chest/Lungs:  No breathing difficulty while speaking.  No audible wheezing.  No cough during conversation.  Cardiovascular:  No obviously elevated jugular venous pressure.  No apparent edema bilaterally in LE.   Abdomen:  no obvious abdominal distention.   Extremities:  no apparent cyanosis.  Skin:  no xanthelasma.  No facial lacerations.  Neurologic:  Normal arm motion bilateral, no tremors.    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 INR >2 past few months. Much less energy since in AFl.  When in sinus on flecainide hr in the 6-0's. AFL appears to typical. Discussed option of AFL ablation and cont with flecainide for pAF vs. Stopping flecainide and start amio with cardioversion. Recommending the former for now. Discussed risks of the procedure including vascular injury and CVA. Will call pt to  schedule. Cont with flecainide and warfarin.     Rich Napier MD

## 2024-09-26 ENCOUNTER — VIRTUAL VISIT (OUTPATIENT)
Dept: CARDIOLOGY | Facility: CLINIC | Age: 84
End: 2024-09-26
Payer: MEDICARE

## 2024-09-26 DIAGNOSIS — I48.3 TYPICAL ATRIAL FLUTTER (H): Primary | ICD-10-CM

## 2024-09-26 DIAGNOSIS — I48.0 PAROXYSMAL ATRIAL FIBRILLATION (H): ICD-10-CM

## 2024-09-26 PROCEDURE — 99215 OFFICE O/P EST HI 40 MIN: CPT | Mod: 95 | Performed by: INTERNAL MEDICINE

## 2024-09-26 PROCEDURE — 93244 EXT ECG>48HR<7D REV&INTERPJ: CPT | Performed by: INTERNAL MEDICINE

## 2024-09-26 NOTE — LETTER
9/26/2024    Chad Betts MD  [address] [address]    RE: Tracy Palomo       Dear Colleague,     I had the pleasure of seeing Tracy Palomo in the [facility].  Tracy is a 84 year old who is being evaluated via a billable video visit.    How would you like to obtain your AVS? MyChart  If the video visit is dropped, the invitation should be resent by: Text to cell phone: [phone]  Will anyone else be joining your video visit? No    Video-Visit Details    Type of service:  Video Visit   Video End Time:4:01 PM  Originating Location (pt. Location): Home    Distant Location (provider location):  Off-site  Platform used for Video Visit: [employer]   A total of 30 minutes was spent with clinic visit, including chart review, including if available echo and cath images, and ECG, Holter, Event and coordinating care    General:  no apparent distress, normal body habitus, sitting upright.  ENT/Mouth:  membranes moist, no nasal discharge.  Normal head shape, no apparent injury or laceration.  Eyes:  no scleral icterus, normal conjunctivae.  No observed jaundice.  Neck:  no apparent neck swelling.   Chest/Lungs:  No breathing difficulty while speaking.  No audible wheezing.  No cough during conversation.  Cardiovascular:  No obviously elevated jugular venous pressure.  No apparent edema bilaterally in LE.   Abdomen:  no obvious abdominal distention.   Extremities:  no apparent cyanosis.  Skin:  no xanthelasma.  No facial lacerations.  Neurologic:  Normal arm motion bilateral, no tremors.    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 INR >2 past few months. Much less energy since in AFl.  When in sinus on flecainide hr in the 6-0's. AFL appears to typical. Discussed option of AFL ablation  and cont with flecainide for pAF vs. Stopping flecainide and start amio with cardioversion. Recommending the former for now. Discussed risks of the procedure including vascular injury and CVA. Will call pt to schedule. Cont with flecainide and warfarin.     Rich Napier MD       Thank you for allowing me to participate in the care of your patient.      Sincerely,     Rich Tello MD     [facility]  cc:   Maday Ruiz PA-C  [address] [place] [address]

## 2024-10-08 ENCOUNTER — HOSPITAL ENCOUNTER (OUTPATIENT)
Facility: CLINIC | Age: 84
End: 2024-10-08
Attending: INTERNAL MEDICINE | Admitting: INTERNAL MEDICINE
Payer: MEDICARE

## 2024-10-09 ENCOUNTER — TELEPHONE (OUTPATIENT)
Dept: CARDIOLOGY | Facility: CLINIC | Age: 84
End: 2024-10-09
Payer: MEDICARE

## 2024-10-09 NOTE — TELEPHONE ENCOUNTER
10/09/24 Spoke w pt and explained need to cancel Aflutter Ablation 10/14 d/t IVF shortage . Explained scheduling will contact her to reschedule in the next week or so.  Msg sent to scheduling   Pt voiced understanding and agreement with plan.   Jorge 122 pm

## 2024-10-11 ENCOUNTER — LAB (OUTPATIENT)
Dept: LAB | Facility: CLINIC | Age: 84
End: 2024-10-11
Payer: MEDICARE

## 2024-10-11 ENCOUNTER — ANTICOAGULATION THERAPY VISIT (OUTPATIENT)
Dept: ANTICOAGULATION | Facility: CLINIC | Age: 84
End: 2024-10-11

## 2024-10-11 ENCOUNTER — TELEPHONE (OUTPATIENT)
Dept: CARDIOLOGY | Facility: CLINIC | Age: 84
End: 2024-10-11

## 2024-10-11 ENCOUNTER — MYC MEDICAL ADVICE (OUTPATIENT)
Dept: CARDIOLOGY | Facility: CLINIC | Age: 84
End: 2024-10-11

## 2024-10-11 DIAGNOSIS — I26.99 OTHER PULMONARY EMBOLISM WITHOUT ACUTE COR PULMONALE, UNSPECIFIED CHRONICITY (H): ICD-10-CM

## 2024-10-11 DIAGNOSIS — I48.92 ATRIAL FLUTTER (H): Primary | ICD-10-CM

## 2024-10-11 DIAGNOSIS — I27.82 CHRONIC PULMONARY EMBOLISM WITHOUT ACUTE COR PULMONALE, UNSPECIFIED PULMONARY EMBOLISM TYPE (H): ICD-10-CM

## 2024-10-11 DIAGNOSIS — Z79.01 LONG TERM CURRENT USE OF ANTICOAGULANT THERAPY: ICD-10-CM

## 2024-10-11 DIAGNOSIS — I27.82 CHRONIC PULMONARY EMBOLISM WITHOUT ACUTE COR PULMONALE, UNSPECIFIED PULMONARY EMBOLISM TYPE (H): Primary | ICD-10-CM

## 2024-10-11 LAB — INR BLD: 1.7 (ref 0.9–1.1)

## 2024-10-11 PROCEDURE — 36416 COLLJ CAPILLARY BLOOD SPEC: CPT

## 2024-10-11 PROCEDURE — 85610 PROTHROMBIN TIME: CPT

## 2024-10-11 RX ORDER — SODIUM CHLORIDE, SODIUM LACTATE, POTASSIUM CHLORIDE, CALCIUM CHLORIDE 600; 310; 30; 20 MG/100ML; MG/100ML; MG/100ML; MG/100ML
INJECTION, SOLUTION INTRAVENOUS CONTINUOUS
Status: CANCELLED | OUTPATIENT
Start: 2024-10-11

## 2024-10-11 RX ORDER — LIDOCAINE 40 MG/G
CREAM TOPICAL
Status: CANCELLED | OUTPATIENT
Start: 2024-10-11

## 2024-10-11 NOTE — TELEPHONE ENCOUNTER
"10/11/24 Pt had INR today = 1.7  Alerted Dr Napier to check if ROSA prep procedure on Monday is needed   He replied \" No need , will perform ICE\"   Continue w yoko Patel 327 pm   "

## 2024-10-11 NOTE — TELEPHONE ENCOUNTER
[facility] Call Center    Phone Message    May a detailed message be left on voicemail: yes     Reason for Call: Other: Pt's daughterIesha called in requesting the pre-procedure instructions be uploaded to pt's MemfoACTt as well. Daughter concerned, pt did not record correctly from the phone call as to medications and eating/drinking. Thank you!     Action Taken: Message routed to:  Other:  CARDIOLOGY ADULT [place] [448[address]]    Travel Screening: Not Applicable     Date of Service:

## 2024-10-11 NOTE — TELEPHONE ENCOUNTER
10/11/24  Spoke w daughter Iesha ( on CTC) and informed her all instructions were sent via [employer] and answered her questions  Jorge 250 pm

## 2024-10-11 NOTE — TELEPHONE ENCOUNTER
10/11/24 Called pt regarding Aflutter Ablation scheduled for 10/14  Notified of arrival time and location -[facility] Welcome desk 10/14 at 11 am  No solids 8 hours prior to arrival time  Clear liquids up until 2 hours prior to arrival  Discussed clear liquids allowed ( 7 up, ginger ale, chicken broth, apple juice, water, coffee with no cream or sugar)   Pt may have sips of water for am meds    Discussed meds to be held - OTC vitamins and supplements  Oral diabetes meds: NONE  Insulin: NONE   SGLT2 Inhibitors: NONE   GLP-1 Agonists: NONE   Diuretic: NONE   Pt on Warfarin, last INR on 9/12 was 2.19 . Recommended pt get INR today , will watch for results and notify Dr Napier      Discussed that patient will need a  for ride home and someone to stay with pt x 24 hours once pt arrives home   Pt will check temperature am of procedure and call [facility] at [phone] in the am in temp > 100.0  Pt voiced understanding and stated they have no further questions at this time.  Jorge

## 2024-10-11 NOTE — PROGRESS NOTES
ANTICOAGULATION MANAGEMENT     Tracy Palomo 84 year old female is on warfarin with subtherapeutic INR result. (Goal INR 2.0-3.0)    Recent labs: (last 7 days)     10/11/24  1515   INR 1.7*       ASSESSMENT     Source(s): Chart Review  Previous INR was Therapeutic last 2(+) visits  Medication, diet, health changes since last INR - ablation scheduled on Monday. Provider is aware of INR result today.          PLAN     Unable to reach Tracy today.    Left message to Take a booster dose of 3.75 mg today and then continue maintenance dose of warfarin 2.5 mg daily this weekend. Request call back for assessment. Mychart also sent.     If no temporary changes, would plan to increase maintenance dose.    Follow up required to discuss out of range result  and discuss dosing instructions and confirm understanding of instructions    Anna Thacker RN  10/11/2024  Anticoagulation Clinic  [facility] for routing messages: dewey HOLLINS [facility] patient phone line: [phone]

## 2024-10-11 NOTE — PROGRESS NOTES
ANTICOAGULATION MANAGEMENT     Tracy Palomo 84 year old female is on warfarin with subtherapeutic INR result. (Goal INR 2.0-3.0)    Recent labs: (last 7 days)     10/11/24  1515   INR 1.7*       ASSESSMENT     Source(s): Chart Review and Patient/Caregiver Call     Warfarin doses taken: Warfarin taken as instructed  Diet: No new diet changes identified  Medication/supplement changes: None noted  New illness, injury, or hospitalization: Feels very tired & ongoing intermittent FRIED + Patient reports being a lot more active in the yard this past week  Signs or symptoms of bleeding or clotting: No  Previous result: Therapeutic last visit; previously outside of goal range  Additional findings: Upcoming surgery/procedure Aflutter ablation on 10/14/24 - see Mychart messages/Adrien from Cardiology - team aware of today's subtherapeutic INR       PLAN     Recommended plan for temporary change(s) and ongoing change(s) affecting INR     Dosing Instructions: booster dose then continue your current warfarin dose with next INR in 3 days & 1 week post-procedure       Summary  As of 10/11/2024      Full warfarin instructions:  10/11: 3.75 mg; Otherwise 2.5 mg every day   Next INR check:  10/14/2024               Telephone call with Tracy who verbalizes understanding and agrees to plan    Lab visit scheduled    Education provided: Please call back if any changes to your diet, medications or how you've been taking warfarin  Symptom monitoring: monitoring for bleeding signs and symptoms and monitoring for clotting signs and symptoms    Plan made per [facility] anticoagulation protocol    Lamar Kirkpatrick RN  10/11/2024  Anticoagulation Clinic  [employer] for routing messages: dewey SOARES  [facility] patient phone line: [phone]        _______________________________________________________________________     Anticoagulation Episode Summary       Current INR goal:  2.0-3.0   TTR:  57.1% (1 y)   Target end date:  Indefinite   Send INR  reminders to:  [facility]    Indications    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]  Long term current use of anticoagulant therapy [Z79.01]  Other pulmonary embolism without acute cor pulmonale  unspecified chronicity (H) [I26.99]  Chronic pulmonary embolism without acute cor pulmonale  unspecified pulmonary embolism type (H) [I27.82]             Comments:               Anticoagulation Care Providers       Provider Role Specialty Phone number    Chad Betts MD Referring Internal Medicine [phone]    Gerard Medrano MD Responsible Family Medicine [phone]

## 2024-10-14 ENCOUNTER — HOSPITAL ENCOUNTER (OUTPATIENT)
Facility: CLINIC | Age: 84
Discharge: HOME | End: 2024-10-14
Attending: INTERNAL MEDICINE | Admitting: INTERNAL MEDICINE
Payer: MEDICARE

## 2024-10-14 VITALS
OXYGEN SATURATION: 95 % | RESPIRATION RATE: 15 BRPM | BODY MASS INDEX: 30.66 KG/M2 | HEIGHT: 65 IN | SYSTOLIC BLOOD PRESSURE: 117 MMHG | TEMPERATURE: 98.2 F | HEART RATE: 64 BPM | WEIGHT: 184 LBS | DIASTOLIC BLOOD PRESSURE: 71 MMHG

## 2024-10-14 DIAGNOSIS — I48.92 ATRIAL FLUTTER (H): ICD-10-CM

## 2024-10-14 DIAGNOSIS — I48.3 TYPICAL ATRIAL FLUTTER (H): ICD-10-CM

## 2024-10-14 LAB
ANION GAP SERPL CALCULATED.3IONS-SCNC: 9 MMOL/L (ref 7–15)
ATRIAL RATE - MUSE: 197 BPM
BUN SERPL-MCNC: 17.7 MG/DL (ref 8–23)
CALCIUM SERPL-MCNC: 9.7 MG/DL (ref 8.8–10.4)
CHLORIDE SERPL-SCNC: 107 MMOL/L (ref 98–107)
CREAT SERPL-MCNC: 0.95 MG/DL (ref 0.51–0.95)
DIASTOLIC BLOOD PRESSURE - MUSE: NORMAL MMHG
EGFRCR SERPLBLD CKD-EPI 2021: 59 ML/MIN/1.73M2
ERYTHROCYTE [DISTWIDTH] IN BLOOD BY AUTOMATED COUNT: 14 % (ref 10–15)
GLUCOSE SERPL-MCNC: 92 MG/DL (ref 70–99)
HCO3 SERPL-SCNC: 27 MMOL/L (ref 22–29)
HCT VFR BLD AUTO: 48 % (ref 35–47)
HGB BLD-MCNC: 15 G/DL (ref 11.7–15.7)
INR PPP: 1.96 (ref 0.85–1.15)
INTERPRETATION ECG - MUSE: NORMAL
MCH RBC QN AUTO: 28.7 PG (ref 26.5–33)
MCHC RBC AUTO-ENTMCNC: 31.3 G/DL (ref 31.5–36.5)
MCV RBC AUTO: 92 FL (ref 78–100)
P AXIS - MUSE: 86 DEGREES
PLATELET # BLD AUTO: 234 10E3/UL (ref 150–450)
POTASSIUM SERPL-SCNC: 5.4 MMOL/L (ref 3.4–5.3)
PR INTERVAL - MUSE: NORMAL MS
QRS DURATION - MUSE: 126 MS
QT - MUSE: 372 MS
QTC - MUSE: 437 MS
R AXIS - MUSE: -46 DEGREES
RBC # BLD AUTO: 5.23 10E6/UL (ref 3.8–5.2)
SODIUM SERPL-SCNC: 143 MMOL/L (ref 135–145)
SYSTOLIC BLOOD PRESSURE - MUSE: NORMAL MMHG
T AXIS - MUSE: 15 DEGREES
VENTRICULAR RATE- MUSE: 83 BPM
WBC # BLD AUTO: 6.2 10E3/UL (ref 4–11)

## 2024-10-14 PROCEDURE — 93010 ELECTROCARDIOGRAM REPORT: CPT | Performed by: INTERNAL MEDICINE

## 2024-10-14 PROCEDURE — 93655 ICAR CATH ABLTJ DSCRT ARRHYT: CPT | Performed by: INTERNAL MEDICINE

## 2024-10-14 PROCEDURE — 272N000001 HC OR GENERAL SUPPLY STERILE: Performed by: INTERNAL MEDICINE

## 2024-10-14 PROCEDURE — 93662 INTRACARDIAC ECG (ICE): CPT | Performed by: INTERNAL MEDICINE

## 2024-10-14 PROCEDURE — 999N000054 HC STATISTIC EKG NON-CHARGEABLE

## 2024-10-14 PROCEDURE — C1732 CATH, EP, DIAG/ABL, 3D/VECT: HCPCS | Performed by: INTERNAL MEDICINE

## 2024-10-14 PROCEDURE — 93653 COMPRE EP EVAL TX SVT: CPT | Performed by: INTERNAL MEDICINE

## 2024-10-14 PROCEDURE — 80048 BASIC METABOLIC PNL TOTAL CA: CPT | Performed by: INTERNAL MEDICINE

## 2024-10-14 PROCEDURE — 85018 HEMOGLOBIN: CPT | Performed by: INTERNAL MEDICINE

## 2024-10-14 PROCEDURE — 93662 INTRACARDIAC ECG (ICE): CPT | Mod: 26 | Performed by: INTERNAL MEDICINE

## 2024-10-14 PROCEDURE — 99153 MOD SED SAME PHYS/QHP EA: CPT | Performed by: INTERNAL MEDICINE

## 2024-10-14 PROCEDURE — 85610 PROTHROMBIN TIME: CPT | Performed by: INTERNAL MEDICINE

## 2024-10-14 PROCEDURE — C1759 CATH, INTRA ECHOCARDIOGRAPHY: HCPCS | Performed by: INTERNAL MEDICINE

## 2024-10-14 PROCEDURE — 250N000009 HC RX 250: Performed by: INTERNAL MEDICINE

## 2024-10-14 PROCEDURE — 93005 ELECTROCARDIOGRAM TRACING: CPT

## 2024-10-14 PROCEDURE — 99152 MOD SED SAME PHYS/QHP 5/>YRS: CPT | Performed by: INTERNAL MEDICINE

## 2024-10-14 PROCEDURE — 250N000011 HC RX IP 250 OP 636: Performed by: INTERNAL MEDICINE

## 2024-10-14 PROCEDURE — 999N000071 HC STATISTIC HEART CATH LAB OR EP LAB

## 2024-10-14 PROCEDURE — 999N000184 HC STATISTIC TELEMETRY

## 2024-10-14 PROCEDURE — 36415 COLL VENOUS BLD VENIPUNCTURE: CPT | Performed by: INTERNAL MEDICINE

## 2024-10-14 PROCEDURE — C1887 CATHETER, GUIDING: HCPCS | Performed by: INTERNAL MEDICINE

## 2024-10-14 RX ORDER — SODIUM CHLORIDE, SODIUM LACTATE, POTASSIUM CHLORIDE, CALCIUM CHLORIDE 600; 310; 30; 20 MG/100ML; MG/100ML; MG/100ML; MG/100ML
INJECTION, SOLUTION INTRAVENOUS CONTINUOUS
Status: DISCONTINUED | OUTPATIENT
Start: 2024-10-14 | End: 2024-10-14 | Stop reason: HOSPADM

## 2024-10-14 RX ORDER — FENTANYL CITRATE 50 UG/ML
INJECTION, SOLUTION INTRAMUSCULAR; INTRAVENOUS
Status: DISCONTINUED | OUTPATIENT
Start: 2024-10-14 | End: 2024-10-14 | Stop reason: HOSPADM

## 2024-10-14 RX ORDER — NALOXONE HYDROCHLORIDE 0.4 MG/ML
0.2 INJECTION, SOLUTION INTRAMUSCULAR; INTRAVENOUS; SUBCUTANEOUS
Status: DISCONTINUED | OUTPATIENT
Start: 2024-10-14 | End: 2024-10-14 | Stop reason: HOSPADM

## 2024-10-14 RX ORDER — NALOXONE HYDROCHLORIDE 0.4 MG/ML
0.4 INJECTION, SOLUTION INTRAMUSCULAR; INTRAVENOUS; SUBCUTANEOUS
Status: DISCONTINUED | OUTPATIENT
Start: 2024-10-14 | End: 2024-10-14 | Stop reason: HOSPADM

## 2024-10-14 RX ORDER — OXYCODONE AND ACETAMINOPHEN 5; 325 MG/1; MG/1
1 TABLET ORAL EVERY 4 HOURS PRN
Status: DISCONTINUED | OUTPATIENT
Start: 2024-10-14 | End: 2024-10-14 | Stop reason: HOSPADM

## 2024-10-14 RX ORDER — LIDOCAINE 40 MG/G
CREAM TOPICAL
Status: DISCONTINUED | OUTPATIENT
Start: 2024-10-14 | End: 2024-10-14 | Stop reason: HOSPADM

## 2024-10-14 ASSESSMENT — ACTIVITIES OF DAILY LIVING (ADL)
ADLS_ACUITY_SCORE: 39

## 2024-10-14 NOTE — PROGRESS NOTES
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 Accesses were closed with VASCADE. Resume all pta meds. Home after 2 hours bedrest.

## 2024-10-14 NOTE — DISCHARGE INSTRUCTIONS
A FLUTTER ABLATION DISCHARGE INSTRUCTIONS    After you go home:    Have an adult stay with you until tomorrow.  You may resume your normal diet.       For 24 hours - due to the sedation you received:  Relax and take it easy.  Do NOT make any important or legal decisions.  Do NOT drive or operate machines at home or at work.  Do NOT drink alcohol.    Care of Groin Puncture Site:    For the first 24 hrs - check the puncture site every 1-2 hours while awake.  For 2 days, when you cough, sneeze, laugh or move your bowels, hold your hand over the puncture site and press firmly.  Remove the dressing after 24 hours, works best to take off in the shower. If there is minor oozing, apply another bandaid and remove it after 12 hours.  It is normal to have bruising or pea size lump at the site.  You may shower tomorrow.  Do NOT take a bath, or use a hot tub or pool for at least 3 days. Do NOT scrub the site. Do not use lotion or powder near the puncture site.    Activity:            For 3 days:  No stooping or squatting  Do NOT do any heavy activity such as exercise, lifting, or straining.   No housework, yard work or any activity that make you sweat  Do NOT lift more than 10 pounds  Limit going up and down the stairs repetitively, for the first 24 hours after procedure.     Bleeding:    If you start bleeding from the site in your groin, lie down flat and press firmly on the site for 10 minutes.   Once bleeding stops, lay flat for 2 hours.  Call [facility] nurse line as soon as you can.       Call 911 right away if you have heavy bleeding or bleeding that does not stop.      Medicines:    Take your medications, including blood thinners, unless your provider tells you not to.  If you have stopped any medicines, check with your provider about when to restart them.  If you have pain or shortness of breath, you may take Advil (ibuprofen) or Tylenol (acetaminophen).    Follow Up Appointments:    November  11th at 12:55 pm with Simona Sheffield NP in [place]   You will receive a phone call the day after you go home from Dr Sachi CASTRO ( [facility], Isa or [facility]la)     Call the clinic if:    You have increased pain or a large or growing hard lump around the site.  The site is red, swollen, hot or tender.  Blood or fluid is draining from the site.  You have chills or a fever greater than 101 F (38 C).  Your leg feels numb, cool or changes color.  Increased pain in the chest and/or groin.  Increased shortness of breath  Chest pain not relieved by Tylenol or Advil/Ibuprofen  New pain in the back or belly that you cannot control with Tylenol.  Recurrent irregular or fast heart rate (AFlutter) lasting over 2 hours.  Any questions or concerns.    Heart rhythms:    You may have some irregular heartbeats. These feel very strong. They may make you feel that the fast heart rhythm is going to start again.  Give it time. The irregular beats should occur less often.      [facility]:    [phone] ( 8am-4:30pm M-F)  Layla Moss or [facility]    [phone] Option 2  On Call Cardiologist (7 days a week)

## 2024-10-14 NOTE — PROGRESS NOTES
Care Suites Admission Nursing Note    Patient Information  Name: Tracy Palomo  Age: 84 year old  Reason for admission: Aflutter ablation  Care Suites arrival time: 1115    Visitor Information  Name: Daughter Jenny and Daughter Iesha     Patient Admission/Assessment   Pre-procedure assessment complete: Yes  If abnormal assessment/labs, provider notified: pending  NPO: Yes  Medications held per instructions/orders: Yes  Consent: to be obtained    Patient oriented to room: Yes  Education/questions answered: Yes  Plan/other: Proceed as planned    Discharge Planning  Discharge name/phone number: Iesha (daughter) [phone]  Overnight post sedation caregiver:  yes  Discharge location: [place]    April De Los Santos RN

## 2024-10-14 NOTE — Clinical Note
Lab results reviewed and abnormals discussed with physician. [place] INPATIENT ENCOUNTER  PULMONARY DAILY PROGRESS NOTE      Patient: Cisco Brown Date: 3/10/2019   : 1950 Attending: Julien Weeks MD   68 year old male    CURRENT HOSPITAL DAY:  Hospital Day: 3    Chief Complaint:  COPD with exacerbation    Other active problems:  Cardiomyopathy    Subjective:   Feels good today. No breathing problems. He has been mobilizing a small amount of secretions.     Reviewed Pertinent: Laboratory studies, medications, and recent progress notes.    Review of Systems:  Pertinent positives and negatives as above. No other significant positive findings noted.    Vital 24 Hour Range Last Value   Temperature Temp  Min: 97.3 °F (36.3 °C)  Max: 97.5 °F (36.4 °C) 97.3 °F (36.3 °C) (03/10/19 1549)   Pulse Pulse  Min: 59  Max: 69 69 (03/10/19 1549)   Respiratory Resp  Min: 18  Max: 20 20 (03/10/19 0912)   Non-Invasive  Blood Pressure BP  Min: 114/65  Max: 134/89 114/65 (03/10/19 1549)   Pulse Oximetry SpO2  Min: 95 %  Max: 97 % 97 % (03/10/19 1607)   Arterial   Blood Pressure No Data Recorded       Vital First Value Last Value   Weight Weight: 106.1 kg (19 014) 105.9 kg (03/10/19 0610)   Height N/A 6' 3\" (190.5 cm) (19 014)   BMI N/A 29.19 (03/10/19 0610)     Respiratory Therapy Last Value   Incentive Spirometry-Volume       Weight over the past 48 Hours:  Patient Vitals for the past 48 hrs:   Weight   03/10/19 0610 105.9 kg       Intake/Output:  I/O last 3 completed shifts:  In:  [P.O.:1960]  Out: 2600 [Urine:2600]      Physical Exam:   Visit Vitals  /65 (BP Location: [facility], Patient Position: Left side lying)   Pulse 69   Temp 97.3 °F (36.3 °C) (Oral)   Resp 20   Ht 6' 3\" (1.905 m)   Wt 105.9 kg   SpO2 97%   BMI 29.19 kg/m²   On room air.    General Appearance:  Alert, cooperative, oriented x3, no distress, appears stated age  Head: Normocephalic, without obvious abnormality, atraumatic  Eyes: Conjuctiva/corneas clear  Throat: Lips, mucosa, and tongue normal;  teeth and gums normal  Neck: Supple, symmetrical, trachea midline, no JVD; no masses  Chest Wall: No tenderness or deformity  Lungs: Breath sounds are clear anteriorly and posteriorly bilaterally with good air movement, respirations unlabored, no dullness to percussion, equal expansion, no accessory respiratory muscle use  Heart: Regular rate and rhythm, S1 and S2 normal, no murmur, rub or gallop  Abdomen: Soft, non-tender, bowel sounds active all four quadrants, no masses, no organomegaly  Extremities: normal, atraumatic, no cyanosis, clubbing or edema  Pulses: 2+ and symmetric  Skin: warm and dry, no rashes      Laboratory Results:  Recent Labs   Lab 03/10/19  0701 03/09/19  0645 03/08/19  0523 03/07/19  1815   SODIUM 138 138 137 139   POTASSIUM 3.7 3.6 4.1 4.2   CHLORIDE 106 106 106 102   CO2 26 24 25 27   BUN 32* 36* 28* 23*   CREATININE 1.93* 2.17* 2.09* 1.87*   GLUCOSE 89 117* 101* 70   ALBUMIN  --   --  3.3* 3.6   PHOS  --   --  4.5  --    AST  --   --  109* 128*   BILIRUBIN  --   --  0.2 0.4     Recent Labs   Lab 03/09/19  0645 03/08/19  0523 03/07/19  1735   WBC 11.2* 4.6 5.7   HGB 13.5 13.3 13.8   HCT 41.3 40.4 41.8    148 167     INR (no units)   Date Value   08/13/2015 1.0       ABG  Lab Results   Component Value Date    APH 7.33 (L) 03/08/2019    APO2 76 (L) 03/08/2019    AHCO3 23 03/08/2019    APCO2 45 03/08/2019       Imaging:  Echocardiogram shows ejection fraction 42% with RV systolic pressure 24 mmHg. Essentially unchanged from last echo in November 2018.    Diagnosis:  1. COPD with mild exacerbation  2. Hypoxemia  2. Cardiomyopathy    Plans:  1. Continues to improve significantly. Essentially now at baseline.. Oxygen has been weaned off. Increase activity level. Continue nebulizers and Advair. Prednisone weaning written for. Up walking in the halls today  2. Resolved.  3. Stress test planned for tomorrow.    Discussed with or notes reviewed:  Patient  Hopefully discharge tomorrow if stress  test results are good.  We will see him again upon request.

## 2024-10-14 NOTE — PROGRESS NOTES
Care Suites Discharge Nursing Note    Patient Information  Name: Tracy Palomo  Age: 84 year old    Discharge Education:  Discharge instructions reviewed: Yes  Additional education/resources provided: na  Patient/patient representative verbalizes understanding: Yes  Patient discharging on new medications: No  Medication education completed: Yes    Discharge Plans:   Discharge location: home  Discharge ride contacted: Yes  Approximate discharge time: 1730    Discharge Criteria:  Discharge criteria met and vital signs stable: Yes    Patient Belongs:  Patient belongings returned to patient: Yes    April De Los Santos RN

## 2024-10-14 NOTE — PROGRESS NOTES
Care Suites Post Procedure Note    Patient Information  Name: Tracy Palomo  Age: 84 year old    Post Procedure  Time patient returned to Care Suites: 1450  Concerns/abnormal assessment: No immediate  If abnormal assessment, provider notified: N/A  Plan/Other: Continue post procedure plan of care.  Anticipate 2 hours of bedrest and up at 1700  Anticipate discharge later today when all discharge criteria are met.    April De Los Santos RN

## 2024-10-15 ENCOUNTER — ANTICOAGULATION THERAPY VISIT (OUTPATIENT)
Dept: ANTICOAGULATION | Facility: CLINIC | Age: 84
End: 2024-10-15

## 2024-10-15 ENCOUNTER — TELEPHONE (OUTPATIENT)
Dept: CARDIOLOGY | Facility: CLINIC | Age: 84
End: 2024-10-15

## 2024-10-15 DIAGNOSIS — I27.82 CHRONIC PULMONARY EMBOLISM WITHOUT ACUTE COR PULMONALE, UNSPECIFIED PULMONARY EMBOLISM TYPE (H): ICD-10-CM

## 2024-10-15 DIAGNOSIS — Z79.01 LONG TERM CURRENT USE OF ANTICOAGULANT THERAPY: ICD-10-CM

## 2024-10-15 DIAGNOSIS — I27.82 CHRONIC PULMONARY EMBOLISM WITHOUT ACUTE COR PULMONALE, UNSPECIFIED PULMONARY EMBOLISM TYPE (H): Primary | ICD-10-CM

## 2024-10-15 DIAGNOSIS — I26.99 OTHER PULMONARY EMBOLISM WITHOUT ACUTE COR PULMONALE, UNSPECIFIED CHRONICITY (H): ICD-10-CM

## 2024-10-15 NOTE — PROGRESS NOTES
ANTICOAGULATION  MANAGEMENT: Discharge Review    Tracy Palomo chart reviewed for anticoagulation continuity of care    Outpatient surgery/procedure on 10/14/24 for Ablation.    Discharge disposition: Home    Results:    Recent labs: (last 7 days)     10/11/24  1515 10/14/24  1132   INR 1.7* 1.96*     Anticoagulation inpatient management:     not applicable     Anticoagulation discharge instructions:     Warfarin dosing: home regimen continued   Bridging: No   INR goal change: No      Medication changes affecting anticoagulation: No    Additional factors affecting anticoagulation: No     PLAN     S/P Atrial fib/flutter ablation: Hx of Labile INR: at least weekly INR x 4 advised.  While Atrial fib/flutter ablation within 12 weeks; notify cardiologist and  pool if INR <= 1.7 within 4 weeks, procedure/surgery hold requested, or non-compliance with monitoring > 1 week.    Recommended follow up is scheduled    Anticoagulation Calendar updated    Randall QUIROGA RN  10/15/2024  Anticoagulation Clinic  [facility] for routing messages: dewey SOARES  [facility] patient phone line: [phone]

## 2024-10-15 NOTE — TELEPHONE ENCOUNTER
10/15/24 Called pt in follow up to Aflutter Ablation performed 10/14    Reviewed discharge instructions with patient    Pain level is 0    Groin dressing is C/D and I with plans to remove later . Discussed wound care and reasons to call Afib RN    Breathing is comfortable     Pt is eating, drinking and urinating without difficulty . LBM was today     Reviewed activity instructions    Reviewed any medication changes     Discussed that possible Afib episodes may occur and to call if > 2 hours in duration or if symptomatic or sustained HR > 120    Discussed reasons to call Afib RN    Reminded of follow up appointments    Verified pt has Afib RN and after hours Cardiology phone numbers    Pt voiced understanding and has no further questions/concerns at this time.    Jorge  256 pm

## 2024-10-20 DIAGNOSIS — R06.02 SHORTNESS OF BREATH: Primary | ICD-10-CM

## 2024-10-20 RX ORDER — FUROSEMIDE 20 MG/1
20 TABLET ORAL DAILY
Qty: 15 TABLET | Refills: 0 | Status: SHIPPED | OUTPATIENT
Start: 2024-10-20 | End: 2024-11-04

## 2024-10-21 ENCOUNTER — ORDERS ONLY (AUTO-RELEASED) (OUTPATIENT)
Dept: CARDIOLOGY | Facility: CLINIC | Age: 84
End: 2024-10-21
Payer: MEDICARE

## 2024-10-21 ENCOUNTER — LAB (OUTPATIENT)
Dept: LAB | Facility: CLINIC | Age: 84
End: 2024-10-21
Payer: MEDICARE

## 2024-10-21 ENCOUNTER — ANTICOAGULATION THERAPY VISIT (OUTPATIENT)
Dept: ANTICOAGULATION | Facility: CLINIC | Age: 84
End: 2024-10-21

## 2024-10-21 ENCOUNTER — TELEPHONE (OUTPATIENT)
Dept: CARDIOLOGY | Facility: CLINIC | Age: 84
End: 2024-10-21

## 2024-10-21 DIAGNOSIS — I27.82 CHRONIC PULMONARY EMBOLISM WITHOUT ACUTE COR PULMONALE, UNSPECIFIED PULMONARY EMBOLISM TYPE (H): ICD-10-CM

## 2024-10-21 DIAGNOSIS — I26.99 OTHER PULMONARY EMBOLISM WITHOUT ACUTE COR PULMONALE, UNSPECIFIED CHRONICITY (H): ICD-10-CM

## 2024-10-21 DIAGNOSIS — I27.82 CHRONIC PULMONARY EMBOLISM WITHOUT ACUTE COR PULMONALE, UNSPECIFIED PULMONARY EMBOLISM TYPE (H): Primary | ICD-10-CM

## 2024-10-21 DIAGNOSIS — I48.92 ATRIAL FLUTTER (H): Primary | ICD-10-CM

## 2024-10-21 DIAGNOSIS — I48.92 ATRIAL FLUTTER (H): ICD-10-CM

## 2024-10-21 DIAGNOSIS — Z79.01 LONG TERM CURRENT USE OF ANTICOAGULANT THERAPY: ICD-10-CM

## 2024-10-21 DIAGNOSIS — R06.02 SHORTNESS OF BREATH: ICD-10-CM

## 2024-10-21 LAB
ANION GAP SERPL CALCULATED.3IONS-SCNC: 9 MMOL/L (ref 7–15)
BUN SERPL-MCNC: 18.6 MG/DL (ref 8–23)
CALCIUM SERPL-MCNC: 9.6 MG/DL (ref 8.8–10.4)
CHLORIDE SERPL-SCNC: 106 MMOL/L (ref 98–107)
CREAT SERPL-MCNC: 1.06 MG/DL (ref 0.51–0.95)
EGFRCR SERPLBLD CKD-EPI 2021: 52 ML/MIN/1.73M2
ERYTHROCYTE [DISTWIDTH] IN BLOOD BY AUTOMATED COUNT: 14.3 % (ref 10–15)
GLUCOSE SERPL-MCNC: 64 MG/DL (ref 70–99)
HCO3 SERPL-SCNC: 28 MMOL/L (ref 22–29)
HCT VFR BLD AUTO: 43.3 % (ref 35–47)
HGB BLD-MCNC: 14 G/DL (ref 11.7–15.7)
INR BLD: 2.4 (ref 0.9–1.1)
MCH RBC QN AUTO: 29.1 PG (ref 26.5–33)
MCHC RBC AUTO-ENTMCNC: 32.3 G/DL (ref 31.5–36.5)
MCV RBC AUTO: 90 FL (ref 78–100)
NT-PROBNP SERPL-MCNC: 1014 PG/ML (ref 0–1800)
PLATELET # BLD AUTO: 204 10E3/UL (ref 150–450)
POTASSIUM SERPL-SCNC: 4.3 MMOL/L (ref 3.4–5.3)
RBC # BLD AUTO: 4.81 10E6/UL (ref 3.8–5.2)
SODIUM SERPL-SCNC: 143 MMOL/L (ref 135–145)
WBC # BLD AUTO: 6.5 10E3/UL (ref 4–11)

## 2024-10-21 PROCEDURE — 85610 PROTHROMBIN TIME: CPT

## 2024-10-21 PROCEDURE — 83880 ASSAY OF NATRIURETIC PEPTIDE: CPT

## 2024-10-21 PROCEDURE — 80048 BASIC METABOLIC PNL TOTAL CA: CPT

## 2024-10-21 PROCEDURE — 36415 COLL VENOUS BLD VENIPUNCTURE: CPT

## 2024-10-21 PROCEDURE — 85027 COMPLETE CBC AUTOMATED: CPT

## 2024-10-21 NOTE — TELEPHONE ENCOUNTER
----- Message from Graciela Chapman sent at 10/20/2024  1:15 PM CDT -----  Ravi Jarvis,     This patient had a flutter ablation two weeks ago and she's scheduled to see you next month. She called in today because she's been gaining 1lbs a day over the last week with exertional shortness of breath. I gave 20 mg of lasix only and ordered some blood work that she'll do tomorrow including BMP, NTproBNP and CBC    Thanks,   AA

## 2024-10-21 NOTE — TELEPHONE ENCOUNTER
"10/21/24 Spoke w pt to check into see how she is doing today after weekend call to on call cardiology for SOB and weight gain. Lasix 20 mg x 1 prescribed and labs were done and drawn this AM  Explained that labs were normal . Pt stated her weight has gone down 2 lbs but she still feels SOB w activity . Pt tried to explained HRS per [employer] but really unable to find accurate information on her own. Pt asked I call daughter Iesha to discuss    Called daughter Iesha who stated she was with pt during call to on call cardiology on Sunday . She feels pts SOB is due to the fact that \" she is an 84 year old woman who can't sit still. She still insists on doing all household chores and yead work\". She was concerned though as she was also complaining of a back ache which was a sx of when pt had a PE in the past. She wanted to call the MD to \"make sure she wasn't missing anything\".She stated the data that she can see on Hrs from [employer] are a summary of the past month so not accurate information since Aflutter Ablation was done on 10/14. Iesha states she does not see anything concerning re heart rates.    Will update Simona to see if monitor is needed or anything else before pts next follow up 11/21    Jorge 115 pm     "

## 2024-10-21 NOTE — PROGRESS NOTES
ANTICOAGULATION MANAGEMENT     Tracy Palomo 84 year old female is on warfarin with therapeutic INR result. (Goal INR 2.0-3.0)    Recent labs: (last 7 days)     10/21/24  1145   INR 2.4*       ASSESSMENT     Source(s): Chart Review and Patient/Caregiver Call     Warfarin doses taken: Warfarin taken as instructed  Diet: No new diet changes identified  Medication/supplement changes: None noted  New illness, injury, or hospitalization: No  Signs or symptoms of bleeding or clotting: No  Previous result: Therapeutic last visit; previously outside of goal range  Additional findings: Recent Atrial fib/flutter ablation within 12 weeks; Hx of Labile INR: at least weekly INR x 4 advised. Notify cardiologist and  pool if INR <= 1.7 within 4 weeks, procedure/surgery hold requested, or non-compliance with monitoring > 1 week.       PLAN     Recommended plan for no diet, medication or health factor changes affecting INR     Dosing Instructions: Continue your current warfarin dose with next INR in 1 week       Summary  As of 10/21/2024      Full warfarin instructions:  2.5 mg every day   Next INR check:  10/28/2024               Telephone call with Tracy who verbalizes understanding and agrees to plan and who agrees to plan and repeated back plan correctly    Lab visit scheduled    Education provided: Contact [phone] with any changes, questions or concerns.     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]  Long term current use of anticoagulant  therapy [Z79.01]  Other pulmonary embolism without acute cor pulmonale  unspecified chronicity (H) [I26.99]  Chronic pulmonary embolism without acute cor pulmonale  unspecified pulmonary embolism type (H) [I27.82]             Comments:               Anticoagulation Care Providers       Provider Role Specialty Phone number    Chad Betts MD Referring Internal Medicine [phone]    Gerard Medrano MD Responsible Family Medicine [phone]               [Alert] : alert [Healthy Appearance] : healthy appearance [No Acute Distress] : no acute distress [Normal Sclera/Conjunctiva] : normal sclera/conjunctiva [Normal Hearing] : hearing was normal [No Respiratory Distress] : no respiratory distress [Kyphosis] : no kyphosis present [No Stigmata of Cushings Syndrome] : no stigmata of Cushings Syndrome [Normal Gait] : normal gait [Acanthosis Nigricans] : no acanthosis nigricans [Normal Insight/Judgement] : insight and judgment were intact [de-identified] : no moon facies, no supraclavicular fat pads

## 2024-10-21 NOTE — TELEPHONE ENCOUNTER
10/21/24 Pt called back and explained recommendations to wear monitor x 7 days . Informed her that monitor would be mailed to her and daughter Iesha would help her put it on.  Pt voiced understanding and agreement with plan.   Jorge 322 pm

## 2024-10-21 NOTE — TELEPHONE ENCOUNTER
10/21/24 Attempted to call pt, reached VM, LM and requested callback. Called daughter Iesha and explained recommendations. She stated ok to mail monitor it pt ( address verified) . Iesha stated she will help pt put it on  Order placed  KHerroRN 205 pm

## 2024-10-21 NOTE — TELEPHONE ENCOUNTER
Patient had CBC, BNP and BMP today after addition Lasix 20 mg daily over the weekend by the [place] fellow. Can you please call and let her know that labs are stable and see how she is feeling?     BILLY Liao, CNP

## 2024-10-23 ENCOUNTER — NURSE TRIAGE (OUTPATIENT)
Dept: INTERNAL MEDICINE | Facility: CLINIC | Age: 84
End: 2024-10-23
Payer: MEDICARE

## 2024-10-23 NOTE — TELEPHONE ENCOUNTER
Back pain starting 10/11. Middle back, left side, right along the spine. Pain is constant. Pain is always the same regardless of activity. Rating pain a 4/10. Taking tylenol with benefit. Using heating pad with improvements as well. No known injuries. Seen a chiropractor yesterday and will be seeing them tomorrow as well for help with this pain. Urinating normally. Bowels are abnormal. Patient having small hard stools about 1x per day. Passing gas normally. Started taking docusate 50mg/sennoside 8.6mg once per day since Sunday.    Ablation of the heart on 10/14. Not taking any narcotics at home. Did discuss home care options for constipation and effects from anesthesia, etc and bowels.    Advised patient to continue ice/heat and tylenol for back pain as this is helping with her pain. Also continue stool softener. Also discussed reasons to be seen emergently if symptoms change or get worse.     Per protocol see provider in 3 days. Patient and daughter Iesha would like to see Dr. Alpesh CHAIREZ. Routing to PCP to advise.      Reason for Disposition   [1] MODERATE back pain (e.g., interferes with normal activities) AND [2] present > 3 days   [1] Age > 50 AND [2] no history of prior similar back pain    Additional Information   Negative: Major injury to the back (e.g., MVA, fall > 10 feet or 3 meters, penetrating injury, etc.)   Negative: Followed a tailbone injury   Negative: [1] Pain in the upper back over the ribs (rib cage) AND [2] radiates (travels, goes) into chest   Negative: [1] Pain in the upper back over the ribs (rib cage) AND [2] worsened by coughing (or clearly increases with breathing)   Negative: Back pain during pregnancy   Negative: Pain mainly in flank (i.e., in the side, over the lower ribs or just below the ribs)   Negative: [1] SEVERE back pain (e.g., excruciating) AND [2] sudden onset AND [3] age > 60 years   Negative: [1] Unable to urinate (or only a few drops) > 4 hours AND [2] bladder feels very full  "(e.g., palpable bladder or strong urge to urinate)   Negative: [1] Loss of bladder or bowel control (urine or bowel incontinence; wetting self, leaking stool) AND [2] new-onset   Negative: Numbness in groin or rectal area (i.e., loss of sensation)   Negative: [1] SEVERE abdominal pain AND [2] present > 1 hour   Negative: [1] Abdominal pain AND [2] age > 60 years   Negative: Weakness of a leg or foot (e.g., unable to bear weight, dragging foot)   Negative: Unable to walk   Negative: Patient sounds very sick or weak to the triager   Negative: [1] SEVERE back pain (e.g., excruciating, unable to do any normal activities) AND [2] not improved 2 hours after pain medicine   Negative: [1] Pain radiates into the thigh or further down the leg AND [2] both legs   Negative: [1] Fever > 100.0 F (37.8 C) AND [2] flank pain (i.e., in side, below ribs and above hip)   Negative: [1] Pain or burning with passing urine (urination) AND [2] flank pain (i.e., in side, below ribs and above hip)   Negative: Numbness in a leg or foot (i.e., loss of sensation)   Negative: [1] Numbness in an arm or hand (i.e., loss of sensation) AND [2] upper back pain   Negative: High-risk adult (e.g., history of cancer, HIV, or IV drug use)   Negative: Soft tissue infection (e.g., abscess, cellulitis) or other serious infection (e.g., bacteremia) in last 2 weeks   Negative: [1] Fever AND [2] no symptoms of UTI  (Exception: Has generalized muscle pains, not localized back pain.)   Negative: Rash in same area as pain (may be described as \"small blisters\")   Negative: Blood in urine (red, pink, or tea-colored)    Protocols used: Back Pain-A-AH    "

## 2024-10-24 NOTE — TELEPHONE ENCOUNTER
Sounds ok as long as luisito GARCIA.  Give it a few days, no openings right now but if worsens will work in.

## 2024-10-24 NOTE — TELEPHONE ENCOUNTER
"RN Triage    Patient Contact    Attempt # 1    Was call answered?  Yes.  \"May I please speak with Tracy\"  Is patient available?   Yes    Writer reviewed information below from provider.   Patient states she is passing gas, having normal BM, and is taking multiple walks.   Patient verbalized understanding, agreeable to plan and no further questions at this time.    Sophie Gomes RN on 10/24/2024 at 12:05 PM    "

## 2024-10-28 ENCOUNTER — ANTICOAGULATION THERAPY VISIT (OUTPATIENT)
Dept: ANTICOAGULATION | Facility: CLINIC | Age: 84
End: 2024-10-28

## 2024-10-28 ENCOUNTER — LAB (OUTPATIENT)
Dept: LAB | Facility: CLINIC | Age: 84
End: 2024-10-28
Payer: MEDICARE

## 2024-10-28 DIAGNOSIS — I26.99 OTHER PULMONARY EMBOLISM WITHOUT ACUTE COR PULMONALE, UNSPECIFIED CHRONICITY (H): ICD-10-CM

## 2024-10-28 DIAGNOSIS — I27.82 CHRONIC PULMONARY EMBOLISM WITHOUT ACUTE COR PULMONALE, UNSPECIFIED PULMONARY EMBOLISM TYPE (H): Primary | ICD-10-CM

## 2024-10-28 DIAGNOSIS — I27.82 CHRONIC PULMONARY EMBOLISM WITHOUT ACUTE COR PULMONALE, UNSPECIFIED PULMONARY EMBOLISM TYPE (H): ICD-10-CM

## 2024-10-28 DIAGNOSIS — Z79.01 LONG TERM CURRENT USE OF ANTICOAGULANT THERAPY: ICD-10-CM

## 2024-10-28 LAB — INR BLD: 2.1 (ref 0.9–1.1)

## 2024-10-28 PROCEDURE — 36416 COLLJ CAPILLARY BLOOD SPEC: CPT

## 2024-10-28 PROCEDURE — 85610 PROTHROMBIN TIME: CPT

## 2024-10-28 NOTE — PROGRESS NOTES
ANTICOAGULATION MANAGEMENT     Tracy Palomo 84 year old female is on warfarin with therapeutic INR result. (Goal INR 2.0-3.0)    Recent labs: (last 7 days)     10/28/24  1109   INR 2.1*       ASSESSMENT     Warfarin Lab Questionnaire    Warfarin Doses Last 7 Days      10/28/2024    11:07 AM   Dose in Tablet or Mg   TAB or MG? tablet (tab)           10/28/2024   Warfarin Lab Questionnaire   Missed doses within past 14 days? No   Changes in diet or alcohol within past 14 days? No   Medication changes since last result? No   Injuries or illness since last result? No   New shortness of breath, severe headaches or sudden changes in vision since last result? No   Abnormal bleeding since last result? No   Upcoming surgery, procedure? No   Best number to call with results? [phone]        Previous result: Therapeutic last visit; previously outside of goal range  Additional findings: Recent Atrial fib/flutter ablation within 12 weeks; Hx of Labile INR: at least weekly INR x 4 advised. Notify cardiologist and  pool if INR <= 1.7 within 4 weeks, procedure/surgery hold requested, or non-compliance with monitoring > 1 week.       PLAN     Recommended plan for no diet, medication or health factor changes affecting INR     Dosing Instructions: Continue your current warfarin dose with next INR in 1 week       Summary  As of 10/28/2024      Full warfarin instructions:  2.5 mg every day   Next INR check:  11/4/2024               Telephone call with Tracy who verbalizes understanding and agrees to plan    Lab visit scheduled    Education provided: Contact [phone] with any changes, questions or concerns.     Plan made per [facility] anticoagulation protocol    Randall QUIROGA RN  10/28/2024  Anticoagulation Clinic  [employer] for routing messages: dewey SOARES  [facility] patient phone line: [phone]        _______________________________________________________________________     Anticoagulation Episode Summary       Current  INR goal:  2.0-3.0   TTR:  60.8% (1 y)   Target end date:  Indefinite   Send INR reminders to:  GURVINDER SOARES    Indications    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]  Long term current use of anticoagulant therapy [Z79.01]  Other pulmonary embolism without acute cor pulmonale  unspecified chronicity (H) [I26.99]  Chronic pulmonary embolism without acute cor pulmonale  unspecified pulmonary embolism type (H) [I27.82]             Comments:  --             Anticoagulation Care Providers       Provider Role Specialty Phone number    Chad Betts MD Referring Internal Medicine [phone]    Gerard Medrano MD Responsible Family Medicine [phone]

## 2024-10-30 ENCOUNTER — PATIENT OUTREACH (OUTPATIENT)
Dept: CARE COORDINATION | Facility: CLINIC | Age: 84
End: 2024-10-30
Payer: MEDICARE

## 2024-11-04 ENCOUNTER — LAB (OUTPATIENT)
Dept: LAB | Facility: CLINIC | Age: 84
End: 2024-11-04
Payer: MEDICARE

## 2024-11-04 ENCOUNTER — ANTICOAGULATION THERAPY VISIT (OUTPATIENT)
Dept: ANTICOAGULATION | Facility: CLINIC | Age: 84
End: 2024-11-04

## 2024-11-04 DIAGNOSIS — I26.99 PULMONARY EMBOLI (H): Primary | ICD-10-CM

## 2024-11-04 DIAGNOSIS — I26.99 OTHER PULMONARY EMBOLISM WITHOUT ACUTE COR PULMONALE, UNSPECIFIED CHRONICITY (H): ICD-10-CM

## 2024-11-04 DIAGNOSIS — Z79.01 LONG TERM CURRENT USE OF ANTICOAGULANT THERAPY: ICD-10-CM

## 2024-11-04 DIAGNOSIS — I27.82 CHRONIC PULMONARY EMBOLISM WITHOUT ACUTE COR PULMONALE, UNSPECIFIED PULMONARY EMBOLISM TYPE (H): ICD-10-CM

## 2024-11-04 LAB — INR BLD: 2.1 (ref 0.9–1.1)

## 2024-11-04 PROCEDURE — 85610 PROTHROMBIN TIME: CPT

## 2024-11-04 PROCEDURE — 36416 COLLJ CAPILLARY BLOOD SPEC: CPT

## 2024-11-04 NOTE — PROGRESS NOTES
ANTICOAGULATION MANAGEMENT     Tracy Palomo 84 year old female is on warfarin with therapeutic INR result. (Goal INR 2.0-3.0)    Recent labs: (last 7 days)     11/04/24  1137   INR 2.1*       ASSESSMENT     Warfarin Lab Questionnaire    Warfarin Doses Last 7 Days      11/4/2024    11:34 AM   Dose in Tablet or Mg   TAB or MG? tablet (tab)           11/4/2024   Warfarin Lab Questionnaire   Missed doses within past 14 days? No   Changes in diet or alcohol within past 14 days? No   Medication changes since last result? No   Injuries or illness since last result? No   New shortness of breath, severe headaches or sudden changes in vision since last result? No   Abnormal bleeding since last result? No   Upcoming surgery, procedure? No   Best number to call with results? [phone]        Previous result: Therapeutic last 2(+) visits  Additional findings: None very tired post ablation has cards follow up in a couple weeks       PLAN     Recommended plan for no diet, medication or health factor changes affecting INR     Dosing Instructions: Continue your current warfarin dose with next INR in 1 week       Summary  As of 11/4/2024      Full warfarin instructions:  2.5 mg every day   Next INR check:  11/11/2024               Telephone call with Tracy who verbalizes understanding and agrees to plan    Lab visit scheduled    Education provided: Please call back if any changes to your diet, medications or how you've been taking warfarin    Plan made per [facility] anticoagulation protocol    Kaley Mcduffie RN  11/4/2024  Anticoagulation Clinic  [employer] for routing messages: dewey SOARES  [facility] patient phone line: [phone]        _______________________________________________________________________     Anticoagulation Episode Summary       Current INR goal:  2.0-3.0   TTR:  61.0% (1 y)   Target end date:  Indefinite   Send INR reminders to:  GURVINDER SOARES    Indications    History of Pulmonary emboli with  probable pulmonary infarction [I26.99]  Long term current use of anticoagulant therapy [Z79.01]  Other pulmonary embolism without acute cor pulmonale  unspecified chronicity (H) [I26.99]  Chronic pulmonary embolism without acute cor pulmonale  unspecified pulmonary embolism type (H) [I27.82]             Comments:  --             Anticoagulation Care Providers       Provider Role Specialty Phone number    Chad Betts MD Referring Internal Medicine [phone]    Gerard Medrano MD Responsible Family Medicine [phone]

## 2024-11-11 ENCOUNTER — LAB (OUTPATIENT)
Dept: LAB | Facility: CLINIC | Age: 84
End: 2024-11-11
Payer: MEDICARE

## 2024-11-11 ENCOUNTER — ANTICOAGULATION THERAPY VISIT (OUTPATIENT)
Dept: ANTICOAGULATION | Facility: CLINIC | Age: 84
End: 2024-11-11

## 2024-11-11 DIAGNOSIS — I27.82 CHRONIC PULMONARY EMBOLISM WITHOUT ACUTE COR PULMONALE, UNSPECIFIED PULMONARY EMBOLISM TYPE (H): ICD-10-CM

## 2024-11-11 DIAGNOSIS — I26.99 OTHER PULMONARY EMBOLISM WITHOUT ACUTE COR PULMONALE, UNSPECIFIED CHRONICITY (H): ICD-10-CM

## 2024-11-11 DIAGNOSIS — I27.82 CHRONIC PULMONARY EMBOLISM WITHOUT ACUTE COR PULMONALE, UNSPECIFIED PULMONARY EMBOLISM TYPE (H): Primary | ICD-10-CM

## 2024-11-11 DIAGNOSIS — Z79.01 LONG TERM CURRENT USE OF ANTICOAGULANT THERAPY: ICD-10-CM

## 2024-11-11 LAB — INR BLD: 2.7 (ref 0.9–1.1)

## 2024-11-11 PROCEDURE — 85610 PROTHROMBIN TIME: CPT

## 2024-11-11 PROCEDURE — 36416 COLLJ CAPILLARY BLOOD SPEC: CPT

## 2024-11-11 NOTE — PROGRESS NOTES
ANTICOAGULATION MANAGEMENT     Tracy Palomo 84 year old female is on warfarin with therapeutic INR result. (Goal INR 2.0-3.0)    Recent labs: (last 7 days)     11/11/24  1124   INR 2.7*       ASSESSMENT     Source(s): Chart Review  Previous INR was Therapeutic last 2(+) visits  Medication, diet, health changes since last INR chart reviewed; none identified    I left a detailed voicemail with the orders reflected in flowsheet. I have also requested a call back if there have been any missed doses, concerns, illness, fever, or if there have been any changes in medications, activity level, or diet        PLAN     Recommended plan for no diet, medication or health factor changes affecting INR     Dosing Instructions: Continue your current warfarin dose with next INR in 2 weeks       Summary  As of 11/11/2024      Full warfarin instructions:  2.5 mg every day   Next INR check:  11/25/2024               Detailed voice message left for Tracy with dosing instructions and follow up date.   Sent [employer] message with dosing and follow up instructions    Contact [phone] to schedule and with any changes, questions or concerns.     Education provided: Please call back if any changes to your diet, medications or how you've been taking warfarin  Contact [phone] with any changes, questions or concerns.     Plan made per [facility] anticoagulation protocol    Arti Ruelas RN  11/11/2024  Anticoagulation Clinic  [facility] for routing messages: dewey SOARES  [facility] patient phone line: [phone]        _______________________________________________________________________     Anticoagulation Episode Summary       Current INR goal:  2.0-3.0   TTR:  61.0% (1 y)   Target end date:  Indefinite   Send INR reminders to:  GURVINDER SOARES    Indications    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]  Long term current use of anticoagulant therapy [Z79.01]  Other pulmonary embolism without acute  cor pulmonale  unspecified chronicity (H) [I26.99]  Chronic pulmonary embolism without acute cor pulmonale  unspecified pulmonary embolism type (H) [I27.82]             Comments:  --             Anticoagulation Care Providers       Provider Role Specialty Phone number    Chad Betts MD Referring Internal Medicine [phone]    Gerard Medrano MD Responsible Family Medicine [phone]

## 2024-11-12 PROCEDURE — 93244 EXT ECG>48HR<7D REV&INTERPJ: CPT | Performed by: INTERNAL MEDICINE

## 2024-11-13 ENCOUNTER — PATIENT OUTREACH (OUTPATIENT)
Dept: CARE COORDINATION | Facility: CLINIC | Age: 84
End: 2024-11-13
Payer: MEDICARE

## 2024-11-21 ENCOUNTER — OFFICE VISIT (OUTPATIENT)
Dept: CARDIOLOGY | Facility: CLINIC | Age: 84
End: 2024-11-21
Payer: MEDICARE

## 2024-11-21 VITALS
HEIGHT: 64 IN | WEIGHT: 184 LBS | SYSTOLIC BLOOD PRESSURE: 136 MMHG | HEART RATE: 62 BPM | RESPIRATION RATE: 16 BRPM | BODY MASS INDEX: 31.41 KG/M2 | OXYGEN SATURATION: 97 % | DIASTOLIC BLOOD PRESSURE: 78 MMHG

## 2024-11-21 DIAGNOSIS — I48.3 TYPICAL ATRIAL FLUTTER (H): ICD-10-CM

## 2024-11-21 DIAGNOSIS — I48.92 ATRIAL FLUTTER, UNSPECIFIED TYPE (H): Primary | ICD-10-CM

## 2024-11-21 DIAGNOSIS — I48.91 ATRIAL FIBRILLATION WITH RAPID VENTRICULAR RESPONSE (H): ICD-10-CM

## 2024-11-21 DIAGNOSIS — I49.3 PVC'S (PREMATURE VENTRICULAR CONTRACTIONS): ICD-10-CM

## 2024-11-21 ASSESSMENT — PAIN SCALES - GENERAL: PAINLEVEL_OUTOF10: NO PAIN (0)

## 2024-11-21 NOTE — PATIENT INSTRUCTIONS
TODAY'S RECOMMENDATIONS:    Consider Eliquis or Xarelto instead of Coumadin. Discuss with  about cost in 2025. We can give you a 30 day free card.   If you go back into atrial fibrillation/flutter, the plan would be to change flecainide to amiodarone and proceed with a cardioversion  Continue all medications without changes.  Consider [email or web address]  Update echocardiogram  Please follow up with cardiology in 4 months.    If you have questions or concerns please call clinic at [phone].    Please call [phone] for scheduling.      It was a pleasure seeing you today!

## 2024-11-21 NOTE — LETTER
11/21/2024    Chad Betts MD  [address] [facility] [address]    RE: Tracy Palomo       Dear Colleague,     I had the pleasure of seeing Tracy Palomo in the [facility].    General Cardiology Clinic Progress Note  Tracy Palomo MRN# [id number]   YOB: 1940 Age: 84 year old     Primary cardiologist: Dr. Bass (general) and Dr. Napier (EP)    Reason for visit: Follow-up atrial flutter    History of presenting illness:    Tracy Palomo is a pleasant 84 year old patient with past medical history significant for:    Paroxysmal atrial fibrillation/atrial flutter: initially diagnosed in ~2014 after surgery for SBO and was continued on Coumadin for history of DVT/PE.  Started on flecainide in 4/2024 but then developed typical atrial flutter.  She met with Dr. Napier and underwent CTI flutter ablation 10/14/2024.  A follow-up Zio patch noted asymptomatic likely atypical atrial flutter at 100% burden with average heart rate of 81 bpm.  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 Napier in 9/2024 for arrhythmia management of symptomatic paroxysmal atrial flutter that was well-controlled on flecainide as well as typical atrial flutter.  They discussed options of ablation versus changing to amiodarone and cardioversion and she opted for the former.  On 10/14/2024 she underwent a CTI flutter ablation with Dr. Napier. On the 6th day post the ablation she felt symptoms of recurrent arrhythmias and a Zio patch was obtained that showed 100% atrial flutter burden with average heart rate of 81 bpm as well as a PVC burden of approximately 19%.      Today she returns for a post ablation follow-up accompanied by her daughter.  We discussed in detail her recent monitor as well as her history of atrial arrhythmias.  She does confirm that she was asymptomatic while  "in atrial flutter post ablation and does confirm she is symptomatic with atrial fibrillation and describes it as a \"drum\".  She is unsure when she converted out of AFL but was in SR with PVCs on EKG today.    We discussed that if she does develop recurrent AFL/AF that is symptomatic the next course of action would be transitioning from flecainide to amiodarone and undergoing a DCCV.  Also, she has been on Coumadin since her initial diagnosis of a PE over a decade ago.  We did discuss transitioning to DOAC for safety and ease of use and the patient is contemplating this change depending on cost of medication.  She does endorse ongoing fatigue and we did discuss her high PVC burden noted on recent monitor.  If fatigue persist despite continuing to remain in SR, could obtain an echocardiogram to assess LVEF.  Tracy is very active and 1 day last week did achieve 10,000 steps.    Diagnotic studies:  EKG 11/21/2024: SR with first-degree AV block and PVCs.  , QTc 417 as well as   Zio patch 11/2024 (~6 days): 100% atrial flutter burden with average heart rate of 81 bpm and a approximately 19% PVC burden  Zio patch 5/2024: Heart rate ranging between  with average heart rate of 59 bpm.  Predominant underlying rhythm was Sinus Rhythm. Atrial Fibrillation/Flutter occurred (2% burden), ranging from  bpm (avg of 96 bpm), the longest lasting 43 mins 38 secs with an avg rate of 110 bpm. 1 Pause occurred lasting 3.3 secs (18 bpm). Atrial Fibrillation/Flutter was detected within +/- 45 seconds of symptomatic patient event(s).   Stress echocardiogram 4/2024: LVEF 55 to 60% without wall motion abnormalities            Assessment and Plan:     ASSESSMENT:    Paroxysmal atrial fibrillation/atrial flutter  Initially diagnosed in ~2014 after surgery for SBO and was continued on Coumadin for history of DVT/PE.    Started on flecainide in 4/2024 but then developed typical atrial flutter.    She met with Dr. Napier and " underwent CTI flutter ablation 10/14/2024.    A follow-up Zio patch noted asymptomatic likely atypical atrial flutter at 100% burden with average heart rate of 81 bpm.  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  Will obtain a pharmacy liaison consult and relay information to patient via [employer].  She was provided with a 30-day free trial card today.  If has recurrent AF/AFL would recommend transition from flecainide to amiodarone followed up by a DCCV.  If ongoing fatigue despite maintaining new NSR would consider updating echocardiogram given high PVC burden on recent monitor.  If LVEF remains normal and patient becomes symptomatic with PVCs, consider diltiazem.                     Orders this Visit:  Orders Placed This Encounter   Procedures     Follow-Up with Cardiology [facility]     EKG 12-lead complete w/read - Clinics (performed today)     Echocardiogram Complete     No orders of the defined types were placed in this encounter.    There are no discontinued medications.    Today's clinic visit entailed:  Review of the result(s) of each unique test - echocardiogram, Zio patch, EKG, labs,  Assessment requiring an independent historian(s) - daughter  The following tests were independently interpreted by me as noted in my documentation: EKG  Ordering of each unique test  Prescription drug management  I spent a total of 69 minutes on the day of the visit.   Time spent by me today doing chart review, history and exam, documentation and further activities per the note  Provider  Link to [facility] Help Grid     The level of medical decision making during this visit was of moderate  "complexity.           Review of Systems:     Review of Systems:  Skin:        Eyes:       ENT:       Respiratory:  Negative for shortness of breath;dyspnea on exertion;cough;wheezing  Cardiovascular:  Negative for;chest pain;edema;lightheadedness Positive for;palpitations;fatigue;dizziness  Gastroenterology:      Genitourinary:       Musculoskeletal:       Neurologic:  Negative for headaches;numbness or tingling of hands;numbness or tingling of feet  Psychiatric:       Heme/Lymph/Imm:       Endocrine:                 Physical Exam:     Vitals: /78 (BP Location: Right arm, Patient Position: Sitting, Cuff Size: Adult Regular)   Pulse 62   Resp 16   Ht 1.626 m (5' 4\")   Wt 83.5 kg (184 lb)   SpO2 97%   BMI 31.58 kg/m    Constitutional: Well nourished and in no apparent distress.  Eyes: Pupils equal, round. Sclerae anicteric.   HEENT: Normocephalic, atraumatic.   Neck: Supple. JVD   Respiratory: Breathing non-labored. Lungs clear to auscultation bilaterally. No crackles, wheezes, rhonchi, or rales.  Cardiovascular:  Regular rate and rhythm, normal S1 and S2. No murmur, rub, or gallop.  Skin: Warm, dry. No rashes, cyanosis, or xanthelasma.  Extremities: No edema.  Neurologic: No gross motor deficits. Alert, awake, and oriented to person, place and time.  Psychiatric: Affect appropriate.        CURRENT MEDICATIONS:  Current Outpatient Medications   Medication Sig Dispense Refill     acetaminophen (TYLENOL) 325 MG tablet Take 3 tablets (975 mg) by mouth every 8 hours as needed for pain 100 tablet 1     flecainide (TAMBOCOR) 50 MG tablet Take 2 tablets (100 mg) by mouth 2 times daily 120 tablet 4     lovastatin (MEVACOR) 40 MG tablet Take 1 tablet (40 mg) by mouth at bedtime 90 tablet 3     warfarin ANTICOAGULANT (COUMADIN) 2.5 MG tablet TAKE 1 AND 1/2 TABLETS BY MOUTH ON THURSDAYS AND 1 TABLET ALL OTHER DAYS 90 tablet 1     furosemide (LASIX) 20 MG tablet Take 1 tablet (20 mg) by mouth daily for 15 days. 15 " tablet 0       ALLERGIES  Allergies   Allergen Reactions     No Known Allergies          PAST MEDICAL HISTORY:  Past Medical History:   Diagnosis Date     Atrial fibrillation (H) 2014    related to colon surgery     Colon polyps      Diverticulosis of colon (without mention of hemorrhage)     Diverticulosis     DVT (deep vein thrombosis) in pregnancy 2014    after colon surgery     Other and unspecified hyperlipidemia      PE (pulmonary embolism) 2014    related to colon surgery     Unspecified essential hypertension        PAST SURGICAL HISTORY:  Past Surgical History:   Procedure Laterality Date     ARTHROPLASTY KNEE Right 1/8/2019    Procedure: Right total knee replacement;  Surgeon: Kaleb Pang DO;  Location: [facility] OR     ARTHROPLASTY KNEE Left 2/16/2021    Procedure: left total knee replacement;  Surgeon: Kaleb Pang DO;  Location: [facility] OR     COLONOSCOPY  09, 10, 12    [facility]     COLONOSCOPY N/A 12/11/2017    Procedure: COLONOSCOPY;  colonoscopy;  Surgeon: Elvis Quezada MD;  Location:  GI     EP ABLATION ATRIAL FLUTTER N/A 10/14/2024    Procedure: Ablation Atrial Flutter;  Surgeon: Rich Napier MD;  Location: [facility] CARDIAC CATH LAB     FLEXIBLE SIGMOIDOSCOPY  09, 11     LAPAROTOMY EXPLORATORY N/A 10/20/2014    Procedure: LAPAROTOMY EXPLORATORY;  Surgeon: Miguel Oleary MD;  Location: [facility] OR     LAPAROTOMY, LYSIS ADHESIONS, COMBINED N/A 10/20/2014    Procedure: COMBINED LAPAROTOMY, LYSIS ADHESIONS;  Surgeon: Miguel Oleary MD;  Location: [facility]     OPEN REDUCTION INTERNAL FIXATION HIP BIPOLAR Left 3/16/2017    Procedure: OPEN REDUCTION INTERNAL FIXATION HIP BIPOLAR;  Surgeon: Kaleb Pang DO;  Location: [facility] OR     RELEASE TRIGGER FINGER Left 1/12/2021    Procedure: Left thumb trigger release;  Surgeon: Kaleb Pang DO;  Location: [facility]     RESECT SMALL BOWEL WITHOUT OSTOMY N/A 10/20/2014    Procedure: RESECT SMALL BOWEL  WITHOUT OSTOMY;  Surgeon: Miguel Oleary MD;  Location: [facility] OR       FAMILY HISTORY:  Family History   Problem Relation Age of Onset     Blood Disease No family hx of         blood clots       SOCIAL HISTORY:  Social History     Socioeconomic History     Marital status:    Tobacco Use     Smoking status: Never     Smokeless tobacco: Never   Substance and Sexual Activity     Alcohol use: No     Alcohol/week: 0.0 standard drinks of alcohol     Drug use: No     Sexual activity: Not Currently     Partners: Male   Other Topics Concern     Parent/sibling w/ CABG, MI or angioplasty before 65F 55M? No     Social Drivers of Health     Financial Resource Strain: High Risk (11/26/2023)    Financial Resource Strain      Within the past 12 months, have you or your family members you live with been unable to get utilities (heat, electricity) when it was really needed?: Yes   Food Insecurity: Low Risk  (11/26/2023)    Food Insecurity      Within the past 12 months, did you worry that your food would run out before you got money to buy more?: No      Within the past 12 months, did the food you bought just not last and you didn t have money to get more?: No   Transportation Needs: Low Risk  (11/26/2023)    Transportation Needs      Within the past 12 months, has lack of transportation kept you from medical appointments, getting your medicines, non-medical meetings or appointments, work, or from getting things that you need?: No   Interpersonal Safety: Low Risk  (10/14/2024)    Interpersonal Safety      Do you feel physically and emotionally safe where you currently live?: Yes      Within the past 12 months, have you been hit, slapped, kicked or otherwise physically hurt by someone?: No      Within the past 12 months, have you been humiliated or emotionally abused in other ways by your partner or ex-partner?: No   Housing Stability: Low Risk  (11/26/2023)    Housing Stability      Do you have housing? : Yes      Are you  worried about losing your housing?: No               Thank you for allowing me to participate in the care of your patient.      Sincerely,     BILLY Shi CNP     [facility]  cc:   BILLY Liao CNP  [address]  [place] [address]

## 2024-11-21 NOTE — PROGRESS NOTES
"  General Cardiology Clinic Progress Note  Tracy Palomo MRN# [id number]   YOB: 1940 Age: 84 year old     Primary cardiologist: Dr. Bass (general) and Dr. Napier (EP)    Reason for visit: Follow-up atrial flutter    History of presenting illness:    Tracy Palomo is a pleasant 84 year old patient with past medical history significant for:    Paroxysmal atrial fibrillation/atrial flutter: initially diagnosed in ~2014 after surgery for SBO and was continued on Coumadin for history of DVT/PE.  Started on flecainide in 4/2024 but then developed typical atrial flutter.  She met with Dr. Napier and underwent CTI flutter ablation 10/14/2024.  A follow-up Zio patch noted asymptomatic likely atypical atrial flutter at 100% burden with average heart rate of 81 bpm.  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 Napier in 9/2024 for arrhythmia management of symptomatic paroxysmal atrial flutter that was well-controlled on flecainide as well as typical atrial flutter.  They discussed options of ablation versus changing to amiodarone and cardioversion and she opted for the former.  On 10/14/2024 she underwent a CTI flutter ablation with Dr. Napier. On the 6th day post the ablation she felt symptoms of recurrent arrhythmias and a Zio patch was obtained that showed 100% atrial flutter burden with average heart rate of 81 bpm as well as a PVC burden of approximately 19%.      Today she returns for a post ablation follow-up accompanied by her daughter.  We discussed in detail her recent monitor as well as her history of atrial arrhythmias.  She does confirm that she was asymptomatic while in atrial flutter post ablation and does confirm she is symptomatic with atrial fibrillation and describes it as a \"drum\".  She is unsure when she converted out of AFL but was in SR with PVCs on EKG " today.    We discussed that if she does develop recurrent AFL/AF that is symptomatic the next course of action would be transitioning from flecainide to amiodarone and undergoing a DCCV.  Also, she has been on Coumadin since her initial diagnosis of a PE over a decade ago.  We did discuss transitioning to DOAC for safety and ease of use and the patient is contemplating this change depending on cost of medication.  She does endorse ongoing fatigue and we did discuss her high PVC burden noted on recent monitor.  If fatigue persist despite continuing to remain in SR, could obtain an echocardiogram to assess LVEF.  Tracy is very active and 1 day last week did achieve 10,000 steps.    Diagnotic studies:  EKG 11/21/2024: SR with first-degree AV block and PVCs.  , QTc 417 as well as   Zio patch 11/2024 (~6 days): 100% atrial flutter burden with average heart rate of 81 bpm and a approximately 19% PVC burden  Zio patch 5/2024: Heart rate ranging between  with average heart rate of 59 bpm.  Predominant underlying rhythm was Sinus Rhythm. Atrial Fibrillation/Flutter occurred (2% burden), ranging from  bpm (avg of 96 bpm), the longest lasting 43 mins 38 secs with an avg rate of 110 bpm. 1 Pause occurred lasting 3.3 secs (18 bpm). Atrial Fibrillation/Flutter was detected within +/- 45 seconds of symptomatic patient event(s).   Stress echocardiogram 4/2024: LVEF 55 to 60% without wall motion abnormalities            Assessment and Plan:     ASSESSMENT:    Paroxysmal atrial fibrillation/atrial flutter  Initially diagnosed in ~2014 after surgery for SBO and was continued on Coumadin for history of DVT/PE.    Started on flecainide in 4/2024 but then developed typical atrial flutter.    She met with Dr. Napier and underwent CTI flutter ablation 10/14/2024.    A follow-up Zio patch noted asymptomatic likely atypical atrial flutter at 100% burden with average heart rate of 81 bpm.  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  Will obtain a pharmacy liaison consult and relay information to patient via "[employer].  She was provided with a 30-day free trial card today.  If has recurrent AF/AFL would recommend transition from flecainide to amiodarone followed up by a DCCV.  If ongoing fatigue despite maintaining new NSR would consider updating echocardiogram given high PVC burden on recent monitor.  If LVEF remains normal and patient becomes symptomatic with PVCs, consider diltiazem.                     Orders this Visit:  Orders Placed This Encounter   Procedures    Follow-Up with Cardiology [facility]    EKG 12-lead complete w/read - Clinics (performed today)    Echocardiogram Complete     No orders of the defined types were placed in this encounter.    There are no discontinued medications.    Today's clinic visit entailed:  Review of the result(s) of each unique test - echocardiogram, Zio patch, EKG, labs,  Assessment requiring an independent historian(s) - daughter  The following tests were independently interpreted by me as noted in my documentation: EKG  Ordering of each unique test  Prescription drug management  I spent a total of 69 minutes on the day of the visit.   Time spent by me today doing chart review, history and exam, documentation and further activities per the note  Provider  Link to [facility] Help Grid     The level of medical decision making during this visit was of moderate complexity.           Review of Systems:     Review of Systems:  Skin:        Eyes:       ENT:       Respiratory:  Negative for shortness of breath;dyspnea on exertion;cough;wheezing  Cardiovascular:  Negative  "for;chest pain;edema;lightheadedness Positive for;palpitations;fatigue;dizziness  Gastroenterology:      Genitourinary:       Musculoskeletal:       Neurologic:  Negative for headaches;numbness or tingling of hands;numbness or tingling of feet  Psychiatric:       Heme/Lymph/Imm:       Endocrine:                 Physical Exam:     Vitals: /78 (BP Location: Right arm, Patient Position: Sitting, Cuff Size: Adult Regular)   Pulse 62   Resp 16   Ht 1.626 m (5' 4\")   Wt 83.5 kg (184 lb)   SpO2 97%   BMI 31.58 kg/m    Constitutional: Well nourished and in no apparent distress.  Eyes: Pupils equal, round. Sclerae anicteric.   HEENT: Normocephalic, atraumatic.   Neck: Supple. JVD   Respiratory: Breathing non-labored. Lungs clear to auscultation bilaterally. No crackles, wheezes, rhonchi, or rales.  Cardiovascular:  Regular rate and rhythm, normal S1 and S2. No murmur, rub, or gallop.  Skin: Warm, dry. No rashes, cyanosis, or xanthelasma.  Extremities: No edema.  Neurologic: No gross motor deficits. Alert, awake, and oriented to person, place and time.  Psychiatric: Affect appropriate.        CURRENT MEDICATIONS:  Current Outpatient Medications   Medication Sig Dispense Refill    acetaminophen (TYLENOL) 325 MG tablet Take 3 tablets (975 mg) by mouth every 8 hours as needed for pain 100 tablet 1    flecainide (TAMBOCOR) 50 MG tablet Take 2 tablets (100 mg) by mouth 2 times daily 120 tablet 4    lovastatin (MEVACOR) 40 MG tablet Take 1 tablet (40 mg) by mouth at bedtime 90 tablet 3    warfarin ANTICOAGULANT (COUMADIN) 2.5 MG tablet TAKE 1 AND 1/2 TABLETS BY MOUTH ON THURSDAYS AND 1 TABLET ALL OTHER DAYS 90 tablet 1    furosemide (LASIX) 20 MG tablet Take 1 tablet (20 mg) by mouth daily for 15 days. 15 tablet 0       ALLERGIES  Allergies   Allergen Reactions    No Known Allergies          PAST MEDICAL HISTORY:  Past Medical History:   Diagnosis Date    Atrial fibrillation (H) 2014    related to colon surgery    Colon " polyps     Diverticulosis of colon (without mention of hemorrhage)     Diverticulosis    DVT (deep vein thrombosis) in pregnancy 2014    after colon surgery    Other and unspecified hyperlipidemia     PE (pulmonary embolism) 2014    related to colon surgery    Unspecified essential hypertension        PAST SURGICAL HISTORY:  Past Surgical History:   Procedure Laterality Date    ARTHROPLASTY KNEE Right 1/8/2019    Procedure: Right total knee replacement;  Surgeon: Kaleb Pang DO;  Location: [facility]    ARTHROPLASTY KNEE Left 2/16/2021    Procedure: left total knee replacement;  Surgeon: Kaleb Pang DO;  Location: [facility] OR    COLONOSCOPY  09, 10, 12    [facility]    COLONOSCOPY N/A 12/11/2017    Procedure: COLONOSCOPY;  colonoscopy;  Surgeon: Elvis Quezada MD;  Location:  GI    EP ABLATION ATRIAL FLUTTER N/A 10/14/2024    Procedure: Ablation Atrial Flutter;  Surgeon: Rich Napier MD;  Location: [facility] CARDIAC CATH LAB    FLEXIBLE SIGMOIDOSCOPY  09, 11    LAPAROTOMY EXPLORATORY N/A 10/20/2014    Procedure: LAPAROTOMY EXPLORATORY;  Surgeon: Miguel Oleary MD;  Location: [facility] OR    LAPAROTOMY, LYSIS ADHESIONS, COMBINED N/A 10/20/2014    Procedure: COMBINED LAPAROTOMY, LYSIS ADHESIONS;  Surgeon: Miguel Oleary MD;  Location: [facility]    OPEN REDUCTION INTERNAL FIXATION HIP BIPOLAR Left 3/16/2017    Procedure: OPEN REDUCTION INTERNAL FIXATION HIP BIPOLAR;  Surgeon: Kaleb Pang DO;  Location: [facility]    RELEASE TRIGGER FINGER Left 1/12/2021    Procedure: Left thumb trigger release;  Surgeon: Kaleb Pang DO;  Location: [facility]    RESECT SMALL BOWEL WITHOUT OSTOMY N/A 10/20/2014    Procedure: RESECT SMALL BOWEL WITHOUT OSTOMY;  Surgeon: Miguel Oleary MD;  Location: [facility]       FAMILY HISTORY:  Family History   Problem Relation Age of Onset    Blood Disease No family hx of         blood clots       SOCIAL HISTORY:  Social History      Socioeconomic History    Marital status:    Tobacco Use    Smoking status: Never    Smokeless tobacco: Never   Substance and Sexual Activity    Alcohol use: No     Alcohol/week: 0.0 standard drinks of alcohol    Drug use: No    Sexual activity: Not Currently     Partners: Male   Other Topics Concern    Parent/sibling w/ CABG, MI or angioplasty before 65F 55M? No     Social Drivers of Health     Financial Resource Strain: High Risk (11/26/2023)    Financial Resource Strain     Within the past 12 months, have you or your family members you live with been unable to get utilities (heat, electricity) when it was really needed?: Yes   Food Insecurity: Low Risk  (11/26/2023)    Food Insecurity     Within the past 12 months, did you worry that your food would run out before you got money to buy more?: No     Within the past 12 months, did the food you bought just not last and you didn t have money to get more?: No   Transportation Needs: Low Risk  (11/26/2023)    Transportation Needs     Within the past 12 months, has lack of transportation kept you from medical appointments, getting your medicines, non-medical meetings or appointments, work, or from getting things that you need?: No   Interpersonal Safety: Low Risk  (10/14/2024)    Interpersonal Safety     Do you feel physically and emotionally safe where you currently live?: Yes     Within the past 12 months, have you been hit, slapped, kicked or otherwise physically hurt by someone?: No     Within the past 12 months, have you been humiliated or emotionally abused in other ways by your partner or ex-partner?: No   Housing Stability: Low Risk  (11/26/2023)    Housing Stability     Do you have housing? : Yes     Are you worried about losing your housing?: No

## 2024-11-25 ENCOUNTER — ANTICOAGULATION THERAPY VISIT (OUTPATIENT)
Dept: ANTICOAGULATION | Facility: CLINIC | Age: 84
End: 2024-11-25

## 2024-11-25 ENCOUNTER — LAB (OUTPATIENT)
Dept: LAB | Facility: CLINIC | Age: 84
End: 2024-11-25
Payer: MEDICARE

## 2024-11-25 DIAGNOSIS — I48.0 PAROXYSMAL ATRIAL FIBRILLATION (H): ICD-10-CM

## 2024-11-25 DIAGNOSIS — I27.82 CHRONIC PULMONARY EMBOLISM WITHOUT ACUTE COR PULMONALE, UNSPECIFIED PULMONARY EMBOLISM TYPE (H): Primary | ICD-10-CM

## 2024-11-25 DIAGNOSIS — Z79.01 LONG TERM CURRENT USE OF ANTICOAGULANT THERAPY: ICD-10-CM

## 2024-11-25 DIAGNOSIS — I27.82 CHRONIC PULMONARY EMBOLISM WITHOUT ACUTE COR PULMONALE, UNSPECIFIED PULMONARY EMBOLISM TYPE (H): ICD-10-CM

## 2024-11-25 DIAGNOSIS — I26.99 OTHER PULMONARY EMBOLISM WITHOUT ACUTE COR PULMONALE, UNSPECIFIED CHRONICITY (H): ICD-10-CM

## 2024-11-25 LAB — INR BLD: 2.8 (ref 0.9–1.1)

## 2024-11-25 PROCEDURE — 85610 PROTHROMBIN TIME: CPT

## 2024-11-25 PROCEDURE — 36416 COLLJ CAPILLARY BLOOD SPEC: CPT

## 2024-11-25 RX ORDER — FLECAINIDE ACETATE 50 MG/1
100 TABLET ORAL 2 TIMES DAILY
Qty: 120 TABLET | Refills: 4 | Status: SHIPPED | OUTPATIENT
Start: 2024-11-25

## 2024-11-25 NOTE — TELEPHONE ENCOUNTER
Last Written Prescription Date:  6/14/24  Last Fill Quantity: 120,  # refills: 4   Last office visit: 11/21/2024 with Simona Sheffield   Future Office Visit:  Pt. Is scheduled to be seen back 4/23/25    Diane Carter on 11/25/2024 at 9:37 AM

## 2024-11-25 NOTE — PROGRESS NOTES
ANTICOAGULATION MANAGEMENT     Tracy Palomo 84 year old female is on warfarin with therapeutic INR result. (Goal INR 2.0-3.0)    Recent labs: (last 7 days)     11/25/24  1141   INR 2.8*       ASSESSMENT     Source(s): Chart Review and Patient/Caregiver Call     Warfarin doses taken: More warfarin taken than planned which may be affecting INR  Diet: No new diet changes identified  Medication/supplement changes: None noted  New illness, injury, or hospitalization: No  Signs or symptoms of bleeding or clotting: No  Previous result: Therapeutic last 2(+) visits  Additional findings: None       PLAN     Recommended plan for temporary change(s) affecting INR     Dosing Instructions: Increase your warfarin dose (7.1% change) with next INR in 3 weeks. Patient has been taking 18.75 mg weekly since 10/11/24. She was on this dose prior. Writer adjusted dose to match what the patient has been taking since the INR is in range.        Summary  As of 11/25/2024      Full warfarin instructions:  3.75 mg every Wed; 2.5 mg all other days   Next INR check:  12/16/2024               Telephone call with Tracy who verbalizes understanding and agrees to plan    Lab visit scheduled    Education provided: Contact [phone] with any changes, questions or concerns.     Plan made per [facility] anticoagulation protocol    Randall QUIROGA RN  11/25/2024  Anticoagulation Clinic  [employer] for routing messages: dewey SOARES  [facility] patient phone line: [phone]        _______________________________________________________________________     Anticoagulation Episode Summary       Current INR goal:  2.0-3.0   TTR:  61.1% (1 y)   Target end date:  Indefinite   Send INR reminders to:  GURVINDER SOARES    Indications    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]  Long term current use of anticoagulant therapy [Z79.01]  Other pulmonary embolism without acute cor pulmonale  unspecified chronicity (H) [I26.99]  Chronic  pulmonary embolism without acute cor pulmonale  unspecified pulmonary embolism type (H) [I27.82]             Comments:  --             Anticoagulation Care Providers       Provider Role Specialty Phone number    Chad Betts MD Referring Internal Medicine [phone]    Gerard Medrano MD Responsible Family Medicine [phone]

## 2024-12-16 ENCOUNTER — LAB (OUTPATIENT)
Dept: LAB | Facility: CLINIC | Age: 84
End: 2024-12-16
Payer: MEDICARE

## 2024-12-16 ENCOUNTER — TELEPHONE (OUTPATIENT)
Dept: ANTICOAGULATION | Facility: CLINIC | Age: 84
End: 2024-12-16

## 2024-12-16 ENCOUNTER — ANTICOAGULATION THERAPY VISIT (OUTPATIENT)
Dept: ANTICOAGULATION | Facility: CLINIC | Age: 84
End: 2024-12-16

## 2024-12-16 DIAGNOSIS — I27.82 CHRONIC PULMONARY EMBOLISM WITHOUT ACUTE COR PULMONALE, UNSPECIFIED PULMONARY EMBOLISM TYPE (H): ICD-10-CM

## 2024-12-16 DIAGNOSIS — I26.99 OTHER PULMONARY EMBOLISM WITHOUT ACUTE COR PULMONALE, UNSPECIFIED CHRONICITY (H): ICD-10-CM

## 2024-12-16 DIAGNOSIS — I26.99 PULMONARY EMBOLI (H): Primary | ICD-10-CM

## 2024-12-16 DIAGNOSIS — I27.82 CHRONIC PULMONARY EMBOLISM WITHOUT ACUTE COR PULMONALE, UNSPECIFIED PULMONARY EMBOLISM TYPE (H): Primary | ICD-10-CM

## 2024-12-16 DIAGNOSIS — Z79.01 LONG TERM (CURRENT) USE OF ANTICOAGULANTS: ICD-10-CM

## 2024-12-16 DIAGNOSIS — Z79.01 LONG TERM CURRENT USE OF ANTICOAGULANT THERAPY: ICD-10-CM

## 2024-12-16 LAB — INR BLD: 2.1 (ref 0.9–1.1)

## 2024-12-16 PROCEDURE — 85610 PROTHROMBIN TIME: CPT

## 2024-12-16 PROCEDURE — 36416 COLLJ CAPILLARY BLOOD SPEC: CPT

## 2024-12-16 NOTE — PROGRESS NOTES
ANTICOAGULATION MANAGEMENT     Tracy Palomo 84 year old female is on warfarin with therapeutic INR result. (Goal INR 2.0-3.0)    Recent labs: (last 7 days)     12/16/24  1127   INR 2.1*       ASSESSMENT     Source(s): Chart Review and Patient/Caregiver Call     Warfarin doses taken: Warfarin taken as instructed  Diet: No new diet changes identified  Medication/supplement changes: None noted  New illness, injury, or hospitalization: No  Signs or symptoms of bleeding or clotting: No  Previous result: Therapeutic last 2(+) visits  Additional findings: None       PLAN     Recommended plan for no diet, medication or health factor changes affecting INR     Dosing Instructions: Continue your current warfarin dose with next INR in 4 weeks       Summary  As of 12/16/2024      Full warfarin instructions:  3.75 mg every Wed; 2.5 mg all other days   Next INR check:  1/13/2025               Telephone call with Tracy who verbalizes understanding and agrees to plan    Lab visit scheduled    Education provided: Please call back if any changes to your diet, medications or how you've been taking warfarin    Plan made per [facility] anticoagulation protocol    Kaley Mcduffie RN  12/16/2024  Anticoagulation Clinic  [employer] for routing messages: dewey SOARES  [facility] patient phone line: [phone]        _______________________________________________________________________     Anticoagulation Episode Summary       Current INR goal:  2.0-3.0   TTR:  62.2% (1 y)   Target end date:  Indefinite   Send INR reminders to:  GURVINDER SOARES    Indications    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]  Long term current use of anticoagulant therapy [Z79.01]  Other pulmonary embolism without acute cor pulmonale  unspecified chronicity (H) [I26.99]  Chronic pulmonary embolism without acute cor pulmonale  unspecified pulmonary embolism type (H) [I27.82]             Comments:  --              Anticoagulation Care Providers       Provider Role Specialty Phone number    Chad Betts MD Referring Internal Medicine [phone]    Gerard Medrano MD Responsible Family Medicine [phone]

## 2024-12-16 NOTE — TELEPHONE ENCOUNTER
ANTICOAGULATION CLINIC REFERRAL RENEWAL REQUEST       An annual renewal order is required for all patients referred to [facility] Anticoagulation [facility].?  Please review and sign the pended referral order for Tracy Palomo.       ANTICOAGULATION SUMMARY      Warfarin indication(s)   Atrial Fibrillation, DVT, and PE    Mechanical heart valve present?  NO       Current goal range   INR: 2.0-3.0     Goal appropriate for indication? Goal INR 2-3, standard for indication(s) above     Time in Therapeutic Range (TTR)  (Goal > 60%) 62.2%       Office visit with referring provider's group within last year yes on 9/17/2024       Kaley Mcduffie, RN  [facility] Anticoagulation [facility]

## 2024-12-19 ENCOUNTER — HOSPITAL ENCOUNTER (OUTPATIENT)
Dept: CARDIOLOGY | Facility: CLINIC | Age: 84
Discharge: HOME | End: 2024-12-19
Attending: NURSE PRACTITIONER
Payer: MEDICARE

## 2024-12-19 DIAGNOSIS — I49.3 PVC'S (PREMATURE VENTRICULAR CONTRACTIONS): ICD-10-CM

## 2024-12-19 DIAGNOSIS — I48.3 TYPICAL ATRIAL FLUTTER (H): ICD-10-CM

## 2024-12-19 DIAGNOSIS — I48.92 ATRIAL FLUTTER, UNSPECIFIED TYPE (H): ICD-10-CM

## 2024-12-19 LAB — LVEF ECHO: NORMAL

## 2024-12-19 PROCEDURE — 93306 TTE W/DOPPLER COMPLETE: CPT

## 2024-12-19 NOTE — PROGRESS NOTES
ANTICOAGULATION MANAGEMENT     Tracy Palomo 83 year old female is on warfarin with subtherapeutic INR result. (Goal INR 2.0-3.0)    Recent labs: (last 7 days)     06/07/23  0951   INR 1.5*       ASSESSMENT       Source(s): Chart Review and Patient/Caregiver Call       Warfarin doses taken: Missed dose(s) may be affecting INR    Diet: No new diet changes identified    Medication/supplement changes: None noted    New illness, injury, or hospitalization: No    Signs or symptoms of bleeding or clotting: No    Previous result: Subtherapeutic    Additional findings: None         PLAN     Recommended plan for temporary change(s) affecting INR     Dosing Instructions: booster dose then continue your current warfarin dose with next INR in 2 weeks       Summary  As of 6/7/2023    Full warfarin instructions:  6/7: 5 mg; Otherwise 5 mg every Mon, Fri; 2.5 mg all other days   Next INR check:  6/21/2023             Telephone call with Tracy who verbalizes understanding and agrees to plan    Lab visit scheduled    Education provided:     Goal range and lab monitoring: goal range and significance of current result    Contact [phone]  with any changes, questions or concerns.     Plan made per [facility] anticoagulation protocol    Belle Berg RN  Anticoagulation Clinic  6/7/2023    _______________________________________________________________________     Anticoagulation Episode Summary     Current INR goal:  2.0-3.0   TTR:  70.1 % (1 y)   Target end date:  Indefinite   Send INR reminders to:  [facility]    Indications    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]  Long term current use of anticoagulant therapy [Z79.01]  Other pulmonary embolism without acute cor pulmonale  unspecified chronicity (H) [I26.99]  Chronic pulmonary embolism without acute cor pulmonale  unspecified pulmonary embolism type (H) [I27.82]           Comments:           Anticoagulation Care Providers     Provider  Role Specialty Phone number    Chad Betts MD Referring Internal Medicine [phone]    Gerard Medrano MD Responsible Family Medicine [phone]           What Is The Reason For Today's Visit?: Preventative Skin Check

## 2025-01-11 ENCOUNTER — HEALTH MAINTENANCE LETTER (OUTPATIENT)
Age: 85
End: 2025-01-11

## 2025-01-13 ENCOUNTER — LAB (OUTPATIENT)
Dept: LAB | Facility: CLINIC | Age: 85
End: 2025-01-13
Payer: MEDICARE

## 2025-01-13 ENCOUNTER — ANTICOAGULATION THERAPY VISIT (OUTPATIENT)
Dept: ANTICOAGULATION | Facility: CLINIC | Age: 85
End: 2025-01-13

## 2025-01-13 DIAGNOSIS — I27.82 CHRONIC PULMONARY EMBOLISM WITHOUT ACUTE COR PULMONALE, UNSPECIFIED PULMONARY EMBOLISM TYPE (H): Primary | ICD-10-CM

## 2025-01-13 DIAGNOSIS — Z79.01 LONG TERM CURRENT USE OF ANTICOAGULANT THERAPY: ICD-10-CM

## 2025-01-13 DIAGNOSIS — Z79.01 LONG TERM (CURRENT) USE OF ANTICOAGULANTS: ICD-10-CM

## 2025-01-13 DIAGNOSIS — I27.82 CHRONIC PULMONARY EMBOLISM WITHOUT ACUTE COR PULMONALE, UNSPECIFIED PULMONARY EMBOLISM TYPE (H): ICD-10-CM

## 2025-01-13 DIAGNOSIS — I26.99 OTHER PULMONARY EMBOLISM WITHOUT ACUTE COR PULMONALE, UNSPECIFIED CHRONICITY (H): ICD-10-CM

## 2025-01-13 LAB — INR BLD: 2.1 (ref 0.9–1.1)

## 2025-01-13 PROCEDURE — 36415 COLL VENOUS BLD VENIPUNCTURE: CPT

## 2025-01-13 PROCEDURE — 85610 PROTHROMBIN TIME: CPT

## 2025-01-13 NOTE — PROGRESS NOTES
ANTICOAGULATION MANAGEMENT     Tracy Palomo 84 year old female is on warfarin with therapeutic INR result. (Goal INR 2.0-3.0)    Recent labs: (last 7 days)     01/13/25  1127   INR 2.1*       ASSESSMENT     Source(s): Chart Review and Patient/Caregiver Call     Warfarin doses taken: Warfarin taken as instructed  Diet: No new diet changes identified  Medication/supplement changes: None noted  New illness, injury, or hospitalization: No  Signs or symptoms of bleeding or clotting: No  Previous result: Therapeutic last 2(+) visits  Additional findings: Upcoming surgery/procedure Dental extraction- 1 tooth. [facility] mant[facility] dental [place], date TBD.       PLAN     Recommended plan for no diet, medication or health factor changes affecting INR     Dosing Instructions: Continue your current warfarin dose with next INR in 4 weeks. Pt will call today to schedule the dental extraction. Informed her that for 1 tooth, she should not need to hold her warfarin, but verify this with facility and schedule INR if they'd like it checked prior to extraction.        Summary  As of 1/13/2025      Full warfarin instructions:  3.75 mg every Wed; 2.5 mg all other days   Next INR check:  2/10/2025               Telephone call with Tracy who verbalizes understanding and agrees to plan and who agrees to plan and repeated back plan correctly    Lab visit scheduled    Education provided: None required    Plan made per [facility] anticoagulation protocol    Radha Rodriguez RN  1/13/2025  Anticoagulation Clinic  [employer] for routing messages: dewey SOARES  [facility] patient phone line: [phone]        _______________________________________________________________________     Anticoagulation Episode Summary       Current INR goal:  2.0-3.0   TTR:  68.5% (1 y)   Target end date:  Indefinite   Send INR reminders to:  GURVINDER SOARES    Indications    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]  Long term current use  of anticoagulant therapy [Z79.01]  Other pulmonary embolism without acute cor pulmonale  unspecified chronicity (H) [I26.99]  Chronic pulmonary embolism without acute cor pulmonale  unspecified pulmonary embolism type (H) [I27.82]             Comments:  --             Anticoagulation Care Providers       Provider Role Specialty Phone number    Chad Betts MD Referring Internal Medicine [phone]    Gerard Medrano MD Responsible Family Medicine [phone]

## 2025-01-14 ENCOUNTER — TELEPHONE (OUTPATIENT)
Dept: INTERNAL MEDICINE | Facility: CLINIC | Age: 85
End: 2025-01-14
Payer: MEDICARE

## 2025-01-14 NOTE — TELEPHONE ENCOUNTER
10:12 AM    INR scheduled for 1/20/25.    Fax INR to: [facility]. Fax: [phone].  Phone #: [phone]      Randall Evans RN, BSN, PHN  Anticoagulation Clinic   [phone]

## 2025-01-14 NOTE — TELEPHONE ENCOUNTER
Patient is having her tooth pulled on Tuesday.    She needs to have her INR drawn within 24 hours and she will need a letter done with the results.    Please advise.    Maria Del Rosario Wright RN on 1/14/2025 at 9:31 AM

## 2025-01-20 ENCOUNTER — LAB (OUTPATIENT)
Dept: LAB | Facility: CLINIC | Age: 85
End: 2025-01-20
Payer: MEDICARE

## 2025-01-20 ENCOUNTER — ANTICOAGULATION THERAPY VISIT (OUTPATIENT)
Dept: ANTICOAGULATION | Facility: CLINIC | Age: 85
End: 2025-01-20

## 2025-01-20 DIAGNOSIS — Z79.01 LONG TERM (CURRENT) USE OF ANTICOAGULANTS: ICD-10-CM

## 2025-01-20 DIAGNOSIS — I26.99 OTHER PULMONARY EMBOLISM WITHOUT ACUTE COR PULMONALE, UNSPECIFIED CHRONICITY (H): ICD-10-CM

## 2025-01-20 DIAGNOSIS — I27.82 CHRONIC PULMONARY EMBOLISM WITHOUT ACUTE COR PULMONALE, UNSPECIFIED PULMONARY EMBOLISM TYPE (H): ICD-10-CM

## 2025-01-20 DIAGNOSIS — Z79.01 LONG TERM CURRENT USE OF ANTICOAGULANT THERAPY: ICD-10-CM

## 2025-01-20 DIAGNOSIS — I27.82 CHRONIC PULMONARY EMBOLISM WITHOUT ACUTE COR PULMONALE, UNSPECIFIED PULMONARY EMBOLISM TYPE (H): Primary | ICD-10-CM

## 2025-01-20 LAB — INR BLD: 2 (ref 0.9–1.1)

## 2025-01-20 PROCEDURE — 85610 PROTHROMBIN TIME: CPT

## 2025-01-20 PROCEDURE — 36416 COLLJ CAPILLARY BLOOD SPEC: CPT

## 2025-01-20 NOTE — PROGRESS NOTES
ANTICOAGULATION MANAGEMENT     Tracy Palomo 84 year old female is on warfarin with therapeutic INR result. (Goal INR 2.0-3.0)    Recent labs: (last 7 days)     01/20/25  0853   INR 2.0*       ASSESSMENT     Warfarin Lab Questionnaire    Warfarin Doses Last 7 Days          1/20/2025   Warfarin Lab Questionnaire   Missed doses within past 14 days? No   Changes in diet or alcohol within past 14 days? No   Medication changes since last result? No   Injuries or illness since last result? No   New shortness of breath, severe headaches or sudden changes in vision since last result? No   Abnormal bleeding since last result? No   Upcoming surgery, procedure? Yes   Please explain, date scheduled? tooth pulled  1-21 -25   at  12;15 pm [place]   Best number to call with results? [phone]     Previous result: Therapeutic last 2(+) visits  Additional findings: Upcoming surgery/procedure needed INR done within 24 hours of her tooth extraction 1/21/25.  Results have been faxed to the dental office.        PLAN     Recommended plan for no diet, medication or health factor changes affecting INR     Dosing Instructions: Continue your current warfarin dose with next INR in 3 weeks       Summary  As of 1/20/2025      Full warfarin instructions:  3.75 mg every Wed; 2.5 mg all other days   Next INR check:  2/10/2025               Telephone call with Tracy who verbalizes understanding and agrees to plan    Lab visit scheduled    Education provided: Contact [phone] with any changes, questions or concerns.     Plan made per [facility] anticoagulation protocol    Belle Berg RN  1/20/2025  Anticoagulation Clinic  [employer] for routing messages: dewey SOARES  [facility] patient phone line: [phone]        _______________________________________________________________________     Anticoagulation Episode Summary       Current INR goal:  2.0-3.0   TTR:  69.7% (1 y)   Target end date:  Indefinite   Send INR reminders to:   [facility] [place]    Indications    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]  Long term current use of anticoagulant therapy [Z79.01]  Other pulmonary embolism without acute cor pulmonale  unspecified chronicity (H) [I26.99]  Chronic pulmonary embolism without acute cor pulmonale  unspecified pulmonary embolism type (H) [I27.82]             Comments:  --             Anticoagulation Care Providers       Provider Role Specialty Phone number    Chad Betts MD Referring Internal Medicine [phone]    Gerard Medrano MD Responsible Family Medicine [phone]

## 2025-01-20 NOTE — TELEPHONE ENCOUNTER
Results have been faxed.  Patient has been notified.    Belle Berg RN  [facility]

## 2025-01-27 DIAGNOSIS — Z96.652 S/P TOTAL KNEE REPLACEMENT USING CEMENT, LEFT: Primary | ICD-10-CM

## 2025-01-28 ASSESSMENT — KOOS JR
STRAIGHTENING KNEE FULLY: EXTREME
KOOS JR SCORING: 50.01
GOING UP OR DOWN STAIRS: MODERATE
TWISING OR PIVOTING ON KNEE: EXTREME
RISING FROM SITTING: MODERATE
STANDING UPRIGHT: MILD
HOW SEVERE IS YOUR KNEE STIFFNESS AFTER FIRST WAKING IN MORNING: MODERATE

## 2025-01-28 NOTE — PROGRESS NOTES
ORTHOPEDIC CONSULT      Chief Complaint: Tracy Palomo is a 84 year old female who is being seen for   Chief Complaint   Patient presents with    Left Knee - Follow Up     Left total knee arthroplasty 2/16/2021       History of Present Illness:   Here with her daughter today.  Complains of left lateral anterior and posterior knee pain.  Started mid January.  No specific injuries or traumas.  She stays active with walking.  The knee does not bother her when she walks.  It is worse with hip flexion and knee extension.  She has been taken some Tylenol.  No specific swelling that she has noticed.  No falls.    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     Past Medical History:   Diagnosis Date    Atrial fibrillation (H) 2014    related to colon surgery    Colon polyps     Diverticulosis of colon (without mention of hemorrhage)     Diverticulosis    DVT (deep vein thrombosis) in pregnancy 2014    after colon surgery    Other and unspecified hyperlipidemia     PE (pulmonary embolism) 2014    related to colon surgery    Unspecified essential hypertension        Past Surgical History:   Procedure Laterality Date    ARTHROPLASTY KNEE Right 1/8/2019    Procedure: Right total knee replacement;  Surgeon: Kaleb Pang DO;  Location:  OR    ARTHROPLASTY KNEE Left 2/16/2021    Procedure: left total knee replacement;  Surgeon: Kaleb Pang DO;  Location:  OR    COLONOSCOPY  09, 10, 12    [facility]    COLONOSCOPY N/A 12/11/2017    Procedure: COLONOSCOPY;  colonoscopy;  Surgeon: Elvis Quezada MD;  Location:  GI    EP ABLATION ATRIAL FLUTTER N/A 10/14/2024    Procedure: Ablation Atrial Flutter;  Surgeon: Rich Napier MD;  Location: [facility] CARDIAC CATH LAB    FLEXIBLE SIGMOIDOSCOPY  09, 11    LAPAROTOMY EXPLORATORY N/A 10/20/2014    Procedure:  LAPAROTOMY EXPLORATORY;  Surgeon: Miguel Oleary MD;  Location: [facility] OR    LAPAROTOMY, LYSIS ADHESIONS, COMBINED N/A 10/20/2014    Procedure: COMBINED LAPAROTOMY, LYSIS ADHESIONS;  Surgeon: Miguel Oleary MD;  [facility]: [facility]    OPEN REDUCTION INTERNAL FIXATION HIP BIPOLAR Left 3/16/2017    Procedure: OPEN REDUCTION INTERNAL FIXATION HIP BIPOLAR;  Surgeon: Kaleb Pang DO;  Location: [facility] OR    RELEASE TRIGGER FINGER Left 1/12/2021    Procedure: Left thumb trigger release;  Surgeon: Kaleb Pang DO;  [facility]: [facility]    RESECT SMALL BOWEL WITHOUT OSTOMY N/A 10/20/2014    Procedure: RESECT SMALL BOWEL WITHOUT OSTOMY;  Surgeon: Miguel Oleary MD;  Location: [facility]       Medications:  Current Outpatient Medications   Medication Sig Dispense Refill    acetaminophen (TYLENOL) 325 MG tablet Take 3 tablets (975 mg) by mouth every 8 hours as needed for pain 100 tablet 1    flecainide (TAMBOCOR) 50 MG tablet Take 2 tablets (100 mg) by mouth 2 times daily. 120 tablet 4    furosemide (LASIX) 20 MG tablet Take 1 tablet (20 mg) by mouth daily for 15 days. 15 tablet 0    lovastatin (MEVACOR) 40 MG tablet Take 1 tablet (40 mg) by mouth at bedtime 90 tablet 3    warfarin ANTICOAGULANT (COUMADIN) 2.5 MG tablet TAKE 1 AND 1/2 TABLETS BY MOUTH ON THURSDAYS AND 1 TABLET ALL OTHER DAYS 90 tablet 1     No current facility-administered medications for this visit.       Allergies   Allergen Reactions    No Known Allergies        Social History     Occupational History    Not on file   Tobacco Use    Smoking status: Never    Smokeless tobacco: Never   Substance and Sexual Activity    Alcohol use: No     Alcohol/week: 0.0 standard drinks of alcohol    Drug use: No    Sexual activity: Not Currently     Partners: Male       Family History   Problem Relation Age of Onset    Blood Disease No family hx of         blood clots       REVIEW OF SYSTEMS  10 point review systems performed otherwise negative as noted  as per history of present illness.    Physical Exam:  Vitals: BP (!) 170/80   Pulse 57   Temp 97.7  F (36.5  C)   Wt 84.8 kg (187 lb)   BMI 32.10 kg/m    BMI= Body mass index is 32.1 kg/m .  Constitutional: healthy, alert and no acute distress   Psychiatric: mentation appears normal and affect normal/bright  NEURO: no focal deficits  RESP: Normal with easy respirations and no use of accessory muscles to breathe, no audible wheezing or retractions  CV: LLE:  no edema         Regular rate and rhythm by palpation  SKIN: No erythema, rashes, excoriation, or breakdown. No evidence of infection.   JOINT/EXTREMITIES:left knee: Well-healed midline surgical incision.  No soft tissue or bursal swelling.  No effusion.  Active motion 0-115 degrees.  Patella tracks midline.  She has some tenderness over the distal lateral condyle/IT band area.  No posterior masses palpable.  No instability.     GAIT: not tested     Diagnostic Modalities:  Left knee x-rays: 3 views taken in the clinic weightbearing shows cemented total knee arthroplasty in place.  No evidence of hardware failure or lucency.  No evidence of position changes.  Patella not resurfaced  Independent visualization of the images was performed.      Impression: left total knee arthroplasty without patellar resurfacing  Left distal IT band syndrome/lateral bursitis    Plan:  All of the above pertinent physical exam and imaging modalities findings was reviewed with Tracy and her daughter.                                          INJECTION PROCEDURE:  The patient was counseled about an  injection, including discussion of risks (including infection), contents of the injection, rationale for performing the injection, and expected benefits of the injection. The skin was prepped with alcohol and betadine and then utilizing sterile technique an injection of the left distal IT band bursa along the lateral condyle of the femur was performed. The injection consisted 1ml of  Kenalog (40mg per 1 ml) mixed with 3ml of 0.5% Marcaine. The patient tolerated the injection well, and there were no complications. The injection site was covered with a Band-Aid. The injection was performed by BILLY Hernandez, CNP, DNP    She did report an immediate improvement in symptoms with the injection.     We reviewed her radiographs.  Unremarkable.  May have some symptoms related to her patellar not being resurfaced.  However appears more consistent with some soft tissue along the IT band and hamstrings.  Recommend the injection.  Recommend physical therapy.  She is agreeable to plan.    Return to clinic 4-6 , week(s), PRN, or sooner as needed for changes.  Re-x-ray on return: No    Vitlay Pang D.O.

## 2025-01-29 ENCOUNTER — OFFICE VISIT (OUTPATIENT)
Dept: ORTHOPEDICS | Facility: CLINIC | Age: 85
End: 2025-01-29
Payer: MEDICARE

## 2025-01-29 ENCOUNTER — ANCILLARY PROCEDURE (OUTPATIENT)
Dept: GENERAL RADIOLOGY | Facility: CLINIC | Age: 85
End: 2025-01-29
Attending: NURSE PRACTITIONER
Payer: MEDICARE

## 2025-01-29 VITALS
SYSTOLIC BLOOD PRESSURE: 170 MMHG | DIASTOLIC BLOOD PRESSURE: 80 MMHG | WEIGHT: 187 LBS | TEMPERATURE: 97.7 F | BODY MASS INDEX: 32.1 KG/M2 | HEART RATE: 57 BPM

## 2025-01-29 DIAGNOSIS — Z96.652 S/P TOTAL KNEE REPLACEMENT USING CEMENT, LEFT: ICD-10-CM

## 2025-01-29 DIAGNOSIS — Z96.652 S/P TOTAL KNEE REPLACEMENT USING CEMENT, LEFT: Primary | ICD-10-CM

## 2025-01-29 DIAGNOSIS — M76.32 IT BAND SYNDROME, LEFT: ICD-10-CM

## 2025-01-29 PROCEDURE — 20610 DRAIN/INJ JOINT/BURSA W/O US: CPT | Mod: LT | Performed by: NURSE PRACTITIONER

## 2025-01-29 PROCEDURE — 73562 X-RAY EXAM OF KNEE 3: CPT | Mod: TC | Performed by: RADIOLOGY

## 2025-01-29 PROCEDURE — 99213 OFFICE O/P EST LOW 20 MIN: CPT | Mod: 25 | Performed by: ORTHOPAEDIC SURGERY

## 2025-01-29 RX ORDER — TRIAMCINOLONE ACETONIDE 40 MG/ML
40 INJECTION, SUSPENSION INTRA-ARTICULAR; INTRAMUSCULAR
Status: COMPLETED | OUTPATIENT
Start: 2025-01-29 | End: 2025-01-29

## 2025-01-29 RX ORDER — BUPIVACAINE HYDROCHLORIDE 5 MG/ML
3 INJECTION, SOLUTION PERINEURAL
Status: COMPLETED | OUTPATIENT
Start: 2025-01-29 | End: 2025-01-29

## 2025-01-29 RX ADMIN — BUPIVACAINE HYDROCHLORIDE 3 ML: 5 INJECTION, SOLUTION PERINEURAL at 08:30

## 2025-01-29 RX ADMIN — TRIAMCINOLONE ACETONIDE 40 MG: 40 INJECTION, SUSPENSION INTRA-ARTICULAR; INTRAMUSCULAR at 08:30

## 2025-01-29 ASSESSMENT — PAIN SCALES - GENERAL: PAINLEVEL_OUTOF10: SEVERE PAIN (8)

## 2025-01-29 NOTE — LETTER
1/29/2025      Tracy Palomo  [address] [place] [id number]      Dear Colleague,    Thank you for referring your patient, Tracy Palomo, to the [facility]. Please see a copy of my visit note below.    ORTHOPEDIC CONSULT      Chief Complaint: Tracy Palomo is a 84 year old female who is being seen for   Chief Complaint   Patient presents with     Left Knee - Follow Up     Left total knee arthroplasty 2/16/2021       History of Present Illness:   Here with her daughter today.  Complains of left lateral anterior and posterior knee pain.  Started mid January.  No specific injuries or traumas.  She stays active with walking.  The knee does not bother her when she walks.  It is worse with hip flexion and knee extension.  She has been taken some Tylenol.  No specific swelling that she has noticed.  No falls.    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     Past Medical History:   Diagnosis Date     Atrial fibrillation (H) 2014    related to colon surgery     Colon polyps      Diverticulosis of colon (without mention of hemorrhage)     Diverticulosis     DVT (deep vein thrombosis) in pregnancy 2014    after colon surgery     Other and unspecified hyperlipidemia      PE (pulmonary embolism) 2014    related to colon surgery     Unspecified essential hypertension        Past Surgical History:   Procedure Laterality Date     ARTHROPLASTY KNEE Right 1/8/2019    Procedure: Right total knee replacement;  Surgeon: Kaleb Pang DO;  [facility]     ARTHROPLASTY KNEE Left 2/16/2021    Procedure: left total knee replacement;  Surgeon: Kaleb Pang DO;  [facility]     COLONOSCOPY  09, 10, 12    [facility]     COLONOSCOPY N/A 12/11/2017    Procedure: COLONOSCOPY;  colonoscopy;  Surgeon: Elvis Quezada MD;  Location:  [facility] GI     EP ABLATION ATRIAL FLUTTER N/A 10/14/2024    Procedure: Ablation Atrial Flutter;  Surgeon: Rich Napier MD;  Location:  HE[facility] CARDIAC CATH LAB     FLEXIBLE SIGMOIDOSCOPY  09, 11     LAPAROTOMY EXPLORATORY N/A 10/20/2014    Procedure: LAPAROTOMY EXPLORATORY;  Surgeon: Miguel Oleary MD;  [facility]     LAPAROTOMY, LYSIS ADHESIONS, COMBINED N/A 10/20/2014    Procedure: COMBINED LAPAROTOMY, LYSIS ADHESIONS;  Surgeon: Miguel Oleary MD;  [facility]     OPEN REDUCTION INTERNAL FIXATION HIP BIPOLAR Left 3/16/2017    Procedure: OPEN REDUCTION INTERNAL FIXATION HIP BIPOLAR;  Surgeon: Kaleb Pang DO;  [facility]     RELEASE TRIGGER FINGER Left 1/12/2021    Procedure: Left thumb trigger release;  Surgeon: Kaleb Pang DO;  [facility]     RESECT SMALL BOWEL WITHOUT OSTOMY N/A 10/20/2014    Procedure: RESECT SMALL BOWEL WITHOUT OSTOMY;  Surgeon: Miguel Oleary MD;  [facility]       Medications:  Current Outpatient Medications   Medication Sig Dispense Refill     acetaminophen (TYLENOL) 325 MG tablet Take 3 tablets (975 mg) by mouth every 8 hours as needed for pain 100 tablet 1     flecainide (TAMBOCOR) 50 MG tablet Take 2 tablets (100 mg) by mouth 2 times daily. 120 tablet 4     furosemide (LASIX) 20 MG tablet Take 1 tablet (20 mg) by mouth daily for 15 days. 15 tablet 0     lovastatin (MEVACOR) 40 MG tablet Take 1 tablet (40 mg) by mouth at bedtime 90 tablet 3     warfarin ANTICOAGULANT (COUMADIN) 2.5 MG tablet TAKE 1 AND 1/2 TABLETS BY MOUTH ON THURSDAYS AND 1 TABLET ALL OTHER DAYS 90 tablet 1     No current facility-administered medications for this visit.       Allergies   Allergen Reactions     No Known Allergies        Social History     Occupational History     Not on file   Tobacco Use     Smoking status: Never     Smokeless tobacco: Never   Substance and Sexual Activity     Alcohol use: No     Alcohol/week: 0.0 standard drinks of alcohol      Drug use: No     Sexual activity: Not Currently     Partners: Male       Family History   Problem Relation Age of Onset     Blood Disease No family hx of         blood clots       REVIEW OF SYSTEMS  10 point review systems performed otherwise negative as noted as per history of present illness.    Physical Exam:  Vitals: BP (!) 170/80   Pulse 57   Temp 97.7  F (36.5  C)   Wt 84.8 kg (187 lb)   BMI 32.10 kg/m    BMI= Body mass index is 32.1 kg/m .  Constitutional: healthy, alert and no acute distress   Psychiatric: mentation appears normal and affect normal/bright  NEURO: no focal deficits  RESP: Normal with easy respirations and no use of accessory muscles to breathe, no audible wheezing or retractions  CV: LLE:  no edema         Regular rate and rhythm by palpation  SKIN: No erythema, rashes, excoriation, or breakdown. No evidence of infection.   JOINT/EXTREMITIES:left knee: Well-healed midline surgical incision.  No soft tissue or bursal swelling.  No effusion.  Active motion 0-115 degrees.  Patella tracks midline.  She has some tenderness over the distal lateral condyle/IT band area.  No posterior masses palpable.  No instability.     GAIT: not tested     Diagnostic Modalities:  Left knee x-rays: 3 views taken in the clinic weightbearing shows cemented total knee arthroplasty in place.  No evidence of hardware failure or lucency.  No evidence of position changes.  Patella not resurfaced  Independent visualization of the images was performed.      Impression: left total knee arthroplasty without patellar resurfacing  Left distal IT band syndrome/lateral bursitis    Plan:  All of the above pertinent physical exam and imaging modalities findings was reviewed with Tracy and her daughter.                                          INJECTION PROCEDURE:  The patient was counseled about an  injection, including discussion of risks (including infection), contents of the injection, rationale for performing the  injection, and expected benefits of the injection. The skin was prepped with alcohol and betadine and then utilizing sterile technique an injection of the left distal IT band bursa along the lateral condyle of the femur was performed. The injection consisted 1ml of Kenalog (40mg per 1 ml) mixed with 3ml of 0.5% Marcaine. The patient tolerated the injection well, and there were no complications. The injection site was covered with a Band-Aid. The injection was performed by BILLY Hernandez, CNP, DNP    She did report an immediate improvement in symptoms with the injection.     We reviewed her radiographs.  Unremarkable.  May have some symptoms related to her patellar not being resurfaced.  However appears more consistent with some soft tissue along the IT band and hamstrings.  Recommend the injection.  Recommend physical therapy.  She is agreeable to plan.    Return to clinic 4-6 , week(s), PRN, or sooner as needed for changes.  Re-x-ray on return: Becky Pang D.O.    Left knee distal IT band steroid injection    Date/Time: 1/29/2025 8:30 AM    Performed by: Al Montiel APRN CNP  Authorized by: Al Montiel APRN CNP    Indications:  Pain  Needle Size:  25 G  Guidance: landmark guided    Approach:  Lateral  Approach comment:  To the distal IT band bursa      Medications:  40 mg triamcinolone 40 MG/ML; 3 mL BUPivacaine 0.5 %  Outcome:  Tolerated well, no immediate complications  Procedure discussed: discussed risks, benefits, and alternatives    Consent Given by:  Patient  Timeout: timeout called immediately prior to procedure    Prep: patient was prepped and draped in usual sterile fashion          Again, thank you for allowing me to participate in the care of your patient.        Sincerely,        Kaleb Pang, DO    Electronically signed

## 2025-01-29 NOTE — PROGRESS NOTES
Left knee distal IT band steroid injection    Date/Time: 1/29/2025 8:30 AM    Performed by: Al Montiel APRN CNP  Authorized by: Al Montiel APRN CNP    Indications:  Pain  Needle Size:  25 G  Guidance: landmark guided    Approach:  Lateral  Approach comment:  To the distal IT band bursa      Medications:  40 mg triamcinolone 40 MG/ML; 3 mL BUPivacaine 0.5 %  Outcome:  Tolerated well, no immediate complications  Procedure discussed: discussed risks, benefits, and alternatives    Consent Given by:  Patient  Timeout: timeout called immediately prior to procedure    Prep: patient was prepped and draped in usual sterile fashion

## 2025-02-05 ASSESSMENT — ACTIVITIES OF DAILY LIVING (ADL)
SIT WITH YOUR KNEE BENT: ACTIVITY IS NOT DIFFICULT
SWELLING: I DO NOT HAVE THE SYMPTOM
KNEEL ON THE FRONT OF YOUR KNEE: I AM UNABLE TO DO THE ACTIVITY
LIMPING: I HAVE THE SYMPTOM BUT IT DOES NOT AFFECT MY ACTIVITY
HOW_WOULD_YOU_RATE_THE_CURRENT_FUNCTION_OF_YOUR_KNEE_DURING_YOUR_USUAL_DAILY_ACTIVITIES_ON_A_SCALE_FROM_0_TO_100_WITH_100_BEING_YOUR_LEVEL_OF_KNEE_FUNCTION_PRIOR_TO_YOUR_INJURY_AND_0_BEING_THE_INABILITY_TO_PERFORM_ANY_OF_YOUR_USUAL_DAILY_ACTIVITIES?: 75
STIFFNESS: THE SYMPTOM AFFECTS MY ACTIVITY MODERATELY
GO DOWN STAIRS: ACTIVITY IS VERY DIFFICULT
WEAKNESS: I DO NOT HAVE THE SYMPTOM
STAND: ACTIVITY IS NOT DIFFICULT
PAIN: THE SYMPTOM AFFECTS MY ACTIVITY SLIGHTLY
AS_A_RESULT_OF_YOUR_KNEE_INJURY,_HOW_WOULD_YOU_RATE_YOUR_CURRENT_LEVEL_OF_DAILY_ACTIVITY?: NEARLY NORMAL
SIT WITH YOUR KNEE BENT: ACTIVITY IS NOT DIFFICULT
KNEEL ON THE FRONT OF YOUR KNEE: I AM UNABLE TO DO THE ACTIVITY
GO UP STAIRS: ACTIVITY IS VERY DIFFICULT
KNEE_ACTIVITY_OF_DAILY_LIVING_SCORE: 60
RISE FROM A CHAIR: ACTIVITY IS VERY DIFFICULT
GO UP STAIRS: ACTIVITY IS VERY DIFFICULT
SQUAT: I AM UNABLE TO DO THE ACTIVITY
KNEE_ACTIVITY_OF_DAILY_LIVING_SUM: 42
LIMPING: I HAVE THE SYMPTOM BUT IT DOES NOT AFFECT MY ACTIVITY
GIVING WAY, BUCKLING OR SHIFTING OF KNEE: I DO NOT HAVE THE SYMPTOM
WEAKNESS: I DO NOT HAVE THE SYMPTOM
STAND: ACTIVITY IS NOT DIFFICULT
SQUAT: I AM UNABLE TO DO THE ACTIVITY
PAIN: THE SYMPTOM AFFECTS MY ACTIVITY SLIGHTLY
RAW_SCORE: 42
RISE FROM A CHAIR: ACTIVITY IS VERY DIFFICULT
GIVING WAY, BUCKLING OR SHIFTING OF KNEE: I DO NOT HAVE THE SYMPTOM
STIFFNESS: THE SYMPTOM AFFECTS MY ACTIVITY MODERATELY
GO DOWN STAIRS: ACTIVITY IS VERY DIFFICULT
AS_A_RESULT_OF_YOUR_KNEE_INJURY,_HOW_WOULD_YOU_RATE_YOUR_CURRENT_LEVEL_OF_DAILY_ACTIVITY?: NEARLY NORMAL
PLEASE_INDICATE_YOR_PRIMARY_REASON_FOR_REFERRAL_TO_THERAPY:: KNEE
HOW_WOULD_YOU_RATE_THE_CURRENT_FUNCTION_OF_YOUR_KNEE_DURING_YOUR_USUAL_DAILY_ACTIVITIES_ON_A_SCALE_FROM_0_TO_100_WITH_100_BEING_YOUR_LEVEL_OF_KNEE_FUNCTION_PRIOR_TO_YOUR_INJURY_AND_0_BEING_THE_INABILITY_TO_PERFORM_ANY_OF_YOUR_USUAL_DAILY_ACTIVITIES?: 75
WALK: ACTIVITY IS NOT DIFFICULT
SWELLING: I DO NOT HAVE THE SYMPTOM
HOW_WOULD_YOU_RATE_THE_OVERALL_FUNCTION_OF_YOUR_KNEE_DURING_YOUR_USUAL_DAILY_ACTIVITIES?: NEARLY NORMAL
HOW_WOULD_YOU_RATE_THE_OVERALL_FUNCTION_OF_YOUR_KNEE_DURING_YOUR_USUAL_DAILY_ACTIVITIES?: NEARLY NORMAL
WALK: ACTIVITY IS NOT DIFFICULT

## 2025-02-06 ENCOUNTER — THERAPY VISIT (OUTPATIENT)
Dept: PHYSICAL THERAPY | Facility: CLINIC | Age: 85
End: 2025-02-06
Attending: ORTHOPAEDIC SURGERY
Payer: MEDICARE

## 2025-02-06 DIAGNOSIS — Z96.652 S/P TOTAL KNEE REPLACEMENT USING CEMENT, LEFT: ICD-10-CM

## 2025-02-06 DIAGNOSIS — M76.32 IT BAND SYNDROME, LEFT: ICD-10-CM

## 2025-02-06 PROCEDURE — 97161 PT EVAL LOW COMPLEX 20 MIN: CPT | Mod: GP

## 2025-02-06 PROCEDURE — 97110 THERAPEUTIC EXERCISES: CPT | Mod: GP

## 2025-02-06 NOTE — PROGRESS NOTES
PHYSICAL THERAPY EVALUATION  Type of Visit: Evaluation     Fall Risk Screen:  Fall screen completed by: PT  Have you fallen 2 or more times in the past year?: No  Have you fallen and had an injury in the past year?: No  Is patient a fall risk?: No    Subjective   Patient is a 84 year old female that presents to PT with Knee pain on the left LE. Patient had L TKA in 2021. Patient had cortisone shot in the IT band on 1/29/2025.  Pain started right before January without a known cause. Has been having some pain on the outside of the knee with movement. Gets the most pain with lifting leg and any extension of the knee. Goes for walks daily and likes to be on the move. Has been having difficulty with lifting leg up, standing up out of chair.     Past Medical History:   Diagnosis Date    Atrial fibrillation (H) 2014    related to colon surgery    Colon polyps     Diverticulosis of colon (without mention of hemorrhage)     Diverticulosis    DVT (deep vein thrombosis) in pregnancy 2014    after colon surgery    Other and unspecified hyperlipidemia     PE (pulmonary embolism) 2014    related to colon surgery    Unspecified essential hypertension          Presenting condition or subjective complaint: sitting on chair legs down I can hardly lift my leg off the floor   AND it hurts when i get upICANsitting position i can not lift my leg start out it also ashly ts me  Date of onset: 01/29/25    Relevant medical history: Arthritis   Dates & types of surgery: ayaka has all that information    Prior diagnostic imaging/testing results: X-ray; Bone scan     Prior therapy history for the same diagnosis, illness or injury: No      Prior Level of Function  Transfers: Independent  Ambulation: Independent  ADL: Independent      Living Environment  Social support: Alone   Type of home: House; 1 level; Basement   Stairs to enter the home: No       Ramp: Yes   Stairs inside the home: Yes 9 Is there a railing: Yes     Help at home: None;  Emergency call system  Equipment owned: Straight Cane; Walker; Walker with wheels; Crutches; Grab bars; Raised toilet seat; Lift chair     Employment: Not Applicable    Hobbies/Interests: puzzle read work in my yard run my   cut the lawn n go fishingf    Patient goals for therapy: yes    Pain assessment:      Patient states that pain at best is a 0/10 and at its worst is a 10/10. Patient states that the pain is primarily located in left knee. Aggravating factors are lifting leg, standing out of chair, and any rotation of the LE. Received a cortisone shot on 1/29/2025 which has not seemed to help as much. Hasn't been icing. Patient has trialled some tylenol.     Objective   KNEE EVALUATION  INTEGUMENTARY (edema, incisions): WNL  POSTURE: Standing Posture: Rounded shoulders  Sitting Posture: Rounded shoulders  GAIT:  Weightbearing Status: WBAT  Assistive Device(s): None  Gait Deviations:  Decreased knee extension and flexion on the L LE, Decreased step length, forward posture in trunk, forward head and forward shoulders.   BALANCE/PROPRIOCEPTION:  Deferred due to increased pain   ROM:   (Degrees) Left AROM Right AROM   Knee Flexion 100 WNL   Knee Extension Lacking 6 WNL     STRENGTH:   Pain: - none + mild ++ moderate +++ severe  Strength Scale: 0-5/5 Left Right   Knee Flexion 3 4   Knee Extension 3 4     SPECIAL TESTS:  Deferred due to pain   PALPATION:  Tenderness along the lateral side of the knee along the IT band on the Left LE       Assessment & Plan   CLINICAL IMPRESSIONS  Medical Diagnosis: S/P total knee replacement using cement, left,   It band syndrome, left    Treatment Diagnosis: S/P TKA on left LE and IT band syndrome with decreased range of motion, strength and gait impairments   Impression/Assessment: Patient is a 84 year old female with Left knee pain complaints.  The following significant findings have been identified: Pain, Decreased ROM/flexibility, Decreased joint mobility, Decreased  strength, Impaired balance, Impaired gait, Impaired muscle performance, and Decreased activity tolerance. These impairments interfere with their ability to perform recreational activities, household chores, household mobility, and community mobility as compared to previous level of function. Patient would benefit from skilled PT intervention to address range of motion and strength deficits to increase functional activity.     Clinical Decision Making (Complexity):  Clinical Presentation: Stable/Uncomplicated  Clinical Presentation Rationale: based on medical and personal factors listed in PT evaluation  Clinical Decision Making (Complexity): Low complexity    PLAN OF CARE  Treatment Interventions:  Modalities: Cryotherapy, E-stim, Hot Pack, Ultrasound  Interventions: Gait Training, Manual Therapy, Neuromuscular Re-education, Therapeutic Activity, Therapeutic Exercise    Long Term Goals     PT Goal 1  Goal Description: Patient will demonstrate independent compliance with HEP > 4 days per weeks to maximize outcomes for increased functional activity.  Target Date: 05/01/25  PT Goal 2  Goal Description: Patient will demonstrate >10 sit to stands with correct mechanics and report less than 4/10 pain  Rationale: to maximize safety and independence within the home;to maximize safety and independence within the community  Target Date: 03/20/25  PT Goal 3  Goal Description: Patient will demonstrate >115 degrees of knee flexion with less than 3/10 pain  Rationale: to maximize safety and independence with performance of ADLs and functional tasks;to maximize safety and independence within the home;to maximize safety and independence within the community  Target Date: 05/01/25      Frequency of Treatment: 1x per week  Duration of Treatment: 12 weeks    Recommended Referrals to Other Professionals:  None  Education Assessment:   Learner/Method: Patient;Listening;Reading;Demonstration;Pictures/Video    Risks and benefits of  evaluation/treatment have been explained.   Patient/Family/caregiver agrees with Plan of Care.     Evaluation Time:     PT Eval, Low Complexity Minutes (01296): 30       Signing Clinician: GUILLERMO Mcdaniel [facility]                                                                                   OUTPATIENT PHYSICAL THERAPY      PLAN OF TREATMENT FOR OUTPATIENT REHABILITATION   Patient's Last Name, First Name, Tracy Denson YOB: 1940   Provider's Name   [facility]   Medical Record No.  [id number]     Onset Date: 01/29/25  Start of Care Date: 02/06/25     Medical Diagnosis:  S/P total knee replacement using cement, left,   It band syndrome, left      PT Treatment Diagnosis:  S/P TKA on left LE and IT band syndrome with decreased range of motion, strength and gait impairments Plan of Treatment  Frequency/Duration: 1x per week/ 12 weeks    Certification date from 02/06/25 to 05/06/25         See note for plan of treatment details and functional goals     Radha Ledesma, PT                         I CERTIFY THE NEED FOR THESE SERVICES FURNISHED UNDER        THIS PLAN OF TREATMENT AND WHILE UNDER MY CARE     (Physician attestation of this document indicates review and certification of the therapy plan).              Referring Provider:  Kaleb Pang    Initial Assessment  See Epic Evaluation- Start of Care Date: 02/06/25

## 2025-02-10 ENCOUNTER — ANTICOAGULATION THERAPY VISIT (OUTPATIENT)
Dept: ANTICOAGULATION | Facility: CLINIC | Age: 85
End: 2025-02-10

## 2025-02-10 ENCOUNTER — LAB (OUTPATIENT)
Dept: LAB | Facility: CLINIC | Age: 85
End: 2025-02-10
Payer: MEDICARE

## 2025-02-10 DIAGNOSIS — I26.99 OTHER PULMONARY EMBOLISM WITHOUT ACUTE COR PULMONALE, UNSPECIFIED CHRONICITY (H): ICD-10-CM

## 2025-02-10 DIAGNOSIS — Z79.01 LONG TERM (CURRENT) USE OF ANTICOAGULANTS: ICD-10-CM

## 2025-02-10 DIAGNOSIS — I27.82 CHRONIC PULMONARY EMBOLISM WITHOUT ACUTE COR PULMONALE, UNSPECIFIED PULMONARY EMBOLISM TYPE (H): ICD-10-CM

## 2025-02-10 DIAGNOSIS — Z79.01 LONG TERM CURRENT USE OF ANTICOAGULANT THERAPY: Primary | ICD-10-CM

## 2025-02-10 LAB — INR BLD: 1.9 (ref 0.9–1.1)

## 2025-02-10 PROCEDURE — 36416 COLLJ CAPILLARY BLOOD SPEC: CPT

## 2025-02-10 PROCEDURE — 85610 PROTHROMBIN TIME: CPT | Mod: GZ

## 2025-02-10 NOTE — PROGRESS NOTES
ANTICOAGULATION MANAGEMENT     Tracy Palomo 84 year old female is on warfarin with subtherapeutic INR result. (Goal INR 2.0-3.0)    Recent labs: (last 7 days)     02/10/25  1116   INR 1.9*       ASSESSMENT     Source(s): Chart Review and Patient/Caregiver Call     Warfarin doses taken: Less warfarin taken than planned which may be affecting INR  Diet: No new diet changes identified  Medication/supplement changes: None noted  New illness, injury, or hospitalization: No  Signs or symptoms of bleeding or clotting: No  Previous result: Therapeutic last 2(+) visits  Additional findings: None       PLAN     Recommended plan for temporary change(s) affecting INR     Dosing Instructions: Continue your current warfarin dose with next INR in 2 weeks   continue dose as set     Summary  As of 2/10/2025      Full warfarin instructions:  3.75 mg every Wed; 2.5 mg all other days   Next INR check:  3/3/2025               Telephone call with Tracy who verbalizes understanding and agrees to plan    Lab visit scheduled    Education provided: Symptom monitoring: monitoring for clotting signs and symptoms and monitoring for stroke signs and symptoms    Plan made per [facility] anticoagulation protocol    Kaley Mcduffie RN  2/10/2025  Anticoagulation Clinic  [place] for routing messages: dewey SOARES  [facility] patient phone line: [phone]        _______________________________________________________________________     Anticoagulation Episode Summary       Current INR goal:  2.0-3.0   TTR:  67.1% (1 y)   Target end date:  Indefinite   Send INR reminders to:  GURVINDER SOARES    Indications    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]  Long term current use of anticoagulant therapy [Z79.01]  Other pulmonary embolism without acute cor pulmonale  unspecified chronicity (H) [I26.99]  Chronic pulmonary embolism without acute cor pulmonale  unspecified pulmonary embolism type (H) [I27.82]              Comments:  --             Anticoagulation Care Providers       Provider Role Specialty Phone number    Chad Betts MD Referring Internal Medicine [phone]    Gerard Medrano MD Responsible Family Medicine [phone]

## 2025-02-11 ENCOUNTER — THERAPY VISIT (OUTPATIENT)
Dept: PHYSICAL THERAPY | Facility: CLINIC | Age: 85
End: 2025-02-11
Attending: ORTHOPAEDIC SURGERY
Payer: MEDICARE

## 2025-02-11 DIAGNOSIS — Z96.652 S/P TOTAL KNEE REPLACEMENT USING CEMENT, LEFT: Primary | ICD-10-CM

## 2025-02-11 PROCEDURE — 97110 THERAPEUTIC EXERCISES: CPT | Mod: GP

## 2025-02-13 DIAGNOSIS — I27.82 CHRONIC PULMONARY EMBOLISM WITHOUT ACUTE COR PULMONALE, UNSPECIFIED PULMONARY EMBOLISM TYPE (H): ICD-10-CM

## 2025-02-13 DIAGNOSIS — I26.99 OTHER PULMONARY EMBOLISM WITHOUT ACUTE COR PULMONALE, UNSPECIFIED CHRONICITY (H): ICD-10-CM

## 2025-02-13 DIAGNOSIS — Z79.01 LONG TERM CURRENT USE OF ANTICOAGULANT THERAPY: ICD-10-CM

## 2025-02-13 RX ORDER — WARFARIN SODIUM 2.5 MG/1
TABLET ORAL
Qty: 100 TABLET | Refills: 1 | Status: SHIPPED | OUTPATIENT
Start: 2025-02-13

## 2025-02-13 NOTE — TELEPHONE ENCOUNTER
ANTICOAGULATION MANAGEMENT:  Medication Refill    Anticoagulation Summary  As of 2/10/2025      Warfarin maintenance plan:  3.75 mg (2.5 mg x 1.5) every Wed; 2.5 mg (2.5 mg x 1) all other days   Next INR check:  3/3/2025   Target end date:  Indefinite    Indications    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]  Long term current use of anticoagulant therapy [Z79.01]  Other pulmonary embolism without acute cor pulmonale  unspecified chronicity (H) [I26.99]  Chronic pulmonary embolism without acute cor pulmonale  unspecified pulmonary embolism type (H) [I27.82]                 Anticoagulation Care Providers       Provider Role Specialty Phone number    Chad Betts MD Referring Internal Medicine [phone]    Gerard Medrano MD Responsible Family Medicine [phone]            Refill Criteria    Visit with referring provider/group: Meets criteria: visit within referring provider group in the last 15 months on 9/17/24    [facility] referral last signed: 12/16/2024; within last year:  Yes    Lab monitoring is up to date (not exceeding 2 weeks overdue): Yes    Tracy meets all criteria for refill. Rx instructions and quantity match patient's current dosing plan. Warfarin 90 day supply with 1 refill granted per [facility] protocol     Belle Berg RN  Anticoagulation Clinic

## 2025-02-17 ENCOUNTER — THERAPY VISIT (OUTPATIENT)
Dept: PHYSICAL THERAPY | Facility: CLINIC | Age: 85
End: 2025-02-17
Attending: ORTHOPAEDIC SURGERY
Payer: MEDICARE

## 2025-02-17 DIAGNOSIS — M25.669 STIFFNESS OF KNEE JOINT, UNSPECIFIED LATERALITY: Primary | ICD-10-CM

## 2025-02-17 PROCEDURE — 97110 THERAPEUTIC EXERCISES: CPT | Mod: GP | Performed by: PHYSICAL THERAPIST

## 2025-02-24 ENCOUNTER — LAB (OUTPATIENT)
Dept: LAB | Facility: CLINIC | Age: 85
End: 2025-02-24
Payer: MEDICARE

## 2025-02-24 ENCOUNTER — ANTICOAGULATION THERAPY VISIT (OUTPATIENT)
Dept: ANTICOAGULATION | Facility: CLINIC | Age: 85
End: 2025-02-24

## 2025-02-24 DIAGNOSIS — I27.82 CHRONIC PULMONARY EMBOLISM WITHOUT ACUTE COR PULMONALE, UNSPECIFIED PULMONARY EMBOLISM TYPE (H): ICD-10-CM

## 2025-02-24 DIAGNOSIS — I27.82 CHRONIC PULMONARY EMBOLISM WITHOUT ACUTE COR PULMONALE, UNSPECIFIED PULMONARY EMBOLISM TYPE (H): Primary | ICD-10-CM

## 2025-02-24 DIAGNOSIS — I26.99 OTHER PULMONARY EMBOLISM WITHOUT ACUTE COR PULMONALE, UNSPECIFIED CHRONICITY (H): ICD-10-CM

## 2025-02-24 DIAGNOSIS — Z79.01 LONG TERM CURRENT USE OF ANTICOAGULANT THERAPY: ICD-10-CM

## 2025-02-24 DIAGNOSIS — Z79.01 LONG TERM (CURRENT) USE OF ANTICOAGULANTS: ICD-10-CM

## 2025-02-24 LAB — INR BLD: 2.1 (ref 0.9–1.1)

## 2025-02-24 PROCEDURE — 85610 PROTHROMBIN TIME: CPT

## 2025-02-24 PROCEDURE — 36416 COLLJ CAPILLARY BLOOD SPEC: CPT

## 2025-02-24 NOTE — PROGRESS NOTES
ANTICOAGULATION MANAGEMENT     Tracy Palomo 84 year old female is on warfarin with therapeutic INR result. (Goal INR 2.0-3.0)    Recent labs: (last 7 days)     02/24/25  1049   INR 2.1*       ASSESSMENT     Source(s): Chart Review and Patient/Caregiver Call     Warfarin doses taken: Warfarin taken as instructed  Diet: No new diet changes identified  Medication/supplement changes: None noted  New illness, injury, or hospitalization: No  Signs or symptoms of bleeding or clotting: No  Previous result: Subtherapeutic  Additional findings: None       PLAN     Recommended plan for no diet, medication or health factor changes affecting INR     Dosing Instructions: Continue your current warfarin dose with next INR in 2 weeks       Summary  As of 2/24/2025      Full warfarin instructions:  3.75 mg every Wed; 2.5 mg all other days   Next INR check:  3/10/2025               Telephone call with Tracy who verbalizes understanding and agrees to plan and who agrees to plan and repeated back plan correctly    Lab visit scheduled    Education provided: None required    Plan made per [facility] anticoagulation protocol    Radha Rodriguez RN  2/24/2025  Anticoagulation Clinic  [employer] for routing messages: dewey SOARES  [facility] patient phone line: [phone]        _______________________________________________________________________     Anticoagulation Episode Summary       Current INR goal:  2.0-3.0   TTR:  65.7% (1 y)   Target end date:  Indefinite   Send INR reminders to:  GURVINDER SOARES    Indications    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]  Long term current use of anticoagulant therapy [Z79.01]  Other pulmonary embolism without acute cor pulmonale  unspecified chronicity (H) [I26.99]  Chronic pulmonary embolism without acute cor pulmonale  unspecified pulmonary embolism type (H) [I27.82]             Comments:  --             Anticoagulation Care Providers       Provider Role  Specialty Phone number    Chad Betts MD Referring Internal Medicine [phone]    Gerard Medrano MD Responsible Family Medicine [phone]

## 2025-02-27 ENCOUNTER — THERAPY VISIT (OUTPATIENT)
Dept: PHYSICAL THERAPY | Facility: CLINIC | Age: 85
End: 2025-02-27
Attending: ORTHOPAEDIC SURGERY
Payer: MEDICARE

## 2025-02-27 DIAGNOSIS — M25.669 STIFFNESS OF KNEE JOINT: Primary | ICD-10-CM

## 2025-02-27 PROCEDURE — 97110 THERAPEUTIC EXERCISES: CPT | Mod: GP | Performed by: PHYSICAL THERAPIST

## 2025-03-06 ENCOUNTER — THERAPY VISIT (OUTPATIENT)
Dept: PHYSICAL THERAPY | Facility: CLINIC | Age: 85
End: 2025-03-06
Attending: ORTHOPAEDIC SURGERY
Payer: MEDICARE

## 2025-03-06 DIAGNOSIS — R29.898 LEFT LEG WEAKNESS: Primary | ICD-10-CM

## 2025-03-06 PROCEDURE — 97110 THERAPEUTIC EXERCISES: CPT | Mod: GP | Performed by: PHYSICAL THERAPIST

## 2025-03-10 ENCOUNTER — ANTICOAGULATION THERAPY VISIT (OUTPATIENT)
Dept: ANTICOAGULATION | Facility: CLINIC | Age: 85
End: 2025-03-10

## 2025-03-10 ENCOUNTER — LAB (OUTPATIENT)
Dept: LAB | Facility: CLINIC | Age: 85
End: 2025-03-10
Payer: MEDICARE

## 2025-03-10 DIAGNOSIS — I26.99 OTHER PULMONARY EMBOLISM WITHOUT ACUTE COR PULMONALE, UNSPECIFIED CHRONICITY (H): ICD-10-CM

## 2025-03-10 DIAGNOSIS — I27.82 CHRONIC PULMONARY EMBOLISM WITHOUT ACUTE COR PULMONALE, UNSPECIFIED PULMONARY EMBOLISM TYPE (H): Primary | ICD-10-CM

## 2025-03-10 DIAGNOSIS — I27.82 CHRONIC PULMONARY EMBOLISM WITHOUT ACUTE COR PULMONALE, UNSPECIFIED PULMONARY EMBOLISM TYPE (H): ICD-10-CM

## 2025-03-10 DIAGNOSIS — Z79.01 LONG TERM (CURRENT) USE OF ANTICOAGULANTS: ICD-10-CM

## 2025-03-10 DIAGNOSIS — Z79.01 LONG TERM CURRENT USE OF ANTICOAGULANT THERAPY: ICD-10-CM

## 2025-03-10 LAB — INR BLD: 1.9 (ref 0.9–1.1)

## 2025-03-10 PROCEDURE — 36416 COLLJ CAPILLARY BLOOD SPEC: CPT

## 2025-03-10 PROCEDURE — 85610 PROTHROMBIN TIME: CPT

## 2025-03-10 NOTE — PROGRESS NOTES
ANTICOAGULATION MANAGEMENT     Tracy Palomo 84 year old female is on warfarin with subtherapeutic INR result. (Goal INR 2.0-3.0)    Recent labs: (last 7 days)     03/10/25  1043   INR 1.9*       ASSESSMENT     Source(s): Chart Review and Patient/Caregiver Call     Warfarin doses taken: Missed dose(s) may be affecting INR  Diet: No new diet changes identified  Medication/supplement changes: None noted  New illness, injury, or hospitalization: No  Signs or symptoms of bleeding or clotting: No  Previous result: Therapeutic last visit; previously outside of goal range  Additional findings: None       PLAN     Recommended plan for temporary change(s) affecting INR     Dosing Instructions: booster dose then continue your current warfarin dose with next INR in 10 days       Summary  As of 3/10/2025      Full warfarin instructions:  3/10: 3.75 mg; Otherwise 3.75 mg every Wed; 2.5 mg all other days   Next INR check:  3/20/2025               Telephone call with Tracy who verbalizes understanding and agrees to plan and who agrees to plan and repeated back plan correctly    Lab visit scheduled    Education provided: None required    Plan made per [facility] anticoagulation protocol    Radha Rodriguez RN  3/10/2025  Anticoagulation Clinic  [employer] for routing messages: dewey SOARES  [facility] patient phone line: [phone]        _______________________________________________________________________     Anticoagulation Episode Summary       Current INR goal:  2.0-3.0   TTR:  63.8% (1 y)   Target end date:  Indefinite   Send INR reminders to:  GURVINDER SOARES    Indications    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]  Long term current use of anticoagulant therapy [Z79.01]  Other pulmonary embolism without acute cor pulmonale  unspecified chronicity (H) [I26.99]  Chronic pulmonary embolism without acute cor pulmonale  unspecified pulmonary embolism type (H) [I27.82]             Comments:  --              Anticoagulation Care Providers       Provider Role Specialty Phone number    Chad Betts MD Referring Internal Medicine [phone]    Gerard Medrano MD Responsible Family Medicine [phone]

## 2025-03-13 ENCOUNTER — THERAPY VISIT (OUTPATIENT)
Dept: PHYSICAL THERAPY | Facility: CLINIC | Age: 85
End: 2025-03-13
Attending: ORTHOPAEDIC SURGERY
Payer: MEDICARE

## 2025-03-13 DIAGNOSIS — M25.669 KNEE STIFFNESS, UNSPECIFIED LATERALITY: Primary | ICD-10-CM

## 2025-03-13 PROCEDURE — 97110 THERAPEUTIC EXERCISES: CPT | Mod: GP | Performed by: PHYSICAL THERAPIST

## 2025-03-18 ENCOUNTER — LAB (OUTPATIENT)
Dept: LAB | Facility: CLINIC | Age: 85
End: 2025-03-18
Payer: MEDICARE

## 2025-03-18 ENCOUNTER — ANTICOAGULATION THERAPY VISIT (OUTPATIENT)
Dept: ANTICOAGULATION | Facility: CLINIC | Age: 85
End: 2025-03-18

## 2025-03-18 DIAGNOSIS — I27.82 CHRONIC PULMONARY EMBOLISM WITHOUT ACUTE COR PULMONALE, UNSPECIFIED PULMONARY EMBOLISM TYPE (H): ICD-10-CM

## 2025-03-18 DIAGNOSIS — Z79.01 LONG TERM CURRENT USE OF ANTICOAGULANT THERAPY: ICD-10-CM

## 2025-03-18 DIAGNOSIS — I26.99 OTHER PULMONARY EMBOLISM WITHOUT ACUTE COR PULMONALE, UNSPECIFIED CHRONICITY (H): ICD-10-CM

## 2025-03-18 DIAGNOSIS — Z79.01 LONG TERM (CURRENT) USE OF ANTICOAGULANTS: ICD-10-CM

## 2025-03-18 DIAGNOSIS — I27.82 CHRONIC PULMONARY EMBOLISM WITHOUT ACUTE COR PULMONALE, UNSPECIFIED PULMONARY EMBOLISM TYPE (H): Primary | ICD-10-CM

## 2025-03-18 LAB — INR BLD: 2 (ref 0.9–1.1)

## 2025-03-18 PROCEDURE — 99207 INR POINT OF CARE: CPT

## 2025-03-18 PROCEDURE — 36416 COLLJ CAPILLARY BLOOD SPEC: CPT

## 2025-03-18 NOTE — PROGRESS NOTES
ANTICOAGULATION MANAGEMENT     Tracy Palomo 84 year old female is on warfarin with therapeutic INR result. (Goal INR 2.0-3.0)    Recent labs: (last 7 days)     03/18/25  1409   INR 2.0*       ASSESSMENT     Source(s): Chart Review and Patient/Caregiver Call     Warfarin doses taken: Warfarin taken as instructed  Diet: No new diet changes identified  Medication/supplement changes: None noted  New illness, injury, or hospitalization: No  Signs or symptoms of bleeding or clotting: No  Previous result: Subtherapeutic  Additional findings: None       PLAN     Recommended plan for no diet, medication or health factor changes affecting INR     Dosing Instructions: Continue your current warfarin dose with next INR in 2 weeks       Summary  As of 3/18/2025      Full warfarin instructions:  3.75 mg every Wed; 2.5 mg all other days   Next INR check:  4/1/2025               Telephone call with Tracy who verbalizes understanding and agrees to plan    Lab visit scheduled    Education provided: Contact [phone] with any changes, questions or concerns.     Plan made per [facility] anticoagulation protocol    Belle Berg RN  3/18/2025  Anticoagulation Clinic  [place] for routing messages: dewey SOARES  [facility] patient phone line: [phone]        _______________________________________________________________________     Anticoagulation Episode Summary       Current INR goal:  2.0-3.0   TTR:  63.3% (1 y)   Target end date:  Indefinite   Send INR reminders to:  GURVINDER SOARES    Indications    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]  Long term current use of anticoagulant therapy [Z79.01]  Other pulmonary embolism without acute cor pulmonale  unspecified chronicity (H) [I26.99]  Chronic pulmonary embolism without acute cor pulmonale  unspecified pulmonary embolism type (H) [I27.82]             Comments:  --             Anticoagulation Care Providers       Provider Role Specialty  Phone number    Chad Betts MD Referring Internal Medicine [phone]    Gerard Medrano MD Responsible Family Medicine [phone]

## 2025-03-24 ENCOUNTER — THERAPY VISIT (OUTPATIENT)
Dept: PHYSICAL THERAPY | Facility: CLINIC | Age: 85
End: 2025-03-24
Attending: ORTHOPAEDIC SURGERY
Payer: MEDICARE

## 2025-03-24 DIAGNOSIS — R29.898 LEFT LEG WEAKNESS: Primary | ICD-10-CM

## 2025-03-24 PROCEDURE — 97110 THERAPEUTIC EXERCISES: CPT | Mod: GP | Performed by: PHYSICAL THERAPIST

## 2025-04-01 ENCOUNTER — LAB (OUTPATIENT)
Dept: LAB | Facility: CLINIC | Age: 85
End: 2025-04-01
Payer: MEDICARE

## 2025-04-01 ENCOUNTER — ANTICOAGULATION THERAPY VISIT (OUTPATIENT)
Dept: ANTICOAGULATION | Facility: CLINIC | Age: 85
End: 2025-04-01

## 2025-04-01 DIAGNOSIS — Z79.01 LONG TERM CURRENT USE OF ANTICOAGULANT THERAPY: ICD-10-CM

## 2025-04-01 DIAGNOSIS — I27.82 CHRONIC PULMONARY EMBOLISM WITHOUT ACUTE COR PULMONALE, UNSPECIFIED PULMONARY EMBOLISM TYPE (H): Primary | ICD-10-CM

## 2025-04-01 DIAGNOSIS — I26.99 OTHER PULMONARY EMBOLISM WITHOUT ACUTE COR PULMONALE, UNSPECIFIED CHRONICITY (H): ICD-10-CM

## 2025-04-01 DIAGNOSIS — I27.82 CHRONIC PULMONARY EMBOLISM WITHOUT ACUTE COR PULMONALE, UNSPECIFIED PULMONARY EMBOLISM TYPE (H): ICD-10-CM

## 2025-04-01 DIAGNOSIS — Z79.01 LONG TERM (CURRENT) USE OF ANTICOAGULANTS: ICD-10-CM

## 2025-04-01 LAB — INR BLD: 1.7 (ref 0.9–1.1)

## 2025-04-01 PROCEDURE — 36416 COLLJ CAPILLARY BLOOD SPEC: CPT

## 2025-04-01 PROCEDURE — 85610 PROTHROMBIN TIME: CPT

## 2025-04-01 NOTE — PROGRESS NOTES
ANTICOAGULATION MANAGEMENT     Tracy Palomo 84 year old female is on warfarin with subtherapeutic INR result. (Goal INR 2.0-3.0)    Recent labs: (last 7 days)     04/01/25  0954   INR 1.7*       ASSESSMENT     Warfarin Lab Questionnaire    Warfarin Doses Last 7 Days      4/1/2025     9:50 AM   Dose in Tablet or Mg   TAB or MG? tablet (tab)           4/1/2025   Warfarin Lab Questionnaire   Missed doses within past 14 days? No   Changes in diet or alcohol within past 14 days? No-- after talking with patient did have greens this past week and would like to start with salads 2-3 times a week    Medication changes since last result? No   Injuries or illness since last result? No   New shortness of breath, severe headaches or sudden changes in vision since last result? No   Abnormal bleeding since last result? No   Upcoming surgery, procedure? No   Best number to call with results? [phone]     Previous result: Therapeutic last visit; previously outside of goal range  Additional findings: None       PLAN     Recommended plan for ongoing change(s) affecting INR     Dosing Instructions: Increase your warfarin dose (7% change) with next INR in 2 weeks       Summary  As of 4/1/2025      Full warfarin instructions:  3.75 mg every Wed, Sat; 2.5 mg all other days   Next INR check:  4/15/2025               Telephone call with Tracy who verbalizes understanding and agrees to plan    Lab visit scheduled    Education provided: Goal range and lab monitoring: goal range and significance of current result  Dietary considerations: importance of consistent vitamin K intake  Contact [phone] with any changes, questions or concerns.     Plan made per [facility] anticoagulation protocol    Belle Berg, RN  4/1/2025  Anticoagulation Clinic  [employer] for routing messages: dewey SAORES  [facility] patient phone line: [phone]        _______________________________________________________________________     Anticoagulation  Episode Summary       Current INR goal:  2.0-3.0   TTR:  62.6% (1 y)   Target end date:  Indefinite   Send INR reminders to:  GURVINDER SOARES    Indications    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]  Long term current use of anticoagulant therapy [Z79.01]  Other pulmonary embolism without acute cor pulmonale  unspecified chronicity (H) [I26.99]  Chronic pulmonary embolism without acute cor pulmonale  unspecified pulmonary embolism type (H) [I27.82]             Comments:  --             Anticoagulation Care Providers       Provider Role Specialty Phone number    Cahd Betts MD Referring Internal Medicine [phone]    Gerard Medrano MD Responsible Family Medicine [phone]

## 2025-04-07 ENCOUNTER — THERAPY VISIT (OUTPATIENT)
Dept: PHYSICAL THERAPY | Facility: CLINIC | Age: 85
End: 2025-04-07
Attending: ORTHOPAEDIC SURGERY
Payer: MEDICARE

## 2025-04-07 DIAGNOSIS — R29.898 LEFT LEG WEAKNESS: Primary | ICD-10-CM

## 2025-04-07 DIAGNOSIS — M25.669 STIFFNESS OF KNEE JOINT, UNSPECIFIED LATERALITY: ICD-10-CM

## 2025-04-07 DIAGNOSIS — E78.5 HYPERLIPIDEMIA LDL GOAL <130: ICD-10-CM

## 2025-04-07 PROCEDURE — 97110 THERAPEUTIC EXERCISES: CPT | Mod: GP | Performed by: PHYSICAL THERAPIST

## 2025-04-07 RX ORDER — LOVASTATIN 40 MG/1
40 TABLET ORAL
Qty: 90 TABLET | Refills: 3 | Status: SHIPPED | OUTPATIENT
Start: 2025-04-07

## 2025-04-07 NOTE — PROGRESS NOTES
[facility]                                                                                   OUTPATIENT PHYSICAL THERAPY    PLAN OF TREATMENT FOR OUTPATIENT REHABILITATION   Patient's Last Name, First Name, Tracy Denson YOB: 1940   Provider's Name   [facility]   Medical Record No.  [id number]     Onset Date: 01/29/25  Start of Care Date: 02/06/25     Medical Diagnosis:  S/P total knee replacement using cement, left,   It band syndrome, left      PT Treatment Diagnosis:  S/P TKA on left LE and IT band syndrome with decreased range of motion, strength and gait impairments Plan of Treatment  Frequency/Duration: 1-2 sessions/ 3 weeks    Certification date from 04/07/25 to 04/28/25         See note for plan of treatment details and functional goals     Carmen Cooper PT                         I CERTIFY THE NEED FOR THESE SERVICES FURNISHED UNDER        THIS PLAN OF TREATMENT AND WHILE UNDER MY CARE     (Physician attestation of this document indicates review and certification of the therapy plan).              Referring Provider:  Kaleb Pang DO    Initial Assessment  See Epic Evaluation- Start of Care Date: 02/06/25 04/07/25 0500   Appointment Info   Signing clinician's name / credentials Carmen Cooper PT   Total/Authorized Visits Medicare   Visits Used 8   Medical Diagnosis S/P total knee replacement using cement, left,   It band syndrome, left   PT Tx Diagnosis S/P TKA on left LE and IT band syndrome with decreased range of motion, strength and gait impairments   Progress Note/Certification   Start of Care Date 02/06/25   Onset of illness/injury or Date of Surgery 01/29/25   Therapy Frequency 1-2 sessions   Predicted Duration 3 weeks   Certification date from 04/07/25   Certification date to 04/28/25   Progress Note Completed Date 04/07/25   GOALS   PT Goals 2;3   PT Goal 1   Goal Description Patient  "will demonstrate independent compliance with HEP > 4 days per weeks to maximize outcomes for increased functional activity.   Target Date 05/01/25   PT Goal 2   Goal Description Patient will demonstrate >10 sit to stands with correct mechanics and report less than 4/10 pain   Rationale to maximize safety and independence within the home;to maximize safety and independence within the community   Goal Progress having pain today so did not do as well. Her symptoms are in her L lateral knee   Target Date 04/30/25   PT Goal 3   Goal Identifier AROM   Goal Description Patient will demonstrate >115 degrees of knee flexion with less than 3/10 pain   Rationale to maximize safety and independence with performance of ADLs and functional tasks;to maximize safety and independence within the home;to maximize safety and independence within the community   Goal Progress Progessing   Target Date 05/01/25   Subjective Report   Subjective Report likes the macro- and microcycle design for her home program. She is not running at this time but rather working on fast paced walking. continues to have L knee pain with sit to stand. symptoms are lateral aspect of her knee.   Treatment Interventions (PT)   Interventions Therapeutic Activity;Manual Therapy   Therapeutic Procedure/Exercise   Therapeutic Procedures: strength, endurance, ROM, flexibility minutes (85011) 40   Ther Proc 1 - Details Reviewed her macrocycle program and she has no questions. Worked on sit to stand at different table hts x total of 20 reps reviewing varipous ways to complete the standing with different hand placements and proper muscle engagement. Added hip adductor strengtheng with band 2-3 x red band x 20 daily to every other day with demonstration. TO calm L knee symptoms, PT stabilized patella with success. Instruction for home in 1 - 3 squares \"I\" for mechanical medial patellar glide with success to decrease symptoms and allow her to be more active. (after manual " therapy)   Patient Response/Progress no questions, no knee symptoms with the squats having had tape and manual therapy   Ther Proc 1 Exercises   Manual Therapy   Manual Therapy: Mobilization, MFR, MLD, friction massage minutes (14019) 10   Manual Therapy 1 manual therapy with home instruction   Manual Therapy 1 - Details seated: superior pouch mobilization followed by inferior patellar glide, education for home   Skilled Intervention decrease tightness and improve patellar mobility   Patient Response/Progress felt warmth and still pain with standing from sit but better   Education   Learner/Method Patient;Listening;Reading;Demonstration;No Barriers to Learning;Pictures/Video   Education Comments hip adduction standing wtih band, self mobilization of patella and superior pouch release, plan of care, goals addressed   Plan   Home program Supine SAQ x 10, Supine Quad sets x 10 with 3-5 second holds, Supine SLR x 10, gluteal sets in sitting, gluteal sets, nerve slide seated/sciatic, hip ER, race walking safely, hip adduction standing wtih band, self mobilization of patella and superior pouch release   Updates to plan of care 1-2 times over next 3 weeks   Comments   Comments Tracy is very compliant and today had nice decrease in her knee pain with the taping technique. She has weakness through adductors and quads at end range. Able to eliminate symptoms with nthe taping technique as noted above. Plan to see her 1-2 more sessions over the next 2 weeks to address questions and advance her program.   Total Session Time   Timed Code Treatment Minutes 50   Total Treatment Time (sum of timed and untimed services) 50

## 2025-04-15 ENCOUNTER — ANTICOAGULATION THERAPY VISIT (OUTPATIENT)
Dept: ANTICOAGULATION | Facility: CLINIC | Age: 85
End: 2025-04-15

## 2025-04-15 ENCOUNTER — LAB (OUTPATIENT)
Dept: LAB | Facility: CLINIC | Age: 85
End: 2025-04-15
Payer: MEDICARE

## 2025-04-15 DIAGNOSIS — Z79.01 LONG TERM CURRENT USE OF ANTICOAGULANT THERAPY: ICD-10-CM

## 2025-04-15 DIAGNOSIS — I26.99 OTHER PULMONARY EMBOLISM WITHOUT ACUTE COR PULMONALE, UNSPECIFIED CHRONICITY (H): ICD-10-CM

## 2025-04-15 DIAGNOSIS — I26.99 PULMONARY EMBOLI (H): Primary | ICD-10-CM

## 2025-04-15 DIAGNOSIS — Z79.01 LONG TERM (CURRENT) USE OF ANTICOAGULANTS: ICD-10-CM

## 2025-04-15 DIAGNOSIS — I27.82 CHRONIC PULMONARY EMBOLISM WITHOUT ACUTE COR PULMONALE, UNSPECIFIED PULMONARY EMBOLISM TYPE (H): ICD-10-CM

## 2025-04-15 LAB — INR BLD: 2.3 (ref 0.9–1.1)

## 2025-04-15 PROCEDURE — 85610 PROTHROMBIN TIME: CPT

## 2025-04-15 PROCEDURE — 36416 COLLJ CAPILLARY BLOOD SPEC: CPT

## 2025-04-15 NOTE — PROGRESS NOTES
ANTICOAGULATION MANAGEMENT     Tracy Palomo 84 year old female is on warfarin with therapeutic INR result. (Goal INR 2.0-3.0)    Recent labs: (last 7 days)     04/15/25  1008   INR 2.3*       ASSESSMENT     Source(s): Chart Review and Patient/Caregiver Call     Warfarin doses taken: Warfarin taken as instructed  Diet: No new diet changes identified  Medication/supplement changes: None noted  New illness, injury, or hospitalization: No  Signs or symptoms of bleeding or clotting: No  Previous result: Subtherapeutic  Additional findings: None       PLAN     Recommended plan for no diet, medication or health factor changes affecting INR     Dosing Instructions: Continue your current warfarin dose with next INR in 3 weeks       Summary  As of 4/15/2025      Full warfarin instructions:  3.75 mg every Wed, Sat; 2.5 mg all other days   Next INR check:  5/6/2025               Telephone call with Tracy who verbalizes understanding and agrees to plan    Lab visit scheduled    Education provided: None required  Please call back if any changes to your diet, medications or how you've been taking warfarin    Plan made per [facility] anticoagulation protocol    Kaley Mcduffie RN  4/15/2025  Anticoagulation Clinic  [employer] for routing messages: dewey SOARES  [facility] patient phone line: [phone]        _______________________________________________________________________     Anticoagulation Episode Summary       Current INR goal:  2.0-3.0   TTR:  60.7% (1 y)   Target end date:  Indefinite   Send INR reminders to:  GURVINDER SOARES    Indications    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]  Long term current use of anticoagulant therapy [Z79.01]  Other pulmonary embolism without acute cor pulmonale  unspecified chronicity (H) [I26.99]  Chronic pulmonary embolism without acute cor pulmonale  unspecified pulmonary embolism type (H) [I27.82]             Comments:  --              Anticoagulation Care Providers       Provider Role Specialty Phone number    Chad Betts MD Referring Internal Medicine [phone]    Gerard Medrano MD Responsible Family Medicine [phone]

## 2025-04-22 ENCOUNTER — THERAPY VISIT (OUTPATIENT)
Dept: PHYSICAL THERAPY | Facility: CLINIC | Age: 85
End: 2025-04-22
Attending: ORTHOPAEDIC SURGERY
Payer: MEDICARE

## 2025-04-22 DIAGNOSIS — M76.32 IT BAND SYNDROME, LEFT: ICD-10-CM

## 2025-04-22 DIAGNOSIS — R29.898 LEFT LEG WEAKNESS: Primary | ICD-10-CM

## 2025-04-22 PROCEDURE — 97110 THERAPEUTIC EXERCISES: CPT | Mod: GP | Performed by: PHYSICAL THERAPIST

## 2025-04-23 ENCOUNTER — ALLIED HEALTH/NURSE VISIT (OUTPATIENT)
Dept: CARDIOLOGY | Facility: CLINIC | Age: 85
End: 2025-04-23
Attending: NURSE PRACTITIONER
Payer: MEDICARE

## 2025-04-23 VITALS
OXYGEN SATURATION: 96 % | WEIGHT: 187 LBS | HEART RATE: 52 BPM | SYSTOLIC BLOOD PRESSURE: 136 MMHG | HEIGHT: 64 IN | DIASTOLIC BLOOD PRESSURE: 84 MMHG | BODY MASS INDEX: 31.92 KG/M2

## 2025-04-23 DIAGNOSIS — I49.3 PVC'S (PREMATURE VENTRICULAR CONTRACTIONS): ICD-10-CM

## 2025-04-23 DIAGNOSIS — R00.1 BRADYCARDIA: ICD-10-CM

## 2025-04-23 DIAGNOSIS — Z79.899 ENCOUNTER FOR MONITORING FLECAINIDE THERAPY: ICD-10-CM

## 2025-04-23 DIAGNOSIS — I48.91 ATRIAL FIBRILLATION WITH RAPID VENTRICULAR RESPONSE (H): ICD-10-CM

## 2025-04-23 DIAGNOSIS — I48.91 ATRIAL FIBRILLATION WITH RAPID VENTRICULAR RESPONSE (H): Primary | ICD-10-CM

## 2025-04-23 DIAGNOSIS — Z51.81 ENCOUNTER FOR MONITORING FLECAINIDE THERAPY: ICD-10-CM

## 2025-04-23 PROCEDURE — 3075F SYST BP GE 130 - 139MM HG: CPT | Performed by: NURSE PRACTITIONER

## 2025-04-23 PROCEDURE — 99214 OFFICE O/P EST MOD 30 MIN: CPT | Performed by: NURSE PRACTITIONER

## 2025-04-23 PROCEDURE — 1126F AMNT PAIN NOTED NONE PRSNT: CPT | Performed by: NURSE PRACTITIONER

## 2025-04-23 PROCEDURE — 3079F DIAST BP 80-89 MM HG: CPT | Performed by: NURSE PRACTITIONER

## 2025-04-23 ASSESSMENT — PAIN SCALES - GENERAL: PAINLEVEL_OUTOF10: NO PAIN (0)

## 2025-04-23 NOTE — PROGRESS NOTES
Tracy Palomo arrived here on 4/23/2025 1:11 PM for 3-7 Days  Zio monitor placement per ordering provider Pauline Keller for the diagnosis Artrial Fibrillation/ Bradycardia.  Patient s skin was prepped per protocol. Dr. Rakan Man is the supervising MD.  Zio monitor was placed.  Instructions were reviewed with and given to the patient.  Patient verbalized understanding of wear, troubleshooting and monitor return instructions.

## 2025-04-23 NOTE — LETTER
4/23/2025    Chad Betts MD  [address] [address]    RE: Tracy Palomo       Dear Colleague,     I had the pleasure of seeing Tracy Palomo in the [facility].  Cardiology Clinic Progress Note  Tracy Palomo MRN# [id number]   YOB: 1940 Age: 84 year old       HPI:    Tracy Palomo is a delightful 84 year old patient with past medical history significant for:    Paroxysmal atrial fibrillation/atrial flutter: initially diagnosed in ~2014 after surgery for SBO and was continued on Coumadin for history of DVT/PE.  Started on flecainide in 4/2024 but then developed typical atrial flutter.  She met with Dr. Napier and underwent CTI flutter ablation 10/14/2024.  A follow-up Zio patch noted asymptomatic likely atypical atrial flutter at 100% burden with average heart rate of 81 bpm.  Patient self converted shortly after her Zio patch.  She remains on flecainide at 100 mg every 12 hours.  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  Normal EF    It is my pleasure meeting Tracy at today's visit in [place].  She has a history of atrial arrhythmias.  Underwent a CTI ablation in 2024.  Follow-up Zio patch 1 month later showed that she was in persistent atrial flutter at 81 bpm.  Converted back to sinus rhythm about a month after her ablation.  EKG in November 2024 confirmed that.  Currently on flecainide 100 mg every 12 hours and warfarin for anticoagulation.    Overall, patient doing okay.  Her daughter joins us for today's visit.  Her mom shares her [employer] information with her.  Heart rates at night are 35 to 40 bpm.  Heart rates during the day tend to be on the lower side, but patient denies daytime fatigue.  She had an episode a few weeks ago where she did not feel like going for her daily walk, but that is truly  isolated.  She is very active for 84 years of age.  Lives independently and continues to do all of her own yard work and housework.  She does feel more tired than usual.  Denies shortness of breath or chest discomfort.      Diagnotic studies:  EKG 11/2024: Sinus with PVCs.   ms  Zio patch 11/12/2024: 100% atrial flutter at 81 bpm with frequent PVCs, burden 18.6%.  Echocardiogram 12/2024: EF 55 to 60%.  RV function normal.  Grade 1 diastolic dysfunction noted.  No significant valve disease.  Stress echocardiogram 4/2024: Patient exercised for 5 minutes.  1 minute 27 seconds into recovery went into atrial flutter with variable block.  4.5 minutes converted back to sinus.  Nonspecific ST changes noted in inferior leads.  EF normal.  No wall motion abnormalities.     Component      Latest Ref Rng 10/21/2024  11:45 AM 3/18/2025  2:09 PM 4/15/2025  10:08 AM   Sodium      135 - 145 mmol/L 143      Potassium      3.4 - 5.3 mmol/L 4.3      Chloride      98 - 107 mmol/L 106      Carbon Dioxide (CO2)      22 - 29 mmol/L 28      Anion Gap      7 - 15 mmol/L 9      Urea Nitrogen      8.0 - 23.0 mg/dL 18.6      Creatinine      0.51 - 0.95 mg/dL 1.06 (H)      GFR Estimate      >60 mL/min/1.73m2 52 (L)      Calcium      8.8 - 10.4 mg/dL 9.6      Glucose      70 - 99 mg/dL 64 (L)      WBC      4.0 - 11.0 10e3/uL 6.5      RBC Count      3.80 - 5.20 10e6/uL 4.81      Hemoglobin      11.7 - 15.7 g/dL 14.0      Hematocrit      35.0 - 47.0 % 43.3      MCV      78 - 100 fL 90      MCH      26.5 - 33.0 pg 29.1      MCHC      31.5 - 36.5 g/dL 32.3      RDW      10.0 - 15.0 % 14.3      Platelet Count      150 - 450 10e3/uL 204      N-Terminal Pro Bnp      0 - 1,800 pg/mL 1,014      INR Point of Care      0.9 - 1.1   2.0 (H)  2.3 (H)         Plan:   Paroxysmal atrial fibrillation on flecainide 100 mg twice daily.  Started 4/2024.  QRS stable.  Having increased fatigue.  Episodes of bradycardia noted on iWatch.  Zio patch placed for 7  days today.  Atrial flutter with successful CTI ablation 10/14/2024.  BST3QV1-MIWq score 6 on Coumadin therapy  Asymptomatic PVCs with burden approximately 19%.  EF normal.  History of DVT/PE on chronic anticoagulation.    I will send him a MyChart regarding her Zio patch report.  She will wear it for 7 days. Not uncommon to have bradycardia during sleeping hours. Patient states that Dr. Napier has discussed possible pacemaker with her in the past.  Sounds that she has shown signs of bradycardia on her iWatch and does have some daytime fatigue.  No complaints of presyncope to date.    Depending on the results we will then plan follow-up.  Order was placed for 1 year in case patient's Zio patch is stable.    The longitudinal plan of care for the diagnosis(es)/condition(s) as documented were addressed during this visit. Due to the added complexity in care, I will continue to support Tracy in the subsequent management and with ongoing continuity of care.      Pauline Keller, NP, APRN CNP          Today's clinic visit entailed:  Review of the result(s) of each unique test - EKG and echo  Ordering of each unique test  34   minutes spent by me on the date of the encounter doing chart review, review of test results, patient visit, documentation, and discussion with family   Provider  Link to [facility] Help Grid     The level of medical decision making during this visit was of moderate complexity.      No orders of the defined types were placed in this encounter.    No orders of the defined types were placed in this encounter.    There are no discontinued medications.      No diagnosis found.    CURRENT MEDICATIONS:  Current Outpatient Medications   Medication Sig Dispense Refill     acetaminophen (TYLENOL) 325 MG tablet Take 3 tablets (975 mg) by mouth every 8 hours as needed for pain 100 tablet 1     flecainide (TAMBOCOR) 50 MG tablet Take 2 tablets (100 mg) by mouth 2 times daily. 120 tablet 4     furosemide (LASIX) 20 MG tablet  Take 1 tablet (20 mg) by mouth daily for 15 days. 15 tablet 0     lovastatin (MEVACOR) 40 MG tablet TAKE ONE TABLET BY MOUTH AT BEDTIME 90 tablet 3     warfarin ANTICOAGULANT (COUMADIN) 2.5 MG tablet TAKE 1 AND 1/2 TABLETS BY MOUTH ON WED AND 1 TABLET ALL OTHER DAYS, as directed 100 tablet 1       ALLERGIES     Allergies   Allergen Reactions     No Known Allergies        PAST MEDICAL HISTORY:  Past Medical History:   Diagnosis Date     Atrial fibrillation (H) 2014    related to colon surgery     Colon polyps      Diverticulosis of colon (without mention of hemorrhage)     Diverticulosis     DVT (deep vein thrombosis) in pregnancy 2014    after colon surgery     Other and unspecified hyperlipidemia      PE (pulmonary embolism) 2014    related to colon surgery     Unspecified essential hypertension        PAST SURGICAL HISTORY:  Past Surgical History:   Procedure Laterality Date     ARTHROPLASTY KNEE Right 1/8/2019    Procedure: Right total knee replacement;  Surgeon: Kaleb Pang DO;  Location: [facility] OR     ARTHROPLASTY KNEE Left 2/16/2021    Procedure: left total knee replacement;  Surgeon: Kaleb Pang DO;  Location:  OR     COLONOSCOPY  09, 10, 12    RUST     COLONOSCOPY N/A 12/11/2017    Procedure: COLONOSCOPY;  colonoscopy;  Surgeon: Elvis Quezada MD;  Location:  GI     EP ABLATION ATRIAL FLUTTER N/A 10/14/2024    Procedure: Ablation Atrial Flutter;  Surgeon: Rich Napier MD;  Location: [facility] CARDIAC CATH LAB     FLEXIBLE SIGMOIDOSCOPY  09, 11     LAPAROTOMY EXPLORATORY N/A 10/20/2014    Procedure: LAPAROTOMY EXPLORATORY;  Surgeon: Miguel Oleary MD;  Location: [facility] OR     LAPAROTOMY, LYSIS ADHESIONS, COMBINED N/A 10/20/2014    Procedure: COMBINED LAPAROTOMY, LYSIS ADHESIONS;  Surgeon: Miguel Oleary MD;  Location:  OR     OPEN REDUCTION INTERNAL FIXATION HIP BIPOLAR Left 3/16/2017    Procedure: OPEN REDUCTION INTERNAL FIXATION HIP BIPOLAR;   Surgeon: Kaleb Pang DO;  Location: [facility] OR     RELEASE TRIGGER FINGER Left 1/12/2021    Procedure: Left thumb trigger release;  Surgeon: Kaleb Pang DO;  Location: [facility] OR     RESECT SMALL BOWEL WITHOUT OSTOMY N/A 10/20/2014    Procedure: RESECT SMALL BOWEL WITHOUT OSTOMY;  Surgeon: Miguel Oleary MD;  Location: [facility] OR       FAMILY HISTORY:  Family History   Problem Relation Age of Onset     Blood Disease No family hx of         blood clots       SOCIAL HISTORY:  Social History     Socioeconomic History     Marital status:    Tobacco Use     Smoking status: Never     Smokeless tobacco: Never   Substance and Sexual Activity     Alcohol use: No     Alcohol/week: 0.0 standard drinks of alcohol     Drug use: No     Sexual activity: Not Currently     Partners: Male   Other Topics Concern     Parent/sibling w/ CABG, MI or angioplasty before 65F 55M? No     Social Drivers of Health     Financial Resource Strain: High Risk (11/26/2023)    Financial Resource Strain      Within the past 12 months, have you or your family members you live with been unable to get utilities (heat, electricity) when it was really needed?: Yes   Food Insecurity: Low Risk  (11/26/2023)    Food Insecurity      Within the past 12 months, did you worry that your food would run out before you got money to buy more?: No      Within the past 12 months, did the food you bought just not last and you didn t have money to get more?: No   Transportation Needs: Low Risk  (11/26/2023)    Transportation Needs      Within the past 12 months, has lack of transportation kept you from medical appointments, getting your medicines, non-medical meetings or appointments, work, or from getting things that you need?: No   Interpersonal Safety: Low Risk  (10/14/2024)    Interpersonal Safety      Do you feel physically and emotionally safe where you currently live?: Yes      Within the past 12 months, have you been hit, slapped, kicked or  otherwise physically hurt by someone?: No      Within the past 12 months, have you been humiliated or emotionally abused in other ways by your partner or ex-partner?: No   Housing Stability: Low Risk  (11/26/2023)    Housing Stability      Do you have housing? : Yes      Are you worried about losing your housing?: No       Review of Systems:  Skin:        Eyes:       ENT:       Respiratory:       Cardiovascular:       Gastroenterology:      Genitourinary:       Musculoskeletal:       Neurologic:       Psychiatric:       Heme/Lymph/Imm:       Endocrine:         Physical Exam:    Vitals: There were no vitals taken for this visit.  Constitutional: Well nourished and in no apparent distress.  Eyes: Pupils equal, round. Sclerae anicteric.   HEENT: Normocephalic, atraumatic.   Neck: Supple. No carotid bruits or JVD   Respiratory: Breathing non-labored. Lungs clear to auscultation bilaterally. No crackles or wheezes  Cardiovascular:  Regular rate and rhythm, normal S1 and S2. No murmur, rub, or gallop.  Skin: Warm, dry. No rashes, cyanosis, or xanthelasma.  Extremities: No edema.  Neurologic: No gross motor deficits. Alert, awake, and oriented to person, place and time.  Psychiatric: Affect appropriate.        Recent Lab Results:  LIPID RESULTS:  Lab Results   Component Value Date    CHOL 173 09/12/2024    CHOL 155 01/31/2020    HDL 50 09/12/2024    HDL 66 01/31/2020    LDL 66 09/12/2024    LDL 56 01/31/2020    TRIG 283 (H) 09/12/2024    TRIG 167 (H) 01/31/2020    CHOLHDLRATIO 3.7 08/24/2015       LIVER ENZYME RESULTS:  Lab Results   Component Value Date    AST 27 04/20/2024    AST 25 01/31/2020    ALT 22 04/20/2024    ALT 27 01/31/2020       CBC RESULTS:  Lab Results   Component Value Date    WBC 6.5 10/21/2024    WBC 5.8 02/01/2021    RBC 4.81 10/21/2024    RBC 4.71 02/01/2021    HGB 14.0 10/21/2024    HGB 11.3 (L) 02/17/2021    HCT 43.3 10/21/2024    HCT 42.2 02/01/2021    MCV 90 10/21/2024    MCV 90 02/01/2021    MCH  29.1 10/21/2024    MCH 29.1 02/01/2021    MCHC 32.3 10/21/2024    MCHC 32.5 02/01/2021    RDW 14.3 10/21/2024    RDW 13.8 02/01/2021     10/21/2024     02/17/2021       BMP RESULTS:  Lab Results   Component Value Date     10/21/2024     08/06/2020    POTASSIUM 4.3 10/21/2024    POTASSIUM 4.4 05/16/2022    POTASSIUM 4.2 08/06/2020    CHLORIDE 106 10/21/2024    CHLORIDE 114 (H) 05/16/2022    CHLORIDE 108 08/06/2020    CO2 28 10/21/2024    CO2 28 05/16/2022    CO2 28 08/06/2020    ANIONGAP 9 10/21/2024    ANIONGAP 3 05/16/2022    ANIONGAP 2 (L) 08/06/2020    GLC 64 (L) 10/21/2024    GLC 92 06/14/2023    GLC 90 05/16/2022     (H) 02/17/2021    BUN 18.6 10/21/2024    BUN 16 05/16/2022    BUN 16 08/06/2020    CR 1.06 (H) 10/21/2024    CR 0.87 08/06/2020    GFRESTIMATED 52 (L) 10/21/2024    GFRESTIMATED 64 08/06/2020    GFRESTBLACK 74 08/06/2020    HARVEY 9.6 10/21/2024    HARVEY 8.7 08/06/2020        A1C RESULTS:  Lab Results   Component Value Date    A1C 5.8 10/20/2014       INR RESULTS:  Lab Results   Component Value Date    INR 2.3 (H) 04/15/2025    INR 1.7 (H) 04/01/2025    INR 1.96 (H) 10/14/2024    INR 2.19 (H) 09/12/2024    INR 2.2 (H) 06/17/2021    INR 2.7 (H) 05/20/2021    INR 1.8 (A) 03/02/2021    INR 1.9 (A) 02/24/2021           CC  Simona Sheffield, APRN CNP  [address] [facility] [[email or web address] [address]  [place] [id number]                  Thank you for allowing me to participate in the care of your patient.      Sincerely,     Pauline Keller, AMANDA, BILLY CNP     [facility]  cc:   BILLY Liao CNP  [address] [place] [address]  [place]  [place] [address]

## 2025-04-23 NOTE — PROGRESS NOTES
Cardiology Clinic Progress Note  Tracy Palomo MRN# [id number]   YOB: 1940 Age: 84 year old       HPI:    Tracy Palomo is a delightful 84 year old patient with past medical history significant for:    Paroxysmal atrial fibrillation/atrial flutter: initially diagnosed in ~2014 after surgery for SBO and was continued on Coumadin for history of DVT/PE.  Started on flecainide in 4/2024 but then developed typical atrial flutter.  She met with Dr. Napier and underwent CTI flutter ablation 10/14/2024.  A follow-up Zio patch noted asymptomatic likely atypical atrial flutter at 100% burden with average heart rate of 81 bpm.  Patient self converted shortly after her Zio patch.  She remains on flecainide at 100 mg every 12 hours.  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  Normal EF    It is my pleasure meeting Tracy at today's visit in [place].  She has a history of atrial arrhythmias.  Underwent a CTI ablation in 2024.  Follow-up Zio patch 1 month later showed that she was in persistent atrial flutter at 81 bpm.  Converted back to sinus rhythm about a month after her ablation.  EKG in November 2024 confirmed that.  Currently on flecainide 100 mg every 12 hours and warfarin for anticoagulation.    Overall, patient doing okay.  Her daughter joins us for today's visit.  Her mom shares her [employer] information with her.  Heart rates at night are 35 to 40 bpm.  Heart rates during the day tend to be on the lower side, but patient denies daytime fatigue.  She had an episode a few weeks ago where she did not feel like going for her daily walk, but that is truly isolated.  She is very active for 84 years of age.  Lives independently and continues to do all of her own yard work and housework.  She does feel more tired than usual.  Denies shortness of breath or chest  discomfort.      Diagnotic studies:  EKG 11/2024: Sinus with PVCs.   ms  Zio patch 11/12/2024: 100% atrial flutter at 81 bpm with frequent PVCs, burden 18.6%.  Echocardiogram 12/2024: EF 55 to 60%.  RV function normal.  Grade 1 diastolic dysfunction noted.  No significant valve disease.  Stress echocardiogram 4/2024: Patient exercised for 5 minutes.  1 minute 27 seconds into recovery went into atrial flutter with variable block.  4.5 minutes converted back to sinus.  Nonspecific ST changes noted in inferior leads.  EF normal.  No wall motion abnormalities.     Component      Latest Ref Rng 10/21/2024  11:45 AM 3/18/2025  2:09 PM 4/15/2025  10:08 AM   Sodium      135 - 145 mmol/L 143      Potassium      3.4 - 5.3 mmol/L 4.3      Chloride      98 - 107 mmol/L 106      Carbon Dioxide (CO2)      22 - 29 mmol/L 28      Anion Gap      7 - 15 mmol/L 9      Urea Nitrogen      8.0 - 23.0 mg/dL 18.6      Creatinine      0.51 - 0.95 mg/dL 1.06 (H)      GFR Estimate      >60 mL/min/1.73m2 52 (L)      Calcium      8.8 - 10.4 mg/dL 9.6      Glucose      70 - 99 mg/dL 64 (L)      WBC      4.0 - 11.0 10e3/uL 6.5      RBC Count      3.80 - 5.20 10e6/uL 4.81      Hemoglobin      11.7 - 15.7 g/dL 14.0      Hematocrit      35.0 - 47.0 % 43.3      MCV      78 - 100 fL 90      MCH      26.5 - 33.0 pg 29.1      MCHC      31.5 - 36.5 g/dL 32.3      RDW      10.0 - 15.0 % 14.3      Platelet Count      150 - 450 10e3/uL 204      N-Terminal Pro Bnp      0 - 1,800 pg/mL 1,014      INR Point of Care      0.9 - 1.1   2.0 (H)  2.3 (H)         Plan:   Paroxysmal atrial fibrillation on flecainide 100 mg twice daily.  Started 4/2024.  QRS stable.  Having increased fatigue.  Episodes of bradycardia noted on iWatch.  Zio patch placed for 7 days today.  Atrial flutter with successful CTI ablation 10/14/2024.  [id number] score 6 on Coumadin therapy  Asymptomatic PVCs with burden approximately 19%.  EF normal.  History of DVT/PE on chronic  anticoagulation.    I will send him a MyChart regarding her Zio patch report.  She will wear it for 7 days. Not uncommon to have bradycardia during sleeping hours. Patient states that Dr. Napier has discussed possible pacemaker with her in the past.  Sounds that she has shown signs of bradycardia on her iWatch and does have some daytime fatigue.  No complaints of presyncope to date.    Depending on the results we will then plan follow-up.  Order was placed for 1 year in case patient's Zio patch is stable.    The longitudinal plan of care for the diagnosis(es)/condition(s) as documented were addressed during this visit. Due to the added complexity in care, I will continue to support Tracy in the subsequent management and with ongoing continuity of care.      Pauline Keller, NP, APRN CNP          Today's clinic visit entailed:  Review of the result(s) of each unique test - EKG and echo  Ordering of each unique test  34   minutes spent by me on the date of the encounter doing chart review, review of test results, patient visit, documentation, and discussion with family   Provider  Link to [facility] Help Grid     The level of medical decision making during this visit was of moderate complexity.      No orders of the defined types were placed in this encounter.    No orders of the defined types were placed in this encounter.    There are no discontinued medications.      No diagnosis found.    CURRENT MEDICATIONS:  Current Outpatient Medications   Medication Sig Dispense Refill    acetaminophen (TYLENOL) 325 MG tablet Take 3 tablets (975 mg) by mouth every 8 hours as needed for pain 100 tablet 1    flecainide (TAMBOCOR) 50 MG tablet Take 2 tablets (100 mg) by mouth 2 times daily. 120 tablet 4    furosemide (LASIX) 20 MG tablet Take 1 tablet (20 mg) by mouth daily for 15 days. 15 tablet 0    lovastatin (MEVACOR) 40 MG tablet TAKE ONE TABLET BY MOUTH AT BEDTIME 90 tablet 3    warfarin ANTICOAGULANT (COUMADIN) 2.5 MG tablet TAKE 1  AND 1/2 TABLETS BY MOUTH ON WED AND 1 TABLET ALL OTHER DAYS, as directed 100 tablet 1       ALLERGIES     Allergies   Allergen Reactions    No Known Allergies        PAST MEDICAL HISTORY:  Past Medical History:   Diagnosis Date    Atrial fibrillation (H) 2014    related to colon surgery    Colon polyps     Diverticulosis of colon (without mention of hemorrhage)     Diverticulosis    DVT (deep vein thrombosis) in pregnancy 2014    after colon surgery    Other and unspecified hyperlipidemia     PE (pulmonary embolism) 2014    related to colon surgery    Unspecified essential hypertension        PAST SURGICAL HISTORY:  Past Surgical History:   Procedure Laterality Date    ARTHROPLASTY KNEE Right 1/8/2019    Procedure: Right total knee replacement;  Surgeon: Kaleb Pang DO;  [facility]    ARTHROPLASTY KNEE Left 2/16/2021    Procedure: left total knee replacement;  Surgeon: Kaleb Pang DO;  Location:  OR    COLONOSCOPY  09, 10, 12    [facility]    COLONOSCOPY N/A 12/11/2017    Procedure: COLONOSCOPY;  colonoscopy;  Surgeon: Elvis Quezada MD;  Location:  GI    EP ABLATION ATRIAL FLUTTER N/A 10/14/2024    Procedure: Ablation Atrial Flutter;  Surgeon: Rich Napier MD;  Location: [facility] CARDIAC CATH LAB    FLEXIBLE SIGMOIDOSCOPY  09, 11    LAPAROTOMY EXPLORATORY N/A 10/20/2014    Procedure: LAPAROTOMY EXPLORATORY;  Surgeon: Miguel Oleary MD;  [facility]    LAPAROTOMY, LYSIS ADHESIONS, COMBINED N/A 10/20/2014    Procedure: COMBINED LAPAROTOMY, LYSIS ADHESIONS;  Surgeon: Miguel Oleary MD;  [facility]    OPEN REDUCTION INTERNAL FIXATION HIP BIPOLAR Left 3/16/2017    Procedure: OPEN REDUCTION INTERNAL FIXATION HIP BIPOLAR;  Surgeon: Kaleb Pang DO;  [facility]    RELEASE TRIGGER FINGER Left 1/12/2021    Procedure: Left thumb trigger release;  Surgeon: Kaleb Pang DO;  [facility]    RESECT SMALL BOWEL WITHOUT  OSTOMY N/A 10/20/2014    Procedure: RESECT SMALL BOWEL WITHOUT OSTOMY;  Surgeon: Miguel Oleary MD;  [facility]       FAMILY HISTORY:  Family History   Problem Relation Age of Onset    Blood Disease No family hx of         blood clots       SOCIAL HISTORY:  Social History     Socioeconomic History    Marital status:    Tobacco Use    Smoking status: Never    Smokeless tobacco: Never   Substance and Sexual Activity    Alcohol use: No     Alcohol/week: 0.0 standard drinks of alcohol    Drug use: No    Sexual activity: Not Currently     Partners: Male   Other Topics Concern    Parent/sibling w/ CABG, MI or angioplasty before 65F 55M? No     Social Drivers of Health     Financial Resource Strain: High Risk (11/26/2023)    Financial Resource Strain     Within the past 12 months, have you or your family members you live with been unable to get utilities (heat, electricity) when it was really needed?: Yes   Food Insecurity: Low Risk  (11/26/2023)    Food Insecurity     Within the past 12 months, did you worry that your food would run out before you got money to buy more?: No     Within the past 12 months, did the food you bought just not last and you didn t have money to get more?: No   Transportation Needs: Low Risk  (11/26/2023)    Transportation Needs     Within the past 12 months, has lack of transportation kept you from medical appointments, getting your medicines, non-medical meetings or appointments, work, or from getting things that you need?: No   Interpersonal Safety: Low Risk  (10/14/2024)    Interpersonal Safety     Do you feel physically and emotionally safe where you currently live?: Yes     Within the past 12 months, have you been hit, slapped, kicked or otherwise physically hurt by someone?: No     Within the past 12 months, have you been humiliated or emotionally abused in other ways by your partner or ex-partner?: No   Housing Stability: Low Risk  (11/26/2023)    Housing Stability     Do  you have housing? : Yes     Are you worried about losing your housing?: No       Review of Systems:  Skin:        Eyes:       ENT:       Respiratory:       Cardiovascular:       Gastroenterology:      Genitourinary:       Musculoskeletal:       Neurologic:       Psychiatric:       Heme/Lymph/Imm:       Endocrine:         Physical Exam:    Vitals: There were no vitals taken for this visit.  Constitutional: Well nourished and in no apparent distress.  Eyes: Pupils equal, round. Sclerae anicteric.   HEENT: Normocephalic, atraumatic.   Neck: Supple. No carotid bruits or JVD   Respiratory: Breathing non-labored. Lungs clear to auscultation bilaterally. No crackles or wheezes  Cardiovascular:  Regular rate and rhythm, normal S1 and S2. No murmur, rub, or gallop.  Skin: Warm, dry. No rashes, cyanosis, or xanthelasma.  Extremities: No edema.  Neurologic: No gross motor deficits. Alert, awake, and oriented to person, place and time.  Psychiatric: Affect appropriate.        Recent Lab Results:  LIPID RESULTS:  Lab Results   Component Value Date    CHOL 173 09/12/2024    CHOL 155 01/31/2020    HDL 50 09/12/2024    HDL 66 01/31/2020    LDL 66 09/12/2024    LDL 56 01/31/2020    TRIG 283 (H) 09/12/2024    TRIG 167 (H) 01/31/2020    CHOLHDLRATIO 3.7 08/24/2015       LIVER ENZYME RESULTS:  Lab Results   Component Value Date    AST 27 04/20/2024    AST 25 01/31/2020    ALT 22 04/20/2024    ALT 27 01/31/2020       CBC RESULTS:  Lab Results   Component Value Date    WBC 6.5 10/21/2024    WBC 5.8 02/01/2021    RBC 4.81 10/21/2024    RBC 4.71 02/01/2021    HGB 14.0 10/21/2024    HGB 11.3 (L) 02/17/2021    HCT 43.3 10/21/2024    HCT 42.2 02/01/2021    MCV 90 10/21/2024    MCV 90 02/01/2021    MCH 29.1 10/21/2024    MCH 29.1 02/01/2021    MCHC 32.3 10/21/2024    MCHC 32.5 02/01/2021    RDW 14.3 10/21/2024    RDW 13.8 02/01/2021     10/21/2024     02/17/2021       BMP RESULTS:  Lab Results   Component Value Date      10/21/2024     08/06/2020    POTASSIUM 4.3 10/21/2024    POTASSIUM 4.4 05/16/2022    POTASSIUM 4.2 08/06/2020    CHLORIDE 106 10/21/2024    CHLORIDE 114 (H) 05/16/2022    CHLORIDE 108 08/06/2020    CO2 28 10/21/2024    CO2 28 05/16/2022    CO2 28 08/06/2020    ANIONGAP 9 10/21/2024    ANIONGAP 3 05/16/2022    ANIONGAP 2 (L) 08/06/2020    GLC 64 (L) 10/21/2024    GLC 92 06/14/2023    GLC 90 05/16/2022     (H) 02/17/2021    BUN 18.6 10/21/2024    BUN 16 05/16/2022    BUN 16 08/06/2020    CR 1.06 (H) 10/21/2024    CR 0.87 08/06/2020    GFRESTIMATED 52 (L) 10/21/2024    GFRESTIMATED 64 08/06/2020    GFRESTBLACK 74 08/06/2020    HARVEY 9.6 10/21/2024    HARVEY 8.7 08/06/2020        A1C RESULTS:  Lab Results   Component Value Date    A1C 5.8 10/20/2014       INR RESULTS:  Lab Results   Component Value Date    INR 2.3 (H) 04/15/2025    INR 1.7 (H) 04/01/2025    INR 1.96 (H) 10/14/2024    INR 2.19 (H) 09/12/2024    INR 2.2 (H) 06/17/2021    INR 2.7 (H) 05/20/2021    INR 1.8 (A) 03/02/2021    INR 1.9 (A) 02/24/2021           CC  Simona Sheffield, APRN CNP  [address]  [place] [address]

## 2025-04-23 NOTE — PATIENT INSTRUCTIONS
Call my nurse with any questions or concerns:  [phone]  *If you have concerns after hours, please call [phone], option 2 to speak with on call Cardiologist.    It was wonderful meeting you both today.  Let me know if you have questions or concerns.  Do not forget to taty any symptoms you are having while wearing your Zio patch.    Thank you-Pauline

## 2025-04-30 ENCOUNTER — TELEPHONE (OUTPATIENT)
Dept: PHYSICAL THERAPY | Facility: CLINIC | Age: 85
End: 2025-04-30
Payer: MEDICARE

## 2025-04-30 DIAGNOSIS — I48.0 PAROXYSMAL ATRIAL FIBRILLATION (H): ICD-10-CM

## 2025-04-30 RX ORDER — FLECAINIDE ACETATE 50 MG/1
100 TABLET ORAL 2 TIMES DAILY
Qty: 120 TABLET | Refills: 11 | Status: SHIPPED | OUTPATIENT
Start: 2025-04-30

## 2025-05-06 ENCOUNTER — ANTICOAGULATION THERAPY VISIT (OUTPATIENT)
Dept: ANTICOAGULATION | Facility: CLINIC | Age: 85
End: 2025-05-06

## 2025-05-06 ENCOUNTER — LAB (OUTPATIENT)
Dept: LAB | Facility: CLINIC | Age: 85
End: 2025-05-06
Payer: MEDICARE

## 2025-05-06 DIAGNOSIS — I27.82 CHRONIC PULMONARY EMBOLISM WITHOUT ACUTE COR PULMONALE, UNSPECIFIED PULMONARY EMBOLISM TYPE (H): ICD-10-CM

## 2025-05-06 DIAGNOSIS — I26.99 OTHER PULMONARY EMBOLISM WITHOUT ACUTE COR PULMONALE, UNSPECIFIED CHRONICITY (H): ICD-10-CM

## 2025-05-06 DIAGNOSIS — Z79.01 LONG TERM CURRENT USE OF ANTICOAGULANT THERAPY: ICD-10-CM

## 2025-05-06 DIAGNOSIS — I26.99 PULMONARY EMBOLI (H): Primary | ICD-10-CM

## 2025-05-06 DIAGNOSIS — Z79.01 LONG TERM (CURRENT) USE OF ANTICOAGULANTS: ICD-10-CM

## 2025-05-06 LAB — INR BLD: 2.3 (ref 0.9–1.1)

## 2025-05-06 PROCEDURE — 85610 PROTHROMBIN TIME: CPT | Mod: GZ

## 2025-05-06 PROCEDURE — 36416 COLLJ CAPILLARY BLOOD SPEC: CPT

## 2025-05-06 NOTE — PROGRESS NOTES
ANTICOAGULATION MANAGEMENT     Tracy Palomo 84 year old female is on warfarin with therapeutic INR result. (Goal INR 2.0-3.0)    Recent labs: (last 7 days)     05/06/25  0951   INR 2.3*       ASSESSMENT     Source(s): Chart Review and Patient/Caregiver Call     Warfarin doses taken: Warfarin taken as instructed  Diet: No new diet changes identified  Medication/supplement changes: None noted  New illness, injury, or hospitalization: No  Signs or symptoms of bleeding or clotting: No  Previous result: Therapeutic last visit; previously outside of goal range  Additional findings: None       PLAN     Recommended plan for no diet, medication or health factor changes affecting INR     Dosing Instructions: Continue your current warfarin dose with next INR in 4 weeks       Summary  As of 5/6/2025      Full warfarin instructions:  3.75 mg every Wed, Sat; 2.5 mg all other days   Next INR check:  6/3/2025               Telephone call with Tracy who verbalizes understanding and agrees to plan    Lab visit scheduled    Education provided: Please call back if any changes to your diet, medications or how you've been taking warfarin    Plan made per [facility] anticoagulation protocol    Kaley Mcduffie RN  5/6/2025  Anticoagulation Clinic  Enlighted for routing messages: dewey SOARES  [facility] patient phone line: [phone]        _______________________________________________________________________     Anticoagulation Episode Summary       Current INR goal:  2.0-3.0   TTR:  63.6% (1 y)   Target end date:  Indefinite   Send INR reminders to:  GURVINDER SOARES    Indications    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]  Long term current use of anticoagulant therapy [Z79.01]  Other pulmonary embolism without acute cor pulmonale  unspecified chronicity (H) [I26.99]  Chronic pulmonary embolism without acute cor pulmonale  unspecified pulmonary embolism type (H) [I27.82]             Comments:   --             Anticoagulation Care Providers       Provider Role Specialty Phone number    Chad Betts MD Referring Internal Medicine [phone]    Gerard Medrano MD Responsible Family Medicine [phone]

## 2025-05-07 LAB — CV ZIO PRELIM RESULTS: NORMAL

## 2025-05-09 NOTE — TELEPHONE ENCOUNTER
Progress Note( Dr. Diehl)  5/9/2025  Subjective:   Admit Date: 4/30/2025  PCP: METEA Diehl MD    Admitted For : Left foot /heel ulcer and also has left calcaneus osteomyelitis    Consulted For: Better control of blood glucose    Interval History: Patient with blood glucose improving with use of U500 insulin  Had debridement done of the left heel ulcer and wound vacuum was placed  His blood glucose were running a bit higher yesterday.  More active possibly today    Denies any chest pains,   Denies SOB .   Denies nausea or vomiting.   No new bowel or bladder symptoms.       Intake/Output Summary (Last 24 hours) at 5/9/2025 0823  Last data filed at 5/9/2025 0816  Gross per 24 hour   Intake 240 ml   Output 1650 ml   Net -1410 ml       DATA    CBC:   No results for input(s): \"WBC\", \"HGB\", \"PLT\" in the last 72 hours.   CMP:  No results for input(s): \"NA\", \"K\", \"CL\", \"CO2\", \"BUN\", \"CREATININE\", \"GLU\", \"CALCIUM\", \"LABALBU\", \"BILITOT\", \"ALKPHOS\", \"AST\", \"ALT\" in the last 72 hours.    Invalid input(s): \"PROT\"    Lipids:   Lab Results   Component Value Date/Time    CHOL 205 04/11/2025 08:12 AM    HDL 38 04/11/2025 08:12 AM    TRIG 83 04/11/2025 08:12 AM     Glucose:  Recent Labs     05/08/25 2025 05/09/25  0240 05/09/25  0640   POCGLU 149* 214* 184*     NyntxnrlxnX5H:  Lab Results   Component Value Date/Time    LABA1C 9.6 04/11/2025 08:12 AM     High Sensitivity TSH:   Lab Results   Component Value Date/Time    TSHHS 1.800 08/29/2019 10:06 AM     Free T3: No results found for: \"FT3\"  Free T4:No results found for: \"T4FREE\"    No orders to display        Scheduled Medicines   Medications:    atorvastatin  20 mg Oral Nightly    sodium chloride flush  5-40 mL IntraVENous 2 times per day    empagliflozin  10 mg Oral Daily    ezetimibe  10 mg Oral Daily    finasteride  5 mg Oral Daily    folic acid-pyridoxine-cyancobalamin 1.13-25-2 tablet  1 tablet Oral Daily    metoprolol succinate  25 mg Oral Daily    potassium chloride  Pt informed of the info below  Johanna Mckeon RN

## 2025-05-20 ENCOUNTER — TELEPHONE (OUTPATIENT)
Dept: PHYSICAL THERAPY | Facility: CLINIC | Age: 85
End: 2025-05-20
Payer: MEDICARE

## 2025-05-20 ENCOUNTER — PATIENT OUTREACH (OUTPATIENT)
Dept: CARE COORDINATION | Facility: CLINIC | Age: 85
End: 2025-05-20
Payer: MEDICARE

## 2025-05-27 ENCOUNTER — RESULTS FOLLOW-UP (OUTPATIENT)
Dept: CARDIOLOGY | Facility: CLINIC | Age: 85
End: 2025-05-27

## 2025-05-28 ENCOUNTER — PATIENT OUTREACH (OUTPATIENT)
Dept: CARE COORDINATION | Facility: CLINIC | Age: 85
End: 2025-05-28
Payer: MEDICARE

## 2025-06-01 ENCOUNTER — HEALTH MAINTENANCE LETTER (OUTPATIENT)
Age: 85
End: 2025-06-01

## 2025-06-05 ENCOUNTER — ANESTHESIA (OUTPATIENT)
Dept: SURGERY | Facility: CLINIC | Age: 85
End: 2025-06-05
Payer: MEDICARE

## 2025-06-05 ENCOUNTER — ANESTHESIA EVENT (OUTPATIENT)
Dept: SURGERY | Facility: CLINIC | Age: 85
End: 2025-06-05
Payer: MEDICARE

## 2025-06-05 ENCOUNTER — DOCUMENTATION ONLY (OUTPATIENT)
Dept: ANTICOAGULATION | Facility: CLINIC | Age: 85
End: 2025-06-05

## 2025-06-05 ENCOUNTER — HOSPITAL ENCOUNTER (OUTPATIENT)
Facility: CLINIC | Age: 85
Discharge: HOME | End: 2025-06-05
Attending: STUDENT IN AN ORGANIZED HEALTH CARE EDUCATION/TRAINING PROGRAM | Admitting: STUDENT IN AN ORGANIZED HEALTH CARE EDUCATION/TRAINING PROGRAM
Payer: MEDICARE

## 2025-06-05 VITALS
SYSTOLIC BLOOD PRESSURE: 168 MMHG | HEART RATE: 45 BPM | DIASTOLIC BLOOD PRESSURE: 81 MMHG | OXYGEN SATURATION: 96 % | TEMPERATURE: 97.5 F | RESPIRATION RATE: 18 BRPM

## 2025-06-05 PROCEDURE — 250N000009 HC RX 250: Performed by: STUDENT IN AN ORGANIZED HEALTH CARE EDUCATION/TRAINING PROGRAM

## 2025-06-05 PROCEDURE — V2632 POST CHMBR INTRAOCULAR LENS: HCPCS | Performed by: STUDENT IN AN ORGANIZED HEALTH CARE EDUCATION/TRAINING PROGRAM

## 2025-06-05 PROCEDURE — 370N000004 HC ANESTHESIA CATARACT PACKAGE: Performed by: STUDENT IN AN ORGANIZED HEALTH CARE EDUCATION/TRAINING PROGRAM

## 2025-06-05 PROCEDURE — 250N000011 HC RX IP 250 OP 636

## 2025-06-05 PROCEDURE — 761N000008 HC RECOVERY CATRACT PACKAGE: Performed by: STUDENT IN AN ORGANIZED HEALTH CARE EDUCATION/TRAINING PROGRAM

## 2025-06-05 PROCEDURE — 360N000007 HC CATARACT SURGICAL PACKAGE: Performed by: STUDENT IN AN ORGANIZED HEALTH CARE EDUCATION/TRAINING PROGRAM

## 2025-06-05 PROCEDURE — 250N000011 HC RX IP 250 OP 636: Performed by: STUDENT IN AN ORGANIZED HEALTH CARE EDUCATION/TRAINING PROGRAM

## 2025-06-05 PROCEDURE — 272N000002 HC OR SUPPLY OTHER OPNP: Performed by: STUDENT IN AN ORGANIZED HEALTH CARE EDUCATION/TRAINING PROGRAM

## 2025-06-05 DEVICE — IMPLANTABLE DEVICE: Type: IMPLANTABLE DEVICE | Site: EYE | Status: FUNCTIONAL

## 2025-06-05 DEVICE — EYE IMP IOL AMO PCL TECNIS ZCB00 21.0: Type: IMPLANTABLE DEVICE | Site: EYE | Status: FUNCTIONAL

## 2025-06-05 RX ORDER — PROPARACAINE HYDROCHLORIDE 5 MG/ML
1 SOLUTION/ DROPS OPHTHALMIC ONCE
Status: COMPLETED | OUTPATIENT
Start: 2025-06-05 | End: 2025-06-05

## 2025-06-05 RX ORDER — MOXIFLOXACIN 5 MG/ML
1 SOLUTION/ DROPS OPHTHALMIC
Status: COMPLETED | OUTPATIENT
Start: 2025-06-05 | End: 2025-06-05

## 2025-06-05 RX ORDER — POVIDONE-IODINE 5 %
1 SOLUTION, NON-ORAL OPHTHALMIC (EYE) ONCE
Status: DISCONTINUED | OUTPATIENT
Start: 2025-06-05 | End: 2025-06-05 | Stop reason: HOSPADM

## 2025-06-05 RX ORDER — TROPICAMIDE 10 MG/ML
1 SOLUTION/ DROPS OPHTHALMIC
Status: COMPLETED | OUTPATIENT
Start: 2025-06-05 | End: 2025-06-05

## 2025-06-05 RX ORDER — PHENYLEPHRINE HYDROCHLORIDE 25 MG/ML
1 SOLUTION/ DROPS OPHTHALMIC
Status: COMPLETED | OUTPATIENT
Start: 2025-06-05 | End: 2025-06-05

## 2025-06-05 RX ORDER — DICLOFENAC SODIUM 1 MG/ML
1 SOLUTION/ DROPS OPHTHALMIC
Status: COMPLETED | OUTPATIENT
Start: 2025-06-05 | End: 2025-06-05

## 2025-06-05 RX ADMIN — TROPICAMIDE 1 DROP: 10 SOLUTION/ DROPS OPHTHALMIC at 08:09

## 2025-06-05 RX ADMIN — TROPICAMIDE 1 DROP: 10 SOLUTION/ DROPS OPHTHALMIC at 08:18

## 2025-06-05 RX ADMIN — MOXIFLOXACIN HYDROCHLORIDE 1 DROP: 5 SOLUTION/ DROPS OPHTHALMIC at 08:19

## 2025-06-05 RX ADMIN — DICLOFENAC SODIUM 1 DROP: 1 SOLUTION OPHTHALMIC at 07:43

## 2025-06-05 RX ADMIN — DICLOFENAC SODIUM 1 DROP: 1 SOLUTION OPHTHALMIC at 08:09

## 2025-06-05 RX ADMIN — PROPARACAINE HYDROCHLORIDE 1 DROP: 5 SOLUTION/ DROPS OPHTHALMIC at 07:43

## 2025-06-05 RX ADMIN — MOXIFLOXACIN HYDROCHLORIDE 1 DROP: 5 SOLUTION/ DROPS OPHTHALMIC at 08:09

## 2025-06-05 RX ADMIN — PHENYLEPHRINE HYDROCHLORIDE 1 DROP: 25 SOLUTION/ DROPS OPHTHALMIC at 07:43

## 2025-06-05 RX ADMIN — PHENYLEPHRINE HYDROCHLORIDE 1 DROP: 25 SOLUTION/ DROPS OPHTHALMIC at 08:18

## 2025-06-05 RX ADMIN — DICLOFENAC SODIUM 1 DROP: 1 SOLUTION OPHTHALMIC at 08:18

## 2025-06-05 RX ADMIN — PROPARACAINE HYDROCHLORIDE 1 DROP: 5 SOLUTION/ DROPS OPHTHALMIC at 08:09

## 2025-06-05 RX ADMIN — MIDAZOLAM 1 MG: 1 INJECTION INTRAMUSCULAR; INTRAVENOUS at 09:07

## 2025-06-05 RX ADMIN — MOXIFLOXACIN HYDROCHLORIDE 1 DROP: 5 SOLUTION/ DROPS OPHTHALMIC at 07:43

## 2025-06-05 RX ADMIN — TROPICAMIDE 1 DROP: 10 SOLUTION/ DROPS OPHTHALMIC at 07:43

## 2025-06-05 RX ADMIN — PHENYLEPHRINE HYDROCHLORIDE 1 DROP: 25 SOLUTION/ DROPS OPHTHALMIC at 08:09

## 2025-06-05 ASSESSMENT — ACTIVITIES OF DAILY LIVING (ADL)
ADLS_ACUITY_SCORE: 41

## 2025-06-05 ASSESSMENT — ENCOUNTER SYMPTOMS: DYSRHYTHMIAS: 1

## 2025-06-05 NOTE — ANESTHESIA PREPROCEDURE EVALUATION
Anesthesia Pre-Procedure Evaluation    Patient: Tracy Palomo   MRN: [id number] : 1940          Procedure : Procedure(s):  Phacoemulsification with standard intraocular lens implant with Istent         Past Medical History:   Diagnosis Date    Arthritis     Atrial fibrillation (H) 2014    related to colon surgery    Colon polyps     Diverticulosis of colon (without mention of hemorrhage)     Diverticulosis    DVT (deep vein thrombosis) in pregnancy 2014    after colon surgery    Other and unspecified hyperlipidemia     PE (pulmonary embolism) 2014    related to colon surgery    Unspecified essential hypertension       Past Surgical History:   Procedure Laterality Date    ARTHROPLASTY KNEE Right 2019    Procedure: Right total knee replacement;  Surgeon: Kaleb Pang DO;  [facility]    ARTHROPLASTY KNEE Left 2021    Procedure: left total knee replacement;  Surgeon: Kaleb Pang DO;  Location:  OR    COLONOSCOPY  09, 10,     [facility]    COLONOSCOPY N/A 2017    Procedure: COLONOSCOPY;  colonoscopy;  Surgeon: Elvis Quezada MD;  Location:  GI    EP ABLATION ATRIAL FLUTTER N/A 10/14/2024    Procedure: Ablation Atrial Flutter;  Surgeon: Rich Napier MD;  Location: [facility] CARDIAC CATH LAB    FLEXIBLE SIGMOIDOSCOPY      LAPAROTOMY EXPLORATORY N/A 10/20/2014    Procedure: LAPAROTOMY EXPLORATORY;  Surgeon: Miguel Oleary MD;  [facility]    LAPAROTOMY, LYSIS ADHESIONS, COMBINED N/A 10/20/2014    Procedure: COMBINED LAPAROTOMY, LYSIS ADHESIONS;  Surgeon: Miguel Oleary MD;  [facility]    OPEN REDUCTION INTERNAL FIXATION HIP BIPOLAR Left 3/16/2017    Procedure: OPEN REDUCTION INTERNAL FIXATION HIP BIPOLAR;  Surgeon: Kaleb Pang DO;  [facility]: [facility]    RELEASE TRIGGER FINGER Left 2021    Procedure: Left thumb trigger release;  Surgeon: Kaleb Pang DO;  [facility]     RESECT SMALL BOWEL WITHOUT OSTOMY N/A 10/20/2014    Procedure: RESECT SMALL BOWEL WITHOUT OSTOMY;  Surgeon: Miguel Oleary MD;  [facility]      Allergies   Allergen Reactions    No Known Allergies       Social History     Tobacco Use    Smoking status: Never    Smokeless tobacco: Never   Substance Use Topics    Alcohol use: No      Wt Readings from Last 1 Encounters:   05/23/25 84.4 kg (186 lb 1.6 oz)        Anesthesia Evaluation   Pt has had prior anesthetic. Type: General and MAC.        ROS/MED HX  ENT/Pulmonary: Comment: H/O PE      Neurologic: Comment: H/O subarachnoid hemorrhage       Cardiovascular: Comment: Patient has H/O a-fib and RVH failure    (+) Dyslipidemia hypertension- -   -  - -   Taking blood thinners                     dysrhythmias, a-fib,        Previous cardiac testing   Echo: Date: 1/8/19 Results:  1. The right ventricle is moderately dilated with moderately reduced systolic  function.  2. Normal left ventricular size and systolic function. Estimated LVEF 50-55%.  3. Trace tricuspid and mitral valve regurgitation.  4. The right ventricular systolic pressure is approximated at 16.3 mmHg plus  the right atrial pressure.  5. Normal size inferior vena cava.     On the previous study dated 11/2/2014, the right ventricle was normal in size  and systolic function.  Stress Test:  Date: Results:    ECG Reviewed:  Date: 11/10/20 Results:  Marked SB rate 49   Inferior infarct age unknown.   Cath:  Date: Results:      METS/Exercise Tolerance:     Hematologic:     (+) History of blood clots,    pt is anticoagulated, anemia,          Musculoskeletal:   (+)  arthritis,             GI/Hepatic: Comment: H/O colon polyps      Renal/Genitourinary:  - neg Renal ROS     Endo:     (+)               Obesity,       Psychiatric/Substance Use:  - neg psychiatric ROS     Infectious Disease:  - neg infectious disease ROS     Malignancy:  - neg malignancy ROS     Other:              Physical  Exam  Airway  Mallampati: II  TM distance: >3 FB    Cardiovascular - normal exam Comments: Patient has H/O a-fib and RVH failure  Dental   (+) Minor Abnormalities - some fillings, tiny chips      Pulmonary - normal exam      Neurological - normal exam  She appears awake, alert and oriented x3.    Other Findings       OUTSIDE LABS:  CBC:   Lab Results   Component Value Date    WBC 6.1 05/23/2025    WBC 6.5 10/21/2024    HGB 14.3 05/23/2025    HGB 14.0 10/21/2024    HCT 43.5 05/23/2025    HCT 43.3 10/21/2024     05/23/2025     10/21/2024     BMP:   Lab Results   Component Value Date     05/23/2025     10/21/2024    POTASSIUM 4.5 05/23/2025    POTASSIUM 4.3 10/21/2024    CHLORIDE 107 05/23/2025    CHLORIDE 106 10/21/2024    CO2 26 05/23/2025    CO2 28 10/21/2024    BUN 18.8 05/23/2025    BUN 18.6 10/21/2024    CR 0.90 05/23/2025    CR 1.06 (H) 10/21/2024    GLC 81 05/23/2025    GLC 64 (L) 10/21/2024     COAGS:   Lab Results   Component Value Date    PTT 37 08/05/2020    INR 2.48 (H) 05/23/2025     POC:   Lab Results   Component Value Date     (H) 10/22/2014     HEPATIC:   Lab Results   Component Value Date    ALBUMIN 4.2 04/20/2024    PROTTOTAL 6.7 04/20/2024    ALT 22 04/20/2024    AST 27 04/20/2024    ALKPHOS 107 04/20/2024    BILITOTAL 1.1 04/20/2024     OTHER:   Lab Results   Component Value Date    LACT 0.9 11/02/2014    A1C 5.8 10/20/2014    HARVEY 9.8 05/23/2025    MAG 2.1 06/14/2023    LIPASE 14 06/10/2023    TSH 2.32 02/29/2024    CRP <2.9 05/16/2022    SED 8 09/12/2024       Anesthesia Plan    ASA Status:  3      NPO Status: NPO Appropriate   Anesthesia Type: MAC.  Airway: natural airway.  Maintenance: N/A.   Techniques and Equipment:       - Monitoring Plan: standard ASA monitoring     Consents    Anesthesia Plan(s) and associated risks, benefits, and realistic alternatives discussed. Questions answered and patient/representative(s) expressed understanding.     - Discussed:      "- Discussed with:  Patient        - Pt is DNR/DNI Status: no DNR          Postoperative Care         Comments:                   BILLY Bravo CRNA    I have reviewed the pertinent notes and labs in the chart from the past 30 days and (re)examined the patient.  Any updates or changes from those notes are reflected in this note.    Clinically Significant Risk Factors Present on Admission                   # Hypertension: Noted on problem list           # Obesity: Estimated body mass index is 31.94 kg/m  as calculated from the following:    Height as of 5/23/25: 1.626 m (5' 4\").    Weight as of 5/23/25: 84.4 kg (186 lb 1.6 oz).                    "

## 2025-06-05 NOTE — PROGRESS NOTES
"ANTICOAGULATION  MANAGEMENT: Discharge Review    Tracy Palomo chart reviewed for anticoagulation continuity of care    Outpatient surgery/procedure on 6/5/25 for cataract procedure.    Discharge disposition: Home    Results:    No results for input(s): \"INR\", \"[id number]\", \"F2\", \"ALMWH\", \"AAUFH\" in the last 168 hours.  Anticoagulation inpatient management:     not applicable     Anticoagulation discharge instructions:     Warfarin dosing: home regimen continued   Bridging: No   INR goal change: No      Medication changes affecting anticoagulation: No    Additional factors affecting anticoagulation: No     PLAN     No adjustment to anticoagulation plan needed    Patient not contacted    No adjustment to Anticoagulation Calendar was required    Belle Berg, RN  6/5/2025  Anticoagulation Clinic  [facility] for routing messages: dewey SOARES  [facility] patient phone line: [phone]  "

## 2025-06-05 NOTE — ANESTHESIA CARE TRANSFER NOTE
Patient: Tracy Palomo    Procedure: Procedure(s):  Phacoemulsification with standard intraocular lens implant with Istent Right Eye       Diagnosis: Nuclear sclerotic cataract of right eye [H25.11]  Primary open angle glaucoma (POAG) of right eye, mild stage [H40.1111]  Diagnosis Additional Information: No value filed.    Anesthesia Type:   MAC     Note:    Oropharynx: spontaneously breathing  Level of Consciousness: awake  Oxygen Supplementation: room air    Independent Airway: airway patency satisfactory and stable  Dentition: dentition unchanged  Vital Signs Stable: post-procedure vital signs reviewed and stable  Report to RN Given: handoff report given  Patient transferred to: Phase II    Handoff Report: Identifed the Patient, Identified the Reponsible Provider, Reviewed the pertinent medical history, Discussed the surgical course, Reviewed Intra-OP anesthesia mangement and issues during anesthesia, Set expectations for post-procedure period and Allowed opportunity for questions and acknowledgement of understanding      Vitals:  Vitals Value Taken Time   /75 06/05/25 09:31   Temp     Pulse 47 06/05/25 09:31   Resp 18 06/05/25 09:31   SpO2 96 % 06/05/25 09:31   Vitals shown include unfiled device data.    Electronically Signed By: BILLY San CRNA  June 5, 2025  9:44 AM

## 2025-06-05 NOTE — BRIEF OP NOTE
[facility]    Brief Operative Note    Pre-operative diagnosis: Nuclear sclerotic cataract of right eye [H25.11]  Primary open angle glaucoma (POAG) of right eye, mild stage [H40.1111]  Post-operative diagnosis Same as pre-operative diagnosis    Procedure: Phacoemulsification with standard intraocular lens implant with Istent Right Eye, Right - Eye    Surgeon: Surgeons and Role:     * Alphonso Ellsworth MD - Primary  Anesthesia: MAC with Topical  Estimated Blood Loss: None    Drains: None  Specimens: * No specimens in log *  Findings:   None.  Complications: None.  Implants:   Implant Name Type Inv. Item Serial No.  Lot No. LRB No. Used Action   EYE IMP IOL DUANE PCL TECNIS ZCB00 21.0 - [id number] Lens/Eye Implant EYE IMP IOL DUANE PCL TECNIS ZCB00 21.0 [id number] [employer]  Right 1 Implanted   iStent Inject W Lens/Eye Implant  [id number] Right 1 Implanted

## 2025-06-05 NOTE — DISCHARGE INSTRUCTIONS
[facility]   Adult Discharge Orders & Instructions     For 24 hours after surgery    Get plenty of rest.  A responsible adult must stay with you for at least 24 hours after you leave the hospital.   Do not drive or use heavy equipment.  If you have weakness or tingling, don't drive or use heavy equipment until this feeling goes away.  Do not drink alcohol.  Avoid strenuous or risky activities.  Ask for help when climbing stairs.   You may feel lightheaded.  If so, sit for a few minutes before standing.  Have someone help you get up.   You may have a slight fever. Call the doctor if your fever is over 100 F (37.7 C) (taken under the tongue) or lasts longer than 24 hours.  You may have a dry mouth, a sore throat, muscle aches or trouble sleeping.  These should go away after 24 hours.  Do not make important or legal decisions.  We don t expect you to have any problems from the surgery or treatment you had today. Just in case, here s what to do if you have pain, upset stomach (nausea), bleeding or infection:  Pain:  Take medicines your doctor has prescribed or over-the-counter medicine they have suggested. Resting and using ice packs can help, too. For surgery on an arm or leg, raise it on a pillow to ease swelling. Call your doctor if these methods don t work.  Copyright Maico Quintero, Licensed under [employer]  Upset stomach (nausea):  Take anti-nausea medicine approved by your doctor. Drink clear liquids like apple juice, ginger ale, broth or 7-Up. Be sure to drink enough fluids. Rest can help, too. Move to normal foods when you re ready.   Bleeding:  In the first 24 hours, you may see a little blood on your dressing, about the size of a quarter. You don t need to worry about this much blood, but if the blood spot keeps getting bigger:  Put pressure on the wound if you can, AND  Call your doctor.  Copyright [employer], Licensed under [employer]  Fever/Infection: Please call  your doctor if you have any of these signs:  Redness  Swelling  Wound feels warm  Pain gets worse  Bad-smelling fluid leaks from wound  Fever or chills  Call your doctor for any of the followin.  It has been over 8 to 10 hours since surgery and you are still not able to urinate (pass water).    2.  Headache for over 24 hours.    3.  Numbness, tingling or weakness in your legs the day after surgery (if you had spinal anesthesia).    For patient questions :  Any specialty not related to the above groups: [phone]

## 2025-06-05 NOTE — ANESTHESIA POSTPROCEDURE EVALUATION
Patient: Tracy Palomo    Procedure: Procedure(s):  Phacoemulsification with standard intraocular lens implant with Istent Right Eye       Anesthesia Type:  MAC    Note:  Disposition: Outpatient   Postop Pain Control: Uneventful            Sign Out: Well controlled pain   PONV: No   Neuro/Psych: Uneventful            Sign Out: Acceptable/Baseline neuro status   Airway/Respiratory: Uneventful            Sign Out: Acceptable/Baseline resp. status   CV/Hemodynamics: Uneventful            Sign Out: Acceptable CV status; No obvious hypovolemia; No obvious fluid overload   Other NRE: NONE   DID A NON-ROUTINE EVENT OCCUR? No           Last vitals:  Vitals Value Taken Time   /74 06/05/25 09:49   Temp     Pulse 46 06/05/25 09:49   Resp 18 06/05/25 09:49   SpO2 96 % 06/05/25 09:54   Vitals shown include unfiled device data.    Electronically Signed By: BILLY Bravo CRNA  June 5, 2025  9:56 AM

## 2025-06-09 NOTE — OP NOTE
[facility]  Ophthalmology Operative Note    PREOPERATIVE DIAGNOSES:   Cataract, Right eye.   Mild POAG, Right eye    POSTOPERATIVE DIAGNOSES: Same    OPERATION: Combined cataract extraction with placement of posterior chamber intraocular lens and iStent implant in the Right eye.     ANESTHESIA: MAC combined with topical     INDICATIONS FOR PROCEDURE: Tracy Palomo was seen in the [facility] for decreased visual acuity in the Right eye. The patient was found to have a visually significant cataract in the Right eye. The patient also had mild POAG and will benefit from additional IOP lowering. The risks, benefits, alternatives and goals of cataract extraction were discussed with the patient, and after adequate discussion the patient understood and agreed to these, and a signed informed consent was obtained prior to the procedure.     DESCRIPTION OF PROCEDURE: After proper patient identification, topical anesthesia was applied to the Right eye. The patient was brought to the operating room and the Right eye was prepped and draped in the usual sterile fashion for intraocular surgery. A lid speculum was placed in the Right eye. A paracentesis was then created and the anterior chamber was filled with 1% non-preserved lidocaine followed by Viscoat. A clear corneal incision was then created temporally using a 2.4mm keratome. A continuous curvilinear capsulorrhexis was then created using a cystotome and Utrata forceps. Hydrodissection was carried out with BSS on a Jessica cannula and the lens rotated freely within the capsular bag. Phacoemulsification was then carried out using the stop and chop technique. Residual cortical material was removed using the I&A handpiece. The capsular bag was then filled with Provisc and a ZCB00 +21.0 diopter intraocular lens was then injected into the capsular bag. The lens showed good centration and stability. At this time the patient's head and the  operating microscope were repositioned for direct gonioscopy. Utilizing a direct gonioprism, the nasal angle was visualized. Additional Provisc was injected to improve visualization of the nasal angle structures. The iStent Inject handpiece was then introduced into the eye and 2 stents were placed into Schlemm's canal within the nasal angle approximately 2 clock hours apart. The iStent implants were checked and were both properly seated. A small amount of blood reflux was noted. The patient's head and operating microscope were then returned to their original positions. Residual viscoelastic was removed using the I&A handpiece. The wound was then hydrated and the anterior chamber reformed. Intracameral Moxifloxacin was then injected into the anterior chamber. The wounds were then checked and found to be sealed. Topical Prednisolone drops were placed in the patient's Right eye followed by a Medrano shield over the top of this. The patient tolerated the procedure well without complications and was told to follow up in the clinic in the next postoperative day.     Implant Name Type Inv. Item Serial No.  Lot No. LRB No. Used Action   EYE IMP IOL DUANE PCL TECNIS ZCB00 21.0 - [id number] Lens/Eye Implant EYE IMP IOL DUANE PCL TECNIS ZCB00 21.0 [id number] [employer]  Right 1 Implanted   iStent Inject W Lens/Eye Implant  [id number] [facility] [id number] Right 1 Implanted        Alphonso Ellsworth MD

## 2025-06-19 ENCOUNTER — ANESTHESIA (OUTPATIENT)
Dept: SURGERY | Facility: CLINIC | Age: 85
End: 2025-06-19
Payer: MEDICARE

## 2025-06-19 ENCOUNTER — DOCUMENTATION ONLY (OUTPATIENT)
Dept: ANTICOAGULATION | Facility: CLINIC | Age: 85
End: 2025-06-19
Payer: MEDICARE

## 2025-06-19 ENCOUNTER — HOSPITAL ENCOUNTER (OUTPATIENT)
Facility: CLINIC | Age: 85
Discharge: HOME | End: 2025-06-19
Attending: STUDENT IN AN ORGANIZED HEALTH CARE EDUCATION/TRAINING PROGRAM | Admitting: STUDENT IN AN ORGANIZED HEALTH CARE EDUCATION/TRAINING PROGRAM
Payer: MEDICARE

## 2025-06-19 ENCOUNTER — ANESTHESIA EVENT (OUTPATIENT)
Dept: SURGERY | Facility: CLINIC | Age: 85
End: 2025-06-19
Payer: MEDICARE

## 2025-06-19 VITALS
RESPIRATION RATE: 16 BRPM | HEART RATE: 47 BPM | DIASTOLIC BLOOD PRESSURE: 83 MMHG | TEMPERATURE: 97.1 F | OXYGEN SATURATION: 96 % | SYSTOLIC BLOOD PRESSURE: 136 MMHG

## 2025-06-19 PROCEDURE — 761N000008 HC RECOVERY CATRACT PACKAGE: Performed by: STUDENT IN AN ORGANIZED HEALTH CARE EDUCATION/TRAINING PROGRAM

## 2025-06-19 PROCEDURE — 250N000009 HC RX 250: Performed by: STUDENT IN AN ORGANIZED HEALTH CARE EDUCATION/TRAINING PROGRAM

## 2025-06-19 PROCEDURE — 250N000011 HC RX IP 250 OP 636: Performed by: STUDENT IN AN ORGANIZED HEALTH CARE EDUCATION/TRAINING PROGRAM

## 2025-06-19 PROCEDURE — C1783 OCULAR IMP, AQUEOUS DRAIN DE: HCPCS | Performed by: STUDENT IN AN ORGANIZED HEALTH CARE EDUCATION/TRAINING PROGRAM

## 2025-06-19 PROCEDURE — 370N000004 HC ANESTHESIA CATARACT PACKAGE: Performed by: STUDENT IN AN ORGANIZED HEALTH CARE EDUCATION/TRAINING PROGRAM

## 2025-06-19 PROCEDURE — 250N000011 HC RX IP 250 OP 636: Performed by: NURSE ANESTHETIST, CERTIFIED REGISTERED

## 2025-06-19 PROCEDURE — V2632 POST CHMBR INTRAOCULAR LENS: HCPCS | Performed by: STUDENT IN AN ORGANIZED HEALTH CARE EDUCATION/TRAINING PROGRAM

## 2025-06-19 PROCEDURE — 360N000007 HC CATARACT SURGICAL PACKAGE: Performed by: STUDENT IN AN ORGANIZED HEALTH CARE EDUCATION/TRAINING PROGRAM

## 2025-06-19 DEVICE — EYE IMP IOL AMO PCL TECNIS ZCB00 20.5: Type: IMPLANTABLE DEVICE | Site: EYE | Status: FUNCTIONAL

## 2025-06-19 DEVICE — STENT 50UM SYM STEARALKONIUM 360UM 230UM 80UM G2W-US: Type: IMPLANTABLE DEVICE | Site: EYE | Status: FUNCTIONAL

## 2025-06-19 RX ORDER — ONDANSETRON 4 MG/1
4 TABLET, ORALLY DISINTEGRATING ORAL EVERY 30 MIN PRN
Status: CANCELLED | OUTPATIENT
Start: 2025-06-19

## 2025-06-19 RX ORDER — ONDANSETRON 2 MG/ML
4 INJECTION INTRAMUSCULAR; INTRAVENOUS EVERY 30 MIN PRN
Status: CANCELLED | OUTPATIENT
Start: 2025-06-19

## 2025-06-19 RX ORDER — POVIDONE-IODINE 5 %
1 SOLUTION, NON-ORAL OPHTHALMIC (EYE) ONCE
Status: DISCONTINUED | OUTPATIENT
Start: 2025-06-19 | End: 2025-06-19 | Stop reason: HOSPADM

## 2025-06-19 RX ORDER — PROPARACAINE HYDROCHLORIDE 5 MG/ML
1 SOLUTION/ DROPS OPHTHALMIC ONCE
Status: DISCONTINUED | OUTPATIENT
Start: 2025-06-19 | End: 2025-06-19 | Stop reason: HOSPADM

## 2025-06-19 RX ORDER — MOXIFLOXACIN 5 MG/ML
1 SOLUTION/ DROPS OPHTHALMIC
Status: COMPLETED | OUTPATIENT
Start: 2025-06-19 | End: 2025-06-19

## 2025-06-19 RX ORDER — DEXAMETHASONE SODIUM PHOSPHATE 10 MG/ML
4 INJECTION, SOLUTION INTRAMUSCULAR; INTRAVENOUS
Status: CANCELLED | OUTPATIENT
Start: 2025-06-19

## 2025-06-19 RX ORDER — PROPARACAINE HYDROCHLORIDE 5 MG/ML
1 SOLUTION/ DROPS OPHTHALMIC ONCE
Status: COMPLETED | OUTPATIENT
Start: 2025-06-19 | End: 2025-06-19

## 2025-06-19 RX ORDER — NALOXONE HYDROCHLORIDE 0.4 MG/ML
0.1 INJECTION, SOLUTION INTRAMUSCULAR; INTRAVENOUS; SUBCUTANEOUS
Status: CANCELLED | OUTPATIENT
Start: 2025-06-19

## 2025-06-19 RX ORDER — TROPICAMIDE 10 MG/ML
1 SOLUTION/ DROPS OPHTHALMIC
Status: COMPLETED | OUTPATIENT
Start: 2025-06-19 | End: 2025-06-19

## 2025-06-19 RX ORDER — DICLOFENAC SODIUM 1 MG/ML
1 SOLUTION/ DROPS OPHTHALMIC
Status: COMPLETED | OUTPATIENT
Start: 2025-06-19 | End: 2025-06-19

## 2025-06-19 RX ORDER — PHENYLEPHRINE HYDROCHLORIDE 25 MG/ML
1 SOLUTION/ DROPS OPHTHALMIC
Status: COMPLETED | OUTPATIENT
Start: 2025-06-19 | End: 2025-06-19

## 2025-06-19 RX ADMIN — MIDAZOLAM 1 MG: 1 INJECTION INTRAMUSCULAR; INTRAVENOUS at 08:37

## 2025-06-19 RX ADMIN — PHENYLEPHRINE HYDROCHLORIDE 1 DROP: 25 SOLUTION/ DROPS OPHTHALMIC at 07:51

## 2025-06-19 RX ADMIN — PROPARACAINE HYDROCHLORIDE 1 DROP: 5 SOLUTION/ DROPS OPHTHALMIC at 07:50

## 2025-06-19 RX ADMIN — PHENYLEPHRINE HYDROCHLORIDE 1 DROP: 25 SOLUTION/ DROPS OPHTHALMIC at 08:00

## 2025-06-19 RX ADMIN — MOXIFLOXACIN HYDROCHLORIDE 1 DROP: 5 SOLUTION/ DROPS OPHTHALMIC at 08:00

## 2025-06-19 RX ADMIN — TROPICAMIDE 1 DROP: 10 SOLUTION/ DROPS OPHTHALMIC at 08:04

## 2025-06-19 RX ADMIN — DICLOFENAC SODIUM 1 DROP: 1 SOLUTION OPHTHALMIC at 07:51

## 2025-06-19 RX ADMIN — DICLOFENAC SODIUM 1 DROP: 1 SOLUTION OPHTHALMIC at 08:00

## 2025-06-19 RX ADMIN — TROPICAMIDE 1 DROP: 10 SOLUTION/ DROPS OPHTHALMIC at 08:00

## 2025-06-19 RX ADMIN — PHENYLEPHRINE HYDROCHLORIDE 1 DROP: 25 SOLUTION/ DROPS OPHTHALMIC at 08:04

## 2025-06-19 RX ADMIN — MOXIFLOXACIN HYDROCHLORIDE 1 DROP: 5 SOLUTION/ DROPS OPHTHALMIC at 08:04

## 2025-06-19 RX ADMIN — MOXIFLOXACIN HYDROCHLORIDE 1 DROP: 5 SOLUTION/ DROPS OPHTHALMIC at 07:51

## 2025-06-19 RX ADMIN — DICLOFENAC SODIUM 1 DROP: 1 SOLUTION OPHTHALMIC at 08:04

## 2025-06-19 RX ADMIN — TROPICAMIDE 1 DROP: 10 SOLUTION/ DROPS OPHTHALMIC at 07:51

## 2025-06-19 ASSESSMENT — ENCOUNTER SYMPTOMS: DYSRHYTHMIAS: 1

## 2025-06-19 ASSESSMENT — ACTIVITIES OF DAILY LIVING (ADL)
ADLS_ACUITY_SCORE: 50
ADLS_ACUITY_SCORE: 50

## 2025-06-19 NOTE — OR NURSING
Discharge criteria met. Belongings including home meds returned to patient. Discharge instruction given and understood by patient and daughter.

## 2025-06-19 NOTE — OP NOTE
[facility]  Ophthalmology Operative Note     PREOPERATIVE DIAGNOSES:   Cataract, Left eye.   Mild POAG, Left eye     POSTOPERATIVE DIAGNOSES: Same     OPERATION: Combined cataract extraction with placement of posterior chamber intraocular lens and iStent implant in the Left eye.      ANESTHESIA: MAC combined with topical      INDICATIONS FOR PROCEDURE: Tracy Palomo was seen in the [facility] for decreased visual acuity in the left  eye. The patient was found to have a visually significant cataract in the left eye. The patient also had mild POAG and will benefit from additional IOP lowering. The risks, benefits, alternatives and goals of cataract extraction were discussed with the patient, and after adequate discussion the patient understood and agreed to these, and a signed informed consent was obtained prior to the procedure.      DESCRIPTION OF PROCEDURE: After proper patient identification, topical anesthesia was applied to the Right eye. The patient was brought to the operating room and the left eye was prepped and draped in the usual sterile fashion for intraocular surgery. A lid speculum was placed in the left eye. A paracentesis was then created and the anterior chamber was filled with 1% non-preserved lidocaine followed by Viscoat. A clear corneal incision was then created temporally using a 2.4mm keratome. A continuous curvilinear capsulorrhexis was then created using a cystotome and Utrata forceps. Hydrodissection was carried out with BSS on a Jessica cannula and the lens rotated freely within the capsular bag. Phacoemulsification was then carried out using the stop and chop technique. Residual cortical material was removed using the I&A handpiece. The capsular bag was then filled with Provisc and a ZCB00 +20.5 diopter intraocular lens was then injected into the capsular bag. The lens showed good centration and stability. At this time the patient's head and the  operating microscope were repositioned for direct gonioscopy. Utilizing a direct gonioprism, the nasal angle was visualized. Additional Provisc was injected to improve visualization of the nasal angle structures. The iStent Inject handpiece was then introduced into the eye and 2 istents were placed into Schlemm's canal within the nasal angle approximately 2 clock hours apart. The iStent implants were checked and were both properly seated. A small amount of blood reflux was noted. The patient's head and operating microscope were then returned to their original positions. Residual viscoelastic was removed using the I&A handpiece. The wound was then hydrated and the anterior chamber reformed. Intracameral Moxifloxacin was then injected into the anterior chamber. The wounds were then checked and found to be sealed. Topical Prednisolone drops were placed in the patient's left eye followed by a Medrano shield over the top of this. The patient tolerated the procedure well without complications and was told to follow up in the clinic in the next postoperative day.         Implant Name Type Inv. Item Serial No.  Lot No. LRB No. Used Action   STENT 50UM SYM STEARALKONIUM 360UM 230UM 80UM G2W-US - [id number] Lens/Eye Implant STENT 50UM SYM STEARALKONIUM 360UM 230UM 80UM G2W-US [id number]   Left 1 Implanted   EYE IMP IOL DUANE PCL TECNIS ZCB00 20.5 - [id number] Lens/Eye Implant EYE IMP IOL DUANE PCL TECNIS ZCB00 20.5 [id number] [employer]  Left 1 Implanted        Alphonso Ellsworth MD

## 2025-06-19 NOTE — PROGRESS NOTES
"ANTICOAGULATION  MANAGEMENT: Discharge Review    Tracy Palomo chart reviewed for anticoagulation continuity of care    Outpatient surgery/procedure on 6/19/25 for cataract surgery.    Discharge disposition: Home    Results:    No results for input(s): \"INR\", \"[id number]\", \"F2\", \"ALMWH\", \"AAUFH\" in the last 168 hours.  Anticoagulation inpatient management:     not applicable     Anticoagulation discharge instructions:     Warfarin dosing: home regimen continued   Bridging: No   INR goal change: No      Medication changes affecting anticoagulation: No    Additional factors affecting anticoagulation: No did not need to hold warfarin for the procedure      PLAN     No adjustment to anticoagulation plan needed    Recommended follow up is scheduled  Patient not contacted    No adjustment to Anticoagulation Calendar was required    Belle Berg RN  6/19/2025  Anticoagulation Clinic  [employer] [place] for routing messages: dewey SOARES  [facility] patient phone line: [phone]  "

## 2025-06-19 NOTE — ANESTHESIA POSTPROCEDURE EVALUATION
Patient: Tracy Palomo    Procedure: Procedure(s):  PHACOEMULSIFICATION, CATARACT, WITH STANDARD INTRAOCULAR LENS IMPLANT INSERTION       Anesthesia Type:  MAC    Note:  Disposition: Outpatient   Postop Pain Control: Uneventful            Sign Out: Well controlled pain   PONV: No   Neuro/Psych: Uneventful            Sign Out: Acceptable/Baseline neuro status   Airway/Respiratory: Uneventful            Sign Out: Acceptable/Baseline resp. status   CV/Hemodynamics: Uneventful            Sign Out: Acceptable CV status   Other NRE: NONE   DID A NON-ROUTINE EVENT OCCUR? No    Event details/Postop Comments:  Pt was happy with anesthesia care.  No complications.  I will follow up with the pt if needed.           Last vitals:  Vitals Value Taken Time   /69 06/19/25 09:20   Temp     Pulse 47 06/19/25 09:18   Resp 16 06/19/25 09:20   SpO2 96 % 06/19/25 09:26   Vitals shown include unfiled device data.    Electronically Signed By: BILLY Poole CRNA  June 19, 2025  9:27 AM

## 2025-06-19 NOTE — ANESTHESIA CARE TRANSFER NOTE
Patient: Tracy Palomo    Procedure: Procedure(s):  PHACOEMULSIFICATION, CATARACT, WITH STANDARD INTRAOCULAR LENS IMPLANT INSERTION       Diagnosis: Nuclear sclerotic cataract of left eye [H25.12]  Primary open angle glaucoma of left eye, mild stage [H40.1121]  Diagnosis Additional Information: No value filed.    Anesthesia Type:   MAC     Note:    Oropharynx: oropharynx clear of all foreign objects and spontaneously breathing  Level of Consciousness: awake  Oxygen Supplementation: room air    Independent Airway: airway patency satisfactory and stable  Dentition: dentition unchanged  Vital Signs Stable: post-procedure vital signs reviewed and stable  Report to RN Given: handoff report given  Patient transferred to: Phase II    Handoff Report: Identifed the Patient, Identified the Reponsible Provider, Reviewed the pertinent medical history, Discussed the surgical course, Reviewed Intra-OP anesthesia mangement and issues during anesthesia, Set expectations for post-procedure period and Allowed opportunity for questions and acknowledgement of understanding      Vitals:  Vitals Value Taken Time   BP     Temp     Pulse     Resp     SpO2         Electronically Signed By: BILLY Poole CRNA  June 19, 2025  9:04 AM

## 2025-06-19 NOTE — ANESTHESIA PREPROCEDURE EVALUATION
Anesthesia Pre-Procedure Evaluation    Patient: Tracy Palomo   MRN: [id number] : 1940          Procedure : Procedure(s):  Phacoemulsification with standard intraocular lens implant with Istent         Past Medical History:   Diagnosis Date    Arthritis     Atrial fibrillation (H) 2014    related to colon surgery    Colon polyps     Diverticulosis of colon (without mention of hemorrhage)     Diverticulosis    DVT (deep vein thrombosis) in pregnancy 2014    after colon surgery    Other and unspecified hyperlipidemia     PE (pulmonary embolism) 2014    related to colon surgery    Unspecified essential hypertension       Past Surgical History:   Procedure Laterality Date    ARTHROPLASTY KNEE Right 2019    Procedure: Right total knee replacement;  Surgeon: Kaleb Pang DO;  Location: [facility]    ARTHROPLASTY KNEE Left 2021    Procedure: left total knee replacement;  Surgeon: Kaleb Pnag DO;  Location:  OR    COLONOSCOPY  09, 10,     [facility]    COLONOSCOPY N/A 2017    Procedure: COLONOSCOPY;  colonoscopy;  Surgeon: Elvis Quezada MD;  Location:  GI    EP ABLATION ATRIAL FLUTTER N/A 10/14/2024    Procedure: Ablation Atrial Flutter;  Surgeon: Rich Napier MD;  Location: [facility] CARDIAC CATH LAB    FLEXIBLE SIGMOIDOSCOPY      LAPAROTOMY EXPLORATORY N/A 10/20/2014    Procedure: LAPAROTOMY EXPLORATORY;  Surgeon: Miguel Oleary MD;  Location: [facility]    LAPAROTOMY, LYSIS ADHESIONS, COMBINED N/A 10/20/2014    Procedure: COMBINED LAPAROTOMY, LYSIS ADHESIONS;  Surgeon: Miguel Oleary MD;  Location: [facility]    OPEN REDUCTION INTERNAL FIXATION HIP BIPOLAR Left 3/16/2017    Procedure: OPEN REDUCTION INTERNAL FIXATION HIP BIPOLAR;  Surgeon: Kaleb Pang DO;  Location: [facility]    PHACOEMULSIFICATION WITH STANDARD INTRAOCULAR LENS IMPLANT Right 2025    Procedure: Phacoemulsification with standard intraocular  lens implant with iStent Right Eye;  Surgeon: Alphonso Ellsworth MD;  Location: [facility]    RELEASE TRIGGER FINGER Left 1/12/2021    Procedure: Left thumb trigger release;  Surgeon: Kaleb Pang DO;  Location: [facility]    RESECT SMALL BOWEL WITHOUT OSTOMY N/A 10/20/2014    Procedure: RESECT SMALL BOWEL WITHOUT OSTOMY;  Surgeon: Miguel Oleary MD;  Location: [facility]      Allergies   Allergen Reactions    No Known Allergies       Social History     Tobacco Use    Smoking status: Never    Smokeless tobacco: Never   Substance Use Topics    Alcohol use: No      Wt Readings from Last 1 Encounters:   05/23/25 84.4 kg (186 lb 1.6 oz)        Anesthesia Evaluation   Pt has had prior anesthetic. Type: General and MAC.        ROS/MED HX  ENT/Pulmonary: Comment: H/O PE      Neurologic: Comment: H/O subarachnoid hemorrhage       Cardiovascular: Comment: Patient has H/O a-fib and RVH failure    (+) Dyslipidemia hypertension- -   -  - -   Taking blood thinners                     dysrhythmias, a-fib,        Previous cardiac testing   Echo: Date: 1/8/19 Results:  1. The right ventricle is moderately dilated with moderately reduced systolic  function.  2. Normal left ventricular size and systolic function. Estimated LVEF 50-55%.  3. Trace tricuspid and mitral valve regurgitation.  4. The right ventricular systolic pressure is approximated at 16.3 mmHg plus  the right atrial pressure.  5. Normal size inferior vena cava.     On the previous study dated 11/2/2014, the right ventricle was normal in size  and systolic function.  Stress Test:  Date: Results:    ECG Reviewed:  Date: 11/10/20 Results:  Marked SB rate 49   Inferior infarct age unknown.   Cath:  Date: Results:      METS/Exercise Tolerance:     Hematologic:     (+) History of blood clots,    pt is anticoagulated, anemia,          Musculoskeletal:   (+)  arthritis,             GI/Hepatic: Comment: H/O colon polyps      Renal/Genitourinary:  - neg Renal ROS     Endo:      (+)               Obesity,       Psychiatric/Substance Use:  - neg psychiatric ROS     Infectious Disease:  - neg infectious disease ROS     Malignancy:  - neg malignancy ROS     Other:              Physical Exam  Airway  Mallampati: II  TM distance: >3 FB    Cardiovascular - normal exam Comments: Patient has H/O a-fib and RVH failure  Dental   (+) Minor Abnormalities - some fillings, tiny chips      Pulmonary - normal exam      Neurological - normal exam  She appears awake, alert and oriented x3.    Other Findings       OUTSIDE LABS:  CBC:   Lab Results   Component Value Date    WBC 6.1 05/23/2025    WBC 6.5 10/21/2024    HGB 14.3 05/23/2025    HGB 14.0 10/21/2024    HCT 43.5 05/23/2025    HCT 43.3 10/21/2024     05/23/2025     10/21/2024     BMP:   Lab Results   Component Value Date     05/23/2025     10/21/2024    POTASSIUM 4.5 05/23/2025    POTASSIUM 4.3 10/21/2024    CHLORIDE 107 05/23/2025    CHLORIDE 106 10/21/2024    CO2 26 05/23/2025    CO2 28 10/21/2024    BUN 18.8 05/23/2025    BUN 18.6 10/21/2024    CR 0.90 05/23/2025    CR 1.06 (H) 10/21/2024    GLC 81 05/23/2025    GLC 64 (L) 10/21/2024     COAGS:   Lab Results   Component Value Date    PTT 37 08/05/2020    INR 2.48 (H) 05/23/2025     POC:   Lab Results   Component Value Date     (H) 10/22/2014     HEPATIC:   Lab Results   Component Value Date    ALBUMIN 4.2 04/20/2024    PROTTOTAL 6.7 04/20/2024    ALT 22 04/20/2024    AST 27 04/20/2024    ALKPHOS 107 04/20/2024    BILITOTAL 1.1 04/20/2024     OTHER:   Lab Results   Component Value Date    LACT 0.9 11/02/2014    A1C 5.8 10/20/2014    HARVEY 9.8 05/23/2025    MAG 2.1 06/14/2023    LIPASE 14 06/10/2023    TSH 2.32 02/29/2024    CRP <2.9 05/16/2022    SED 8 09/12/2024       Anesthesia Plan    ASA Status:  3      NPO Status: NPO Appropriate   Anesthesia Type: MAC.  Airway: natural airway.  Maintenance: N/A.   Techniques and Equipment:       - Monitoring Plan: standard ASA  "monitoring     Consents    Anesthesia Plan(s) and associated risks, benefits, and realistic alternatives discussed. Questions answered and patient/representative(s) expressed understanding.     - Discussed:     - Discussed with:  Patient        - Pt is DNR/DNI Status: no DNR          Postoperative Care         Comments:    Other Comments: The risks and benefits of anesthesia, and the alternatives where applicable, have been discussed with the patient, and they wish to proceed.                  David Bill, APRN CRNA    I have reviewed the pertinent notes and labs in the chart from the past 30 days and (re)examined the patient.  Any updates or changes from those notes are reflected in this note.    Clinically Significant Risk Factors Present on Admission                   # Hypertension: Noted on problem list           # Obesity: Estimated body mass index is 31.94 kg/m  as calculated from the following:    Height as of 5/23/25: 1.626 m (5' 4\").    Weight as of 5/23/25: 84.4 kg (186 lb 1.6 oz).                    "

## 2025-07-01 ENCOUNTER — ANTICOAGULATION THERAPY VISIT (OUTPATIENT)
Dept: ANTICOAGULATION | Facility: CLINIC | Age: 85
End: 2025-07-01

## 2025-07-01 ENCOUNTER — RESULTS FOLLOW-UP (OUTPATIENT)
Dept: ANTICOAGULATION | Facility: CLINIC | Age: 85
End: 2025-07-01

## 2025-07-01 ENCOUNTER — LAB (OUTPATIENT)
Dept: LAB | Facility: CLINIC | Age: 85
End: 2025-07-01
Payer: MEDICARE

## 2025-07-01 DIAGNOSIS — Z79.01 LONG TERM CURRENT USE OF ANTICOAGULANT THERAPY: ICD-10-CM

## 2025-07-01 DIAGNOSIS — I27.82 CHRONIC PULMONARY EMBOLISM WITHOUT ACUTE COR PULMONALE, UNSPECIFIED PULMONARY EMBOLISM TYPE (H): ICD-10-CM

## 2025-07-01 DIAGNOSIS — I26.99 OTHER PULMONARY EMBOLISM WITHOUT ACUTE COR PULMONALE, UNSPECIFIED CHRONICITY (H): ICD-10-CM

## 2025-07-01 DIAGNOSIS — Z79.01 LONG TERM (CURRENT) USE OF ANTICOAGULANTS: ICD-10-CM

## 2025-07-01 DIAGNOSIS — I27.82 CHRONIC PULMONARY EMBOLISM WITHOUT ACUTE COR PULMONALE, UNSPECIFIED PULMONARY EMBOLISM TYPE (H): Primary | ICD-10-CM

## 2025-07-01 LAB — INR BLD: 3.9 (ref 0.9–1.1)

## 2025-07-01 PROCEDURE — 85610 PROTHROMBIN TIME: CPT

## 2025-07-01 PROCEDURE — 36416 COLLJ CAPILLARY BLOOD SPEC: CPT

## 2025-07-01 NOTE — PROGRESS NOTES
ANTICOAGULATION MANAGEMENT     Tracy Palomo 85 year old female is on warfarin with supratherapeutic INR result. (Goal INR 2.0-3.0)    Recent labs: (last 7 days)     07/01/25  0851   INR 3.9*       ASSESSMENT     Source(s): Chart Review and Patient/Caregiver Call     Warfarin doses taken: Warfarin taken as instructed  Diet: Decreased greens/vitamin K in diet; plans to resume previous intake  Medication/supplement changes: None noted  New illness, injury, or hospitalization: Yes: has had some eye issues and has been tired after her procedures/treatments, diet has been a little different.  Plans to get back on track   Signs or symptoms of bleeding or clotting: No  Previous result: Therapeutic last 2(+) visits  Additional findings: None       PLAN     Recommended plan for temporary change(s) affecting INR     Dosing Instructions: hold dose then continue your current warfarin dose with next INR in 2 weeks       Summary  As of 7/1/2025      Full warfarin instructions:  7/1: Hold; Otherwise 3.75 mg every Wed, Sat; 2.5 mg all other days   Next INR check:  7/15/2025               Telephone call with Tracy who verbalizes understanding and agrees to plan  My chart message sent also     Lab visit scheduled    Education provided: Goal range and lab monitoring: goal range and significance of current result  Dietary considerations: importance of consistent vitamin K intake  Contact [phone] with any changes, questions or concerns.     Plan made per [facility] anticoagulation protocol    Belle Berg RN  7/1/2025  Anticoagulation Clinic  [facility] [employer] for routing messages: dewey SOARES  [facility] patient phone line: [phone]        _______________________________________________________________________     Anticoagulation Episode Summary       Current INR goal:  2.0-3.0   TTR:  65.4% (1 y)   Target end date:  Indefinite   Send INR reminders to:  GURVINDER SOARES    Indications    History of Pulmonary emboli with  probable pulmonary infarction [I26.99]  Long term current use of anticoagulant therapy [Z79.01]  Other pulmonary embolism without acute cor pulmonale  unspecified chronicity (H) [I26.99]  Chronic pulmonary embolism without acute cor pulmonale  unspecified pulmonary embolism type (H) [I27.82]             Comments:  --             Anticoagulation Care Providers       Provider Role Specialty Phone number    Chad Betts MD Referring Internal Medicine [phone]    Gerard Medrano MD Responsible Family Medicine [phone]

## 2025-07-15 ENCOUNTER — HOSPITAL ENCOUNTER (EMERGENCY)
Facility: CLINIC | Age: 85
Discharge: HOME | End: 2025-07-15
Attending: EMERGENCY MEDICINE
Payer: MEDICARE

## 2025-07-15 ENCOUNTER — ANTICOAGULATION THERAPY VISIT (OUTPATIENT)
Dept: ANTICOAGULATION | Facility: CLINIC | Age: 85
End: 2025-07-15

## 2025-07-15 ENCOUNTER — APPOINTMENT (OUTPATIENT)
Dept: GENERAL RADIOLOGY | Facility: CLINIC | Age: 85
End: 2025-07-15
Attending: EMERGENCY MEDICINE
Payer: MEDICARE

## 2025-07-15 ENCOUNTER — APPOINTMENT (OUTPATIENT)
Dept: CT IMAGING | Facility: CLINIC | Age: 85
End: 2025-07-15
Attending: EMERGENCY MEDICINE
Payer: MEDICARE

## 2025-07-15 ENCOUNTER — RESULTS FOLLOW-UP (OUTPATIENT)
Dept: ANTICOAGULATION | Facility: CLINIC | Age: 85
End: 2025-07-15

## 2025-07-15 ENCOUNTER — LAB (OUTPATIENT)
Dept: LAB | Facility: CLINIC | Age: 85
End: 2025-07-15
Payer: MEDICARE

## 2025-07-15 VITALS
SYSTOLIC BLOOD PRESSURE: 157 MMHG | HEART RATE: 50 BPM | DIASTOLIC BLOOD PRESSURE: 76 MMHG | TEMPERATURE: 98.2 F | RESPIRATION RATE: 20 BRPM | OXYGEN SATURATION: 95 %

## 2025-07-15 DIAGNOSIS — I27.82 CHRONIC PULMONARY EMBOLISM WITHOUT ACUTE COR PULMONALE, UNSPECIFIED PULMONARY EMBOLISM TYPE (H): Primary | ICD-10-CM

## 2025-07-15 DIAGNOSIS — S63.502A WRIST SPRAIN, LEFT, INITIAL ENCOUNTER: ICD-10-CM

## 2025-07-15 DIAGNOSIS — Z79.01 LONG TERM (CURRENT) USE OF ANTICOAGULANTS: ICD-10-CM

## 2025-07-15 DIAGNOSIS — I27.82 CHRONIC PULMONARY EMBOLISM WITHOUT ACUTE COR PULMONALE, UNSPECIFIED PULMONARY EMBOLISM TYPE (H): ICD-10-CM

## 2025-07-15 DIAGNOSIS — S09.90XA CLOSED HEAD INJURY, INITIAL ENCOUNTER: ICD-10-CM

## 2025-07-15 DIAGNOSIS — Z79.01 LONG TERM CURRENT USE OF ANTICOAGULANT THERAPY: ICD-10-CM

## 2025-07-15 DIAGNOSIS — S70.02XA CONTUSION OF LEFT HIP, INITIAL ENCOUNTER: ICD-10-CM

## 2025-07-15 DIAGNOSIS — S60.012A CONTUSION OF LEFT THUMB WITHOUT DAMAGE TO NAIL, INITIAL ENCOUNTER: ICD-10-CM

## 2025-07-15 DIAGNOSIS — I26.99 OTHER PULMONARY EMBOLISM WITHOUT ACUTE COR PULMONALE, UNSPECIFIED CHRONICITY (H): ICD-10-CM

## 2025-07-15 LAB
ANION GAP SERPL CALCULATED.3IONS-SCNC: 10 MMOL/L (ref 7–15)
BASOPHILS # BLD AUTO: 0 10E3/UL (ref 0–0.2)
BASOPHILS NFR BLD AUTO: 0 %
BUN SERPL-MCNC: 20 MG/DL (ref 8–23)
CALCIUM SERPL-MCNC: 9.3 MG/DL (ref 8.8–10.4)
CHLORIDE SERPL-SCNC: 107 MMOL/L (ref 98–107)
CREAT SERPL-MCNC: 0.95 MG/DL (ref 0.51–0.95)
EGFRCR SERPLBLD CKD-EPI 2021: 59 ML/MIN/1.73M2
EOSINOPHIL # BLD AUTO: 0.1 10E3/UL (ref 0–0.7)
EOSINOPHIL NFR BLD AUTO: 1 %
ERYTHROCYTE [DISTWIDTH] IN BLOOD BY AUTOMATED COUNT: 13.8 % (ref 10–15)
GLUCOSE SERPL-MCNC: 89 MG/DL (ref 70–99)
HCO3 SERPL-SCNC: 24 MMOL/L (ref 22–29)
HCT VFR BLD AUTO: 39.7 % (ref 35–47)
HGB BLD-MCNC: 12.9 G/DL (ref 11.7–15.7)
IMM GRANULOCYTES # BLD: 0 10E3/UL
IMM GRANULOCYTES NFR BLD: 0 %
INR BLD: 2.1 (ref 0.9–1.1)
INR PPP: 1.99 (ref 0.85–1.15)
LYMPHOCYTES # BLD AUTO: 1 10E3/UL (ref 0.8–5.3)
LYMPHOCYTES NFR BLD AUTO: 14 %
MCH RBC QN AUTO: 30.3 PG (ref 26.5–33)
MCHC RBC AUTO-ENTMCNC: 32.5 G/DL (ref 31.5–36.5)
MCV RBC AUTO: 93 FL (ref 78–100)
MONOCYTES # BLD AUTO: 0.5 10E3/UL (ref 0–1.3)
MONOCYTES NFR BLD AUTO: 7 %
NEUTROPHILS # BLD AUTO: 5.3 10E3/UL (ref 1.6–8.3)
NEUTROPHILS NFR BLD AUTO: 77 %
NRBC # BLD AUTO: 0 10E3/UL
NRBC BLD AUTO-RTO: 0 /100
PLATELET # BLD AUTO: 186 10E3/UL (ref 150–450)
POTASSIUM SERPL-SCNC: 4.4 MMOL/L (ref 3.4–5.3)
PROTHROMBIN TIME: 22.4 SECONDS (ref 11.8–14.8)
RBC # BLD AUTO: 4.26 10E6/UL (ref 3.8–5.2)
SODIUM SERPL-SCNC: 141 MMOL/L (ref 135–145)
WBC # BLD AUTO: 7 10E3/UL (ref 4–11)

## 2025-07-15 PROCEDURE — 99285 EMERGENCY DEPT VISIT HI MDM: CPT | Mod: 25 | Performed by: EMERGENCY MEDICINE

## 2025-07-15 PROCEDURE — 73110 X-RAY EXAM OF WRIST: CPT | Mod: LT

## 2025-07-15 PROCEDURE — 70450 CT HEAD/BRAIN W/O DYE: CPT

## 2025-07-15 PROCEDURE — 73502 X-RAY EXAM HIP UNI 2-3 VIEWS: CPT

## 2025-07-15 PROCEDURE — 85004 AUTOMATED DIFF WBC COUNT: CPT | Performed by: EMERGENCY MEDICINE

## 2025-07-15 PROCEDURE — 36415 COLL VENOUS BLD VENIPUNCTURE: CPT | Performed by: EMERGENCY MEDICINE

## 2025-07-15 PROCEDURE — 85610 PROTHROMBIN TIME: CPT | Mod: GZ

## 2025-07-15 PROCEDURE — 36416 COLLJ CAPILLARY BLOOD SPEC: CPT

## 2025-07-15 PROCEDURE — 250N000011 HC RX IP 250 OP 636: Performed by: EMERGENCY MEDICINE

## 2025-07-15 PROCEDURE — 73130 X-RAY EXAM OF HAND: CPT | Mod: LT

## 2025-07-15 PROCEDURE — 96374 THER/PROPH/DIAG INJ IV PUSH: CPT

## 2025-07-15 PROCEDURE — 85610 PROTHROMBIN TIME: CPT | Performed by: EMERGENCY MEDICINE

## 2025-07-15 PROCEDURE — 99284 EMERGENCY DEPT VISIT MOD MDM: CPT | Performed by: EMERGENCY MEDICINE

## 2025-07-15 PROCEDURE — 80048 BASIC METABOLIC PNL TOTAL CA: CPT | Performed by: EMERGENCY MEDICINE

## 2025-07-15 RX ORDER — HYDROCODONE BITARTRATE AND ACETAMINOPHEN 5; 325 MG/1; MG/1
1 TABLET ORAL EVERY 6 HOURS PRN
Qty: 6 TABLET | Refills: 0 | Status: SHIPPED | OUTPATIENT
Start: 2025-07-15

## 2025-07-15 RX ORDER — HYDROMORPHONE HCL IN WATER/PF 6 MG/30 ML
0.2 PATIENT CONTROLLED ANALGESIA SYRINGE INTRAVENOUS ONCE
Status: COMPLETED | OUTPATIENT
Start: 2025-07-15 | End: 2025-07-15

## 2025-07-15 RX ADMIN — HYDROMORPHONE HYDROCHLORIDE 0.2 MG: 0.2 INJECTION, SOLUTION INTRAMUSCULAR; INTRAVENOUS; SUBCUTANEOUS at 22:42

## 2025-07-15 ASSESSMENT — ACTIVITIES OF DAILY LIVING (ADL)
ADLS_ACUITY_SCORE: 50
ADLS_ACUITY_SCORE: 50

## 2025-07-15 NOTE — PROGRESS NOTES
ANTICOAGULATION MANAGEMENT     Tracy Palomo 85 year old female is on warfarin with therapeutic INR result. (Goal INR 2.0-3.0)    Recent labs: (last 7 days)     07/15/25  0907   INR 2.1*       ASSESSMENT     Source(s): Chart Review and Patient/Caregiver Call     Warfarin doses taken: Warfarin taken as instructed  Diet: No new diet changes identified  Medication/supplement changes: None noted  New illness, injury, or hospitalization: No  Signs or symptoms of bleeding or clotting: No  Previous result: Supratherapeutic  Additional findings: None       PLAN     Recommended plan for no diet, medication or health factor changes affecting INR     Dosing Instructions: Continue your current warfarin dose with next INR in 4 weeks       Summary  As of 7/15/2025      Full warfarin instructions:  3.75 mg every Wed, Sat; 2.5 mg all other days   Next INR check:  8/12/2025               Telephone call with Tracy who verbalizes understanding and agrees to plan    Patient elected to schedule next visit in 4 weeks rather than the 3 as advised due to being on a family vacation     Education provided: Contact [phone] with any changes, questions or concerns.     Plan made per [facility] anticoagulation protocol    Belle Berg RN  7/15/2025  Anticoagulation Clinic  [employer] for routing messages: dewey SOARES  [facility] patient phone line: [phone]        _______________________________________________________________________     Anticoagulation Episode Summary       Current INR goal:  2.0-3.0   TTR:  65.8% (1 y)   Target end date:  Indefinite   Send INR reminders to:  GURVINDER SOARES    Indications    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]  Long term current use of anticoagulant therapy [Z79.01]  Other pulmonary embolism without acute cor pulmonale  unspecified chronicity (H) [I26.99]  Chronic pulmonary embolism without acute cor pulmonale  unspecified pulmonary embolism type (H)  [I27.82]             Comments:  --             Anticoagulation Care Providers       Provider Role Specialty Phone number    Chad Betts MD Referring Internal Medicine [phone]    Gerard Merdano MD Responsible Family Medicine [phone]

## 2025-07-16 ENCOUNTER — DOCUMENTATION ONLY (OUTPATIENT)
Dept: ANTICOAGULATION | Facility: CLINIC | Age: 85
End: 2025-07-16
Payer: MEDICARE

## 2025-07-16 NOTE — PROGRESS NOTES
ANTICOAGULATION  MANAGEMENT: Discharge Review    Tracy Palomo chart reviewed for anticoagulation continuity of care    Emergency room visit on 7/15/25 for a fall.    Discharge disposition: Home    Results:    Recent labs: (last 7 days)     07/15/25  0907 07/15/25  2134   INR 2.1* 1.99*     Anticoagulation inpatient management:     not applicable     Anticoagulation discharge instructions:     Warfarin dosing: home regimen continued   Bridging: No   INR goal change: No      Medication changes affecting anticoagulation: No    Additional factors affecting anticoagulation: No     PLAN     No adjustment to anticoagulation plan needed patient was in [facility] same day and INR therapeutic, results will differ slightly with venous draw.  No change to plan at this time.     Recommended follow up is scheduled  Patient not contacted    No adjustment to Anticoagulation Calendar was required    Belle Berg RN  7/16/2025  Anticoagulation Clinic  [facility] for routing messages: dewey SOARES  [facility] patient phone line: [phone]

## 2025-07-16 NOTE — DISCHARGE INSTRUCTIONS
Return to the emergency department if you develop new or worsening symptoms.  Use the wrist splint for comfort.  There were no significant abnormal findings on your head CT, hip x-ray, wrist x-ray and hand x-ray.  If you are still having pain in your wrist after a week please see your doctor for reevaluation.  It was a pleasure to see you tonight.  I hope you get better quickly.

## 2025-07-16 NOTE — ED PROVIDER NOTES
JOSE Molina presents to the emergency department following a fall that occurred at 7 PM this evening. She was going out to her trailer to retrieve her cell phone when she stepped down onto wet wooden decking after a day of rain. She slipped and fell, striking her head on a railing. She sustained a tender bump to her head, injury to her left thumb and wrist, and pain to her previously replaced left hip. She reports a slight headache but denies any severe headache, vomiting, or confusion since the fall. The thumb is painful to touch and the wrist hurts with bending movements. Her hip is tender at the site where she fell.    MEDICATIONS/ALLEVIATING FACTORS    No medications or alleviating factors were tried at home prior to arrival.    ROS: 10 point review of systems performed and is otherwise negative    Positive for slight headache, head tenderness, left thumb pain, left wrist pain, and hip pain. Negative for vomiting, confusion, severe headache, chest pain, and abdominal pain.    EXAM    Vital signs reviewed  -blood pressure elevated 211/96, afebrile, pulse of 64.  -GEN: NAD, appears well    -HEENT: normocephalic with exception of a tender bump on left head, EOMI, PERRLA    -Neck: full range of motion, no tenderness midline cervical spine  -CV: rrr without murmur, rubs, gallops    -Chest: CTA bilaterally without wheezes, crackles, rhonchi    -Abdomen: no distension, non tender, no guarding or rebound    -MSK: Left thumb tender to palpation, there is bruising to her thumb.  Left wrist tender with movement and palpation, hip tender over previously replaced hip area, good range of motion in hip with knee to chest and log rolling    -Neurologic: alert and oriented x 3, no confusion, pupils equal and reactive, follows commands,  strength intact bilaterally (limited by left hand pain)    -Psychiatric: normal mood, no obvious psychiatric disturbance    PERTINENT PAST MEDICAL HISTORY     History of hip replacement.  Name: Murphy Willams      : 1951      MRN: [id number]  Encounter Provider: Pravin Ortega Jr, MD  Encounter Date: 2025   [facility]  :  Assessment & Plan  Paroxysmal A-fib (HCC)  Continue cardiology follow-up.  He is rate controlled today and seems to be in sinus rhythm.  He is clinically quite stable.       Pulmonary hypertension (HCC)  He is quite stable at this time.  Continue routine follow-up       Chronic combined systolic and diastolic congestive heart failure (HCC)  Wt Readings from Last 3 Encounters:   25 91.6 kg (202 lb)   25 91.6 kg (202 lb)   25 91.4 kg (201 lb 8 oz)     Weight is up a little bit but he does not really have signs of fluid overload.  He is off furosemide altogether due to polyuria.  I did bump up his hydrochlorothiazide due to some elevated home blood pressure readings.  Check BMP in about a month and then continue with routine labs ordered through oncology.          Orders:    hydroCHLOROthiazide 25 mg tablet; Take 1 tablet (25 mg total) by mouth daily    Gastroesophageal reflux disease without esophagitis  Stable at this time.       Depression, recurrent (HCC)  Mood is stable.  He had a lot of situational depressed mood and anxiety regarding his health issues over the last several years.  He does continue to take lorazepam just as needed.         Autoimmune hemolytic anemia (HCC)  Continue close follow-up with oncology       CLL (chronic lymphocytic leukemia) (HCC)  Recent CBCs look great.  Continue oncology follow-up.       Hyponatremia  He has a history of mild hyponatremia.  I am bumping up his hydrochlorothiazide today so I would like to repeat a BMP in 1 month  Orders:    Basic metabolic panel; Future    Hyperglycemia  He is going to follow-up in about 3 months for blood pressure check.  I will check labs after that.       Hypercholesterolemia  Check lipids after his next visit       Long QT  Currently taking Coumadin.    DIAGNOSTICS     Labs Ordered and Resulted from Time of ED Arrival to Time of ED Departure   BASIC METABOLIC PANEL - Abnormal       Result Value    Sodium 141      Potassium 4.4      Chloride 107      Carbon Dioxide (CO2) 24      Anion Gap 10      Urea Nitrogen 20.0      Creatinine 0.95      GFR Estimate 59 (*)     Calcium 9.3      Glucose 89     INR - Abnormal    INR 1.99 (*)     PT 22.4 (*)    CBC WITH PLATELETS AND DIFFERENTIAL    WBC Count 7.0      RBC Count 4.26      Hemoglobin 12.9      Hematocrit 39.7      MCV 93      MCH 30.3      MCHC 32.5      RDW 13.8      Platelet Count 186      % Neutrophils 77      % Lymphocytes 14      % Monocytes 7      % Eosinophils 1      % Basophils 0      % Immature Granulocytes 0      NRBCs per 100 WBC 0      Absolute Neutrophils 5.3      Absolute Lymphocytes 1.0      Absolute Monocytes 0.5      Absolute Eosinophils 0.1      Absolute Basophils 0.0      Absolute Immature Granulocytes 0.0      Absolute NRBCs 0.0       Head CT w/o contrast   Final Result   IMPRESSION:   1.  No CT finding of a mass, hemorrhage or focal area suggestive of acute infarct.   2.  Diffuse age related changes.         XR Hip Left 2-3 Views   Final Result   IMPRESSION: Bipolar noncemented left hip arthroplasty hardware appears unchanged; no abnormal periprosthetic lucency, fracture, or other lesion identified.      XR Hand Left G/E 3 Views   Final Result   IMPRESSION: No acute fracture or dislocation. The bones are demineralized. 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      XR Wrist Left G/E 3 Views   Final Result   IMPRESSION: No acute fracture or dislocation. The bones are demineralized. 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            MDM  Mechanical fall with multiple trauma including head injury, left wrist/thumb injury, and hip contusion. physical exam findings of tenderness at injury sites, and patient on anticoagulation requiring  syndrome type 2  Continue to be very careful about med management with his history of long QT syndrome.       Essential hypertension  Home blood pressures have showed some elevated readings.  Increase hydrochlorothiazide to 25 mg daily.  Orders:    hydroCHLOROthiazide 25 mg tablet; Take 1 tablet (25 mg total) by mouth daily    Basic metabolic panel; Future           History of Present Illness   HPI patient presents today for follow-up of chronic health issues.  Overall, he feels he is doing pretty well.  He notes his weight has bumped up a little bit but he has been eating more through the holidays etc.  He denies any fluid buildup in his legs.  He denies any chest pain, shortness of breath or palpitations.  He is had no orthopnea.  He is off furosemide and his polyuria has improved significantly.  He is tolerating hydrochlorothiazide but has noted some elevated blood pressure readings intermittently.  Mood is stable.  He does try to minimize lorazepam intake but takes it often to help him sleep.    Review of Systems   Constitutional:  Negative for appetite change, chills, fatigue, fever and unexpected weight change.   HENT:  Negative for trouble swallowing.    Eyes:  Negative for visual disturbance.   Respiratory:  Negative for cough, chest tightness, shortness of breath and wheezing.    Cardiovascular:  Negative for chest pain, palpitations and leg swelling.   Gastrointestinal:  Negative for abdominal distention, abdominal pain, blood in stool, constipation and diarrhea.   Endocrine: Negative for polyuria.   Genitourinary:  Negative for difficulty urinating and flank pain.   Musculoskeletal:  Negative for arthralgias and myalgias.   Skin:  Negative for rash.   Neurological:  Negative for dizziness and light-headedness.   Hematological:  Negative for adenopathy. Does not bruise/bleed easily.   Psychiatric/Behavioral:  Negative for dysphoric mood and sleep disturbance. The patient is not nervous/anxious.   "      Objective   /80 (BP Location: Left arm, Patient Position: Sitting, Cuff Size: Standard)   Pulse 92   Resp 12   Ht 5' 7.99\" (1.727 m)   Wt 91.6 kg (202 lb)   SpO2 98%   BMI 30.72 kg/m²      Physical Exam  Constitutional:       General: He is not in acute distress.     Appearance: Normal appearance. He is well-developed. He is not diaphoretic.   HENT:      Head: Normocephalic.      Right Ear: External ear normal.      Left Ear: External ear normal.      Nose: Nose normal.   Eyes:      General:         Right eye: No discharge.         Left eye: No discharge.      Conjunctiva/sclera: Conjunctivae normal.      Pupils: Pupils are equal, round, and reactive to light.   Neck:      Thyroid: No thyromegaly.      Trachea: No tracheal deviation.   Cardiovascular:      Rate and Rhythm: Normal rate and regular rhythm.      Heart sounds: Normal heart sounds. No murmur heard.     No friction rub.   Pulmonary:      Effort: Pulmonary effort is normal. No respiratory distress.      Breath sounds: Normal breath sounds. No wheezing.   Chest:      Chest wall: No tenderness.   Abdominal:      General: There is no distension.      Palpations: There is no mass.      Tenderness: There is no abdominal tenderness. There is no guarding or rebound.      Hernia: No hernia is present.   Musculoskeletal:         General: No swelling or deformity.      Cervical back: Normal range of motion. No rigidity.      Right lower leg: No edema.      Left lower leg: No edema.   Lymphadenopathy:      Cervical: No cervical adenopathy.   Skin:     Findings: No erythema or rash.   Neurological:      General: No focal deficit present.      Mental Status: He is alert.      Cranial Nerves: No cranial nerve deficit.      Coordination: Coordination normal.   Psychiatric:         Thought Content: Thought content normal.       " evaluation for bleeding risk. Differential diagnosis based on symptoms includes head injury with possible intracranial bleeding (given anticoagulation), wrist/thumb fracture or sprain, and hip fracture or contusion.    A/P     1. Head injury with scalp hematoma -    CT neg. Patient improved with 0.2 mg of dilaudid iv    2. Left wrist/thumb injury - neg xray but ttp over snuff box.  Wrist splint applied.  Rec follow up in one week if no improvement.       3. Hip contusion - tenderness over previously replaced hip.       Xray neg      Discharge home, patient be given a small prescription for Norco.  I discussed the risk and benefits of this and she would like to try it.  She is here with her daughter who will also be monitoring her.  Return to ER precautions and follow-up precautions discussed.  All questions answered prior to discharge.    DISPOSITION    Discharged home, follow-up in the clinic in 1 week to check her wrist.  Return to ER precautions discussed.     Rohan Glynn MD  07/15/25 [id number]

## 2025-07-16 NOTE — ED TRIAGE NOTES
Triage Assessment (Adult)       Row Name 07/15/25 2059          Triage Assessment    Airway WDL WDL        Respiratory WDL    Respiratory WDL WDL                   Fell at approximately 1900. Hit head and left wrist.  Some bruising noted to left thumb.  Some left hip pain.  Small bump on head.  Patient is on blood thinners

## 2025-07-21 ENCOUNTER — MYC MEDICAL ADVICE (OUTPATIENT)
Dept: INTERNAL MEDICINE | Facility: CLINIC | Age: 85
End: 2025-07-21
Payer: MEDICARE

## 2025-07-21 DIAGNOSIS — Z79.01 LONG TERM CURRENT USE OF ANTICOAGULANT THERAPY: Primary | ICD-10-CM

## 2025-07-23 ENCOUNTER — ANTICOAGULATION THERAPY VISIT (OUTPATIENT)
Dept: ANTICOAGULATION | Facility: CLINIC | Age: 85
End: 2025-07-23

## 2025-07-23 ENCOUNTER — LAB (OUTPATIENT)
Dept: LAB | Facility: CLINIC | Age: 85
End: 2025-07-23
Payer: MEDICARE

## 2025-07-23 DIAGNOSIS — Z79.01 LONG TERM CURRENT USE OF ANTICOAGULANT THERAPY: ICD-10-CM

## 2025-07-23 DIAGNOSIS — I26.99 OTHER PULMONARY EMBOLISM WITHOUT ACUTE COR PULMONALE, UNSPECIFIED CHRONICITY (H): ICD-10-CM

## 2025-07-23 DIAGNOSIS — I27.82 CHRONIC PULMONARY EMBOLISM WITHOUT ACUTE COR PULMONALE, UNSPECIFIED PULMONARY EMBOLISM TYPE (H): Primary | ICD-10-CM

## 2025-07-23 DIAGNOSIS — I27.82 CHRONIC PULMONARY EMBOLISM WITHOUT ACUTE COR PULMONALE, UNSPECIFIED PULMONARY EMBOLISM TYPE (H): ICD-10-CM

## 2025-07-23 LAB
ERYTHROCYTE [DISTWIDTH] IN BLOOD BY AUTOMATED COUNT: 13.5 % (ref 10–15)
HCT VFR BLD AUTO: 40.3 % (ref 35–47)
HGB BLD-MCNC: 13 G/DL (ref 11.7–15.7)
INR PPP: 2.49 (ref 0.85–1.15)
MCH RBC QN AUTO: 29.9 PG (ref 26.5–33)
MCHC RBC AUTO-ENTMCNC: 32.3 G/DL (ref 31.5–36.5)
MCV RBC AUTO: 93 FL (ref 78–100)
PLATELET # BLD AUTO: 240 10E3/UL (ref 150–450)
PROTHROMBIN TIME: 26.2 SECONDS (ref 11.8–14.8)
RBC # BLD AUTO: 4.35 10E6/UL (ref 3.8–5.2)
WBC # BLD AUTO: 6.7 10E3/UL (ref 4–11)

## 2025-07-23 PROCEDURE — 85610 PROTHROMBIN TIME: CPT

## 2025-07-23 PROCEDURE — 85027 COMPLETE CBC AUTOMATED: CPT

## 2025-07-23 PROCEDURE — 36415 COLL VENOUS BLD VENIPUNCTURE: CPT

## 2025-07-23 NOTE — PROGRESS NOTES
ANTICOAGULATION MANAGEMENT     Tracy Palomo 85 year old female is on warfarin with therapeutic INR result. (Goal INR 2.0-3.0)    Recent labs: (last 7 days)     07/23/25  1017   INR 2.49*       ASSESSMENT     Source(s): Chart Review and Patient/Caregiver Call     Warfarin doses taken: Warfarin taken as instructed  Diet: No new diet changes identified  Medication/supplement changes: None noted  New illness, injury, or hospitalization: No  Signs or symptoms of bleeding or clotting: Yes:  extensive bruising on her leg and butt following her fall last week. It is not worse, slowly improving and turning colors. She has contacted PCP regarding the bruising, sooner INR and hgb was recommended today.  Previous result: Therapeutic last visit; previously outside of goal range  Additional findings: None       PLAN     Recommended plan for temporary change(s) affecting INR     Dosing Instructions: Continue your current warfarin dose with next INR in 3 weeks (on date previously scheduled)    Summary  As of 7/23/2025      Full warfarin instructions:  3.75 mg every Wed, Sat; 2.5 mg all other days   Next INR check:  8/12/2025               Telephone call with Tracy who verbalizes understanding and agrees to plan    Lab visit scheduled    Education provided: Goal range and lab monitoring: goal range and significance of current result  Symptom monitoring: when to seek medical attention/emergency care  Contact [phone] with any changes, questions or concerns.     Plan made per [facility] anticoagulation protocol    Yuli Vaca RN  7/23/2025  Anticoagulation Clinic  [employer] for routing messages: dewey SOARES  [facility] patient phone line: [phone]        _______________________________________________________________________     Anticoagulation Episode Summary       Current INR goal:  2.0-3.0   TTR:  68.0% (1 y)   Target end date:  Indefinite   Send INR reminders to:  GURVINDER SOARES    Indications    History of  Pulmonary emboli with probable pulmonary infarction [I26.99]  Long term current use of anticoagulant therapy [Z79.01]  Other pulmonary embolism without acute cor pulmonale  unspecified chronicity (H) [I26.99]  Chronic pulmonary embolism without acute cor pulmonale  unspecified pulmonary embolism type (H) [I27.82]             Comments:  --             Anticoagulation Care Providers       Provider Role Specialty Phone number    Chad Betts MD Referring Internal Medicine [phone]    Gerard Medrano MD Responsible Family Medicine [phone]

## 2025-07-28 ENCOUNTER — NURSE TRIAGE (OUTPATIENT)
Dept: INTERNAL MEDICINE | Facility: CLINIC | Age: 85
End: 2025-07-28
Payer: MEDICARE

## 2025-07-28 NOTE — TELEPHONE ENCOUNTER
Consent to communicate is on file to speak with daughter    Nurse Triage SBAR    Is this a 2nd Level Triage? NO    Situation: the sore on her bottom is still very hard, and hot. It is pink and red toned. Not all the bruise is all purple.    Background: patient was in the ER 7/15/25.    Assessment: the bruise is going down her bottom more. Daughter states the red area looks like a bruise not like an open sore. No increase in pain. Not light headed unless she does too much. She is getting more out of breath, but about the same as last week.    She also states she is getting another flair of shingles on the back of her head close to the top of her skull that started 2 days ago.      Recommendation: daughter would like to know if this could be normal bruise progression? If pcp would like another picture sent?     Also if she can get a prescription for her shingles outbreak?    Routed to provider  Maria Del Rosario Wright RN on 7/28/2025 at 12:55 PM      Additional Information   Negative: Shock suspected (e.g., cold/pale/clammy skin, too weak to stand, low BP, rapid pulse)   Negative: Fever and purple or blood-colored spots or dots   Negative: Sounds like a life-threatening emergency to the triager   Negative: Bruise(s) of forehead or head from an injury   Negative: Bruise(s) of face or jaw from an injury   Negative: Patient has a concerning injury (e.g., chest, neck, leg)   Negative: Falls or falling is main concern   Negative: Post-op bruising   Negative: Feeling weak or lightheaded (e.g., woozy, feeling like they might faint)   Negative: Bruise on head, face, chest, or abdomen and taking Coumadin (warfarin) or other strong blood thinner, or known bleeding disorder (e.g., thrombocytopenia)   Negative: Unexplained bleeding from another site (e.g., gums, nose, urine)   Negative: Patient sounds very sick or weak to the triager   Negative: SEVERE pain and not improved 2 hours after pain medicine/ice packs   Negative: Purple or  blood-colored spots or dots that are not from injury or friction (no fever and sounds well to triager)   Negative: 5 or more bruises now, NOT caused by an injury   Negative: Raised bruise and size > 2 inches (5 cm) and getting bigger    Protocols used: Bruises-A-[place]

## 2025-07-28 NOTE — TELEPHONE ENCOUNTER
Please see if they can send a picture of the shingles rash with an e-visit or [employer] message and a picture of the bruising.

## 2025-07-29 NOTE — TELEPHONE ENCOUNTER
Updated photo of bruise sent by [employer].  Maria Del Rosario Wright RN on 7/29/2025 at 8:57 AM

## 2025-07-29 NOTE — TELEPHONE ENCOUNTER
RN talked to daughter. She has picture of bruise she will send right now. She will attempt to send a photo of the rash a later when out of work meetings.     Lebron Coreas RN on 7/29/2025 at 8:48 AM

## 2025-08-12 ENCOUNTER — ANTICOAGULATION THERAPY VISIT (OUTPATIENT)
Dept: ANTICOAGULATION | Facility: CLINIC | Age: 85
End: 2025-08-12

## 2025-08-12 ENCOUNTER — RESULTS FOLLOW-UP (OUTPATIENT)
Dept: ANTICOAGULATION | Facility: CLINIC | Age: 85
End: 2025-08-12

## 2025-08-12 ENCOUNTER — LAB (OUTPATIENT)
Dept: LAB | Facility: CLINIC | Age: 85
End: 2025-08-12
Payer: MEDICARE

## 2025-08-12 DIAGNOSIS — I27.82 CHRONIC PULMONARY EMBOLISM WITHOUT ACUTE COR PULMONALE, UNSPECIFIED PULMONARY EMBOLISM TYPE (H): ICD-10-CM

## 2025-08-12 DIAGNOSIS — Z79.01 LONG TERM CURRENT USE OF ANTICOAGULANT THERAPY: ICD-10-CM

## 2025-08-12 DIAGNOSIS — I27.82 CHRONIC PULMONARY EMBOLISM WITHOUT ACUTE COR PULMONALE, UNSPECIFIED PULMONARY EMBOLISM TYPE (H): Primary | ICD-10-CM

## 2025-08-12 DIAGNOSIS — I26.99 OTHER PULMONARY EMBOLISM WITHOUT ACUTE COR PULMONALE, UNSPECIFIED CHRONICITY (H): ICD-10-CM

## 2025-08-12 DIAGNOSIS — Z79.01 LONG TERM (CURRENT) USE OF ANTICOAGULANTS: ICD-10-CM

## 2025-08-12 LAB — INR BLD: 3 (ref 0.9–1.1)

## 2025-08-12 PROCEDURE — 85610 PROTHROMBIN TIME: CPT

## 2025-08-12 PROCEDURE — 36416 COLLJ CAPILLARY BLOOD SPEC: CPT

## 2025-08-16 DIAGNOSIS — I27.82 CHRONIC PULMONARY EMBOLISM WITHOUT ACUTE COR PULMONALE, UNSPECIFIED PULMONARY EMBOLISM TYPE (H): ICD-10-CM

## 2025-08-16 DIAGNOSIS — I26.99 OTHER PULMONARY EMBOLISM WITHOUT ACUTE COR PULMONALE, UNSPECIFIED CHRONICITY (H): ICD-10-CM

## 2025-08-16 DIAGNOSIS — Z79.01 LONG TERM CURRENT USE OF ANTICOAGULANT THERAPY: ICD-10-CM

## 2025-08-18 RX ORDER — WARFARIN SODIUM 2.5 MG/1
TABLET ORAL
Qty: 96 TABLET | Refills: 1 | Status: SHIPPED | OUTPATIENT
Start: 2025-08-18

## (undated) DEVICE — HOOD FLYTE 0408-800-000

## (undated) DEVICE — SOL NACL 0.9% IRRIG 1000ML BOTTLE 07138-09

## (undated) DEVICE — SU VICRYL 0 OS-6 18" UND J754T

## (undated) DEVICE — GLOVE ESTEEM BLUE W/NEU-THERA 7.5  2D73PB75

## (undated) DEVICE — PACK TOTAL JOINT STD LATEX

## (undated) DEVICE — KIT ATTUNE EPAK PIN SYSTEM 2544-00-111

## (undated) DEVICE — SU ETHIBOND 2 V-37 4X30" MX69G

## (undated) DEVICE — GLOVE PROTEXIS BLUE W/NEU-THERA 8.0  2D73EB80

## (undated) DEVICE — CAST PADDING 6" COTTON WEBRIL UNSTERILE 9086

## (undated) DEVICE — DRSG XEROFORM 1X8"

## (undated) DEVICE — NDL COUNTER 20CT 31142493

## (undated) DEVICE — DEFIB PRO-PADZ LVP LQD GEL ADULT 8900-2105-01

## (undated) DEVICE — SHEATH HEMO FAST CATH RAMP 406965

## (undated) DEVICE — BLADE SAW SAGITTAL STRK 18X90X1.19MM HD SYS 6 6118-119-090

## (undated) DEVICE — PACK EP SRG PROC LF DISP SAN32EPFSR

## (undated) DEVICE — DRAPE CONVERTORS U-DRAPE 60X72" 8476

## (undated) DEVICE — DRAPE EXTREMITY W/ARMBOARD 29405

## (undated) DEVICE — CATH TRAY FOLEY 16FR DRAINAGE BAG STATLOCK 899916

## (undated) DEVICE — CAST PADDING 4" COTTON WEBRIL STERILE 9084S

## (undated) DEVICE — NDL SPINAL 18GA 3.5" 405184

## (undated) DEVICE — NDL COUNTER 40CT  31142311

## (undated) DEVICE — CATH SECURE 5445-3

## (undated) DEVICE — STOCKING SLEEVE COMPRESSION CALF MED

## (undated) DEVICE — ESU ELEC BLADE 6" COATED E1450-6

## (undated) DEVICE — DRAPE U SPLIT 74X120" 29440

## (undated) DEVICE — NDL COUNTER 40CT

## (undated) DEVICE — BNDG ESMARK 6" STERILE

## (undated) DEVICE — DRAPE POUCH INSTRUMENT 1018

## (undated) DEVICE — BNDG COBAN 6"X5YDS STERILE

## (undated) DEVICE — BONE CLEANING TIP INTERPULSE  0210-010-000

## (undated) DEVICE — INTRO CATH 12CM 8.5FR FST-CATH

## (undated) DEVICE — PEN MARKING SKIN

## (undated) DEVICE — SUCTION IRR SYSTEM W/O TIP INTERPULSE HANDPIECE 0210-100-000

## (undated) DEVICE — CATH SOUNDSTAR 8FRX90CM 10439011

## (undated) DEVICE — BONE CEMENT MIXING BOWL W/SPATULA

## (undated) DEVICE — SYR 50ML LL W/O NDL 309653

## (undated) DEVICE — GLOVE ESTEEM BLUE W/NEU-THERA 8.0  2D73PB80

## (undated) DEVICE — TOURNIQUET HEMACLEAR 60 BLUE 30-60CM PRH-060-BL-01A

## (undated) DEVICE — GLOVE PROTEGRITY 7.5 LATEX

## (undated) DEVICE — GLOVE PROTEXIS W/NEU-THERA 7.5  2D73TE75

## (undated) DEVICE — STPL SKIN 35W APPOSE 8886803712

## (undated) DEVICE — BLADE SAW OSCILLATING LINVATEC 5071-547

## (undated) DEVICE — BNDG ELASTIC 6" DBL LENGTH UNSTERILE 6611-16

## (undated) DEVICE — DRAPE SHEET REV FOLD 3/4 9349

## (undated) DEVICE — SYR 10ML PREFILLED 0.9% NACL INJ NOT STERILE 306500

## (undated) DEVICE — PREP CHLORAPREP 26ML TINTED ORANGE  260815

## (undated) DEVICE — SU VICRYL 2-0 CP-2 18" UND J762D

## (undated) DEVICE — ESU GROUND PAD UNIVERSAL W/O CORD

## (undated) DEVICE — BLADE SAW OSCIL/SAG STRK 11X90X1.19MM 4/2000 4111-119-090

## (undated) DEVICE — CATH TRAY FOLEY 16FR SIL

## (undated) DEVICE — BLADE SAW SAGITTAL 25.5X9.5X.4MM FINE LINVATEC 5023-138

## (undated) DEVICE — ADH SKIN CLOSURE PREMIERPRO EXOFIN 1.0ML 3470

## (undated) DEVICE — SUCTION TIP YANKAUER ORTHO SUPER SUCKER EFS-111

## (undated) DEVICE — ESU PENCIL SMOKE EVAC W/ROCKER SWITCH 0703-047-000

## (undated) DEVICE — SOL NACL 0.9% IRRIG 3000ML BAG 07972-08

## (undated) DEVICE — TOURNIQUET CUFF 34"

## (undated) DEVICE — TAPE MICROFOAM 4" 1528-4

## (undated) DEVICE — SOL WATER IRRIG 1000ML BOTTLE 07139-09

## (undated) DEVICE — CATH EP 7FR X 115CM DECANAV CA

## (undated) DEVICE — GLOVE PROTEXIS W/NEU-THERA 7.0  2D73TE70

## (undated) DEVICE — STPL SKIN 35W 059037

## (undated) DEVICE — CATH 8MM NAVISTAR J-J CRV 115

## (undated) DEVICE — INTRO SHEATH 7FRX10CM PINNACLE RSS702

## (undated) DEVICE — PACK HAND WRIST FOREARM CUSTOM

## (undated) DEVICE — TOURNIQUET SGL BLADDER 18"X4" RED 5921-218-135

## (undated) DEVICE — CAST PADDING 6" WEBRIL STERILE

## (undated) DEVICE — SU ETHILON 4-0 PS-2 18" 1667G

## (undated) DEVICE — NDL 19GA 1.5" FILTER 305200

## (undated) DEVICE — CAST PADDING 6" UNSTERILE 9046

## (undated) DEVICE — GLOVE PROTEXIS BLUE W/NEU-THERA 7.5  2D73EB75

## (undated) DEVICE — BASIN SET MINOR DISP

## (undated) RX ORDER — PROPOFOL 10 MG/ML
INJECTION, EMULSION INTRAVENOUS
Status: DISPENSED
Start: 2025-06-05

## (undated) RX ORDER — FENTANYL CITRATE 50 UG/ML
INJECTION, SOLUTION INTRAMUSCULAR; INTRAVENOUS
Status: DISPENSED
Start: 2017-12-11

## (undated) RX ORDER — ACETAMINOPHEN 10 MG/ML
INJECTION, SOLUTION INTRAVENOUS
Status: DISPENSED
Start: 2017-03-16

## (undated) RX ORDER — LIDOCAINE HYDROCHLORIDE 10 MG/ML
INJECTION, SOLUTION EPIDURAL; INFILTRATION; INTRACAUDAL; PERINEURAL
Status: DISPENSED
Start: 2025-06-05

## (undated) RX ORDER — FENTANYL CITRATE 50 UG/ML
INJECTION, SOLUTION INTRAMUSCULAR; INTRAVENOUS
Status: DISPENSED
Start: 2017-03-16

## (undated) RX ORDER — PHENYLEPHRINE HCL IN 0.9% NACL 1 MG/10 ML
SYRINGE (ML) INTRAVENOUS
Status: DISPENSED
Start: 2019-01-08

## (undated) RX ORDER — DEXAMETHASONE SODIUM PHOSPHATE 10 MG/ML
INJECTION INTRAMUSCULAR; INTRAVENOUS
Status: DISPENSED
Start: 2017-03-16

## (undated) RX ORDER — BUPIVACAINE HYDROCHLORIDE 5 MG/ML
INJECTION, SOLUTION EPIDURAL; INTRACAUDAL
Status: DISPENSED
Start: 2017-03-16

## (undated) RX ORDER — EPHEDRINE SULFATE 50 MG/ML
INJECTION, SOLUTION INTRAMUSCULAR; INTRAVENOUS; SUBCUTANEOUS
Status: DISPENSED
Start: 2017-03-16

## (undated) RX ORDER — PROPOFOL 10 MG/ML
INJECTION, EMULSION INTRAVENOUS
Status: DISPENSED
Start: 2021-02-16

## (undated) RX ORDER — FENTANYL CITRATE 50 UG/ML
INJECTION, SOLUTION INTRAMUSCULAR; INTRAVENOUS
Status: DISPENSED
Start: 2019-01-08

## (undated) RX ORDER — LIDOCAINE HYDROCHLORIDE 20 MG/ML
INJECTION, SOLUTION EPIDURAL; INFILTRATION; INTRACAUDAL; PERINEURAL
Status: DISPENSED
Start: 2021-02-16

## (undated) RX ORDER — GLYCOPYRROLATE 0.2 MG/ML
INJECTION INTRAMUSCULAR; INTRAVENOUS
Status: DISPENSED
Start: 2017-03-16

## (undated) RX ORDER — ONDANSETRON 2 MG/ML
INJECTION INTRAMUSCULAR; INTRAVENOUS
Status: DISPENSED
Start: 2017-03-16

## (undated) RX ORDER — MORPHINE SULFATE 0.5 MG/ML
INJECTION, SOLUTION EPIDURAL; INTRATHECAL; INTRAVENOUS
Status: DISPENSED
Start: 2017-03-16

## (undated) RX ORDER — DEXAMETHASONE SODIUM PHOSPHATE 10 MG/ML
INJECTION INTRAMUSCULAR; INTRAVENOUS
Status: DISPENSED
Start: 2019-01-08

## (undated) RX ORDER — PROPOFOL 10 MG/ML
INJECTION, EMULSION INTRAVENOUS
Status: DISPENSED
Start: 2017-03-16

## (undated) RX ORDER — BACITRACIN 50000 [IU]/1
INJECTION, POWDER, FOR SOLUTION INTRAMUSCULAR
Status: DISPENSED
Start: 2017-03-16

## (undated) RX ORDER — LIDOCAINE HYDROCHLORIDE 20 MG/ML
INJECTION, SOLUTION EPIDURAL; INFILTRATION; INTRACAUDAL; PERINEURAL
Status: DISPENSED
Start: 2017-03-16

## (undated) RX ORDER — NEOSTIGMINE METHYLSULFATE 1 MG/ML
VIAL (ML) INJECTION
Status: DISPENSED
Start: 2017-03-16

## (undated) RX ORDER — ETOMIDATE 2 MG/ML
INJECTION INTRAVENOUS
Status: DISPENSED
Start: 2017-03-16

## (undated) RX ORDER — FENTANYL CITRATE 50 UG/ML
INJECTION, SOLUTION INTRAMUSCULAR; INTRAVENOUS
Status: DISPENSED
Start: 2021-02-16

## (undated) RX ORDER — FENTANYL CITRATE 50 UG/ML
INJECTION, SOLUTION INTRAMUSCULAR; INTRAVENOUS
Status: DISPENSED
Start: 2024-10-14

## (undated) RX ORDER — GLYCOPYRROLATE 0.2 MG/ML
INJECTION, SOLUTION INTRAMUSCULAR; INTRAVENOUS
Status: DISPENSED
Start: 2025-06-05

## (undated) RX ORDER — PROPOFOL 10 MG/ML
INJECTION, EMULSION INTRAVENOUS
Status: DISPENSED
Start: 2019-01-08

## (undated) RX ORDER — KETOROLAC TROMETHAMINE 30 MG/ML
INJECTION, SOLUTION INTRAMUSCULAR; INTRAVENOUS
Status: DISPENSED
Start: 2017-03-16

## (undated) RX ORDER — LIDOCAINE HYDROCHLORIDE 20 MG/ML
INJECTION, SOLUTION EPIDURAL; INFILTRATION; INTRACAUDAL; PERINEURAL
Status: DISPENSED
Start: 2019-01-08

## (undated) RX ORDER — ONDANSETRON 2 MG/ML
INJECTION INTRAMUSCULAR; INTRAVENOUS
Status: DISPENSED
Start: 2019-01-08